# Patient Record
Sex: MALE | Race: WHITE | NOT HISPANIC OR LATINO | Employment: OTHER | URBAN - METROPOLITAN AREA
[De-identification: names, ages, dates, MRNs, and addresses within clinical notes are randomized per-mention and may not be internally consistent; named-entity substitution may affect disease eponyms.]

---

## 2017-01-01 ENCOUNTER — APPOINTMENT (OUTPATIENT)
Dept: LAB | Facility: CLINIC | Age: 72
End: 2017-01-01
Attending: INTERNAL MEDICINE
Payer: MEDICARE

## 2017-01-01 ENCOUNTER — ONCOLOGY VISIT (OUTPATIENT)
Dept: ONCOLOGY | Facility: CLINIC | Age: 72
End: 2017-01-01
Attending: INTERNAL MEDICINE
Payer: MEDICARE

## 2017-01-01 ENCOUNTER — TELEPHONE (OUTPATIENT)
Dept: ONCOLOGY | Facility: CLINIC | Age: 72
End: 2017-01-01

## 2017-01-01 ENCOUNTER — APPOINTMENT (OUTPATIENT)
Dept: LAB | Facility: CLINIC | Age: 72
End: 2017-01-01
Attending: PHYSICIAN ASSISTANT
Payer: MEDICARE

## 2017-01-01 ENCOUNTER — ONCOLOGY VISIT (OUTPATIENT)
Dept: ONCOLOGY | Facility: CLINIC | Age: 72
End: 2017-01-01
Attending: PHYSICIAN ASSISTANT
Payer: MEDICARE

## 2017-01-01 ENCOUNTER — ANESTHESIA EVENT (OUTPATIENT)
Dept: SURGERY | Facility: CLINIC | Age: 72
End: 2017-01-01
Payer: MEDICARE

## 2017-01-01 ENCOUNTER — TELEPHONE (OUTPATIENT)
Dept: TRANSPLANT | Facility: CLINIC | Age: 72
End: 2017-01-01

## 2017-01-01 ENCOUNTER — SURGERY (OUTPATIENT)
Age: 72
End: 2017-01-01

## 2017-01-01 ENCOUNTER — OFFICE VISIT (OUTPATIENT)
Dept: TRANSPLANT | Facility: CLINIC | Age: 72
End: 2017-01-01
Attending: INTERNAL MEDICINE
Payer: MEDICARE

## 2017-01-01 ENCOUNTER — HOSPITAL ENCOUNTER (OUTPATIENT)
Facility: CLINIC | Age: 72
Setting detail: SPECIMEN
Discharge: HOME | End: 2017-08-16
Attending: INTERNAL MEDICINE | Admitting: INTERNAL MEDICINE
Payer: MEDICARE

## 2017-01-01 ENCOUNTER — CARE COORDINATION (OUTPATIENT)
Dept: ONCOLOGY | Facility: CLINIC | Age: 72
End: 2017-01-01

## 2017-01-01 ENCOUNTER — HOSPITAL ENCOUNTER (OUTPATIENT)
Facility: CLINIC | Age: 72
Discharge: HOME | End: 2017-07-27
Attending: SPECIALIST | Admitting: SPECIALIST
Payer: MEDICARE

## 2017-01-01 ENCOUNTER — ANESTHESIA (OUTPATIENT)
Dept: SURGERY | Facility: CLINIC | Age: 72
End: 2017-01-01
Payer: MEDICARE

## 2017-01-01 ENCOUNTER — TRANSFERRED RECORDS (OUTPATIENT)
Dept: HEALTH INFORMATION MANAGEMENT | Facility: CLINIC | Age: 72
End: 2017-01-01

## 2017-01-01 ENCOUNTER — HOSPITAL ENCOUNTER (OUTPATIENT)
Facility: CLINIC | Age: 72
Discharge: HOME | End: 2017-09-21
Attending: SPECIALIST | Admitting: SPECIALIST
Payer: MEDICARE

## 2017-01-01 ENCOUNTER — DOCUMENTATION ONLY (OUTPATIENT)
Dept: ONCOLOGY | Facility: CLINIC | Age: 72
End: 2017-01-01

## 2017-01-01 ENCOUNTER — RADIANT APPOINTMENT (OUTPATIENT)
Dept: GENERAL RADIOLOGY | Facility: CLINIC | Age: 72
End: 2017-01-01
Attending: INTERNAL MEDICINE
Payer: COMMERCIAL

## 2017-01-01 ENCOUNTER — APPOINTMENT (OUTPATIENT)
Dept: GENERAL RADIOLOGY | Facility: CLINIC | Age: 72
End: 2017-01-01
Attending: EMERGENCY MEDICINE
Payer: MEDICARE

## 2017-01-01 ENCOUNTER — INFUSION THERAPY VISIT (OUTPATIENT)
Dept: INFUSION THERAPY | Facility: CLINIC | Age: 72
End: 2017-01-01
Attending: FAMILY MEDICINE
Payer: MEDICARE

## 2017-01-01 ENCOUNTER — HOSPITAL ENCOUNTER (EMERGENCY)
Facility: CLINIC | Age: 72
Discharge: HOME | End: 2017-10-06
Attending: EMERGENCY MEDICINE | Admitting: EMERGENCY MEDICINE
Payer: MEDICARE

## 2017-01-01 ENCOUNTER — ALLIED HEALTH/NURSE VISIT (OUTPATIENT)
Dept: PHARMACY | Facility: CLINIC | Age: 72
End: 2017-01-01
Payer: COMMERCIAL

## 2017-01-01 VITALS
DIASTOLIC BLOOD PRESSURE: 85 MMHG | BODY MASS INDEX: 20.43 KG/M2 | RESPIRATION RATE: 16 BRPM | OXYGEN SATURATION: 98 % | HEART RATE: 84 BPM | TEMPERATURE: 97.4 F | SYSTOLIC BLOOD PRESSURE: 140 MMHG | WEIGHT: 119.1 LBS

## 2017-01-01 VITALS
HEIGHT: 64 IN | TEMPERATURE: 98.2 F | WEIGHT: 119.9 LBS | BODY MASS INDEX: 20.58 KG/M2 | HEART RATE: 80 BPM | HEART RATE: 95 BPM | RESPIRATION RATE: 16 BRPM | SYSTOLIC BLOOD PRESSURE: 110 MMHG | WEIGHT: 116.8 LBS | OXYGEN SATURATION: 98 % | RESPIRATION RATE: 16 BRPM | TEMPERATURE: 98.4 F | DIASTOLIC BLOOD PRESSURE: 67 MMHG | DIASTOLIC BLOOD PRESSURE: 74 MMHG | SYSTOLIC BLOOD PRESSURE: 110 MMHG | BODY MASS INDEX: 19.94 KG/M2 | OXYGEN SATURATION: 96 %

## 2017-01-01 VITALS
SYSTOLIC BLOOD PRESSURE: 107 MMHG | HEART RATE: 89 BPM | RESPIRATION RATE: 16 BRPM | OXYGEN SATURATION: 95 % | TEMPERATURE: 97.9 F | BODY MASS INDEX: 22.11 KG/M2 | DIASTOLIC BLOOD PRESSURE: 70 MMHG | WEIGHT: 123.8 LBS

## 2017-01-01 VITALS
SYSTOLIC BLOOD PRESSURE: 160 MMHG | OXYGEN SATURATION: 98 % | HEART RATE: 78 BPM | RESPIRATION RATE: 16 BRPM | TEMPERATURE: 97.8 F | DIASTOLIC BLOOD PRESSURE: 90 MMHG

## 2017-01-01 VITALS
BODY MASS INDEX: 19.49 KG/M2 | HEART RATE: 95 BPM | SYSTOLIC BLOOD PRESSURE: 130 MMHG | WEIGHT: 113.6 LBS | OXYGEN SATURATION: 97 % | RESPIRATION RATE: 16 BRPM | DIASTOLIC BLOOD PRESSURE: 81 MMHG | TEMPERATURE: 98 F

## 2017-01-01 VITALS
DIASTOLIC BLOOD PRESSURE: 76 MMHG | WEIGHT: 120 LBS | RESPIRATION RATE: 16 BRPM | TEMPERATURE: 97.4 F | BODY MASS INDEX: 20.49 KG/M2 | HEART RATE: 76 BPM | HEIGHT: 64 IN | OXYGEN SATURATION: 96 % | SYSTOLIC BLOOD PRESSURE: 124 MMHG

## 2017-01-01 VITALS
DIASTOLIC BLOOD PRESSURE: 80 MMHG | TEMPERATURE: 98.2 F | RESPIRATION RATE: 18 BRPM | OXYGEN SATURATION: 98 % | HEART RATE: 65 BPM | SYSTOLIC BLOOD PRESSURE: 134 MMHG

## 2017-01-01 VITALS
HEART RATE: 97 BPM | DIASTOLIC BLOOD PRESSURE: 80 MMHG | SYSTOLIC BLOOD PRESSURE: 124 MMHG | HEIGHT: 64 IN | RESPIRATION RATE: 16 BRPM | BODY MASS INDEX: 20.14 KG/M2 | TEMPERATURE: 97.6 F | OXYGEN SATURATION: 98 % | WEIGHT: 118 LBS

## 2017-01-01 VITALS
WEIGHT: 124 LBS | HEART RATE: 110 BPM | SYSTOLIC BLOOD PRESSURE: 103 MMHG | OXYGEN SATURATION: 97 % | DIASTOLIC BLOOD PRESSURE: 67 MMHG | TEMPERATURE: 98.1 F | BODY MASS INDEX: 23.41 KG/M2 | HEIGHT: 61 IN | RESPIRATION RATE: 18 BRPM

## 2017-01-01 VITALS
OXYGEN SATURATION: 98 % | TEMPERATURE: 98.6 F | RESPIRATION RATE: 16 BRPM | SYSTOLIC BLOOD PRESSURE: 143 MMHG | HEART RATE: 73 BPM | DIASTOLIC BLOOD PRESSURE: 89 MMHG

## 2017-01-01 VITALS
HEART RATE: 91 BPM | WEIGHT: 118.3 LBS | OXYGEN SATURATION: 97 % | DIASTOLIC BLOOD PRESSURE: 80 MMHG | RESPIRATION RATE: 16 BRPM | SYSTOLIC BLOOD PRESSURE: 125 MMHG | TEMPERATURE: 98.6 F | BODY MASS INDEX: 20.31 KG/M2

## 2017-01-01 VITALS
RESPIRATION RATE: 16 BRPM | BODY MASS INDEX: 24.23 KG/M2 | HEIGHT: 60 IN | SYSTOLIC BLOOD PRESSURE: 153 MMHG | DIASTOLIC BLOOD PRESSURE: 92 MMHG | OXYGEN SATURATION: 97 % | TEMPERATURE: 97.5 F | WEIGHT: 123.4 LBS

## 2017-01-01 VITALS
SYSTOLIC BLOOD PRESSURE: 117 MMHG | RESPIRATION RATE: 18 BRPM | DIASTOLIC BLOOD PRESSURE: 82 MMHG | TEMPERATURE: 98.2 F | OXYGEN SATURATION: 98 % | HEART RATE: 75 BPM

## 2017-01-01 VITALS
OXYGEN SATURATION: 96 % | BODY MASS INDEX: 20.05 KG/M2 | RESPIRATION RATE: 14 BRPM | DIASTOLIC BLOOD PRESSURE: 75 MMHG | SYSTOLIC BLOOD PRESSURE: 112 MMHG | WEIGHT: 116.8 LBS | TEMPERATURE: 97.6 F | HEART RATE: 96 BPM

## 2017-01-01 VITALS
HEIGHT: 63 IN | OXYGEN SATURATION: 95 % | WEIGHT: 124 LBS | BODY MASS INDEX: 21.97 KG/M2 | RESPIRATION RATE: 18 BRPM | SYSTOLIC BLOOD PRESSURE: 123 MMHG | HEART RATE: 84 BPM | TEMPERATURE: 98.3 F | DIASTOLIC BLOOD PRESSURE: 76 MMHG

## 2017-01-01 VITALS
HEART RATE: 84 BPM | RESPIRATION RATE: 20 BRPM | HEIGHT: 61 IN | SYSTOLIC BLOOD PRESSURE: 147 MMHG | TEMPERATURE: 97.5 F | OXYGEN SATURATION: 98 % | DIASTOLIC BLOOD PRESSURE: 89 MMHG | BODY MASS INDEX: 24 KG/M2 | WEIGHT: 127.1 LBS

## 2017-01-01 VITALS
HEIGHT: 64 IN | RESPIRATION RATE: 16 BRPM | HEART RATE: 77 BPM | TEMPERATURE: 98.9 F | DIASTOLIC BLOOD PRESSURE: 77 MMHG | BODY MASS INDEX: 21.6 KG/M2 | OXYGEN SATURATION: 95 % | SYSTOLIC BLOOD PRESSURE: 133 MMHG | WEIGHT: 126.5 LBS

## 2017-01-01 VITALS
DIASTOLIC BLOOD PRESSURE: 79 MMHG | HEART RATE: 87 BPM | SYSTOLIC BLOOD PRESSURE: 118 MMHG | BODY MASS INDEX: 20.22 KG/M2 | OXYGEN SATURATION: 97 % | RESPIRATION RATE: 16 BRPM | TEMPERATURE: 97.8 F | WEIGHT: 117.8 LBS

## 2017-01-01 VITALS
SYSTOLIC BLOOD PRESSURE: 130 MMHG | RESPIRATION RATE: 16 BRPM | DIASTOLIC BLOOD PRESSURE: 82 MMHG | BODY MASS INDEX: 21.86 KG/M2 | TEMPERATURE: 97.6 F | WEIGHT: 123.4 LBS | HEIGHT: 63 IN | OXYGEN SATURATION: 94 %

## 2017-01-01 VITALS
SYSTOLIC BLOOD PRESSURE: 147 MMHG | TEMPERATURE: 98.4 F | HEART RATE: 80 BPM | BODY MASS INDEX: 21.91 KG/M2 | OXYGEN SATURATION: 96 % | WEIGHT: 127.7 LBS | DIASTOLIC BLOOD PRESSURE: 89 MMHG

## 2017-01-01 VITALS
HEIGHT: 63 IN | BODY MASS INDEX: 21.55 KG/M2 | TEMPERATURE: 97.9 F | OXYGEN SATURATION: 91 % | HEART RATE: 55 BPM | SYSTOLIC BLOOD PRESSURE: 124 MMHG | RESPIRATION RATE: 16 BRPM | WEIGHT: 121.6 LBS | DIASTOLIC BLOOD PRESSURE: 78 MMHG

## 2017-01-01 VITALS
OXYGEN SATURATION: 98 % | HEART RATE: 97 BPM | HEIGHT: 64 IN | WEIGHT: 114.3 LBS | DIASTOLIC BLOOD PRESSURE: 71 MMHG | RESPIRATION RATE: 18 BRPM | TEMPERATURE: 97.6 F | BODY MASS INDEX: 19.52 KG/M2 | SYSTOLIC BLOOD PRESSURE: 117 MMHG

## 2017-01-01 VITALS
SYSTOLIC BLOOD PRESSURE: 123 MMHG | HEART RATE: 75 BPM | WEIGHT: 112.89 LBS | RESPIRATION RATE: 16 BRPM | BODY MASS INDEX: 19.38 KG/M2 | DIASTOLIC BLOOD PRESSURE: 80 MMHG | TEMPERATURE: 98.9 F | OXYGEN SATURATION: 98 %

## 2017-01-01 VITALS
HEART RATE: 66 BPM | RESPIRATION RATE: 16 BRPM | SYSTOLIC BLOOD PRESSURE: 171 MMHG | DIASTOLIC BLOOD PRESSURE: 98 MMHG | TEMPERATURE: 98.7 F

## 2017-01-01 VITALS
WEIGHT: 120 LBS | OXYGEN SATURATION: 96 % | TEMPERATURE: 98.3 F | SYSTOLIC BLOOD PRESSURE: 141 MMHG | DIASTOLIC BLOOD PRESSURE: 93 MMHG | BODY MASS INDEX: 21.43 KG/M2 | HEART RATE: 101 BPM

## 2017-01-01 VITALS
RESPIRATION RATE: 16 BRPM | TEMPERATURE: 98.8 F | BODY MASS INDEX: 20.49 KG/M2 | DIASTOLIC BLOOD PRESSURE: 86 MMHG | SYSTOLIC BLOOD PRESSURE: 132 MMHG | WEIGHT: 119.4 LBS | OXYGEN SATURATION: 99 % | HEART RATE: 84 BPM

## 2017-01-01 VITALS
DIASTOLIC BLOOD PRESSURE: 69 MMHG | BODY MASS INDEX: 21.18 KG/M2 | OXYGEN SATURATION: 95 % | SYSTOLIC BLOOD PRESSURE: 117 MMHG | TEMPERATURE: 97.5 F | WEIGHT: 123.46 LBS | HEART RATE: 84 BPM | RESPIRATION RATE: 16 BRPM

## 2017-01-01 VITALS
DIASTOLIC BLOOD PRESSURE: 81 MMHG | BODY MASS INDEX: 20.04 KG/M2 | HEART RATE: 64 BPM | WEIGHT: 116.8 LBS | TEMPERATURE: 98 F | OXYGEN SATURATION: 96 % | RESPIRATION RATE: 16 BRPM | SYSTOLIC BLOOD PRESSURE: 120 MMHG

## 2017-01-01 VITALS
BODY MASS INDEX: 20.46 KG/M2 | TEMPERATURE: 98 F | RESPIRATION RATE: 16 BRPM | HEART RATE: 90 BPM | SYSTOLIC BLOOD PRESSURE: 108 MMHG | OXYGEN SATURATION: 91 % | WEIGHT: 119.2 LBS | DIASTOLIC BLOOD PRESSURE: 67 MMHG

## 2017-01-01 VITALS
DIASTOLIC BLOOD PRESSURE: 76 MMHG | OXYGEN SATURATION: 94 % | TEMPERATURE: 98.1 F | BODY MASS INDEX: 22.36 KG/M2 | WEIGHT: 125.2 LBS | HEART RATE: 76 BPM | SYSTOLIC BLOOD PRESSURE: 128 MMHG

## 2017-01-01 DIAGNOSIS — E87.6 HYPOKALEMIA: ICD-10-CM

## 2017-01-01 DIAGNOSIS — R11.10 VOMITING AND DIARRHEA: ICD-10-CM

## 2017-01-01 DIAGNOSIS — C90.02 MULTIPLE MYELOMA IN RELAPSE (H): Primary | ICD-10-CM

## 2017-01-01 DIAGNOSIS — I10 ESSENTIAL HYPERTENSION: ICD-10-CM

## 2017-01-01 DIAGNOSIS — B37.0 THRUSH: ICD-10-CM

## 2017-01-01 DIAGNOSIS — C90.00 MULTIPLE MYELOMA NOT HAVING ACHIEVED REMISSION (H): ICD-10-CM

## 2017-01-01 DIAGNOSIS — C90.02 MULTIPLE MYELOMA IN RELAPSE (H): ICD-10-CM

## 2017-01-01 DIAGNOSIS — R19.7 VOMITING AND DIARRHEA: ICD-10-CM

## 2017-01-01 DIAGNOSIS — M54.50 ACUTE BILATERAL LOW BACK PAIN WITHOUT SCIATICA: ICD-10-CM

## 2017-01-01 DIAGNOSIS — R53.83 OTHER FATIGUE: ICD-10-CM

## 2017-01-01 DIAGNOSIS — Z86.73 HISTORY OF STROKE: ICD-10-CM

## 2017-01-01 DIAGNOSIS — K21.9 GASTROESOPHAGEAL REFLUX DISEASE WITHOUT ESOPHAGITIS: Primary | ICD-10-CM

## 2017-01-01 DIAGNOSIS — C90.00 MULTIPLE MYELOMA NOT HAVING ACHIEVED REMISSION (H): Primary | ICD-10-CM

## 2017-01-01 DIAGNOSIS — N39.0 URINARY TRACT INFECTION, SITE UNSPECIFIED: ICD-10-CM

## 2017-01-01 DIAGNOSIS — R06.02 SOB (SHORTNESS OF BREATH): Primary | ICD-10-CM

## 2017-01-01 DIAGNOSIS — R06.02 SOB (SHORTNESS OF BREATH): ICD-10-CM

## 2017-01-01 DIAGNOSIS — I25.10 CAD (CORONARY ARTERY DISEASE): ICD-10-CM

## 2017-01-01 DIAGNOSIS — D84.9 IMMUNOCOMPROMISED STATE (H): ICD-10-CM

## 2017-01-01 DIAGNOSIS — C90.00 MYELOMA (H): Primary | ICD-10-CM

## 2017-01-01 DIAGNOSIS — Z79.899 ENCOUNTER FOR LONG-TERM CURRENT USE OF MEDICATION: ICD-10-CM

## 2017-01-01 DIAGNOSIS — R41.0 DELIRIUM: ICD-10-CM

## 2017-01-01 DIAGNOSIS — N39.0 URINARY TRACT INFECTION: Primary | ICD-10-CM

## 2017-01-01 DIAGNOSIS — Z51.81 MEDICATION MONITORING ENCOUNTER: Primary | ICD-10-CM

## 2017-01-01 DIAGNOSIS — G89.3 CANCER ASSOCIATED PAIN: ICD-10-CM

## 2017-01-01 DIAGNOSIS — F05 SUNDOWNING: ICD-10-CM

## 2017-01-01 DIAGNOSIS — I25.10 CORONARY ARTERY DISEASE INVOLVING NATIVE HEART WITHOUT ANGINA PECTORIS, UNSPECIFIED VESSEL OR LESION TYPE: ICD-10-CM

## 2017-01-01 DIAGNOSIS — S42.292A OTHER CLOSED DISPLACED FRACTURE OF PROXIMAL END OF LEFT HUMERUS, INITIAL ENCOUNTER: ICD-10-CM

## 2017-01-01 LAB
ABO + RH BLD: ABNORMAL
ALBUMIN SERPL ELPH-MCNC: 3.4 G/DL (ref 3.7–5.1)
ALBUMIN SERPL ELPH-MCNC: 3.4 G/DL (ref 3.7–5.1)
ALBUMIN SERPL ELPH-MCNC: 3.5 G/DL (ref 3.7–5.1)
ALBUMIN SERPL ELPH-MCNC: 3.8 G/DL (ref 3.7–5.1)
ALBUMIN SERPL-MCNC: 2.3 G/DL (ref 3.4–5)
ALBUMIN SERPL-MCNC: 2.3 G/DL (ref 3.4–5)
ALBUMIN SERPL-MCNC: 2.4 G/DL (ref 3.4–5)
ALBUMIN SERPL-MCNC: 2.4 G/DL (ref 3.4–5)
ALBUMIN SERPL-MCNC: 2.6 G/DL (ref 3.4–5)
ALBUMIN SERPL-MCNC: 2.7 G/DL (ref 3.4–5)
ALBUMIN SERPL-MCNC: 2.8 G/DL (ref 3.4–5)
ALBUMIN SERPL-MCNC: 3 G/DL (ref 3.4–5)
ALBUMIN SERPL-MCNC: 3.1 G/DL (ref 3.4–5)
ALBUMIN SERPL-MCNC: 3.1 G/DL (ref 3.4–5)
ALBUMIN SERPL-MCNC: 3.3 G/DL (ref 3.4–5)
ALBUMIN SERPL-MCNC: 3.3 G/DL (ref 3.4–5)
ALBUMIN SERPL-MCNC: 3.4 G/DL (ref 3.4–5)
ALBUMIN SERPL-MCNC: 3.4 G/DL (ref 3.4–5)
ALBUMIN SERPL-MCNC: 3.5 G/DL (ref 3.4–5)
ALBUMIN UR-MCNC: NEGATIVE MG/DL
ALP SERPL-CCNC: 42 U/L (ref 40–150)
ALP SERPL-CCNC: 43 U/L (ref 40–150)
ALP SERPL-CCNC: 43 U/L (ref 40–150)
ALP SERPL-CCNC: 44 U/L (ref 40–150)
ALP SERPL-CCNC: 44 U/L (ref 40–150)
ALP SERPL-CCNC: 45 U/L (ref 40–150)
ALP SERPL-CCNC: 46 U/L (ref 40–150)
ALP SERPL-CCNC: 50 U/L (ref 40–150)
ALP SERPL-CCNC: 51 U/L (ref 40–150)
ALP SERPL-CCNC: 51 U/L (ref 40–150)
ALP SERPL-CCNC: 53 U/L (ref 40–150)
ALP SERPL-CCNC: 55 U/L (ref 40–150)
ALP SERPL-CCNC: 55 U/L (ref 40–150)
ALP SERPL-CCNC: 56 U/L (ref 40–150)
ALP SERPL-CCNC: 57 U/L (ref 40–150)
ALP SERPL-CCNC: 63 U/L (ref 40–150)
ALP SERPL-CCNC: 67 U/L (ref 40–150)
ALP SERPL-CCNC: 69 U/L (ref 40–150)
ALPHA1 GLOB SERPL ELPH-MCNC: 0.3 G/DL (ref 0.2–0.4)
ALPHA1 GLOB SERPL ELPH-MCNC: 0.4 G/DL (ref 0.2–0.4)
ALPHA2 GLOB SERPL ELPH-MCNC: 0.9 G/DL (ref 0.5–0.9)
ALPHA2 GLOB SERPL ELPH-MCNC: 1 G/DL (ref 0.5–0.9)
ALPHA2 GLOB SERPL ELPH-MCNC: 1.2 G/DL (ref 0.5–0.9)
ALT SERPL W P-5'-P-CCNC: 11 U/L (ref 0–70)
ALT SERPL W P-5'-P-CCNC: 12 U/L (ref 0–70)
ALT SERPL W P-5'-P-CCNC: 13 U/L (ref 0–70)
ALT SERPL W P-5'-P-CCNC: 13 U/L (ref 0–70)
ALT SERPL W P-5'-P-CCNC: 14 U/L (ref 0–70)
ALT SERPL W P-5'-P-CCNC: 15 U/L (ref 0–70)
ALT SERPL W P-5'-P-CCNC: 15 U/L (ref 0–70)
ALT SERPL W P-5'-P-CCNC: 16 U/L (ref 0–70)
ALT SERPL W P-5'-P-CCNC: 17 U/L (ref 0–70)
ALT SERPL W P-5'-P-CCNC: 18 U/L (ref 0–70)
ALT SERPL W P-5'-P-CCNC: 20 U/L (ref 0–70)
ALT SERPL W P-5'-P-CCNC: 26 U/L (ref 0–70)
ALT SERPL W P-5'-P-CCNC: 28 U/L (ref 0–70)
ANION GAP SERPL CALCULATED.3IONS-SCNC: 10 MMOL/L (ref 3–14)
ANION GAP SERPL CALCULATED.3IONS-SCNC: 11 MMOL/L (ref 3–14)
ANION GAP SERPL CALCULATED.3IONS-SCNC: 11 MMOL/L (ref 3–14)
ANION GAP SERPL CALCULATED.3IONS-SCNC: 12 MMOL/L (ref 3–14)
ANION GAP SERPL CALCULATED.3IONS-SCNC: 7 MMOL/L (ref 3–14)
ANION GAP SERPL CALCULATED.3IONS-SCNC: 7 MMOL/L (ref 3–14)
ANION GAP SERPL CALCULATED.3IONS-SCNC: 8 MMOL/L (ref 3–14)
ANION GAP SERPL CALCULATED.3IONS-SCNC: 9 MMOL/L (ref 3–14)
ANISOCYTOSIS BLD QL SMEAR: ABNORMAL
ANISOCYTOSIS BLD QL SMEAR: SLIGHT
APPEARANCE UR: ABNORMAL
AST SERPL W P-5'-P-CCNC: 10 U/L (ref 0–45)
AST SERPL W P-5'-P-CCNC: 12 U/L (ref 0–45)
AST SERPL W P-5'-P-CCNC: 14 U/L (ref 0–45)
AST SERPL W P-5'-P-CCNC: 16 U/L (ref 0–45)
AST SERPL W P-5'-P-CCNC: 6 U/L (ref 0–45)
AST SERPL W P-5'-P-CCNC: 8 U/L (ref 0–45)
AST SERPL W P-5'-P-CCNC: 9 U/L (ref 0–45)
B-GLOBULIN SERPL ELPH-MCNC: 0.6 G/DL (ref 0.6–1)
B-GLOBULIN SERPL ELPH-MCNC: 0.7 G/DL (ref 0.6–1)
B-GLOBULIN SERPL ELPH-MCNC: 0.8 G/DL (ref 0.6–1)
BACTERIA SPEC CULT: ABNORMAL
BASOPHILS # BLD AUTO: 0 10E9/L (ref 0–0.2)
BASOPHILS # BLD AUTO: 0.1 10E9/L (ref 0–0.2)
BASOPHILS NFR BLD AUTO: 0 %
BASOPHILS NFR BLD AUTO: 0.2 %
BASOPHILS NFR BLD AUTO: 0.3 %
BASOPHILS NFR BLD AUTO: 0.4 %
BASOPHILS NFR BLD AUTO: 0.5 %
BASOPHILS NFR BLD AUTO: 0.9 %
BASOPHILS NFR BLD AUTO: 2.4 %
BILIRUB SERPL-MCNC: 0.2 MG/DL (ref 0.2–1.3)
BILIRUB SERPL-MCNC: 0.3 MG/DL (ref 0.2–1.3)
BILIRUB SERPL-MCNC: 0.4 MG/DL (ref 0.2–1.3)
BILIRUB SERPL-MCNC: 0.5 MG/DL (ref 0.2–1.3)
BILIRUB SERPL-MCNC: 0.5 MG/DL (ref 0.2–1.3)
BILIRUB SERPL-MCNC: 0.6 MG/DL (ref 0.2–1.3)
BILIRUB SERPL-MCNC: 0.8 MG/DL (ref 0.2–1.3)
BILIRUB UR QL STRIP: NEGATIVE
BLD GP AB SCN SERPL QL: ABNORMAL
BLD PROD TYP BPU: ABNORMAL
BLD PROD TYP BPU: NORMAL
BLD UNIT ID BPU: 0
BLOOD BANK CMNT PATIENT-IMP: ABNORMAL
BLOOD PRODUCT CODE: NORMAL
BPU ID: NORMAL
BUN SERPL-MCNC: 17 MG/DL (ref 7–30)
BUN SERPL-MCNC: 18 MG/DL (ref 7–30)
BUN SERPL-MCNC: 20 MG/DL (ref 7–30)
BUN SERPL-MCNC: 20 MG/DL (ref 7–30)
BUN SERPL-MCNC: 22 MG/DL (ref 7–30)
BUN SERPL-MCNC: 22 MG/DL (ref 7–30)
BUN SERPL-MCNC: 23 MG/DL (ref 7–30)
BUN SERPL-MCNC: 26 MG/DL (ref 7–30)
BUN SERPL-MCNC: 28 MG/DL (ref 7–30)
BUN SERPL-MCNC: 30 MG/DL (ref 7–30)
BUN SERPL-MCNC: 32 MG/DL (ref 7–30)
BUN SERPL-MCNC: 36 MG/DL (ref 7–30)
BUN SERPL-MCNC: 37 MG/DL (ref 7–30)
CALCIUM SERPL-MCNC: 7.2 MG/DL (ref 8.5–10.1)
CALCIUM SERPL-MCNC: 7.6 MG/DL (ref 8.5–10.1)
CALCIUM SERPL-MCNC: 7.7 MG/DL (ref 8.5–10.1)
CALCIUM SERPL-MCNC: 7.9 MG/DL (ref 8.5–10.1)
CALCIUM SERPL-MCNC: 8 MG/DL (ref 8.5–10.1)
CALCIUM SERPL-MCNC: 8.5 MG/DL (ref 8.5–10.1)
CALCIUM SERPL-MCNC: 8.5 MG/DL (ref 8.5–10.1)
CALCIUM SERPL-MCNC: 8.6 MG/DL (ref 8.5–10.1)
CALCIUM SERPL-MCNC: 8.7 MG/DL (ref 8.5–10.1)
CALCIUM SERPL-MCNC: 8.8 MG/DL (ref 8.5–10.1)
CALCIUM SERPL-MCNC: 8.9 MG/DL (ref 8.5–10.1)
CALCIUM SERPL-MCNC: 9 MG/DL (ref 8.5–10.1)
CALCIUM SERPL-MCNC: 9.1 MG/DL (ref 8.5–10.1)
CALCIUM SERPL-MCNC: 9.1 MG/DL (ref 8.5–10.1)
CALCIUM SERPL-MCNC: 9.2 MG/DL (ref 8.5–10.1)
CALCIUM SERPL-MCNC: 9.2 MG/DL (ref 8.5–10.1)
CALCIUM SERPL-MCNC: 9.4 MG/DL (ref 8.5–10.1)
CALCIUM SERPL-MCNC: 9.5 MG/DL (ref 8.5–10.1)
CALCIUM SERPL-MCNC: 9.5 MG/DL (ref 8.5–10.1)
CHLORIDE SERPL-SCNC: 100 MMOL/L (ref 94–109)
CHLORIDE SERPL-SCNC: 102 MMOL/L (ref 94–109)
CHLORIDE SERPL-SCNC: 103 MMOL/L (ref 94–109)
CHLORIDE SERPL-SCNC: 104 MMOL/L (ref 94–109)
CHLORIDE SERPL-SCNC: 105 MMOL/L (ref 94–109)
CHLORIDE SERPL-SCNC: 107 MMOL/L (ref 94–109)
CHLORIDE SERPL-SCNC: 108 MMOL/L (ref 94–109)
CHLORIDE SERPL-SCNC: 109 MMOL/L (ref 94–109)
CHLORIDE SERPL-SCNC: 109 MMOL/L (ref 94–109)
CHLORIDE SERPL-SCNC: 110 MMOL/L (ref 94–109)
CO2 SERPL-SCNC: 17 MMOL/L (ref 20–32)
CO2 SERPL-SCNC: 19 MMOL/L (ref 20–32)
CO2 SERPL-SCNC: 20 MMOL/L (ref 20–32)
CO2 SERPL-SCNC: 21 MMOL/L (ref 20–32)
CO2 SERPL-SCNC: 21 MMOL/L (ref 20–32)
CO2 SERPL-SCNC: 22 MMOL/L (ref 20–32)
CO2 SERPL-SCNC: 22 MMOL/L (ref 20–32)
CO2 SERPL-SCNC: 23 MMOL/L (ref 20–32)
CO2 SERPL-SCNC: 23 MMOL/L (ref 20–32)
CO2 SERPL-SCNC: 24 MMOL/L (ref 20–32)
CO2 SERPL-SCNC: 24 MMOL/L (ref 20–32)
CO2 SERPL-SCNC: 25 MMOL/L (ref 20–32)
CO2 SERPL-SCNC: 26 MMOL/L (ref 20–32)
CO2 SERPL-SCNC: 26 MMOL/L (ref 20–32)
CO2 SERPL-SCNC: 27 MMOL/L (ref 20–32)
CO2 SERPL-SCNC: 28 MMOL/L (ref 20–32)
CO2 SERPL-SCNC: 28 MMOL/L (ref 20–32)
COLOR UR AUTO: YELLOW
CREAT SERPL-MCNC: 0.79 MG/DL (ref 0.66–1.25)
CREAT SERPL-MCNC: 0.82 MG/DL (ref 0.66–1.25)
CREAT SERPL-MCNC: 0.88 MG/DL (ref 0.66–1.25)
CREAT SERPL-MCNC: 0.96 MG/DL (ref 0.66–1.25)
CREAT SERPL-MCNC: 0.99 MG/DL (ref 0.66–1.25)
CREAT SERPL-MCNC: 1.03 MG/DL (ref 0.66–1.25)
CREAT SERPL-MCNC: 1.05 MG/DL (ref 0.66–1.25)
CREAT SERPL-MCNC: 1.05 MG/DL (ref 0.66–1.25)
CREAT SERPL-MCNC: 1.06 MG/DL (ref 0.66–1.25)
CREAT SERPL-MCNC: 1.23 MG/DL (ref 0.66–1.25)
CREAT SERPL-MCNC: 1.35 MG/DL (ref 0.66–1.25)
CREAT SERPL-MCNC: 1.37 MG/DL (ref 0.66–1.25)
CREAT SERPL-MCNC: 1.39 MG/DL (ref 0.66–1.25)
CREAT SERPL-MCNC: 1.41 MG/DL (ref 0.66–1.25)
CREAT SERPL-MCNC: 1.49 MG/DL (ref 0.66–1.25)
CREAT SERPL-MCNC: 1.74 MG/DL (ref 0.66–1.25)
CREAT SERPL-MCNC: 1.77 MG/DL (ref 0.66–1.25)
CREAT SERPL-MCNC: 1.98 MG/DL (ref 0.66–1.25)
CREAT SERPL-MCNC: 2.01 MG/DL (ref 0.66–1.25)
CREAT SERPL-MCNC: 2.03 MG/DL (ref 0.66–1.25)
CREAT SERPL-MCNC: 2.1 MG/DL (ref 0.66–1.25)
DIFFERENTIAL METHOD BLD: ABNORMAL
EOSINOPHIL # BLD AUTO: 0 10E9/L (ref 0–0.7)
EOSINOPHIL # BLD AUTO: 0.1 10E9/L (ref 0–0.7)
EOSINOPHIL # BLD AUTO: 0.1 10E9/L (ref 0–0.7)
EOSINOPHIL NFR BLD AUTO: 0 %
EOSINOPHIL NFR BLD AUTO: 1.1 %
EOSINOPHIL NFR BLD AUTO: 1.3 %
EOSINOPHIL NFR BLD AUTO: 2.6 %
ERYTHROCYTE [DISTWIDTH] IN BLOOD BY AUTOMATED COUNT: 15.1 % (ref 10–15)
ERYTHROCYTE [DISTWIDTH] IN BLOOD BY AUTOMATED COUNT: 15.3 % (ref 10–15)
ERYTHROCYTE [DISTWIDTH] IN BLOOD BY AUTOMATED COUNT: 15.6 % (ref 10–15)
ERYTHROCYTE [DISTWIDTH] IN BLOOD BY AUTOMATED COUNT: 15.9 % (ref 10–15)
ERYTHROCYTE [DISTWIDTH] IN BLOOD BY AUTOMATED COUNT: 16 % (ref 10–15)
ERYTHROCYTE [DISTWIDTH] IN BLOOD BY AUTOMATED COUNT: 16.2 % (ref 10–15)
ERYTHROCYTE [DISTWIDTH] IN BLOOD BY AUTOMATED COUNT: 16.4 % (ref 10–15)
ERYTHROCYTE [DISTWIDTH] IN BLOOD BY AUTOMATED COUNT: 17.6 % (ref 10–15)
ERYTHROCYTE [DISTWIDTH] IN BLOOD BY AUTOMATED COUNT: 18.2 % (ref 10–15)
ERYTHROCYTE [DISTWIDTH] IN BLOOD BY AUTOMATED COUNT: 18.4 % (ref 10–15)
ERYTHROCYTE [DISTWIDTH] IN BLOOD BY AUTOMATED COUNT: 18.6 % (ref 10–15)
ERYTHROCYTE [DISTWIDTH] IN BLOOD BY AUTOMATED COUNT: 19.1 % (ref 10–15)
ERYTHROCYTE [DISTWIDTH] IN BLOOD BY AUTOMATED COUNT: 19.6 % (ref 10–15)
ERYTHROCYTE [DISTWIDTH] IN BLOOD BY AUTOMATED COUNT: 19.6 % (ref 10–15)
ERYTHROCYTE [DISTWIDTH] IN BLOOD BY AUTOMATED COUNT: 20.1 % (ref 10–15)
ERYTHROCYTE [DISTWIDTH] IN BLOOD BY AUTOMATED COUNT: 21.2 % (ref 10–15)
GAMMA GLOB SERPL ELPH-MCNC: 2 G/DL (ref 0.7–1.6)
GAMMA GLOB SERPL ELPH-MCNC: 2.3 G/DL (ref 0.7–1.6)
GAMMA GLOB SERPL ELPH-MCNC: 2.5 G/DL (ref 0.7–1.6)
GAMMA GLOB SERPL ELPH-MCNC: 2.7 G/DL (ref 0.7–1.6)
GAMMA GLOB SERPL ELPH-MCNC: 2.8 G/DL (ref 0.7–1.6)
GAMMA GLOB SERPL ELPH-MCNC: 2.9 G/DL (ref 0.7–1.6)
GFR SERPL CREATININE-BSD FRML MDRD: 31 ML/MIN/1.7M2
GFR SERPL CREATININE-BSD FRML MDRD: 32 ML/MIN/1.7M2
GFR SERPL CREATININE-BSD FRML MDRD: 33 ML/MIN/1.7M2
GFR SERPL CREATININE-BSD FRML MDRD: 33 ML/MIN/1.7M2
GFR SERPL CREATININE-BSD FRML MDRD: 38 ML/MIN/1.7M2
GFR SERPL CREATININE-BSD FRML MDRD: 39 ML/MIN/1.7M2
GFR SERPL CREATININE-BSD FRML MDRD: 46 ML/MIN/1.7M2
GFR SERPL CREATININE-BSD FRML MDRD: 49 ML/MIN/1.7M2
GFR SERPL CREATININE-BSD FRML MDRD: 50 ML/MIN/1.7M2
GFR SERPL CREATININE-BSD FRML MDRD: 51 ML/MIN/1.7M2
GFR SERPL CREATININE-BSD FRML MDRD: 52 ML/MIN/1.7M2
GFR SERPL CREATININE-BSD FRML MDRD: 58 ML/MIN/1.7M2
GFR SERPL CREATININE-BSD FRML MDRD: 68 ML/MIN/1.7M2
GFR SERPL CREATININE-BSD FRML MDRD: 69 ML/MIN/1.7M2
GFR SERPL CREATININE-BSD FRML MDRD: 69 ML/MIN/1.7M2
GFR SERPL CREATININE-BSD FRML MDRD: 71 ML/MIN/1.7M2
GFR SERPL CREATININE-BSD FRML MDRD: 74 ML/MIN/1.7M2
GFR SERPL CREATININE-BSD FRML MDRD: 77 ML/MIN/1.7M2
GFR SERPL CREATININE-BSD FRML MDRD: 85 ML/MIN/1.7M2
GFR SERPL CREATININE-BSD FRML MDRD: >90 ML/MIN/1.7M2
GFR SERPL CREATININE-BSD FRML MDRD: >90 ML/MIN/1.7M2
GLUCOSE SERPL-MCNC: 101 MG/DL (ref 70–99)
GLUCOSE SERPL-MCNC: 103 MG/DL (ref 70–99)
GLUCOSE SERPL-MCNC: 106 MG/DL (ref 70–99)
GLUCOSE SERPL-MCNC: 112 MG/DL (ref 70–99)
GLUCOSE SERPL-MCNC: 115 MG/DL (ref 70–99)
GLUCOSE SERPL-MCNC: 117 MG/DL (ref 70–99)
GLUCOSE SERPL-MCNC: 119 MG/DL (ref 70–99)
GLUCOSE SERPL-MCNC: 121 MG/DL (ref 70–99)
GLUCOSE SERPL-MCNC: 122 MG/DL (ref 70–99)
GLUCOSE SERPL-MCNC: 123 MG/DL (ref 70–99)
GLUCOSE SERPL-MCNC: 129 MG/DL (ref 70–99)
GLUCOSE SERPL-MCNC: 130 MG/DL (ref 70–99)
GLUCOSE SERPL-MCNC: 134 MG/DL (ref 70–99)
GLUCOSE SERPL-MCNC: 157 MG/DL (ref 70–99)
GLUCOSE SERPL-MCNC: 158 MG/DL (ref 70–99)
GLUCOSE SERPL-MCNC: 161 MG/DL (ref 70–99)
GLUCOSE SERPL-MCNC: 165 MG/DL (ref 70–99)
GLUCOSE SERPL-MCNC: 86 MG/DL (ref 70–99)
GLUCOSE SERPL-MCNC: 90 MG/DL (ref 70–99)
GLUCOSE SERPL-MCNC: 91 MG/DL (ref 70–99)
GLUCOSE SERPL-MCNC: 98 MG/DL (ref 70–99)
GLUCOSE UR STRIP-MCNC: NEGATIVE MG/DL
HCT VFR BLD AUTO: 24.9 % (ref 40–53)
HCT VFR BLD AUTO: 25.8 % (ref 40–53)
HCT VFR BLD AUTO: 26.4 % (ref 40–53)
HCT VFR BLD AUTO: 26.6 % (ref 40–53)
HCT VFR BLD AUTO: 27.1 % (ref 40–53)
HCT VFR BLD AUTO: 27.3 % (ref 40–53)
HCT VFR BLD AUTO: 28 % (ref 40–53)
HCT VFR BLD AUTO: 28.2 % (ref 40–53)
HCT VFR BLD AUTO: 28.6 % (ref 40–53)
HCT VFR BLD AUTO: 29.9 % (ref 40–53)
HCT VFR BLD AUTO: 30.1 % (ref 40–53)
HCT VFR BLD AUTO: 30.9 % (ref 40–53)
HCT VFR BLD AUTO: 31.1 % (ref 40–53)
HCT VFR BLD AUTO: 31.7 % (ref 40–53)
HCT VFR BLD AUTO: 32.3 % (ref 40–53)
HCT VFR BLD AUTO: 32.4 % (ref 40–53)
HCT VFR BLD AUTO: 32.8 % (ref 40–53)
HCT VFR BLD AUTO: 33.2 % (ref 40–53)
HCT VFR BLD AUTO: 34.8 % (ref 40–53)
HCT VFR BLD AUTO: 36.2 % (ref 40–53)
HGB BLD-MCNC: 10.2 G/DL (ref 13.3–17.7)
HGB BLD-MCNC: 10.2 G/DL (ref 13.3–17.7)
HGB BLD-MCNC: 10.3 G/DL (ref 13.3–17.7)
HGB BLD-MCNC: 10.3 G/DL (ref 13.3–17.7)
HGB BLD-MCNC: 10.7 G/DL (ref 13.3–17.7)
HGB BLD-MCNC: 10.8 G/DL (ref 13.3–17.7)
HGB BLD-MCNC: 11.5 G/DL (ref 13.3–17.7)
HGB BLD-MCNC: 7.6 G/DL (ref 13.3–17.7)
HGB BLD-MCNC: 8.1 G/DL (ref 13.3–17.7)
HGB BLD-MCNC: 8.2 G/DL (ref 13.3–17.7)
HGB BLD-MCNC: 8.5 G/DL (ref 13.3–17.7)
HGB BLD-MCNC: 8.5 G/DL (ref 13.3–17.7)
HGB BLD-MCNC: 8.6 G/DL (ref 13.3–17.7)
HGB BLD-MCNC: 8.8 G/DL (ref 13.3–17.7)
HGB BLD-MCNC: 8.9 G/DL (ref 13.3–17.7)
HGB BLD-MCNC: 8.9 G/DL (ref 13.3–17.7)
HGB BLD-MCNC: 9.4 G/DL (ref 13.3–17.7)
HGB BLD-MCNC: 9.5 G/DL (ref 13.3–17.7)
HGB BLD-MCNC: 9.7 G/DL (ref 13.3–17.7)
HGB BLD-MCNC: 9.7 G/DL (ref 13.3–17.7)
HGB UR QL STRIP: NEGATIVE
HYALINE CASTS #/AREA URNS LPF: 48 /LPF (ref 0–2)
IGM SERPL-MCNC: 2620 MG/DL (ref 60–265)
IGM SERPL-MCNC: 4260 MG/DL (ref 60–265)
IMM GRANULOCYTES # BLD: 0 10E9/L (ref 0–0.4)
IMM GRANULOCYTES NFR BLD: 0 %
IMM GRANULOCYTES NFR BLD: 0.2 %
IMM GRANULOCYTES NFR BLD: 0.3 %
IMM GRANULOCYTES NFR BLD: 0.4 %
IMM GRANULOCYTES NFR BLD: 0.5 %
IMM GRANULOCYTES NFR BLD: 0.7 %
IMM GRANULOCYTES NFR BLD: 0.8 %
IMM GRANULOCYTES NFR BLD: 0.8 %
IMM GRANULOCYTES NFR BLD: 1.4 %
KAPPA LC UR-MCNC: <0.3 MG/DL (ref 0.33–1.94)
KAPPA LC UR-MCNC: ABNORMAL MG/DL (ref 0.33–1.94)
KAPPA LC/LAMBDA SER: ABNORMAL {RATIO} (ref 0.26–1.65)
KETONES UR STRIP-MCNC: NEGATIVE MG/DL
LAMBDA LC SERPL-MCNC: 137.5 MG/DL (ref 0.57–2.63)
LAMBDA LC SERPL-MCNC: 144.5 MG/DL (ref 0.57–2.63)
LAMBDA LC SERPL-MCNC: 32 MG/DL (ref 0.57–2.63)
LAMBDA LC SERPL-MCNC: 51.5 MG/DL (ref 0.57–2.63)
LEUKOCYTE ESTERASE UR QL STRIP: ABNORMAL
LOCATION PERFORMED: NORMAL
LYMPHOCYTES # BLD AUTO: 0.1 10E9/L (ref 0.8–5.3)
LYMPHOCYTES # BLD AUTO: 0.2 10E9/L (ref 0.8–5.3)
LYMPHOCYTES # BLD AUTO: 0.3 10E9/L (ref 0.8–5.3)
LYMPHOCYTES # BLD AUTO: 0.4 10E9/L (ref 0.8–5.3)
LYMPHOCYTES # BLD AUTO: 0.8 10E9/L (ref 0.8–5.3)
LYMPHOCYTES NFR BLD AUTO: 1.4 %
LYMPHOCYTES NFR BLD AUTO: 1.7 %
LYMPHOCYTES NFR BLD AUTO: 15 %
LYMPHOCYTES NFR BLD AUTO: 3.5 %
LYMPHOCYTES NFR BLD AUTO: 3.5 %
LYMPHOCYTES NFR BLD AUTO: 4.4 %
LYMPHOCYTES NFR BLD AUTO: 4.9 %
LYMPHOCYTES NFR BLD AUTO: 5 %
LYMPHOCYTES NFR BLD AUTO: 5 %
LYMPHOCYTES NFR BLD AUTO: 5.1 %
LYMPHOCYTES NFR BLD AUTO: 5.1 %
LYMPHOCYTES NFR BLD AUTO: 5.3 %
LYMPHOCYTES NFR BLD AUTO: 5.8 %
LYMPHOCYTES NFR BLD AUTO: 6 %
LYMPHOCYTES NFR BLD AUTO: 6.1 %
LYMPHOCYTES NFR BLD AUTO: 6.7 %
LYMPHOCYTES NFR BLD AUTO: 7.8 %
LYMPHOCYTES NFR BLD AUTO: 8.6 %
LYMPHOCYTES NFR BLD AUTO: 9.6 %
LYMPHOCYTES NFR BLD AUTO: 9.7 %
Lab: ABNORMAL
M PROTEIN SERPL ELPH-MCNC: 1.8 G/DL
M PROTEIN SERPL ELPH-MCNC: 2.1 G/DL
M PROTEIN SERPL ELPH-MCNC: 2.3 G/DL
M PROTEIN SERPL ELPH-MCNC: 2.4 G/DL
M PROTEIN SERPL ELPH-MCNC: 2.5 G/DL
M PROTEIN SERPL ELPH-MCNC: 2.8 G/DL
MACROCYTES BLD QL SMEAR: PRESENT
MCH RBC QN AUTO: 31.3 PG (ref 26.5–33)
MCH RBC QN AUTO: 31.3 PG (ref 26.5–33)
MCH RBC QN AUTO: 31.5 PG (ref 26.5–33)
MCH RBC QN AUTO: 32 PG (ref 26.5–33)
MCH RBC QN AUTO: 32 PG (ref 26.5–33)
MCH RBC QN AUTO: 32.3 PG (ref 26.5–33)
MCH RBC QN AUTO: 32.8 PG (ref 26.5–33)
MCH RBC QN AUTO: 33 PG (ref 26.5–33)
MCH RBC QN AUTO: 33.1 PG (ref 26.5–33)
MCH RBC QN AUTO: 33.1 PG (ref 26.5–33)
MCH RBC QN AUTO: 33.2 PG (ref 26.5–33)
MCH RBC QN AUTO: 33.3 PG (ref 26.5–33)
MCH RBC QN AUTO: 33.4 PG (ref 26.5–33)
MCH RBC QN AUTO: 33.5 PG (ref 26.5–33)
MCH RBC QN AUTO: 33.6 PG (ref 26.5–33)
MCH RBC QN AUTO: 33.7 PG (ref 26.5–33)
MCHC RBC AUTO-ENTMCNC: 30.5 G/DL (ref 31.5–36.5)
MCHC RBC AUTO-ENTMCNC: 30.5 G/DL (ref 31.5–36.5)
MCHC RBC AUTO-ENTMCNC: 31 G/DL (ref 31.5–36.5)
MCHC RBC AUTO-ENTMCNC: 31.1 G/DL (ref 31.5–36.5)
MCHC RBC AUTO-ENTMCNC: 31.2 G/DL (ref 31.5–36.5)
MCHC RBC AUTO-ENTMCNC: 31.4 G/DL (ref 31.5–36.5)
MCHC RBC AUTO-ENTMCNC: 31.5 G/DL (ref 31.5–36.5)
MCHC RBC AUTO-ENTMCNC: 31.6 G/DL (ref 31.5–36.5)
MCHC RBC AUTO-ENTMCNC: 31.8 G/DL (ref 31.5–36.5)
MCHC RBC AUTO-ENTMCNC: 32.2 G/DL (ref 31.5–36.5)
MCV RBC AUTO: 100 FL (ref 78–100)
MCV RBC AUTO: 101 FL (ref 78–100)
MCV RBC AUTO: 101 FL (ref 78–100)
MCV RBC AUTO: 102 FL (ref 78–100)
MCV RBC AUTO: 103 FL (ref 78–100)
MCV RBC AUTO: 104 FL (ref 78–100)
MCV RBC AUTO: 105 FL (ref 78–100)
MCV RBC AUTO: 105 FL (ref 78–100)
MCV RBC AUTO: 106 FL (ref 78–100)
MCV RBC AUTO: 107 FL (ref 78–100)
MCV RBC AUTO: 99 FL (ref 78–100)
MICRO REPORT STATUS: ABNORMAL
MICROORGANISM SPEC CULT: ABNORMAL
MISCELLANEOUS TEST: NORMAL
MONOCYTES # BLD AUTO: 0.1 10E9/L (ref 0–1.3)
MONOCYTES # BLD AUTO: 0.2 10E9/L (ref 0–1.3)
MONOCYTES # BLD AUTO: 0.2 10E9/L (ref 0–1.3)
MONOCYTES # BLD AUTO: 0.3 10E9/L (ref 0–1.3)
MONOCYTES # BLD AUTO: 0.4 10E9/L (ref 0–1.3)
MONOCYTES # BLD AUTO: 0.5 10E9/L (ref 0–1.3)
MONOCYTES # BLD AUTO: 0.6 10E9/L (ref 0–1.3)
MONOCYTES # BLD AUTO: 0.7 10E9/L (ref 0–1.3)
MONOCYTES NFR BLD AUTO: 11.5 %
MONOCYTES NFR BLD AUTO: 13.8 %
MONOCYTES NFR BLD AUTO: 16.2 %
MONOCYTES NFR BLD AUTO: 16.5 %
MONOCYTES NFR BLD AUTO: 17.1 %
MONOCYTES NFR BLD AUTO: 17.2 %
MONOCYTES NFR BLD AUTO: 17.4 %
MONOCYTES NFR BLD AUTO: 17.4 %
MONOCYTES NFR BLD AUTO: 18 %
MONOCYTES NFR BLD AUTO: 18.4 %
MONOCYTES NFR BLD AUTO: 18.4 %
MONOCYTES NFR BLD AUTO: 18.6 %
MONOCYTES NFR BLD AUTO: 2.7 %
MONOCYTES NFR BLD AUTO: 20.6 %
MONOCYTES NFR BLD AUTO: 3.5 %
MONOCYTES NFR BLD AUTO: 4.4 %
MONOCYTES NFR BLD AUTO: 5.2 %
MONOCYTES NFR BLD AUTO: 6 %
MONOCYTES NFR BLD AUTO: 8 %
MONOCYTES NFR BLD AUTO: 9.2 %
MUCOUS THREADS #/AREA URNS LPF: PRESENT /LPF
MYELOCYTES # BLD: 0 10E9/L
MYELOCYTES NFR BLD MANUAL: 0.9 %
NEUTROPHILS # BLD AUTO: 1.5 10E9/L (ref 1.6–8.3)
NEUTROPHILS # BLD AUTO: 1.6 10E9/L (ref 1.6–8.3)
NEUTROPHILS # BLD AUTO: 1.6 10E9/L (ref 1.6–8.3)
NEUTROPHILS # BLD AUTO: 1.8 10E9/L (ref 1.6–8.3)
NEUTROPHILS # BLD AUTO: 1.9 10E9/L (ref 1.6–8.3)
NEUTROPHILS # BLD AUTO: 10.2 10E9/L (ref 1.6–8.3)
NEUTROPHILS # BLD AUTO: 2.3 10E9/L (ref 1.6–8.3)
NEUTROPHILS # BLD AUTO: 2.4 10E9/L (ref 1.6–8.3)
NEUTROPHILS # BLD AUTO: 2.5 10E9/L (ref 1.6–8.3)
NEUTROPHILS # BLD AUTO: 2.6 10E9/L (ref 1.6–8.3)
NEUTROPHILS # BLD AUTO: 2.8 10E9/L (ref 1.6–8.3)
NEUTROPHILS # BLD AUTO: 2.9 10E9/L (ref 1.6–8.3)
NEUTROPHILS # BLD AUTO: 3.1 10E9/L (ref 1.6–8.3)
NEUTROPHILS # BLD AUTO: 3.2 10E9/L (ref 1.6–8.3)
NEUTROPHILS # BLD AUTO: 3.9 10E9/L (ref 1.6–8.3)
NEUTROPHILS # BLD AUTO: 3.9 10E9/L (ref 1.6–8.3)
NEUTROPHILS # BLD AUTO: 4.1 10E9/L (ref 1.6–8.3)
NEUTROPHILS # BLD AUTO: 4.2 10E9/L (ref 1.6–8.3)
NEUTROPHILS # BLD AUTO: 4.2 10E9/L (ref 1.6–8.3)
NEUTROPHILS # BLD AUTO: 4.8 10E9/L (ref 1.6–8.3)
NEUTROPHILS NFR BLD AUTO: 70.4 %
NEUTROPHILS NFR BLD AUTO: 70.9 %
NEUTROPHILS NFR BLD AUTO: 72.8 %
NEUTROPHILS NFR BLD AUTO: 73.4 %
NEUTROPHILS NFR BLD AUTO: 75.9 %
NEUTROPHILS NFR BLD AUTO: 76 %
NEUTROPHILS NFR BLD AUTO: 76.5 %
NEUTROPHILS NFR BLD AUTO: 76.5 %
NEUTROPHILS NFR BLD AUTO: 77 %
NEUTROPHILS NFR BLD AUTO: 77 %
NEUTROPHILS NFR BLD AUTO: 77.1 %
NEUTROPHILS NFR BLD AUTO: 77.8 %
NEUTROPHILS NFR BLD AUTO: 78.9 %
NEUTROPHILS NFR BLD AUTO: 79.6 %
NEUTROPHILS NFR BLD AUTO: 84.2 %
NEUTROPHILS NFR BLD AUTO: 87.7 %
NEUTROPHILS NFR BLD AUTO: 89.9 %
NEUTROPHILS NFR BLD AUTO: 90.4 %
NEUTROPHILS NFR BLD AUTO: 94.8 %
NEUTROPHILS NFR BLD AUTO: 95 %
NITRATE UR QL: NEGATIVE
NORMAL RANGE FOR SEND OUTS MISC TEST: NORMAL
NRBC # BLD AUTO: 0 10*3/UL
NRBC # BLD AUTO: 0.1 10*3/UL
NRBC BLD AUTO-RTO: 0 /100
NRBC BLD AUTO-RTO: 1 /100
NUM BPU REQUESTED: 1
NUM BPU REQUESTED: 2
NUM BPU REQUESTED: 2
NUM BPU REQUESTED: 3
PH UR STRIP: 5 PH (ref 5–7)
PLATELET # BLD AUTO: 111 10E9/L (ref 150–450)
PLATELET # BLD AUTO: 120 10E9/L (ref 150–450)
PLATELET # BLD AUTO: 122 10E9/L (ref 150–450)
PLATELET # BLD AUTO: 126 10E9/L (ref 150–450)
PLATELET # BLD AUTO: 140 10E9/L (ref 150–450)
PLATELET # BLD AUTO: 150 10E9/L (ref 150–450)
PLATELET # BLD AUTO: 151 10E9/L (ref 150–450)
PLATELET # BLD AUTO: 158 10E9/L (ref 150–450)
PLATELET # BLD AUTO: 25 10E9/L (ref 150–450)
PLATELET # BLD AUTO: 28 10E9/L (ref 150–450)
PLATELET # BLD AUTO: 32 10E9/L (ref 150–450)
PLATELET # BLD AUTO: 35 10E9/L (ref 150–450)
PLATELET # BLD AUTO: 47 10E9/L (ref 150–450)
PLATELET # BLD AUTO: 50 10E9/L (ref 150–450)
PLATELET # BLD AUTO: 52 10E9/L (ref 150–450)
PLATELET # BLD AUTO: 58 10E9/L (ref 150–450)
PLATELET # BLD AUTO: 62 10E9/L (ref 150–450)
PLATELET # BLD AUTO: 76 10E9/L (ref 150–450)
PLATELET # BLD AUTO: 79 10E9/L (ref 150–450)
PLATELET # BLD AUTO: 93 10E9/L (ref 150–450)
PLATELET # BLD EST: ABNORMAL 10*3/UL
POIKILOCYTOSIS BLD QL SMEAR: SLIGHT
POTASSIUM SERPL-SCNC: 2.8 MMOL/L (ref 3.4–5.3)
POTASSIUM SERPL-SCNC: 3.6 MMOL/L (ref 3.4–5.3)
POTASSIUM SERPL-SCNC: 3.6 MMOL/L (ref 3.4–5.3)
POTASSIUM SERPL-SCNC: 3.9 MMOL/L (ref 3.4–5.3)
POTASSIUM SERPL-SCNC: 4 MMOL/L (ref 3.4–5.3)
POTASSIUM SERPL-SCNC: 4 MMOL/L (ref 3.4–5.3)
POTASSIUM SERPL-SCNC: 4.1 MMOL/L (ref 3.4–5.3)
POTASSIUM SERPL-SCNC: 4.1 MMOL/L (ref 3.4–5.3)
POTASSIUM SERPL-SCNC: 4.2 MMOL/L (ref 3.4–5.3)
POTASSIUM SERPL-SCNC: 4.3 MMOL/L (ref 3.4–5.3)
POTASSIUM SERPL-SCNC: 4.4 MMOL/L (ref 3.4–5.3)
POTASSIUM SERPL-SCNC: 4.4 MMOL/L (ref 3.4–5.3)
POTASSIUM SERPL-SCNC: 4.5 MMOL/L (ref 3.4–5.3)
POTASSIUM SERPL-SCNC: 4.6 MMOL/L (ref 3.4–5.3)
POTASSIUM SERPL-SCNC: 4.6 MMOL/L (ref 3.4–5.3)
POTASSIUM SERPL-SCNC: 4.7 MMOL/L (ref 3.4–5.3)
POTASSIUM SERPL-SCNC: 4.8 MMOL/L (ref 3.4–5.3)
POTASSIUM SERPL-SCNC: 4.9 MMOL/L (ref 3.4–5.3)
POTASSIUM SERPL-SCNC: 4.9 MMOL/L (ref 3.4–5.3)
PROT PATTERN SERPL ELPH-IMP: ABNORMAL
PROT SERPL-MCNC: 10.3 G/DL (ref 6.8–8.8)
PROT SERPL-MCNC: 10.6 G/DL (ref 6.8–8.8)
PROT SERPL-MCNC: 8.1 G/DL (ref 6.8–8.8)
PROT SERPL-MCNC: 8.2 G/DL (ref 6.8–8.8)
PROT SERPL-MCNC: 8.2 G/DL (ref 6.8–8.8)
PROT SERPL-MCNC: 8.4 G/DL (ref 6.8–8.8)
PROT SERPL-MCNC: 8.5 G/DL (ref 6.8–8.8)
PROT SERPL-MCNC: 8.6 G/DL (ref 6.8–8.8)
PROT SERPL-MCNC: 8.6 G/DL (ref 6.8–8.8)
PROT SERPL-MCNC: 8.7 G/DL (ref 6.8–8.8)
PROT SERPL-MCNC: 8.9 G/DL (ref 6.8–8.8)
PROT SERPL-MCNC: 9 G/DL (ref 6.8–8.8)
PROT SERPL-MCNC: 9.1 G/DL (ref 6.8–8.8)
PROT SERPL-MCNC: 9.2 G/DL (ref 6.8–8.8)
PROT SERPL-MCNC: 9.3 G/DL (ref 6.8–8.8)
PROT SERPL-MCNC: 9.4 G/DL (ref 6.8–8.8)
RBC # BLD AUTO: 2.32 10E12/L (ref 4.4–5.9)
RBC # BLD AUTO: 2.53 10E12/L (ref 4.4–5.9)
RBC # BLD AUTO: 2.55 10E12/L (ref 4.4–5.9)
RBC # BLD AUTO: 2.56 10E12/L (ref 4.4–5.9)
RBC # BLD AUTO: 2.57 10E12/L (ref 4.4–5.9)
RBC # BLD AUTO: 2.61 10E12/L (ref 4.4–5.9)
RBC # BLD AUTO: 2.62 10E12/L (ref 4.4–5.9)
RBC # BLD AUTO: 2.67 10E12/L (ref 4.4–5.9)
RBC # BLD AUTO: 2.67 10E12/L (ref 4.4–5.9)
RBC # BLD AUTO: 2.79 10E12/L (ref 4.4–5.9)
RBC # BLD AUTO: 2.84 10E12/L (ref 4.4–5.9)
RBC # BLD AUTO: 2.9 10E12/L (ref 4.4–5.9)
RBC # BLD AUTO: 2.91 10E12/L (ref 4.4–5.9)
RBC # BLD AUTO: 3.07 10E12/L (ref 4.4–5.9)
RBC # BLD AUTO: 3.11 10E12/L (ref 4.4–5.9)
RBC # BLD AUTO: 3.16 10E12/L (ref 4.4–5.9)
RBC # BLD AUTO: 3.21 10E12/L (ref 4.4–5.9)
RBC # BLD AUTO: 3.29 10E12/L (ref 4.4–5.9)
RBC # BLD AUTO: 3.45 10E12/L (ref 4.4–5.9)
RBC # BLD AUTO: 3.65 10E12/L (ref 4.4–5.9)
RBC #/AREA URNS AUTO: 6 /HPF (ref 0–2)
RESULT: NORMAL
SEND OUTS MISC TEST CODE: NORMAL
SEND OUTS MISC TEST SPECIMEN: NORMAL
SODIUM SERPL-SCNC: 135 MMOL/L (ref 133–144)
SODIUM SERPL-SCNC: 136 MMOL/L (ref 133–144)
SODIUM SERPL-SCNC: 137 MMOL/L (ref 133–144)
SODIUM SERPL-SCNC: 138 MMOL/L (ref 133–144)
SODIUM SERPL-SCNC: 139 MMOL/L (ref 133–144)
SODIUM SERPL-SCNC: 140 MMOL/L (ref 133–144)
SODIUM SERPL-SCNC: 140 MMOL/L (ref 133–144)
SODIUM SERPL-SCNC: 141 MMOL/L (ref 133–144)
SP GR UR STRIP: 1.01 (ref 1–1.03)
SPECIMEN EXP DATE BLD: ABNORMAL
SPECIMEN SOURCE: ABNORMAL
SQUAMOUS #/AREA URNS AUTO: 1 /HPF (ref 0–1)
TEST NAME: NORMAL
TRANSFUSION STATUS PATIENT QL: NORMAL
URATE SERPL-MCNC: 6.5 MG/DL (ref 3.5–7.2)
URATE SERPL-MCNC: 6.5 MG/DL (ref 3.5–7.2)
URN SPEC COLLECT METH UR: ABNORMAL
UROBILINOGEN UR STRIP-MCNC: 0 MG/DL (ref 0–2)
WBC # BLD AUTO: 10.8 10E9/L (ref 4–11)
WBC # BLD AUTO: 2.1 10E9/L (ref 4–11)
WBC # BLD AUTO: 2.1 10E9/L (ref 4–11)
WBC # BLD AUTO: 2.2 10E9/L (ref 4–11)
WBC # BLD AUTO: 2.5 10E9/L (ref 4–11)
WBC # BLD AUTO: 2.6 10E9/L (ref 4–11)
WBC # BLD AUTO: 2.9 10E9/L (ref 4–11)
WBC # BLD AUTO: 3.1 10E9/L (ref 4–11)
WBC # BLD AUTO: 3.3 10E9/L (ref 4–11)
WBC # BLD AUTO: 3.5 10E9/L (ref 4–11)
WBC # BLD AUTO: 3.7 10E9/L (ref 4–11)
WBC # BLD AUTO: 3.8 10E9/L (ref 4–11)
WBC # BLD AUTO: 4 10E9/L (ref 4–11)
WBC # BLD AUTO: 4.1 10E9/L (ref 4–11)
WBC # BLD AUTO: 4.2 10E9/L (ref 4–11)
WBC # BLD AUTO: 4.5 10E9/L (ref 4–11)
WBC # BLD AUTO: 4.6 10E9/L (ref 4–11)
WBC # BLD AUTO: 4.7 10E9/L (ref 4–11)
WBC # BLD AUTO: 5.4 10E9/L (ref 4–11)
WBC # BLD AUTO: 5.5 10E9/L (ref 4–11)
WBC #/AREA URNS AUTO: 40 /HPF (ref 0–2)

## 2017-01-01 PROCEDURE — 96413 CHEMO IV INFUSION 1 HR: CPT

## 2017-01-01 PROCEDURE — 25000128 H RX IP 250 OP 636: Mod: ZF | Performed by: PHYSICIAN ASSISTANT

## 2017-01-01 PROCEDURE — 25000128 H RX IP 250 OP 636: Mod: ZF | Performed by: INTERNAL MEDICINE

## 2017-01-01 PROCEDURE — 84165 PROTEIN E-PHORESIS SERUM: CPT | Performed by: PHYSICIAN ASSISTANT

## 2017-01-01 PROCEDURE — 87088 URINE BACTERIA CULTURE: CPT | Performed by: PHYSICIAN ASSISTANT

## 2017-01-01 PROCEDURE — 85025 COMPLETE CBC W/AUTO DIFF WBC: CPT | Performed by: PHYSICIAN ASSISTANT

## 2017-01-01 PROCEDURE — 00000402 ZZHCL STATISTIC TOTAL PROTEIN: Performed by: PHYSICIAN ASSISTANT

## 2017-01-01 PROCEDURE — 86901 BLOOD TYPING SEROLOGIC RH(D): CPT | Performed by: PHYSICIAN ASSISTANT

## 2017-01-01 PROCEDURE — 80053 COMPREHEN METABOLIC PANEL: CPT | Performed by: INTERNAL MEDICINE

## 2017-01-01 PROCEDURE — 86850 RBC ANTIBODY SCREEN: CPT | Performed by: PHYSICIAN ASSISTANT

## 2017-01-01 PROCEDURE — 36415 COLL VENOUS BLD VENIPUNCTURE: CPT | Performed by: PHYSICIAN ASSISTANT

## 2017-01-01 PROCEDURE — 99212 OFFICE O/P EST SF 10 MIN: CPT | Mod: ZF

## 2017-01-01 PROCEDURE — 99214 OFFICE O/P EST MOD 30 MIN: CPT | Mod: ZP | Performed by: PHYSICIAN ASSISTANT

## 2017-01-01 PROCEDURE — 86902 BLOOD TYPE ANTIGEN DONOR EA: CPT | Performed by: PHYSICIAN ASSISTANT

## 2017-01-01 PROCEDURE — 36591 DRAW BLOOD OFF VENOUS DEVICE: CPT

## 2017-01-01 PROCEDURE — G0008 ADMIN INFLUENZA VIRUS VAC: HCPCS

## 2017-01-01 PROCEDURE — 96360 HYDRATION IV INFUSION INIT: CPT

## 2017-01-01 PROCEDURE — 71000028 ZZH EYE RECOVERY PHASE 2 EACH 15 MINS: Performed by: SPECIALIST

## 2017-01-01 PROCEDURE — 85025 COMPLETE CBC W/AUTO DIFF WBC: CPT | Performed by: INTERNAL MEDICINE

## 2017-01-01 PROCEDURE — 99284 EMERGENCY DEPT VISIT MOD MDM: CPT | Mod: 25

## 2017-01-01 PROCEDURE — 36000101 ZZH EYE SURGERY LEVEL 3 1ST 30 MIN: Performed by: SPECIALIST

## 2017-01-01 PROCEDURE — 25000128 H RX IP 250 OP 636: Mod: JW,ZF | Performed by: INTERNAL MEDICINE

## 2017-01-01 PROCEDURE — 86900 BLOOD TYPING SEROLOGIC ABO: CPT | Performed by: INTERNAL MEDICINE

## 2017-01-01 PROCEDURE — 87186 SC STD MICRODIL/AGAR DIL: CPT | Performed by: PHYSICIAN ASSISTANT

## 2017-01-01 PROCEDURE — 40000170 ZZH STATISTIC PRE-PROCEDURE ASSESSMENT II: Performed by: SPECIALIST

## 2017-01-01 PROCEDURE — 99605 MTMS BY PHARM NP 15 MIN: CPT | Performed by: PHARMACIST

## 2017-01-01 PROCEDURE — 25000128 H RX IP 250 OP 636: Performed by: ANESTHESIOLOGY

## 2017-01-01 PROCEDURE — 99211 OFF/OP EST MAY X REQ PHY/QHP: CPT | Mod: ZF

## 2017-01-01 PROCEDURE — 25000128 H RX IP 250 OP 636: Performed by: NURSE ANESTHETIST, CERTIFIED REGISTERED

## 2017-01-01 PROCEDURE — 90662 IIV NO PRSV INCREASED AG IM: CPT | Mod: ZF | Performed by: INTERNAL MEDICINE

## 2017-01-01 PROCEDURE — 83883 ASSAY NEPHELOMETRY NOT SPEC: CPT | Performed by: PHYSICIAN ASSISTANT

## 2017-01-01 PROCEDURE — 80053 COMPREHEN METABOLIC PANEL: CPT | Performed by: PHYSICIAN ASSISTANT

## 2017-01-01 PROCEDURE — 96375 TX/PRO/DX INJ NEW DRUG ADDON: CPT

## 2017-01-01 PROCEDURE — 99213 OFFICE O/P EST LOW 20 MIN: CPT

## 2017-01-01 PROCEDURE — 81001 URINALYSIS AUTO W/SCOPE: CPT | Performed by: PHYSICIAN ASSISTANT

## 2017-01-01 PROCEDURE — 96361 HYDRATE IV INFUSION ADD-ON: CPT

## 2017-01-01 PROCEDURE — 86900 BLOOD TYPING SEROLOGIC ABO: CPT | Performed by: PHYSICIAN ASSISTANT

## 2017-01-01 PROCEDURE — 96367 TX/PROPH/DG ADDL SEQ IV INF: CPT

## 2017-01-01 PROCEDURE — 82784 ASSAY IGA/IGD/IGG/IGM EACH: CPT | Performed by: INTERNAL MEDICINE

## 2017-01-01 PROCEDURE — 80048 BASIC METABOLIC PNL TOTAL CA: CPT | Performed by: PHYSICIAN ASSISTANT

## 2017-01-01 PROCEDURE — V2632 POST CHMBR INTRAOCULAR LENS: HCPCS | Performed by: SPECIALIST

## 2017-01-01 PROCEDURE — 25000128 H RX IP 250 OP 636: Mod: ZF | Performed by: STUDENT IN AN ORGANIZED HEALTH CARE EDUCATION/TRAINING PROGRAM

## 2017-01-01 PROCEDURE — 00000402 ZZHCL STATISTIC TOTAL PROTEIN: Performed by: INTERNAL MEDICINE

## 2017-01-01 PROCEDURE — 99212 OFFICE O/P EST SF 10 MIN: CPT

## 2017-01-01 PROCEDURE — P9040 RBC LEUKOREDUCED IRRADIATED: HCPCS | Performed by: PHYSICIAN ASSISTANT

## 2017-01-01 PROCEDURE — 25000128 H RX IP 250 OP 636: Performed by: SPECIALIST

## 2017-01-01 PROCEDURE — 86850 RBC ANTIBODY SCREEN: CPT | Performed by: INTERNAL MEDICINE

## 2017-01-01 PROCEDURE — 25000125 ZZHC RX 250: Performed by: SPECIALIST

## 2017-01-01 PROCEDURE — 96401 CHEMO ANTI-NEOPL SQ/IM: CPT

## 2017-01-01 PROCEDURE — 84165 PROTEIN E-PHORESIS SERUM: CPT | Performed by: INTERNAL MEDICINE

## 2017-01-01 PROCEDURE — 83883 ASSAY NEPHELOMETRY NOT SPEC: CPT | Performed by: INTERNAL MEDICINE

## 2017-01-01 PROCEDURE — 99212 OFFICE O/P EST SF 10 MIN: CPT | Mod: 25

## 2017-01-01 PROCEDURE — 23600 CLTX PROX HUMRL FX W/O MNPJ: CPT | Mod: LT

## 2017-01-01 PROCEDURE — 25000128 H RX IP 250 OP 636: Performed by: INTERNAL MEDICINE

## 2017-01-01 PROCEDURE — 27210794 ZZH OR GENERAL SUPPLY STERILE: Performed by: SPECIALIST

## 2017-01-01 PROCEDURE — 36430 TRANSFUSION BLD/BLD COMPNT: CPT

## 2017-01-01 PROCEDURE — 99215 OFFICE O/P EST HI 40 MIN: CPT | Mod: ZP | Performed by: PHYSICIAN ASSISTANT

## 2017-01-01 PROCEDURE — 84550 ASSAY OF BLOOD/URIC ACID: CPT | Performed by: PHYSICIAN ASSISTANT

## 2017-01-01 PROCEDURE — 37000008 ZZH ANESTHESIA TECHNICAL FEE, 1ST 30 MIN: Performed by: SPECIALIST

## 2017-01-01 PROCEDURE — 73030 X-RAY EXAM OF SHOULDER: CPT | Mod: LT

## 2017-01-01 PROCEDURE — 96365 THER/PROPH/DIAG IV INF INIT: CPT

## 2017-01-01 PROCEDURE — 96374 THER/PROPH/DIAG INJ IV PUSH: CPT

## 2017-01-01 PROCEDURE — 86901 BLOOD TYPING SEROLOGIC RH(D): CPT | Performed by: INTERNAL MEDICINE

## 2017-01-01 PROCEDURE — 99211 OFF/OP EST MAY X REQ PHY/QHP: CPT | Mod: 25

## 2017-01-01 PROCEDURE — 84550 ASSAY OF BLOOD/URIC ACID: CPT | Performed by: INTERNAL MEDICINE

## 2017-01-01 PROCEDURE — 99607 MTMS BY PHARM ADDL 15 MIN: CPT | Performed by: PHARMACIST

## 2017-01-01 PROCEDURE — 99213 OFFICE O/P EST LOW 20 MIN: CPT | Mod: 25

## 2017-01-01 PROCEDURE — 87086 URINE CULTURE/COLONY COUNT: CPT | Performed by: PHYSICIAN ASSISTANT

## 2017-01-01 PROCEDURE — 25000125 ZZHC RX 250: Performed by: NURSE ANESTHETIST, CERTIFIED REGISTERED

## 2017-01-01 PROCEDURE — 25000125 ZZHC RX 250: Performed by: ANESTHESIOLOGY

## 2017-01-01 DEVICE — EYE IMP IOL AMO PCL TECNIS ZCB00 22.0: Type: IMPLANTABLE DEVICE | Site: EYE | Status: FUNCTIONAL

## 2017-01-01 DEVICE — EYE IMP IOL AMO PCL TECNIS ZCB00 21.5: Type: IMPLANTABLE DEVICE | Site: EYE | Status: FUNCTIONAL

## 2017-01-01 RX ORDER — MEPERIDINE HYDROCHLORIDE 25 MG/ML
25 INJECTION INTRAMUSCULAR; INTRAVENOUS; SUBCUTANEOUS EVERY 30 MIN PRN
Status: CANCELLED | OUTPATIENT
Start: 2017-01-01

## 2017-01-01 RX ORDER — ALBUTEROL SULFATE 90 UG/1
1-2 AEROSOL, METERED RESPIRATORY (INHALATION)
Status: CANCELLED
Start: 2017-01-01

## 2017-01-01 RX ORDER — ALBUTEROL SULFATE 0.83 MG/ML
2.5 SOLUTION RESPIRATORY (INHALATION)
Status: CANCELLED | OUTPATIENT
Start: 2017-01-01

## 2017-01-01 RX ORDER — SODIUM CHLORIDE 9 MG/ML
1000 INJECTION, SOLUTION INTRAVENOUS CONTINUOUS PRN
Status: CANCELLED
Start: 2017-01-01

## 2017-01-01 RX ORDER — POTASSIUM CHLORIDE 1500 MG/1
TABLET, EXTENDED RELEASE ORAL
Qty: 60 TABLET | Refills: 0 | Status: SHIPPED | OUTPATIENT
Start: 2017-01-01 | End: 2017-01-01

## 2017-01-01 RX ORDER — POTASSIUM CHLORIDE 1500 MG/1
20 TABLET, EXTENDED RELEASE ORAL DAILY
Qty: 30 TABLET | Refills: 3 | Status: SHIPPED | OUTPATIENT
Start: 2017-01-01 | End: 2017-01-01

## 2017-01-01 RX ORDER — HEPARIN SODIUM (PORCINE) LOCK FLUSH IV SOLN 100 UNIT/ML 100 UNIT/ML
5 SOLUTION INTRAVENOUS
Status: DISCONTINUED | OUTPATIENT
Start: 2017-01-01 | End: 2017-01-01 | Stop reason: HOSPADM

## 2017-01-01 RX ORDER — ZOLEDRONIC ACID 0.04 MG/ML
4 INJECTION, SOLUTION INTRAVENOUS ONCE
Status: CANCELLED | OUTPATIENT
Start: 2017-01-01 | End: 2017-01-01

## 2017-01-01 RX ORDER — HYDRALAZINE HYDROCHLORIDE 20 MG/ML
10 INJECTION INTRAMUSCULAR; INTRAVENOUS ONCE
Status: COMPLETED | OUTPATIENT
Start: 2017-01-01 | End: 2017-01-01

## 2017-01-01 RX ORDER — HEPARIN SODIUM (PORCINE) LOCK FLUSH IV SOLN 100 UNIT/ML 100 UNIT/ML
500 SOLUTION INTRAVENOUS ONCE
Status: COMPLETED | OUTPATIENT
Start: 2017-01-01 | End: 2017-01-01

## 2017-01-01 RX ORDER — EPINEPHRINE 0.3 MG/.3ML
0.3 INJECTION SUBCUTANEOUS EVERY 5 MIN PRN
Status: CANCELLED | OUTPATIENT
Start: 2017-01-01

## 2017-01-01 RX ORDER — HEPARIN SODIUM (PORCINE) LOCK FLUSH IV SOLN 100 UNIT/ML 100 UNIT/ML
500 SOLUTION INTRAVENOUS DAILY PRN
Status: DISCONTINUED | OUTPATIENT
Start: 2017-01-01 | End: 2017-01-01 | Stop reason: HOSPADM

## 2017-01-01 RX ORDER — ONDANSETRON 2 MG/ML
INJECTION INTRAMUSCULAR; INTRAVENOUS PRN
Status: DISCONTINUED | OUTPATIENT
Start: 2017-01-01 | End: 2017-01-01

## 2017-01-01 RX ORDER — METHYLPREDNISOLONE SODIUM SUCCINATE 125 MG/2ML
125 INJECTION, POWDER, LYOPHILIZED, FOR SOLUTION INTRAMUSCULAR; INTRAVENOUS
Status: CANCELLED
Start: 2017-01-01

## 2017-01-01 RX ORDER — HEPARIN SODIUM (PORCINE) LOCK FLUSH IV SOLN 100 UNIT/ML 100 UNIT/ML
500 SOLUTION INTRAVENOUS EVERY 8 HOURS
Status: CANCELLED
Start: 2017-01-01

## 2017-01-01 RX ORDER — ZOLEDRONIC ACID 0.04 MG/ML
4 INJECTION, SOLUTION INTRAVENOUS ONCE
Status: COMPLETED | OUTPATIENT
Start: 2017-01-01 | End: 2017-01-01

## 2017-01-01 RX ORDER — ZOLEDRONIC ACID 0.04 MG/ML
4 INJECTION, SOLUTION INTRAVENOUS ONCE
Status: CANCELLED | OUTPATIENT
Start: 2018-01-01 | End: 2018-01-01

## 2017-01-01 RX ORDER — LORAZEPAM 2 MG/ML
0.5 INJECTION INTRAMUSCULAR EVERY 4 HOURS PRN
Status: CANCELLED
Start: 2017-01-01

## 2017-01-01 RX ORDER — LIDOCAINE HYDROCHLORIDE 10 MG/ML
INJECTION, SOLUTION EPIDURAL; INFILTRATION; INTRACAUDAL; PERINEURAL PRN
Status: DISCONTINUED | OUTPATIENT
Start: 2017-01-01 | End: 2017-01-01 | Stop reason: HOSPADM

## 2017-01-01 RX ORDER — BALANCED SALT SOLUTION 6.4; .75; .48; .3; 3.9; 1.7 MG/ML; MG/ML; MG/ML; MG/ML; MG/ML; MG/ML
SOLUTION OPHTHALMIC PRN
Status: DISCONTINUED | OUTPATIENT
Start: 2017-01-01 | End: 2017-01-01 | Stop reason: HOSPADM

## 2017-01-01 RX ORDER — DIPHENHYDRAMINE HYDROCHLORIDE 50 MG/ML
50 INJECTION INTRAMUSCULAR; INTRAVENOUS
Status: CANCELLED
Start: 2017-01-01

## 2017-01-01 RX ORDER — HEPARIN SODIUM (PORCINE) LOCK FLUSH IV SOLN 100 UNIT/ML 100 UNIT/ML
5 SOLUTION INTRAVENOUS ONCE
Status: COMPLETED | OUTPATIENT
Start: 2017-01-01 | End: 2017-01-01

## 2017-01-01 RX ORDER — CEFDINIR 300 MG/1
300 CAPSULE ORAL 2 TIMES DAILY
Qty: 14 CAPSULE | Refills: 0 | Status: SHIPPED | OUTPATIENT
Start: 2017-01-01 | End: 2017-01-01

## 2017-01-01 RX ORDER — HEPARIN SODIUM (PORCINE) LOCK FLUSH IV SOLN 100 UNIT/ML 100 UNIT/ML
5 SOLUTION INTRAVENOUS EVERY 8 HOURS
Status: DISCONTINUED | OUTPATIENT
Start: 2017-01-01 | End: 2017-01-01 | Stop reason: HOSPADM

## 2017-01-01 RX ORDER — HEPARIN SODIUM (PORCINE) LOCK FLUSH IV SOLN 100 UNIT/ML 100 UNIT/ML
500 SOLUTION INTRAVENOUS EVERY 8 HOURS
Status: DISCONTINUED | OUTPATIENT
Start: 2017-01-01 | End: 2017-01-01 | Stop reason: HOSPADM

## 2017-01-01 RX ORDER — ACYCLOVIR 400 MG/1
400 TABLET ORAL 2 TIMES DAILY
Qty: 60 TABLET | Refills: 0 | Status: SHIPPED | OUTPATIENT
Start: 2017-01-01 | End: 2017-01-01

## 2017-01-01 RX ORDER — DEXAMETHASONE 4 MG/1
TABLET ORAL
Qty: 30 TABLET | Refills: 3 | Status: SHIPPED | OUTPATIENT
Start: 2017-01-01 | End: 2017-01-01

## 2017-01-01 RX ORDER — PHENYLEPHRINE HYDROCHLORIDE 25 MG/ML
1 SOLUTION/ DROPS OPHTHALMIC
Status: COMPLETED | OUTPATIENT
Start: 2017-01-01 | End: 2017-01-01

## 2017-01-01 RX ORDER — METHYLPHENIDATE HYDROCHLORIDE 5 MG/1
5 TABLET ORAL 2 TIMES DAILY
Qty: 15 TABLET | Refills: 0 | Status: SHIPPED | OUTPATIENT
Start: 2017-01-01 | End: 2017-01-01

## 2017-01-01 RX ORDER — DEXAMETHASONE SODIUM PHOSPHATE 4 MG/ML
20 INJECTION, SOLUTION INTRA-ARTICULAR; INTRALESIONAL; INTRAMUSCULAR; INTRAVENOUS; SOFT TISSUE ONCE
Status: CANCELLED
Start: 2017-01-01 | End: 2017-01-01

## 2017-01-01 RX ORDER — LIDOCAINE 40 MG/G
CREAM TOPICAL
Status: DISCONTINUED | OUTPATIENT
Start: 2017-01-01 | End: 2017-01-01 | Stop reason: HOSPADM

## 2017-01-01 RX ORDER — MOXIFLOXACIN 5 MG/ML
1 SOLUTION/ DROPS OPHTHALMIC
Status: CANCELLED | OUTPATIENT
Start: 2017-01-01

## 2017-01-01 RX ORDER — PROPARACAINE HYDROCHLORIDE 5 MG/ML
1 SOLUTION/ DROPS OPHTHALMIC ONCE
Status: COMPLETED | OUTPATIENT
Start: 2017-01-01 | End: 2017-01-01

## 2017-01-01 RX ORDER — HEPARIN SODIUM (PORCINE) LOCK FLUSH IV SOLN 100 UNIT/ML 100 UNIT/ML
5 SOLUTION INTRAVENOUS EVERY 8 HOURS
Status: CANCELLED
Start: 2017-01-01

## 2017-01-01 RX ORDER — METHYLPHENIDATE HYDROCHLORIDE 5 MG/1
5 TABLET ORAL 2 TIMES DAILY
Qty: 15 TABLET | Refills: 0 | COMMUNITY
Start: 2017-01-01 | End: 2017-01-01

## 2017-01-01 RX ORDER — AMLODIPINE BESYLATE 5 MG/1
TABLET ORAL
Qty: 90 TABLET | Refills: 1 | Status: SHIPPED | OUTPATIENT
Start: 2017-01-01 | End: 2018-01-01

## 2017-01-01 RX ORDER — CLOPIDOGREL BISULFATE 75 MG/1
TABLET ORAL
Qty: 90 TABLET | Refills: 0 | Status: ON HOLD | OUTPATIENT
Start: 2017-01-01 | End: 2018-01-01

## 2017-01-01 RX ORDER — OLANZAPINE 5 MG/1
TABLET ORAL AT BEDTIME
Qty: 60 TABLET | Refills: 0 | COMMUNITY
Start: 2017-01-01

## 2017-01-01 RX ORDER — HEPARIN SODIUM (PORCINE) LOCK FLUSH IV SOLN 100 UNIT/ML 100 UNIT/ML
5 SOLUTION INTRAVENOUS
Status: COMPLETED | OUTPATIENT
Start: 2017-01-01 | End: 2017-01-01

## 2017-01-01 RX ORDER — EPINEPHRINE 1 MG/ML
0.3 INJECTION, SOLUTION, CONCENTRATE INTRAVENOUS EVERY 5 MIN PRN
Status: CANCELLED | OUTPATIENT
Start: 2017-01-01

## 2017-01-01 RX ORDER — PROPARACAINE HYDROCHLORIDE 5 MG/ML
1 SOLUTION/ DROPS OPHTHALMIC ONCE
Status: CANCELLED | OUTPATIENT
Start: 2017-01-01 | End: 2017-01-01

## 2017-01-01 RX ORDER — TROPICAMIDE 10 MG/ML
1 SOLUTION/ DROPS OPHTHALMIC
Status: CANCELLED | OUTPATIENT
Start: 2017-01-01

## 2017-01-01 RX ORDER — ACYCLOVIR 400 MG/1
400 TABLET ORAL 2 TIMES DAILY
Qty: 60 TABLET | Refills: 3 | Status: SHIPPED | OUTPATIENT
Start: 2017-01-01 | End: 2017-01-01

## 2017-01-01 RX ORDER — MOXIFLOXACIN 5 MG/ML
1 SOLUTION/ DROPS OPHTHALMIC
Status: COMPLETED | OUTPATIENT
Start: 2017-01-01 | End: 2017-01-01

## 2017-01-01 RX ORDER — POTASSIUM CHLORIDE 1500 MG/1
TABLET, EXTENDED RELEASE ORAL
Qty: 60 TABLET | Refills: 0 | COMMUNITY
Start: 2017-01-01 | End: 2017-01-01

## 2017-01-01 RX ORDER — ACYCLOVIR 400 MG/1
400 TABLET ORAL 2 TIMES DAILY
Qty: 60 TABLET | Refills: 3 | Status: SHIPPED | OUTPATIENT
Start: 2017-01-01 | End: 2018-01-01

## 2017-01-01 RX ORDER — PROPARACAINE HYDROCHLORIDE 5 MG/ML
1 SOLUTION/ DROPS OPHTHALMIC ONCE
Status: DISCONTINUED | OUTPATIENT
Start: 2017-01-01 | End: 2017-01-01 | Stop reason: HOSPADM

## 2017-01-01 RX ORDER — HEPARIN SODIUM (PORCINE) LOCK FLUSH IV SOLN 100 UNIT/ML 100 UNIT/ML
5 SOLUTION INTRAVENOUS ONCE
Status: DISCONTINUED | OUTPATIENT
Start: 2017-01-01 | End: 2017-01-01 | Stop reason: HOSPADM

## 2017-01-01 RX ORDER — OXYCODONE HYDROCHLORIDE 5 MG/1
5 TABLET ORAL EVERY 6 HOURS PRN
Qty: 15 TABLET | Refills: 0 | Status: SHIPPED | OUTPATIENT
Start: 2017-01-01 | End: 2018-01-01

## 2017-01-01 RX ORDER — DEXAMETHASONE 4 MG/1
TABLET ORAL
Qty: 40 TABLET | Refills: 3 | Status: ON HOLD | OUTPATIENT
Start: 2017-01-01 | End: 2018-01-01

## 2017-01-01 RX ORDER — PREDNISONE 50 MG/1
TABLET ORAL
Qty: 30 TABLET | Refills: 1 | Status: ON HOLD | OUTPATIENT
Start: 2017-01-01 | End: 2018-01-01

## 2017-01-01 RX ORDER — SODIUM CHLORIDE, SODIUM LACTATE, POTASSIUM CHLORIDE, CALCIUM CHLORIDE 600; 310; 30; 20 MG/100ML; MG/100ML; MG/100ML; MG/100ML
500 INJECTION, SOLUTION INTRAVENOUS CONTINUOUS
Status: DISCONTINUED | OUTPATIENT
Start: 2017-01-01 | End: 2017-01-01 | Stop reason: HOSPADM

## 2017-01-01 RX ORDER — MUPIROCIN 20 MG/G
OINTMENT TOPICAL DAILY PRN
COMMUNITY
End: 2017-01-01

## 2017-01-01 RX ORDER — PHENYLEPHRINE HYDROCHLORIDE 25 MG/ML
1 SOLUTION/ DROPS OPHTHALMIC
Status: CANCELLED | OUTPATIENT
Start: 2017-01-01

## 2017-01-01 RX ORDER — HEPARIN SODIUM (PORCINE) LOCK FLUSH IV SOLN 100 UNIT/ML 100 UNIT/ML
5 SOLUTION INTRAVENOUS EVERY 8 HOURS PRN
Status: DISCONTINUED | OUTPATIENT
Start: 2017-01-01 | End: 2017-01-01 | Stop reason: HOSPADM

## 2017-01-01 RX ORDER — TRAMADOL HYDROCHLORIDE 50 MG/1
25-50 TABLET ORAL EVERY 6 HOURS PRN
Qty: 60 TABLET | Refills: 3 | Status: SHIPPED | OUTPATIENT
Start: 2017-01-01 | End: 2017-01-01

## 2017-01-01 RX ORDER — POTASSIUM CHLORIDE 1.5 G/1.58G
20 POWDER, FOR SOLUTION ORAL 2 TIMES DAILY
COMMUNITY
End: 2017-01-01

## 2017-01-01 RX ORDER — TRAMADOL HYDROCHLORIDE 50 MG/1
25-50 TABLET ORAL EVERY 6 HOURS PRN
Qty: 28 TABLET | Refills: 0
Start: 2017-01-01 | End: 2017-01-01

## 2017-01-01 RX ORDER — HEPARIN SODIUM (PORCINE) LOCK FLUSH IV SOLN 100 UNIT/ML 100 UNIT/ML
5 SOLUTION INTRAVENOUS ONCE
Status: CANCELLED
Start: 2017-01-01 | End: 2017-01-01

## 2017-01-01 RX ORDER — TROPICAMIDE 10 MG/ML
1 SOLUTION/ DROPS OPHTHALMIC
Status: COMPLETED | OUTPATIENT
Start: 2017-01-01 | End: 2017-01-01

## 2017-01-01 RX ORDER — NYSTATIN 100000/ML
500000 SUSPENSION, ORAL (FINAL DOSE FORM) ORAL 4 TIMES DAILY
Qty: 473 ML | Refills: 3 | Status: ON HOLD | OUTPATIENT
Start: 2017-01-01 | End: 2018-01-01

## 2017-01-01 RX ORDER — POTASSIUM CHLORIDE 1500 MG/1
20 TABLET, EXTENDED RELEASE ORAL DAILY
Qty: 30 TABLET | Refills: 3 | Status: ON HOLD | OUTPATIENT
Start: 2017-01-01 | End: 2018-01-01

## 2017-01-01 RX ORDER — TRAMADOL HYDROCHLORIDE 50 MG/1
50 TABLET ORAL EVERY 6 HOURS PRN
Qty: 60 TABLET | Refills: 3
Start: 2017-01-01 | End: 2018-01-01

## 2017-01-01 RX ORDER — ONDANSETRON 2 MG/ML
16 INJECTION INTRAMUSCULAR; INTRAVENOUS ONCE
Status: CANCELLED
Start: 2017-01-01 | End: 2017-01-01

## 2017-01-01 RX ORDER — TRAMADOL HYDROCHLORIDE 50 MG/1
25-50 TABLET ORAL EVERY 6 HOURS PRN
Qty: 28 TABLET | Refills: 0 | Status: SHIPPED | OUTPATIENT
Start: 2017-01-01 | End: 2017-01-01

## 2017-01-01 RX ADMIN — SODIUM CHLORIDE, PRESERVATIVE FREE 5 ML: 5 INJECTION INTRAVENOUS at 14:31

## 2017-01-01 RX ADMIN — SODIUM CHLORIDE, PRESERVATIVE FREE 5 ML: 5 INJECTION INTRAVENOUS at 15:51

## 2017-01-01 RX ADMIN — SODIUM CHLORIDE, PRESERVATIVE FREE 5 ML: 5 INJECTION INTRAVENOUS at 09:27

## 2017-01-01 RX ADMIN — SODIUM CHLORIDE, PRESERVATIVE FREE 5 ML: 5 INJECTION INTRAVENOUS at 16:10

## 2017-01-01 RX ADMIN — SODIUM CHLORIDE, PRESERVATIVE FREE 500 UNITS: 5 INJECTION INTRAVENOUS at 15:39

## 2017-01-01 RX ADMIN — SODIUM CHLORIDE, PRESERVATIVE FREE 5 ML: 5 INJECTION INTRAVENOUS at 15:12

## 2017-01-01 RX ADMIN — SODIUM CHONDROITIN SULFATE / SODIUM HYALURONATE 1 ML: 0.55-0.5 INJECTION INTRAOCULAR at 14:40

## 2017-01-01 RX ADMIN — MOXIFLOXACIN 1 DROP: 5 SOLUTION/ DROPS OPHTHALMIC at 13:28

## 2017-01-01 RX ADMIN — SODIUM CHLORIDE, PRESERVATIVE FREE 5 ML: 5 INJECTION INTRAVENOUS at 11:51

## 2017-01-01 RX ADMIN — CYCLOPHOSPHAMIDE 350 MG: 500 INJECTION, POWDER, FOR SOLUTION INTRAVENOUS; ORAL at 15:17

## 2017-01-01 RX ADMIN — SODIUM CHLORIDE, PRESERVATIVE FREE 5 ML: 5 INJECTION INTRAVENOUS at 13:57

## 2017-01-01 RX ADMIN — SODIUM CHLORIDE, PRESERVATIVE FREE 5 ML: 5 INJECTION INTRAVENOUS at 17:34

## 2017-01-01 RX ADMIN — CARFILZOMIB 44 MG: 60 INJECTION, POWDER, LYOPHILIZED, FOR SOLUTION INTRAVENOUS at 16:38

## 2017-01-01 RX ADMIN — DEXMEDETOMIDINE HYDROCHLORIDE 4 MCG: 100 INJECTION, SOLUTION INTRAVENOUS at 14:30

## 2017-01-01 RX ADMIN — SODIUM CHLORIDE, PRESERVATIVE FREE 5 ML: 5 INJECTION INTRAVENOUS at 14:34

## 2017-01-01 RX ADMIN — PHENYLEPHRINE HYDROCHLORIDE 1 DROP: 2.5 SOLUTION/ DROPS OPHTHALMIC at 11:14

## 2017-01-01 RX ADMIN — CYCLOPHOSPHAMIDE 350 MG: 500 INJECTION, POWDER, FOR SOLUTION INTRAVENOUS; ORAL at 11:36

## 2017-01-01 RX ADMIN — CARFILZOMIB 44 MG: 60 INJECTION, POWDER, LYOPHILIZED, FOR SOLUTION INTRAVENOUS at 13:00

## 2017-01-01 RX ADMIN — SODIUM CHLORIDE 250 ML: 9 INJECTION, SOLUTION INTRAVENOUS at 15:11

## 2017-01-01 RX ADMIN — ZOLEDRONIC ACID 4 MG: 0.04 INJECTION, SOLUTION INTRAVENOUS at 15:32

## 2017-01-01 RX ADMIN — SODIUM CHLORIDE, PRESERVATIVE FREE 5 ML: 5 INJECTION INTRAVENOUS at 14:25

## 2017-01-01 RX ADMIN — DEXAMETHASONE SODIUM PHOSPHATE 4 MG: 10 INJECTION, SOLUTION INTRAMUSCULAR; INTRAVENOUS at 12:44

## 2017-01-01 RX ADMIN — SODIUM CHLORIDE, PRESERVATIVE FREE 5 ML: 5 INJECTION INTRAVENOUS at 17:48

## 2017-01-01 RX ADMIN — MOXIFLOXACIN 1 DROP: 5 SOLUTION/ DROPS OPHTHALMIC at 13:36

## 2017-01-01 RX ADMIN — BORTEZOMIB 1.6 MG: 3.5 INJECTION, POWDER, LYOPHILIZED, FOR SOLUTION INTRAVENOUS; SUBCUTANEOUS at 14:02

## 2017-01-01 RX ADMIN — SODIUM CHLORIDE, PRESERVATIVE FREE 500 UNITS: 5 INJECTION INTRAVENOUS at 13:33

## 2017-01-01 RX ADMIN — SODIUM CHLORIDE, PRESERVATIVE FREE 5 ML: 5 INJECTION INTRAVENOUS at 10:19

## 2017-01-01 RX ADMIN — SODIUM CHLORIDE 250 ML: 9 INJECTION, SOLUTION INTRAVENOUS at 11:22

## 2017-01-01 RX ADMIN — SODIUM CHLORIDE 250 ML: 9 INJECTION, SOLUTION INTRAVENOUS at 15:32

## 2017-01-01 RX ADMIN — SODIUM CHLORIDE, POTASSIUM CHLORIDE, SODIUM LACTATE AND CALCIUM CHLORIDE 500 ML: 600; 310; 30; 20 INJECTION, SOLUTION INTRAVENOUS at 11:47

## 2017-01-01 RX ADMIN — SODIUM CHLORIDE 1000 ML: 9 INJECTION, SOLUTION INTRAVENOUS at 13:44

## 2017-01-01 RX ADMIN — SODIUM CHLORIDE, PRESERVATIVE FREE 5 ML: 5 INJECTION INTRAVENOUS at 15:52

## 2017-01-01 RX ADMIN — CARFILZOMIB 44 MG: 60 INJECTION, POWDER, LYOPHILIZED, FOR SOLUTION INTRAVENOUS at 16:55

## 2017-01-01 RX ADMIN — DEXAMETHASONE SODIUM PHOSPHATE 20 MG: 10 INJECTION, SOLUTION INTRAMUSCULAR; INTRAVENOUS at 13:03

## 2017-01-01 RX ADMIN — PHENYLEPHRINE HYDROCHLORIDE 1 DROP: 2.5 SOLUTION/ DROPS OPHTHALMIC at 13:36

## 2017-01-01 RX ADMIN — SODIUM CHLORIDE, PRESERVATIVE FREE 5 ML: 5 INJECTION INTRAVENOUS at 16:50

## 2017-01-01 RX ADMIN — SODIUM CHLORIDE, PRESERVATIVE FREE 500 UNITS: 5 INJECTION INTRAVENOUS at 16:34

## 2017-01-01 RX ADMIN — SODIUM CHLORIDE, PRESERVATIVE FREE 500 UNITS: 5 INJECTION INTRAVENOUS at 17:29

## 2017-01-01 RX ADMIN — DEXMEDETOMIDINE HYDROCHLORIDE 8 MCG: 100 INJECTION, SOLUTION INTRAVENOUS at 14:23

## 2017-01-01 RX ADMIN — PHENYLEPHRINE HYDROCHLORIDE 1 DROP: 2.5 SOLUTION/ DROPS OPHTHALMIC at 13:31

## 2017-01-01 RX ADMIN — SODIUM CHLORIDE, PRESERVATIVE FREE 5 ML: 5 INJECTION INTRAVENOUS at 16:35

## 2017-01-01 RX ADMIN — DEXAMETHASONE SODIUM PHOSPHATE 20 MG: 10 INJECTION, SOLUTION INTRAMUSCULAR; INTRAVENOUS at 11:23

## 2017-01-01 RX ADMIN — DEXAMETHASONE SODIUM PHOSPHATE 20 MG: 10 INJECTION, SOLUTION INTRAMUSCULAR; INTRAVENOUS at 15:56

## 2017-01-01 RX ADMIN — TROPICAMIDE 1 DROP: 10 SOLUTION/ DROPS OPHTHALMIC at 13:36

## 2017-01-01 RX ADMIN — HYDRALAZINE HYDROCHLORIDE 10 MG: 20 INJECTION INTRAMUSCULAR; INTRAVENOUS at 15:23

## 2017-01-01 RX ADMIN — LIDOCAINE HYDROCHLORIDE 1 ML: 10 INJECTION, SOLUTION EPIDURAL; INFILTRATION; INTRACAUDAL; PERINEURAL at 13:36

## 2017-01-01 RX ADMIN — CARFILZOMIB 44 MG: 60 INJECTION, POWDER, LYOPHILIZED, FOR SOLUTION INTRAVENOUS at 13:30

## 2017-01-01 RX ADMIN — PHENYLEPHRINE HYDROCHLORIDE 1 DROP: 2.5 SOLUTION/ DROPS OPHTHALMIC at 11:26

## 2017-01-01 RX ADMIN — SODIUM CHLORIDE, PRESERVATIVE FREE 500 UNITS: 5 INJECTION INTRAVENOUS at 11:33

## 2017-01-01 RX ADMIN — MOXIFLOXACIN HYDROCHLORIDE 1 DROP: 5 SOLUTION/ DROPS OPHTHALMIC at 11:21

## 2017-01-01 RX ADMIN — SODIUM CHLORIDE 1000 ML: 9 INJECTION, SOLUTION INTRAVENOUS at 14:47

## 2017-01-01 RX ADMIN — ONDANSETRON 4 MG: 2 INJECTION INTRAMUSCULAR; INTRAVENOUS at 13:47

## 2017-01-01 RX ADMIN — SODIUM CHLORIDE, PRESERVATIVE FREE 5 ML: 5 INJECTION INTRAVENOUS at 11:46

## 2017-01-01 RX ADMIN — TROPICAMIDE 1 DROP: 10 SOLUTION/ DROPS OPHTHALMIC at 13:27

## 2017-01-01 RX ADMIN — INFLUENZA A VIRUSA/MICHIGAN/45/2015 X-275 (H1N1) ANTIGEN (FORMALDEHYDE INACTIVATED), INFLUENZA A VIRUS A/HONG KONG/4801/2014 X-263B (H3N2) ANTIGEN (FORMALDEHYDE INACTIVATED), AND INFLUENZA B VIRUS B/BRISBANE/60/2008 ANTIGEN (FORMALDEHYDE INACTIVATED) 0.5 ML: 60; 60; 60 INJECTION, SUSPENSION INTRAMUSCULAR at 17:35

## 2017-01-01 RX ADMIN — SODIUM CHLORIDE, PRESERVATIVE FREE 5 ML: 5 INJECTION INTRAVENOUS at 11:58

## 2017-01-01 RX ADMIN — SODIUM CHLORIDE, PRESERVATIVE FREE 5 ML: 5 INJECTION INTRAVENOUS at 08:57

## 2017-01-01 RX ADMIN — SODIUM CHLORIDE, PRESERVATIVE FREE 5 ML: 5 INJECTION INTRAVENOUS at 10:04

## 2017-01-01 RX ADMIN — DEXAMETHASONE SODIUM PHOSPHATE 4 MG: 10 INJECTION, SOLUTION INTRAMUSCULAR; INTRAVENOUS at 16:16

## 2017-01-01 RX ADMIN — SODIUM CHLORIDE, PRESERVATIVE FREE 500 UNITS: 5 INJECTION INTRAVENOUS at 12:15

## 2017-01-01 RX ADMIN — SODIUM CHLORIDE 1000 ML: 9 INJECTION, SOLUTION INTRAVENOUS at 10:09

## 2017-01-01 RX ADMIN — PROPARACAINE HYDROCHLORIDE 1 DROP: 5 SOLUTION/ DROPS OPHTHALMIC at 13:27

## 2017-01-01 RX ADMIN — TROPICAMIDE 1 DROP: 10 SOLUTION/ DROPS OPHTHALMIC at 11:26

## 2017-01-01 RX ADMIN — LIDOCAINE HYDROCHLORIDE 1 ML: 10 INJECTION, SOLUTION EPIDURAL; INFILTRATION; INTRACAUDAL; PERINEURAL at 14:40

## 2017-01-01 RX ADMIN — EPINEPHRINE 500 ML: 1 INJECTION, SOLUTION, CONCENTRATE INTRAVENOUS at 14:40

## 2017-01-01 RX ADMIN — SODIUM CHLORIDE 250 ML: 9 INJECTION, SOLUTION INTRAVENOUS at 12:44

## 2017-01-01 RX ADMIN — MIDAZOLAM HYDROCHLORIDE 1 MG: 1 INJECTION, SOLUTION INTRAMUSCULAR; INTRAVENOUS at 14:26

## 2017-01-01 RX ADMIN — SODIUM CHLORIDE, PRESERVATIVE FREE 5 ML: 5 INJECTION INTRAVENOUS at 15:50

## 2017-01-01 RX ADMIN — TROPICAMIDE 1 DROP: 10 SOLUTION/ DROPS OPHTHALMIC at 13:31

## 2017-01-01 RX ADMIN — SODIUM CHLORIDE, PRESERVATIVE FREE 5 ML: 5 INJECTION INTRAVENOUS at 18:21

## 2017-01-01 RX ADMIN — SODIUM CHLORIDE, PRESERVATIVE FREE 5 ML: 5 INJECTION INTRAVENOUS at 15:53

## 2017-01-01 RX ADMIN — ONDANSETRON 4 MG: 2 INJECTION INTRAMUSCULAR; INTRAVENOUS at 14:26

## 2017-01-01 RX ADMIN — SODIUM CHLORIDE, PRESERVATIVE FREE 5 ML: 5 INJECTION INTRAVENOUS at 12:06

## 2017-01-01 RX ADMIN — SODIUM CHLORIDE, PRESERVATIVE FREE 5 ML: 5 INJECTION INTRAVENOUS at 08:43

## 2017-01-01 RX ADMIN — MOXIFLOXACIN 1 DROP: 5 SOLUTION/ DROPS OPHTHALMIC at 13:31

## 2017-01-01 RX ADMIN — SODIUM CHLORIDE 1000 ML: 9 INJECTION, SOLUTION INTRAVENOUS at 17:04

## 2017-01-01 RX ADMIN — SODIUM CHLORIDE, PRESERVATIVE FREE 5 ML: 5 INJECTION INTRAVENOUS at 11:29

## 2017-01-01 RX ADMIN — SODIUM CHLORIDE 1000 ML: 9 INJECTION, SOLUTION INTRAVENOUS at 13:27

## 2017-01-01 RX ADMIN — SODIUM CHLORIDE, PRESERVATIVE FREE 5 ML: 5 INJECTION INTRAVENOUS at 10:46

## 2017-01-01 RX ADMIN — SODIUM CHLORIDE, PRESERVATIVE FREE 5 ML: 5 INJECTION INTRAVENOUS at 18:35

## 2017-01-01 RX ADMIN — SODIUM CHLORIDE, PRESERVATIVE FREE 5 ML: 5 INJECTION INTRAVENOUS at 15:18

## 2017-01-01 RX ADMIN — DEXAMETHASONE SODIUM PHOSPHATE 4 MG: 10 INJECTION, SOLUTION INTRAMUSCULAR; INTRAVENOUS at 16:26

## 2017-01-01 RX ADMIN — MIDAZOLAM HYDROCHLORIDE 1 MG: 1 INJECTION, SOLUTION INTRAMUSCULAR; INTRAVENOUS at 13:50

## 2017-01-01 RX ADMIN — HEPARIN SODIUM (PORCINE) LOCK FLUSH IV SOLN 100 UNIT/ML 5 ML: 100 SOLUTION at 14:52

## 2017-01-01 RX ADMIN — CYCLOPHOSPHAMIDE 350 MG: 500 INJECTION, POWDER, FOR SOLUTION INTRAVENOUS; ORAL at 16:01

## 2017-01-01 RX ADMIN — BORTEZOMIB 1.6 MG: 3.5 INJECTION, POWDER, LYOPHILIZED, FOR SOLUTION INTRAVENOUS; SUBCUTANEOUS at 11:13

## 2017-01-01 RX ADMIN — SODIUM CHLORIDE 1000 ML: 9 INJECTION, SOLUTION INTRAVENOUS at 14:33

## 2017-01-01 RX ADMIN — LIDOCAINE HYDROCHLORIDE 0.5 ML: 35 GEL OPHTHALMIC at 14:40

## 2017-01-01 RX ADMIN — TROPICAMIDE 1 DROP: 10 SOLUTION/ DROPS OPHTHALMIC at 11:21

## 2017-01-01 RX ADMIN — MOXIFLOXACIN HYDROCHLORIDE 1 DROP: 5 SOLUTION/ DROPS OPHTHALMIC at 11:27

## 2017-01-01 RX ADMIN — CARFILZOMIB 44 MG: 60 INJECTION, POWDER, LYOPHILIZED, FOR SOLUTION INTRAVENOUS at 12:06

## 2017-01-01 RX ADMIN — BORTEZOMIB 1.6 MG: 3.5 INJECTION, POWDER, LYOPHILIZED, FOR SOLUTION INTRAVENOUS; SUBCUTANEOUS at 13:44

## 2017-01-01 RX ADMIN — SODIUM CHLORIDE, PRESERVATIVE FREE 5 ML: 5 INJECTION INTRAVENOUS at 12:10

## 2017-01-01 RX ADMIN — PROPARACAINE HYDROCHLORIDE 1 DROP: 5 SOLUTION/ DROPS OPHTHALMIC at 11:15

## 2017-01-01 RX ADMIN — SODIUM CHLORIDE, PRESERVATIVE FREE 5 ML: 5 INJECTION INTRAVENOUS at 08:39

## 2017-01-01 RX ADMIN — MOXIFLOXACIN HYDROCHLORIDE 1 DROP: 5 SOLUTION/ DROPS OPHTHALMIC at 11:15

## 2017-01-01 RX ADMIN — SODIUM CHLORIDE: 9 INJECTION, SOLUTION INTRAVENOUS at 16:26

## 2017-01-01 RX ADMIN — SODIUM CHLORIDE, PRESERVATIVE FREE 5 ML: 5 INJECTION INTRAVENOUS at 18:58

## 2017-01-01 RX ADMIN — CARFILZOMIB 44 MG: 60 INJECTION, POWDER, LYOPHILIZED, FOR SOLUTION INTRAVENOUS at 14:55

## 2017-01-01 RX ADMIN — DEXAMETHASONE SODIUM PHOSPHATE 4 MG: 10 INJECTION, SOLUTION INTRAMUSCULAR; INTRAVENOUS at 13:14

## 2017-01-01 RX ADMIN — DEXAMETHASONE SODIUM PHOSPHATE 4 MG: 10 INJECTION, SOLUTION INTRAMUSCULAR; INTRAVENOUS at 14:34

## 2017-01-01 RX ADMIN — BALANCED SALT SOLUTION 15 ML: 6.4; .75; .48; .3; 3.9; 1.7 SOLUTION OPHTHALMIC at 14:40

## 2017-01-01 RX ADMIN — SODIUM CHLORIDE 500 ML: 9 INJECTION, SOLUTION INTRAVENOUS at 18:28

## 2017-01-01 RX ADMIN — DEXAMETHASONE SODIUM PHOSPHATE 20 MG: 10 INJECTION, SOLUTION INTRAMUSCULAR; INTRAVENOUS at 15:11

## 2017-01-01 RX ADMIN — SODIUM CHLORIDE, PRESERVATIVE FREE 500 UNITS: 5 INJECTION INTRAVENOUS at 13:34

## 2017-01-01 RX ADMIN — TROPICAMIDE 1 DROP: 10 SOLUTION/ DROPS OPHTHALMIC at 11:15

## 2017-01-01 RX ADMIN — PHENYLEPHRINE HYDROCHLORIDE 1 DROP: 2.5 SOLUTION/ DROPS OPHTHALMIC at 11:21

## 2017-01-01 RX ADMIN — PHENYLEPHRINE HYDROCHLORIDE 1 DROP: 2.5 SOLUTION/ DROPS OPHTHALMIC at 13:27

## 2017-01-01 RX ADMIN — BORTEZOMIB 1.6 MG: 3.5 INJECTION, POWDER, LYOPHILIZED, FOR SOLUTION INTRAVENOUS; SUBCUTANEOUS at 13:25

## 2017-01-01 RX ADMIN — SODIUM CHLORIDE 1000 ML: 9 INJECTION, SOLUTION INTRAVENOUS at 11:54

## 2017-01-01 RX ADMIN — SODIUM CHLORIDE, PRESERVATIVE FREE 500 UNITS: 5 INJECTION INTRAVENOUS at 16:18

## 2017-01-01 RX ADMIN — SODIUM CHLORIDE, POTASSIUM CHLORIDE, SODIUM LACTATE AND CALCIUM CHLORIDE 500 ML: 600; 310; 30; 20 INJECTION, SOLUTION INTRAVENOUS at 13:36

## 2017-01-01 ASSESSMENT — ENCOUNTER SYMPTOMS
NECK PAIN: 0
ARTHRALGIAS: 1
BACK PAIN: 0
NUMBNESS: 0
CHEST TIGHTNESS: 0

## 2017-01-01 ASSESSMENT — PAIN SCALES - GENERAL
PAINLEVEL: NO PAIN (0)
PAINLEVEL: SEVERE PAIN (7)
PAINLEVEL: NO PAIN (0)
PAINLEVEL: SEVERE PAIN (7)
PAINLEVEL: NO PAIN (0)

## 2017-01-03 ASSESSMENT — ENCOUNTER SYMPTOMS
DECREASED APPETITE: 0
CHILLS: 0
CHILLS: 0
INSOMNIA: 0
POLYPHAGIA: 0
DEPRESSION: 1
BRUISES/BLEEDS EASILY: 1
DECREASED CONCENTRATION: 1
NIGHT SWEATS: 0
POLYDIPSIA: 0
NERVOUS/ANXIOUS: 0
WEIGHT GAIN: 0
WEIGHT LOSS: 0
INCREASED ENERGY: 0
WEIGHT LOSS: 0
POLYDIPSIA: 0
PANIC: 0
WEIGHT GAIN: 0
HALLUCINATIONS: 0
DEPRESSION: 1
FEVER: 0
INCREASED ENERGY: 0
INSOMNIA: 0
NERVOUS/ANXIOUS: 0
ALTERED TEMPERATURE REGULATION: 1
POLYPHAGIA: 0
PANIC: 0
ALTERED TEMPERATURE REGULATION: 1
FATIGUE: 1
HALLUCINATIONS: 0
FEVER: 0
SWOLLEN GLANDS: 0
SWOLLEN GLANDS: 0
FATIGUE: 1
DECREASED APPETITE: 0
BRUISES/BLEEDS EASILY: 1
DECREASED CONCENTRATION: 1
NIGHT SWEATS: 0

## 2017-01-05 ENCOUNTER — PRE VISIT (OUTPATIENT)
Dept: CARDIOLOGY | Facility: CLINIC | Age: 72
End: 2017-01-05

## 2017-01-05 ENCOUNTER — APPOINTMENT (OUTPATIENT)
Dept: LAB | Facility: CLINIC | Age: 72
End: 2017-01-05
Attending: INTERNAL MEDICINE
Payer: MEDICARE

## 2017-01-05 ENCOUNTER — OFFICE VISIT (OUTPATIENT)
Dept: TRANSPLANT | Facility: CLINIC | Age: 72
End: 2017-01-05
Attending: INTERNAL MEDICINE
Payer: MEDICARE

## 2017-01-05 VITALS
DIASTOLIC BLOOD PRESSURE: 75 MMHG | TEMPERATURE: 98.1 F | RESPIRATION RATE: 16 BRPM | OXYGEN SATURATION: 96 % | WEIGHT: 142.2 LBS | BODY MASS INDEX: 25.29 KG/M2 | SYSTOLIC BLOOD PRESSURE: 135 MMHG | HEART RATE: 62 BPM

## 2017-01-05 DIAGNOSIS — Z79.899 ENCOUNTER FOR LONG-TERM CURRENT USE OF MEDICATION: ICD-10-CM

## 2017-01-05 DIAGNOSIS — L98.9 SKIN LESION OF SCALP: ICD-10-CM

## 2017-01-05 DIAGNOSIS — K21.9 GASTROESOPHAGEAL REFLUX DISEASE WITHOUT ESOPHAGITIS: ICD-10-CM

## 2017-01-05 DIAGNOSIS — R53.83 FATIGUE, UNSPECIFIED TYPE: ICD-10-CM

## 2017-01-05 DIAGNOSIS — R53.81 MALAISE: ICD-10-CM

## 2017-01-05 DIAGNOSIS — C90.00 MULTIPLE MYELOMA, REMISSION STATUS UNSPECIFIED (H): Primary | ICD-10-CM

## 2017-01-05 DIAGNOSIS — R55 SYNCOPE, UNSPECIFIED SYNCOPE TYPE: Primary | ICD-10-CM

## 2017-01-05 LAB
ALBUMIN SERPL-MCNC: 3.2 G/DL (ref 3.4–5)
ALP SERPL-CCNC: 58 U/L (ref 40–150)
ALT SERPL W P-5'-P-CCNC: 19 U/L (ref 0–70)
ANION GAP SERPL CALCULATED.3IONS-SCNC: 8 MMOL/L (ref 3–14)
ANISOCYTOSIS BLD QL SMEAR: SLIGHT
AST SERPL W P-5'-P-CCNC: 13 U/L (ref 0–45)
BASOPHILS # BLD AUTO: 0.2 10E9/L (ref 0–0.2)
BASOPHILS NFR BLD AUTO: 7.6 %
BILIRUB SERPL-MCNC: 0.4 MG/DL (ref 0.2–1.3)
BUN SERPL-MCNC: 20 MG/DL (ref 7–30)
CALCIUM SERPL-MCNC: 8.6 MG/DL (ref 8.5–10.1)
CHLORIDE SERPL-SCNC: 107 MMOL/L (ref 94–109)
CO2 SERPL-SCNC: 25 MMOL/L (ref 20–32)
CREAT SERPL-MCNC: 0.88 MG/DL (ref 0.66–1.25)
DIFFERENTIAL METHOD BLD: ABNORMAL
EOSINOPHIL # BLD AUTO: 0.1 10E9/L (ref 0–0.7)
EOSINOPHIL NFR BLD AUTO: 5.7 %
ERYTHROCYTE [DISTWIDTH] IN BLOOD BY AUTOMATED COUNT: 17.7 % (ref 10–15)
GFR SERPL CREATININE-BSD FRML MDRD: 85 ML/MIN/1.7M2
GLUCOSE SERPL-MCNC: 96 MG/DL (ref 70–99)
HCT VFR BLD AUTO: 33.4 % (ref 40–53)
HGB BLD-MCNC: 10.4 G/DL (ref 13.3–17.7)
LYMPHOCYTES # BLD AUTO: 0.2 10E9/L (ref 0.8–5.3)
LYMPHOCYTES NFR BLD AUTO: 11.4 %
MACROCYTES BLD QL SMEAR: PRESENT
MCH RBC QN AUTO: 31.9 PG (ref 26.5–33)
MCHC RBC AUTO-ENTMCNC: 31.1 G/DL (ref 31.5–36.5)
MCV RBC AUTO: 103 FL (ref 78–100)
MONOCYTES # BLD AUTO: 0.2 10E9/L (ref 0–1.3)
MONOCYTES NFR BLD AUTO: 8.6 %
NEUTROPHILS # BLD AUTO: 1.3 10E9/L (ref 1.6–8.3)
NEUTROPHILS NFR BLD AUTO: 66.7 %
PLATELET # BLD AUTO: 163 10E9/L (ref 150–450)
POIKILOCYTOSIS BLD QL SMEAR: SLIGHT
POLYCHROMASIA BLD QL SMEAR: SLIGHT
POTASSIUM SERPL-SCNC: 3.8 MMOL/L (ref 3.4–5.3)
PROT SERPL-MCNC: 6.4 G/DL (ref 6.8–8.8)
RBC # BLD AUTO: 3.26 10E12/L (ref 4.4–5.9)
SODIUM SERPL-SCNC: 141 MMOL/L (ref 133–144)
WBC # BLD AUTO: 2 10E9/L (ref 4–11)

## 2017-01-05 PROCEDURE — 85025 COMPLETE CBC W/AUTO DIFF WBC: CPT | Performed by: INTERNAL MEDICINE

## 2017-01-05 PROCEDURE — 00000402 ZZHCL STATISTIC TOTAL PROTEIN: Performed by: INTERNAL MEDICINE

## 2017-01-05 PROCEDURE — 25000125 ZZHC RX 250: Mod: ZF | Performed by: INTERNAL MEDICINE

## 2017-01-05 PROCEDURE — 82784 ASSAY IGA/IGD/IGG/IGM EACH: CPT | Performed by: INTERNAL MEDICINE

## 2017-01-05 PROCEDURE — 80053 COMPREHEN METABOLIC PANEL: CPT | Performed by: INTERNAL MEDICINE

## 2017-01-05 PROCEDURE — 36591 DRAW BLOOD OFF VENOUS DEVICE: CPT

## 2017-01-05 PROCEDURE — 84165 PROTEIN E-PHORESIS SERUM: CPT | Performed by: INTERNAL MEDICINE

## 2017-01-05 PROCEDURE — 99212 OFFICE O/P EST SF 10 MIN: CPT | Mod: ZF

## 2017-01-05 RX ORDER — HEPARIN SODIUM (PORCINE) LOCK FLUSH IV SOLN 100 UNIT/ML 100 UNIT/ML
5 SOLUTION INTRAVENOUS ONCE
Status: COMPLETED | OUTPATIENT
Start: 2017-01-05 | End: 2017-01-05

## 2017-01-05 RX ADMIN — SODIUM CHLORIDE, PRESERVATIVE FREE 5 ML: 5 INJECTION INTRAVENOUS at 15:09

## 2017-01-05 ASSESSMENT — PAIN SCALES - GENERAL: PAINLEVEL: NO PAIN (0)

## 2017-01-05 NOTE — NURSING NOTE
BMT Heme Malignancy Rooming Note    Anthony Carbone - 1/5/2017 3:39 PM     Chief Complaint   Patient presents with     Port Draw     Labs drawn by RN from port. VS taken.      Oncology Clinic Visit     Return: MM        /75 mmHg  Pulse 62  Temp(Src) 98.1  F (36.7  C)  Resp 16  Wt 64.5 kg (142 lb 3.2 oz)  SpO2 96%     Medications reviewed: Yes    Labs drawn: Yes    Drawn by: Clinic Staff  Via: portacath  See Doc Flowsheet for details.      Dressing changed: No     Medications given: No    Staff time:6    Additional information if applicable: n/a    KAROLYN HDZ CMA

## 2017-01-05 NOTE — NURSING NOTE
Chief Complaint   Patient presents with     Port Draw     Labs drawn by RN from port. VS taken.      JASWINDER FLOYD RN

## 2017-01-05 NOTE — PROGRESS NOTES
HEMATOLOGY/ONCOLOGY PROGRESS NOTE  Jan 5, 2017    REASON FOR VISIT: patient with multiple myeloma, evaluate fatigue    Diagnosis: 71 year old gentleman with IgM lambda multiple myeloma originally diagnosed in 01/2012 as stage I, standard risk disease.   Treatment: Revlimid plus dexamethasone for 4-5 cycles but plateaued by late 03/2012.   - Velcade was added and he received another 2-3 cycles, achieving a good partial remission.     Transplant: Single auto after melphalan 200 mg/m2 preparative regimen on 01/09/2013  - Post-transplant course: unremarkable except for some mild steroid-induced hyperglycemia, gastritis, nausea and vomiting.     Maintenance: Lenalidomide at day-100 then developed a maculopapular rash. Lenalidomide was held and then we re-challenged him after about a month to 2 months at a lower dose (5 mg daily); rash returned.   - was started on maintenance Velcade, every other week through July 2014, when he was noted with abnormalities on myeloma studies drawn there. Noted with prostate cancer dx at about the time of relapse (see below).    Relapse: noted with return on M-spike in blood/urine with marrow involvement but negative PET.   - Started on retreatment with RVD on 8/27/14 with Revlimid at 15 mg 14 days of 21 days, dexamethasone 40 mg weekly and velcade weekly.Completed at total of 5 cycles without complication by 12/2014.   - Started Cycle 6 on inc'd Rev dosing of 20mg daily x 2 weeks on 12/11/14 which was complicated by pneumonia.  - Adm 12/21-1/10 for human metapneumovirus pneumonia complicated by anorexia, HTN, depression, anorexia with significant weight loss.   - Restarted Ernesto/Dex only on 2/4/15 with good tolerance but with thrombocytopenia.   - Bendamustine added to Velcade/dexamethasone on 5/21/15 due to disease progression  - Velcade discontinued on 7/9/2015 due to side effects (orthostasis)  - Schedule changed to bendamustine 80 mg/m2 days 1 and 2 on 28-day cycle  - Cycle 1 tolerated  poorly due to lightheadedness and weakness  - Cycle 2 dose reduced to 60 mg/m2 and pre-meds adjusted  - Cycle 5 received on 9/17/15.  - 10/1/2015 - increasing IgM, M-spike - started Pomalidomide 4mg/day (21 days out of 28 days) and weekly Decadron 20mg on Oct 1st, 2015.    - Carfilzomib added on 10/22/2015 making this CPD.  - C3-C6  received in [place]    - Returned to [facility] and resumed CPD here    - Adm: 4/12-4/14 with fever, confusion, neutropenia. Noted on MRI to have acute/subacute CVA and subacute/chronic CVA; started on Plavix, given brief course of Abx and GCSF prior to d/c.     - Continued on Carfilzomib and dexamethasone alone  - Pomalyst added back on 7/13/2016 for rising FLC  - Start daratumumab 11/10/16. Changed to every other week after 4 weekly treatments d/t profound fatigue and malaise.     INTERVAL HISTORY:  Feeling well today. Has felt better with the change to Q2wk daratumumab. Fatigue has improved though he stills needs the occasional nap. No F/C. No new bony pains. Eating well. No significant dizziness or lightheadedness. NO diarrhea/loose stools nor any URI sx.  He denies any cough orthopnea, or leg swelling. He would like to be able to go to [place] for the winter, but has not yet purchased tickets. Points out new scalp lesion that he's noted. NO pain, but has gotten a bit bigger.  Complete ROS was otherwise negative.     PHYSICAL EXAMINATION  /75 mmHg  Pulse 62  Temp(Src) 98.1  F (36.7  C)  Resp 16  Wt 64.5 kg (142 lb 3.2 oz)  SpO2 96%  Wt Readings from Last 10 Encounters:   01/05/17 64.5 kg (142 lb 3.2 oz)   12/29/16 63.231 kg (139 lb 6.4 oz)   12/23/16 63.186 kg (139 lb 4.8 oz)   12/15/16 63.322 kg (139 lb 9.6 oz)   12/09/16 63.095 kg (139 lb 1.6 oz)   11/30/16 63.866 kg (140 lb 12.8 oz)   11/23/16 62.778 kg (138 lb 6.4 oz)   11/17/16 62.37 kg (137 lb 8 oz)     General: no acute distress.  HEENT: PERRL, no palor or icterus. NECK: supple - no cervical or supraclavicular LAD.  CVS:  RRR. CHEST: clear b/l ABDOMEN: soft non tender no masses/HSM.  NEURO: AAOX3  Grossly non focal neuro exam.  SKIN: no obvious rashes.  LYMPH NODES: no palpable cervical or supraclavicular LAD.  EXTREMITIES: No edema.    LABS:  Recent Labs   Lab Test  01/05/17   1516   WBC  2.0*   RBC  3.26*   HGB  10.4*   HCT  33.4*   MCV  103*   MCH  31.9   MCHC  31.1*   RDW  17.7*   PLT  163     Recent Labs   Lab Test  01/05/17   1516  12/29/16   0648   NA  141  142   POTASSIUM  3.8  3.9   CHLORIDE  107  108   CO2  25  25   ANIONGAP  8  10   GLC  96  197*   BUN  20  20   CR  0.88  1.03   JAZMIN  8.6  8.8     Recent Labs   Lab Test  01/05/17   1516   PROTTOTAL  6.4*   ALBUMIN  3.2*   BILITOTAL  0.4   ALKPHOS  58   AST  13   ALT  19      10/5/2016 08:55 11/2/2016 16:16 12/9/2016 07:03 1/5/2017 15:16   IGM 1300 (H) 1410 (H) 838 (H) 1000 (H)   Bootjack Free Lt Chain <0.30 (L) <0.30 (L) <0.30 (L)    Kappa Lambda Ratio Unable to Calculate Unable to Calculate Unable to Calculate    Lambda Free Lt Chain 22.25 (H) 27.50 (H) 15.30 (H)    Monoclonal Peak 0.8 (H) 0.7 (H) 0.5 (H) 0.6 (H)     IMAGING:  Chest x-ray (12/23/16) shows no evidence of pneumonia.     IMPRESSION/PLAN:  71 year old male with relapsed MM after autologous transplant (1/9/2013), with recent progression on carfilzomib therapy, now on Ana/Pom/dex.    1. IgM multiple myeloma: Started on daratumumab on 11/10/16. Plan to add back on Kyprolis once approved but for now will leave it combined with Pom which he is tolerating fair.   - Awaiting biomarkers to come back before final decision on possible Pom reduction to 2 mg vs. Continuing as we are doing.  - If things continue to worsen, would try adding Carfilzomib to this regimen.     2. CV: History of prolonged QTc and orthostasis. Recently with issues with supine hypertension. Cardiology following.    - Continue amlodipine 5 mg at bedtime, daily BP checks, and midodrine for symptomatic orthostatic hypotension. Avoid QTc prolonging  meds.     3. Abd pain/cramps:  Has a h/o GERD, recent EGD on 8/20/15 was negative for ulcer or gastritis, small hiatal hernia was noted. - Ok'd prophylactic use of imodium prn.  - Continue Nexium qday. Has Carafate available prn.    4. ID: Presently without any fevers or infections.  - Flu shot completed for 2016-17 season    5. Subacute CVA: Presented with confusion, fever. ? Related to pomalidomide. No recurrent symptoms. Follow as he remains without symptoms still.     6. FEN: Decreased eating. Weight's come back up just a little since last visit. Will monitor and continued to encourage nutritional supplements.  - Creat relatively stable. Monitor.     7. Prostate cancer: PSA is stable at 8.54 (4/16/15). Last outside PSA was 8, per the notes from July 2014 when he saw Nicho Velazquez and Fang. Slightly increased on last check. Will just continue to monitor for now.      8. Scalp lesion: needs derm referral. Will place.     Derm referral- new scalp lesion- see this week or next when patient here    [place], DO

## 2017-01-06 ENCOUNTER — OFFICE VISIT (OUTPATIENT)
Dept: CARDIOLOGY | Facility: CLINIC | Age: 72
End: 2017-01-06
Attending: INTERNAL MEDICINE
Payer: MEDICARE

## 2017-01-06 VITALS
HEART RATE: 60 BPM | BODY MASS INDEX: 24.5 KG/M2 | HEIGHT: 64 IN | WEIGHT: 143.5 LBS | DIASTOLIC BLOOD PRESSURE: 82 MMHG | OXYGEN SATURATION: 94 % | SYSTOLIC BLOOD PRESSURE: 145 MMHG

## 2017-01-06 DIAGNOSIS — I95.1 ORTHOSTATIC HYPOTENSION: Primary | ICD-10-CM

## 2017-01-06 DIAGNOSIS — R55 SYNCOPE, UNSPECIFIED SYNCOPE TYPE: ICD-10-CM

## 2017-01-06 LAB
ALBUMIN SERPL ELPH-MCNC: 3.6 G/DL (ref 3.7–5.1)
ALPHA1 GLOB SERPL ELPH-MCNC: 0.3 G/DL (ref 0.2–0.4)
ALPHA2 GLOB SERPL ELPH-MCNC: 0.8 G/DL (ref 0.5–0.9)
B-GLOBULIN SERPL ELPH-MCNC: 0.6 G/DL (ref 0.6–1)
ELP INTERPRETATION: ABNORMAL
GAMMA GLOB SERPL ELPH-MCNC: 0.8 G/DL (ref 0.7–1.6)
IGM SERPL-MCNC: 1000 MG/DL (ref 60–265)
M PROTEIN SERPL ELPH-MCNC: 0.6 G/DL

## 2017-01-06 PROCEDURE — 99213 OFFICE O/P EST LOW 20 MIN: CPT | Performed by: INTERNAL MEDICINE

## 2017-01-06 PROCEDURE — 99212 OFFICE O/P EST SF 10 MIN: CPT | Mod: ZF

## 2017-01-06 ASSESSMENT — PAIN SCALES - GENERAL: PAINLEVEL: NO PAIN (0)

## 2017-01-06 NOTE — MR AVS SNAPSHOT
After Visit Summary   1/6/2017    Anthony Carbone    MRN: [id number]           Patient Information     Date Of Birth          1945        Visit Information        Provider Department      1/6/2017 1:00 PM Dmitri Banks MD [facility]        Today's Diagnoses     Orthostatic hypotension    -  1     Syncope, unspecified syncope type           Care Instructions    You will be scheduled for a follow up visit as instructed.    If you have any questions regarding to your visit please contact your care team:     Cardiology  Telephone Number    Yanni Wright -[phone]   Or send a message to your provider via my chart.   For scheduling appts or procedures:    Radha Laboy     [phone] or  [phone]   For the Device Clinic (Pacemakers and ICD's)   RN's :   Justyna Nieves  During business hours: [phone]    After business hours:   [phone]- select option 4 and ask for job code [phone].    You can also contact us using My Chart. If you need assistance in setting this up, please contact our office or ask at your follow up visit.     If you need a medication refill please contact your pharmacy. Please allow 3 business days for your refill to be completed.     As always, Thank you for trusting us with your health care needs!        Follow-ups after your visit        Follow-up notes from your care team     Return in about 6 months (around 7/6/2017) for Dr banks, [place].      Your next 10 appointments already scheduled     Jan 12, 2017  6:30 AM   [facility] Lab Draw with  [facility] LAB DRAW   [facility] ([facility])    [address] [place] [address] [phone]            Jan 12, 2017  7:00 AM   Infusion 360 with UC ONCOLOGY INFUSION, UC 11 ATC   [facility] ([facility])    [address] [address] [phone]             Jan 12, 2017  7:30 AM   (Arrive by 7:15 AM)   Return Visit with Leanne Reddy PA-C   [facility] ([facility])    [address] [place] [id number]   [phone]            Jan 26, 2017  8:30 AM   [facility] Lab Draw with UC [facility] LAB DRAW   [facility] Lab Draw ([facility])    [address] [place] [address]   [phone]            Jan 26, 2017  9:00 AM   Infusion 360 with UC ONCOLOGY INFUSION, UC 23 ATC   [facility] ([facility])    [address] [place] [address]   [phone]            Feb 09, 2017  8:30 AM   [facility] Lab Draw with UC [facility] LAB DRAW   [facility] Lab Draw ([facility])    [address] [place] [address]   [phone]            Feb 09, 2017  9:00 AM   Infusion 360 with UC ONCOLOGY INFUSION, UC 26 ATC   [facility] ([facility])    [address] [place] [address]   [phone]              Who to contact     If you have questions or need follow up information about today's clinic visit or your schedule please contact [facility] directly at [phone].  Normal or non-critical lab and imaging results will be communicated to you by MyChart, letter or phone within 4 business days after the clinic has received the results. If you do not hear from us within 7 days, please contact the clinic through [employer] or phone. If you have a critical or abnormal lab result, we will notify you by phone as soon as possible.  Submit refill requests through [employer] or call your pharmacy and they will forward the refill request to us. Please allow 3 business days for your refill to be completed.          Additional Information About Your Visit        [employer]  "Information     [employer] gives you secure access to your electronic health record. If you see a primary care provider, you can also send messages to your care team and make appointments. If you have questions, please call your primary care clinic.  If you do not have a primary care provider, please call [phone] and they will assist you.        Care EveryWhere ID     This is your Care EveryWhere ID. This could be used by other organizations to access your [facility] medical records  [id number]        Your Vitals Were     Pulse Height BMI (Body Mass Index) Pulse Oximetry          60 1.626 m (5' 4\") 24.62 kg/m2 94%         Blood Pressure from Last 3 Encounters:   01/06/17 150/84   01/05/17 135/75   12/29/16 106/59    Weight from Last 3 Encounters:   01/06/17 65.091 kg (143 lb 8 oz)   01/05/17 64.5 kg (142 lb 3.2 oz)   12/29/16 63.231 kg (139 lb 6.4 oz)              We Performed the Following     EKG 12-lead, tracing only (Future)        Primary Care Provider Office Phone # Fax #    Sanket Burciaga [phone]       [facility] [address] [facility] [address]        Thank you!     Thank you for choosing [facility]  for your care. Our goal is always to provide you with excellent care. Hearing back from our patients is one way we can continue to improve our services. Please take a few minutes to complete the written survey that you may receive in the mail after your visit with us. Thank you!             Your Updated Medication List - Protect others around you: Learn how to safely use, store and throw away your medicines at www.disposemymeds.org.          This list is accurate as of: 1/6/17  1:35 PM.  Always use your most recent med list.                   Brand Name Dispense Instructions for use    ACYCLOVIR PO      Take 400 mg by mouth 2 times daily       amLODIPine 5 MG tablet    NORVASC    30 tablet    Take one tablet at bedtime.       clopidogrel 75 MG tablet    " PLAVIX    90 tablet    Take 1 tablet (75 mg) by mouth daily       dexamethasone 4 MG tablet    DECADRON    30 tablet    Take 20mg twice weekly on weeks of Kyprolis (3 weeks on, 1 week off, repeat) - Takes the same 2 days as chemo each week       midodrine 2.5 MG tablet    PROAMATINE    90 tablet    Take 1 tablet (2.5 mg) by mouth 3 times daily       NEXIUM PO      Take 40 mg by mouth every morning (before breakfast)       ondansetron 8 MG ODT tab    ZOFRAN-ODT    30 tablet    Take 1 tablet (8 mg) by mouth every 8 hours as needed for nausea       potassium chloride SA 20 MEQ CR tablet    K-DUR/KLOR-CON M    60 tablet    Take 1 tablet (20 mEq) by mouth 2 times daily       SIMVASTATIN PO      Take 20 mg by mouth daily       sucralfate 1 GM tablet    CARAFATE    120 tablet    Take 1 tablet (1 g) by mouth 4 times daily as needed

## 2017-01-06 NOTE — Clinical Note
1/6/2017      RE: Anthony Carbone  [address] [facility] [address]       Dear Colleague,    Thank you for the opportunity to participate in the care of your patient, Anthony Carbone, at the [facility]. Please see a copy of my visit note below.    Chief Complaint: Long QT, orthostatic hypotension    HPI:   Mr. Carbone is a 70 y/o male with syncope - head-up tilt table study and autonomic study demonstrated orthostatic hypotension due to autonomic dysfunction, long QT that resolved after discontinuing meds and hypokalemia that prolong QT, and is currently being treated for multiple myeloma.     He was last seen in clinic in post-hospital follow-up on 9/8/2015. He was seen for long QT that resolved after discontinuing meds and hypokalemia that prolong QT. He was also having a great deal of difficulty with orthostatic hypotension and supine hypertension. He was discharged on midodrine 2.5 mg TID and the fludrocortisone was discontinued.     He has been doing well. He has occasional light-headedness but has had no syncope. He is taking his midodrine three times a day.    Interval History:  He has doing well. He denies even occasional lightheadedness. He would like to continue taking his Midodrine 2.5mg , TID    [id number]: He was in the hospital in April after losing consciousness. He reports he was standing shaving after his shower and he felt very weak. He called for his wife who helped him sit down. She reports his eyes were open but he was unresponsive for several minutes. Eventually, she was able to help him to his bed. Based on brain MRIs done later he was told he had a stroke.    His midodrine was stopped and he was re-started on fludrocortisone. He developed supine hypertension and the fludrocortisone was stopped. This is the reason it was stopped in the first place.     He has not had further episodes of loss of  consciousness.    10Jun2016: No further syncope. His BP readings from home show only rare readings over 140 mm systolic.     02Sep2016: Mr. Carbone notes that his chief complaint has been fatigue since his last visit. This is most marked after his chemotherapy sessions. Mr. Carbone gets chemo three out of four weeks of each month, therefore he is experiencing a significant amount of fatigue. He is, however, devoid of his orthostatic symptoms. Mr. Carbone also notes the absence of dizziness/lighteadedness at rest, palpitations, or syncopal episodes.    In the interim, Mr. Carbone was also seen by our Neurology colleagues, who recommended 30-day monitoring to rule out paroxsymal AFib.     06Jan2017 Interval history: He has been doing well. He has not had any syncope. He's chemo schedule and dosages have been adjusted and he feels better as far as his energy level is concerned. He is taking his midodrine as prescribed.     Current Outpatient Prescriptions   Medication Sig Dispense Refill     potassium chloride SA (K-DUR/KLOR-CON M) 20 MEQ CR tablet Take 1 tablet (20 mEq) by mouth 2 times daily 60 tablet 3     ACYCLOVIR PO Take 400 mg by mouth 2 times daily        ondansetron (ZOFRAN-ODT) 8 MG ODT tab Take 1 tablet (8 mg) by mouth every 8 hours as needed for nausea 30 tablet 3     amLODIPine (NORVASC) 5 MG tablet Take one tablet at bedtime. 30 tablet 6     dexamethasone (DECADRON) 4 MG tablet Take 20mg twice weekly on weeks of Kyprolis (3 weeks on, 1 week off, repeat) - Takes the same 2 days as chemo each week 30 tablet 3     clopidogrel (PLAVIX) 75 MG tablet Take 1 tablet (75 mg) by mouth daily 90 tablet 3     midodrine (PROAMATINE) 2.5 MG tablet Take 1 tablet (2.5 mg) by mouth 3 times daily 90 tablet 6     sucralfate (CARAFATE) 1 GM tablet Take 1 tablet (1 g) by mouth 4 times daily as needed 120 tablet 1     Esomeprazole Magnesium (NEXIUM PO) Take 40 mg by mouth every morning (before breakfast)        SIMVASTATIN PO Take 20 mg by mouth daily          Past Medical History   Diagnosis Date     Hypertension      Hx of migraines      haven;t bothered him since 2003     Nonulcer dyspepsia      Coronary artery disease, non-occlusive Jan 2012     Polio age 6     no sequelae     Malignant neoplasm (H) 1/2012     Prostate cancer (H)      Myeloma (H)      multiple       Past Surgical History   Procedure Laterality Date     Right inguinal hernia surgery       Right toe surgery       Colonoscopy       Esophagoscopy, gastroscopy, duodenoscopy (egd), combined N/A 1/6/2015     Procedure: COMBINED ESOPHAGOSCOPY, GASTROSCOPY, DUODENOSCOPY (EGD);  Surgeon: Yamil Donaldson Chi, MD;  Location:  GI     Esophagoscopy, gastroscopy, duodenoscopy (egd), combined Left 8/20/2015     Procedure: COMBINED ESOPHAGOSCOPY, GASTROSCOPY, DUODENOSCOPY (EGD), BIOPSY SINGLE OR MULTIPLE;  Surgeon: Mane Barragan MD;  Location:  GI       No family history on file.    Social History   Substance Use Topics     Smoking status: Never Smoker      Smokeless tobacco: Never Used     Alcohol Use: Yes      Comment: rarely       Allergies   Allergen Reactions     Aspirin      Stomach upset     Bactrim [Sulfamethoxazole W-Trimethoprim] Rash      Answers for HPI/ROS submitted by the patient on 1/3/2017   General Symptoms: Yes  Skin Symptoms: No  HENT Symptoms: No  EYE SYMPTOMS: No  HEART SYMPTOMS: No  LUNG SYMPTOMS: No  INTESTINAL SYMPTOMS: No  URINARY SYMPTOMS: No  REPRODUCTIVE SYMPTOMS: Yes  SKELETAL SYMPTOMS: No  BLOOD SYMPTOMS: Yes  NERVOUS SYSTEM SYMPTOMS: No  MENTAL HEALTH SYMPTOMS: Yes  Fever: No  Loss of appetite: No  Weight loss: No  Weight gain: No  Fatigue: Yes  Night sweats: No  Chills: No  Increased stress: Yes  Excessive hunger: No  Excessive thirst: No  Feeling hot or cold when others believe the temperature is normal: Yes  Loss of height: No  Post-operative complications: No  Surgical site pain: No  Hallucinations: No  Change in or Loss of  "Energy: No  Hyperactivity: No  Confusion: No  Anemia: Yes  Swollen glands: No  Easy bleeding or bruising: Yes  Edema or swelling: No  Scrotal pain or swelling: No  Erectile dysfunction: Yes  Penile discharge: No  Genital ulcers: No  Reduced libido: Yes  Nervous or Anxious: No  Depression: Yes  Trouble sleeping: No  Trouble thinking or concentrating: Yes  Mood changes: No  Panic attacks: No    No changes reported on 06Dec2017/woa    Physical Examination:  Vitals: /84 mmHg  Pulse 60  Ht 1.626 m (5' 4\")  Wt 65.091 kg (143 lb 8 oz)  BMI 24.62 kg/m2  SpO2 94%  BMI= Body mass index is 24.62 kg/(m^2).  GENERAL APPEARANCE: healthy, alert and no distress  HEENT: no icterus, mucosa moist, no cyanosis.  NECK:  JVP is not visible  CHEST: lungs clear to auscultation, respirations are unlabored, normal respiratory rate. 30-day monitor on chest. Also has port.   CARDIOVASCULAR: regular rhythm, normal S1S2, no murmur or gallop.  EXTREMITIES: no clubbing, cyanosis or edema     Laboratory:    I reviewed his CardioNet results. No atrial fibrillation was documented. 06Dec2017/woa    Previous studies:  MRI brain without and with contrast  MRA of the head without contrast  Neck MRA without and with contrast     History:  new finding on CT, syncope.     Comparison:  Head CT for 12/20/16 same day        Technique:    Brain MRI:  Axial diffusion, FLAIR, T2-weighted, susceptibility, and  coronal T1-weighted images were obtained without intravenous contrast.  Following intravenous gadolinium-based contrast administration, axial  and coronal T1-weighted images were obtained.     Head MRA: 3D time-of-flight MRA of the [place] was performed  without intravenous contrast.  Neck MRA:  Limited non contrast 2DTOF images were obtained of the  mid-cervical region. Following intravenous gadolinium-based contrast  administration, a contrast enhanced MRA of the neck/cervical vessels  was performed.  Three-dimensional reconstructions " of the neck and head MRA were  created, which were reviewed by the radiologist.     Dose: 7.5 ml Gadavist injected     Findings:    Brain MRI: Axial diffusion weighted images demonstrate a small,  punctate acute infarct in the left posterior limb internal capsule or  anterior aspect of the optic radiations near [place] loop, with  corresponding hyperintensity on FLAIR images. On the FLAIR images,  there is nonspecific moderate periventricular T2-hyperintensity, which  are most likely related to chronic small vessel ischemic disease. The  right seema-cerebellum has a wedge-shaped 2+ CM size focus of  encephalomalacia, corresponding to the area of dark signal on the CT  scan. This is new from the prior whole body PET/CT including the head  from 8/27/2014. No reduced diffusion (acute infarct) is noted within  this area; faint enhancement inferiorly suggests a late subacute or  chronic nature.       There is also slightly increased signal on FLAIR within the left  insula, and hippocampus and temporal lobe, which is of uncertain  etiology, does not have corresponding finding on diffusion images, and  does not enhance, which is nonspecific. There is no definite acute  intracranial hemorrhage on susceptibility images, although there is  remote hemosiderin deposition within the right seema-cerebellar chronic  infarct. Old remote infarcts present in the cerebellum bilaterally,  small and wedge-shaped. Ventricles are proportionate to the cerebral  sulci.. Contrast-enhanced images of the brain demonstrate mild left  putaminal blush of enhancement, which could be related to underlying  capillary telangiectasia but is of doubtful clinical significance.  Punctate remote hemorrhage on susceptibility images present in the  high left centrum semiovale ovale.     Head MRA demonstrates no definite aneurysm or stenosis of the major  intracranial arteries. The anterior communicating artery is patent.  Regarding the posterior communicating  arteries, both are patent.     Neck MRA demonstrates mild atherosclerotic findings of carotid  bifurcations with less than 30% stenosis of the left ICA only and  patent major cervical arteries. Antegrade flow in the major cervical  vasculature.         Impression:  1. Likely small acute or early subacute infarct within the left  posterior limb internal capsule or anterior edge of the optic  radiations.  2. Late subacute or chronic infarct in the right seema-cerebellum which  has occurred in the interim since 8/27/2014.  3. Of uncertain etiology is the mildly increased signal within the  left insula, anterior temporal lobe, and hippocampus. This can be seen  post seizure, or less likely are related to viral infection (such as  herpes encephalitis), or subacute vascular insult. Recommend continued  followup in one to 2 weeks with MRI.  4. Head MRA demonstrates patent major intracranial arteries.  5. Neck MRA demonstrates atherosclerosis but patent major cervical  arteries. Faint, brush-like enhancement of the left putamen, a  doubtful clinical significance but may represent a subtle, slow flow  vascular malformation such as a capillary telangiectasia. This also  can be further evaluated on followup MRI in 2-3 weeks.  6. Diffuse, chronic, severe vasculopathic findings of the  periventricular white matter on FLAIR images.        MAGI LAWLER MD      Assessment and recommendations:    1) Orthostatic hypotension  2) Supine hypertension  - Continue midodrine 2.5 mg TID   3) Stroke - no atrial fibrillation seen on monitor, we discussed placement of implantable loop recorder for prolonged monitoring. He will consider this.      RTC 6 months     I appreciate the chance to help with Mr. Carbone's care. Please let me know if you have any questions or concerns.    Dmitri Banks MD

## 2017-01-06 NOTE — PATIENT INSTRUCTIONS
You will be scheduled for a follow up visit as instructed.    If you have any questions regarding to your visit please contact your care team:     Cardiology  Telephone Number    Yanni Wright -[phone]   Or send a message to your provider via my chart.   For scheduling appts or procedures:    Radha Laboy     [phone] or  [phone]   For the Device Clinic (Pacemakers and ICD's)   RN's :   Justyna Nieves  During business hours: [phone]    After business hours:   [phone]- select option 4 and ask for job code [phone].    You can also contact us using My Chart. If you need assistance in setting this up, please contact our office or ask at your follow up visit.     If you need a medication refill please contact your pharmacy. Please allow 3 business days for your refill to be completed.     As always, Thank you for trusting us with your health care needs!

## 2017-01-06 NOTE — NURSING NOTE
Return Appointment: Patient given instructions regarding scheduling next clinic visit. Patient demonstrated understanding of this information and agreed to call with further questions or concerns.

## 2017-01-06 NOTE — PROGRESS NOTES
Chief Complaint: Long QT, orthostatic hypotension    HPI:   Mr. Carbone is a 68 y/o male with syncope - head-up tilt table study and autonomic study demonstrated orthostatic hypotension due to autonomic dysfunction, long QT that resolved after discontinuing meds and hypokalemia that prolong QT, and is currently being treated for multiple myeloma.     He was last seen in clinic in post-hospital follow-up on 9/8/2015. He was seen for long QT that resolved after discontinuing meds and hypokalemia that prolong QT. He was also having a great deal of difficulty with orthostatic hypotension and supine hypertension. He was discharged on midodrine 2.5 mg TID and the fludrocortisone was discontinued.     He has been doing well. He has occasional light-headedness but has had no syncope. He is taking his midodrine three times a day.    Interval History:  He has doing well. He denies even occasional lightheadedness. He would like to continue taking his Midodrine 2.5mg , TID    [id number]: He was in the hospital in April after losing consciousness. He reports he was standing shaving after his shower and he felt very weak. He called for his wife who helped him sit down. She reports his eyes were open but he was unresponsive for several minutes. Eventually, she was able to help him to his bed. Based on brain MRIs done later he was told he had a stroke.    His midodrine was stopped and he was re-started on fludrocortisone. He developed supine hypertension and the fludrocortisone was stopped. This is the reason it was stopped in the first place.     He has not had further episodes of loss of consciousness.    [id number]: No further syncope. His BP readings from home show only rare readings over 140 mm systolic.     [id number]: Mr. Carbone notes that his chief complaint has been fatigue since his last visit. This is most marked after his chemotherapy sessions. Mr. Carbone gets chemo three out of four weeks of each month, therefore  he is experiencing a significant amount of fatigue. He is, however, devoid of his orthostatic symptoms. Mr. Carbone also notes the absence of dizziness/lighteadedness at rest, palpitations, or syncopal episodes.    In the interim, Mr. Carbone was also seen by our Neurology colleagues, who recommended 30-day monitoring to rule out paroxsymal AFib.     06Jan2017 Interval history: He has been doing well. He has not had any syncope. He's chemo schedule and dosages have been adjusted and he feels better as far as his energy level is concerned. He is taking his midodrine as prescribed.     Current Outpatient Prescriptions   Medication Sig Dispense Refill     potassium chloride SA (K-DUR/KLOR-CON M) 20 MEQ CR tablet Take 1 tablet (20 mEq) by mouth 2 times daily 60 tablet 3     ACYCLOVIR PO Take 400 mg by mouth 2 times daily        ondansetron (ZOFRAN-ODT) 8 MG ODT tab Take 1 tablet (8 mg) by mouth every 8 hours as needed for nausea 30 tablet 3     amLODIPine (NORVASC) 5 MG tablet Take one tablet at bedtime. 30 tablet 6     dexamethasone (DECADRON) 4 MG tablet Take 20mg twice weekly on weeks of Kyprolis (3 weeks on, 1 week off, repeat) - Takes the same 2 days as chemo each week 30 tablet 3     clopidogrel (PLAVIX) 75 MG tablet Take 1 tablet (75 mg) by mouth daily 90 tablet 3     midodrine (PROAMATINE) 2.5 MG tablet Take 1 tablet (2.5 mg) by mouth 3 times daily 90 tablet 6     sucralfate (CARAFATE) 1 GM tablet Take 1 tablet (1 g) by mouth 4 times daily as needed 120 tablet 1     Esomeprazole Magnesium (NEXIUM PO) Take 40 mg by mouth every morning (before breakfast)       SIMVASTATIN PO Take 20 mg by mouth daily          Past Medical History   Diagnosis Date     Hypertension      Hx of migraines      haven;t bothered him since 2003     Nonulcer dyspepsia      Coronary artery disease, non-occlusive Jan 2012     Polio age 6     no sequelae     Malignant neoplasm (H) 1/2012     Prostate cancer (H)      Myeloma (H)       multiple       Past Surgical History   Procedure Laterality Date     Right inguinal hernia surgery       Right toe surgery       Colonoscopy       Esophagoscopy, gastroscopy, duodenoscopy (egd), combined N/A 1/6/2015     Procedure: COMBINED ESOPHAGOSCOPY, GASTROSCOPY, DUODENOSCOPY (EGD);  Surgeon: Yamil Donaldson Chi, MD;  Location:  GI     Esophagoscopy, gastroscopy, duodenoscopy (egd), combined Left 8/20/2015     Procedure: COMBINED ESOPHAGOSCOPY, GASTROSCOPY, DUODENOSCOPY (EGD), BIOPSY SINGLE OR MULTIPLE;  Surgeon: Mane Barragan MD;  Location:  GI       No family history on file.    Social History   Substance Use Topics     Smoking status: Never Smoker      Smokeless tobacco: Never Used     Alcohol Use: Yes      Comment: rarely       Allergies   Allergen Reactions     Aspirin      Stomach upset     Bactrim [Sulfamethoxazole W-Trimethoprim] Rash      Answers for HPI/ROS submitted by the patient on 1/3/2017   General Symptoms: Yes  Skin Symptoms: No  HENT Symptoms: No  EYE SYMPTOMS: No  HEART SYMPTOMS: No  LUNG SYMPTOMS: No  INTESTINAL SYMPTOMS: No  URINARY SYMPTOMS: No  REPRODUCTIVE SYMPTOMS: Yes  SKELETAL SYMPTOMS: No  BLOOD SYMPTOMS: Yes  NERVOUS SYSTEM SYMPTOMS: No  MENTAL HEALTH SYMPTOMS: Yes  Fever: No  Loss of appetite: No  Weight loss: No  Weight gain: No  Fatigue: Yes  Night sweats: No  Chills: No  Increased stress: Yes  Excessive hunger: No  Excessive thirst: No  Feeling hot or cold when others believe the temperature is normal: Yes  Loss of height: No  Post-operative complications: No  Surgical site pain: No  Hallucinations: No  Change in or Loss of Energy: No  Hyperactivity: No  Confusion: No  Anemia: Yes  Swollen glands: No  Easy bleeding or bruising: Yes  Edema or swelling: No  Scrotal pain or swelling: No  Erectile dysfunction: Yes  Penile discharge: No  Genital ulcers: No  Reduced libido: Yes  Nervous or Anxious: No  Depression: Yes  Trouble sleeping: No  Trouble thinking or concentrating:  "Yes  Mood changes: No  Panic attacks: No    No changes reported on 06Dec2017/woa    Physical Examination:  Vitals: /84 mmHg  Pulse 60  Ht 1.626 m (5' 4\")  Wt 65.091 kg (143 lb 8 oz)  BMI 24.62 kg/m2  SpO2 94%  BMI= Body mass index is 24.62 kg/(m^2).  GENERAL APPEARANCE: healthy, alert and no distress  HEENT: no icterus, mucosa moist, no cyanosis.  NECK:  JVP is not visible  CHEST: lungs clear to auscultation, respirations are unlabored, normal respiratory rate. 30-day monitor on chest. Also has port.   CARDIOVASCULAR: regular rhythm, normal S1S2, no murmur or gallop.  EXTREMITIES: no clubbing, cyanosis or edema     Laboratory:    I reviewed his CardioNet results. No atrial fibrillation was documented. 06Dec2017/woa    Previous studies:  MRI brain without and with contrast  MRA of the head without contrast  Neck MRA without and with contrast     History:  new finding on CT, syncope.     Comparison:  Head CT for 12/20/16 same day        Technique:    Brain MRI:  Axial diffusion, FLAIR, T2-weighted, susceptibility, and  coronal T1-weighted images were obtained without intravenous contrast.  Following intravenous gadolinium-based contrast administration, axial  and coronal T1-weighted images were obtained.     Head MRA: 3D time-of-flight MRA of the [place] was performed  without intravenous contrast.  Neck MRA:  Limited non contrast 2DTOF images were obtained of the  mid-cervical region. Following intravenous gadolinium-based contrast  administration, a contrast enhanced MRA of the neck/cervical vessels  was performed.  Three-dimensional reconstructions of the neck and head MRA were  created, which were reviewed by the radiologist.     Dose: 7.5 ml Gadavist injected     Findings:    Brain MRI: Axial diffusion weighted images demonstrate a small,  punctate acute infarct in the left posterior limb internal capsule or  anterior aspect of the optic radiations near [place] loop, with  corresponding " hyperintensity on FLAIR images. On the FLAIR images,  there is nonspecific moderate periventricular T2-hyperintensity, which  are most likely related to chronic small vessel ischemic disease. The  right seema-cerebellum has a wedge-shaped 2+ CM size focus of  encephalomalacia, corresponding to the area of dark signal on the CT  scan. This is new from the prior whole body PET/CT including the head  from 8/27/2014. No reduced diffusion (acute infarct) is noted within  this area; faint enhancement inferiorly suggests a late subacute or  chronic nature.       There is also slightly increased signal on FLAIR within the left  insula, and hippocampus and temporal lobe, which is of uncertain  etiology, does not have corresponding finding on diffusion images, and  does not enhance, which is nonspecific. There is no definite acute  intracranial hemorrhage on susceptibility images, although there is  remote hemosiderin deposition within the right seema-cerebellar chronic  infarct. Old remote infarcts present in the cerebellum bilaterally,  small and wedge-shaped. Ventricles are proportionate to the cerebral  sulci.. Contrast-enhanced images of the brain demonstrate mild left  putaminal blush of enhancement, which could be related to underlying  capillary telangiectasia but is of doubtful clinical significance.  Punctate remote hemorrhage on susceptibility images present in the  high left centrum semiovale ovale.     Head MRA demonstrates no definite aneurysm or stenosis of the major  intracranial arteries. The anterior communicating artery is patent.  Regarding the posterior communicating arteries, both are patent.     Neck MRA demonstrates mild atherosclerotic findings of carotid  bifurcations with less than 30% stenosis of the left ICA only and  patent major cervical arteries. Antegrade flow in the major cervical  vasculature.         Impression:  1. Likely small acute or early subacute infarct within the left  posterior limb  internal capsule or anterior edge of the optic  radiations.  2. Late subacute or chronic infarct in the right seema-cerebellum which  has occurred in the interim since 8/27/2014.  3. Of uncertain etiology is the mildly increased signal within the  left insula, anterior temporal lobe, and hippocampus. This can be seen  post seizure, or less likely are related to viral infection (such as  herpes encephalitis), or subacute vascular insult. Recommend continued  followup in one to 2 weeks with MRI.  4. Head MRA demonstrates patent major intracranial arteries.  5. Neck MRA demonstrates atherosclerosis but patent major cervical  arteries. Faint, brush-like enhancement of the left putamen, a  doubtful clinical significance but may represent a subtle, slow flow  vascular malformation such as a capillary telangiectasia. This also  can be further evaluated on followup MRI in 2-3 weeks.  6. Diffuse, chronic, severe vasculopathic findings of the  periventricular white matter on FLAIR images.        MAGI LAWLER MD      Assessment and recommendations:    1) Orthostatic hypotension  2) Supine hypertension  - Continue midodrine 2.5 mg TID   3) Stroke - no atrial fibrillation seen on monitor, we discussed placement of implantable loop recorder for prolonged monitoring. He will consider this.      RTC 6 months     I appreciate the chance to help with Mr. Carbone's care. Please let me know if you have any questions or concerns.    Dmitri Banks MD

## 2017-01-10 LAB — INTERPRETATION ECG - MUSE: NORMAL

## 2017-01-12 ENCOUNTER — ONCOLOGY VISIT (OUTPATIENT)
Dept: ONCOLOGY | Facility: CLINIC | Age: 72
End: 2017-01-12
Attending: PHYSICIAN ASSISTANT
Payer: MEDICARE

## 2017-01-12 ENCOUNTER — APPOINTMENT (OUTPATIENT)
Dept: LAB | Facility: CLINIC | Age: 72
End: 2017-01-12
Attending: INTERNAL MEDICINE
Payer: MEDICARE

## 2017-01-12 ENCOUNTER — INFUSION THERAPY VISIT (OUTPATIENT)
Dept: ONCOLOGY | Facility: CLINIC | Age: 72
End: 2017-01-12
Attending: INTERNAL MEDICINE
Payer: MEDICARE

## 2017-01-12 VITALS
DIASTOLIC BLOOD PRESSURE: 74 MMHG | WEIGHT: 142.64 LBS | BODY MASS INDEX: 24.47 KG/M2 | RESPIRATION RATE: 18 BRPM | TEMPERATURE: 98.2 F | OXYGEN SATURATION: 98 % | SYSTOLIC BLOOD PRESSURE: 132 MMHG | HEART RATE: 78 BPM

## 2017-01-12 DIAGNOSIS — C90.00 MULTIPLE MYELOMA NOT HAVING ACHIEVED REMISSION (H): Primary | ICD-10-CM

## 2017-01-12 DIAGNOSIS — C90.00 MULTIPLE MYELOMA, REMISSION STATUS UNSPECIFIED (H): ICD-10-CM

## 2017-01-12 DIAGNOSIS — R53.83 OTHER FATIGUE: ICD-10-CM

## 2017-01-12 LAB
ALBUMIN SERPL-MCNC: 3.4 G/DL (ref 3.4–5)
ALP SERPL-CCNC: 51 U/L (ref 40–150)
ALT SERPL W P-5'-P-CCNC: 22 U/L (ref 0–70)
ANION GAP SERPL CALCULATED.3IONS-SCNC: 10 MMOL/L (ref 3–14)
AST SERPL W P-5'-P-CCNC: 14 U/L (ref 0–45)
BASOPHILS # BLD AUTO: 0.1 10E9/L (ref 0–0.2)
BASOPHILS NFR BLD AUTO: 1.6 %
BILIRUB SERPL-MCNC: 0.6 MG/DL (ref 0.2–1.3)
BUN SERPL-MCNC: 22 MG/DL (ref 7–30)
CALCIUM SERPL-MCNC: 9.1 MG/DL (ref 8.5–10.1)
CHLORIDE SERPL-SCNC: 108 MMOL/L (ref 94–109)
CO2 SERPL-SCNC: 25 MMOL/L (ref 20–32)
CREAT SERPL-MCNC: 0.84 MG/DL (ref 0.66–1.25)
DIFFERENTIAL METHOD BLD: ABNORMAL
EOSINOPHIL # BLD AUTO: 0.1 10E9/L (ref 0–0.7)
EOSINOPHIL NFR BLD AUTO: 3.2 %
ERYTHROCYTE [DISTWIDTH] IN BLOOD BY AUTOMATED COUNT: 17.7 % (ref 10–15)
GFR SERPL CREATININE-BSD FRML MDRD: 90 ML/MIN/1.7M2
GLUCOSE SERPL-MCNC: 165 MG/DL (ref 70–99)
HCT VFR BLD AUTO: 35.1 % (ref 40–53)
HGB BLD-MCNC: 10.9 G/DL (ref 13.3–17.7)
IMM GRANULOCYTES # BLD: 0 10E9/L (ref 0–0.4)
IMM GRANULOCYTES NFR BLD: 0.5 %
LYMPHOCYTES # BLD AUTO: 0.4 10E9/L (ref 0.8–5.3)
LYMPHOCYTES NFR BLD AUTO: 8.1 %
MCH RBC QN AUTO: 31.6 PG (ref 26.5–33)
MCHC RBC AUTO-ENTMCNC: 31.1 G/DL (ref 31.5–36.5)
MCV RBC AUTO: 102 FL (ref 78–100)
MONOCYTES # BLD AUTO: 0.5 10E9/L (ref 0–1.3)
MONOCYTES NFR BLD AUTO: 11.7 %
NEUTROPHILS # BLD AUTO: 3.3 10E9/L (ref 1.6–8.3)
NEUTROPHILS NFR BLD AUTO: 74.9 %
NRBC # BLD AUTO: 0 10*3/UL
NRBC BLD AUTO-RTO: 0 /100
PLATELET # BLD AUTO: 169 10E9/L (ref 150–450)
POTASSIUM SERPL-SCNC: 3.7 MMOL/L (ref 3.4–5.3)
PROT SERPL-MCNC: 7 G/DL (ref 6.8–8.8)
RBC # BLD AUTO: 3.45 10E12/L (ref 4.4–5.9)
SODIUM SERPL-SCNC: 142 MMOL/L (ref 133–144)
WBC # BLD AUTO: 4.4 10E9/L (ref 4–11)

## 2017-01-12 PROCEDURE — 96413 CHEMO IV INFUSION 1 HR: CPT

## 2017-01-12 PROCEDURE — 25000125 ZZHC RX 250: Mod: ZF | Performed by: INTERNAL MEDICINE

## 2017-01-12 PROCEDURE — 25000128 H RX IP 250 OP 636: Mod: ZF | Performed by: INTERNAL MEDICINE

## 2017-01-12 PROCEDURE — 99214 OFFICE O/P EST MOD 30 MIN: CPT | Mod: ZP | Performed by: PHYSICIAN ASSISTANT

## 2017-01-12 PROCEDURE — 80053 COMPREHEN METABOLIC PANEL: CPT | Performed by: INTERNAL MEDICINE

## 2017-01-12 PROCEDURE — 85025 COMPLETE CBC W/AUTO DIFF WBC: CPT | Performed by: INTERNAL MEDICINE

## 2017-01-12 PROCEDURE — 25000130 H RX MED GY IP 250 OP 259 PS 637: Mod: ZF | Performed by: INTERNAL MEDICINE

## 2017-01-12 PROCEDURE — 25000132 ZZH RX MED GY IP 250 OP 250 PS 637: Mod: ZF | Performed by: INTERNAL MEDICINE

## 2017-01-12 PROCEDURE — 96415 CHEMO IV INFUSION ADDL HR: CPT

## 2017-01-12 PROCEDURE — A9270 NON-COVERED ITEM OR SERVICE: HCPCS | Mod: ZF | Performed by: INTERNAL MEDICINE

## 2017-01-12 PROCEDURE — 25000128 H RX IP 250 OP 636: Mod: ZF | Performed by: PHYSICIAN ASSISTANT

## 2017-01-12 PROCEDURE — 96375 TX/PRO/DX INJ NEW DRUG ADDON: CPT

## 2017-01-12 RX ORDER — HEPARIN SODIUM (PORCINE) LOCK FLUSH IV SOLN 100 UNIT/ML 100 UNIT/ML
5 SOLUTION INTRAVENOUS
Status: COMPLETED | OUTPATIENT
Start: 2017-01-12 | End: 2017-01-12

## 2017-01-12 RX ORDER — HEPARIN SODIUM (PORCINE) LOCK FLUSH IV SOLN 100 UNIT/ML 100 UNIT/ML
500 SOLUTION INTRAVENOUS ONCE
Status: COMPLETED | OUTPATIENT
Start: 2017-01-12 | End: 2017-01-12

## 2017-01-12 RX ORDER — ACETAMINOPHEN 325 MG/1
650 TABLET ORAL ONCE
Status: COMPLETED | OUTPATIENT
Start: 2017-01-12 | End: 2017-01-12

## 2017-01-12 RX ORDER — DIPHENHYDRAMINE HCL 25 MG
50 CAPSULE ORAL ONCE
Status: COMPLETED | OUTPATIENT
Start: 2017-01-12 | End: 2017-01-12

## 2017-01-12 RX ADMIN — DEXAMETHASONE SODIUM PHOSPHATE 12 MG: 10 INJECTION, SOLUTION INTRAMUSCULAR; INTRAVENOUS at 07:52

## 2017-01-12 RX ADMIN — SODIUM CHLORIDE 250 ML: 9 INJECTION, SOLUTION INTRAVENOUS at 07:36

## 2017-01-12 RX ADMIN — DARATUMUMAB 1000 MG: 100 INJECTION, SOLUTION, CONCENTRATE INTRAVENOUS at 08:49

## 2017-01-12 RX ADMIN — SODIUM CHLORIDE, PRESERVATIVE FREE 500 UNITS: 5 INJECTION INTRAVENOUS at 12:07

## 2017-01-12 RX ADMIN — ACETAMINOPHEN 650 MG: 325 TABLET ORAL at 07:41

## 2017-01-12 RX ADMIN — DIPHENHYDRAMINE HYDROCHLORIDE 50 MG: 25 CAPSULE ORAL at 07:41

## 2017-01-12 RX ADMIN — SODIUM CHLORIDE, PRESERVATIVE FREE 5 ML: 5 INJECTION INTRAVENOUS at 07:22

## 2017-01-12 NOTE — PATIENT INSTRUCTIONS
Contact Numbers    [facility] [place]: [phone]  [facility] [place]:  [phone]    Call triage with chills and/or temperature greater than or equal to 100.5, uncontrolled nausea/vomiting, diarrhea, constipation, dizziness, shortness of breath, chest pain, bleeding, unexplained bruising, or any new/concerning symptoms, questions/concerns.     If you are having any concerning symptoms or wish to speak to a provider before your next infusion visit, please call your care coordinator or triage to notify them so we can adequately serve you.       After Hours: [phone]    If after hours, weekends, or holidays, call main hospital  and ask for Oncology doctor on call.        January 2017 Sunday Monday Tuesday Wednesday Thursday Friday Saturday   1     2     3     4     5     [facility]    3:00 PM   (15 min.)   [facility]     [facility] ONC RETURN    3:30 PM   (30 min.)   Kamari Topete MD   [facility] Blood and Marrow Transplant 6     [facility] RETURN ARRHYTHMIA    1:00 PM   (30 min.)   Dmitri Banks MD   [facility] 7       8     9     10     11     12     [facility] D[facility]    6:30 AM   (15 min.)   [facility]   [facility] Lab Draw     [facility] ONC INFUSION 360    7:00 AM   (360 min.)   [facility] ONCOLOGY INFUSION   [facility]     [facility] RETURN    7:30 AM   (50 min.)   Leanne Reddy PA-C   [facility] 13     14       15     16     17     18     19     20     21       22     23     24     25     26     [facility] D[facility]    8:30 AM   (15 min.)   [facility]   [facility]     [facility] ONC INFUSION 360    9:00 AM   (360 min.)    ONCOLOGY INFUSION   [facility] 27     28       29     30     31 February 2017 Sunday Monday Tuesday Wednesday Thursday Friday Saturday                  1     2     3     4       5     6     7     8     9     [facility]  [facility] DRAW    8:30 AM   (15 min.)    [facility] LAB DRAW   [facility] Lab Draw     [facility] ONC INFUSION 360    9:00 AM   (360 min.)    ONCOLOGY INFUSION   [facility] 10     11       12     13     14     15     16     17     18       19     20     21     22     23     [facility] LAB DRAW    8:30 AM   (15 min.)   [facility] DRAW   [facility] Lab Draw     [facility] ONC INFUSION 360    9:00 AM   (360 min.)    ONCOLOGY INFUSION   [facility] 24     25       26     27     28                                      Lab Results:  Recent Results (from the past 12 hour(s))   Comprehensive metabolic panel    Collection Time: 01/12/17  7:28 AM   Result Value Ref Range    Sodium 142 133 - 144 mmol/L    Potassium 3.7 3.4 - 5.3 mmol/L    Chloride 108 94 - 109 mmol/L    Carbon Dioxide 25 20 - 32 mmol/L    Anion Gap 10 3 - 14 mmol/L    Glucose 165 (H) 70 - 99 mg/dL    Urea Nitrogen 22 7 - 30 mg/dL    Creatinine 0.84 0.66 - 1.25 mg/dL    GFR Estimate 90 >60 mL/min/1.7m2    GFR Estimate If Black >90   GFR Calc   >60 mL/min/1.7m2    Calcium 9.1 8.5 - 10.1 mg/dL    Bilirubin Total 0.6 0.2 - 1.3 mg/dL    Albumin 3.4 3.4 - 5.0 g/dL    Protein Total 7.0 6.8 - 8.8 g/dL    Alkaline Phosphatase 51 40 - 150 U/L    ALT 22 0 - 70 U/L    AST 14 0 - 45 U/L   CBC with platelets differential    Collection Time: 01/12/17  7:28 AM   Result Value Ref Range    WBC 4.4 4.0 - 11.0 10e9/L    RBC Count 3.45 (L) 4.4 - 5.9 10e12/L    Hemoglobin 10.9 (L) 13.3 - 17.7 g/dL    Hematocrit 35.1 (L) 40.0 - 53.0 %     (H) 78 - 100 fl    MCH 31.6 26.5 - 33.0 pg    MCHC 31.1 (L) 31.5 - 36.5 g/dL    RDW 17.7 (H) 10.0 - 15.0 %    Platelet Count 169 150 - 450 10e9/L    Diff Method Automated Method     % Neutrophils 74.9 %    % Lymphocytes 8.1 %    % Monocytes 11.7 %    % Eosinophils 3.2 %    % Basophils 1.6 %    % Immature Granulocytes 0.5 %    Nucleated RBCs 0 0 /100    Absolute Neutrophil 3.3 1.6  - 8.3 10e9/L    Absolute Lymphocytes 0.4 (L) 0.8 - 5.3 10e9/L    Absolute Monocytes 0.5 0.0 - 1.3 10e9/L    Absolute Eosinophils 0.1 0.0 - 0.7 10e9/L    Absolute Basophils 0.1 0.0 - 0.2 10e9/L    Abs Immature Granulocytes 0.0 0 - 0.4 10e9/L    Absolute Nucleated RBC 0.0

## 2017-01-12 NOTE — PROGRESS NOTES
Infusion Nursing Note:  Anthony Carbone presents today for Cycle 2 Day 22 Daratumumab.    Patient seen by provider today: Yes: PREM Herrera    Note: N/A.    Intravenous Access:  Implanted Port.    Treatment Conditions:  HGB     10.9   1/12/2017  WBC      4.4   1/12/2017   ANEU      3.3   1/12/2017  PLT      169   1/12/2017       NA      142   1/12/2017                POTASSIUM      3.7   1/12/2017             CR     0.84   1/12/2017               JAZMIN      9.1   1/12/2017             BILITOTAL      0.6   1/12/2017        ALBUMIN      3.4   1/12/2017                 ALT       22   1/12/2017        AST       14   1/12/2017  Results reviewed, labs MET treatment parameters, ok to proceed with treatment.      Post Infusion Assessment:  Patient tolerated infusion without incident.  Blood return noted pre and post infusion.  Site patent and intact, free from redness, edema or discomfort.  No evidence of extravasations.  Access discontinued per protocol.    Discharge Plan:   Patient declined prescription refills.  Discharge instructions reviewed with: Patient and Wife.  Patient and family verbalized understanding of discharge instructions and all questions answered.  Copy of AVS reviewed with patient and family.  Patient will return 1/26/17 for next appointment.  Patient discharged in stable condition accompanied by: self and wife.  Departure Mode: Ambulatory.    Natividad Caraballo RN

## 2017-01-12 NOTE — MR AVS SNAPSHOT
After Visit Summary   1/12/2017    Anthony Carbone    MRN: [id number]           Patient Information     Date Of Birth          1945        Visit Information        Provider Department      1/12/2017 7:00 AM UC 11 ATC;  ONCOLOGY INFUSION [facility]        Today's Diagnoses     Multiple myeloma not having achieved remission (H)    -  1     Multiple myeloma, remission status unspecified (H)           Care Instructions    Contact Numbers    [facility] Main Line: [phone]  [facility] Triage:  [phone]    Call triage with chills and/or temperature greater than or equal to 100.5, uncontrolled nausea/vomiting, diarrhea, constipation, dizziness, shortness of breath, chest pain, bleeding, unexplained bruising, or any new/concerning symptoms, questions/concerns.     If you are having any concerning symptoms or wish to speak to a provider before your next infusion visit, please call your care coordinator or triage to notify them so we can adequately serve you.       After Hours: [phone]    If after hours, weekends, or holidays, call main hospital  and ask for Oncology doctor on call.        January 2017 Sunday Monday Tuesday Wednesday Thursday Friday Saturday   1     2     3     4     5     [facility]    3:00 PM   (15 min.)    [facility] LAB DRAW   [facility] Lab Draw     [facility] ONC RETURN    3:30 PM   (30 min.)   Kamari Topete MD   [facility] Blood and Marrow Transplant 6     UMP RETURN ARRHYTHMIA    1:00 PM   (30 min.)   Dmitri Banks MD   [facility] 7       8     9     10     11     12     [facility] DRAW    6:30 AM   (15 min.)    [facility] LAB DRAW   [facility] Lab Draw     [facility] ONC INFUSION 360    7:00 AM   (360 min.)   UC ONCOLOGY INFUSION   [facility]     UMP RETURN    7:30 AM   (50 min.)   Leanne Reddy PA-C   [facility] 13     14       15     16     17     18      19     20     21       22     23     24     25     26     [facility] DRAW    8:30 AM   (15 min.)    MASONIC LAB DRAW   [facility] Lab Draw     UMP ONC INFUSION 360    9:00 AM   (360 min.)    ONCOLOGY INFUSION   [facility] 27     28       29 30 31 February 2017 Sunday Monday Tuesday Wednesday Thursday Friday Saturday                  1     2     3     4       5     6     7     8     9     [facility] DRAW    8:30 AM   (15 min.)    M[facility] LAB DRAW   [facility] Lab Draw     [facility] ONC INFUSION 360    9:00 AM   (360 min.)    ONCOLOGY INFUSION   [facility] 10     11       12     13     14     15     16     17     18       19     20     21     22     23     [facility] DRAW    8:30 AM   (15 min.)    M[facility] LAB DRAW   [facility] Lab Draw     [facility] ONC INFUSION 360    9:00 AM   (360 min.)    ONCOLOGY INFUSION   [facility] 24     25 26     27     28                                      Lab Results:  Recent Results (from the past 12 hour(s))   Comprehensive metabolic panel    Collection Time: 01/12/17  7:28 AM   Result Value Ref Range    Sodium 142 133 - 144 mmol/L    Potassium 3.7 3.4 - 5.3 mmol/L    Chloride 108 94 - 109 mmol/L    Carbon Dioxide 25 20 - 32 mmol/L    Anion Gap 10 3 - 14 mmol/L    Glucose 165 (H) 70 - 99 mg/dL    Urea Nitrogen 22 7 - 30 mg/dL    Creatinine 0.84 0.66 - 1.25 mg/dL    GFR Estimate 90 >60 mL/min/1.7m2    GFR Estimate If Black >90   GFR Calc   >60 mL/min/1.7m2    Calcium 9.1 8.5 - 10.1 mg/dL    Bilirubin Total 0.6 0.2 - 1.3 mg/dL    Albumin 3.4 3.4 - 5.0 g/dL    Protein Total 7.0 6.8 - 8.8 g/dL    Alkaline Phosphatase 51 40 - 150 U/L    ALT 22 0 - 70 U/L    AST 14 0 - 45 U/L   CBC with platelets differential    Collection Time: 01/12/17  7:28 AM   Result Value Ref Range    WBC 4.4 4.0 - 11.0 10e9/L    RBC Count 3.45 (L) 4.4 - 5.9  10e12/L    Hemoglobin 10.9 (L) 13.3 - 17.7 g/dL    Hematocrit 35.1 (L) 40.0 - 53.0 %     (H) 78 - 100 fl    MCH 31.6 26.5 - 33.0 pg    MCHC 31.1 (L) 31.5 - 36.5 g/dL    RDW 17.7 (H) 10.0 - 15.0 %    Platelet Count 169 150 - 450 10e9/L    Diff Method Automated Method     % Neutrophils 74.9 %    % Lymphocytes 8.1 %    % Monocytes 11.7 %    % Eosinophils 3.2 %    % Basophils 1.6 %    % Immature Granulocytes 0.5 %    Nucleated RBCs 0 0 /100    Absolute Neutrophil 3.3 1.6 - 8.3 10e9/L    Absolute Lymphocytes 0.4 (L) 0.8 - 5.3 10e9/L    Absolute Monocytes 0.5 0.0 - 1.3 10e9/L    Absolute Eosinophils 0.1 0.0 - 0.7 10e9/L    Absolute Basophils 0.1 0.0 - 0.2 10e9/L    Abs Immature Granulocytes 0.0 0 - 0.4 10e9/L    Absolute Nucleated RBC 0.0                Follow-ups after your visit        Your next 10 appointments already scheduled     Jan 26, 2017  8:30 AM   [facility] Lab Draw with  [facility] LAB DRAW   [facility] ([facility])    [address] [place] [id number] [phone]            Jan 26, 2017  9:00 AM   Infusion 360 with UC ONCOLOGY INFUSION,  23 ATC   [facility] ([facility])    [address] [address]   [phone]            Feb 09, 2017  8:30 AM   Masonic Lab Draw with  MASONIC LAB DRAW   [facility] Masonic Lab Draw ([facility])    [address] [address] [phone]            Feb 09, 2017  9:00 AM   Infusion 360 with UC ONCOLOGY INFUSION, UC 26 ATC   [facility] ([facility])    [address] [address] [phone]            Feb 23, 2017  8:30 AM   [facility]ic Lab Draw with  MASONIC LAB DRAW   [facility] Masonic Lab Draw ([facility])    [address] [facility]  [address] [facility] [address]    [phone]            Feb 23, 2017  9:00 AM   Infusion 360 with UC ONCOLOGY INFUSION, UC 23 ATC   [facility] ([facility])    [address]  2nd Floor  [facility] [address] [phone]            Jul 14, 2017  1:00 PM   (Arrive by 12:45 PM)   RETURN ARRHYTHMIA with Dmitri Banks MD   [facility] ([facility])    [address]  3rd Floor  [place] [address] [phone]              Who to contact     If you have questions or need follow up information about today's clinic visit or your schedule please contact [facility] directly at [phone].  Normal or non-critical lab and imaging results will be communicated to you by MyChart, letter or phone within 4 business days after the clinic has received the results. If you do not hear from us within 7 days, please contact the clinic through MyChart or phone. If you have a critical or abnormal lab result, we will notify you by phone as soon as possible.  Submit refill requests through [employer] or call your pharmacy and they will forward the refill request to us. Please allow 3 business days for your refill to be completed.          Additional Information About Your Visit        [employer]inkle Information     [employer] gives you secure access to your electronic health record. If you see a primary care provider, you can also send messages to your care team and make appointments. If you have questions, please call your primary care clinic.  If you do not have a primary care provider, please call [phone] and they will assist you.        Care EveryWhere ID     This is your Care EveryWhere ID. This could be used by other organizations to access your [facility] medical records  [id number]        Your Vitals Were     Pulse Temperature Respirations Pulse Oximetry          78 98.2  F (36.8  C) (Oral) 18 98%         Blood Pressure from Last 3  Encounters:   01/12/17 132/74   01/06/17 150/84   01/05/17 135/75    Weight from Last 3 Encounters:   01/12/17 64.7 kg (142 lb 10.2 oz)   01/06/17 65.091 kg (143 lb 8 oz)   01/05/17 64.5 kg (142 lb 3.2 oz)              We Performed the Following     CBC with platelets differential     Comprehensive metabolic panel        Primary Care Provider Office Phone # Fax #    Sanket Burciaga [phone]       [facility] [address] [facility] [address]        Thank you!     Thank you for choosing [facility]  for your care. Our goal is always to provide you with excellent care. Hearing back from our patients is one way we can continue to improve our services. Please take a few minutes to complete the written survey that you may receive in the mail after your visit with us. Thank you!             Your Updated Medication List - Protect others around you: Learn how to safely use, store and throw away your medicines at www.disposemymeds.org.          This list is accurate as of: 1/12/17  9:02 AM.  Always use your most recent med list.                   Brand Name Dispense Instructions for use    ACYCLOVIR PO      Take 400 mg by mouth 2 times daily       amLODIPine 5 MG tablet    NORVASC    30 tablet    Take one tablet at bedtime.       clopidogrel 75 MG tablet    PLAVIX    90 tablet    Take 1 tablet (75 mg) by mouth daily       dexamethasone 4 MG tablet    DECADRON    30 tablet    Take 20mg twice weekly on weeks of Kyprolis (3 weeks on, 1 week off, repeat) - Takes the same 2 days as chemo each week       midodrine 2.5 MG tablet    PROAMATINE    90 tablet    Take 1 tablet (2.5 mg) by mouth 3 times daily       NEXIUM PO      Take 40 mg by mouth every morning (before breakfast)       ondansetron 8 MG ODT tab    ZOFRAN-ODT    30 tablet    Take 1 tablet (8 mg) by mouth every 8 hours as needed for nausea       potassium chloride SA 20 MEQ CR tablet    K-DUR/KLOR-CON M    60  tablet    Take 1 tablet (20 mEq) by mouth 2 times daily       SIMVASTATIN PO      Take 20 mg by mouth daily       sucralfate 1 GM tablet    CARAFATE    120 tablet    Take 1 tablet (1 g) by mouth 4 times daily as needed

## 2017-01-12 NOTE — PROGRESS NOTES
HEMATOLOGY/ONCOLOGY PROGRESS NOTE  Jan 12, 2017    REASON FOR VISIT: patient with multiple myeloma    Diagnosis: 71 year old gentleman with IgM lambda multiple myeloma originally diagnosed in 01/2012 as stage I, standard risk disease.   Treatment: Revlimid plus dexamethasone for 4-5 cycles but plateaued by late 03/2012.   - Velcade was added and he received another 2-3 cycles, achieving a good partial remission.     Transplant: Single auto after melphalan 200 mg/m2 preparative regimen on 01/09/2013  - Post-transplant course: unremarkable except for some mild steroid-induced hyperglycemia, gastritis, nausea and vomiting.     Maintenance: Lenalidomide at day-100 then developed a maculopapular rash. Lenalidomide was held and then we re-challenged him after about a month to 2 months at a lower dose (5 mg daily); rash returned.   - was started on maintenance Velcade, every other week through July 2014, when he was noted with abnormalities on myeloma studies drawn there. Noted with prostate cancer dx at about the time of relapse (see below).    Relapse: noted with return on M-spike in blood/urine with marrow involvement but negative PET.   - Started on retreatment with RVD on 8/27/14 with Revlimid at 15 mg 14 days of 21 days, dexamethasone 40 mg weekly and velcade weekly.Completed at total of 5 cycles without complication by 12/2014.   - Started Cycle 6 on inc'd Rev dosing of 20mg daily x 2 weeks on 12/11/14 which was complicated by pneumonia.  - Adm 12/21-1/10 for human metapneumovirus pneumonia complicated by anorexia, HTN, depression, anorexia with significant weight loss.   - Restarted Ernesto/Dex only on 2/4/15 with good tolerance but with thrombocytopenia.   - Bendamustine added to Velcade/dexamethasone on 5/21/15 due to disease progression  - Velcade discontinued on 7/9/2015 due to side effects (orthostasis)  - Schedule changed to bendamustine 80 mg/m2 days 1 and 2 on 28-day cycle  - Cycle 1 tolerated poorly due to  lightheadedness and weakness  - Cycle 2 dose reduced to 60 mg/m2 and pre-meds adjusted  - Cycle 5 received on 9/17/15.  - 10/1/2015 - increasing IgM, M-spike - started Pomalidomide 4mg/day (21 days out of 28 days) and weekly Decadron 20mg on Oct 1st, 2015.    - Carfilzomib added on 10/22/2015 making this CPD.  - C3-C6  received in [place]    - Returned to [facility] and resumed CPD here    - Adm: 4/12-4/14 with fever, confusion, neutropenia. Noted on MRI to have acute/subacute CVA and subacute/chronic CVA; started on Plavix, given brief course of Abx and GCSF prior to d/c.     - Continued on Carfilzomib and dexamethasone alone  - Pomalyst added back on 7/13/2016 for rising FLC  - Start daratumumab 11/10/16. Changed to every other week after 4 weekly treatments d/t profound fatigue and malaise.    INTERVAL HISTORY:    Yamil presents with his wife today for follow-up. He has been feeling very well, feels the fatigue has improved tremendously. He now only needs an occasional nap but is functioning throughout the day. He was busy shoveling yesterday and went to the mall, too. No other concerns with this health. No fevers, chills, cough, SOB, CP, issues with his GERD, nausea, vomiting, abdominal pain, bowel changes, bleeding, or swelling. He is hoping to leave for [place] at the end of the month through April. Remainder of ROS otherwise negative.    PHYSICAL EXAMINATION    Vital Signs 1/12/2017   Systolic 132   Diastolic 74   Pulse 78   Temperature 98.2   Respirations 18   Weight (LB) 142 lb 10.2 oz   Height    BMI    Pain    O2 98     Wt Readings from Last 10 Encounters:   01/12/17 64.7 kg (142 lb 10.2 oz)   01/06/17 65.091 kg (143 lb 8 oz)   01/05/17 64.5 kg (142 lb 3.2 oz)   12/29/16 63.231 kg (139 lb 6.4 oz)   12/23/16 63.186 kg (139 lb 4.8 oz)   12/15/16 63.322 kg (139 lb 9.6 oz)   12/09/16 63.095 kg (139 lb 1.6 oz)   11/30/16 63.866 kg (140 lb 12.8 oz)   11/23/16 62.778 kg (138 lb 6.4 oz)   11/17/16 62.37 kg (137 lb 8 oz)    General: no acute distress.  HEENT: PERRL, no palor or icterus. NECK: supple - no cervical or supraclavicular LAD.  CVS: RRR. CHEST: Fine crackles in the LLL, otherwise clear to auscultation b/l . ABDOMEN: soft non tender no masses palpated in a seated position.  NEURO: AAOX3  Grossly non focal neuro exam.  SKIN: no obvious rashes.  LYMPH NODES: no palpable cervical or supraclavicular LAD.  EXTREMITIES: No edema.    LABS:  Results for WILLIAN ARCINIEGA (MRN [id number]) as of 1/12/2017 08:03   Ref. Range 1/12/2017 07:28   Sodium Latest Ref Range: 133-144 mmol/L 142   Potassium Latest Ref Range: 3.4-5.3 mmol/L 3.7   Chloride Latest Ref Range:  mmol/L 108   Carbon Dioxide Latest Ref Range: 20-32 mmol/L 25   Urea Nitrogen Latest Ref Range: 7-30 mg/dL 22   Creatinine Latest Ref Range: 0.66-1.25 mg/dL 0.84   GFR Estimate Latest Ref Range: >60 mL/min/1.7m2 90   GFR Estimate If Black Latest Ref Range: >60 mL/min/1.7m2 >90...   Calcium Latest Ref Range: 8.5-10.1 mg/dL 9.1   Anion Gap Latest Ref Range: 3-14 mmol/L 10   Albumin Latest Ref Range: 3.4-5.0 g/dL 3.4   Protein Total Latest Ref Range: 6.8-8.8 g/dL 7.0   Bilirubin Total Latest Ref Range: 0.2-1.3 mg/dL 0.6   Alkaline Phosphatase Latest Ref Range:  U/L 51   ALT Latest Ref Range: 0-70 U/L 22   AST Latest Ref Range: 0-45 U/L 14   Glucose Latest Ref Range: 70-99 mg/dL 165 (H)   WBC Latest Ref Range: 4.0-11.0 10e9/L 4.4   Hemoglobin Latest Ref Range: 13.3-17.7 g/dL 10.9 (L)   Hematocrit Latest Ref Range: 40.0-53.0 % 35.1 (L)   Platelet Count Latest Ref Range: 150-450 10e9/L 169     IMPRESSION/PLAN:  71 year old male with relapsed MM after autologous transplant (1/9/2013), with recent progression on carfilzomib therapy, now on Ana/Pom/Dex.    1. IgM multiple myeloma: Started on daratumumab on 11/10/16, changed to QOW due to fatigue with improved tolerance.  Planning to resume Pom at lower dose, as well. Continue checking biomarkers at start of each  cycle. Plan to add Kyprolis back in once approved. Of note, Yamil is hoping to go to [place] for the winter after the 1/26 infusion. Will discuss with Dr. Topete.    2. CV: History of prolonged QTc anad orthostasis. Follows with Dr. Banks.   - Continue amlodipine 5 mg at bedtime, daily BP checks, and midodrine for symptomatic orthostatic hypotension. Avoid QTc prolonging meds.     3. GI: History of GERD, no issues recently  - Continue Nexium QD and Carafate PRN    4. ID: Afebrile without infectious s/s.  - Received flu vaccine for 2016-17 season    5. Neuro: Continues on clopidogrel for suspected subacute CVA 4/2016    6. Fatigue: Significantly improved. Now only needing an occasional nap. Busy shoveling, etc now.    7. Derm: Scalp lesion. Derm referral placed last week but has not heard from scheduling. Encouraged Yamil to call the scheduling line.    Leanne Reddy PA-C    Addendum  January 13, 2017    Discussed plan with Dr. Topete. Plan to keep the daratumumab at QOW, but will try to conitnue the Pomalyst at 4 mg. OK to go to [place] after 1/26 infusion.  ECT

## 2017-01-12 NOTE — PROGRESS NOTES
"SPIRITUAL HEALTH SERVICES  SPIRITUAL ASSESSMENT Progress Note  [facility]    PRIMARY FOCUS:     Support for coping    ILLNESS CIRCUMSTANCES:   Reviewed documentation. Reflective conversation shared with pt Yamil and his wife Casey which integrated elements of illness and family narratives.     Context of Serious Illness/Symptom(s) - Yamil and Casey celebrated that Yamil has more energy now with a change in his meds.  They are happy that his chemo is \"working\" and that he is feeling more energetic.    Resources for Support - Friends, family, Judaism.    DISTRESS:     Emotional/Existential/Relational Distress - Yamil continues to worry about his sister Monique who has terminal cancer.  Yamil said that her doctors are trying to manage the cancer to \"give her more time but \"there's nothing else they can do.\"    Spiritual/Voodoo Distress - Not discussed    Social/Cultural/Economic Distress - Not discussed.    SPIRITUAL/Sikhism COPING:     Yarsani/Milagros - Casey is Pentecostalism.  Yamil is Druze    Spiritual Practice(s) - Prayer, which we shared together.  Anabaptism attendance and for Casey, singing with a choir.    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   o Yamil spoke of the cemetery plot his family has in [place], [place], saying that his sister Monique and her  would be buried there.  He said the plot is almost full but there is one more spot in this family plot in the cemetery that will be for him. We discussed how he finds some comfort and connection knowing that he will be buried in this cemetery where he knows most of the names on the gravestones.  o Since Yamil's sister Monique's health is declining, Casey and Yamil hope to spend the rest of the winter in [place] and visit Monique in [place] while they are there. Yamil said he has no particular hopes for " "the visit, just to \"spend time together.\"    GOALS OF CARE:    Goals of Care - Yamil and Casey are hoping to go to [place] soon and finish his treatments there.  They also hope to visit Yamil's sister Monique in [place] while they are in [place].    Meaning/Sense-Making - Yamil values the many relationships he has developed during his life and is grateful that he has more energy now to live his life and share these memories.    PLAN: Either I or another [facility] chaplaincy team member will follow up with Casey and Yamil as Yamil receives ongoing therapies at the [facility].    Nallely Billings M.Div.   Intern  Pager [phone]    "

## 2017-01-12 NOTE — NURSING NOTE
Chief Complaint   Patient presents with     Port Draw     port access and draw     Port in right chest wall accessed with 3/4 in gripper needle and covered with gauze dressing.  Labs drawn and port flushed with NS and heparin  Sarahy Ballesteros RN

## 2017-01-12 NOTE — Clinical Note
1/12/2017      RE: Anthony Thomasjacksonbaljit  [address] [facility] [place] [facility] [address]       HEMATOLOGY/ONCOLOGY PROGRESS NOTE  Jan 12, 2017    REASON FOR VISIT: patient with multiple myeloma    Diagnosis: 71 year old gentleman with IgM lambda multiple myeloma originally diagnosed in 01/2012 as stage I, standard risk disease.   Treatment: Revlimid plus dexamethasone for 4-5 cycles but plateaued by late 03/2012.   - Velcade was added and he received another 2-3 cycles, achieving a good partial remission.     Transplant: Single auto after melphalan 200 mg/m2 preparative regimen on 01/09/2013  - Post-transplant course: unremarkable except for some mild steroid-induced hyperglycemia, gastritis, nausea and vomiting.     Maintenance: Lenalidomide at day-100 then developed a maculopapular rash. Lenalidomide was held and then we re-challenged him after about a month to 2 months at a lower dose (5 mg daily); rash returned.   - was started on maintenance Velcade, every other week through July 2014, when he was noted with abnormalities on myeloma studies drawn there. Noted with prostate cancer dx at about the time of relapse (see below).    Relapse: noted with return on M-spike in blood/urine with marrow involvement but negative PET.   - Started on retreatment with RVD on 8/27/14 with Revlimid at 15 mg 14 days of 21 days, dexamethasone 40 mg weekly and velcade weekly.Completed at total of 5 cycles without complication by 12/2014.   - Started Cycle 6 on inc'd Rev dosing of 20mg daily x 2 weeks on 12/11/14 which was complicated by pneumonia.  - Adm 12/21-1/10 for human metapneumovirus pneumonia complicated by anorexia, HTN, depression, anorexia with significant weight loss.   - Restarted Ernesto/Dex only on 2/4/15 with good tolerance but with thrombocytopenia.   - Bendamustine added to Velcade/dexamethasone on 5/21/15 due to disease progression  - Velcade discontinued on 7/9/2015 due to side effects (orthostasis)  - Schedule changed  to bendamustine 80 mg/m2 days 1 and 2 on 28-day cycle  - Cycle 1 tolerated poorly due to lightheadedness and weakness  - Cycle 2 dose reduced to 60 mg/m2 and pre-meds adjusted  - Cycle 5 received on 9/17/15.  - 10/1/2015 - increasing IgM, M-spike - started Pomalidomide 4mg/day (21 days out of 28 days) and weekly Decadron 20mg on Oct 1st, 2015.    - Carfilzomib added on 10/22/2015 making this CPD.  - C3-C6  received in [place]    - Returned to [facility] and resumed CPD here    - Adm: 4/12-4/14 with fever, confusion, neutropenia. Noted on MRI to have acute/subacute CVA and subacute/chronic CVA; started on Plavix, given brief course of Abx and GCSF prior to d/c.     - Continued on Carfilzomib and dexamethasone alone  - Pomalyst added back on 7/13/2016 for rising FLC  - Start daratumumab 11/10/16. Changed to every other week after 4 weekly treatments d/t profound fatigue and malaise.    INTERVAL HISTORY:    Yamil presents with his wife today for follow-up. He has been feeling very well, feels the fatigue has improved tremendously. He now only needs an occasional nap but is functioning throughout the day. He was busy shoveling yesterday and went to the mall, too. No other concerns with this health. No fevers, chills, cough, SOB, CP, issues with his GERD, nausea, vomiting, abdominal pain, bowel changes, bleeding, or swelling. He is hoping to leave for [place] at the end of the month through April. Remainder of ROS otherwise negative.    PHYSICAL EXAMINATION    Vital Signs 1/12/2017   Systolic 132   Diastolic 74   Pulse 78   Temperature 98.2   Respirations 18   Weight (LB) 142 lb 10.2 oz   Height    BMI    Pain    O2 98     Wt Readings from Last 10 Encounters:   01/12/17 64.7 kg (142 lb 10.2 oz)   01/06/17 65.091 kg (143 lb 8 oz)   01/05/17 64.5 kg (142 lb 3.2 oz)   12/29/16 63.231 kg (139 lb 6.4 oz)   12/23/16 63.186 kg (139 lb 4.8 oz)   12/15/16 63.322 kg (139 lb 9.6 oz)   12/09/16 63.095 kg (139 lb 1.6 oz)   11/30/16 63.866 kg  (140 lb 12.8 oz)   11/23/16 62.778 kg (138 lb 6.4 oz)   11/17/16 62.37 kg (137 lb 8 oz)   General: no acute distress.  HEENT: PERRL, no palor or icterus. NECK: supple - no cervical or supraclavicular LAD.  CVS: RRR. CHEST: Fine crackles in the LLL, otherwise clear to auscultation b/l . ABDOMEN: soft non tender no masses palpated in a seated position.  NEURO: AAOX3  Grossly non focal neuro exam.  SKIN: no obvious rashes.  LYMPH NODES: no palpable cervical or supraclavicular LAD.  EXTREMITIES: No edema.    LABS:  Results for WILLIAN ARCINIEGA (MRN [id number]) as of 1/12/2017 08:03   Ref. Range 1/12/2017 07:28   Sodium Latest Ref Range: 133-144 mmol/L 142   Potassium Latest Ref Range: 3.4-5.3 mmol/L 3.7   Chloride Latest Ref Range:  mmol/L 108   Carbon Dioxide Latest Ref Range: 20-32 mmol/L 25   Urea Nitrogen Latest Ref Range: 7-30 mg/dL 22   Creatinine Latest Ref Range: 0.66-1.25 mg/dL 0.84   GFR Estimate Latest Ref Range: >60 mL/min/1.7m2 90   GFR Estimate If Black Latest Ref Range: >60 mL/min/1.7m2 >90...   Calcium Latest Ref Range: 8.5-10.1 mg/dL 9.1   Anion Gap Latest Ref Range: 3-14 mmol/L 10   Albumin Latest Ref Range: 3.4-5.0 g/dL 3.4   Protein Total Latest Ref Range: 6.8-8.8 g/dL 7.0   Bilirubin Total Latest Ref Range: 0.2-1.3 mg/dL 0.6   Alkaline Phosphatase Latest Ref Range:  U/L 51   ALT Latest Ref Range: 0-70 U/L 22   AST Latest Ref Range: 0-45 U/L 14   Glucose Latest Ref Range: 70-99 mg/dL 165 (H)   WBC Latest Ref Range: 4.0-11.0 10e9/L 4.4   Hemoglobin Latest Ref Range: 13.3-17.7 g/dL 10.9 (L)   Hematocrit Latest Ref Range: 40.0-53.0 % 35.1 (L)   Platelet Count Latest Ref Range: 150-450 10e9/L 169     IMPRESSION/PLAN:  71 year old male with relapsed MM after autologous transplant (1/9/2013), with recent progression on carfilzomib therapy, now on Ana/Pom/Dex.    1. IgM multiple myeloma: Started on daratumumab on 11/10/16, changed to QOW due to fatigue with improved tolerance.  Planning  to resume Pom at lower dose, as well. Continue checking biomarkers at start of each cycle. Plan to add Kyprolis back in once approved. Of note, Yamil is hoping to go to [place] for the winter after the 1/26 infusion. Will discuss with Dr. Topete.    2. CV: History of prolonged QTc anad orthostasis. Follows with Dr. Banks.   - Continue amlodipine 5 mg at bedtime, daily BP checks, and midodrine for symptomatic orthostatic hypotension. Avoid QTc prolonging meds.     3. GI: History of GERD, no issues recently  - Continue Nexium QD and Carafate PRN    4. ID: Afebrile without infectious s/s.  - Received flu vaccine for 2016-17 season    5. Neuro: Continues on clopidogrel for suspected subacute CVA 4/2016    6. Fatigue: Significantly improved. Now only needing an occasional nap. Busy shoveling, etc now.    7. Derm: Scalp lesion. Derm referral placed last week but has not heard from scheduling. Encouraged Yamil to call the scheduling line.    Leanne Reddy PA-C    Addendum  January 13, 2017    Discussed plan with Dr. Topete. Plan to keep the daratumumab at QOW, but will try to conitnue the Pomalyst at 4 mg. OK to go to [place] after 1/26 infusion.  ECT    Leanne Reddy PA-C

## 2017-01-13 ENCOUNTER — CARE COORDINATION (OUTPATIENT)
Dept: ONCOLOGY | Facility: CLINIC | Age: 72
End: 2017-01-13

## 2017-01-13 DIAGNOSIS — C90.00 MYELOMA (H): Primary | ICD-10-CM

## 2017-01-13 NOTE — PROGRESS NOTES
Called Yamil and Casey this morning to update them per Dr. Topete's request.  Dr. Topete would like to keep Yamil on Pomalyst 4 MG daily for 21 days of a 28 day cycle.  He also stated that after the 1/26 Daratumumab infusion it is okay for them to go to [place].  The Scharpings verbalized understanding and were grateful for the follow up call.    Called Yamil this morning to review with him the basic survey questions required of someone taking Pomalyst. Reviewed that Anthony can cause severe birth defects or death to unborn babies. Informed him, that his semen may contain Pomalyst even after he stops treatment (up to 4 weeks after stopping) and that he must use a latex or synthetic condom every time he has sex with a women of child bearing age. Anthony understands that Pomalyst is only for him and not to be shared with others and should be kept out the reach of small children. He understands that he will have to complete a mandatory confidential survey monthly while taking Pomalyst and that he is to return any unused  capsules to "" for proper disposal. He understands that he will not be able to donate blood or sperm while taking Pomalyst and for 4 weeks after stopping the medication. The appropriate paper work was completed and the prescription will be sent to the [facility]. The authorization number for the prescription is [id number] . Reviewed with Anthony that he is to take the Pomalyst 4 MG daily for 21 days out of a 28 day cycle. Anthony verbalized understanding of Pomalyst and how it is to be handle. Reviewed with Anthony his schedule, the important phone numbers (triage and after hours). Encouraged Anthony to call if he had any questions.

## 2017-01-16 ENCOUNTER — CARE COORDINATION (OUTPATIENT)
Dept: ONCOLOGY | Facility: CLINIC | Age: 72
End: 2017-01-16

## 2017-01-16 NOTE — PROGRESS NOTES
Called patient this afternoon to see if he had received his Pomalyst.  Patient will have it delivered tonight. He will start taking it tomorrow morning on 1/17/17.  Patient also wanted to inform us that he will be leaving for [place] as of 1/29/17. He is set up to see the MD there on 1/31/17.

## 2017-01-17 ENCOUNTER — OFFICE VISIT (OUTPATIENT)
Dept: DERMATOLOGY | Facility: CLINIC | Age: 72
End: 2017-01-17

## 2017-01-17 DIAGNOSIS — D48.5 NEOPLASM OF UNCERTAIN BEHAVIOR OF SKIN: Primary | ICD-10-CM

## 2017-01-17 DIAGNOSIS — L57.0 AK (ACTINIC KERATOSIS): ICD-10-CM

## 2017-01-17 PROCEDURE — 88305 TISSUE EXAM BY PATHOLOGIST: CPT | Performed by: DERMATOLOGY

## 2017-01-17 RX ORDER — LIDOCAINE HYDROCHLORIDE AND EPINEPHRINE 10; 10 MG/ML; UG/ML
3 INJECTION, SOLUTION INFILTRATION; PERINEURAL ONCE
Qty: 3 ML | Refills: 0 | OUTPATIENT
Start: 2017-01-17 | End: 2017-01-17

## 2017-01-17 ASSESSMENT — PAIN SCALES - GENERAL: PAINLEVEL: NO PAIN (0)

## 2017-01-17 NOTE — PATIENT INSTRUCTIONS
Wound Care After a Biopsy    What is a skin biopsy?  A skin biopsy allows the doctor to examine a very small piece of tissue under the microscope to determine the diagnosis and the best treatment for the skin condition. A local anesthetic (numbing medicine)  is injected with a very small needle into the skin area to be tested. A small piece of skin is taken from the area. Sometimes a suture (stitch) is used.     What are the risks of a skin biopsy?  I will experience scar, bleeding, swelling, pain, crusting and redness. I may experience incomplete removal or recurrence. Risks of this procedure are excessive bleeding, bruising, infection, nerve damage, numbness, thick (hypertrophic or keloidal) scar and non-diagnostic biopsy.    How should I care for my wound for the first 24 hours?    Keep the wound dry and covered for 24 hours    If it bleeds, hold direct pressure on the area for 15 minutes. If bleeding does not stop then go to the emergency room    Avoid strenuous exercise the first 1-2 days or as your doctor instructs you    How should I care for the wound after 24 hours?    After 24 hours, remove the bandage    You may bathe or shower as normal    If you had a scalp biopsy, you can shampoo as usual and can use shower water to clean the biopsy site daily    Clean the wound twice a day with gentle soap and water    Do not scrub, be gentle    Apply white petroleum/Vaseline after cleaning the wound with a cotton swab or a clean finger, and keep the site covered with a Bandaid /bandage. Bandages are not necessary with a scalp biopsy    If you are unable to cover the site with a Bandaid /bandage, re-apply ointment 2-3 times a day to keep the site moist. Moisture will help with healing    Avoid strenuous activity for first 1-2 days    Avoid lakes, rivers, pools, and oceans until the stitches are removed or the site is healed    How do I clean my wound?    Wash hands for 15 minutes with soap or use hand  before  all wound care    Clean the wound with gentle soap and water    Apply white petroleum/Vaseline  to wound after it is clean    Replace the Bandaid /bandage to keep the wound covered for the first few days or as instructed by your doctor    If you had a scalp biopsy, warm shower water to the area on a daily basis should suffice    What should I use to clean my wound?     Cotton-tipped applicators (Qtips )    White petroleum jelly (Vaseline ). Use a clean new container and use Q-tips to apply.    Bandaids   as needed    Gentle soap     How should I care for my wound long term?    Do not get your wound dirty    Keep up with wound care for one week or until the area is healed.    A small scab will form and fall off by itself when the area is completely healed. The area will be red and will become pink in color as it heals. Sun protection is very important for how your scar will turn out. Sunscreen with an SPF 30 or greater is recommended once the area is healed.    If you have stitches, stitches need to be removed in  days. You may return to our clinic for this or you may have it done locally at your doctor s office.    You should have some soreness but it should be mild and slowly go away over several days. Talk to your doctor about using tylenol for pain,    When should I call my doctor?  If you have increased:     Pain or swelling    Pus or drainage (clear or slightly yellow drainage is ok)    Temperature over 100F    Spreading redness or warmth around wound    When will I hear about my results?  The biopsy results can take 2-3 weeks to come back. The clinic will call you with the results, send you a Gera-ITt message, or have you schedule a follow-up clinic or phone time to discuss the results. Contact our clinics if you do not hear from us in 3 weeks.     Who should I call with questions?    [facility]: [phone]     [facility]: [phone]    For  urgent needs outside of business hours call the [facility] at [phone] and ask for the dermatology resident on call      Cryotherapy    What is it?    Use of a very cold liquid, such as liquid nitrogen, to freeze and destroy abnormal skin cells that need to be removed    What should I expect?    Tenderness and redness    A small blister that might grow and fill with dark purple blood. There may be crusting.    More than one treatment may be needed if the lesions do not go away.    How do I care for the treated area?    Gently wash the area with your hands when bathing.    Use a thin layer of Vaseline to help with healing. You may use a Band-Aid.     The area should heal within 7-10 days and may leave behind a pink or lighter color.     Do not use an antibiotic or Neosporin ointment.     You may take acetaminophen (Tylenol) for pain.     Call your Doctor if you have:    Severe pain    Signs of infection (warmth, redness, cloudy yellow drainage, and or a bad smell)    Questions or concerns    Who should I call with questions?       [facility]: [phone]       [facility]: [phone]       For urgent needs outside of business hours call the [facility] at [phone]        and ask for the dermatology resident on call

## 2017-01-17 NOTE — Clinical Note
1/17/2017       RE: Anthony Carbone  [address] [place] [address]     Dear Colleague,    Thank you for referring your patient, Anthony Carbone, to the [facility] DERMATOLOGY at [facility]. Please see a copy of my visit note below.    [facility] Dermatology Note      Dermatology Problem List:  1. Neoplasm of uncertain behavior on the L mid scalp. The differential diagnosis includes SCC vs inflamed keratosis, s/p bx 1/17/16  2. AKs: LN    Encounter Date: Jan 17, 2017    CC:   Lesion on scalp    History of Present Illness:  Mr. Anthony Carbone is a 71 year old male who presents in consultation from Dr. Kamari Topete for evaluation of a spot on his scalp. Pt states that area has been present for about a month. This spot has not grown in size or otherwise changed since he first noticed it. Lesion is not tender, painful, or otherwise symptomatic. Pt denies any other new, changing, or symptomatic lesions of concern. Pt otherwise feeling well without any other concerns. Pt does have a history of multiple myeloma for which he is regularly seeing Dr. Topete.     Past Medical History:   Patient Active Problem List   Diagnosis     Multiple myeloma (H)     Hypertension     Multiple myeloma (H)     Neutropenic fever (H)     Encounter for long-term current use of medication     GERD (gastroesophageal reflux disease)     Pneumonia     Syncope     Syncope, unspecified syncope type     Myeloma (H)     Altered mental status     Past Medical History   Diagnosis Date     Hypertension      Hx of migraines      haven;t bothered him since 2003     Nonulcer dyspepsia      Coronary artery disease, non-occlusive Jan 2012     Polio age 6     no sequelae     Malignant neoplasm (H) 1/2012     Prostate cancer (H)      Myeloma (H)      multiple     Past Surgical History   Procedure Laterality Date     Right inguinal hernia surgery       Right toe surgery        Colonoscopy       Esophagoscopy, gastroscopy, duodenoscopy (egd), combined N/A 1/6/2015     Procedure: COMBINED ESOPHAGOSCOPY, GASTROSCOPY, DUODENOSCOPY (EGD);  Surgeon: Yamil Donaldson Chi, MD;  Location:  GI     Esophagoscopy, gastroscopy, duodenoscopy (egd), combined Left 8/20/2015     Procedure: COMBINED ESOPHAGOSCOPY, GASTROSCOPY, DUODENOSCOPY (EGD), BIOPSY SINGLE OR MULTIPLE;  Surgeon: Mane Barragan MD;  Location:  GI       Social History:   The patient denies use of tanning beds.    Family History:  There is no family history of skin cancer.    Medications:  Current Outpatient Prescriptions   Medication Sig Dispense Refill     pomalidomide (POMALYST) 4 MG capsule Take 1 capsule (4 mg) by mouth daily for 21 days Swallow whole, do NOT break, chew or open capsule. Take 2 hours before or after food. 21 capsule 0     potassium chloride SA (K-DUR/KLOR-CON M) 20 MEQ CR tablet Take 1 tablet (20 mEq) by mouth 2 times daily 60 tablet 3     ACYCLOVIR PO Take 400 mg by mouth 2 times daily        ondansetron (ZOFRAN-ODT) 8 MG ODT tab Take 1 tablet (8 mg) by mouth every 8 hours as needed for nausea 30 tablet 3     amLODIPine (NORVASC) 5 MG tablet Take one tablet at bedtime. 30 tablet 6     dexamethasone (DECADRON) 4 MG tablet Take 20mg twice weekly on weeks of Kyprolis (3 weeks on, 1 week off, repeat) - Takes the same 2 days as chemo each week 30 tablet 3     clopidogrel (PLAVIX) 75 MG tablet Take 1 tablet (75 mg) by mouth daily 90 tablet 3     midodrine (PROAMATINE) 2.5 MG tablet Take 1 tablet (2.5 mg) by mouth 3 times daily 90 tablet 6     sucralfate (CARAFATE) 1 GM tablet Take 1 tablet (1 g) by mouth 4 times daily as needed 120 tablet 1     Esomeprazole Magnesium (NEXIUM PO) Take 40 mg by mouth every morning (before breakfast)       SIMVASTATIN PO Take 20 mg by mouth daily           Allergies   Allergen Reactions     Aspirin      Stomach upset     Bactrim [Sulfamethoxazole W-Trimethoprim] Rash         Review of  Systems:  -As per HPI  -Constitutional: The patient denies fatigue, fevers, chills, unintended weight loss, and night sweats.  -HEENT: Patient denies nonhealing oral sores.  -Skin: As above in HPI. No additional skin concerns.    Physical exam:  Vitals: There were no vitals taken for this visit.  GEN: This is a well developed, well-nourished male in no acute distress, in a pleasant mood.    SKIN: Focused examination of the scalp, face was performed.  - hyperkeratotic, non-tender firm papule with central ulceration and hemorrhagic crust on L midscalp  - few scattered pink, gritty papules on the posterior scalp, b/l helices, FH  -No other lesions of concern on areas examined.     Impression/Plan:  1. Neoplasm of uncertain behavior on the L mid scalp. The differential diagnosis includes SCC vs inflamed keratosis.     Shave biopsy:  After discussion of benefits and risks including but not limited to bleeding/bruising, pain/swelling, infection, scar, incomplete removal, nerve damage/numbness, recurrence, and non-diagnostic biopsy, written consent, verbal consent and photographs were obtained. Time-out was performed. The area was cleaned with isopropyl alcohol. 1mL of 1% lidocaine with epinephrine was injected to obtain adequate anesthesia of the lesion on the L mid scalp. A shave biopsy was performed. Hemostasis was achieved with aluminium chloride. Vaseline and a sterile dressing were applied. The patient tolerated the procedure and no complications were noted. The patient was provided with verbal and written post care instructions.       2. Actinic keratosis    Cryotherapy procedure note: After verbal consent and discussion of risks and benefits including but no limited to dyspigmentation/scar, blister, and pain, 4 were treated with 1-2mm freeze border for 2 cycles with liquid nitrogen. Post cryotherapy instructions were provided.         Follow-up prn for new or changing lesions or pending biopsy results.         Abad staffed the patient.    Staff Involved:  Resident(Shane Olmos)/Staff(as above)    I have seen and examined this patient and agree with the assessment and plan as documented in the resident's note.    Broderick Melgoza MD  Dermatology Attending          Pictures taken of patient today to be placed in chart for future reference.    Again, thank you for allowing me to participate in the care of your patient.      Sincerely,    Broderick Melgoza MD

## 2017-01-17 NOTE — NURSING NOTE
Dermatology Rooming Note    Anthony Carbone's goals for this visit include:   Chief Complaint   Patient presents with     Derm Problem     Anthony comes to clinic with concerns of a new lesion on his left scalp     Jessenia Venegas LPN

## 2017-01-17 NOTE — MR AVS SNAPSHOT
After Visit Summary   1/17/2017    Anthony Carbone    MRN: [id number]           Patient Information     Date Of Birth          1945        Visit Information        Provider Department      1/17/2017 10:00 AM Broderick Melgoza MD [facility] Dermatology        Today's Diagnoses     Neoplasm of uncertain behavior of skin    -  1       Care Instructions    Wound Care After a Biopsy    What is a skin biopsy?  A skin biopsy allows the doctor to examine a very small piece of tissue under the microscope to determine the diagnosis and the best treatment for the skin condition. A local anesthetic (numbing medicine)  is injected with a very small needle into the skin area to be tested. A small piece of skin is taken from the area. Sometimes a suture (stitch) is used.     What are the risks of a skin biopsy?  I will experience scar, bleeding, swelling, pain, crusting and redness. I may experience incomplete removal or recurrence. Risks of this procedure are excessive bleeding, bruising, infection, nerve damage, numbness, thick (hypertrophic or keloidal) scar and non-diagnostic biopsy.    How should I care for my wound for the first 24 hours?    Keep the wound dry and covered for 24 hours    If it bleeds, hold direct pressure on the area for 15 minutes. If bleeding does not stop then go to the emergency room    Avoid strenuous exercise the first 1-2 days or as your doctor instructs you    How should I care for the wound after 24 hours?    After 24 hours, remove the bandage    You may bathe or shower as normal    If you had a scalp biopsy, you can shampoo as usual and can use shower water to clean the biopsy site daily    Clean the wound twice a day with gentle soap and water    Do not scrub, be gentle    Apply white petroleum/Vaseline after cleaning the wound with a cotton swab or a clean finger, and keep the site covered with a Bandaid /bandage. Bandages are not necessary with a scalp biopsy    If you  are unable to cover the site with a Bandaid /bandage, re-apply ointment 2-3 times a day to keep the site moist. Moisture will help with healing    Avoid strenuous activity for first 1-2 days    Avoid lakes, rivers, pools, and oceans until the stitches are removed or the site is healed    How do I clean my wound?    Wash hands for 15 minutes with soap or use hand  before all wound care    Clean the wound with gentle soap and water    Apply white petroleum/Vaseline  to wound after it is clean    Replace the Bandaid /bandage to keep the wound covered for the first few days or as instructed by your doctor    If you had a scalp biopsy, warm shower water to the area on a daily basis should suffice    What should I use to clean my wound?     Cotton-tipped applicators (Qtips )    White petroleum jelly (Vaseline ). Use a clean new container and use Q-tips to apply.    Bandaids   as needed    Gentle soap     How should I care for my wound long term?    Do not get your wound dirty    Keep up with wound care for one week or until the area is healed.    A small scab will form and fall off by itself when the area is completely healed. The area will be red and will become pink in color as it heals. Sun protection is very important for how your scar will turn out. Sunscreen with an SPF 30 or greater is recommended once the area is healed.    If you have stitches, stitches need to be removed in  days. You may return to our clinic for this or you may have it done locally at your doctor s office.    You should have some soreness but it should be mild and slowly go away over several days. Talk to your doctor about using tylenol for pain,    When should I call my doctor?  If you have increased:     Pain or swelling    Pus or drainage (clear or slightly yellow drainage is ok)    Temperature over 100F    Spreading redness or warmth around wound    When will I hear about my results?  The biopsy results can take 2-3 weeks to come  back. The clinic will call you with the results, send you a [employer]ee [employer] message, or have you schedule a follow-up clinic or phone time to discuss the results. Contact our clinics if you do not hear from us in 3 weeks.     Who should I call with questions?    [facility]: [phone]     [facility]: [phone]    For urgent needs outside of business hours call the [facility] at [phone] and ask for the dermatology resident on call      Cryotherapy    What is it?    Use of a very cold liquid, such as liquid nitrogen, to freeze and destroy abnormal skin cells that need to be removed    What should I expect?    Tenderness and redness    A small blister that might grow and fill with dark purple blood. There may be crusting.    More than one treatment may be needed if the lesions do not go away.    How do I care for the treated area?    Gently wash the area with your hands when bathing.    Use a thin layer of Vaseline to help with healing. You may use a Band-Aid.     The area should heal within 7-10 days and may leave behind a pink or lighter color.     Do not use an antibiotic or Neosporin ointment.     You may take acetaminophen (Tylenol) for pain.     Call your Doctor if you have:    Severe pain    Signs of infection (warmth, redness, cloudy yellow drainage, and or a bad smell)    Questions or concerns    Who should I call with questions?       [facility]: [phone]       [facility]: [phone]       For urgent needs outside of business hours call the [facility] at [phone]        and ask for the dermatology resident on call              Follow-ups after your visit        Your next 10 appointments already scheduled     Jan 26, 2017  8:30 AM   M[facility] Lab Draw with  MAS[facility]IC LAB DRAW   [facility] Lab Draw ([facility])    [address]  [address]nd [address]  [place] [place] [address] [phone]            Jan 26, 2017  9:00 AM   Infusion 360 with UC ONCOLOGY INFUSION, UC 23 ATC   [facility] ([facility])    [address]  [address]  [place] [place] [address] [phone]            Jul 14, 2017  1:00 PM   (Arrive by 12:45 PM)   RETURN ARRHYTHMIA with Dmitri Banks MD   [facility]note 2[facility]    [address]  3rd Floor  [place] [place] [address] [phone]              Who to contact     Please call your clinic at [phone] to:    Ask questions about your health    Make or cancel appointments    Discuss your medicines    Learn about your test results    Speak to your doctor   If you have compliments or concerns about an experience at your clinic, or if you wish to file a complaint, please contact [facility] Patient Relations at [phone] or email us at [email or web address]         Additional Information About Your Visit        [employer] Information     "[employer] gives you secure access to your electronic health record. If you see a primary care provider, you can also send messages to your care team and make appointments. If you have questions, please call your primary care clinic.  If you do not have a primary care provider, please call [phone] and they will assist you.      "[employer] is an electronic gateway that provides easy, online access to your medical records. With "[employer] you can request a clinic appointment, read your test results, renew a prescription or communicate with your care team.     To access your existing account, please contact your [facility] Clinic or call [phone] for assistance.        Care EveryWhere ID     This is your Care EveryWhere ID. This could be used by other organizations to access your [facility] medical records  [id number]         Blood  Pressure from Last 3 Encounters:   01/12/17 132/74   01/06/17 150/84   01/05/17 135/75    Weight from Last 3 Encounters:   01/12/17 64.7 kg (142 lb 10.2 oz)   01/06/17 65.091 kg (143 lb 8 oz)   01/05/17 64.5 kg (142 lb 3.2 oz)              We Performed the Following     BIOPSY SKIN/SUBQ/MUC MEM, SINGLE LESION          Today's Medication Changes          These changes are accurate as of: 1/17/17 10:56 AM.  If you have any questions, ask your nurse or doctor.               Start taking these medicines.        Dose/Directions    lidocaine 1% with EPINEPHrine 1:100,000 1 %-1:041294 injection   Used for:  Neoplasm of uncertain behavior of skin   Started by:  Broderick Melgoza MD        Dose:  3 mL   Inject 3 mLs into the skin once for 1 dose   Quantity:  3 mL   Refills:  0            Where to get your medicines      Some of these will need a paper prescription and others can be bought over the counter.  Ask your nurse if you have questions.     You don't need a prescription for these medications    - lidocaine 1% with EPINEPHrine 1:100,000 1 %-1:156922 injection             Primary Care Provider Office Phone # Fax #    Sanket Burciaga [phone]       [place] [id number] [facility] [address]        Thank you!     Thank you for choosing [facility] DERMATOLOGY  for your care. Our goal is always to provide you with excellent care. Hearing back from our patients is one way we can continue to improve our services. Please take a few minutes to complete the written survey that you may receive in the mail after your visit with us. Thank you!             Your Updated Medication List - Protect others around you: Learn how to safely use, store and throw away your medicines at www.disposemymeds.org.          This list is accurate as of: 1/17/17 10:56 AM.  Always use your most recent med list.                   Brand Name Dispense Instructions for use    ACYCLOVIR PO      Take 400 mg by mouth  2 times daily       amLODIPine 5 MG tablet    NORVASC    30 tablet    Take one tablet at bedtime.       clopidogrel 75 MG tablet    PLAVIX    90 tablet    Take 1 tablet (75 mg) by mouth daily       dexamethasone 4 MG tablet    DECADRON    30 tablet    Take 20mg twice weekly on weeks of Kyprolis (3 weeks on, 1 week off, repeat) - Takes the same 2 days as chemo each week       lidocaine 1% with EPINEPHrine 1:100,000 1 %-1:937572 injection     3 mL    Inject 3 mLs into the skin once for 1 dose       midodrine 2.5 MG tablet    PROAMATINE    90 tablet    Take 1 tablet (2.5 mg) by mouth 3 times daily       NEXIUM PO      Take 40 mg by mouth every morning (before breakfast)       ondansetron 8 MG ODT tab    ZOFRAN-ODT    30 tablet    Take 1 tablet (8 mg) by mouth every 8 hours as needed for nausea       pomalidomide 4 MG capsule    POMALYST    21 capsule    Take 1 capsule (4 mg) by mouth daily for 21 days Swallow whole, do NOT break, chew or open capsule. Take 2 hours before or after food.       potassium chloride SA 20 MEQ CR tablet    K-DUR/KLOR-CON M    60 tablet    Take 1 tablet (20 mEq) by mouth 2 times daily       SIMVASTATIN PO      Take 20 mg by mouth daily       sucralfate 1 GM tablet    CARAFATE    120 tablet    Take 1 tablet (1 g) by mouth 4 times daily as needed

## 2017-01-17 NOTE — PROGRESS NOTES
[facility] Dermatology Note      Dermatology Problem List:  1. Neoplasm of uncertain behavior on the L mid scalp. The differential diagnosis includes SCC vs inflamed keratosis, s/p bx 1/17/16  2. AKs: LN    Encounter Date: Jan 17, 2017    CC:   Lesion on scalp    History of Present Illness:  Mr. Anthony Carbone is a 71 year old male who presents in consultation from Dr. Kamari Topete for evaluation of a spot on his scalp. Pt states that area has been present for about a month. This spot has not grown in size or otherwise changed since he first noticed it. Lesion is not tender, painful, or otherwise symptomatic. Pt denies any other new, changing, or symptomatic lesions of concern. Pt otherwise feeling well without any other concerns. Pt does have a history of multiple myeloma for which he is regularly seeing Dr. Topete.     Past Medical History:   Patient Active Problem List   Diagnosis     Multiple myeloma (H)     Hypertension     Multiple myeloma (H)     Neutropenic fever (H)     Encounter for long-term current use of medication     GERD (gastroesophageal reflux disease)     Pneumonia     Syncope     Syncope, unspecified syncope type     Myeloma (H)     Altered mental status     Past Medical History   Diagnosis Date     Hypertension      Hx of migraines      haven;t bothered him since 2003     Nonulcer dyspepsia      Coronary artery disease, non-occlusive Jan 2012     Polio age 6     no sequelae     Malignant neoplasm (H) 1/2012     Prostate cancer (H)      Myeloma (H)      multiple     Past Surgical History   Procedure Laterality Date     Right inguinal hernia surgery       Right toe surgery       Colonoscopy       Esophagoscopy, gastroscopy, duodenoscopy (egd), combined N/A 1/6/2015     Procedure: COMBINED ESOPHAGOSCOPY, GASTROSCOPY, DUODENOSCOPY (EGD);  Surgeon: Yamil Donaldson Chi, MD;  Location:  GI     Esophagoscopy, gastroscopy, duodenoscopy (egd), combined Left 8/20/2015      Procedure: COMBINED ESOPHAGOSCOPY, GASTROSCOPY, DUODENOSCOPY (EGD), BIOPSY SINGLE OR MULTIPLE;  Surgeon: Mane Barragan MD;  Location:  GI       Social History:   The patient denies use of tanning beds.    Family History:  There is no family history of skin cancer.    Medications:  Current Outpatient Prescriptions   Medication Sig Dispense Refill     pomalidomide (POMALYST) 4 MG capsule Take 1 capsule (4 mg) by mouth daily for 21 days Swallow whole, do NOT break, chew or open capsule. Take 2 hours before or after food. 21 capsule 0     potassium chloride SA (K-DUR/KLOR-CON M) 20 MEQ CR tablet Take 1 tablet (20 mEq) by mouth 2 times daily 60 tablet 3     ACYCLOVIR PO Take 400 mg by mouth 2 times daily        ondansetron (ZOFRAN-ODT) 8 MG ODT tab Take 1 tablet (8 mg) by mouth every 8 hours as needed for nausea 30 tablet 3     amLODIPine (NORVASC) 5 MG tablet Take one tablet at bedtime. 30 tablet 6     dexamethasone (DECADRON) 4 MG tablet Take 20mg twice weekly on weeks of Kyprolis (3 weeks on, 1 week off, repeat) - Takes the same 2 days as chemo each week 30 tablet 3     clopidogrel (PLAVIX) 75 MG tablet Take 1 tablet (75 mg) by mouth daily 90 tablet 3     midodrine (PROAMATINE) 2.5 MG tablet Take 1 tablet (2.5 mg) by mouth 3 times daily 90 tablet 6     sucralfate (CARAFATE) 1 GM tablet Take 1 tablet (1 g) by mouth 4 times daily as needed 120 tablet 1     Esomeprazole Magnesium (NEXIUM PO) Take 40 mg by mouth every morning (before breakfast)       SIMVASTATIN PO Take 20 mg by mouth daily           Allergies   Allergen Reactions     Aspirin      Stomach upset     Bactrim [Sulfamethoxazole W-Trimethoprim] Rash         Review of Systems:  -As per HPI  -Constitutional: The patient denies fatigue, fevers, chills, unintended weight loss, and night sweats.  -HEENT: Patient denies nonhealing oral sores.  -Skin: As above in HPI. No additional skin concerns.    Physical exam:  Vitals: There were no vitals taken for this  visit.  GEN: This is a well developed, well-nourished male in no acute distress, in a pleasant mood.    SKIN: Focused examination of the scalp, face was performed.  - hyperkeratotic, non-tender firm papule with central ulceration and hemorrhagic crust on L midscalp  - few scattered pink, gritty papules on the posterior scalp, b/l helices, FH  -No other lesions of concern on areas examined.     Impression/Plan:  1. Neoplasm of uncertain behavior on the L mid scalp. The differential diagnosis includes SCC vs inflamed keratosis.     Shave biopsy:  After discussion of benefits and risks including but not limited to bleeding/bruising, pain/swelling, infection, scar, incomplete removal, nerve damage/numbness, recurrence, and non-diagnostic biopsy, written consent, verbal consent and photographs were obtained. Time-out was performed. The area was cleaned with isopropyl alcohol. 1mL of 1% lidocaine with epinephrine was injected to obtain adequate anesthesia of the lesion on the L mid scalp. A shave biopsy was performed. Hemostasis was achieved with aluminium chloride. Vaseline and a sterile dressing were applied. The patient tolerated the procedure and no complications were noted. The patient was provided with verbal and written post care instructions.       2. Actinic keratosis    Cryotherapy procedure note: After verbal consent and discussion of risks and benefits including but no limited to dyspigmentation/scar, blister, and pain, 4 were treated with 1-2mm freeze border for 2 cycles with liquid nitrogen. Post cryotherapy instructions were provided.         Follow-up prn for new or changing lesions or pending biopsy results.       Dr. Melgoza staffed the patient.    Staff Involved:  Resident(Shane Olmos)/Staff(as above)    I have seen and examined this patient and agree with the assessment and plan as documented in the resident's note.    Broderick Melgoza MD  Dermatology Attending

## 2017-01-19 LAB — COPATH REPORT: NORMAL

## 2017-01-23 DIAGNOSIS — C44.42 SQUAMOUS CELL CARCINOMA OF SCALP: Primary | ICD-10-CM

## 2017-01-25 DIAGNOSIS — C90.00 MULTIPLE MYELOMA, REMISSION STATUS UNSPECIFIED (H): ICD-10-CM

## 2017-01-25 DIAGNOSIS — C90.00 MULTIPLE MYELOMA NOT HAVING ACHIEVED REMISSION (H): Primary | ICD-10-CM

## 2017-01-26 ENCOUNTER — INFUSION THERAPY VISIT (OUTPATIENT)
Dept: ONCOLOGY | Facility: CLINIC | Age: 72
End: 2017-01-26
Attending: INTERNAL MEDICINE
Payer: MEDICARE

## 2017-01-26 ENCOUNTER — APPOINTMENT (OUTPATIENT)
Dept: LAB | Facility: CLINIC | Age: 72
End: 2017-01-26
Attending: INTERNAL MEDICINE
Payer: MEDICARE

## 2017-01-26 VITALS
OXYGEN SATURATION: 96 % | DIASTOLIC BLOOD PRESSURE: 79 MMHG | HEART RATE: 65 BPM | RESPIRATION RATE: 18 BRPM | WEIGHT: 142.4 LBS | SYSTOLIC BLOOD PRESSURE: 132 MMHG | BODY MASS INDEX: 24.43 KG/M2 | TEMPERATURE: 97.7 F

## 2017-01-26 DIAGNOSIS — C90.00 MULTIPLE MYELOMA, REMISSION STATUS UNSPECIFIED (H): ICD-10-CM

## 2017-01-26 DIAGNOSIS — C90.00 MULTIPLE MYELOMA NOT HAVING ACHIEVED REMISSION (H): Primary | ICD-10-CM

## 2017-01-26 LAB
ALBUMIN SERPL-MCNC: 3.3 G/DL (ref 3.4–5)
ALP SERPL-CCNC: 62 U/L (ref 40–150)
ALT SERPL W P-5'-P-CCNC: 15 U/L (ref 0–70)
ANION GAP SERPL CALCULATED.3IONS-SCNC: 10 MMOL/L (ref 3–14)
AST SERPL W P-5'-P-CCNC: 7 U/L (ref 0–45)
BASOPHILS # BLD AUTO: 0 10E9/L (ref 0–0.2)
BASOPHILS NFR BLD AUTO: 0.7 %
BILIRUB SERPL-MCNC: 0.4 MG/DL (ref 0.2–1.3)
BUN SERPL-MCNC: 19 MG/DL (ref 7–30)
CALCIUM SERPL-MCNC: 9.1 MG/DL (ref 8.5–10.1)
CHLORIDE SERPL-SCNC: 106 MMOL/L (ref 94–109)
CO2 SERPL-SCNC: 24 MMOL/L (ref 20–32)
CREAT SERPL-MCNC: 0.9 MG/DL (ref 0.66–1.25)
DIFFERENTIAL METHOD BLD: ABNORMAL
EOSINOPHIL # BLD AUTO: 0.1 10E9/L (ref 0–0.7)
EOSINOPHIL NFR BLD AUTO: 2.6 %
ERYTHROCYTE [DISTWIDTH] IN BLOOD BY AUTOMATED COUNT: 16.6 % (ref 10–15)
GFR SERPL CREATININE-BSD FRML MDRD: 83 ML/MIN/1.7M2
GLUCOSE SERPL-MCNC: 128 MG/DL (ref 70–99)
HCT VFR BLD AUTO: 33.9 % (ref 40–53)
HGB BLD-MCNC: 10.8 G/DL (ref 13.3–17.7)
IGM SERPL-MCNC: 1540 MG/DL (ref 60–265)
IMM GRANULOCYTES # BLD: 0 10E9/L (ref 0–0.4)
IMM GRANULOCYTES NFR BLD: 0.9 %
KAPPA LC UR-MCNC: ABNORMAL MG/DL (ref 0.33–1.94)
KAPPA LC/LAMBDA SER: ABNORMAL {RATIO} (ref 0.26–1.65)
LAMBDA LC SERPL-MCNC: 26.5 MG/DL (ref 0.57–2.63)
LYMPHOCYTES # BLD AUTO: 0.1 10E9/L (ref 0.8–5.3)
LYMPHOCYTES NFR BLD AUTO: 2.4 %
MCH RBC QN AUTO: 31.9 PG (ref 26.5–33)
MCHC RBC AUTO-ENTMCNC: 31.9 G/DL (ref 31.5–36.5)
MCV RBC AUTO: 100 FL (ref 78–100)
MONOCYTES # BLD AUTO: 0.3 10E9/L (ref 0–1.3)
MONOCYTES NFR BLD AUTO: 5.7 %
NEUTROPHILS # BLD AUTO: 4 10E9/L (ref 1.6–8.3)
NEUTROPHILS NFR BLD AUTO: 87.7 %
NRBC # BLD AUTO: 0 10*3/UL
NRBC BLD AUTO-RTO: 0 /100
PLATELET # BLD AUTO: 140 10E9/L (ref 150–450)
POTASSIUM SERPL-SCNC: 4.1 MMOL/L (ref 3.4–5.3)
PROT SERPL-MCNC: 7.3 G/DL (ref 6.8–8.8)
RBC # BLD AUTO: 3.39 10E12/L (ref 4.4–5.9)
SODIUM SERPL-SCNC: 140 MMOL/L (ref 133–144)
WBC # BLD AUTO: 4.6 10E9/L (ref 4–11)

## 2017-01-26 PROCEDURE — 96415 CHEMO IV INFUSION ADDL HR: CPT

## 2017-01-26 PROCEDURE — 82784 ASSAY IGA/IGD/IGG/IGM EACH: CPT | Performed by: INTERNAL MEDICINE

## 2017-01-26 PROCEDURE — 96413 CHEMO IV INFUSION 1 HR: CPT

## 2017-01-26 PROCEDURE — 84165 PROTEIN E-PHORESIS SERUM: CPT | Performed by: INTERNAL MEDICINE

## 2017-01-26 PROCEDURE — 80053 COMPREHEN METABOLIC PANEL: CPT | Performed by: INTERNAL MEDICINE

## 2017-01-26 PROCEDURE — 25000132 ZZH RX MED GY IP 250 OP 250 PS 637: Mod: ZF | Performed by: INTERNAL MEDICINE

## 2017-01-26 PROCEDURE — 83883 ASSAY NEPHELOMETRY NOT SPEC: CPT | Performed by: INTERNAL MEDICINE

## 2017-01-26 PROCEDURE — 25000125 ZZHC RX 250: Mod: ZF | Performed by: INTERNAL MEDICINE

## 2017-01-26 PROCEDURE — 96375 TX/PRO/DX INJ NEW DRUG ADDON: CPT

## 2017-01-26 PROCEDURE — A9270 NON-COVERED ITEM OR SERVICE: HCPCS | Mod: ZF | Performed by: INTERNAL MEDICINE

## 2017-01-26 PROCEDURE — 25000130 H RX MED GY IP 250 OP 259 PS 637: Mod: ZF | Performed by: INTERNAL MEDICINE

## 2017-01-26 PROCEDURE — 85025 COMPLETE CBC W/AUTO DIFF WBC: CPT | Performed by: INTERNAL MEDICINE

## 2017-01-26 PROCEDURE — 25000128 H RX IP 250 OP 636: Mod: ZF | Performed by: INTERNAL MEDICINE

## 2017-01-26 PROCEDURE — 00000402 ZZHCL STATISTIC TOTAL PROTEIN: Performed by: INTERNAL MEDICINE

## 2017-01-26 RX ORDER — HEPARIN SODIUM (PORCINE) LOCK FLUSH IV SOLN 100 UNIT/ML 100 UNIT/ML
5 SOLUTION INTRAVENOUS DAILY PRN
Status: DISCONTINUED | OUTPATIENT
Start: 2017-01-26 | End: 2017-01-26 | Stop reason: HOSPADM

## 2017-01-26 RX ORDER — HEPARIN SODIUM (PORCINE) LOCK FLUSH IV SOLN 100 UNIT/ML 100 UNIT/ML
500 SOLUTION INTRAVENOUS ONCE
Status: DISCONTINUED | OUTPATIENT
Start: 2017-01-26 | End: 2017-01-26 | Stop reason: HOSPADM

## 2017-01-26 RX ORDER — ACETAMINOPHEN 325 MG/1
650 TABLET ORAL ONCE
Status: COMPLETED | OUTPATIENT
Start: 2017-01-26 | End: 2017-01-26

## 2017-01-26 RX ORDER — DIPHENHYDRAMINE HCL 25 MG
50 CAPSULE ORAL ONCE
Status: COMPLETED | OUTPATIENT
Start: 2017-01-26 | End: 2017-01-26

## 2017-01-26 RX ADMIN — ACETAMINOPHEN 650 MG: 325 TABLET ORAL at 09:14

## 2017-01-26 RX ADMIN — DEXAMETHASONE SODIUM PHOSPHATE 12 MG: 10 INJECTION, SOLUTION INTRAMUSCULAR; INTRAVENOUS at 09:16

## 2017-01-26 RX ADMIN — DIPHENHYDRAMINE HYDROCHLORIDE 50 MG: 25 CAPSULE ORAL at 09:14

## 2017-01-26 RX ADMIN — DARATUMUMAB 1000 MG: 100 INJECTION, SOLUTION, CONCENTRATE INTRAVENOUS at 09:54

## 2017-01-26 RX ADMIN — SODIUM CHLORIDE, PRESERVATIVE FREE 5 ML: 5 INJECTION INTRAVENOUS at 13:07

## 2017-01-26 RX ADMIN — SODIUM CHLORIDE 250 ML: 9 INJECTION, SOLUTION INTRAVENOUS at 09:14

## 2017-01-26 RX ADMIN — SODIUM CHLORIDE, PRESERVATIVE FREE 5 ML: 5 INJECTION INTRAVENOUS at 08:49

## 2017-01-26 ASSESSMENT — PAIN SCALES - GENERAL: PAINLEVEL: NO PAIN (0)

## 2017-01-26 NOTE — PROGRESS NOTES
SPIRITUAL HEALTH SERVICES  SPIRITUAL ASSESSMENT Progress Note  [facility]    Follow-up visit with pt Yamil and his wife Casey.  They celebrated that they will be leaving for [place] this Friday and look forward to spending time there with friends, while Yamil continues his treatment at a clinic there.  Yamil also reflected on his plans to visit his sister Monique in [place].  She is entering hospice for her ovarian cancer, and Yamil and Casey hope to spend some time with her, depending on how her energy for visitors is.      Shared prayer for their safe travels and for their time with Monique. Yamil said he will likely return to [place] sometime in the late spring or early summer.  I told them that I would likely not be working in the clinic at that time, but that other members of the [facility] chaplaincy team would be available for ongoing support.    PLAN:  Either I or another [facility] chaplaincy team member will follow up with Yamil and Casey as Yamil receives ongoing therapies at the [facility].    Nallely Billings M.Div.   Intern  Pager [phone]

## 2017-01-26 NOTE — PROGRESS NOTES
Infusion Nursing Note:  Anthony Carbone presents today for cycle 3, day 1 daratumumab.    Patient seen by provider today: No   present during visit today: Not Applicable.    Note: N/A.    Intravenous Access:  Implanted Port.    Treatment Conditions:  HGB     10.8   1/26/2017  WBC      4.6   1/26/2017   ANEU      4.0   1/26/2017  PLT      140   1/26/2017     NA      140   1/26/2017                POTASSIUM      4.1   1/26/2017               CR     0.90   1/26/2017              JAZMIN      9.1   1/26/2017             BILITOTAL      0.4   1/26/2017        ALBUMIN      3.3   1/26/2017                 ALT       15   1/26/2017        AST        7   1/26/2017  Results reviewed, labs MET treatment parameters, ok to proceed with treatment.      Post Infusion Assessment:  Patient tolerated infusion without incident.  Blood return noted pre and post infusion.  Site patent and intact, free from redness, edema or discomfort.  No evidence of extravasations.  Access discontinued per protocol.    Discharge Plan:   Patient declined prescription refills.  Discharge instructions reviewed with: Patient.  Patient and/or family verbalized understanding of discharge instructions and all questions answered.  Copy of AVS reviewed with patient and/or family.  Patient is going to [place] on Sunday 1/29 and will be receiving future treatments there. He will call to schedule future visits at [facility] when he returns in late spring  Patient discharged in stable condition accompanied by: self and wife.  Departure Mode: Ambulatory.  Face to Face time: 0.    Rhea Mcgraw RN

## 2017-01-26 NOTE — MR AVS SNAPSHOT
After Visit Summary   1/26/2017    Anthony Carbone    MRN: [id number]           Patient Information     Date Of Birth          1945        Visit Information        Provider Department      1/26/2017 9:00 AM UC 23 ATC;  ONCOLOGY INFUSION [facility]        Today's Diagnoses     Multiple myeloma not having achieved remission (H)    -  1     Multiple myeloma, remission status unspecified (H)           Care Instructions    Contact Numbers    [facility] Main Line: [phone]  [facility] Triage:  [phone]    Call triage with chills and/or temperature greater than or equal to 100.5, uncontrolled nausea/vomiting, diarrhea, constipation, dizziness, shortness of breath, chest pain, bleeding, unexplained bruising, or any new/concerning symptoms, questions/concerns.     If you are having any concerning symptoms or wish to speak to a provider before your next infusion visit, please call your care coordinator or triage to notify them so we can adequately serve you.       After Hours: [phone]    If after hours, weekends, or holidays, call main hospital  and ask for Oncology doctor on call.           January 2017 Sunday Monday Tuesday Wednesday Thursday Friday Saturday   1     2     3     4     5     [facility]    3:00 PM   (15 min.)    [facility] DRAW   [facility] Lab Draw     [facility] ONC RETURN    3:30 PM   (30 min.)   Kamari Topete MD   [facility] Blood and Marrow Transplant 6     [facility] RETURN ARRHYTHMIA    1:00 PM   (30 min.)   Dmitri Banks MD   [facility] 7       8     9     10     11     12     [facility]    6:30 AM   (15 min.)    [facility] DRAW   [facility] Lab Draw     [facility] ONC INFUSION 360    7:00 AM   (360 min.)    ONCOLOGY INFUSION   [facility]     [facility] RETURN    7:30 AM   (50 min.)   Leanne Reddy PA-C   [facility] 13     14       15     16     17     [facility]  NEW   10:00 AM   (15 min.)   Broderick Melgoza MD   [facility] Dermatology 18     19     20     21       22     23     24     25     26     [facility] DRAW    8:30 AM   (15 min.)    [facility] DRAW   [facility] Lab Draw     [facility] ONC INFUSION 360    9:00 AM   (360 min.)    ONCOLOGY INFUSION   [facility] 27     28       29     30 31 February 2017 Sunday Monday Tuesday Wednesday Thursday Friday Saturday                  1     2     3     4       5     6     7     8     9     10     11       12     13     14     15     16     17     18       19     20     21     22     23     24     25       26     27     28                                     Recent Results (from the past 24 hour(s))   Protein electrophoresis    Collection Time: 01/26/17  8:59 AM   Result Value Ref Range    Albumin Fraction Pending 3.7 - 5.1 g/dL    Alpha 1 Fraction Pending 0.2 - 0.4 g/dL    Alpha 2 Fraction Pending 0.5 - 0.9 g/dL    Beta Fraction Pending 0.6 - 1.0 g/dL    Gamma Fraction Pending 0.7 - 1.6 g/dL    Monoclonal Peak Pending 0.0 g/dL    ELP Interpretation: Pending    Comprehensive metabolic panel    Collection Time: 01/26/17  8:59 AM   Result Value Ref Range    Sodium 140 133 - 144 mmol/L    Potassium 4.1 3.4 - 5.3 mmol/L    Chloride 106 94 - 109 mmol/L    Carbon Dioxide 24 20 - 32 mmol/L    Anion Gap 10 3 - 14 mmol/L    Glucose 128 (H) 70 - 99 mg/dL    Urea Nitrogen 19 7 - 30 mg/dL    Creatinine 0.90 0.66 - 1.25 mg/dL    GFR Estimate 83 >60 mL/min/1.7m2    GFR Estimate If Black >90   GFR Calc   >60 mL/min/1.7m2    Calcium 9.1 8.5 - 10.1 mg/dL    Bilirubin Total 0.4 0.2 - 1.3 mg/dL    Albumin 3.3 (L) 3.4 - 5.0 g/dL    Protein Total 7.3 6.8 - 8.8 g/dL    Alkaline Phosphatase 62 40 - 150 U/L    ALT 15 0 - 70 U/L    AST 7 0 - 45 U/L   CBC with platelets differential    Collection Time: 01/26/17  8:59 AM   Result Value Ref Range    WBC 4.6 4.0 - 11.0  10e9/L    RBC Count 3.39 (L) 4.4 - 5.9 10e12/L    Hemoglobin 10.8 (L) 13.3 - 17.7 g/dL    Hematocrit 33.9 (L) 40.0 - 53.0 %     78 - 100 fl    MCH 31.9 26.5 - 33.0 pg    MCHC 31.9 31.5 - 36.5 g/dL    RDW 16.6 (H) 10.0 - 15.0 %    Platelet Count 140 (L) 150 - 450 10e9/L    Diff Method Automated Method     % Neutrophils 87.7 %    % Lymphocytes 2.4 %    % Monocytes 5.7 %    % Eosinophils 2.6 %    % Basophils 0.7 %    % Immature Granulocytes 0.9 %    Nucleated RBCs 0 0 /100    Absolute Neutrophil 4.0 1.6 - 8.3 10e9/L    Absolute Lymphocytes 0.1 (L) 0.8 - 5.3 10e9/L    Absolute Monocytes 0.3 0.0 - 1.3 10e9/L    Absolute Eosinophils 0.1 0.0 - 0.7 10e9/L    Absolute Basophils 0.0 0.0 - 0.2 10e9/L    Abs Immature Granulocytes 0.0 0 - 0.4 10e9/L    Absolute Nucleated RBC 0.0                  Follow-ups after your visit        Your next 10 appointments already scheduled     Jul 14, 2017  1:00 PM   (Arrive by 12:45 PM)   RETURN ARRHYTHMIA with Dmitri Banks MD   [facility] ([facility])    [address] [place] [id number] [phone]              Who to contact     If you have questions or need follow up information about today's clinic visit or your schedule please contact [facility] directly at [phone].  Normal or non-critical lab and imaging results will be communicated to you by MyChart, letter or phone within 4 business days after the clinic has received the results. If you do not hear from us within 7 days, please contact the clinic through MyChart or phone. If you have a critical or abnormal lab result, we will notify you by phone as soon as possible.  Submit refill requests through [employer] or call your pharmacy and they will forward the refill request to us. Please allow 3 business days for your refill to be completed.          Additional Information About Your Visit        [employer] Information     [employer] gives you  secure access to your electronic health record. If you see a primary care provider, you can also send messages to your care team and make appointments. If you have questions, please call your primary care clinic.  If you do not have a primary care provider, please call [phone] and they will assist you.        Care EveryWhere ID     This is your Care EveryWhere ID. This could be used by other organizations to access your [place] medical records  [id number]        Your Vitals Were     Pulse Temperature Respirations Pulse Oximetry          65 97.7  F (36.5  C) (Oral) 18 96%         Blood Pressure from Last 3 Encounters:   01/26/17 132/79   01/12/17 132/74   01/06/17 150/84    Weight from Last 3 Encounters:   01/26/17 64.592 kg (142 lb 6.4 oz)   01/12/17 64.7 kg (142 lb 10.2 oz)   01/06/17 65.091 kg (143 lb 8 oz)              We Performed the Following     CBC with platelets differential     Comprehensive metabolic panel     IgM     Kappa and lambda light chain     Protein electrophoresis     Treatment Conditions        Primary Care Provider Office Phone # Fax #    Sanket Burciaga [phone]       [facility] [address] [facility] [address]        Thank you!     Thank you for choosing [facility] CANCER [facility]  for your care. Our goal is always to provide you with excellent care. Hearing back from our patients is one way we can continue to improve our services. Please take a few minutes to complete the written survey that you may receive in the mail after your visit with us. Thank you!             Your Updated Medication List - Protect others around you: Learn how to safely use, store and throw away your medicines at www.disposemymeds.org.          This list is accurate as of: 1/26/17 11:25 AM.  Always use your most recent med list.                   Brand Name Dispense Instructions for use    ACYCLOVIR PO      Take 400 mg by mouth 2 times daily       amLODIPine 5 MG  tablet    NORVASC    30 tablet    Take one tablet at bedtime.       clopidogrel 75 MG tablet    PLAVIX    90 tablet    Take 1 tablet (75 mg) by mouth daily       dexamethasone 4 MG tablet    DECADRON    30 tablet    Take 20mg twice weekly on weeks of Kyprolis (3 weeks on, 1 week off, repeat) - Takes the same 2 days as chemo each week       midodrine 2.5 MG tablet    PROAMATINE    90 tablet    Take 1 tablet (2.5 mg) by mouth 3 times daily       NEXIUM PO      Take 40 mg by mouth every morning (before breakfast)       ondansetron 8 MG ODT tab    ZOFRAN-ODT    30 tablet    Take 1 tablet (8 mg) by mouth every 8 hours as needed for nausea       pomalidomide 4 MG capsule    POMALYST    21 capsule    Take 1 capsule (4 mg) by mouth daily for 21 days Swallow whole, do NOT break, chew or open capsule. Take 2 hours before or after food.       potassium chloride SA 20 MEQ CR tablet    K-DUR/KLOR-CON M    60 tablet    Take 1 tablet (20 mEq) by mouth 2 times daily       SIMVASTATIN PO      Take 20 mg by mouth daily       sucralfate 1 GM tablet    CARAFATE    120 tablet    Take 1 tablet (1 g) by mouth 4 times daily as needed

## 2017-01-26 NOTE — NURSING NOTE
Chief Complaint   Patient presents with     Port Draw     port accessed by RN, labs collected and sent, line flushed with NS and heparin, pt tolerated . Vitals taken and pt checked in for next appt.     Emma Izaguirre

## 2017-01-26 NOTE — PATIENT INSTRUCTIONS
Contact Numbers    [facility]: [phone]  [facility] Triage:  [phone]    Call triage with chills and/or temperature greater than or equal to 100.5, uncontrolled nausea/vomiting, diarrhea, constipation, dizziness, shortness of breath, chest pain, bleeding, unexplained bruising, or any new/concerning symptoms, questions/concerns.     If you are having any concerning symptoms or wish to speak to a provider before your next infusion visit, please call your care coordinator or triage to notify them so we can adequately serve you.       After Hours: [phone]    If after hours, weekends, or holidays, call main hospital  and ask for Oncology doctor on call.           January 2017 Sunday Monday Tuesday Wednesday Thursday Friday Saturday   1     2     3     4     5     [facility]    3:00 PM   (15 min.)   [facility]     [facility] ONC RETURN    3:30 PM   (30 min.)   Kamari Topete MD   [facility] Blood and Marrow Transplant 6     [facility] RETURN ARRHYTHMIA    1:00 PM   (30 min.)   Dmitri Banks MD   [facility] 7       8     9     10     11     12     [facility] DRAW    6:30 AM   (15 min.)   [facility]   [facility] Lab Draw     [facility] ONC INFUSION 360    7:00 AM   (360 min.)    ONCOLOGY INFUSION   [facility]     [facility] RETURN    7:30 AM   (50 min.)   Leanne Reddy PA-C   [facility] 13     14       15     16     17     [facility] NEW   10:00 AM   (15 min.)   Broderick Melgoza MD   [facility] Dermatology 18     19     20     21       22     23     24     25     26     [facility] D[facility]    8:30 AM   (15 min.)   [facility]   [facility] Lab Draw     [facility] ONC INFUSION 360    9:00 AM   (360 min.)    ONCOLOGY INFUSION   [facility] 27 28 29     30     31 February 2017 Sunday Monday Tuesday Wednesday Thursday Friday  Saturday                  1     2     3     4       5     6     7     8     9     10     11       12     13     14     15     16     17     18       19     20     21     22     23     24     25       26     27     28                                     Recent Results (from the past 24 hour(s))   Protein electrophoresis    Collection Time: 01/26/17  8:59 AM   Result Value Ref Range    Albumin Fraction Pending 3.7 - 5.1 g/dL    Alpha 1 Fraction Pending 0.2 - 0.4 g/dL    Alpha 2 Fraction Pending 0.5 - 0.9 g/dL    Beta Fraction Pending 0.6 - 1.0 g/dL    Gamma Fraction Pending 0.7 - 1.6 g/dL    Monoclonal Peak Pending 0.0 g/dL    ELP Interpretation: Pending    Comprehensive metabolic panel    Collection Time: 01/26/17  8:59 AM   Result Value Ref Range    Sodium 140 133 - 144 mmol/L    Potassium 4.1 3.4 - 5.3 mmol/L    Chloride 106 94 - 109 mmol/L    Carbon Dioxide 24 20 - 32 mmol/L    Anion Gap 10 3 - 14 mmol/L    Glucose 128 (H) 70 - 99 mg/dL    Urea Nitrogen 19 7 - 30 mg/dL    Creatinine 0.90 0.66 - 1.25 mg/dL    GFR Estimate 83 >60 mL/min/1.7m2    GFR Estimate If Black >90   GFR Calc   >60 mL/min/1.7m2    Calcium 9.1 8.5 - 10.1 mg/dL    Bilirubin Total 0.4 0.2 - 1.3 mg/dL    Albumin 3.3 (L) 3.4 - 5.0 g/dL    Protein Total 7.3 6.8 - 8.8 g/dL    Alkaline Phosphatase 62 40 - 150 U/L    ALT 15 0 - 70 U/L    AST 7 0 - 45 U/L   CBC with platelets differential    Collection Time: 01/26/17  8:59 AM   Result Value Ref Range    WBC 4.6 4.0 - 11.0 10e9/L    RBC Count 3.39 (L) 4.4 - 5.9 10e12/L    Hemoglobin 10.8 (L) 13.3 - 17.7 g/dL    Hematocrit 33.9 (L) 40.0 - 53.0 %     78 - 100 fl    MCH 31.9 26.5 - 33.0 pg    MCHC 31.9 31.5 - 36.5 g/dL    RDW 16.6 (H) 10.0 - 15.0 %    Platelet Count 140 (L) 150 - 450 10e9/L    Diff Method Automated Method     % Neutrophils 87.7 %    % Lymphocytes 2.4 %    % Monocytes 5.7 %    % Eosinophils 2.6 %    % Basophils 0.7 %    % Immature Granulocytes 0.9 %    Nucleated RBCs 0  0 /100    Absolute Neutrophil 4.0 1.6 - 8.3 10e9/L    Absolute Lymphocytes 0.1 (L) 0.8 - 5.3 10e9/L    Absolute Monocytes 0.3 0.0 - 1.3 10e9/L    Absolute Eosinophils 0.1 0.0 - 0.7 10e9/L    Absolute Basophils 0.0 0.0 - 0.2 10e9/L    Abs Immature Granulocytes 0.0 0 - 0.4 10e9/L    Absolute Nucleated RBC 0.0

## 2017-01-27 LAB
ALBUMIN SERPL ELPH-MCNC: 3.5 G/DL (ref 3.7–5.1)
ALPHA1 GLOB SERPL ELPH-MCNC: 0.4 G/DL (ref 0.2–0.4)
ALPHA2 GLOB SERPL ELPH-MCNC: 1 G/DL (ref 0.5–0.9)
B-GLOBULIN SERPL ELPH-MCNC: 0.8 G/DL (ref 0.6–1)
ELP INTERPRETATION: ABNORMAL
GAMMA GLOB SERPL ELPH-MCNC: 1.1 G/DL (ref 0.7–1.6)
M PROTEIN SERPL ELPH-MCNC: 0.9 G/DL

## 2017-01-31 ENCOUNTER — TRANSFERRED RECORDS (OUTPATIENT)
Dept: HEALTH INFORMATION MANAGEMENT | Facility: CLINIC | Age: 72
End: 2017-01-31

## 2017-01-31 LAB
% GRANULOCYTES: 73.5 %
ALBUMIN SERPL-MCNC: 3.7 G/DL
ALBUMIN SERPL-MCNC: 37.5 % (ref 48.1–59.5)
ALBUMIN/GLOB SERPL: 0.6 {RATIO}
ALBUMIN/GLOB SERPL: 1.4 {RATIO}
ALP SERPL-CCNC: 58 U/L
ALPHA-1-GLOBULIN: 6 % (ref 2.3–4.9)
ALPHA-2-GLOBULIN: 18.2 % (ref 6.9–13)
ALT SERPL-CCNC: 12 U/L
ANION GAP SERPL CALCULATED.3IONS-SCNC: ABNORMAL MMOL/L
AST SERPL-CCNC: 8 U/L
BASOPHILS # BLD AUTO: 0 10*3/UL
BASOPHILS NFR BLD AUTO: 0.2 %
BETA GLOBULIN: 15.4 % (ref 13.8–19.7)
BILIRUB SERPL-MCNC: 0.4 MG/DL
BUN SERPL-MCNC: 26 MG/DL
BUN/CREATININE RATIO: 28.9
CALCIUM SERPL-MCNC: 9.7 MG/DL
CHLORIDE SERPLBLD-SCNC: 105 MMOL/L
CO2 SERPL-SCNC: 21 MMOL/L
CREAT SERPL-MCNC: 0.9 MG/DL
EOSINOPHIL # BLD AUTO: 0.1 10*3/UL
EOSINOPHIL NFR BLD AUTO: 5.3 %
ERYTHROCYTE [DISTWIDTH] IN BLOOD BY AUTOMATED COUNT: 14.8 %
GAMMA GLOBULIN: 23 % (ref 10.1–21.9)
GFR SERPL CREATININE-BSD FRML MDRD: 88 ML/MIN/1.73M2
GLOBULIN: 2.7 G/DL
GLUCOSE SERPL-MCNC: 181 MG/DL (ref 70–99)
HCT VFR BLD AUTO: 34.3 %
HEMOGLOBIN: 11.5 G/DL (ref 13.3–17.7)
IGA: <25 MG/DL (ref 71–377)
IGG: 91 MG/DL (ref 716–1594)
IGM: 1307 MG/DL (ref 46–208)
KAPPA LAMBDA RATIO: 0.02 (ref 0.26–1.65)
LYMPHOCYTES # BLD AUTO: 0.2 10*3/UL
LYMPHOCYTES NFR BLD AUTO: 13.6 %
Lab: 141.64 MG/L (ref 5.71–26.3)
Lab: 3.14 MG/L (ref 3.3–19.4)
MCH RBC QN AUTO: ABNORMAL PG
MCHC RBC AUTO-ENTMCNC: ABNORMAL G/DL
MCV RBC AUTO: ABNORMAL FL
MONOCYTES # BLD AUTO: 0.1 10*3/UL
MONOCYTES NFR BLD AUTO: 7.4 %
NEUTROPHILS # BLD AUTO: 1.3 10*3/UL
PLATELET COUNT - QUEST: 128 10^9/L (ref 150–450)
PMV BLD: 8.1 FL
POTASSIUM SERPL-SCNC: 3.9 MMOL/L
PROT SERPL-MCNC: 6.4 G/DL
PROT SERPL-MCNC: 6.4 G/DL (ref 5.9–8.4)
RBC # BLD AUTO: 3.62 10^12/L
SODIUM SERPL-SCNC: 140 MMOL/L
WBC # BLD AUTO: 1.7 10^9/L

## 2017-02-02 ENCOUNTER — TELEPHONE (OUTPATIENT)
Dept: NEUROLOGY | Facility: CLINIC | Age: 72
End: 2017-02-02

## 2017-02-02 NOTE — TELEPHONE ENCOUNTER
Patients wife reports that patient is doing well and does not wish to follow-up with Dr. Graves at this time. He is doing well with new chemo. He is currently in [place] for a few weeks. They have my contact number and will call with questions and schedule as needed.

## 2017-02-02 NOTE — TELEPHONE ENCOUNTER
----- Message from Bella Weinstein RN sent at 11/3/2016  1:02 PM CDT -----  Regarding: f/u on pt status  Dr. Graves patient with multiple myeloma who was to follow-up November 2016. Deferred appointment. Contact wife, Casey, to see how patient is doing and schedule appointment, if needed.

## 2017-02-06 PROBLEM — L57.0 AK (ACTINIC KERATOSIS): Status: ACTIVE | Noted: 2017-02-06

## 2017-02-06 PROBLEM — D48.5 NEOPLASM OF UNCERTAIN BEHAVIOR OF SKIN: Status: ACTIVE | Noted: 2017-02-06

## 2017-02-09 ENCOUNTER — TELEPHONE (OUTPATIENT)
Dept: DERMATOLOGY | Facility: CLINIC | Age: 72
End: 2017-02-09

## 2017-02-09 NOTE — TELEPHONE ENCOUNTER
Spoke with patient's wife regarding Mohs surgery- Dr. Aparicio does not want Mr. Carbone to wait until he is back from [place] in May to have Mohs and recommended he have the surgery done there. I gave Mr. Carbone's wife the website Dr. Aparicio suggested using to find a Mohs surgeon in their area. She will call back with clinic information once they have chosen one so we can forward the medical records.

## 2017-02-13 ENCOUNTER — CARE COORDINATION (OUTPATIENT)
Dept: ONCOLOGY | Facility: CLINIC | Age: 72
End: 2017-02-13

## 2017-02-13 DIAGNOSIS — C90.00 MULTIPLE MYELOMA (H): Primary | ICD-10-CM

## 2017-02-13 NOTE — PROGRESS NOTES
Per Dr. Efrem arreola to refill Pomalyst at 4 MG daily for 21 days.    Called Anthony this morning to review with him the basic survey questions required of someone taking Pomalyst. Reviewed that Anthony can cause severe birth defects or death to unborn babies. Informed him, that his semen may contain Pomalyst even after he stops treatment (up to 4 weeks after stopping) and that he must use a latex or synthetic condom every time he has sex with a women of child bearing age. Anthony understands that Pomalyst is only for him and not to be shared with others and should be kept out the reach of small children. He understands that he will have to complete a mandatory confidential survey monthly while taking Pomalyst and that he is to return any unused  capsules to [facility] for proper disposal. He understands that he will not be able to donate blood or sperm while taking Pomalyst and for 4 weeks after stopping the medication. The appropriate paper work was completed and the prescription will be sent to the [facility]. The authorization number for the prescription is #[id number]. Reviewed with Anthony that he is to take the Pomalyst 4 MG daily for 21 days of a 28 day cycle. . Anthony verbalized understanding of Pomalyst and how it is to be handle. Reviewed with Anthony his schedule, the important phone numbers (triage and after hours), and his current schedule. Encouraged Anthony to call if he had any questions.

## 2017-02-20 ENCOUNTER — TELEPHONE (OUTPATIENT)
Dept: DERMATOLOGY | Facility: CLINIC | Age: 72
End: 2017-02-20

## 2017-02-20 NOTE — TELEPHONE ENCOUNTER
Patient's wife called to let me know they had found a clinic in [place] to have Mohs surgery done. She gave me the clinic information ([facility], [place], [place]) and I faxed  Williamjacksonbaljit's recs and path report.

## 2017-02-28 ENCOUNTER — APPOINTMENT (RX ONLY)
Dept: URBAN - METROPOLITAN AREA CLINIC 116 | Facility: CLINIC | Age: 72
Setting detail: DERMATOLOGY
End: 2017-02-28

## 2017-02-28 VITALS — SYSTOLIC BLOOD PRESSURE: 103 MMHG | HEART RATE: 76 BPM | DIASTOLIC BLOOD PRESSURE: 47 MMHG

## 2017-02-28 PROBLEM — C44.42 SQUAMOUS CELL CARCINOMA OF SKIN OF SCALP AND NECK: Status: ACTIVE | Noted: 2017-02-28

## 2017-02-28 PROCEDURE — 13121 CMPLX RPR S/A/L 2.6-7.5 CM: CPT

## 2017-02-28 PROCEDURE — 17312 MOHS ADDL STAGE: CPT

## 2017-02-28 PROCEDURE — ? MOHS SURGERY

## 2017-02-28 PROCEDURE — 17311 MOHS 1 STAGE H/N/HF/G: CPT

## 2017-02-28 PROCEDURE — ? PRESCRIPTION

## 2017-02-28 RX ORDER — CEPHALEXIN 250 MG/1
CAPSULE ORAL
Qty: 14 | Refills: 0 | Status: ERX | COMMUNITY
Start: 2017-02-28

## 2017-02-28 RX ADMIN — CEPHALEXIN: 250 CAPSULE ORAL at 15:49

## 2017-02-28 NOTE — PROCEDURE: MOHS SURGERY
Consent (Nose)/Introductory Paragraph: The rationale for Mohs was explained to the patient and consent was obtained. The risks, benefits and alternatives to therapy were discussed in detail. Specifically, the risks of nasal deformity, changes in the flow of air through the nose, infection, scarring, bleeding, prolonged wound healing, incomplete removal, allergy to anesthesia, nerve injury and recurrence were addressed. Prior to the procedure, the treatment site was clearly identified and confirmed by the patient. All components of Universal Protocol/PAUSE Rule completed.
Consent (Spinal Accessory)/Introductory Paragraph: The rationale for Mohs was explained to the patient and consent was obtained. The risks, benefits and alternatives to therapy were discussed in detail. Specifically, the risks of damage to the spinal accessory nerve, infection, scarring, bleeding, prolonged wound healing, incomplete removal, allergy to anesthesia, and recurrence were addressed. Prior to the procedure, the treatment site was clearly identified and confirmed by the patient. All components of Universal Protocol/PAUSE Rule completed.
Quadrant Reporting?: no
Incorporate Mauc Into Note After Indications: Yes
Consent 3/Introductory Paragraph: I gave the patient a chance to ask questions they had about the procedure. Following this I explained the Mohs procedure and consent was obtained. The risks, benefits and alternatives to therapy were discussed in detail. Specifically, the risks of infection, scarring, bleeding, prolonged wound healing, incomplete removal, allergy to anesthesia, nerve injury and recurrence were addressed. Prior to the procedure, the treatment site was clearly identified and confirmed by the patient. All components of Universal Protocol/PAUSE Rule completed.
Keystone Flap Text: The defect edges were debeveled with a #15 scalpel blade. Given the location of the defect, shape of the defect a keystone flap was deemed most appropriate. Using a sterile surgical marker, an appropriate keystone flap was drawn incorporating the defect, outlining the appropriate donor tissue and placing the expected incisions within the relaxed skin tension lines where possible. The area thus outlined was incised deep to adipose tissue with a #15 scalpel blade. The skin margins were undermined to an appropriate distance in all directions around the primary defect and laterally outward around the flap utilizing iris scissors.
O-Z Plasty Text: The defect edges were debeveled with a #15 scalpel blade. Given the location of the defect, shape of the defect and the proximity to free margins an O-Z plasty (double transposition flap) was deemed most appropriate. Using a sterile surgical marker, the appropriate transposition flaps were drawn incorporating the defect and placing the expected incisions within the relaxed skin tension lines where possible. The area thus outlined was incised deep to adipose tissue with a #15 scalpel blade. The skin margins were undermined to an appropriate distance in all directions utilizing iris scissors. Hemostasis was achieved with electrocautery. The flaps were then transposed into place, one clockwise and the other counterclockwise, and anchored with interrupted buried subcutaneous sutures.
Spiral Flap Text: The defect edges were debeveled with a #15 scalpel blade. Given the location of the defect, shape of the defect and the proximity to free margins a spiral flap was deemed most appropriate. Using a sterile surgical marker, an appropriate rotation flap was drawn incorporating the defect and placing the expected incisions within the relaxed skin tension lines where possible. The area thus outlined was incised deep to adipose tissue with a #15 scalpel blade. The skin margins were undermined to an appropriate distance in all directions utilizing iris scissors.
Quadrants Reporting?: 0
Cheek-To-Nose Interpolation Flap Text: A decision was made to reconstruct the defect utilizing an interpolation axial flap and a staged reconstruction. A telfa template was made of the defect. This telfa template was then used to outline the Cheek-To-Nose Interpolation flap. The donor area for the pedicle flap was then injected with anesthesia. The flap was excised through the skin and subcutaneous tissue down to the layer of the underlying musculature. The interpolation flap was carefully excised within this deep plane to maintain its blood supply. The edges of the donor site were undermined. The donor site was closed in a primary fashion. The pedicle was then rotated into position and sutured. Once the tube was sutured into place, adequate blood supply was confirmed with blanching and refill. The pedicle was then wrapped with xeroform gauze and dressed appropriately with a telfa and gauze bandage to ensure continued blood supply and protect the attached pedicle.
Depth Of Tumor Invasion (For Histology): dermis
Graft Donor Site Dermal Sutures (Optional): 5-0 Monocryl
Island Pedicle Flap Text: The defect edges were debeveled with a #15 scalpel blade. Given the location of the defect, shape of the defect and the proximity to free margins an island pedicle advancement flap was deemed most appropriate. Using a sterile surgical marker, an appropriate advancement flap was drawn incorporating the defect, outlining the appropriate donor tissue and placing the expected incisions within the relaxed skin tension lines where possible. The area thus outlined was incised deep to adipose tissue with a #15 scalpel blade. The skin margins were undermined to an appropriate distance in all directions around the primary defect and laterally outward around the island pedicle utilizing iris scissors. There was minimal undermining beneath the pedicle flap.
Bi-Rhombic Flap Text: The defect edges were debeveled with a #15 scalpel blade. Given the location of the defect and the proximity to free margins a bi-rhombic flap was deemed most appropriate. Using a sterile surgical marker, an appropriate rhombic flap was drawn incorporating the defect. The area thus outlined was incised deep to adipose tissue with a #15 scalpel blade. The skin margins were undermined to an appropriate distance in all directions utilizing iris scissors.
Split-Thickness Skin Graft Text: The defect edges were debeveled with a #15 scalpel blade. Given the location of the defect, shape of the defect and the proximity to free margins a split thickness skin graft was deemed most appropriate. Using a sterile surgical marker, the primary defect shape was transferred to the donor site. The split thickness graft was then harvested. The skin graft was then placed in the primary defect and oriented appropriately.
Lazy S Complex Repair Preamble Text (Leave Blank If You Do Not Want): Extensive wide undermining was performed.
Purse String (Simple) Text: Given the location of the defect and the characteristics of the surrounding skin a pursestring closure was deemed most appropriate. Undermining was performed circumfirentially around the surgical defect. A purstring suture was then placed and tightened.
Mixed Nodular And Infiltrative Bcc Histology Text: There are narrow strands of spiky, irregular basal cell carcinoma cells infiltrating between collagen bundles with mucin-rich stroma
Date Of Previous Biopsy (Optional): 01/17/17
Repair Type: Complex Repair
Closure 2 Information: This tab is for additional flaps and grafts, including complex repair and grafts and complex repair and flaps. You can also specify a different location for the additional defect, if the location is the same you do not need to select a new one. We will insert the automated text for the repair you select below just as we do for solitary flaps and grafts. Please note that at this time if you select a location with a different insurance zone you will need to override the ICD10 and CPT if appropriate.
Tissue Cultured Epidermal Autograft Text: The defect edges were debeveled with a #15 scalpel blade. Given the location of the defect, shape of the defect and the proximity to free margins a tissue cultured epidermal autograft was deemed most appropriate. The graft was then trimmed to fit the size of the defect. The graft was then placed in the primary defect and oriented appropriately.
Consent Type: Consent 1 (Standard)
Consent 1/Introductory Paragraph: Various treatment options for skin cancer treatment; including but not limited to no treatment, cryosurgery or cryotherapy, excision, radiation therapy, electrodessication and curettage, topical therapeutic agents and light therapy were discussed in depth with the patient. The rationale for Mohs was explained to the patient and consent was obtained. The risks, benefits and alternatives to therapy were discussed in detail. Specifically, the risks of infection, scarring, bleeding, prolonged wound healing, incomplete removal, allergy to anesthesia, nerve injury and recurrence were addressed. Prior to the procedure, the treatment site was clearly identified and confirmed by the patient and the patient agreed that Mohs surgery is the best treatment option for their lesion. No guarantees were made or implied; close follow-up was stressed out to the patient. All components of Universal Protocol/PAUSE Rule completed.
Scc In Situ Histology Text: Atypia of the keratinocytes across the full thickness of the epidermis with loss of the granular layer and overlying zones of parakeratosis
Stage 11: Additional Anesthesia Type: 1% lidocaine with epinephrine
Bcc Histology Text: Beneath an irregular epidermis, there are small nodular masses of basaloid neoplastic cells with nuclear pleomorphism attached to the basal layer and surrounded by a loose fibrous stroma and mild inflammation in the dermis. The findings are typical for a basal cell carcinoma.
Bcc Infundibulocystic Histology Text: Beneath an irregular epidermis, there are small nodular masses  of basaloid neoplastic cells aggregating in a cystic formation with nuclear pleomorphism attached to the basal layer and surrounded by a loose fibrous stroma and mild inflammation in the dermis. The findings are typical for a basal cell carcinoma.
Lazy S Intermediate Repair Preamble Text (Leave Blank If You Do Not Want): Undermining was performed with blunt dissection.
Perineural Invasion (For Histology - Be Specific If Possible): absent
Cheiloplasty (Complex) Text: A decision was made to reconstruct the defect with a  cheiloplasty. The defect was undermined extensively. Additional obicularis oris muscle was excised with a 15 blade scalpel. The defect was converted into a full thickness wedge to facilite a better cosmetic result. Small vessels were then tied off with 5-0 monocyrl. The obicularis oris, superficial fascia, adipose and dermis were then reapproximated. After the deeper layers were approximated the epidermis was reapproximated with particular care given to realign the vermillion border.
Area M Indication Text: Tumors in this location are included in Area M (cheek, forehead, scalp, neck, jawline and pretibial skin). Mohs surgery is indicated for tumors in these anatomic locations.
Unna Boot Text: An Unna boot was placed to help immobilize the limb and facilitate more rapid healing.
Burow's Advancement Flap Text: The defect edges were debeveled with a #15 scalpel blade. Given the location of the defect and the proximity to free margins a Burow's advancement flap was deemed most appropriate. Using a sterile surgical marker, the appropriate advancement flap was drawn incorporating the defect and placing the expected incisions within the relaxed skin tension lines where possible. The area thus outlined was incised deep to adipose tissue with a #15 scalpel blade. The skin margins were undermined to an appropriate distance in all directions utilizing iris scissors.
Surgical Defect Width In Cm (Optional): 1.1
Repair Hemostasis (Optional): Electrodesiccation
A-T Advancement Flap Text: The defect edges were debeveled with a #15 scalpel blade. Given the location of the defect, shape of the defect and the proximity to free margins an A-T advancement flap was deemed most appropriate. Using a sterile surgical marker, an appropriate advancement flap was drawn incorporating the defect and placing the expected incisions within the relaxed skin tension lines where possible. The area thus outlined was incised deep to adipose tissue with a #15 scalpel blade. The skin margins were undermined to an appropriate distance in all directions utilizing iris scissors.
Number Of Stages: 2
Paramedian Forehead Flap Text: A decision was made to reconstruct the defect utilizing an interpolation axial flap and a staged reconstruction. A telfa template was made of the defect. This telfa template was then used to outline the paramedian forehead pedicle flap. The donor area for the pedicle flap was then injected with anesthesia. The flap was excised through the skin and subcutaneous tissue down to the layer of the underlying musculature. The pedicle flap was carefully excised within this deep plane to maintain its blood supply. The edges of the donor site were undermined. The donor site was closed in a primary fashion. The pedicle was then rotated into position and sutured. Once the tube was sutured into place, adequate blood supply was confirmed with blanching and refill. The pedicle was then wrapped with xeroform gauze and dressed appropriately with a telfa and gauze bandage to ensure continued blood supply and protect the attached pedicle.
Ear Star Wedge Flap Text: The defect edges were debeveled with a #15 blade scalpel. Given the location of the defect and the proximity to free margins (helical rim) an ear star wedge flap was deemed most appropriate. Using a sterile surgical marker, the appropriate flap was drawn incorporating the defect and placing the expected incisions between the helical rim and antihelix where possible. The area thus outlined was incised through and through with a #15 scalpel blade.
Consent (Scalp)/Introductory Paragraph: The rationale for Mohs was explained to the patient and consent was obtained. The risks, benefits and alternatives to therapy were discussed in detail. Specifically, the risks of changes in hair growth pattern secondary to repair, infection, scarring, bleeding, prolonged wound healing, incomplete removal, allergy to anesthesia, nerve injury and recurrence were addressed. Prior to the procedure, the treatment site was clearly identified and confirmed by the patient. All components of Universal Protocol/PAUSE Rule completed.
Scc Poorly Differentiated Histology Text: There are nests of squamous epithelial cells arising from the epidermis and extending into the dermis. The malignant cells are poorly differentiated.
Full Thickness Lip Wedge Repair (Flap) Text: Given the location of the defect and the proximity to free margins a full thickness wedge repair was deemed most appropriate. Using a sterile surgical marker, the appropriate repair was drawn incorporating the defect and placing the expected incisions perpendicular to the vermillion border. The vermillion border was also meticulously outlined to ensure appropriate reapproximation during the repair. The area thus outlined was incised through and through with a #15 scalpel blade. The muscularis and dermis were reaproximated with deep sutures following hemostasis. Care was taken to realign the vermillion border before proceeding with the superficial closure. Once the vermillion was realigned the superfical and mucosal closure was finished.
Same Histology In Subsequent Stages Text: The pattern and morphology of the tumor is as described in the first stage.
H Plasty Text: Given the location of the defect, shape of the defect and the proximity to free margins a H-plasty was deemed most appropriate for repair. Using a sterile surgical marker, the appropriate advancement arms of the H-plasty were drawn incorporating the defect and placing the expected incisions within the relaxed skin tension lines where possible. The area thus outlined was incised deep to adipose tissue with a #15 scalpel blade. The skin margins were undermined to an appropriate distance in all directions utilizing iris scissors. The opposing advancement arms were then advanced into place in opposite direction and anchored with interrupted buried subcutaneous sutures.
Graft Basting Suture (Optional): 5-0 Fast Absorbing Gut
Mohs Histo Method Verbiage: Each section was then chromacoded and processed in the Mohs lab using the Mohs protocol and submitted for frozen section.
Consent (Temporal Branch)/Introductory Paragraph: The rationale for Mohs was explained to the patient and consent was obtained. The risks, benefits and alternatives to therapy were discussed in detail. Specifically, the risks of damage to the temporal branch of the facial nerve, infection, scarring, bleeding, prolonged wound healing, incomplete removal, allergy to anesthesia, and recurrence were addressed. Prior to the procedure, the treatment site was clearly identified and confirmed by the patient. All components of Universal Protocol/PAUSE Rule completed.
Melolabial Interpolation Flap Text: A decision was made to reconstruct the defect utilizing an interpolation axial flap and a staged reconstruction. A telfa template was made of the defect. This telfa template was then used to outline the melolabial interpolation flap. The donor area for the pedicle flap was then injected with anesthesia. The flap was excised through the skin and subcutaneous tissue down to the layer of the underlying musculature. The pedicle flap was carefully excised within this deep plane to maintain its blood supply. The edges of the donor site were undermined. The donor site was closed in a primary fashion. The pedicle was then rotated into position and sutured. Once the tube was sutured into place, adequate blood supply was confirmed with blanching and refill. The pedicle was then wrapped with xeroform gauze and dressed appropriately with a telfa and gauze bandage to ensure continued blood supply and protect the attached pedicle.
Surgical Defect Width In Cm (Optional): 1.5
Double Island Pedicle Flap Text: The defect edges were debeveled with a #15 scalpel blade. Given the location of the defect, shape of the defect and the proximity to free margins a double island pedicle advancement flap was deemed most appropriate. Using a sterile surgical marker, an appropriate advancement flap was drawn incorporating the defect, outlining the appropriate donor tissue and placing the expected incisions within the relaxed skin tension lines where possible. The area thus outlined was incised deep to adipose tissue with a #15 scalpel blade. The skin margins were undermined to an appropriate distance in all directions around the primary defect and laterally outward around the island pedicle utilizing iris scissors. There was minimal undermining beneath the pedicle flap.
Advancement Flap (Double) Text: The defect edges were debeveled with a #15 scalpel blade. Given the location of the defect and the proximity to free margins a double advancement flap was deemed most appropriate. Using a sterile surgical marker, the appropriate advancement flaps were drawn incorporating the defect and placing the expected incisions within the relaxed skin tension lines where possible. The area thus outlined was incised deep to adipose tissue with a #15 scalpel blade. The skin margins were undermined to an appropriate distance in all directions utilizing iris scissors.
Dermal Autograft Text: The defect edges were debeveled with a #15 scalpel blade. Given the location of the defect, shape of the defect and the proximity to free margins a dermal autograft was deemed most appropriate. Using a sterile surgical marker, the primary defect shape was transferred to the donor site. The area thus outlined was incised deep to adipose tissue with a #15 scalpel blade. The harvested graft was then trimmed of adipose and epidermal tissue until only dermis was left. The skin graft was then placed in the primary defect and oriented appropriately.
Medical Necessity Statement: Based on my medical judgement; after reviewing the pertinent pathology report, physical exam findings and the various treatment options for skin cancer treatment; standard excision and/or destruction technique methods are not clinically sufficient treatments options. To prevent the risk of compromising surgical cure and reconstruction; prompt microscopic examination of the surgical margins is necessary. Based on the given indication(s), maximum conservation of healthy tissue, and high cure rate, Mohs surgery is the most appropriate treatment for this cancer.
Consent (Ear)/Introductory Paragraph: The rationale for Mohs was explained to the patient and consent was obtained. The risks, benefits and alternatives to therapy were discussed in detail. Specifically, the risks of ear deformity, infection, scarring, bleeding, prolonged wound healing, incomplete removal, allergy to anesthesia, nerve injury and recurrence were addressed. Prior to the procedure, the treatment site was clearly identified and confirmed by the patient. All components of Universal Protocol/PAUSE Rule completed.
Rhombic Flap Text: The defect edges were debeveled with a #15 scalpel blade. Given the location of the defect and the proximity to free margins a rhombic flap was deemed most appropriate. Using a sterile surgical marker, an appropriate rhombic flap was drawn incorporating the defect. The area thus outlined was incised deep to adipose tissue with a #15 scalpel blade. The skin margins were undermined to an appropriate distance in all directions utilizing iris scissors.
Mucosal Advancement Flap Text: Given the location of the defect, shape of the defect and the proximity to free margins a mucosal advancement flap was deemed most appropriate. Incisions were made with a 15 blade scalpel in the appropriate fashion along the cutaneous vermillion border and the mucosal lip. The remaining actinically damaged mucosal tissue was excised. The mucosal advancement flap was then elevated to the gingival sulcus with care taken to preserve the neurovascular structures and advanced into the primary defect. Care was taken to ensure that precise realignment of the vermillion border was achieved.
Bcc Pigmented Histology Text: Functional melanocytes are scattered through the basal cell carcinoma tumor islands and there are numerous melanophages in the stroma
Stage 3: Additional Anesthesia Type: 1% lidocaine with epinephrine and a 1:10 solution of 8.4% sodium bicarbonate
Helical Rim Advancement Flap Text: The defect edges were debeveled with a #15 blade scalpel. Given the location of the defect and the proximity to free margins (helical rim) a double helical rim advancement flap was deemed most appropriate. Using a sterile surgical marker, the appropriate advancement flaps were drawn incorporating the defect and placing the expected incisions between the helical rim and antihelix where possible. The area thus outlined was incised through and through with a #15 scalpel blade. With a skin hook and iris scissors, the flaps were gently and sharply undermined and freed up.
Purse String (Intermediate) Text: Given the location of the defect and the characteristics of the surrounding skin a pursestring intermediate closure was deemed most appropriate. Undermining was performed circumfirentially around the surgical defect. A purstring suture was then placed and tightened.
Home Suture Removal Text: Patient was provided instructions on removing sutures and will remove their sutures at home. If they have any questions or difficulties they will call the office.
Eye Protection Verbiage: Before proceeding with the stage, a plastic scleral shield was inserted. The globe was anesthetized with a few drops of 1% lidocaine with 1:100,000 epinephrine. Then, an appropriate sized scleral shield was chosen and coated with lacrilube ointment. The shield was gently inserted and left in place for the duration of each stage. After the stage was completed, the shield was gently removed.
Bcc  Morpheaform/Sclerosing Histology Text: Beneath an irregular epidermis, there are bizarrely shaped masses of basaloid neoplastic cells with nuclear pleomorphism attached to the basal layer and surrounded by a rapidly forming scar and mild inflammation in the dermis. The findings are typical for a basal cell carcinoma.
Secondary Intention Text (Leave Blank If You Do Not Want): The defect will heal with secondary intention.
O-L Flap Text: The defect edges were debeveled with a #15 scalpel blade. Given the location of the defect, shape of the defect and the proximity to free margins an O-L flap was deemed most appropriate. Using a sterile surgical marker, an appropriate advancement flap was drawn incorporating the defect and placing the expected incisions within the relaxed skin tension lines where possible. The area thus outlined was incised deep to adipose tissue with a #15 scalpel blade. The skin margins were undermined to an appropriate distance in all directions utilizing iris scissors.
Trilobed Flap Text: The defect edges were debeveled with a #15 scalpel blade. Given the location of the defect and the proximity to free margins a trilobed flap was deemed most appropriate. Using a sterile surgical marker, an appropriate trilobed flap drawn around the defect. The area thus outlined was incised deep to adipose tissue with a #15 scalpel blade. The skin margins were undermined to an appropriate distance in all directions utilizing iris scissors.
Epidermal Closure Graft Donor Site (Optional): running
W Plasty Text: The lesion was extirpated to the level of the fat with a #15 scalpel blade. Given the location of the defect, shape of the defect and the proximity to free margins a W-plasty was deemed most appropriate for repair. Using a sterile surgical marker, the appropriate transposition arms of the W-plasty were drawn incorporating the defect and placing the expected incisions within the relaxed skin tension lines where possible. The area thus outlined was incised deep to adipose tissue with a #15 scalpel blade. The skin margins were undermined to an appropriate distance in all directions utilizing iris scissors. The opposing transposition arms were then transposed into place in opposite direction and anchored with interrupted buried subcutaneous sutures.
Scc Histology Text: There are nests of squamous epithelial cells arising from the epidermis and extending into the dermis. The malignant cells are large with abundant eosinophilic cytoplasm and a large vesicular nucleus.
Epidermal Sutures: 4-0 Prolene
S Plasty Text: Given the location and shape of the defect, and the orientation of relaxed skin tension lines, an S-plasty was deemed most appropriate for repair. Using a sterile surgical marker, the appropriate outline of the S-plasty was drawn, incorporating the defect and placing the expected incisions within the relaxed skin tension lines where possible. The area thus outlined was incised deep to adipose tissue with a #15 scalpel blade. The skin margins were undermined to an appropriate distance in all directions utilizing iris scissors. The skin flaps were advanced over the defect. The opposing margins were then approximated with interrupted buried subcutaneous sutures.
Muscle Hinge Flap Text: The defect edges were debeveled with a #15 scalpel blade. Given the size, depth and location of the defect and the proximity to free margins a muscle hinge flap was deemed most appropriate. Using a sterile surgical marker, an appropriate hinge flap was drawn incorporating the defect. The area thus outlined was incised with a #15 scalpel blade. The skin margins were undermined to an appropriate distance in all directions utilizing iris scissors.
Location Indication Override (Is Already Calculated Based On Selected Body Location): Area M
Referred To Asc For Closure Text (Leave Blank If You Do Not Want): After obtaining clear surgical margins the patient was sent to an [facility] for surgical repair. The patient understands they will receive post-surgical care and follow-up from the [facility] physician.
Inflammation Suggestive Of Cancer Camouflage Histology Text: There was a dense lymphocytic infiltrate which prevented adequate histologic evaluation of adjacent structures.
Body Location Override (Optional - Billing Will Still Be Based On Selected Body Map Location If Applicable): Left Mid Scalp
Mixed Nodular And Micronodular Bcc Histology Text: Beneath an irregular epidermis, there are varying sizes of nodular masses of basaloid neoplastic cells with nuclear pleomorphism attached to the basal layer and surrounded by a loose fibrous stroma and mild inflammation in the dermis. The findings are typical for a basal cell carcinoma.
Rotation Flap Text: After a lengthy discussion with the patient regarding the wound treatment reconstruction options available including but not limited to simple repair, intermediate repair, complex repair, adjacent tissue transfer, tissue graft as well as allowing the lesion to repair by secondary intention. It was determined that due to the defect location, complexity, and given indications; an adjacent tissue transfer (rotation flap) would be the most appropriate means for repair of the surgical defect as the defect extended through the skin into the subcutaneous tissues, and required rearrangement or transfer of adjacent tissue to repair the surgical wound. \\n\\n\\n\\n\\nThe defect edges were debeveled with a #15 scalpel blade. Given the location of the defect, shape of the defect and the proximity to free margins a rotation flap was deemed most appropriate. Using a sterile surgical marker, an appropriate rotation flap was drawn incorporating the defect and placing the expected incisions within the relaxed skin tension lines where possible. The area thus outlined was incised deep to adipose tissue with a #15 scalpel blade. The skin margins were undermined to an appropriate distance in all directions utilizing iris scissors.
Surgeon Performing Repair (Optional): Alf Nolasco M.D.
Surgeon/Pathologist Verbiage (Will Incorporate Name Of Surgeon From Intro If Not Blank): operated in two distinct and integrated capacities as the surgeon and pathologist.
Presence Of Scar Tissue (For Histology): present
Complex Repair And Flap Additional Text (Will Appearing After The Standard Complex Repair Text): The complex repair was not sufficient to completely close the primary defect. The remaining additional defect was repaired with the flap mentioned below.
Advancement-Rotation Flap Text: The defect edges were debeveled with a #15 scalpel blade. Given the location of the defect, shape of the defect and the proximity to free margins an advancement-rotation flap was deemed most appropriate. Using a sterile surgical marker, an appropriate flap was drawn incorporating the defect and placing the expected incisions within the relaxed skin tension lines where possible. The area thus outlined was incised deep to adipose tissue with a #15 scalpel blade. The skin margins were undermined to an appropriate distance in all directions utilizing iris scissors.
Hemostasis: Electrocautery
Transposition Flap Text: After a lengthy discussion with the patient regarding the wound treatment reconstruction options available including but not limited to simple repair, intermediate repair, complex repair, adjacent tissue transfer, tissue graft as well as allowing the lesion to repair by secondary intention. It was determined that due to the defect location, complexity, and given indications; an adjacent tissue transfer (transposition flap) would be the most appropriate means for repair of the surgical defect as the defect extended through the skin into the subcutaneous tissues, and required rearrangement or transfer of adjacent tissue to repair the surgical wound. \\n\\n\\n\\n\\nThe defect edges were debeveled with a #15 scalpel blade. Given the location of the defect and the proximity to free margins a transposition flap was deemed most appropriate. Using a sterile surgical marker, an appropriate transposition flap was drawn incorporating the defect. The area thus outlined was incised deep to adipose tissue with a #15 scalpel blade. The skin margins were undermined to an appropriate distance in all directions utilizing iris scissors.
Referring Physician (Optional): Hemant Conway
Bcc  Nodular Histology Text: Nodular aggregates of basaloid cells with large, hyperchromatic, oval nuclei and little cytoplasm aligning densely in a palisade pattern at the periphery of the nest
Xenograft Text: The defect edges were debeveled with a #15 scalpel blade. Given the location of the defect, shape of the defect and the proximity to free margins a xenograft was deemed most appropriate. The graft was then trimmed to fit the size of the defect. The graft was then placed in the primary defect and oriented appropriately.
Mohs Case Number: [id number]
Subsequent Stages Histo Method Verbiage: Using a similar technique to that described above, a thin layer of tissue was removed from all areas where tumor was visible on the previous stage. The tissue was again oriented, mapped, dyed, and processed as above.
Detail Level: Detailed
Posterior Auricular Interpolation Flap Text: A decision was made to reconstruct the defect utilizing an interpolation axial flap and a staged reconstruction. A telfa template was made of the defect. This telfa template was then used to outline the posterior auricular interpolation flap. The donor area for the pedicle flap was then injected with anesthesia. The flap was excised through the skin and subcutaneous tissue down to the layer of the underlying musculature. The pedicle flap was carefully excised within this deep plane to maintain its blood supply. The edges of the donor site were undermined. The donor site was closed in a primary fashion. The pedicle was then rotated into position and sutured. Once the tube was sutured into place, adequate blood supply was confirmed with blanching and refill. The pedicle was then wrapped with xeroform gauze and dressed appropriately with a telfa and gauze bandage to ensure continued blood supply and protect the attached pedicle.
Consent (Marginal Mandibular)/Introductory Paragraph: The rationale for Mohs was explained to the patient and consent was obtained. The risks, benefits and alternatives to therapy were discussed in detail. Specifically, the risks of damage to the marginal mandibular branch of the facial nerve, infection, scarring, bleeding, prolonged wound healing, incomplete removal, allergy to anesthesia, and recurrence were addressed. Prior to the procedure, the treatment site was clearly identified and confirmed by the patient. All components of Universal Protocol/PAUSE Rule completed.
Crescentic Advancement Flap Text: The defect edges were debeveled with a #15 scalpel blade. Given the location of the defect and the proximity to free margins a crescentic advancement flap was deemed most appropriate. Using a sterile surgical marker, the appropriate advancement flap was drawn incorporating the defect and placing the expected incisions within the relaxed skin tension lines where possible. The area thus outlined was incised deep to adipose tissue with a #15 scalpel blade. The skin margins were undermined to an appropriate distance in all directions utilizing iris scissors.
Dressing: pressure dressing with telfa
O-T Plasty Text: The defect edges were debeveled with a #15 scalpel blade. Given the location of the defect, shape of the defect and the proximity to free margins an O-T plasty was deemed most appropriate. Using a sterile surgical marker, an appropriate O-T plasty was drawn incorporating the defect and placing the expected incisions within the relaxed skin tension lines where possible. The area thus outlined was incised deep to adipose tissue with a #15 scalpel blade. The skin margins were undermined to an appropriate distance in all directions utilizing iris scissors.
Closure 3 Information: This tab is for additional flaps and grafts above and beyond our usual structured repairs. Please note if you enter information here it will not currently bill and you will need to add the billing information manually.
Referred To Otolaryngology For Closure Text (Leave Blank If You Do Not Want): After obtaining clear surgical margins the patient was sent to otolaryngology for surgical repair. The patient understands they will receive post-surgical care and follow-up from the referring physician's office.
Afx Histology Text: Bizarre multinucleated tumor cells in hypercellular, spindly stroma with frequent mitotic figures. There are also smaller fibroblastic cells with pleomorphism and angulated nuclei confined to the dermis.
No Residual Tumor Seen Histology Text: There were no malignant cells seen in the sections examined.
Area L Indication Text: Tumors in this location are included in Area L (trunk and extremities). Mohs surgery is indicated for larger tumors, or tumors with aggressive histologic features, in these anatomic locations.
Aggressive Histology Indication Override: AGGRESSIVE pathology
Estimated Blood Loss (Cc): minimal
Bilobed Flap Text: The defect edges were debeveled with a #15 scalpel blade. Given the location of the defect and the proximity to free margins a bilobe flap was deemed most appropriate. Using a sterile surgical marker, an appropriate bilobe flap drawn around the defect. The area thus outlined was incised deep to adipose tissue with a #15 scalpel blade. The skin margins were undermined to an appropriate distance in all directions utilizing iris scissors.
Consent (Lip)/Introductory Paragraph: The rationale for Mohs was explained to the patient and consent was obtained. The risks, benefits and alternatives to therapy were discussed in detail. Specifically, the risks of lip deformity, changes in the oral aperture, infection, scarring, bleeding, prolonged wound healing, incomplete removal, allergy to anesthesia, nerve injury and recurrence were addressed. Prior to the procedure, the treatment site was clearly identified and confirmed by the patient. All components of Universal Protocol/PAUSE Rule completed.
Manual Repair Warning Statement: We plan on removing the manually selected variable below in favor of our much easier automatic structured text blocks found in the previous tab. We decided to do this to help make the flow better and give you the full power of structured data. Manual selection is never going to be ideal in our platform and I would encourage you to avoid using manual selection from this point on, especially since I will be sunsetting this feature. It is important that you do one of two things with the customized text below. First, you can save all of the text in a word file so you can have it for future reference. Second, transfer the text to the appropriate area in the Library tab. Lastly, if there is a flap or graft type which we do not have you need to let us know right away so I can add it in before the variable is hidden. No need to panic, we plan to give you roughly 6 months to make the change.
Consent 2/Introductory Paragraph: Mohs surgery was explained to the patient and consent was obtained. The risks, benefits and alternatives to therapy were discussed in detail. Specifically, the risks of infection, scarring, bleeding, prolonged wound healing, incomplete removal, allergy to anesthesia, nerve injury and recurrence were addressed. Prior to the procedure, the treatment site was clearly identified and confirmed by the patient. All components of Universal Protocol/PAUSE Rule completed.
Mixed Superficial And Nodular Bcc Histology Text: On the basal layer of and beneath an irregular epidermis, there are small nodular masses of basaloid neoplastic cells with nuclear pleomorphism attached to the basal layer and surrounded by a loose fibrous stroma and mild inflammation in the dermis. The findings are typical for a basal cell carcinoma.
Modified Advancement Flap Text: The defect edges were debeveled with a #15 scalpel blade. Given the location of the defect, shape of the defect and the proximity to free margins a modified advancement flap was deemed most appropriate. Using a sterile surgical marker, an appropriate advancement flap was drawn incorporating the defect and placing the expected incisions within the relaxed skin tension lines where possible. The area thus outlined was incised deep to adipose tissue with a #15 scalpel blade. The skin margins were undermined to an appropriate distance in all directions utilizing iris scissors.
Deep Sutures: 3-0 Monocryl
Ear Wedge Repair Text: A wedge excision was completed by carrying down an excision through the full thickness of the ear and cartilage with an inward facing Burow's triangle. The wound was then closed in a layered fashion.
Anesthesia Volume In Cc: 3
Wound Care: Vaseline
Composite Graft Text: The defect edges were debeveled with a #15 scalpel blade. Given the location of the defect, shape of the defect, the proximity to free margins and the fact the defect was full thickness a composite graft was deemed most appropriate. The defect was outline and then transferred to the donor site. A full thickness graft was then excised from the donor site. The graft was then placed in the primary defect, oriented appropriately and then sutured into place. The secondary defect was then repaired using a primary closure.
Island Pedicle Flap With Canthal Suspension Text: The defect edges were debeveled with a #15 scalpel blade. Given the location of the defect, shape of the defect and the proximity to free margins an island pedicle advancement flap was deemed most appropriate. Using a sterile surgical marker, an appropriate advancement flap was drawn incorporating the defect, outlining the appropriate donor tissue and placing the expected incisions within the relaxed skin tension lines where possible. The area thus outlined was incised deep to adipose tissue with a #15 scalpel blade. The skin margins were undermined to an appropriate distance in all directions around the primary defect and laterally outward around the island pedicle utilizing iris scissors. There was minimal undermining beneath the pedicle flap. A suspension suture was placed in the canthal tendon to prevent tension and prevent ectropion.
Area H Indication Text: Tumors in this location are included in Area H (eyelids, eyebrows, nose, lips, chin, ear, pre-auricular, post-auricular, temple, genitalia, hands, feet, ankles and areola). Tissue conservation is critical in these anatomic locations.
Scc Moderately Differentiated Histology Text: There are nests of squamous epithelial cells arising from the epidermis and extending into the dermis. The malignant cells are large with abundant eosinophilic cytoplasm and a large nucleus. Keratin pearls are also present.
Mauc Instructions: By selecting yes to the question below the [facility] number will be added into the note. This will be calculated automatically based on the diagnosis chosen, the size entered, the body zone selected (H,M,L) and the specific indications you chose. You will also have the option to override the Mohs AUC if you disagree with the automatically calculated number and this option is found in the Case Summary tab.
Bcc Infiltrative Histology Text: There were numerous aggregates of basaloid cells demonstrating an infiltrative pattern.
Bilateral Helical Rim Advancement Flap Text: The defect edges were debeveled with a #15 blade scalpel. Given the location of the defect and the proximity to free margins (helical rim) a bilateral helical rim advancement flap was deemed most appropriate. Using a sterile surgical marker, the appropriate advancement flaps were drawn incorporating the defect and placing the expected incisions between the helical rim and antihelix where possible. The area thus outlined was incised through and through with a #15 scalpel blade. With a skin hook and iris scissors, the flaps were gently and sharply undermined and freed up.
Epidermal Closure: pulley stitch
Graft Donor Site Epidermal Sutures (Optional): 6-0 Prolene
Cheek Interpolation Flap Text: A decision was made to reconstruct the defect utilizing an interpolation axial flap and a staged reconstruction. A telfa template was made of the defect. This telfa template was then used to outline the Cheek Interpolation flap. The donor area for the pedicle flap was then injected with anesthesia. The flap was excised through the skin and subcutaneous tissue down to the layer of the underlying musculature. The interpolation flap was carefully excised within this deep plane to maintain its blood supply. The edges of the donor site were undermined. The donor site was closed in a primary fashion. The pedicle was then rotated into position and sutured. Once the tube was sutured into place, adequate blood supply was confirmed with blanching and refill. The pedicle was then wrapped with xeroform gauze and dressed appropriately with a telfa and gauze bandage to ensure continued blood supply and protect the attached pedicle.
Ftsg Text: The defect edges were debeveled with a #15 scalpel blade. Given the location of the defect, shape of the defect and the proximity to free margins a full thickness skin graft was deemed most appropriate. Using a sterile surgical marker, the primary defect shape was transferred to the donor site. The area thus outlined was incised deep to adipose tissue with a #15 scalpel blade. The harvested graft was then trimmed of adipose tissue until only dermis and epidermis was left. The skin margins of the secondary defect were undermined to an appropriate distance in all directions utilizing iris scissors. The secondary defect was closed with interrupted buried subcutaneous sutures. The skin edges were then re-apposed with running  sutures. The skin graft was then placed in the primary defect and oriented appropriately.
Surgeon: Yumiko Quijano M.D.
Undermining Location (Optional): in the deep fat
Bilobed Transposition Flap Text: The defect edges were debeveled with a #15 scalpel blade. Given the location of the defect and the proximity to free margins a bilobed transposition flap was deemed most appropriate. Using a sterile surgical marker, an appropriate bilobe flap drawn around the defect. The area thus outlined was incised deep to adipose tissue with a #15 scalpel blade. The skin margins were undermined to an appropriate distance in all directions utilizing iris scissors.
Referred To Oculoplastics For Closure Text (Leave Blank If You Do Not Want): After obtaining clear surgical margins the patient was sent to oculoplastics for surgical repair. The patient understands they will receive post-surgical care and follow-up from the referring physician's office.
Melanoma In Situ Histology Text: Histologically, the lesion is asymmetrical, poorly circumscribed with architectural disturbance and marked cytological atypia
Postop Diagnosis: same
Repair Anesthesia Method: local infiltration
Alternatives Discussed Intro (Do Not Add Period): I discussed alternative treatments to Mohs surgery and specifically discussed the risks and benefits of
Closure 4 Information: This tab is for additional flaps and grafts above and beyond our usual structured repairs.  Please note if you enter information here it will not currently bill and you will need to add the billing information manually.
Epidermal Autograft Text: The defect edges were debeveled with a #15 scalpel blade. Given the location of the defect, shape of the defect and the proximity to free margins an epidermal autograft was deemed most appropriate. Using a sterile surgical marker, the primary defect shape was transferred to the donor site. The epidermal graft was then harvested. The skin graft was then placed in the primary defect and oriented appropriately.
Hidradenocarcinoma Histology Text: On histologic exam, there are nodular, epithelioid, basaloid tumor cells with irregular infiltration of the surrounding dermis and foci of duct formation.
Scc Acantholytic Histology Text: There are nests of squamous epithelial cells arising from the epidermis and extending into the dermis.  The malignant cells are demonstratic acantholysis
Mohs Rapid Report Verbiage: The area of clinically evident tumor was marked with skin marking ink and appropriately hatched. The initial incision was made following the Mohs approach through the skin. The specimen was taken to the lab, divided into the necessary number of pieces, chromacoded and processed according to the Mohs protocol. This was repeated in successive stages until a tumor free defect was achieved.
V-Y Plasty Text: The defect edges were debeveled with a #15 scalpel blade. Given the location of the defect, shape of the defect and the proximity to free margins an V-Y advancement flap was deemed most appropriate. Using a sterile surgical marker, an appropriate advancement flap was drawn incorporating the defect and placing the expected incisions within the relaxed skin tension lines where possible. The area thus outlined was incised deep to adipose tissue with a #15 scalpel blade. The skin margins were undermined to an appropriate distance in all directions utilizing iris scissors.
Tumor Debulked?: scalpel
No Repair - Repaired With Adjacent Surgical Defect Text (Leave Blank If You Do Not Want): After obtaining clear surgical margins the defect was repaired concurrently with another surgical defect which was in close approximation.
Cheiloplasty (Less Than 50%) Text: A decision was made to reconstruct the defect with a  cheiloplasty. The defect was undermined extensively. Additional obicularis oris muscle was excised with a 15 blade scalpel. The defect was converted into a full thickness wedge, of less than 50% of the vertical height of the lip, to facilite a better cosmetic result. Small vessels were then tied off with 5-0 monocyrl. The obicularis oris, superficial fascia, adipose and dermis were then reapproximated. After the deeper layers were approximated the epidermis was reapproximated with particular care given to realign the vermillion border.
O-T Advancement Flap Text: The defect edges were debeveled with a #15 scalpel blade. Given the location of the defect, shape of the defect and the proximity to free margins an O-T advancement flap was deemed most appropriate. Using a sterile surgical marker, an appropriate advancement flap was drawn incorporating the defect and placing the expected incisions within the relaxed skin tension lines where possible. The area thus outlined was incised deep to adipose tissue with a #15 scalpel blade. The skin margins were undermined to an appropriate distance in all directions utilizing iris scissors.
Interpolation Flap Text: A decision was made to reconstruct the defect utilizing an interpolation axial flap and a staged reconstruction. A telfa template was made of the defect. This telfa template was then used to outline the interpolation flap. The donor area for the pedicle flap was then injected with anesthesia. The flap was excised through the skin and subcutaneous tissue down to the layer of the underlying musculature. The interpolation flap was carefully excised within this deep plane to maintain its blood supply. The edges of the donor site were undermined. The donor site was closed in a primary fashion. The pedicle was then rotated into position and sutured. Once the tube was sutured into place, adequate blood supply was confirmed with blanching and refill. The pedicle was then wrapped with xeroform gauze and dressed appropriately with a telfa and gauze bandage to ensure continued blood supply and protect the attached pedicle.
Referred To Plastics For Closure Text (Leave Blank If You Do Not Want): After obtaining clear surgical margins the patient was sent to plastics for surgical repair. The patient understands they will receive post-surgical care and follow-up from the referring physician's office.
Bcc  Nodulocystic Histology Text: Beneath an irregular epidermis, there are small nodular masses of basaloid neoplastic cells aggregating in a cystic formation with nuclear pleomorphism attached to the basal layer and surrounded by a loose fibrous stroma and mild inflammation in the dermis. The findings are typical for a basal cell carcinoma.
Alar Island Pedicle Flap Text: The defect edges were debeveled with a #15 scalpel blade. Given the location of the defect, shape of the defect and the proximity to the alar rim an island pedicle advancement flap was deemed most appropriate. Using a sterile surgical marker, an appropriate advancement flap was drawn incorporating the defect, outlining the appropriate donor tissue and placing the expected incisions within the nasal ala running parallel to the alar rim. The area thus outlined was incised with a #15 scalpel blade. The skin margins were undermined minimally to an appropriate distance in all directions around the primary defect and laterally outward around the island pedicle utilizing iris scissors. There was minimal undermining beneath the pedicle flap.
Mohs Method Verbiage: An incision at a 45 degree angle following the standard Mohs approach was done and the specimen was harvested as a microscopic controlled layer.
Localized Dermabrasion Text: The patient was draped in routine manner. Localized dermabrasion using 3 x 17 mm wire brush was performed in routine manner to papillary dermis. This spot dermabrasion is being performed to complete skin cancer reconstruction. It also will eliminate the other sun damaged precancerous cells that are known to be part of the regional effect of a lifetime's worth of sun exposure. This localized dermabrasion is therapeutic and should not be considered cosmetic in any regard.
Dorsal Nasal Flap Text: The defect edges were debeveled with a #15 scalpel blade. Given the location of the defect and the proximity to free margins a dorsal nasal flap was deemed most appropriate. Using a sterile surgical marker, an appropriate dorsal nasal flap was drawn around the defect. The area thus outlined was incised deep to adipose tissue with a #15 scalpel blade. The skin margins were undermined to an appropriate distance in all directions utilizing iris scissors.
Simple / Intermediate / Complex Repair - Final Wound Length In Cm: 4.5
Bcc Superficial Histology Text: On the basal layer of an irregular epidermis, there are small nodular masses of basaloid neoplastic cells with nuclear pleomorphism attached to the basal layer and surrounded by a loose fibrous stroma and mild inflammation in the dermis. The findings are typical for a basal cell carcinoma.
Hatchet Flap Text: The defect edges were debeveled with a #15 scalpel blade. Given the location of the defect, shape of the defect and the proximity to free margins a hatchet flap was deemed most appropriate. Using a sterile surgical marker, an appropriate hatchet flap was drawn incorporating the defect and placing the expected incisions within the relaxed skin tension lines where possible. The area thus outlined was incised deep to adipose tissue with a #15 scalpel blade. The skin margins were undermined to an appropriate distance in all directions utilizing iris scissors.
Cartilage Graft Text: The defect edges were debeveled with a #15 scalpel blade. Given the location of the defect, shape of the defect, the fact the defect involved a full thickness cartilage defect a cartilage graft was deemed most appropriate. An appropriate donor site was identified, cleansed, and anesthetized. The cartilage graft was then harvested and transferred to the recipient site, oriented appropriately and then sutured into place. The secondary defect was then repaired using a primary closure.
Complex Repair And Graft Additional Text (Will Appearing After The Standard Complex Repair Text): The complex repair was not sufficient to completely close the primary defect. The remaining additional defect was repaired with the graft mentioned below.
Melolabial Transposition Flap Text: The defect edges were debeveled with a #15 scalpel blade. Given the location of the defect and the proximity to free margins a melolabial flap was deemed most appropriate. Using a sterile surgical marker, an appropriate melolabial transposition flap was drawn incorporating the defect. The area thus outlined was incised deep to adipose tissue with a #15 scalpel blade. The skin margins were undermined to an appropriate distance in all directions utilizing iris scissors.
Initial Size Of Lesion: 1
Post-Care Instructions: I reviewed the post-care instructions with the patient in detail. \\n\\nKeep our initial bandage on and dry for 48-72 hours as directed. After 48-72 hours,\\n1. Cleanse the wound with peroxide gently. If there is a lot of crusting/scabbing, soak the site with peroxide to soften. \\n2. Apply a generous amount of Vaseline to the surgical site. DO NOT use Neosporin. \\n3. Cover the wound with a dressing. You can use a non-stick pad with paper tape or regular bandages. \\n4. Repeat twice daily, once in the morning, once in the evening. \\n\\n\\nIt is common to experience swelling, bruising, and drainage after surgery. To decrease pain, use extra strength Tylenol as directed on the bottle. Please do not use ibuprofen, Aleve, Motrin, or Excedrin as they may worsen bleeding. If Tylenol does not help with your pain, please call our office. Certain pain medications may require you to come to the office to  an actual paper prescription. Other intermediate (non-narcotic) strength pain medications may be called in for you if necessary. \\n\\nTo decrease pain, patients can also try using an ice pack, a bag of frozen green peas, or a bag of frozen marshmellows. Place the ice pack on top of the bandage for 20-30 minutes, then take a break for 20-30 minutes (place the ice pack back in the freezer to get it cold again). Repeat as many times as necessary. \\n\\nIf bleeding should occur, apply firm direct pressure to the site for 30 minutes without easing up. If bleeding continues after 30 minutes, please call the office at any time. In rare instances, it may be necessary to go to the nearest emergency room. \\n\\n\\nThings to avoid while healing:\\n - NO heavy lifting, exercise, or swimming for the next 14 days. \\n - NO exposure to tap water for the next 48-72 hours. \\n - NO exposure to hot tub, swimming pool, or ocean water for the next 14 days. \\n\\n\\nShould you have any questions or concerns, please call:\\n - [facility] Location = [facility] 07 [phone], please ask for [phone] or a member of Dr. Gisselle Campos surgical team\\n - [facility] : [facility] Location = [phone] - [phone], please ask for Mike Adkisn or a member of Dr. Gisselle Campos surgical team
Skin Substitute Text: The defect edges were debeveled with a #15 scalpel blade. Given the location of the defect, shape of the defect and the proximity to free margins a skin substitute graft was deemed most appropriate. The graft material was trimmed to fit the size of the defect. The graft was then placed in the primary defect and oriented appropriately.
Mastoid Interpolation Flap Text: A decision was made to reconstruct the defect utilizing an interpolation axial flap and a staged reconstruction. A telfa template was made of the defect. This telfa template was then used to outline the mastoid interpolation flap. The donor area for the pedicle flap was then injected with anesthesia. The flap was excised through the skin and subcutaneous tissue down to the layer of the underlying musculature. The pedicle flap was carefully excised within this deep plane to maintain its blood supply. The edges of the donor site were undermined. The donor site was closed in a primary fashion. The pedicle was then rotated into position and sutured. Once the tube was sutured into place, adequate blood supply was confirmed with blanching and refill. The pedicle was then wrapped with xeroform gauze and dressed appropriately with a telfa and gauze bandage to ensure continued blood supply and protect the attached pedicle.
Advancement Flap (Single) Text: After a lengthy discussion with the patient regarding the wound treatment reconstruction options available including but not limited to simple repair, intermediate repair, complex repair, adjacent tissue transfer, tissue graft as well as allowing the lesion to repair by secondary intention. It was determined that due to the defect location, complexity, and given indications; an adjacent tissue transfer (advancement flap) would be the most appropriate means for repair of the surgical defect as the defect extended through the skin into the subcutaneous tissues, and required rearrangement or transfer of adjacent tissue to repair the surgical wound. \\n\\n\\n\\n\\n\\nThe defect edges were debeveled with a #15 scalpel blade. Given the location of the defect and the proximity to free margins a single advancement flap was deemed most appropriate. Using a sterile surgical marker, an appropriate advancement flap was drawn incorporating the defect and placing the expected incisions within the relaxed skin tension lines where possible. The area thus outlined was incised deep to adipose tissue with a #15 scalpel blade. The skin margins were undermined to an appropriate distance in all directions utilizing iris scissors.
V-Y Flap Text: The defect edges were debeveled with a #15 scalpel blade. Given the location of the defect, shape of the defect and the proximity to free margins a V-Y flap was deemed most appropriate. Using a sterile surgical marker, an appropriate advancement flap was drawn incorporating the defect and placing the expected incisions within the relaxed skin tension lines where possible. The area thus outlined was incised deep to adipose tissue with a #15 scalpel blade. The skin margins were undermined to an appropriate distance in all directions utilizing iris scissors.
Z Plasty Text: The lesion was extirpated to the level of the fat with a #15 scalpel blade. Given the location of the defect, shape of the defect and the proximity to free margins a Z-plasty was deemed most appropriate for repair. Using a sterile surgical marker, the appropriate transposition arms of the Z-plasty were drawn incorporating the defect and placing the expected incisions within the relaxed skin tension lines where possible. The area thus outlined was incised deep to adipose tissue with a #15 scalpel blade. The skin margins were undermined to an appropriate distance in all directions utilizing iris scissors. The opposing transposition arms were then transposed into place in opposite direction and anchored with interrupted buried subcutaneous sutures.
Bcc Micronodular Histology Text: Beneath an irregular epidermis, there are extremely small but infiltrative nodular masses of basaloid neoplastic cells with nuclear pleomorphism attached to the basal layer and surrounded by a loose fibrous stroma and mild inflammation in the dermis. The findings are typical for a basal cell carcinoma.
Previous Accession (Optional): 
Scc Ka Subtype Histology Text: There is well-differentiated squamous epithelium with pleomorphism and masses of keratin.
Consent (Near Eyelid Margin)/Introductory Paragraph: The rationale for Mohs was explained to the patient and consent was obtained. The risks, benefits and alternatives to therapy were discussed in detail. Specifically, the risks of ectropion or eyelid deformity, infection, scarring, bleeding, prolonged wound healing, incomplete removal, allergy to anesthesia, nerve injury and recurrence were addressed. Prior to the procedure, the treatment site was clearly identified and confirmed by the patient. All components of Universal Protocol/PAUSE Rule completed.
Repair Performed By Another Provider Text (Leave Blank If You Do Not Want): After obtaining clear surgical margins the defect was repaired by another provider.
Island Pedicle Flap-Requiring Vessel Identification Text: The defect edges were debeveled with a #15 scalpel blade. Given the location of the defect, shape of the defect and the proximity to free margins an island pedicle advancement flap was deemed most appropriate. Using a sterile surgical marker, an appropriate advancement flap was drawn, based on the axial vessel mentioned above, incorporating the defect, outlining the appropriate donor tissue and placing the expected incisions within the relaxed skin tension lines where possible. The area thus outlined was incised deep to adipose tissue with a #15 scalpel blade. The skin margins were undermined to an appropriate distance in all directions around the primary defect and laterally outward around the island pedicle utilizing iris scissors. There was minimal undermining beneath the pedicle flap.

## 2017-02-28 NOTE — HPI: SKIN LESION (SQUAMOUS CELL CARCINOMA)
How Severe Is Your Skin Cancer?: mild
Is This A New Presentation, Or A Follow-Up?: Squamous Cell Carcinoma
Location From Outside Provider (Will Override Previously Chosen Location): Left Mid Scalp
When Was Squamous Cell Carcinoma Biopsied? (Optional): 1/17/17
Accession # (Optional):

## 2017-03-07 ENCOUNTER — MEDICAL CORRESPONDENCE (OUTPATIENT)
Dept: TRANSPLANT | Facility: CLINIC | Age: 72
End: 2017-03-07

## 2017-03-14 ENCOUNTER — APPOINTMENT (RX ONLY)
Dept: URBAN - METROPOLITAN AREA CLINIC 116 | Facility: CLINIC | Age: 72
Setting detail: DERMATOLOGY
End: 2017-03-14

## 2017-03-14 DIAGNOSIS — Z48.02 ENCOUNTER FOR REMOVAL OF SUTURES: ICD-10-CM

## 2017-03-14 PROCEDURE — ? SUTURE REMOVAL (GLOBAL PERIOD)

## 2017-03-14 ASSESSMENT — LOCATION ZONE DERM: LOCATION ZONE: SCALP

## 2017-03-14 ASSESSMENT — LOCATION DETAILED DESCRIPTION DERM: LOCATION DETAILED: LEFT CENTRAL FRONTAL SCALP

## 2017-03-14 ASSESSMENT — LOCATION SIMPLE DESCRIPTION DERM: LOCATION SIMPLE: LEFT SCALP

## 2017-03-14 NOTE — PROCEDURE: SUTURE REMOVAL (GLOBAL PERIOD)
Add 05864 Cpt? (Important Note: In 2017 The Use Of 40039 Is Being Tracked By Cms To Determine Future Global Period Reimbursement For Global Periods): no
Body Location Override (Optional - Billing Will Still Be Based On Selected Body Map Location If Applicable): Left mid scalp
Detail Level: Detailed

## 2017-03-15 ENCOUNTER — TRANSFERRED RECORDS (OUTPATIENT)
Dept: HEALTH INFORMATION MANAGEMENT | Facility: CLINIC | Age: 72
End: 2017-03-15

## 2017-03-15 LAB
BASOPHILS # BLD AUTO: 0 10*3/UL
BASOPHILS NFR BLD AUTO: 0.4 %
EOSINOPHIL # BLD AUTO: 0.1 10*3/UL
EOSINOPHIL NFR BLD AUTO: 2.9 %
ERYTHROCYTE [DISTWIDTH] IN BLOOD BY AUTOMATED COUNT: ABNORMAL %
HCT VFR BLD AUTO: 33.7 %
HEMOGLOBIN: 10.9 G/DL (ref 13.3–17.7)
LYMPHOCYTES # BLD AUTO: 0.4 10*3/UL
LYMPHOCYTES NFR BLD AUTO: 18.1 %
MCH RBC QN AUTO: 30.4 PG
MCHC RBC AUTO-ENTMCNC: 32.3 G/DL
MCV RBC AUTO: 94 FL
MONOCYTES # BLD AUTO: 0.5 10*3/UL
MONOCYTES NFR BLD AUTO: 23.2 %
NEUTROPHILS # BLD AUTO: 1.3 10*3/UL
NEUTROPHILS NFR BLD AUTO: 55.4 %
PLATELET COUNT - QUEST: 245 10^9/L (ref 150–450)
RBC # BLD AUTO: 3.58 10^12/L
WBC # BLD AUTO: 2.3 10^9/L

## 2017-03-17 ENCOUNTER — CARE COORDINATION (OUTPATIENT)
Dept: ONCOLOGY | Facility: CLINIC | Age: 72
End: 2017-03-17

## 2017-03-17 NOTE — PROGRESS NOTES
"Patient's wife called in on Tuesday 3/14/17 stating that Yamil has been dealing with some cold like symptoms for a couple of weeks now. Per the wife the symptoms of cough, nasal congestion, and fatigue have been waxing and waning now since the beginning on March. When Ymail went in for his Daratumumab infusion on 3/9/17 they started him on a Z-pack and gave him Tessalon Pearls for his cough. He completed the Z-pack on the 3/13/17 but he is still not feeling really great. He last saw his oncologist in [place] on 2/23/17 has his MM labs down and assessment done. (clinic notes are scaned in Media tab) He is returning to him again on 3/23/17 for his next set of MM labs and assessment. Wife however is going to be taking him in tomorrow Wednesday 3/15/17 to be assessed for this ongoing cold/cough/fatigue that does not seem to get better. Updated Dr. Topete who suggested that he should get a viral swab done at the appointment if able. Patient's wife verbalized understanding and will get back to us after his appointment on Wednesday.     Wife messaged team late on Wednesday 3/15/17 with an update of his appointment and copy of his labs which are entered into epic. See message below:    \"Attached are Yamil s labs from today, sorry it took awhile as we just got home. They did not have the capability at the clinic to do a swab for flu or virus.  While NP examined Yamil and talked with us, she was consulting with Dr Singh at another location.  He directed the following:    Fluids and Steroids:  They provided Yamil with hydration today to boost his energy (took about 2 hrs) plus also gave him Decahedron to also boost his energy level.    Prescribed:  - Allegra - given high pollen counts in the [place] area, may contribute to some allergies.  - Musinex to loosen lung secretions and told to drink lots to cough secretions up.  - Flonase spray    Notes from NP will be sent to Dr Topete after write up, probably tomorrow.    They also suggested " "plenty of rest, hand washing and again lots of fluids.\"      Per Dr. Topete, He would like Mr. Carbone to HOLD his Pomalyst for 1 week and then restart it at 2 MG daily for 21 days instead of 4 MG. He is also wondering what his MM labs are (Light chains, IgM, and SPEP) will be done next at his visit with Dr. Singh next week on 3/23/17.  Mrs. Carbone was grateful for the call and the follow up care. She will continue to fax clinic notes and information about Mr. Carbone as she gets it.   "

## 2017-03-21 DIAGNOSIS — I95.1 ORTHOSTATIC SYNCOPE: ICD-10-CM

## 2017-03-21 DIAGNOSIS — I95.1 ORTHOSTATIC HYPOTENSION: ICD-10-CM

## 2017-03-21 RX ORDER — MIDODRINE HYDROCHLORIDE 2.5 MG/1
2.5 TABLET ORAL 3 TIMES DAILY
Qty: 90 TABLET | Refills: 3 | Status: ON HOLD | OUTPATIENT
Start: 2017-03-21 | End: 2017-05-16

## 2017-03-23 ENCOUNTER — TRANSFERRED RECORDS (OUTPATIENT)
Dept: HEALTH INFORMATION MANAGEMENT | Facility: CLINIC | Age: 72
End: 2017-03-23

## 2017-03-23 ENCOUNTER — CARE COORDINATION (OUTPATIENT)
Dept: ONCOLOGY | Facility: CLINIC | Age: 72
End: 2017-03-23

## 2017-03-23 DIAGNOSIS — C90.00 MULTIPLE MYELOMA (H): Primary | ICD-10-CM

## 2017-03-23 LAB
% GRANULOCYTES: 71 %
% GRANULOCYTES: 71 %
ALBUMIN SERPL-MCNC: 3.6 G/DL
ALP SERPL-CCNC: 52 U/L
ALT SERPL-CCNC: 10 U/L
ANION GAP SERPL CALCULATED.3IONS-SCNC: 1.1 MMOL/L
AST SERPL-CCNC: 10 U/L
BASOPHILS # BLD AUTO: 0 10*3/UL
BASOPHILS # BLD AUTO: 0 10*3/UL
BASOPHILS NFR BLD AUTO: 0.6 %
BASOPHILS NFR BLD AUTO: 0.6 %
BILIRUB SERPL-MCNC: 0.2 MG/DL
BUN SERPL-MCNC: 22 MG/DL
CALCIUM SERPL-MCNC: 9.6 MG/DL
CHLORIDE SERPLBLD-SCNC: 107 MMOL/L
CO2 SERPL-SCNC: 23 MMOL/L
CREAT SERPL-MCNC: 0.9 MG/DL
EOSINOPHIL # BLD AUTO: 0.1 10*3/UL
EOSINOPHIL # BLD AUTO: 0.1 10*3/UL
EOSINOPHIL NFR BLD AUTO: 5 %
EOSINOPHIL NFR BLD AUTO: 5 %
ERYTHROCYTE [DISTWIDTH] IN BLOOD BY AUTOMATED COUNT: 15.7 %
ERYTHROCYTE [DISTWIDTH] IN BLOOD BY AUTOMATED COUNT: 15.7 %
GFR SERPL CREATININE-BSD FRML MDRD: 88 ML/MIN/1.73M2
GLUCOSE SERPL-MCNC: 168 MG/DL (ref 70–99)
HCT VFR BLD AUTO: 31 %
HCT VFR BLD AUTO: 31 %
HEMOGLOBIN: 10.1 G/DL (ref 13.3–17.7)
HEMOGLOBIN: 10.1 G/DL (ref 13.3–17.7)
IGA: <25 MG/DL
IGG: 101 MG/DL
IGM: 2092 MG/DL
KAPPA LAMBDA RATIO: 0.03
LYMPHOCYTES # BLD AUTO: 0.5 10*3/UL
LYMPHOCYTES # BLD AUTO: 0.5 10*3/UL
LYMPHOCYTES NFR BLD AUTO: 17.3 %
LYMPHOCYTES NFR BLD AUTO: 17.3 %
Lab: 208.77 MG/L
Lab: 6.09 MG/L
M-SPIKE: 1.2 G/DL
MCH RBC QN AUTO: 31 PG
MCH RBC QN AUTO: 31 PG
MCHC RBC AUTO-ENTMCNC: 32.5 G/DL
MCHC RBC AUTO-ENTMCNC: 32.5 G/DL
MCV RBC AUTO: 95 FL
MCV RBC AUTO: 95 FL
MONOCYTES # BLD AUTO: 0.2 10*3/UL
MONOCYTES # BLD AUTO: 0.2 10*3/UL
MONOCYTES NFR BLD AUTO: 6.1 %
MONOCYTES NFR BLD AUTO: 6.1 %
NEUTROPHILS # BLD AUTO: 1.9 10*3/UL
NEUTROPHILS NFR BLD AUTO: 1.9 %
PLATELET COUNT - QUEST: 183 10^9/L (ref 150–450)
PLATELET COUNT - QUEST: 183 10^9/L (ref 150–450)
PMV BLD: 8.2 FL
PMV BLD: 8.2 FL
POTASSIUM SERPL-SCNC: 3.8 MMOL/L
PROT SERPL-MCNC: 7 G/DL
RBC # BLD AUTO: 3.25 10^12/L
RBC # BLD AUTO: 3.25 10^12/L
SODIUM SERPL-SCNC: 142 MMOL/L
WBC # BLD AUTO: 2.7 10^9/L
WBC # BLD AUTO: 2.7 10^9/L

## 2017-03-23 NOTE — PROGRESS NOTES
Per Dr. Efrem arreola to refill Pomalyst but at a decreased dose of 2 MG daily (was 4 MG) for 21 days.    Called Anthony this afternoon to review with him the basic survey questions required of someone taking Pomalyst. Reviewed that Anthony can cause severe birth defects or death to unborn babies. Informed him, that his semen may contain Pomalyst even after he stops treatment (up to 4 weeks after stopping) and that he must use a latex or synthetic condom every time he has sex with a women of child bearing age. Anthony understands that Pomalyst is only for him and not to be shared with others and should be kept out the reach of small children. He understands that he will have to complete a mandatory confidential survey monthly while taking Pomalyst and that he is to return any unused  capsules to [facility] for proper disposal. He understands that he will not be able to donate blood or sperm while taking Polalyst and for 4 weeks after stopping the medication. The appropriate paper work was completed and the prescription will be sent to the [facility]. The authorization number for the prescription is #[id number] . Reviewed with Anthony that he is to take the Polalyst 2MG daily for 21 days of a 28 day cycle. Anthony verbalized understanding of Pomalyst and how it is to be handle. Reviewed with Anthony his schedule, the important phone numbers (triage and after hours).  Encouraged Anthony to call if he had any questions.

## 2017-03-31 ENCOUNTER — CARE COORDINATION (OUTPATIENT)
Dept: ONCOLOGY | Facility: CLINIC | Age: 72
End: 2017-03-31

## 2017-03-31 DIAGNOSIS — C90.00 MULTIPLE MYELOMA (H): ICD-10-CM

## 2017-03-31 NOTE — PROGRESS NOTES
Called patient to inform them that their prescription has been resent to the [facility] which was dictated by insurance.  Asked that the patient call me to notify me when drug has been delivered and started.  Patient and wife verbalized understanding and were grateful for the help.

## 2017-04-04 ENCOUNTER — DOCUMENTATION ONLY (OUTPATIENT)
Dept: TRANSPLANT | Facility: CLINIC | Age: 72
End: 2017-04-04

## 2017-04-04 NOTE — PROGRESS NOTES
Patient was approved for free drug through [employer]. Please see letter below.

## 2017-04-06 ENCOUNTER — TRANSFERRED RECORDS (OUTPATIENT)
Dept: HEALTH INFORMATION MANAGEMENT | Facility: CLINIC | Age: 72
End: 2017-04-06

## 2017-04-06 LAB
BASOPHILS # BLD AUTO: 0 10*3/UL
BASOPHILS NFR BLD AUTO: 0.7 %
EOSINOPHIL # BLD AUTO: 0.1 10*3/UL
EOSINOPHIL NFR BLD AUTO: 2.9 %
ERYTHROCYTE [DISTWIDTH] IN BLOOD BY AUTOMATED COUNT: 15.1 %
HCT VFR BLD AUTO: 30.8 %
HEMOGLOBIN: 10.2 G/DL (ref 13.3–17.7)
LYMPHOCYTES # BLD AUTO: 0.5 10*3/UL
LYMPHOCYTES NFR BLD AUTO: 13.8 %
MCH RBC QN AUTO: ABNORMAL PG
MCHC RBC AUTO-ENTMCNC: ABNORMAL G/DL
MCV RBC AUTO: ABNORMAL FL
MONOCYTES # BLD AUTO: 0.5 10*3/UL
MONOCYTES NFR BLD AUTO: 12.4 %
NEUTROPHILS # BLD AUTO: ABNORMAL 10*3/UL
NEUTROPHILS NFR BLD AUTO: ABNORMAL %
PLATELET COUNT - QUEST: 169 10^9/L (ref 150–450)
RBC # BLD AUTO: 3.23 10^12/L
WBC # BLD AUTO: 3.6 10^9/L

## 2017-04-20 ENCOUNTER — TRANSFERRED RECORDS (OUTPATIENT)
Dept: HEALTH INFORMATION MANAGEMENT | Facility: CLINIC | Age: 72
End: 2017-04-20

## 2017-04-20 LAB
ALBUMIN SERPL-MCNC: 3.9 G/DL
ALP SERPL-CCNC: 56 U/L
ALT SERPL-CCNC: 14 U/L
ANION GAP SERPL CALCULATED.3IONS-SCNC: ABNORMAL MMOL/L
AST SERPL-CCNC: 10 U/L
BASOPHILS # BLD AUTO: 0.1 10*3/UL
BASOPHILS NFR BLD AUTO: 0.4 %
BILIRUB SERPL-MCNC: 0.4 MG/DL
BUN SERPL-MCNC: 21 MG/DL
CALCIUM SERPL-MCNC: 10.3 MG/DL
CHLORIDE SERPLBLD-SCNC: 105 MMOL/L
CO2 SERPL-SCNC: 24 MMOL/L
CREAT SERPL-MCNC: 0.8 MG/DL
EOSINOPHIL # BLD AUTO: 0 10*3/UL
EOSINOPHIL NFR BLD AUTO: 1.6 %
ERYTHROCYTE [DISTWIDTH] IN BLOOD BY AUTOMATED COUNT: 15.5 %
GFR SERPL CREATININE-BSD FRML MDRD: ABNORMAL ML/MIN/1.73M2
GLUCOSE SERPL-MCNC: 129 MG/DL (ref 70–99)
HCT VFR BLD AUTO: 30.7 %
HEMOGLOBIN: 9.9 G/DL (ref 13.3–17.7)
IGA: <25 MG/DL
IGA: <25 MG/DL
IGG: 116 MG/DL
IGG: 116 MG/DL
IGM: 2180
IGM: 2180 MG/DL
KAPPA FREE LT CHAIN: 3.05
KAPPA LAMBDA RATIO: 0.01
LAMBDA FREE LT CHAIN: 248.32
LYMPHOCYTES # BLD AUTO: 0.3 10*3/UL
LYMPHOCYTES NFR BLD AUTO: 8.1 %
MCH RBC QN AUTO: ABNORMAL PG
MCHC RBC AUTO-ENTMCNC: ABNORMAL G/DL
MCV RBC AUTO: ABNORMAL FL
MONOCYTES # BLD AUTO: 0.5 10*3/UL
MONOCYTES NFR BLD AUTO: 13.3 %
NEUTROPHILS # BLD AUTO: 2.7 10*3/UL
NEUTROPHILS NFR BLD AUTO: 76.6 %
PLATELET COUNT - QUEST: 131 10^9/L (ref 150–450)
POTASSIUM SERPL-SCNC: 4.2 MMOL/L
PROT SERPL-MCNC: 7.3 G/DL
RBC # BLD AUTO: 3.17 10^12/L
SODIUM SERPL-SCNC: 141 MMOL/L
WBC # BLD AUTO: 3.5 10^9/L

## 2017-04-25 ENCOUNTER — APPOINTMENT (RX ONLY)
Dept: URBAN - METROPOLITAN AREA CLINIC 116 | Facility: CLINIC | Age: 72
Setting detail: DERMATOLOGY
End: 2017-04-25

## 2017-04-25 DIAGNOSIS — L72.8 OTHER FOLLICULAR CYSTS OF THE SKIN AND SUBCUTANEOUS TISSUE: ICD-10-CM

## 2017-04-25 DIAGNOSIS — S31000A OPEN WOUND(S) (MULTIPLE) OF UNSPECIFIED SITE(S), WITHOUT MENTION OF COMPLICATION: ICD-10-CM

## 2017-04-25 PROBLEM — T07 UNSPECIFIED MULTIPLE INJURIES: Status: ACTIVE | Noted: 2017-04-25

## 2017-04-25 PROCEDURE — 99212 OFFICE O/P EST SF 10 MIN: CPT | Mod: 25,57

## 2017-04-25 PROCEDURE — ? EXCISION

## 2017-04-25 PROCEDURE — ? SEPARATE AND IDENTIFIABLE DOCUMENTATION

## 2017-04-25 PROCEDURE — 14040 TIS TRNFR F/C/C/M/N/A/G/H/F: CPT

## 2017-04-25 ASSESSMENT — LOCATION DETAILED DESCRIPTION DERM: LOCATION DETAILED: LEFT INFERIOR FOREHEAD

## 2017-04-25 ASSESSMENT — LOCATION SIMPLE DESCRIPTION DERM: LOCATION SIMPLE: LEFT FOREHEAD

## 2017-04-25 ASSESSMENT — LOCATION ZONE DERM: LOCATION ZONE: FACE

## 2017-04-25 NOTE — PROCEDURE: EXCISION
Purse String (Simple) Text: Given the location of the defect and the characteristics of the surrounding skin a purse string simple closure was deemed most appropriate. Undermining was performed circumferentially around the surgical defect. A purse string suture was then placed and tightened.
Complex Repair And Split-Thickness Skin Graft Text: The defect edges were debeveled with a #15 scalpel blade. The primary defect was closed partially with a complex linear closure. Given the location of the defect, shape of the defect and the proximity to free margins a split thickness skin graft was deemed most appropriate to repair the remaining defect. The graft was trimmed to fit the size of the remaining defect. The graft was then placed in the primary defect, oriented appropriately, and sutured into place.
Detail Level: Detailed
Rhombic Flap Text: The defect edges were debeveled with a #15 scalpel blade. Given the location of the defect and the proximity to free margins a rhombic flap was deemed most appropriate. Using a sterile surgical marker, an appropriate rhombic flap was drawn incorporating the defect. The area thus outlined was incised deep to adipose tissue with a #15 scalpel blade. The skin margins were undermined to an appropriate distance in all directions utilizing iris scissors.
Purse String (Intermediate) Text: Given the location of the defect and the characteristics of the surrounding skin a pursestring intermediate closure was deemed most appropriate. Undermining was performed circumfirentially around the surgical defect. A purstring suture was then placed and tightened.
O-T Advancement Flap Text: The defect edges were debeveled with a #15 scalpel blade. Given the location of the defect, shape of the defect and the proximity to free margins an O-T advancement flap was deemed most appropriate. Using a sterile surgical marker, an appropriate advancement flap was drawn incorporating the defect and placing the expected incisions within the relaxed skin tension lines where possible. The area thus outlined was incised deep to adipose tissue with a #15 scalpel blade. The skin margins were undermined to an appropriate distance in all directions utilizing iris scissors.
Complex Repair And V-Y Plasty Text: The defect edges were debeveled with a #15 scalpel blade. The primary defect was closed partially with a complex linear closure. Given the location of the remaining defect, shape of the defect and the proximity to free margins a V-Y plasty was deemed most appropriate for complete closure of the defect. Using a sterile surgical marker, an appropriate advancement flap was drawn incorporating the defect and placing the expected incisions within the relaxed skin tension lines where possible. The area thus outlined was incised deep to adipose tissue with a #15 scalpel blade. The skin margins were undermined to an appropriate distance in all directions utilizing iris scissors.
Complex Repair And Dorsal Nasal Flap Text: The defect edges were debeveled with a #15 scalpel blade. The primary defect was closed partially with a complex linear closure. Given the location of the remaining defect, shape of the defect and the proximity to free margins a dorsal nasal flap was deemed most appropriate for complete closure of the defect. Using a sterile surgical marker, an appropriate flap was drawn incorporating the defect and placing the expected incisions within the relaxed skin tension lines where possible. The area thus outlined was incised deep to adipose tissue with a #15 scalpel blade. The skin margins were undermined to an appropriate distance in all directions utilizing iris scissors.
Biopsy Photograph Reviewed: No (no photograph available)
Bill For Surgical Tray: no
Fusiform Excision Additional Text (Leave Blank If You Do Not Want): The margin was drawn around the clinically apparent lesion. A fusiform shape was then drawn on the skin incorporating the lesion and margins. Incisions were then made along these lines to the appropriate tissue plane and the lesion was extirpated.
Surgeon (Optional): Dr Rosalina Monk
Secondary Defect Length (In Cm): 0
Flap Type: S plasty
Epidermal Closure: running horizontal mattress
Primary Defect Width (In Cm): 1.5
Lip Wedge Excision Repair Text: Given the location of the defect and the proximity to free margins a full thickness wedge repair was deemed most appropriate. Using a sterile surgical marker, the appropriate repair was drawn incorporating the defect and placing the expected incisions perpendicular to the vermillion border. The vermillion border was also meticulously outlined to ensure appropriate reapproximation during the repair. The area thus outlined was incised through and through with a #15 scalpel blade. The muscularis and dermis were reaproximated with deep sutures following hemostasis. Care was taken to realign the vermillion border before proceeding with the superficial closure. Once the vermillion was realigned the superfical and mucosal closure was finished.
Alar Island Pedicle Flap Text: The defect edges were debeveled with a #15 scalpel blade. Given the location of the defect, shape of the defect and the proximity to the alar rim an island pedicle advancement flap was deemed most appropriate. Using a sterile surgical marker, an appropriate advancement flap was drawn incorporating the defect, outlining the appropriate donor tissue and placing the expected incisions within the nasal ala running parallel to the alar rim. The area thus outlined was incised with a #15 scalpel blade. The skin margins were undermined minimally to an appropriate distance in all directions around the primary defect and laterally outward around the island pedicle utilizing iris scissors. There was minimal undermining beneath the pedicle flap.
Scalpel Size: 15 blade
Advancement Flap (Single) Text: After a lengthy discussion with the patient regarding the wound treatment reconstruction options available including but not limited to simple repair, intermediate repair, complex repair, adjacent tissue transfer, tissue graft as well as allowing the lesion to repair by secondary intention. It was determined that due to the defect location, complexity, and given indications; an adjacent tissue transfer (advancement flap) would be the most appropriate means for repair of the surgical defect as the defect extended through the skin into the subcutaneous tissues, and required rearrangement or transfer of adjacent tissue to repair the surgical wound. \\n\\n\\n\\n\\nThe defect edges were debeveled with a #15 scalpel blade. Given the location of the defect and the proximity to free margins a single advancement flap was deemed most appropriate. Using a sterile surgical marker, an appropriate advancement flap was drawn incorporating the defect and placing the expected incisions within the relaxed skin tension lines where possible. The area thus outlined was incised deep to adipose tissue with a #15 scalpel blade. The skin margins were undermined to an appropriate distance in all directions utilizing iris scissors.
Cheek Interpolation Flap Text: A decision was made to reconstruct the defect utilizing an interpolation axial flap and a staged reconstruction. A telfa template was made of the defect. This telfa template was then used to outline the Cheek Interpolation flap. The donor area for the pedicle flap was then injected with anesthesia. The flap was excised through the skin and subcutaneous tissue down to the layer of the underlying musculature. The interpolation flap was carefully excised within this deep plane to maintain its blood supply. The edges of the donor site were undermined. The donor site was closed in a primary fashion. The pedicle was then rotated into position and sutured. Once the tube was sutured into place, adequate blood supply was confirmed with blanching and refill. The pedicle was then wrapped with xeroform gauze and dressed appropriately with a telfa and gauze bandage to ensure continued blood supply and protect the attached pedicle.
Surgeon Performing Repair (Optional): Dr. Ivett Ayala
Intermediate Repair Preamble Text (Leave Blank If You Do Not Want): Undermining was performed with blunt dissection.
Complex Repair And Ftsg Text: The defect edges were debeveled with a #15 scalpel blade. The primary defect was closed partially with a complex linear closure. Given the location of the defect, shape of the defect and the proximity to free margins a full thickness skin graft was deemed most appropriate to repair the remaining defect. The graft was trimmed to fit the size of the remaining defect. The graft was then placed in the primary defect, oriented appropriately, and sutured into place.
Complex Repair And Melolabial Flap Text: The defect edges were debeveled with a #15 scalpel blade. The primary defect was closed partially with a complex linear closure. Given the location of the remaining defect, shape of the defect and the proximity to free margins a melolabial flap was deemed most appropriate for complete closure of the defect. Using a sterile surgical marker, an appropriate advancement flap was drawn incorporating the defect and placing the expected incisions within the relaxed skin tension lines where possible. The area thus outlined was incised deep to adipose tissue with a #15 scalpel blade. The skin margins were undermined to an appropriate distance in all directions utilizing iris scissors.
W Plasty Text: The lesion was extirpated to the level of the fat with a #15 scalpel blade. Given the location of the defect, shape of the defect and the proximity to free margins a W-plasty was deemed most appropriate for repair. Using a sterile surgical marker, the appropriate transposition arms of the W-plasty were drawn incorporating the defect and placing the expected incisions within the relaxed skin tension lines where possible. The area thus outlined was incised deep to adipose tissue with a #15 scalpel blade. The skin margins were undermined to an appropriate distance in all directions utilizing iris scissors. The opposing transposition arms were then transposed into place in opposite direction and anchored with interrupted buried subcutaneous sutures.
Bi-Rhombic Flap Text: The defect edges were debeveled with a #15 scalpel blade. Given the location of the defect and the proximity to free margins a bi-rhombic flap was deemed most appropriate. Using a sterile surgical marker, an appropriate rhombic flap was drawn incorporating the defect. The area thus outlined was incised deep to adipose tissue with a #15 scalpel blade. The skin margins were undermined to an appropriate distance in all directions utilizing iris scissors.
Keystone Flap Text: The defect edges were debeveled with a #15 scalpel blade. Given the location of the defect, shape of the defect a keystone flap was deemed most appropriate. Using a sterile surgical marker, an appropriate keystone flap was drawn incorporating the defect, outlining the appropriate donor tissue and placing the expected incisions within the relaxed skin tension lines where possible. The area thus outlined was incised deep to adipose tissue with a #15 scalpel blade. The skin margins were undermined to an appropriate distance in all directions around the primary defect and laterally outward around the flap utilizing iris scissors.
Medical Necessity Clause: This procedure was medically necessary because the lesion that was treated was: growing and irritating to the patient
Island Pedicle Flap With Canthal Suspension Text: The defect edges were debeveled with a #15 scalpel blade. Given the location of the defect, shape of the defect and the proximity to free margins an island pedicle advancement flap was deemed most appropriate. Using a sterile surgical marker, an appropriate advancement flap was drawn incorporating the defect, outlining the appropriate donor tissue and placing the expected incisions within the relaxed skin tension lines where possible. The area thus outlined was incised deep to adipose tissue with a #15 scalpel blade. The skin margins were undermined to an appropriate distance in all directions around the primary defect and laterally outward around the island pedicle utilizing iris scissors. There was minimal undermining beneath the pedicle flap. A suspension suture was placed in the canthal tendon to prevent tension and prevent ectropion.
Excision Method: Elliptical
Epidermal Sutures: 5-0 Fast Absorbing Gut
Island Pedicle Flap-Requiring Vessel Identification Text: The defect edges were debeveled with a #15 scalpel blade. Given the location of the defect, shape of the defect and the proximity to free margins an island pedicle advancement flap was deemed most appropriate. Using a sterile surgical marker, an appropriate advancement flap was drawn, based on the axial vessel mentioned above, incorporating the defect, outlining the appropriate donor tissue and placing the expected incisions within the relaxed skin tension lines where possible. The area thus outlined was incised deep to adipose tissue with a #15 scalpel blade. The skin margins were undermined to an appropriate distance in all directions around the primary defect and laterally outward around the island pedicle utilizing iris scissors. There was minimal undermining beneath the pedicle flap.
Dermal Autograft Text: The defect edges were debeveled with a #15 scalpel blade. Given the location of the defect, shape of the defect and the proximity to free margins a dermal autograft was deemed most appropriate. Using a sterile surgical marker, the primary defect shape was transferred to the donor site. The area thus outlined was incised deep to adipose tissue with a #15 scalpel blade. The harvested graft was then trimmed of adipose and epidermal tissue until only dermis was left. The skin graft was then placed in the primary defect and oriented appropriately.
Billing Type: [employer]
Render The Repair Note As A Separate Paragraph: Yes
S Plasty Text: Given the location and shape of the defect, and the orientation of relaxed skin tension lines, an S-plasty was deemed most appropriate for repair. Using a sterile surgical marker, the appropriate outline of the S-plasty was drawn, incorporating the defect and placing the expected incisions within the relaxed skin tension lines where possible. The area thus outlined was incised deep to adipose tissue with a #15 scalpel blade. The skin margins were undermined to an appropriate distance in all directions utilizing iris scissors. The skin flaps were advanced over the defect. The opposing margins were then approximated with interrupted buried subcutaneous sutures.
Ear Star Wedge Flap Text: The defect edges were debeveled with a #15 blade scalpel. Given the location of the defect and the proximity to free margins (helical rim) an ear star wedge flap was deemed most appropriate. Using a sterile surgical marker, the appropriate flap was drawn incorporating the defect and placing the expected incisions between the helical rim and antihelix where possible. The area thus outlined was incised through and through with a #15 scalpel blade.
Helical Rim Advancement Flap Text: The defect edges were debeveled with a #15 blade scalpel. Given the location of the defect and the proximity to free margins (helical rim) a double helical rim advancement flap was deemed most appropriate. Using a sterile surgical marker, the appropriate advancement flaps were drawn incorporating the defect and placing the expected incisions between the helical rim and antihelix where possible. The area thus outlined was incised through and through with a #15 scalpel blade. With a skin hook and iris scissors, the flaps were gently and sharply undermined and freed up.
Consent was obtained from the patient. The risks and benefits to therapy were discussed in detail. Specifically, the risks of infection, scarring, bleeding, prolonged wound healing, incomplete removal, allergy to anesthesia, nerve injury and recurrence were addressed. Prior to the procedure, the treatment site was clearly identified and confirmed by the patient. All components of Universal Protocol/PAUSE Rule completed.
Complex Repair And M Plasty Text: The defect edges were debeveled with a #15 scalpel blade. The primary defect was closed partially with a complex linear closure. Given the location of the remaining defect, shape of the defect and the proximity to free margins an M plasty was deemed most appropriate for complete closure of the defect. Using a sterile surgical marker, an appropriate advancement flap was drawn incorporating the defect and placing the expected incisions within the relaxed skin tension lines where possible. The area thus outlined was incised deep to adipose tissue with a #15 scalpel blade. The skin margins were undermined to an appropriate distance in all directions utilizing iris scissors.
Paramedian Forehead Flap Text: A decision was made to reconstruct the defect utilizing an interpolation axial flap and a staged reconstruction. A telfa template was made of the defect. This telfa template was then used to outline the paramedian forehead pedicle flap. The donor area for the pedicle flap was then injected with anesthesia. The flap was excised through the skin and subcutaneous tissue down to the layer of the underlying musculature. The pedicle flap was carefully excised within this deep plane to maintain its blood supply. The edges of the donor site were undermined. The donor site was closed in a primary fashion. The pedicle was then rotated into position and sutured. Once the tube was sutured into place, adequate blood supply was confirmed with blanching and refill. The pedicle was then wrapped with xeroform gauze and dressed appropriately with a telfa and gauze bandage to ensure continued blood supply and protect the attached pedicle.
Complex Repair And Tissue Cultured Epidermal Autograft Text: The defect edges were debeveled with a #15 scalpel blade. The primary defect was closed partially with a complex linear closure. Given the location of the defect, shape of the defect and the proximity to free margins an tissue cultured epidermal autograft was deemed most appropriate to repair the remaining defect. The graft was trimmed to fit the size of the remaining defect. The graft was then placed in the primary defect, oriented appropriately, and sutured into place.
Perilesional Excision Additional Text (Leave Blank If You Do Not Want): The margin was drawn around the clinically apparent lesion. Incisions were then made along these lines to the appropriate tissue plane and the lesion was extirpated.
Undermining Location (Optional): in the deep fat
Ftsg Text: The defect edges were debeveled with a #15 scalpel blade. Given the location of the defect, shape of the defect and the proximity to free margins a full thickness skin graft was deemed most appropriate. Using a sterile surgical marker, the primary defect shape was transferred to the donor site. The area thus outlined was incised deep to adipose tissue with a #15 scalpel blade. The harvested graft was then trimmed of adipose tissue until only dermis and epidermis was left. The skin margins of the secondary defect were undermined to an appropriate distance in all directions utilizing iris scissors. The secondary defect was closed with interrupted buried subcutaneous sutures. The skin edges were then re-apposed with running  sutures. The skin graft was then placed in the primary defect and oriented appropriately.
O-L Flap Text: The defect edges were debeveled with a #15 scalpel blade. Given the location of the defect, shape of the defect and the proximity to free margins an O-L flap was deemed most appropriate. Using a sterile surgical marker, an appropriate advancement flap was drawn incorporating the defect and placing the expected incisions within the relaxed skin tension lines where possible. The area thus outlined was incised deep to adipose tissue with a #15 scalpel blade. The skin margins were undermined to an appropriate distance in all directions utilizing iris scissors.
Melolabial Transposition Flap Text: The defect edges were debeveled with a #15 scalpel blade. Given the location of the defect and the proximity to free margins a melolabial flap was deemed most appropriate. Using a sterile surgical marker, an appropriate melolabial transposition flap was drawn incorporating the defect. The area thus outlined was incised deep to adipose tissue with a #15 scalpel blade. The skin margins were undermined to an appropriate distance in all directions utilizing iris scissors.
Medical Necessity Clause: The excision was medically necessary because the lesion which was excised was: growing, painful and irritating to the patient
Eliptical Excision Additional Text (Leave Blank If You Do Not Want): The margin was drawn around the clinically apparent lesion. An elliptical shape was then drawn on the skin incorporating the lesion and margins. Incisions were then made along these lines to the appropriate tissue plane and the lesion was extirpated.
Double Island Pedicle Flap Text: The defect edges were debeveled with a #15 scalpel blade. Given the location of the defect, shape of the defect and the proximity to free margins a double island pedicle advancement flap was deemed most appropriate. Using a sterile surgical marker, an appropriate advancement flap was drawn incorporating the defect, outlining the appropriate donor tissue and placing the expected incisions within the relaxed skin tension lines where possible. The area thus outlined was incised deep to adipose tissue with a #15 scalpel blade. The skin margins were undermined to an appropriate distance in all directions around the primary defect and laterally outward around the island pedicle utilizing iris scissors. There was minimal undermining beneath the pedicle flap.
Bilateral Helical Rim Advancement Flap Text: The defect edges were debeveled with a #15 blade scalpel. Given the location of the defect and the proximity to free margins (helical rim) a bilateral helical rim advancement flap was deemed most appropriate. Using a sterile surgical marker, the appropriate advancement flaps were drawn incorporating the defect and placing the expected incisions between the helical rim and antihelix where possible. The area thus outlined was incised through and through with a #15 scalpel blade. With a skin hook and iris scissors, the flaps were gently and sharply undermined and freed up.
Post-Care Instructions: I reviewed the post-care instructions with the patient in detail. \\n\\nKeep our initial bandage on and dry for 48-72 hours as directed. After 48-72 hours,\\n1. Cleanse the wound with peroxide gently. If there is a lot of crusting/scabbing, soak the site with peroxide to soften. \\n2. Apply a generous amount of Vaseline to the surgical site. DO NOT use Neosporin. \\n3. Cover the wound with a dressing. You can use a non-stick pad with paper tape or regular bandages. \\n4. Repeat twice daily, once in the morning, once in the evening. \\n\\n\\nIt is common to experience swelling, bruising, and drainage after surgery. To decrease pain, use extra strength Tylenol as directed on the bottle. Please do not use ibuprofen, Aleve, Motrin, or Excedrin as they may worsen bleeding. If Tylenol does not help with your pain, please call our office. Certain pain medications may require you to come to the office to  an actual paper prescription. Other intermediate (non-narcotic) strength pain medications may be called in for you if necessary. \\n\\nTo decrease pain, patients can also try using an ice pack, a bag of frozen green peas, or a bag of frozen marshmellows. Place the ice pack on top of the bandage for 20-30 minutes, then take a break for 20-30 minutes (place the ice pack back in the freezer to get it cold again). Repeat as many times as necessary. \\n\\nIf bleeding should occur, apply firm direct pressure to the site for 30 minutes without easing up. If bleeding continues after 30 minutes, please call the office at any time. In rare instances, it may be necessary to go to the nearest emergency room. \\n\\n\\nThings to avoid while healing:\\n - NO heavy lifting, exercise, or swimming for the next 14 days. \\n - NO exposure to tap water for the next 48-72 hours. \\n - NO exposure to hot tub, swimming pool, or ocean water for the next 14 days. \\n\\n\\nShould you have any questions or concerns, please call:\\n - [facility] Location = [facility] 07 [id number], please ask for [address] [employer] or a member of Dr. Lynette Rivas surgical team\\n - [id number] [facility] Location = [phone] - [phone], please ask for Katherine Kate or a member of Dr. Lynette Rivas surgical team
Z Plasty Text: The lesion was extirpated to the level of the fat with a #15 scalpel blade. Given the location of the defect, shape of the defect and the proximity to free margins a Z-plasty was deemed most appropriate for repair. Using a sterile surgical marker, the appropriate transposition arms of the Z-plasty were drawn incorporating the defect and placing the expected incisions within the relaxed skin tension lines where possible. The area thus outlined was incised deep to adipose tissue with a #15 scalpel blade. The skin margins were undermined to an appropriate distance in all directions utilizing iris scissors. The opposing transposition arms were then transposed into place in opposite direction and anchored with interrupted buried subcutaneous sutures.
Advancement Flap (Double) Text: The defect edges were debeveled with a #15 scalpel blade. Given the location of the defect and the proximity to free margins a double advancement flap was deemed most appropriate. Using a sterile surgical marker, the appropriate advancement flaps were drawn incorporating the defect and placing the expected incisions within the relaxed skin tension lines where possible. The area thus outlined was incised deep to adipose tissue with a #15 scalpel blade. The skin margins were undermined to an appropriate distance in all directions utilizing iris scissors.
Dressing: pressure dressing with telfa
Complex Repair And O-L Flap Text: The defect edges were debeveled with a #15 scalpel blade. The primary defect was closed partially with a complex linear closure. Given the location of the remaining defect, shape of the defect and the proximity to free margins an O-L flap was deemed most appropriate for complete closure of the defect. Using a sterile surgical marker, an appropriate flap was drawn incorporating the defect and placing the expected incisions within the relaxed skin tension lines where possible. The area thus outlined was incised deep to adipose tissue with a #15 scalpel blade. The skin margins were undermined to an appropriate distance in all directions utilizing iris scissors.
Complex Repair And Bilobe Flap Text: The defect edges were debeveled with a #15 scalpel blade. The primary defect was closed partially with a complex linear closure. Given the location of the remaining defect, shape of the defect and the proximity to free margins a bilobe flap was deemed most appropriate for complete closure of the defect. Using a sterile surgical marker, an appropriate advancement flap was drawn incorporating the defect and placing the expected incisions within the relaxed skin tension lines where possible. The area thus outlined was incised deep to adipose tissue with a #15 scalpel blade. The skin margins were undermined to an appropriate distance in all directions utilizing iris scissors.
Complex Repair And Rotation Flap Text: The defect edges were debeveled with a #15 scalpel blade. The primary defect was closed partially with a complex linear closure. Given the location of the remaining defect, shape of the defect and the proximity to free margins a rotation flap was deemed most appropriate for complete closure of the defect. Using a sterile surgical marker, an appropriate advancement flap was drawn incorporating the defect and placing the expected incisions within the relaxed skin tension lines where possible. The area thus outlined was incised deep to adipose tissue with a #15 scalpel blade. The skin margins were undermined to an appropriate distance in all directions utilizing iris scissors.
Dorsal Nasal Flap Text: The defect edges were debeveled with a #15 scalpel blade. Given the location of the defect and the proximity to free margins a dorsal nasal flap was deemed most appropriate. Using a sterile surgical marker, an appropriate dorsal nasal flap was drawn around the defect. The area thus outlined was incised deep to adipose tissue with a #15 scalpel blade. The skin margins were undermined to an appropriate distance in all directions utilizing iris scissors.
Complex Repair And W Plasty Text: The defect edges were debeveled with a #15 scalpel blade. The primary defect was closed partially with a complex linear closure. Given the location of the remaining defect, shape of the defect and the proximity to free margins a W plasty was deemed most appropriate for complete closure of the defect. Using a sterile surgical marker, an appropriate advancement flap was drawn incorporating the defect and placing the expected incisions within the relaxed skin tension lines where possible. The area thus outlined was incised deep to adipose tissue with a #15 scalpel blade. The skin margins were undermined to an appropriate distance in all directions utilizing iris scissors.
Modified Advancement Flap Text: The defect edges were debeveled with a #15 scalpel blade. Given the location of the defect, shape of the defect and the proximity to free margins a modified advancement flap was deemed most appropriate. Using a sterile surgical marker, an appropriate advancement flap was drawn incorporating the defect and placing the expected incisions within the relaxed skin tension lines where possible. The area thus outlined was incised deep to adipose tissue with a #15 scalpel blade. The skin margins were undermined to an appropriate distance in all directions utilizing iris scissors.
Rotation Flap Text: The defect edges were debeveled with a #15 scalpel blade. Given the location of the defect, shape of the defect and the proximity to free margins a rotation flap was deemed most appropriate. Using a sterile surgical marker, an appropriate rotation flap was drawn incorporating the defect and placing the expected incisions within the relaxed skin tension lines where possible. The area thus outlined was incised deep to adipose tissue with a #15 scalpel blade. The skin margins were undermined to an appropriate distance in all directions utilizing iris scissors.
Saucerization Excision Additional Text (Leave Blank If You Do Not Want): The margin was drawn around the clinically apparent lesion. Incisions were then made along these lines, in a tangential fashion, to the appropriate tissue plane and the lesion was extirpated.
Spiral Flap Text: The defect edges were debeveled with a #15 scalpel blade. Given the location of the defect, shape of the defect and the proximity to free margins a spiral flap was deemed most appropriate. Using a sterile surgical marker, an appropriate rotation flap was drawn incorporating the defect and placing the expected incisions within the relaxed skin tension lines where possible. The area thus outlined was incised deep to adipose tissue with a #15 scalpel blade. The skin margins were undermined to an appropriate distance in all directions utilizing iris scissors.
Crescentic Advancement Flap Text: The defect edges were debeveled with a #15 scalpel blade. Given the location of the defect and the proximity to free margins a crescentic advancement flap was deemed most appropriate. Using a sterile surgical marker, the appropriate advancement flap was drawn incorporating the defect and placing the expected incisions within the relaxed skin tension lines where possible. The area thus outlined was incised deep to adipose tissue with a #15 scalpel blade. The skin margins were undermined to an appropriate distance in all directions utilizing iris scissors.
O-Z Plasty Text: The defect edges were debeveled with a #15 scalpel blade. Given the location of the defect, shape of the defect and the proximity to free margins an O-Z plasty (double transposition flap) was deemed most appropriate. Using a sterile surgical marker, the appropriate transposition flaps were drawn incorporating the defect and placing the expected incisions within the relaxed skin tension lines where possible. The area thus outlined was incised deep to adipose tissue with a #15 scalpel blade. The skin margins were undermined to an appropriate distance in all directions utilizing iris scissors. Hemostasis was achieved with electrocautery. The flaps were then transposed into place, one clockwise and the other counterclockwise, and anchored with interrupted buried subcutaneous sutures.
Complex Repair And Modified Advancement Flap Text: The defect edges were debeveled with a #15 scalpel blade. The primary defect was closed partially with a complex linear closure. Given the location of the remaining defect, shape of the defect and the proximity to free margins a modified advancement flap was deemed most appropriate for complete closure of the defect. Using a sterile surgical marker, an appropriate advancement flap was drawn incorporating the defect and placing the expected incisions within the relaxed skin tension lines where possible. The area thus outlined was incised deep to adipose tissue with a #15 scalpel blade. The skin margins were undermined to an appropriate distance in all directions utilizing iris scissors.
Muscle Hinge Flap Text: The defect edges were debeveled with a #15 scalpel blade. Given the size, depth and location of the defect and the proximity to free margins a muscle hinge flap was deemed most appropriate. Using a sterile surgical marker, an appropriate hinge flap was drawn incorporating the defect. The area thus outlined was incised with a #15 scalpel blade. The skin margins were undermined to an appropriate distance in all directions utilizing iris scissors.
Posterior Auricular Interpolation Flap Text: A decision was made to reconstruct the defect utilizing an interpolation axial flap and a staged reconstruction. A telfa template was made of the defect. This telfa template was then used to outline the posterior auricular interpolation flap. The donor area for the pedicle flap was then injected with anesthesia. The flap was excised through the skin and subcutaneous tissue down to the layer of the underlying musculature. The pedicle flap was carefully excised within this deep plane to maintain its blood supply. The edges of the donor site were undermined. The donor site was closed in a primary fashion. The pedicle was then rotated into position and sutured. Once the tube was sutured into place, adequate blood supply was confirmed with blanching and refill. The pedicle was then wrapped with xeroform gauze and dressed appropriately with a telfa and gauze bandage to ensure continued blood supply and protect the attached pedicle.
Excisional Biopsy Additional Text (Leave Blank If You Do Not Want): The margin was drawn around the clinically apparent lesion. An elliptical shape was then drawn on the skin incorporating the lesion and margins.  Incisions were then made along these lines to the appropriate tissue plane and the lesion was extirpated.
Complex Repair And O-T Advancement Flap Text: The defect edges were debeveled with a #15 scalpel blade. The primary defect was closed partially with a complex linear closure. Given the location of the remaining defect, shape of the defect and the proximity to free margins an O-T advancement flap was deemed most appropriate for complete closure of the defect. Using a sterile surgical marker, an appropriate advancement flap was drawn incorporating the defect and placing the expected incisions within the relaxed skin tension lines where possible. The area thus outlined was incised deep to adipose tissue with a #15 scalpel blade. The skin margins were undermined to an appropriate distance in all directions utilizing iris scissors.
Bilobed Flap Text: The defect edges were debeveled with a #15 scalpel blade. Given the location of the defect and the proximity to free margins a bilobe flap was deemed most appropriate. Using a sterile surgical marker, an appropriate bilobe flap drawn around the defect. The area thus outlined was incised deep to adipose tissue with a #15 scalpel blade. The skin margins were undermined to an appropriate distance in all directions utilizing iris scissors.
V-Y Plasty Text: The defect edges were debeveled with a #15 scalpel blade. Given the location of the defect, shape of the defect and the proximity to free margins an V-Y advancement flap was deemed most appropriate. Using a sterile surgical marker, an appropriate advancement flap was drawn incorporating the defect and placing the expected incisions within the relaxed skin tension lines where possible. The area thus outlined was incised deep to adipose tissue with a #15 scalpel blade. The skin margins were undermined to an appropriate distance in all directions utilizing iris scissors.
Complex Repair And Double M Plasty Text: The defect edges were debeveled with a #15 scalpel blade. The primary defect was closed partially with a complex linear closure. Given the location of the remaining defect, shape of the defect and the proximity to free margins a double M plasty was deemed most appropriate for complete closure of the defect. Using a sterile surgical marker, an appropriate advancement flap was drawn incorporating the defect and placing the expected incisions within the relaxed skin tension lines where possible. The area thus outlined was incised deep to adipose tissue with a #15 scalpel blade. The skin margins were undermined to an appropriate distance in all directions utilizing iris scissors.
Skin Substitute Text: The defect edges were debeveled with a #15 scalpel blade. Given the location of the defect, shape of the defect and the proximity to free margins a skin substitute graft was deemed most appropriate. The graft material was trimmed to fit the size of the defect. The graft was then placed in the primary defect and oriented appropriately.
Split-Thickness Skin Graft Text: The defect edges were debeveled with a #15 scalpel blade. Given the location of the defect, shape of the defect and the proximity to free margins a split thickness skin graft was deemed most appropriate. Using a sterile surgical marker, the primary defect shape was transferred to the donor site. The split thickness graft was then harvested. The skin graft was then placed in the primary defect and oriented appropriately.
Lab: [facility]
V-Y Flap Text: The defect edges were debeveled with a #15 scalpel blade. Given the location of the defect, shape of the defect and the proximity to free margins a V-Y flap was deemed most appropriate. Using a sterile surgical marker, an appropriate advancement flap was drawn incorporating the defect and placing the expected incisions within the relaxed skin tension lines where possible. The area thus outlined was incised deep to adipose tissue with a #15 scalpel blade. The skin margins were undermined to an appropriate distance in all directions utilizing iris scissors.
A-T Advancement Flap Text: The defect edges were debeveled with a #15 scalpel blade. Given the location of the defect, shape of the defect and the proximity to free margins an A-T advancement flap was deemed most appropriate. Using a sterile surgical marker, an appropriate advancement flap was drawn incorporating the defect and placing the expected incisions within the relaxed skin tension lines where possible. The area thus outlined was incised deep to adipose tissue with a #15 scalpel blade. The skin margins were undermined to an appropriate distance in all directions utilizing iris scissors.
Complex Repair And Dermal Autograft Text: The defect edges were debeveled with a #15 scalpel blade. The primary defect was closed partially with a complex linear closure. Given the location of the defect, shape of the defect and the proximity to free margins an dermal autograft was deemed most appropriate to repair the remaining defect. The graft was trimmed to fit the size of the remaining defect. The graft was then placed in the primary defect, oriented appropriately, and sutured into place.
Cheek-To-Nose Interpolation Flap Text: A decision was made to reconstruct the defect utilizing an interpolation axial flap and a staged reconstruction. A telfa template was made of the defect. This telfa template was then used to outline the Cheek-To-Nose Interpolation flap. The donor area for the pedicle flap was then injected with anesthesia. The flap was excised through the skin and subcutaneous tissue down to the layer of the underlying musculature. The interpolation flap was carefully excised within this deep plane to maintain its blood supply. The edges of the donor site were undermined. The donor site was closed in a primary fashion. The pedicle was then rotated into position and sutured. Once the tube was sutured into place, adequate blood supply was confirmed with blanching and refill. The pedicle was then wrapped with xeroform gauze and dressed appropriately with a telfa and gauze bandage to ensure continued blood supply and protect the attached pedicle.
Anesthesia Volume In Cc: 9
Transposition Flap Text: The defect edges were debeveled with a #15 scalpel blade. Given the location of the defect and the proximity to free margins a transposition flap was deemed most appropriate. Using a sterile surgical marker, an appropriate transposition flap was drawn incorporating the defect. The area thus outlined was incised deep to adipose tissue with a #15 scalpel blade. The skin margins were undermined to an appropriate distance in all directions utilizing iris scissors.
Repair Type: Flap
Island Pedicle Flap Text: The defect edges were debeveled with a #15 scalpel blade. Given the location of the defect, shape of the defect and the proximity to free margins an island pedicle advancement flap was deemed most appropriate. Using a sterile surgical marker, an appropriate advancement flap was drawn incorporating the defect, outlining the appropriate donor tissue and placing the expected incisions within the relaxed skin tension lines where possible. The area thus outlined was incised deep to adipose tissue with a #15 scalpel blade. The skin margins were undermined to an appropriate distance in all directions around the primary defect and laterally outward around the island pedicle utilizing iris scissors. There was minimal undermining beneath the pedicle flap.
Anesthesia Type: 1% lidocaine with epinephrine and a 1:10 solution of 8.4% sodium bicarbonate
Mucosal Advancement Flap Text: Given the location of the defect, shape of the defect and the proximity to free margins a mucosal advancement flap was deemed most appropriate. Incisions were made with a 15 blade scalpel in the appropriate fashion along the cutaneous vermillion border and the mucosal lip. The remaining actinically damaged mucosal tissue was excised. The mucosal advancement flap was then elevated to the gingival sulcus with care taken to preserve the neurovascular structures and advanced into the primary defect. Care was taken to ensure that precise realignment of the vermillion border was achieved.
Burow's Advancement Flap Text: The defect edges were debeveled with a #15 scalpel blade. Given the location of the defect and the proximity to free margins a Burow's advancement flap was deemed most appropriate. Using a sterile surgical marker, the appropriate advancement flap was drawn incorporating the defect and placing the expected incisions within the relaxed skin tension lines where possible. The area thus outlined was incised deep to adipose tissue with a #15 scalpel blade. The skin margins were undermined to an appropriate distance in all directions utilizing iris scissors.
Cartilage Graft Text: The defect edges were debeveled with a #15 scalpel blade. Given the location of the defect, shape of the defect, the fact the defect involved a full thickness cartilage defect a cartilage graft was deemed most appropriate. An appropriate donor site was identified, cleansed, and anesthetized. The cartilage graft was then harvested and transferred to the recipient site, oriented appropriately and then sutured into place. The secondary defect was then repaired using a primary closure.
Bilobed Transposition Flap Text: The defect edges were debeveled with a #15 scalpel blade. Given the location of the defect and the proximity to free margins a bilobed transposition flap was deemed most appropriate. Using a sterile surgical marker, an appropriate bilobe flap drawn around the defect. The area thus outlined was incised deep to adipose tissue with a #15 scalpel blade. The skin margins were undermined to an appropriate distance in all directions utilizing iris scissors.
Complex Repair And Rhombic Flap Text: The defect edges were debeveled with a #15 scalpel blade. The primary defect was closed partially with a complex linear closure. Given the location of the remaining defect, shape of the defect and the proximity to free margins a rhombic flap was deemed most appropriate for complete closure of the defect. Using a sterile surgical marker, an appropriate advancement flap was drawn incorporating the defect and placing the expected incisions within the relaxed skin tension lines where possible. The area thus outlined was incised deep to adipose tissue with a #15 scalpel blade. The skin margins were undermined to an appropriate distance in all directions utilizing iris scissors.
Complex Repair Preamble Text (Leave Blank If You Do Not Want): Extensive wide undermining was performed.
Trilobed Flap Text: The defect edges were debeveled with a #15 scalpel blade. Given the location of the defect and the proximity to free margins a trilobed flap was deemed most appropriate. Using a sterile surgical marker, an appropriate trilobed flap drawn around the defect. The area thus outlined was incised deep to adipose tissue with a #15 scalpel blade. The skin margins were undermined to an appropriate distance in all directions utilizing iris scissors.
Excision Depth: adipose tissue
Complex Repair And Xenograft Text: The defect edges were debeveled with a #15 scalpel blade.  The primary defect was closed partially with a complex linear closure.  Given the location of the defect, shape of the defect and the proximity to free margins an tissue cultured epidermal autograft was deemed most appropriate to repair the remaining defect.  The graft was trimmed to fit the size of the remaining defect.  The graft was then placed in the primary defect, oriented appropriately, and sutured into place.
Mastoid Interpolation Flap Text: A decision was made to reconstruct the defect utilizing an interpolation axial flap and a staged reconstruction. A telfa template was made of the defect. This telfa template was then used to outline the mastoid interpolation flap. The donor area for the pedicle flap was then injected with anesthesia. The flap was excised through the skin and subcutaneous tissue down to the layer of the underlying musculature. The pedicle flap was carefully excised within this deep plane to maintain its blood supply. The edges of the donor site were undermined. The donor site was closed in a primary fashion. The pedicle was then rotated into position and sutured. Once the tube was sutured into place, adequate blood supply was confirmed with blanching and refill. The pedicle was then wrapped with xeroform gauze and dressed appropriately with a telfa and gauze bandage to ensure continued blood supply and protect the attached pedicle.
Complex Repair And Single Advancement Flap Text: The defect edges were debeveled with a #15 scalpel blade. The primary defect was closed partially with a complex linear closure. Given the location of the remaining defect, shape of the defect and the proximity to free margins a single advancement flap was deemed most appropriate for complete closure of the defect. Using a sterile surgical marker, an appropriate advancement flap was drawn incorporating the defect and placing the expected incisions within the relaxed skin tension lines where possible. The area thus outlined was incised deep to adipose tissue with a #15 scalpel blade. The skin margins were undermined to an appropriate distance in all directions utilizing iris scissors.
Lab Facility: [id number]
Complex Repair And A-T Advancement Flap Text: The defect edges were debeveled with a #15 scalpel blade. The primary defect was closed partially with a complex linear closure. Given the location of the remaining defect, shape of the defect and the proximity to free margins an A-T advancement flap was deemed most appropriate for complete closure of the defect. Using a sterile surgical marker, an appropriate advancement flap was drawn incorporating the defect and placing the expected incisions within the relaxed skin tension lines where possible. The area thus outlined was incised deep to adipose tissue with a #15 scalpel blade. The skin margins were undermined to an appropriate distance in all directions utilizing iris scissors.
Slit Excision Additional Text (Leave Blank If You Do Not Want): A linear line was drawn on the skin overlying the lesion. An incision was made slowly until the lesion was visualized. Once visualized, the lesion was removed with blunt dissection.
Complex Repair And Double Advancement Flap Text: The defect edges were debeveled with a #15 scalpel blade. The primary defect was closed partially with a complex linear closure. Given the location of the remaining defect, shape of the defect and the proximity to free margins a double advancement flap was deemed most appropriate for complete closure of the defect. Using a sterile surgical marker, an appropriate advancement flap was drawn incorporating the defect and placing the expected incisions within the relaxed skin tension lines where possible. The area thus outlined was incised deep to adipose tissue with a #15 scalpel blade. The skin margins were undermined to an appropriate distance in all directions utilizing iris scissors.
O-T Plasty Text: The defect edges were debeveled with a #15 scalpel blade. Given the location of the defect, shape of the defect and the proximity to free margins an O-T plasty was deemed most appropriate. Using a sterile surgical marker, an appropriate O-T plasty was drawn incorporating the defect and placing the expected incisions within the relaxed skin tension lines where possible. The area thus outlined was incised deep to adipose tissue with a #15 scalpel blade. The skin margins were undermined to an appropriate distance in all directions utilizing iris scissors.
Interpolation Flap Text: A decision was made to reconstruct the defect utilizing an interpolation axial flap and a staged reconstruction. A telfa template was made of the defect. This telfa template was then used to outline the interpolation flap. The donor area for the pedicle flap was then injected with anesthesia. The flap was excised through the skin and subcutaneous tissue down to the layer of the underlying musculature. The interpolation flap was carefully excised within this deep plane to maintain its blood supply. The edges of the donor site were undermined. The donor site was closed in a primary fashion. The pedicle was then rotated into position and sutured. Once the tube was sutured into place, adequate blood supply was confirmed with blanching and refill. The pedicle was then wrapped with xeroform gauze and dressed appropriately with a telfa and gauze bandage to ensure continued blood supply and protect the attached pedicle.
Composite Graft Text: The defect edges were debeveled with a #15 scalpel blade. Given the location of the defect, shape of the defect, the proximity to free margins and the fact the defect was full thickness a composite graft was deemed most appropriate. The defect was outline and then transferred to the donor site. A full thickness graft was then excised from the donor site. The graft was then placed in the primary defect, oriented appropriately and then sutured into place. The secondary defect was then repaired using a primary closure.
Deep Sutures: 5-0 Monocryl
Hatchet Flap Text: The defect edges were debeveled with a #15 scalpel blade. Given the location of the defect, shape of the defect and the proximity to free margins a hatchet flap was deemed most appropriate. Using a sterile surgical marker, an appropriate hatchet flap was drawn incorporating the defect and placing the expected incisions within the relaxed skin tension lines where possible. The area thus outlined was incised deep to adipose tissue with a #15 scalpel blade. The skin margins were undermined to an appropriate distance in all directions utilizing iris scissors.
Hemostasis: Electrocautery
Estimated Blood Loss (Cc): minimal
Wound Care: Vaseline
H Plasty Text: Given the location of the defect, shape of the defect and the proximity to free margins a H-plasty was deemed most appropriate for repair. Using a sterile surgical marker, the appropriate advancement arms of the H-plasty were drawn incorporating the defect and placing the expected incisions within the relaxed skin tension lines where possible. The area thus outlined was incised deep to adipose tissue with a #15 scalpel blade. The skin margins were undermined to an appropriate distance in all directions utilizing iris scissors. The opposing advancement arms were then advanced into place in opposite direction and anchored with interrupted buried subcutaneous sutures.
Complex Repair And Z Plasty Text: The defect edges were debeveled with a #15 scalpel blade. The primary defect was closed partially with a complex linear closure. Given the location of the remaining defect, shape of the defect and the proximity to free margins a Z plasty was deemed most appropriate for complete closure of the defect. Using a sterile surgical marker, an appropriate advancement flap was drawn incorporating the defect and placing the expected incisions within the relaxed skin tension lines where possible. The area thus outlined was incised deep to adipose tissue with a #15 scalpel blade. The skin margins were undermined to an appropriate distance in all directions utilizing iris scissors.
Medical Necessity Information: It is in your best interest to select a reason for this procedure from the list below. All of these items fulfill various CMS LCD requirements except lesion extends to a margin.
No Repair - Repaired With Adjacent Surgical Defect Text (Leave Blank If You Do Not Want): After the excision the defect was repaired concurrently with another surgical defect which was in close approximation.
Body Location Override (Optional - Billing Will Still Be Based On Selected Body Map Location If Applicable): Left [place]
Complex Repair And Transposition Flap Text: The defect edges were debeveled with a #15 scalpel blade. The primary defect was closed partially with a complex linear closure. Given the location of the remaining defect, shape of the defect and the proximity to free margins a transposition flap was deemed most appropriate for complete closure of the defect. Using a sterile surgical marker, an appropriate advancement flap was drawn incorporating the defect and placing the expected incisions within the relaxed skin tension lines where possible. The area thus outlined was incised deep to adipose tissue with a #15 scalpel blade. The skin margins were undermined to an appropriate distance in all directions utilizing iris scissors.
Epidermal Autograft Text: The defect edges were debeveled with a #15 scalpel blade. Given the location of the defect, shape of the defect and the proximity to free margins an epidermal autograft was deemed most appropriate. Using a sterile surgical marker, the primary defect shape was transferred to the donor site. The epidermal graft was then harvested. The skin graft was then placed in the primary defect and oriented appropriately.
Complex Repair And Skin Substitute Graft Text: The defect edges were debeveled with a #15 scalpel blade. The primary defect was closed partially with a complex linear closure. Given the location of the remaining defect, shape of the defect and the proximity to free margins a skin substitute graft was deemed most appropriate to repair the remaining defect. The graft was trimmed to fit the size of the remaining defect. The graft was then placed in the primary defect, oriented appropriately, and sutured into place.
Xenograft Text: The defect edges were debeveled with a #15 scalpel blade. Given the location of the defect, shape of the defect and the proximity to free margins a xenograft was deemed most appropriate. The graft was then trimmed to fit the size of the defect. The graft was then placed in the primary defect and oriented appropriately.
Complex Repair And Epidermal Autograft Text: The defect edges were debeveled with a #15 scalpel blade. The primary defect was closed partially with a complex linear closure. Given the location of the defect, shape of the defect and the proximity to free margins an epidermal autograft was deemed most appropriate to repair the remaining defect. The graft was trimmed to fit the size of the remaining defect. The graft was then placed in the primary defect, oriented appropriately, and sutured into place.
Tissue Cultured Epidermal Autograft Text: The defect edges were debeveled with a #15 scalpel blade. Given the location of the defect, shape of the defect and the proximity to free margins a tissue cultured epidermal autograft was deemed most appropriate. The graft was then trimmed to fit the size of the defect. The graft was then placed in the primary defect and oriented appropriately.
Melolabial Interpolation Flap Text: A decision was made to reconstruct the defect utilizing an interpolation axial flap and a staged reconstruction. A telfa template was made of the defect. This telfa template was then used to outline the melolabial interpolation flap. The donor area for the pedicle flap was then injected with anesthesia. The flap was excised through the skin and subcutaneous tissue down to the layer of the underlying musculature. The pedicle flap was carefully excised within this deep plane to maintain its blood supply. The edges of the donor site were undermined. The donor site was closed in a primary fashion. The pedicle was then rotated into position and sutured. Once the tube was sutured into place, adequate blood supply was confirmed with blanching and refill. The pedicle was then wrapped with xeroform gauze and dressed appropriately with a telfa and gauze bandage to ensure continued blood supply and protect the attached pedicle.

## 2017-04-28 ENCOUNTER — CARE COORDINATION (OUTPATIENT)
Dept: ONCOLOGY | Facility: CLINIC | Age: 72
End: 2017-04-28

## 2017-04-28 NOTE — PROGRESS NOTES
Mrs. Carbnoe called in today to give Dr. Topete and team an update on Yamil's health status. He has recently come down with another cold and is not feeling so well. Casey took Yamil in yesterday to be seen by a PA as his cough has increased and was keeping him up. He is extremely weak and fatigued. He has mild congestion and a significant cough that has been going on for about a week. He has no fever. He had a chest x-ray done and was started on Doxycyline for a week. He was given a cough syrup with codeine which has helped. They did not do a resp culture and the chest x-ray results are not yet back. He also has had significant increase in back and had a MRI done of her lumbar spine. Mrs. Carbone will be getting all the records/reports of these appointment and tests sent to Dr. Topete. For know they are hoping the antibiotics will help and the cough syrup will help him sleep. She will keep Dr. Topete updated.

## 2017-05-01 DIAGNOSIS — E87.6 HYPOKALEMIA: ICD-10-CM

## 2017-05-01 RX ORDER — POTASSIUM CHLORIDE 1500 MG/1
TABLET, EXTENDED RELEASE ORAL
Qty: 60 TABLET | Refills: 0 | Status: ON HOLD | OUTPATIENT
Start: 2017-05-01 | End: 2017-05-16

## 2017-05-01 NOTE — TELEPHONE ENCOUNTER
Potassium Chloride 20 meq      Last Written Prescription Date: 12/29/16  Last Fill Quantity: 60,  # refills: 3   Last Office Visit with [facility], [facility] or [facility] prescribing provider: 1/12/17 with Leanne AMARO

## 2017-05-04 ENCOUNTER — TRANSFERRED RECORDS (OUTPATIENT)
Dept: HEALTH INFORMATION MANAGEMENT | Facility: CLINIC | Age: 72
End: 2017-05-04

## 2017-05-07 ENCOUNTER — HOSPITAL ENCOUNTER (INPATIENT)
Facility: CLINIC | Age: 72
LOS: 9 days | Discharge: NURSING, REHAB OR LONG-TERM CARE | DRG: 840 | End: 2017-05-16
Attending: EMERGENCY MEDICINE | Admitting: INTERNAL MEDICINE
Payer: MEDICARE

## 2017-05-07 DIAGNOSIS — C90.02 MULTIPLE MYELOMA IN RELAPSE (H): ICD-10-CM

## 2017-05-07 DIAGNOSIS — I95.1 ORTHOSTATIC SYNCOPE: ICD-10-CM

## 2017-05-07 DIAGNOSIS — Z86.73 HISTORY OF STROKE: ICD-10-CM

## 2017-05-07 DIAGNOSIS — E78.2 MIXED HYPERLIPIDEMIA: Primary | ICD-10-CM

## 2017-05-07 DIAGNOSIS — R11.0 NAUSEA: ICD-10-CM

## 2017-05-07 DIAGNOSIS — I10 ESSENTIAL HYPERTENSION: ICD-10-CM

## 2017-05-07 DIAGNOSIS — R41.0 DELIRIUM: ICD-10-CM

## 2017-05-07 DIAGNOSIS — M54.50 ACUTE BILATERAL LOW BACK PAIN WITHOUT SCIATICA: ICD-10-CM

## 2017-05-07 DIAGNOSIS — I95.1 ORTHOSTATIC HYPOTENSION: ICD-10-CM

## 2017-05-07 DIAGNOSIS — E87.6 HYPOKALEMIA: ICD-10-CM

## 2017-05-07 DIAGNOSIS — K21.9 GASTROESOPHAGEAL REFLUX DISEASE WITHOUT ESOPHAGITIS: ICD-10-CM

## 2017-05-07 DIAGNOSIS — E86.0 DEHYDRATION: ICD-10-CM

## 2017-05-07 PROBLEM — M54.9 BACK PAIN: Status: ACTIVE | Noted: 2017-05-07

## 2017-05-07 LAB
ALBUMIN SERPL-MCNC: 3.4 G/DL (ref 3.4–5)
ALBUMIN UR-MCNC: 10 MG/DL
ALP SERPL-CCNC: 66 U/L (ref 40–150)
ALT SERPL W P-5'-P-CCNC: 14 U/L (ref 0–70)
ANION GAP SERPL CALCULATED.3IONS-SCNC: 13 MMOL/L (ref 3–14)
APPEARANCE UR: CLEAR
AST SERPL W P-5'-P-CCNC: 11 U/L (ref 0–45)
BASOPHILS # BLD AUTO: 0.1 10E9/L (ref 0–0.2)
BASOPHILS NFR BLD AUTO: 2.8 %
BILIRUB SERPL-MCNC: 0.5 MG/DL (ref 0.2–1.3)
BILIRUB UR QL STRIP: NEGATIVE
BUN SERPL-MCNC: 54 MG/DL (ref 7–30)
CALCIUM SERPL-MCNC: 10.3 MG/DL (ref 8.5–10.1)
CHLORIDE SERPL-SCNC: 102 MMOL/L (ref 94–109)
CO2 SERPL-SCNC: 21 MMOL/L (ref 20–32)
COLOR UR AUTO: YELLOW
CREAT SERPL-MCNC: 1.8 MG/DL (ref 0.66–1.25)
DIFFERENTIAL METHOD BLD: ABNORMAL
EOSINOPHIL # BLD AUTO: 0 10E9/L (ref 0–0.7)
EOSINOPHIL NFR BLD AUTO: 0.3 %
ERYTHROCYTE [DISTWIDTH] IN BLOOD BY AUTOMATED COUNT: 17.8 % (ref 10–15)
GFR SERPL CREATININE-BSD FRML MDRD: 37 ML/MIN/1.7M2
GLUCOSE SERPL-MCNC: 151 MG/DL (ref 70–99)
GLUCOSE UR STRIP-MCNC: NEGATIVE MG/DL
HCT VFR BLD AUTO: 33.8 % (ref 40–53)
HGB BLD-MCNC: 10.9 G/DL (ref 13.3–17.7)
HGB UR QL STRIP: NEGATIVE
HYALINE CASTS #/AREA URNS LPF: 9 /LPF (ref 0–2)
IMM GRANULOCYTES # BLD: 0 10E9/L (ref 0–0.4)
IMM GRANULOCYTES NFR BLD: 1 %
KETONES UR STRIP-MCNC: 5 MG/DL
LEUKOCYTE ESTERASE UR QL STRIP: ABNORMAL
LIPASE SERPL-CCNC: 95 U/L (ref 73–393)
LYMPHOCYTES # BLD AUTO: 0.4 10E9/L (ref 0.8–5.3)
LYMPHOCYTES NFR BLD AUTO: 10.8 %
MCH RBC QN AUTO: 31.8 PG (ref 26.5–33)
MCHC RBC AUTO-ENTMCNC: 32.2 G/DL (ref 31.5–36.5)
MCV RBC AUTO: 99 FL (ref 78–100)
MONOCYTES # BLD AUTO: 0.3 10E9/L (ref 0–1.3)
MONOCYTES NFR BLD AUTO: 8 %
MUCOUS THREADS #/AREA URNS LPF: PRESENT /LPF
NEUTROPHILS # BLD AUTO: 3.1 10E9/L (ref 1.6–8.3)
NEUTROPHILS NFR BLD AUTO: 77.1 %
NITRATE UR QL: NEGATIVE
NRBC # BLD AUTO: 0 10*3/UL
NRBC BLD AUTO-RTO: 0 /100
PH UR STRIP: 5 PH (ref 5–7)
PLATELET # BLD AUTO: 201 10E9/L (ref 150–450)
POTASSIUM SERPL-SCNC: 4.4 MMOL/L (ref 3.4–5.3)
PROT SERPL-MCNC: 9.5 G/DL (ref 6.8–8.8)
RBC # BLD AUTO: 3.43 10E12/L (ref 4.4–5.9)
RBC #/AREA URNS AUTO: 1 /HPF (ref 0–2)
SODIUM SERPL-SCNC: 136 MMOL/L (ref 133–144)
SP GR UR STRIP: 1.01 (ref 1–1.03)
SQUAMOUS #/AREA URNS AUTO: <1 /HPF (ref 0–1)
TRANS CELLS #/AREA URNS HPF: <1 /HPF (ref 0–1)
URN SPEC COLLECT METH UR: ABNORMAL
UROBILINOGEN UR STRIP-MCNC: NORMAL MG/DL (ref 0–2)
WBC # BLD AUTO: 4 10E9/L (ref 4–11)
WBC #/AREA URNS AUTO: 7 /HPF (ref 0–2)

## 2017-05-07 PROCEDURE — 99223 1ST HOSP IP/OBS HIGH 75: CPT | Mod: AI | Performed by: INTERNAL MEDICINE

## 2017-05-07 PROCEDURE — 99285 EMERGENCY DEPT VISIT HI MDM: CPT | Mod: 25 | Performed by: EMERGENCY MEDICINE

## 2017-05-07 PROCEDURE — 83690 ASSAY OF LIPASE: CPT | Performed by: EMERGENCY MEDICINE

## 2017-05-07 PROCEDURE — 25800025 ZZH RX 258: Performed by: EMERGENCY MEDICINE

## 2017-05-07 PROCEDURE — 99285 EMERGENCY DEPT VISIT HI MDM: CPT | Mod: Z6 | Performed by: EMERGENCY MEDICINE

## 2017-05-07 PROCEDURE — 81001 URINALYSIS AUTO W/SCOPE: CPT | Performed by: EMERGENCY MEDICINE

## 2017-05-07 PROCEDURE — 85025 COMPLETE CBC W/AUTO DIFF WBC: CPT | Performed by: EMERGENCY MEDICINE

## 2017-05-07 PROCEDURE — 25000128 H RX IP 250 OP 636: Performed by: STUDENT IN AN ORGANIZED HEALTH CARE EDUCATION/TRAINING PROGRAM

## 2017-05-07 PROCEDURE — 20000002 ZZH R&B BMT INTERMEDIATE

## 2017-05-07 PROCEDURE — 80053 COMPREHEN METABOLIC PANEL: CPT | Performed by: EMERGENCY MEDICINE

## 2017-05-07 PROCEDURE — 25000128 H RX IP 250 OP 636: Performed by: INTERNAL MEDICINE

## 2017-05-07 RX ORDER — CLOPIDOGREL BISULFATE 75 MG/1
75 TABLET ORAL DAILY
Status: DISCONTINUED | OUTPATIENT
Start: 2017-05-08 | End: 2017-05-16 | Stop reason: HOSPADM

## 2017-05-07 RX ORDER — ACYCLOVIR 200 MG/1
400 CAPSULE ORAL 2 TIMES DAILY
Status: DISCONTINUED | OUTPATIENT
Start: 2017-05-07 | End: 2017-05-16 | Stop reason: HOSPADM

## 2017-05-07 RX ORDER — AMLODIPINE BESYLATE 5 MG/1
5 TABLET ORAL DAILY
Status: DISCONTINUED | OUTPATIENT
Start: 2017-05-08 | End: 2017-05-09

## 2017-05-07 RX ORDER — NALOXONE HYDROCHLORIDE 0.4 MG/ML
.1-.4 INJECTION, SOLUTION INTRAMUSCULAR; INTRAVENOUS; SUBCUTANEOUS
Status: DISCONTINUED | OUTPATIENT
Start: 2017-05-07 | End: 2017-05-16 | Stop reason: HOSPADM

## 2017-05-07 RX ORDER — ONDANSETRON 8 MG/1
8 TABLET, ORALLY DISINTEGRATING ORAL EVERY 8 HOURS PRN
Status: DISCONTINUED | OUTPATIENT
Start: 2017-05-07 | End: 2017-05-07

## 2017-05-07 RX ORDER — PROCHLORPERAZINE MALEATE 5 MG
5 TABLET ORAL EVERY 6 HOURS PRN
Status: DISCONTINUED | OUTPATIENT
Start: 2017-05-07 | End: 2017-05-16 | Stop reason: HOSPADM

## 2017-05-07 RX ORDER — SODIUM CHLORIDE 9 MG/ML
INJECTION, SOLUTION INTRAVENOUS CONTINUOUS
Status: DISCONTINUED | OUTPATIENT
Start: 2017-05-07 | End: 2017-05-08

## 2017-05-07 RX ORDER — SUCRALFATE 1 G/1
1 TABLET ORAL 4 TIMES DAILY PRN
Status: DISCONTINUED | OUTPATIENT
Start: 2017-05-07 | End: 2017-05-16 | Stop reason: HOSPADM

## 2017-05-07 RX ORDER — SODIUM CHLORIDE, SODIUM LACTATE, POTASSIUM CHLORIDE, CALCIUM CHLORIDE 600; 310; 30; 20 MG/100ML; MG/100ML; MG/100ML; MG/100ML
INJECTION, SOLUTION INTRAVENOUS CONTINUOUS
Status: DISCONTINUED | OUTPATIENT
Start: 2017-05-07 | End: 2017-05-07

## 2017-05-07 RX ORDER — SIMVASTATIN 20 MG
20 TABLET ORAL AT BEDTIME
Status: DISCONTINUED | OUTPATIENT
Start: 2017-05-07 | End: 2017-05-16 | Stop reason: HOSPADM

## 2017-05-07 RX ORDER — ONDANSETRON 2 MG/ML
8 INJECTION INTRAMUSCULAR; INTRAVENOUS EVERY 8 HOURS PRN
Status: DISCONTINUED | OUTPATIENT
Start: 2017-05-07 | End: 2017-05-16 | Stop reason: HOSPADM

## 2017-05-07 RX ORDER — ONDANSETRON 8 MG/1
8 TABLET, ORALLY DISINTEGRATING ORAL EVERY 8 HOURS PRN
Status: DISCONTINUED | OUTPATIENT
Start: 2017-05-07 | End: 2017-05-16 | Stop reason: HOSPADM

## 2017-05-07 RX ORDER — OXYCODONE HYDROCHLORIDE 5 MG/1
5 TABLET ORAL EVERY 4 HOURS PRN
Status: DISCONTINUED | OUTPATIENT
Start: 2017-05-07 | End: 2017-05-08

## 2017-05-07 RX ORDER — HALOPERIDOL 5 MG/ML
2 INJECTION INTRAMUSCULAR EVERY 6 HOURS PRN
Status: DISCONTINUED | OUTPATIENT
Start: 2017-05-07 | End: 2017-05-08

## 2017-05-07 RX ORDER — MIDODRINE HYDROCHLORIDE 2.5 MG/1
2.5 TABLET ORAL 3 TIMES DAILY
Status: DISCONTINUED | OUTPATIENT
Start: 2017-05-07 | End: 2017-05-11

## 2017-05-07 RX ADMIN — HALOPERIDOL LACTATE 2 MG: 5 INJECTION, SOLUTION INTRAMUSCULAR at 23:38

## 2017-05-07 RX ADMIN — SODIUM CHLORIDE, POTASSIUM CHLORIDE, SODIUM LACTATE AND CALCIUM CHLORIDE: 600; 310; 30; 20 INJECTION, SOLUTION INTRAVENOUS at 19:07

## 2017-05-07 RX ADMIN — SODIUM CHLORIDE: 9 INJECTION, SOLUTION INTRAVENOUS at 22:21

## 2017-05-07 ASSESSMENT — ENCOUNTER SYMPTOMS
TREMORS: 1
FATIGUE: 1
BACK PAIN: 1
WEAKNESS: 1
CONFUSION: 1
ABDOMINAL PAIN: 1
COUGH: 1
FEVER: 0
CARDIOVASCULAR NEGATIVE: 1

## 2017-05-07 NOTE — IP AVS SNAPSHOT
` `     UNIT [address] [facility] BANK: [phone]            Medication Administration Report for Anthony Carbone as of 05/16/17 1321   Legend:    Given Hold Not Given Due Canceled Entry Other Actions    Time Time (Time) Time  Time-Action       Inactive    Active    Linked        Medications 05/10/17 05/11/17 05/12/17 05/13/17 05/14/17 05/15/17 05/16/17    0.9% sodium chloride infusion  Rate: 200 mL/hr Freq: CONTINUOUS PRN Route: IV  PRN Comment: refractory hypotension associated with hypersensitivity  Start: 05/11/17 1700              acetaminophen (TYLENOL) tablet 1,000 mg  Dose: 1,000 mg Freq: EVERY 6 HOURS PRN Route: PO  PRN Reason: mild pain  Start: 05/08/17 1147   Admin Instructions: Maximum acetaminophen dose from all sources = 75 mg/kg/day not to exceed 4 gram     0931 (1,000 mg)-Given       1922 (650 mg)-Given        0741 (1,000 mg)-Given                acyclovir (ZOVIRAX) capsule 400 mg  Dose: 400 mg Freq: 2 TIMES DAILY Route: PO  Indications Comment: Unsure of dose  Start: 05/07/17 2145    0844 (400 mg)-Given       1922 (400 mg)-Given        0810 (400 mg)-Given       2053 (400 mg)-Given        0815 (400 mg)-Given       1943 (400 mg)-Given        0838 (400 mg)-Given       1959 (400 mg)-Given        0951 (400 mg)-Given       2100 (400 mg)-Given        0941 (400 mg)-Given       1943 (400 mg)-Given        0846 (400 mg)-Given       [ ] 2000           albuterol (PROAIR HFA/PROVENTIL HFA/VENTOLIN HFA) Inhaler 1-2 puff  Dose: 1-2 puff Freq: ONCE PRN Route: IN  PRN Comment: refractory bronchospasm associated with hypersensitivity  Start: 05/11/17 1700              albuterol neb solution 2.5 mg  Dose: 2.5 mg Freq: ONCE PRN Route: NEBULIZATION  PRN Comment: refractory bronchospasm associated with hypersensitivity  Start: 05/11/17 1700              amLODIPine (NORVASC) tablet 5 mg  Dose: 5 mg Freq: AT BEDTIME Route: PO  Start: 05/10/17 2200    1923 (5 mg)-Given        2133 (5 mg)-Given        1944 (5  mg)-Given        1959 (5 mg)-Given        2101 (5 mg)-Given        1943 (5 mg)-Given        [ ] 2000           clopidogrel (PLAVIX) tablet 75 mg  Dose: 75 mg Freq: DAILY Route: PO  Start: 05/08/17 0800    0844 (75 mg)-Given        0810 (75 mg)-Given        0818 (75 mg)-Given        0838 (75 mg)-Given        0952 (75 mg)-Given        0941 (75 mg)-Given        0846 (75 mg)-Given           diclofenac (VOLTAREN) 1 % topical gel 2 g  Dose: 2 g Freq: 4 TIMES DAILY PRN Route: TD  PRN Reason: moderate pain  Start: 05/10/17 1207   Admin Instructions: Apply to ribs  Send dosing card with product.     1929 (2 g)-Given        0526 (2 g)-Given                diphenhydrAMINE (BENADRYL) injection 50 mg  Dose: 50 mg Freq: ONCE PRN Route: IV  PRN Comment: rash, hives, itching, facial flushing associated with hypersensitivity  Start: 05/11/17 1700              EPINEPHrine (ADRENALIN) injection 0.3 mg  Dose: 0.3 mg Freq: EVERY 5 MIN PRN Route: IM  PRN Comment: swollen lips / tongue, airway obstruction, or hypotension associated with hypersensitivity  Start: 05/11/17 1700   Admin Instructions: May repeat x1 dose. Administer in the mid outer thigh.  Not for direct undiluted intravenous injection (1mg/ml = 1:1000). Protect from light.               esomeprazole (nexIUM) CR capsule 40 mg  Dose: 40 mg Freq: EVERY MORNING BEFORE BREAKFAST Route: PO  Start: 05/09/17 1100    0843 (40 mg)-Given        0810 (40 mg)-Given        0814 (40 mg)-Given [C]        0847 (20 mg)-Given [C]       0931 (20 mg)-Given        0951 (40 mg)-Given        0940 (40 mg)-Given        0845 (40 mg)-Given           famotidine (PEPCID) injection 20 mg  Dose: 20 mg Freq: ONCE PRN Route: IV  PRN Comment: reflux  Start: 05/11/17 1700              haloperidol lactate (HALDOL) injection 5 mg  Dose: 5 mg Freq: EVERY 4 HOURS PRN Route: IV/IM  PRN Reason: agitation  Start: 05/08/17 7203 7315 (5 mg)-Given               heparin 100 UNIT/ML injection 5 mL  Dose: 5 mL Freq:  EVERY 28 DAYS Route: IK  Start: 05/11/17 1030   Admin Instructions: ONLY to de-access each port in dual implanted port.  Flush with 10 mL NS followed by 5 mL heparin (100 units/mL) at discharge and at least every 28 days.  MAX: 5 mL per port                      heparin lock flush 10 UNIT/ML injection 5-10 mL  Dose: 5-10 mL Freq: EVERY 1 HOUR PRN Route: IK  PRN Reason: other  PRN Comment: to lock each port in dual implanted port.  Start: 05/11/17 1017   Admin Instructions: MAX: 5 mL per port.       0540 (5 mL)-Given        0701 (5 mL)-Given       2008 (5 mL)-Given        0349 (5 mL)-Given       0604 (5 mL)-Given       0901 (5 mL)-Given        1652 (5 mL)-Given            heparin lock flush 10 UNIT/ML injection 5-10 mL  Dose: 5-10 mL Freq: EVERY 24 HOURS Route: IK  Start: 05/11/17 1030   Admin Instructions: To lock each dormant port in dual implanted port.  Check PRN heparin flush order to see when last dose of PRN heparin was given before administering.   MAX: 5 mL per port.      1138 (5 mL)-Given       2247 (5 mL)-Given        (1028)-Not Given [C]        0015 (5 mL)-Given       (1330)-Not Given        1256 (5 mL)-Given        0944 (5 mL)-Given        1042 (5 mL)-Given           hydrALAZINE (APRESOLINE) injection 10 mg  Dose: 10 mg Freq: EVERY 6 HOURS PRN Route: IV  PRN Reason: high blood pressure  PRN Comment: SBP >160 or DBP >110  Start: 05/12/17 0938              HYDROmorphone (DILAUDID) half-tab 1-2 mg  Dose: 1-2 mg Freq: EVERY 4 HOURS PRN Route: PO  PRN Reason: moderate to severe pain  Start: 05/08/17 1508    0447 (1 mg)-Given       0840 (2 mg)-Given        0524 (1 mg)-Given       1605 (2 mg)-Given        0339 (2 mg)-Given       0905 (2 mg)-Given         0258 (2 mg)-Given       1255 (2 mg)-Given       2045 (2 mg)-Given        0250 (2 mg)-Given       1022 (2 mg)-Given       1613 (2 mg)-Given       2018 (2 mg)-Given        0036 (2 mg)-Given       0845 (2 mg)-Given       1239 (2 mg)-Given           lidocaine  (LIDODERM) 5 % Patch 3 patch  Dose: 3 patch Freq: EVERY 24 HOURS 0800 Route: TD  Start: 05/10/17 1215   Admin Instructions: Apply patch(s) to ribs. To prevent lidocaine toxicity, patient should be patch free for 12 hrs daily. Patches may be cut to smaller size prior to removing release liner.  NEVER APPLY HEAT OVER PATCH which will increase absorption and may lead to risk of local anesthetic toxicity. Do not apply over area where liposomal bupivacaine was injected for 96 hours post injection.     1408 (1 patch)-Given [C]        0746 (1 patch)-Given [C]        0816 (2 patch)-Given [C]        0838 (2 patch)-Given [C]        0952 (2 patch)-Given [C]        0936 (2 patch)-Given [C]        0846 (2 patch)-Given [C]           lidocaine (LIDODERM) Patch in Place  Freq: EVERY 8 HOURS Route: TD  Start: 05/10/17 1215   Admin Instructions: Chart every shift, confirming that patch is still in place on patient (no barcode scan needed). See patch order for dose information.  NEVER APPLY HEAT OVER PATCH which will increase absorption and may lead to risk of local anesthetic toxicity. Do not apply over area where liposomal bupivacaine injected for 96 hours.     1410 ( )-Patch in Place       1923 ( )-Patch Removed               1138 ( )-Patch in Place       (2104)-Not Given [C]        (0354)-Not Given [C]       1158 ( )-Patch in Place       1953 ( )-Given        (0436)-Not Given [C]       1240 ( )-Patch in Place       (2005)-Not Given        (0609)-Not Given [C]       1215 ( )-Patch in Place       (2105)-Not Given        (0450)-Not Given [C]       1147 ( )-Patch in Place                      [ ] 1215       [ ] 2015           lidocaine (LIDODERM) patch REMOVAL  Freq: EVERY 24 HOURS 2000 Route: TD  Start: 05/10/17 2000   Admin Instructions: Patient should have a 12 hour patch free interval     1923 ( )-Patch Removed        2104 ( )-Patch Removed        1953 ( )-Given        2005 ( )-Patch Removed        2105 ( )-Patch Removed         "        [ ] 2000           lidocaine (LMX4) kit  Freq: EVERY 1 HOUR PRN Route: Top  PRN Reason: moderate pain  PRN Comment: with VAD insertion or accessing implanted port  Start: 05/11/17 1017   Admin Instructions: Do NOT give if patient has a history of allergy to any local anesthetic or any \"cleo\" product.   Apply 30 minutes prior to VAD insertion or port access.  MAX Dose:  2.5 g (  of 5 g tube)               lidocaine 1 % 1 mL  Dose: 1 mL Freq: EVERY 1 HOUR PRN Route: OTHER  PRN Comment: mild pain with VAD insertion or accessing implanted port  Start: 05/11/17 1017   Admin Instructions: Do NOT give if patient has a history of allergy to any local anesthetic or any \"cleo\" product. MAX dose 1 mL subcutaneous OR intradermal in divided doses.               LORazepam (ATIVAN) tablet 0.5 mg  Dose: 0.5 mg Freq: EVERY 4 HOURS PRN Route: PO  PRN Reasons: anxiety,nausea,vomiting  Start: 05/15/17 1150         1205 (0.5 mg)-Given            MEDICATION INSTRUCTION  Freq: CONTINUOUS PRN Route: XX  PRN Comment: hypersensitivity reaction  Start: 05/11/17 1700   Admin Instructions: Hypersensitivity Reaction: Slow rate or stop medication infusion if hypersensitivity reaction occurs. Obtain Hypersensitivity Kit located on patient care unit. Administer only the hypersensitivity medications required for appropriate symptom relief.               Medication Instruction  Freq: CONTINUOUS PRN Route: XX  Start: 05/07/17 2144   Admin Instructions: No rectal suppositories if WBC less than 1000/ L or platelets less than 50,000/ L               meperidine (DEMEROL) injection 25 mg  Dose: 25 mg Freq: EVERY 30 MIN PRN Route: IV  PRN Comment: rigors associated with hypersensitivity.  Start: 05/11/17 1700              methylPREDNISolone sodium succinate (solu-MEDROL) injection 125 mg  Dose: 125 mg Freq: ONCE PRN Route: IV  PRN Comment: swollen lips / tongue, refractory hypotension, or bronchospasm associated with hypersensitivity  Start: " 05/11/17 1700   Admin Instructions: Doses greater than 125 mg need to be in at least 50 mL IVPB               naloxone (NARCAN) injection 0.1-0.4 mg  Dose: 0.1-0.4 mg Freq: EVERY 2 MIN PRN Route: IV  PRN Reason: opioid reversal  Start: 05/07/17 2210   Admin Instructions: For respiratory rate LESS than or EQUAL to 8.  Partial reversal dose:  0.1 mg titrated q 2 minutes for Analgesia Side Effects Monitoring Sedation Level of 3 (frequently drowsy, arousable, drifts to sleep during conversation).Full reversal dose:  0.4 mg bolus for Analgesia Side Effects Monitoring Sedation Level of 4 (somnolent, minimal or no response to stimulation).               OLANZapine (zyPREXA) tablet 5 mg  Dose: 5 mg Freq: 2 TIMES DAILY Route: PO  Start: 05/12/17 0915   Admin Instructions: Combined IM and PO doses may significantly increase the risk of orthostatic hypotension at 30 mg per day or higher.       1025 (5 mg)-Given       1944 (5 mg)-Given        0838 (5 mg)-Given       1959 (5 mg)-Given        0951 (5 mg)-Given       2101 (5 mg)-Given        0941 (5 mg)-Given       1943 (5 mg)-Given        0846 (5 mg)-Given       [ ] 2000           ondansetron (ZOFRAN) injection 8 mg  Dose: 8 mg Freq: EVERY 8 HOURS PRN Route: IV  PRN Reasons: nausea,vomiting  Start: 05/07/17 2144   Admin Instructions: Offer second  Irritant.       2321 (8 mg)-Given              Or  ondansetron (ZOFRAN-ODT) ODT tab 8 mg  Dose: 8 mg Freq: EVERY 8 HOURS PRN Route: PO  PRN Reasons: nausea,vomiting  Start: 05/07/17 2144   Admin Instructions: Offer second.                      potassium chloride (KLOR-CON) Packet 20-40 mEq  Dose: 20-40 mEq Freq: EVERY 2 HOURS PRN Route: ORAL OR FEED  PRN Reason: potassium supplementation  Start: 05/11/17 1315   Admin Instructions: Use if unable to tolerate tablets.  If Serum K+ 3.0-3.3, dose = 60 mEq po total dose (40 mEq x1 followed in 2 hours by 20 mEq x1). Recheck K+ level 4 hours after dose and the next AM.  If Serum K+ 2.5-2.9,  dose = 80 mEq po total dose (40 mEq Q2H x2). Recheck K+ level 4 hours after dose and the next AM.  If Serum K+ less than 2.5, See IV order.  Dissolve packet contents in 4-8 ounces of cold water or juice.               potassium chloride 10 mEq in 100 mL intermittent infusion  Dose: 10 mEq Freq: EVERY 1 HOUR PRN Route: IV  PRN Reason: potassium supplementation  Start: 05/11/17 1315   Admin Instructions: Infuse via PERIPHERAL LINE or CENTRAL LINE. Use for central line replacement if patient weight less than 65 kg, if patient is on TPN with high potassium content or if unit does not stock 20 mEq bags.   If Serum K+ 3.0-3.3, dose = 10 mEq/hr x4 doses (40 mEq IV total dose). Recheck K+ level 2 hours after dose and the next AM.   If Serum K+ less than 3.0, dose = 10 mEq/hr x6 doses (60 mEq IV total dose). Recheck K+ level 2 hours after dose and the next AM.               potassium chloride 10 mEq in 100 mL intermittent infusion with 10 mg lidocaine  Dose: 10 mEq Freq: EVERY 1 HOUR PRN Route: IV  PRN Reason: potassium supplementation  Start: 05/11/17 1315   Admin Instructions: Infuse via PERIPHERAL LINE. Use potassium with lidocaine for pain with peripheral administration.  If Serum K+ 3.0-3.3, dose = 10 mEq/hr x4 doses (40 mEq IV total dose). Recheck K+ level 2 hours after dose and the next AM.  If Serum K+ less than 3.0, dose = 10 mEq/hr x6 doses (60 mEq IV total dose). Recheck K+ level 2 hours after dose and the next AM.               potassium chloride 20 mEq in 50 mL intermittent infusion  Dose: 20 mEq Freq: EVERY 1 HOUR PRN Route: IV  PRN Reason: potassium supplementation  Start: 05/11/17 1315   Admin Instructions: Infuse via CENTRAL LINE Only. May need EKG if less than 65 kg or on TPN - Max rate is 0.3 mEq/kg/hr for patients not on EKG monitoring.   If Serum K+ 3.0-3.3, dose = 20 mEq/hr x2 doses (40 mEq IV total dose). Recheck K+ level 2 hours after dose and the next AM.  If Serum K+ less than 3.0, dose = 20 mEq/hr  x3 doses (60 mEq IV total dose). Recheck K+ level 2 hours after dose and the next AM.               potassium chloride SA (K-DUR/KLOR-CON M) CR tablet 20 mEq  Dose: 20 mEq Freq: 2 TIMES DAILY Route: PO  Start: 05/09/17 1100   Admin Instructions: DO NOT CRUSH.     0844 (20 mEq)-Given       1923 (20 mEq)-Given        0810 (20 mEq)-Given       2053 (20 mEq)-Given        0814 (20 mEq)-Given       1944 (20 mEq)-Given        0838 (20 mEq)-Given       1959 (20 mEq)-Given        0952 (20 mEq)-Given       2100 (20 mEq)-Given        0941 (20 mEq)-Given       1943 (20 mEq)-Given        0846 (20 mEq)-Given       [ ] 2000           potassium chloride SA (K-DUR/KLOR-CON M) CR tablet 20-40 mEq  Dose: 20-40 mEq Freq: EVERY 2 HOURS PRN Route: PO  PRN Reason: potassium supplementation  Start: 05/11/17 1315   Admin Instructions: Use if able to take PO.   If Serum K+ 3.0-3.3, dose = 60 mEq po total dose (40 mEq x1 followed in 2 hours by 20 mEq x1). Recheck K+ level 4 hours after dose and the next AM.  If Serum K+ 2.5-2.9, dose = 80 mEq po total dose (40 mEq Q2H x2). Recheck K+ level 4 hours after dose and the next AM.  If Serum K+ less than 2.5, See IV order.  DO NOT CRUSH.      1527 (40 mEq)-Given       1740 (20 mEq)-Given [C]                prochlorperazine (COMPAZINE) tablet 5 mg  Dose: 5 mg Freq: EVERY 6 HOURS PRN Route: PO  PRN Reasons: nausea,vomiting  Start: 05/07/17 2144   Admin Instructions: Offer first              Or  prochlorperazine (COMPAZINE) injection 5 mg  Dose: 5 mg Freq: EVERY 6 HOURS PRN Route: IV  PRN Reasons: nausea,vomiting  Start: 05/07/17 2144   Admin Instructions: Offer first               simethicone (MYLICON) chewable tablet 160 mg  Dose: 160 mg Freq: 4 TIMES DAILY PRN Route: PO  PRN Reasons: cramping,other  PRN Comment: gas pain  Start: 05/12/17 1137       0835 (160 mg)-Given       1330 (160 mg)-Given        1255 (160 mg)-Given       2012 (160 mg)-Given        1611 (160 mg)-Given            simvastatin  (ZOCOR) tablet 20 mg  Dose: 20 mg Freq: AT BEDTIME Route: PO  Start: 05/07/17 2215    1923 (20 mg)-Given        2133 (20 mg)-Given        1945 (20 mg)-Given        1959 (20 mg)-Given        2101 (20 mg)-Given        1943 (20 mg)-Given        [ ] 2000           sodium chloride (PF) 0.9% PF flush 10-20 mL  Dose: 10-20 mL Freq: EVERY 1 HOUR PRN Route: IK  PRN Reasons: line flush,post meds or blood draw  Start: 05/11/17 1017   Admin Instructions: And Daily PRN, to de-access each port in dual implanted port.  Flush with 10 mL NS followed by 5 mL heparin (100 units/mL) at discharge and at least every 28 days.        0657 (20 mL)-Given        0348 (10 mL)-Given       0901 (20 mL)-Given             sodium chloride (PF) 0.9% PF flush 10-20 mL  Dose: 10-20 mL Freq: EVERY 1 HOUR PRN Route: IK  PRN Reasons: line flush,post meds or blood draw  PRN Comment: To flush each CVC Implanted port.  Start: 05/11/17 1017   Admin Instructions: 10 mL post IV meds;   20 mL post blood draw.       0540 (20 mL)-Given       1305 (30 mL)-Given [C]        0010 (10 mL)-Given        0604 (20 mL)-Given        0634 (20 mL)-Given       1652 (20 mL)-Given        0648 (30 mL)-Given [C]           sucralfate (CARAFATE) tablet 1 g  Dose: 1 g Freq: 4 TIMES DAILY PRN Route: PO  PRN Reason: nausea  Start: 05/07/17 2144   Admin Instructions: Recommended to take before meals.              Completed Medications  Medications 05/10/17 05/11/17 05/12/17 05/13/17 05/14/17 05/15/17 05/16/17         Dose: 500 mL Freq: ONCE Route: IV  Start: 05/13/17 1615   End: 05/13/17 1728       1728 (500 mL)-New Bag                Dose: 750 mg Freq: DAILY Route: PO  Indications of Use: COMMUNITY ACQUIRED PNEUMONIA  Start: 05/15/17 0937   End: 05/15/17 1205   Admin Instructions: Administer at least 2 hrs before or 4 hrs after aluminum, calcium, iron, zinc or magnesium containing products.          1205 (750 mg)-Given              Dose: 750 mg Freq: DAILY Route: PO  Indications of  Use: COMMUNITY ACQUIRED PNEUMONIA  Start: 05/10/17 1215   End: 05/14/17 0951   Admin Instructions: Administer at least 2 hrs before or 4 hrs after aluminum, calcium, iron, zinc or magnesium containing products.     1309 (750 mg)-Given        0810 (750 mg)-Given        0814 (750 mg)-Given        0838 (750 mg)-Given        0951 (750 mg)-Given            Discontinued Medications  Medications 05/10/17 05/11/17 05/12/17 05/13/17 05/14/17 05/15/17 05/16/17         Dose: 0.3-0.5 mg Freq: EVERY 4 HOURS PRN Route: IV  PRN Reason: severe pain  Start: 05/10/17 1029   End: 05/15/17 0727      0914 (0.5 mg)-Given        0008 (0.3 mg)-Given         0727-Med Discontinued          Dose: 0.5 mg Freq: ONCE PRN Route: IV  PRN Reason: other  PRN Comment: severe pain not relieved by initial 0.5mg dose  Start: 05/12/17 0914   End: 05/15/17 0727         0727-Med Discontinued          Dose: 750 mg Freq: DAILY Route: PO  Indications of Use: COMMUNITY ACQUIRED PNEUMONIA  Start: 05/15/17 0800   End: 05/15/17 0937   Admin Instructions: Administer at least 2 hrs before or 4 hrs after aluminum, calcium, iron, zinc or magnesium containing products.          0937-Med Discontinued  (0942)-Not Given              Dose: 0.5 mg Freq: EVERY 4 HOURS PRN Route: IV  PRN Comment: Anxiety or Nausea/Vomiting  Start: 05/11/17 1700   End: 05/15/17 1150   Admin Instructions: IV Push over 2-5 minutes. May repeat x 1.<br><br>Offer 3rd for nausea/vomiting  For IV PUSH: Dilute with equal volume of NS.         0343 (0.5 mg)-Given        1150-Med Discontinued

## 2017-05-07 NOTE — ED NOTES
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

## 2017-05-07 NOTE — ED PROVIDER NOTES
History     Chief Complaint   Patient presents with     Back Pain     HPI  Anthony Carbone is a 71 year old male with a history of multiple myeloma s/p BMT (1/9/2013) who presents to the emergency department today with complaints of worsening back pain and ongoing cough. Patient flew back from [place], [place] today, where he had been staying for the past 3.5 months. Patient's wife reports that patient developed back pain several weeks ago while in [place] and underwent an x-ray as well as an MRI approximately 2 weeks ago. Patient's wife reported that the MRI showed possible compression and spinal stenosis. Patient was started on Tramadol on Thursday (3 days ago) for the pain. Patient's wife also reports that patient has had a cough for several weeks. She states he has been taking doxycycline as well as promethazine with codeine for the past week and a half. She denies any more recent changes in his medications other than adding Tramadol. Patient's wife reports that patient took both the cough medication as well as the Tramadol around 3 PM this afternoon. Patient's wife reports that while on the flight today, patient became disoriented, confused and very agitated, she states he wanted to get off the plane mid-flight. She also reports that his back pain appeared to acutely worsen and states he was unable to get up out of his seat due to pain. Patient has not had any recent fevers. He is followed by Dr. Topete with oncology. He recently completed a 3 week cycle of Pomalyst. He is scheduled for a follow up appointment with oncology early this week.  Here in the ED, patient is alert and oriented. He is complaining of some abdominal pain.     I have reviewed the Medications, Allergies, Past Medical and Surgical History, and Social History in the [employer] system.    Past Medical History:   Diagnosis Date     Coronary artery disease, non-occlusive Jan 2012     Hx of migraines     haven;t bothered him since 2003      Hypertension      Malignant neoplasm (H) 1/2012     Myeloma (H)     multiple     Nonulcer dyspepsia      Polio age 6    no sequelae     Prostate cancer (H)        Past Surgical History:   Procedure Laterality Date     COLONOSCOPY       ESOPHAGOSCOPY, GASTROSCOPY, DUODENOSCOPY (EGD), COMBINED N/A 1/6/2015    Procedure: COMBINED ESOPHAGOSCOPY, GASTROSCOPY, DUODENOSCOPY (EGD);  Surgeon: Yamil Donaldson Chi, MD;  Location: [facility]     ESOPHAGOSCOPY, GASTROSCOPY, DUODENOSCOPY (EGD), COMBINED Left 8/20/2015    Procedure: COMBINED ESOPHAGOSCOPY, GASTROSCOPY, DUODENOSCOPY (EGD), BIOPSY SINGLE OR MULTIPLE;  Surgeon: Mane Barragan MD;  Location: [facility]     right inguinal hernia surgery       right toe surgery         Family History   Problem Relation Age of Onset     Melanoma No family hx of      Skin Cancer No family hx of        Social History   Substance Use Topics     Smoking status: Never Smoker     Smokeless tobacco: Never Used     Alcohol use Yes      Comment: rarely     Current Facility-Administered Medications   Medication     lactated ringers infusion     Current Outpatient Prescriptions   Medication     potassium chloride SA (K-DUR/KLOR-CON M) 20 MEQ CR tablet     pomalidomide (POMALYST) 2 MG capsule     midodrine (PROAMATINE) 2.5 MG tablet     ACYCLOVIR PO     ondansetron (ZOFRAN-ODT) 8 MG ODT tab     amLODIPine (NORVASC) 5 MG tablet     dexamethasone (DECADRON) 4 MG tablet     clopidogrel (PLAVIX) 75 MG tablet     sucralfate (CARAFATE) 1 GM tablet     Esomeprazole Magnesium (NEXIUM PO)     SIMVASTATIN PO     Facility-Administered Medications Ordered in Other Encounters   Medication     filgrastim (NEUPOGEN) injection vial 780 mcg        Allergies   Allergen Reactions     Aspirin      Stomach upset     Bactrim [Sulfamethoxazole W-Trimethoprim] Rash       Review of Systems   Constitutional: Positive for fatigue. Negative for fever.   Respiratory: Positive for cough.    Cardiovascular: Negative.    Gastrointestinal:  Positive for abdominal pain.   Musculoskeletal: Positive for back pain.   Neurological: Positive for tremors and weakness.   Psychiatric/Behavioral: Positive for confusion.   All other systems reviewed and are negative.      Physical Exam   BP: 162/90  Pulse: 93  Heart Rate: 93  Temp: 98.1  F (36.7  C)  Resp: 16  Weight: 58.2 kg (128 lb 6.4 oz)  SpO2: 95 %  Physical Exam   Constitutional: He is oriented to person, place, and time. He appears ill. No distress.   HENT:   Mouth/Throat: Mucous membranes are dry.   Eyes: Pupils are equal, round, and reactive to light.   Neck: Neck supple.   Cardiovascular: Normal rate, regular rhythm and normal heart sounds.    Pulmonary/Chest: Effort normal and breath sounds normal.   Abdominal: Soft. There is no tenderness.   Neurological: He is alert and oriented to person, place, and time. He displays normal reflexes. No cranial nerve deficit. He exhibits normal muscle tone.   Skin: Skin is warm. He is not diaphoretic.   Psychiatric: He has a normal mood and affect. His behavior is normal.   Nursing note and vitals reviewed.      ED Course     ED Course     Procedures   6:29 PM  The patient was seen and examined by Dr. aLw in Room ED22.         Medications   lactated ringers infusion ( Intravenous New Bag 5/7/17 1907)     8:23 PM Discussed with Oncology Fellow         Labs Ordered and Resulted from Time of ED Arrival Up to the Time of Departure from the ED   CBC WITH PLATELETS DIFFERENTIAL - Abnormal; Notable for the following:        Result Value    RBC Count 3.43 (*)     Hemoglobin 10.9 (*)     Hematocrit 33.8 (*)     RDW 17.8 (*)     Absolute Lymphocytes 0.4 (*)     All other components within normal limits   COMPREHENSIVE METABOLIC PANEL - Abnormal; Notable for the following:     Glucose 151 (*)     Urea Nitrogen 54 (*)     Creatinine 1.80 (*)     GFR Estimate 37 (*)     GFR Estimate If Black 45 (*)     Calcium 10.3 (*)     Protein Total 9.5 (*)     All other components  within normal limits   LIPASE   ROUTINE UA WITH MICROSCOPIC            Assessments & Plan (with Medical Decision Making)   71-year-old male with a history of multiple myeloma status post BMT in 2013 now with relapse. They have been spending the winter in [place]. He had a recent viral syndrome and was started on doxycycline. This seemed to have improved. However, he s been dwindling over the last few days, and in fact was weak and confused on the airplane flying home, and so was brought directly to the Emergency Department. Here, he clinically appeared dehydrated, and this is reflected in his labs in the elevated BUN and creatinine. He is afebrile with a good hemoglobin and platelets. They did hold his chemotherapy because of his viral illness. He had a recent MRI done because of increasing back pain. I don t know the formal reading of that, but neurologically here he is intact. He will be admitted to the BMT service for hydration, pain management, and likely reinitiation of his chemotherapy.    I have reviewed the nursing notes.    I have reviewed the findings, diagnosis, plan and need for follow up with the patient.    New Prescriptions    No medications on file       Final diagnoses:   Dehydration   Acute bilateral low back pain without sciatica   Multiple myeloma in relapse (H)   Delirium   I, Shannon Walker, am serving as a trained medical scribe to document services personally performed by Jan Law MD, based on the provider's statements to me.      IJan MD, was physically present and have reviewed and verified the accuracy of this note documented by Shannon Walker.       5/7/2017   [facility], [place], EMERGENCY DEPARTMENT     Jan Law MD  05/07/17 2025       Jan Law MD  05/07/17 2055

## 2017-05-07 NOTE — IP AVS SNAPSHOT
` `     [address] [facility] BANK: [phone]                 INTERAGENCY TRANSFER FORM - NOTES (H&P, Discharge Summary, Consults, Procedures, Therapies)   2017                    Hospital Admission Date: 2017  ANTHONY SANTOCHARLYLUDA   : 1945  Sex: Male        Patient PCP Information     Provider PCP Type    [facility]         History & Physicals      H&P by Joanie Anderson MD at 2017  9:56 PM     Author:  Joanie Anderson MD Service:  Blood and Marrow Transplantation Author Type:  Physician    Filed:  2017  9:56 PM Date of Service:  2017  9:56 PM Note Created:  2017  9:24 PM    Status:  Signed :  Joanie Anderson MD (Physician)         [facility] History and Physical    Anthony Carbone MRN# [id number]   Age: 71 year old YOB: 1945     Date of Admission:  2017    Home clinic: [facility]  Primary care provider: Sanket Burciaga          Assessment and Plan:   71 year old male with relapsed MM S/P  autologous transplant (2013), with recent progression on carfilzomib therapy, now on Ana/Pom/Dex presented to the ED for evaluation of sever back pain , JOSE MARIA and confusion   1- Back pain : unclear the cause started 3 weeks ago and as per the ED note the wife states that MRI at OSH showed possible compression and spinal stenosis, started on some pain medication but his pain continue worsening, no neurological symptoms , no trama or fall- will. Unclear if the pain related to his MM or other cause   - consider MRI of the thoracolumber if the pat. Tolerated   - consider NS as indicated   - pain medication PRN  2- JOSE MARIA : worsening than baseline, likely d/o dehydration   - IVF for now   - monitor lab   3-  Confusion: seems confusion to time and places, could be related to the pain/ medication?? may consider further image if worsening   4- ID: no fever will do infectious workup and will  "f/U  - c/w prophy medication   5- hx of relapse MM: on  Ana/Pom/Dex  6- GI: History of GERD, no issues recently  - Continue Nexium QD and Carafate PRN  7- Neuro: Continues on clopidogrel for suspected subacute CVA 4/2016  8- FTT and deconditioning: Neutritional consult  - PT OT   9- CV: History of prolonged QTc anad orthostasis.  - Continue amlodipine 5 mg at bedtime, daily BP checks, and midodrine for symptomatic orthostatic hypotension. Avoid QTc prolonging meds  FEN  - IVF  - monitor lytes and replace as needed  - ADAT  DVT PPX Mechanical  Code status Full Code   -        Chief Complaint:   Back pain     History is obtained from the patient and medical record          History of Present Illness:   71 year old male with relapsed MM S/P  autologous transplant (1/9/2013), with recent progression on carfilzomib therapy, now on Ana/Pom/Dex presented to the ED for evaluation of sever back pain . The pat. Was in  Vacation in [place] for the past 3.5 months. About 3 weeks ago  he started to  have back pain evaluated per the ED note Patient's wife reported that the MRI showed possible compression and spinal stenosis. Patient was started on Tramadol on Thursday (3 days ago) for the pain\" but the pain was worsening , Patient flew back from  [place] today and presented to the ED with worsening back pain and confusion, found to have JOSE MARIA and abnormal lab, he also was treated with doxycycline for cough and possible PNA,. The pat. Seems confusion at the time of exam and unable to provide detail hx about his pain. He denies any chest pain, no SOB, no abdominal pain . deniesany fever, no N/V or urinary symptoms. no neurological symptoms , no trama or fall.       Cancer Treatment History:   Diagnosis: 71 year old gentleman with IgM lambda multiple myeloma originally diagnosed in 01/2012 as stage I, standard risk disease.   Treatment: Revlimid plus dexamethasone for 4-5 cycles but plateaued by late 03/2012.   - Velcade was added and he " received another 2-3 cycles, achieving a good partial remission.      Transplant: Single auto after melphalan 200 mg/m2 preparative regimen on 01/09/2013  - Post-transplant course: unremarkable except for some mild steroid-induced hyperglycemia, gastritis, nausea and vomiting.      Maintenance: Lenalidomide at day-100 then developed a maculopapular rash. Lenalidomide was held and then we re-challenged him after about a month to 2 months at a lower dose (5 mg daily); rash returned.   - was started on maintenance Velcade, every other week through July 2014, when he was noted with abnormalities on myeloma studies drawn there. Noted with prostate cancer dx at about the time of relapse (see below).     Relapse: noted with return on M-spike in blood/urine with marrow involvement but negative PET.   - Started on retreatment with RVD on 8/27/14 with Revlimid at 15 mg 14 days of 21 days, dexamethasone 40 mg weekly and velcade weekly.Completed at total of 5 cycles without complication by 12/2014.   - Started Cycle 6 on inc'd Rev dosing of 20mg daily x 2 weeks on 12/11/14 which was complicated by pneumonia.  - Adm 12/21-1/10 for human metapneumovirus pneumonia complicated by anorexia, HTN, depression, anorexia with significant weight loss.   - Restarted Ernesto/Dex only on 2/4/15 with good tolerance but with thrombocytopenia.   - Bendamustine added to Velcade/dexamethasone on 5/21/15 due to disease progression  - Velcade discontinued on 7/9/2015 due to side effects (orthostasis)  - Schedule changed to bendamustine 80 mg/m2 days 1 and 2 on 28-day cycle  - Cycle 1 tolerated poorly due to lightheadedness and weakness  - Cycle 2 dose reduced to 60 mg/m2 and pre-meds adjusted  - Cycle 5 received on 9/17/15.  - 10/1/2015 - increasing IgM, M-spike - started Pomalidomide 4mg/day (21 days out of 28 days) and weekly Decadron 20mg on Oct 1st, 2015.    - Carfilzomib added on 10/22/2015 making this CPD.  - C3-C6  received in [place]    -  Returned to [facility] and resumed CPD here    - Adm: 4/12-4/14 with fever, confusion, neutropenia. Noted on MRI to have acute/subacute CVA and subacute/chronic CVA; started on Plavix, given brief course of Abx and GCSF prior to d/c.     - Continued on Carfilzomib and dexamethasone alone  - Pomalyst added back on 7/13/2016 for rising FLC  - Start daratumumab 11/10/16. Changed to every other week after 4 weekly treatments d/t profound fatigue and malaise.         Past Medical History:     Past Medical History:   Diagnosis Date     Coronary artery disease, non-occlusive Jan 2012     Hx of migraines     haven;t bothered him since 2003     Hypertension      Malignant neoplasm (H) 1/2012     Myeloma (H)     multiple     Nonulcer dyspepsia      Polio age 6    no sequelae     Prostate cancer (H)             Past Surgical History:      Past Surgical History:   Procedure Laterality Date     COLONOSCOPY       ESOPHAGOSCOPY, GASTROSCOPY, DUODENOSCOPY (EGD), COMBINED N/A 1/6/2015    Procedure: COMBINED ESOPHAGOSCOPY, GASTROSCOPY, DUODENOSCOPY (EGD);  Surgeon: Yamil Donaldson Chi, MD;  Location:  GI     ESOPHAGOSCOPY, GASTROSCOPY, DUODENOSCOPY (EGD), COMBINED Left 8/20/2015    Procedure: COMBINED ESOPHAGOSCOPY, GASTROSCOPY, DUODENOSCOPY (EGD), BIOPSY SINGLE OR MULTIPLE;  Surgeon: Mane Barragan MD;  Location:  GI     right inguinal hernia surgery       right toe surgery              Social History:     Social History     Social History     Marital status:      Spouse name: N/A     Number of children: N/A     Years of education: N/A     Occupational History     Not on file.     Social History Main Topics     Smoking status: Never Smoker     Smokeless tobacco: Never Used     Alcohol use Yes      Comment: rarely     Drug use: No     Sexual activity: Not on file     Other Topics Concern     Not on file     Social History Narrative            Family History:   The family history is negative for Melanoma and Skin Cancer.    Family  history   reviewed and updated in [facility]         Immunizations:     Immunization History   Administered Date(s) Administered     HIB 01/07/2014     Hepatitis B 01/07/2014     Influenza (High Dose) 3 valent vaccine 10/08/2014, 11/19/2015, 10/05/2016     Influenza (IIV3) 10/01/2012     Pneumococcal (PCV 13) 01/07/2014     Poliovirus, inactivated (IPV) 01/07/2014     TDAP Vaccine (Boostrix) 01/07/2014            Allergies:     Allergies   Allergen Reactions     Aspirin      Stomach upset     Bactrim [Sulfamethoxazole W-Trimethoprim] Rash            Medications:     Current Facility-Administered Medications   Medication     lactated ringers infusion     Current Outpatient Prescriptions   Medication Sig     potassium chloride SA (K-DUR/KLOR-CON M) 20 MEQ CR tablet TAKE 1 TABLET BY MOUTH TWICE DAILY.     pomalidomide (POMALYST) 2 MG capsule Take 1 capsule (2 mg) by mouth daily Swallow whole, do NOT break, chew or open capsule. Take 2 hours before or after food.     midodrine (PROAMATINE) 2.5 MG tablet Take 1 tablet (2.5 mg) by mouth 3 times daily     ACYCLOVIR PO Take 400 mg by mouth 2 times daily      ondansetron (ZOFRAN-ODT) 8 MG ODT tab Take 1 tablet (8 mg) by mouth every 8 hours as needed for nausea     amLODIPine (NORVASC) 5 MG tablet Take one tablet at bedtime.     dexamethasone (DECADRON) 4 MG tablet Take 20mg twice weekly on weeks of Kyprolis (3 weeks on, 1 week off, repeat) - Takes the same 2 days as chemo each week     clopidogrel (PLAVIX) 75 MG tablet Take 1 tablet (75 mg) by mouth daily     sucralfate (CARAFATE) 1 GM tablet Take 1 tablet (1 g) by mouth 4 times daily as needed     Esomeprazole Magnesium (NEXIUM PO) Take 40 mg by mouth every morning (before breakfast)     SIMVASTATIN PO Take 20 mg by mouth daily      Facility-Administered Medications Ordered in Other Encounters   Medication     filgrastim (NEUPOGEN) injection vial 780 mcg            Review of Systems:   A comprehensive review of systems was  performed and found to be negative except as described in this note         Physical Exam:     Vitals were reviewed  Patient Vitals for the past 8 hrs:   BP Temp Temp src Pulse Heart Rate Resp SpO2 Weight   05/07/17 2111 (!) 152/106 - - - 98 (!) 45 97 % -   05/07/17 2030 145/88 - - - 74 13 - -   05/07/17 2015 - - - - 78 17 - -   05/07/17 2000 (!) 142/102 - - - 76 16 - -   05/07/17 1945 - - - - 77 15 - -   05/07/17 1930 148/82 - - - 81 12 92 % -   05/07/17 1915 - - - - 84 14 95 % -   05/07/17 1900 150/82 - - - 86 17 - -   05/07/17 1830 (!) 144/98 - - - 92 19 92 % -   05/07/17 1823 162/90 98.1  F (36.7  C) Oral 93 93 16 95 % 58.2 kg (128 lb 6.4 oz)     Constitutional: Awake, alert, cooperative, no apparent distress.  Eyes: Lids and lashes normal, pupils equal, round and reactive to light, extra ocular muscles intact, sclera clear, conjunctiva normal.  ENT: Normocephalic, without obvious abnormality, atramatic, sinuses nontender on palpation, external ears without lesions, oral pharynx with moist mucus membranes, tonsils without erythema or exudates, gums normal and good dentition.  Neck: Supple, symmetrical, trachea midline, no adenopathy, thyroid symmetric, not enlarged and no tenderness, skin normal.  Hematologic / Lymphatic: No cervical lymphadenopathy and no supraclavicular lymphadenopathy.  Back: Symmetric, no curvature, spinous processes are non-tender on palpation, paraspinous muscles are non-tender on palpation, no costal vertebral tenderness.  Lungs: No increased work of breathing, good air exchange, clear to auscultation bilaterally, no crackles or wheezing.  Cardiovascular: Regular rate and rhythm, normal S1 and S2, no S3 or S4, and no murmur noted.  Abdomen:  normal bowel sounds, soft, non-distended, non-tender, no masses palpated, no hepatosplenomegally.  Musculoskeletal: No redness, warmth, or swelling of the joints.  Full range of motion noted.  Motor strength is 5 out of 5 all extremities bilaterally.   Tone is normal.  Neurologic: Awake, alert, confused to  place and time.  Cranial nerves II-XII are grossly intact.  Motor is 5 out of 5 bilaterally.  Skin: No rashes, erythema, pallor, petechia or purpura.         Data:     Results for orders placed or performed during the hospital encounter of 05/07/17   CBC with platelets differential   Result Value Ref Range    WBC 4.0 4.0 - 11.0 10e9/L    RBC Count 3.43 (L) 4.4 - 5.9 10e12/L    Hemoglobin 10.9 (L) 13.3 - 17.7 g/dL    Hematocrit 33.8 (L) 40.0 - 53.0 %    MCV 99 78 - 100 fl    MCH 31.8 26.5 - 33.0 pg    MCHC 32.2 31.5 - 36.5 g/dL    RDW 17.8 (H) 10.0 - 15.0 %    Platelet Count 201 150 - 450 10e9/L    Diff Method Automated Method     % Neutrophils 77.1 %    % Lymphocytes 10.8 %    % Monocytes 8.0 %    % Eosinophils 0.3 %    % Basophils 2.8 %    % Immature Granulocytes 1.0 %    Nucleated RBCs 0 0 /100    Absolute Neutrophil 3.1 1.6 - 8.3 10e9/L    Absolute Lymphocytes 0.4 (L) 0.8 - 5.3 10e9/L    Absolute Monocytes 0.3 0.0 - 1.3 10e9/L    Absolute Eosinophils 0.0 0.0 - 0.7 10e9/L    Absolute Basophils 0.1 0.0 - 0.2 10e9/L    Abs Immature Granulocytes 0.0 0 - 0.4 10e9/L    Absolute Nucleated RBC 0.0    Comprehensive metabolic panel   Result Value Ref Range    Sodium 136 133 - 144 mmol/L    Potassium 4.4 3.4 - 5.3 mmol/L    Chloride 102 94 - 109 mmol/L    Carbon Dioxide 21 20 - 32 mmol/L    Anion Gap 13 3 - 14 mmol/L    Glucose 151 (H) 70 - 99 mg/dL    Urea Nitrogen 54 (H) 7 - 30 mg/dL    Creatinine 1.80 (H) 0.66 - 1.25 mg/dL    GFR Estimate 37 (L) >60 mL/min/1.7m2    GFR Estimate If Black 45 (L) >60 mL/min/1.7m2    Calcium 10.3 (H) 8.5 - 10.1 mg/dL    Bilirubin Total 0.5 0.2 - 1.3 mg/dL    Albumin 3.4 3.4 - 5.0 g/dL    Protein Total 9.5 (H) 6.8 - 8.8 g/dL    Alkaline Phosphatase 66 40 - 150 U/L    ALT 14 0 - 70 U/L    AST 11 0 - 45 U/L   Lipase   Result Value Ref Range    Lipase 95 73 - 393 U/L     Attestation:  Total time: 70 minutes    Shelly Menendez,  MD[EB1.1]        Revision History        User Key Date/Time User Provider Type Action    > EB1.1 5/7/2017  9:56 PM Joanie Anderson MD Physician Sign                  Discharge Summaries     No notes of this type exist for this encounter.         Consult Notes      Consults by Deborah Richardson RPA at 5/8/2017  1:37 PM     Author:  Deborah Richardson RPA Service:  Interventional Radiology Author Type:  Radiology Practioner Assistant    Filed:  5/8/2017  1:37 PM Date of Service:  5/8/2017  1:37 PM Note Created:  5/8/2017  1:36 PM    Status:  Signed :  Deborah Richardson RPA (Radiology Practioner Assistant)     Consult Orders:    1. Interventional Radiology IP Consult: tomorrow in AM may need a non tunneled line for plex; Consultant may enter orders: Yes; Patient to be seen: Routine - within 24 hours; Call back #: [phone] [[id number]] ordered by Lorena Mays MD at 05/08/17 1217                Patient is on IR schedule 5/9/2017 for a Nontunneled central line placement .   Labs WNL for procedure.   No NPO required.   Consent will be done prior to procedure.  Please [place] the [place] RN [place] [phone] for estimated time of procedure.       Deborah Debra IR RPA  [phone] Call pager  [phone] pager[SY1.1]             Revision History        User Key Date/Time User Provider Type Action    > SY1.1 5/8/2017  1:37 PM Deborah Richardson RPA Radiology Practioner Assistant Sign                     Progress Notes - Physician (Notes from 05/13/17 through 05/16/17)      Progress Notes by Louisa Castillo RD at 5/15/2017  2:58 PM     Author:  Louisa Castillo RD Service:  Nutrition Author Type:  Registered Dietitian    Filed:  5/15/2017  3:59 PM Date of Service:  5/15/2017  2:58 PM Note Created:  5/15/2017  2:58 PM    Status:  Signed :  Louisa Castillo RD (Registered Dietitian)         CLINICAL NUTRITION SERVICES - REASSESSMENT NOTE     Nutrition Prescription    RECOMMENDATIONS FOR  MDs/PROVIDERS TO ORDER:[NS1.1]  - Recommend obtaining calorie counts if pt remains hospitalized > 72 hrs, in order to determine if nutrition support warranted.[NS1.2]    Malnutrition Status:[NS1.1]    Unable to determine at this time[NS1.2]    Recommendations already ordered by Registered Dietitian (RD):[NS1.1]  - Changed Magic Cup order to betw meals at 10 am and 2 pm.    Future/Additional Recommendations:  - If EN is indicated, recommend via NJT with Isosource 1.5 @ goal 45 ml/hr (1080 ml/day) to provide 1620 kcals, 73 g PRO, 821 ml free H2O, 190 g CHO and 16 g Fiber daily. Pt would need additional 30 ml fluid flushes for FT patency, additional flushes per MD pending pt's oral intake.[NS1.2]      EVALUATION OF THE PROGRESS TOWARD GOALS   Diet: Regular with Magic Cup supplement ordered with meals (lunch and dinner)    Intake:[NS1.1] Per RN/flowsheets (pt out of room at time of visit),  pt with fair appetite and consuming on average approximately 67% of his meals over the past week.  - Noted to continue to have intermittent abd pain and AMS which reported to be resolving.   - Per review of health touch, no evidence that pt is requesting any nutritional supplements at time of meal ordering.[NS1.2]    NEW FINDINGS   Weight: 59.4 kg (today), 58.2 kg (5/7 - admit); Wt remains above admit wt.[NS1.1] However, h/o 9.9% wt loss x past 4 mos.[NS1.2]    MALNUTRITION  % Intake:[NS1.1] < 75% for > 7 days (non-severe)[NS1.2]  % Weight Loss:[NS1.1] > 7.5% in 3 months (severe)[NS1.2]  Subcutaneous Fat Loss:[NS1.1] Unable to assess[NS1.2]  Muscle Loss:[NS1.1] Unable to assess[NS1.2]  Fluid Accumulation/Edema:[NS1.1] None noted per chart review[NS1.2]  Malnutrition Diagnosis:[NS1.1] Unable to determine due to unable to complete nutritional physical reassessment.[NS1.2]    Previous Goals   Patient to consume % of nutritionally adequate meal trays TID, or the equivalent with supplements/snacks.    Evaluation:[NS1.1] Not  met[NS1.2]    Previous Nutrition Diagnosis  Inadequate oral intake related to severe back pain, increased confusion as evidenced by pt with possible poor PO and FTT PTA, 9.9% wt loss in the past 4 months, pt with poor intake at present with pt not ordering any meals so far today       Evaluation:[NS1.1] No change[NS1.2]    CURRENT NUTRITION DIAGNOSIS[NS1.1]  Inadequate oral intake[NS1.2] related to[NS1.1] improvement in AMS, but appetite reduced and has intermittent tim pain[NS1.2] as evidenced by[NS1.1] pt consuming < 75% of meal intakes over the past week.[NS1.2]    INTERVENTIONS  Implementation[NS1.1]  Medical food supplement therapy as outlined above[NS1.2]    Goals[NS1.1]  Patient to consume % of nutritionally adequate meal trays TID, or the equivalent with supplements/snacks.[NS1.2]    Monitoring/Evaluation  Progress toward goals will be monitored and evaluated per protocol.[NS1.1]      Louisa Castillo RD,LD  Pager [phone][NS1.2]         Revision History        User Key Date/Time User Provider Type Action    > NS1.2 5/15/2017  3:59 PM Louisa Castillo RD Registered Dietitian Sign     NS1.1 5/15/2017  2:58 PM Louisa Castillo RD Registered Dietitian             Progress Notes by Samantha Jones at 5/15/2017  3:45 PM     Author:  Samantha Jones Service:  Social Work Author Type:      Filed:  5/15/2017  3:53 PM Date of Service:  5/15/2017  3:45 PM Note Created:  5/15/2017  3:45 PM    Status:  Signed :  Samantha Jones ()         Social Work Services Progress Note[JM1.1]    Hospital Day: 9[JM1.2]  Collaborated with:  Hematology team, 7D RN staff, pt, pt's wife-Casey (via phone) and TCU admissions  Data:  Received update from team re: pt medically ready for DC to TCU.    Intervention:  Met with pt, and spoke with pt's wife re: DC planning recommendation for TCU.  Reviewed TCU options, average length of stay and anticipated insurance coverage.  Pt's wife states preferences  for:  1-[facility] - left voicemail, waiting call back  2-[facility] - faxed referral, currently assessing   3-[facility]  Assessment:  See bedside RN, PT/OT and provider notes  Significant relationship at present time:  Pt's wife  Family of origin is available for support:  yes  Other support available:  yes  Gaps in support system:  n/a  Patient is caregiver to:  None  Plan:    Anticipated Disposition:  Facility:  TCU (tbd)    Barriers to d/c plan:  availability / acceptance     Follow Up:  SW will continue to follow and assist with DC planning    CLIFFORD Sheridan, LIANE  7D  (Hem/Onc)  Pager: [phone]  5/15/2017[JM1.1]                 Revision History        User Key Date/Time User Provider Type Action    > JM1.2 5/15/2017  3:53 PM Samantha Jones  Sign     JM1.1 5/15/2017  3:45 PM Samantha Jones              Progress Notes by Lorena Mays MD at 5/14/2017 12:22 PM     Author:  Lorena Mays MD Service:  Hem/Onc Author Type:  Resident    Filed:  5/14/2017 12:25 PM Date of Service:  5/14/2017 12:22 PM Note Created:  5/14/2017 12:22 PM    Status:  Attested :  Lorena Mays MD (Resident)    Cosigner:  Savanna Bob MD at 5/14/2017  3:24 PM        Attestation signed by Savanna Bob MD at 5/14/2017  3:24 PM        Attending note:   I have reviewed today's vital signs, medications, labs and imaging results. I have seen, evaluated and examined the patient independently and discussed the plan with the patient and team. The associated note has been read and corrected by me. My independent findings and assessment are below.  In Summary: Anthony Carbone is a 71 year old male with a history of relapsed multiple myeloma admitted on 5/7/2017 with back pain, confusion, and acute kidney injury, now all resolved after correction of hyper Ca and treatment w/ rasburicase and carfilzomib.     Overnight: No issues.  Did well w/ no sitter.  Afebrile.     Exam: NAD, Ox3, OP clear, RRR No MRG, lungs clear, abd soft, no edema.   Key labs: WBC 3.8 Hgb 8.4 Plt 105 Cr 1.01  Plan: MM stable, Continue outpt treatment after d/c. Renal: Cr improved and stable off IVF. Ca normalized. ID: ? PNA, now afebrile, will complete 7 days of levaquin on 5/15. Continue acyclovir prophy. Pain control adequate. Neuro: Delirium resolved, not needing sitter. Continue zyprexa and monitor. Plan to d/c to TCU.   Savanna Bob MD  Pager: [phone]                               [facility] [place]  Hematology / Oncology Progress Note     Assessment & Plan   Anthony Carbone is a 71 year old male with a history of relapsed multiple myeloma admitted on 5/7/2017 with back pain, confusion, and acute kidney injury.     1. NEURO  #Encephalopathy, likely combination of metabolic, infectious, & uremic  - Multifactorial. Per wife, had been getting weaker over several days PTA but confusion suddenly came on in PM of 5/7 while on airplane back from trip to [place]. AMS resolving (even jokes how he used to say president was [place]). Noncontrast CT head completed 5/12/17 and was negative. Evaluated by PT/OT and they recommend TCU placement, discussed with SW.   -Infectious workup with BC, UC, CXR, RVP - BC, UC negative thus far. RVP negative. CXR with possible pneumonia, treating with Levaquin (see below).  -Discussed on admission with Dr. Bowie of Palliative Care, appreciate assistance. Zyprexa 5mg BID with Haldol 5mg every 4 hours PRN agitation (Qtc 5/8 431). Discussed this plan with patient's wife Casey and she was in agreement with this strategy due to patient not being cooperative with cares prior to medication administration. Continue Zyprexa 5mg BID (was reduced to 5mg QHS but delirium increased).  -Delirium precautions  -No plasmapheresis needed at this time    #History of subacute CVA. Continue home Plavix.    2. RENAL  #Acute  kidney injury  -Unclear etiology. Creatinine on 4/20/17 was 0.80. Wife reports patient was taking 1200-2400mg of ibuprofen daily for at least the past 2 weeks. Also with suspected progression of myeloma.  -SCr continues to improve s/p IV hydration. Avoid NSAIDs in future. D/t hypernatremia and overall clinical improvement IV fluids were d/c'd on 5/11. Will hold off on further IV fluids, continue to follow creatinine and PO intake.     #Hypercalcemia - RESOLVED  -Reduce IV fluid rate  -Pamidronate 60mg x 1 given 5/8    #Hyperuricemia associated with malignancy - RESOLVED  -Reduce IV fluid rate  -Rasburicase 3mg x 1 given 5/8 to treat hyperuricemia (not tumor lysis syndrome)    3. ID  #Possible infection - suspected pneumonia  -Per wife and review of notes, patient was experiencing cough and cold symptoms for at least the past 2 weeks PTA. Was being treated with doxycycline and promethazine + codeine cough syrup.  -ID workup as above  -Transitioned to oral Levaquin 750mg daily on 5/10, plan to complete 7 days of therapy (done on 5/15).  -Continue acyclovir 400mg BID for prophylaxis    4. HEME/ONC  #Multiple myeloma  -Followed by Dr. Topete. Most recently being treated in [place]. Was on daratumumab/pomalidomide/dexamethasone. Had been on hold for past couple of weeks d/t illness.  -Labs pending  -Solumedrol 100mg IV 5/8, 5/9 and 5/10. Aware that this can worsen mental status, but feel benefit outweighs risk at this time and treating delirium as above. May also help with pain. Transitioned 5/11 to oral dexamethasone at 40mg daily x 3 days. Also giving carfilzomib 5/11 and today per Dr. Topete. Will plan for follow up as outpatient early next week pending TCU stay.  -Viscosity index = 2.3. No plasmapheresis needed.    #Cancer-related pain  -Per wife, has been going on for about 2 weeks. Consider imaging. MRI lumbar spine done 4/24 at OSH in [place] reviewed, copy made to be scanned into chart: mild-to-moderate compression  deformities T9-T11, degenerative changes in lumbar spine, mild central and foraminal stenoses in lumbar spine. Additional evaluation indicates pain is located over rib cage and well as mid-back. Reported significant pain while coughing recently, rib x-ray negative for fracture. Will continue lidocaine, Voltaren. X-rays of thoracic and lumbar spine ordered 5/11 which showed redemonstration of T11 compression fracture from Dec 2016, but otherwise nothing new. EKG and cardiac enzymes negative.  -Try Tylenol first d/t delirium. Dilaudid ordered PRN severe pain if unable to control with Tylenol.  -Also receiving steroids as above.    5. CV  #Hypertension. Continue home Norvasc.  #Hyperlipidemia. Continue home Zocor.    6. GI  #GERD. Continue home Nexium.    #Abdominal pain. Developed sudden onset of abdominal pain this AM 5/12/17. Resolved with Dilaudid. Abd x-ray showed nonobstructive bowel gas pattern. No further pain. Simethicone available as needed.    FEN  -No MIVF, bolus as needed.  -Electrolyte replacement PRN per protocol  -Regular diet as tolerated. Continue to monitor PO intake.    Code status: FULL  DC: require TCU placement on MOnday      Case and plan discussed with Dr. Lisbeth Mays MD  PGY4  Hematology Oncology Fellow      Interval History     Did well overnight, denies infectious symptoms.  Feels better.  Sleeping well.    Physical Exam   Temp: 97.6  F (36.4  C) Temp src: Oral BP: 98/48   Heart Rate: 77 Resp: 20 SpO2: 96 % O2 Device: None (Room air)    Vitals:    05/12/17 1200 05/13/17 0900 05/14/17 1208   Weight: 59.5 kg (131 lb 1.6 oz) 59.9 kg (132 lb 1.6 oz) 59.3 kg (130 lb 11.2 oz)     Vital Signs with Ranges  Temp:  [96.8  F (36  C)-98.5  F (36.9  C)] 97.6  F (36.4  C)  Heart Rate:  [] 77  Resp:  [18-22] 20  BP: ()/(48-88) 98/48  SpO2:  [93 %-97 %] 96 %  I/O last 3 completed shifts:  In: 1357 [P.O.:837; I.V.:520]  Out: -     Constitutional: Adult male who appears stated age  seen lying in bed, no acute distress.   Respiratory: No increased work of breathing, good air exchange, lungs clear to auscultation.  Cardiovascular: Regular rate and rhythm, no obvious murmurs.  Abdominal: Soft, non-tender.   Skin: warm, dry  Musculoskeletal: No LE edema.   Neurologic: mild tremor today. Cooperative    Medications     - MEDICATION INSTRUCTIONS -       NaCl       - MEDICATION INSTRUCTIONS -         OLANZapine  5 mg Oral BID     heparin lock flush  5-10 mL Intracatheter Q24H     heparin  5 mL Intracatheter Q28 Days     lidocaine  3 patch Transdermal Q24H     lidocaine   Transdermal Q24h     lidocaine   Transdermal Q8H     esomeprazole  40 mg Oral QAM AC     amLODIPine  5 mg Oral At Bedtime     potassium chloride  20 mEq Oral BID     acyclovir (ZOVIRAX) capsule 400 mg  400 mg Oral BID     clopidogrel  75 mg Oral Daily     simvastatin (ZOCOR) tablet 20 mg  20 mg Oral At Bedtime       Data   Results for orders placed or performed during the hospital encounter of 05/07/17 (from the past 24 hour(s))   Uric acid   Result Value Ref Range    Uric Acid 3.2 (L) 3.5 - 7.2 mg/dL   INR   Result Value Ref Range    INR 1.27 (H) 0.86 - 1.14   Comprehensive metabolic panel   Result Value Ref Range    Sodium 142 133 - 144 mmol/L    Potassium 3.7 3.4 - 5.3 mmol/L    Chloride 111 (H) 94 - 109 mmol/L    Carbon Dioxide 23 20 - 32 mmol/L    Anion Gap 8 3 - 14 mmol/L    Glucose 160 (H) 70 - 99 mg/dL    Urea Nitrogen 33 (H) 7 - 30 mg/dL    Creatinine 1.01 0.66 - 1.25 mg/dL    GFR Estimate 72 >60 mL/min/1.7m2    GFR Estimate If Black 88 >60 mL/min/1.7m2    Calcium 7.3 (L) 8.5 - 10.1 mg/dL    Bilirubin Total 0.3 0.2 - 1.3 mg/dL    Albumin 2.6 (L) 3.4 - 5.0 g/dL    Protein Total 7.2 6.8 - 8.8 g/dL    Alkaline Phosphatase 42 40 - 150 U/L    ALT 19 0 - 70 U/L    AST 10 0 - 45 U/L   CBC with platelets differential   Result Value Ref Range    WBC 3.8 (L) 4.0 - 11.0 10e9/L    RBC Count 2.78 (L) 4.4 - 5.9 10e12/L    Hemoglobin 8.4  (L) 13.3 - 17.7 g/dL    Hematocrit 27.7 (L) 40.0 - 53.0 %     78 - 100 fl    MCH 30.2 26.5 - 33.0 pg    MCHC 30.3 (L) 31.5 - 36.5 g/dL    RDW 18.1 (H) 10.0 - 15.0 %    Platelet Count 105 (L) 150 - 450 10e9/L    Diff Method Automated Method     % Neutrophils 83.7 %    % Lymphocytes 3.1 %    % Monocytes 12.6 %    % Eosinophils 0.0 %    % Basophils 0.3 %    % Immature Granulocytes 0.3 %    Nucleated RBCs 2 (H) 0 /100    Absolute Neutrophil 3.2 1.6 - 8.3 10e9/L    Absolute Lymphocytes 0.1 (L) 0.8 - 5.3 10e9/L    Absolute Monocytes 0.5 0.0 - 1.3 10e9/L    Absolute Eosinophils 0.0 0.0 - 0.7 10e9/L    Absolute Basophils 0.0 0.0 - 0.2 10e9/L    Abs Immature Granulocytes 0.0 0 - 0.4 10e9/L    Absolute Nucleated RBC 0.1    Lactic acid level STAT   Result Value Ref Range    Lactic Acid 1.7 0.7 - 2.1 mmol/L[EL1.1]          Revision History        User Key Date/Time User Provider Type Action    > EL1.1 5/14/2017 12:25 PM Lorena Mays MD Resident Sign            Progress Notes by Lorena Mays MD at 5/13/2017  4:05 PM     Author:  Lorena Mays MD Service:  Hem/Onc Author Type:  Resident    Filed:  5/13/2017  4:11 PM Date of Service:  5/13/2017  4:05 PM Note Created:  5/13/2017  4:05 PM    Status:  Attested :  Lorena Mays MD (Resident)    Cosigner:  Savanna Bob MD at 5/14/2017  8:29 AM        Attestation signed by Savanna Bob MD at 5/14/2017  8:29 AM        Attending note:    I have reviewed today's vital signs, medications, labs and imaging results. I have seen, evaluated and examined the patient independently and discussed the plan with the patient and team. The associated note has been read and corrected by me.  My independent findings and assessment are below.  In Summary:  Anthony Thomasjacksonbaljit is a 71 year old male with a history of relapsed multiple myeloma admitted on 5/7/2017 with back pain, confusion, and acute kidney injury, now all resolved after correction of hyper Ca  and treatment w/ rasburicase and carfilzomib.   Overnight: Abdominal pain, normal KUB, now resolved. Some delirium, resolved this am. Back pain stable, adequately controlled. Afebrile, on RA. Working w/ PT/OT who recommend TCU  Exam: NAD, Ox3, OP clear, RRR  No MRG, lungs clear, abd soft, no edema.   Key labs: WBC 5.4  Anc 4.9  Plt 138  Cr 0.98  Plan: MM stable,  Continue outpt treatment after d/c.  Renal: Cr improved and stable off IVF. Ca normalized. ID: ? PNA, now afebrile, will completed 7 days of levaquin on 5/15. Continue acyclovir prophy. Pain control adequate. Neuro: Delirium last pm may have been precipitated by our change in his meds. Restart zyprexa and monitor.    Plan to d/c to TCU.   Savanna Bob MD  Pager: [phone]                                 [facility] [place]  Hematology / Oncology Progress Note     Assessment & Plan   Anthony Carbone is a 71 year old male with a history of relapsed multiple myeloma admitted on 5/7/2017 with back pain, confusion, and acute kidney injury.     1. NEURO  #Encephalopathy, likely combination of metabolic, infectious, & uremic  - Multifactorial. Per wife, had been getting weaker over several days PTA but confusion suddenly came on in PM of 5/7 while on airplane back from trip to [place]. AMS resolving (even jokes how he used to say president was [place]). Noncontrast CT head completed 5/12/17 and was negative. Evaluated by PT/OT and they recommend TCU placement, discussed with SW.   -Infectious workup with BC, UC, CXR, RVP - BC, UC negative thus far. RVP negative. CXR with possible pneumonia, treating with Levaquin (see below).  -Discussed on admission with Dr. Bowie of Palliative Care, appreciate assistance. Zyprexa 5mg BID with Haldol 5mg every 4 hours PRN agitation (Qtc 5/8 431). Discussed this plan with patient's wife Casey and she was in agreement with this strategy due to patient not being cooperative with cares prior to  medication administration. Continue Zyprexa 5mg BID (was reduced to 5mg QHS but delirium increased).  -Delirium precautions  -No plasmapheresis needed at this time    #History of subacute CVA. Continue home Plavix.    2. RENAL  #Acute kidney injury  -Unclear etiology. Creatinine on 4/20/17 was 0.80. Wife reports patient was taking 1200-2400mg of ibuprofen daily for at least the past 2 weeks. Also with suspected progression of myeloma.  -SCr continues to improve s/p IV hydration. Avoid NSAIDs in future. D/t hypernatremia and overall clinical improvement IV fluids were d/c'd on 5/11. Will hold off on further IV fluids, continue to follow creatinine and PO intake.     #Hypercalcemia - RESOLVED  -Reduce IV fluid rate  -Pamidronate 60mg x 1 given 5/8    #Hyperuricemia associated with malignancy - RESOLVED  -Reduce IV fluid rate  -Rasburicase 3mg x 1 given 5/8 to treat hyperuricemia (not tumor lysis syndrome)    3. ID  #Possible infection - suspected pneumonia  -Per wife and review of notes, patient was experiencing cough and cold symptoms for at least the past 2 weeks PTA. Was being treated with doxycycline and promethazine + codeine cough syrup.  -ID workup as above  -Transitioned to oral Levaquin 750mg daily on 5/10, plan to complete 7 days of therapy (done on 5/15).  -Continue acyclovir 400mg BID for prophylaxis    4. HEME/ONC  #Multiple myeloma  -Followed by Dr. Topete. Most recently being treated in [place]. Was on daratumumab/pomalidomide/dexamethasone. Had been on hold for past couple of weeks d/t illness.  -Labs pending  -Solumedrol 100mg IV 5/8, 5/9 and 5/10. Aware that this can worsen mental status, but feel benefit outweighs risk at this time and treating delirium as above. May also help with pain. Transitioned 5/11 to oral dexamethasone at 40mg daily x 3 days. Also giving carfilzomib 5/11 and today per Dr. Topete. Will plan for follow up as outpatient early next week pending TCU stay.  -Viscosity index = 2.3. No  plasmapheresis needed.    #Cancer-related pain  -Per wife, has been going on for about 2 weeks. Consider imaging. MRI lumbar spine done 4/24 at OSH in [place] reviewed, copy made to be scanned into chart: mild-to-moderate compression deformities T9-T11, degenerative changes in lumbar spine, mild central and foraminal stenoses in lumbar spine. Additional evaluation indicates pain is located over rib cage and well as mid-back. Reported significant pain while coughing recently, rib x-ray negative for fracture. Will continue lidocaine, Voltaren. X-rays of thoracic and lumbar spine ordered 5/11 which showed redemonstration of T11 compression fracture from Dec 2016, but otherwise nothing new. EKG and cardiac enzymes negative.  -Try Tylenol first d/t delirium. Dilaudid ordered PRN severe pain if unable to control with Tylenol.  -Also receiving steroids as above.    5. CV  #Hypertension. Continue home Norvasc.  #Hyperlipidemia. Continue home Zocor.    6. GI  #GERD. Continue home Nexium.    #Abdominal pain. Developed sudden onset of abdominal pain this AM 5/12/17. Resolved with Dilaudid. Abd x-ray showed nonobstructive bowel gas pattern. No further pain. Simethicone available as needed.    FEN  -No MIVF, bolus as needed.  -Electrolyte replacement PRN per protocol  -Regular diet as tolerated. Continue to monitor PO intake.    Code status: FULL      Case and plan discussed with Dr. Lisbeth Mays MD  PGY4  Hematology Oncology Fellow      Interval History   Overnight increased delirium.  Is able to joke about it this am appropriately. No fevers, sweats, chills. No chest pain, SOB. Urinating ok and bowels regular.    Physical Exam   Temp: 97  F (36.1  C) Temp src: Oral BP: 124/80   Heart Rate: 64 Resp: 18 SpO2: 97 % O2 Device: None (Room air)    Vitals:    05/11/17 0801 05/12/17 1200 05/13/17 0900   Weight: 60.3 kg (133 lb) 59.5 kg (131 lb 1.6 oz) 59.9 kg (132 lb 1.6 oz)     Vital Signs with Ranges  Temp:  [97  F (36.1   C)-99.2  F (37.3  C)] 97  F (36.1  C)  Heart Rate:  [62-64] 64  Resp:  [18] 18  BP: (121-146)/(52-89) 124/80  SpO2:  [94 %-97 %] 97 %  I/O last 3 completed shifts:  In: 897 [P.O.:877; I.V.:20]  Out: 222 [Urine:222]    Constitutional: Adult male who appears stated age seen lying in bed, no acute distress.   Respiratory: No increased work of breathing, good air exchange, lungs clear to auscultation.  Cardiovascular: Regular rate and rhythm, no obvious murmurs.  Abdominal: Soft, non-tender.   Skin: warm, dry  Musculoskeletal: No LE edema.   Neurologic: mild tremor today. Cooperative    Medications     - MEDICATION INSTRUCTIONS -       NaCl       - MEDICATION INSTRUCTIONS -         OLANZapine  5 mg Oral BID     heparin lock flush  5-10 mL Intracatheter Q24H     heparin  5 mL Intracatheter Q28 Days     lidocaine  3 patch Transdermal Q24H     lidocaine   Transdermal Q24h     lidocaine   Transdermal Q8H     levofloxacin  750 mg Oral Daily     esomeprazole  40 mg Oral QAM AC     amLODIPine  5 mg Oral At Bedtime     potassium chloride  20 mEq Oral BID     acyclovir (ZOVIRAX) capsule 400 mg  400 mg Oral BID     clopidogrel  75 mg Oral Daily     simvastatin (ZOCOR) tablet 20 mg  20 mg Oral At Bedtime       Data   Results for orders placed or performed during the hospital encounter of 05/07/17 (from the past 24 hour(s))   Uric acid   Result Value Ref Range    Uric Acid 2.8 (L) 3.5 - 7.2 mg/dL   INR   Result Value Ref Range    INR 1.35 (H) 0.86 - 1.14   Comprehensive metabolic panel   Result Value Ref Range    Sodium 145 (H) 133 - 144 mmol/L    Potassium 3.7 3.4 - 5.3 mmol/L    Chloride 114 (H) 94 - 109 mmol/L    Carbon Dioxide 21 20 - 32 mmol/L    Anion Gap 10 3 - 14 mmol/L    Glucose 112 (H) 70 - 99 mg/dL    Urea Nitrogen 30 7 - 30 mg/dL    Creatinine 0.98 0.66 - 1.25 mg/dL    GFR Estimate 74 >60 mL/min/1.7m2    GFR Estimate If Black >90   GFR Calc   >60 mL/min/1.7m2    Calcium 7.5 (L) 8.5 - 10.1 mg/dL     Bilirubin Total 0.3 0.2 - 1.3 mg/dL    Albumin 2.5 (L) 3.4 - 5.0 g/dL    Protein Total 6.7 (L) 6.8 - 8.8 g/dL    Alkaline Phosphatase 36 (L) 40 - 150 U/L    ALT 15 0 - 70 U/L    AST 8 0 - 45 U/L   CBC with platelets differential   Result Value Ref Range    WBC 5.4 4.0 - 11.0 10e9/L    RBC Count 2.73 (L) 4.4 - 5.9 10e12/L    Hemoglobin 8.3 (L) 13.3 - 17.7 g/dL    Hematocrit 27.6 (L) 40.0 - 53.0 %     (H) 78 - 100 fl    MCH 30.4 26.5 - 33.0 pg    MCHC 30.1 (L) 31.5 - 36.5 g/dL    RDW 18.5 (H) 10.0 - 15.0 %    Platelet Count 138 (L) 150 - 450 10e9/L    Diff Method Automated Method     % Neutrophils 89.6 %    % Lymphocytes 2.0 %    % Monocytes 7.6 %    % Eosinophils 0.0 %    % Basophils 0.4 %    % Immature Granulocytes 0.4 %    Nucleated RBCs 1 (H) 0 /100    Absolute Neutrophil 4.9 1.6 - 8.3 10e9/L    Absolute Lymphocytes 0.1 (L) 0.8 - 5.3 10e9/L    Absolute Monocytes 0.4 0.0 - 1.3 10e9/L    Absolute Eosinophils 0.0 0.0 - 0.7 10e9/L    Absolute Basophils 0.0 0.0 - 0.2 10e9/L    Abs Immature Granulocytes 0.0 0 - 0.4 10e9/L    Absolute Nucleated RBC 0.1    Magnesium   Result Value Ref Range    Magnesium 1.7 1.6 - 2.3 mg/dL   Phosphorus   Result Value Ref Range    Phosphorus 2.3 (L) 2.5 - 4.5 mg/dL[EL1.1]          Revision History        User Key Date/Time User Provider Type Action    > EL1.1 5/13/2017  4:11 PM Lorena Mays MD Resident Sign            Progress Notes by Parmjit Gregory MD at 5/12/2017 11:48 AM     Author:  Parmjit Gregory MD Service:  Hem/Onc Author Type:  Physician    Filed:  5/13/2017 11:43 AM Date of Service:  5/12/2017 11:48 AM Note Created:  5/12/2017 11:48 AM    Status:  Signed :  Parmjit Gregory MD (Physician)         [facility]  Hematology / Oncology Progress Note[AB1.1]     Assessment & Plan[AB1.2]   Anthony aCrbone is a 71 year old male with a history of relapsed multiple myeloma admitted on 5/7/2017 with back pain, confusion, and  acute kidney injury.     1. NEURO  #[AB1.1]Encephalopathy, likely combination of metabolic, infectious, & uremic[AB1.3]  -[AB1.1] Multifactorial.[AB1.3] Per wife, ha[AB1.1]d[AB1.3] been getting weaker over several days[AB1.1] PTA[AB1.3] but confusion suddenly came on in PM of 5/7 while on airplane back from trip to [place].[AB1.1] AMS nearly resolved yesterday but unfortunately some regression today. Noncontrast CT head completed today and was negative. Evaluated by PT/OT and they recommend TCU placement, discussed with SW.[AB1.3]   -Infectious workup with BC, UC, CXR, RVP - BC, UC negative thus far. RVP negative. CXR with possible pneumonia, treating with Levaquin (see below).  -Discussed on admission with Dr. Bowie of Palliative Care, appreciate assistance. Zyprexa 5mg BID with Haldol 5mg every 4 hours PRN agitation (Qtc 5/8 431). Discussed this plan with patient's wife Casey and she was in agreement with this strategy due to patient not being cooperative with cares prior to medication administration.[AB1.1] Given regression today, resumed Zyprexa 5mg BID (was reduced to 5mg QHS yesterday).[AB1.3]  -Delirium precautions  -No plasmapheresis needed at this time    #History of subacute CVA. Continue home Plavix.    2. RENAL  #Acute kidney injury  -Unclear etiology. Creatinine on 4/20/17 was 0.80. Wife reports patient was taking 1200-2400mg of ibuprofen daily for at least the past 2 weeks. Also with suspected progression of myeloma.  -SCr continues to improve s/p IV hydration. Avoid NSAIDs in future. D/t hypernatremia and overall clinical improvement[AB1.1] IV fluids were d/c'd on 5/11. Will hold off on further IV fluids today but continue to follow creatinine and PO intake.[AB1.3]     #Hypercalcemia - RESOLVED  -Reduce IV fluid rate  -Pamidronate 60mg x 1 given 5/8    #Hyperuricemia associated with malignancy - RESOLVED  -Reduce IV fluid rate  -Rasburicase 3mg x 1 given 5/8 to treat hyperuricemia (not tumor lysis  syndrome)    3. ID  #Possible infection - suspected pneumonia  -Per wife and review of notes, patient was experiencing cough and cold symptoms for at least the past 2 weeks PTA. Was being treated with doxycycline and promethazine + codeine cough syrup.  -ID workup as above  -Transitioned to oral Levaquin 750mg daily on 5/10, plan to complete 7 days of therapy[AB1.1] (done on 5/15)[AB1.3].  -Continue acyclovir 400mg BID for prophylaxis    4. HEME/ONC  #Multiple myeloma  -Followed by Dr. Topete. Most recently being treated in [place]. Was on daratumumab/pomalidomide/dexamethasone. Had been on hold for past couple of weeks d/t illness.  -Labs pending  -Solumedrol 100mg IV 5/8, 5/9 and 5/10. Aware that this can worsen mental status, but feel benefit outweighs risk at this time and treating delirium as above. May also help with pain. Transitioned[AB1.1] 5/11[AB1.3] to oral dexamethasone at 40mg daily x 3 days. Also giving carfilzomib[AB1.1] 5/11[AB1.3] and to[AB1.1]day[AB1.3] per Dr. Topete. Will plan for follow up as outpatient early next week pending TCU stay.  -Viscosity index = 2.3. No plasmapheresis needed.    #Cancer-related pain  -Per wife, has been going on for about 2 weeks. Consider imaging. MRI lumbar spine done 4/24 at OSH in [place] reviewed, copy made to be scanned into chart: mild-to-moderate compression deformities T9-T11, degenerative changes in lumbar spine, mild central and foraminal stenoses in lumbar spine. Additional evaluation indicates pain is located over rib cage and well as mid-back. Reported significant pain while coughing recently, rib x-ray negative for fracture. Will continue lidocaine, Voltaren. X-rays of thoracic and lumbar spine ordered[AB1.1] 5/11 which showed redemonstration of T11 compression fracture from Dec 2016, but otherwise nothing new[AB1.3].[AB1.1] EKG and cardiac enzymes negative.[AB1.3]  -Try Tylenol first d/t delirium. Dilaudid ordered PRN severe pain if unable to control with  Tylenol.  -Also receiving steroids as above.    5. CV  #Hypertension. Continue home Norvasc.  #Hyperlipidemia. Continue home Zocor.    6. GI  #GERD. Continue home Nexium.[AB1.1]    #Abdominal pain. Developed sudden onset of abdominal pain this AM. Resolved with Dilaudid. Abd x-ray showed nonobstructive bowel gas pattern. No further pain since this morning. Simethicone available as needed.[AB1.3]    FEN  -No MIVF, bolus as needed.  -Electrolyte replacement PRN per protocol  -Regular diet as tolerated.[AB1.1] Continue to monitor PO intake.[AB1.3]    Code status: FULL    Patient discussed with staff attending Dr. Gregory.    Brittney Armstrong PA-C  Hematology/Oncology  Pager: [phone][AB1.1]    Interval History[AB1.2]   Mr. Carbone[AB1.1] is having abdominal pain this morning. Also reported double vision for a few minutes and increased shaking/tremors after having x-ray done. This resolved quickly. More disoriented today. No fevers, sweats, chills. No chest pain, SOB. Urinating ok and bowels regular.[AB1.3]    Physical Exam   Temp: 96.9  F (36.1  C) Temp src: Oral BP: 118/64 Pulse: 95 Heart Rate: 65 Resp: 20 SpO2: 96 % O2 Device: None (Room air)    Vitals:    05/10/17 0754 05/11/17 0801 05/12/17 1200   Weight: 61.9 kg (136 lb 6.4 oz) 60.3 kg (133 lb) 59.5 kg (131 lb 1.6 oz)[AB1.2]     Vital Signs with Ranges[AB1.1]  Temp:  [95.4  F (35.2  C)-98.9  F (37.2  C)] 96.9  F (36.1  C)  Pulse:  [62-95] 95  Heart Rate:  [64-96] 65  Resp:  [16-20] 20  BP: (118-166)/() 118/64  SpO2:  [94 %-98 %] 96 %  I/O last 3 completed shifts:  In: 1107 [P.O.:480; I.V.:50; IV Piggyback:577]  Out: 950 [Urine:950][AB1.2]    Constitutional: Adult male who appears stated age seen lying in bed, no acute distress.   Respiratory: No increased work of breathing, good air exchange, lungs clear to auscultation.  Cardiovascular: Regular rate and rhythm, no obvious murmurs.  Abdominal: + bowel sounds. Soft, non-tender[AB1.1]. Somewhat  distended.[AB1.3]  Skin: Contusions noted to extremities.   Musculoskeletal: No LE edema. R ribs tender to palpation. No tenderness to palpation of spinous processes.  Neurologic: Alert and oriented x[AB1.1] 2[AB1.3]. No tremor today.[AB1.1] Cooperative, mood and affect somewhat labile. Cranial nerves 2-12 intact as tested. No pronator drift.[AB1.3]    Medications     - MEDICATION INSTRUCTIONS -       NaCl       - MEDICATION INSTRUCTIONS -         OLANZapine  5 mg Oral BID     heparin lock flush  5-10 mL Intracatheter Q24H     heparin  5 mL Intracatheter Q28 Days     dexamethasone  4 mg Intravenous Q24H     carfilzomib (KYPROLIS) chemo infusion  27 mg/m2 (Treatment Plan Recorded) Intravenous Q24H     lidocaine  3 patch Transdermal Q24H     lidocaine   Transdermal Q24h     lidocaine   Transdermal Q8H     levofloxacin  750 mg Oral Daily     dexamethasone  40 mg Oral Daily     esomeprazole  40 mg Oral QAM AC     amLODIPine  5 mg Oral At Bedtime     potassium chloride  20 mEq Oral BID     acyclovir (ZOVIRAX) capsule 400 mg  400 mg Oral BID     clopidogrel  75 mg Oral Daily     simvastatin (ZOCOR) tablet 20 mg  20 mg Oral At Bedtime       Data   Results for orders placed or performed during the hospital encounter of 05/07/17 (from the past 24 hour(s))   Troponin I   Result Value Ref Range    Troponin I ES 0.018 0.000 - 0.045 ug/L   CK total   Result Value Ref Range    CK Total 137 30 - 300 U/L   CBC with platelets differential   Result Value Ref Range    WBC 3.5 (L) 4.0 - 11.0 10e9/L    RBC Count 3.06 (L) 4.4 - 5.9 10e12/L    Hemoglobin 9.3 (L) 13.3 - 17.7 g/dL    Hematocrit 30.3 (L) 40.0 - 53.0 %    MCV 99 78 - 100 fl    MCH 30.4 26.5 - 33.0 pg    MCHC 30.7 (L) 31.5 - 36.5 g/dL    RDW 17.9 (H) 10.0 - 15.0 %    Platelet Count 174 150 - 450 10e9/L    Diff Method Automated Method     % Neutrophils 89.3 %    % Lymphocytes 3.2 %    % Monocytes 5.4 %    % Eosinophils 0.3 %    % Basophils 0.9 %    % Immature Granulocytes 0.9 %     Nucleated RBCs 1 (H) 0 /100    Absolute Neutrophil 3.1 1.6 - 8.3 10e9/L    Absolute Lymphocytes 0.1 (L) 0.8 - 5.3 10e9/L    Absolute Monocytes 0.2 0.0 - 1.3 10e9/L    Absolute Eosinophils 0.0 0.0 - 0.7 10e9/L    Absolute Basophils 0.0 0.0 - 0.2 10e9/L    Abs Immature Granulocytes 0.0 0 - 0.4 10e9/L    Absolute Nucleated RBC 0.0    Comprehensive metabolic panel   Result Value Ref Range    Sodium 143 133 - 144 mmol/L    Potassium 4.0 3.4 - 5.3 mmol/L    Chloride 110 (H) 94 - 109 mmol/L    Carbon Dioxide 23 20 - 32 mmol/L    Anion Gap 9 3 - 14 mmol/L    Glucose 141 (H) 70 - 99 mg/dL    Urea Nitrogen 17 7 - 30 mg/dL    Creatinine 0.94 0.66 - 1.25 mg/dL    GFR Estimate 78 >60 mL/min/1.7m2    GFR Estimate If Black >90   GFR Calc   >60 mL/min/1.7m2    Calcium 7.8 (L) 8.5 - 10.1 mg/dL    Bilirubin Total 0.3 0.2 - 1.3 mg/dL    Albumin 2.8 (L) 3.4 - 5.0 g/dL    Protein Total 7.7 6.8 - 8.8 g/dL    Alkaline Phosphatase 44 40 - 150 U/L    ALT 17 0 - 70 U/L    AST 15 0 - 45 U/L   Troponin I   Result Value Ref Range    Troponin I ES 0.022 0.000 - 0.045 ug/L   Uric acid   Result Value Ref Range    Uric Acid 1.4 (L) 3.5 - 7.2 mg/dL   INR   Result Value Ref Range    INR 1.25 (H) 0.86 - 1.14   Comprehensive metabolic panel   Result Value Ref Range    Sodium 146 (H) 133 - 144 mmol/L    Potassium 3.5 3.4 - 5.3 mmol/L    Chloride 112 (H) 94 - 109 mmol/L    Carbon Dioxide 21 20 - 32 mmol/L    Anion Gap 12 3 - 14 mmol/L    Glucose 122 (H) 70 - 99 mg/dL    Urea Nitrogen 15 7 - 30 mg/dL    Creatinine 0.82 0.66 - 1.25 mg/dL    GFR Estimate >90  Non  GFR Calc   >60 mL/min/1.7m2    GFR Estimate If Black >90   GFR Calc   >60 mL/min/1.7m2    Calcium 7.8 (L) 8.5 - 10.1 mg/dL    Bilirubin Total 0.7 0.2 - 1.3 mg/dL    Albumin 2.6 (L) 3.4 - 5.0 g/dL    Protein Total 7.2 6.8 - 8.8 g/dL    Alkaline Phosphatase 41 40 - 150 U/L    ALT 16 0 - 70 U/L    AST 13 0 - 45 U/L   CK total   Result Value Ref Range     CK Total 119 30 - 300 U/L   CBC with platelets differential   Result Value Ref Range    WBC 5.4 4.0 - 11.0 10e9/L    RBC Count 2.95 (L) 4.4 - 5.9 10e12/L    Hemoglobin 9.0 (L) 13.3 - 17.7 g/dL    Hematocrit 29.3 (L) 40.0 - 53.0 %    MCV 99 78 - 100 fl    MCH 30.5 26.5 - 33.0 pg    MCHC 30.7 (L) 31.5 - 36.5 g/dL    RDW 18.2 (H) 10.0 - 15.0 %    Platelet Count 174 150 - 450 10e9/L    Diff Method Automated Method     % Neutrophils 88.0 %    % Lymphocytes 4.3 %    % Monocytes 6.5 %    % Eosinophils 0.0 %    % Basophils 0.6 %    % Immature Granulocytes 0.6 %    Nucleated RBCs 1 (H) 0 /100    Absolute Neutrophil 4.7 1.6 - 8.3 10e9/L    Absolute Lymphocytes 0.2 (L) 0.8 - 5.3 10e9/L    Absolute Monocytes 0.4 0.0 - 1.3 10e9/L    Absolute Eosinophils 0.0 0.0 - 0.7 10e9/L    Absolute Basophils 0.0 0.0 - 0.2 10e9/L    Abs Immature Granulocytes 0.0 0 - 0.4 10e9/L    Absolute Nucleated RBC 0.1    XR Abdomen 2 Views    Narrative    XR ABDOMEN 2 VW  5/12/2017 10:14 AM      HISTORY: Upper abd pain    COMPARISON: 8/27/2014    FINDINGS: Upright and supine frontal views of the abdomen.  Nonobstructive bowel gas pattern. No free air or pneumatosis. Multiple  pelvic phleboliths. The lung bases are clear. Moderate left greater  than right degenerative changes in the hips.      Impression    IMPRESSION: Nonobstructive bowel gas pattern.    I have personally reviewed the examination and initial interpretation  and I agree with the findings.    BAKARI RUSSELL   CT Head w/o Contrast    Narrative    Head CT without contrast    History: Tremor, altered mental status.  Comparison: MRI 4/12/2016    Technique: Axial images through the brain obtained without intravenous  contrast, reviewed in bone, brain, and subdural windows.    Findings: Motion artifact mildly degrades image quality. Areas of  encephalomalacia again noted in the left parietal lobe and in the  right cerebellar hemisphere. Patchy low-attenuation foci in cerebral  white matter is  "similar to the prior MRI. No focal loss of gray-white  differentiation or acute intracranial hemorrhage. No hydrocephalus.  Mild cerebral volume loss again noted.    No skull fracture. Layering fluid in the maxillary sinuses  bilaterally.      Impression    Impression:  1. No acute intracranial pathology. Relatively little change in  cerebral volume loss and presumed chronic small vessel ischemic  disease.  2. Layering fluid in the maxillary sinuses bilaterally, possibly  sinusitis.    MOLINA HANSON MD   Lactic acid whole blood   Result Value Ref Range    Lactic Acid 2.1 0.7 - 2.1 mmol/L[AB1.2]     ATTENDING ADDENDUM:    I have independently seen and evaluated the patient on May 1[AB1.1]2[AR1.1], 2017 and reviewed clinical, laboratory, and radiographic findings. I have discussed the plan with the team and agree with the attached note with the following edits:    Anthony Carbone is a 71 year old year old male, with R/R IgM MM here for AMS, possible PNA, and rapid progression of myeloma, hyperCa, high IgM. S/p bisphosphanate and hydration, and improved[AB1.1]. Abd pain sudden onset this AM and some \"twitches\" which resolved with pain control. Residual urine was minimal.[AR1.1]       Ph/E: Vitals reviewed. No distress. Oropharynx clear. Neck supple. Heart RRR. Lungs clear. Abdomen[AB1.1] tender (generalized) but no rebound or guarding[AR1.1]. No peripheral edema. No rash. Neuro nonfocal.     Plan: Acute issues resolved,[AB1.1] abd pain today which was unremarkable on CT. Also some \"twitch\" which was unremarkable on brain imaging. Watch closely and continue carfilzomib.[AR1.1] Dex[AB1.1] one more dose.      OLANZapine  5 mg Oral BID     heparin lock flush  5-10 mL Intracatheter Q24H     heparin  5 mL Intracatheter Q28 Days     lidocaine  3 patch Transdermal Q24H     lidocaine   Transdermal Q24h     lidocaine   Transdermal Q8H     levofloxacin  750 mg Oral Daily     esomeprazole  40 mg Oral QAM AC     amLODIPine "  5 mg Oral At Bedtime     potassium chloride  20 mEq Oral BID     acyclovir (ZOVIRAX) capsule 400 mg  400 mg Oral BID     clopidogrel  75 mg Oral Daily     simvastatin (ZOCOR) tablet 20 mg  20 mg Oral At Bedtime[AR1.1]       Parmjit Gregory[AB1.1]       Revision History        User Key Date/Time User Provider Type Action    > AR1.1 5/13/2017 11:43 AM Parmjit Gregory MD Physician Sign     AB1.2 5/12/2017  3:29 PM Brittney Armstrong PA-C Physician Assistant - PAULETTE Sign     AB1.3 5/12/2017  3:22 PM Brittney Armstrong PA-C Physician Assistant - C      AB1.1 5/12/2017 11:48 AM Brittney Armstrong PA-C Physician Assistant - PAULETTE             Progress Notes by Bette Sevilla RN at 5/12/2017 10:00 PM     Author:  Di, Bette Deyvi, RN Service:  Oncology Author Type:  Registered Nurse    Filed:  5/13/2017  3:40 AM Date of Service:  5/12/2017 10:00 PM Note Created:  5/13/2017  3:38 AM    Status:  Signed :  Bette Sevilla RN (Registered Nurse)         Nursing Focus: Chemotherapy  D: Positive blood return via Port x3. Insertion site is clean/dry/intact, dressing intact with no complaints of pain.  Urine output is recorded in intake in Doc Flowsheet.    I: Premedications given per order (see electronic medical administration record). Dose #2 of Carfilzomib started to infuse over 10 minutes. Reviewed pt teaching on chemotherapy side effects.  Pt denies need for further teaching. Chemotherapy double checked per protocol by two chemotherapy competent RN's.   A: Tolerating procedure well. Denies nausea and or pain during chemotherapy infusion.   P: Continue to monitor urine output and symptoms of nausea. Screen for symptoms of toxicity.[ML1.1]       Revision History        User Key Date/Time User Provider Type Action    > ML1.1 5/13/2017  3:40 AM Bette Sevilla RN Registered Nurse Sign            Progress Notes by Bette Sevilla RN at 5/12/2017 11:25 PM     Author:  Di, Bette Deyvi, RN Service:  Oncology Author Type:  Registered Nurse    Filed:   "5/13/2017  3:37 AM Date of Service:  5/12/2017 11:25 PM Note Created:  5/13/2017  3:24 AM    Status:  Signed :  Bette Sevilla RN (Registered Nurse)         Pt become more agitated than when pt was receiving chemotherapy. Post chemotherapy, pt wanted to leave room again, two staff was at pt's bedside, pt pushed against staff and gripped strongly against a staff's hands. Pt was asked to stop, refused. Additional staff arrived in room, asked pt to comply with sitting in bed, pt refused, pt assisted to sit in chair. Pt redirected that it is night time and pt should considering going to bed, after a short conversation, pt agreed. Sitter at bedside for patient's safety. Fall risk. Assist x2.[ML1.1]      Revision History        User Key Date/Time User Provider Type Action    > ML1.1 5/13/2017  3:37 AM Bette Sevilla RN Registered Nurse Sign            Progress Notes by Bette Sevilla RN at 5/12/2017  9:10 PM     Author:  Di, Bette Deyvi, RN Service:  Oncology Author Type:  Registered Nurse    Filed:  5/13/2017  3:21 AM Date of Service:  5/12/2017  9:10 PM Note Created:  5/13/2017  3:14 AM    Status:  Signed :  Bette Sevilla RN (Registered Nurse)         Pt become increasing agitation. Found in bed undressing him, attempted to get out of bed, disregarding verbal commands to stay seated. Assist x3 when pt started pacing for the door, pt pushed his weight again staff, reports he has to \"go home\" or \"go to the office\", unable to redirect patient to bed. Haldol IV x1 given. Code 21 called for additional assistance from hospital staff, security, float RN and nursing supervisor attended to site. Staff asked patient to cooperate and go back to bed, pt refused, pt demanded he leave the hospital, considering his state of increasing confusion and agitation, he's not in a stable state to leave. Pt educated about chemotherapy; demanded to talk to debra LANGSTON MD, MD arrived to seen 5 mins after phone call. Pt didn't have any " questions for MD, MD left. Pt complied to staying in bed until chemotherapy was completed. Pt requested to talk to his wife, whom we've dialed her numbers, both no answer, pt happily left voicemail. Currently, pt agree to cares and remains calm.[ML1.1]     Revision History        User Key Date/Time User Provider Type Action    > ML1.1 5/13/2017  3:21 AM Bette Sevilla RN Registered Nurse Sign                  Procedure Notes     No notes of this type exist for this encounter.      Progress Notes - Therapies (Notes from 05/13/17 through 05/16/17)     No notes of this type exist for this encounter.

## 2017-05-07 NOTE — IP AVS SNAPSHOT
MRN:[id number]                      After Visit Summary   5/7/2017    Anthony Carbone    MRN: [id number]           Thank you!     Thank you for choosing [facility] for your care. Our goal is always to provide you with excellent care. Hearing back from our patients is one way we can continue to improve our services. Please take a few minutes to complete the written survey that you may receive in the mail after you visit with us. Thank you!        Patient Information     Date Of Birth          1945        Designated Caregiver       Most Recent Value    Caregiver    Will someone help with your care after discharge? yes    Name of designated caregiver Louise    Phone number of caregiver unknown    Caregiver address [place]      About your hospital stay     You were admitted on:  May 7, 2017 You last received care in the:  [address] [facility]    You were discharged on:  May 16, 2017        Reason for your hospital stay       You were hospitalized for pneumonia and dehydration as well as worsening multiple myeloma.                  Who to Call     For medical emergencies, please call 911.  For non-urgent questions about your medical care, please call your primary care provider or clinic, [phone]          Attending Provider     Provider Specialty    Jan Law MD Emergency Medicine    [facility], MD [facility]    Parmjit Gregory MD Hematology & Oncology       Primary Care Provider Office Phone # Fax #    Sanket Gualberto [phone]       [facility] [address] [facility] [address]        After Care Instructions     Activity - Up with nursing assistance           Advance Diet as Tolerated       Follow this diet upon discharge: Orders Placed This Encounter      Snacks/Supplements Adult: With Meals      Snacks/Supplements Adult: Magic Cup; Between Meals      Regular Diet Adult            Daily weights        Call Provider for weight gain of more than 2 pounds per day or 5 pounds per week.            Fall precautions           General info for SNF       Length of Stay Estimate: Short Term Care: Estimated # of Days <30  Condition at Discharge: Improving  Level of care:skilled   Rehabilitation Potential: Good  Admission H&P remains valid and up-to-date: Yes  Recent Chemotherapy: Date: 5/11 and 5/12                       Use Nursing Home Standing Orders: Yes            Intake and output       Every shift            Mantoux instructions       Give two-step Mantoux (PPD) Per Facility Policy Yes                  Follow-up Appointments     Follow Up and recommended labs and tests       Follow up as scheduled on Friday 5/19 with Leanne Reddy PA-C as scheduled.                  Your next 10 appointments already scheduled     May 19, 2017  9:45 AM CDT   [facility] Draw with  24/7 Card LAB DRAW   [facility] Lab Draw ([facility])    [address] [place] [address]  [address] [facility] [address]   [phone]            May 19, 2017 10:20 AM CDT   (Arrive by 10:05 AM)   Return Visit with Leanne Reddy PA-C   [facility] ([facility])    [address] [place] [address]  [address] [facility] [address]   [phone]            Jul 14, 2017  1:00 PM CDT   (Arrive by 12:45 PM)   RETURN ARRHYTHMIA with Dmitri Banks MD   [facility])    [address] [place] [address]  [address] [facility] [address]   [phone]              Pending Results     No orders found from 5/5/2017 to 5/8/2017.            Statement of Approval     Ordered          05/16/17 1319  I have reviewed and agree with all the recommendations and orders detailed in this document.  EFFECTIVE NOW     Approved and electronically signed by:  Brittney Armstrong PA-C             Admission Information     Date & Time Provider Department Dept. Phone     5/7/2017 Parmjit Gregory MD [address] [facility] [place] [phone]      Your Vitals Were     Blood Pressure Pulse Temperature Respirations Weight Pulse Oximetry    101/55 (BP Location: Right arm) 95 98.4  F (36.9  C) (Oral) 16 59.1 kg (130 lb 3.2 oz) 97%    BMI (Body Mass Index)                   22.35 kg/m2           [facility] Information     [facility] gives you secure access to your electronic health record. If you see a primary care provider, you can also send messages to your care team and make appointments. If you have questions, please call your primary care clinic.  If you do not have a primary care provider, please call [phone] and they will assist you.        Care EveryWhere ID     This is your Care EveryWhere ID. This could be used by other organizations to access your [facility] medical records  [id number]           Review of your medicines      START taking        Dose / Directions    acetaminophen 500 MG tablet   Commonly known as:  TYLENOL   Used for:  Multiple myeloma in relapse (H)        Dose:  1000 mg   Take 2 tablets (1,000 mg) by mouth every 6 hours as needed for mild pain   Refills:  0       HYDROmorphone 2 MG tablet   Commonly known as:  DILAUDID   Used for:  Multiple myeloma in relapse (H)        Dose:  2 mg   Take 1 tablet (2 mg) by mouth every 4 hours as needed for moderate to severe pain   Refills:  0       lidocaine 5 % Patch   Commonly known as:  LIDODERM   Used for:  Multiple myeloma in relapse (H)        Dose:  3 patch   Place 3 patches onto the skin every 24 hours   Quantity:  30 patch   Refills:  0       LORazepam 0.5 MG tablet   Commonly known as:  ATIVAN   Used for:  Multiple myeloma in relapse (H), Delirium        Dose:  0.5 mg   Take 1 tablet (0.5 mg) by mouth every 4 hours as needed   Quantity:  30 tablet   Refills:  0       OLANZapine 5 MG tablet   Commonly known as:  zyPREXA   Used for:  Delirium, Multiple myeloma in relapse (H)        Dose:  5 mg   Take 1 tablet (5 mg) by mouth  2 times daily   Quantity:  60 tablet   Refills:  0       simethicone 80 MG chewable tablet   Commonly known as:  MYLICON   Used for:  Nausea        Dose:  160 mg   Take 2 tablets (160 mg) by mouth 4 times daily as needed for cramping or other (gas pain)   Quantity:  180 tablet   Refills:  0         CONTINUE these medicines which may have CHANGED, or have new prescriptions. If we are uncertain of the size of tablets/capsules you have at home, strength may be listed as something that might have changed.        Dose / Directions    acyclovir 400 MG tablet   Commonly known as:  ZOVIRAX   Indication:  Unsure of dose   This may have changed:  medication strength   Used for:  Multiple myeloma in relapse (H)        Dose:  400 mg   Take 1 tablet (400 mg) by mouth 2 times daily   Quantity:  60 tablet   Refills:  0       esomeprazole 40 MG CR capsule   Commonly known as:  nexIUM   This may have changed:  medication strength   Used for:  Gastroesophageal reflux disease without esophagitis        Dose:  40 mg   Take 1 capsule (40 mg) by mouth every morning (before breakfast)   Quantity:  30 capsule   Refills:  0       potassium chloride SA 20 MEQ CR tablet   Commonly known as:  K-DUR/KLOR-CON M   This may have changed:  See the new instructions.   Used for:  Hypokalemia        TAKE 1 TABLET BY MOUTH TWICE DAILY.   Quantity:  60 tablet   Refills:  0         CONTINUE these medicines which have NOT CHANGED        Dose / Directions    amLODIPine 5 MG tablet   Commonly known as:  NORVASC   Used for:  Essential hypertension        Take one tablet at bedtime.   Quantity:  30 tablet   Refills:  0       clopidogrel 75 MG tablet   Commonly known as:  PLAVIX   Used for:  History of stroke        Dose:  75 mg   Take 1 tablet (75 mg) by mouth daily   Quantity:  30 tablet   Refills:  0       midodrine 2.5 MG tablet   Commonly known as:  PROAMATINE   Used for:  Orthostatic hypotension, Orthostatic syncope        Dose:  2.5 mg   Take 1 tablet  (2.5 mg) by mouth 3 times daily   Quantity:  90 tablet   Refills:  3       ondansetron 8 MG ODT tab   Commonly known as:  ZOFRAN-ODT   Used for:  Nausea        Dose:  8 mg   Take 1 tablet (8 mg) by mouth every 8 hours as needed for nausea   Quantity:  30 tablet   Refills:  3       simvastatin 5 MG tablet   Commonly known as:  ZOCOR   Used for:  Mixed hyperlipidemia        Dose:  20 mg   Take 4 tablets (20 mg) by mouth daily   Quantity:  30 tablet   Refills:  0       sucralfate 1 GM tablet   Commonly known as:  CARAFATE   Used for:  Gastroesophageal reflux disease without esophagitis        Dose:  1 g   Take 1 tablet (1 g) by mouth 4 times daily as needed   Quantity:  120 tablet   Refills:  1         STOP taking     dexamethasone 4 MG tablet   Commonly known as:  DECADRON           pomalidomide 2 MG capsule   Commonly known as:  POMALYST                Where to get your medicines      Some of these will need a paper prescription and others can be bought over the counter. Ask your nurse if you have questions.     You don't need a prescription for these medications     acetaminophen 500 MG tablet    acyclovir 400 MG tablet    amLODIPine 5 MG tablet    clopidogrel 75 MG tablet    esomeprazole 40 MG CR capsule    lidocaine 5 % Patch    midodrine 2.5 MG tablet    OLANZapine 5 MG tablet    ondansetron 8 MG ODT tab    potassium chloride SA 20 MEQ CR tablet    simethicone 80 MG chewable tablet    simvastatin 5 MG tablet    sucralfate 1 GM tablet         Information about where to get these medications is not yet available     ! Ask your nurse or doctor about these medications     HYDROmorphone 2 MG tablet    LORazepam 0.5 MG tablet                Protect others around you: Learn how to safely use, store and throw away your medicines at www.disposemymeds.org.             Medication List: This is a list of all your medications and when to take them. Check marks below indicate your daily home schedule. Keep this list as a  reference.      Medications           Morning Afternoon Evening Bedtime As Needed    acetaminophen 500 MG tablet   Commonly known as:  TYLENOL   Take 2 tablets (1,000 mg) by mouth every 6 hours as needed for mild pain   Last time this was given:  1,000 mg on 5/11/2017  7:41 AM                                acyclovir 400 MG tablet   Commonly known as:  ZOVIRAX   Take 1 tablet (400 mg) by mouth 2 times daily   Last time this was given:  400 mg on 5/16/2017  8:46 AM                                amLODIPine 5 MG tablet   Commonly known as:  NORVASC   Take one tablet at bedtime.   Last time this was given:  5 mg on 5/15/2017  7:43 PM                                clopidogrel 75 MG tablet   Commonly known as:  PLAVIX   Take 1 tablet (75 mg) by mouth daily   Last time this was given:  75 mg on 5/16/2017  8:46 AM                                esomeprazole 40 MG CR capsule   Commonly known as:  nexIUM   Take 1 capsule (40 mg) by mouth every morning (before breakfast)   Last time this was given:  40 mg on 5/16/2017  8:45 AM                                HYDROmorphone 2 MG tablet   Commonly known as:  DILAUDID   Take 1 tablet (2 mg) by mouth every 4 hours as needed for moderate to severe pain   Last time this was given:  2 mg on 5/16/2017 12:39 PM                                lidocaine 5 % Patch   Commonly known as:  LIDODERM   Place 3 patches onto the skin every 24 hours   Last time this was given:  2 patches on 5/16/2017  8:46 AM                                LORazepam 0.5 MG tablet   Commonly known as:  ATIVAN   Take 1 tablet (0.5 mg) by mouth every 4 hours as needed   Last time this was given:  0.5 mg on 5/15/2017 12:05 PM                                midodrine 2.5 MG tablet   Commonly known as:  PROAMATINE   Take 1 tablet (2.5 mg) by mouth 3 times daily   Last time this was given:  2.5 mg on 5/11/2017  3:28 PM                                OLANZapine 5 MG tablet   Commonly known as:  zyPREXA   Take 1 tablet (5  mg) by mouth 2 times daily   Last time this was given:  5 mg on 5/16/2017  8:46 AM                                ondansetron 8 MG ODT tab   Commonly known as:  ZOFRAN-ODT   Take 1 tablet (8 mg) by mouth every 8 hours as needed for nausea                                potassium chloride SA 20 MEQ CR tablet   Commonly known as:  K-DUR/KLOR-CON M   TAKE 1 TABLET BY MOUTH TWICE DAILY.   Last time this was given:  20 mEq on 5/16/2017  8:46 AM                                simethicone 80 MG chewable tablet   Commonly known as:  MYLICON   Take 2 tablets (160 mg) by mouth 4 times daily as needed for cramping or other (gas pain)   Last time this was given:  160 mg on 5/15/2017  4:11 PM                                simvastatin 5 MG tablet   Commonly known as:  ZOCOR   Take 4 tablets (20 mg) by mouth daily   Last time this was given:  20 mg on 5/15/2017  7:43 PM                                sucralfate 1 GM tablet   Commonly known as:  CARAFATE   Take 1 tablet (1 g) by mouth 4 times daily as needed

## 2017-05-07 NOTE — IP AVS SNAPSHOT
"    UNIT [facility]D [facility]: [phone]                                              INTERAGENCY TRANSFER FORM - LAB / IMAGING / EKG / EMG RESULTS   2017                    Hospital Admission Date: 2017  WILLIAN ARCINIEGA   : 1945  Sex: Male        Attending Provider: Parmjit Gregory MD     Allergies:  Aspirin, Bactrim [Sulfamethoxazole W-trimethoprim]    Infection:  None   Service:  Hem/Onc    Ht:  1.597 m (5' 2.87\")   Wt:  59.1 kg (130 lb 3.2 oz)   Admission Wt:  58.2 kg (128 lb 6.4 oz)    BMI:  23.16 kg/m 2   BSA:  1.62 m 2            Patient PCP Information     Provider PCP Type    [facility]         Lab Results - 3 Days      CBC with platelets differential [[id number]] (Abnormal)  Resulted: 17 0801, Result status: Final result    Ordering provider: Parmjit Gregory MD  05/15/17 0820 Resulting lab: [facility]    Specimen Information    Type Source Collected On   Blood  17 0643          Components       Value Reference Range Flag Lab   WBC 2.1 4.0 - 11.0 10e9/L L 51   RBC Count 3.05 4.4 - 5.9 10e12/L L 51   Hemoglobin 9.2 13.3 - 17.7 g/dL L 51   Hematocrit 30.5 40.0 - 53.0 % L 51    78 - 100 fl  51   MCH 30.2 26.5 - 33.0 pg  51   MCHC 30.2 31.5 - 36.5 g/dL L 51   RDW 18.3 10.0 - 15.0 % H 51   Platelet Count 60 150 - 450 10e9/L L 51   Diff Method Automated Method   51   % Neutrophils 84.4 %  51   % Lymphocytes 6.1 %  51   % Monocytes 5.7 %  51   % Eosinophils 0.9 %  51   % Basophils 2.4 %  51   % Immature Granulocytes 0.5 %  51   Nucleated RBCs 3 0 /100 H 51   Absolute Neutrophil 1.8 1.6 - 8.3 10e9/L  51   Absolute Lymphocytes 0.1 0.8 - 5.3 10e9/L L 51   Absolute Monocytes 0.1 0.0 - 1.3 10e9/L  51   Absolute Eosinophils 0.0 0.0 - 0.7 10e9/L  51   Absolute Basophils 0.1 0.0 - 0.2 10e9/L  51   Abs Immature Granulocytes 0.0 0 - 0.4 10e9/L  51   Absolute Nucleated RBC 0.1   51            Uric acid [[id number]]  Resulted: 17 " 0739, Result status: Final result    Ordering provider: Parmjit Gregory MD  05/15/17 2330 Resulting lab: [facility]    Specimen Information    Type Source Collected On   Blood  05/16/17 0643          Components       Value Reference Range Flag Lab   Uric Acid 3.8 3.5 - 7.2 mg/dL  51            Comprehensive metabolic panel [[id number]] (Abnormal)  Resulted: 05/16/17 0739, Result status: Final result    Ordering provider: Parmjit Gregory MD  05/15/17 2330 Resulting lab: [facility]    Specimen Information    Type Source Collected On   Blood  05/16/17 0643          Components       Value Reference Range Flag Lab   Sodium 141 133 - 144 mmol/L  51   Potassium 3.6 3.4 - 5.3 mmol/L  51   Chloride 110 94 - 109 mmol/L H 51   Carbon Dioxide 22 20 - 32 mmol/L  51   Anion Gap 10 3 - 14 mmol/L  51   Glucose 90 70 - 99 mg/dL  51   Urea Nitrogen 21 7 - 30 mg/dL  51   Creatinine 0.96 0.66 - 1.25 mg/dL  51   GFR Estimate 77 >60 mL/min/1.7m2  51   Comment:  Non  GFR Calc   GFR Estimate If Black -- >60 mL/min/1.7m2  51   Result:         >90   GFR Calc     Calcium 7.2 8.5 - 10.1 mg/dL L 51   Result:     Bilirubin Total 1.2 0.2 - 1.3 mg/dL  51   Albumin 2.5 3.4 - 5.0 g/dL L 51   Protein Total 7.1 6.8 - 8.8 g/dL  51   Alkaline Phosphatase 40 40 - 150 U/L  51   ALT 16 0 - 70 U/L  51   AST 10 0 - 45 U/L  51            INR [[id number]] (Abnormal)  Resulted: 05/16/17 0729, Result status: Final result    Ordering provider: Parmjit Gregory MD  05/15/17 2330 Resulting lab: [facility]    Specimen Information    Type Source Collected On   Blood  05/16/17 0643          Components       Value Reference Range Flag Lab   INR 1.16 0.86 - 1.14 H 51            Lactic acid level STAT [[id number]]  Resulted: 05/15/17 1707, Result status: Final result    Ordering provider: Parmjit Gregory MD  05/15/17 1611 Resulting lab:  [facility]    Specimen Information    Type Source Collected On   Blood  05/15/17 1656          Components       Value Reference Range Flag Lab   Lactic Acid 1.8 0.7 - 2.1 mmol/L  51            CBC with platelets differential [[id number]] (Abnormal)  Resulted: 05/15/17 1503, Result status: Edited Result - FINAL    Ordering provider: Parmjit Gregory MD  05/14/17 2330 Resulting lab: [facility]    Specimen Information    Type Source Collected On   Blood  05/15/17 0647          Components       Value Reference Range Flag Lab   WBC 3.4 4.0 - 11.0 10e9/L L 51   RBC Count 3.21 4.4 - 5.9 10e12/L L 51   Hemoglobin 9.8 13.3 - 17.7 g/dL L 51   Hematocrit 32.1 40.0 - 53.0 % L 51    78 - 100 fl  51   MCH 30.5 26.5 - 33.0 pg  51   MCHC 30.5 31.5 - 36.5 g/dL L 51   RDW 18.0 10.0 - 15.0 % H 51   Platelet Count 85 150 - 450 10e9/L L 51   Diff Method Manual Differential   51   % Neutrophils 75.3 %  51   % Lymphocytes 8.8 %  51   % Monocytes 8.8 %  51   % Eosinophils 0.0 %  51   % Basophils 0.0 %  51   % Plasma Cells 7.1 %  51   Comment:         Plasmacytoid lymphocytes. Favor neoplastic plasma cells. Perform flow   cytometry   on peripheral blood if clinically indicated. Discussed with Dr. Mays on   5.15.17 at 1153. JUJU Allen, DO     Nucleated RBCs 4 0 /100 H 51   Absolute Neutrophil 2.6 1.6 - 8.3 10e9/L  51   Absolute Lymphocytes 0.3 0.8 - 5.3 10e9/L L 51   Absolute Monocytes 0.3 0.0 - 1.3 10e9/L  51   Absolute Eosinophils 0.0 0.0 - 0.7 10e9/L  51   Absolute Basophils 0.0 0.0 - 0.2 10e9/L  51   Absolute Plasma Cells 0.2 0 10e9/L H 51   Absolute Nucleated RBC 0.1   51   Anisocytosis Slight   51   Poikilocytosis Slight   51   Ovalocytes Slight   51   Macrocytes Present   51   Platelet Estimate Confirming automated cell count   51            Uric acid [[id number]]  Resulted: 05/15/17 0734, Result status: Final result    Ordering provider: Parmjit Gregory MD   05/14/17 2330 Resulting lab: [facility]    Specimen Information    Type Source Collected On   Blood  05/15/17 0647          Components       Value Reference Range Flag Lab   Uric Acid 3.8 3.5 - 7.2 mg/dL  51            Comprehensive metabolic panel [[id number]] (Abnormal)  Resulted: 05/15/17 0734, Result status: Final result    Ordering provider: Parmjit Gregory MD  05/14/17 2330 Resulting lab: [facility]    Specimen Information    Type Source Collected On   Blood  05/15/17 0647          Components       Value Reference Range Flag Lab   Sodium 145 133 - 144 mmol/L H 51   Potassium 3.9 3.4 - 5.3 mmol/L  51   Chloride 114 94 - 109 mmol/L H 51   Carbon Dioxide 21 20 - 32 mmol/L  51   Anion Gap 10 3 - 14 mmol/L  51   Glucose 89 70 - 99 mg/dL  51   Urea Nitrogen 29 7 - 30 mg/dL  51   Creatinine 1.04 0.66 - 1.25 mg/dL  51   GFR Estimate 70 >60 mL/min/1.7m2  51   Comment:  Non  GFR Calc   GFR Estimate If Black 85 >60 mL/min/1.7m2  51   Comment:  African American GFR Calc   Calcium 7.6 8.5 - 10.1 mg/dL L 51   Bilirubin Total 0.8 0.2 - 1.3 mg/dL  51   Albumin 2.6 3.4 - 5.0 g/dL L 51   Protein Total 7.4 6.8 - 8.8 g/dL  51   Alkaline Phosphatase 41 40 - 150 U/L  51   ALT 20 0 - 70 U/L  51   AST 10 0 - 45 U/L  51            INR [[id number]] (Abnormal)  Resulted: 05/15/17 0722, Result status: Final result    Ordering provider: Parmjit Gregory MD  05/14/17 2330 Resulting lab: [facility]    Specimen Information    Type Source Collected On   Blood  05/15/17 0647          Components       Value Reference Range Flag Lab   INR 1.15 0.86 - 1.14 H 51            Lactic acid level STAT [[id number]]  Resulted: 05/14/17 0923, Result status: Final result    Ordering provider: Parmjit Gregory MD  05/14/17 0746 Resulting lab: [facility]    Specimen Information    Type Source Collected On   Blood   05/14/17 0904          Components       Value Reference Range Flag Lab   Lactic Acid 1.7 0.7 - 2.1 mmol/L  51            Blood culture [[id number]]  Resulted: 05/14/17 0706, Result status: Final result    Ordering provider: Nora Zurita MD  05/08/17 1052 Resulting lab: [facility]    Specimen Information    Type Source Collected On   Blood  05/08/17 1149          Components       Value Reference Range Flag Lab   Specimen Description Blood Left Arm   51   Culture Micro No growth   225   Micro Report Status FINAL 05/14/2017   225            Blood culture [[id number]]  Resulted: 05/14/17 0706, Result status: Final result    Ordering provider: Brittney Armstrong PA-C  05/08/17 0743 Resulting lab: [facility]    Specimen Information    Type Source Collected On   Blood venous blood 05/08/17 1032          Components       Value Reference Range Flag Lab   Specimen Description Blood VAD Collection   75   Culture Micro No growth   225   Micro Report Status FINAL 05/14/2017   225            Uric acid [[id number]] (Abnormal)  Resulted: 05/14/17 0656, Result status: Final result    Ordering provider: Parmjit Gregory MD  05/13/17 2330 Resulting lab: [facility]    Specimen Information    Type Source Collected On   Blood  05/14/17 0608          Components       Value Reference Range Flag Lab   Uric Acid 3.2 3.5 - 7.2 mg/dL L 51            Comprehensive metabolic panel [[id number]] (Abnormal)  Resulted: 05/14/17 0656, Result status: Final result    Ordering provider: Parmjit Gregory MD  05/13/17 2330 Resulting lab: [facility]    Specimen Information    Type Source Collected On   Blood  05/14/17 0608          Components       Value Reference Range Flag Lab   Sodium 142 133 - 144 mmol/L  51   Potassium 3.7 3.4 - 5.3 mmol/L  51   Chloride 111 94 - 109 mmol/L H 51   Carbon Dioxide 23 20 - 32 mmol/L  51   Anion Gap  8 3 - 14 mmol/L  51   Glucose 160 70 - 99 mg/dL H 51   Urea Nitrogen 33 7 - 30 mg/dL H 51   Creatinine 1.01 0.66 - 1.25 mg/dL  51   GFR Estimate 72 >60 mL/min/1.7m2  51   Comment:  Non  GFR Calc   GFR Estimate If Black 88 >60 mL/min/1.7m2  51   Comment:  African American GFR Calc   Calcium 7.3 8.5 - 10.1 mg/dL L 51   Bilirubin Total 0.3 0.2 - 1.3 mg/dL  51   Albumin 2.6 3.4 - 5.0 g/dL L 51   Protein Total 7.2 6.8 - 8.8 g/dL  51   Alkaline Phosphatase 42 40 - 150 U/L  51   ALT 19 0 - 70 U/L  51   AST 10 0 - 45 U/L  51            INR [[id number]] (Abnormal)  Resulted: 05/14/17 0652, Result status: Final result    Ordering provider: Parmjit Gregory MD  05/13/17 2330 Resulting lab: [facility]    Specimen Information    Type Source Collected On   Blood  05/14/17 0608          Components       Value Reference Range Flag Lab   INR 1.27 0.86 - 1.14 H 51            CBC with platelets differential [[id number]] (Abnormal)  Resulted: 05/14/17 0632, Result status: Final result    Ordering provider: Parmjit Gregory MD  05/13/17 2330 Resulting lab: [facility]    Specimen Information    Type Source Collected On   Blood  05/14/17 0608          Components       Value Reference Range Flag Lab   WBC 3.8 4.0 - 11.0 10e9/L L 51   RBC Count 2.78 4.4 - 5.9 10e12/L L 51   Hemoglobin 8.4 13.3 - 17.7 g/dL L 51   Hematocrit 27.7 40.0 - 53.0 % L 51    78 - 100 fl  51   MCH 30.2 26.5 - 33.0 pg  51   MCHC 30.3 31.5 - 36.5 g/dL L 51   RDW 18.1 10.0 - 15.0 % H 51   Platelet Count 105 150 - 450 10e9/L L 51   Diff Method Automated Method   51   % Neutrophils 83.7 %  51   % Lymphocytes 3.1 %  51   % Monocytes 12.6 %  51   % Eosinophils 0.0 %  51   % Basophils 0.3 %  51   % Immature Granulocytes 0.3 %  51   Nucleated RBCs 2 0 /100 H 51   Absolute Neutrophil 3.2 1.6 - 8.3 10e9/L  51   Absolute Lymphocytes 0.1 0.8 - 5.3 10e9/L L 51   Absolute Monocytes 0.5 0.0 - 1.3  10e9/L  51   Absolute Eosinophils 0.0 0.0 - 0.7 10e9/L  51   Absolute Basophils 0.0 0.0 - 0.2 10e9/L  51   Abs Immature Granulocytes 0.0 0 - 0.4 10e9/L  51   Absolute Nucleated RBC 0.1   51            Creatine Kinase Isoenzymes [[id number]]  Resulted: 05/13/17 1911, Result status: Final result    Ordering provider: Brittney Armstrong PA-C  05/11/17 1103 Resulting lab: [facility]    Specimen Information    Type Source Collected On   Blood  05/11/17 1115          Components       Value Reference Range Flag Lab   CK Total 151   51   Comment:         Reference range: 20 to 200  Unit: U/L  (Note)  REFERENCE INTERVAL: CK Total  Access complete set of age- and/or gender-specific  reference intervals for this test in the [facility]  Test Directory ([employer]     CK-   51   Comment:  Reference range: 96 to 100  Unit: %     CK-MB 0   51   Comment:  Reference range: 0 to 4  Unit: %     CK-BB 0   51   Comment:  Reference range: 0 to 0  Unit: %     CK Macro Type I 0   51   Comment:  Reference range: 0 to 0  Unit: %     CK Macro Type II 0   51   Comment:         Reference range: 0 to 0  Unit: %  (Note)  Performed by [facility],  [address] [place] [id number] [phone]  [email or web address] Fritz Bell MD, Lab. Director              Uric acid [[id number]] (Abnormal)  Resulted: 05/13/17 0816, Result status: Final result    Ordering provider: Parmjit Gregory MD  05/12/17 2330 Resulting lab: [facility]    Specimen Information    Type Source Collected On   Blood  05/13/17 0701          Components       Value Reference Range Flag Lab   Uric Acid 2.8 3.5 - 7.2 mg/dL L 51            Comprehensive metabolic panel [[id number]] (Abnormal)  Resulted: 05/13/17 0816, Result status: Final result    Ordering provider: Parmjit Gregory MD  05/12/17 2330 Resulting lab: [facility]    Specimen Information    Type Source Collected On    Blood  05/13/17 0701          Components       Value Reference Range Flag Lab   Sodium 145 133 - 144 mmol/L H 51   Potassium 3.7 3.4 - 5.3 mmol/L  51   Chloride 114 94 - 109 mmol/L H 51   Carbon Dioxide 21 20 - 32 mmol/L  51   Anion Gap 10 3 - 14 mmol/L  51   Glucose 112 70 - 99 mg/dL H 51   Urea Nitrogen 30 7 - 30 mg/dL  51   Creatinine 0.98 0.66 - 1.25 mg/dL  51   GFR Estimate 74 >60 mL/min/1.7m2  51   Comment:  Non  GFR Calc   GFR Estimate If Black -- >60 mL/min/1.7m2  51   Result:         >90   GFR Calc     Calcium 7.5 8.5 - 10.1 mg/dL L 51   Result:     Bilirubin Total 0.3 0.2 - 1.3 mg/dL  51   Albumin 2.5 3.4 - 5.0 g/dL L 51   Protein Total 6.7 6.8 - 8.8 g/dL L 51   Alkaline Phosphatase 36 40 - 150 U/L L 51   ALT 15 0 - 70 U/L  51   AST 8 0 - 45 U/L  51            Magnesium [[id number]]  Resulted: 05/13/17 0816, Result status: Final result    Ordering provider: Parmjit Gregory MD  05/12/17 2330 Resulting lab: [facility]    Specimen Information    Type Source Collected On   Blood  05/13/17 0701          Components       Value Reference Range Flag Lab   Magnesium 1.7 1.6 - 2.3 mg/dL  51            Phosphorus [[id number]] (Abnormal)  Resulted: 05/13/17 0816, Result status: Final result    Ordering provider: Parmjit Gregory MD  05/12/17 2330 Resulting lab: [facility]    Specimen Information    Type Source Collected On   Blood  05/13/17 0701          Components       Value Reference Range Flag Lab   Phosphorus 2.3 2.5 - 4.5 mg/dL L 51            INR [[id number]] (Abnormal)  Resulted: 05/13/17 0804, Result status: Final result    Ordering provider: Parmjit Gregory MD  05/12/17 2330 Resulting lab: [facility]    Specimen Information    Type Source Collected On   Blood  05/13/17 0701          Components       Value Reference Range Flag Lab   INR 1.35 0.86 - 1.14 H 51            CBC with  platelets differential [[id number]] (Abnormal)  Resulted: 05/13/17 0754, Result status: Final result    Ordering provider: Parmjit Gregory MD  05/12/17 2310 Resulting lab: [facility]    Specimen Information    Type Source Collected On   Blood  05/13/17 0701          Components       Value Reference Range Flag Lab   WBC 5.4 4.0 - 11.0 10e9/L  51   RBC Count 2.73 4.4 - 5.9 10e12/L L 51   Hemoglobin 8.3 13.3 - 17.7 g/dL L 51   Hematocrit 27.6 40.0 - 53.0 % L 51    78 - 100 fl H 51   MCH 30.4 26.5 - 33.0 pg  51   MCHC 30.1 31.5 - 36.5 g/dL L 51   RDW 18.5 10.0 - 15.0 % H 51   Platelet Count 138 150 - 450 10e9/L L 51   Diff Method Automated Method   51   % Neutrophils 89.6 %  51   % Lymphocytes 2.0 %  51   % Monocytes 7.6 %  51   % Eosinophils 0.0 %  51   % Basophils 0.4 %  51   % Immature Granulocytes 0.4 %  51   Nucleated RBCs 1 0 /100 H 51   Absolute Neutrophil 4.9 1.6 - 8.3 10e9/L  51   Absolute Lymphocytes 0.1 0.8 - 5.3 10e9/L L 51   Absolute Monocytes 0.4 0.0 - 1.3 10e9/L  51   Absolute Eosinophils 0.0 0.0 - 0.7 10e9/L  51   Absolute Basophils 0.0 0.0 - 0.2 10e9/L  51   Abs Immature Granulocytes 0.0 0 - 0.4 10e9/L  51   Absolute Nucleated RBC 0.1   51            Testing Performed By     Lab - Abbreviation Name Director Address Valid Date Range    51 - Unknown [facility] Unknown [address] [facility] [id number] 12/31/14 1010 - Present    75 - Unknown [facility] Unknown [address] [facility] [id number] 01/15/15 1019 - Present    225 - Unknown [facility] Unknown [address] [facility] [id number] 12/19/14 0954 - Present            Unresulted Labs (24h ago through future)    Start       Ordered    05/12/17 0530  Uric acid  AM DRAW,   Routine      05/11/17 1711 05/12/17 0530  INR  DAILY,   Routine      05/11/17 1711 05/12/17 0530  Comprehensive metabolic panel  DAILY,    Routine      05/11/17 1711    05/12/17 0530  CBC with platelets differential  DAILY,   Routine     Comments:  Last Lab Result: Hemoglobin (g/dL)       Date                     Value                 05/11/2017               8.7 (L)          ----------    05/11/17 1711    Unscheduled  ABO/Rh type and screen  CONDITIONAL (SPECIFY),   Routine     Comments:  Draw Routine IF not performed in last three days, then repeat every three days.    05/07/17 2144    Unscheduled  Blood culture  (Blood Culture - 2 Sites)  CONDITIONAL (SPECIFY),   Routine     Comments:  Site #1 - collected from venipuncture IF temp is greater than 100.4* F. May repeat max of once per 24 hrs.    05/07/17 2144    Unscheduled  Blood culture  (Blood Culture - 2 Sites)  CONDITIONAL (SPECIFY),   Routine     Comments:  Site # 2 - collected from VAD IF temp is greater than 100.4*F.  IF no VAD then 2 peripheral venipuncture  sticks from 2 different sites. May repeat max of once per 24 hrs.    05/07/17 2144    Unscheduled  Blood culture  (Blood Culture - 2 Sites)  CONDITIONAL (SPECIFY),   Routine     Comments:  Site #1 - IF 1 blood culture is collected for temp greater than 100.4*F, draw one blood culture 15 minutes after first blood culture collected.    05/07/17 2144    Unscheduled  Blood culture  (Blood Culture - 2 Sites)  CONDITIONAL (SPECIFY),   Routine     Comments:  Site #2 - IF 1 blood culture is collected for temp greater than 100.4*F, draw one blood culture 15 minutes after first blood culture collected.    05/07/17 2144    Unscheduled  Platelets prepare order unit conditional  (Conditional Platelet  Units)  CONDITIONAL (SPECIFY) BLOOD,   Routine     Comments:  For patients with significant heart failure: Recommend transfusing slowly using the 4 hour allowed time period, if clinically appropriate.  Do not change or add to this text.  Use additional instructions field.   Question Answer Comment   Number of Doses 1    When to transfuse a) Platelet  "count 10,000/uL or less    Special Requirements a) None    Transfusion duration per dose Infuse within 4 hours of release        05/07/17 2144    Unscheduled  Potassium  (Potassium Replacement - \"Standard\" - For K levels less than 3.4 mmol/L - UU,UR,UA,RH,SH,PH,WY )  CONDITIONAL (SPECIFY),   Routine     Comments:  Obtain Potassium Level for these conditions:  *IF no potassium result within 24 hours before initiation of order set, draw potassium level with next lab collect.    *2 HOURS AFTER last IV potassium replacement dose and 4 hours after an oral replacement dose.  *Next morning after potassium dose.     Repeat Potassium Replacement if necessary.    05/11/17 1711    Unscheduled  Red blood cell prepare order unit conditional  (Conditional Red Blood Cells Unit)  CONDITIONAL (SPECIFY) BLOOD,   Routine     Comments:  For patients with significant heart failure:  Recommend transfusing slowly using the 4 hour allowed time period, if clinically appropriate.  Do NOT change or add to this text.  Use additional instructions field.   Question Answer Comment   Irradiation Indication Past, current or scheduled stem cell transplant (BM, cord, bld)    Red Blood Cells: IRRADIATED    Number of Units 1    When to transfuse Hemoglobin is 7 g/dL or less (anemia)    Special Requirements None    Infusion Duration Infuse within 4 hours of release        05/14/17 1220    Signed and Held  INR  (INR and PTT Panel)  STAT,   Routine     Comments:  On admission    Signed and Held    Signed and Held  CBC with platelets  STAT,   Routine     Comments:  Last Lab Result: Hemoglobin (g/dL)       Date                     Value                 05/08/2017               10.0 (L)         ----------    Signed and Held      Encounter-Level Documents:     There are no encounter-level documents.      Order-Level Documents:     There are no order-level documents.      "

## 2017-05-07 NOTE — ED NOTES
Bed: ED22  Expected date: 5/7/17  Expected time: 6:20 PM  Means of arrival: Ambulance  Comments:  Aga 89  71 y M  [place] hx, on flight c/o upper back pain, refusing pain meds from EMS  [place]

## 2017-05-07 NOTE — LETTER
[facility] [employer] [facility]               Recipient:  Admissions           Sender:  Samantha Jones LG   Pager: [phone]          Date: May 16, 2017  Patient Name:  Anthony Carbone  Routing Message:  DC orders w/PT/OT. Thanks!  7D RN: [phone]          The documents accompanying this notice contain confidential information belonging to the sender.  This information is intended only for the use of the individual or entity named above.  The authorized recipient of this information is prohibited from disclosing this information to any other party and is required to destroy the information after its stated need has been fulfilled, unless otherwise required by state law.      If you are not the intended recipient, you are hereby notified that any disclosure, copying, distribution or action taken in reliance on the contents of these documents is strictly prohibited. If you have received this document in error, please notify [place] immediately at [phone].  You may return the document via fax ([phone]) or return mail  (Health Information Management, , [address] [place] [address]).

## 2017-05-07 NOTE — IP AVS SNAPSHOT
"    UNIT 7D [facility]: [phone]                                              INTERAGENCY TRANSFER FORM - PHYSICIAN ORDERS   2017                    Hospital Admission Date: 2017  WILLIAN ARCINIEGA   : 1945  Sex: Male        Attending Provider: Parmjit Gregory MD     Allergies:  Aspirin, Bactrim [Sulfamethoxazole W-trimethoprim]    Infection:  None   Service:  Hem/Onc    Ht:  1.597 m (5' 2.87\")   Wt:  59.1 kg (130 lb 3.2 oz)   Admission Wt:  58.2 kg (128 lb 6.4 oz)    BMI:  23.16 kg/m 2   BSA:  1.62 m 2            Patient PCP Information     Provider PCP Type    Sanket Burciaga [facility]      ED Clinical Impression     Diagnosis Description Comment Added By Time Added    Dehydration [E86.0] Dehydration [E86.0]  Jan Law MD 2017  7:59 PM    Acute bilateral low back pain without sciatica [M54.5] Acute bilateral low back pain without sciatica [M54.5]  Jan Law MD 2017  8:00 PM    Multiple myeloma in relapse (H) [C90.02] Multiple myeloma in relapse (H) [C90.02]  Jan Law MD 2017  8:00 PM    Delirium [R41.0] Delirium [R41.0]  Jan Law MD 2017  8:00 PM      Hospital Problems as of 2017              Priority Class Noted POA    Back pain Medium  2017 Yes      Non-Hospital Problems as of 2017              Priority Class Noted    Multiple myeloma (H)   2012    Hypertension   Unknown    Multiple myeloma (H)   2012    Neutropenic fever (H)   2013    Encounter for long-term current use of medication   2013    GERD (gastroesophageal reflux disease) Medium  2014    Pneumonia   2014    Syncope   2015    Syncope, unspecified syncope type   2015    Myeloma (H)   10/22/2015    Altered mental status Medium  2016    Neoplasm of uncertain behavior of skin Medium  2017    AK (actinic keratosis) Medium  2017      Code Status History     Date Active Date Inactive " Code Status Order ID Comments User Context    5/16/2017  1:12 PM  Full Code [id number]  Brittnye Armstrong PA-C Outpatient    5/7/2017  9:44 PM 5/16/2017  1:12 PM Full Code [id number]  Joanie Anderson MD Inpatient    4/14/2016  9:15 AM 5/7/2017  9:44 PM Full Code [id number]  Roxie Frey PA-C Outpatient    4/12/2016  9:03 PM 4/14/2016  9:15 AM Full Code [id number]  Joanie Anderson MD Inpatient    12/23/2014  9:32 AM 1/10/2015  6:37 PM DNR/DNI [id number]  Kamilla Dumont PA-C Inpatient    12/21/2014 10:03 PM 12/23/2014  9:32 AM Full Code [id number]  Colin Bundy MD Inpatient    1/18/2013  8:28 AM 1/21/2013  3:14 PM Full Code [id number]  Mary Limon, RENETTA Inpatient    1/17/2013 11:22 PM 1/18/2013  8:28 AM DNR/DNI [id number]  Letty Puente RN Inpatient    1/7/2013 12:25 PM 1/10/2013 11:32 AM Full Code [id number]  Lorena Pizarro, NP Inpatient    12/20/2012 12:41 PM 12/22/2012  4:36 PM Full Code [id number]  Leda Gibson PA Inpatient         Medication Review      START taking        Dose / Directions Comments    acetaminophen 500 MG tablet   Commonly known as:  TYLENOL   Used for:  Multiple myeloma in relapse (H)        Dose:  1000 mg   Take 2 tablets (1,000 mg) by mouth every 6 hours as needed for mild pain   Refills:  0        HYDROmorphone 2 MG tablet   Commonly known as:  DILAUDID   Used for:  Multiple myeloma in relapse (H)        Dose:  2 mg   Take 1 tablet (2 mg) by mouth every 4 hours as needed for moderate to severe pain   Refills:  0        lidocaine 5 % Patch   Commonly known as:  LIDODERM   Used for:  Multiple myeloma in relapse (H)        Dose:  3 patch   Place 3 patches onto the skin every 24 hours   Quantity:  30 patch   Refills:  0        LORazepam 0.5 MG tablet   Commonly known as:  ATIVAN   Used for:  Multiple myeloma in relapse (H), Delirium        Dose:  0.5 mg   Take 1 tablet (0.5 mg) by mouth every 4 hours as needed    Quantity:  30 tablet   Refills:  0        OLANZapine 5 MG tablet   Commonly known as:  zyPREXA   Used for:  Delirium, Multiple myeloma in relapse (H)        Dose:  5 mg   Take 1 tablet (5 mg) by mouth 2 times daily   Quantity:  60 tablet   Refills:  0        simethicone 80 MG chewable tablet   Commonly known as:  MYLICON   Used for:  Nausea        Dose:  160 mg   Take 2 tablets (160 mg) by mouth 4 times daily as needed for cramping or other (gas pain)   Quantity:  180 tablet   Refills:  0          CONTINUE these medications which may have CHANGED, or have new prescriptions. If we are uncertain of the size of tablets/capsules you have at home, strength may be listed as something that might have changed.        Dose / Directions Comments    acyclovir 400 MG tablet   Commonly known as:  ZOVIRAX   Indication:  Unsure of dose   This may have changed:  medication strength   Used for:  Multiple myeloma in relapse (H)        Dose:  400 mg   Take 1 tablet (400 mg) by mouth 2 times daily   Quantity:  60 tablet   Refills:  0        esomeprazole 40 MG CR capsule   Commonly known as:  nexIUM   This may have changed:  medication strength   Used for:  Gastroesophageal reflux disease without esophagitis        Dose:  40 mg   Take 1 capsule (40 mg) by mouth every morning (before breakfast)   Quantity:  30 capsule   Refills:  0        potassium chloride SA 20 MEQ CR tablet   Commonly known as:  K-DUR/KLOR-CON M   This may have changed:  See the new instructions.   Used for:  Hypokalemia        TAKE 1 TABLET BY MOUTH TWICE DAILY.   Quantity:  60 tablet   Refills:  0          CONTINUE these medications which have NOT CHANGED        Dose / Directions Comments    amLODIPine 5 MG tablet   Commonly known as:  NORVASC   Used for:  Essential hypertension        Take one tablet at bedtime.   Quantity:  30 tablet   Refills:  0        clopidogrel 75 MG tablet   Commonly known as:  PLAVIX   Used for:  History of stroke        Dose:  75 mg    Take 1 tablet (75 mg) by mouth daily   Quantity:  30 tablet   Refills:  0        midodrine 2.5 MG tablet   Commonly known as:  PROAMATINE   Used for:  Orthostatic hypotension, Orthostatic syncope        Dose:  2.5 mg   Take 1 tablet (2.5 mg) by mouth 3 times daily   Quantity:  90 tablet   Refills:  3        ondansetron 8 MG ODT tab   Commonly known as:  ZOFRAN-ODT   Used for:  Nausea        Dose:  8 mg   Take 1 tablet (8 mg) by mouth every 8 hours as needed for nausea   Quantity:  30 tablet   Refills:  3        simvastatin 5 MG tablet   Commonly known as:  ZOCOR   Used for:  Mixed hyperlipidemia        Dose:  20 mg   Take 4 tablets (20 mg) by mouth daily   Quantity:  30 tablet   Refills:  0        sucralfate 1 GM tablet   Commonly known as:  CARAFATE   Used for:  Gastroesophageal reflux disease without esophagitis        Dose:  1 g   Take 1 tablet (1 g) by mouth 4 times daily as needed   Quantity:  120 tablet   Refills:  1          STOP taking     dexamethasone 4 MG tablet   Commonly known as:  DECADRON           pomalidomide 2 MG capsule   Commonly known as:  POMALYST                   Summary of Visit     Reason for your hospital stay       You were hospitalized for pneumonia and dehydration as well as worsening multiple myeloma.             After Care     Activity - Up with nursing assistance           Advance Diet as Tolerated       Follow this diet upon discharge: Orders Placed This Encounter      Snacks/Supplements Adult: With Meals      Snacks/Supplements Adult: Magic Cup; Between Meals      Regular Diet Adult       Daily weights       Call Provider for weight gain of more than 2 pounds per day or 5 pounds per week.       Fall precautions           General info for SNF       Length of Stay Estimate: Short Term Care: Estimated # of Days <30  Condition at Discharge: Improving  Level of care:skilled   Rehabilitation Potential: Good  Admission H&P remains valid and up-to-date: Yes  Recent Chemotherapy: Date:  5/11 and 5/12                       Use Nursing Home Standing Orders: Yes       Intake and output       Every shift       Mantoux instructions       Give two-step Mantoux (PPD) Per Facility Policy Yes             Your next 10 appointments already scheduled     May 19, 2017  9:45 AM CDT   [facility] Draw with  [facility]   [facility] ([facility])    [address] [place] [address]  [address] [facility] [address]   [phone]            May 19, 2017 10:20 AM CDT   (Arrive by 10:05 AM)   Return Visit with Leanne Reddy PA-C   [facility] ([facility])    [address] [place] [address]  [address] [facility] [address]   [phone]            Jul 14, 2017  1:00 PM CDT   (Arrive by 12:45 PM)   RETURN ARRHYTHMIA with Dmitri Banks MD   [facility] ([facility])    [address] [place] [address]  3rd Floor  [facility] [address]   [phone]              Follow-Up Appointment Instructions     Future Labs/Procedures    Follow Up and recommended labs and tests     Comments:    Follow up as scheduled on Friday 5/19 with Leanne Reddy PA-C as scheduled.      Follow-Up Appointment Instructions     Follow Up and recommended labs and tests       Follow up as scheduled on Friday 5/19 with Leanne Reddy PA-C as scheduled.             Statement of Approval     Ordered          05/16/17 1319  I have reviewed and agree with all the recommendations and orders detailed in this document.  EFFECTIVE NOW     Approved and electronically signed by:  Brittney Armstrong PA-C

## 2017-05-07 NOTE — LETTER
Transition Communication Hand-off for Care Transitions to Next Level of Care Provider    Name: Anthony Carbone  MRN #: [id number]  Primary Care Provider: Sanket Burciaga     Primary Clinic: [facility] [address] [facility] [address]     Reason for Hospitalization:  Dehydration [E86.0]  Delirium [R41.0]  Multiple myeloma in relapse (H) [C90.02]  Acute bilateral low back pain without sciatica [M54.5]  Admit Date/Time: 5/7/2017  6:21 PM  Payor Source: Payor: MEDICA / Plan: MEDICA PRIME SOLUTION / Product Type: Indemnity /      Anticipated Discharge Date: 5/16/2017     Discharge Disposition:   TCU: [facility]  [address] [place] [address]  Fax: [phone]  Admissions: [phone]  TCU Fax: [phone]  TCU Adms.: [phone]  Main: [phone]     Additional Services/Equipment Arranged: Pt's wife will provide family transport today around 2pm.      Patient / Family response to discharge plan:  In agreement with DC today to TCU     Persons notified of above discharge plan: Hematology team, 7D RN staff, pt's wife (via phone) and [facility] TCU admissions     Discharge Needs Assessment:  Needs       Most Recent Value    Equipment Currently Used at Home other (see comments) [Pt and spouse living in 3 level home. Pt does not need to ac]    Transportation Available car, family or friend will provide        Follow-up plan:  Future Appointments  Date Time Provider Department Center   5/17/2017 6:00 AM Kelly Dawn, PT [facility]   5/19/2017 9:45 AM  [facility]   5/19/2017 10:20 AM Leanne Reddy PA-C [facility]   7/14/2017 1:00 PM Dmitri Banks MD [facility]       CLIFFORD Sheridan, MSW  7D  (Hem/Onc)  Phone: [phone]  5/16/2017      AVS/Discharge Summary is the source of truth; this is a helpful guide for improved communication of patient story

## 2017-05-07 NOTE — IP AVS SNAPSHOT
"` `     UNIT 7D [facility]: [phone]                                              INTERAGENCY TRANSFER FORM - NURSING   2017                    Hospital Admission Date: 2017  WILLIAN ARCINIEGA   : 1945  Sex: Male        Attending Provider: Parmjit Gregory MD     Allergies:  Aspirin, Bactrim [Sulfamethoxazole W-trimethoprim]    Infection:  None   Service:  Hem/Onc    Ht:  1.597 m (5' 2.87\")   Wt:  59.1 kg (130 lb 3.2 oz)   Admission Wt:  58.2 kg (128 lb 6.4 oz)    BMI:  23.16 kg/m 2   BSA:  1.62 m 2            Patient PCP Information     Provider PCP Type    Sanket Burciaga [facility]      Current Code Status     Date Active Code Status Order ID Comments User Context       Prior      Code Status History     Date Active Date Inactive Code Status Order ID Comments User Context    2017  1:12 PM  Full Code [id number]  Brittney Armstrong PA-C Outpatient    2017  9:44 PM 2017  1:12 PM Full Code [id number]  Joanie Anderson MD Inpatient    2016  9:15 AM 2017  9:44 PM Full Code [id number]  Roxie Frey PA-C Outpatient    2016  9:03 PM 2016  9:15 AM Full Code [id number]  Joanie Anderson MD Inpatient    2014  9:32 AM 1/10/2015  6:37 PM DNR/DNI [id number]  Kamilla Dumont PA-C Inpatient    2014 10:03 PM 2014  9:32 AM Full Code [id number]  Colin Bundy MD Inpatient    2013  8:28 AM 2013  3:14 PM Full Code [id number]  Mary Limon NP Inpatient    2013 11:22 PM 2013  8:28 AM DNR/DNI [id number]  Letty Puente RN Inpatient    2013 12:25 PM 1/10/2013 11:32 AM Full Code [id number]  Lorena Pizarro NP Inpatient    2012 12:41 PM 2012  4:36 PM Full Code [id number]  Leda Gibson PA Inpatient      Advance Directives        Does patient have a scanned Advance Directive/ACP document in EPIC?           No        Hospital Problems as of 2017        "       Priority Class Noted POA    Back pain Medium  5/7/2017 Yes      Non-Hospital Problems as of 5/16/2017              Priority Class Noted    Multiple myeloma (H)   8/28/2012    Hypertension   Unknown    Multiple myeloma (H)   12/20/2012    Neutropenic fever (H)   1/17/2013    Encounter for long-term current use of medication   1/22/2013    GERD (gastroesophageal reflux disease) Medium  12/8/2014    Pneumonia   12/21/2014    Syncope   7/6/2015    Syncope, unspecified syncope type   7/8/2015    Myeloma (H)   10/22/2015    Altered mental status Medium  4/12/2016    Neoplasm of uncertain behavior of skin Medium  2/6/2017    AK (actinic keratosis) Medium  2/6/2017      Immunizations     Name Date      HIB 01/07/14     Hepatitis B 01/07/14     Influenza (High Dose) 3 valent vaccine 10/05/16     Influenza (High Dose) 3 valent vaccine 11/19/15     Influenza (High Dose) 3 valent vaccine 10/08/14     Influenza (IIV3) 10/01/12     Pneumococcal (PCV 13) 01/07/14     Poliovirus, inactivated (IPV) 01/07/14     TDAP Vaccine (Boostrix) 01/07/14          END      ASSESSMENT     Discharge Profile Flowsheet     DISCHARGE NEEDS ASSESSMENT     Patient's communication style  spoken language (English or Bilingual) 05/07/17 2204    Equipment Currently Used at Home  other (see comments) (Pt and spouse living in 3 level home. Pt does not need to ac) 05/11/17 1558   SKIN      Transportation Available  car;family or friend will provide 05/11/17 1558   Inspection  Full 05/16/17 0908    Equipment Used at Home  none 12/27/14 1529   Skin WDL  ex 05/15/17 1614    GASTROINTESTINAL (ADULT,PEDIATRIC,OB)     Skin Color/Characteristics  bruised (ecchymotic) 05/16/17 0908    GI WDL  ex 05/16/17 0908   Skin Temperature  warm 05/16/17 0908    Abdominal Appearance  distended 05/16/17 0908   Skin Moisture  dry 05/16/17 0908    All Quadrants Bowel Sounds  audible and active in all quadrants 05/16/17 0908   Skin Integrity  bruise(s);abrasion(s) 05/16/17  "0908    All Quadrants Palpation  tender 05/15/17 1309   Additional Documentation  Wound (LDA) 05/13/17 1747    Last Bowel Movement  05/14/17 05/15/17 1309   SAFETY      GI Signs/Symptoms  abdominal discomfort;abdominal pain 05/16/17 0908   Safety WDL  WDL 05/16/17 0908    COMMUNICATION ASSESSMENT     Safety Factors  ID band on;upper side rails raised x 2;call light in reach;wheels locked;bed in low position 05/16/17 0908                 Assessment WDL (Within Defined Limits) Definitions           Safety WDL     Effective: 09/28/15    Row Information: <b>WDL Definition:</b> Bed in low position, wheels locked; call light in reach; upper side rails up x 2; ID band on<br> <font color=\"gray\"><i>Item=AS safety wdl>>List=AS safety wdl>>Version=F14</i></font>      Skin WDL     Effective: 09/28/15    Row Information: <b>WDL Definition:</b> Warm; dry; intact; elastic; without discoloration; pressure points without redness<br> <font color=\"gray\"><i>Item=AS skin wdl>>List=AS skin wdl>>Version=F14</i></font>      Vitals     Vital Signs Flowsheet     VITAL SIGNS     Functioning  can do most things, but pain gets in the way of some 05/16/17 1241    Temp  98.4  F (36.9  C) 05/16/17 1112   Sleep  awake with occasional pain 05/16/17 1241    Temp src  Oral 05/16/17 1112   ANALGESIA SIDE EFFECTS MONITORING      Resp  16 05/16/17 1112   Side Effects Monitoring: Respiratory Quality  R 05/16/17 1241    Pulse  95 05/12/17 0934   Side Effects Monitoring: Respiratory Depth  N 05/16/17 1241    Heart Rate  77 05/16/17 1112   Side Effects Monitoring: Sedation Level  1 05/16/17 1241    Pulse/Heart Rate Source  Monitor 05/16/17 1112   HEIGHT AND WEIGHT      BP  101/55 05/16/17 1146   Height Method  Actual 05/07/17 1828    BP Location  Right arm 05/16/17 1146   Weight  59.1 kg (130 lb 3.2 oz) 05/16/17 0902    OXYGEN THERAPY     POSITIONING      SpO2  97 % 05/16/17 1112   Body Position  independently positioning 05/16/17 0908    O2 Device  None " (Room air) 05/16/17 1112   Head of Bed (HOB)  HOB at 30-45 degrees 05/16/17 0908    PAIN/COMFORT     Chair  Upright in chair 05/15/17 1614    Patient Currently in Pain  yes 05/16/17 1241   Positioning/Transfer Devices  pillows 05/13/17 1747    Preferred Pain Scale  CAPA (Clinically Aligned Pain Assessment) ([facility] Adults Only) 05/16/17 1241   DAILY CARE      Pain Location  Back 05/16/17 1241   Activity Type  activity adjusted per tolerance;activity encouraged 05/16/17 0908    Pain Orientation  Right;Left;Lower 05/16/17 0859   Activity Level of Assistance  assistance, stand-by 05/16/17 0908    Pain Descriptors  Aching 05/16/17 1241   Activity Assistive Device  walker 05/15/17 1309    Pain Management Interventions  analgesia administered 05/16/17 1241   KARLEE COMA SCALE      Pain Intervention(s)  Medication (See eMAR) 05/16/17 1241   Best Eye Response  4-->(E4) spontaneous 05/12/17 2207    Response to Interventions  Decrease in pain 05/16/17 0152   Best Motor Response  6-->(M6) obeys commands 05/12/17 2207    CLINICALLY ALIGNED PAIN ASSESSMENT (CAPA) ([facility] ADULTS ONLY)     Best Verbal Response  4-->(V4) confused 05/12/17 2207    Comfort  tolerable with discomfort 05/16/17 1241   Karlee Coma Scale Score  14 05/12/17 2207    Change in Pain  about the same 05/16/17 1241   CHEMO VALIDATION      Pain Control  partially effective 05/16/17 1241   Chemo order double check RN 1  chemo 05/11/17 1702            Patient Lines/Drains/Airways Status    Active LINES/DRAINS/AIRWAYS     Name: Placement date: Placement time: Site: Days: Last dressing change:    Wound 05/07/17 Elbow Abrasion(s);Skin tear 05/07/17   2213   Elbow   8             Patient Lines/Drains/Airways Status    Active PICC/CVC     Name: Placement date: Placement time: Site: Days: Additional Info Last dressing change:    Port A Cath Single 05/08/17 Right Chest wall 05/08/17   1100   Chest wall   8 Orientation: Right             Power Port: Yes            Line Flushed (See MAR): Yes               Intake/Output Detail Report     Date Intake     Output Net    Shift P.O. I.V. IV Piggyback Total Urine Total       Day 05/15/17 0000 - 05/15/17 0659 120 20 -- 140 -- -- 140    María 05/15/17 0700 - 05/15/17 1459 360 -- -- 360 -- -- 360    Noc 05/15/17 1500 - 05/15/17 2359 -- 20 -- 20 350 350 -330    Day 05/16/17 0000 - 05/16/17 0659 -- 30 -- 30 300 300 -270    María 05/16/17 0700 - 05/16/17 1459 120 -- -- 120 -- -- 120      Last Void/BM       Most Recent Value    Urine Occurrence 1 at 05/16/2017 0902    Stool Occurrence 1 at 05/16/2017 0907      Case Management/Discharge Planning     Case Management/Discharge Planning Flowsheet     REFERRAL INFORMATION     Equipment Currently Used at Home  other (see comments) (Pt and spouse living in 3 level home. Pt does not need to ac) 05/11/17 1558    Arrived From  emergency department 01/17/13 2312   / CAREGIVER      LIVING ENVIRONMENT     Filed Complexity Screen Score  5 05/08/17 0858    Lives With  spouse 05/11/17 1558   ABUSE RISK SCREEN      Living Arrangements  house 05/11/17 1558   QUESTION TO PATIENT:  Has a member of your family or a partner(now or in the past) intimidated, hurt, manipulated, or controlled you in any way?  no 05/07/17 1823    Provides Primary Care For  no one 01/08/13 1420   QUESTION TO PATIENT: Do you feel safe going back to the place where you are living?  yes 05/07/17 1823    COPING/STRESS     OBSERVATION: Is there reason to believe there has been maltreatment of a vulnerable adult (ie. Physical/Sexual/Emotional abuse, self neglect, lack of adequate food, shelter, medical care, or financial exploitation)?  no 05/07/17 1823    Major Change/Loss/Stressor  hospitalization 05/07/17 2204   (R) MENTAL HEALTH SUICIDE RISK      DISCHARGE PLANNING     Are you depressed or being treated for depression?  No 05/07/17 2204    Transportation Available  car;family or friend will provide 05/11/17  1558   HOMICIDE RISK      Equipment Used at Home  none 12/27/14 1529   Homicidal Ideation  no 05/07/17 1823    FINAL RESOURCES

## 2017-05-07 NOTE — IP AVS SNAPSHOT
` ` Patient Information     Patient Name Sex     Anthony Arciniega ([id number]) Male 1945       Room Bed    7517 7517-02      Patient Demographics     Address Phone E-mail Address    [address] [place] [id number] [phone] (Home)  [phone] (Mobile) *Preferred* [email or web address]      Patient Ethnicity & Race     Ethnic Group Patient Race    Not  or  White      Emergency Contact(s)     Name Relation Home Work Mobile    RORO ARCINIEGA Spouse [phone]      Documents on File        Status Date Received Description       Documents for the Patient    Privacy Notice - [place] Received 12     Insurance Card Received 13     External Medication Information Consent       Patient ID Received 13     Consent for Services - Hospital/Clinic  ()      Consent for Services - Hospital/Clinic Received () 12     Consent for Services - Hospital/Clinic  () 12 CONSENT FOR SERVICE    Privacy Notice - [place]  12 ACKNOWLEDGMENT OF RECEIPT OF NOTICE OF PRIVACY PRACTICE    Consent to Communicate Received () 12     Consent to Communicate  () 12 AUTHORIZATION TO DISCUSS PROTECTED HEALTH INFORMATION    Consent for Services - [facility] Received 13 [facility]ent [facility]    Insurance Card       Insurance Card       Insurance Card       Advance Directives and Living Will Received 01/10/13 VALIDATION OF AD 13    Advance Directives and Living Will Received 01/10/13 HEALTHCARE DIRECTIVE 13    Research  13 ADULT CONSENT (CETI)(HCT)    Research  13 HIPAA AUTHORIZATION    Research  13 HIPAA AUTHORIZATION    Research  13 CONSENT TO PARTICIPATE IN RESEARCH BLOOD AND MARROW TRANSPLANT PROGRAM    Research  13 HIPAA AUTHORIZATION    Research  13 ADULT CONSENT (NMDP) & (CIBMTR)    HIM HANH Authorization - File Only  13 AUTHORIZATION FOR RELEASE OF PROTECTED  HEALTH INFORMATION    Consent for Services - [facility]  13     Consent for EHR Access  13 Copied from existing Consent for services - C/HOD collected on 2012    [facility] Specified Other       Consent for Services - Hospital/Clinic Received () 13     Consent for Services - Hospital/Clinic Received () 14     HIM HANH Authorization  10/31/13 [facility]    Consent for Services - Hospital/Clinic  () 14 CONSENT FOR SERVICE    Consent to Communicate  09/10/14 AUTHORIZATION TO DISCUSS PROTECTED HEALTH INFORMATION    HIM HANH Authorization  10/07/14     Consent for Services - Hospital/Clinic  () 01/13/15 CONSENT FOR SERVICE    Business/Insurance/Care Coordination/Health Form - Patient  03/23/15 NOTICE OF APPROVAL OF MEDICARE PRESCRIPTION DRUG COVERAGE    Business/Insurance/Care Coordination/Health Form - Patient  04/10/15 REQUEST FOR MEDICARE PRESCRIPTION DRUG COVERAGE DETERMINATION    Business/Insurance/Care Coordination/Health Form - Patient  04/10/15 NOTICE OF APPROVAL FOR MEDICARE PRESCRIPTION DRUG COVERAGE    Business/Insurance/Care Coordination/Health Form - Patient  04/14/15 NOTICE OF APPROVAL OF PRESCRIPTION DRUG COVERAGE    Business/Insurance/Care Coordination/Health Form - Patient  05/27/15 PRIOR AUTHORIZATION    Consent for Services - Hospital/Clinic Received () 07/16/15     Consent for Services - Hospital/Clinic  () 07/16/15 CONSENT FOR SERVICE    Business/Insurance/Care Coordination/Health Form - Patient  10/02/15 APPROVAL FOR POMALYST 8/27/15 - 16    HIM HANH Authorization  12/15/15 [facility]    Business/Insurance/Care Coordination/Health Form - Patient  16 CELGENE, REVLIMID PATIENT-PHYSICIAN AGREEMENT FORM, 2014    Consent for Services/Privacy Notice - Hospital/Clinic Received 17     Consent for Services/Privacy Notice - Hospital/Clinic-Esign Received () 16      Business/Insurance/Care Coordination/Health Form - Patient  08/02/16 PATIENT FINANCIAL ASSISTANCE    Business/Insurance/Care Coordination/Health Form - Patient   Kyprolis- Drug Replacement Program Denial       Documents for the Encounter    CMS IM for Patient Signature Received 05/07/17     EMS/Ambulance Record  05/10/17 [facility]      Admission Information     Attending Provider Admitting Provider Admission Type Admission Date/Time    Parmjit Gregory MD Barjes Alrawi, Ezzideen A, MD Emergency 05/07/17  1821    Discharge Date Hospital Service Auth/Cert Status Service Area     Hem/Onc Incomplete [facility]    Unit Room/Bed Admission Status       UU U7D 7517/7517-02 Admission (Confirmed)       Admission     Complaint    Back pain      Hospital Account     Name Acct ID Class Status Primary Coverage    Anthony Carbone [id number] Inpatient Open MEDICARE - MEDICARE FOR HB SUPPLEMENT            Guarantor Account (for Hospital Account #[id number])     Name Relation to Pt Service Area Active? Acct Type    Anthony Carbone Self FCS Yes Personal/Family    Address Phone          [address] [place] [address] [phone](H)              Coverage Information (for Hospital Account #[id number])     1. MEDICARE/MEDICARE FOR HB SUPPLEMENT     F/O Payor/Plan Precert #    MEDICARE/MEDICARE FOR HB SUPPLEMENT     Subscriber Subscriber #    Anthony Carbone [id number]    Address Phone    [employer] CLAIM[employer]  [address]  [place] [address] [phone]          2. MEDICA/MEDICA PRIME SOLUTION     F/O Payor/Plan Precert #    MEDICA/MEDICA PRIME SOLUTION     Subscriber Subscriber #    Anthony Carbone [id number]    Address Phone    [address] [address]  [place] [address] [phone]

## 2017-05-07 NOTE — IP AVS SNAPSHOT
[address] [address]    Phone:  [phone]                                       After Visit Summary   5/7/2017    Anthony Carbone    MRN: [id number]           After Visit Summary Signature Page     I have received my discharge instructions, and my questions have been answered. I have discussed any challenges I see with this plan with the nurse or doctor.    ..........................................................................................................................................  Patient/Patient Representative Signature      ..........................................................................................................................................  Patient Representative Print Name and Relationship to Patient    ..................................................               ................................................  Date                                            Time    ..........................................................................................................................................  Reviewed by Signature/Title    ...................................................              ..............................................  Date                                                            Time

## 2017-05-08 ENCOUNTER — APPOINTMENT (OUTPATIENT)
Dept: GENERAL RADIOLOGY | Facility: CLINIC | Age: 72
DRG: 840 | End: 2017-05-08
Attending: PHYSICIAN ASSISTANT
Payer: MEDICARE

## 2017-05-08 LAB
ALBUMIN UR-MCNC: 10 MG/DL
AMMONIA PLAS-SCNC: 15 UMOL/L (ref 10–50)
ANION GAP SERPL CALCULATED.3IONS-SCNC: 10 MMOL/L (ref 3–14)
APPEARANCE UR: CLEAR
APTT PPP: 31 SEC (ref 22–37)
BASOPHILS # BLD AUTO: 0.1 10E9/L (ref 0–0.2)
BASOPHILS NFR BLD AUTO: 2.6 %
BILIRUB UR QL STRIP: NEGATIVE
BUN SERPL-MCNC: 47 MG/DL (ref 7–30)
CALCIUM SERPL-MCNC: 10.2 MG/DL (ref 8.5–10.1)
CHLORIDE SERPL-SCNC: 106 MMOL/L (ref 94–109)
CO2 SERPL-SCNC: 23 MMOL/L (ref 20–32)
COLOR UR AUTO: YELLOW
CREAT SERPL-MCNC: 1.44 MG/DL (ref 0.66–1.25)
DIFFERENTIAL METHOD BLD: ABNORMAL
EOSINOPHIL # BLD AUTO: 0 10E9/L (ref 0–0.7)
EOSINOPHIL NFR BLD AUTO: 0 %
ERYTHROCYTE [DISTWIDTH] IN BLOOD BY AUTOMATED COUNT: 17.7 % (ref 10–15)
FIBRINOGEN PPP-MCNC: 392 MG/DL (ref 200–420)
GFR SERPL CREATININE-BSD FRML MDRD: 48 ML/MIN/1.7M2
GLUCOSE SERPL-MCNC: 128 MG/DL (ref 70–99)
GLUCOSE UR STRIP-MCNC: NEGATIVE MG/DL
HCT VFR BLD AUTO: 31.6 % (ref 40–53)
HGB BLD-MCNC: 10 G/DL (ref 13.3–17.7)
HGB UR QL STRIP: NEGATIVE
HYALINE CASTS #/AREA URNS LPF: 1 /LPF (ref 0–2)
IMM GRANULOCYTES # BLD: 0 10E9/L (ref 0–0.4)
IMM GRANULOCYTES NFR BLD: 0.3 %
INR PPP: 1.14 (ref 0.86–1.14)
KAPPA LC UR-MCNC: ABNORMAL MG/DL (ref 0.33–1.94)
KAPPA LC/LAMBDA SER: ABNORMAL {RATIO} (ref 0.26–1.65)
KETONES UR STRIP-MCNC: 10 MG/DL
LAMBDA LC SERPL-MCNC: 31 MG/DL (ref 0.57–2.63)
LEUKOCYTE ESTERASE UR QL STRIP: NEGATIVE
LYMPHOCYTES # BLD AUTO: 0.3 10E9/L (ref 0.8–5.3)
LYMPHOCYTES NFR BLD AUTO: 8.9 %
MAGNESIUM SERPL-MCNC: 2 MG/DL (ref 1.6–2.3)
MCH RBC QN AUTO: 30.5 PG (ref 26.5–33)
MCHC RBC AUTO-ENTMCNC: 31.6 G/DL (ref 31.5–36.5)
MCV RBC AUTO: 96 FL (ref 78–100)
MONOCYTES # BLD AUTO: 0.5 10E9/L (ref 0–1.3)
MONOCYTES NFR BLD AUTO: 16.1 %
MUCOUS THREADS #/AREA URNS LPF: PRESENT /LPF
NEUTROPHILS # BLD AUTO: 2.2 10E9/L (ref 1.6–8.3)
NEUTROPHILS NFR BLD AUTO: 72.1 %
NITRATE UR QL: NEGATIVE
NRBC # BLD AUTO: 0 10*3/UL
NRBC BLD AUTO-RTO: 0 /100
PH UR STRIP: 5 PH (ref 5–7)
PHOSPHATE SERPL-MCNC: 3.8 MG/DL (ref 2.5–4.5)
PLATELET # BLD AUTO: 195 10E9/L (ref 150–450)
POTASSIUM SERPL-SCNC: 3.9 MMOL/L (ref 3.4–5.3)
RBC # BLD AUTO: 3.28 10E12/L (ref 4.4–5.9)
RBC #/AREA URNS AUTO: 1 /HPF (ref 0–2)
SODIUM SERPL-SCNC: 139 MMOL/L (ref 133–144)
SP GR UR STRIP: 1.01 (ref 1–1.03)
TSH SERPL DL<=0.05 MIU/L-ACNC: 1.05 MU/L (ref 0.4–4)
URATE SERPL-MCNC: 8.3 MG/DL (ref 3.5–7.2)
URN SPEC COLLECT METH UR: ABNORMAL
UROBILINOGEN UR STRIP-MCNC: NORMAL MG/DL (ref 0–2)
WBC # BLD AUTO: 3 10E9/L (ref 4–11)
WBC #/AREA URNS AUTO: 1 /HPF (ref 0–2)

## 2017-05-08 PROCEDURE — 87086 URINE CULTURE/COLONY COUNT: CPT | Performed by: PHYSICIAN ASSISTANT

## 2017-05-08 PROCEDURE — 87633 RESP VIRUS 12-25 TARGETS: CPT | Performed by: PHYSICIAN ASSISTANT

## 2017-05-08 PROCEDURE — 84443 ASSAY THYROID STIM HORMONE: CPT | Performed by: INTERNAL MEDICINE

## 2017-05-08 PROCEDURE — 83735 ASSAY OF MAGNESIUM: CPT | Performed by: INTERNAL MEDICINE

## 2017-05-08 PROCEDURE — 25000128 H RX IP 250 OP 636: Performed by: STUDENT IN AN ORGANIZED HEALTH CARE EDUCATION/TRAINING PROGRAM

## 2017-05-08 PROCEDURE — 25000128 H RX IP 250 OP 636: Performed by: PHYSICIAN ASSISTANT

## 2017-05-08 PROCEDURE — 82784 ASSAY IGA/IGD/IGG/IGM EACH: CPT | Performed by: INTERNAL MEDICINE

## 2017-05-08 PROCEDURE — 85610 PROTHROMBIN TIME: CPT | Performed by: INTERNAL MEDICINE

## 2017-05-08 PROCEDURE — 84100 ASSAY OF PHOSPHORUS: CPT | Performed by: INTERNAL MEDICINE

## 2017-05-08 PROCEDURE — 20000002 ZZH R&B BMT INTERMEDIATE

## 2017-05-08 PROCEDURE — A9270 NON-COVERED ITEM OR SERVICE: HCPCS | Mod: GY | Performed by: INTERNAL MEDICINE

## 2017-05-08 PROCEDURE — 85384 FIBRINOGEN ACTIVITY: CPT | Performed by: INTERNAL MEDICINE

## 2017-05-08 PROCEDURE — 87040 BLOOD CULTURE FOR BACTERIA: CPT | Performed by: INTERNAL MEDICINE

## 2017-05-08 PROCEDURE — A9270 NON-COVERED ITEM OR SERVICE: HCPCS | Mod: GY | Performed by: PHYSICIAN ASSISTANT

## 2017-05-08 PROCEDURE — 82140 ASSAY OF AMMONIA: CPT | Performed by: INTERNAL MEDICINE

## 2017-05-08 PROCEDURE — 93010 ELECTROCARDIOGRAM REPORT: CPT | Performed by: INTERNAL MEDICINE

## 2017-05-08 PROCEDURE — 25000132 ZZH RX MED GY IP 250 OP 250 PS 637: Mod: GY | Performed by: PHYSICIAN ASSISTANT

## 2017-05-08 PROCEDURE — 36415 COLL VENOUS BLD VENIPUNCTURE: CPT | Performed by: INTERNAL MEDICINE

## 2017-05-08 PROCEDURE — 83883 ASSAY NEPHELOMETRY NOT SPEC: CPT | Performed by: INTERNAL MEDICINE

## 2017-05-08 PROCEDURE — 85730 THROMBOPLASTIN TIME PARTIAL: CPT | Performed by: INTERNAL MEDICINE

## 2017-05-08 PROCEDURE — 00000402 ZZHCL STATISTIC TOTAL PROTEIN: Performed by: INTERNAL MEDICINE

## 2017-05-08 PROCEDURE — 85025 COMPLETE CBC W/AUTO DIFF WBC: CPT | Performed by: INTERNAL MEDICINE

## 2017-05-08 PROCEDURE — 86334 IMMUNOFIX E-PHORESIS SERUM: CPT | Performed by: INTERNAL MEDICINE

## 2017-05-08 PROCEDURE — 84550 ASSAY OF BLOOD/URIC ACID: CPT | Performed by: INTERNAL MEDICINE

## 2017-05-08 PROCEDURE — 25000132 ZZH RX MED GY IP 250 OP 250 PS 637: Mod: GY | Performed by: INTERNAL MEDICINE

## 2017-05-08 PROCEDURE — 71010 XR CHEST PORT 1 VW: CPT

## 2017-05-08 PROCEDURE — 84165 PROTEIN E-PHORESIS SERUM: CPT | Performed by: INTERNAL MEDICINE

## 2017-05-08 PROCEDURE — 85810 BLOOD VISCOSITY EXAMINATION: CPT | Performed by: INTERNAL MEDICINE

## 2017-05-08 PROCEDURE — 81001 URINALYSIS AUTO W/SCOPE: CPT | Performed by: INTERNAL MEDICINE

## 2017-05-08 PROCEDURE — 93005 ELECTROCARDIOGRAM TRACING: CPT

## 2017-05-08 PROCEDURE — 87040 BLOOD CULTURE FOR BACTERIA: CPT | Performed by: PHYSICIAN ASSISTANT

## 2017-05-08 PROCEDURE — 80048 BASIC METABOLIC PNL TOTAL CA: CPT | Performed by: INTERNAL MEDICINE

## 2017-05-08 RX ORDER — HYDROMORPHONE HYDROCHLORIDE 1 MG/ML
.3-.5 INJECTION, SOLUTION INTRAMUSCULAR; INTRAVENOUS; SUBCUTANEOUS EVERY 4 HOURS PRN
Status: DISCONTINUED | OUTPATIENT
Start: 2017-05-08 | End: 2017-05-10

## 2017-05-08 RX ORDER — METHYLPREDNISOLONE SODIUM SUCCINATE 125 MG/2ML
100 INJECTION, POWDER, LYOPHILIZED, FOR SOLUTION INTRAMUSCULAR; INTRAVENOUS ONCE
Status: COMPLETED | OUTPATIENT
Start: 2017-05-09 | End: 2017-05-09

## 2017-05-08 RX ORDER — HALOPERIDOL 5 MG/ML
2 INJECTION INTRAMUSCULAR ONCE
Status: COMPLETED | OUTPATIENT
Start: 2017-05-08 | End: 2017-05-08

## 2017-05-08 RX ORDER — HALOPERIDOL 5 MG/ML
2 INJECTION INTRAMUSCULAR EVERY 6 HOURS PRN
Status: DISCONTINUED | OUTPATIENT
Start: 2017-05-08 | End: 2017-05-08

## 2017-05-08 RX ORDER — ACETAMINOPHEN 500 MG
1000 TABLET ORAL EVERY 6 HOURS PRN
Status: DISCONTINUED | OUTPATIENT
Start: 2017-05-08 | End: 2017-05-16 | Stop reason: HOSPADM

## 2017-05-08 RX ORDER — OLANZAPINE 5 MG/1
5 TABLET, ORALLY DISINTEGRATING ORAL 2 TIMES DAILY
Status: DISCONTINUED | OUTPATIENT
Start: 2017-05-08 | End: 2017-05-10

## 2017-05-08 RX ORDER — HALOPERIDOL 5 MG/ML
5 INJECTION INTRAMUSCULAR EVERY 4 HOURS
Status: DISCONTINUED | OUTPATIENT
Start: 2017-05-08 | End: 2017-05-08

## 2017-05-08 RX ORDER — METHYLPREDNISOLONE SODIUM SUCCINATE 125 MG/2ML
100 INJECTION, POWDER, LYOPHILIZED, FOR SOLUTION INTRAMUSCULAR; INTRAVENOUS ONCE
Status: DISCONTINUED | OUTPATIENT
Start: 2017-05-09 | End: 2017-05-08

## 2017-05-08 RX ORDER — HALOPERIDOL 5 MG/ML
5 INJECTION INTRAMUSCULAR EVERY 4 HOURS PRN
Status: DISCONTINUED | OUTPATIENT
Start: 2017-05-08 | End: 2017-05-16 | Stop reason: HOSPADM

## 2017-05-08 RX ORDER — PIPERACILLIN SODIUM, TAZOBACTAM SODIUM 2; .25 G/10ML; G/10ML
2.25 INJECTION, POWDER, LYOPHILIZED, FOR SOLUTION INTRAVENOUS EVERY 6 HOURS
Status: DISCONTINUED | OUTPATIENT
Start: 2017-05-08 | End: 2017-05-09

## 2017-05-08 RX ORDER — SODIUM CHLORIDE 9 MG/ML
INJECTION, SOLUTION INTRAVENOUS CONTINUOUS
Status: DISCONTINUED | OUTPATIENT
Start: 2017-05-08 | End: 2017-05-11

## 2017-05-08 RX ORDER — HALOPERIDOL 5 MG/ML
5 INJECTION INTRAMUSCULAR ONCE
Status: COMPLETED | OUTPATIENT
Start: 2017-05-08 | End: 2017-05-08

## 2017-05-08 RX ORDER — METHYLPREDNISOLONE SODIUM SUCCINATE 125 MG/2ML
100 INJECTION, POWDER, LYOPHILIZED, FOR SOLUTION INTRAMUSCULAR; INTRAVENOUS ONCE
Status: COMPLETED | OUTPATIENT
Start: 2017-05-08 | End: 2017-05-08

## 2017-05-08 RX ADMIN — SODIUM CHLORIDE 1000 ML: 9 INJECTION, SOLUTION INTRAVENOUS at 10:32

## 2017-05-08 RX ADMIN — CARBIDOPA AND LEVODOPA 2.5 MG: 50; 200 TABLET, EXTENDED RELEASE ORAL at 10:38

## 2017-05-08 RX ADMIN — ACYCLOVIR 400 MG: 200 CAPSULE ORAL at 19:48

## 2017-05-08 RX ADMIN — CARBIDOPA AND LEVODOPA 2.5 MG: 50; 200 TABLET, EXTENDED RELEASE ORAL at 14:09

## 2017-05-08 RX ADMIN — ACETAMINOPHEN 1000 MG: 325 TABLET, FILM COATED ORAL at 12:01

## 2017-05-08 RX ADMIN — PIPERACILLIN AND TAZOBACTAM 2.25 G: 2; .25 INJECTION, POWDER, LYOPHILIZED, FOR SOLUTION INTRAVENOUS; PARENTERAL at 16:17

## 2017-05-08 RX ADMIN — SODIUM CHLORIDE 1000 ML: 9 INJECTION, SOLUTION INTRAVENOUS at 16:42

## 2017-05-08 RX ADMIN — ACYCLOVIR 400 MG: 200 CAPSULE ORAL at 10:39

## 2017-05-08 RX ADMIN — PAMIDRONATE DISODIUM 60 MG: 9 INJECTION INTRAVENOUS at 17:56

## 2017-05-08 RX ADMIN — HALOPERIDOL LACTATE 5 MG: 5 INJECTION, SOLUTION INTRAMUSCULAR at 10:03

## 2017-05-08 RX ADMIN — METHYLPREDNISOLONE SODIUM SUCCINATE 100 MG: 125 INJECTION, POWDER, LYOPHILIZED, FOR SOLUTION INTRAMUSCULAR; INTRAVENOUS at 10:43

## 2017-05-08 RX ADMIN — PIPERACILLIN AND TAZOBACTAM 2.25 G: 2; .25 INJECTION, POWDER, LYOPHILIZED, FOR SOLUTION INTRAVENOUS; PARENTERAL at 10:47

## 2017-05-08 RX ADMIN — CLOPIDOGREL 75 MG: 75 TABLET, FILM COATED ORAL at 10:39

## 2017-05-08 RX ADMIN — HALOPERIDOL LACTATE 2 MG: 5 INJECTION, SOLUTION INTRAMUSCULAR at 00:59

## 2017-05-08 RX ADMIN — CARBIDOPA AND LEVODOPA 2.5 MG: 50; 200 TABLET, EXTENDED RELEASE ORAL at 19:48

## 2017-05-08 RX ADMIN — OLANZAPINE 5 MG: 5 TABLET, ORALLY DISINTEGRATING ORAL at 14:09

## 2017-05-08 RX ADMIN — PIPERACILLIN AND TAZOBACTAM 2.25 G: 2; .25 INJECTION, POWDER, LYOPHILIZED, FOR SOLUTION INTRAVENOUS; PARENTERAL at 22:05

## 2017-05-08 RX ADMIN — SODIUM CHLORIDE: 9 INJECTION, SOLUTION INTRAVENOUS at 10:33

## 2017-05-08 RX ADMIN — OLANZAPINE 5 MG: 5 TABLET, ORALLY DISINTEGRATING ORAL at 19:48

## 2017-05-08 RX ADMIN — AMLODIPINE BESYLATE 5 MG: 5 TABLET ORAL at 10:39

## 2017-05-08 ASSESSMENT — PAIN DESCRIPTION - DESCRIPTORS: DESCRIPTORS: ACHING

## 2017-05-08 NOTE — PROGRESS NOTES
CLINICAL NUTRITION SERVICES - ASSESSMENT NOTE     Nutrition Prescription    RECOMMENDATIONS FOR MDs/PROVIDERS TO ORDER:  - No orders needed at this time  - Monitor K+, Mg++ and Phosphorus and replace as appropriate    Malnutrition Status:    - Unable to determine due to not being able to meet pt    Recommendations already ordered by Registered Dietitian (RD):  - Will try Magic cup BID     Future/Additional Recommendations:  - If pt unable to take adequate PO due to increased confusion, will need to consider NS if pt able to keep access for NS without pulling it.  - If EN is indicated, recommend via NJT with Isosource 1.5 @ goal 45 ml/hr (1080 ml/day) to provide 1620 kcals, 73 g PRO, 821 ml free H2O, 190 g CHO and 16 g Fiber daily. Pt would need additional 30 ml fluid flushes for FT patency, additional flushes per MD pending pt's oral intake.   - If EN is contraindicated and PN becomes POC, recommend central PN per DW of 58 kg @ 1440 ml/day (or per MD pending pt's fluid status)  with 150 gm Dextrose (GIR= 1.8 mg/kg/min), 80 gm AA and 20% 250 ml lipids x 5 days (Mon -Fri). If pt tolerating this initial volumes without exhibiting any signs or symptoms of refeeding (K+, Mg++ wnl and Phos > 1.9), recommend increasing Dextrose by 30 -50 gm daily until final goal of 225 gm (GIR = 2.7 mg/kg/min). This goal regimen would provide on average 1442 kcal/day (25 kcal/kg), 1.4 gm/kg,  31% of total kcal from fat on days when pt receives lipids.   - Monitor mental status, PO intake, ONS acceptance and order jory cts as appropriate       REASON FOR ASSESSMENT  Anthony Carbone is a/an 71 year old male assessed by the dietitian for Provider Order - Nutritional Assessment for FTT. Pt with hx of relapsed MM s/p autologous transplant (1/9/2013) with recent progression was admitted for severe back pain, JOSE MARIA, confusion.     NUTRITION HISTORY  Pt confused/agitated and combative and not able to give nutrition hx. Pt has 1:1 sitter.  "Unable to meet pt 2/2 code 21 called this am. Per chart review pt does not like Boost supplements    CURRENT NUTRITION ORDERS  Diet: Regular with snacks and supplements with meals  Intake/Tolerance: pt has not ordered any meal so far per  records.     LABS  Labs reviewed  Date: 5/8/17   K+, Mg++ and Phos wnl,  BUN,Cr increased due to JOSE MARIA but slowly trending down.     MEDICATIONS  Medications reviewed    ANTHROPOMETRICS  Height: 5'  3\"  Most Recent Weight: 58.2 kg (128 lb 6.4 oz)    IBW: 56  kg  BMI: Normal BMI  Weight History:   Wt Readings from Last 10 Encounters:   05/07/17 58.2 kg (128 lb 6.4 oz)   01/26/17 64.6 kg (142 lb 6.4 oz)   01/12/17 64.7 kg (142 lb 10.2 oz)   01/06/17 65.1 kg (143 lb 8 oz)   01/05/17 64.5 kg (142 lb 3.2 oz)   12/29/16 63.2 kg (139 lb 6.4 oz)   12/23/16 63.2 kg (139 lb 4.8 oz)   12/15/16 63.3 kg (139 lb 9.6 oz)   12/09/16 63.1 kg (139 lb 1.6 oz)   11/30/16 63.9 kg (140 lb 12.8 oz)   12 lb wt loss since Jan 5 2017 - 9.9% in past 4 months  Dosing Weight: 58 kg (CBW)    ASSESSED NUTRITION NEEDS  Estimated Energy Needs: 1450 - 1740 kcals/day (25 - 30 kcals/kg)  Justification: Maintenance  Estimated Protein Needs: 70 - 87 grams protein/day (1.2 - 1.5 grams of pro/kg)  Justification: Maintenance  Estimated Fluid Needs:  (1 mL/kcal)   Justification: Maintenance and Per provider pending fluid status    PHYSICAL FINDINGS  See malnutrition section below.    MALNUTRITION  % Intake: Unable to assess  % Weight Loss: > 7.5% in 3 months (severe)  Subcutaneous Fat Loss: Unable to assess  Muscle Loss: Unable to assess  Fluid Accumulation/Edema: Unable to assess  Malnutrition Diagnosis: Unable to determine due to not being able to meet pt    NUTRITION DIAGNOSIS  Inadequate oral intake related to severe back pain, increased confusion as evidenced by pt with possible poor PO and FTT PTA, 9.9% wt loss in the past 4 months, pt with poor intake at present with pt not ordering any meals so far today  "      INTERVENTIONS  Implementation  Nutrition Education: Not appropriate at this time due to patient condition   Medical food supplement therapy     Goals  Patient to consume % of nutritionally adequate meal trays TID, or the equivalent with supplements/snacks.     Monitoring/Evaluation  Progress toward goals will be monitored and evaluated per protocol.    Hollis Wan RD LD  Weekday pager - [phone]  Weekend pager - [phone]

## 2017-05-08 NOTE — PROGRESS NOTES
General acute hospital, [place]  Hematology / Oncology Progress Note     Assessment & Plan   Anthony Carbone is a 71 year old male with a history of relapsed multiple myeloma admitted on 5/7/2017 with back pain, confusion, and acute kidney injury.     1. NEURO  #Altered mental status.  #Weakness.  #Tremor.  - Unclear etiology. Suspect infection vs dehydration vs metabolic derangement vs worsening myeloma vs other. Per wife, has been getting weaker over past several days but confusion suddenly came on in PM of 5/7 while on airplane back from trip to [place].  -Infectious workup with BC, UC, CXR, RVP  -Empirically started on Zosyn (renally dosed)  -Discussed with Dr. Bowie of Palliative Care, appreciate assistance. Zyprexa 5mg BID with Haldol 5mg every 4 hours PRN agitation (Qtc 5/8 431). Discussed this plan with patient's wife Casey and she was in agreement with this strategy due to patient not being cooperative with cares prior to medication administration.  -Delirium precautions  -Consider plasmapheresis if no significant improvement overnight and infectious workup appears to be negative  -Hold off on imaging, may need CT or MRI tomorrow.    #History of subacute CVA. Continue home Plavix.    2. RENAL  #Acute kidney injury  -Unclear etiology. Creatinine on 4/20/17 was 0.80. Wife reports patient was taking 1200-2400mg of ibuprofen daily for at least the past 2 weeks. Also with suspected progression of myeloma.  -Aggressively hydrating with IV fluids, will follow BMP    #Hypercalcemia  -IV fluids as above  -Pamidronate 60mg x 1 today    #Hyperuricemia  -IV fluids as above  -Rasburicase 3mg x 1 today  -Follow level    3. ID  #Possible infection   -Per wife and review of notes, patient was experiencing cough and cold symptoms for at least the past 2 weeks. Was being treated with doxycycline and promethazine + codeine cough syrup.  -ID workup as above  -On renally dosed Zosyn  -Continue acyclovir  400mg BID for prophylaxis    4. HEME/ONC  #Multiple myeloma  -Followed by Dr. Topete. Most recently being treated in [place]. Was on daratumumab/pomalidomide/dexamethasone. Had been on hold for past couple of weeks d/t illness.  -Labs pending  -Solumedrol 100mg IV today and tomorrow. Aware that this can worsen mental status, but feel benefit outweighs risk at this time and treating delirium as above. May also help with pain.   -If no improvement in AMS tomorrow, consider plasmapheresis. Transfusion medicine aware and scheduled in IR for line placement if needed.    #Cancer-related pain  -Per wife, has been going on for about 2 weeks. Consider imaging.  -Hold NSAIDs d/t JOSE MARIA  -Try Tylenol first d/t delirium. Dilaudid ordered PRN severe pain if unable to control with Tylenol.  -Also receiving IV steroids as above.    5. CV  #Hypertension. Continue home Norvasc.  #Hyperlipidemia. Continue home Zocor.    FEN  -MIVF with NS at 175ml/hr  -Electrolyte replacement PRN per protocol  -Regular diet as tolerated. Consider TPN - per wife, poor PO intake for past several days.    Code status: FULL    Patient discussed with staff attending Dr. Zurita.    Brittney Armstrong PA-C  Hematology/Oncology  Pager: [phone]    Interval History   Majority of history obtained from review of records and patient's wife, Casey. Patient himself denies pain. He thinks [facility] is president and it is currently the 1800s. He is able to tell me he is in a hospital. He is able to say that he has an appointment with his oncology doctor tomorrow morning. Per nursing he removed his own port access needle twice overnight.    Physical Exam   Temp: 98  F (36.7  C) Temp src: Oral BP: 160/86 Pulse: 94 Heart Rate: 93 Resp: 20 SpO2: 95 % O2 Device: None (Room air)    Vitals:    05/07/17 1823   Weight: 58.2 kg (128 lb 6.4 oz)     Vital Signs with Ranges  Temp:  [97.3  F (36.3  C)-99.3  F (37.4  C)] 98  F (36.7  C)  Pulse:  [93-94] 94  Heart Rate:  [74-98]  93  Resp:  [12-45] 20  BP: (114-162)/() 160/86  SpO2:  [92 %-98 %] 95 %  I/O last 3 completed shifts:  In: -   Out: 350 [Urine:350]    Constitutional: Adult male who appears stated age seen lying in bed, no acute distress. Unable to fully examine due to refusal.  Respiratory: No increased work of breathing, good air exchange, decreased breath sounds.  Cardiovascular: Regular rate and rhythm, no obvious murmurs.  Skin: Contusions noted to extremities.  Neurologic: Alert. Oriented to self. Occasionally oriented to place. Extremity tremor vs muscle fasciculations noted. Not cooperative with exam or interview questions. Memory for recent events somewhat intact (knows he was in [place] recently). Thought process not normal at this time.    Medications     NaCl 175 mL/hr at 05/08/17 1224     - MEDICATION INSTRUCTIONS -         piperacillin-tazobactam  2.25 g Intravenous Q6H     haloperidol lactate  5 mg IV/IM Q4H     acyclovir (ZOVIRAX) capsule 400 mg  400 mg Oral BID     amLODIPine  5 mg Oral Daily     clopidogrel  75 mg Oral Daily     midodrine  2.5 mg Oral TID     pomalidomide  2 mg Oral Daily     simvastatin (ZOCOR) tablet 20 mg  20 mg Oral At Bedtime       Data   Results for orders placed or performed during the hospital encounter of 05/07/17 (from the past 24 hour(s))   CBC with platelets differential   Result Value Ref Range    WBC 4.0 4.0 - 11.0 10e9/L    RBC Count 3.43 (L) 4.4 - 5.9 10e12/L    Hemoglobin 10.9 (L) 13.3 - 17.7 g/dL    Hematocrit 33.8 (L) 40.0 - 53.0 %    MCV 99 78 - 100 fl    MCH 31.8 26.5 - 33.0 pg    MCHC 32.2 31.5 - 36.5 g/dL    RDW 17.8 (H) 10.0 - 15.0 %    Platelet Count 201 150 - 450 10e9/L    Diff Method Automated Method     % Neutrophils 77.1 %    % Lymphocytes 10.8 %    % Monocytes 8.0 %    % Eosinophils 0.3 %    % Basophils 2.8 %    % Immature Granulocytes 1.0 %    Nucleated RBCs 0 0 /100    Absolute Neutrophil 3.1 1.6 - 8.3 10e9/L    Absolute Lymphocytes 0.4 (L) 0.8 - 5.3 10e9/L     Absolute Monocytes 0.3 0.0 - 1.3 10e9/L    Absolute Eosinophils 0.0 0.0 - 0.7 10e9/L    Absolute Basophils 0.1 0.0 - 0.2 10e9/L    Abs Immature Granulocytes 0.0 0 - 0.4 10e9/L    Absolute Nucleated RBC 0.0    Comprehensive metabolic panel   Result Value Ref Range    Sodium 136 133 - 144 mmol/L    Potassium 4.4 3.4 - 5.3 mmol/L    Chloride 102 94 - 109 mmol/L    Carbon Dioxide 21 20 - 32 mmol/L    Anion Gap 13 3 - 14 mmol/L    Glucose 151 (H) 70 - 99 mg/dL    Urea Nitrogen 54 (H) 7 - 30 mg/dL    Creatinine 1.80 (H) 0.66 - 1.25 mg/dL    GFR Estimate 37 (L) >60 mL/min/1.7m2    GFR Estimate If Black 45 (L) >60 mL/min/1.7m2    Calcium 10.3 (H) 8.5 - 10.1 mg/dL    Bilirubin Total 0.5 0.2 - 1.3 mg/dL    Albumin 3.4 3.4 - 5.0 g/dL    Protein Total 9.5 (H) 6.8 - 8.8 g/dL    Alkaline Phosphatase 66 40 - 150 U/L    ALT 14 0 - 70 U/L    AST 11 0 - 45 U/L   Lipase   Result Value Ref Range    Lipase 95 73 - 393 U/L   UA with Microscopic   Result Value Ref Range    Color Urine Yellow     Appearance Urine Clear     Glucose Urine Negative NEG mg/dL    Bilirubin Urine Negative NEG    Ketones Urine 5 (A) NEG mg/dL    Specific Gravity Urine 1.015 1.003 - 1.035    Blood Urine Negative NEG    pH Urine 5.0 5.0 - 7.0 pH    Protein Albumin Urine 10 (A) NEG mg/dL    Urobilinogen mg/dL Normal 0.0 - 2.0 mg/dL    Nitrite Urine Negative NEG    Leukocyte Esterase Urine Trace (A) NEG    Source Midstream Urine     WBC Urine 7 (H) 0 - 2 /HPF    RBC Urine 1 0 - 2 /HPF    Squamous Epithelial /HPF Urine <1 0 - 1 /HPF    Transitional Epi <1 0 - 1 /HPF    Mucous Urine Present (A) NEG /LPF    Hyaline Casts 9 (H) 0 - 2 /LPF   Basic metabolic panel   Result Value Ref Range    Sodium 139 133 - 144 mmol/L    Potassium 3.9 3.4 - 5.3 mmol/L    Chloride 106 94 - 109 mmol/L    Carbon Dioxide 23 20 - 32 mmol/L    Anion Gap 10 3 - 14 mmol/L    Glucose 128 (H) 70 - 99 mg/dL    Urea Nitrogen 47 (H) 7 - 30 mg/dL    Creatinine 1.44 (H) 0.66 - 1.25 mg/dL    GFR Estimate  48 (L) >60 mL/min/1.7m2    GFR Estimate If Black 58 (L) >60 mL/min/1.7m2    Calcium 10.2 (H) 8.5 - 10.1 mg/dL   CBC with platelets differential   Result Value Ref Range    WBC 3.0 (L) 4.0 - 11.0 10e9/L    RBC Count 3.28 (L) 4.4 - 5.9 10e12/L    Hemoglobin 10.0 (L) 13.3 - 17.7 g/dL    Hematocrit 31.6 (L) 40.0 - 53.0 %    MCV 96 78 - 100 fl    MCH 30.5 26.5 - 33.0 pg    MCHC 31.6 31.5 - 36.5 g/dL    RDW 17.7 (H) 10.0 - 15.0 %    Platelet Count 195 150 - 450 10e9/L    Diff Method Automated Method     % Neutrophils 72.1 %    % Lymphocytes 8.9 %    % Monocytes 16.1 %    % Eosinophils 0.0 %    % Basophils 2.6 %    % Immature Granulocytes 0.3 %    Nucleated RBCs 0 0 /100    Absolute Neutrophil 2.2 1.6 - 8.3 10e9/L    Absolute Lymphocytes 0.3 (L) 0.8 - 5.3 10e9/L    Absolute Monocytes 0.5 0.0 - 1.3 10e9/L    Absolute Eosinophils 0.0 0.0 - 0.7 10e9/L    Absolute Basophils 0.1 0.0 - 0.2 10e9/L    Abs Immature Granulocytes 0.0 0 - 0.4 10e9/L    Absolute Nucleated RBC 0.0    INR   Result Value Ref Range    INR 1.14 0.86 - 1.14   Protein electrophoresis   Result Value Ref Range    Albumin Fraction Pending 3.7 - 5.1 g/dL    Alpha 1 Fraction Pending 0.2 - 0.4 g/dL    Alpha 2 Fraction Pending 0.5 - 0.9 g/dL    Beta Fraction Pending 0.6 - 1.0 g/dL    Gamma Fraction Pending 0.7 - 1.6 g/dL    Monoclonal Peak Pending 0.0 g/dL    ELP Interpretation: Pending    Blood culture   Result Value Ref Range    Specimen Description Blood VAD Collection     Culture Micro Pending     Micro Report Status Pending    Phosphorus   Result Value Ref Range    Phosphorus 3.8 2.5 - 4.5 mg/dL   Magnesium   Result Value Ref Range    Magnesium 2.0 1.6 - 2.3 mg/dL   TSH   Result Value Ref Range    TSH 1.05 0.40 - 4.00 mU/L   Partial thromboplastin time   Result Value Ref Range    PTT 31 22 - 37 sec   Fibrinogen activity   Result Value Ref Range    Fibrinogen 392 200 - 420 mg/dL   Uric acid   Result Value Ref Range    Uric Acid 8.3 (H) 3.5 - 7.2 mg/dL   Ammonia    Result Value Ref Range    Ammonia 15 10 - 50 umol/L   EKG 12-lead, complete   Result Value Ref Range    Interpretation ECG Click View Image link to view waveform and result    UA with Microscopic   Result Value Ref Range    Color Urine Yellow     Appearance Urine Clear     Glucose Urine Negative NEG mg/dL    Bilirubin Urine Negative NEG    Ketones Urine 10 (A) NEG mg/dL    Specific Gravity Urine 1.013 1.003 - 1.035    Blood Urine Negative NEG    pH Urine 5.0 5.0 - 7.0 pH    Protein Albumin Urine 10 (A) NEG mg/dL    Urobilinogen mg/dL Normal 0.0 - 2.0 mg/dL    Nitrite Urine Negative NEG    Leukocyte Esterase Urine Negative NEG    Source Midstream Urine     WBC Urine 1 0 - 2 /HPF    RBC Urine 1 0 - 2 /HPF    Mucous Urine Present (A) NEG /LPF    Hyaline Casts 1 0 - 2 /LPF   Urine Culture Aerobic Bacterial   Result Value Ref Range    Specimen Description Midstream Urine     Special Requests Specimen received in preservative     Culture Micro Pending     Micro Report Status Pending    Interventional Radiology IP Consult: tomorrow in AM may need a non tunneled line for plex; Consultant may enter orders: Yes; Patient to be seen: Routine - within 24 hours; Call back #: [phone]    Deborah Berger, GISSELL     5/8/2017  1:37 PM  Patient is on IR schedule 5/9/2017 for a Nontunneled central line   placement .   Labs WNL for procedure.   No NPO required.   Consent will be done prior to procedure.  Please contact the IR charge RN at [phone] for estimated time of   procedure.       Deborah Richardson IR RPA  [phone] Call pager  [phone] pager             Patient has been seen, examined and evaluated by me independently. I have reviewed today's vital signs, medications, labs and imaging results. I have discussed the plan with the patient and his wife.  I visted him 2 times , a total of > 40 minutes  PE  Alert, oriented but not ll the time, afternnoon he was more clear  In no acute distress  Vitals  are stable  /75 (BP Location: Right arm)  Pulse 94  Temp 98.3  F (36.8  C) (Oral)  Resp 18  Wt 58.2 kg (128 lb 6.4 oz)  SpO2 95%  BMI 22.04 kg/m2    CVS RR, and Pulmonary systems hard to listen from the chest  Labs  CBC and chemistry are not significant  Lab Results   Component Value Date    WBC 3.0 05/08/2017     Lab Results   Component Value Date    RBC 3.28 05/08/2017     Lab Results   Component Value Date    HGB 10.0 05/08/2017     Lab Results   Component Value Date    HCT 31.6 05/08/2017     No components found for: MCT  Lab Results   Component Value Date    MCV 96 05/08/2017     Lab Results   Component Value Date    MCH 30.5 05/08/2017     Lab Results   Component Value Date    MCHC 31.6 05/08/2017     Lab Results   Component Value Date    RDW 17.7 05/08/2017     Lab Results   Component Value Date     05/08/2017       Last Basic Metabolic Panel:  Lab Results   Component Value Date     05/08/2017      Lab Results   Component Value Date    POTASSIUM 3.9 05/08/2017     Lab Results   Component Value Date    CHLORIDE 106 05/08/2017     Lab Results   Component Value Date    JAZMIN 10.2 05/08/2017     Lab Results   Component Value Date    CO2 23 05/08/2017     Lab Results   Component Value Date    BUN 47 05/08/2017     Lab Results   Component Value Date    CR 1.44 05/08/2017     Lab Results   Component Value Date     05/08/2017     He has multiple issues, apparently last few weeks in [place] he was not feeling well, more back pain (MRI was done and shared with Dr. Leon), fatique (taking naps), less appetite and oral intake. His became confused, agitated during flight and brought to ER directly.    1-Some agitation, confusion, seems delirium. PRN haldols seems working. Zyprexa was added.  Etiology:   A) ARF: Mostly caused by MM and dehydration and toxic substance retention-Aggressive IVF, creatine was getting better already  High UA, give rasburicase  B) He has had cough and sputum,  perhaps infection in respiratory and/or urinary tract; Start Zosyn, adjusted dose by renal failure  UA is clear, Chest XRya  Possible L sided infiltration (official result is pending)  C) Hyperviscosity due to high IgM; from PB TPro /alb ration, clearly hs G globulin fraction increased. Hyperviscosity ordred; give IVF, high dose steroids, if not clears his MS tomorrow, then consider plasmapheresis    D)Hypercalcemia, again idincation of MM progression. Treat with IVF and steroids.      He got better over 8 h, but still unknoen his progress toningt    2-Bone pain, back pain, Dilauded IV if needed      3-TSH is WNL  Even he improves with this aggressive supportive care, e needs effective therapy for MM (if available) because it is main the cause of all problems    Delay imagings needing him to go out of floor given he is not stable unless necessary and will change therapy  Overall high risk patient  Nora Zurita MD

## 2017-05-08 NOTE — PROGRESS NOTES
Called to 5C for electrocardiogram.  Exam explained and patient persistent on refusing.  Attempts to reassure patient seem to lack effectiveness.  Will try again in AM.  RN/MD aware.

## 2017-05-08 NOTE — ED NOTES
Patient found by ER tech in a corner of the room by a chair calling for help. When writer arrived ER tech was assisting patient with urinal. Portacath needle was on the bed still attached to IV tubing. Port re-accessed without incident once patient was back in bed. Report called to 5C, receiving RN updated on incident.

## 2017-05-08 NOTE — CONSULTS
Patient is on IR schedule 5/9/2017 for a Nontunneled central line placement .   Labs WNL for procedure.   No NPO required.   Consent will be done prior to procedure.  Please contact the IR charge RN at [phone] for estimated time of procedure.       Deborah Richardson IR RPA  [phone] Call pager  [phone] pager

## 2017-05-08 NOTE — PLAN OF CARE
Problem: Goal Outcome Summary  Goal: Goal Outcome Summary  Outcome: Declining  Pt arrived to 5C from ED for severe back pain, confusion, and JOSE MARIA. Upon admission patient was able to tell me he was in the hospital, could tell me his name. He became very agitated when asked questions which made it difficult to get a clear indication of his orientation status because he was unwilling to answer. He appeared to be disoriented to time/situation when arrived to unit. Pt reported having back pain but refusing to take any medications or any interventions offered. Pt was able to answer yes/no questions appropriately but unwilling to respond when asked anything further. Pt having good motor strength but weak, denies numbness tingling, pupils reactive. After about an hour patient was settled into bed, he pulled the call light out and was aggressive/combative and more agitated from earlier. Pt was also picking at his lines and had ripped off his fluid line and took off his oxygen. MD was notified and 2 mg of haldol was ordered. Pt became more combative/aggressive with more interaction and needed to be restrained to administer haldol IV. A bedside sitter was also issued. Pt port still intact. Pt vitally stable on room air. MD requesting CT scan/12 lead EKG for further assessment of his confusion but patient continued to be combative/agressive/agitated despite reassuring/calming measures. Pt orientation had also declined as patient stated that he was at home and it was 1977. He did know Steven Ludwig was the president. No signs of hallucinations. Moonlighter was notified and came down for further assessment. 2mg of haldol was also administered with pt needing to be restrained. Fluids were attempted to be restarted but patient instantly become combative and tried to pulled his line. Moonlighter decided to hold off on interventions/test and to let patient rest. Pt is calm and more relaxed when left alone to sleep. Pt was able to tell  staff when he needed to use the bathroom and has not been incontinent. He is able to stand and walk with assist of 2 if willing.     Chemo medication - Pomalyst. Not in stock in pharmacy, will need home supply brought in.    Problem: Confusion, Acute (Adult)  Goal: Identify Related Risk Factors and Signs and Symptoms  Related risk factors and signs and symptoms are identified upon initiation of Human Response Clinical Practice Guideline (CPG)    05/08/17 0143   Confusion, Acute   Related Risk Factors (Acute Confusion) dehydration;pain;advanced age;medication effects;infection   Signs and Symptoms (Acute Confusion) acute onset of symptoms;disorientation;emotional/behavioral disturbances;reasoning/planning disturbed;thought process diminished/disorganized

## 2017-05-09 LAB
ALBUMIN SERPL ELPH-MCNC: 3.6 G/DL (ref 3.7–5.1)
ALBUMIN SERPL-MCNC: 2.8 G/DL (ref 3.4–5)
ALP SERPL-CCNC: 48 U/L (ref 40–150)
ALPHA1 GLOB SERPL ELPH-MCNC: 0.5 G/DL (ref 0.2–0.4)
ALPHA2 GLOB SERPL ELPH-MCNC: 1.1 G/DL (ref 0.5–0.9)
ALT SERPL W P-5'-P-CCNC: 12 U/L (ref 0–70)
ANION GAP SERPL CALCULATED.3IONS-SCNC: 11 MMOL/L (ref 3–14)
AST SERPL W P-5'-P-CCNC: 15 U/L (ref 0–45)
B-GLOBULIN SERPL ELPH-MCNC: 0.7 G/DL (ref 0.6–1)
BACTERIA SPEC CULT: NORMAL
BASOPHILS # BLD AUTO: 0 10E9/L (ref 0–0.2)
BASOPHILS NFR BLD AUTO: 0.4 %
BILIRUB DIRECT SERPL-MCNC: 0.1 MG/DL (ref 0–0.2)
BILIRUB SERPL-MCNC: 0.4 MG/DL (ref 0.2–1.3)
BUN SERPL-MCNC: 34 MG/DL (ref 7–30)
CALCIUM SERPL-MCNC: 8.7 MG/DL (ref 8.5–10.1)
CHLORIDE SERPL-SCNC: 110 MMOL/L (ref 94–109)
CO2 SERPL-SCNC: 21 MMOL/L (ref 20–32)
CREAT SERPL-MCNC: 1.15 MG/DL (ref 0.66–1.25)
DIFFERENTIAL METHOD BLD: ABNORMAL
ELP INTERPRETATION: ABNORMAL
EOSINOPHIL # BLD AUTO: 0 10E9/L (ref 0–0.7)
EOSINOPHIL NFR BLD AUTO: 0 %
ERYTHROCYTE [DISTWIDTH] IN BLOOD BY AUTOMATED COUNT: 17.8 % (ref 10–15)
GAMMA GLOB SERPL ELPH-MCNC: 2.4 G/DL (ref 0.7–1.6)
GFR SERPL CREATININE-BSD FRML MDRD: 62 ML/MIN/1.7M2
GLUCOSE SERPL-MCNC: 123 MG/DL (ref 70–99)
HCT VFR BLD AUTO: 28.4 % (ref 40–53)
HGB BLD-MCNC: 8.9 G/DL (ref 13.3–17.7)
IGA SERPL-MCNC: <7 MG/DL (ref 70–380)
IGA SERPL-MCNC: ABNORMAL MG/DL (ref 70–380)
IGG SERPL-MCNC: 120 MG/DL (ref 695–1620)
IGG SERPL-MCNC: 138 MG/DL (ref 695–1620)
IGM SERPL-MCNC: 2960 MG/DL (ref 60–265)
IGM SERPL-MCNC: 3410 MG/DL (ref 60–265)
IMM GRANULOCYTES # BLD: 0 10E9/L (ref 0–0.4)
IMM GRANULOCYTES NFR BLD: 0.4 %
IMMUNOFIXATION ELP: ABNORMAL
INR PPP: 1.21 (ref 0.86–1.14)
LYMPHOCYTES # BLD AUTO: 0.2 10E9/L (ref 0.8–5.3)
LYMPHOCYTES NFR BLD AUTO: 8.9 %
Lab: NORMAL
M PROTEIN SERPL ELPH-MCNC: 2.1 G/DL
MCH RBC QN AUTO: 30.6 PG (ref 26.5–33)
MCHC RBC AUTO-ENTMCNC: 31.3 G/DL (ref 31.5–36.5)
MCV RBC AUTO: 98 FL (ref 78–100)
MICRO REPORT STATUS: NORMAL
MONOCYTES # BLD AUTO: 0.3 10E9/L (ref 0–1.3)
MONOCYTES NFR BLD AUTO: 10.5 %
NEUTROPHILS # BLD AUTO: 2.1 10E9/L (ref 1.6–8.3)
NEUTROPHILS NFR BLD AUTO: 79.8 %
NRBC # BLD AUTO: 0 10*3/UL
NRBC BLD AUTO-RTO: 0 /100
PLATELET # BLD AUTO: 146 10E9/L (ref 150–450)
POTASSIUM SERPL-SCNC: 3.6 MMOL/L (ref 3.4–5.3)
PROT SERPL-MCNC: 7.3 G/DL (ref 6.8–8.8)
RBC # BLD AUTO: 2.91 10E12/L (ref 4.4–5.9)
SODIUM SERPL-SCNC: 142 MMOL/L (ref 133–144)
SPECIMEN SOURCE: NORMAL
URATE SERPL-MCNC: 2.8 MG/DL (ref 3.5–7.2)
VISC SER: 2.3 CP (ref 1.4–1.8)
WBC # BLD AUTO: 2.6 10E9/L (ref 4–11)

## 2017-05-09 PROCEDURE — 25000132 ZZH RX MED GY IP 250 OP 250 PS 637: Mod: GY | Performed by: PHYSICIAN ASSISTANT

## 2017-05-09 PROCEDURE — 80076 HEPATIC FUNCTION PANEL: CPT | Performed by: INTERNAL MEDICINE

## 2017-05-09 PROCEDURE — 85610 PROTHROMBIN TIME: CPT | Performed by: INTERNAL MEDICINE

## 2017-05-09 PROCEDURE — 85025 COMPLETE CBC W/AUTO DIFF WBC: CPT | Performed by: INTERNAL MEDICINE

## 2017-05-09 PROCEDURE — 80076 HEPATIC FUNCTION PANEL: CPT | Performed by: PHYSICIAN ASSISTANT

## 2017-05-09 PROCEDURE — 82784 ASSAY IGA/IGD/IGG/IGM EACH: CPT | Performed by: PHYSICIAN ASSISTANT

## 2017-05-09 PROCEDURE — 25000128 H RX IP 250 OP 636: Performed by: STUDENT IN AN ORGANIZED HEALTH CARE EDUCATION/TRAINING PROGRAM

## 2017-05-09 PROCEDURE — 20000002 ZZH R&B BMT INTERMEDIATE

## 2017-05-09 PROCEDURE — 80048 BASIC METABOLIC PNL TOTAL CA: CPT | Performed by: INTERNAL MEDICINE

## 2017-05-09 PROCEDURE — 84550 ASSAY OF BLOOD/URIC ACID: CPT | Performed by: INTERNAL MEDICINE

## 2017-05-09 PROCEDURE — A9270 NON-COVERED ITEM OR SERVICE: HCPCS | Mod: GY | Performed by: INTERNAL MEDICINE

## 2017-05-09 PROCEDURE — A9270 NON-COVERED ITEM OR SERVICE: HCPCS | Mod: GY | Performed by: PHYSICIAN ASSISTANT

## 2017-05-09 PROCEDURE — 25000128 H RX IP 250 OP 636: Performed by: INTERNAL MEDICINE

## 2017-05-09 PROCEDURE — 25000132 ZZH RX MED GY IP 250 OP 250 PS 637: Mod: GY | Performed by: INTERNAL MEDICINE

## 2017-05-09 PROCEDURE — 25000128 H RX IP 250 OP 636: Performed by: PHYSICIAN ASSISTANT

## 2017-05-09 RX ORDER — POTASSIUM CHLORIDE 750 MG/1
20 TABLET, EXTENDED RELEASE ORAL 2 TIMES DAILY
Status: DISCONTINUED | OUTPATIENT
Start: 2017-05-09 | End: 2017-05-16 | Stop reason: HOSPADM

## 2017-05-09 RX ORDER — PIPERACILLIN SODIUM, TAZOBACTAM SODIUM 3; .375 G/15ML; G/15ML
3.38 INJECTION, POWDER, LYOPHILIZED, FOR SOLUTION INTRAVENOUS EVERY 6 HOURS
Status: DISCONTINUED | OUTPATIENT
Start: 2017-05-09 | End: 2017-05-10

## 2017-05-09 RX ORDER — AMLODIPINE BESYLATE 5 MG/1
5 TABLET ORAL AT BEDTIME
Status: DISCONTINUED | OUTPATIENT
Start: 2017-05-10 | End: 2017-05-16 | Stop reason: HOSPADM

## 2017-05-09 RX ORDER — METHYLPREDNISOLONE SODIUM SUCCINATE 125 MG/2ML
100 INJECTION, POWDER, LYOPHILIZED, FOR SOLUTION INTRAMUSCULAR; INTRAVENOUS ONCE
Status: COMPLETED | OUTPATIENT
Start: 2017-05-10 | End: 2017-05-10

## 2017-05-09 RX ADMIN — PIPERACILLIN SODIUM,TAZOBACTAM SODIUM 3.38 G: 3; .375 INJECTION, POWDER, FOR SOLUTION INTRAVENOUS at 16:19

## 2017-05-09 RX ADMIN — HYDROMORPHONE HYDROCHLORIDE 1 MG: 2 TABLET ORAL at 21:00

## 2017-05-09 RX ADMIN — POTASSIUM CHLORIDE 20 MEQ: 750 TABLET, EXTENDED RELEASE ORAL at 11:45

## 2017-05-09 RX ADMIN — ACYCLOVIR 400 MG: 200 CAPSULE ORAL at 21:00

## 2017-05-09 RX ADMIN — PIPERACILLIN SODIUM,TAZOBACTAM SODIUM 3.38 G: 3; .375 INJECTION, POWDER, FOR SOLUTION INTRAVENOUS at 22:52

## 2017-05-09 RX ADMIN — METHYLPREDNISOLONE SODIUM SUCCINATE 100 MG: 125 INJECTION, POWDER, LYOPHILIZED, FOR SOLUTION INTRAMUSCULAR; INTRAVENOUS at 05:52

## 2017-05-09 RX ADMIN — POTASSIUM CHLORIDE 20 MEQ: 750 TABLET, EXTENDED RELEASE ORAL at 21:00

## 2017-05-09 RX ADMIN — PIPERACILLIN AND TAZOBACTAM 2.25 G: 2; .25 INJECTION, POWDER, LYOPHILIZED, FOR SOLUTION INTRAVENOUS; PARENTERAL at 04:31

## 2017-05-09 RX ADMIN — ACYCLOVIR 400 MG: 200 CAPSULE ORAL at 08:06

## 2017-05-09 RX ADMIN — ACETAMINOPHEN 1000 MG: 325 TABLET, FILM COATED ORAL at 19:55

## 2017-05-09 RX ADMIN — PIPERACILLIN SODIUM,TAZOBACTAM SODIUM 3.38 G: 3; .375 INJECTION, POWDER, FOR SOLUTION INTRAVENOUS at 10:27

## 2017-05-09 RX ADMIN — OLANZAPINE 5 MG: 5 TABLET, ORALLY DISINTEGRATING ORAL at 21:00

## 2017-05-09 RX ADMIN — SIMVASTATIN 20 MG: 20 TABLET, FILM COATED ORAL at 21:00

## 2017-05-09 RX ADMIN — SODIUM CHLORIDE: 9 INJECTION, SOLUTION INTRAVENOUS at 08:06

## 2017-05-09 RX ADMIN — OLANZAPINE 5 MG: 5 TABLET, ORALLY DISINTEGRATING ORAL at 08:06

## 2017-05-09 RX ADMIN — CARBIDOPA AND LEVODOPA 2.5 MG: 50; 200 TABLET, EXTENDED RELEASE ORAL at 08:06

## 2017-05-09 RX ADMIN — ACETAMINOPHEN 1000 MG: 325 TABLET, FILM COATED ORAL at 08:49

## 2017-05-09 RX ADMIN — SODIUM CHLORIDE: 9 INJECTION, SOLUTION INTRAVENOUS at 16:20

## 2017-05-09 RX ADMIN — CARBIDOPA AND LEVODOPA 2.5 MG: 50; 200 TABLET, EXTENDED RELEASE ORAL at 14:12

## 2017-05-09 RX ADMIN — SODIUM CHLORIDE: 9 INJECTION, SOLUTION INTRAVENOUS at 16:19

## 2017-05-09 RX ADMIN — AMLODIPINE BESYLATE 5 MG: 5 TABLET ORAL at 08:06

## 2017-05-09 RX ADMIN — CARBIDOPA AND LEVODOPA 2.5 MG: 50; 200 TABLET, EXTENDED RELEASE ORAL at 21:00

## 2017-05-09 RX ADMIN — SODIUM CHLORIDE 3 MG: 9 INJECTION, SOLUTION INTRAVENOUS at 00:40

## 2017-05-09 RX ADMIN — ESOMEPRAZOLE MAGNESIUM 40 MG: 20 CAPSULE, DELAYED RELEASE PELLETS ORAL at 11:45

## 2017-05-09 RX ADMIN — CLOPIDOGREL 75 MG: 75 TABLET, FILM COATED ORAL at 08:06

## 2017-05-09 ASSESSMENT — PAIN DESCRIPTION - DESCRIPTORS
DESCRIPTORS: SHARP
DESCRIPTORS: SHARP
DESCRIPTORS: ACHING

## 2017-05-09 NOTE — PROGRESS NOTES
[facility] [place]  Hematology / Oncology Progress Note     Assessment & Plan   Anthony Carbone is a 71 year old male with a history of relapsed multiple myeloma admitted on 5/7/2017 with back pain, confusion, and acute kidney injury.     1. NEURO  #Altered mental status.  #Weakness.  #Tremor.  - Unclear etiology. Suspect infection vs dehydration vs metabolic derangement vs worsening myeloma vs other (overall, likely multifactorial). Per wife, has been getting weaker over past several days but confusion suddenly came on in PM of 5/7 while on airplane back from trip to [place]. Some improvement noted today.  -Infectious workup with BC, UC, CXR, RVP - BC, UC negative thus far. RVP pending. CXR with possible pneumonia   -Empirically started on Zosyn, will continue  -Discussed with Dr. Bowie of Palliative Care, appreciate assistance. Zyprexa 5mg BID with Haldol 5mg every 4 hours PRN agitation (Qtc 5/8 431). Discussed this plan with patient's wife Casey and she was in agreement with this strategy due to patient not being cooperative with cares prior to medication administration.  -Delirium precautions  -Consider plasmapheresis if no significant improvement overnight and infectious workup appears to be negative  -Hold off on imaging, may need CT or MRI.     #History of subacute CVA. Continue home Plavix.    2. RENAL  #Acute kidney injury  -Unclear etiology. Creatinine on 4/20/17 was 0.80. Wife reports patient was taking 1200-2400mg of ibuprofen daily for at least the past 2 weeks. Also with suspected progression of myeloma.  -Aggressively hydrating with IV fluids, will follow BMP - SCr improved today to 1.15.    #Hypercalcemia - significantly improved  -Continue IV fluids as above  -Pamidronate 60mg x 1 given 5/8    #Hyperuricemia - significantly improved  -Continue IV fluids as above  -Rasburicase 3mg x 1 5/8    3. ID  #Possible infection - suspect pneumonia  -Per wife and review of  notes, patient was experiencing cough and cold symptoms for at least the past 2 weeks. Was being treated with doxycycline and promethazine + codeine cough syrup.  -ID workup as above  -On renally dosed Zosyn  -Continue acyclovir 400mg BID for prophylaxis    4. HEME/ONC  #Multiple myeloma  -Followed by Dr. Topete. Most recently being treated in [place]. Was on daratumumab/pomalidomide/dexamethasone. Had been on hold for past couple of weeks d/t illness.  -Labs pending  -Solumedrol 100mg IV 5/8, 5/9 and 5/10. Aware that this can worsen mental status, but feel benefit outweighs risk at this time and treating delirium as above. May also help with pain.   -Viscosity index = 2.3. No plasmapheresis needed.    #Cancer-related pain  -Per wife, has been going on for about 2 weeks. Consider imaging. MRI lumbar spine done 4/24 at OSH in [place] reviewed, copy made to be scanned into chart: mild-to-moderate compression deformities T9-T11, degenerative changes in lumbar spine, mild central and foraminal stenoses in lumbar spine.  -Hold NSAIDs d/t JOSE MARIA  -Try Tylenol first d/t delirium. Dilaudid ordered PRN severe pain if unable to control with Tylenol.  -Also receiving IV steroids as above.    5. CV  #Hypertension. Continue home Norvasc.  #Hyperlipidemia. Continue home Zocor.    6. GI  #GERD. Continue home Nexium.    FEN  -MIVF with NS at 175ml/hr  -Electrolyte replacement PRN per protocol  -Regular diet as tolerated. Consider TPN if PO intake does not improve as mental status improves.    Code status: FULL    Patient discussed with staff attending Dr. Zurita.    Brittney Armstrong PA-C  Hematology/Oncology  Pager: [phone]    Interval History   Majority of history obtained from review of records, RN reports and patient's wife, Casey. Mr. Carbone is a bit more oriented and alert today, somewhat more cooperative. Reports pain to R side. Feels his cough is better. No chest pain or SOB. States he hasn't been eating much, unable to tell me why  not.   Physical Exam   Temp: 97.5  F (36.4  C) Temp src: Oral BP: 128/70 Pulse: 60 Heart Rate: 70 Resp: 16 SpO2: 96 % O2 Device: None (Room air)    Vitals:    05/07/17 1823   Weight: 58.2 kg (128 lb 6.4 oz)     Vital Signs with Ranges  Temp:  [96.6  F (35.9  C)-98.6  F (37  C)] 97.5  F (36.4  C)  Pulse:  [60] 60  Heart Rate:  [58-70] 70  Resp:  [16-18] 16  BP: (128-143)/(70-75) 128/70  SpO2:  [94 %-98 %] 96 %  I/O last 3 completed shifts:  In: 3893.33 [P.O.:210; I.V.:3683.33]  Out: 1975 [Urine:1975]    Constitutional: Adult male who appears stated age seen lying in bed, no acute distress.   Respiratory: No increased work of breathing, good air exchange, decreased breath sounds with ? Crackles R lung base.  Cardiovascular: Regular rate and rhythm, no obvious murmurs.  Skin: Contusions noted to extremities.   Musculoskeletal: No LE edema.  Neurologic: Alert. Oriented to self and place. Extremity tremor vs muscle fasciculations noted. Memory for recent events somewhat intact (knows he was in [place] recently).     Medications     NaCl 175 mL/hr at 05/09/17 0806     - MEDICATION INSTRUCTIONS -         piperacillin-tazobactam  3.375 g Intravenous Q6H     esomeprazole  40 mg Oral QAM AC     [START ON 5/10/2017] amLODIPine  5 mg Oral At Bedtime     potassium chloride  20 mEq Oral BID     [START ON 5/10/2017] methylPREDNISolone  100 mg Intravenous Once     OLANZapine zydis  5 mg Oral BID     acyclovir (ZOVIRAX) capsule 400 mg  400 mg Oral BID     clopidogrel  75 mg Oral Daily     midodrine  2.5 mg Oral TID     simvastatin (ZOCOR) tablet 20 mg  20 mg Oral At Bedtime       Data   Results for orders placed or performed during the hospital encounter of 05/07/17 (from the past 24 hour(s))   XR Chest Port 1 View    Narrative    Exam: XR CHEST PORT 1 VW, 5/8/2017 3:50 PM    Indication: Recent cough/URI x 2 weeks    Comparison: Radiograph of the chest 12/23/2016 and 4/11/2016.    Findings:   There is a right IJ catheter, which is  accessed, with the tip over the  atriocaval junction. The cardiac silhouette is enlarged. There is a  left basilar opacity and possible retrocardiac opacity. The right lung  is clear. The upper abdomen is unremarkable. No evidence of  pneumothorax.      Impression    Impression: There is a left lung base pulmonary opacity, which could  represent infection, atelectasis, or aspiration.    I have personally reviewed the examination and initial interpretation  and I agree with the findings.    LUX BUENO MD   Basic metabolic panel   Result Value Ref Range    Sodium 142 133 - 144 mmol/L    Potassium 3.6 3.4 - 5.3 mmol/L    Chloride 110 (H) 94 - 109 mmol/L    Carbon Dioxide 21 20 - 32 mmol/L    Anion Gap 11 3 - 14 mmol/L    Glucose 123 (H) 70 - 99 mg/dL    Urea Nitrogen 34 (H) 7 - 30 mg/dL    Creatinine 1.15 0.66 - 1.25 mg/dL    GFR Estimate 62 >60 mL/min/1.7m2    GFR Estimate If Black 75 >60 mL/min/1.7m2    Calcium 8.7 8.5 - 10.1 mg/dL   CBC with platelets differential   Result Value Ref Range    WBC 2.6 (L) 4.0 - 11.0 10e9/L    RBC Count 2.91 (L) 4.4 - 5.9 10e12/L    Hemoglobin 8.9 (L) 13.3 - 17.7 g/dL    Hematocrit 28.4 (L) 40.0 - 53.0 %    MCV 98 78 - 100 fl    MCH 30.6 26.5 - 33.0 pg    MCHC 31.3 (L) 31.5 - 36.5 g/dL    RDW 17.8 (H) 10.0 - 15.0 %    Platelet Count 146 (L) 150 - 450 10e9/L    Diff Method Automated Method     % Neutrophils 79.8 %    % Lymphocytes 8.9 %    % Monocytes 10.5 %    % Eosinophils 0.0 %    % Basophils 0.4 %    % Immature Granulocytes 0.4 %    Nucleated RBCs 0 0 /100    Absolute Neutrophil 2.1 1.6 - 8.3 10e9/L    Absolute Lymphocytes 0.2 (L) 0.8 - 5.3 10e9/L    Absolute Monocytes 0.3 0.0 - 1.3 10e9/L    Absolute Eosinophils 0.0 0.0 - 0.7 10e9/L    Absolute Basophils 0.0 0.0 - 0.2 10e9/L    Abs Immature Granulocytes 0.0 0 - 0.4 10e9/L    Absolute Nucleated RBC 0.0    INR   Result Value Ref Range    INR 1.21 (H) 0.86 - 1.14   Uric acid   Result Value Ref Range    Uric Acid 2.8 (L) 3.5 - 7.2  mg/dL   Hepatic panel   Result Value Ref Range    Bilirubin Direct 0.1 0.0 - 0.2 mg/dL    Bilirubin Total 0.4 0.2 - 1.3 mg/dL    Albumin 2.8 (L) 3.4 - 5.0 g/dL    Protein Total 7.3 6.8 - 8.8 g/dL    Alkaline Phosphatase 48 40 - 150 U/L    ALT 12 0 - 70 U/L    AST 15 0 - 45 U/L   Immunoglobulins A G and M   Result Value Ref Range     (L) 695 - 1620 mg/dL    IGA <7  Add on Order   (L) 70 - 380 mg/dL    IGM 2960 (H) 60 - 265 mg/dL     Patient has been seen, examined and evaluated by me independently. I have reviewed today's vital signs, medications, labs and imaging results. I have discussed the plan with the patient . He is much clear today    PE  Alert, oriented  In no acute distress  Vitals are stable  /70 (BP Location: Left arm)  Pulse 60  Temp 97.5  F (36.4  C) (Oral)  Resp 16  Wt 58.2 kg (128 lb 6.4 oz)  SpO2 96%  BMI 22.04 kg/m2    Lung sounds crackle in base  CVS R,R  abd soft    Lab Results   Component Value Date    WBC 2.6 05/09/2017     Lab Results   Component Value Date    RBC 2.91 05/09/2017     Lab Results   Component Value Date    HGB 8.9 05/09/2017     Lab Results   Component Value Date    HCT 28.4 05/09/2017     No components found for: MCT  Lab Results   Component Value Date    MCV 98 05/09/2017     Lab Results   Component Value Date    MCH 30.6 05/09/2017     Lab Results   Component Value Date    MCHC 31.3 05/09/2017     Lab Results   Component Value Date    RDW 17.8 05/09/2017     Lab Results   Component Value Date     05/09/2017       Last Basic Metabolic Panel:  Lab Results   Component Value Date     05/09/2017      Lab Results   Component Value Date    POTASSIUM 3.6 05/09/2017     Lab Results   Component Value Date    CHLORIDE 110 05/09/2017     Lab Results   Component Value Date    JAZMIN 8.7 05/09/2017     Lab Results   Component Value Date    CO2 21 05/09/2017     Lab Results   Component Value Date    BUN 34 05/09/2017     Lab Results   Component Value Date    CR  1.15 05/09/2017     Lab Results   Component Value Date     05/09/2017       He is more alert today    His calcium, uric acid, renal dysfunction are all improved (pamidronate, rasburicase, aggressive IVF); Cont     Viscosity is <4, and his MS is better so no plasmapheresis    Pneumonia, Cont zosyn    Cont Prednisone IV, but consider adding more myeloma therapy (not very myelosuppressive given infection now) with discussion .    Overall much improved.    Nora Zurita MD

## 2017-05-09 NOTE — PLAN OF CARE
Problem: Goal Outcome Summary  Goal: Goal Outcome Summary  Outcome: No Change  AVSS. Oriented to self only, but more agreeable this shift. No hostile behaviors noted. Sitter at bedside. Increasing restlessness noted this evening. Pt complains of back pain but declines intervention. Heating pad available at bedside. Up with 1A and walker. Pt taking small amounts of fluids. Sleeping on and off throughout shift. Continue to monitor, following plan of care.

## 2017-05-09 NOTE — PLAN OF CARE
Problem: Goal Outcome Summary  Goal: Goal Outcome Summary  Outcome: No Change  Vitals stable.  Mental status has cleared and we are no longer using the bedside attendant.  Tylenol once for bilateral mid-back pain.  Reports no pain at rest but does grimace with activity.  Treating pneumonia with zosyn.  Wife at bedside most of the day.      Problem: Confusion, Acute (Adult)  Goal: Identify Related Risk Factors and Signs and Symptoms  Related risk factors and signs and symptoms are identified upon initiation of Human Response Clinical Practice Guideline (CPG)   Outcome: Improving    05/09/17 2363   Confusion, Acute   Related Risk Factors (Acute Confusion) advanced age;dehydration;infection   Signs and Symptoms (Acute Confusion) other (see comments)

## 2017-05-09 NOTE — PLAN OF CARE
"Problem: Goal Outcome Summary  Goal: Goal Outcome Summary  Outcome: No Change  Pt sleeping during night. Awakens with cares then sleeps again. Denies complaints, pain or nausea. When asked if he needed anything he stated \"you've already done everything.\" Sitter at bedside due to confusion. Pt alert and orientated, confused about time but able to answer questions appropriately. Up with assist of one. NS 175ml/hour to port. No replacements needed this am. Hourly rounding done. Will continue to monitor.     Problem: Confusion, Acute (Adult)  Goal: Identify Related Risk Factors and Signs and Symptoms  Related risk factors and signs and symptoms are identified upon initiation of Human Response Clinical Practice Guideline (CPG)     05/09/17 0623   Confusion, Acute   Related Risk Factors (Acute Confusion) dehydration;cognitive impairment;neurological impairment;advanced age;pain   Signs and Symptoms (Acute Confusion) disorientation;memory disturbed           "

## 2017-05-10 ENCOUNTER — APPOINTMENT (OUTPATIENT)
Dept: GENERAL RADIOLOGY | Facility: CLINIC | Age: 72
DRG: 840 | End: 2017-05-10
Attending: PHYSICIAN ASSISTANT
Payer: MEDICARE

## 2017-05-10 LAB
ALBUMIN SERPL-MCNC: 2.6 G/DL (ref 3.4–5)
ALP SERPL-CCNC: 44 U/L (ref 40–150)
ALT SERPL W P-5'-P-CCNC: 14 U/L (ref 0–70)
ANION GAP SERPL CALCULATED.3IONS-SCNC: 8 MMOL/L (ref 3–14)
AST SERPL W P-5'-P-CCNC: 14 U/L (ref 0–45)
BASOPHILS # BLD AUTO: 0 10E9/L (ref 0–0.2)
BASOPHILS NFR BLD AUTO: 0.7 %
BILIRUB SERPL-MCNC: 0.3 MG/DL (ref 0.2–1.3)
BUN SERPL-MCNC: 27 MG/DL (ref 7–30)
CALCIUM SERPL-MCNC: 8 MG/DL (ref 8.5–10.1)
CHLORIDE SERPL-SCNC: 115 MMOL/L (ref 94–109)
CO2 SERPL-SCNC: 22 MMOL/L (ref 20–32)
CREAT SERPL-MCNC: 1.09 MG/DL (ref 0.66–1.25)
DIFFERENTIAL METHOD BLD: ABNORMAL
EOSINOPHIL # BLD AUTO: 0 10E9/L (ref 0–0.7)
EOSINOPHIL NFR BLD AUTO: 0 %
ERYTHROCYTE [DISTWIDTH] IN BLOOD BY AUTOMATED COUNT: 18.1 % (ref 10–15)
FLUAV H1 2009 PAND RNA SPEC QL NAA+PROBE: NEGATIVE
FLUAV H1 RNA SPEC QL NAA+PROBE: NEGATIVE
FLUAV H3 RNA SPEC QL NAA+PROBE: NEGATIVE
FLUAV RNA SPEC QL NAA+PROBE: NEGATIVE
FLUBV RNA SPEC QL NAA+PROBE: NEGATIVE
GFR SERPL CREATININE-BSD FRML MDRD: 66 ML/MIN/1.7M2
GLUCOSE SERPL-MCNC: 84 MG/DL (ref 70–99)
HADV DNA SPEC QL NAA+PROBE: NEGATIVE
HADV DNA SPEC QL NAA+PROBE: NEGATIVE
HCT VFR BLD AUTO: 27.6 % (ref 40–53)
HGB BLD-MCNC: 8.5 G/DL (ref 13.3–17.7)
HMPV RNA SPEC QL NAA+PROBE: NEGATIVE
HPIV1 RNA SPEC QL NAA+PROBE: NEGATIVE
HPIV2 RNA SPEC QL NAA+PROBE: NEGATIVE
HPIV3 RNA SPEC QL NAA+PROBE: NEGATIVE
IMM GRANULOCYTES # BLD: 0 10E9/L (ref 0–0.4)
IMM GRANULOCYTES NFR BLD: 0.3 %
INR PPP: 1.2 (ref 0.86–1.14)
INTERPRETATION ECG - MUSE: NORMAL
LYMPHOCYTES # BLD AUTO: 0.3 10E9/L (ref 0.8–5.3)
LYMPHOCYTES NFR BLD AUTO: 9.6 %
MCH RBC QN AUTO: 30.7 PG (ref 26.5–33)
MCHC RBC AUTO-ENTMCNC: 30.8 G/DL (ref 31.5–36.5)
MCV RBC AUTO: 100 FL (ref 78–100)
MICROBIOLOGIST REVIEW: NORMAL
MONOCYTES # BLD AUTO: 0.3 10E9/L (ref 0–1.3)
MONOCYTES NFR BLD AUTO: 11 %
NEUTROPHILS # BLD AUTO: 2.3 10E9/L (ref 1.6–8.3)
NEUTROPHILS NFR BLD AUTO: 78.4 %
NRBC # BLD AUTO: 0 10*3/UL
NRBC BLD AUTO-RTO: 0 /100
PLATELET # BLD AUTO: 153 10E9/L (ref 150–450)
POTASSIUM SERPL-SCNC: 3.5 MMOL/L (ref 3.4–5.3)
PROT SERPL-MCNC: 7 G/DL (ref 6.8–8.8)
RBC # BLD AUTO: 2.77 10E12/L (ref 4.4–5.9)
RHINOVIRUS RNA SPEC QL NAA+PROBE: NEGATIVE
RSV RNA SPEC QL NAA+PROBE: NEGATIVE
RSV RNA SPEC QL NAA+PROBE: NEGATIVE
SODIUM SERPL-SCNC: 145 MMOL/L (ref 133–144)
SPECIMEN SOURCE: NORMAL
URATE SERPL-MCNC: <0.2 MG/DL (ref 3.5–7.2)
WBC # BLD AUTO: 2.9 10E9/L (ref 4–11)

## 2017-05-10 PROCEDURE — 80053 COMPREHEN METABOLIC PANEL: CPT | Performed by: INTERNAL MEDICINE

## 2017-05-10 PROCEDURE — 85610 PROTHROMBIN TIME: CPT | Performed by: INTERNAL MEDICINE

## 2017-05-10 PROCEDURE — 25000132 ZZH RX MED GY IP 250 OP 250 PS 637: Mod: GY | Performed by: INTERNAL MEDICINE

## 2017-05-10 PROCEDURE — 25000132 ZZH RX MED GY IP 250 OP 250 PS 637: Mod: GY | Performed by: PHYSICIAN ASSISTANT

## 2017-05-10 PROCEDURE — 20000002 ZZH R&B BMT INTERMEDIATE

## 2017-05-10 PROCEDURE — A9270 NON-COVERED ITEM OR SERVICE: HCPCS | Mod: GY | Performed by: PHYSICIAN ASSISTANT

## 2017-05-10 PROCEDURE — 71101 X-RAY EXAM UNILAT RIBS/CHEST: CPT | Mod: RT

## 2017-05-10 PROCEDURE — 25000128 H RX IP 250 OP 636: Performed by: INTERNAL MEDICINE

## 2017-05-10 PROCEDURE — A9270 NON-COVERED ITEM OR SERVICE: HCPCS | Mod: GY | Performed by: INTERNAL MEDICINE

## 2017-05-10 PROCEDURE — 25000128 H RX IP 250 OP 636: Performed by: STUDENT IN AN ORGANIZED HEALTH CARE EDUCATION/TRAINING PROGRAM

## 2017-05-10 PROCEDURE — 85025 COMPLETE CBC W/AUTO DIFF WBC: CPT | Performed by: INTERNAL MEDICINE

## 2017-05-10 PROCEDURE — 84550 ASSAY OF BLOOD/URIC ACID: CPT | Performed by: INTERNAL MEDICINE

## 2017-05-10 PROCEDURE — 25000128 H RX IP 250 OP 636: Performed by: PHYSICIAN ASSISTANT

## 2017-05-10 RX ORDER — OLANZAPINE 5 MG/1
5 TABLET, ORALLY DISINTEGRATING ORAL 2 TIMES DAILY
Status: COMPLETED | OUTPATIENT
Start: 2017-05-10 | End: 2017-05-10

## 2017-05-10 RX ORDER — HYDROMORPHONE HYDROCHLORIDE 1 MG/ML
.3-.5 INJECTION, SOLUTION INTRAMUSCULAR; INTRAVENOUS; SUBCUTANEOUS EVERY 4 HOURS PRN
Status: DISCONTINUED | OUTPATIENT
Start: 2017-05-10 | End: 2017-05-15

## 2017-05-10 RX ORDER — LIDOCAINE 50 MG/G
3 PATCH TOPICAL
Status: DISCONTINUED | OUTPATIENT
Start: 2017-05-10 | End: 2017-05-16 | Stop reason: HOSPADM

## 2017-05-10 RX ORDER — LEVOFLOXACIN 750 MG/1
750 TABLET, FILM COATED ORAL DAILY
Status: COMPLETED | OUTPATIENT
Start: 2017-05-10 | End: 2017-05-14

## 2017-05-10 RX ORDER — DEXAMETHASONE 4 MG/1
40 TABLET ORAL DAILY
Status: COMPLETED | OUTPATIENT
Start: 2017-05-11 | End: 2017-05-13

## 2017-05-10 RX ORDER — HYDROMORPHONE HYDROCHLORIDE 1 MG/ML
.5-1 INJECTION, SOLUTION INTRAMUSCULAR; INTRAVENOUS; SUBCUTANEOUS ONCE
Status: DISCONTINUED | OUTPATIENT
Start: 2017-05-10 | End: 2017-05-11

## 2017-05-10 RX ADMIN — OLANZAPINE 5 MG: 5 TABLET, ORALLY DISINTEGRATING ORAL at 08:45

## 2017-05-10 RX ADMIN — ACYCLOVIR 400 MG: 200 CAPSULE ORAL at 19:22

## 2017-05-10 RX ADMIN — LIDOCAINE 1 PATCH: 50 PATCH TOPICAL at 14:08

## 2017-05-10 RX ADMIN — METHYLPREDNISOLONE SODIUM SUCCINATE 100 MG: 125 INJECTION, POWDER, LYOPHILIZED, FOR SOLUTION INTRAMUSCULAR; INTRAVENOUS at 06:07

## 2017-05-10 RX ADMIN — POTASSIUM CHLORIDE 20 MEQ: 750 TABLET, EXTENDED RELEASE ORAL at 08:44

## 2017-05-10 RX ADMIN — ACETAMINOPHEN 650 MG: 325 TABLET, FILM COATED ORAL at 19:22

## 2017-05-10 RX ADMIN — PIPERACILLIN SODIUM,TAZOBACTAM SODIUM 3.38 G: 3; .375 INJECTION, POWDER, FOR SOLUTION INTRAVENOUS at 10:08

## 2017-05-10 RX ADMIN — DICLOFENAC SODIUM 2 G: 10 GEL TOPICAL at 19:29

## 2017-05-10 RX ADMIN — SIMVASTATIN 20 MG: 20 TABLET, FILM COATED ORAL at 19:23

## 2017-05-10 RX ADMIN — PIPERACILLIN SODIUM,TAZOBACTAM SODIUM 3.38 G: 3; .375 INJECTION, POWDER, FOR SOLUTION INTRAVENOUS at 04:18

## 2017-05-10 RX ADMIN — ESOMEPRAZOLE MAGNESIUM 40 MG: 20 CAPSULE, DELAYED RELEASE PELLETS ORAL at 08:43

## 2017-05-10 RX ADMIN — OLANZAPINE 5 MG: 5 TABLET, ORALLY DISINTEGRATING ORAL at 19:22

## 2017-05-10 RX ADMIN — LEVOFLOXACIN 750 MG: 750 TABLET, FILM COATED ORAL at 13:09

## 2017-05-10 RX ADMIN — HYDROMORPHONE HYDROCHLORIDE 1 MG: 2 TABLET ORAL at 04:47

## 2017-05-10 RX ADMIN — CARBIDOPA AND LEVODOPA 2.5 MG: 50; 200 TABLET, EXTENDED RELEASE ORAL at 13:09

## 2017-05-10 RX ADMIN — CLOPIDOGREL 75 MG: 75 TABLET, FILM COATED ORAL at 08:44

## 2017-05-10 RX ADMIN — SODIUM CHLORIDE: 9 INJECTION, SOLUTION INTRAVENOUS at 08:50

## 2017-05-10 RX ADMIN — HYDROMORPHONE HYDROCHLORIDE 2 MG: 2 TABLET ORAL at 08:40

## 2017-05-10 RX ADMIN — AMLODIPINE BESYLATE 5 MG: 5 TABLET ORAL at 19:23

## 2017-05-10 RX ADMIN — ACETAMINOPHEN 1000 MG: 325 TABLET, FILM COATED ORAL at 09:31

## 2017-05-10 RX ADMIN — ACYCLOVIR 400 MG: 200 CAPSULE ORAL at 08:44

## 2017-05-10 RX ADMIN — CARBIDOPA AND LEVODOPA 2.5 MG: 50; 200 TABLET, EXTENDED RELEASE ORAL at 08:44

## 2017-05-10 RX ADMIN — POTASSIUM CHLORIDE 20 MEQ: 750 TABLET, EXTENDED RELEASE ORAL at 19:23

## 2017-05-10 ASSESSMENT — PAIN DESCRIPTION - DESCRIPTORS
DESCRIPTORS: ACHING;RADIATING;SHARP
DESCRIPTORS: ACHING;SHARP

## 2017-05-10 NOTE — PLAN OF CARE
"Problem: Goal Outcome Summary  Goal: Goal Outcome Summary  Outcome: No Change  Pt alert and able to answer orientation questions this shift. More aware of situation overnight but remains off at times. Pt using call light but not always, bed alarm on. Pt did pull out port during start of shift, he told staff that it \"got stuck and came out\". IV line was broken and port needle had been taken out by patient. Pt did not call staff with incident. Pt let staff place new needle and has not removed. No agitation this shift. VSS. Pt very pleasant and thanking staff throughout night. Up until about 1 am as unable to sleep. Pt reporting back/abdominal pain this shift. Tylenol given once with no relief. Dilaudid given twice, pt reports this helps pain. Pt up with assist of one and walker to bathroom. IVF continue at 175 ml/hour. Continues zosyn for pneumonia. Hourly rounding done. Will continue to monitor.     Problem: Confusion, Acute (Adult)  Goal: Identify Related Risk Factors and Signs and Symptoms  Related risk factors and signs and symptoms are identified upon initiation of Human Response Clinical Practice Guideline (CPG)     05/10/17 0631   Confusion, Acute   Related Risk Factors (Acute Confusion) advanced age;dehydration;infection   Signs and Symptoms (Acute Confusion) disorientation  (off at times but orientated enough to answer questions)           "

## 2017-05-10 NOTE — PLAN OF CARE
Problem: Goal Outcome Summary  Goal: Goal Outcome Summary  Afebrile,vital stable,alert and oriented x3 confusion to situation.Complained of severe  Right lower back pain,not relieved with prn 2 mg po dilaudid and  1000 mg tylenol per pt,PA was contacted and came to assess pt,dilaudid iv x1 ordered in addition to prn dilaudid IV every 4hrs,pt declined prn IV dilaudid when offered,said pain was already relieved at that time,rib x ray ordered,this is yet to be done,Voltaren jell and Lidoderm patch  for back ordered,PT/OT consult made.Pt reported feeling comfortable at this time.Appetite fair.IV antibiotic switched to po.Up with standby assist and walker.Continue to monitor per plan of care.

## 2017-05-10 NOTE — PROGRESS NOTES
[facility], [place]  Hematology / Oncology Progress Note     Assessment & Plan   Anthony Carbone is a 71 year old male with a history of relapsed multiple myeloma admitted on 5/7/2017 with back pain, confusion, and acute kidney injury.     1. NEURO  #Altered mental status.  #Weakness.  #Tremor.  - Unclear etiology. Suspect infection vs dehydration vs metabolic derangement vs worsening myeloma vs other (overall, likely multifactorial). Per wife, has been getting weaker over past several days but confusion suddenly came on in PM of 5/7 while on airplane back from trip to [place]. Continued improvement noted today, AMS nearly resolved.  -Infectious workup with BC, UC, CXR, RVP - BC, UC negative thus far. RVP pending. CXR with possible pneumonia   -Empirically started on Zosyn, will continue  -Discussed with Dr. Bowie of Palliative Care, appreciate assistance. Zyprexa 5mg BID with Haldol 5mg every 4 hours PRN agitation (Qtc 5/8 431). Discussed this plan with patient's wife Casey and she was in agreement with this strategy due to patient not being cooperative with cares prior to medication administration. Will hold tomorrow AM dose of Zyprexa given significant improvement after treating underlying medical issues as outlined below and in anticipation of discharge in next 1-2 days.  -Delirium precautions  -No plasmapheresis needed at this time    #History of subacute CVA. Continue home Plavix.    2. RENAL  #Acute kidney injury  -Unclear etiology. Creatinine on 4/20/17 was 0.80. Wife reports patient was taking 1200-2400mg of ibuprofen daily for at least the past 2 weeks. Also with suspected progression of myeloma.  -SCr continues to improve s/p IV hydration. Avoid NSAIDs in future. Reduce IV fluid rate and push PO fluids as tolerated.    #Hypercalcemia - RESOLVED  -Reduce IV fluid rate  -Pamidronate 60mg x 1 given 5/8    #Hyperuricemia associated with malignancy - RESOLVED  -Reduce IV fluid  rate  -Rasburicase 3mg x 1 given 5/8 to treat hyperuricemia (not tumor lysis syndrome)    3. ID  #Possible infection - suspected pneumonia  -Per wife and review of notes, patient was experiencing cough and cold symptoms for at least the past 2 weeks PTA. Was being treated with doxycycline and promethazine + codeine cough syrup.  -ID workup as above  -Transition to oral Levaquin 750mg daily to complete 7 days of therapy.  -Continue acyclovir 400mg BID for prophylaxis    4. HEME/ONC  #Multiple myeloma  -Followed by Dr. Topete. Most recently being treated in [place]. Was on daratumumab/pomalidomide/dexamethasone. Had been on hold for past couple of weeks d/t illness.  -Labs pending  -Solumedrol 100mg IV 5/8, 5/9 and 5/10. Aware that this can worsen mental status, but feel benefit outweighs risk at this time and treating delirium as above. May also help with pain. Will transition tomorrow to oral dexamethasone at 40mg daily x 3 days per recs from Dr. Topete with planned close outpatient follow up.  -Viscosity index = 2.3. No plasmapheresis needed.    #Cancer-related pain  -Per wife, has been going on for about 2 weeks. Consider imaging. MRI lumbar spine done 4/24 at OSH in [place] reviewed, copy made to be scanned into chart: mild-to-moderate compression deformities T9-T11, degenerative changes in lumbar spine, mild central and foraminal stenoses in lumbar spine. Additional evaluation indicates pain is actually located over rib cage. Reported significant pain while coughing recently, ? Rib fracture. Add lidocaine patch, Voltaren gel (follow SCr if using this as some systemic absorption possible).  -Try Tylenol first d/t delirium. Dilaudid ordered PRN severe pain if unable to control with Tylenol.  -Also receiving steroids as above.    5. CV  #Hypertension. Continue home Norvasc.  #Hyperlipidemia. Continue home Zocor.    6. GI  #GERD. Continue home Nexium.    FEN  -MIVF with NS at 50ml/hr  -Electrolyte replacement PRN per  protocol  -Regular diet as tolerated. PO intake much improved today.    Code status: FULL    Patient discussed with staff attending Dr. Gregory.    Brittney Armstrong PA-C  Hematology/Oncology  Pager: [phone]    Interval History   Mr. Carbone is much more alert today and reports overall feeling better. He is having more pain to his R side (starts in front and goes into his back) now that he is up and moving more. This is not new from previous pain which he has been experiencing for the past few weeks since he started getting sick. It hurts to take a deep breath. He feels his cough is now a lot better. His appetite has improved. Wife at bedside and notes his overall mental status and personality seems to be back to normal. Yamil denies any chest pain or shortness of breath. Bowels are regular. Urinating normally.    Physical Exam   Temp: 97.6  F (36.4  C) Temp src: Axillary BP: 156/74 Pulse: (!) 45 Heart Rate: 45 Resp: 16 SpO2: 95 % O2 Device: None (Room air)    Vitals:    05/07/17 1823 05/10/17 0754   Weight: 58.2 kg (128 lb 6.4 oz) 61.9 kg (136 lb 6.4 oz)     Vital Signs with Ranges  Temp:  [96.2  F (35.7  C)-98.3  F (36.8  C)] 97.6  F (36.4  C)  Pulse:  [45-46] 45  Heart Rate:  [45-53] 45  Resp:  [16] 16  BP: (131-156)/(64-74) 156/74  SpO2:  [94 %-97 %] 95 %  I/O last 3 completed shifts:  In: 4680 [P.O.:880; I.V.:3800]  Out: 3400 [Urine:3400]    Constitutional: Adult male who appears stated age seen lying in bed, no acute distress.   Respiratory: No increased work of breathing, good air exchange, lungs clear to auscultation.  Cardiovascular: Regular rate and rhythm, no obvious murmurs.  Abdominal: + bowel sounds. Soft, non-tender, non-distended.  Skin: Contusions noted to extremities.   Musculoskeletal: No LE edema. R ribs tender to palpation.  Neurologic: Alert and oriented x 3. No tremor today. Memory much improved. Much more cooperative.     Medications     NaCl 50 mL/hr at 05/10/17 1159     - MEDICATION  INSTRUCTIONS -         HYDROmorphone  0.5-1 mg Intravenous Once     lidocaine  3 patch Transdermal Q24H     lidocaine   Transdermal Q24h     lidocaine   Transdermal Q8H     levofloxacin  750 mg Oral Daily     esomeprazole  40 mg Oral QAM AC     amLODIPine  5 mg Oral At Bedtime     potassium chloride  20 mEq Oral BID     OLANZapine zydis  5 mg Oral BID     acyclovir (ZOVIRAX) capsule 400 mg  400 mg Oral BID     clopidogrel  75 mg Oral Daily     midodrine  2.5 mg Oral TID     simvastatin (ZOCOR) tablet 20 mg  20 mg Oral At Bedtime       Data   Results for orders placed or performed during the hospital encounter of 05/07/17 (from the past 24 hour(s))   CBC with platelets differential   Result Value Ref Range    WBC 2.9 (L) 4.0 - 11.0 10e9/L    RBC Count 2.77 (L) 4.4 - 5.9 10e12/L    Hemoglobin 8.5 (L) 13.3 - 17.7 g/dL    Hematocrit 27.6 (L) 40.0 - 53.0 %     78 - 100 fl    MCH 30.7 26.5 - 33.0 pg    MCHC 30.8 (L) 31.5 - 36.5 g/dL    RDW 18.1 (H) 10.0 - 15.0 %    Platelet Count 153 150 - 450 10e9/L    Diff Method Automated Method     % Neutrophils 78.4 %    % Lymphocytes 9.6 %    % Monocytes 11.0 %    % Eosinophils 0.0 %    % Basophils 0.7 %    % Immature Granulocytes 0.3 %    Nucleated RBCs 0 0 /100    Absolute Neutrophil 2.3 1.6 - 8.3 10e9/L    Absolute Lymphocytes 0.3 (L) 0.8 - 5.3 10e9/L    Absolute Monocytes 0.3 0.0 - 1.3 10e9/L    Absolute Eosinophils 0.0 0.0 - 0.7 10e9/L    Absolute Basophils 0.0 0.0 - 0.2 10e9/L    Abs Immature Granulocytes 0.0 0 - 0.4 10e9/L    Absolute Nucleated RBC 0.0    INR   Result Value Ref Range    INR 1.20 (H) 0.86 - 1.14   Uric acid   Result Value Ref Range    Uric Acid <0.2 (L) 3.5 - 7.2 mg/dL   Comprehensive metabolic panel   Result Value Ref Range    Sodium 145 (H) 133 - 144 mmol/L    Potassium 3.5 3.4 - 5.3 mmol/L    Chloride 115 (H) 94 - 109 mmol/L    Carbon Dioxide 22 20 - 32 mmol/L    Anion Gap 8 3 - 14 mmol/L    Glucose 84 70 - 99 mg/dL    Urea Nitrogen 27 7 - 30 mg/dL     Creatinine 1.09 0.66 - 1.25 mg/dL    GFR Estimate 66 >60 mL/min/1.7m2    GFR Estimate If Black 80 >60 mL/min/1.7m2    Calcium 8.0 (L) 8.5 - 10.1 mg/dL    Bilirubin Total 0.3 0.2 - 1.3 mg/dL    Albumin 2.6 (L) 3.4 - 5.0 g/dL    Protein Total 7.0 6.8 - 8.8 g/dL    Alkaline Phosphatase 44 40 - 150 U/L    ALT 14 0 - 70 U/L    AST 14 0 - 45 U/L     ATTENDING ADDENDUM:    I have independently seen and evaluated the patient on May 10, 2017 and reviewed clinical, laboratory, and radiographic findings. I have discussed the plan with the team and agree with the attached note with the following edits:    Anthony Carbone is a 71 year old year old male, with R/R IgM MM here for AMS, possible PNA, and rapid progression of myeloma, hyperCa, high IgM. S/p bisphosphanate and hydration, and improved. Some musculoskeletal pain around the ribs.     Ph/E: Vitals reviewed. No distress. Oropharynx clear. Neck supple. Heart RRR. Lungs clear. Abdomen soft. No peripheral edema. No rash. Neuro nonfocal.     Plan: Acute issues resolved, switch to Leva and complete the ABx course for possible PNA, Dex daily x3 and home sooner or after the doses. Dr. Topete in agreement. Next plan to be discussed in clinic. If pain continues, check cardiac enzymes especially given the bradycardia which could mean right-sided CAD.       HYDROmorphone  0.5-1 mg Intravenous Once     lidocaine  3 patch Transdermal Q24H     lidocaine   Transdermal Q24h     lidocaine   Transdermal Q8H     levofloxacin  750 mg Oral Daily     [START ON 5/11/2017] dexamethasone  40 mg Oral Daily     esomeprazole  40 mg Oral QAM AC     amLODIPine  5 mg Oral At Bedtime     potassium chloride  20 mEq Oral BID     acyclovir (ZOVIRAX) capsule 400 mg  400 mg Oral BID     clopidogrel  75 mg Oral Daily     midodrine  2.5 mg Oral TID     simvastatin (ZOCOR) tablet 20 mg  20 mg Oral At Bedtime       Parmjit Gregory

## 2017-05-11 ENCOUNTER — APPOINTMENT (OUTPATIENT)
Dept: OCCUPATIONAL THERAPY | Facility: CLINIC | Age: 72
DRG: 840 | End: 2017-05-11
Attending: PHYSICIAN ASSISTANT
Payer: MEDICARE

## 2017-05-11 ENCOUNTER — APPOINTMENT (OUTPATIENT)
Dept: PHYSICAL THERAPY | Facility: CLINIC | Age: 72
DRG: 840 | End: 2017-05-11
Attending: PHYSICIAN ASSISTANT
Payer: MEDICARE

## 2017-05-11 ENCOUNTER — APPOINTMENT (OUTPATIENT)
Dept: GENERAL RADIOLOGY | Facility: CLINIC | Age: 72
DRG: 840 | End: 2017-05-11
Attending: PHYSICIAN ASSISTANT
Payer: MEDICARE

## 2017-05-11 LAB
ALBUMIN SERPL-MCNC: 2.6 G/DL (ref 3.4–5)
ALBUMIN SERPL-MCNC: 2.8 G/DL (ref 3.4–5)
ALP SERPL-CCNC: 42 U/L (ref 40–150)
ALP SERPL-CCNC: 44 U/L (ref 40–150)
ALT SERPL W P-5'-P-CCNC: 16 U/L (ref 0–70)
ALT SERPL W P-5'-P-CCNC: 17 U/L (ref 0–70)
ANION GAP SERPL CALCULATED.3IONS-SCNC: 8 MMOL/L (ref 3–14)
ANION GAP SERPL CALCULATED.3IONS-SCNC: 9 MMOL/L (ref 3–14)
AST SERPL W P-5'-P-CCNC: 13 U/L (ref 0–45)
AST SERPL W P-5'-P-CCNC: 15 U/L (ref 0–45)
BASOPHILS # BLD AUTO: 0 10E9/L (ref 0–0.2)
BASOPHILS # BLD AUTO: 0 10E9/L (ref 0–0.2)
BASOPHILS NFR BLD AUTO: 0.9 %
BASOPHILS NFR BLD AUTO: 1.2 %
BILIRUB SERPL-MCNC: 0.3 MG/DL (ref 0.2–1.3)
BILIRUB SERPL-MCNC: 0.4 MG/DL (ref 0.2–1.3)
BUN SERPL-MCNC: 16 MG/DL (ref 7–30)
BUN SERPL-MCNC: 17 MG/DL (ref 7–30)
CALCIUM SERPL-MCNC: 7.8 MG/DL (ref 8.5–10.1)
CALCIUM SERPL-MCNC: 7.8 MG/DL (ref 8.5–10.1)
CHLORIDE SERPL-SCNC: 110 MMOL/L (ref 94–109)
CHLORIDE SERPL-SCNC: 116 MMOL/L (ref 94–109)
CK SERPL-CCNC: 137 U/L (ref 30–300)
CK SERPL-CCNC: 154 U/L (ref 30–300)
CO2 SERPL-SCNC: 23 MMOL/L (ref 20–32)
CO2 SERPL-SCNC: 24 MMOL/L (ref 20–32)
CREAT SERPL-MCNC: 0.9 MG/DL (ref 0.66–1.25)
CREAT SERPL-MCNC: 0.94 MG/DL (ref 0.66–1.25)
DIFFERENTIAL METHOD BLD: ABNORMAL
DIFFERENTIAL METHOD BLD: ABNORMAL
EOSINOPHIL # BLD AUTO: 0 10E9/L (ref 0–0.7)
EOSINOPHIL # BLD AUTO: 0 10E9/L (ref 0–0.7)
EOSINOPHIL NFR BLD AUTO: 0 %
EOSINOPHIL NFR BLD AUTO: 0.3 %
ERYTHROCYTE [DISTWIDTH] IN BLOOD BY AUTOMATED COUNT: 17.9 % (ref 10–15)
ERYTHROCYTE [DISTWIDTH] IN BLOOD BY AUTOMATED COUNT: 18 % (ref 10–15)
GFR SERPL CREATININE-BSD FRML MDRD: 78 ML/MIN/1.7M2
GFR SERPL CREATININE-BSD FRML MDRD: 83 ML/MIN/1.7M2
GLUCOSE SERPL-MCNC: 141 MG/DL (ref 70–99)
GLUCOSE SERPL-MCNC: 79 MG/DL (ref 70–99)
HCT VFR BLD AUTO: 28.2 % (ref 40–53)
HCT VFR BLD AUTO: 30.3 % (ref 40–53)
HGB BLD-MCNC: 8.7 G/DL (ref 13.3–17.7)
HGB BLD-MCNC: 9.3 G/DL (ref 13.3–17.7)
IMM GRANULOCYTES # BLD: 0 10E9/L (ref 0–0.4)
IMM GRANULOCYTES # BLD: 0 10E9/L (ref 0–0.4)
IMM GRANULOCYTES NFR BLD: 0.6 %
IMM GRANULOCYTES NFR BLD: 0.9 %
INR PPP: 1.23 (ref 0.86–1.14)
LYMPHOCYTES # BLD AUTO: 0.1 10E9/L (ref 0.8–5.3)
LYMPHOCYTES # BLD AUTO: 0.2 10E9/L (ref 0.8–5.3)
LYMPHOCYTES NFR BLD AUTO: 3.2 %
LYMPHOCYTES NFR BLD AUTO: 6.3 %
MCH RBC QN AUTO: 30.4 PG (ref 26.5–33)
MCH RBC QN AUTO: 30.5 PG (ref 26.5–33)
MCHC RBC AUTO-ENTMCNC: 30.7 G/DL (ref 31.5–36.5)
MCHC RBC AUTO-ENTMCNC: 30.9 G/DL (ref 31.5–36.5)
MCV RBC AUTO: 99 FL (ref 78–100)
MCV RBC AUTO: 99 FL (ref 78–100)
MONOCYTES # BLD AUTO: 0.2 10E9/L (ref 0–1.3)
MONOCYTES # BLD AUTO: 0.4 10E9/L (ref 0–1.3)
MONOCYTES NFR BLD AUTO: 12 %
MONOCYTES NFR BLD AUTO: 5.4 %
NEUTROPHILS # BLD AUTO: 2.7 10E9/L (ref 1.6–8.3)
NEUTROPHILS # BLD AUTO: 3.1 10E9/L (ref 1.6–8.3)
NEUTROPHILS NFR BLD AUTO: 79.9 %
NEUTROPHILS NFR BLD AUTO: 89.3 %
NRBC # BLD AUTO: 0 10*3/UL
NRBC # BLD AUTO: 0 10*3/UL
NRBC BLD AUTO-RTO: 1 /100
NRBC BLD AUTO-RTO: 1 /100
PLATELET # BLD AUTO: 150 10E9/L (ref 150–450)
PLATELET # BLD AUTO: 174 10E9/L (ref 150–450)
POTASSIUM SERPL-SCNC: 3.1 MMOL/L (ref 3.4–5.3)
POTASSIUM SERPL-SCNC: 4 MMOL/L (ref 3.4–5.3)
PROT SERPL-MCNC: 6.9 G/DL (ref 6.8–8.8)
PROT SERPL-MCNC: 7.7 G/DL (ref 6.8–8.8)
RBC # BLD AUTO: 2.85 10E12/L (ref 4.4–5.9)
RBC # BLD AUTO: 3.06 10E12/L (ref 4.4–5.9)
SODIUM SERPL-SCNC: 143 MMOL/L (ref 133–144)
SODIUM SERPL-SCNC: 147 MMOL/L (ref 133–144)
TROPONIN I SERPL-MCNC: 0.02 UG/L (ref 0–0.04)
TROPONIN I SERPL-MCNC: 0.02 UG/L (ref 0–0.04)
URATE SERPL-MCNC: 0.5 MG/DL (ref 3.5–7.2)
WBC # BLD AUTO: 3.3 10E9/L (ref 4–11)
WBC # BLD AUTO: 3.5 10E9/L (ref 4–11)

## 2017-05-11 PROCEDURE — 82550 ASSAY OF CK (CPK): CPT | Performed by: INTERNAL MEDICINE

## 2017-05-11 PROCEDURE — 12000008 ZZH R&B INTERMEDIATE UMMC

## 2017-05-11 PROCEDURE — A9270 NON-COVERED ITEM OR SERVICE: HCPCS | Mod: GY | Performed by: PHYSICIAN ASSISTANT

## 2017-05-11 PROCEDURE — 82552 ASSAY OF CPK IN BLOOD: CPT | Performed by: PHYSICIAN ASSISTANT

## 2017-05-11 PROCEDURE — 97116 GAIT TRAINING THERAPY: CPT | Mod: GP | Performed by: PHYSICAL THERAPIST

## 2017-05-11 PROCEDURE — 80053 COMPREHEN METABOLIC PANEL: CPT | Performed by: INTERNAL MEDICINE

## 2017-05-11 PROCEDURE — 72070 X-RAY EXAM THORAC SPINE 2VWS: CPT

## 2017-05-11 PROCEDURE — 25000132 ZZH RX MED GY IP 250 OP 250 PS 637: Mod: GY | Performed by: INTERNAL MEDICINE

## 2017-05-11 PROCEDURE — 93005 ELECTROCARDIOGRAM TRACING: CPT

## 2017-05-11 PROCEDURE — 3E04305 INTRODUCTION OF OTHER ANTINEOPLASTIC INTO CENTRAL VEIN, PERCUTANEOUS APPROACH: ICD-10-PCS | Performed by: INTERNAL MEDICINE

## 2017-05-11 PROCEDURE — 82550 ASSAY OF CK (CPK): CPT | Performed by: PHYSICIAN ASSISTANT

## 2017-05-11 PROCEDURE — 25000128 H RX IP 250 OP 636: Performed by: INTERNAL MEDICINE

## 2017-05-11 PROCEDURE — 97530 THERAPEUTIC ACTIVITIES: CPT | Mod: GP | Performed by: PHYSICAL THERAPIST

## 2017-05-11 PROCEDURE — 85025 COMPLETE CBC W/AUTO DIFF WBC: CPT | Performed by: INTERNAL MEDICINE

## 2017-05-11 PROCEDURE — 25000125 ZZHC RX 250: Performed by: INTERNAL MEDICINE

## 2017-05-11 PROCEDURE — 84484 ASSAY OF TROPONIN QUANT: CPT | Performed by: PHYSICIAN ASSISTANT

## 2017-05-11 PROCEDURE — 25000125 ZZHC RX 250: Performed by: PHYSICIAN ASSISTANT

## 2017-05-11 PROCEDURE — 84550 ASSAY OF BLOOD/URIC ACID: CPT | Performed by: INTERNAL MEDICINE

## 2017-05-11 PROCEDURE — 25000132 ZZH RX MED GY IP 250 OP 250 PS 637: Mod: GY | Performed by: PHYSICIAN ASSISTANT

## 2017-05-11 PROCEDURE — 97162 PT EVAL MOD COMPLEX 30 MIN: CPT | Mod: GP | Performed by: PHYSICAL THERAPIST

## 2017-05-11 PROCEDURE — 97535 SELF CARE MNGMENT TRAINING: CPT | Mod: GO

## 2017-05-11 PROCEDURE — 40000193 ZZH STATISTIC PT WARD VISIT: Performed by: PHYSICAL THERAPIST

## 2017-05-11 PROCEDURE — 72100 X-RAY EXAM L-S SPINE 2/3 VWS: CPT

## 2017-05-11 PROCEDURE — 97165 OT EVAL LOW COMPLEX 30 MIN: CPT | Mod: GO

## 2017-05-11 PROCEDURE — 84484 ASSAY OF TROPONIN QUANT: CPT | Performed by: INTERNAL MEDICINE

## 2017-05-11 PROCEDURE — 40000133 ZZH STATISTIC OT WARD VISIT

## 2017-05-11 PROCEDURE — 93010 ELECTROCARDIOGRAM REPORT: CPT | Performed by: INTERNAL MEDICINE

## 2017-05-11 PROCEDURE — 25000128 H RX IP 250 OP 636: Performed by: PHYSICIAN ASSISTANT

## 2017-05-11 PROCEDURE — 85610 PROTHROMBIN TIME: CPT | Performed by: INTERNAL MEDICINE

## 2017-05-11 PROCEDURE — A9270 NON-COVERED ITEM OR SERVICE: HCPCS | Mod: GY | Performed by: INTERNAL MEDICINE

## 2017-05-11 PROCEDURE — 25000131 ZZH RX MED GY IP 250 OP 636 PS 637: Mod: GY | Performed by: PHYSICIAN ASSISTANT

## 2017-05-11 RX ORDER — POTASSIUM CHLORIDE 29.8 MG/ML
20 INJECTION INTRAVENOUS
Status: DISCONTINUED | OUTPATIENT
Start: 2017-05-11 | End: 2017-05-16 | Stop reason: HOSPADM

## 2017-05-11 RX ORDER — METHYLPREDNISOLONE SODIUM SUCCINATE 125 MG/2ML
125 INJECTION, POWDER, LYOPHILIZED, FOR SOLUTION INTRAMUSCULAR; INTRAVENOUS
Status: DISCONTINUED | OUTPATIENT
Start: 2017-05-11 | End: 2017-05-16 | Stop reason: HOSPADM

## 2017-05-11 RX ORDER — POTASSIUM CHLORIDE 7.45 MG/ML
10 INJECTION INTRAVENOUS
Status: DISCONTINUED | OUTPATIENT
Start: 2017-05-11 | End: 2017-05-16 | Stop reason: HOSPADM

## 2017-05-11 RX ORDER — ALBUTEROL SULFATE 90 UG/1
1-2 AEROSOL, METERED RESPIRATORY (INHALATION)
Status: DISCONTINUED | OUTPATIENT
Start: 2017-05-11 | End: 2017-05-16 | Stop reason: HOSPADM

## 2017-05-11 RX ORDER — POTASSIUM CL/LIDO/0.9 % NACL 10MEQ/0.1L
10 INTRAVENOUS SOLUTION, PIGGYBACK (ML) INTRAVENOUS
Status: DISCONTINUED | OUTPATIENT
Start: 2017-05-11 | End: 2017-05-16 | Stop reason: HOSPADM

## 2017-05-11 RX ORDER — METHYLPREDNISOLONE SODIUM SUCCINATE 125 MG/2ML
125 INJECTION, POWDER, LYOPHILIZED, FOR SOLUTION INTRAMUSCULAR; INTRAVENOUS
Status: CANCELLED
Start: 2017-05-12

## 2017-05-11 RX ORDER — EPINEPHRINE 0.3 MG/.3ML
0.3 INJECTION SUBCUTANEOUS EVERY 5 MIN PRN
Status: DISCONTINUED | OUTPATIENT
Start: 2017-05-11 | End: 2017-05-11

## 2017-05-11 RX ORDER — POTASSIUM CHLORIDE 750 MG/1
20-40 TABLET, EXTENDED RELEASE ORAL
Status: DISCONTINUED | OUTPATIENT
Start: 2017-05-11 | End: 2017-05-16 | Stop reason: HOSPADM

## 2017-05-11 RX ORDER — DIPHENHYDRAMINE HYDROCHLORIDE 50 MG/ML
50 INJECTION INTRAMUSCULAR; INTRAVENOUS
Status: DISCONTINUED | OUTPATIENT
Start: 2017-05-11 | End: 2017-05-16 | Stop reason: HOSPADM

## 2017-05-11 RX ORDER — HEPARIN SODIUM (PORCINE) LOCK FLUSH IV SOLN 100 UNIT/ML 100 UNIT/ML
5 SOLUTION INTRAVENOUS
Status: DISCONTINUED | OUTPATIENT
Start: 2017-05-11 | End: 2017-05-16 | Stop reason: HOSPADM

## 2017-05-11 RX ORDER — MEPERIDINE HYDROCHLORIDE 25 MG/ML
25 INJECTION INTRAMUSCULAR; INTRAVENOUS; SUBCUTANEOUS EVERY 30 MIN PRN
Status: DISCONTINUED | OUTPATIENT
Start: 2017-05-11 | End: 2017-05-16 | Stop reason: HOSPADM

## 2017-05-11 RX ORDER — ALBUTEROL SULFATE 0.83 MG/ML
2.5 SOLUTION RESPIRATORY (INHALATION)
Status: DISCONTINUED | OUTPATIENT
Start: 2017-05-11 | End: 2017-05-16 | Stop reason: HOSPADM

## 2017-05-11 RX ORDER — ALBUTEROL SULFATE 0.83 MG/ML
2.5 SOLUTION RESPIRATORY (INHALATION)
Status: CANCELLED | OUTPATIENT
Start: 2017-05-12

## 2017-05-11 RX ORDER — POTASSIUM CHLORIDE 1.5 G/1.58G
20-40 POWDER, FOR SOLUTION ORAL
Status: DISCONTINUED | OUTPATIENT
Start: 2017-05-11 | End: 2017-05-16 | Stop reason: HOSPADM

## 2017-05-11 RX ORDER — DEXAMETHASONE SODIUM PHOSPHATE 4 MG/ML
4 INJECTION, SOLUTION INTRA-ARTICULAR; INTRALESIONAL; INTRAMUSCULAR; INTRAVENOUS; SOFT TISSUE EVERY 24 HOURS
Status: COMPLETED | OUTPATIENT
Start: 2017-05-11 | End: 2017-05-12

## 2017-05-11 RX ORDER — EPINEPHRINE 1 MG/ML
0.3 INJECTION INTRAMUSCULAR; INTRAVENOUS; SUBCUTANEOUS EVERY 5 MIN PRN
Status: DISCONTINUED | OUTPATIENT
Start: 2017-05-11 | End: 2017-05-16 | Stop reason: HOSPADM

## 2017-05-11 RX ORDER — EPINEPHRINE 1 MG/ML
0.3 INJECTION INTRAMUSCULAR; INTRAVENOUS; SUBCUTANEOUS EVERY 5 MIN PRN
Status: CANCELLED | OUTPATIENT
Start: 2017-05-12

## 2017-05-11 RX ORDER — LORAZEPAM 2 MG/ML
0.5 INJECTION INTRAMUSCULAR EVERY 4 HOURS PRN
Status: DISCONTINUED | OUTPATIENT
Start: 2017-05-11 | End: 2017-05-15

## 2017-05-11 RX ORDER — DIPHENHYDRAMINE HYDROCHLORIDE 50 MG/ML
50 INJECTION INTRAMUSCULAR; INTRAVENOUS
Status: CANCELLED
Start: 2017-05-12

## 2017-05-11 RX ORDER — LORAZEPAM 2 MG/ML
0.5 INJECTION INTRAMUSCULAR EVERY 4 HOURS PRN
Status: CANCELLED
Start: 2017-05-12

## 2017-05-11 RX ORDER — HEPARIN SODIUM,PORCINE 10 UNIT/ML
5-10 VIAL (ML) INTRAVENOUS EVERY 24 HOURS
Status: DISCONTINUED | OUTPATIENT
Start: 2017-05-11 | End: 2017-05-16 | Stop reason: HOSPADM

## 2017-05-11 RX ORDER — SODIUM CHLORIDE 9 MG/ML
1000 INJECTION, SOLUTION INTRAVENOUS CONTINUOUS PRN
Status: CANCELLED
Start: 2017-05-12

## 2017-05-11 RX ORDER — ALBUTEROL SULFATE 90 UG/1
1-2 AEROSOL, METERED RESPIRATORY (INHALATION)
Status: CANCELLED
Start: 2017-05-12

## 2017-05-11 RX ORDER — EPINEPHRINE 0.3 MG/.3ML
0.3 INJECTION SUBCUTANEOUS EVERY 5 MIN PRN
Status: CANCELLED | OUTPATIENT
Start: 2017-05-12

## 2017-05-11 RX ORDER — LIDOCAINE 40 MG/G
CREAM TOPICAL
Status: DISCONTINUED | OUTPATIENT
Start: 2017-05-11 | End: 2017-05-16 | Stop reason: HOSPADM

## 2017-05-11 RX ORDER — MEPERIDINE HYDROCHLORIDE 25 MG/ML
25 INJECTION INTRAMUSCULAR; INTRAVENOUS; SUBCUTANEOUS EVERY 30 MIN PRN
Status: CANCELLED | OUTPATIENT
Start: 2017-05-12

## 2017-05-11 RX ORDER — SODIUM CHLORIDE 9 MG/ML
1000 INJECTION, SOLUTION INTRAVENOUS CONTINUOUS PRN
Status: DISCONTINUED | OUTPATIENT
Start: 2017-05-11 | End: 2017-05-16 | Stop reason: HOSPADM

## 2017-05-11 RX ORDER — HEPARIN SODIUM,PORCINE 10 UNIT/ML
5-10 VIAL (ML) INTRAVENOUS
Status: DISCONTINUED | OUTPATIENT
Start: 2017-05-11 | End: 2017-05-16 | Stop reason: HOSPADM

## 2017-05-11 RX ADMIN — SODIUM CHLORIDE, PRESERVATIVE FREE 5 ML: 5 INJECTION INTRAVENOUS at 22:47

## 2017-05-11 RX ADMIN — ACYCLOVIR 400 MG: 200 CAPSULE ORAL at 08:10

## 2017-05-11 RX ADMIN — CLOPIDOGREL 75 MG: 75 TABLET, FILM COATED ORAL at 08:10

## 2017-05-11 RX ADMIN — CARBIDOPA AND LEVODOPA 2.5 MG: 50; 200 TABLET, EXTENDED RELEASE ORAL at 15:28

## 2017-05-11 RX ADMIN — ACETAMINOPHEN 1000 MG: 325 TABLET, FILM COATED ORAL at 07:41

## 2017-05-11 RX ADMIN — LEVOFLOXACIN 750 MG: 750 TABLET, FILM COATED ORAL at 08:10

## 2017-05-11 RX ADMIN — POTASSIUM CHLORIDE 40 MEQ: 750 TABLET, EXTENDED RELEASE ORAL at 15:27

## 2017-05-11 RX ADMIN — ESOMEPRAZOLE MAGNESIUM 40 MG: 20 CAPSULE, DELAYED RELEASE PELLETS ORAL at 08:10

## 2017-05-11 RX ADMIN — ACYCLOVIR 400 MG: 200 CAPSULE ORAL at 20:53

## 2017-05-11 RX ADMIN — DEXAMETHASONE SODIUM PHOSPHATE 4 MG: 4 INJECTION, SOLUTION INTRAMUSCULAR; INTRAVENOUS at 20:53

## 2017-05-11 RX ADMIN — DEXAMETHASONE 40 MG: 4 TABLET ORAL at 08:10

## 2017-05-11 RX ADMIN — POTASSIUM CHLORIDE 20 MEQ: 750 TABLET, EXTENDED RELEASE ORAL at 17:40

## 2017-05-11 RX ADMIN — DICLOFENAC SODIUM 2 G: 10 GEL TOPICAL at 05:26

## 2017-05-11 RX ADMIN — SODIUM CHLORIDE 500 ML: 9 INJECTION, SOLUTION INTRAVENOUS at 21:00

## 2017-05-11 RX ADMIN — CARFILZOMIB 44 MG: 60 INJECTION, POWDER, LYOPHILIZED, FOR SOLUTION INTRAVENOUS at 21:43

## 2017-05-11 RX ADMIN — HYDROMORPHONE HYDROCHLORIDE 2 MG: 2 TABLET ORAL at 16:05

## 2017-05-11 RX ADMIN — AMLODIPINE BESYLATE 5 MG: 5 TABLET ORAL at 21:33

## 2017-05-11 RX ADMIN — SODIUM CHLORIDE: 9 INJECTION, SOLUTION INTRAVENOUS at 00:22

## 2017-05-11 RX ADMIN — LIDOCAINE 1 PATCH: 50 PATCH TOPICAL at 07:46

## 2017-05-11 RX ADMIN — SIMVASTATIN 20 MG: 20 TABLET, FILM COATED ORAL at 21:33

## 2017-05-11 RX ADMIN — POTASSIUM CHLORIDE 20 MEQ: 750 TABLET, EXTENDED RELEASE ORAL at 08:10

## 2017-05-11 RX ADMIN — SODIUM CHLORIDE, PRESERVATIVE FREE 5 ML: 5 INJECTION INTRAVENOUS at 11:38

## 2017-05-11 RX ADMIN — CARBIDOPA AND LEVODOPA 2.5 MG: 50; 200 TABLET, EXTENDED RELEASE ORAL at 08:09

## 2017-05-11 RX ADMIN — POTASSIUM CHLORIDE 20 MEQ: 750 TABLET, EXTENDED RELEASE ORAL at 20:53

## 2017-05-11 RX ADMIN — HYDROMORPHONE HYDROCHLORIDE 1 MG: 2 TABLET ORAL at 05:24

## 2017-05-11 ASSESSMENT — ACTIVITIES OF DAILY LIVING (ADL): IADL_COMMENTS: SPOUSE ABLE TO ASSIST

## 2017-05-11 ASSESSMENT — PAIN DESCRIPTION - DESCRIPTORS
DESCRIPTORS: ACHING
DESCRIPTORS: ACHING

## 2017-05-11 NOTE — PLAN OF CARE
Problem: Goal Outcome Summary  Goal: Goal Outcome Summary  Outcome: No Change  Vitals are stable,oriented x 3,intermittent confusion to situation,patient had  prn tylenol x1 and scheduled Lidoderm patch applied for mid back pain,pain is controlled with this intervention per pt.Thoracic and lumbar spine x-ray done this afternoon,result pending.Intermittent bradycardia,pt is asymptomatic,blood pressure stable.EKG and cardiac lab drawn to follow up on bradycardia,Troponin and CK level result were normal,both labs ordered  to be done every 8 hrs,next due at 2000.Potassium level 3.1 today,replacement protocol ordered,pt got  oral 40 mEq kcL at 1530,would need another 20 mEq kcl  po at 1730 and potassium level to be rechecked 4 hrs after last dose of replacement,.Urine output adequate,need encouragement to eat.no bowel movement this shift.Ambulates with standby assist and walker.Transfer order to unit 7D received,report given to  receiving 7D nurse.Patient was transferred to 7D at 1555 via wheel chair accompanied by his wife and belongings.Continue per plan of care.

## 2017-05-11 NOTE — PROGRESS NOTES
DATE/TIME  (DOT-TD, DOT-NOW) CHEMO CHECK ACTIVITY (REGIMEN & DOSE CHECK, DAY, DOSE #, NAME OF CHEMO #1)  CHEMO DRUG #2  CHEMO DRUG #3 NAME OF RN #1 (USE DOT-ME HERE) NAME OF RN#2 (2ND RN TO LOG IN SEPARATELY)   5/11/2017  5:01 PM   Carfilzomib day 1 & 2 double check   Kyra Walker        5/11/2017  9:28 PM   Carfilzomib dose #1   Kyra Cespedes     5/12/2017  9:06 PM Carfilzomib dose #2   Bette Da Silva

## 2017-05-11 NOTE — PLAN OF CARE
Problem: Goal Outcome Summary  Goal: Goal Outcome Summary  Outcome: No Change  Has been alert and oriented x4 although he has been forgetful and impulsive. Denied pain earlier in the night. C/o now of pain after getting up to the bathroom. Given oxycodone po and Voltaren gel to back. BP elevated into the 160's. Afebrile. Portacath with 09. NS at 50/hr.Ambulating to the bathroom with walker and assist of one. Continue with current plan of care. Notify team with concerns.

## 2017-05-11 NOTE — PROGRESS NOTES
05/11/17 1543   Quick Adds   Type of Visit Initial Occupational Therapy Evaluation   Living Environment   Lives With spouse   Living Arrangements house   Home Accessibility bed and bath are not on the first floor;stairs within home;stairs to enter home   Number of Stairs to Enter Home 3   Number of Stairs Within Home 12   Stair Railings at Home other (see comments)  (unilateral railing)   Transportation Available car;family or friend will provide   Living Environment Comment Pt and spouse living in 3 level home. Pt does not need to access upper level. Bedroom, a bathroom with tub/shower, kitchen, and laundry room. Pt prefers showering downstairs with walk-in shower and built-in bench but has to navigate 12 stairs. Receive in-home cleaning services.   Self-Care   Dominant Hand right   Usual Activity Tolerance good   Current Activity Tolerance moderate   Regular Exercise no   Equipment Currently Used at Home other (see comments)  (Pt and spouse living in 3 level home. Pt does not need to ac)   Activity/Exercise/Self-Care Comment Pt reports enjoying yard work and gardening but has been limited 2/2 fatigue and back pain. No formal exercise.   Functional Level Prior   Ambulation 0-->independent   Transferring 0-->independent   Toileting 0-->independent   Bathing 0-->independent   Dressing 0-->independent   Eating 0-->independent   Communication 0-->understands/communicates without difficulty   Swallowing 0-->swallows foods/liquids without difficulty   Cognition 2 - difficulty with organizing thoughts  (reports difficulty with numbers since CVA last year.)   Fall history within last six months yes   Number of times patient has fallen within last six months (Per wife, pt fell down stairs.)   Which of the above functional risks had a recent onset or change? ambulation;transferring;dressing;bathing;cognition;fall history;toileting   Prior Functional Level Comment Pt previously independent with ADLs requiring assist with med  "management, financials, cooking, cleaning, and laundry. Pt recently requiring increased assist for ADLs 2/2 back pain   General Information   Onset of Illness/Injury or Date of Surgery - Date 05/07/17   Referring Physician Kamari Topete MD   Patient/Family Goals Statement Return home   Additional Occupational Profile Info/Pertinent History of Current Problem \"71 year old male with a history of relapsed multiple myeloma admitted on 5/7/2017 with back pain, confusion, and acute kidney injury.\" Returned home from [facility] home on Sunday and noting increased agitation, confusion, and weakness since.    Precautions/Limitations spinal precautions;fall precautions   Weight-Bearing Status - LUE partial weight-bearing (% in comments)  (<10#)   Weight-Bearing Status - RUE partial weight-bearing (% in comments)  (<10#)   Weight-Bearing Status - LLE full weight-bearing   Weight-Bearing Status - RLE full weight-bearing   General Observations Pt up in chair with spouse present.    General Info Comments Activity: up ad cassi   Cognitive Status Examination   Orientation time;person  (Did not ask place; also oriented to time, month, and year)   Level of Consciousness alert;confused;agitated  (Becoming agitated near end of session)   Able to Follow Commands moderate impairment   Personal Safety (Cognitive) decreased insight to deficits;mild impairment   Attention Distractible during evaluation   Organization/Problem Solving Problem solving impaired   Cognitive Comment Pt demonstrating confusion and slightly tangential during conversation. Unable to provide accurate home setup requiring verification from spouse.   Visual Perception   Visual Perception Wears glasses   Visual Perception Comments Bilateral blurriness-aware of need for cataract surgery.   Integumentary/Edema   Integumentary/Edema no deficits were identifed   Posture   Posture forward head position   Strength   Strength Comments Not tested 2/2 spinal precautions " "  Lower Body Dressing   Level of [place]: Dress Lower Body maximum assist (25% patients effort)   Physical Assist/Nonphysical Assist: Dress Lower Body set-up required;verbal cues   Instrumental Activities of Daily Living (IADL)   Previous Responsibilities laundry;yardwork   IADL Comments Spouse able to assist    Activities of Daily Living Analysis   Impairments Contributing to Impaired Activities of Daily Living cognition impaired;pain;ROM decreased  (spinal precautions)   General Therapy Interventions   Planned Therapy Interventions ADL retraining;IADL retraining;cognition;transfer training;home program guidelines;progressive activity/exercise;risk factor education   Clinical Impression   Criteria for Skilled Therapeutic Interventions Met yes, treatment indicated   OT Diagnosis Decreased safety and independence in I/ADLs   Influenced by the following impairments spinal precautions, cognition, pain   Assessment of Occupational Performance 3-5 Performance Deficits   Identified Performance Deficits LB dressing, home management, shower transfer, toileting   Clinical Decision Making (Complexity) Low complexity   Therapy Frequency daily   Predicted Duration of Therapy Intervention (days/wks) 1 week   Anticipated Equipment Needs at Discharge other (see comments)  (grab bars or toilet safety frame for toilet transfers)   Anticipated Discharge Disposition Home with Assist;Home with Home Therapy;Transitional Care Facility   Risks and Benefits of Treatment have been explained. Yes   Patient, Family & other staff in agreement with plan of care Yes   [facility] [employer] TM \"6 Clicks\"   2016, Trustees of [facility], under license to [employer].  All rights reserved.   6 Clicks Short Forms Daily Activity Inpatient Short Form   [facility] [employer]  \"6 Clicks\" Daily Activity Inpatient Short Form   1. Putting on and taking off regular lower body clothing? 2 - A Lot   2. Bathing (including washing, rinsing, " drying)? 2 - A Lot   3. Toileting, which includes using toilet, bedpan or urinal? 3 - A Little   4. Putting on and taking off regular upper body clothing? 3 - A Little   5. Taking care of personal grooming such as brushing teeth? 3 - A Little   6. Eating meals? 4 - None   Daily Activity Raw Score (Score out of 24.Lower scores equate to lower levels of function) 17   Total Evaluation Time   Total Evaluation Time (Minutes) 10

## 2017-05-11 NOTE — PLAN OF CARE
Problem: Goal Outcome Summary  Goal: Goal Outcome Summary  Outcome: Improving  AVSS on room air. Pt oriented to person, place, and time. Disoriented to situation and forgetful. Pt had trouble using call light and phone in room. Became agitated briefly but redirected easily when reminded that his wife was coming tomorrow morning. No cough noted this evening. Given voltaren gel to left lower back and tylenol x1. Xray of ribs done this evening, negative for fracture. Up with SBA to bathroom. Port infusing 50cc/hr NS. Ate 1/2 of dinner. Midodrine held as BP was mildly elevated.

## 2017-05-11 NOTE — PROGRESS NOTES
" 05/11/17 1500   Quick Adds   Type of Visit Initial PT Evaluation   Living Environment   Lives With spouse   Living Arrangements house   Home Accessibility bed and bath on same level;bed and bath are not on the first floor;stairs to enter home;stairs within home   Number of Stairs to Enter Home 3  (railing)   Number of Stairs Within Home (3 level home, 16 steps with railing to basement)   Living Environment Comment Pt and SO live in a house in [place] during the summer, live in [place] in the winter. [place] house has 3 stories. Main floor has master bedroom, second bedroom where patient sleeps in a twin size bed. Main floor also has kitchen, living area and a bathroom. Pt prefers to use the bathroom in basement (16 steps with railing) to access downstairs basement. Basement bathroom has a walk in shower with a bench. Pt's SO sleeps in master bedroom, patient sleeps in second bedroom with twin bed. Reports lower bed height is easier to get into/out of.    Self-Care   Usual Activity Tolerance good   Current Activity Tolerance moderate   Regular Exercise no   Equipment Currently Used at Home bath bench  (shower \"stool\"- shower chair without backrest)   Activity/Exercise/Self-Care Comment Pt's wife assists with medication set-up, finances, drives/runs errands. Reports occassionally leaving patient home alone for short periods (~2 hours) for errands. Has had home PT/OT previously after 20 day hospital admission for PNA.    Functional Level Prior   Ambulation 0-->independent   Transferring 0-->independent   Fall history within last six months yes   Which of the above functional risks had a recent onset or change? ambulation;transferring;toileting;bathing;dressing   Prior Functional Level Comment Pt and wife live in [place] in the winter months. Returned to [place] on Sunday, 5/7/17. Pt and SO report progressive weakness prior to return home. Pt walking more slowly, and \"crooked\". Requiring some assist to get off " the toilet. Patient reports back pain began in April resulting in progressively decreased activity level. Has used a FWW previously after discharge from hospital (autologous BMT for MM in 2013). Does not own walking aide at home. History of falls. Per SO, last year patient had multiple falls (likely 2/2 medication management/side effects causing fainting). Pt has also had a fall down the stairs at home. Pt had increased agitation/AML on plane returning home to [place] on 5/11/17 resulting in hospital admission.    General Information   Onset of Illness/Injury or Date of Surgery - Date 05/10/17   Referring Physician Brittney Armstrong PA-C   Patient/Family Goals Statement Go home   Pertinent History of Current Problem (include personal factors and/or comorbidities that impact the POC) Pt is a  is a 71 year old male with a history of relapsed multiple myeloma admitted on 5/7/2017 with back pain, confusion, and acute kidney injury. PMH: CVA   Precautions/Limitations fall precautions;spinal precautions  (T9-11 vertebral deformities, MM)   Heart Disease Risk Factors High blood pressure;Lack of physical activity   General Observations Pt supine in bed upon entry, agreeable to participate in PT session, RN ok'd session. Pt wearing glasses, endorses blurry vision (cataracts in B eyes).    General Info Comments Activity: Up ad cassi   Cognitive Status Examination   Orientation person;place   Level of Consciousness alert   Follows Commands and Answers Questions 100% of the time;able to follow single-step instructions   Personal Safety and Judgment at risk behaviors demonstrated   Memory impaired   Cognitive Comment Pt initially reports date as May 24th, 1986. Unable to correctly identify date despite cues.    Pain Assessment   Patient Currently in Pain Yes, see Vital Sign flowsheet   Posture    Posture Kyphosis;Protracted shoulders;Forward head position   Range of Motion (ROM)   ROM Comment BLE ROM WFL   Strength   Strength  "Comments BLE strength >3/5 per observation of functional mobility. No formal MMT 2/2 spinal precautions/vertebral deformities.    Bed Mobility   Bed Mobility Comments Supine>sitting EOB with min A via logroll technique.    Transfer Skills   Transfer Comments Sit<>stand with FWW, CGA and use of gait belt.    Gait   Gait Comments Pt ambulated 15 ft with HHA-min A x 1, CGA. Noted decreased tavon, decreased terminal knee extension in terminal swing phase, kyphotic posture. One posterior LOB   Balance   Balance Comments Poor-pt stands with normal PAMELA x ~10 seconds. Noted posterior lean/weight through heels. Poor postural awareness, pt reports he feels like he is falling forward.    General Therapy Interventions   Planned Therapy Interventions gait training;balance training;bed mobility training;strengthening;transfer training;home program guidelines;progressive activity/exercise   Clinical Impression   Criteria for Skilled Therapeutic Intervention yes, treatment indicated   PT Diagnosis impaired functional mobility   Influenced by the following impairments impaired balance, decreased activity tolerance, impaired cognition, spinal precautions/back pain   Functional limitations due to impairments difficulty with bed mobility, transfers, ambulation, stairs, community mobility   Clinical Presentation Evolving/Changing   Clinical Presentation Rationale medical plan, PMH, co-morbidities, change in cognition, clinical judgement.    Clinical Decision Making (Complexity) Moderate complexity   Therapy Frequency` daily   Predicted Duration of Therapy Intervention (days/wks) 5/16/17   Anticipated Equipment Needs at Discharge front wheeled walker   Anticipated Discharge Disposition Home with Home Therapy;Transitional Care Facility   Risk & Benefits of therapy have been explained Yes   Patient, Family & other staff in agreement with plan of care Yes   [facility] AM-PAC TM \"6 Clicks\"   2016, Trustees of [facility], " "under license to [employer].  All rights reserved.   6 Clicks Short Forms Basic Mobility Inpatient Short Form   [facility] AM-PAC  \"6 Clicks\" V.2 Basic Mobility Inpatient Short Form   1. Turning from your back to your side while in a flat bed without using bedrails? 4 - None   2. Moving from lying on your back to sitting on the side of a flat bed without using bedrails? 3 - A Little   3. Moving to and from a bed to a chair (including a wheelchair)? 3 - A Little   4. Standing up from a chair using your arms (e.g., wheelchair, or bedside chair)? 3 - A Little   5. To walk in hospital room? 3 - A Little   6. Climbing 3-5 steps with a railing? 3 - A Little   Basic Mobility Raw Score (Score out of 24.Lower scores equate to lower levels of function) 19   Total Evaluation Time   Total Evaluation Time (Minutes) 12     "

## 2017-05-11 NOTE — PROGRESS NOTES
[facility], [place]  Hematology / Oncology Progress Note     Assessment & Plan   Anthony Carbone is a 71 year old male with a history of relapsed multiple myeloma admitted on 5/7/2017 with back pain, confusion, and acute kidney injury.     1. NEURO  #Altered mental status.  #Weakness.  #Tremor.  - Unclear etiology. Suspect infection vs dehydration vs metabolic derangement vs worsening myeloma vs other (overall, likely multifactorial). Per wife, has been getting weaker over past several days but confusion suddenly came on in PM of 5/7 while on airplane back from trip to [place]. Continued improvement noted today, AMS nearly resolved. Evaluated by PT/OT today, they are recommending TCU. Will follow up with SW on this tomorrow AM.  -Infectious workup with BC, UC, CXR, RVP - BC, UC negative thus far. RVP negative. CXR with possible pneumonia, treating with Levaquin (see below).  -Discussed on admission with Dr. Bowie of Palliative Care, appreciate assistance. Zyprexa 5mg BID with Haldol 5mg every 4 hours PRN agitation (Qtc 5/8 431). Discussed this plan with patient's wife Casey and she was in agreement with this strategy due to patient not being cooperative with cares prior to medication administration. Reduced dose of Zyprexa to 5mg QHS given significant improvement after treating underlying medical issues as outlined below and in anticipation of discharge to TCU.   -Delirium precautions  -No plasmapheresis needed at this time    #History of subacute CVA. Continue home Plavix.    2. RENAL  #Acute kidney injury  -Unclear etiology. Creatinine on 4/20/17 was 0.80. Wife reports patient was taking 1200-2400mg of ibuprofen daily for at least the past 2 weeks. Also with suspected progression of myeloma.  -SCr continues to improve s/p IV hydration. Avoid NSAIDs in future. D/t hypernatremia and overall clinical improvement will d/c IV fluids today and monitor PO intake.    #Hypercalcemia -  RESOLVED  -Reduce IV fluid rate  -Pamidronate 60mg x 1 given 5/8    #Hyperuricemia associated with malignancy - RESOLVED  -Reduce IV fluid rate  -Rasburicase 3mg x 1 given 5/8 to treat hyperuricemia (not tumor lysis syndrome)    3. ID  #Possible infection - suspected pneumonia  -Per wife and review of notes, patient was experiencing cough and cold symptoms for at least the past 2 weeks PTA. Was being treated with doxycycline and promethazine + codeine cough syrup.  -ID workup as above  -Transitioned to oral Levaquin 750mg daily on 5/10, plan to complete 7 days of therapy.  -Continue acyclovir 400mg BID for prophylaxis    4. HEME/ONC  #Multiple myeloma  -Followed by Dr. Topete. Most recently being treated in [place]. Was on daratumumab/pomalidomide/dexamethasone. Had been on hold for past couple of weeks d/t illness.  -Labs pending  -Solumedrol 100mg IV 5/8, 5/9 and 5/10. Aware that this can worsen mental status, but feel benefit outweighs risk at this time and treating delirium as above. May also help with pain. Transitioned today to oral dexamethasone at 40mg daily x 3 days. Also giving carfilzomib today and tomorrow per Dr. Topete. Will plan for follow up as outpatient early next week pending TCU stay.  -Viscosity index = 2.3. No plasmapheresis needed.    #Cancer-related pain  -Per wife, has been going on for about 2 weeks. Consider imaging. MRI lumbar spine done 4/24 at OSH in [place] reviewed, copy made to be scanned into chart: mild-to-moderate compression deformities T9-T11, degenerative changes in lumbar spine, mild central and foraminal stenoses in lumbar spine. Additional evaluation indicates pain is located over rib cage and well as mid-back. Reported significant pain while coughing recently, rib x-ray negative for fracture. Will continue lidocaine, Voltaren. X-rays of thoracic and lumbar spine ordered today. Also checking EKG and cardiac enzymes x 3 to r/o R CAD.  -Try Tylenol first d/t delirium. Dilaudid ordered  PRN severe pain if unable to control with Tylenol.  -Also receiving steroids as above.    5. CV  #Hypertension. Continue home Norvasc.  #Hyperlipidemia. Continue home Zocor.    6. GI  #GERD. Continue home Nexium.    FEN  -No MIVF, bolus as needed.  -Electrolyte replacement PRN per protocol  -Regular diet as tolerated. PO intake continuing to improve, will monitor.    Code status: FULL    Patient discussed with staff attending Dr. Gregory.    [facility]  Hematology/Oncology  Pager: [phone]    Interval History   Mr. Carbone reports continued pain, now located more-so in mid-back. This is not new pain and c/w previous pain he was having prior to admission. Mental status is still better compared to admission. His appetite is a bit better, but still not quite back to normal. He denies any chest pain or shortness of breath. Maybe some nausea if he eats too much. No bowel complaints. Urinating normally. He is agreeable to working with PT/OT.     Physical Exam   Temp: 95.4  F (35.2  C) Temp src: Oral BP: 153/90 Pulse: 56 Heart Rate: 96 Resp: 18 SpO2: 98 % O2 Device: None (Room air)    Vitals:    05/07/17 1823 05/10/17 0754 05/11/17 0801   Weight: 58.2 kg (128 lb 6.4 oz) 61.9 kg (136 lb 6.4 oz) 60.3 kg (133 lb)     Vital Signs with Ranges  Temp:  [95.4  F (35.2  C)-98.2  F (36.8  C)] 95.4  F (35.2  C)  Pulse:  [56] 56  Heart Rate:  [50-96] 96  Resp:  [16-18] 18  BP: (134-167)/(66-90) 153/90  SpO2:  [94 %-98 %] 98 %  I/O last 3 completed shifts:  In: 1410 [P.O.:560; I.V.:850]  Out: 3300 [Urine:3300]    Constitutional: Adult male who appears stated age seen lying in bed, no acute distress.   Respiratory: No increased work of breathing, good air exchange, lungs clear to auscultation.  Cardiovascular: Regular rate and rhythm, no obvious murmurs.  Abdominal: + bowel sounds. Soft, non-tender, non-distended.  Skin: Contusions noted to extremities.   Musculoskeletal: No LE edema. R ribs tender to palpation. No  tenderness to palpation of spinous processes.  Neurologic: Alert and oriented x 3. No tremor today. Memory much improved. Much more cooperative. Pleasant mood and affect.    Medications     - MEDICATION INSTRUCTIONS -       NaCl       - MEDICATION INSTRUCTIONS -         heparin lock flush  5-10 mL Intracatheter Q24H     heparin  5 mL Intracatheter Q28 Days     sodium chloride 0.9%  500 mL Intravenous Once     dexamethasone  4 mg Intravenous Q24H     carfilzomib (KYPROLIS) chemo infusion  27 mg/m2 (Treatment Plan Recorded) Intravenous Q24H     lidocaine  3 patch Transdermal Q24H     lidocaine   Transdermal Q24h     lidocaine   Transdermal Q8H     levofloxacin  750 mg Oral Daily     dexamethasone  40 mg Oral Daily     esomeprazole  40 mg Oral QAM AC     amLODIPine  5 mg Oral At Bedtime     potassium chloride  20 mEq Oral BID     acyclovir (ZOVIRAX) capsule 400 mg  400 mg Oral BID     clopidogrel  75 mg Oral Daily     midodrine  2.5 mg Oral TID     simvastatin (ZOCOR) tablet 20 mg  20 mg Oral At Bedtime       Data   Results for orders placed or performed during the hospital encounter of 05/07/17 (from the past 24 hour(s))   CBC with platelets differential   Result Value Ref Range    WBC 3.3 (L) 4.0 - 11.0 10e9/L    RBC Count 2.85 (L) 4.4 - 5.9 10e12/L    Hemoglobin 8.7 (L) 13.3 - 17.7 g/dL    Hematocrit 28.2 (L) 40.0 - 53.0 %    MCV 99 78 - 100 fl    MCH 30.5 26.5 - 33.0 pg    MCHC 30.9 (L) 31.5 - 36.5 g/dL    RDW 18.0 (H) 10.0 - 15.0 %    Platelet Count 150 150 - 450 10e9/L    Diff Method Automated Method     % Neutrophils 79.9 %    % Lymphocytes 6.3 %    % Monocytes 12.0 %    % Eosinophils 0.0 %    % Basophils 1.2 %    % Immature Granulocytes 0.6 %    Nucleated RBCs 1 (H) 0 /100    Absolute Neutrophil 2.7 1.6 - 8.3 10e9/L    Absolute Lymphocytes 0.2 (L) 0.8 - 5.3 10e9/L    Absolute Monocytes 0.4 0.0 - 1.3 10e9/L    Absolute Eosinophils 0.0 0.0 - 0.7 10e9/L    Absolute Basophils 0.0 0.0 - 0.2 10e9/L    Abs Immature  Granulocytes 0.0 0 - 0.4 10e9/L    Absolute Nucleated RBC 0.0    INR   Result Value Ref Range    INR 1.23 (H) 0.86 - 1.14   Uric acid   Result Value Ref Range    Uric Acid 0.5 (L) 3.5 - 7.2 mg/dL   Comprehensive metabolic panel   Result Value Ref Range    Sodium 147 (H) 133 - 144 mmol/L    Potassium 3.1 (L) 3.4 - 5.3 mmol/L    Chloride 116 (H) 94 - 109 mmol/L    Carbon Dioxide 24 20 - 32 mmol/L    Anion Gap 8 3 - 14 mmol/L    Glucose 79 70 - 99 mg/dL    Urea Nitrogen 16 7 - 30 mg/dL    Creatinine 0.90 0.66 - 1.25 mg/dL    GFR Estimate 83 >60 mL/min/1.7m2    GFR Estimate If Black >90   GFR Calc   >60 mL/min/1.7m2    Calcium 7.8 (L) 8.5 - 10.1 mg/dL    Bilirubin Total 0.4 0.2 - 1.3 mg/dL    Albumin 2.6 (L) 3.4 - 5.0 g/dL    Protein Total 6.9 6.8 - 8.8 g/dL    Alkaline Phosphatase 42 40 - 150 U/L    ALT 16 0 - 70 U/L    AST 13 0 - 45 U/L   Troponin I   Result Value Ref Range    Troponin I ES 0.024 0.000 - 0.045 ug/L   CK total   Result Value Ref Range    CK Total 154 30 - 300 U/L   EKG 12-lead, complete   Result Value Ref Range    Interpretation ECG Click View Image link to view waveform and result    XR Thoracic Spine 2 Views    Narrative    EXAMINATION: XR THORACIC SPINE 2 VW, XR LUMBAR SPINE 2-3 VIEWS   5/11/2017 2:23 PM     CLINICAL HISTORY:  Back pain, multiple myeloma     COMPARISON: 12/23/2016, bone survey dated 1/2/2014    FINDINGS: AP and lateral views of the thoracic and lumbar spine were  obtained and compared to the prior examination dated 1/2/2014. There  is a central venous catheter in place. There is an anterior  compression fracture at the level of T11, this is not new and has been  seen previously on the chest radiograph dated 12/23/2016 when compared  to the prior exam there is no significant change. There has been  progression of multilevel degenerative changes of the lumbar spine  with marked disc space narrowing.      Impression    IMPRESSION:   1. Redemonstration of T11 compression  fracture, not significantly  changed since 12/23/2016.  2. Significant multilevel degenerative changes of the lumbar spine  have progressed since prior exams.  3. If clinically indicated, a cross-sectional study would be more  sensitive to changes involving the spinal cord.    JUTTA ELLERMANN, MD   XR Lumbar Spine 2/3 Views    Narrative    EXAMINATION: XR THORACIC SPINE 2 VW, XR LUMBAR SPINE 2-3 VIEWS   5/11/2017 2:23 PM     CLINICAL HISTORY:  Back pain, multiple myeloma     COMPARISON: 12/23/2016, bone survey dated 1/2/2014    FINDINGS: AP and lateral views of the thoracic and lumbar spine were  obtained and compared to the prior examination dated 1/2/2014. There  is a central venous catheter in place. There is an anterior  compression fracture at the level of T11, this is not new and has been  seen previously on the chest radiograph dated 12/23/2016 when compared  to the prior exam there is no significant change. There has been  progression of multilevel degenerative changes of the lumbar spine  with marked disc space narrowing.      Impression    IMPRESSION:   1. Redemonstration of T11 compression fracture, not significantly  changed since 12/23/2016.  2. Significant multilevel degenerative changes of the lumbar spine  have progressed since prior exams.  3. If clinically indicated, a cross-sectional study would be more  sensitive to changes involving the spinal cord.    JUTTA ELLERMANN, MD     ATTENDING ADDENDUM:    I have independently seen and evaluated the patient on May 11, 2017 and reviewed clinical, laboratory, and radiographic findings. I have discussed the plan with the team and agree with the attached note with the following edits:    Anthony Carbone is a 71 year old year old male, with R/R IgM MM here for AMS, possible PNA, and rapid progression of myeloma, hyperCa, high IgM. S/p bisphosphanate and hydration, and improved. Some musculoskeletal pain around the ribs which is better.      Ph/E:  Vitals reviewed. No distress. Oropharynx clear. Neck supple. Heart RRR. Lungs clear. Abdomen soft. No peripheral edema. No rash. Neuro nonfocal.     Plan: Acute issues resolved, Dex daily x2, Carfilzomib daily x2, then home. Dr. Topete in agreement. Next plan to be discussed in clinic. Cardiac work up for pain neg and is likely due to bone lesions of myeloma. He has seen carfilzomib before and tolerated it well with efficacy.       heparin lock flush  5-10 mL Intracatheter Q24H     heparin  5 mL Intracatheter Q28 Days     sodium chloride 0.9%  500 mL Intravenous Once     dexamethasone  4 mg Intravenous Q24H     carfilzomib (KYPROLIS) chemo infusion  27 mg/m2 (Treatment Plan Recorded) Intravenous Q24H     lidocaine  3 patch Transdermal Q24H     lidocaine   Transdermal Q24h     lidocaine   Transdermal Q8H     levofloxacin  750 mg Oral Daily     dexamethasone  40 mg Oral Daily     esomeprazole  40 mg Oral QAM AC     amLODIPine  5 mg Oral At Bedtime     potassium chloride  20 mEq Oral BID     acyclovir (ZOVIRAX) capsule 400 mg  400 mg Oral BID     clopidogrel  75 mg Oral Daily     midodrine  2.5 mg Oral TID     simvastatin (ZOCOR) tablet 20 mg  20 mg Oral At Bedtime       Parmjit Gregory

## 2017-05-11 NOTE — PLAN OF CARE
Problem: Goal Outcome Summary  Goal: Goal Outcome Summary  PT 5C: Initial PT evaluation completed, treatment initiated. Pt oriented to self, states date incorrectly as May 24th, 1986. Tangential in conversation, requires assist from SO to answer questions about home set-up. Pt and wife educated on logroll technique to maintain neutral spinal alignment 2/2 vertebral compression deformities. Sit<>stand with CGA. Pt has posterior LOB with static standing without UE support. Posterior LOB with ambulation without AD. Pt ambulated 100 ft x 1, 300 ft x 1 with FWW, CGA, gait belt donned for safety. New FWW ordered to room (pt needs youth walker 2/2 height). Compared to baseline- pt presents with increased cognition deficits, decreased strength and impaired balance. Pt requires FWW/gait belt and CGA with all mobility 2/2 fall risk. Also recommend use of bed/chair alarm without family in room.      Recommend: Currently recommend discharge to TCU. With daily IP PT/OT, pt may progress to safe discharge to home with assist from wife and home therapies. Pt would need to be able to transfer and ambulate with supervision, negotiate 3 stairs to enter home, demonstrate adherence to spinal precautions with transfers and mobility. OT following to monitor cognition/provide appropriate level of assist needed at home.

## 2017-05-11 NOTE — PLAN OF CARE
Problem: Goal Outcome Summary  Goal: Goal Outcome Summary  OT 5C: OT eval and treatment initiated. Pt with decreased cognition from baseline per spouse report. Inconsistency in home setup and PLOF requiring verification from wife. Administered Short Blessed Test to assess cognition. Pt scored 10/28 indicating mild cognitive impairment (0-8 indicates normal performance). Pt scored well in orientation, oriented to time of day, year, and month, fair in delayed recall, but poor in concentration with counting backwards from twenty and stating the months in reverse order, requiring repetition of directions and VCs for attending to task. Educated on spinal precautions and ways to adapt, grade and modify daily activities. Assessed carryover of figure four compensatory technique for LB dressing; however patient demonstrating difficulty with carryover and problem solving task despite 1-step cuing. Unable to assess other ADLs and functional mobility 2/2 pain. Pt limited by cognitive deficits, spinal precautions, and pain. Recommend use of bed/chair alarm without family in room.      Recommend: Currently recommend discharge to TCU. With daily IP PT/OT, pt may progress to safe discharge to home with assist from wife and home therapies. Barriers for discharge home at this time include need for 24/7 supervision/assist 2/2 cognitive deficits and concern for safety as well as home setup as patient reports preference for using downstairs shower but would have to navigate 10+ stairs. Also no presence of grab bars by toilets or in shower.

## 2017-05-12 ENCOUNTER — APPOINTMENT (OUTPATIENT)
Dept: GENERAL RADIOLOGY | Facility: CLINIC | Age: 72
DRG: 840 | End: 2017-05-12
Attending: PHYSICIAN ASSISTANT
Payer: MEDICARE

## 2017-05-12 ENCOUNTER — APPOINTMENT (OUTPATIENT)
Dept: CT IMAGING | Facility: CLINIC | Age: 72
DRG: 840 | End: 2017-05-12
Attending: PHYSICIAN ASSISTANT
Payer: MEDICARE

## 2017-05-12 ENCOUNTER — APPOINTMENT (OUTPATIENT)
Dept: PHYSICAL THERAPY | Facility: CLINIC | Age: 72
DRG: 840 | End: 2017-05-12
Payer: MEDICARE

## 2017-05-12 ENCOUNTER — APPOINTMENT (OUTPATIENT)
Dept: OCCUPATIONAL THERAPY | Facility: CLINIC | Age: 72
DRG: 840 | End: 2017-05-12
Payer: MEDICARE

## 2017-05-12 LAB
ALBUMIN SERPL-MCNC: 2.6 G/DL (ref 3.4–5)
ALP SERPL-CCNC: 41 U/L (ref 40–150)
ALT SERPL W P-5'-P-CCNC: 16 U/L (ref 0–70)
ANION GAP SERPL CALCULATED.3IONS-SCNC: 12 MMOL/L (ref 3–14)
AST SERPL W P-5'-P-CCNC: 13 U/L (ref 0–45)
BASOPHILS # BLD AUTO: 0 10E9/L (ref 0–0.2)
BASOPHILS NFR BLD AUTO: 0.6 %
BILIRUB SERPL-MCNC: 0.7 MG/DL (ref 0.2–1.3)
BUN SERPL-MCNC: 15 MG/DL (ref 7–30)
CALCIUM SERPL-MCNC: 7.8 MG/DL (ref 8.5–10.1)
CHLORIDE SERPL-SCNC: 112 MMOL/L (ref 94–109)
CK SERPL-CCNC: 119 U/L (ref 30–300)
CO2 SERPL-SCNC: 21 MMOL/L (ref 20–32)
CREAT SERPL-MCNC: 0.82 MG/DL (ref 0.66–1.25)
DIFFERENTIAL METHOD BLD: ABNORMAL
EOSINOPHIL # BLD AUTO: 0 10E9/L (ref 0–0.7)
EOSINOPHIL NFR BLD AUTO: 0 %
ERYTHROCYTE [DISTWIDTH] IN BLOOD BY AUTOMATED COUNT: 18.2 % (ref 10–15)
GFR SERPL CREATININE-BSD FRML MDRD: ABNORMAL ML/MIN/1.7M2
GLUCOSE SERPL-MCNC: 122 MG/DL (ref 70–99)
HCT VFR BLD AUTO: 29.3 % (ref 40–53)
HGB BLD-MCNC: 9 G/DL (ref 13.3–17.7)
IMM GRANULOCYTES # BLD: 0 10E9/L (ref 0–0.4)
IMM GRANULOCYTES NFR BLD: 0.6 %
INR PPP: 1.25 (ref 0.86–1.14)
LACTATE BLD-SCNC: 2.1 MMOL/L (ref 0.7–2.1)
LYMPHOCYTES # BLD AUTO: 0.2 10E9/L (ref 0.8–5.3)
LYMPHOCYTES NFR BLD AUTO: 4.3 %
MCH RBC QN AUTO: 30.5 PG (ref 26.5–33)
MCHC RBC AUTO-ENTMCNC: 30.7 G/DL (ref 31.5–36.5)
MCV RBC AUTO: 99 FL (ref 78–100)
MONOCYTES # BLD AUTO: 0.4 10E9/L (ref 0–1.3)
MONOCYTES NFR BLD AUTO: 6.5 %
NEUTROPHILS # BLD AUTO: 4.7 10E9/L (ref 1.6–8.3)
NEUTROPHILS NFR BLD AUTO: 88 %
NRBC # BLD AUTO: 0.1 10*3/UL
NRBC BLD AUTO-RTO: 1 /100
PLATELET # BLD AUTO: 174 10E9/L (ref 150–450)
POTASSIUM SERPL-SCNC: 3.5 MMOL/L (ref 3.4–5.3)
PROT SERPL-MCNC: 7.2 G/DL (ref 6.8–8.8)
RBC # BLD AUTO: 2.95 10E12/L (ref 4.4–5.9)
SODIUM SERPL-SCNC: 146 MMOL/L (ref 133–144)
TROPONIN I SERPL-MCNC: 0.02 UG/L (ref 0–0.04)
URATE SERPL-MCNC: 1.4 MG/DL (ref 3.5–7.2)
WBC # BLD AUTO: 5.4 10E9/L (ref 4–11)

## 2017-05-12 PROCEDURE — 80053 COMPREHEN METABOLIC PANEL: CPT | Performed by: INTERNAL MEDICINE

## 2017-05-12 PROCEDURE — 97110 THERAPEUTIC EXERCISES: CPT | Mod: GP | Performed by: PHYSICAL THERAPIST

## 2017-05-12 PROCEDURE — 36592 COLLECT BLOOD FROM PICC: CPT | Performed by: INTERNAL MEDICINE

## 2017-05-12 PROCEDURE — 40000133 ZZH STATISTIC OT WARD VISIT

## 2017-05-12 PROCEDURE — 83605 ASSAY OF LACTIC ACID: CPT | Performed by: PHYSICIAN ASSISTANT

## 2017-05-12 PROCEDURE — 97535 SELF CARE MNGMENT TRAINING: CPT | Mod: GO

## 2017-05-12 PROCEDURE — 85025 COMPLETE CBC W/AUTO DIFF WBC: CPT | Performed by: INTERNAL MEDICINE

## 2017-05-12 PROCEDURE — 12000008 ZZH R&B INTERMEDIATE UMMC

## 2017-05-12 PROCEDURE — 82550 ASSAY OF CK (CPK): CPT | Performed by: INTERNAL MEDICINE

## 2017-05-12 PROCEDURE — A9270 NON-COVERED ITEM OR SERVICE: HCPCS | Mod: GY | Performed by: PHYSICIAN ASSISTANT

## 2017-05-12 PROCEDURE — 25000125 ZZHC RX 250: Performed by: INTERNAL MEDICINE

## 2017-05-12 PROCEDURE — 74020 XR ABDOMEN 2 VW: CPT

## 2017-05-12 PROCEDURE — 36592 COLLECT BLOOD FROM PICC: CPT | Performed by: PHYSICIAN ASSISTANT

## 2017-05-12 PROCEDURE — 25000132 ZZH RX MED GY IP 250 OP 250 PS 637: Mod: GY | Performed by: INTERNAL MEDICINE

## 2017-05-12 PROCEDURE — 25000128 H RX IP 250 OP 636: Performed by: INTERNAL MEDICINE

## 2017-05-12 PROCEDURE — 25000128 H RX IP 250 OP 636: Performed by: PHYSICIAN ASSISTANT

## 2017-05-12 PROCEDURE — 25000132 ZZH RX MED GY IP 250 OP 250 PS 637: Mod: GY | Performed by: PHYSICIAN ASSISTANT

## 2017-05-12 PROCEDURE — 25000125 ZZHC RX 250: Performed by: PHYSICIAN ASSISTANT

## 2017-05-12 PROCEDURE — 25000131 ZZH RX MED GY IP 250 OP 636 PS 637: Mod: GY | Performed by: PHYSICIAN ASSISTANT

## 2017-05-12 PROCEDURE — 97530 THERAPEUTIC ACTIVITIES: CPT | Mod: GP | Performed by: PHYSICAL THERAPIST

## 2017-05-12 PROCEDURE — A9270 NON-COVERED ITEM OR SERVICE: HCPCS | Mod: GY | Performed by: INTERNAL MEDICINE

## 2017-05-12 PROCEDURE — 97116 GAIT TRAINING THERAPY: CPT | Mod: GP | Performed by: PHYSICAL THERAPIST

## 2017-05-12 PROCEDURE — 84484 ASSAY OF TROPONIN QUANT: CPT | Performed by: INTERNAL MEDICINE

## 2017-05-12 PROCEDURE — 40000193 ZZH STATISTIC PT WARD VISIT: Performed by: PHYSICAL THERAPIST

## 2017-05-12 PROCEDURE — 70450 CT HEAD/BRAIN W/O DYE: CPT

## 2017-05-12 PROCEDURE — 84550 ASSAY OF BLOOD/URIC ACID: CPT | Performed by: INTERNAL MEDICINE

## 2017-05-12 PROCEDURE — 85610 PROTHROMBIN TIME: CPT | Performed by: INTERNAL MEDICINE

## 2017-05-12 RX ORDER — SIMETHICONE 80 MG
160 TABLET,CHEWABLE ORAL 4 TIMES DAILY PRN
Status: DISCONTINUED | OUTPATIENT
Start: 2017-05-12 | End: 2017-05-16 | Stop reason: HOSPADM

## 2017-05-12 RX ORDER — HYDROMORPHONE HYDROCHLORIDE 1 MG/ML
0.5 INJECTION, SOLUTION INTRAMUSCULAR; INTRAVENOUS; SUBCUTANEOUS
Status: DISCONTINUED | OUTPATIENT
Start: 2017-05-12 | End: 2017-05-15

## 2017-05-12 RX ORDER — OLANZAPINE 5 MG/1
5 TABLET ORAL 2 TIMES DAILY
Status: DISCONTINUED | OUTPATIENT
Start: 2017-05-12 | End: 2017-05-16 | Stop reason: HOSPADM

## 2017-05-12 RX ORDER — HYDRALAZINE HYDROCHLORIDE 20 MG/ML
10 INJECTION INTRAMUSCULAR; INTRAVENOUS EVERY 6 HOURS PRN
Status: DISCONTINUED | OUTPATIENT
Start: 2017-05-12 | End: 2017-05-16 | Stop reason: HOSPADM

## 2017-05-12 RX ADMIN — DEXAMETHASONE SODIUM PHOSPHATE 4 MG: 4 INJECTION, SOLUTION INTRAMUSCULAR; INTRAVENOUS at 21:44

## 2017-05-12 RX ADMIN — HYDROMORPHONE HYDROCHLORIDE 0.5 MG: 1 INJECTION, SOLUTION INTRAMUSCULAR; INTRAVENOUS; SUBCUTANEOUS at 09:14

## 2017-05-12 RX ADMIN — CARFILZOMIB 44 MG: 60 INJECTION, POWDER, LYOPHILIZED, FOR SOLUTION INTRAVENOUS at 22:07

## 2017-05-12 RX ADMIN — LEVOFLOXACIN 750 MG: 750 TABLET, FILM COATED ORAL at 08:14

## 2017-05-12 RX ADMIN — ONDANSETRON 8 MG: 2 INJECTION INTRAMUSCULAR; INTRAVENOUS at 23:21

## 2017-05-12 RX ADMIN — SODIUM CHLORIDE, PRESERVATIVE FREE 5 ML: 5 INJECTION INTRAVENOUS at 05:40

## 2017-05-12 RX ADMIN — LIDOCAINE 2 PATCH: 50 PATCH TOPICAL at 08:16

## 2017-05-12 RX ADMIN — OLANZAPINE 5 MG: 5 TABLET, FILM COATED ORAL at 10:25

## 2017-05-12 RX ADMIN — HYDROMORPHONE HYDROCHLORIDE 2 MG: 2 TABLET ORAL at 03:39

## 2017-05-12 RX ADMIN — ACYCLOVIR 400 MG: 200 CAPSULE ORAL at 19:43

## 2017-05-12 RX ADMIN — CLOPIDOGREL 75 MG: 75 TABLET, FILM COATED ORAL at 08:18

## 2017-05-12 RX ADMIN — DEXAMETHASONE 40 MG: 4 TABLET ORAL at 08:15

## 2017-05-12 RX ADMIN — OLANZAPINE 5 MG: 5 TABLET, FILM COATED ORAL at 19:44

## 2017-05-12 RX ADMIN — POTASSIUM CHLORIDE 20 MEQ: 750 TABLET, EXTENDED RELEASE ORAL at 19:44

## 2017-05-12 RX ADMIN — ESOMEPRAZOLE MAGNESIUM 40 MG: 20 CAPSULE, DELAYED RELEASE PELLETS ORAL at 08:14

## 2017-05-12 RX ADMIN — AMLODIPINE BESYLATE 5 MG: 5 TABLET ORAL at 19:44

## 2017-05-12 RX ADMIN — ACYCLOVIR 400 MG: 200 CAPSULE ORAL at 08:15

## 2017-05-12 RX ADMIN — POTASSIUM CHLORIDE 20 MEQ: 750 TABLET, EXTENDED RELEASE ORAL at 08:14

## 2017-05-12 RX ADMIN — SIMVASTATIN 20 MG: 20 TABLET, FILM COATED ORAL at 19:45

## 2017-05-12 RX ADMIN — HYDROMORPHONE HYDROCHLORIDE 2 MG: 2 TABLET ORAL at 09:05

## 2017-05-12 RX ADMIN — HALOPERIDOL LACTATE 5 MG: 5 INJECTION, SOLUTION INTRAMUSCULAR at 21:15

## 2017-05-12 ASSESSMENT — PAIN DESCRIPTION - DESCRIPTORS: DESCRIPTORS: ACHING

## 2017-05-12 NOTE — PLAN OF CARE
Problem: Goal Outcome Summary  Goal: Goal Outcome Summary  PT 7D: Pt alert, sitting up in chair. Oriented to self only. New involuntary facial twitching on L noted. Increased difficulty with communication/completing continuous and sensical statements compared to prior PT session on 5/11/17. Able to point to abdominal area when asked for areas of pain, pt endorses reproducible pain to palpation on L ribcage only. Unsure of accuracy of yes/no statements. Pt transfers sit<>stand with min-mod A, mod A to stand pivot 180 degrees to chair. Pt ambualted 90 ft x 1, 300 ft x 1 with FWW, CGA-min A x 1, assist of another for wheelchair follow. Ambulates/turns with simple verbal cues for direction. Increased difficulty with motor planning/coordination of stand pivot to wheelchair. Stable cardiovascular response to activity.     Recommend: TCU upon discharge

## 2017-05-12 NOTE — PLAN OF CARE
Problem: Goal Outcome Summary  Goal: Goal Outcome Summary  Outcome: No Change     2975-1477: Hypertensive; midodrine held per MD; scheduled norvasc given. Severe back pain when patient first arrived to the unit; improved after PO dilaudid. On levaquin for pneumonia. Disoriented to time, situation, and place. Patient pleasantly confused and redirectable. Bed alarm on. Up with Ax1 and walker. PT recommending d/c to TCU. Potassium replaced; recheck 4.0. Continuing to check troponin levels Q8 hrs; last level was 0.018. Patient received first dose of carfilzomib and tolerated well. NS bolus was given 30 minutes prior and post. Continue with plan of care.      5325-7131: Blood pressures improved. Continues to be pleasantly confused with off statements. Up frequently to the bathroom overnight; using urinal at bedside with assistance x1, gait belt, and walker. 2 small BMs. Dilaudid given x1 for back pain. Up in chair with alarm this morning drinking coffee. Continue with plan of care.

## 2017-05-12 NOTE — PLAN OF CARE
Problem: Goal Outcome Summary  Goal: Goal Outcome Summary  Outcome: No Change  VSS, Hypertensive before xray. Severe abd pain this AM; improved after PO & IV dilaudid. Had abd xray. Had head CT. On levaquin for pneumonia. Disoriented to time, situation, and place. Patient pleasantly confused at times. Bed alarm on. Up with Ax1 and walker. PT recommending d/c to TCU. Continuing to check troponin levels Q8 hrs; last level was 0.022. Pt had int twitching and shivering, was observed to have double vision one time. Wife at BS, helpful. Continue with plan of care.

## 2017-05-12 NOTE — PROGRESS NOTES
Nursing Focus: Transfer     D: Arrived at 1600 from 5C via wheelchair. Patient accompanied by staff and wife. Admitted for back pain, confusion, pneumonia, and JOSE MARIA.     I: Transfer process began.  Patient oriented to room, enviroment, call light.  MD notified of patient's arrival on unit. Patient's wife given unit phone number.    A: Hypertensive, afebrile.  Patient stable at this time.  Complaining of back pain. Dilaudid given. Patient disoriented and pleasantly confused; bed alarm on.     P: Implement plan of care when available. Continue to monitor patient. Nursing interventions as appropriate. Notify MD with changes in pt status.

## 2017-05-12 NOTE — PROGRESS NOTES
Nursing Focus: Chemotherapy    D: Positive blood return via Port. Insertion site is clean/dry/intact, dressing intact with no complaints of pain.  Urine output is recorded in intake Doc Flowsheet.      I: Dexamethasone premedications given per order (see electronic medical administration record). NS bolus given 30 minutes prior and 30 minutes post chemo infusion. Carfilzomib dose #1 of 2 started to infuse over 10 minutes. Reviewed pt teaching on chemotherapy side effects.  Pt denies need for further teaching. Chemotherapy double checked per protocol by two chemotherapy competent RN's.     A: Tolerating chemo well. Denies nausea and or pain.     P: Continue to monitor urine output and symptoms of nausea. Screen for symptoms of toxicity.

## 2017-05-13 ENCOUNTER — APPOINTMENT (OUTPATIENT)
Dept: OCCUPATIONAL THERAPY | Facility: CLINIC | Age: 72
DRG: 840 | End: 2017-05-13
Payer: MEDICARE

## 2017-05-13 ENCOUNTER — APPOINTMENT (OUTPATIENT)
Dept: PHYSICAL THERAPY | Facility: CLINIC | Age: 72
DRG: 840 | End: 2017-05-13
Payer: MEDICARE

## 2017-05-13 LAB
ALBUMIN SERPL-MCNC: 2.5 G/DL (ref 3.4–5)
ALP SERPL-CCNC: 36 U/L (ref 40–150)
ALT SERPL W P-5'-P-CCNC: 15 U/L (ref 0–70)
ANION GAP SERPL CALCULATED.3IONS-SCNC: 10 MMOL/L (ref 3–14)
AST SERPL W P-5'-P-CCNC: 8 U/L (ref 0–45)
BASOPHILS # BLD AUTO: 0 10E9/L (ref 0–0.2)
BASOPHILS NFR BLD AUTO: 0.4 %
BILIRUB SERPL-MCNC: 0.3 MG/DL (ref 0.2–1.3)
BUN SERPL-MCNC: 30 MG/DL (ref 7–30)
CALCIUM SERPL-MCNC: 7.5 MG/DL (ref 8.5–10.1)
CHLORIDE SERPL-SCNC: 114 MMOL/L (ref 94–109)
CK BB CFR SERPL ELPH: 0 %
CK MACRO1 CFR SERPL: 0 %
CK MACRO2 CFR SERPL: 0 %
CK MB CFR SERPL ELPH: 0 %
CK MM CFR SERPL ELPH: 100 %
CK SERPL-CCNC: 151 U/L
CO2 SERPL-SCNC: 21 MMOL/L (ref 20–32)
CREAT SERPL-MCNC: 0.98 MG/DL (ref 0.66–1.25)
DIFFERENTIAL METHOD BLD: ABNORMAL
EOSINOPHIL # BLD AUTO: 0 10E9/L (ref 0–0.7)
EOSINOPHIL NFR BLD AUTO: 0 %
ERYTHROCYTE [DISTWIDTH] IN BLOOD BY AUTOMATED COUNT: 18.5 % (ref 10–15)
GFR SERPL CREATININE-BSD FRML MDRD: 74 ML/MIN/1.7M2
GLUCOSE SERPL-MCNC: 112 MG/DL (ref 70–99)
HCT VFR BLD AUTO: 27.6 % (ref 40–53)
HGB BLD-MCNC: 8.3 G/DL (ref 13.3–17.7)
IMM GRANULOCYTES # BLD: 0 10E9/L (ref 0–0.4)
IMM GRANULOCYTES NFR BLD: 0.4 %
INR PPP: 1.35 (ref 0.86–1.14)
INTERPRETATION ECG - MUSE: NORMAL
LYMPHOCYTES # BLD AUTO: 0.1 10E9/L (ref 0.8–5.3)
LYMPHOCYTES NFR BLD AUTO: 2 %
MAGNESIUM SERPL-MCNC: 1.7 MG/DL (ref 1.6–2.3)
MCH RBC QN AUTO: 30.4 PG (ref 26.5–33)
MCHC RBC AUTO-ENTMCNC: 30.1 G/DL (ref 31.5–36.5)
MCV RBC AUTO: 101 FL (ref 78–100)
MONOCYTES # BLD AUTO: 0.4 10E9/L (ref 0–1.3)
MONOCYTES NFR BLD AUTO: 7.6 %
NEUTROPHILS # BLD AUTO: 4.9 10E9/L (ref 1.6–8.3)
NEUTROPHILS NFR BLD AUTO: 89.6 %
NRBC # BLD AUTO: 0.1 10*3/UL
NRBC BLD AUTO-RTO: 1 /100
PHOSPHATE SERPL-MCNC: 2.3 MG/DL (ref 2.5–4.5)
PLATELET # BLD AUTO: 138 10E9/L (ref 150–450)
POTASSIUM SERPL-SCNC: 3.7 MMOL/L (ref 3.4–5.3)
PROT SERPL-MCNC: 6.7 G/DL (ref 6.8–8.8)
RBC # BLD AUTO: 2.73 10E12/L (ref 4.4–5.9)
SODIUM SERPL-SCNC: 145 MMOL/L (ref 133–144)
URATE SERPL-MCNC: 2.8 MG/DL (ref 3.5–7.2)
WBC # BLD AUTO: 5.4 10E9/L (ref 4–11)

## 2017-05-13 PROCEDURE — 40000193 ZZH STATISTIC PT WARD VISIT: Performed by: PHYSICAL THERAPIST

## 2017-05-13 PROCEDURE — 97535 SELF CARE MNGMENT TRAINING: CPT | Mod: GO

## 2017-05-13 PROCEDURE — 97530 THERAPEUTIC ACTIVITIES: CPT | Mod: GP | Performed by: PHYSICAL THERAPIST

## 2017-05-13 PROCEDURE — 36592 COLLECT BLOOD FROM PICC: CPT | Performed by: INTERNAL MEDICINE

## 2017-05-13 PROCEDURE — 84100 ASSAY OF PHOSPHORUS: CPT | Performed by: INTERNAL MEDICINE

## 2017-05-13 PROCEDURE — 25000125 ZZHC RX 250: Performed by: PHYSICIAN ASSISTANT

## 2017-05-13 PROCEDURE — 85610 PROTHROMBIN TIME: CPT | Performed by: INTERNAL MEDICINE

## 2017-05-13 PROCEDURE — 80053 COMPREHEN METABOLIC PANEL: CPT | Performed by: INTERNAL MEDICINE

## 2017-05-13 PROCEDURE — 25000132 ZZH RX MED GY IP 250 OP 250 PS 637: Mod: GY | Performed by: INTERNAL MEDICINE

## 2017-05-13 PROCEDURE — 25000131 ZZH RX MED GY IP 250 OP 636 PS 637: Mod: GY | Performed by: PHYSICIAN ASSISTANT

## 2017-05-13 PROCEDURE — 97116 GAIT TRAINING THERAPY: CPT | Mod: GP | Performed by: PHYSICAL THERAPIST

## 2017-05-13 PROCEDURE — 25000132 ZZH RX MED GY IP 250 OP 250 PS 637: Mod: GY | Performed by: PHYSICIAN ASSISTANT

## 2017-05-13 PROCEDURE — 84550 ASSAY OF BLOOD/URIC ACID: CPT | Performed by: INTERNAL MEDICINE

## 2017-05-13 PROCEDURE — 25000128 H RX IP 250 OP 636: Performed by: PHYSICIAN ASSISTANT

## 2017-05-13 PROCEDURE — 85025 COMPLETE CBC W/AUTO DIFF WBC: CPT | Performed by: INTERNAL MEDICINE

## 2017-05-13 PROCEDURE — 83735 ASSAY OF MAGNESIUM: CPT | Performed by: INTERNAL MEDICINE

## 2017-05-13 PROCEDURE — 97112 NEUROMUSCULAR REEDUCATION: CPT | Mod: GP | Performed by: PHYSICAL THERAPIST

## 2017-05-13 PROCEDURE — A9270 NON-COVERED ITEM OR SERVICE: HCPCS | Mod: GY | Performed by: PHYSICIAN ASSISTANT

## 2017-05-13 PROCEDURE — 40000133 ZZH STATISTIC OT WARD VISIT

## 2017-05-13 PROCEDURE — 12000001 ZZH R&B MED SURG/OB UMMC

## 2017-05-13 PROCEDURE — 97530 THERAPEUTIC ACTIVITIES: CPT | Mod: GO

## 2017-05-13 PROCEDURE — A9270 NON-COVERED ITEM OR SERVICE: HCPCS | Mod: GY | Performed by: INTERNAL MEDICINE

## 2017-05-13 PROCEDURE — 25000128 H RX IP 250 OP 636: Performed by: STUDENT IN AN ORGANIZED HEALTH CARE EDUCATION/TRAINING PROGRAM

## 2017-05-13 RX ADMIN — AMLODIPINE BESYLATE 5 MG: 5 TABLET ORAL at 19:59

## 2017-05-13 RX ADMIN — HYDROMORPHONE HYDROCHLORIDE 0.3 MG: 1 INJECTION, SOLUTION INTRAMUSCULAR; INTRAVENOUS; SUBCUTANEOUS at 00:08

## 2017-05-13 RX ADMIN — SIMETHICONE CHEW TAB 80 MG 160 MG: 80 TABLET ORAL at 08:35

## 2017-05-13 RX ADMIN — POTASSIUM CHLORIDE 20 MEQ: 750 TABLET, EXTENDED RELEASE ORAL at 08:38

## 2017-05-13 RX ADMIN — DEXAMETHASONE 40 MG: 4 TABLET ORAL at 08:37

## 2017-05-13 RX ADMIN — ESOMEPRAZOLE MAGNESIUM 20 MG: 20 CAPSULE, DELAYED RELEASE PELLETS ORAL at 09:31

## 2017-05-13 RX ADMIN — ACYCLOVIR 400 MG: 200 CAPSULE ORAL at 08:38

## 2017-05-13 RX ADMIN — ACYCLOVIR 400 MG: 200 CAPSULE ORAL at 19:59

## 2017-05-13 RX ADMIN — SIMETHICONE CHEW TAB 80 MG 160 MG: 80 TABLET ORAL at 13:30

## 2017-05-13 RX ADMIN — OLANZAPINE 5 MG: 5 TABLET, FILM COATED ORAL at 08:38

## 2017-05-13 RX ADMIN — LIDOCAINE 2 PATCH: 50 PATCH TOPICAL at 08:38

## 2017-05-13 RX ADMIN — CLOPIDOGREL 75 MG: 75 TABLET, FILM COATED ORAL at 08:38

## 2017-05-13 RX ADMIN — LEVOFLOXACIN 750 MG: 750 TABLET, FILM COATED ORAL at 08:38

## 2017-05-13 RX ADMIN — OLANZAPINE 5 MG: 5 TABLET, FILM COATED ORAL at 19:59

## 2017-05-13 RX ADMIN — SODIUM CHLORIDE, PRESERVATIVE FREE 5 ML: 5 INJECTION INTRAVENOUS at 20:08

## 2017-05-13 RX ADMIN — SODIUM CHLORIDE 500 ML: 9 INJECTION, SOLUTION INTRAVENOUS at 17:28

## 2017-05-13 RX ADMIN — SODIUM CHLORIDE, PRESERVATIVE FREE 5 ML: 5 INJECTION INTRAVENOUS at 00:15

## 2017-05-13 RX ADMIN — ESOMEPRAZOLE MAGNESIUM 20 MG: 20 CAPSULE, DELAYED RELEASE PELLETS ORAL at 08:47

## 2017-05-13 RX ADMIN — SIMVASTATIN 20 MG: 20 TABLET, FILM COATED ORAL at 19:59

## 2017-05-13 RX ADMIN — SODIUM CHLORIDE, PRESERVATIVE FREE 5 ML: 5 INJECTION INTRAVENOUS at 07:01

## 2017-05-13 RX ADMIN — POTASSIUM CHLORIDE 20 MEQ: 750 TABLET, EXTENDED RELEASE ORAL at 19:59

## 2017-05-13 ASSESSMENT — PAIN DESCRIPTION - DESCRIPTORS
DESCRIPTORS: ACHING
DESCRIPTORS: CRAMPING
DESCRIPTORS: CRAMPING;ACHING
DESCRIPTORS: CRAMPING

## 2017-05-13 NOTE — PLAN OF CARE
Problem: Goal Outcome Summary  Goal: Goal Outcome Summary  Outcome: Improving  Yamil was mentally clear this morning, benefitting from getting up to a chair and starting morning routines.  He has been alert and oriented x4 today without any agitation or restlessness. He required no haldol today.  He worked with PT and OT, walking in the halls and playing Connect 4.  He ate good amounts of breakfast and lunch.  He continues on levaquin for probable pneumonia.  His chief complaint today has been abdominal cramping pain.  He had an abdominal xray yesterday which showed nonobstructive gas pattern; simethicone was ordered and has helped ease his pain, also has sucralfate available.  Warm pack has helped as well.  His wife was at bedside for several hours.  Bed alarm is on, bedside attendant was released at noonish.  Possible transfer to TCU tomorrow or Monday.

## 2017-05-13 NOTE — PROGRESS NOTES
[facility], [place]  Hematology / Oncology Progress Note     Assessment & Plan   Anthony Carbone is a 71 year old male with a history of relapsed multiple myeloma admitted on 5/7/2017 with back pain, confusion, and acute kidney injury.     1. NEURO  #Encephalopathy, likely combination of metabolic, infectious, & uremic  - Multifactorial. Per wife, had been getting weaker over several days PTA but confusion suddenly came on in PM of 5/7 while on airplane back from trip to [place]. AMS resolving (even jokes how he used to say president was [place]). Noncontrast CT head completed 5/12/17 and was negative. Evaluated by PT/OT and they recommend TCU placement, discussed with SW.   -Infectious workup with BC, UC, CXR, RVP - BC, UC negative thus far. RVP negative. CXR with possible pneumonia, treating with Levaquin (see below).  -Discussed on admission with Dr. Bowie of Palliative Care, appreciate assistance. Zyprexa 5mg BID with Haldol 5mg every 4 hours PRN agitation (Qtc 5/8 431). Discussed this plan with patient's wife Casey and she was in agreement with this strategy due to patient not being cooperative with cares prior to medication administration. Continue Zyprexa 5mg BID (was reduced to 5mg QHS but delirium increased).  -Delirium precautions  -No plasmapheresis needed at this time    #History of subacute CVA. Continue home Plavix.    2. RENAL  #Acute kidney injury  -Unclear etiology. Creatinine on 4/20/17 was 0.80. Wife reports patient was taking 1200-2400mg of ibuprofen daily for at least the past 2 weeks. Also with suspected progression of myeloma.  -SCr continues to improve s/p IV hydration. Avoid NSAIDs in future. D/t hypernatremia and overall clinical improvement IV fluids were d/c'd on 5/11. Will hold off on further IV fluids, continue to follow creatinine and PO intake.     #Hypercalcemia - RESOLVED  -Reduce IV fluid rate  -Pamidronate 60mg x 1 given 5/8    #Hyperuricemia  associated with malignancy - RESOLVED  -Reduce IV fluid rate  -Rasburicase 3mg x 1 given 5/8 to treat hyperuricemia (not tumor lysis syndrome)    3. ID  #Possible infection - suspected pneumonia  -Per wife and review of notes, patient was experiencing cough and cold symptoms for at least the past 2 weeks PTA. Was being treated with doxycycline and promethazine + codeine cough syrup.  -ID workup as above  -Transitioned to oral Levaquin 750mg daily on 5/10, plan to complete 7 days of therapy (done on 5/15).  -Continue acyclovir 400mg BID for prophylaxis    4. HEME/ONC  #Multiple myeloma  -Followed by Dr. Topete. Most recently being treated in [place]. Was on daratumumab/pomalidomide/dexamethasone. Had been on hold for past couple of weeks d/t illness.  -Labs pending  -Solumedrol 100mg IV 5/8, 5/9 and 5/10. Aware that this can worsen mental status, but feel benefit outweighs risk at this time and treating delirium as above. May also help with pain. Transitioned 5/11 to oral dexamethasone at 40mg daily x 3 days. Also giving carfilzomib 5/11 and today per Dr. Topete. Will plan for follow up as outpatient early next week pending TCU stay.  -Viscosity index = 2.3. No plasmapheresis needed.    #Cancer-related pain  -Per wife, has been going on for about 2 weeks. Consider imaging. MRI lumbar spine done 4/24 at OSH in [place] reviewed, copy made to be scanned into chart: mild-to-moderate compression deformities T9-T11, degenerative changes in lumbar spine, mild central and foraminal stenoses in lumbar spine. Additional evaluation indicates pain is located over rib cage and well as mid-back. Reported significant pain while coughing recently, rib x-ray negative for fracture. Will continue lidocaine, Voltaren. X-rays of thoracic and lumbar spine ordered 5/11 which showed redemonstration of T11 compression fracture from Dec 2016, but otherwise nothing new. EKG and cardiac enzymes negative.  -Try Tylenol first d/t delirium. Dilaudid  ordered PRN severe pain if unable to control with Tylenol.  -Also receiving steroids as above.    5. CV  #Hypertension. Continue home Norvasc.  #Hyperlipidemia. Continue home Zocor.    6. GI  #GERD. Continue home Nexium.    #Abdominal pain. Developed sudden onset of abdominal pain this AM 5/12/17. Resolved with Dilaudid. Abd x-ray showed nonobstructive bowel gas pattern. No further pain. Simethicone available as needed.    FEN  -No MIVF, bolus as needed.  -Electrolyte replacement PRN per protocol  -Regular diet as tolerated. Continue to monitor PO intake.    Code status: FULL      Case and plan discussed with Dr. Lisbeth Mays MD  [id number]  Hematology Oncology Fellow      Interval History   Overnight increased delirium.  Is able to joke about it this am appropriately. No fevers, sweats, chills. No chest pain, SOB. Urinating ok and bowels regular.    Physical Exam   Temp: 97  F (36.1  C) Temp src: Oral BP: 124/80   Heart Rate: 64 Resp: 18 SpO2: 97 % O2 Device: None (Room air)    Vitals:    05/11/17 0801 05/12/17 1200 05/13/17 0900   Weight: 60.3 kg (133 lb) 59.5 kg (131 lb 1.6 oz) 59.9 kg (132 lb 1.6 oz)     Vital Signs with Ranges  Temp:  [97  F (36.1  C)-99.2  F (37.3  C)] 97  F (36.1  C)  Heart Rate:  [62-64] 64  Resp:  [18] 18  BP: (121-146)/(52-89) 124/80  SpO2:  [94 %-97 %] 97 %  I/O last 3 completed shifts:  In: 897 [P.O.:877; I.V.:20]  Out: 222 [Urine:222]    Constitutional: Adult male who appears stated age seen lying in bed, no acute distress.   Respiratory: No increased work of breathing, good air exchange, lungs clear to auscultation.  Cardiovascular: Regular rate and rhythm, no obvious murmurs.  Abdominal: Soft, non-tender.   Skin: warm, dry  Musculoskeletal: No LE edema.   Neurologic: mild tremor today. Cooperative    Medications     - MEDICATION INSTRUCTIONS -       NaCl       - MEDICATION INSTRUCTIONS -         OLANZapine  5 mg Oral BID     heparin lock flush  5-10 mL Intracatheter Q24H      heparin  5 mL Intracatheter Q28 Days     lidocaine  3 patch Transdermal Q24H     lidocaine   Transdermal Q24h     lidocaine   Transdermal Q8H     levofloxacin  750 mg Oral Daily     esomeprazole  40 mg Oral QAM AC     amLODIPine  5 mg Oral At Bedtime     potassium chloride  20 mEq Oral BID     acyclovir (ZOVIRAX) capsule 400 mg  400 mg Oral BID     clopidogrel  75 mg Oral Daily     simvastatin (ZOCOR) tablet 20 mg  20 mg Oral At Bedtime       Data   Results for orders placed or performed during the hospital encounter of 05/07/17 (from the past 24 hour(s))   Uric acid   Result Value Ref Range    Uric Acid 2.8 (L) 3.5 - 7.2 mg/dL   INR   Result Value Ref Range    INR 1.35 (H) 0.86 - 1.14   Comprehensive metabolic panel   Result Value Ref Range    Sodium 145 (H) 133 - 144 mmol/L    Potassium 3.7 3.4 - 5.3 mmol/L    Chloride 114 (H) 94 - 109 mmol/L    Carbon Dioxide 21 20 - 32 mmol/L    Anion Gap 10 3 - 14 mmol/L    Glucose 112 (H) 70 - 99 mg/dL    Urea Nitrogen 30 7 - 30 mg/dL    Creatinine 0.98 0.66 - 1.25 mg/dL    GFR Estimate 74 >60 mL/min/1.7m2    GFR Estimate If Black >90   GFR Calc   >60 mL/min/1.7m2    Calcium 7.5 (L) 8.5 - 10.1 mg/dL    Bilirubin Total 0.3 0.2 - 1.3 mg/dL    Albumin 2.5 (L) 3.4 - 5.0 g/dL    Protein Total 6.7 (L) 6.8 - 8.8 g/dL    Alkaline Phosphatase 36 (L) 40 - 150 U/L    ALT 15 0 - 70 U/L    AST 8 0 - 45 U/L   CBC with platelets differential   Result Value Ref Range    WBC 5.4 4.0 - 11.0 10e9/L    RBC Count 2.73 (L) 4.4 - 5.9 10e12/L    Hemoglobin 8.3 (L) 13.3 - 17.7 g/dL    Hematocrit 27.6 (L) 40.0 - 53.0 %     (H) 78 - 100 fl    MCH 30.4 26.5 - 33.0 pg    MCHC 30.1 (L) 31.5 - 36.5 g/dL    RDW 18.5 (H) 10.0 - 15.0 %    Platelet Count 138 (L) 150 - 450 10e9/L    Diff Method Automated Method     % Neutrophils 89.6 %    % Lymphocytes 2.0 %    % Monocytes 7.6 %    % Eosinophils 0.0 %    % Basophils 0.4 %    % Immature Granulocytes 0.4 %    Nucleated RBCs 1 (H) 0 /100     Absolute Neutrophil 4.9 1.6 - 8.3 10e9/L    Absolute Lymphocytes 0.1 (L) 0.8 - 5.3 10e9/L    Absolute Monocytes 0.4 0.0 - 1.3 10e9/L    Absolute Eosinophils 0.0 0.0 - 0.7 10e9/L    Absolute Basophils 0.0 0.0 - 0.2 10e9/L    Abs Immature Granulocytes 0.0 0 - 0.4 10e9/L    Absolute Nucleated RBC 0.1    Magnesium   Result Value Ref Range    Magnesium 1.7 1.6 - 2.3 mg/dL   Phosphorus   Result Value Ref Range    Phosphorus 2.3 (L) 2.5 - 4.5 mg/dL

## 2017-05-13 NOTE — PLAN OF CARE
Problem: Goal Outcome Summary  Goal: Goal Outcome Summary  Outcome: No Change     Pt aggressive at beginning of shift (see note). Eventually able to redirect to bed. Sitter at bedside, bed alarm on. Pt oriented to self only. Pt refusing vital sign checks, fighting against blood pressure cuff, refusing temp check and clenching hands so no pulse ox can be done. Dilaudid 0.3 mg given for back pain with relief. Voiding well. Cont POC.

## 2017-05-13 NOTE — PROGRESS NOTES
Nursing Focus: Chemotherapy  D: Positive blood return via Port x3. Insertion site is clean/dry/intact, dressing intact with no complaints of pain.  Urine output is recorded in intake in Doc Flowsheet.    I: Premedications given per order (see electronic medical administration record). Dose #2 of Carfilzomib started to infuse over 10 minutes. Reviewed pt teaching on chemotherapy side effects.  Pt denies need for further teaching. Chemotherapy double checked per protocol by two chemotherapy competent RN's.   A: Tolerating procedure well. Denies nausea and or pain during chemotherapy infusion.   P: Continue to monitor urine output and symptoms of nausea. Screen for symptoms of toxicity.

## 2017-05-13 NOTE — PLAN OF CARE
"Problem: Goal Outcome Summary  Goal: Goal Outcome Summary  PT 7D: Reviewed importance of logroll for supine>sitting for neutral spinal alignment 2/2 compression deformities. Pt rolls onto R side with simple cue to bend L knee/place foot on bed, verbal cue to \"roll on your side\". Sidelying>sitting with SBA, simple cue to \"sit up\". Sit<>stand with CGA-min A. Pt ambulated 300 ft x 3 with CGA on gait belt, easily distracted by environment causing mild instability but no formal LOB. Completed stairs negotiation up/down steps x 5, progressing to no railing. Pt requires CGA-min A 2/2 posterior LOB upon descent. Pt is limited by impaired balance/coordination, confusion limiting ability to follow multi-step commands.     Recommend: TCU upon discharge      "

## 2017-05-13 NOTE — PLAN OF CARE
Problem: Goal Outcome Summary  Goal: Goal Outcome Summary  Outcome: No Change  D/I: Confused throughout the shift. Oriented to self only. Making nonsensical statements. Calm and cooperative at the beginning of the shift, but gradually became more and more agitated. The pt got oob and began walking to the doorway around 2100. Stated  that he was going to go home. Very agitated and aggressive. Unable to redirect the pt. Code 21 called. The pt agreed to return to bed after speaking with security team. Continued to express frustration and anger about not being able to go home. Given IV Haldol at 2115. The pt was seen by Dr Menendez around 2200  The pt  agreed to stay and to have chemo tonight after speaking with the MD. Less agitated around 2230, but continues to be impulsive and confused. Attendant at the bedside after the Code 21.  Continues to have uncontrollable movements in face and trunk. Hands with tremors. Voiding spontaneously. Able to ambulate to the BR with SBA of 1 and a walker.   P: Continue POC. Fall precautions. Bed alarm. Attendant prn. Alert MD of changes in status.

## 2017-05-13 NOTE — PROGRESS NOTES
"Pt become increasing agitation. Found in bed undressing him, attempted to get out of bed, disregarding verbal commands to stay seated. Assist x3 when pt started pacing for the door, pt pushed his weight again staff, reports he has to \"go home\" or \"go to the office\", unable to redirect patient to bed. Haldol IV x1 given. Code 21 called for additional assistance from hospital staff, security, float RN and nursing supervisor attended to site. Staff asked patient to cooperate and go back to bed, pt refused, pt demanded he leave the hospital, considering his state of increasing confusion and agitation, he's not in a stable state to leave. Pt educated about chemotherapy; demanded to talk to debra LANGSTON MD, MD arrived to seen 5 mins after phone call. Pt didn't have any questions for MD IVIS left. Pt complied to staying in bed until chemotherapy was completed. Pt requested to talk to his wife, whom we've dialed her numbers, both no answer, pt happily left voicemail. Currently, pt agree to cares and remains calm.  "

## 2017-05-13 NOTE — PROGRESS NOTES
Pt become more agitated than when pt was receiving chemotherapy. Post chemotherapy, pt wanted to leave room again, two staff was at pt's bedside, pt pushed against staff and gripped strongly against a staff's hands. Pt was asked to stop, refused. Additional staff arrived in room, asked pt to comply with sitting in bed, pt refused, pt assisted to sit in chair. Pt redirected that it is night time and pt should considering going to bed, after a short conversation, pt agreed. Sitter at bedside for patient's safety. Fall risk. Assist x2.

## 2017-05-13 NOTE — PLAN OF CARE
Problem: Goal Outcome Summary  Goal: Goal Outcome Summary  OT 7D: Pt and Th played 5 rounds of connect four, pt was able to follow simple one-step directions throughout game, but has significant difficulty with concept of strategy even with max VCs from Th. Pt was unable to identify ways to win even when Th would directly point out opportunities, pt would not catch on and would place game chip in a random spot. Pts cognition has significantly improved since last session, pt is cooperative/compliant and able to follow 75% of direct one-step commands. Pt also ambulated ~1000 feet with fww and CGA this session, no signs of fatigue noted. Pt unable to recall any spinal precautions, will need reinforcement.      REC TCU to increase safety and independence with ADLs/IADLs/cognition and functional mobility.

## 2017-05-14 ENCOUNTER — APPOINTMENT (OUTPATIENT)
Dept: PHYSICAL THERAPY | Facility: CLINIC | Age: 72
DRG: 840 | End: 2017-05-14
Payer: MEDICARE

## 2017-05-14 LAB
ALBUMIN SERPL-MCNC: 2.6 G/DL (ref 3.4–5)
ALP SERPL-CCNC: 42 U/L (ref 40–150)
ALT SERPL W P-5'-P-CCNC: 19 U/L (ref 0–70)
ANION GAP SERPL CALCULATED.3IONS-SCNC: 8 MMOL/L (ref 3–14)
AST SERPL W P-5'-P-CCNC: 10 U/L (ref 0–45)
BACTERIA SPEC CULT: NO GROWTH
BACTERIA SPEC CULT: NO GROWTH
BASOPHILS # BLD AUTO: 0 10E9/L (ref 0–0.2)
BASOPHILS NFR BLD AUTO: 0.3 %
BILIRUB SERPL-MCNC: 0.3 MG/DL (ref 0.2–1.3)
BUN SERPL-MCNC: 33 MG/DL (ref 7–30)
CALCIUM SERPL-MCNC: 7.3 MG/DL (ref 8.5–10.1)
CHLORIDE SERPL-SCNC: 111 MMOL/L (ref 94–109)
CO2 SERPL-SCNC: 23 MMOL/L (ref 20–32)
CREAT SERPL-MCNC: 1.01 MG/DL (ref 0.66–1.25)
DIFFERENTIAL METHOD BLD: ABNORMAL
EOSINOPHIL # BLD AUTO: 0 10E9/L (ref 0–0.7)
EOSINOPHIL NFR BLD AUTO: 0 %
ERYTHROCYTE [DISTWIDTH] IN BLOOD BY AUTOMATED COUNT: 18.1 % (ref 10–15)
GFR SERPL CREATININE-BSD FRML MDRD: 72 ML/MIN/1.7M2
GLUCOSE SERPL-MCNC: 160 MG/DL (ref 70–99)
HCT VFR BLD AUTO: 27.7 % (ref 40–53)
HGB BLD-MCNC: 8.4 G/DL (ref 13.3–17.7)
IMM GRANULOCYTES # BLD: 0 10E9/L (ref 0–0.4)
IMM GRANULOCYTES NFR BLD: 0.3 %
INR PPP: 1.27 (ref 0.86–1.14)
LACTATE BLD-SCNC: 1.7 MMOL/L (ref 0.7–2.1)
LYMPHOCYTES # BLD AUTO: 0.1 10E9/L (ref 0.8–5.3)
LYMPHOCYTES NFR BLD AUTO: 3.1 %
MCH RBC QN AUTO: 30.2 PG (ref 26.5–33)
MCHC RBC AUTO-ENTMCNC: 30.3 G/DL (ref 31.5–36.5)
MCV RBC AUTO: 100 FL (ref 78–100)
MICRO REPORT STATUS: NORMAL
MICRO REPORT STATUS: NORMAL
MONOCYTES # BLD AUTO: 0.5 10E9/L (ref 0–1.3)
MONOCYTES NFR BLD AUTO: 12.6 %
NEUTROPHILS # BLD AUTO: 3.2 10E9/L (ref 1.6–8.3)
NEUTROPHILS NFR BLD AUTO: 83.7 %
NRBC # BLD AUTO: 0.1 10*3/UL
NRBC BLD AUTO-RTO: 2 /100
PLATELET # BLD AUTO: 105 10E9/L (ref 150–450)
POTASSIUM SERPL-SCNC: 3.7 MMOL/L (ref 3.4–5.3)
PROT SERPL-MCNC: 7.2 G/DL (ref 6.8–8.8)
RBC # BLD AUTO: 2.78 10E12/L (ref 4.4–5.9)
SODIUM SERPL-SCNC: 142 MMOL/L (ref 133–144)
SPECIMEN SOURCE: NORMAL
SPECIMEN SOURCE: NORMAL
URATE SERPL-MCNC: 3.2 MG/DL (ref 3.5–7.2)
WBC # BLD AUTO: 3.8 10E9/L (ref 4–11)

## 2017-05-14 PROCEDURE — 25000125 ZZHC RX 250: Performed by: PHYSICIAN ASSISTANT

## 2017-05-14 PROCEDURE — 83605 ASSAY OF LACTIC ACID: CPT | Performed by: INTERNAL MEDICINE

## 2017-05-14 PROCEDURE — 36592 COLLECT BLOOD FROM PICC: CPT | Performed by: INTERNAL MEDICINE

## 2017-05-14 PROCEDURE — 40000193 ZZH STATISTIC PT WARD VISIT: Performed by: PHYSICAL THERAPIST

## 2017-05-14 PROCEDURE — 84550 ASSAY OF BLOOD/URIC ACID: CPT | Performed by: INTERNAL MEDICINE

## 2017-05-14 PROCEDURE — 85025 COMPLETE CBC W/AUTO DIFF WBC: CPT | Performed by: INTERNAL MEDICINE

## 2017-05-14 PROCEDURE — 97116 GAIT TRAINING THERAPY: CPT | Mod: GP | Performed by: PHYSICAL THERAPIST

## 2017-05-14 PROCEDURE — 25000128 H RX IP 250 OP 636: Performed by: INTERNAL MEDICINE

## 2017-05-14 PROCEDURE — 97530 THERAPEUTIC ACTIVITIES: CPT | Mod: GP | Performed by: PHYSICAL THERAPIST

## 2017-05-14 PROCEDURE — 85610 PROTHROMBIN TIME: CPT | Performed by: INTERNAL MEDICINE

## 2017-05-14 PROCEDURE — 80053 COMPREHEN METABOLIC PANEL: CPT | Performed by: INTERNAL MEDICINE

## 2017-05-14 PROCEDURE — 25000132 ZZH RX MED GY IP 250 OP 250 PS 637: Mod: GY | Performed by: PHYSICIAN ASSISTANT

## 2017-05-14 PROCEDURE — 25000132 ZZH RX MED GY IP 250 OP 250 PS 637: Mod: GY | Performed by: INTERNAL MEDICINE

## 2017-05-14 PROCEDURE — A9270 NON-COVERED ITEM OR SERVICE: HCPCS | Mod: GY | Performed by: INTERNAL MEDICINE

## 2017-05-14 PROCEDURE — 12000008 ZZH R&B INTERMEDIATE UMMC

## 2017-05-14 PROCEDURE — A9270 NON-COVERED ITEM OR SERVICE: HCPCS | Mod: GY | Performed by: PHYSICIAN ASSISTANT

## 2017-05-14 RX ORDER — LEVOFLOXACIN 750 MG/1
750 TABLET, FILM COATED ORAL DAILY
Status: DISCONTINUED | OUTPATIENT
Start: 2017-05-15 | End: 2017-05-15

## 2017-05-14 RX ADMIN — POTASSIUM CHLORIDE 20 MEQ: 750 TABLET, EXTENDED RELEASE ORAL at 09:52

## 2017-05-14 RX ADMIN — HYDROMORPHONE HYDROCHLORIDE 2 MG: 2 TABLET ORAL at 20:45

## 2017-05-14 RX ADMIN — ACYCLOVIR 400 MG: 200 CAPSULE ORAL at 21:00

## 2017-05-14 RX ADMIN — AMLODIPINE BESYLATE 5 MG: 5 TABLET ORAL at 21:01

## 2017-05-14 RX ADMIN — SODIUM CHLORIDE, PRESERVATIVE FREE 5 ML: 5 INJECTION INTRAVENOUS at 03:49

## 2017-05-14 RX ADMIN — LEVOFLOXACIN 750 MG: 750 TABLET, FILM COATED ORAL at 09:51

## 2017-05-14 RX ADMIN — CLOPIDOGREL 75 MG: 75 TABLET, FILM COATED ORAL at 09:52

## 2017-05-14 RX ADMIN — LORAZEPAM 0.5 MG: 2 INJECTION INTRAMUSCULAR; INTRAVENOUS at 03:43

## 2017-05-14 RX ADMIN — ESOMEPRAZOLE MAGNESIUM 40 MG: 20 CAPSULE, DELAYED RELEASE PELLETS ORAL at 09:51

## 2017-05-14 RX ADMIN — SODIUM CHLORIDE, PRESERVATIVE FREE 5 ML: 5 INJECTION INTRAVENOUS at 06:04

## 2017-05-14 RX ADMIN — OLANZAPINE 5 MG: 5 TABLET, FILM COATED ORAL at 09:51

## 2017-05-14 RX ADMIN — ACYCLOVIR 400 MG: 200 CAPSULE ORAL at 09:51

## 2017-05-14 RX ADMIN — SIMETHICONE CHEW TAB 80 MG 160 MG: 80 TABLET ORAL at 12:55

## 2017-05-14 RX ADMIN — HYDROMORPHONE HYDROCHLORIDE 2 MG: 2 TABLET ORAL at 02:58

## 2017-05-14 RX ADMIN — LIDOCAINE 2 PATCH: 50 PATCH TOPICAL at 09:52

## 2017-05-14 RX ADMIN — SIMETHICONE CHEW TAB 80 MG 160 MG: 80 TABLET ORAL at 20:12

## 2017-05-14 RX ADMIN — SODIUM CHLORIDE, PRESERVATIVE FREE 5 ML: 5 INJECTION INTRAVENOUS at 09:01

## 2017-05-14 RX ADMIN — OLANZAPINE 5 MG: 5 TABLET, FILM COATED ORAL at 21:01

## 2017-05-14 RX ADMIN — HYDROMORPHONE HYDROCHLORIDE 2 MG: 2 TABLET ORAL at 12:55

## 2017-05-14 RX ADMIN — SODIUM CHLORIDE, PRESERVATIVE FREE 5 ML: 5 INJECTION INTRAVENOUS at 12:56

## 2017-05-14 RX ADMIN — POTASSIUM CHLORIDE 20 MEQ: 750 TABLET, EXTENDED RELEASE ORAL at 21:00

## 2017-05-14 RX ADMIN — SIMVASTATIN 20 MG: 20 TABLET, FILM COATED ORAL at 21:01

## 2017-05-14 ASSESSMENT — PAIN DESCRIPTION - DESCRIPTORS
DESCRIPTORS: ACHING
DESCRIPTORS: ACHING;CRAMPING

## 2017-05-14 NOTE — PLAN OF CARE
Problem: Goal Outcome Summary  Goal: Goal Outcome Summary  PT 7D: Reviewed spinal precautions and technique for donning socks/shoes, completes with min A. Pt ambulated >1000 ft to [facility] with CGA-SBA. Ambulation on uneven surfaces/up and down incline to challenge dynamic balance. Gait deviations but no LOB. Pt continues to requires simple single step cues, demonstrates difficulty with dual task activities.    Recommend: TCU upon discharge to continue to address mobility and cognition    PT: pt D/C to TCU before PT session today. Pt last seen 5/14/17 ( see above.)   At time of D/c pt met 2/5 goals  Unmet goals of bed mob and transfer pt needing increased assist  Goal #1 not tested  Pt to cont with skilled PT at TCU

## 2017-05-14 NOTE — PLAN OF CARE
Problem: Individualization  Goal: Patient Preferences  Afebrile. VSS. Pt denied pain. Has been quite for the most part. Alert and oriented with orientation question, but tries to get up from bed on his own in the wrong angle. Bed alarm on. Patient received 500cc fluid bolus. Continue to monitor.

## 2017-05-14 NOTE — PLAN OF CARE
Problem: Goal Outcome Summary  Goal: Goal Outcome Summary  OT 7D: cancel, pt sleeping soundly upon arrival, pts SO reporting PT to return soon, unable to check back in later PM with schedule, will reschedule.

## 2017-05-14 NOTE — PLAN OF CARE
Problem: Goal Outcome Summary  Goal: Goal Outcome Summary  Outcome: Improving     AVSS, afebrile. A & O x 4 but forgetful. C/o abdominal pain, dilaudid 2mg PO given with relief. Pt anxious overnight, ativan given with relief. Voiding well, no BM overnight. Up with assist and walker, not calling when needs to get up, bed alarm on for safety. Cont POC.

## 2017-05-14 NOTE — PLAN OF CARE
Problem: Goal Outcome Summary  Goal: Goal Outcome Summary  Outcome: Improving  Oriented x4 today.  A bit sleepy this morning but had difficulty falling asleep but ultimately slept after 3-4am after receiving dilaudid and ativan.  Pain was well controlled until after lunch when he developed increased abdominal discomfort. Given simethicone and dilaudid with good relief albeit with some sleepiness.  Has sat in chair for 1 hr three times today.  Has walked to the bathroom.  Plans to work with PT this afternoon.  His wife is at the bedside.  His nutritional intake is fair, about 50%.  Anticipate transfer to TCU soon.

## 2017-05-14 NOTE — PROGRESS NOTES
[facility], [place]  Hematology / Oncology Progress Note     Assessment & Plan   Anthony Carbone is a 71 year old male with a history of relapsed multiple myeloma admitted on 5/7/2017 with back pain, confusion, and acute kidney injury.     1. NEURO  #Encephalopathy, likely combination of metabolic, infectious, & uremic  - Multifactorial. Per wife, had been getting weaker over several days PTA but confusion suddenly came on in PM of 5/7 while on airplane back from trip to [place]. AMS resolving (even jokes how he used to say president was [place]). Noncontrast CT head completed 5/12/17 and was negative. Evaluated by PT/OT and they recommend TCU placement, discussed with SW.   -Infectious workup with BC, UC, CXR, RVP - BC, UC negative thus far. RVP negative. CXR with possible pneumonia, treating with Levaquin (see below).  -Discussed on admission with Dr. Bowie of Palliative Care, appreciate assistance. Zyprexa 5mg BID with Haldol 5mg every 4 hours PRN agitation (Qtc 5/8 431). Discussed this plan with patient's wife Casey and she was in agreement with this strategy due to patient not being cooperative with cares prior to medication administration. Continue Zyprexa 5mg BID (was reduced to 5mg QHS but delirium increased).  -Delirium precautions  -No plasmapheresis needed at this time    #History of subacute CVA. Continue home Plavix.    2. RENAL  #Acute kidney injury  -Unclear etiology. Creatinine on 4/20/17 was 0.80. Wife reports patient was taking 1200-2400mg of ibuprofen daily for at least the past 2 weeks. Also with suspected progression of myeloma.  -SCr continues to improve s/p IV hydration. Avoid NSAIDs in future. D/t hypernatremia and overall clinical improvement IV fluids were d/c'd on 5/11. Will hold off on further IV fluids, continue to follow creatinine and PO intake.     #Hypercalcemia - RESOLVED  -Reduce IV fluid rate  -Pamidronate 60mg x 1 given 5/8    #Hyperuricemia  associated with malignancy - RESOLVED  -Reduce IV fluid rate  -Rasburicase 3mg x 1 given 5/8 to treat hyperuricemia (not tumor lysis syndrome)    3. ID  #Possible infection - suspected pneumonia  -Per wife and review of notes, patient was experiencing cough and cold symptoms for at least the past 2 weeks PTA. Was being treated with doxycycline and promethazine + codeine cough syrup.  -ID workup as above  -Transitioned to oral Levaquin 750mg daily on 5/10, plan to complete 7 days of therapy (done on 5/15).  -Continue acyclovir 400mg BID for prophylaxis    4. HEME/ONC  #Multiple myeloma  -Followed by Dr. Topete. Most recently being treated in [place]. Was on daratumumab/pomalidomide/dexamethasone. Had been on hold for past couple of weeks d/t illness.  -Labs pending  -Solumedrol 100mg IV 5/8, 5/9 and 5/10. Aware that this can worsen mental status, but feel benefit outweighs risk at this time and treating delirium as above. May also help with pain. Transitioned 5/11 to oral dexamethasone at 40mg daily x 3 days. Also giving carfilzomib 5/11 and today per Dr. Topete. Will plan for follow up as outpatient early next week pending TCU stay.  -Viscosity index = 2.3. No plasmapheresis needed.    #Cancer-related pain  -Per wife, has been going on for about 2 weeks. Consider imaging. MRI lumbar spine done 4/24 at OSH in [place] reviewed, copy made to be scanned into chart: mild-to-moderate compression deformities T9-T11, degenerative changes in lumbar spine, mild central and foraminal stenoses in lumbar spine. Additional evaluation indicates pain is located over rib cage and well as mid-back. Reported significant pain while coughing recently, rib x-ray negative for fracture. Will continue lidocaine, Voltaren. X-rays of thoracic and lumbar spine ordered 5/11 which showed redemonstration of T11 compression fracture from Dec 2016, but otherwise nothing new. EKG and cardiac enzymes negative.  -Try Tylenol first d/t delirium. Dilaudid  ordered PRN severe pain if unable to control with Tylenol.  -Also receiving steroids as above.    5. CV  #Hypertension. Continue home Norvasc.  #Hyperlipidemia. Continue home Zocor.    6. GI  #GERD. Continue home Nexium.    #Abdominal pain. Developed sudden onset of abdominal pain this AM 5/12/17. Resolved with Dilaudid. Abd x-ray showed nonobstructive bowel gas pattern. No further pain. Simethicone available as needed.    FEN  -No MIVF, bolus as needed.  -Electrolyte replacement PRN per protocol  -Regular diet as tolerated. Continue to monitor PO intake.    Code status: FULL  DC: require TCU placement on MOnday      Case and plan discussed with Dr. Lisbeth Mays MD  [id number]  Hematology Oncology Fellow      Interval History     Did well overnight, denies infectious symptoms.  Feels better.  Sleeping well.    Physical Exam   Temp: 97.6  F (36.4  C) Temp src: Oral BP: 98/48   Heart Rate: 77 Resp: 20 SpO2: 96 % O2 Device: None (Room air)    Vitals:    05/12/17 1200 05/13/17 0900 05/14/17 1208   Weight: 59.5 kg (131 lb 1.6 oz) 59.9 kg (132 lb 1.6 oz) 59.3 kg (130 lb 11.2 oz)     Vital Signs with Ranges  Temp:  [96.8  F (36  C)-98.5  F (36.9  C)] 97.6  F (36.4  C)  Heart Rate:  [] 77  Resp:  [18-22] 20  BP: ()/(48-88) 98/48  SpO2:  [93 %-97 %] 96 %  I/O last 3 completed shifts:  In: 1357 [P.O.:837; I.V.:520]  Out: -     Constitutional: Adult male who appears stated age seen lying in bed, no acute distress.   Respiratory: No increased work of breathing, good air exchange, lungs clear to auscultation.  Cardiovascular: Regular rate and rhythm, no obvious murmurs.  Abdominal: Soft, non-tender.   Skin: warm, dry  Musculoskeletal: No LE edema.   Neurologic: mild tremor today. Cooperative    Medications     - MEDICATION INSTRUCTIONS -       NaCl       - MEDICATION INSTRUCTIONS -         OLANZapine  5 mg Oral BID     heparin lock flush  5-10 mL Intracatheter Q24H     heparin  5 mL Intracatheter Q28 Days      lidocaine  3 patch Transdermal Q24H     lidocaine   Transdermal Q24h     lidocaine   Transdermal Q8H     esomeprazole  40 mg Oral QAM AC     amLODIPine  5 mg Oral At Bedtime     potassium chloride  20 mEq Oral BID     acyclovir (ZOVIRAX) capsule 400 mg  400 mg Oral BID     clopidogrel  75 mg Oral Daily     simvastatin (ZOCOR) tablet 20 mg  20 mg Oral At Bedtime       Data   Results for orders placed or performed during the hospital encounter of 05/07/17 (from the past 24 hour(s))   Uric acid   Result Value Ref Range    Uric Acid 3.2 (L) 3.5 - 7.2 mg/dL   INR   Result Value Ref Range    INR 1.27 (H) 0.86 - 1.14   Comprehensive metabolic panel   Result Value Ref Range    Sodium 142 133 - 144 mmol/L    Potassium 3.7 3.4 - 5.3 mmol/L    Chloride 111 (H) 94 - 109 mmol/L    Carbon Dioxide 23 20 - 32 mmol/L    Anion Gap 8 3 - 14 mmol/L    Glucose 160 (H) 70 - 99 mg/dL    Urea Nitrogen 33 (H) 7 - 30 mg/dL    Creatinine 1.01 0.66 - 1.25 mg/dL    GFR Estimate 72 >60 mL/min/1.7m2    GFR Estimate If Black 88 >60 mL/min/1.7m2    Calcium 7.3 (L) 8.5 - 10.1 mg/dL    Bilirubin Total 0.3 0.2 - 1.3 mg/dL    Albumin 2.6 (L) 3.4 - 5.0 g/dL    Protein Total 7.2 6.8 - 8.8 g/dL    Alkaline Phosphatase 42 40 - 150 U/L    ALT 19 0 - 70 U/L    AST 10 0 - 45 U/L   CBC with platelets differential   Result Value Ref Range    WBC 3.8 (L) 4.0 - 11.0 10e9/L    RBC Count 2.78 (L) 4.4 - 5.9 10e12/L    Hemoglobin 8.4 (L) 13.3 - 17.7 g/dL    Hematocrit 27.7 (L) 40.0 - 53.0 %     78 - 100 fl    MCH 30.2 26.5 - 33.0 pg    MCHC 30.3 (L) 31.5 - 36.5 g/dL    RDW 18.1 (H) 10.0 - 15.0 %    Platelet Count 105 (L) 150 - 450 10e9/L    Diff Method Automated Method     % Neutrophils 83.7 %    % Lymphocytes 3.1 %    % Monocytes 12.6 %    % Eosinophils 0.0 %    % Basophils 0.3 %    % Immature Granulocytes 0.3 %    Nucleated RBCs 2 (H) 0 /100    Absolute Neutrophil 3.2 1.6 - 8.3 10e9/L    Absolute Lymphocytes 0.1 (L) 0.8 - 5.3 10e9/L    Absolute Monocytes 0.5  0.0 - 1.3 10e9/L    Absolute Eosinophils 0.0 0.0 - 0.7 10e9/L    Absolute Basophils 0.0 0.0 - 0.2 10e9/L    Abs Immature Granulocytes 0.0 0 - 0.4 10e9/L    Absolute Nucleated RBC 0.1    Lactic acid level STAT   Result Value Ref Range    Lactic Acid 1.7 0.7 - 2.1 mmol/L

## 2017-05-15 ENCOUNTER — APPOINTMENT (OUTPATIENT)
Dept: OCCUPATIONAL THERAPY | Facility: CLINIC | Age: 72
DRG: 840 | End: 2017-05-15
Payer: MEDICARE

## 2017-05-15 LAB
ALBUMIN SERPL-MCNC: 2.6 G/DL (ref 3.4–5)
ALP SERPL-CCNC: 41 U/L (ref 40–150)
ALT SERPL W P-5'-P-CCNC: 20 U/L (ref 0–70)
ANION GAP SERPL CALCULATED.3IONS-SCNC: 10 MMOL/L (ref 3–14)
ANISOCYTOSIS BLD QL SMEAR: SLIGHT
AST SERPL W P-5'-P-CCNC: 10 U/L (ref 0–45)
BASOPHILS # BLD AUTO: 0 10E9/L (ref 0–0.2)
BASOPHILS NFR BLD AUTO: 0 %
BILIRUB SERPL-MCNC: 0.8 MG/DL (ref 0.2–1.3)
BUN SERPL-MCNC: 29 MG/DL (ref 7–30)
CALCIUM SERPL-MCNC: 7.6 MG/DL (ref 8.5–10.1)
CHLORIDE SERPL-SCNC: 114 MMOL/L (ref 94–109)
CO2 SERPL-SCNC: 21 MMOL/L (ref 20–32)
CREAT SERPL-MCNC: 1.04 MG/DL (ref 0.66–1.25)
DIFFERENTIAL METHOD BLD: ABNORMAL
EOSINOPHIL # BLD AUTO: 0 10E9/L (ref 0–0.7)
EOSINOPHIL NFR BLD AUTO: 0 %
ERYTHROCYTE [DISTWIDTH] IN BLOOD BY AUTOMATED COUNT: 18 % (ref 10–15)
GFR SERPL CREATININE-BSD FRML MDRD: 70 ML/MIN/1.7M2
GLUCOSE SERPL-MCNC: 89 MG/DL (ref 70–99)
HCT VFR BLD AUTO: 32.1 % (ref 40–53)
HGB BLD-MCNC: 9.8 G/DL (ref 13.3–17.7)
IMM PLASMA CELLS NFR BLD: 7.1 %
INR PPP: 1.15 (ref 0.86–1.14)
LACTATE BLD-SCNC: 1.8 MMOL/L (ref 0.7–2.1)
LYMPHOCYTES # BLD AUTO: 0.3 10E9/L (ref 0.8–5.3)
LYMPHOCYTES NFR BLD AUTO: 8.8 %
MACROCYTES BLD QL SMEAR: PRESENT
MCH RBC QN AUTO: 30.5 PG (ref 26.5–33)
MCHC RBC AUTO-ENTMCNC: 30.5 G/DL (ref 31.5–36.5)
MCV RBC AUTO: 100 FL (ref 78–100)
MONOCYTES # BLD AUTO: 0.3 10E9/L (ref 0–1.3)
MONOCYTES NFR BLD AUTO: 8.8 %
NEUTROPHILS # BLD AUTO: 2.6 10E9/L (ref 1.6–8.3)
NEUTROPHILS NFR BLD AUTO: 75.3 %
NRBC # BLD AUTO: 0.1 10*3/UL
NRBC BLD AUTO-RTO: 4 /100
OVALOCYTES BLD QL SMEAR: SLIGHT
PLASMA CELLS # BLD MANUAL: 0.2 10E9/L
PLATELET # BLD AUTO: 85 10E9/L (ref 150–450)
PLATELET # BLD EST: ABNORMAL 10*3/UL
POIKILOCYTOSIS BLD QL SMEAR: SLIGHT
POTASSIUM SERPL-SCNC: 3.9 MMOL/L (ref 3.4–5.3)
PROT SERPL-MCNC: 7.4 G/DL (ref 6.8–8.8)
RBC # BLD AUTO: 3.21 10E12/L (ref 4.4–5.9)
SODIUM SERPL-SCNC: 145 MMOL/L (ref 133–144)
URATE SERPL-MCNC: 3.8 MG/DL (ref 3.5–7.2)
WBC # BLD AUTO: 3.4 10E9/L (ref 4–11)

## 2017-05-15 PROCEDURE — 36592 COLLECT BLOOD FROM PICC: CPT | Performed by: INTERNAL MEDICINE

## 2017-05-15 PROCEDURE — 80053 COMPREHEN METABOLIC PANEL: CPT | Performed by: INTERNAL MEDICINE

## 2017-05-15 PROCEDURE — 85610 PROTHROMBIN TIME: CPT | Performed by: INTERNAL MEDICINE

## 2017-05-15 PROCEDURE — 25000132 ZZH RX MED GY IP 250 OP 250 PS 637: Mod: GY | Performed by: PHYSICIAN ASSISTANT

## 2017-05-15 PROCEDURE — 12000008 ZZH R&B INTERMEDIATE UMMC

## 2017-05-15 PROCEDURE — 97530 THERAPEUTIC ACTIVITIES: CPT | Mod: GO

## 2017-05-15 PROCEDURE — 40000133 ZZH STATISTIC OT WARD VISIT

## 2017-05-15 PROCEDURE — 84550 ASSAY OF BLOOD/URIC ACID: CPT | Performed by: INTERNAL MEDICINE

## 2017-05-15 PROCEDURE — 83605 ASSAY OF LACTIC ACID: CPT | Performed by: INTERNAL MEDICINE

## 2017-05-15 PROCEDURE — 25000125 ZZHC RX 250: Performed by: PHYSICIAN ASSISTANT

## 2017-05-15 PROCEDURE — 85025 COMPLETE CBC W/AUTO DIFF WBC: CPT | Performed by: INTERNAL MEDICINE

## 2017-05-15 PROCEDURE — A9270 NON-COVERED ITEM OR SERVICE: HCPCS | Mod: GY | Performed by: INTERNAL MEDICINE

## 2017-05-15 PROCEDURE — A9270 NON-COVERED ITEM OR SERVICE: HCPCS | Mod: GY | Performed by: PHYSICIAN ASSISTANT

## 2017-05-15 PROCEDURE — 25000132 ZZH RX MED GY IP 250 OP 250 PS 637: Mod: GY | Performed by: INTERNAL MEDICINE

## 2017-05-15 RX ORDER — LORAZEPAM 0.5 MG/1
0.5 TABLET ORAL EVERY 4 HOURS PRN
Status: DISCONTINUED | OUTPATIENT
Start: 2017-05-15 | End: 2017-05-16 | Stop reason: HOSPADM

## 2017-05-15 RX ORDER — LEVOFLOXACIN 750 MG/1
750 TABLET, FILM COATED ORAL DAILY
Status: COMPLETED | OUTPATIENT
Start: 2017-05-15 | End: 2017-05-15

## 2017-05-15 RX ADMIN — ACYCLOVIR 400 MG: 200 CAPSULE ORAL at 19:43

## 2017-05-15 RX ADMIN — HYDROMORPHONE HYDROCHLORIDE 2 MG: 2 TABLET ORAL at 10:22

## 2017-05-15 RX ADMIN — HYDROMORPHONE HYDROCHLORIDE 2 MG: 2 TABLET ORAL at 16:13

## 2017-05-15 RX ADMIN — ESOMEPRAZOLE MAGNESIUM 40 MG: 20 CAPSULE, DELAYED RELEASE PELLETS ORAL at 09:40

## 2017-05-15 RX ADMIN — POTASSIUM CHLORIDE 20 MEQ: 750 TABLET, EXTENDED RELEASE ORAL at 09:41

## 2017-05-15 RX ADMIN — OLANZAPINE 5 MG: 5 TABLET, FILM COATED ORAL at 09:41

## 2017-05-15 RX ADMIN — AMLODIPINE BESYLATE 5 MG: 5 TABLET ORAL at 19:43

## 2017-05-15 RX ADMIN — SIMETHICONE CHEW TAB 80 MG 160 MG: 80 TABLET ORAL at 16:11

## 2017-05-15 RX ADMIN — LIDOCAINE 2 PATCH: 50 PATCH TOPICAL at 09:36

## 2017-05-15 RX ADMIN — LORAZEPAM 0.5 MG: 0.5 TABLET ORAL at 12:05

## 2017-05-15 RX ADMIN — SODIUM CHLORIDE, PRESERVATIVE FREE 5 ML: 5 INJECTION INTRAVENOUS at 09:44

## 2017-05-15 RX ADMIN — HYDROMORPHONE HYDROCHLORIDE 2 MG: 2 TABLET ORAL at 20:18

## 2017-05-15 RX ADMIN — ACYCLOVIR 400 MG: 200 CAPSULE ORAL at 09:41

## 2017-05-15 RX ADMIN — LEVOFLOXACIN 750 MG: 750 TABLET, FILM COATED ORAL at 12:05

## 2017-05-15 RX ADMIN — SIMVASTATIN 20 MG: 20 TABLET, FILM COATED ORAL at 19:43

## 2017-05-15 RX ADMIN — SODIUM CHLORIDE, PRESERVATIVE FREE 5 ML: 5 INJECTION INTRAVENOUS at 16:52

## 2017-05-15 RX ADMIN — POTASSIUM CHLORIDE 20 MEQ: 750 TABLET, EXTENDED RELEASE ORAL at 19:43

## 2017-05-15 RX ADMIN — OLANZAPINE 5 MG: 5 TABLET, FILM COATED ORAL at 19:43

## 2017-05-15 RX ADMIN — CLOPIDOGREL 75 MG: 75 TABLET, FILM COATED ORAL at 09:41

## 2017-05-15 RX ADMIN — HYDROMORPHONE HYDROCHLORIDE 2 MG: 2 TABLET ORAL at 02:50

## 2017-05-15 ASSESSMENT — PAIN DESCRIPTION - DESCRIPTORS: DESCRIPTORS: ACHING

## 2017-05-15 NOTE — PLAN OF CARE
Problem: Goal Outcome Summary  Goal: Goal Outcome Summary  OT 7D: Pt participating in navigation task today to facilitate problem solving and orientation to external environment, completing about 500 feet functional mobility CGA with FWW. Pt requiring one-step commands and frequent repetition of verbalizing purpose/goal of activity and strategies to increase success with activity. Pt unable to navigate way to unit nor to specific room when on that unit despite these verbal cues. Pt unable to navigate way back to room with exception of finding his room when provided room number 3 rooms away from his own. Pt limited by short term memory deficits, problem solving, and following multi-step commands. Rec: TCU to progress cognition and independence in ADLs.

## 2017-05-15 NOTE — PLAN OF CARE
Problem: Goal Outcome Summary  Goal: Goal Outcome Summary  Outcome: No Change  VSS. C/O abd/back pain. Heating pad offered and dilaudid given as well. Triggered sepsis, Lactic acid 1.8. Poor PO this evening. No other complaints.

## 2017-05-15 NOTE — PLAN OF CARE
Problem: Goal Outcome Summary  Goal: Goal Outcome Summary  PT 7C - Cancel - Pt recently finishing session with OT this afternoon so declining d/t fatigue, pain, etc.

## 2017-05-15 NOTE — PROGRESS NOTES
Social Work Services Progress Note    Hospital Day: 9  Collaborated with:  Hematology team, 7D RN staff, pt, pt's wife-Casey (via phone) and TCU admissions  Data:  Received update from team re: pt medically ready for DC to TCU.    Intervention:  Met with pt, and spoke with pt's wife re: DC planning recommendation for TCU.  Reviewed TCU options, average length of stay and anticipated insurance coverage.  Pt's wife states preferences for:  [facility] - left voicemail, waiting call back  2-[facility] - faxed referral, currently assessing   3-[facility]  Assessment:  See bedside RN, PT/OT and provider notes  Significant relationship at present time:  Pt's wife  Family of origin is available for support:  yes  Other support available:  yes  Gaps in support system:  n/a  Patient is caregiver to:  None  Plan:    Anticipated Disposition:  Facility:  TCU (tbd)    Barriers to d/c plan:  availability / acceptance     Follow Up:  SW will continue to follow and assist with DC planning    CLIFFORD Sheridan, MSW  7D  (Hem/Onc)  Pager: [phone]  5/15/2017

## 2017-05-15 NOTE — PLAN OF CARE
Problem: Goal Outcome Summary  Goal: Goal Outcome Summary  Outcome: No Change     AVSS, afebrile. A&O x 4, but forgetful. Bed alarm on for safety. Up with assist to bathroom. C/o abdominal pain, dilaudid 2mg given x 1 with relief. Awaiting TCU placement. Cont POC.

## 2017-05-15 NOTE — PLAN OF CARE
Problem: Individualization  Goal: Patient Preferences  Afebrile. VSS. Patient c/o of abdominal and back pain this shift. Simethicone 80 mg  and dilaudid 2 mg given x 1 with good relief. Patient worked with PT this afternoon and has slept between care afterwards. A&Ox4, but forgetful. Bed alarm on for precaution. Continue to monitor.

## 2017-05-15 NOTE — PROGRESS NOTES
CLINICAL NUTRITION SERVICES - REASSESSMENT NOTE     Nutrition Prescription    RECOMMENDATIONS FOR MDs/PROVIDERS TO ORDER:  - Recommend obtaining calorie counts if pt remains hospitalized > 72 hrs, in order to determine if nutrition support warranted.    Malnutrition Status:    Unable to determine at this time    Recommendations already ordered by Registered Dietitian (RD):  - Changed Magic Cup order to betw meals at 10 am and 2 pm.    Future/Additional Recommendations:  - If EN is indicated, recommend via NJT with Isosource 1.5 @ goal 45 ml/hr (1080 ml/day) to provide 1620 kcals, 73 g PRO, 821 ml free H2O, 190 g CHO and 16 g Fiber daily. Pt would need additional 30 ml fluid flushes for FT patency, additional flushes per MD pending pt's oral intake.      EVALUATION OF THE PROGRESS TOWARD GOALS   Diet: Regular with Magic Cup supplement ordered with meals (lunch and dinner)    Intake: Per RN/flowsheets (pt out of room at time of visit),  pt with fair appetite and consuming on average approximately 67% of his meals over the past week.  - Noted to continue to have intermittent abd pain and AMS which reported to be resolving.   - Per review of health touch, no evidence that pt is requesting any nutritional supplements at time of meal ordering.    NEW FINDINGS   Weight: 59.4 kg (today), 58.2 kg (5/7 - admit); Wt remains above admit wt. However, h/o 9.9% wt loss x past 4 mos.    MALNUTRITION  % Intake: < 75% for > 7 days (non-severe)  % Weight Loss: > 7.5% in 3 months (severe)  Subcutaneous Fat Loss: Unable to assess  Muscle Loss: Unable to assess  Fluid Accumulation/Edema: None noted per chart review  Malnutrition Diagnosis: Unable to determine due to unable to complete nutritional physical reassessment.    Previous Goals   Patient to consume % of nutritionally adequate meal trays TID, or the equivalent with supplements/snacks.    Evaluation: Not met    Previous Nutrition Diagnosis  Inadequate oral intake related to  severe back pain, increased confusion as evidenced by pt with possible poor PO and FTT PTA, 9.9% wt loss in the past 4 months, pt with poor intake at present with pt not ordering any meals so far today       Evaluation: No change    CURRENT NUTRITION DIAGNOSIS  Inadequate oral intake related to improvement in AMS, but appetite reduced and has intermittent tim pain as evidenced by pt consuming < 75% of meal intakes over the past week.    INTERVENTIONS  Implementation  Medical food supplement therapy as outlined above    Goals  Patient to consume % of nutritionally adequate meal trays TID, or the equivalent with supplements/snacks.    Monitoring/Evaluation  Progress toward goals will be monitored and evaluated per protocol.      Louisa Castillo RD,LD  Pager [phone]

## 2017-05-15 NOTE — PROGRESS NOTES
[facility], [place]  Hematology / Oncology Progress Note     Assessment & Plan   Anthony Carbone is a 71 year old male with a history of relapsed multiple myeloma admitted on 5/7/2017 with back pain, confusion, and acute kidney injury.     1. NEURO  #Encephalopathy, likely combination of metabolic, infectious, & uremic  - Multifactorial. Per wife, had been getting weaker over several days PTA but confusion suddenly came on in PM of 5/7 while on airplane back from trip to [place]. AMS seems to be resolving, has some intermittent agitation particularly at nighttime (may be sundowning). Noncontrast CT head completed 5/12/17 and was negative. Evaluated by PT/OT and they recommend TCU placement, discussed with SW.   -Infectious workup with BC, UC, CXR, RVP - BC, UC negative. RVP negative. CXR with possible pneumonia, treated with Zosyn/Levaquin (see below).  -Discussed on admission with Dr. Bowie of Palliative Care, appreciate assistance. Zyprexa 5mg BID with Haldol 5mg every 4 hours PRN agitation (Qtc 5/8 431). Discussed this plan with patient's wife Casey and she was in agreement with this strategy due to patient not being cooperative with cares prior to medication administration. Continue Zyprexa 5mg BID (was reduced to 5mg QHS but delirium increased so will keep at same dose - consider increasing PM dose if nighttime agitation worsens).  -Delirium precautions  -No plasmapheresis needed at this time    #History of subacute CVA. Continue home Plavix.    2. RENAL  #Acute kidney injury  -Unclear etiology. Creatinine on 4/20/17 was 0.80. Wife reports patient was taking 1200-2400mg of ibuprofen daily for at least the past 2 weeks. Also with suspected progression of myeloma.  -SCr continues to improve s/p IV hydration. Avoid NSAIDs in future. D/t hypernatremia and overall clinical improvement IV fluids were d/c'd on 5/11. Will hold off on further IV fluids, continue to follow creatinine and  PO intake.     #Hypercalcemia - RESOLVED  -Reduce IV fluid rate  -Pamidronate 60mg x 1 given 5/8    #Hyperuricemia associated with malignancy - RESOLVED  -Reduce IV fluid rate  -Rasburicase 3mg x 1 given 5/8 to treat hyperuricemia (not tumor lysis syndrome)    3. ID  #Possible infection - suspected pneumonia  -Per wife and review of notes, patient was experiencing cough and cold symptoms for at least the past 2 weeks PTA. Was being treated with doxycycline and promethazine + codeine cough syrup.  -ID workup as above  -Transitioned to oral Levaquin 750mg daily on 5/10, completed 7 days of antibiotic therapy today.   -Continue acyclovir 400mg BID for prophylaxis    4. HEME/ONC  #Multiple myeloma  -Followed by Dr. Topete. Most recently being treated in [place]. Was on daratumumab/pomalidomide/dexamethasone. Had been on hold for past couple of weeks d/t illness.  -Labs pending  -Solumedrol 100mg IV 5/8, 5/9 and 5/10. Aware that this can worsen mental status, but feel benefit outweighs risk at this time and treating delirium as above. May also help with pain. Transitioned 5/11 to oral dexamethasone at 40mg daily x 3 days. Also received carfilzomib 5/11 and 5/12 per Dr. Topete. Outpatient follow-up scheduled for this week.  -Viscosity index = 2.3. No plasmapheresis needed.    #Cancer-related pain  -Per wife, started about 2 weeks PTA. MRI lumbar spine done 4/24 at OSH in [place] reviewed, copy made to be scanned into chart: mild-to-moderate compression deformities T9-T11, degenerative changes in lumbar spine, mild central and foraminal stenoses in lumbar spine. Additional evaluation indicates pain is located over rib cage and well as mid-back. Reported significant pain while coughing recently, rib x-ray negative for fracture. Will continue lidocaine, Voltaren. X-rays of thoracic and lumbar spine ordered 5/11 which showed redemonstration of T11 compression fracture from Dec 2016, but otherwise nothing new. EKG and cardiac enzymes  negative.  -Try Tylenol first d/t delirium. Dilaudid ordered PRN severe pain if unable to control with Tylenol.  -Also received steroids (Methylprednisolone 100mg daily x 3 days and then 40mg dexamethasone x 3 days).    5. CV  #Hypertension. Continue home Norvasc.  #Hyperlipidemia. Continue home Zocor.    6. GI  #GERD. Continue home Nexium.    #Abdominal pain. Developed sudden onset of abdominal pain AM of 5/12/17. Resolved with Dilaudid. Abd x-ray showed nonobstructive bowel gas pattern. No further pain. Simethicone available as needed. Consider CT scan if worsening.    FEN  -No MIVF, bolus as needed.  -Electrolyte replacement PRN per protocol  -Regular diet as tolerated.    Code status: FULL  Dispo: Awaiting TCU placement.    Case and plan discussed with Dr. Bob.    Brittney Armstrong PA-C  Hematology/Oncology  Pager: [phone]    Interval History     Yamil reports feeling ok overall today this morning. He continues with some intermittent abdominal pain and back/rib pain. No fevers, sweats or chills. Mental status ok today. Understands he needs to go to TCU to get stronger before being able to go home. No new complaints or concerns today. Slept well.    Physical Exam   Temp: 98.8  F (37.1  C) Temp src: Oral BP: 119/59   Heart Rate: 60 Resp: 18 SpO2: 98 % O2 Device: None (Room air)    Vitals:    05/13/17 0900 05/14/17 1208 05/15/17 0900   Weight: 59.9 kg (132 lb 1.6 oz) 59.3 kg (130 lb 11.2 oz) 59.4 kg (131 lb)     Vital Signs with Ranges  Temp:  [97.6  F (36.4  C)-98.8  F (37.1  C)] 98.8  F (37.1  C)  Heart Rate:  [56-77] 60  Resp:  [18-20] 18  BP: ()/(48-75) 119/59  SpO2:  [96 %-98 %] 98 %  I/O last 3 completed shifts:  In: 750 [P.O.:710; I.V.:40]  Out: -     Constitutional: Adult male who appears stated age seen lying in bed, no acute distress.   Respiratory: No increased work of breathing, good air exchange, lungs clear to auscultation.  Cardiovascular: Regular rate and rhythm, no obvious  murmurs.  Abdominal: Soft, non-tender.   Skin: warm, dry  Musculoskeletal: No LE edema.   Neurologic: A&O x 3. Pleasant affect, appropriate conversation.     Medications     - MEDICATION INSTRUCTIONS -       NaCl       - MEDICATION INSTRUCTIONS -         levofloxacin  750 mg Oral Daily     OLANZapine  5 mg Oral BID     heparin lock flush  5-10 mL Intracatheter Q24H     heparin  5 mL Intracatheter Q28 Days     lidocaine  3 patch Transdermal Q24H     lidocaine   Transdermal Q24h     lidocaine   Transdermal Q8H     esomeprazole  40 mg Oral QAM AC     amLODIPine  5 mg Oral At Bedtime     potassium chloride  20 mEq Oral BID     acyclovir (ZOVIRAX) capsule 400 mg  400 mg Oral BID     clopidogrel  75 mg Oral Daily     simvastatin (ZOCOR) tablet 20 mg  20 mg Oral At Bedtime       Data   Results for orders placed or performed during the hospital encounter of 05/07/17 (from the past 24 hour(s))   Uric acid   Result Value Ref Range    Uric Acid 3.8 3.5 - 7.2 mg/dL   INR   Result Value Ref Range    INR 1.15 (H) 0.86 - 1.14   Comprehensive metabolic panel   Result Value Ref Range    Sodium 145 (H) 133 - 144 mmol/L    Potassium 3.9 3.4 - 5.3 mmol/L    Chloride 114 (H) 94 - 109 mmol/L    Carbon Dioxide 21 20 - 32 mmol/L    Anion Gap 10 3 - 14 mmol/L    Glucose 89 70 - 99 mg/dL    Urea Nitrogen 29 7 - 30 mg/dL    Creatinine 1.04 0.66 - 1.25 mg/dL    GFR Estimate 70 >60 mL/min/1.7m2    GFR Estimate If Black 85 >60 mL/min/1.7m2    Calcium 7.6 (L) 8.5 - 10.1 mg/dL    Bilirubin Total 0.8 0.2 - 1.3 mg/dL    Albumin 2.6 (L) 3.4 - 5.0 g/dL    Protein Total 7.4 6.8 - 8.8 g/dL    Alkaline Phosphatase 41 40 - 150 U/L    ALT 20 0 - 70 U/L    AST 10 0 - 45 U/L   CBC with platelets differential   Result Value Ref Range    WBC 3.4 (L) 4.0 - 11.0 10e9/L    RBC Count 3.21 (L) 4.4 - 5.9 10e12/L    Hemoglobin 9.8 (L) 13.3 - 17.7 g/dL    Hematocrit 32.1 (L) 40.0 - 53.0 %     78 - 100 fl    MCH 30.5 26.5 - 33.0 pg    MCHC 30.5 (L) 31.5 - 36.5  g/dL    RDW 18.0 (H) 10.0 - 15.0 %    Platelet Count 85 (L) 150 - 450 10e9/L    Diff Method Manual Differential     % Neutrophils 75.3 %    % Lymphocytes 8.8 %    % Monocytes 8.8 %    % Eosinophils 0.0 %    % Basophils 0.0 %    % Plasma Cells 7.1 %    Nucleated RBCs 4 (H) 0 /100    Absolute Neutrophil 2.6 1.6 - 8.3 10e9/L    Absolute Lymphocytes 0.3 (L) 0.8 - 5.3 10e9/L    Absolute Monocytes 0.3 0.0 - 1.3 10e9/L    Absolute Eosinophils 0.0 0.0 - 0.7 10e9/L    Absolute Basophils 0.0 0.0 - 0.2 10e9/L    Absolute Plasma Cells 0.2 (H) 0 10e9/L    Absolute Nucleated RBC 0.1     Anisocytosis Slight     Poikilocytosis Slight     Ovalocytes Slight     Macrocytes Present     Platelet Estimate Confirming automated cell count

## 2017-05-15 NOTE — PLAN OF CARE
Problem: Goal Outcome Summary  Goal: Goal Outcome Summary  AVSS. Pt continues to have abdominal/back pain. Lidocaine patches placed on lower back. Dilaudid and ativan given x 1. Hot packs applied with some relief. Fair appetite. Voiding spont. Ambulating with walker and sba. Pt alert and oriented, yet forgetful. Bed alarm on. Continue with poc. Awaiting TCU placement.

## 2017-05-15 NOTE — PLAN OF CARE
Problem: Goal Outcome Summary  Goal: Goal Outcome Summary  OT/7D - Pt declines therapy upon AM attempt due to increased pain. RN notified of pain and has plans to administer medication. Will check back later this morning as able.

## 2017-05-16 ENCOUNTER — CARE COORDINATION (OUTPATIENT)
Dept: CARE COORDINATION | Facility: CLINIC | Age: 72
End: 2017-05-16

## 2017-05-16 VITALS
RESPIRATION RATE: 16 BRPM | HEART RATE: 95 BPM | DIASTOLIC BLOOD PRESSURE: 55 MMHG | WEIGHT: 130.2 LBS | OXYGEN SATURATION: 97 % | TEMPERATURE: 98.4 F | BODY MASS INDEX: 22.35 KG/M2 | SYSTOLIC BLOOD PRESSURE: 101 MMHG

## 2017-05-16 LAB
ALBUMIN SERPL-MCNC: 2.5 G/DL (ref 3.4–5)
ALP SERPL-CCNC: 40 U/L (ref 40–150)
ALT SERPL W P-5'-P-CCNC: 16 U/L (ref 0–70)
ANION GAP SERPL CALCULATED.3IONS-SCNC: 10 MMOL/L (ref 3–14)
AST SERPL W P-5'-P-CCNC: 10 U/L (ref 0–45)
BASOPHILS # BLD AUTO: 0.1 10E9/L (ref 0–0.2)
BASOPHILS NFR BLD AUTO: 2.4 %
BILIRUB SERPL-MCNC: 1.2 MG/DL (ref 0.2–1.3)
BUN SERPL-MCNC: 21 MG/DL (ref 7–30)
CALCIUM SERPL-MCNC: 7.2 MG/DL (ref 8.5–10.1)
CHLORIDE SERPL-SCNC: 110 MMOL/L (ref 94–109)
CO2 SERPL-SCNC: 22 MMOL/L (ref 20–32)
CREAT SERPL-MCNC: 0.96 MG/DL (ref 0.66–1.25)
DIFFERENTIAL METHOD BLD: ABNORMAL
EOSINOPHIL # BLD AUTO: 0 10E9/L (ref 0–0.7)
EOSINOPHIL NFR BLD AUTO: 0.9 %
ERYTHROCYTE [DISTWIDTH] IN BLOOD BY AUTOMATED COUNT: 18.3 % (ref 10–15)
GFR SERPL CREATININE-BSD FRML MDRD: 77 ML/MIN/1.7M2
GLUCOSE SERPL-MCNC: 90 MG/DL (ref 70–99)
HCT VFR BLD AUTO: 30.5 % (ref 40–53)
HGB BLD-MCNC: 9.2 G/DL (ref 13.3–17.7)
IMM GRANULOCYTES # BLD: 0 10E9/L (ref 0–0.4)
IMM GRANULOCYTES NFR BLD: 0.5 %
INR PPP: 1.16 (ref 0.86–1.14)
LYMPHOCYTES # BLD AUTO: 0.1 10E9/L (ref 0.8–5.3)
LYMPHOCYTES NFR BLD AUTO: 6.1 %
MCH RBC QN AUTO: 30.2 PG (ref 26.5–33)
MCHC RBC AUTO-ENTMCNC: 30.2 G/DL (ref 31.5–36.5)
MCV RBC AUTO: 100 FL (ref 78–100)
MONOCYTES # BLD AUTO: 0.1 10E9/L (ref 0–1.3)
MONOCYTES NFR BLD AUTO: 5.7 %
NEUTROPHILS # BLD AUTO: 1.8 10E9/L (ref 1.6–8.3)
NEUTROPHILS NFR BLD AUTO: 84.4 %
NRBC # BLD AUTO: 0.1 10*3/UL
NRBC BLD AUTO-RTO: 3 /100
PLATELET # BLD AUTO: 60 10E9/L (ref 150–450)
POTASSIUM SERPL-SCNC: 3.6 MMOL/L (ref 3.4–5.3)
PROT SERPL-MCNC: 7.1 G/DL (ref 6.8–8.8)
RBC # BLD AUTO: 3.05 10E12/L (ref 4.4–5.9)
SODIUM SERPL-SCNC: 141 MMOL/L (ref 133–144)
URATE SERPL-MCNC: 3.8 MG/DL (ref 3.5–7.2)
WBC # BLD AUTO: 2.1 10E9/L (ref 4–11)

## 2017-05-16 PROCEDURE — 25000125 ZZHC RX 250: Performed by: PHYSICIAN ASSISTANT

## 2017-05-16 PROCEDURE — 80053 COMPREHEN METABOLIC PANEL: CPT | Performed by: INTERNAL MEDICINE

## 2017-05-16 PROCEDURE — 85610 PROTHROMBIN TIME: CPT | Performed by: INTERNAL MEDICINE

## 2017-05-16 PROCEDURE — 85025 COMPLETE CBC W/AUTO DIFF WBC: CPT | Performed by: INTERNAL MEDICINE

## 2017-05-16 PROCEDURE — 25000132 ZZH RX MED GY IP 250 OP 250 PS 637: Mod: GY | Performed by: PHYSICIAN ASSISTANT

## 2017-05-16 PROCEDURE — A9270 NON-COVERED ITEM OR SERVICE: HCPCS | Mod: GY | Performed by: PHYSICIAN ASSISTANT

## 2017-05-16 PROCEDURE — 25000128 H RX IP 250 OP 636: Performed by: PHYSICIAN ASSISTANT

## 2017-05-16 PROCEDURE — A9270 NON-COVERED ITEM OR SERVICE: HCPCS | Mod: GY | Performed by: INTERNAL MEDICINE

## 2017-05-16 PROCEDURE — 25000132 ZZH RX MED GY IP 250 OP 250 PS 637: Mod: GY | Performed by: INTERNAL MEDICINE

## 2017-05-16 PROCEDURE — 84550 ASSAY OF BLOOD/URIC ACID: CPT | Performed by: INTERNAL MEDICINE

## 2017-05-16 PROCEDURE — 36592 COLLECT BLOOD FROM PICC: CPT | Performed by: INTERNAL MEDICINE

## 2017-05-16 RX ORDER — OLANZAPINE 5 MG/1
5 TABLET ORAL 2 TIMES DAILY
Qty: 60 TABLET | Refills: 0 | DISCHARGE
Start: 2017-05-16 | End: 2017-01-01

## 2017-05-16 RX ORDER — SUCRALFATE 1 G/1
1 TABLET ORAL 4 TIMES DAILY PRN
Qty: 120 TABLET | Refills: 1 | DISCHARGE
Start: 2017-05-16 | End: 2017-01-01

## 2017-05-16 RX ORDER — ONDANSETRON 8 MG/1
8 TABLET, ORALLY DISINTEGRATING ORAL EVERY 8 HOURS PRN
Qty: 30 TABLET | Refills: 3 | DISCHARGE
Start: 2017-05-16 | End: 2018-01-01

## 2017-05-16 RX ORDER — HYDROMORPHONE HYDROCHLORIDE 2 MG/1
2 TABLET ORAL EVERY 4 HOURS PRN
Refills: 0 | Status: ON HOLD | DISCHARGE
Start: 2017-05-16 | End: 2017-05-29

## 2017-05-16 RX ORDER — LIDOCAINE 50 MG/G
3 PATCH TOPICAL EVERY 24 HOURS
Qty: 30 PATCH | Refills: 0 | Status: SHIPPED | DISCHARGE
Start: 2017-05-16 | End: 2017-01-01

## 2017-05-16 RX ORDER — CLOPIDOGREL BISULFATE 75 MG/1
75 TABLET ORAL DAILY
Qty: 30 TABLET | Refills: 0 | DISCHARGE
Start: 2017-05-16 | End: 2017-01-01

## 2017-05-16 RX ORDER — MIDODRINE HYDROCHLORIDE 2.5 MG/1
2.5 TABLET ORAL 3 TIMES DAILY
Qty: 90 TABLET | Refills: 3 | Status: ON HOLD | DISCHARGE
Start: 2017-05-16 | End: 2017-05-29

## 2017-05-16 RX ORDER — AMLODIPINE BESYLATE 5 MG/1
TABLET ORAL
Qty: 30 TABLET | Refills: 0 | DISCHARGE
Start: 2017-05-16 | End: 2017-01-01

## 2017-05-16 RX ORDER — SIMVASTATIN 5 MG
20 TABLET ORAL DAILY
Qty: 30 TABLET | DISCHARGE
Start: 2017-05-16 | End: 2017-01-01

## 2017-05-16 RX ORDER — ESOMEPRAZOLE MAGNESIUM 40 MG/1
40 CAPSULE, DELAYED RELEASE ORAL
Qty: 30 CAPSULE | Refills: 0 | DISCHARGE
Start: 2017-05-16

## 2017-05-16 RX ORDER — ACETAMINOPHEN 500 MG
1000 TABLET ORAL EVERY 6 HOURS PRN
DISCHARGE
Start: 2017-05-16 | End: 2017-01-01

## 2017-05-16 RX ORDER — ACYCLOVIR 400 MG/1
400 TABLET ORAL 2 TIMES DAILY
Qty: 60 TABLET | Refills: 0 | DISCHARGE
Start: 2017-05-16 | End: 2017-01-01

## 2017-05-16 RX ORDER — POTASSIUM CHLORIDE 1500 MG/1
TABLET, EXTENDED RELEASE ORAL
Qty: 60 TABLET | Refills: 0 | DISCHARGE
Start: 2017-05-16 | End: 2017-01-01

## 2017-05-16 RX ORDER — SIMETHICONE 80 MG
160 TABLET,CHEWABLE ORAL 4 TIMES DAILY PRN
Qty: 180 TABLET | DISCHARGE
Start: 2017-05-16 | End: 2017-01-01

## 2017-05-16 RX ORDER — LORAZEPAM 0.5 MG/1
0.5 TABLET ORAL EVERY 4 HOURS PRN
Qty: 30 TABLET | Refills: 0 | Status: ON HOLD | DISCHARGE
Start: 2017-05-16 | End: 2017-05-29

## 2017-05-16 RX ADMIN — SIMETHICONE CHEW TAB 80 MG 160 MG: 80 TABLET ORAL at 14:06

## 2017-05-16 RX ADMIN — CLOPIDOGREL 75 MG: 75 TABLET, FILM COATED ORAL at 08:46

## 2017-05-16 RX ADMIN — POTASSIUM CHLORIDE 20 MEQ: 750 TABLET, EXTENDED RELEASE ORAL at 08:46

## 2017-05-16 RX ADMIN — HYDROMORPHONE HYDROCHLORIDE 2 MG: 2 TABLET ORAL at 08:45

## 2017-05-16 RX ADMIN — SODIUM CHLORIDE, PRESERVATIVE FREE 5 ML: 5 INJECTION INTRAVENOUS at 10:42

## 2017-05-16 RX ADMIN — LIDOCAINE 2 PATCH: 50 PATCH TOPICAL at 08:46

## 2017-05-16 RX ADMIN — SODIUM CHLORIDE, PRESERVATIVE FREE 5 ML: 5 INJECTION INTRAVENOUS at 13:48

## 2017-05-16 RX ADMIN — HYDROMORPHONE HYDROCHLORIDE 2 MG: 2 TABLET ORAL at 00:36

## 2017-05-16 RX ADMIN — ACETAMINOPHEN 1000 MG: 325 TABLET, FILM COATED ORAL at 14:06

## 2017-05-16 RX ADMIN — HYDROMORPHONE HYDROCHLORIDE 2 MG: 2 TABLET ORAL at 12:39

## 2017-05-16 RX ADMIN — OLANZAPINE 5 MG: 5 TABLET, FILM COATED ORAL at 08:46

## 2017-05-16 RX ADMIN — ESOMEPRAZOLE MAGNESIUM 40 MG: 20 CAPSULE, DELAYED RELEASE PELLETS ORAL at 08:45

## 2017-05-16 RX ADMIN — ACYCLOVIR 400 MG: 200 CAPSULE ORAL at 08:46

## 2017-05-16 ASSESSMENT — ENCOUNTER SYMPTOMS
DEPRESSION: 1
POLYPHAGIA: 0
SPEECH CHANGE: 1
WHEEZING: 1
EYE REDNESS: 0
DYSPNEA ON EXERTION: 0
DISTURBANCES IN COORDINATION: 0
STIFFNESS: 0
HEADACHES: 0
EYE PAIN: 0
EYE WATERING: 0
CHILLS: 0
INSOMNIA: 1
NIGHT SWEATS: 0
FATIGUE: 1
JOINT SWELLING: 0
MYALGIAS: 0
WEIGHT GAIN: 0
NUMBNESS: 0
COUGH: 1
HEMOPTYSIS: 0
SNORES LOUDLY: 1
TREMORS: 1
MEMORY LOSS: 1
POSTURAL DYSPNEA: 0
SHORTNESS OF BREATH: 0
WEAKNESS: 1
COUGH DISTURBING SLEEP: 1
ALTERED TEMPERATURE REGULATION: 1
FEVER: 0
LOSS OF CONSCIOUSNESS: 0
NERVOUS/ANXIOUS: 1
POLYDIPSIA: 0
SEIZURES: 0
PARALYSIS: 0
DIZZINESS: 0
DECREASED CONCENTRATION: 1
NECK PAIN: 0
MUSCLE CRAMPS: 0
ARTHRALGIAS: 0
TINGLING: 0
DOUBLE VISION: 1
INCREASED ENERGY: 1
WEIGHT LOSS: 1
PANIC: 1
EYE IRRITATION: 0
MUSCLE WEAKNESS: 0
DECREASED APPETITE: 1
SPUTUM PRODUCTION: 1
RESPIRATORY PAIN: 0
HALLUCINATIONS: 1
BACK PAIN: 1

## 2017-05-16 ASSESSMENT — PAIN DESCRIPTION - DESCRIPTORS
DESCRIPTORS: ACHING
DESCRIPTORS: ACHING

## 2017-05-16 NOTE — DISCHARGE SUMMARY
[facility], [place]  Discharge Summary  Hematology / Oncology    Date of Admission:  5/7/2017  Date of Discharge:  5/16/2017  Discharging Provider: Brittney Armstrong PA-C    Discharge Diagnoses   Patient Active Problem List   Diagnosis     Multiple myeloma (H)     Hypertension     Multiple myeloma (H)     Neutropenic fever (H)     Encounter for long-term current use of medication     GERD (gastroesophageal reflux disease)     Pneumonia     Syncope     Syncope, unspecified syncope type     Myeloma (H)     Altered mental status     Neoplasm of uncertain behavior of skin     AK (actinic keratosis)     Back pain       History of Present Illness   Anthony Carbone is a 71 year old male with a history of relapsed multiple myeloma admitted on 5/7/2017 with back pain, confusion, and acute kidney injury. He was noted to have hypercalcemia, hyperuricemia, pneumonia, and laboratory evidence of worsening multiple myeloma which were felt to be causing the above issues. He was treated with aggressive IV hydration, antibiotic therapy (Zosyn + Levaquin), rasburicase, and pamidronate. His encephalopathy was treated with Zyprexa and had resolved on discharge with the exception of some episodes of sundowning. On day of discharge Yamil was feeling well. He felt his pain was well-controlled (located in back and ribs). His appetite and PO intake had improved. He was willing to work with PT/OT at [facility]. He was scheduled to follow up in clinic on Friday with labwork prior.    Hospital Course   Anthony Carbone was admitted on 5/7/2017.  The following problems were addressed during his hospitalization:    1. NEURO  #Encephalopathy, likely combination of metabolic, infectious, & uremic  - Multifactorial. Per wife, had been getting weaker over several days PTA but confusion suddenly came on in PM of 5/7 while on airplane back from trip to [place]. AMS essentially resolved, has some intermittent agitation  particularly at nighttime (may be sundowning). Noncontrast CT head completed 5/12/17 and was negative. Evaluated by PT/OT and they recommend TCU placement, discussed with SW.   -Infectious workup with BC, UC, CXR, RVP - BC, UC negative. RVP negative. CXR with possible pneumonia, treated with Zosyn/Levaquin (see below).  -Discussed on admission with Dr. Bowie of Palliative Care, appreciate assistance. Controlled with Zyprexa 5mg BID.  -Delirium precautions  -No plasmapheresis needed at this time     #History of subacute CVA. Continue home Plavix.     2. RENAL  #Acute kidney injury - RESOLVED.  -Unclear etiology. Creatinine on 4/20/17 was 0.80. Wife reports patient was taking 1200-2400mg of ibuprofen daily for at least the past 2 weeks. Also with suspected progression of myeloma.  -Avoid NSAIDs in future. D/t hypernatremia and overall clinical improvement IV fluids were d/c'd on 5/11 and creatinine remained stable.    #Hypercalcemia - RESOLVED  -Pamidronate 60mg x 1 given 5/8     #Hyperuricemia associated with malignancy - RESOLVED  -Rasburicase 3mg x 1 given 5/8 to treat hyperuricemia (not tumor lysis syndrome)     3. ID  #Possible infection - suspected pneumonia  -Per wife and review of notes, patient was experiencing cough and cold symptoms for at least the past 2 weeks PTA. Was being treated with doxycycline and promethazine + codeine cough syrup.  -ID workup as above  -Transitioned to oral Levaquin 750mg daily on 5/10, completed 7 days of antibiotic therapy on 5/15.   -Continue acyclovir 400mg BID for prophylaxis     4. HEME/ONC  #Multiple myeloma  -Followed by Dr. Topete. Most recently being treated in [place]. Was on daratumumab/pomalidomide/dexamethasone. Had been on hold for past couple of weeks d/t illness.  -Labs pending  -Solumedrol 100mg IV 5/8, 5/9 and 5/10. Aware that this can worsen mental status, but feel benefit outweighs risk at this time and treating delirium as above. May also help with pain.  Transitioned 5/11 to oral dexamethasone at 40mg daily x 3 days. Also received carfilzomib 5/11 and 5/12 per Dr. Topete. Outpatient follow-up scheduled for Friday.  -Viscosity index = 2.3. No plasmapheresis needed.     #Cancer-related pain  -Per wife, started about 2 weeks PTA. MRI lumbar spine done 4/24 at OSH in [place] reviewed, copy made to be scanned into chart: mild-to-moderate compression deformities T9-T11, degenerative changes in lumbar spine, mild central and foraminal stenoses in lumbar spine. Additional evaluation indicates pain is located over rib cage and well as mid-back. Reported significant pain while coughing recently, rib x-ray negative for fracture. Will continue lidocaine, Voltaren. X-rays of thoracic and lumbar spine ordered 5/11 which showed redemonstration of T11 compression fracture from Dec 2016, but otherwise nothing new. EKG and cardiac enzymes negative.  -Try Tylenol first d/t delirium. Dilaudid ordered PRN severe pain if unable to control with Tylenol.  -Also received steroids (Methylprednisolone 100mg daily x 3 days and then 40mg dexamethasone x 3 days).     5. CV  #Hypertension. Continue home Norvasc.  #Hyperlipidemia. Continue home Zocor.     6. GI  #GERD. Continue home Nexium.     #Abdominal pain. Developed sudden onset of abdominal pain AM of 5/12/17. Resolved with Dilaudid. Abd x-ray showed nonobstructive bowel gas pattern. No further pain. Simethicone available as needed. Consider CT scan as outpatient if persistent.     FEN  -No MIVF, bolus as needed.  -Electrolyte replacement PRN per protocol  -Regular diet as tolerated.     Code status: FULL     Case and plan discussed with Dr. Bob.     Brittney Armstrong PA-C  Hematology/Oncology  Pager: [phone]    Code Status   Full Code    Primary Care Physician   Sanket Burciaga    Physical Exam   Temp: 98.4  F (36.9  C) Temp src: Oral BP: 101/55   Heart Rate: 77 Resp: 16 SpO2: 97 % O2 Device: None (Room air)    Vitals:    05/14/17 1208  05/15/17 0900 05/16/17 0902   Weight: 59.3 kg (130 lb 11.2 oz) 59.4 kg (131 lb) 59.1 kg (130 lb 3.2 oz)     Vital Signs with Ranges  Temp:  [96.5  F (35.8  C)-99.9  F (37.7  C)] 98.4  F (36.9  C)  Heart Rate:  [] 77  Resp:  [16] 16  BP: ()/(45-70) 101/55  SpO2:  [95 %-97 %] 97 %  I/O last 3 completed shifts:  In: 410 [P.O.:360; I.V.:50]  Out: 650 [Urine:650]    Constitutional: Adult male who appears stated age seen lying in bed, no acute distress.   Respiratory: No increased work of breathing, good air exchange, lungs clear to auscultation.  Cardiovascular: Regular rate and rhythm, no obvious murmurs.  Abdominal: + bowel sounds. Soft, non-tender.   Skin: warm, dry  Musculoskeletal: No LE edema.   Neurologic: A&O x 3. Pleasant affect, appropriate conversation.       Discharge Disposition   Discharged to short-term care facility  Condition at discharge: Stable    Consultations This Hospital Stay   NUTRITION SERVICES ADULT IP CONSULT  NUTRITION SERVICES ADULT IP CONSULT  INTERVENTIONAL RADIOLOGY IP CONSULT  TRANSFUSION MEDICINE IP CONSULT  INTERVENTIONAL RADIOLOGY IP CONSULT  PHYSICAL THERAPY ADULT IP CONSULT  OCCUPATIONAL THERAPY ADULT IP CONSULT    Discharge Orders     General info for SNF   Length of Stay Estimate: Short Term Care: Estimated # of Days <30  Condition at Discharge: Improving  Level of care:skilled   Rehabilitation Potential: Good  Admission H&P remains valid and up-to-date: Yes  Recent Chemotherapy: Date: 5/11 and 5/12                       Use Nursing Home Standing Orders: Yes     Mantoux instructions   Give two-step Mantoux (PPD) Per Facility Policy Yes     Reason for your hospital stay   You were hospitalized for pneumonia and dehydration as well as worsening multiple myeloma.     Intake and output   Every shift     Daily weights   Call Provider for weight gain of more than 2 pounds per day or 5 pounds per week.     Follow Up and recommended labs and tests   Follow up as scheduled on  Friday 5/19 with Leanne Reddy PA-C as scheduled.     Activity - Up with nursing assistance     Full Code     Fall precautions     Advance Diet as Tolerated   Follow this diet upon discharge: Orders Placed This Encounter     Snacks/Supplements Adult: With Meals     Snacks/Supplements Adult: Magic Cup; Between Meals     Regular Diet Adult       Discharge Medications   Current Discharge Medication List      START taking these medications    Details   HYDROmorphone (DILAUDID) 2 MG tablet Take 1 tablet (2 mg) by mouth every 4 hours as needed for moderate to severe pain  Refills: 0    Associated Diagnoses: Multiple myeloma in relapse (H); Acute bilateral low back pain without sciatica      acetaminophen (TYLENOL) 500 MG tablet Take 2 tablets (1,000 mg) by mouth every 6 hours as needed for mild pain    Associated Diagnoses: Acute bilateral low back pain without sciatica; Multiple myeloma in relapse (H)      LORazepam (ATIVAN) 0.5 MG tablet Take 1 tablet (0.5 mg) by mouth every 4 hours as needed  Qty: 30 tablet, Refills: 0    Associated Diagnoses: Multiple myeloma in relapse (H); Delirium      OLANZapine (ZYPREXA) 5 MG tablet Take 1 tablet (5 mg) by mouth 2 times daily  Qty: 60 tablet, Refills: 0    Associated Diagnoses: Delirium; Multiple myeloma in relapse (H)      simethicone (MYLICON) 80 MG chewable tablet Take 2 tablets (160 mg) by mouth 4 times daily as needed for cramping or other (gas pain)  Qty: 180 tablet    Associated Diagnoses: Nausea      lidocaine (LIDODERM) 5 % Patch Place 3 patches onto the skin every 24 hours  Qty: 30 patch, Refills: 0    Associated Diagnoses: Multiple myeloma in relapse (H); Acute bilateral low back pain without sciatica         CONTINUE these medications which have CHANGED    Details   ondansetron (ZOFRAN-ODT) 8 MG ODT tab Take 1 tablet (8 mg) by mouth every 8 hours as needed for nausea  Qty: 30 tablet, Refills: 3    Associated Diagnoses: Nausea      simvastatin (ZOCOR) 5 MG tablet Take  4 tablets (20 mg) by mouth daily  Qty: 30 tablet    Associated Diagnoses: Mixed hyperlipidemia      acyclovir (ZOVIRAX) 400 MG tablet Take 1 tablet (400 mg) by mouth 2 times daily  Qty: 60 tablet, Refills: 0    Associated Diagnoses: Multiple myeloma in relapse (H)      amLODIPine (NORVASC) 5 MG tablet Take one tablet at bedtime.  Qty: 30 tablet, Refills: 0    Associated Diagnoses: Essential hypertension      potassium chloride SA (K-DUR/KLOR-CON M) 20 MEQ CR tablet TAKE 1 TABLET BY MOUTH TWICE DAILY.  Qty: 60 tablet, Refills: 0    Associated Diagnoses: Hypokalemia      clopidogrel (PLAVIX) 75 MG tablet Take 1 tablet (75 mg) by mouth daily  Qty: 30 tablet, Refills: 0    Associated Diagnoses: History of stroke      midodrine (PROAMATINE) 2.5 MG tablet Take 1 tablet (2.5 mg) by mouth 3 times daily  Qty: 90 tablet, Refills: 3    Associated Diagnoses: Orthostatic hypotension; Orthostatic syncope      esomeprazole (NEXIUM) 40 MG CR capsule Take 1 capsule (40 mg) by mouth every morning (before breakfast)  Qty: 30 capsule, Refills: 0    Associated Diagnoses: Gastroesophageal reflux disease without esophagitis      sucralfate (CARAFATE) 1 GM tablet Take 1 tablet (1 g) by mouth 4 times daily as needed  Qty: 120 tablet, Refills: 1    Associated Diagnoses: Gastroesophageal reflux disease without esophagitis         STOP taking these medications       pomalidomide (POMALYST) 2 MG capsule Comments:   Reason for Stopping:         dexamethasone (DECADRON) 4 MG tablet Comments:   Reason for Stopping:             Allergies   Allergies   Allergen Reactions     Aspirin      Stomach upset     Bactrim [Sulfamethoxazole W-Trimethoprim] Rash     Data   Results for orders placed or performed during the hospital encounter of 05/07/17   XR Chest Port 1 View    Narrative    Exam: XR CHEST PORT 1 VW, 5/8/2017 3:50 PM    Indication: Recent cough/URI x 2 weeks    Comparison: Radiograph of the chest 12/23/2016 and 4/11/2016.    Findings:   There  is a right IJ catheter, which is accessed, with the tip over the  atriocaval junction. The cardiac silhouette is enlarged. There is a  left basilar opacity and possible retrocardiac opacity. The right lung  is clear. The upper abdomen is unremarkable. No evidence of  pneumothorax.      Impression    Impression: There is a left lung base pulmonary opacity, which could  represent infection, atelectasis, or aspiration.    I have personally reviewed the examination and initial interpretation  and I agree with the findings.    LUX BUENO MD   XR Rib & Chest Port Right G/E 3 Views    Narrative    Exam: XR RIBS & CHEST PORT RIGHT 3VW  5/10/2017 3:50 PM     CLINICAL HISTORY:  Rib pain ? fracture     COMPARISON: 5/8/2017    FINDINGS: Views of the right ribs were obtained. There is a comparison  available dated 5/8/2017. There is no definite evidence of rib  fracture. Again seen is a central venous catheter. A metallic density  is projecting suture the catheter and has been present previously.  There is a left lung atelectasis.      Impression    IMPRESSION: No definite evidence of rib fracture on the right. Should  clinical suspicion persist a repeat radiograph is recommended. Soft  tissue density involving the left lung, likely representing  atelectasis.    JUTTA ELLERMANN, MD   XR Lumbar Spine 2/3 Views    Narrative    EXAMINATION: XR THORACIC SPINE 2 VW, XR LUMBAR SPINE 2-3 VIEWS   5/11/2017 2:23 PM     CLINICAL HISTORY:  Back pain, multiple myeloma     COMPARISON: 12/23/2016, bone survey dated 1/2/2014    FINDINGS: AP and lateral views of the thoracic and lumbar spine were  obtained and compared to the prior examination dated 1/2/2014. There  is a central venous catheter in place. There is an anterior  compression fracture at the level of T11, this is not new and has been  seen previously on the chest radiograph dated 12/23/2016 when compared  to the prior exam there is no significant change. There has  been  progression of multilevel degenerative changes of the lumbar spine  with marked disc space narrowing.      Impression    IMPRESSION:   1. Redemonstration of T11 compression fracture, not significantly  changed since 12/23/2016.  2. Significant multilevel degenerative changes of the lumbar spine  have progressed since prior exams.  3. If clinically indicated, a cross-sectional study would be more  sensitive to changes involving the spinal cord.    JUTTA ELLERMANN, MD   XR Thoracic Spine 2 Views    Narrative    EXAMINATION: XR THORACIC SPINE 2 VW, XR LUMBAR SPINE 2-3 VIEWS   5/11/2017 2:23 PM     CLINICAL HISTORY:  Back pain, multiple myeloma     COMPARISON: 12/23/2016, bone survey dated 1/2/2014    FINDINGS: AP and lateral views of the thoracic and lumbar spine were  obtained and compared to the prior examination dated 1/2/2014. There  is a central venous catheter in place. There is an anterior  compression fracture at the level of T11, this is not new and has been  seen previously on the chest radiograph dated 12/23/2016 when compared  to the prior exam there is no significant change. There has been  progression of multilevel degenerative changes of the lumbar spine  with marked disc space narrowing.      Impression    IMPRESSION:   1. Redemonstration of T11 compression fracture, not significantly  changed since 12/23/2016.  2. Significant multilevel degenerative changes of the lumbar spine  have progressed since prior exams.  3. If clinically indicated, a cross-sectional study would be more  sensitive to changes involving the spinal cord.    JUTTA ELLERMANN, MD   XR Abdomen 2 Views    Narrative    XR ABDOMEN 2 VW  5/12/2017 10:14 AM      HISTORY: Upper abd pain    COMPARISON: 8/27/2014    FINDINGS: Upright and supine frontal views of the abdomen.  Nonobstructive bowel gas pattern. No free air or pneumatosis. Multiple  pelvic phleboliths. The lung bases are clear. Moderate left greater  than right degenerative  changes in the hips.      Impression    IMPRESSION: Nonobstructive bowel gas pattern.    I have personally reviewed the examination and initial interpretation  and I agree with the findings.    BAKARI RUSSELL   CT Head w/o Contrast    Narrative    Head CT without contrast    History: Tremor, altered mental status.  Comparison: MRI 4/12/2016    Technique: Axial images through the brain obtained without intravenous  contrast, reviewed in bone, brain, and subdural windows.    Findings: Motion artifact mildly degrades image quality. Areas of  encephalomalacia again noted in the left parietal lobe and in the  right cerebellar hemisphere. Patchy low-attenuation foci in cerebral  white matter is similar to the prior MRI. No focal loss of gray-white  differentiation or acute intracranial hemorrhage. No hydrocephalus.  Mild cerebral volume loss again noted.    No skull fracture. Layering fluid in the maxillary sinuses  bilaterally.      Impression    Impression:  1. No acute intracranial pathology. Relatively little change in  cerebral volume loss and presumed chronic small vessel ischemic  disease.  2. Layering fluid in the maxillary sinuses bilaterally, possibly  sinusitis.    MOLINA HANSON MD     Most Recent 3 CBC's:  Recent Labs   Lab Test  05/16/17   0643  05/15/17   0647  05/14/17   0608   WBC  2.1*  3.4*  3.8*   HGB  9.2*  9.8*  8.4*   MCV  100  100  100   PLT  60*  85*  105*     Most Recent 3 BMP's:  Recent Labs   Lab Test  05/16/17   0643  05/15/17   0647  05/14/17   0608   NA  141  145*  142   POTASSIUM  3.6  3.9  3.7   CHLORIDE  110*  114*  111*   CO2  22  21  23   BUN  21  29  33*   CR  0.96  1.04  1.01   ANIONGAP  10  10  8   JAZMIN  7.2*  7.6*  7.3*   GLC  90  89  160*     Most Recent 2 LFT's:  Recent Labs   Lab Test  05/16/17   0643  05/15/17   0647   AST  10  10   ALT  16  20   ALKPHOS  40  41   BILITOTAL  1.2  0.8     Most Recent 3 INR's:  Recent Labs   Lab Test  05/16/17   0643  05/15/17   0647  05/14/17    0608   INR  1.16*  1.15*  1.27*

## 2017-05-16 NOTE — PROGRESS NOTES
Patient is a BMT patient so they will handle the post DC follow up for this patient            Blood & Marrow Transplant Information    Donor/Recipient: Recipient   Infusions    Transplant     Transplant Type:     Referral 12/9/14    Evaluation     Mobilization Started      Admission     Conditioning Started     Collection Date     Discharge       Primary BMT transplant protocol with signed consent   Ancillary protocols with signed consent   Protocols presented and patient declined      Transplant Type Source Material Infusion Date Comments Is Tandem?   Autologous Peripheral blood stem cells 1/9/13 refused tandem No         Transplant Characteristics

## 2017-05-16 NOTE — PLAN OF CARE
Problem: Goal Outcome Summary  Goal: Goal Outcome Summary  Occupational Therapy Discharge Summary     Reason for therapy discharge:    Discharged to transitional care facility.     Progress towards therapy goal(s). See goals on Care Plan in [facility] record for goal details.  Goals partially met.  Barriers to achieving goals:   discharge from facility.     Therapy recommendation(s):    Continued therapy is recommended.  Rationale/Recommendations:  increase independence and safety with ADL/IADL and to progress cognition as able.

## 2017-05-16 NOTE — PLAN OF CARE
Nursing Focus: Discharge    D: Patient discharged to TCU at 1400. Patient stable upon discharge.  All belongings sent with patient and wife.    I: Discharge prescriptions sent to TCU. All discharge medications and instructions reviewed with patient and wife. Discharge packet sent with patient to TCU. Patient instructed to call clinic triage nurse if he experiences a fever >100.4, uncontrolled nausea, vomiting, diarrhea, or pain; or experiences any signs or symptoms of bleeding. Port heparin locked and deaccessed. Education completed.  Care Plan goals met and adequate for discharge.    A: Patient and wife verbalized understanding of discharge medications and instructions. Pain in back and abdomen, dilaudid 2mg PO given x2. Tylenol and simethicone given for pain. Denies nausea. A&Ox4. Up with assist of 1. AVSS, afebrile.

## 2017-05-16 NOTE — PROGRESS NOTES
Social Work Services Discharge Note      Patient Name:  Anthony Carbone     Anticipated Discharge Date:  5/16/2017    Discharge Disposition:   TCU:  [facility]  [address] [place] [address]  Fax: [phone]  Admissions: [phone]  TCU Fax: [phone]  TCU Adms.: [phone]   Main: [phone]     Pre-Admission Screening (PAS) online form has been completed.  The Level of Care (LOC) is:  Determined  Confirmation Code is:  [id number]  Patient/caregiver informed of referral to [facility] Line for Pre-Admission Screening for skilled nursing facility (SNF) placement and to expect a phone call post discharge from SNF.     Additional Services/Equipment Arranged:  Pt's wife will provide family transport today around 2pm.      Patient / Family response to discharge plan:  In agreement with DC today to TCU     Persons notified of above discharge plan:  Hematology team, 7D RN staff, pt's wife (via phone) and [facility] TCU admissions     Staff Discharge Instructions:  SW faxed discharge orders and signed hard scripts for any controlled substances.  Please print a packet (including scripts) and send with patient.     CTS Handoff completed:  Will complete upon DC    Medicare Notice of Rights provided to the patient/family:      CLIFFORD Sheridan, LIANE  7D  (Hem/Onc)  Pager: [phone]  5/16/2017

## 2017-05-16 NOTE — PLAN OF CARE
Problem: Goal Outcome Summary  Goal: Goal Outcome Summary  Outcome: No Change  2805-1124: A&O, VSS on RA. Tmax 99.3. C/o abdomen pain that was well managed with heating packs and PRN PO Dilaudid x2. Denied nausea. Rested well throughout night between cares. Up with SBA. Voiding adequate amounts. SL'ed. On regular diet with poor appetite. Awaiting placement. Continue to monitor.

## 2017-05-17 ENCOUNTER — CARE COORDINATION (OUTPATIENT)
Dept: ONCOLOGY | Facility: CLINIC | Age: 72
End: 2017-05-17

## 2017-05-19 ENCOUNTER — ONCOLOGY VISIT (OUTPATIENT)
Dept: ONCOLOGY | Facility: CLINIC | Age: 72
End: 2017-05-19
Attending: PHYSICIAN ASSISTANT
Payer: MEDICARE

## 2017-05-19 ENCOUNTER — INFUSION THERAPY VISIT (OUTPATIENT)
Dept: ONCOLOGY | Facility: CLINIC | Age: 72
End: 2017-05-19
Attending: INTERNAL MEDICINE
Payer: MEDICARE

## 2017-05-19 ENCOUNTER — APPOINTMENT (OUTPATIENT)
Dept: LAB | Facility: CLINIC | Age: 72
End: 2017-05-19
Attending: INTERNAL MEDICINE
Payer: MEDICARE

## 2017-05-19 VITALS
WEIGHT: 130.4 LBS | TEMPERATURE: 98.2 F | RESPIRATION RATE: 16 BRPM | SYSTOLIC BLOOD PRESSURE: 120 MMHG | DIASTOLIC BLOOD PRESSURE: 76 MMHG | HEART RATE: 98 BPM | BODY MASS INDEX: 22.26 KG/M2 | HEIGHT: 64 IN | OXYGEN SATURATION: 96 %

## 2017-05-19 DIAGNOSIS — C90.00 MULTIPLE MYELOMA (H): Primary | ICD-10-CM

## 2017-05-19 DIAGNOSIS — C90.02 MULTIPLE MYELOMA IN RELAPSE (H): ICD-10-CM

## 2017-05-19 LAB
ALBUMIN SERPL-MCNC: 3.1 G/DL (ref 3.4–5)
ALP SERPL-CCNC: 49 U/L (ref 40–150)
ALT SERPL W P-5'-P-CCNC: 22 U/L (ref 0–70)
ANION GAP SERPL CALCULATED.3IONS-SCNC: 8 MMOL/L (ref 3–14)
AST SERPL W P-5'-P-CCNC: 17 U/L (ref 0–45)
BASOPHILS # BLD AUTO: 0 10E9/L (ref 0–0.2)
BASOPHILS NFR BLD AUTO: 0.3 %
BILIRUB SERPL-MCNC: 0.5 MG/DL (ref 0.2–1.3)
BUN SERPL-MCNC: 15 MG/DL (ref 7–30)
CALCIUM SERPL-MCNC: 8.4 MG/DL (ref 8.5–10.1)
CHLORIDE SERPL-SCNC: 107 MMOL/L (ref 94–109)
CO2 SERPL-SCNC: 24 MMOL/L (ref 20–32)
CREAT SERPL-MCNC: 0.83 MG/DL (ref 0.66–1.25)
DIFFERENTIAL METHOD BLD: ABNORMAL
EOSINOPHIL # BLD AUTO: 0.1 10E9/L (ref 0–0.7)
EOSINOPHIL NFR BLD AUTO: 3 %
ERYTHROCYTE [DISTWIDTH] IN BLOOD BY AUTOMATED COUNT: 18.6 % (ref 10–15)
GFR SERPL CREATININE-BSD FRML MDRD: ABNORMAL ML/MIN/1.7M2
GLUCOSE SERPL-MCNC: 103 MG/DL (ref 70–99)
HCT VFR BLD AUTO: 31.3 % (ref 40–53)
HGB BLD-MCNC: 9.7 G/DL (ref 13.3–17.7)
IMM GRANULOCYTES # BLD: 0 10E9/L (ref 0–0.4)
IMM GRANULOCYTES NFR BLD: 0.3 %
LYMPHOCYTES # BLD AUTO: 0.2 10E9/L (ref 0.8–5.3)
LYMPHOCYTES NFR BLD AUTO: 5.3 %
MCH RBC QN AUTO: 31 PG (ref 26.5–33)
MCHC RBC AUTO-ENTMCNC: 31 G/DL (ref 31.5–36.5)
MCV RBC AUTO: 100 FL (ref 78–100)
MONOCYTES # BLD AUTO: 0.4 10E9/L (ref 0–1.3)
MONOCYTES NFR BLD AUTO: 10.4 %
NEUTROPHILS # BLD AUTO: 2.7 10E9/L (ref 1.6–8.3)
NEUTROPHILS NFR BLD AUTO: 80.7 %
NRBC # BLD AUTO: 0 10*3/UL
NRBC BLD AUTO-RTO: 0 /100
PLATELET # BLD AUTO: 71 10E9/L (ref 150–450)
POTASSIUM SERPL-SCNC: 4.4 MMOL/L (ref 3.4–5.3)
PROT SERPL-MCNC: 8 G/DL (ref 6.8–8.8)
RBC # BLD AUTO: 3.13 10E12/L (ref 4.4–5.9)
SODIUM SERPL-SCNC: 139 MMOL/L (ref 133–144)
WBC # BLD AUTO: 3.4 10E9/L (ref 4–11)

## 2017-05-19 PROCEDURE — 84165 PROTEIN E-PHORESIS SERUM: CPT | Performed by: PHYSICIAN ASSISTANT

## 2017-05-19 PROCEDURE — 86334 IMMUNOFIX E-PHORESIS SERUM: CPT | Performed by: PHYSICIAN ASSISTANT

## 2017-05-19 PROCEDURE — 83883 ASSAY NEPHELOMETRY NOT SPEC: CPT | Performed by: PHYSICIAN ASSISTANT

## 2017-05-19 PROCEDURE — 96376 TX/PRO/DX INJ SAME DRUG ADON: CPT

## 2017-05-19 PROCEDURE — 82784 ASSAY IGA/IGD/IGG/IGM EACH: CPT | Performed by: PHYSICIAN ASSISTANT

## 2017-05-19 PROCEDURE — 85025 COMPLETE CBC W/AUTO DIFF WBC: CPT | Performed by: PHYSICIAN ASSISTANT

## 2017-05-19 PROCEDURE — 00000402 ZZHCL STATISTIC TOTAL PROTEIN: Performed by: PHYSICIAN ASSISTANT

## 2017-05-19 PROCEDURE — 25000128 H RX IP 250 OP 636: Mod: ZF | Performed by: PHYSICIAN ASSISTANT

## 2017-05-19 PROCEDURE — 25000128 H RX IP 250 OP 636: Mod: ZF | Performed by: INTERNAL MEDICINE

## 2017-05-19 PROCEDURE — 99215 OFFICE O/P EST HI 40 MIN: CPT | Mod: ZP | Performed by: PHYSICIAN ASSISTANT

## 2017-05-19 PROCEDURE — 80053 COMPREHEN METABOLIC PANEL: CPT | Performed by: PHYSICIAN ASSISTANT

## 2017-05-19 PROCEDURE — 99212 OFFICE O/P EST SF 10 MIN: CPT | Mod: 25

## 2017-05-19 PROCEDURE — 96374 THER/PROPH/DIAG INJ IV PUSH: CPT

## 2017-05-19 RX ORDER — HEPARIN SODIUM (PORCINE) LOCK FLUSH IV SOLN 100 UNIT/ML 100 UNIT/ML
5 SOLUTION INTRAVENOUS EVERY 8 HOURS
Status: DISCONTINUED | OUTPATIENT
Start: 2017-05-19 | End: 2017-05-19 | Stop reason: HOSPADM

## 2017-05-19 RX ORDER — HYDROMORPHONE HYDROCHLORIDE 1 MG/ML
.3-.5 INJECTION, SOLUTION INTRAMUSCULAR; INTRAVENOUS; SUBCUTANEOUS
Status: DISCONTINUED | OUTPATIENT
Start: 2017-05-19 | End: 2017-05-19

## 2017-05-19 RX ORDER — MORPHINE SULFATE 15 MG/1
15 TABLET, FILM COATED, EXTENDED RELEASE ORAL EVERY 12 HOURS
Qty: 60 TABLET | Refills: 0 | Status: ON HOLD | OUTPATIENT
Start: 2017-05-19 | End: 2017-05-29

## 2017-05-19 RX ORDER — HEPARIN SODIUM (PORCINE) LOCK FLUSH IV SOLN 100 UNIT/ML 100 UNIT/ML
5 SOLUTION INTRAVENOUS ONCE
Status: COMPLETED | OUTPATIENT
Start: 2017-05-19 | End: 2017-05-19

## 2017-05-19 RX ADMIN — SODIUM CHLORIDE, PRESERVATIVE FREE 5 ML: 5 INJECTION INTRAVENOUS at 14:21

## 2017-05-19 RX ADMIN — SODIUM CHLORIDE, PRESERVATIVE FREE 5 ML: 5 INJECTION INTRAVENOUS at 10:06

## 2017-05-19 RX ADMIN — HYDROMORPHONE HYDROCHLORIDE 0.3 MG: 1 INJECTION, SOLUTION INTRAMUSCULAR; INTRAVENOUS; SUBCUTANEOUS at 13:59

## 2017-05-19 RX ADMIN — HYDROMORPHONE HYDROCHLORIDE 0.5 MG: 1 INJECTION, SOLUTION INTRAMUSCULAR; INTRAVENOUS; SUBCUTANEOUS at 11:40

## 2017-05-19 ASSESSMENT — PAIN SCALES - GENERAL: PAINLEVEL: EXTREME PAIN (9)

## 2017-05-19 NOTE — NURSING NOTE
"Oncology Rooming Note    May 19, 2017 10:24 AM   Anthony Carbone is a 71 year old male who presents for:    Chief Complaint   Patient presents with     Oncology Clinic Visit     Myeloma      Port Draw     Labs collected via port by RN.      Initial Vitals: /76  Pulse 98  Temp 98.2  F (36.8  C) (Oral)  Resp 16  Ht 1.626 m (5' 4.02\")  Wt 59.1 kg (130 lb 6.4 oz)  SpO2 96%  BMI 22.37 kg/m2 Estimated body mass index is 22.37 kg/(m^2) as calculated from the following:    Height as of this encounter: 1.626 m (5' 4.02\").    Weight as of this encounter: 59.1 kg (130 lb 6.4 oz). Body surface area is 1.63 meters squared.  Extreme Pain (9) Comment: Back   No LMP for male patient.  Allergies reviewed: Yes  Medications reviewed: Yes    Medications: Medication refills not needed today.  Pharmacy name entered into [facility]:    [id number] - [address] AT [facility] [address] [facility] [address]  [place], [place] - [address] [facility]  [id number] - [place], [place] - [address] AT [facility] OF [address]    Clinical concerns: No new concerns Provider was notified.    7 minutes for nursing intake (face to face time)     Marleni Hernandez MA              "

## 2017-05-19 NOTE — LETTER
5/19/2017      RE: Anthony Carbone  [address] [place] [address]       HEMATOLOGY/ONCOLOGY PROGRESS NOTE  May 19, 2017    REASON FOR VISIT: patient with multiple myeloma    Diagnosis: 71 year old gentleman with IgM lambda multiple myeloma originally diagnosed in 01/2012 as stage I, standard risk disease.   Treatment: Revlimid plus dexamethasone for 4-5 cycles but plateaued by late 03/2012.   - Velcade was added and he received another 2-3 cycles, achieving a good partial remission.     Transplant: Single auto after melphalan 200 mg/m2 preparative regimen on 01/09/2013  - Post-transplant course: unremarkable except for some mild steroid-induced hyperglycemia, gastritis, nausea and vomiting.     Maintenance: Lenalidomide at day-100 then developed a maculopapular rash. Lenalidomide was held and then we re-challenged him after about a month to 2 months at a lower dose (5 mg daily); rash returned.   - was started on maintenance Velcade, every other week through July 2014, when he was noted with abnormalities on myeloma studies drawn there. Noted with prostate cancer dx at about the time of relapse (see below).    Relapse: noted with return on M-spike in blood/urine with marrow involvement but negative PET.   - Started on retreatment with RVD on 8/27/14 with Revlimid at 15 mg 14 days of 21 days, dexamethasone 40 mg weekly and velcade weekly.Completed at total of 5 cycles without complication by 12/2014.   - Started Cycle 6 on inc'd Rev dosing of 20mg daily x 2 weeks on 12/11/14 which was complicated by pneumonia.  - Adm 12/21-1/10 for human metapneumovirus pneumonia complicated by anorexia, HTN, depression, anorexia with significant weight loss.   - Restarted Ernesto/Dex only on 2/4/15 with good tolerance but with thrombocytopenia.   - Bendamustine added to Velcade/dexamethasone on 5/21/15 due to disease progression  - Velcade discontinued on 7/9/2015 due to side effects (orthostasis)  - Schedule changed to  "bendamustine 80 mg/m2 days 1 and 2 on 28-day cycle  - Cycle 1 tolerated poorly due to lightheadedness and weakness  - Cycle 2 dose reduced to 60 mg/m2 and pre-meds adjusted  - Cycle 5 received on 9/17/15.  - 10/1/2015 - increasing IgM, M-spike - started Pomalidomide 4mg/day (21 days out of 28 days) and weekly Decadron 20mg on Oct 1st, 2015.    - Carfilzomib added on 10/22/2015 making this CPD.  - C3-C6  received in [place]    - Returned to [facility] and resumed CPD here    - Adm: 4/12-4/14 with fever, confusion, neutropenia. Noted on MRI to have acute/subacute CVA and subacute/chronic CVA; started on Plavix, given brief course of Abx and GCSF prior to d/c.     - Continued on Carfilzomib and dexamethasone alone  - Pomalyst added back on 7/13/2016 for rising FLC  - Start daratumumab 11/10/16. Changed to every other week after 4 weekly treatments d/t profound fatigue and malaise.    INTERVAL HISTORY:    Yamil presents with his wife today for follow-up. He is residing at the TCU. He is not feeling great today, pain being the main issue. When asked how the pain is, he says he is \"suffering\". Pain is 9-10/10 in intensity. Unchanged in character or location, remains painful in lower ribs bilaterally extending to his lumbar area. No new sites of pain or new types of pain. No pleuritic pain. No bowel/bladder dysfunction or paresthesias. He does take Dilaudid at the TCU and this is marginally effective. Lidocaine patches the same. Heat warmer felt good in the car. Confusion much better, he does not feel confused. He hasn't had any fevers, chills, cough, SOB, chest pain, abdominal pain, GERD, diarrhea, constipation (not taking any promotility agents), bleeding, or swelling. Remainder of ROS otherwise negative.    PHYSICAL EXAMINATION  /76  Pulse 98  Temp 98.2  F (36.8  C) (Oral)  Resp 16  Ht 1.626 m (5' 4.02\")  Wt 59.1 kg (130 lb 6.4 oz)  SpO2 96%  BMI 22.37 kg/m2    Wt Readings from Last 10 Encounters:   05/19/17 59.1 kg " (130 lb 6.4 oz)   05/16/17 59.1 kg (130 lb 3.2 oz)   01/26/17 64.6 kg (142 lb 6.4 oz)   01/12/17 64.7 kg (142 lb 10.2 oz)   01/06/17 65.1 kg (143 lb 8 oz)   01/05/17 64.5 kg (142 lb 3.2 oz)   12/29/16 63.2 kg (139 lb 6.4 oz)   12/23/16 63.2 kg (139 lb 4.8 oz)   12/15/16 63.3 kg (139 lb 9.6 oz)   12/09/16 63.1 kg (139 lb 1.6 oz)   General: appears in pain, hunched over, using wheelchair. Unable to transfer.  No acute distress. HEENT: PERRL, no palor or icterus. NECK: supple - no cervical or supraclavicular LAD.  CVS: RRR. CHEST: Fine crackles in the LLL, otherwise clear to auscultation b/l . ABDOMEN: soft non tender no masses palpated in a seated position.  NEURO: AAOX3  Grossly non focal neuro exam.  SKIN: no obvious rashes.  LYMPH NODES: no palpable cervical or supraclavicular LAD.  EXTREMITIES: No edema. MSK: generalized non-focal tenderness at lower rib margins bilaterally extending to lumbar paraspinal muslces    LABS:  Results for WILLIAN ARCINIEGA (MRN [id number]) as of 5/18/2017 18:37   Ref. Range 1/26/2017 08:59 1/31/2017 00:00 3/23/2017 09:02 4/20/2017 00:00 4/20/2017 00:00 5/8/2017 10:32   Albumin Fraction Latest Ref Range: 3.7 - 5.1 g/dL 3.5 (L)     3.6 (L)   Alpha 1 Fraction Latest Ref Range: 0.2 - 0.4 g/dL 0.4     0.5 (H)   Alpha 2 Fraction Latest Ref Range: 0.5 - 0.9 g/dL 1.0 (H)     1.1 (H)   Beta Fraction Latest Ref Range: 0.6 - 1.0 g/dL 0.8     0.7   ELP Interpretation: Unknown (Note)...     (Note)...   Gamma Fraction Latest Ref Range: 0.7 - 1.6 g/dL 1.1     2.4 (H)   IGA Latest Ref Range: 70 - 380 mg/dL  <25 <25 <25 <25 <7 (L)   IGG Latest Ref Range: 695 - 1620 mg/dL  91 (A) 101 116 116 138 (L)   IGM Latest Ref Range: 60 - 265 mg/dL 1540 (H) 1307 (A) 2092 2180 2180 3410 (H)   Immunofixation ELP Unknown      (Note)...   Kappa Free Lt Chain Latest Ref Range: 0.33 - 1.94 mg/dL <0.30... (L)   3.05  <0.30... (L)   Kappa Free Lt Chains mg/L Latest Units: mg/L  3.14 (A) 6.09      Kappa Lambda Ratio  Latest Ref Range: 0.26 - 1.65  Unable to Calculate 0.02 (A) 0.03 0.01  Unable to Calculate   Lambda Free Lt Chain Latest Ref Range: 0.57 - 2.63 mg/dL 26.50 (H)   248.32  31.00 (H)   Lambda Free Lt Chaines mg/L Latest Units: mg/L  141.64 (A) 208.77      Monoclonal Peak Latest Ref Range: 0.0 g/dL 0.9 (H)     2.1 (H)   Results for WILLIAN ARCINIEGA (MRN [id number]) as of 5/19/2017 10:28   Ref. Range 5/16/2017 06:43 5/19/2017 10:15   WBC Latest Ref Range: 4.0 - 11.0 10e9/L 2.1 (L) 3.4 (L)   Hemoglobin Latest Ref Range: 13.3 - 17.7 g/dL 9.2 (L) 9.7 (L)   Hematocrit Latest Ref Range: 40.0 - 53.0 % 30.5 (L) 31.3 (L)   Platelet Count Latest Ref Range: 150 - 450 10e9/L 60 (L) 71 (L)   Results for WILLIAN ARCINIEGA (MRN [id number]) as of 5/19/2017 10:54   Ref. Range 5/19/2017 10:15   Sodium Latest Ref Range: 133 - 144 mmol/L 139   Potassium Latest Ref Range: 3.4 - 5.3 mmol/L 4.4   Chloride Latest Ref Range: 94 - 109 mmol/L 107   Carbon Dioxide Latest Ref Range: 20 - 32 mmol/L 24   Urea Nitrogen Latest Ref Range: 7 - 30 mg/dL 15   Creatinine Latest Ref Range: 0.66 - 1.25 mg/dL 0.83   GFR Estimate Latest Ref Range: >60 mL/min/1.7m2 >90...   GFR Estimate If Black Latest Ref Range: >60 mL/min/1.7m2 >90...   Calcium Latest Ref Range: 8.5 - 10.1 mg/dL 8.4 (L)   Anion Gap Latest Ref Range: 3 - 14 mmol/L 8   Albumin Latest Ref Range: 3.4 - 5.0 g/dL 3.1 (L)   Protein Total Latest Ref Range: 6.8 - 8.8 g/dL 8.0   Bilirubin Total Latest Ref Range: 0.2 - 1.3 mg/dL 0.5   Alkaline Phosphatase Latest Ref Range: 40 - 150 U/L 49   ALT Latest Ref Range: 0 - 70 U/L 22   AST Latest Ref Range: 0 - 45 U/L 17   Glucose Latest Ref Range: 70 - 99 mg/dL 103 (H)     IMPRESSION/PLAN:  71 year old male with relapsed MM after autologous transplant (1/9/2013), with recent progression on daratumumab/pom/dex, with recent hospitalization 5/7-5/16 for back pain, JOSE MARIA,and  Hypercalcemia.    1. MM: Previously on edgardo/pom/dex while wintering in [place],  progressive disease on SPEP inpatient (M spike 0.9 --> 2.1, IgM 3410)  - Received solumedrol 100 mg IV daily 5/8-5/10. Started PO dex 40 mg daily x 3 days on 5/11 with Kyprolis 5/11 and 5/12  - Unclear what plan moving forward is. Message sent to Dr. Topete, he is out today. I will see Yamil back early next week.    2. Encephalopathy: Onset of AMS 5/7 en route from [place]. Likely multifactorial in setting of metabolic derangements/possible infection/uremia. Mostly resolved inpatient with exception of sundowning, CT head negative. Viscosity index 2.3.  - Continues to improve, continue Zyprexa 5 mg BID for now    3. Heme: Cytopenias 2/2 treatment and likely MM in setting of progression. Stable.  - Continues on Plavix for history of CVA -- although will need to monitor platelets and hold for platelets <50    4. Renal/FEN: JOSE MARIA on admission, likely multifactorial in setting of NSAIDs, uremia, hyperCa2+  - Creat stable today  - Hypercalcemia: s/p pamidronate x 1 on 5/8, resolved  - Hyperuricemia: s/p rasburicase x 1 on 5/8    5. ID: Possible PNA, completed course of PO Levaquin. Afebrile without localzing infectious s/s today.  - Continues ppx  mg BID    6. Back/rib pain: Started early May. Lumbar MRI at OSH showing mild-mod central and foraminal stenoses in lumbar spine, per patient and wife, there was no MM lesion there. Rib films inpatient negative, back films stable from prior imaging. Uncontrolled pain today of same quality/location, uncontrolled 10/10 in intensity. No bowel/bladder dysfunction or paresthesias.  - Really not optimizing the PRN dilaudid. Given 0.5 mg IV dilaudid in infusion with dramatic improvement in pain   - Discussed admission for pain control versus trial of MS Contin 15 mg q12h. GIven the significant improvement, decision as mutually made between myself, patient, and patient's wife to try the MS Contin. Call or go to ED with worsening symptoms, new pain, or any neurologic symptoms.  Discussed possibility for constipation and use of promotility agents, as well as possibility for recurrence of the AMS, which case they will call.  - If the severity persists, may consider thoracic MRI and repeating the lumbar MRI  - Continue Tylenol, Voltaren, lidocaine    7. CV: Continue Norvasc and Zocor.   - Avoid QTc prolonging agents (history of QTc prolongation with syncopal episodes)      I spent >50 minutes with the patient, with over 50% of the time spent counseling or coordinating their care as described above.      Leanne Reddy PA-C  Hematology, Oncology, and Transplant  [facility]  [address] [place] [id number]  [phone]

## 2017-05-19 NOTE — PROGRESS NOTES
Infusion Nursing Note:  Anthony Carbone presents today for pain medications.    Patient seen by provider today: Yes: Leanne Reddy PA-C   present during visit today: Not Applicable.    Note: Patient arrived with 10/10 pain.  Port re-accessed and dilaudid 0.5 mg IV given. Pt sleeping so pain was not assessed 15 minutes post.  Pt rated pain a 5/10 upon waking up.   Leanne Reddy PA-C came to assess patient in infusion. TORB: Leanne Reddy PA-C/Nadine Fong RN: Observe patient for 20 to 30 more minutes. Ok to give another dose of pain medications.  Okay to discharge if feeling better.  Pt rated pain at a 3/10 and requested a second dose of dilaudid before leaving.  Dilaudid 0.3 mg IV given. 15 minutes post pt rated pain 0/10    Intravenous Access:  Implanted Port.    Treatment Conditions:  Lab Results   Component Value Date    HGB 9.7 05/19/2017     Lab Results   Component Value Date    WBC 3.4 05/19/2017      Lab Results   Component Value Date    ANEU 2.7 05/19/2017     Lab Results   Component Value Date    PLT 71 05/19/2017      Lab Results   Component Value Date     05/19/2017                   Lab Results   Component Value Date    POTASSIUM 4.4 05/19/2017           Lab Results   Component Value Date    MAG 1.7 05/13/2017            Lab Results   Component Value Date    CR 0.83 05/19/2017                   Lab Results   Component Value Date    JAZMIN 8.4 05/19/2017                Lab Results   Component Value Date    BILITOTAL 0.5 05/19/2017           Lab Results   Component Value Date    ALBUMIN 3.1 05/19/2017                    Lab Results   Component Value Date    ALT 22 05/19/2017           Lab Results   Component Value Date    AST 17 05/19/2017       Post Infusion Assessment:  Patient tolerated infusion without incident.  Blood return noted pre and post infusion.  Site patent and intact, free from redness, edema or discomfort.  No evidence of extravasations.  Access discontinued  per protocol.    Discharge Plan:   Prescription refills given for morphine.  Discharge instructions reviewed with: Patient.  Patient and/or family verbalized understanding of discharge instructions and all questions answered.  Copy of AVS reviewed with patient and/or family.  Patient will return 5/23/17 for next appointment.  Patient discharged in stable condition accompanied by: self.  Departure Mode: Ambulatory.    Nadine Fong RN

## 2017-05-19 NOTE — NURSING NOTE
Chief Complaint   Patient presents with     Oncology Clinic Visit     Myeloma      Port Draw     Labs collected via port by RN.      Port accessed, labs collected, flushed with normal saline and heparin.     Chrsitie Dixon RN

## 2017-05-19 NOTE — PATIENT INSTRUCTIONS
Contact Numbers    [facility] Main Line: [phone]  [facility] Triage:  [phone]    Call triage with chills and/or temperature greater than or equal to 100.5, uncontrolled nausea/vomiting, diarrhea, constipation, dizziness, shortness of breath, chest pain, bleeding, unexplained bruising, or any new/concerning symptoms, questions/concerns.     If you are having any concerning symptoms or wish to speak to a provider before your next infusion visit, please call your care coordinator or triage to notify them so we can adequately serve you.       After Hours: [phone]    If after hours, weekends, or holidays, call main hospital  and ask for Oncology doctor on call.         May 2017   [facility] Monday Tuesday Wednesday Thursday Friday Saturday        1     2     3     4     5     6       7     Admission    6:21 PM   Joanie Anderson MD   [address] [facility]   (Discharge: 5/16/2017) 8     XR CHEST PORT 1 VIEW    3:05 PM   (20 min.)   [id number]   [facility],  Radiology 9     P [facility] LAB DRAW   11:30 AM   (15 min.)    [facility] LAB DRAW   [facility] Lab Draw     [facility] ONC RETURN   12:00 PM   (30 min.)    BMT DOM   [facility] Blood and Marrow Transplant 10     XR RIBS RIGHT 2 VIEWS    3:20 PM   (20 min.)   [email or web address]   [facility],  Radiology 11     IP EVALUATION    6:00 AM   (60 min.)   Lashay Kelley, PT   [facility], [place], Physical Therapy     IP EVALUATION    6:00 AM   (60 min.)   Lashay Gonzalez, OT   [facility], [place], Occupational Therapy     XR LUMBAR SPINE 2/3 VIEWS    1:10 PM   (20 min.)   [email or web address]   [facility],  Radiology     XR THORACIC SPINE 2 VIEWS    1:15 PM   (20 min.)   [email or web address]   [facility],  Radiology 12     IP TREATMENT    6:00 AM   (30 min.)   Dulce Maria Bejarano, OT   [facility], [place], Occupational Therapy     XR ABDOMEN 2 VIEWS    9:15 AM   (15 min.)   [email or web address]   [facility],  Radiology     CT HEAD WO   11:40 AM   (15 min.)   [id number]   [facility], CT     IP TREATMENT    1:00 PM   (30  min.)   Lashay Kelley, PT   [facility] [place], Physical Therapy 13     IP TREATMENT    6:00 AM   (30 min.)   Dulce Maria Bejarano, OT   [facility] [place], Occupational Therapy     IP TREATMENT   11:00 AM   (30 min.)   Lashay Kelley, PT   [facility] [place], Physical Therapy   14     IP TREATMENT    1:00 PM   (30 min.)   Lashay Kelley, PT   [facility] [place], Physical Therapy 15     IP TREATMENT    6:00 AM   (30 min.)   Lashay Gonzalez, OT   [facility] [place], Occupational Therapy 16     17     18     19     [facility] LAB DRAW    9:45 AM   (15 min.)    [facility] LAB DRAW   [facility] Lab Draw     [facility] RETURN   10:05 AM   (50 min.)   Leanne Reddy PA-C   [facility] ONC INFUSION 120   11:30 AM   (120 min.)    ONCOLOGY INFUSION   [facility] 20       21     22     23     24     25     26     27       28     29     30     31 June 2017 Sunday Monday Tuesday Wednesday Thursday Friday Saturday                       1     2     3       4     5     6     7     8     9     10       11     12     13     14     15     16     17       18     19     20     21     22     23     24       25     26     27     28     29     30                       Lab Results:  Recent Results (from the past 12 hour(s))   Comprehensive metabolic panel    Collection Time: 05/19/17 10:15 AM   Result Value Ref Range    Sodium 139 133 - 144 mmol/L    Potassium 4.4 3.4 - 5.3 mmol/L    Chloride 107 94 - 109 mmol/L    Carbon Dioxide 24 20 - 32 mmol/L    Anion Gap 8 3 - 14 mmol/L    Glucose 103 (H) 70 - 99 mg/dL    Urea Nitrogen 15 7 - 30 mg/dL    Creatinine 0.83 0.66 - 1.25 mg/dL    GFR Estimate >90  Non  GFR Calc   >60 mL/min/1.7m2    GFR Estimate If Black >90   GFR Calc   >60 mL/min/1.7m2    Calcium 8.4 (L) 8.5 - 10.1 mg/dL    Bilirubin Total 0.5 0.2 - 1.3 mg/dL    Albumin 3.1 (L) 3.4 - 5.0 g/dL    Protein Total 8.0 6.8 - 8.8 g/dL    Alkaline  Phosphatase 49 40 - 150 U/L    ALT 22 0 - 70 U/L    AST 17 0 - 45 U/L   CBC with platelets differential    Collection Time: 05/19/17 10:15 AM   Result Value Ref Range    WBC 3.4 (L) 4.0 - 11.0 10e9/L    RBC Count 3.13 (L) 4.4 - 5.9 10e12/L    Hemoglobin 9.7 (L) 13.3 - 17.7 g/dL    Hematocrit 31.3 (L) 40.0 - 53.0 %     78 - 100 fl    MCH 31.0 26.5 - 33.0 pg    MCHC 31.0 (L) 31.5 - 36.5 g/dL    RDW 18.6 (H) 10.0 - 15.0 %    Platelet Count 71 (L) 150 - 450 10e9/L    Diff Method Automated Method     % Neutrophils 80.7 %    % Lymphocytes 5.3 %    % Monocytes 10.4 %    % Eosinophils 3.0 %    % Basophils 0.3 %    % Immature Granulocytes 0.3 %    Nucleated RBCs 0 0 /100    Absolute Neutrophil 2.7 1.6 - 8.3 10e9/L    Absolute Lymphocytes 0.2 (L) 0.8 - 5.3 10e9/L    Absolute Monocytes 0.4 0.0 - 1.3 10e9/L    Absolute Eosinophils 0.1 0.0 - 0.7 10e9/L    Absolute Basophils 0.0 0.0 - 0.2 10e9/L    Abs Immature Granulocytes 0.0 0 - 0.4 10e9/L    Absolute Nucleated RBC 0.0    Protein electrophoresis    Collection Time: 05/19/17 10:15 AM   Result Value Ref Range    Albumin Fraction Pending 3.7 - 5.1 g/dL    Alpha 1 Fraction Pending 0.2 - 0.4 g/dL    Alpha 2 Fraction Pending 0.5 - 0.9 g/dL    Beta Fraction Pending 0.6 - 1.0 g/dL    Gamma Fraction Pending 0.7 - 1.6 g/dL    Monoclonal Peak Pending 0.0 g/dL    ELP Interpretation: Pending

## 2017-05-19 NOTE — MR AVS SNAPSHOT
After Visit Summary   5/19/2017    Anthony Carbone    MRN: [id number]           Patient Information     Date Of Birth          1945        Visit Information        Provider Department      5/19/2017 11:30 AM  32 ATC;  ONCOLOGY INFUSION [facility]        Care Instructions    Contact Numbers    [facility] Main Line: [phone]  [facility] Triage:  [phone]    Call triage with chills and/or temperature greater than or equal to 100.5, uncontrolled nausea/vomiting, diarrhea, constipation, dizziness, shortness of breath, chest pain, bleeding, unexplained bruising, or any new/concerning symptoms, questions/concerns.     If you are having any concerning symptoms or wish to speak to a provider before your next infusion visit, please call your care coordinator or triage to notify them so we can adequately serve you.       After Hours: [phone]    If after hours, weekends, or holidays, call main hospital  and ask for Oncology doctor on call.         May 2017   [facility] Monday Tuesday Wednesday Thursday Friday Saturday        1     2     3     4     5     6       7     Admission    6:21 PM   Joanie Anderson MD   [address] [facility]   (Discharge: 5/16/2017) 8     XR CHEST PORT 1 VIEW    3:05 PM   (20 min.)   [id number]   [facility],  Radiology 9     [facility] DRAW   11:30 AM   (15 min.)    [facility] DRAW   [facility] Lab Draw     [facility] ONC RETURN   12:00 PM   (30 min.)    BMT DOM   [facility] Blood and Marrow Transplant 10     XR RIBS RIGHT 2 VIEWS    3:20 PM   (20 min.)   UUXR3   [facility],  Radiology 11     IP EVALUATION    6:00 AM   (60 min.)   Lashay Kelley, PT   [facility], Physical Therapy     IP EVALUATION    6:00 AM   (60 min.)   Lashay Gonzalez, OT   [facility], Occupational Therapy     XR LUMBAR SPINE 2/3 VIEWS    1:10 PM   (20 min.)   UUXR3   [facility],  Radiology     XR THORACIC SPINE 2 VIEWS    1:15 PM   (20  min.)   UUXR3   [facility] [place],  Radiology 12     IP TREATMENT    6:00 AM   (30 min.)   Dulce Maria Bejarano, OT   [facility] [place], Occupational Therapy     XR ABDOMEN 2 VIEWS    9:15 AM   (15 min.)   UUXR3   [facility],  Radiology     CT HEAD WO   11:40 AM   (15 min.)   UUCT1   [facility] [place], CT     IP TREATMENT    1:00 PM   (30 min.)   Lashay Kelley, PT   [facility] [place], Physical Therapy 13     IP TREATMENT    6:00 AM   (30 min.)   Dulce Maria Bejarano, OT   [facility] [place], Occupational Therapy     IP TREATMENT   11:00 AM   (30 min.)   Lashay Kelley, PT   [facility] [place], Physical Therapy   14     IP TREATMENT    1:00 PM   (30 min.)   Lashay Kelley, PT   [facility] [place], Physical Therapy 15     IP TREATMENT    6:00 AM   (30 min.)   Lashay Gonzalez, OT   [facility] [place], Occupational Therapy 16     17     18     19     [facility] DRAW    9:45 AM   (15 min.)    [facility] DRAW   [facility] Lab Draw     [facility] RETURN   10:05 AM   (50 min.)   Leanne Reddy PA-C   [facility] [facility] Cancer [facility] ONC INFUSION 120   11:30 AM   (120 min.)    ONCOLOGY INFUSION   [facility] 20       21     22     23     24     25     26     27       28     29     30     31 June 2017 Sunday Monday Tuesday Wednesday Thursday Friday Saturday                       1     2     3       4     5     6     7     8     9     10       11     12     13     14     15     16     17       18     19     20     21     22     23     24       25     26     27     28     29     30                       Lab Results:  Recent Results (from the past 12 hour(s))   Comprehensive metabolic panel    Collection Time: 05/19/17 10:15 AM   Result Value Ref Range    Sodium 139 133 - 144 mmol/L    Potassium 4.4 3.4 - 5.3 mmol/L    Chloride 107 94 - 109 mmol/L    Carbon Dioxide 24 20 - 32 mmol/L    Anion Gap 8 3 - 14 mmol/L    Glucose 103 (H) 70 - 99 mg/dL    Urea Nitrogen 15 7 - 30 mg/dL     Creatinine 0.83 0.66 - 1.25 mg/dL    GFR Estimate >90  Non  GFR Calc   >60 mL/min/1.7m2    GFR Estimate If Black >90   GFR Calc   >60 mL/min/1.7m2    Calcium 8.4 (L) 8.5 - 10.1 mg/dL    Bilirubin Total 0.5 0.2 - 1.3 mg/dL    Albumin 3.1 (L) 3.4 - 5.0 g/dL    Protein Total 8.0 6.8 - 8.8 g/dL    Alkaline Phosphatase 49 40 - 150 U/L    ALT 22 0 - 70 U/L    AST 17 0 - 45 U/L   CBC with platelets differential    Collection Time: 05/19/17 10:15 AM   Result Value Ref Range    WBC 3.4 (L) 4.0 - 11.0 10e9/L    RBC Count 3.13 (L) 4.4 - 5.9 10e12/L    Hemoglobin 9.7 (L) 13.3 - 17.7 g/dL    Hematocrit 31.3 (L) 40.0 - 53.0 %     78 - 100 fl    MCH 31.0 26.5 - 33.0 pg    MCHC 31.0 (L) 31.5 - 36.5 g/dL    RDW 18.6 (H) 10.0 - 15.0 %    Platelet Count 71 (L) 150 - 450 10e9/L    Diff Method Automated Method     % Neutrophils 80.7 %    % Lymphocytes 5.3 %    % Monocytes 10.4 %    % Eosinophils 3.0 %    % Basophils 0.3 %    % Immature Granulocytes 0.3 %    Nucleated RBCs 0 0 /100    Absolute Neutrophil 2.7 1.6 - 8.3 10e9/L    Absolute Lymphocytes 0.2 (L) 0.8 - 5.3 10e9/L    Absolute Monocytes 0.4 0.0 - 1.3 10e9/L    Absolute Eosinophils 0.1 0.0 - 0.7 10e9/L    Absolute Basophils 0.0 0.0 - 0.2 10e9/L    Abs Immature Granulocytes 0.0 0 - 0.4 10e9/L    Absolute Nucleated RBC 0.0    Protein electrophoresis    Collection Time: 05/19/17 10:15 AM   Result Value Ref Range    Albumin Fraction Pending 3.7 - 5.1 g/dL    Alpha 1 Fraction Pending 0.2 - 0.4 g/dL    Alpha 2 Fraction Pending 0.5 - 0.9 g/dL    Beta Fraction Pending 0.6 - 1.0 g/dL    Gamma Fraction Pending 0.7 - 1.6 g/dL    Monoclonal Peak Pending 0.0 g/dL    ELP Interpretation: Pending              Follow-ups after your visit        Your next 10 appointments already scheduled     Jul 14, 2017  1:00 PM CDT   (Arrive by 12:45 PM)   RETURN ARRHYTHMIA with Dmitri Banks MD   [facility] ([facility])    [address] [id number] [phone]              Future tests that were ordered for you today     Open Future Orders        Priority Expected Expires Ordered    CBC with platelets differential Routine 5/22/2017 7/19/2017 5/19/2017    Comprehensive metabolic panel Routine 5/22/2017 7/19/2017 5/19/2017            Who to contact     If you have questions or need follow up information about today's clinic visit or your schedule please contact [facility] directly at [phone].  Normal or non-critical lab and imaging results will be communicated to you by FlyCliphart, letter or phone within 4 business days after the clinic has received the results. If you do not hear from us within 7 days, please contact the clinic through Zazzy or phone. If you have a critical or abnormal lab result, we will notify you by phone as soon as possible.  Submit refill requests through Zazzy or call your pharmacy and they will forward the refill request to us. Please allow 3 business days for your refill to be completed.          Additional Information About Your Visit        Zazzy Information     Zazzy gives you secure access to your electronic health record. If you see a primary care provider, you can also send messages to your care team and make appointments. If you have questions, please call your primary care clinic.  If you do not have a primary care provider, please call [phone] and they will assist you.        Care EveryWhere ID     This is your Care EveryWhere ID. This could be used by other organizations to access your [facility] medical records  [id number]         Blood Pressure from Last 3 Encounters:   05/19/17 120/76   05/16/17 101/55   01/26/17 132/79    Weight from Last 3 Encounters:   05/19/17 59.1 kg (130 lb 6.4 oz)   05/16/17 59.1 kg (130 lb 3.2 oz)   01/26/17 64.6 kg (142 lb 6.4 oz)              Today, you had the following     No orders found for display          Today's Medication Changes          These changes are accurate as of: 5/19/17  2:17 PM.  If you have any questions, ask your nurse or doctor.               Start taking these medicines.        Dose/Directions    morphine 15 MG 12 hr tablet   Commonly known as:  MS CONTIN   Used for:  Multiple myeloma in relapse (H)   Started by:  Leanne Reddy PA-C        Dose:  15 mg   Take 1 tablet (15 mg) by mouth every 12 hours   Quantity:  60 tablet   Refills:  0            Where to get your medicines      Some of these will need a paper prescription and others can be bought over the counter.  Ask your nurse if you have questions.     Bring a paper prescription for each of these medications     morphine 15 MG 12 hr tablet                Primary Care Provider Office Phone # Fax #    Sanket Gualberto [phone]       [facility] [address] [facility] [address]        Thank you!     Thank you for choosing [facility]  for your care. Our goal is always to provide you with excellent care. Hearing back from our patients is one way we can continue to improve our services. Please take a few minutes to complete the written survey that you may receive in the mail after your visit with us. Thank you!             Your Updated Medication List - Protect others around you: Learn how to safely use, store and throw away your medicines at www.disposemymeds.org.          This list is accurate as of: 5/19/17  2:17 PM.  Always use your most recent med list.                   Brand Name Dispense Instructions for use    acetaminophen 500 MG tablet    TYLENOL     Take 2 tablets (1,000 mg) by mouth every 6 hours as needed for mild pain       acyclovir 400 MG tablet    ZOVIRAX    60 tablet    Take 1 tablet (400 mg) by mouth 2 times daily       amLODIPine 5 MG tablet    NORVASC    30 tablet    Take one tablet at bedtime.       clopidogrel 75 MG tablet    PLAVIX    30 tablet     Take 1 tablet (75 mg) by mouth daily       esomeprazole 40 MG CR capsule    nexIUM    30 capsule    Take 1 capsule (40 mg) by mouth every morning (before breakfast)       HYDROmorphone 2 MG tablet    DILAUDID     Take 1 tablet (2 mg) by mouth every 4 hours as needed for moderate to severe pain       lidocaine 5 % Patch    LIDODERM    30 patch    Place 3 patches onto the skin every 24 hours       LORazepam 0.5 MG tablet    ATIVAN    30 tablet    Take 1 tablet (0.5 mg) by mouth every 4 hours as needed       midodrine 2.5 MG tablet    PROAMATINE    90 tablet    Take 1 tablet (2.5 mg) by mouth 3 times daily       morphine 15 MG 12 hr tablet    MS CONTIN    60 tablet    Take 1 tablet (15 mg) by mouth every 12 hours       OLANZapine 5 MG tablet    zyPREXA    60 tablet    Take 1 tablet (5 mg) by mouth 2 times daily       ondansetron 8 MG ODT tab    ZOFRAN-ODT    30 tablet    Take 1 tablet (8 mg) by mouth every 8 hours as needed for nausea       potassium chloride SA 20 MEQ CR tablet    K-DUR/KLOR-CON M    60 tablet    TAKE 1 TABLET BY MOUTH TWICE DAILY.       simethicone 80 MG chewable tablet    MYLICON    180 tablet    Take 2 tablets (160 mg) by mouth 4 times daily as needed for cramping or other (gas pain)       simvastatin 5 MG tablet    ZOCOR    30 tablet    Take 4 tablets (20 mg) by mouth daily       sucralfate 1 GM tablet    CARAFATE    120 tablet    Take 1 tablet (1 g) by mouth 4 times daily as needed

## 2017-05-19 NOTE — MR AVS SNAPSHOT
After Visit Summary   5/19/2017    Anthony Carbone    MRN: [id number]           Patient Information     Date Of Birth          1945        Visit Information        Provider Department      5/19/2017 10:20 AM Leanne Reddy PA-C [facility]        Today's Diagnoses     Multiple myeloma in relapse (H)           Follow-ups after your visit        Your next 10 appointments already scheduled     May 23, 2017 10:30 AM CDT   [facility] Draw with  [employer] LAB DRAW   [facility] Draw ([facility])    [address] [place] [address]  [address] [facility] [address] [phone]            May 23, 2017 11:00 AM CDT   (Arrive by 10:45 AM)   Return Visit with Leanne Reddy PA-C   [facility] ([facility])    [address] [place] [address]  [address] [facility] [id number] [phone]            Jul 14, 2017  1:00 PM CDT   (Arrive by 12:45 PM)   RETURN ARRHYTHMIA with Dmitri Bakns MD   [facility] ([facility])    [address] [place] [address]  [address] [facility] [id number] [phone]              Future tests that were ordered for you today     Open Future Orders        Priority Expected Expires Ordered    CBC with platelets differential Routine 5/22/2017 7/19/2017 5/19/2017    Comprehensive metabolic panel Routine 5/22/2017 7/19/2017 5/19/2017            Who to contact     If you have questions or need follow up information about today's clinic visit or your schedule please contact [facility] directly at [phone].  Normal or non-critical lab and imaging results will be communicated to you by MyChart, letter or phone within 4 business days after the clinic has received the results. If you do not hear from us within 7 days, please contact the clinic through MyChart or phone. If you have a critical or abnormal lab result, we  "will notify you by phone as soon as possible.  Submit refill requests through [employer] or call your pharmacy and they will forward the refill request to us. Please allow 3 business days for your refill to be completed.          Additional Information About Your Visit        [employer] Information     [employer] gives you secure access to your electronic health record. If you see a primary care provider, you can also send messages to your care team and make appointments. If you have questions, please call your primary care clinic.  If you do not have a primary care provider, please call [phone] and they will assist you.        Care EveryWhere ID     This is your Care EveryWhere ID. This could be used by other organizations to access your [place] medical records  [id number]        Your Vitals Were     Pulse Temperature Respirations Height Pulse Oximetry BMI (Body Mass Index)    98 98.2  F (36.8  C) (Oral) 16 1.626 m (5' 4.02\") 96% 22.37 kg/m2       Blood Pressure from Last 3 Encounters:   05/19/17 120/76   05/16/17 101/55   01/26/17 132/79    Weight from Last 3 Encounters:   05/19/17 59.1 kg (130 lb 6.4 oz)   05/16/17 59.1 kg (130 lb 3.2 oz)   01/26/17 64.6 kg (142 lb 6.4 oz)              We Performed the Following     CBC with platelets differential     Comprehensive metabolic panel     Kappa and lambda light chain     Protein electrophoresis     Protein Immunofixation Serum          Today's Medication Changes          These changes are accurate as of: 5/19/17  3:06 PM.  If you have any questions, ask your nurse or doctor.               Start taking these medicines.        Dose/Directions    morphine 15 MG 12 hr tablet   Commonly known as:  MS CONTIN   Used for:  Multiple myeloma in relapse (H)   Started by:  Leanne Reddy PA-C        Dose:  15 mg   Take 1 tablet (15 mg) by mouth every 12 hours   Quantity:  60 tablet   Refills:  0            Where to get your medicines      Some of these will need a " paper prescription and others can be bought over the counter.  Ask your nurse if you have questions.     Bring a paper prescription for each of these medications     morphine 15 MG 12 hr tablet                Primary Care Provider Office Phone # Fax #    Sanket Burciaga [phone]       [facility] [address]  [facility] [address]        Thank you!     Thank you for choosing [facility] CANCER [facility]  for your care. Our goal is always to provide you with excellent care. Hearing back from our patients is one way we can continue to improve our services. Please take a few minutes to complete the written survey that you may receive in the mail after your visit with us. Thank you!             Your Updated Medication List - Protect others around you: Learn how to safely use, store and throw away your medicines at www.disposemymeds.org.          This list is accurate as of: 5/19/17  3:06 PM.  Always use your most recent med list.                   Brand Name Dispense Instructions for use    acetaminophen 500 MG tablet    TYLENOL     Take 2 tablets (1,000 mg) by mouth every 6 hours as needed for mild pain       acyclovir 400 MG tablet    ZOVIRAX    60 tablet    Take 1 tablet (400 mg) by mouth 2 times daily       amLODIPine 5 MG tablet    NORVASC    30 tablet    Take one tablet at bedtime.       clopidogrel 75 MG tablet    PLAVIX    30 tablet    Take 1 tablet (75 mg) by mouth daily       esomeprazole 40 MG CR capsule    nexIUM    30 capsule    Take 1 capsule (40 mg) by mouth every morning (before breakfast)       HYDROmorphone 2 MG tablet    DILAUDID     Take 1 tablet (2 mg) by mouth every 4 hours as needed for moderate to severe pain       lidocaine 5 % Patch    LIDODERM    30 patch    Place 3 patches onto the skin every 24 hours       LORazepam 0.5 MG tablet    ATIVAN    30 tablet    Take 1 tablet (0.5 mg) by mouth every 4 hours as needed       midodrine 2.5 MG tablet     PROAMATINE    90 tablet    Take 1 tablet (2.5 mg) by mouth 3 times daily       morphine 15 MG 12 hr tablet    MS CONTIN    60 tablet    Take 1 tablet (15 mg) by mouth every 12 hours       OLANZapine 5 MG tablet    zyPREXA    60 tablet    Take 1 tablet (5 mg) by mouth 2 times daily       ondansetron 8 MG ODT tab    ZOFRAN-ODT    30 tablet    Take 1 tablet (8 mg) by mouth every 8 hours as needed for nausea       potassium chloride SA 20 MEQ CR tablet    K-DUR/KLOR-CON M    60 tablet    TAKE 1 TABLET BY MOUTH TWICE DAILY.       simethicone 80 MG chewable tablet    MYLICON    180 tablet    Take 2 tablets (160 mg) by mouth 4 times daily as needed for cramping or other (gas pain)       simvastatin 5 MG tablet    ZOCOR    30 tablet    Take 4 tablets (20 mg) by mouth daily       sucralfate 1 GM tablet    CARAFATE    120 tablet    Take 1 tablet (1 g) by mouth 4 times daily as needed

## 2017-05-22 LAB
ALBUMIN SERPL ELPH-MCNC: 3.4 G/DL (ref 3.7–5.1)
ALPHA1 GLOB SERPL ELPH-MCNC: 0.4 G/DL (ref 0.2–0.4)
ALPHA2 GLOB SERPL ELPH-MCNC: 1 G/DL (ref 0.5–0.9)
B-GLOBULIN SERPL ELPH-MCNC: 0.7 G/DL (ref 0.6–1)
GAMMA GLOB SERPL ELPH-MCNC: 2.4 G/DL (ref 0.7–1.6)
IGA SERPL-MCNC: <7 MG/DL (ref 70–380)
IGG SERPL-MCNC: 132 MG/DL (ref 695–1620)
IGM SERPL-MCNC: 3310 MG/DL (ref 60–265)
IMMUNOFIXATION ELP: ABNORMAL
KAPPA LC UR-MCNC: ABNORMAL MG/DL (ref 0.33–1.94)
KAPPA LC/LAMBDA SER: ABNORMAL {RATIO} (ref 0.26–1.65)
LAMBDA LC SERPL-MCNC: 29.25 MG/DL (ref 0.57–2.63)
M PROTEIN SERPL ELPH-MCNC: 2.2 G/DL
PROT PATTERN SERPL ELPH-IMP: ABNORMAL

## 2017-05-23 ENCOUNTER — INFUSION THERAPY VISIT (OUTPATIENT)
Dept: ONCOLOGY | Facility: CLINIC | Age: 72
DRG: 177 | End: 2017-05-23
Attending: INTERNAL MEDICINE
Payer: MEDICARE

## 2017-05-23 ENCOUNTER — APPOINTMENT (OUTPATIENT)
Dept: LAB | Facility: CLINIC | Age: 72
DRG: 177 | End: 2017-05-23
Attending: INTERNAL MEDICINE
Payer: MEDICARE

## 2017-05-23 VITALS
RESPIRATION RATE: 20 BRPM | HEART RATE: 82 BPM | SYSTOLIC BLOOD PRESSURE: 141 MMHG | TEMPERATURE: 98.5 F | DIASTOLIC BLOOD PRESSURE: 82 MMHG | OXYGEN SATURATION: 95 % | BODY MASS INDEX: 22.39 KG/M2 | WEIGHT: 130.5 LBS

## 2017-05-23 DIAGNOSIS — C90.02 MULTIPLE MYELOMA IN RELAPSE (H): Primary | ICD-10-CM

## 2017-05-23 DIAGNOSIS — C90.02 MULTIPLE MYELOMA IN RELAPSE (H): ICD-10-CM

## 2017-05-23 LAB
ALBUMIN SERPL-MCNC: 2.9 G/DL (ref 3.4–5)
ALP SERPL-CCNC: 52 U/L (ref 40–150)
ALT SERPL W P-5'-P-CCNC: 17 U/L (ref 0–70)
ANION GAP SERPL CALCULATED.3IONS-SCNC: 8 MMOL/L (ref 3–14)
AST SERPL W P-5'-P-CCNC: 18 U/L (ref 0–45)
BASOPHILS # BLD AUTO: 0 10E9/L (ref 0–0.2)
BASOPHILS NFR BLD AUTO: 0.2 %
BILIRUB SERPL-MCNC: 0.4 MG/DL (ref 0.2–1.3)
BUN SERPL-MCNC: 18 MG/DL (ref 7–30)
CALCIUM SERPL-MCNC: 9.3 MG/DL (ref 8.5–10.1)
CHLORIDE SERPL-SCNC: 102 MMOL/L (ref 94–109)
CO2 SERPL-SCNC: 26 MMOL/L (ref 20–32)
CREAT SERPL-MCNC: 0.89 MG/DL (ref 0.66–1.25)
DIFFERENTIAL METHOD BLD: ABNORMAL
EOSINOPHIL # BLD AUTO: 0 10E9/L (ref 0–0.7)
EOSINOPHIL NFR BLD AUTO: 1 %
ERYTHROCYTE [DISTWIDTH] IN BLOOD BY AUTOMATED COUNT: 18.6 % (ref 10–15)
GFR SERPL CREATININE-BSD FRML MDRD: 84 ML/MIN/1.7M2
GLUCOSE SERPL-MCNC: 97 MG/DL (ref 70–99)
HCT VFR BLD AUTO: 29.7 % (ref 40–53)
HGB BLD-MCNC: 9.1 G/DL (ref 13.3–17.7)
IMM GRANULOCYTES # BLD: 0 10E9/L (ref 0–0.4)
IMM GRANULOCYTES NFR BLD: 0.5 %
LYMPHOCYTES # BLD AUTO: 0.2 10E9/L (ref 0.8–5.3)
LYMPHOCYTES NFR BLD AUTO: 4.2 %
MCH RBC QN AUTO: 31.3 PG (ref 26.5–33)
MCHC RBC AUTO-ENTMCNC: 30.6 G/DL (ref 31.5–36.5)
MCV RBC AUTO: 102 FL (ref 78–100)
MONOCYTES # BLD AUTO: 0.4 10E9/L (ref 0–1.3)
MONOCYTES NFR BLD AUTO: 9.8 %
NEUTROPHILS # BLD AUTO: 3.5 10E9/L (ref 1.6–8.3)
NEUTROPHILS NFR BLD AUTO: 84.3 %
NRBC # BLD AUTO: 0 10*3/UL
NRBC BLD AUTO-RTO: 0 /100
PLATELET # BLD AUTO: 116 10E9/L (ref 150–450)
POTASSIUM SERPL-SCNC: 4.7 MMOL/L (ref 3.4–5.3)
PROT SERPL-MCNC: 8 G/DL (ref 6.8–8.8)
RBC # BLD AUTO: 2.91 10E12/L (ref 4.4–5.9)
SODIUM SERPL-SCNC: 136 MMOL/L (ref 133–144)
WBC # BLD AUTO: 4.1 10E9/L (ref 4–11)

## 2017-05-23 PROCEDURE — 99213 OFFICE O/P EST LOW 20 MIN: CPT | Mod: ZP | Performed by: PHYSICIAN ASSISTANT

## 2017-05-23 RX ORDER — HEPARIN SODIUM (PORCINE) LOCK FLUSH IV SOLN 100 UNIT/ML 100 UNIT/ML
5 SOLUTION INTRAVENOUS EVERY 8 HOURS
Status: DISCONTINUED | OUTPATIENT
Start: 2017-05-23 | End: 2017-05-23 | Stop reason: HOSPADM

## 2017-05-23 RX ORDER — EPINEPHRINE 0.3 MG/.3ML
0.3 INJECTION SUBCUTANEOUS EVERY 5 MIN PRN
Status: CANCELLED | OUTPATIENT
Start: 2017-05-24

## 2017-05-23 RX ORDER — ALBUTEROL SULFATE 90 UG/1
1-2 AEROSOL, METERED RESPIRATORY (INHALATION)
Status: CANCELLED
Start: 2017-05-23

## 2017-05-23 RX ORDER — SODIUM CHLORIDE 9 MG/ML
1000 INJECTION, SOLUTION INTRAVENOUS CONTINUOUS PRN
Status: CANCELLED
Start: 2017-05-23

## 2017-05-23 RX ORDER — MEPERIDINE HYDROCHLORIDE 25 MG/ML
25 INJECTION INTRAMUSCULAR; INTRAVENOUS; SUBCUTANEOUS EVERY 30 MIN PRN
Status: CANCELLED | OUTPATIENT
Start: 2017-05-24

## 2017-05-23 RX ORDER — ALBUTEROL SULFATE 0.83 MG/ML
2.5 SOLUTION RESPIRATORY (INHALATION)
Status: CANCELLED | OUTPATIENT
Start: 2017-05-24

## 2017-05-23 RX ORDER — ALBUTEROL SULFATE 0.83 MG/ML
2.5 SOLUTION RESPIRATORY (INHALATION)
Status: CANCELLED | OUTPATIENT
Start: 2017-05-23

## 2017-05-23 RX ORDER — HEPARIN SODIUM (PORCINE) LOCK FLUSH IV SOLN 100 UNIT/ML 100 UNIT/ML
5 SOLUTION INTRAVENOUS
Status: COMPLETED | OUTPATIENT
Start: 2017-05-23 | End: 2017-05-23

## 2017-05-23 RX ORDER — EPINEPHRINE 0.3 MG/.3ML
0.3 INJECTION SUBCUTANEOUS EVERY 5 MIN PRN
Status: CANCELLED | OUTPATIENT
Start: 2017-05-23

## 2017-05-23 RX ORDER — LORAZEPAM 2 MG/ML
0.5 INJECTION INTRAMUSCULAR EVERY 4 HOURS PRN
Status: CANCELLED
Start: 2017-05-23

## 2017-05-23 RX ORDER — METHYLPREDNISOLONE SODIUM SUCCINATE 125 MG/2ML
125 INJECTION, POWDER, LYOPHILIZED, FOR SOLUTION INTRAMUSCULAR; INTRAVENOUS
Status: CANCELLED
Start: 2017-05-23

## 2017-05-23 RX ORDER — SODIUM CHLORIDE 9 MG/ML
1000 INJECTION, SOLUTION INTRAVENOUS CONTINUOUS PRN
Status: CANCELLED
Start: 2017-05-24

## 2017-05-23 RX ORDER — DIPHENHYDRAMINE HYDROCHLORIDE 50 MG/ML
50 INJECTION INTRAMUSCULAR; INTRAVENOUS
Status: CANCELLED
Start: 2017-05-24

## 2017-05-23 RX ORDER — ALBUTEROL SULFATE 90 UG/1
1-2 AEROSOL, METERED RESPIRATORY (INHALATION)
Status: CANCELLED
Start: 2017-05-24

## 2017-05-23 RX ORDER — LORAZEPAM 2 MG/ML
0.5 INJECTION INTRAMUSCULAR EVERY 4 HOURS PRN
Status: CANCELLED
Start: 2017-05-24

## 2017-05-23 RX ORDER — DIPHENHYDRAMINE HYDROCHLORIDE 50 MG/ML
50 INJECTION INTRAMUSCULAR; INTRAVENOUS
Status: CANCELLED
Start: 2017-05-23

## 2017-05-23 RX ORDER — METHYLPREDNISOLONE SODIUM SUCCINATE 125 MG/2ML
125 INJECTION, POWDER, LYOPHILIZED, FOR SOLUTION INTRAMUSCULAR; INTRAVENOUS
Status: CANCELLED
Start: 2017-05-24

## 2017-05-23 RX ORDER — MEPERIDINE HYDROCHLORIDE 25 MG/ML
25 INJECTION INTRAMUSCULAR; INTRAVENOUS; SUBCUTANEOUS EVERY 30 MIN PRN
Status: CANCELLED | OUTPATIENT
Start: 2017-05-23

## 2017-05-23 RX ADMIN — DEXAMETHASONE SODIUM PHOSPHATE 4 MG: 10 INJECTION, SOLUTION INTRAMUSCULAR; INTRAVENOUS at 13:43

## 2017-05-23 RX ADMIN — CARFILZOMIB 44 MG: 60 INJECTION, POWDER, LYOPHILIZED, FOR SOLUTION INTRAVENOUS at 14:34

## 2017-05-23 RX ADMIN — SODIUM CHLORIDE 500 ML: 9 INJECTION, SOLUTION INTRAVENOUS at 13:42

## 2017-05-23 RX ADMIN — SODIUM CHLORIDE, PRESERVATIVE FREE 5 ML: 5 INJECTION INTRAVENOUS at 15:48

## 2017-05-23 ASSESSMENT — PAIN SCALES - GENERAL: PAINLEVEL: NO PAIN (0)

## 2017-05-23 NOTE — NURSING NOTE
Port in right chest wall accessed with 3/4 in gripper needle and covered with gauze dressing.  Labs drawn and port flushed with NS and heparin  Sarahy Ballesteros RN

## 2017-05-23 NOTE — MR AVS SNAPSHOT
After Visit Summary   5/23/2017    Anthony Carbone    MRN: [id number]           Patient Information     Date Of Birth          1945        Visit Information        Provider Department      5/23/2017 11:00 AM Leanne Reddy PA-C [facility]        Today's Diagnoses     Multiple myeloma in relapse (H)           Follow-ups after your visit        Your next 10 appointments already scheduled     May 24, 2017  1:00 PM CDT   Infusion 60 with UC ONCOLOGY INFUSION, UC 11 ATC   [facility] ([facility])    [address] [place] [address]   [phone]            Jun 06, 2017  1:15 PM CDT   Masonic Lab Draw with UC MASONIC LAB DRAW   [facility]    [address] [place] [address]   [phone]            Jun 06, 2017  1:40 PM CDT   (Arrive by 1:25 PM)   Return Visit with Leanne Reddy PA-C   [facility] ([facility])    [address] [place] [address]   [phone]            Jun 06, 2017  3:00 PM CDT   Infusion 60 with UC ONCOLOGY INFUSION, UC 26 ATC   [facility] ([facility])    [address] [place] [address]   [phone]            Jun 07, 2017  2:30 PM CDT   Infusion 60 with UC ONCOLOGY INFUSION, UC 29 ATC   [facility] ([facility])    [address] [place] [address]   [phone]            Jun 13, 2017  1:30 PM CDT   Masonic Lab Draw with UC MASONIC LAB DRAW   [facility]    [address] [place] [address]   [phone]            Jun 13, 2017  2:00 PM CDT   Infusion 60 with UC ONCOLOGY INFUSION   [facility]  Cancer Clinic ([facility])    [address]  2nd Floor  [facility] [address] [phone]              Who to contact     If you have questions or need follow up information about today's clinic visit or your schedule please contact [facility] directly at [phone].  Normal or non-critical lab and imaging results will be communicated to you by MyChart, letter or phone within 4 business days after the clinic has received the results. If you do not hear from us within 7 days, please contact the clinic through [employer] or phone. If you have a critical or abnormal lab result, we will notify you by phone as soon as possible.  Submit refill requests through [employer] or call your pharmacy and they will forward the refill request to us. Please allow 3 business days for your refill to be completed.          Additional Information About Your Visit        [employer] Information     [employer] gives you secure access to your electronic health record. If you see a primary care provider, you can also send messages to your care team and make appointments. If you have questions, please call your primary care clinic.  If you do not have a primary care provider, please call [phone] and they will assist you.        Care EveryWhere ID     This is your Care EveryWhere ID. This could be used by other organizations to access your [place] medical records  [id number]        Your Vitals Were     Pulse Temperature Respirations Pulse Oximetry BMI (Body Mass Index)       82 98.5  F (36.9  C) (Oral) 20 95% 22.39 kg/m2        Blood Pressure from Last 3 Encounters:   05/23/17 141/82   05/19/17 120/76   05/16/17 101/55    Weight from Last 3 Encounters:   05/23/17 59.2 kg (130 lb 8 oz)   05/19/17 59.1 kg (130 lb 6.4 oz)   05/16/17 59.1 kg (130 lb 3.2 oz)              Today, you had the following     No orders found for display       Primary Care Provider Office Phone # Fax #    Sanket  Gualberto [phone]       [facility] [address]  [facility] [address]        Thank you!     Thank you for choosing [facility]  for your care. Our goal is always to provide you with excellent care. Hearing back from our patients is one way we can continue to improve our services. Please take a few minutes to complete the written survey that you may receive in the mail after your visit with us. Thank you!             Your Updated Medication List - Protect others around you: Learn how to safely use, store and throw away your medicines at www.disposemymeds.org.          This list is accurate as of: 5/23/17  5:09 PM.  Always use your most recent med list.                   Brand Name Dispense Instructions for use    acetaminophen 500 MG tablet    TYLENOL     Take 2 tablets (1,000 mg) by mouth every 6 hours as needed for mild pain       acyclovir 400 MG tablet    ZOVIRAX    60 tablet    Take 1 tablet (400 mg) by mouth 2 times daily       amLODIPine 5 MG tablet    NORVASC    30 tablet    Take one tablet at bedtime.       clopidogrel 75 MG tablet    PLAVIX    30 tablet    Take 1 tablet (75 mg) by mouth daily       esomeprazole 40 MG CR capsule    nexIUM    30 capsule    Take 1 capsule (40 mg) by mouth every morning (before breakfast)       HYDROmorphone 2 MG tablet    DILAUDID     Take 1 tablet (2 mg) by mouth every 4 hours as needed for moderate to severe pain       lidocaine 5 % Patch    LIDODERM    30 patch    Place 3 patches onto the skin every 24 hours       LORazepam 0.5 MG tablet    ATIVAN    30 tablet    Take 1 tablet (0.5 mg) by mouth every 4 hours as needed       midodrine 2.5 MG tablet    PROAMATINE    90 tablet    Take 1 tablet (2.5 mg) by mouth 3 times daily       morphine 15 MG 12 hr tablet    MS CONTIN    60 tablet    Take 1 tablet (15 mg) by mouth every 12 hours       OLANZapine 5 MG tablet    zyPREXA    60 tablet    Take 1 tablet (5 mg) by mouth 2  times daily       ondansetron 8 MG ODT tab    ZOFRAN-ODT    30 tablet    Take 1 tablet (8 mg) by mouth every 8 hours as needed for nausea       potassium chloride SA 20 MEQ CR tablet    K-DUR/KLOR-CON M    60 tablet    TAKE 1 TABLET BY MOUTH TWICE DAILY.       simethicone 80 MG chewable tablet    MYLICON    180 tablet    Take 2 tablets (160 mg) by mouth 4 times daily as needed for cramping or other (gas pain)       simvastatin 5 MG tablet    ZOCOR    30 tablet    Take 4 tablets (20 mg) by mouth daily       sucralfate 1 GM tablet    CARAFATE    120 tablet    Take 1 tablet (1 g) by mouth 4 times daily as needed

## 2017-05-23 NOTE — MR AVS SNAPSHOT
After Visit Summary   5/23/2017    Anthony Carbone    MRN: [id number]           Patient Information     Date Of Birth          1945        Visit Information        Provider Department      5/23/2017 1:00 PM  14 ATC;  ONCOLOGY INFUSION [facility]        Today's Diagnoses     Multiple myeloma in relapse (H)    -  1      Care Instructions    Contact Numbers    [facility] Main Line: [phone]  [facility] Triage:  [phone]    Call triage with chills and/or temperature greater than or equal to 100.5, uncontrolled nausea/vomiting, diarrhea, constipation, dizziness, shortness of breath, chest pain, bleeding, unexplained bruising, or any new/concerning symptoms, questions/concerns.     If you are having any concerning symptoms or wish to speak to a provider before your next infusion visit, please call your care coordinator or triage to notify them so we can adequately serve you.       After Hours: [phone]    If after hours, weekends, or holidays, call main hospital  and ask for Oncology doctor on call.         May 2017   [facility] Monday Tuesday Wednesday Thursday Friday Saturday        1     2     3     4     5     6       7     Admission    6:21 PM   Joanie Anderson MD   [address] [facility]   (Discharge: 5/16/2017) 8     XR CHEST PORT 1 VIEW    3:05 PM   (20 min.)   [id number]   [facility],  Radiology 9     [facility] MASONIC LAB DRAW   11:30 AM   (15 min.)    MASONIC LAB DRAW   [facility] Masonic Lab Draw     [facility] ONC RETURN   12:00 PM   (30 min.)    BMT DOM   [facility] Blood and Marrow Transplant 10     XR RIBS RIGHT 2 VIEWS    3:20 PM   (20 min.)   UUXR3   [facility],  Radiology 11     IP EVALUATION    6:00 AM   (60 min.)   Lashay Kelley, PT   [facility] [place], Physical Therapy     IP EVALUATION    6:00 AM   (60 min.)   Lashay Gonzalez, OT   [facility], Occupational Therapy     XR LUMBAR SPINE 2/3 VIEWS    1:10 PM   (20 min.)   UUXR3   [facility]  [place],  Radiology     XR THORACIC SPINE 2 VIEWS    1:15 PM   (20 min.)   UUXR3   [facility] [place],  Radiology 12     IP TREATMENT    6:00 AM   (30 min.)   Dulce Maria Bejarano, OT   [facility] [place], Occupational Therapy     XR ABDOMEN 2 VIEWS    9:15 AM   (15 min.)   UUXR3   [facility],  Radiology     CT HEAD WO   11:40 AM   (15 min.)   UUCT1   [facility] [place], [place]     IP TREATMENT    1:00 PM   (30 min.)   Lashay Kelley, PT   [facility] [place], Physical Therapy 13     IP TREATMENT    6:00 AM   (30 min.)   Dulce Maria Bejarano, OT   [facility] [place], Occupational Therapy     IP TREATMENT   11:00 AM   (30 min.)   Lashay Kelley, PT   [facility] [place], Physical Therapy   14     IP TREATMENT    1:00 PM   (30 min.)   Lashay Kelley, PT   [facility] [place], Physical Therapy 15     IP TREATMENT    6:00 AM   (30 min.)   Lashay Gonzalez, OT   [facility] [place], Occupational Therapy 16     17     18     19     [facility] MASONIC LAB DRAW    9:45 AM   (15 min.)    [facility] LAB [facility]RAW   [facility]     [facility] RETURN   10:05 AM   (50 min.)   Leanne Reddy PA-C [facility] ONC INFUSION 120   11:30 AM   (120 min.)   UC ONCOLOGY INFUSION   [facility] 20       21     22     23     [facility] MASONIC LAB DRAW   10:30 AM   (15 min.)   [facility] MASONIC LAB DRAW   [facility] Masonic Lab Draw     [facility] RETURN   10:45 AM   (50 min.)   [facility]    1:00 PM   (60 min.)   UC ONCOLOGY INFUSION   [facility] 24     [facility] ONC INFUSION 60    1:00 PM   (60 min.)   UC ONCOLOGY INFUSION   [facility] 25     26     27       28     29     30     31 June 2017 Sunday Monday Tuesday Wednesday Thursday Friday Saturday                       1     2     3       4     5     6     [facility] MASONIC LAB DRAW    1:15 PM   (15 min.)   [facility] MASONIC LAB DRAW   [facility] Masonic Lab Draw     P  RETURN    1:25 PM   (50 min.)   Leanne Reddy PA-C   [facility]     [facility] ONC INFUSION 60    3:00 PM   (60 min.)    ONCOLOGY INFUSION   [facility] 7     [facility] ONC INFUSION 60    2:30 PM   (60 min.)    ONCOLOGY INFUSION   [facility] 8     9     10       11     12     13     [facility] MASONIC LAB DRAW    1:30 PM   (15 min.)    MASONIC LAB DRAW   [facility] Lab Draw     [facility] ONC INFUSION 60    2:00 PM   (60 min.)    ONCOLOGY INFUSION   [facility] 14     [facility] ONC INFUSION 60    2:00 PM   (60 min.)    ONCOLOGY INFUSION   [facility] 15     16     17       18     19     20     [facility] MASONIC LAB DRAW    1:30 PM   (15 min.)   [facility] MASONIC LAB DRAW   [facility] Lab Draw     [facility] ONC INFUSION 60    2:00 PM   (60 min.)    ONCOLOGY INFUSION   [facility] 21     [facility] ONC INFUSION 60    2:00 PM   (60 min.)    ONCOLOGY INFUSION   [facility] 22     23     24       25     26     27     28     29     30                       Lab Results:  Recent Results (from the past 12 hour(s))   CBC with platelets differential    Collection Time: 05/23/17 11:05 AM   Result Value Ref Range    WBC 4.1 4.0 - 11.0 10e9/L    RBC Count 2.91 (L) 4.4 - 5.9 10e12/L    Hemoglobin 9.1 (L) 13.3 - 17.7 g/dL    Hematocrit 29.7 (L) 40.0 - 53.0 %     (H) 78 - 100 fl    MCH 31.3 26.5 - 33.0 pg    MCHC 30.6 (L) 31.5 - 36.5 g/dL    RDW 18.6 (H) 10.0 - 15.0 %    Platelet Count 116 (L) 150 - 450 10e9/L    Diff Method Automated Method     % Neutrophils 84.3 %    % Lymphocytes 4.2 %    % Monocytes 9.8 %    % Eosinophils 1.0 %    % Basophils 0.2 %    % Immature Granulocytes 0.5 %    Nucleated RBCs 0 0 /100    Absolute Neutrophil 3.5 1.6 - 8.3 10e9/L    Absolute Lymphocytes 0.2 (L) 0.8 - 5.3 10e9/L    Absolute Monocytes 0.4 0.0 - 1.3 10e9/L    Absolute Eosinophils 0.0 0.0 - 0.7 10e9/L    Absolute Basophils 0.0  0.0 - 0.2 10e9/L    Abs Immature Granulocytes 0.0 0 - 0.4 10e9/L    Absolute Nucleated RBC 0.0    Comprehensive metabolic panel    Collection Time: 05/23/17 11:05 AM   Result Value Ref Range    Sodium 136 133 - 144 mmol/L    Potassium 4.7 3.4 - 5.3 mmol/L    Chloride 102 94 - 109 mmol/L    Carbon Dioxide 26 20 - 32 mmol/L    Anion Gap 8 3 - 14 mmol/L    Glucose 97 70 - 99 mg/dL    Urea Nitrogen 18 7 - 30 mg/dL    Creatinine 0.89 0.66 - 1.25 mg/dL    GFR Estimate 84 >60 mL/min/1.7m2    GFR Estimate If Black >90   GFR Calc   >60 mL/min/1.7m2    Calcium 9.3 8.5 - 10.1 mg/dL    Bilirubin Total 0.4 0.2 - 1.3 mg/dL    Albumin 2.9 (L) 3.4 - 5.0 g/dL    Protein Total 8.0 6.8 - 8.8 g/dL    Alkaline Phosphatase 52 40 - 150 U/L    ALT 17 0 - 70 U/L    AST 18 0 - 45 U/L             Follow-ups after your visit        Your next 10 appointments already scheduled     May 24, 2017  1:00 PM CDT   Infusion 60 with UC ONCOLOGY INFUSION, UC 11 ATC   [facility] ([facility])    [address] [place] [id number] [phone]            Jun 06, 2017  1:15 PM CDT   [facility]RAW   [facility] Lab Draw ([facility])    [address] [place] [id number] [phone]            Jun 06, 2017  1:40 PM CDT   (Arrive by 1:25 PM)   Return Visit with Leanne Reddy PA-C   [facility] ([facility])    [address] [phone]            Jun 06, 2017  3:00 PM CDT   Infusion 60 with UC ONCOLOGY INFUSION, UC 26 ATC   [facility] ([facility])    [address] [place] [phone]            Jun 07, 2017  2:30 PM CDT   Infusion 60 with UC ONCOLOGY INFUSION, UC 29 ATC   [facility] ([facility]  [facility])    [address] [place] [address]  2nd [facility] [address] [phone]            Jun 13, 2017  1:30 PM CDT   Masonic Lab Draw with  MASONIC LAB DRAW   [facility] Lab Draw ([facility])    [address] [place] [phone]            Jun 13, 2017  2:00 PM CDT   Infusion 60 with  ONCOLOGY INFUSION   [facility] ([facility])    [address] [place] [id number] [phone]              Who to contact     If you have questions or need follow up information about today's clinic visit or your schedule please contact [facility] directly at [phone].  Normal or non-critical lab and imaging results will be communicated to you by MyChart, letter or phone within 4 business days after the clinic has received the results. If you do not hear from us within 7 days, please contact the clinic through [employer] or phone. If you have a critical or abnormal lab result, we will notify you by phone as soon as possible.  Submit refill requests through Emp[employer] or call your pharmacy and they will forward the refill request to us. Please allow 3 business days for your refill to be completed.          Additional Information About Your Visit        [employer] Information     [employer] gives you secure access to your electronic health record. If you see a primary care provider, you can also send messages to your care team and make appointments. If you have questions, please call your primary care clinic.  If you do not have a primary care provider, please call [phone] and they will assist you.        Care EveryWhere ID     This is your Care EveryWhere ID. This could be used by other organizations to access your [place] medical records  [id number]         Blood Pressure from Last 3 Encounters:   05/23/17 141/82   05/19/17 120/76    05/16/17 101/55    Weight from Last 3 Encounters:   05/23/17 59.2 kg (130 lb 8 oz)   05/19/17 59.1 kg (130 lb 6.4 oz)   05/16/17 59.1 kg (130 lb 3.2 oz)              We Performed the Following     CBC with platelets differential     Comprehensive metabolic panel     Treatment Conditions        Primary Care Provider Office Phone # Fax #    Sanket Burciaga [phone]       [facility] [address] [address]        Thank you!     Thank you for choosing [facility]CER [facility]  for your care. Our goal is always to provide you with excellent care. Hearing back from our patients is one way we can continue to improve our services. Please take a few minutes to complete the written survey that you may receive in the mail after your visit with us. Thank you!             Your Updated Medication List - Protect others around you: Learn how to safely use, store and throw away your medicines at www.disposemymeds.org.          This list is accurate as of: 5/23/17  2:54 PM.  Always use your most recent med list.                   Brand Name Dispense Instructions for use    acetaminophen 500 MG tablet    TYLENOL     Take 2 tablets (1,000 mg) by mouth every 6 hours as needed for mild pain       acyclovir 400 MG tablet    ZOVIRAX    60 tablet    Take 1 tablet (400 mg) by mouth 2 times daily       amLODIPine 5 MG tablet    NORVASC    30 tablet    Take one tablet at bedtime.       clopidogrel 75 MG tablet    PLAVIX    30 tablet    Take 1 tablet (75 mg) by mouth daily       esomeprazole 40 MG CR capsule    nexIUM    30 capsule    Take 1 capsule (40 mg) by mouth every morning (before breakfast)       HYDROmorphone 2 MG tablet    DILAUDID     Take 1 tablet (2 mg) by mouth every 4 hours as needed for moderate to severe pain       lidocaine 5 % Patch    LIDODERM    30 patch    Place 3 patches onto the skin every 24 hours       LORazepam 0.5 MG tablet    ATIVAN    30 tablet    Take 1  tablet (0.5 mg) by mouth every 4 hours as needed       midodrine 2.5 MG tablet    PROAMATINE    90 tablet    Take 1 tablet (2.5 mg) by mouth 3 times daily       morphine 15 MG 12 hr tablet    MS CONTIN    60 tablet    Take 1 tablet (15 mg) by mouth every 12 hours       OLANZapine 5 MG tablet    zyPREXA    60 tablet    Take 1 tablet (5 mg) by mouth 2 times daily       ondansetron 8 MG ODT tab    ZOFRAN-ODT    30 tablet    Take 1 tablet (8 mg) by mouth every 8 hours as needed for nausea       potassium chloride SA 20 MEQ CR tablet    K-DUR/KLOR-CON M    60 tablet    TAKE 1 TABLET BY MOUTH TWICE DAILY.       simethicone 80 MG chewable tablet    MYLICON    180 tablet    Take 2 tablets (160 mg) by mouth 4 times daily as needed for cramping or other (gas pain)       simvastatin 5 MG tablet    ZOCOR    30 tablet    Take 4 tablets (20 mg) by mouth daily       sucralfate 1 GM tablet    CARAFATE    120 tablet    Take 1 tablet (1 g) by mouth 4 times daily as needed

## 2017-05-23 NOTE — LETTER
5/23/2017      RE: Anthony Carbone  [address] [place] [address]       HEMATOLOGY/ONCOLOGY PROGRESS NOTE  May 23, 2017    REASON FOR VISIT: patient with multiple myeloma    Diagnosis: 71 year old gentleman with IgM lambda multiple myeloma originally diagnosed in 01/2012 as stage I, standard risk disease.   Treatment: Revlimid plus dexamethasone for 4-5 cycles but plateaued by late 03/2012.   - Velcade was added and he received another 2-3 cycles, achieving a good partial remission.     Transplant: Single auto after melphalan 200 mg/m2 preparative regimen on 01/09/2013  - Post-transplant course: unremarkable except for some mild steroid-induced hyperglycemia, gastritis, nausea and vomiting.     Maintenance: Lenalidomide at day-100 then developed a maculopapular rash. Lenalidomide was held and then we re-challenged him after about a month to 2 months at a lower dose (5 mg daily); rash returned.   - was started on maintenance Velcade, every other week through July 2014, when he was noted with abnormalities on myeloma studies drawn there. Noted with prostate cancer dx at about the time of relapse (see below).    Relapse: noted with return on M-spike in blood/urine with marrow involvement but negative PET.   - Started on retreatment with RVD on 8/27/14 with Revlimid at 15 mg 14 days of 21 days, dexamethasone 40 mg weekly and velcade weekly.Completed at total of 5 cycles without complication by 12/2014.   - Started Cycle 6 on inc'd Rev dosing of 20mg daily x 2 weeks on 12/11/14 which was complicated by pneumonia.  - Adm 12/21-1/10 for human metapneumovirus pneumonia complicated by anorexia, HTN, depression, anorexia with significant weight loss.   - Restarted Ernesto/Dex only on 2/4/15 with good tolerance but with thrombocytopenia.   - Bendamustine added to Velcade/dexamethasone on 5/21/15 due to disease progression  - Velcade discontinued on 7/9/2015 due to side effects (orthostasis)  - Schedule changed to  bendamustine 80 mg/m2 days 1 and 2 on 28-day cycle  - Cycle 1 tolerated poorly due to lightheadedness and weakness  - Cycle 2 dose reduced to 60 mg/m2 and pre-meds adjusted  - Cycle 5 received on 9/17/15.  - 10/1/2015 - increasing IgM, M-spike - started Pomalidomide 4mg/day (21 days out of 28 days) and weekly Decadron 20mg on Oct 1st, 2015.    - Carfilzomib added on 10/22/2015 making this CPD.  - C3-C6  received in [place]    - Returned to [facility] and resumed CPD here    - Adm: 4/12-4/14 with fever, confusion, neutropenia. Noted on MRI to have acute/subacute CVA and subacute/chronic CVA; started on Plavix, given brief course of Abx and GCSF prior to d/c.     - Continued on Carfilzomib and dexamethasone alone  - Pomalyst added back on 7/13/2016 for rising FLC  - Start daratumumab 11/10/16. Changed to every other week after 4 weekly treatments d/t profound fatigue and malaise.    INTERVAL HISTORY:    Yamil presents with his wife today for follow-up. Since our visit on Friday, he has been quite a bit better. The addition of the MS Contin has been very helpful for his pain and he actually feels it is now well controlled. He hasn't need to use much dilaudid for breakthrough and there has been a day where he didn't need any at all. He remains without any leg weakness, paresthesias, or bowel/bladder symptoms. He does get some mild fatigue from the MS contin, but no confusion. He doesn't feel that he has been confused lately and that this is better. He is eager to get back on treatment.  He hasn't had any fevers, chills, cough, SOB, chest pain, abdominal pain, GERD, diarrhea, constipation (not taking any promotility agents), bleeding, or swelling. Remainder of ROS otherwise negative.    PHYSICAL EXAMINATION  /82 (BP Location: Right arm, Patient Position: Chair, Cuff Size: Adult Regular)  Pulse 82  Temp 98.5  F (36.9  C) (Oral)  Resp 20  Wt 59.2 kg (130 lb 8 oz)  SpO2 95%  BMI 22.39 kg/m2    Wt Readings from Last 10  Encounters:   05/23/17 59.2 kg (130 lb 8 oz)   05/19/17 59.1 kg (130 lb 6.4 oz)   05/16/17 59.1 kg (130 lb 3.2 oz)   01/26/17 64.6 kg (142 lb 6.4 oz)   01/12/17 64.7 kg (142 lb 10.2 oz)   01/06/17 65.1 kg (143 lb 8 oz)   01/05/17 64.5 kg (142 lb 3.2 oz)   12/29/16 63.2 kg (139 lb 6.4 oz)   12/23/16 63.2 kg (139 lb 4.8 oz)   12/15/16 63.3 kg (139 lb 9.6 oz)   General: Looks better today.  No acute distress. HEENT: PERRL, no palor or icterus. NECK: supple - no cervical or supraclavicular LAD.  CVS: RRR. CHEST: Fine crackles in the LLL, otherwise clear to auscultation b/l . ABDOMEN: soft non tender no masses palpated in a seated position.  NEURO: AAOX3  Grossly non focal neuro exam.  SKIN: no obvious rashes.  LYMPH NODES: no palpable cervical or supraclavicular LAD.  EXTREMITIES: No edema. MSK: generalized non-focal tenderness at lower rib margins bilaterally extending to lumbar paraspinal muslces    LABS:  Results for WILLIAN ARCINIEGA (MRN [id number]) as of 5/18/2017 18:37   Ref. Range 1/26/2017 08:59 1/31/2017 00:00 3/23/2017 09:02 4/20/2017 00:00 4/20/2017 00:00 5/8/2017 10:32   Albumin Fraction Latest Ref Range: 3.7 - 5.1 g/dL 3.5 (L)     3.6 (L)   Alpha 1 Fraction Latest Ref Range: 0.2 - 0.4 g/dL 0.4     0.5 (H)   Alpha 2 Fraction Latest Ref Range: 0.5 - 0.9 g/dL 1.0 (H)     1.1 (H)   Beta Fraction Latest Ref Range: 0.6 - 1.0 g/dL 0.8     0.7   ELP Interpretation: Unknown (Note)...     (Note)...   Gamma Fraction Latest Ref Range: 0.7 - 1.6 g/dL 1.1     2.4 (H)   IGA Latest Ref Range: 70 - 380 mg/dL  <25 <25 <25 <25 <7 (L)   IGG Latest Ref Range: 695 - 1620 mg/dL  91 (A) 101 116 116 138 (L)   IGM Latest Ref Range: 60 - 265 mg/dL 1540 (H) 1307 (A) 2092 2180 2180 3410 (H)   Immunofixation ELP Unknown      (Note)...   Kappa Free Lt Chain Latest Ref Range: 0.33 - 1.94 mg/dL <0.30... (L)   3.05  <0.30... (L)   Kappa Free Lt Chains mg/L Latest Units: mg/L  3.14 (A) 6.09      Kappa Lambda Ratio Latest Ref Range:  0.26 - 1.65  Unable to Calculate 0.02 (A) 0.03 0.01  Unable to Calculate   Lambda Free Lt Chain Latest Ref Range: 0.57 - 2.63 mg/dL 26.50 (H)   248.32  31.00 (H)   Lambda Free Lt Chaines mg/L Latest Units: mg/L  141.64 (A) 208.77      Monoclonal Peak Latest Ref Range: 0.0 g/dL 0.9 (H)     2.1 (H)     Results for WILLAIN ARCINIEGA (MRN [id number]) as of 5/23/2017 12:02   Ref. Range 5/23/2017 11:05   Sodium Latest Ref Range: 133 - 144 mmol/L 136   Potassium Latest Ref Range: 3.4 - 5.3 mmol/L 4.7   Chloride Latest Ref Range: 94 - 109 mmol/L 102   Carbon Dioxide Latest Ref Range: 20 - 32 mmol/L 26   Urea Nitrogen Latest Ref Range: 7 - 30 mg/dL 18   Creatinine Latest Ref Range: 0.66 - 1.25 mg/dL 0.89   GFR Estimate Latest Ref Range: >60 mL/min/1.7m2 84   GFR Estimate If Black Latest Ref Range: >60 mL/min/1.7m2 >90...   Calcium Latest Ref Range: 8.5 - 10.1 mg/dL 9.3   Anion Gap Latest Ref Range: 3 - 14 mmol/L 8   Albumin Latest Ref Range: 3.4 - 5.0 g/dL 2.9 (L)   Protein Total Latest Ref Range: 6.8 - 8.8 g/dL 8.0   Bilirubin Total Latest Ref Range: 0.2 - 1.3 mg/dL 0.4   Alkaline Phosphatase Latest Ref Range: 40 - 150 U/L 52   ALT Latest Ref Range: 0 - 70 U/L 17   AST Latest Ref Range: 0 - 45 U/L 18   Glucose Latest Ref Range: 70 - 99 mg/dL 97   WBC Latest Ref Range: 4.0 - 11.0 10e9/L 4.1   Hemoglobin Latest Ref Range: 13.3 - 17.7 g/dL 9.1 (L)   Hematocrit Latest Ref Range: 40.0 - 53.0 % 29.7 (L)   Platelet Count Latest Ref Range: 150 - 450 10e9/L 116 (L)     IMPRESSION/PLAN:  71 year old male with relapsed MM after autologous transplant (1/9/2013), with recent progression on daratumumab/pom/dex, with recent hospitalization 5/7-5/16 for back pain, JOSE MARIA,and  Hypercalcemia.    1. MM: Previously on edgardo/pom/dex while wintering in [place], progressive disease on SPEP inpatient (M spike 0.9 --> 2.1, IgM 3410)  - Received solumedrol 100 mg IV daily 5/8-5/10. Started PO dex 40 mg daily x 3 days on 5/11 with Kyprolis 5/11 and  5/12  - Resume Kyprolis today and tomorrow with Dex 20 mg days of infusion. Will have week off next week, and return to start cycle 2. Dr. Topete thinking of what to add to this and will work him into his schedule.    2. Encephalopathy: Onset of AMS 5/7 en route from [place]. Likely multifactorial in setting of metabolic derangements/possible infection/uremia. Mostly resolved inpatient with exception of sundowning, CT head negative. Viscosity index 2.3.  - Continues to improve, continue Zyprexa 5 mg BID for now    3. Heme: Cytopenias 2/2 treatment and likely MM in setting of progression. Stable.  - Continues on Plavix for history of CVA -- although will need to monitor platelets and hold for platelets <50    4. Renal/FEN: JOSE MARIA on admission, likely multifactorial in setting of NSAIDs, uremia, hyperCa2+  - Creat stable today  - Hypercalcemia: s/p pamidronate x 1 on 5/8, resolved but will need to monitor closely, as it is creeping up  - Hyperuricemia: s/p rasburicase x 1 on 5/8    5. ID: Possible PNA, completed course of PO Levaquin. Afebrile without localzing infectious s/s today.  - Continues ppx  mg BID    6. Back/rib pain: Started early May. Lumbar MRI at OSH showing mild-mod central and foraminal stenoses in lumbar spine, per patient and wife, there was no MM lesion there. Rib films inpatient negative, back films stable from prior imaging. Uncontrolled pain today of same quality/location, uncontrolled 10/10 in intensity. No bowel/bladder dysfunction or paresthesias.  - Significantly improved with addition of MS Contin 15 mg q12h, tolerating with mild fatigue but no confusion. Rarely needing PRN Dilaudid with this.  - If pain significantly worsens, may consider thoracic MRI and repeating the lumbar MRI  - Continue Tylenol, Voltaren, lidocaine    7. CV: Continue Norvasc and Zocor.   - Avoid QTc prolonging agents (history of QTc prolongation with syncopal episodes)          Leanne Reddy PA-C  Hematology,  Oncology, and Transplant  [facility]  [address], [place] [id number] [phone]

## 2017-05-23 NOTE — NURSING NOTE
"Oncology Rooming Note    May 23, 2017 11:23 AM   Anthony Carbone is a 71 year old male who presents for:    Chief Complaint   Patient presents with     Port Draw     port access and lab draw     Oncology Clinic Visit     F/u back pain     Initial Vitals: /82 (BP Location: Right arm, Patient Position: Chair, Cuff Size: Adult Regular)  Pulse 82  Temp 98.5  F (36.9  C) (Oral)  Resp 20  Wt 59.2 kg (130 lb 8 oz)  SpO2 95%  BMI 22.39 kg/m2 Estimated body mass index is 22.39 kg/(m^2) as calculated from the following:    Height as of 5/19/17: 1.626 m (5' 4.02\").    Weight as of this encounter: 59.2 kg (130 lb 8 oz). Body surface area is 1.64 meters squared.  No Pain (0) Comment: Data Unavailable   No LMP for male patient.  Allergies reviewed: Yes  Medications reviewed: Yes    Medications: Medication refills not needed today.  Pharmacy name entered into [facility]:    [id number] - [facility] [address] AT [facility] [address]  [place], [place] - [address] [facility]  [facility] [address] [place], [place] - [address] AT [facility] [address]    Clinical concerns: Back pain is better Leanne Reddy was notified.    6 minutes for nursing intake (face to face time)     Yanni Chilel CMA              "

## 2017-05-23 NOTE — PATIENT INSTRUCTIONS
Contact Numbers    [facility] Main Line: [phone]  [facility] Triage:  [phone]    Call triage with chills and/or temperature greater than or equal to 100.5, uncontrolled nausea/vomiting, diarrhea, constipation, dizziness, shortness of breath, chest pain, bleeding, unexplained bruising, or any new/concerning symptoms, questions/concerns.     If you are having any concerning symptoms or wish to speak to a provider before your next infusion visit, please call your care coordinator or triage to notify them so we can adequately serve you.       After Hours: [phone]    If after hours, weekends, or holidays, call main hospital  and ask for Oncology doctor on call.         May 2017   [facility] Monday Tuesday Wednesday Thursday Friday Saturday        1     2     3     4     5     6       7     Admission    6:21 PM   Joanie Anderson MD   [address] [facility]   (Discharge: 5/16/2017) 8     XR CHEST PORT 1 VIEW    3:05 PM   (20 min.)   [id number]   [facility],  Radiology 9     P [facility] LAB DRAW   11:30 AM   (15 min.)    [facility] LAB DRAW   [facility] Lab Draw     [facility] ONC RETURN   12:00 PM   (30 min.)    BMT DOM   [facility] Blood and Marrow Transplant 10     XR RIBS RIGHT 2 VIEWS    3:20 PM   (20 min.)   [email or web address]   [facility],  Radiology 11     IP EVALUATION    6:00 AM   (60 min.)   Lashay Kelley, PT   [facility] [place], Physical Therapy     IP EVALUATION    6:00 AM   (60 min.)   Lashay Gonzalez, OT   [facility] [place], Occupational Therapy     XR LUMBAR SPINE 2/3 VIEWS    1:10 PM   (20 min.)   [id number]   [facility],  Radiology     XR THORACIC SPINE 2 VIEWS    1:15 PM   (20 min.)   [id number]   [facility],  Radiology 12     IP TREATMENT    6:00 AM   (30 min.)   Dulce Maria Bejarano, OT   [facility] [place], Occupational Therapy     XR ABDOMEN 2 VIEWS    9:15 AM   (15 min.)   [id number]   [facility],  Radiology     CT HEAD WO   11:40 AM   (15 min.)   [id number]   [facility], CT     IP TREATMENT    1:00 PM   (30  min.)   Lashay Kelley, PT   [facility] [place], Physical Therapy 13     IP TREATMENT    6:00 AM   (30 min.)   Dulce Maria Bejarano, OT   [facility] [place], Occupational Therapy     IP TREATMENT   11:00 AM   (30 min.)   Lashay Kelley, PT   [facility] [place], Physical Therapy   14     IP TREATMENT    1:00 PM   (30 min.)   Lashay Kelley, PT   [facility] [place], Physical Therapy 15     IP TREATMENT    6:00 AM   (30 min.)   Lashay Gonzalez, OT   [facility] [place], Occupational Therapy 16     17     18     19     [facility] DRAW    9:45 AM   (15 min.)    [facility] LAB DRAW   [facility] Lab Draw     [facility] RETURN   10:05 AM   (50 min.)   Leanne Reddy PA-C   [facility]   11:30 AM   (120 min.)    ONCOLOGY INFUSION   [facility] 20       21     22     23     [facility] LAB DRAW   10:30 AM   (15 min.)   [facility] DRAW   [facility] Lab Draw     [facility] RETURN   10:45 AM   (50 min.)   Leanne Reddy PA-C   [facility]     [facility] ONC INFUSION 60    1:00 PM   (60 min.)   [facility] ONCOLOGY INFUSION   [facility] 24     [facility] ONC INFUSION 60    1:00 PM   (60 min.)    ONCOLOGY INFUSION   [facility] 25     26     27       28     29     30     31 June 2017 Sunday Monday Tuesday Wednesday Thursday Friday Saturday                       1     2     3       4     5     6     [facility] DRAW    1:15 PM   (15 min.)   [facility] LAB DRAW   [facility] Lab Draw     [facility] RETURN    1:25 PM   (50 min.)   Leanne Reddy PA-C   [facility]     [facility] ONC INFUSION 60    3:00 PM   (60 min.)   [facility] ONCOLOGY INFUSION   [facility] 7     [facility] ONC INFUSION 60    2:30 PM   (60 min.)   [facility] ONCOLOGY INFUSION   [facility] 8     9     10       11     12     13     [facility] DRAW    1:30 PM   (15 min.)   [facility]  LAB DRAW   [facility] Lab Draw     [facility] ONC INFUSION 60    2:00 PM   (60 min.)    ONCOLOGY INFUSION   [facility] 14     [facility] ONC INFUSION 60    2:00 PM   (60 min.)    ONCOLOGY INFUSION   [facility] 15     16     17       18     19     20     [facility] LAB DRAW    1:30 PM   (15 min.)   [facility] LAB DRAW   [facility] Lab Draw     [facility] ONC INFUSION 60    2:00 PM   (60 min.)    ONCOLOGY INFUSION   [facility] 21     [facility] ONC INFUSION 60    2:00 PM   (60 min.)    ONCOLOGY INFUSION   [facility] 22     23     24       25     26     27     28     29     30                       Lab Results:  Recent Results (from the past 12 hour(s))   CBC with platelets differential    Collection Time: 05/23/17 11:05 AM   Result Value Ref Range    WBC 4.1 4.0 - 11.0 10e9/L    RBC Count 2.91 (L) 4.4 - 5.9 10e12/L    Hemoglobin 9.1 (L) 13.3 - 17.7 g/dL    Hematocrit 29.7 (L) 40.0 - 53.0 %     (H) 78 - 100 fl    MCH 31.3 26.5 - 33.0 pg    MCHC 30.6 (L) 31.5 - 36.5 g/dL    RDW 18.6 (H) 10.0 - 15.0 %    Platelet Count 116 (L) 150 - 450 10e9/L    Diff Method Automated Method     % Neutrophils 84.3 %    % Lymphocytes 4.2 %    % Monocytes 9.8 %    % Eosinophils 1.0 %    % Basophils 0.2 %    % Immature Granulocytes 0.5 %    Nucleated RBCs 0 0 /100    Absolute Neutrophil 3.5 1.6 - 8.3 10e9/L    Absolute Lymphocytes 0.2 (L) 0.8 - 5.3 10e9/L    Absolute Monocytes 0.4 0.0 - 1.3 10e9/L    Absolute Eosinophils 0.0 0.0 - 0.7 10e9/L    Absolute Basophils 0.0 0.0 - 0.2 10e9/L    Abs Immature Granulocytes 0.0 0 - 0.4 10e9/L    Absolute Nucleated RBC 0.0    Comprehensive metabolic panel    Collection Time: 05/23/17 11:05 AM   Result Value Ref Range    Sodium 136 133 - 144 mmol/L    Potassium 4.7 3.4 - 5.3 mmol/L    Chloride 102 94 - 109 mmol/L    Carbon Dioxide 26 20 - 32 mmol/L    Anion Gap 8 3 - 14 mmol/L    Glucose 97 70 - 99 mg/dL    Urea Nitrogen 18 7  - 30 mg/dL    Creatinine 0.89 0.66 - 1.25 mg/dL    GFR Estimate 84 >60 mL/min/1.7m2    GFR Estimate If Black >90   GFR Calc   >60 mL/min/1.7m2    Calcium 9.3 8.5 - 10.1 mg/dL    Bilirubin Total 0.4 0.2 - 1.3 mg/dL    Albumin 2.9 (L) 3.4 - 5.0 g/dL    Protein Total 8.0 6.8 - 8.8 g/dL    Alkaline Phosphatase 52 40 - 150 U/L    ALT 17 0 - 70 U/L    AST 18 0 - 45 U/L

## 2017-05-23 NOTE — PROGRESS NOTES
HEMATOLOGY/ONCOLOGY PROGRESS NOTE  May 23, 2017    REASON FOR VISIT: patient with multiple myeloma    Diagnosis: 71 year old gentleman with IgM lambda multiple myeloma originally diagnosed in 01/2012 as stage I, standard risk disease.   Treatment: Revlimid plus dexamethasone for 4-5 cycles but plateaued by late 03/2012.   - Velcade was added and he received another 2-3 cycles, achieving a good partial remission.     Transplant: Single auto after melphalan 200 mg/m2 preparative regimen on 01/09/2013  - Post-transplant course: unremarkable except for some mild steroid-induced hyperglycemia, gastritis, nausea and vomiting.     Maintenance: Lenalidomide at day-100 then developed a maculopapular rash. Lenalidomide was held and then we re-challenged him after about a month to 2 months at a lower dose (5 mg daily); rash returned.   - was started on maintenance Velcade, every other week through July 2014, when he was noted with abnormalities on myeloma studies drawn there. Noted with prostate cancer dx at about the time of relapse (see below).    Relapse: noted with return on M-spike in blood/urine with marrow involvement but negative PET.   - Started on retreatment with RVD on 8/27/14 with Revlimid at 15 mg 14 days of 21 days, dexamethasone 40 mg weekly and velcade weekly.Completed at total of 5 cycles without complication by 12/2014.   - Started Cycle 6 on inc'd Rev dosing of 20mg daily x 2 weeks on 12/11/14 which was complicated by pneumonia.  - Adm 12/21-1/10 for human metapneumovirus pneumonia complicated by anorexia, HTN, depression, anorexia with significant weight loss.   - Restarted Ernesto/Dex only on 2/4/15 with good tolerance but with thrombocytopenia.   - Bendamustine added to Velcade/dexamethasone on 5/21/15 due to disease progression  - Velcade discontinued on 7/9/2015 due to side effects (orthostasis)  - Schedule changed to bendamustine 80 mg/m2 days 1 and 2 on 28-day cycle  - Cycle 1 tolerated poorly due to  lightheadedness and weakness  - Cycle 2 dose reduced to 60 mg/m2 and pre-meds adjusted  - Cycle 5 received on 9/17/15.  - 10/1/2015 - increasing IgM, M-spike - started Pomalidomide 4mg/day (21 days out of 28 days) and weekly Decadron 20mg on Oct 1st, 2015.    - Carfilzomib added on 10/22/2015 making this CPD.  - C3-C6  received in [place]    - Returned to [facility] and resumed CPD here    - Adm: 4/12-4/14 with fever, confusion, neutropenia. Noted on MRI to have acute/subacute CVA and subacute/chronic CVA; started on Plavix, given brief course of Abx and GCSF prior to d/c.     - Continued on Carfilzomib and dexamethasone alone  - Pomalyst added back on 7/13/2016 for rising FLC  - Start daratumumab 11/10/16. Changed to every other week after 4 weekly treatments d/t profound fatigue and malaise.    INTERVAL HISTORY:    Yamil presents with his wife today for follow-up. Since our visit on Friday, he has been quite a bit better. The addition of the MS Contin has been very helpful for his pain and he actually feels it is now well controlled. He hasn't need to use much dilaudid for breakthrough and there has been a day where he didn't need any at all. He remains without any leg weakness, paresthesias, or bowel/bladder symptoms. He does get some mild fatigue from the MS contin, but no confusion. He doesn't feel that he has been confused lately and that this is better. He is eager to get back on treatment.  He hasn't had any fevers, chills, cough, SOB, chest pain, abdominal pain, GERD, diarrhea, constipation (not taking any promotility agents), bleeding, or swelling. Remainder of ROS otherwise negative.    PHYSICAL EXAMINATION  /82 (BP Location: Right arm, Patient Position: Chair, Cuff Size: Adult Regular)  Pulse 82  Temp 98.5  F (36.9  C) (Oral)  Resp 20  Wt 59.2 kg (130 lb 8 oz)  SpO2 95%  BMI 22.39 kg/m2    Wt Readings from Last 10 Encounters:   05/23/17 59.2 kg (130 lb 8 oz)   05/19/17 59.1 kg (130 lb 6.4 oz)    05/16/17 59.1 kg (130 lb 3.2 oz)   01/26/17 64.6 kg (142 lb 6.4 oz)   01/12/17 64.7 kg (142 lb 10.2 oz)   01/06/17 65.1 kg (143 lb 8 oz)   01/05/17 64.5 kg (142 lb 3.2 oz)   12/29/16 63.2 kg (139 lb 6.4 oz)   12/23/16 63.2 kg (139 lb 4.8 oz)   12/15/16 63.3 kg (139 lb 9.6 oz)   General: Looks better today.  No acute distress. HEENT: PERRL, no palor or icterus. NECK: supple - no cervical or supraclavicular LAD.  CVS: RRR. CHEST: Fine crackles in the LLL, otherwise clear to auscultation b/l . ABDOMEN: soft non tender no masses palpated in a seated position.  NEURO: AAOX3  Grossly non focal neuro exam.  SKIN: no obvious rashes.  LYMPH NODES: no palpable cervical or supraclavicular LAD.  EXTREMITIES: No edema. MSK: generalized non-focal tenderness at lower rib margins bilaterally extending to lumbar paraspinal muslces    LABS:  Results for WILLIAN ARCINIEGA (MRN [id number]) as of 5/18/2017 18:37   Ref. Range 1/26/2017 08:59 1/31/2017 00:00 3/23/2017 09:02 4/20/2017 00:00 4/20/2017 00:00 5/8/2017 10:32   Albumin Fraction Latest Ref Range: 3.7 - 5.1 g/dL 3.5 (L)     3.6 (L)   Alpha 1 Fraction Latest Ref Range: 0.2 - 0.4 g/dL 0.4     0.5 (H)   Alpha 2 Fraction Latest Ref Range: 0.5 - 0.9 g/dL 1.0 (H)     1.1 (H)   Beta Fraction Latest Ref Range: 0.6 - 1.0 g/dL 0.8     0.7   ELP Interpretation: Unknown (Note)...     (Note)...   Gamma Fraction Latest Ref Range: 0.7 - 1.6 g/dL 1.1     2.4 (H)   IGA Latest Ref Range: 70 - 380 mg/dL  <25 <25 <25 <25 <7 (L)   IGG Latest Ref Range: 695 - 1620 mg/dL  91 (A) 101 116 116 138 (L)   IGM Latest Ref Range: 60 - 265 mg/dL 1540 (H) 1307 (A) 2092 2180 2180 3410 (H)   Immunofixation ELP Unknown      (Note)...   Kappa Free Lt Chain Latest Ref Range: 0.33 - 1.94 mg/dL <0.30... (L)   3.05  <0.30... (L)   Kappa Free Lt Chains mg/L Latest Units: mg/L  3.14 (A) 6.09      Kappa Lambda Ratio Latest Ref Range: 0.26 - 1.65  Unable to Calculate 0.02 (A) 0.03 0.01  Unable to Calculate   Lambda  Free Lt Chain Latest Ref Range: 0.57 - 2.63 mg/dL 26.50 (H)   248.32  31.00 (H)   Lambda Free Lt Chaines mg/L Latest Units: mg/L  141.64 (A) 208.77      Monoclonal Peak Latest Ref Range: 0.0 g/dL 0.9 (H)     2.1 (H)     Results for WILLIAN ARCINIEGA (MRN [id number]) as of 5/23/2017 12:02   Ref. Range 5/23/2017 11:05   Sodium Latest Ref Range: 133 - 144 mmol/L 136   Potassium Latest Ref Range: 3.4 - 5.3 mmol/L 4.7   Chloride Latest Ref Range: 94 - 109 mmol/L 102   Carbon Dioxide Latest Ref Range: 20 - 32 mmol/L 26   Urea Nitrogen Latest Ref Range: 7 - 30 mg/dL 18   Creatinine Latest Ref Range: 0.66 - 1.25 mg/dL 0.89   GFR Estimate Latest Ref Range: >60 mL/min/1.7m2 84   GFR Estimate If Black Latest Ref Range: >60 mL/min/1.7m2 >90...   Calcium Latest Ref Range: 8.5 - 10.1 mg/dL 9.3   Anion Gap Latest Ref Range: 3 - 14 mmol/L 8   Albumin Latest Ref Range: 3.4 - 5.0 g/dL 2.9 (L)   Protein Total Latest Ref Range: 6.8 - 8.8 g/dL 8.0   Bilirubin Total Latest Ref Range: 0.2 - 1.3 mg/dL 0.4   Alkaline Phosphatase Latest Ref Range: 40 - 150 U/L 52   ALT Latest Ref Range: 0 - 70 U/L 17   AST Latest Ref Range: 0 - 45 U/L 18   Glucose Latest Ref Range: 70 - 99 mg/dL 97   WBC Latest Ref Range: 4.0 - 11.0 10e9/L 4.1   Hemoglobin Latest Ref Range: 13.3 - 17.7 g/dL 9.1 (L)   Hematocrit Latest Ref Range: 40.0 - 53.0 % 29.7 (L)   Platelet Count Latest Ref Range: 150 - 450 10e9/L 116 (L)     IMPRESSION/PLAN:  71 year old male with relapsed MM after autologous transplant (1/9/2013), with recent progression on daratumumab/pom/dex, with recent hospitalization 5/7-5/16 for back pain, JOSE MARIA,and  Hypercalcemia.    1. MM: Previously on edgardo/pom/dex while wintering in [place], progressive disease on SPEP inpatient (M spike 0.9 --> 2.1, IgM 3410)  - Received solumedrol 100 mg IV daily 5/8-5/10. Started PO dex 40 mg daily x 3 days on 5/11 with Kyprolis 5/11 and 5/12  - Resume Kyprolis today and tomorrow with Dex 20 mg days of infusion. Will have  week off next week, and return to start cycle 2. Dr. Topete thinking of what to add to this and will work him into his schedule.    2. Encephalopathy: Onset of AMS 5/7 en route from [place]. Likely multifactorial in setting of metabolic derangements/possible infection/uremia. Mostly resolved inpatient with exception of sundowning, CT head negative. Viscosity index 2.3.  - Continues to improve, continue Zyprexa 5 mg BID for now    3. Heme: Cytopenias 2/2 treatment and likely MM in setting of progression. Stable.  - Continues on Plavix for history of CVA -- although will need to monitor platelets and hold for platelets <50    4. Renal/FEN: JOSE MARIA on admission, likely multifactorial in setting of NSAIDs, uremia, hyperCa2+  - Creat stable today  - Hypercalcemia: s/p pamidronate x 1 on 5/8, resolved but will need to monitor closely, as it is creeping up  - Hyperuricemia: s/p rasburicase x 1 on 5/8    5. ID: Possible PNA, completed course of PO Levaquin. Afebrile without localzing infectious s/s today.  - Continues ppx  mg BID    6. Back/rib pain: Started early May. Lumbar MRI at OSH showing mild-mod central and foraminal stenoses in lumbar spine, per patient and wife, there was no MM lesion there. Rib films inpatient negative, back films stable from prior imaging. Uncontrolled pain today of same quality/location, uncontrolled 10/10 in intensity. No bowel/bladder dysfunction or paresthesias.  - Significantly improved with addition of MS Contin 15 mg q12h, tolerating with mild fatigue but no confusion. Rarely needing PRN Dilaudid with this.  - If pain significantly worsens, may consider thoracic MRI and repeating the lumbar MRI  - Continue Tylenol, Voltaren, lidocaine    7. CV: Continue Norvasc and Zocor.   - Avoid QTc prolonging agents (history of QTc prolongation with syncopal episodes)          Leanne Reddy PA-C  Hematology, Oncology, and Transplant  [facility]  [address] [place] [address]  [address]  [phone]

## 2017-05-23 NOTE — PROGRESS NOTES
Infusion Nursing Note:  Anthony Carbone presents today for C1D15 - kyprolis    Patient seen by provider today: Yes: PREM Herrera   present during visit today: Not Applicable.    Note: Patient states he is feeling much better today and at the moment has no pain.     Intravenous Access:  Implanted Port.    Treatment Conditions:  Lab Results   Component Value Date    HGB 9.1 05/23/2017     Lab Results   Component Value Date    WBC 4.1 05/23/2017      Lab Results   Component Value Date    ANEU 3.5 05/23/2017     Lab Results   Component Value Date     05/23/2017      Lab Results   Component Value Date     05/23/2017                   Lab Results   Component Value Date    POTASSIUM 4.7 05/23/2017           Lab Results   Component Value Date    MAG 1.7 05/13/2017            Lab Results   Component Value Date    CR 0.89 05/23/2017                   Lab Results   Component Value Date    JAZMIN 9.3 05/23/2017                Lab Results   Component Value Date    BILITOTAL 0.4 05/23/2017           Lab Results   Component Value Date    ALBUMIN 2.9 05/23/2017                    Lab Results   Component Value Date    ALT 17 05/23/2017           Lab Results   Component Value Date    AST 18 05/23/2017     Results reviewed, labs MET treatment parameters, ok to proceed with treatment.      Post Infusion Assessment:  Patient tolerated infusion without incident.  Blood return noted pre and post infusion.  Site patent and intact, free from redness, edema or discomfort.  No evidence of extravasations. Kyprolis stopped at 1522.     Discharge Plan:   Patient declined prescription refills.  Discharge instructions reviewed with: Patient.  Patient and/or family verbalized understanding of discharge instructions and all questions answered.  Copy of AVS reviewed with patient and/or family.  Patient will return 5/24/17 for day 2 kyprolis,  Patient discharged in stable condition accompanied by: self.  Departure  Mode: Ambulatory.      Tracy Ibrahim RN

## 2017-05-24 ENCOUNTER — ONCOLOGY VISIT (OUTPATIENT)
Dept: ONCOLOGY | Facility: CLINIC | Age: 72
DRG: 177 | End: 2017-05-24
Attending: INTERNAL MEDICINE
Payer: MEDICARE

## 2017-05-24 ENCOUNTER — CARE COORDINATION (OUTPATIENT)
Dept: ONCOLOGY | Facility: CLINIC | Age: 72
End: 2017-05-24

## 2017-05-24 VITALS
SYSTOLIC BLOOD PRESSURE: 123 MMHG | HEART RATE: 84 BPM | DIASTOLIC BLOOD PRESSURE: 73 MMHG | TEMPERATURE: 96.6 F | RESPIRATION RATE: 16 BRPM

## 2017-05-24 DIAGNOSIS — C90.00 MULTIPLE MYELOMA (H): Primary | ICD-10-CM

## 2017-05-24 DIAGNOSIS — C90.02 MULTIPLE MYELOMA IN RELAPSE (H): ICD-10-CM

## 2017-05-24 DIAGNOSIS — R41.0 CONFUSION: ICD-10-CM

## 2017-05-24 DIAGNOSIS — C90.00 MULTIPLE MYELOMA NOT HAVING ACHIEVED REMISSION (H): Primary | ICD-10-CM

## 2017-05-24 LAB
ALBUMIN SERPL-MCNC: 2.9 G/DL (ref 3.4–5)
ALBUMIN UR-MCNC: 30 MG/DL
ALP SERPL-CCNC: 47 U/L (ref 40–150)
ALT SERPL W P-5'-P-CCNC: 19 U/L (ref 0–70)
ANION GAP SERPL CALCULATED.3IONS-SCNC: 10 MMOL/L (ref 3–14)
APPEARANCE UR: ABNORMAL
AST SERPL W P-5'-P-CCNC: 14 U/L (ref 0–45)
BASOPHILS # BLD AUTO: 0 10E9/L (ref 0–0.2)
BASOPHILS NFR BLD AUTO: 0 %
BILIRUB SERPL-MCNC: 0.3 MG/DL (ref 0.2–1.3)
BILIRUB UR QL STRIP: NEGATIVE
BUN SERPL-MCNC: 28 MG/DL (ref 7–30)
CA-I SERPL ISE-MCNC: 4.7 MG/DL (ref 4.4–5.2)
CALCIUM SERPL-MCNC: 9.1 MG/DL (ref 8.5–10.1)
CHLORIDE SERPL-SCNC: 104 MMOL/L (ref 94–109)
CO2 SERPL-SCNC: 24 MMOL/L (ref 20–32)
COLOR UR AUTO: YELLOW
CREAT SERPL-MCNC: 1.17 MG/DL (ref 0.66–1.25)
DIFFERENTIAL METHOD BLD: ABNORMAL
EOSINOPHIL # BLD AUTO: 0 10E9/L (ref 0–0.7)
EOSINOPHIL NFR BLD AUTO: 0 %
ERYTHROCYTE [DISTWIDTH] IN BLOOD BY AUTOMATED COUNT: 18.5 % (ref 10–15)
GFR SERPL CREATININE-BSD FRML MDRD: 61 ML/MIN/1.7M2
GLUCOSE SERPL-MCNC: 208 MG/DL (ref 70–99)
GLUCOSE UR STRIP-MCNC: 50 MG/DL
HCT VFR BLD AUTO: 26.6 % (ref 40–53)
HGB BLD-MCNC: 8.3 G/DL (ref 13.3–17.7)
HGB UR QL STRIP: NEGATIVE
HYALINE CASTS #/AREA URNS LPF: 176 /LPF (ref 0–2)
IMM GRANULOCYTES # BLD: 0 10E9/L (ref 0–0.4)
IMM GRANULOCYTES NFR BLD: 0.8 %
KETONES UR STRIP-MCNC: 5 MG/DL
LEUKOCYTE ESTERASE UR QL STRIP: NEGATIVE
LYMPHOCYTES # BLD AUTO: 0.2 10E9/L (ref 0.8–5.3)
LYMPHOCYTES NFR BLD AUTO: 2.9 %
MAGNESIUM SERPL-MCNC: 2.1 MG/DL (ref 1.6–2.3)
MCH RBC QN AUTO: 30.7 PG (ref 26.5–33)
MCHC RBC AUTO-ENTMCNC: 31.2 G/DL (ref 31.5–36.5)
MCV RBC AUTO: 99 FL (ref 78–100)
MONOCYTES # BLD AUTO: 0.3 10E9/L (ref 0–1.3)
MONOCYTES NFR BLD AUTO: 6.6 %
MUCOUS THREADS #/AREA URNS LPF: PRESENT /LPF
NEUTROPHILS # BLD AUTO: 4.6 10E9/L (ref 1.6–8.3)
NEUTROPHILS NFR BLD AUTO: 89.7 %
NITRATE UR QL: NEGATIVE
NRBC # BLD AUTO: 0 10*3/UL
NRBC BLD AUTO-RTO: 0 /100
PH UR STRIP: 5 PH (ref 5–7)
PLATELET # BLD AUTO: 120 10E9/L (ref 150–450)
POTASSIUM SERPL-SCNC: 4.6 MMOL/L (ref 3.4–5.3)
PROT SERPL-MCNC: 7.8 G/DL (ref 6.8–8.8)
RBC # BLD AUTO: 2.7 10E12/L (ref 4.4–5.9)
RBC #/AREA URNS AUTO: 2 /HPF (ref 0–2)
SODIUM SERPL-SCNC: 138 MMOL/L (ref 133–144)
SP GR UR STRIP: 1.02 (ref 1–1.03)
URATE SERPL-MCNC: 5.9 MG/DL (ref 3.5–7.2)
URN SPEC COLLECT METH UR: ABNORMAL
UROBILINOGEN UR STRIP-MCNC: 0 MG/DL (ref 0–2)
WBC # BLD AUTO: 5.1 10E9/L (ref 4–11)
WBC #/AREA URNS AUTO: 2 /HPF (ref 0–2)

## 2017-05-24 PROCEDURE — 99214 OFFICE O/P EST MOD 30 MIN: CPT | Performed by: PHYSICIAN ASSISTANT

## 2017-05-24 RX ORDER — HEPARIN SODIUM (PORCINE) LOCK FLUSH IV SOLN 100 UNIT/ML 100 UNIT/ML
5 SOLUTION INTRAVENOUS EVERY 8 HOURS
Status: DISCONTINUED | OUTPATIENT
Start: 2017-05-24 | End: 2017-05-24

## 2017-05-24 RX ORDER — HEPARIN SODIUM (PORCINE) LOCK FLUSH IV SOLN 100 UNIT/ML 100 UNIT/ML
500 SOLUTION INTRAVENOUS ONCE
Status: COMPLETED | OUTPATIENT
Start: 2017-05-24 | End: 2017-05-24

## 2017-05-24 RX ADMIN — SODIUM CHLORIDE 1000 ML: 9 INJECTION, SOLUTION INTRAVENOUS at 15:00

## 2017-05-24 RX ADMIN — SODIUM CHLORIDE, PRESERVATIVE FREE 500 UNITS: 5 INJECTION INTRAVENOUS at 17:02

## 2017-05-24 RX ADMIN — DEXAMETHASONE SODIUM PHOSPHATE 4 MG: 10 INJECTION, SOLUTION INTRAMUSCULAR; INTRAVENOUS at 13:44

## 2017-05-24 RX ADMIN — SODIUM CHLORIDE 500 ML: 9 INJECTION, SOLUTION INTRAVENOUS at 13:44

## 2017-05-24 ASSESSMENT — PAIN SCALES - GENERAL: PAINLEVEL: NO PAIN (0)

## 2017-05-24 NOTE — PROGRESS NOTES
"HEMATOLOGY/ONCOLOGY PROGRESS NOTE  May 24, 2017    REASON FOR VISIT: add-on fatigue, confusion     Diagnosis: 71 year old gentleman with relapsed IgM lambda multiple myeloma originally diagnosed in 2012 as stage I, standard risk disease.   Current Treatment: Kyprolis IV + dexamethasone 20 mg days of Kyprolis. Day 15 given 17      INTERVAL HISTORY:    Yamil was seen by Leanne Reddy yesterday in routine follow-up prior to day 15 Kyprolis. He was feeling well yesterday. Tolerated infusion well and was a little tired when he returned to TCU. He woke up this morning and nursing staff noted this AM he was a little confused. His wife noticed he seemed very tired when they went for lunch. He does not feel confused and feels well. Able to tell me name, , season of the year. When I asked him where he is, he mutters about \"feeling okay\". He does not recall 3 objects. He is able to answer direct yes/no questions.  He has no new symptoms.  Afebrile here, no chills, no cough, sore throat, SOB, CP, abdominal pain, n/v/d, difficulties with urination. He has been eating okay. Fluid intake is unclear. No new meds. Leanne increased his MS Contin last Friday to 15 mg BID. He has not used any dilaudid since . No other recent dose adjustments. No falls, no HA, no vision changes, hemiparesis, or paraesthesias. ROS otherwise negative.     PHYSICAL EXAMINATION    Vital Signs 2017   Systolic 123   Diastolic 73   Pulse 84   Temperature 96.6   Respirations 16   Weight (LB)    Height    BMI (Calculated)    Pain    O2      Wt Readings from Last 10 Encounters:   17 59.2 kg (130 lb 8 oz)   17 59.1 kg (130 lb 6.4 oz)   17 59.1 kg (130 lb 3.2 oz)   17 64.6 kg (142 lb 6.4 oz)   17 64.7 kg (142 lb 10.2 oz)   17 65.1 kg (143 lb 8 oz)   17 64.5 kg (142 lb 3.2 oz)   16 63.2 kg (139 lb 6.4 oz)   16 63.2 kg (139 lb 4.8 oz)   12/15/16 63.3 kg (139 lb 9.6 oz)   General: Alert, oriented x " 2.  No acute distress. HEENT: PERRL, no palor or icterus. CVS: RRR. CHEST: CTAB, normal work of breathing ABDOMEN: soft non tender no masses palpated in a seated position.  NEURO: AAOX3  Grossly non focal neuro exam. SKIN: no obvious rashes. EXTREMITIES: No edema.     LABS:    5/23/2017 11:05 5/24/2017 14:15 5/24/2017 16:00   Sodium 136 138    Potassium 4.7 4.6    Chloride 102 104    Carbon Dioxide 26 24    Urea Nitrogen 18 28    Creatinine 0.89 1.17    GFR Estimate 84 61    GFR Estimate If Black >90... 74    Calcium 9.3 9.1    Anion Gap 8 10    Magnesium  2.1    Albumin 2.9 (L) 2.9 (L)    Protein Total 8.0 7.8    Bilirubin Total 0.4 0.3    Alkaline Phosphatase 52 47    ALT 17 19    AST 18 14    Calcium Ionized   4.7   Uric Acid  5.9    Glucose 97 208 (H)    WBC 4.1 5.1    Hemoglobin 9.1 (L) 8.3 (L)    Hematocrit 29.7 (L) 26.6 (L)    Platelet Count 116 (L) 120 (L)    RBC Count 2.91 (L) 2.70 (L)     (H) 99    MCH 31.3 30.7    MCHC 30.6 (L) 31.2 (L)    RDW 18.6 (H) 18.5 (H)    Diff Method Automated Method Automated Method    % Neutrophils 84.3 89.7    % Lymphocytes 4.2 2.9    % Monocytes 9.8 6.6    % Eosinophils 1.0 0.0    % Basophils 0.2 0.0    % Immature Granulocytes 0.5 0.8    Nucleated RBCs 0 0    Absolute Neutrophil 3.5 4.6    Absolute Lymphocytes 0.2 (L) 0.2 (L)    Absolute Monocytes 0.4 0.3    Absolute Eosinophils 0.0 0.0    Absolute Basophils 0.0 0.0    Abs Immature Granulocytes 0.0 0.0    Absolute Nucleated RBC 0.0 0.0          IMPRESSION/PLAN:  71 year old male with relapsed MM after autologous transplant (1/9/2013), with recent progression on daratumumab/pom/dex, with recent hospitalization 5/7-5/16 for back pain, JOSE MARIA,and  Hypercalcemia. Started on kyprolis IV 5/11 as well as po dex.     1. MM: Previously on edgardo/pom/dex while wintering in [place], progressive disease on SPEP inpatient (M spike 0.9 --> 2.1, IgM 3410)  - Received solumedrol 100 mg IV daily 5/8-5/10. Started PO dex 40 mg daily x 3 days on  5/11 with Kyprolis 5/11 and 5/12  - HOLDing kyprolis today due to AMS of unclear etiology. Plan for close follow-up tomorrow and kyprolis tomorrow if he has cleared.     2. Encephalopathy: Onset of AMS 5/7 en route from [place]. Likely multifactorial in setting of metabolic derangements/possible infection/uremia. Mostly resolved inpatient with exception of sundowning, CT head negative. He has been on zyprexa 5 mg BID.   -Acute confusion this AM: Calcium stable from yesterday, corrected in mid-10s, normal Mg, normal uric acid. Mild Cr bump today. Checked UA for infection which was negative. No contributory meds. Return tomorrow, recheck lytes, check ammonia, reassess.     3. Heme: Cytopenias 2/2 treatment and likely MM in setting of progression. Hgb dropped today to 8.3. No bleeding. Normal bili so not likely to be hemolysis, trend tomorrow.   - Continues on Plavix for history of CVA -- although will need to monitor platelets and hold for platelets <50    4. Renal/FEN: Mild Cr bump today. He was given 1 L IVF. UA today--negative for infection but did have large amount of casts which is likely secondary to dehydration. Recheck Cr tomorrow.   -Hx Hypercalcemia: s/p pamidronate x 1 on 5/8, trended up yesterday, stable today, given IVFs   -Hx Hyperuricemia: s/p rasburicase x 1 on 5/8    5. ID: No infectious symptoms but with AMS checked UA which was negative.   - Continues ppx  mg BID    6. Back/rib pain: Started early May. Lumbar MRI at OSH showing mild-mod central and foraminal stenoses in lumbar spine, per patient and wife, there was no MM lesion there. Rib films inpatient negative, back films stable from prior imaging.  - Significantly improved with addition of MS Contin 15 mg q12h, tolerating fine yesterday. No dilaudid needs since 5/21.     7. CV: Continue Norvasc and Zocor.   - Avoid QTc prolonging agents (history of QTc prolongation with syncopal episodes)     Leda Gold PA-C    [facility]  [address] [address]  [phone]

## 2017-05-24 NOTE — MR AVS SNAPSHOT
After Visit Summary   5/24/2017    Anthony Carbone    MRN: [id number]           Patient Information     Date Of Birth          1945        Visit Information        Provider Department      5/24/2017 2:40 PM Leda Gold PA [facility]        Today's Diagnoses     Multiple myeloma not having achieved remission (H)    -  1    Confusion           Follow-ups after your visit        Your next 10 appointments already scheduled     May 25, 2017 11:45 AM CDT   Masonic Lab Draw with UC MASONIC LAB DRAW   [facility] Masonic Lab Draw ([facility])    [address] [place] [id number]   [phone]            May 25, 2017 12:20 PM CDT   (Arrive by 12:05 PM)   Return Visit with Leanne Reddy PA-C   [facility] ([facility])    [address] [place] [id number]   [phone]            May 25, 2017  1:00 PM CDT   Infusion 60 with UC ONCOLOGY INFUSION, UC 29 ATC   [facility] ([facility])    [address] [place] [id number]   [phone]            Jun 06, 2017  1:15 PM CDT   Masonic Lab Draw with UC MASONIC LAB DRAW   [facility] Masonic Lab Draw ([facility])    [address] [place] [address]   [phone]            Jun 06, 2017  1:40 PM CDT   (Arrive by 1:25 PM)   Return Visit with Leanne Reddy PA-C   [facility] ([facility])    [address] [place] [address]   [phone]            Jun 06, 2017  3:00 PM CDT   Infusion 60 with UC ONCOLOGY INFUSION, UC 26 ATC   [facility] ([facility])    [address] [place] [address]   [phone]            Jun 07, 2017  2:30 PM CDT    Infusion 60 with UC ONCOLOGY INFUSION, UC 29 ATC   [facility] ([facility])    [address]  [address]  [facility] [address] [phone]              Future tests that were ordered for you today     Open Future Orders        Priority Expected Expires Ordered    Ammonia Routine 5/25/2017 5/24/2018 5/24/2017            Who to contact     If you have questions or need follow up information about today's clinic visit or your schedule please contact [facility] directly at [phone].  Normal or non-critical lab and imaging results will be communicated to you by Mitomicshart, letter or phone within 4 business days after the clinic has received the results. If you do not hear from us within 7 days, please contact the clinic through Summifyt or phone. If you have a critical or abnormal lab result, we will notify you by phone as soon as possible.  Submit refill requests through InHomeVest or call your pharmacy and they will forward the refill request to us. Please allow 3 business days for your refill to be completed.          Additional Information About Your Visit        Mitomicshart Information     InHomeVest gives you secure access to your electronic health record. If you see a primary care provider, you can also send messages to your care team and make appointments. If you have questions, please call your primary care clinic.  If you do not have a primary care provider, please call [phone] and they will assist you.        Care EveryWhere ID     This is your Care EveryWhere ID. This could be used by other organizations to access your [facility] medical records  [id number]         Blood Pressure from Last 3 Encounters:   05/24/17 123/73   05/23/17 141/82   05/19/17 120/76    Weight from Last 3 Encounters:   05/23/17 59.2 kg (130 lb 8 oz)   05/19/17 59.1 kg (130 lb 6.4 oz)   05/16/17 59.1 kg (130 lb 3.2 oz)              We Performed the Following      Calcium ionized        Primary Care Provider Office Phone # Fax #    Sanket Burciaga [phone]       [facility] [address]  [facility] [phone]        Thank you!     Thank you for choosing [facility]  for your care. Our goal is always to provide you with excellent care. Hearing back from our patients is one way we can continue to improve our services. Please take a few minutes to complete the written survey that you may receive in the mail after your visit with us. Thank you!             Your Updated Medication List - Protect others around you: Learn how to safely use, store and throw away your medicines at www.disposemymeds.org.          This list is accurate as of: 5/24/17  5:23 PM.  Always use your most recent med list.                   Brand Name Dispense Instructions for use    acetaminophen 500 MG tablet    TYLENOL     Take 2 tablets (1,000 mg) by mouth every 6 hours as needed for mild pain       acyclovir 400 MG tablet    ZOVIRAX    60 tablet    Take 1 tablet (400 mg) by mouth 2 times daily       amLODIPine 5 MG tablet    NORVASC    30 tablet    Take one tablet at bedtime.       clopidogrel 75 MG tablet    PLAVIX    30 tablet    Take 1 tablet (75 mg) by mouth daily       esomeprazole 40 MG CR capsule    nexIUM    30 capsule    Take 1 capsule (40 mg) by mouth every morning (before breakfast)       HYDROmorphone 2 MG tablet    DILAUDID     Take 1 tablet (2 mg) by mouth every 4 hours as needed for moderate to severe pain       lidocaine 5 % Patch    LIDODERM    30 patch    Place 3 patches onto the skin every 24 hours       LORazepam 0.5 MG tablet    ATIVAN    30 tablet    Take 1 tablet (0.5 mg) by mouth every 4 hours as needed       midodrine 2.5 MG tablet    PROAMATINE    90 tablet    Take 1 tablet (2.5 mg) by mouth 3 times daily       morphine 15 MG 12 hr tablet    MS CONTIN    60 tablet    Take 1 tablet (15 mg) by mouth every 12 hours        OLANZapine 5 MG tablet    zyPREXA    60 tablet    Take 1 tablet (5 mg) by mouth 2 times daily       ondansetron 8 MG ODT tab    ZOFRAN-ODT    30 tablet    Take 1 tablet (8 mg) by mouth every 8 hours as needed for nausea       potassium chloride SA 20 MEQ CR tablet    K-DUR/KLOR-CON M    60 tablet    TAKE 1 TABLET BY MOUTH TWICE DAILY.       simethicone 80 MG chewable tablet    MYLICON    180 tablet    Take 2 tablets (160 mg) by mouth 4 times daily as needed for cramping or other (gas pain)       simvastatin 5 MG tablet    ZOCOR    30 tablet    Take 4 tablets (20 mg) by mouth daily       sucralfate 1 GM tablet    CARAFATE    120 tablet    Take 1 tablet (1 g) by mouth 4 times daily as needed

## 2017-05-24 NOTE — PROGRESS NOTES
Infusion Nursing Note:  Anthony Carbone presents today for Cycle 16 Day 1 Kyprolis  (being deferred until tomorrow).    Patient seen by provider today: Yes: Leda AMARO   present during visit today: Not Applicable.    Note: Appears very sleepy.  Is somewhat confused.  Wife does say he's more sleepy today as compared to yesterday.  He answers some questions appropriately, others he does not.    Discussed with Leda AMARO - will check cbcdp, cmp.  She will see him in infusion.    CHRISTOPHER Chou RN / Leda AMARO  No Kyprolis today.  1 liter IV NS today  UA  To return tomorrow to see Leanne AMARO followed by possible Kyprolis.  Labs tomorrow:  Cbcdp, cmp, uric acid, mg+    Intravenous Access:  Implanted Port.    Treatment Conditions:  Lab Results   Component Value Date    HGB 8.3 05/24/2017     Lab Results   Component Value Date    WBC 5.1 05/24/2017      Lab Results   Component Value Date    ANEU 4.6 05/24/2017     Lab Results   Component Value Date     05/24/2017      Lab Results   Component Value Date     05/24/2017                   Lab Results   Component Value Date    POTASSIUM 4.6 05/24/2017           Lab Results   Component Value Date    MAG 2.1 05/24/2017            Lab Results   Component Value Date    CR 1.17 05/24/2017                   Lab Results   Component Value Date    JAZMIN 9.1 05/24/2017                Lab Results   Component Value Date    BILITOTAL 0.3 05/24/2017           Lab Results   Component Value Date    ALBUMIN 2.9 05/24/2017                    Lab Results   Component Value Date    ALT 19 05/24/2017           Lab Results   Component Value Date    AST 14 05/24/2017         Post Infusion Assessment:  Patient tolerated infusion without incident.  Blood return noted pre and post infusion.  Site patent and intact, free from redness, edema or discomfort.  No evidence of extravasations.  Access discontinued per protocol.    Discharge  Plan:   Patient declined prescription refills.  Copy of AVS reviewed with patient and/or family.  Patient will return tomorow for next appointment.  Patient discharged in stable condition accompanied by: wife.  Departure Mode: Ambulatory with walker.  Face to Face time: 10 minutes.    Estrella Chou RN

## 2017-05-24 NOTE — PATIENT INSTRUCTIONS
[facility] Main Line: [phone]    Call triage nurse with chills and/or temperature greater than or equal to 100.4, uncontrolled nausea/vomiting, diarrhea, constipation, dizziness, shortness of breath, chest pain, bleeding, unexplained bruising, or any new/concerning symptoms, questions/concerns.   If you are having any concerning symptoms or wish to speak to a provider before your next infusion visit, please call your care coordinator or triage to notify them so we can adequately serve you.   Triage Nurse Line: [phone]    If after hours, weekends, or holidays, call main hospital  and ask for Oncology doctor on call @ [phone]               May 2017   [facility] Monday Tuesday Wednesday Thursday Friday Saturday        1     2     3     4     5     6       7     Admission    6:21 PM   Joanie Anderson MD   [address] [facility]   (Discharge: 5/16/2017) 8     XR CHEST PORT 1 VIEW    3:05 PM   (20 min.)   [id number]   [facility],  Radiology 9     [facility] MASONIC LAB DRAW   11:30 AM   (15 min.)    MASONIC LAB DRAW   [facility] Masonic Lab Draw     [facility] ONC RETURN   12:00 PM   (30 min.)    BMT DOM   [facility] Blood and Marrow Transplant 10     XR RIBS RIGHT 2 VIEWS    3:20 PM   (20 min.)   UUXR3   [facility],  Radiology 11     IP EVALUATION    6:00 AM   (60 min.)   Lashay Kelley, PT   [facility] [place], Physical Therapy     IP EVALUATION    6:00 AM   (60 min.)   Lashay Gonzalez, OT   [facility] [place], Occupational Therapy     XR LUMBAR SPINE 2/3 VIEWS    1:10 PM   (20 min.)   UUXR3   [facility],  Radiology     XR THORACIC SPINE 2 VIEWS    1:15 PM   (20 min.)   UUXR3   [facility] [place],  Radiology 12     IP TREATMENT    6:00 AM   (30 min.)   Dulce Maria Bejarano, OT   [facility] [place], Occupational Therapy     XR ABDOMEN 2 VIEWS    9:15 AM   (15 min.)   UUXR3   [facility],  Radiology     CT HEAD WO   11:40 AM   (15 min.)   UUCT1   [facility], [place]     IP TREATMENT    1:00 PM   (30  min.)   Lashay Kelley, PT   [facility] [place], Physical Therapy 13     IP TREATMENT    6:00 AM   (30 min.)   Dulce Maria Bejarano, OT   [facility] [place], Occupational Therapy     IP TREATMENT   11:00 AM   (30 min.)   Lashay Kelley, PT   [facility] [place], Physical Therapy   14     IP TREATMENT    1:00 PM   (30 min.)   Lashay Kelley, PT   [facility] [place], Physical Therapy 15     IP TREATMENT    6:00 AM   (30 min.)   Lashay Gonzalez, OT   [facility] [place], Occupational Therapy 16     17     18     19     [facility] MASONIC LAB DRAW    9:45 AM   (15 min.)   [facility]     [facility] RETURN   10:05 AM   (50 min.)   Leanne Reddy PA-C   [facility] ONC INFUSION 120   11:30 AM   (120 min.)   UC ONCOLOGY INFUSION   [facility] 20       21     22     23     [facility] MAS[facility]IC LAB DRAW   10:30 AM   (15 min.)   [facility]     [facility] RETURN   10:45 AM   (50 min.)   [facility]   [facility]    1:00 PM   (60 min.)   [facility] ONCOLOGY INFUSION   [facility] 24     [facility] ONC INFUSION 60    1:00 PM   (60 min.)   [facility] ONCOLOGY INFUSION   [facility]     [facility] RETURN    2:25 PM   (50 min.)   Leda Gold PA M [facility] 25     P MASONIC LAB DRAW   11:45 AM   (15 min.)   [facility] MASONIC LAB DRAW   [facility] Masonic Lab Draw     [facility] RETURN   12:05 PM   (50 min.)   [facility]C   [facility] 60    1:00 PM   (60 min.)   UC ONCOLOGY INFUSION   [facility] 26     27       28     29     30     31 June 2017 Sunday Monday Tuesday Wednesday Thursday Friday Saturday                       1     2     3       4     5     6     [facility] MASONIC LAB DRAW    1:15 PM   (15 min.)   [facility] LAB DRAW   [facility] Masonic Lab Draw     [facility]  RETURN    1:25 PM   (50 min.)   Leanne Reddy PA-C   [facility]     [facility] ONC INFUSION 60    3:00 PM   (60 min.)    ONCOLOGY INFUSION   [facility] 7     [facility] ONC INFUSION 60    2:30 PM   (60 min.)   UC ONCOLOGY INFUSION   [facility] 8     9     10       11     12     13     [facility] MASONIC LAB DRAW    1:30 PM   (15 min.)    MASONIC LAB DRAW   [facility] Lab Draw     [facility] ONC INFUSION 60    2:00 PM   (60 min.)   UC ONCOLOGY INFUSION   [facility] 14     [facility] ONC INFUSION 60    2:00 PM   (60 min.)    ONCOLOGY INFUSION   [facility] 15     16     17       18     19     20     [facility] MASONIC LAB DRAW    1:30 PM   (15 min.)    MASONIC LAB DRAW   [facility] Lab Draw     [facility] ONC INFUSION 60    2:00 PM   (60 min.)    ONCOLOGY INFUSION   [facility] 21     [facility] ONC INFUSION 60    2:00 PM   (60 min.)    ONCOLOGY INFUSION   [facility] 22     23     24       25     26     27     28     29     30                       Lab Results:  Recent Results (from the past 12 hour(s))   Comprehensive metabolic panel    Collection Time: 05/24/17  2:15 PM   Result Value Ref Range    Sodium 138 133 - 144 mmol/L    Potassium 4.6 3.4 - 5.3 mmol/L    Chloride 104 94 - 109 mmol/L    Carbon Dioxide 24 20 - 32 mmol/L    Anion Gap 10 3 - 14 mmol/L    Glucose 208 (H) 70 - 99 mg/dL    Urea Nitrogen 28 7 - 30 mg/dL    Creatinine 1.17 0.66 - 1.25 mg/dL    GFR Estimate 61 >60 mL/min/1.7m2    GFR Estimate If Black 74 >60 mL/min/1.7m2    Calcium 9.1 8.5 - 10.1 mg/dL    Bilirubin Total 0.3 0.2 - 1.3 mg/dL    Albumin 2.9 (L) 3.4 - 5.0 g/dL    Protein Total 7.8 6.8 - 8.8 g/dL    Alkaline Phosphatase 47 40 - 150 U/L    ALT 19 0 - 70 U/L    AST 14 0 - 45 U/L   *CBC with platelets differential    Collection Time: 05/24/17  2:15 PM   Result Value Ref Range    WBC 5.1 4.0 - 11.0 10e9/L    RBC Count 2.70 (L)  4.4 - 5.9 10e12/L    Hemoglobin 8.3 (L) 13.3 - 17.7 g/dL    Hematocrit 26.6 (L) 40.0 - 53.0 %    MCV 99 78 - 100 fl    MCH 30.7 26.5 - 33.0 pg    MCHC 31.2 (L) 31.5 - 36.5 g/dL    RDW 18.5 (H) 10.0 - 15.0 %    Platelet Count 120 (L) 150 - 450 10e9/L    Diff Method Automated Method     % Neutrophils 89.7 %    % Lymphocytes 2.9 %    % Monocytes 6.6 %    % Eosinophils 0.0 %    % Basophils 0.0 %    % Immature Granulocytes 0.8 %    Nucleated RBCs 0 0 /100    Absolute Neutrophil 4.6 1.6 - 8.3 10e9/L    Absolute Lymphocytes 0.2 (L) 0.8 - 5.3 10e9/L    Absolute Monocytes 0.3 0.0 - 1.3 10e9/L    Absolute Eosinophils 0.0 0.0 - 0.7 10e9/L    Absolute Basophils 0.0 0.0 - 0.2 10e9/L    Abs Immature Granulocytes 0.0 0 - 0.4 10e9/L    Absolute Nucleated RBC 0.0    Uric acid    Collection Time: 05/24/17  2:15 PM   Result Value Ref Range    Uric Acid 5.9 3.5 - 7.2 mg/dL   Magnesium    Collection Time: 05/24/17  2:15 PM   Result Value Ref Range    Magnesium 2.1 1.6 - 2.3 mg/dL   Calcium ionized    Collection Time: 05/24/17  4:00 PM   Result Value Ref Range    Calcium Ionized 4.7 4.4 - 5.2 mg/dL

## 2017-05-24 NOTE — PROGRESS NOTES
See Pt Education documentation re: teach back. Pt's WIFE was able to correctly verbalize when she should call the care team, 3 strategies for infection prevention and what to do when/if he was to develop a fever. Patient is dealing with some AMS at this time and was unable to teach patient.

## 2017-05-25 ENCOUNTER — APPOINTMENT (OUTPATIENT)
Dept: GENERAL RADIOLOGY | Facility: CLINIC | Age: 72
DRG: 177 | End: 2017-05-25
Attending: PHYSICIAN ASSISTANT
Payer: MEDICARE

## 2017-05-25 ENCOUNTER — HOSPITAL ENCOUNTER (INPATIENT)
Facility: CLINIC | Age: 72
LOS: 4 days | Discharge: NURSING, REHAB OR LONG-TERM CARE | DRG: 177 | End: 2017-05-29
Admitting: INTERNAL MEDICINE
Payer: MEDICARE

## 2017-05-25 ENCOUNTER — INFUSION THERAPY VISIT (OUTPATIENT)
Dept: ONCOLOGY | Facility: CLINIC | Age: 72
DRG: 177 | End: 2017-05-25
Attending: INTERNAL MEDICINE
Payer: MEDICARE

## 2017-05-25 ENCOUNTER — APPOINTMENT (OUTPATIENT)
Dept: LAB | Facility: CLINIC | Age: 72
DRG: 177 | End: 2017-05-25
Attending: INTERNAL MEDICINE
Payer: MEDICARE

## 2017-05-25 VITALS
SYSTOLIC BLOOD PRESSURE: 117 MMHG | TEMPERATURE: 98.3 F | HEART RATE: 91 BPM | DIASTOLIC BLOOD PRESSURE: 79 MMHG | OXYGEN SATURATION: 96 % | RESPIRATION RATE: 12 BRPM

## 2017-05-25 DIAGNOSIS — R41.0 DELIRIUM: ICD-10-CM

## 2017-05-25 DIAGNOSIS — C90.00 MULTIPLE MYELOMA NOT HAVING ACHIEVED REMISSION (H): Primary | ICD-10-CM

## 2017-05-25 DIAGNOSIS — M54.50 ACUTE BILATERAL LOW BACK PAIN WITHOUT SCIATICA: ICD-10-CM

## 2017-05-25 DIAGNOSIS — R68.2 DRY MOUTH: Primary | ICD-10-CM

## 2017-05-25 DIAGNOSIS — J18.9 PNEUMONIA OF LEFT LOWER LOBE DUE TO INFECTIOUS ORGANISM: ICD-10-CM

## 2017-05-25 DIAGNOSIS — R19.7 DIARRHEA, UNSPECIFIED TYPE: ICD-10-CM

## 2017-05-25 DIAGNOSIS — C90.00 MULTIPLE MYELOMA (H): Primary | ICD-10-CM

## 2017-05-25 DIAGNOSIS — I95.1 ORTHOSTATIC SYNCOPE: ICD-10-CM

## 2017-05-25 DIAGNOSIS — R41.82 ALTERED MENTAL STATUS: ICD-10-CM

## 2017-05-25 DIAGNOSIS — C90.00 MULTIPLE MYELOMA NOT HAVING ACHIEVED REMISSION (H): ICD-10-CM

## 2017-05-25 DIAGNOSIS — R41.0 DISORIENTATION: ICD-10-CM

## 2017-05-25 DIAGNOSIS — C90.02 MULTIPLE MYELOMA IN RELAPSE (H): ICD-10-CM

## 2017-05-25 DIAGNOSIS — I95.1 ORTHOSTATIC HYPOTENSION: ICD-10-CM

## 2017-05-25 DIAGNOSIS — R41.0 CONFUSION: ICD-10-CM

## 2017-05-25 LAB
ALBUMIN SERPL-MCNC: 3 G/DL (ref 3.4–5)
ALBUMIN UR-MCNC: NEGATIVE MG/DL
ALP SERPL-CCNC: 60 U/L (ref 40–150)
ALT SERPL W P-5'-P-CCNC: 21 U/L (ref 0–70)
AMMONIA PLAS-SCNC: 19 UMOL/L (ref 10–50)
ANION GAP SERPL CALCULATED.3IONS-SCNC: 8 MMOL/L (ref 3–14)
APPEARANCE UR: CLEAR
AST SERPL W P-5'-P-CCNC: 12 U/L (ref 0–45)
BASOPHILS # BLD AUTO: 0 10E9/L (ref 0–0.2)
BASOPHILS NFR BLD AUTO: 0.1 %
BILIRUB SERPL-MCNC: 0.3 MG/DL (ref 0.2–1.3)
BILIRUB UR QL STRIP: NEGATIVE
BUN SERPL-MCNC: 37 MG/DL (ref 7–30)
CALCIUM SERPL-MCNC: 9.2 MG/DL (ref 8.5–10.1)
CHLORIDE SERPL-SCNC: 107 MMOL/L (ref 94–109)
CO2 SERPL-SCNC: 23 MMOL/L (ref 20–32)
COLOR UR AUTO: ABNORMAL
CREAT SERPL-MCNC: 1.2 MG/DL (ref 0.66–1.25)
CREAT SERPL-MCNC: 1.31 MG/DL (ref 0.66–1.25)
DIFFERENTIAL METHOD BLD: ABNORMAL
EOSINOPHIL # BLD AUTO: 0 10E9/L (ref 0–0.7)
EOSINOPHIL NFR BLD AUTO: 0 %
ERYTHROCYTE [DISTWIDTH] IN BLOOD BY AUTOMATED COUNT: 18.6 % (ref 10–15)
GFR SERPL CREATININE-BSD FRML MDRD: 54 ML/MIN/1.7M2
GFR SERPL CREATININE-BSD FRML MDRD: 59 ML/MIN/1.7M2
GLUCOSE SERPL-MCNC: 135 MG/DL (ref 70–99)
GLUCOSE UR STRIP-MCNC: NEGATIVE MG/DL
HCT VFR BLD AUTO: 28.3 % (ref 40–53)
HGB BLD-MCNC: 8.7 G/DL (ref 13.3–17.7)
HGB UR QL STRIP: NEGATIVE
HYALINE CASTS #/AREA URNS LPF: 1 /LPF (ref 0–2)
IMM GRANULOCYTES # BLD: 0.1 10E9/L (ref 0–0.4)
IMM GRANULOCYTES NFR BLD: 0.7 %
KETONES UR STRIP-MCNC: NEGATIVE MG/DL
LEUKOCYTE ESTERASE UR QL STRIP: NEGATIVE
LYMPHOCYTES # BLD AUTO: 0.2 10E9/L (ref 0.8–5.3)
LYMPHOCYTES NFR BLD AUTO: 1.6 %
MAGNESIUM SERPL-MCNC: 2.2 MG/DL (ref 1.6–2.3)
MCH RBC QN AUTO: 31.4 PG (ref 26.5–33)
MCHC RBC AUTO-ENTMCNC: 30.7 G/DL (ref 31.5–36.5)
MCV RBC AUTO: 102 FL (ref 78–100)
MONOCYTES # BLD AUTO: 1.1 10E9/L (ref 0–1.3)
MONOCYTES NFR BLD AUTO: 11.9 %
MUCOUS THREADS #/AREA URNS LPF: PRESENT /LPF
NEUTROPHILS # BLD AUTO: 8.1 10E9/L (ref 1.6–8.3)
NEUTROPHILS NFR BLD AUTO: 85.7 %
NITRATE UR QL: NEGATIVE
NRBC # BLD AUTO: 0.1 10*3/UL
NRBC BLD AUTO-RTO: 1 /100
PH UR STRIP: 5 PH (ref 5–7)
PLATELET # BLD AUTO: 100 10E9/L (ref 150–450)
PLATELET # BLD AUTO: 118 10E9/L (ref 150–450)
POTASSIUM SERPL-SCNC: 4.9 MMOL/L (ref 3.4–5.3)
PROT SERPL-MCNC: 8.2 G/DL (ref 6.8–8.8)
RBC # BLD AUTO: 2.77 10E12/L (ref 4.4–5.9)
RBC #/AREA URNS AUTO: <1 /HPF (ref 0–2)
SODIUM SERPL-SCNC: 138 MMOL/L (ref 133–144)
SP GR UR STRIP: 1.01 (ref 1–1.03)
TRANS CELLS #/AREA URNS HPF: <1 /HPF (ref 0–1)
TSH SERPL DL<=0.005 MIU/L-ACNC: 1.7 MU/L (ref 0.4–4)
URATE SERPL-MCNC: 6.4 MG/DL (ref 3.5–7.2)
URN SPEC COLLECT METH UR: ABNORMAL
UROBILINOGEN UR STRIP-MCNC: NORMAL MG/DL (ref 0–2)
WBC # BLD AUTO: 9.4 10E9/L (ref 4–11)
WBC #/AREA URNS AUTO: 0 /HPF (ref 0–2)

## 2017-05-25 PROCEDURE — 36592 COLLECT BLOOD FROM PICC: CPT | Performed by: PHYSICIAN ASSISTANT

## 2017-05-25 PROCEDURE — A9270 NON-COVERED ITEM OR SERVICE: HCPCS | Mod: GY | Performed by: INTERNAL MEDICINE

## 2017-05-25 PROCEDURE — 25000128 H RX IP 250 OP 636: Mod: ZF | Performed by: INTERNAL MEDICINE

## 2017-05-25 PROCEDURE — 87040 BLOOD CULTURE FOR BACTERIA: CPT | Performed by: PHYSICIAN ASSISTANT

## 2017-05-25 PROCEDURE — 85049 AUTOMATED PLATELET COUNT: CPT | Performed by: PHYSICIAN ASSISTANT

## 2017-05-25 PROCEDURE — 99213 OFFICE O/P EST LOW 20 MIN: CPT | Mod: 25

## 2017-05-25 PROCEDURE — 85810 BLOOD VISCOSITY EXAMINATION: CPT | Performed by: PHYSICIAN ASSISTANT

## 2017-05-25 PROCEDURE — 82784 ASSAY IGA/IGD/IGG/IGM EACH: CPT | Performed by: PHYSICIAN ASSISTANT

## 2017-05-25 PROCEDURE — 25000128 H RX IP 250 OP 636: Mod: ZF | Performed by: PHYSICIAN ASSISTANT

## 2017-05-25 PROCEDURE — 84443 ASSAY THYROID STIM HORMONE: CPT | Performed by: INTERNAL MEDICINE

## 2017-05-25 PROCEDURE — 87088 URINE BACTERIA CULTURE: CPT | Performed by: PHYSICIAN ASSISTANT

## 2017-05-25 PROCEDURE — 00000402 ZZHCL STATISTIC TOTAL PROTEIN: Performed by: PHYSICIAN ASSISTANT

## 2017-05-25 PROCEDURE — 87186 SC STD MICRODIL/AGAR DIL: CPT | Performed by: PHYSICIAN ASSISTANT

## 2017-05-25 PROCEDURE — 82140 ASSAY OF AMMONIA: CPT | Performed by: PHYSICIAN ASSISTANT

## 2017-05-25 PROCEDURE — 25000128 H RX IP 250 OP 636: Performed by: INTERNAL MEDICINE

## 2017-05-25 PROCEDURE — 83735 ASSAY OF MAGNESIUM: CPT | Performed by: INTERNAL MEDICINE

## 2017-05-25 PROCEDURE — 84165 PROTEIN E-PHORESIS SERUM: CPT | Performed by: PHYSICIAN ASSISTANT

## 2017-05-25 PROCEDURE — 71020 XR CHEST 2 VW: CPT

## 2017-05-25 PROCEDURE — 96361 HYDRATE IV INFUSION ADD-ON: CPT

## 2017-05-25 PROCEDURE — 40000268 ZZH STATISTIC NO CHARGES: Mod: ZF

## 2017-05-25 PROCEDURE — 83883 ASSAY NEPHELOMETRY NOT SPEC: CPT | Performed by: PHYSICIAN ASSISTANT

## 2017-05-25 PROCEDURE — 80053 COMPREHEN METABOLIC PANEL: CPT | Performed by: INTERNAL MEDICINE

## 2017-05-25 PROCEDURE — 81001 URINALYSIS AUTO W/SCOPE: CPT | Performed by: PHYSICIAN ASSISTANT

## 2017-05-25 PROCEDURE — 25000132 ZZH RX MED GY IP 250 OP 250 PS 637: Mod: GY | Performed by: INTERNAL MEDICINE

## 2017-05-25 PROCEDURE — 82565 ASSAY OF CREATININE: CPT | Performed by: PHYSICIAN ASSISTANT

## 2017-05-25 PROCEDURE — 96360 HYDRATION IV INFUSION INIT: CPT

## 2017-05-25 PROCEDURE — 12000001 ZZH R&B MED SURG/OB UMMC

## 2017-05-25 PROCEDURE — 87086 URINE CULTURE/COLONY COUNT: CPT | Performed by: PHYSICIAN ASSISTANT

## 2017-05-25 PROCEDURE — 99223 1ST HOSP IP/OBS HIGH 75: CPT | Mod: AI | Performed by: INTERNAL MEDICINE

## 2017-05-25 PROCEDURE — 85025 COMPLETE CBC W/AUTO DIFF WBC: CPT | Performed by: INTERNAL MEDICINE

## 2017-05-25 PROCEDURE — 99215 OFFICE O/P EST HI 40 MIN: CPT | Mod: ZP | Performed by: PHYSICIAN ASSISTANT

## 2017-05-25 PROCEDURE — 84550 ASSAY OF BLOOD/URIC ACID: CPT | Performed by: INTERNAL MEDICINE

## 2017-05-25 RX ORDER — ACETAMINOPHEN 325 MG/1
650 TABLET ORAL EVERY 4 HOURS PRN
Status: DISCONTINUED | OUTPATIENT
Start: 2017-05-25 | End: 2017-05-26

## 2017-05-25 RX ORDER — LIDOCAINE 50 MG/G
3 PATCH TOPICAL EVERY 24 HOURS
Status: DISCONTINUED | OUTPATIENT
Start: 2017-05-26 | End: 2017-05-29 | Stop reason: HOSPADM

## 2017-05-25 RX ORDER — AMLODIPINE BESYLATE 5 MG/1
5 TABLET ORAL DAILY
Status: DISCONTINUED | OUTPATIENT
Start: 2017-05-25 | End: 2017-05-29 | Stop reason: HOSPADM

## 2017-05-25 RX ORDER — NALOXONE HYDROCHLORIDE 0.4 MG/ML
.1-.4 INJECTION, SOLUTION INTRAMUSCULAR; INTRAVENOUS; SUBCUTANEOUS
Status: DISCONTINUED | OUTPATIENT
Start: 2017-05-25 | End: 2017-05-29 | Stop reason: HOSPADM

## 2017-05-25 RX ORDER — ACYCLOVIR 400 MG/1
400 TABLET ORAL 2 TIMES DAILY
Status: DISCONTINUED | OUTPATIENT
Start: 2017-05-25 | End: 2017-05-29 | Stop reason: HOSPADM

## 2017-05-25 RX ORDER — HEPARIN SODIUM (PORCINE) LOCK FLUSH IV SOLN 100 UNIT/ML 100 UNIT/ML
500 SOLUTION INTRAVENOUS ONCE
Status: COMPLETED | OUTPATIENT
Start: 2017-05-25 | End: 2017-05-25

## 2017-05-25 RX ORDER — SODIUM CHLORIDE 9 MG/ML
INJECTION, SOLUTION INTRAVENOUS CONTINUOUS
Status: DISCONTINUED | OUTPATIENT
Start: 2017-05-25 | End: 2017-05-29 | Stop reason: HOSPADM

## 2017-05-25 RX ORDER — SIMETHICONE 80 MG
160 TABLET,CHEWABLE ORAL 4 TIMES DAILY PRN
Status: DISCONTINUED | OUTPATIENT
Start: 2017-05-25 | End: 2017-05-29 | Stop reason: HOSPADM

## 2017-05-25 RX ORDER — ONDANSETRON 8 MG/1
8 TABLET, ORALLY DISINTEGRATING ORAL EVERY 8 HOURS PRN
Status: DISCONTINUED | OUTPATIENT
Start: 2017-05-25 | End: 2017-05-29 | Stop reason: HOSPADM

## 2017-05-25 RX ORDER — OLANZAPINE 5 MG/1
5 TABLET ORAL 2 TIMES DAILY
Status: DISCONTINUED | OUTPATIENT
Start: 2017-05-25 | End: 2017-05-29 | Stop reason: HOSPADM

## 2017-05-25 RX ORDER — SUCRALFATE 1 G/1
1 TABLET ORAL 4 TIMES DAILY PRN
Status: DISCONTINUED | OUTPATIENT
Start: 2017-05-25 | End: 2017-05-29 | Stop reason: HOSPADM

## 2017-05-25 RX ORDER — PANTOPRAZOLE SODIUM 20 MG/1
40 TABLET, DELAYED RELEASE ORAL
Status: DISCONTINUED | OUTPATIENT
Start: 2017-05-26 | End: 2017-05-29 | Stop reason: HOSPADM

## 2017-05-25 RX ORDER — CLOPIDOGREL BISULFATE 75 MG/1
75 TABLET ORAL DAILY
Status: DISCONTINUED | OUTPATIENT
Start: 2017-05-26 | End: 2017-05-29 | Stop reason: HOSPADM

## 2017-05-25 RX ORDER — HEPARIN SODIUM (PORCINE) LOCK FLUSH IV SOLN 100 UNIT/ML 100 UNIT/ML
5 SOLUTION INTRAVENOUS ONCE
Status: COMPLETED | OUTPATIENT
Start: 2017-05-25 | End: 2017-05-25

## 2017-05-25 RX ORDER — SIMVASTATIN 20 MG
20 TABLET ORAL AT BEDTIME
Status: DISCONTINUED | OUTPATIENT
Start: 2017-05-25 | End: 2017-05-29 | Stop reason: HOSPADM

## 2017-05-25 RX ORDER — AMOXICILLIN 250 MG
1-2 CAPSULE ORAL
Status: DISCONTINUED | OUTPATIENT
Start: 2017-05-25 | End: 2017-05-27

## 2017-05-25 RX ORDER — POLYETHYLENE GLYCOL 3350 17 G/17G
17 POWDER, FOR SOLUTION ORAL DAILY PRN
Status: DISCONTINUED | OUTPATIENT
Start: 2017-05-25 | End: 2017-05-29 | Stop reason: HOSPADM

## 2017-05-25 RX ORDER — ACETAMINOPHEN 500 MG
1000 TABLET ORAL EVERY 6 HOURS PRN
Status: DISCONTINUED | OUTPATIENT
Start: 2017-05-25 | End: 2017-05-25

## 2017-05-25 RX ORDER — HYDROMORPHONE HYDROCHLORIDE 2 MG/1
2 TABLET ORAL
Status: DISCONTINUED | OUTPATIENT
Start: 2017-05-25 | End: 2017-05-26

## 2017-05-25 RX ADMIN — OLANZAPINE 5 MG: 5 TABLET, FILM COATED ORAL at 20:23

## 2017-05-25 RX ADMIN — SODIUM CHLORIDE, PRESERVATIVE FREE: 5 INJECTION INTRAVENOUS at 22:38

## 2017-05-25 RX ADMIN — SODIUM CHLORIDE 1000 ML: 9 INJECTION, SOLUTION INTRAVENOUS at 20:23

## 2017-05-25 RX ADMIN — HEPARIN SODIUM (PORCINE) LOCK FLUSH IV SOLN 100 UNIT/ML 500 UNITS: 100 SOLUTION at 17:10

## 2017-05-25 RX ADMIN — AMLODIPINE BESYLATE 5 MG: 5 TABLET ORAL at 20:23

## 2017-05-25 RX ADMIN — SODIUM CHLORIDE 500 ML: 9 INJECTION, SOLUTION INTRAVENOUS at 16:57

## 2017-05-25 RX ADMIN — HYDROMORPHONE HYDROCHLORIDE 2 MG: 2 TABLET ORAL at 20:23

## 2017-05-25 RX ADMIN — SIMVASTATIN 20 MG: 20 TABLET, FILM COATED ORAL at 21:50

## 2017-05-25 RX ADMIN — ACYCLOVIR 400 MG: 400 TABLET ORAL at 20:23

## 2017-05-25 RX ADMIN — SODIUM CHLORIDE 1000 ML: 9 INJECTION, SOLUTION INTRAVENOUS at 13:56

## 2017-05-25 RX ADMIN — SODIUM CHLORIDE, PRESERVATIVE FREE 5 ML: 5 INJECTION INTRAVENOUS at 12:52

## 2017-05-25 ASSESSMENT — PAIN DESCRIPTION - DESCRIPTORS: DESCRIPTORS: SORE

## 2017-05-25 ASSESSMENT — PAIN SCALES - GENERAL: PAINLEVEL: NO PAIN (0)

## 2017-05-25 NOTE — PROGRESS NOTES
Infusion Nursing Note:  Anthony Carbone presents today for Delayed Cycle 16 Day 1 Kyprolis, which is being held again today..    Patient seen by provider today: Yes: Leanne AMARO   present during visit today: Not Applicable.    Note: Per Leanne Reddy, no chemo today.  Give 1 liter IV NS.  Yamil will be admitted to hospital.    Yamil more confused today than yesterday.  See Leanne's note for assessment.  When Ravin (wife) left to get lunch, I sat with Yamil as I was not comfortable leaving him alone.  He was pulling at his IV line and trying to get out of the infusion chair.  I did let 7D know that he would probably need a sitter (spoke with Daniel).    Leanne has ordered more lab work, which was drawn and sent.  Obtained blood cx via port.  Unable to obtain via venipuncture.  (7D notified - Daniel the nurse)    Intravenous Access:  Implanted Port.    Treatment Conditions:  Lab Results   Component Value Date    HGB 8.7 05/25/2017     Lab Results   Component Value Date    WBC 9.4 05/25/2017      Lab Results   Component Value Date    ANEU 8.1 05/25/2017     Lab Results   Component Value Date     05/25/2017      Lab Results   Component Value Date     05/25/2017                   Lab Results   Component Value Date    POTASSIUM 4.9 05/25/2017           Lab Results   Component Value Date    MAG 2.2 05/25/2017            Lab Results   Component Value Date    CR 1.31 05/25/2017                   Lab Results   Component Value Date    JAZMIN 9.2 05/25/2017                Lab Results   Component Value Date    BILITOTAL 0.3 05/25/2017           Lab Results   Component Value Date    ALBUMIN 3.0 05/25/2017                    Lab Results   Component Value Date    ALT 21 05/25/2017           Lab Results   Component Value Date    AST 12 05/25/2017         Post Infusion Assessment:  Patient tolerated infusion without incident.  Blood return noted pre and post infusion.  Site patent and intact, free from  redness, edema or discomfort.  No evidence of extravasations.  Port flushed per protocol and left intact for hospital admission.    Discharge Plan:   Patient declined prescription refills.  AVS to patient via MYCHART.  Patient will return (to be determined based on hospital d/c) for next appointment.   Patient discharged in stable condition accompanied by: [facility].  Departure Mode: Wheelchair.  Face to Face time: 10 minutes.    Estrella Chou RN

## 2017-05-25 NOTE — IP AVS SNAPSHOT
` `     [address] [facility] BANK: [phone]            Medication Administration Report for Anthony Carbone as of 05/29/17 0936   Legend:    Given Hold Not Given Due Canceled Entry Other Actions    Time Time (Time) Time  Time-Action       Inactive    Active    Linked        Medications 05/23/17 05/24/17 05/25/17 05/26/17 05/27/17 05/28/17 05/29/17    0.9% sodium chloride infusion  Rate: 100 mL/hr Freq: CONTINUOUS Route: IV  Start: 05/25/17 1830      2238 ( )-New Bag        0850 ( )-Restarted       0936 (20 mL)-New Bag       1026-Stopped       1126 ( )-Restarted       1516 ( )-New Bag       1838-Stopped       2051 ( )-Restarted        0408 ( )-New Bag       2357 ( )-New Bag        1241 ( )-New Bag       2248 ( )-New Bag            acetaminophen (TYLENOL) tablet 1,000 mg  Dose: 1,000 mg Freq: 3 TIMES DAILY Route: PO  Start: 05/26/17 2000   Admin Instructions: Maximum acetaminophen dose from all sources = 75 mg/kg/day not to exceed 4 gram        2007 (1,000 mg)-Given        0902 (1,000 mg)-Given       1407 (1,000 mg)-Given       1951 (1,000 mg)-Given        0835 (1,000 mg)-Given       1417 (1,000 mg)-Given       1953 (1,000 mg)-Given        0759 (1,000 mg)-Given       [ ] 1400       [ ] 2000           acyclovir (ZOVIRAX) tablet 400 mg  Dose: 400 mg Freq: 2 TIMES DAILY Route: PO  Indications Comment: Unsure of dose  Start: 05/25/17 2000      2023 (400 mg)-Given        0855 (400 mg)-Given       2007 (400 mg)-Given        0902 (400 mg)-Given       1951 (400 mg)-Given        0835 (400 mg)-Given       1953 (400 mg)-Given        0759 (400 mg)-Given       [ ] 2000           amLODIPine (NORVASC) tablet 5 mg  Dose: 5 mg Freq: DAILY Route: PO  Start: 05/25/17 1830 2023 (5 mg)-Given        0855 (5 mg)-Given        0902 (5 mg)-Given        0835 (5 mg)-Given        0759 (5 mg)-Given           amoxicillin-clavulanate (AUGMENTIN) 875-125 MG per tablet 1 tablet  Dose: 1 tablet Freq: EVERY 12 HOURS SCHEDULED Route:  PO  Indications of Use: HEALTHCARE-ASSOCIATED PNEUMONIA  Start: 05/28/17 1115   End: 06/05/17 0759         1245 (1 tablet)-Given       1953 (1 tablet)-Given        0759 (1 tablet)-Given       [ ] 2000           artificial saliva (BIOTENE DRY MOUTHWASH) liquid 3-5 mL  Dose: 3-5 mL Freq: 4 TIMES DAILY Route: SWISH & SPIT  Start: 05/28/17 1200         1246 (5 mL)-Given       1701 (5 mL)-Given       1953 (5 mL)-Given        (0808)-Not Given       [ ] 1200       [ ] 1600       [ ] 2000           clopidogrel (PLAVIX) tablet 75 mg  Dose: 75 mg Freq: DAILY Route: PO  Start: 05/26/17 0800       0855 (75 mg)-Given        0903 (75 mg)-Given        0835 (75 mg)-Given        0759 (75 mg)-Given           enoxaparin (LOVENOX) injection 40 mg  Dose: 40 mg Freq: DAILY Route: SC  Start: 05/26/17 1200       1514 (40 mg)-Given               1754 (40 mg)-Given        1701 (40 mg)-Given        [ ] 1600           haloperidol (HALDOL) tablet 5 mg  Dose: 5 mg Freq: EVERY 6 HOURS PRN Route: PO  PRN Reason: agitation  Start: 05/26/17 1814              HYDROmorphone (DILAUDID) half-tab 1 mg  Dose: 1 mg Freq: EVERY 3 HOURS PRN Route: PO  PRN Reason: moderate to severe pain  Start: 05/26/17 1803   Admin Instructions: Offer 2nd         1431 (1 mg)-Given             lidocaine (LIDODERM) 5 % Patch 3 patch  Dose: 3 patch Freq: EVERY 24 HOURS Route: TD  Start: 05/26/17 0800   Admin Instructions: Apply patch(s) to as directed . To prevent lidocaine toxicity, patient should be patch free for 12 hrs daily. Patches may be cut to smaller size prior to removing release liner.  NEVER APPLY HEAT OVER PATCH which will increase absorption and may lead to risk of local anesthetic toxicity.  Do not apply over area where liposomal bupivacaine was injected for 96 hours post injection.        (0853)-Not Given       1030 (2 patch)-Given [C]        0907 (3 patch)-Given [C]        0935 (3 patch)-Given [C]        0809 (3 patch)-Given [C]           lidocaine (LIDODERM)  Patch in Place  Freq: EVERY 8 HOURS Route: TD  Start: 05/25/17 1845   Admin Instructions: Chart every shift, confirming that patch is still in place on patient (no barcode scan needed). See patch order for dose information.  NEVER APPLY HEAT OVER PATCH which will increase absorption and may lead to risk of local anesthetic toxicity. Do not apply over area where liposomal bupivacaine injected for 96 hours.       1900 (3 each)-Patch in Place [C]        (0428)-Not Given [C]       (1013)-Not Given       1111 ( )-Patch in Place       1840 ( )-Patch in Place        (0219)-Not Given [C]       1000 ( )-Patch in Place       1817 ( )-Patch in Place        (0300)-Not Given [C]       1021 ( )-Patch in Place       1902 ( )-Patch in Place        (0208)-Not Given [C]       [ ] 1045       [ ] 1845           lidocaine (LIDODERM) patch REMOVAL  Freq: EVERY 24 HOURS 2000 Route: TD  Start: 05/25/17 2000   Admin Instructions: Remove lidocaine Patch.       2032 (3 each)-Patch Removed        2007 ( )-Patch Removed [C]        1952 ( )-Patch Removed [C]        1954 ( )-Patch Removed        [ ] 2000           magnesium sulfate 4 g in 100 mL sterile water (premade)  Dose: 4 g Freq: EVERY 4 HOURS PRN Route: IV  PRN Reason: magnesium supplementation  Start: 05/27/17 1101   Admin Instructions: For serum Mg++ less than 1.6 mg/dL  Give 4 g and recheck magnesium level 2 hours after dose, and next AM.               Medication Instruction  Freq: CONTINUOUS PRN Route: XX  Start: 05/25/17 1731   Admin Instructions: No rectal suppositories if WBC less than 1000/ L or platelets less than 50,000/ L               naloxone (NARCAN) injection 0.1-0.4 mg  Dose: 0.1-0.4 mg Freq: EVERY 2 MIN PRN Route: IV  PRN Reason: opioid reversal  Start: 05/25/17 2007   Admin Instructions: For respiratory rate LESS than or EQUAL to 8.  Partial reversal dose:  0.1 mg titrated q 2 minutes for Analgesia Side Effects Monitoring Sedation Level of 3 (frequently drowsy, arousable,  drifts to sleep during conversation).Full reversal dose:  0.4 mg bolus for Analgesia Side Effects Monitoring Sedation Level of 4 (somnolent, minimal or no response to stimulation).               OLANZapine (zyPREXA) tablet 5 mg  Dose: 5 mg Freq: 2 TIMES DAILY Route: PO  Start: 05/25/17 2000   Admin Instructions: Combined IM and PO doses may significantly increase the risk of orthostatic hypotension at 30 mg per day or higher.       2023 (5 mg)-Given        0855 (5 mg)-Given       2007 (5 mg)-Given        0903 (5 mg)-Given       1951 (5 mg)-Given        0835 (5 mg)-Given       1953 (5 mg)-Given        0759 (5 mg)-Given       [ ] 2000           ondansetron (ZOFRAN-ODT) ODT tab 8 mg  Dose: 8 mg Freq: EVERY 8 HOURS PRN Route: PO  PRN Reason: nausea  Start: 05/25/17 1826   Admin Instructions: With dry hands, peel back foil backing and gently remove tablet; do not push oral disintegrating tablet through foil backing; administer immediately on tongue and oral disintegrating tablet dissolves in seconds; then swallow with saliva; liquid not required.               pantoprazole (PROTONIX) EC tablet 40 mg  Dose: 40 mg Freq: EVERY MORNING BEFORE BREAKFAST Route: PO  Start: 05/26/17 0730   Admin Instructions: Auto-Sub for Nexium (home med) to formulary agent.        0855 (40 mg)-Given        0903 (40 mg)-Given        0835 (40 mg)-Given        0759 (40 mg)-Given           polyethylene glycol (MIRALAX/GLYCOLAX) Packet 17 g  Dose: 17 g Freq: DAILY PRN Route: PO  PRN Reason: constipation  Start: 05/25/17 1731   Admin Instructions: Hold for loose stools.  1 Packet = 17 grams. Mixed prescribed dose in 8 ounces of water. Follow with 8 oz. of water.               potassium chloride (KLOR-CON) Packet 20-40 mEq  Dose: 20-40 mEq Freq: EVERY 2 HOURS PRN Route: ORAL OR FEED  PRN Reason: potassium supplementation  Start: 05/27/17 1101   Admin Instructions: Use if unable to tolerate tablets.  If Serum K+ 3.0-3.3, dose = 60 mEq po total dose  (40 mEq x1 followed in 2 hours by 20 mEq x1). Recheck K+ level 4 hours after dose and the next AM.  If Serum K+ 2.5-2.9, dose = 80 mEq po total dose (40 mEq Q2H x2). Recheck K+ level 4 hours after dose and the next AM.  If Serum K+ less than 2.5, See IV order.  Dissolve packet contents in 4-8 ounces of cold water or juice.               potassium chloride 10 mEq in 100 mL intermittent infusion  Dose: 10 mEq Freq: EVERY 1 HOUR PRN Route: IV  PRN Reason: potassium supplementation  Start: 05/27/17 1101   Admin Instructions: Infuse via PERIPHERAL LINE or CENTRAL LINE. Use for central line replacement if patient weight less than 65 kg, if patient is on TPN with high potassium content or if unit does not stock 20 mEq bags.   If Serum K+ 3.0-3.3, dose = 10 mEq/hr x4 doses (40 mEq IV total dose). Recheck K+ level 2 hours after dose and the next AM.   If Serum K+ less than 3.0, dose = 10 mEq/hr x6 doses (60 mEq IV total dose). Recheck K+ level 2 hours after dose and the next AM.               potassium chloride 10 mEq in 100 mL intermittent infusion with 10 mg lidocaine  Dose: 10 mEq Freq: EVERY 1 HOUR PRN Route: IV  PRN Reason: potassium supplementation  Start: 05/27/17 1101   Admin Instructions: Infuse via PERIPHERAL LINE. Use potassium with lidocaine for pain with peripheral administration.  If Serum K+ 3.0-3.3, dose = 10 mEq/hr x4 doses (40 mEq IV total dose). Recheck K+ level 2 hours after dose and the next AM.  If Serum K+ less than 3.0, dose = 10 mEq/hr x6 doses (60 mEq IV total dose). Recheck K+ level 2 hours after dose and the next AM.               potassium chloride 20 mEq in 50 mL intermittent infusion  Dose: 20 mEq Freq: EVERY 1 HOUR PRN Route: IV  PRN Reason: potassium supplementation  Last Dose: 20 mEq (05/29/17 0702)  Start: 05/27/17 1101   Admin Instructions: Infuse via CENTRAL LINE Only. May need EKG if less than 65 kg or on TPN - Max rate is 0.3 mEq/kg/hr for patients not on EKG monitoring.   If Serum K+  3.0-3.3, dose = 20 mEq/hr x2 doses (40 mEq IV total dose). Recheck K+ level 2 hours after dose and the next AM.  If Serum K+ less than 3.0, dose = 20 mEq/hr x3 doses (60 mEq IV total dose). Recheck K+ level 2 hours after dose and the next AM.         1109 (20 mEq)-New Bag       1304 (20 mEq)-New Bag        1029 (20 mEq)-New Bag       1241 (20 mEq)-New Bag       1415 (20 mEq)-New Bag        0702 (20 mEq)-New Bag [C]           potassium chloride SA (K-DUR/KLOR-CON M) CR tablet 20-40 mEq  Dose: 20-40 mEq Freq: EVERY 2 HOURS PRN Route: PO  PRN Reason: potassium supplementation  Start: 05/27/17 1101   Admin Instructions: Use if able to take PO.   If Serum K+ 3.0-3.3, dose = 60 mEq po total dose (40 mEq x1 followed in 2 hours by 20 mEq x1). Recheck K+ level 4 hours after dose and the next AM.  If Serum K+ 2.5-2.9, dose = 80 mEq po total dose (40 mEq Q2H x2). Recheck K+ level 4 hours after dose and the next AM.  If Serum K+ less than 2.5, See IV order.  DO NOT CRUSH.               saccharomyces boulardii (FLORASTOR) capsule 250 mg  Dose: 250 mg Freq: 2 TIMES DAILY Route: PO  Start: 05/29/17 0815   Admin Instructions: Capsules may be opened and sprinkled on semisolid food or added to beverage.           [ ] 0815       [ ] 2000           senna-docusate (SENOKOT-S;PERICOLACE) 8.6-50 MG per tablet 1 tablet  Dose: 1 tablet Freq: AT BEDTIME PRN Route: PO  PRN Reason: constipation  Start: 05/28/17 0930   Admin Instructions: Hold for loose stools.               simethicone (MYLICON) chewable tablet 160 mg  Dose: 160 mg Freq: 4 TIMES DAILY PRN Route: PO  PRN Reasons: cramping,other  PRN Comment: gas pain  Start: 05/25/17 1826              simvastatin (ZOCOR) tablet 20 mg  Dose: 20 mg Freq: AT BEDTIME Route: PO  Start: 05/25/17 2200      2150 (20 mg)-Given        2215 (20 mg)-Given        2222 (20 mg)-Given        2159 (20 mg)-Given        [ ] 2200           sodium chloride (PF) 0.9% PF flush 3 mL  Dose: 3 mL Freq: EVERY 8 HOURS  Route: IK  Start: 05/28/17 1830         1828 (20 mL)-Given [C]        (0208)-Not Given       0510 (20 mL)-Given [C]       [ ] 1030       [ ] 1830           sodium phosphate 15 mmol in D5W intermittent infusion  Dose: 15 mmol Freq: DAILY PRN Route: IV  PRN Reason: phosphorous supplementation  Start: 05/27/17 1101   Admin Instructions: For serum phosphorus level 2-2.4  Do not infuse Phosphorus in the same line as TPN.   Give 15 mmol and recheck phosphorus level next AM.               sodium phosphate 20 mmol in D5W intermittent infusion  Dose: 20 mmol Freq: EVERY 6 HOURS PRN Route: IV  PRN Reason: phosphorous supplementation  Start: 05/27/17 1101   Admin Instructions: For serum phosphorus level 1.1-1.9  Do not infuse Phosphorus in the same line as TPN.   Give 20 mmol and recheck phosphorus level 2 hours after last dose and next AM.               sodium phosphate 25 mmol in D5W intermittent infusion  Dose: 25 mmol Freq: EVERY 8 HOURS PRN Route: IV  PRN Reason: phosphorous supplementation  Start: 05/27/17 1101   Admin Instructions: For serum phosphorus level less than 1.1  Do not infuse Phosphorus in the same line as TPN.   Give 25 mmol and recheck phosphorus level 2 hours after last dose and next AM.               sucralfate (CARAFATE) tablet 1 g  Dose: 1 g Freq: 4 TIMES DAILY PRN Route: PO  PRN Reason: nausea  Start: 05/25/17 1826   Admin Instructions: Recommended to take before meals.               traMADol (ULTRAM) half-tab 25-50 mg  Dose: 25-50 mg Freq: EVERY 6 HOURS PRN Route: PO  PRN Reason: moderate pain  Start: 05/26/17 1800   Admin Instructions: Offer 1st.        2007 (25 mg)-Given       2332 (25 mg)-Given        0903 (25 mg)-Given       0950 (25 mg)-Given       1952 (25 mg)-Given        0836 (25 mg)-Given       2200 (25 mg)-Given           Completed Medications  Medications 05/23/17 05/24/17 05/25/17 05/26/17 05/27/17 05/28/17 05/29/17         Dose: 20 mL Freq: ONCE Route: IK  Start: 05/28/17 0545   End:  05/28/17 0542         0542 (20 mL)-Given              Dose: 20 mL Freq: ONCE Route: IV  Start: 05/27/17 1615   End: 05/27/17 1612        1612 (20 mL)-Given               Dose: 20 mL Freq: ONCE Route: IK  Start: 05/27/17 0630   End: 05/27/17 0626        0626 (20 mL)-Given            Discontinued Medications  Medications 05/23/17 05/24/17 05/25/17 05/26/17 05/27/17 05/28/17 05/29/17         Dose: 650 mg Freq: EVERY 4 HOURS PRN Route: PO  PRN Reason: mild pain  Start: 05/25/17 1731   End: 05/26/17 1803   Admin Instructions: Maximum acetaminophen dose from all sources = 75 mg/kg/day not to exceed 4 grams/day.        0858 (650 mg)-Given       1524 (650 mg)-Given       1803-Med Discontinued            Dose: 2 mg Freq: EVERY 3 HOURS PRN Route: PO  PRN Reason: moderate to severe pain  Start: 05/25/17 2003   End: 05/26/17 1804      2023 (2 mg)-Given        0410 (2 mg)-Given       0858 (2 mg)-Given       1524 (2 mg)-Given       1804-Med Discontinued            Dose: 4.5 g Freq: EVERY 6 HOURS Route: IV  Indications of Use: HEALTHCARE-ASSOCIATED PNEUMONIA  Start: 05/26/17 0830   End: 05/28/17 1105       0909 (4.5 g)-New Bag       1514 (4.5 g)-New Bag       2049 (4.5 g)-New Bag        0341 (4.5 g)-New Bag       0830 (4.5 g)-New Bag       1511 (4.5 g)-New Bag       1952 (4.5 g)-New Bag        0240 (4.5 g)-New Bag       0831 (4.5 g)-New Bag       1105-Med Discontinued          Dose: 1 tablet Freq: AT BEDTIME Route: PO  Start: 05/27/17 2200   End: 05/28/17 0923   Admin Instructions: Hold for loose stools.         2222 (1 tablet)-Given        0923-Med Discontinued          Dose: 1-2 tablet Freq: AT BEDTIME PRN Route: PO  PRN Reason: constipation  Start: 05/25/17 1731   End: 05/27/17 1025   Admin Instructions: Start with 1 tablet PO BID, If no bowel movement in 24 hours, increase to 2 tablets PO BID.  Hold for loose stools.         1025-Med Discontinued           Dose: 1,500 mg Freq: EVERY 24 HOURS Route: IV  Indications of Use:  HEALTHCARE-ASSOCIATED PNEUMONIA  Start: 05/26/17 0930   End: 05/27/17 0843   Admin Instructions: For an adult with peripheral catheter and dose of 2-2.5 g, infuse over 2 hours.  Vesicant. IF central catheter, doses below 2 g may be given over 1 hour.        1026 (1,500 mg)-New Bag        0843-Med Discontinued           Dose: 1,000 mg Freq: EVERY 12 HOURS Route: IV  Indications of Use: HEALTHCARE-ASSOCIATED PNEUMONIA  Start: 05/27/17 0930   End: 05/27/17 1025   Admin Instructions: For an adult with peripheral catheter and dose of 2-2.5 g, infuse over 2 hours.  Vesicant. IF central catheter, doses below 2 g may be given over 1 hour.         0941 (1,000 mg)-New Bag       1025-Med Discontinued      Medications 05/23/17 05/24/17 05/25/17 05/26/17 05/27/17 05/28/17 05/29/17

## 2017-05-25 NOTE — IP AVS SNAPSHOT
` `     [address] [facility]: [phone]                 INTERAGENCY TRANSFER FORM - NOTES (H&P, Discharge Summary, Consults, Procedures, Therapies)   2017                    Hospital Admission Date: 2017  ANTHONY ARCINIEGA   : 1945  Sex: Male        Patient PCP Information     Provider PCP Type    [facility]         History & Physicals      H&P by Joanie Anderson MD at 2017  4:18 PM     Author:  Joanie Anderson MD Service:  Hem/Onc Author Type:  Physician    Filed:  2017 10:16 PM Date of Service:  2017  4:18 PM Creation Time:  2017  4:18 PM    Status:  Signed :  Joanie Anderson MD (Physician)             History and Physical  Hematology / Oncology     Date of Admission:  (Not on file)  Date of Service (when I saw the patient):[CR1.1] 17[CR1.2]    Assessment & Plan   Anthony Arciniega is a 71 year old male with history of relapsed multiple myeloma, recently resumed Kyprolis (), and recent admission earlier this month for back pain, JOSE MARIA, and hypercalcemia, who is being admitted to our service with altered mental status.[CR1.1]       1-[EB1.1] AMS. RN at TCU noted patient more confused yesterday. Kyprolis was held yesterday. Per wife, patient's confusion waxes and wanes. Intermittently agitated. Feeling fatigued over past 1-2 days, unreliable historian in clinic appointment today. Recently admitted for AMS - with likely multifactorial cause including metabolic derangements, uremia, and possible infection. Recently started MS Contin  with improvement of pain but no apparent AMS.   -Continue Zyprexa 5 mg bid on admission.  -Labs from clinic to f/u on: CMP, CBC, viscosity, SPEP, light chains, BCx, TSH, ammonia, UA/UC, and CXR.  -Give 1L NS bolus on admission, and then run MIVF  -PRN haldol[CR1.1]  2- chronic pain: pain is better, will hold MS Contin  For now and c/w dilaudid, consider palliative  consult.    3- JOSE MARIA: Crt slightly elevated, will start IVF and will monitor    4- ID: no fever will do infectious workup and will f/U  - c/w prophy medication   5- hx of relapse MM: on  Ana/Pom/Dex  6- GI: History of GERD, no issues recently  - Continue Nexium QD and Carafate PRN  7- Neuro: Continues on clopidogrel for suspected subacute CVA 4/2016  8- FTT and deconditioning: Neutritional consult  - PT OT   9- CV: History of prolonged QTc anad orthostasis.  - Continue amlodipine 5 mg at bedtime, daily BP checks, and midodrine for symptomatic orthostatic hypotension. Avoid QTc prolonging meds  FEN  - IVF  - monitor lytes and replace as needed  - ADAT  DVT PPX Mechanical  Code status Full Code[EB1.1]     Heme  #IgM lambda multiple myeloma. Origionally dx in 1/2012.        Code Status[CR1.1]   Full Code[EB1.1]  Chief Complaint[CR1.1]   Confusion     History is obtained from the patient and medical record[EB1.1]     History of Present Illness[CR1.1]   71 year old male with relapsed MM S/P  autologous transplant (1/9/2013), with recent progression on carfilzomib therapy, now on Ana/Pom/Dex , recently admitted for confusion , back pain and JOSE MARIA , discharged to the TCU, today he was noticed to be more confused than his baseline and disoriented, transferred to the hospital for further workup, he denies any fever, no chest pian, SOB, pain is under control, no N/V or diarrhea. VS was stable on exam.[EB1.1]   Past Medical History    I have reviewed this patient's medical history and updated it with pertinent information if needed.[CR1.1]   Past Medical History:   Diagnosis Date     Coronary artery disease, non-occlusive Jan 2012     Hx of migraines     haven;t bothered him since 2003     Hypertension      Malignant neoplasm (H) 1/2012     Myeloma (H)     multiple     Nonulcer dyspepsia      Polio age 6    no sequelae     Prostate cancer (H)[CR1.3]        Past Surgical History   I have reviewed this patient's surgical history  and updated it with pertinent information if needed.[CR1.1]  Past Surgical History:   Procedure Laterality Date     COLONOSCOPY       ESOPHAGOSCOPY, GASTROSCOPY, DUODENOSCOPY (EGD), COMBINED N/A 1/6/2015    Procedure: COMBINED ESOPHAGOSCOPY, GASTROSCOPY, DUODENOSCOPY (EGD);  Surgeon: Yamil Donaldson Chi, MD;  Location:  GI     ESOPHAGOSCOPY, GASTROSCOPY, DUODENOSCOPY (EGD), COMBINED Left 8/20/2015    Procedure: COMBINED ESOPHAGOSCOPY, GASTROSCOPY, DUODENOSCOPY (EGD), BIOPSY SINGLE OR MULTIPLE;  Surgeon: Mane Barragan MD;  Location:  GI     right inguinal hernia surgery       right toe surgery[CR1.4]         Prior to Admission Medications   Cannot display prior to admission medications because the patient has not been admitted in this contact.     Allergies   Allergies   Allergen Reactions     Aspirin      Stomach upset     Bactrim [Sulfamethoxazole W-Trimethoprim] Rash       Social History   I have reviewed this patient's social history and updated it with pertinent information if needed. Anthony Thomasjacksonbaljit[CR1.1]  reports that he has never smoked. He has never used smokeless tobacco. He reports that he drinks alcohol. He reports that he does not use illicit drugs.[CR1.4]    Family History   I have reviewed this patient's family history and updated it with pertinent information if needed.[CR1.1]   Family History   Problem Relation Age of Onset     Melanoma No family hx of      Skin Cancer No family hx of[CR1.4]        Review of Systems   The 10 point Review of Systems is negative other than noted in the HPI or here.     Physical Exam                      Vital Signs with Ranges  BP: ()/()   Arterial Line BP: ()/()   0 lbs 0 oz[CR1.1]    Constitutional: Awake, alert, cooperative, no apparent distress, and appears stated age.  Eyes: Lids and lashes normal, pupils equal, round and reactive to light, extra ocular muscles intact, sclera clear, conjunctiva normal.  ENT: Normocephalic, without obvious abnormality,  atraumatic, sinuses nontender on palpation, external ears without lesions, oral pharynx with moist mucus membranes, tonsils without erythema or exudates, gums normal and good dentition.  Respiratory: No increased work of breathing, good air exchange, clear to auscultation bilaterally, no crackles or wheezing.  Cardiovascular: Normal apical impulse, regular rate and rhythm, normal S1 and S2, no S3 or S4, and no murmur noted.  GI: No scars, normal bowel sounds, soft, non-distended, non-tender, no masses palpated, no hepatosplenomegaly.  Lymph/Hematologic: No cervical lymphadenopathy and no supraclavicular lymphadenopathy.  Skin: No bruising or bleeding, normal skin color, texture, turgor, no redness, warmth, or swelling, no rashes, no lesions, no abnormal moles, nails normal without discoloration or clubbing and no jaundice.  Musculoskeletal: There is no redness, warmth, or swelling of the joints.  Full range of motion noted.  Motor strength is 5 out of 5 all extremities bilaterally.  Tone is normal.  Neurologic: Awake, alert, oriented to name, place and time.  Cranial nerves II-XII are grossly intact.  Motor is 5 out of 5 bilaterally.    Neuropsychiatric: Calm, normal eye contact, alert, normal affect, oriented to self, place, time and situation, memory for past and recent events intact and thought process normal.[EB1.1]    Data[CR1.1]   Results for orders placed or performed during the hospital encounter of 05/25/17 (from the past 24 hour(s))   XR Chest 2 Views    Narrative    EXAM: XR CHEST 2 VW  5/25/2017 6:22 PM      HISTORY: Altered mental status, evaluate for infection    COMPARISON: Chest radiograph 5/10/2017, 5/8/2017    FINDINGS:     AP and lateral views of the chest. Right IJ central venous catheter  tip projects in the mid low right atrium.    The cardiomediastinal silhouette is within normal limits. Increased  left perihilar and left lower lobe pulmonary opacities.     Stable trace left pleural effusion.  No pneumothorax.       Impression    IMPRESSION:   1.  Increased left perihilar and left lower lobe pulmonary opacities,  infection, aspiration and/or atelectasis.  2.  Stable trace left pleural effusion.    I have personally reviewed the examination and initial interpretation  and I agree with the findings.    MICH VINCENT MD   Platelet count   Result Value Ref Range    Platelet Count 100 (L) 150 - 450 10e9/L   Creatinine   Result Value Ref Range    Creatinine 1.20 0.66 - 1.25 mg/dL    GFR Estimate 59 (L) >60 mL/min/1.7m2    GFR Estimate If Black 72 >60 mL/min/1.7m2[EB1.2]            Revision History        User Key Date/Time User Provider Type Action    > EB1.2 5/25/2017 10:16 PM Joanie Anderson MD Physician Sign     EB1.1 5/25/2017 10:07 PM Joanie Anderson MD Physician      CR1.4 5/25/2017  4:48 PM Criss Vaz PA-C Physician Assistant - [employer] [employer]     CR1.3 5/25/2017  4:45 PM Criss Vaz PA-C Physician Assistant - C      CR1.2 5/25/2017  4:27 PM Criss Vaz PA-C Physician Assistant - C      CR1.1 5/25/2017  4:18 PM Criss Vaz PA-C Physician Assistant - C                   Discharge Summaries     No notes of this type exist for this encounter.         Consult Notes      Consults by Lorena Rhoades MD at 5/26/2017  1:46 PM     Author:  Lorena Rhoades MD Service:  Palliative Author Type:  Physician    Filed:  5/26/2017  5:20 PM Date of Service:  5/26/2017  1:46 PM Creation Time:  5/26/2017  1:46 PM    Status:  Signed :  Lorena Rhoades MD (Physician)     Consult Orders:    1. Palliative Care ADULT: Patient to be seen: Routine within 24 hrs; Call back #: [phone]; pain management and support of illness; Consultant may enter orders: Yes [[id number]] ordered by Criss Vaz PA-C at 05/26/17 1108                [facility]  Palliative Care Consultation         Anthony Carbone MRN#  [id number]   Age: 71 year old YOB: 1945   Date of Admission: 5/25/2017    Reason for consult:[EE1.1] Pain management[EE1.2]       Requesting physician/service:[EE1.1] Heme/onc[EE1.2]       Recommendations[EE1.1]      -would recommend restarting tramadol 25-50 mg q6h prn  -would have oral dilaudid 1 mg q3h prn as second option for pain control if tramadol is not sufficient  -continue to hold long acting opioid agents given acute illness, in the future if long acting agent was deemed necessary would recommend fentanyl patch at lowest dose of 12 mcg/hr as this does not have metabolites that would build up in renal impairment[EE1.2] (in case he develops another JOSE MARIA)[EJ1.1]  -continue lidocaine patch  -recommend scheduling tylenol 1000 mg TID  -continued ambulation with PT/OT[EE1.2]  - could consider voltaren gel to back as well (tried last admission, pt and wife were not sure that it helped)[EJ1.1]    POLST[EE1.1] N/A pt full code    Discussion:  Pain has clearly been an issue for Yamil recently as has hospitalizations with confusion and infection recently. Although there are multiple etiologies for his confusion and AMS (infection, metabolic, dehydration), it certainly is possible morphine was playing a role in this episode. It appears he tolerated the medication well, however as his renal function worsened he likely built up metabolites which likely played a role in his confusion. Agree with holding long acting agents at the moment while acutely ill. Since admission, has been receiving oral dilaudid which has improved his pain, however he has been spending most of the day sleeping. Seems reasonable to trial tramadol; he has been on this medication in the past with good response and it will be less likely to be sedating. For breakthrough pain would have oral dilaudid available, but would decrease dose to 1 mg in hopes of improving sedation. Would schedule tylenol and ensure he remains as active as  possible with PT/OT.[EE1.2]     Pain seems to be related to the known T11 spinal compression fracture. Per chart review, does nto appear to be related to the multiple myeloma but i am not completely clear on this.  No neuropathic symptoms--no radiation of pain and now LE weakness.[EJ1.1]     Our hope is we can control his pain without the need for long acting agent, however is long acting agent is needed for adequate pain control[EE1.2] to allow for more activity[EJ1.1], would recommend fentanyl patch at lowest dose (12.5 mcg/hr).  This is due to fentanyl not having metabolites that would build up if he were to develop renal dysfunction in the future.[EE1.2] This dose of fentanyl would only be ok if he was already using and tolerating at least 25-30mg oral morphine equivalent per day (ie 6-8mg dilaudid per day).[EJ1.1]    Would be appropriate for outpatient palliative follow up if Yamil or Casey are interested.    Patient seen and discussed with attending Dr. Lorena Rhoades.    Jos Ferreira  Internal Medicine PGY1[EE1.2]    Patient seen and evaluated with Dr. Ferreira.   Agree with assessment and recommendations.    Total time spent was 80 minutes,  >50% of time was spent counseling and/or coordination of care regarding symptoms, support, pain, AMS.    Shelly Rhoades  Palliative Care   Pager [phone][EJ1.1]       Disease Process/es & Symptoms[EE1.1]   Anthony Carbone is a 71 year old male with history of multiple myeloma s/p autologous transplant 1/2013 with subsequent relapse currently on chemotherapy with recent admission for confusion, infection, and JOSE MARIA who was admitted with confusion and found to have probable pneumonia and dehydration. Prior to admission was started on MS contin and there was question whether this was playing a role in this confusion episode.    Hx of possible stroke[EE1.2]     Support/Coping   (We typically ask each of our patients the following questions:)    How do you make sense  "of this?[EE1.1] Not asked[EE1.2]  Are you Sikh? Spiritual? Not so much?[EE1.1] Yamil's Nondenominational maurizio is important to him, Casey is a Islam[EE1.2]  Are you at peace?[EE1.1] Not asked[EE1.2]  What are your concerns, medical and non-medical, that are not being addressed?[EE1.1] None[EE1.2]   Who are your \"go-to\" people when you need support?[EE1.1] Family and friends[EE1.2]    Plan for psychosocial/spiritual support/follow-up from palliative team:[EE1.1] was seen by  this morning[EE1.2]     Decision-Making & Goals of Care     Discussion/counseling today about prognosis/goals of care/decisions:[EE1.1]   Not discussed[EE1.2]  Patient has a completed health care directive available in the chart (Y/N):[EE1.1] Y[EE1.2]  Health care agent (only if patient has an available, complete HCD):[EE1.1] wife Casey[EE1.2]    Code Status:[EE1.1] Full code[EE1.2]   Patient has decision-making capacity (Y/N):[EE1.1] Y, although not while acutely confused[EE1.2]    Coordination of Care     Findings & plan of care discussed with:[EE1.1] primary team[EE1.2]  Follow-up plan from palliative team:[EE1.1] will be available via on call pager this weekend[EE1.2]  Thank you for involving us in the patient's care.           Chief Complaint     \"My pain has been fair\"         History of Present Illness     History obtained from: Patient, patient's wife Casey, and EMR[EE1.1]    Anthony Carbone is a 71 year old male with history of multiple myeloma s/p autologous transplant 1/2013 with subsequent relapse currently on chemotherapy with recent admission for confusion, infection, and JOSE MARIA who was admitted with confusion and found to have probable pneumonia and dehydration. Lower back pain has been an issue for Yamil worsening recently and impacting his activity. Previously for his pain he was on tramadol with improvement. This was discontinued during a hospital stay and most recently on 5/19 was started on MS contin for his pain. This produced " great improvement in his pain, his wife Casey does note he was fairly sleeping after starting the medication though. He was doing well until early this week when he became more agitated, confused, and weak. This prompted his admission to the hospital. He has since been found to have a likely pneumonia and dehydration and is receiving IV antibiotics and fluids.    Palliative care was consulted to help determine pain management regimen given concern the morphine could have been playing a role in his confusion. Since admission, MS contin has been held. He has been receiving oral dilaudid 2 mg prn. He does not endorse much pain currently, however he has been sleeping basically all day. Denies constipation, nausea, vomiting, difficulty breathing, and anxiety.[EE1.2]          Past Medical History:[EE1.1]   Past Medical History:   Diagnosis Date     Coronary artery disease, non-occlusive Jan 2012     Hx of migraines     haven;t bothered him since 2003     Hypertension      Malignant neoplasm (H) 1/2012     Myeloma (H)     multiple     Nonulcer dyspepsia      Polio age 6    no sequelae     Prostate cancer (H)[EE1.3]               Past Surgical History:[EE1.1]   Past Surgical History:   Procedure Laterality Date     COLONOSCOPY       ESOPHAGOSCOPY, GASTROSCOPY, DUODENOSCOPY (EGD), COMBINED N/A 1/6/2015    Procedure: COMBINED ESOPHAGOSCOPY, GASTROSCOPY, DUODENOSCOPY (EGD);  Surgeon: Yamil Donaldson Chi, MD;  Location:  GI     ESOPHAGOSCOPY, GASTROSCOPY, DUODENOSCOPY (EGD), COMBINED Left 8/20/2015    Procedure: COMBINED ESOPHAGOSCOPY, GASTROSCOPY, DUODENOSCOPY (EGD), BIOPSY SINGLE OR MULTIPLE;  Surgeon: Mane Barragan MD;  Location:  GI     right inguinal hernia surgery       right toe surgery[EE1.3]                 Social/Spiritual History:     Living situation: Most recently living at [facility] after last hospitalization in early May, prior to this was living at home with wife  Family system: , no children  Functional  status (needs help with ADLs or IADLs): recently required assistance[EE1.1], was ambulating with walker and assistance at TCU[EJ1.1]  Employment/education: retired [employer]  Use of community resources: not asked  Activities/interests: spending time with family, friends, travel  History of substance use/abuse: not asked            Family History:[EE1.1]   Family History   Problem Relation Age of Onset     Melanoma No family hx of      Skin Cancer No family hx of[EE1.4]               Allergies:[EE1.1]   Allergies   Allergen Reactions     Aspirin      Stomach upset     Bactrim [Sulfamethoxazole W-Trimethoprim] Rash[EE1.5]              Medications:   I have reviewed this patient's medication profile and medications during this hospitalization[EE1.1]  Current Facility-Administered Medications   Medication     piperacillin-tazobactam (ZOSYN) 4.5 g vial to attach to  mL bag     vancomycin (VANCOCIN) 1,500 mg in NaCl 0.9 % 250 mL intermittent infusion     enoxaparin (LOVENOX) injection 40 mg     Medication Instruction     senna-docusate (SENOKOT-S;PERICOLACE) 8.6-50 MG per tablet 1-2 tablet     polyethylene glycol (MIRALAX/GLYCOLAX) Packet 17 g     acetaminophen (TYLENOL) tablet 650 mg     acyclovir (ZOVIRAX) tablet 400 mg     amLODIPine (NORVASC) tablet 5 mg     clopidogrel (PLAVIX) tablet 75 mg     pantoprazole (PROTONIX) EC tablet 40 mg     lidocaine (LIDODERM) 5 % Patch 3 patch     OLANZapine (zyPREXA) tablet 5 mg     ondansetron (ZOFRAN-ODT) ODT tab 8 mg     simethicone (MYLICON) chewable tablet 160 mg     simvastatin (ZOCOR) tablet 20 mg     sucralfate (CARAFATE) tablet 1 g     0.9% sodium chloride infusion     lidocaine (LIDODERM) patch REMOVAL     lidocaine (LIDODERM) Patch in Place     HYDROmorphone (DILAUDID) tablet 2 mg     naloxone (NARCAN) injection 0.1-0.4 mg     Facility-Administered Medications Ordered in Other Encounters   Medication     filgrastim (NEUPOGEN) injection vial 780 mcg[EE1.5]               Review of Systems:   The comprehensive review of systems is negative other than noted here and in the HPI.    Palliative Symptom Review (0=no symptom/no concern, 1=mild, 2=moderate, 3=severe):      Pain: 2      Fatigue: 1      Nausea: 0      Constipation: 0      Diarrhea: 0      Depressive Symptoms: 0      Anxiety: 1      Drowsiness: 1      Poor Appetite: 0      Shortness of Breath: 0      Insomnia: 0      Other: 0            Physical Exam:[EE1.1]   Temp: 97.1  F (36.2  C) Temp src: Oral BP: 160/73 Pulse: 90   Resp: 16 SpO2: 95 % O2 Device: None (Room air)[EE1.6]    Constitutional:[EE1.1]   Initially sleeping comfortably, awakes appropriately, appears comfortable in NAD[EE1.2]     Eyes:[EE1.1]   Sclera anicteric[EE1.2]     ENT:[EE1.1]   Mucus membranes dry[EE1.2]     Neck:[EE1.1]   No JVD[EE1.2]     Lungs:[EE1.1]   No increased work of breathing[EE1.2]     Cardiovascular:[EE1.1]   Normal rate, regular rhythm, no murmur/rub/gallop[EE1.2]     Abdomen:[EE1.1]   Soft, non tender, non distended[EE1.2]     Musculoskeletal:[EE1.1]   No LE edema, palpation of spinous processes without tenderness, no tenderness to palpation of hips or with internal/external rotation of thighs[EE1.2].[EJ1.1]      Neurologic:[EE1.1]   Drowsy but awake[EJ1.1] and oriented[EE1.2] to person and place, missing some details about his medical situation and aware that he has been confused[EJ1.1] , lower extremity strength 5/5 bilaterally[EE1.2]      Skin:[EE1.1]   Diffuse bruising on upper extremities[EE1.2]          Delirium Screen/CAM:  Delirium = (#1 and #2 = YES) + (#3 and/or #4)        1) Acute onset and fluctuating course:   N   (acute change in mental status from baseline over last 24 hours)    2) Inattention:   N   (difficulty focusing, distractible, can't follow conversation)    Delirium/CAM score: 0/4  Interpretation:  1)  Delirium:  present/not present  Not present  2)  Type:  Hypoactive, hyperactive, mixed  N/A  3)  Severity:  Mild,  moderate, severe  N/A         Data Reviewed:   Laboratory and imaging results reviewed in EPIC.[EE1.1]    Per chart review notes document MRI from OSH done 4/24 in [facility], noting compression deformities T9-11 with degenerative changes to lumbar spine and mild stenosis.[EJ1.1]          Revision History        User Key Date/Time User Provider Type Action    > EJ1.1 5/26/2017  5:20 PM Lorena Rhoades MD Physician Sign     EE1.2 5/26/2017  3:44 PM Jos Ferreira MD Resident Sign     EE1.3 5/26/2017  1:51 PM Jos Ferreira MD Resident      EE1.4 5/26/2017  1:50 PM Jos Ferreira MD Resident      EE1.5 5/26/2017  1:49 PM Jos Ferreira MD Resident      EE1.6 5/26/2017  1:48 PM Jos Ferreira MD Resident      EE1.1 5/26/2017  1:46 PM Jos Ferreira MD Resident                      Progress Notes - Physician (Notes from 05/26/17 through 05/29/17)      Progress Notes by Natividad Mora MSW at 5/29/2017  9:06 AM     Author:  Natividad Mora MSW Service:  (none) Author Type:      Filed:  5/29/2017  9:12 AM Date of Service:  5/29/2017  9:06 AM Creation Time:  5/29/2017  9:06 AM    Status:  Addendum :  Natividad Mora MSW ()         Social Work Services Discharge Note      Patient Name:  Anthony Carbone     Anticipated Discharge Date: 5/29/17     Discharge Disposition:    Nursing Home Placement  -SNF:   [facility]- currently holding [facility] bed     PAS not needed as the pt is returning to the TCU.     Additional Services/Equipment Arranged: none     Patient / Family response to discharge plan:  agree     Persons notified of above discharge plan: Torito Kohli, [facility], Pt, family.    -Staff Discharge Instructions:  Please fax discharge orders and signed hard scripts for any controlled substances.  Please print a packet and send with patient.     FAX ORDERS TO:  [facility]  [phone]- fax  [phone]- phone[JE1.1]          Nicole LIANE Mora, LGSW  On-Call   [phone][JE1.2]     Revision History        User Key Date/Time User Provider Type Action    > JE1.2 5/29/2017  9:12 AM Natividad Mora MSW  Addend     JE1.1 5/29/2017  9:09 AM Natividad Mora MSW  Sign            Progress Notes by Criss Vaz PA-C at 5/28/2017 11:34 AM     Author:  Criss Vaz PA-C Service:  Hem/Onc Author Type:  Physician Assistant - C    Filed:  5/28/2017 11:50 AM Date of Service:  5/28/2017 11:34 AM Creation Time:  5/28/2017 11:34 AM    Status:  Attested :  Criss Vaz PA-C (Physician Assistant - C)    Cosigner:  Serina Vang MD at 5/28/2017  6:10 PM        Attestation signed by Serina Vang MD at 5/28/2017  6:10 PM        Attending note.  With the team of midlevel practitioners, nurses and a fellow, we reviewed today's events, vital signs, medications, labs and imaging results and then I have independently seen and examined the patient. I have discussed todays' plan with the team, read and corrected the progress note and agree with the findings.        71 year old male with history of relapsed multiple myeloma, recently treated with Kyprolis (5/23), admitted with subacute mental status changes, delirium and somnolence - diagnosed with new pneumonia.     Again better today, walked the halls. No fevers. Clear and conversant.  O/E oriented to place, up in a chair, no SOB. chest clear today, RRR, abd soft.  A/P End-stage myeloma -- with new onset pneumonia - possible aspiration - responding to Zosyn. Will change to Augmentin and plan to d/c tomorrow back to TCU. Viscosity and IgM levels suggest response to carfilzomib. Plan d/w the patient and his wife.    Serina Vang MD                               [facility], [place]    Hematology / Oncology Progress  Note    Date of Admission: 5/25/2017  Hospital Day #: 3   Date of Service (when I saw the patient): 05/28/2017     Assessment & Plan   Anthony Carbone is a 71 year old male with history of relapsed multiple myeloma, recently resumed Kyprolis (5/23), and recent admission earlier this month for back pain, JOSE MARIA, and hypercalcemia, who is being admitted to our service with altered mental status.      Neuro  #AMS. RN at TCU noted patient more confused 5/24. Kyprolis was held. Per wife, patient's confusion waxes and wanes and has been more fatigued since 5/24. Intermittently agitated. Feeling fatigued over past 1-2 days prior to admission. Patient was an unreliable historian in clinic appointment 5/25 due to confusion, would answer questions incorrectly or would not provide answers. Had difficulty participating in exam in clinic (per outpatient PA). Recently admitted for AMS 5/7-516 with likely multifactorial cause including metabolic derangements, uremia, and possible infection. Recently started MS Contin 5/19 with improvement of pain but no apparent AMS on reevaluation 5/23.   -Continue Zyprexa 5 mg bid.  -Cr on admission elevated to 1.31, has since improved to ~0.8. Electrolytes WNL.   -BCx NGTD, TSH WNL, ammonia normal, UA/UC negative  -MIVF  -PRN haldol 5 mg q6hrs prn    #Hx CVA. suspected subacute CVA 4/2016.  -Continue PTA clopidogrel     ID  #Pneumonia. CXR with LLL opacity and increased perihilar opacities, with stable L pleural effusion. Denies shortness of breath, chest pain, or cough. No sputum production.  -Started on IV Vancomycin and IV Zosyn. D/c'd IV vancomycin 5/27.  -Transitioned from IV Zosyn--->Augmentin 875 mg bid. Patient to complete a 10 day course (through 6/4).    #Anti-infectives.  -Acyclovir 400 mg bid    Heme  #IgM lamda multiple myeloma. Origonially diagnosed 1/2012. S/p therapy with Revlimid + dex for 4-5 cycles but plateaued 3/2012. Velcade added, completed 2-3 cycles then achieving  GPR. 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 In 7/2014 found to have return of M-spike with marrow involvement (PET negative) and began retreatment with RVD in 8/14. Completed 5 cycles, 6th complicated by infection. Bendamustine added to VD 5/15 due to progression but tolerated poorly, dose reduced with cycle 2 and completed 5 cycles in total. Found to have increasing M-spike 10/2015 and started Pomolyst and weekly decadron 10/2015. Carflizomib added. Daratumumab added 11/2016. Admitted to hospital 5/2017 for AMS, while inpatient started Kyprolis and continues on this.   -Kyprolis held (since 5/24) due to AMS, will continue to hold while inpatient, to resume at outpatient follow up.  -Viscosity index of 2.1 (H), SPEP (Albumin 3.1 (L), gamma 2.1 (H), and monoclonal peak 1.9 (H), and Light chains-kappa WNL, lambda elevated @ 29. Slightly improved from last evaluation, demonstrating response to carfilzomib.    #Anemia, Thrombocytopenia. 2/2 to treatment and MM in setting of progression.  -Transfuse for Hgb <8 and plts <10K. No transfusions needed today.    Renal  #JOSE MARIA. Cr elevated to 1.31 on admission. 1L fluid bolus given in clinic, another on admission. MIVF continued. Cr improved to ~0.8-0.9. Will monitor.    Misc  #Back/ rib pain. Started 5/2017. Lumbar MRI at OSH showing mild-mod central and foraminal stenoses in lumbar spine, per patient's wife, no MM lesion there. Rib films from last admission negative. Started MS Contin 15 mg bid 5/19 with improvement of pain and no e/o AMS.  -Held MS contin on admission  -Palliative consult placed, appreciate recs.  -Tramadol 25-50 mg PO q6 hrs prn (use first)  -IV dilaudid 1 mg q 3hrs prn  -Lidocaine patches  -Tylenol 1000 mg tid started today.  -Palliative suggested considering a f/u apt outpatient with their team for further management. May also consider something like a fentanyl patch for pain management  outpatient to avoid possible decreased clearance of narcotic if JOSE MARIA.  -Pain well managed on tramadol prn and scheduled tylenol.     CV  #Hypertension.  -Continue PTA amlodipine  -Avoid QTC prolonging agents (has hx of QTc prolongation and syncopal episodes).  -Consider midodrine for symptomatic orthostatic hypotension.    GI  #History of GERD  -Protonix 40 mg daily  -Carafate PRN    #Abdominal pain. Patient endorses some 'side pain' which when evaluated/palpated was located in lateral LLQ and lateral RLQ. Endorsed mild tenderness to palpation. No rigidity or guarding. Patient did have lg stool burden on Xray (seen on CXR from admission). Pain has resolved.  -Will monitor, no urgent evaluation necessary.  -Senna transitioned to prn as patient has softer stools now and multiple yesterday.    FEN:  -NS @ 100 ml/hr  -PRN lyte replacement  -RDAT    Prophy/Misc:  -VTE: Lovenox 40 mg daily.  -GI/PUD: Protonix 40 mg daily  -Bowels: senna prn and miralax prn  -PT/OT consult  -Biotene mouth rinse daily    Code status: Full  Disposition: Improvement of AMS, remains afebrile, pain management, and resolution of JOSE MARIA, plan to DC Monday.    Criss Vaz PA-C  Hematology/Oncology  [phone]      Interval History   Yamil states he feels better over the last day or two. Explains he has had 2 BMs this morning already that are quite soft. Feels as though pain has been well controlled. Denies shortness of breath, cough, or URI symptoms. Abdomen without pain. Encouraged patient to drink fluids and try eating a bit more today.    Physical Exam   Temp: 97  F (36.1  C) Temp src: Oral BP: 137/78   Heart Rate: 63 Resp: 16 SpO2: 95 % O2 Device: None (Room air)    Vitals:    05/26/17 1200 05/27/17 1007 05/28/17 0749   Weight: 59.8 kg (131 lb 12.8 oz) 58.7 kg (129 lb 8 oz) 56.4 kg (124 lb 4.8 oz)     Vital Signs with Ranges  Temp:  [95.7  F (35.4  C)-98.8  F (37.1  C)] 97  F (36.1  C)  Heart Rate:  [57-78] 63  Resp:  [16-18] 16  BP:  (113-153)/(58-85) 137/78  SpO2:  [94 %-96 %] 95 %  I/O last 3 completed shifts:  In: 3180 [P.O.:540; I.V.:2640]  Out: 1925 [Urine:1925]    Constitutional: Pleasant male seen lying in bed, in NAD. Interactive in conversation and appropriately engaged.   HEENT: NCAT, PERRL, anicteric sclerae. Oral mucosa dry with some white film on tongue, does not scrape away when tried to remove.   Respiratory: Non-labored breathing, with good air exchange. Lung sounds are clear, with good aeration to bases. No crackles, wheezing or rhonchi auscultated.   Cardiovascular: Regular rate and rhythm. No murmur or rub.   GI: Normoactive bowel sounds. Abdomen softer today, non-distended, and non-tender. No palpable masses or organomegaly.  Skin: Warm and dry. No concerning lesions or rash on exposed surfaces.   Musculoskeletal: Extremities grossly normal, non-tender, no edema today.  Neurologic: A&O x3, speech normal. Responsive to questions and answered them appropriately. Needed help sitting up/rolling over during exam.  Neuropsychiatric: Mentation and affect appear normal/appropriate.    Medications   Current Facility-Administered Medications   Medication     senna-docusate (SENOKOT-S;PERICOLACE) 8.6-50 MG per tablet 1 tablet     amoxicillin-clavulanate (AUGMENTIN) 875-125 MG per tablet 1 tablet     artificial saliva (BIOTENE DRY MOUTHWASH) liquid 3-5 mL     potassium chloride SA (K-DUR/KLOR-CON M) CR tablet 20-40 mEq     potassium chloride (KLOR-CON) Packet 20-40 mEq     potassium chloride 10 mEq in 100 mL intermittent infusion     potassium chloride 10 mEq in 100 mL intermittent infusion with 10 mg lidocaine     potassium chloride 20 mEq in 50 mL intermittent infusion     magnesium sulfate 4 g in 100 mL sterile water (premade)     sodium phosphate 15 mmol in D5W intermittent infusion     sodium phosphate 20 mmol in D5W intermittent infusion     sodium phosphate 25 mmol in D5W intermittent infusion     enoxaparin (LOVENOX) injection 40  mg     traMADol (ULTRAM) half-tab 25-50 mg     acetaminophen (TYLENOL) tablet 1,000 mg     HYDROmorphone (DILAUDID) half-tab 1 mg     haloperidol (HALDOL) tablet 5 mg     Medication Instruction     polyethylene glycol (MIRALAX/GLYCOLAX) Packet 17 g     acyclovir (ZOVIRAX) tablet 400 mg     amLODIPine (NORVASC) tablet 5 mg     clopidogrel (PLAVIX) tablet 75 mg     pantoprazole (PROTONIX) EC tablet 40 mg     lidocaine (LIDODERM) 5 % Patch 3 patch     OLANZapine (zyPREXA) tablet 5 mg     ondansetron (ZOFRAN-ODT) ODT tab 8 mg     simethicone (MYLICON) chewable tablet 160 mg     simvastatin (ZOCOR) tablet 20 mg     sucralfate (CARAFATE) tablet 1 g     0.9% sodium chloride infusion     lidocaine (LIDODERM) patch REMOVAL     lidocaine (LIDODERM) Patch in Place     naloxone (NARCAN) injection 0.1-0.4 mg     Facility-Administered Medications Ordered in Other Encounters   Medication     filgrastim (NEUPOGEN) injection vial 780 mcg       Data   CBC    Recent Labs  Lab 05/28/17  0540 05/27/17  0630 05/26/17  0943 05/25/17  1947 05/25/17  1304   WBC 2.5* 3.6* 5.2  --  9.4   RBC 3.13* 3.20* 2.49*  --  2.77*   HGB 9.5* 9.6* 7.5*  --  8.7*   HCT 30.5* 30.8* 24.8*  --  28.3*   MCV 97 96 100  --  102*   MCH 30.4 30.0 30.1  --  31.4   MCHC 31.1* 31.2* 30.2*  --  30.7*   RDW 19.5* 19.2* 18.9*  --  18.6*   PLT 87* 95* 98* 100* 118*     CMP    Recent Labs  Lab 05/28/17  0540 05/27/17  1618 05/27/17  0630 05/26/17  0943 05/25/17  1947 05/25/17  1304 05/24/17  1415 05/23/17  1105     --  142 143  --  138 138 136   POTASSIUM 2.9* 3.4 3.2* 3.5  --  4.9 4.6 4.7   CHLORIDE 108  --  107 110*  --  107 104 102   CO2 24  --  25 25  --  23 24 26   ANIONGAP 9  --  10 8  --  8 10 8   GLC 88  --  89 96  --  135* 208* 97   BUN 8  --  9 18  --  37* 28 18   CR 0.90  --  0.84 0.80 1.20 1.31* 1.17 0.89   GFRESTIMATED 83  --  90 >90Non  GFR Calc 59* 54* 61 84   GFRESTBLACK >90African American GFR Calc  --  >90African American GFR Calc  >90African American GFR Calc 72 65 74 >90African American GFR Calc   JAZMIN 8.0*  --  8.5 8.3*  --  9.2 9.1 9.3   MAG  --   --   --   --   --  2.2 2.1  --    PROTTOTAL  --   --   --   --   --  8.2 7.8 8.0   ALBUMIN  --   --   --   --   --  3.0* 2.9* 2.9*   BILITOTAL  --   --   --   --   --  0.3 0.3 0.4   ALKPHOS  --   --   --   --   --  60 47 52   AST  --   --   --   --   --  12 14 18   ALT  --   --   --   --   --  21 19 17     INRNo lab results found in last 7 days.    Results for orders placed or performed during the hospital encounter of 05/25/17   XR Chest 2 Views    Narrative    EXAM: XR CHEST 2 VW  5/25/2017 6:22 PM      HISTORY: Altered mental status, evaluate for infection    COMPARISON: Chest radiograph 5/10/2017, 5/8/2017    FINDINGS:     AP and lateral views of the chest. Right IJ central venous catheter  tip projects in the mid low right atrium.    The cardiomediastinal silhouette is within normal limits. Increased  left perihilar and left lower lobe pulmonary opacities.     Stable trace left pleural effusion. No pneumothorax.       Impression    IMPRESSION:   1.  Increased left perihilar and left lower lobe pulmonary opacities,  infection, aspiration and/or atelectasis.  2.  Stable trace left pleural effusion.    I have personally reviewed the examination and initial interpretation  and I agree with the findings.    MICH VINCENT MD[CR1.1]          Revision History        User Key Date/Time User Provider Type Action    > CR1.1 5/28/2017 11:50 AM Criss Vaz PA-C Physician Assistant - PAULETTE Sign            Progress Notes by Criss Vaz PA-C at 5/27/2017 12:39 PM     Author:  Criss Vaz PA-C Service:  Hem/Onc Author Type:  Physician Assistant Tamiko CALDWELL    Filed:  5/27/2017 12:54 PM Date of Service:  5/27/2017 12:39 PM Creation Time:  5/27/2017 12:39 PM    Status:  Attested :  Criss Vaz PA-C (Physician Assistant - C)    Cosigner:  Serina Vang MD at 5/27/2017  6:11  PM        Attestation signed by Serina Vang MD at 5/27/2017  6:11 PM        Attending note.  With the team of midlevel practitioners, nurses and a fellow, we reviewed today's events, vital signs, medications, labs and imaging results and then I have independently seen and examined the patient. I have discussed todays' plan with the team, read and corrected the progress note and agree with the findings.       71 year old male with history of relapsed multiple myeloma, recently resumed Kyprolis (5/23), admitted with subacute mental status changes, delirium and somnolence - diagnosed with new pneumonia.    Better today, walked the halls. No fevers.   O/E oriented to place, up in a chair, conversant, no SOB. chest clear today, RRR, abd soft.  A/P End-stage myeloma -- with new onset pneumonia - possible aspiration - will cont  Zosyn. Ok  To stop Vanco and monitor. Blood cx and aspiration precautions. Will monitor, viscosity and IgM suggest response to carfilzomib. Hope he clears quickly.   Serina Vang MD  Associate Professor of Medicine                               [facility], [place]    Hematology / Oncology Progress Note    Date of Admission: 5/25/2017  Hospital Day #: 2   Date of Service (when I saw the patient): 05/27/2017     Assessment & Plan   Anthony Carbone is a 71 year old male with history of relapsed multiple myeloma, recently resumed Kyprolis (5/23), and recent admission earlier this month for back pain, JOSE MARIA, and hypercalcemia, who is being admitted to our service with altered mental status.      Neuro  #AMS. RN at TCU noted patient more confused 5/24. Kyprolis was held. Per wife, patient's confusion waxes and wanes and has been more fatigued since 5/24. Intermittently agitated. Feeling fatigued over past 1-2 days prior to admission. Patient was an unreliable historian in clinic appointment 5/25 due to confusion, would answer questions incorrectly or  would not provide answers. Had difficulty participating in exam in clinic (per outpatient PA). Recently admitted for AMS 5/7-516 with likely multifactorial cause including metabolic derangements, uremia, and possible infection. Recently started MS Contin 5/19 with improvement of pain but no apparent AMS on reevaluation 5/23.   -Continue Zyprexa 5 mg bid.  -Cr on admission elevated to 1.31, has since improved to ~0.8. Electrolytes WNL.   -BCx NGTD, TSH WNL, ammonia normal, UA/UC negative  -MIVF  -PRN haldol 5 mg q6hrs prn  -Patient alert and oriented to person, place, and situation today. Unable to tell what year it was, but knew who the president is.    #Hx CVA. suspected subacute CVA 4/2016.  -Continue PTA clopidogrel     ID  #Pneumonia. CXR with LLL opacity and increased perihilar opacities, with stable L pleural effusion. Denies shortness of breath, chest pain, or cough. No sputum production.  -Started on IV Vancomycin and IV Zosyn. D/c'd IV vancomycin today.  -Will consider transitioning to oral abx tomorrow.  -Consider SLP consult to evaluate for aspiration once patient improves.    #Anti-infectives.  -Acyclovir 400 mg bid    Heme  #IgM lamda multiple myeloma. Elizabety diagnosed 1/2012. S/p therapy with Revlimid + dex for 4-5 cycles but plateaued 3/2012. Velcade added, completed 2-3 cycles then achieving GPR. 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 In 7/2014 found to have return of M-spike with marrow involvement (PET negative) and began retreatment with RVD in 8/14. Completed 5 cycles, 6th complicated by infection. Bendamustine added to VD 5/15 due to progression but tolerated poorly, dose reduced with cycle 2 and completed 5 cycles in total. Found to have increasing M-spike 10/2015 and started Pomolyst and weekly decadron 10/2015. Carflizomib added. Daratumumab added 11/2016. Admitted to hospital 5/2017 for AMS, while inpatient started  Kyprolis and continues on this.   -Kyprolis held (since 5/24) due to AMS, will continue to hold while inpatient, to resume at outpatient follow up.  -Viscosity index of 2.1 (H), SPEP (Albumin 3.1 (L), gamma 2.1 (H), and monoclonal peak 1.9 (H), and Light chains-kappa WNL, lambda elevated @ 29. Slightly improved from last evaluation, decreasing suspicion for AMS due to myeloma progression.    #Anemia, Thrombocytopenia. 2/2 to treatment and MM in setting of progression.  -Transfuse for Hgb <8 and plts <10K. No transfusions needed today.    Renal  #JOSE MARIA. Cr elevated to 1.31 on admission. 1L fluid bolus given in clinic, another on admission. MIVF continued. Cr improved to 0.8. Will monitor.    Misc  #Back/ rib pain. Started 5/2017. Lumbar MRI at OSH showing mild-mod central and foraminal stenoses in lumbar spine, per patient's wife, no MM lesion there. Rib films from last admission negative. Started MS Contin 15 mg bid 5/19 with improvement of pain and no e/o AMS.  -Held MS contin on admission  -Palliative consult placed, appreciate recs.  -Tramadol 25-50 mg PO q6 hrs prn (use first)  -IV dilaudid 1 mg q 3hrs prn  -Lidocaine patches  -Tylenol 1000 mg tid started today.  -Palliative suggested considering a f/u apt outpatient with their team for further management. May also consider something like a fentanyl patch for pain management outpatient to avoid possible decreased clearance of narcotic if JOSE MARIA.    CV  #Hypertension.  -Continue PTA amlodipine  -Avoid QTC prolonging agents (has hx of QTc prolongation and syncopal episodes).  -Consider midodrine for symptomatic orthostatic hypotension.    GI  #History of GERD  - Protonix 40 mg daily  -Carafate PRN    #Abdominal pain. Patient endorses some 'side pain' which when evaluated/palpated was located in lateral LLQ and lateral RLQ. Endorsed mild tenderness to palpation. No rigidity or guarding. Patient did have lg stool burden on Xray (seen on CXR from admission).   -Will  monitor, no urgent evaluation necessary.  -Senna scheduled 1 tab PO qhs.    FEN:  -NS @ 100 ml/hr  -PRN lyte replacement  -RDAT    Prophy/Misc:  -VTE: Lovenox 40 mg daily.  -GI/PUD: Protonix 40 mg daily  -Bowels: senna 1 tab PO qhs and miralax prn  -PT/OT consult    Code status: Full  Disposition: Improvement of AMS, remains afebrile, pain management, and resolution of JOSE MARIA, possible DC Monday.    Criss Vaz PA-C  Hematology/Oncology  [phone]      Interval History   Patient was sleeping on entry but woke easily. Alert and interactive in discussion today. Able to answer questions appropriately. Denies headache, vision changes, URI symptoms, cough, shortness of breath, chest pain, abdominal pain, dysuria or diarrhea. Does endorse some 'side pain' which he locates to the lateral LLQ. Otherwise feels comfortable. Per nursing, did have some moderately severe back pain mid morning, given medication for pain management.     Physical Exam   Temp: 96.2  F (35.7  C) Temp src: Oral BP: 152/85 Pulse: 68 Heart Rate: 64 Resp: 16 SpO2: 96 % O2 Device: None (Room air)    Vitals:    05/25/17 1729 05/26/17 1200 05/27/17 1007   Weight: 62.9 kg (138 lb 11.2 oz) 59.8 kg (131 lb 12.8 oz) 58.7 kg (129 lb 8 oz)     Vital Signs with Ranges  Temp:  [96.2  F (35.7  C)-98.5  F (36.9  C)] 96.2  F (35.7  C)  Pulse:  [68-82] 68  Heart Rate:  [64-77] 64  Resp:  [16-18] 16  BP: (135-165)/(75-88) 152/85  SpO2:  [93 %-96 %] 96 %  I/O last 3 completed shifts:  In: 1885 [P.O.:540; I.V.:1045]  Out: 3195 [Urine:3195]    Constitutional: Pleasant male seen lying in bed, in NAD. Interactive with questions during conversation and appropriately engaged.   HEENT: NCAT, PERRL, anicteric sclerae. Oral mucosa pink and moist with no lesions or thrush.  Respiratory: Non-labored breathing, with good air exchange. Lung sounds coarse but improved, with good aeration to bases. Some crackles noted at bases b/l. No wheezing or rhonchi. Had to return for better  lung exam as patient would not sit or roll during first exam.   Cardiovascular: Regular rate and rhythm. No murmur or rub.   GI: Normoactive bowel sounds. Abdomen soft, non-distended, and non-tender. No palpable masses or organomegaly.  Skin: Warm and dry. No concerning lesions or rash on exposed surfaces. Bruising on hand noted.  Musculoskeletal: Extremities grossly normal, non-tender, no edema today.  Neurologic: A&O to person, place and situation. disoriented to time, speech normal. Interacts moderately during exam. Responsive to questions and answered them appropriately.  Neuropsychiatric: Mentation and affect appear normal/appropriate.    Medications   Current Facility-Administered Medications   Medication     senna-docusate (SENOKOT-S;PERICOLACE) 8.6-50 MG per tablet 1 tablet     potassium chloride SA (K-DUR/KLOR-CON M) CR tablet 20-40 mEq     potassium chloride (KLOR-CON) Packet 20-40 mEq     potassium chloride 10 mEq in 100 mL intermittent infusion     potassium chloride 10 mEq in 100 mL intermittent infusion with 10 mg lidocaine     potassium chloride 20 mEq in 50 mL intermittent infusion     magnesium sulfate 4 g in 100 mL sterile water (premade)     sodium phosphate 15 mmol in D5W intermittent infusion     sodium phosphate 20 mmol in D5W intermittent infusion     sodium phosphate 25 mmol in D5W intermittent infusion     piperacillin-tazobactam (ZOSYN) 4.5 g vial to attach to  mL bag     enoxaparin (LOVENOX) injection 40 mg     traMADol (ULTRAM) half-tab 25-50 mg     acetaminophen (TYLENOL) tablet 1,000 mg     HYDROmorphone (DILAUDID) half-tab 1 mg     haloperidol (HALDOL) tablet 5 mg     Medication Instruction     polyethylene glycol (MIRALAX/GLYCOLAX) Packet 17 g     acyclovir (ZOVIRAX) tablet 400 mg     amLODIPine (NORVASC) tablet 5 mg     clopidogrel (PLAVIX) tablet 75 mg     pantoprazole (PROTONIX) EC tablet 40 mg     lidocaine (LIDODERM) 5 % Patch 3 patch     OLANZapine (zyPREXA) tablet 5 mg      ondansetron (ZOFRAN-ODT) ODT tab 8 mg     simethicone (MYLICON) chewable tablet 160 mg     simvastatin (ZOCOR) tablet 20 mg     sucralfate (CARAFATE) tablet 1 g     0.9% sodium chloride infusion     lidocaine (LIDODERM) patch REMOVAL     lidocaine (LIDODERM) Patch in Place     naloxone (NARCAN) injection 0.1-0.4 mg     Facility-Administered Medications Ordered in Other Encounters   Medication     filgrastim (NEUPOGEN) injection vial 780 mcg       Data   CBC    Recent Labs  Lab 05/27/17  0630 05/26/17  0943 05/25/17 1947 05/25/17  1304 05/24/17  1415   WBC 3.6* 5.2  --  9.4 5.1   RBC 3.20* 2.49*  --  2.77* 2.70*   HGB 9.6* 7.5*  --  8.7* 8.3*   HCT 30.8* 24.8*  --  28.3* 26.6*   MCV 96 100  --  102* 99   MCH 30.0 30.1  --  31.4 30.7   MCHC 31.2* 30.2*  --  30.7* 31.2*   RDW 19.2* 18.9*  --  18.6* 18.5*   PLT 95* 98* 100* 118* 120*     CMP    Recent Labs  Lab 05/27/17  0630 05/26/17  0943 05/25/17  1947 05/25/17  1304 05/24/17  1415 05/23/17  1105    143  --  138 138 136   POTASSIUM 3.2* 3.5  --  4.9 4.6 4.7   CHLORIDE 107 110*  --  107 104 102   CO2 25 25  --  23 24 26   ANIONGAP 10 8  --  8 10 8   GLC 89 96  --  135* 208* 97   BUN 9 18  --  37* 28 18   CR 0.84 0.80 1.20 1.31* 1.17 0.89   GFRESTIMATED 90 >90Non  GFR Calc 59* 54* 61 84   GFRESTBLACK >90African American GFR Calc >90African American GFR Calc 72 65 74 >90African American GFR Calc   JAZMIN 8.5 8.3*  --  9.2 9.1 9.3   MAG  --   --   --  2.2 2.1  --    PROTTOTAL  --   --   --  8.2 7.8 8.0   ALBUMIN  --   --   --  3.0* 2.9* 2.9*   BILITOTAL  --   --   --  0.3 0.3 0.4   ALKPHOS  --   --   --  60 47 52   AST  --   --   --  12 14 18   ALT  --   --   --  21 19 17     INRNo lab results found in last 7 days.    Results for orders placed or performed during the hospital encounter of 05/25/17   XR Chest 2 Views    Narrative    EXAM: XR CHEST 2 VW  5/25/2017 6:22 PM      HISTORY: Altered mental status, evaluate for infection    COMPARISON:  Chest radiograph 5/10/2017, 5/8/2017    FINDINGS:     AP and lateral views of the chest. Right IJ central venous catheter  tip projects in the mid low right atrium.    The cardiomediastinal silhouette is within normal limits. Increased  left perihilar and left lower lobe pulmonary opacities.     Stable trace left pleural effusion. No pneumothorax.       Impression    IMPRESSION:   1.  Increased left perihilar and left lower lobe pulmonary opacities,  infection, aspiration and/or atelectasis.  2.  Stable trace left pleural effusion.    I have personally reviewed the examination and initial interpretation  and I agree with the findings.    MICH VINCENT MD[CR1.1]          Revision History        User Key Date/Time User Provider Type Action    > CR1.1 5/27/2017 12:54 PM Criss Vaz PA-C Physician Assistant - C Sign            Progress Notes by Alexia Hoyos PT at 5/27/2017  2:17 PM     Author:  Alexia Hoyos PT Service:  (none) Author Type:  Physical Therapist    Filed:  5/27/2017  2:18 PM Date of Service:  5/27/2017  2:17 PM Creation Time:  5/27/2017  2:17 PM    Status:  Signed :  Alexia Hoyos PT (Physical Therapist)          05/27/17 1315   Quick Adds   Type of Visit Initial PT Evaluation   Living Environment   Lives With spouse   Living Arrangements house   Home Accessibility stairs within home;stairs to enter home   Number of Stairs to Enter Home 3   Number of Stairs Within Home 12   Stair Railings at Home (No railing on outside stairs)   Transportation Available family or friend will provide   Living Environment Comment Pt lives in 3 story home with wife, does not need to go up to 2nd level, bathroom with walk-in shower in basement, 3 stairs to enter with no rail.  Pt admitted from TCU, has not been home for a while as he was hospitalzed then sent right to TCU.   Self-Care   Dominant Hand left   Usual Activity Tolerance good   Current Activity Tolerance moderate   Regular Exercise  no   Activity/Exercise/Self-Care Comment Pt at TCU prior to admission, was receiving theapy there.  Per pt and wife prior to hospitalization/TCU admission pt was mostly IND at home, receiving some assist from wife, both are retired.   Functional Level Prior   Ambulation 0-->independent   Transferring 0-->independent   Toileting 2-->assistive person   Bathing 0-->independent   Dressing 0-->independent   Eating 0-->independent   Communication 0-->understands/communicates without difficulty   Swallowing 0-->swallows foods/liquids without difficulty   Cognition 1 - attention or memory deficits   Fall history within last six months no   Which of the above functional risks had a recent onset or change? transferring;ambulation   Prior Functional Level Comment Pt came from TCU however was IND prior to first hospital admission.    General Information   Onset of Illness/Injury or Date of Surgery - Date 05/25/17   Referring Physician Criss Vaz PA-C   Patient/Family Goals Statement Pt wants to return home safely.   Pertinent History of Current Problem (include personal factors and/or comorbidities that impact the POC) 71 year old male with history of relapsed multiple myeloma, recently resumed Kyprolis (5/23), and recent admission earlier this month for back pain, JOSE MARIA, and hypercalcemia, who is being admitted to our service with altered mental status   Precautions/Limitations fall precautions   Weight-Bearing Status - LUE full weight-bearing   Weight-Bearing Status - RUE full weight-bearing   Weight-Bearing Status - LLE full weight-bearing   Weight-Bearing Status - RLE full weight-bearing   General Observations On RA, per RN improved mental status compared to this morning.   General Info Comments PT: up ad cassi   Cognitive Status Examination   Orientation orientation to person, place and time   Level of Consciousness alert   Follows Commands and Answers Questions 75% of the time;able to follow single-step instructions    Personal Safety and Judgment intact   Memory intact   Pain Assessment   Patient Currently in Pain Yes, see Vital Sign flowsheet  (Back pain)   Posture    Posture Kyphosis   Range of Motion (ROM)   ROM Comment ROM WFL   Strength   Strength Comments B LE strength grossly 4/5   Bed Mobility   Bed Mobility Comments ModA for bed mobility   Transfer Skills   Transfer Comments SBA for transfers   Gait   Gait Comments Ambulated 100' with use of FWW and CGA, improved step length and posture with cues   Balance   Balance Comments Impaired standing balance, when standing in front of chair with no UE support demonstrating shaking/unsteadiness   Sensory Examination   Sensory Perception no deficits were identified   Coordination   Coordination no deficits were identified   Muscle Tone   Muscle Tone no deficits were identified   General Therapy Interventions   Planned Therapy Interventions balance training;bed mobility training;gait training;strengthening;transfer training;risk factor education;home program guidelines;progressive activity/exercise   Clinical Impression   Criteria for Skilled Therapeutic Intervention yes, treatment indicated   PT Diagnosis Impaired functional mobility   Influenced by the following impairments Decreased strength, balance and activity tolerance   Functional limitations due to impairments Inability to perform functional mobility at baseline level of functioning   Clinical Presentation Evolving/Changing   Clinical Presentation Rationale Mental status changing as day goes on   Clinical Decision Making (Complexity) Low complexity   Therapy Frequency` 5 times/week   Predicted Duration of Therapy Intervention (days/wks) 1 week   Anticipated Equipment Needs at Discharge front wheeled walker   Anticipated Discharge Disposition Transitional Care Facility;Home with Home Therapy   Risk & Benefits of therapy have been explained Yes   Patient, Family & other staff in agreement with plan of care Yes   Clinical  "Impression Comments Pt would beenfit from in-patient PT in order to increase strength and IND with functional mobility.   [facility] AM-PAC TM \"6 Clicks\"   2016, Trustees of [facility], under license to [employer].  All rights reserved.   6 Clicks Short Forms Basic Mobility Inpatient Short Form   [facility] AM-PAC  \"6 Clicks\" V.2 Basic Mobility Inpatient Short Form   1. Turning from your back to your side while in a flat bed without using bedrails? 3 - A Little   2. Moving from lying on your back to sitting on the side of a flat bed without using bedrails? 3 - A Little   3. Moving to and from a bed to a chair (including a wheelchair)? 3 - A Little   4. Standing up from a chair using your arms (e.g., wheelchair, or bedside chair)? 3 - A Little   5. To walk in hospital room? 3 - A Little   6. Climbing 3-5 steps with a railing? 2 - A Lot   Basic Mobility Raw Score (Score out of 24.Lower scores equate to lower levels of function) 17   Total Evaluation Time   Total Evaluation Time (Minutes) 10[BD1.1]        Revision History        User Key Date/Time User Provider Type Action    > BD1.1 5/27/2017  2:18 PM Alexia Hoyos PT Physical Therapist Sign            Progress Notes by Airam Carolina OT at 5/27/2017 12:45 PM     Author:  Airam Carolina OT Service:  (none) Author Type:  Occupational Therapist    Filed:  5/27/2017 12:45 PM Date of Service:  5/27/2017 12:45 PM Creation Time:  5/27/2017 12:45 PM    Status:  Signed :  Airam Carolina OT (Occupational Therapist)          05/27/17 0800   Quick Adds   Type of Visit Initial Occupational Therapy Evaluation   Living Environment   Lives With spouse   Living Arrangements house   Home Accessibility bed not on first floor;stairs to enter home;stairs within home   Number of Stairs to Enter Home 3   Number of Stairs Within Home 12   Transportation Available car;family or friend will provide   Living Environment Comment Patient came to hospital from " "TCU, unsure how long he has been at [facility] but states \"not long\". Pt. lives with wife in 3 level home, does not need to access upper level. Patient states he has a walk-in shower with a bench however upon chart review, requires to use 12 steps. Receives in-home cleaning services   Self-Care   Dominant Hand right   Usual Activity Tolerance good   Current Activity Tolerance moderate   Regular Exercise no   Activity/Exercise/Self-Care Comment [facility] at [facility] prior to [facility]   Functional Level Prior   Ambulation 0-->independent   Transferring 2-->assistive person   Toileting 2-->assistive person   Bathing 0-->independent   Dressing 0-->independent   Eating 0-->independent   Communication 0-->understands/communicates without difficulty   Swallowing 0-->swallows foods/liquids without difficulty   Cognition 2 - difficulty with organizing thoughts   Fall history within last six months no   Which of the above functional risks had a recent onset or change? ambulation;transferring;dressing;bathing;toileting;cognition   Prior Functional Level Comment Patient came from [facility]. States he was previous independent with ADLs and functional mobility. Required assistance with cooking, cleaning, laundry and medication management.    General Information   Onset of Illness/Injury or Date of Surgery - Date 05/25/17   Referring Physician Criss Vaz, ALPHONSO   Patient/Family Goals Statement Decrease back pain   Additional Occupational Profile Info/Pertinent History of Current Problem Per chart review: \" 71 year old male with history of relapsed multiple myeloma, recently resumed Kyprolis (5/23), and recent admission earlier this month for back pain, JOSE MARIA, and hypercalcemia, who is being admitted to our service with altered mental status\"   Precautions/Limitations fall precautions   Heart Disease Risk Factors Medical history   General Observations Patient supine in bed upon arrival   General Info Comments On room air, IV intact   Cognitive " "Status Examination   Orientation person;place   Level of Consciousness alert   Able to Follow Commands moderate impairment   Personal Safety (Cognitive) decreased awareness, need for assist;decreased awareness, need for safety;decreased insight to deficits   Memory impaired   Cognitive Comment Patient oriented to person, place. Disoriented to time stating it was \"2018\". Had difficulty following directions and required one-step commands   Visual Perception   Visual Perception Wears glasses   Sensory Examination   Sensory Quick Adds No deficits were identified   Pain Assessment   Patient Currently in Pain Yes, see Vital Sign flowsheet   Posture   Posture forward head position;protracted shoulders   Range of Motion (ROM)   ROM Quick Adds No deficits were identified   Strength   Strength Comments Not formally assessed due to back pain, generalized weakness noted during ADLs and functional transfer   Muscle Tone Assessment   Muscle Tone Quick Adds No deficits were identified   Mobility   Bed Mobility Comments Supine>sit with moderate Ax1 and FWW   Transfer Skills   Transfer Comments Min A for sit<>stand with FWW, verbal cues for hand placement   Toilet Transfer   Toilet Transfer Comments Transfer to commode with moderate Ax1 using FWW   Lower Body Dressing   Level of [place]: Dress Lower Body maximum assist (25% patients effort)   Physical Assist/Nonphysical Assist: Dress Lower Body 1 person assist   Toileting   Level of [place]: Toilet maximum assist (25% patients effort)   Physical Assist/Nonphysical Assist: Toilet 1 person assist   Activities of Daily Living Analysis   Impairments Contributing to Impaired Activities of Daily Living balance impaired;cognition impaired;pain;postural control impaired;strength decreased   General Therapy Interventions   Planned Therapy Interventions ADL retraining;balance training;bed mobility training;cognition;strengthening;transfer training;home program guidelines;progressive " "activity/exercise;risk factor education   Clinical Impression   Criteria for Skilled Therapeutic Interventions Met yes, treatment indicated   OT Diagnosis Decrease independence with ADLs and functional mobility   Influenced by the following impairments pain, weakness, decrease cognition   Assessment of Occupational Performance 5 or more Performance Deficits   Identified Performance Deficits dressing, bathing, toileting, transfers, grooming/hygiene   Clinical Decision Making (Complexity) Moderate complexity   Therapy Frequency 5 times/wk   Predicted Duration of Therapy Intervention (days/wks) x10 days   Anticipated Discharge Disposition Transitional Care Facility   Risks and Benefits of Treatment have been explained. Yes   Patient, Family & other staff in agreement with plan of care Yes   [facility] TM \"6 Clicks\"   2016, Trustees of [facility], under license to [employer].  All rights reserved.   6 Clicks Short Forms Daily Activity Inpatient Short Form   [facility]  \"6 Clicks\" Daily Activity Inpatient Short Form   1. Putting on and taking off regular lower body clothing? 2 - A Lot   2. Bathing (including washing, rinsing, drying)? 2 - A Lot   3. Toileting, which includes using toilet, bedpan or urinal? 2 - A Lot   4. Putting on and taking off regular upper body clothing? 2 - A Lot   5. Taking care of personal grooming such as brushing teeth? 3 - A Little   6. Eating meals? 4 - None   Daily Activity Raw Score (Score out of 24.Lower scores equate to lower levels of function) 15   Total Evaluation Time   Total Evaluation Time (Minutes) 10[JC1.1]        Revision History        User Key Date/Time User Provider Type Action    > JC1.1 5/27/2017 12:45 PM Airam Carolina OT Occupational Therapist Sign            Progress Notes by Criss Vaz PA-C at 5/26/2017  6:15 PM     Author:  Criss Vaz PA-C Service:  Hem/Onc Author Type:  Physician Assistant - C    Filed:  5/26/2017  " 7:06 PM Date of Service:  5/26/2017  6:15 PM Creation Time:  5/26/2017  6:15 PM    Status:  Attested :  Criss Vaz PA-C (Physician Assistant - PAULETTE)    Cosigner:  Serina Vang MD at 5/26/2017 10:22 PM        Attestation signed by Serina Vang MD at 5/26/2017 10:22 PM        Attending note.  With the team of midlevel practitioners, nurses and a fellow, we reviewed today's events, vital signs, medications, labs and imaging results and then I have independently seen and examined the patient. I have discussed todays' plan with the team, read and corrected the progress note and agree with the findings.      71 year old male with history of relapsed multiple myeloma, recently resumed Kyprolis (5/23), admitted with subacute mental status changes, delirium and somnolence.  W/u showed new pneumonia. No fevers today, still intermittent confused, very weak. No new complains.  O/E oriented to place, weak, in bed, unable to sit up, pale, ecchymosis, Chest with crackles, RRR, abd soft.  A/P End-stage myeloma -- with new onset pneumonia - possible aspiration - will start Zosyn/Vanco and monitor. Blood cx and aspiration precautions. Will monitor, recheck viscosity and IgM. Hope he clears quickly.   Serina Vang MD  Associate Professor of Medicine  [phone].                                [facility], [place]    Hematology / Oncology Progress Note    Date of Admission: 5/25/2017  Hospital Day #: 1   Date of Service (when I saw the patient): 05/26/2017     Assessment & Plan   Anthony Carbone is a 71 year old male with history of relapsed multiple myeloma, recently resumed Kyprolis (5/23), and recent admission earlier this month for back pain, JOSE MARIA, and hypercalcemia, who is being admitted to our service with altered mental status.      Neuro  #AMS. RN at TCU noted patient more confused 5/24. Kyprolis was held. Per wife, patient's confusion waxes and wanes and has  been more fatigued since 5/24. Intermittently agitated. Feeling fatigued over past 1-2 days prior to admission. Patient was an unreliable historian in clinic appointment 5/25 due to confusion, would answer questions incorrectly or would not provide answers. Had difficulty participating in exam in clinic (per outpatient PA). Recently admitted for AMS 5/7-516 with likely multifactorial cause including metabolic derangements, uremia, and possible infection. Recently started MS Contin 5/19 with improvement of pain but no apparent AMS on reevaluation 5/23.   -Continue Zyprexa 5 mg bid.  -Cr on admission elevated to 1.31, has since improved. Electrolytes WNL.   -BCx NGTD, TSH WNL, ammonia normal, UA/UC negative  -MIVF  -PRN haldol 5 mg q6hrs prn    #Hx CVA. suspected subacute CVA 4/2016.  -Continue PTA clopidogrel     ID  #Pneumonia. CXR with LLL opacity and increased perihilar opacities, with stable L pleural effusion. Denies shortness of breath, chest pain, or cough. No sputum production.  -Started on IV Vancomycin and IV Zosyn.  -Consider SLP consult to evaluate for aspiration once patient improves.    #Anti-infectives.  -Acyclovir 400 mg bid    Heme  #IgM lamda multiple myeloma. Origonially diagnosed 1/2012. S/p therapy with Revlimid + dex for 4-5 cycles but plateaued 3/2012. Velcade added, completed 2-3 cycles then achieving GPR. 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 In 7/2014 found to have return of M-spike with marrow involvement (PET negative) and began retreatment with RVD in 8/14. Completed 5 cycles, 6th complicated by infection. Bendamustine added to VD 5/15 due to progression but tolerated poorly, dose reduced with cycle 2 and completed 5 cycles in total. Found to have increasing M-spike 10/2015 and started Pomolyst and weekly decadron 10/2015. Carflizomib added. Daratumumab added 11/2016. Admitted to hospital 5/2017 for AMS, while inpatient  started Kyprolis and continues on this.   -Kyprolis held (since 5/24) due to AMS, will continue to hold while inpatient.  -Viscosity index of 2.1 (H)  -SPEP (Albumin 3.1 (L), gamma 2.1 (H), and monoclonal peak 1.9 (H)  -Light chains-kappa WNL, lambda elevated @ 29    #Anemia, Thrombocytopenia. 2/2 to treatment and MM in setting of progression.  -Transfuse for Hgb <8 and plts <10K. Received PRBCs today.    Renal  #JOSE MARIA. Cr elevated to 1.31 on admission. 1L fluid bolus given in clinic, another on admission. MIVF continued. Cr improved to 0.8. Will monitor.    Misc  #Back/ rib pain. Started 5/2017. Lumbar MRI at OSH showing mild-mod central and foraminal stenoses in lumbar spine, per patient's wife, no MM lesion there. Rib films from last admission negative. Started MS Contin 15 mg bid 5/19 with improvement of pain and no e/o AMS.  -Held MS contin on admission  -Palliative consult placed, appreciate recs.  -Tramadol 25-50 mg PO q6 hrs prn (use first)  -IV dilaudid 1 mg q 3hrs prn  -Lidocaine patches  -Tylenol 1000 mg tid started today.  -Palliative suggested considering a f/u apt outpatient with their team for further management. May also consider something like a fentanyl patch for pain management outpatient to avoid possible decreased clearance of narcotic if JOSE MARIA.    CV  #Hypertension.  -Continue PTA amlodipine  -Avoid QTC prolonging agents (has hx of QTc prolongation and syncopal episodes).  -Consider midodrine for symptomatic orthostatic hypotension.    GI  #History of GERD  - Protonix 40 mg daily  -Carafate PRN    FEN:  -NS @ 100 ml/hr  -PRN lyte replacement  -RDAT    Prophy/Misc:  -VTE: will reevaluate in am  -GI/PUD: Protonix 40 mg daily  -Bowels: senna and miralax prn  -PT/OT consult    Code status: Full  Disposition: Improvement of AMS, remains afebrile, pain management, and resolution of JOSE MARIA, unsure at this time.    Criss Vaz PA-C  Hematology/Oncology  [phone]      Interval History   Patient was  mildly somnolent when visiting today. Answered questions when asked. Denied pain, SOB, chest pain, headaches, vision changes, nausea, vomiting, abdominal pain, diarrhea, dysuria. Was alert and oriented x4, disoriented to situation. Did not open eyes during interview or exam. Minimally participated during exam.    Physical Exam   Temp: 98.4  F (36.9  C) Temp src: Oral BP: 135/75 Pulse: 80   Resp: 18 SpO2: 93 % O2 Device: None (Room air)    Vitals:    05/25/17 1729 05/26/17 1200   Weight: 62.9 kg (138 lb 11.2 oz) 59.8 kg (131 lb 12.8 oz)     Vital Signs with Ranges  Temp:  [97.1  F (36.2  C)-98.7  F (37.1  C)] 98.4  F (36.9  C)  Pulse:  [71-90] 80  Resp:  [16-18] 18  BP: (135-160)/(60-80) 135/75  SpO2:  [93 %-96 %] 93 %  I/O last 3 completed shifts:  In: 1965 [P.O.:240; I.V.:725; IV Piggyback:1000]  Out: 1825 [Urine:1825]    Constitutional: 72 y/o male seen resting in bed in NAD. Interactive with questions however did not open eyes or move during assessment.  HEENT: NCAT. Oral mucosa pink and moist with no lesions or thrush.  Respiratory: Non-labored breathing, good air exchange, coarse lung sounds, especially on left side, good aeration to bases. Some crackles noted at bases. No wheezing or rhonchi. Had to return for better lung exam as patient would not sit or roll during first exam.   Cardiovascular: Regular rate and rhythm. No murmur or rub.   GI: Normoactive bowel sounds. Abdomen soft, non-distended, and non-tender. No palpable masses or organomegaly.  Skin: Warm and dry. No concerning lesions or rash on exposed surfaces. Bruising on hand noted.  Musculoskeletal: Extremities grossly normal, non-tender, 1+ pitting edema.   Neurologic: A&O to person, place and time, disoriented to situation, speech normal. Minimal participation in exam. Was more alert in afternoon when revisited but still tired. Responsive to questions and answered them appropriately.  Neuropsychiatric: Mentation and affect appear  normal/appropriate.    Medications   Current Facility-Administered Medications   Medication     piperacillin-tazobactam (ZOSYN) 4.5 g vial to attach to  mL bag     vancomycin (VANCOCIN) 1,500 mg in NaCl 0.9 % 250 mL intermittent infusion     enoxaparin (LOVENOX) injection 40 mg     traMADol (ULTRAM) half-tab 25-50 mg     acetaminophen (TYLENOL) tablet 1,000 mg     HYDROmorphone (DILAUDID) half-tab 1 mg     haloperidol (HALDOL) tablet 5 mg     Medication Instruction     senna-docusate (SENOKOT-S;PERICOLACE) 8.6-50 MG per tablet 1-2 tablet     polyethylene glycol (MIRALAX/GLYCOLAX) Packet 17 g     acyclovir (ZOVIRAX) tablet 400 mg     amLODIPine (NORVASC) tablet 5 mg     clopidogrel (PLAVIX) tablet 75 mg     pantoprazole (PROTONIX) EC tablet 40 mg     lidocaine (LIDODERM) 5 % Patch 3 patch     OLANZapine (zyPREXA) tablet 5 mg     ondansetron (ZOFRAN-ODT) ODT tab 8 mg     simethicone (MYLICON) chewable tablet 160 mg     simvastatin (ZOCOR) tablet 20 mg     sucralfate (CARAFATE) tablet 1 g     0.9% sodium chloride infusion     lidocaine (LIDODERM) patch REMOVAL     lidocaine (LIDODERM) Patch in Place     naloxone (NARCAN) injection 0.1-0.4 mg     Facility-Administered Medications Ordered in Other Encounters   Medication     filgrastim (NEUPOGEN) injection vial 780 mcg       Data   CBC  Recent Labs  Lab 05/26/17  0943 05/25/17  1947 05/25/17  1304 05/24/17  1415 05/23/17  1105   WBC 5.2  --  9.4 5.1 4.1   RBC 2.49*  --  2.77* 2.70* 2.91*   HGB 7.5*  --  8.7* 8.3* 9.1*   HCT 24.8*  --  28.3* 26.6* 29.7*     --  102* 99 102*   MCH 30.1  --  31.4 30.7 31.3   MCHC 30.2*  --  30.7* 31.2* 30.6*   RDW 18.9*  --  18.6* 18.5* 18.6*   PLT 98* 100* 118* 120* 116*     CMP  Recent Labs  Lab 05/26/17  0943 05/25/17  1947 05/25/17  1304 05/24/17  1415 05/23/17  1105     --  138 138 136   POTASSIUM 3.5  --  4.9 4.6 4.7   CHLORIDE 110*  --  107 104 102   CO2 25  --  23 24 26   ANIONGAP 8  --  8 10 8   GLC 96  --  135*  208* 97   BUN 18  --  37* 28 18   CR 0.80 1.20 1.31* 1.17 0.89   GFRESTIMATED >90Non  GFR Calc 59* 54* 61 84   GFRESTBLACK >90African American GFR Calc 72 65 74 >90African American GFR Calc   JAZMIN 8.3*  --  9.2 9.1 9.3   MAG  --   --  2.2 2.1  --    PROTTOTAL  --   --  8.2 7.8 8.0   ALBUMIN  --   --  3.0* 2.9* 2.9*   BILITOTAL  --   --  0.3 0.3 0.4   ALKPHOS  --   --  60 47 52   AST  --   --  12 14 18   ALT  --   --  21 19 17     INRNo lab results found in last 7 days.    Results for orders placed or performed during the hospital encounter of 05/25/17   XR Chest 2 Views    Narrative    EXAM: XR CHEST 2 VW  5/25/2017 6:22 PM      HISTORY: Altered mental status, evaluate for infection    COMPARISON: Chest radiograph 5/10/2017, 5/8/2017    FINDINGS:     AP and lateral views of the chest. Right IJ central venous catheter  tip projects in the mid low right atrium.    The cardiomediastinal silhouette is within normal limits. Increased  left perihilar and left lower lobe pulmonary opacities.     Stable trace left pleural effusion. No pneumothorax.       Impression    IMPRESSION:   1.  Increased left perihilar and left lower lobe pulmonary opacities,  infection, aspiration and/or atelectasis.  2.  Stable trace left pleural effusion.    I have personally reviewed the examination and initial interpretation  and I agree with the findings.    MICH VINCENT MD[CR1.1]          Revision History        User Key Date/Time User Provider Type Action    > CR1.1 5/26/2017  7:06 PM Criss Vaz PA-C Physician Assistant - C Sign            Progress Notes by Savanna Granados at 5/26/2017 12:22 PM     Author:  Savanna Granados Service:  Spiritual Health Author Type:      Filed:  5/26/2017 12:31 PM Date of Service:  5/26/2017 12:22 PM Creation Time:  5/26/2017 12:22 PM    Status:  Signed :  Savanna Granados ()         SPIRITUAL HEALTH SERVICES  SPIRITUAL ASSESSMENT Progress Note  [facility] ([place]) 7D  "    REFERRAL SOURCE: Patient requested hospital  on admission to the unit.    Shared a reflective conversation with Casey, Yamil's wife, at Yamil's bedside. Ymail was drifting in and out of restfulness as we talked. Casey shared that pain has been a significant issue for Yamil in the past few weeks and as they have worked to control that issue, he has been \"somewhat disoriented.\" Yamil asked to hold Casey's hand as help for him to feel grounded. Shared a prayer together for answers and direction as to what is happening in Yamil's body at this time.     PLAN: I will follow up with Yamil 1-2x/week as he remains on the unit.     Savanna Granados  Chaplain Resident  Pager [phone][SK1.1]       Revision History        User Key Date/Time User Provider Type Action    > SK1.1 5/26/2017 12:31 PM Savanna Granados Sign            Progress Notes by Cassie Pan RN at 5/26/2017  5:00 AM     Author:  Cassie Pan RN Service:  Oncology Author Type:  Registered Nurse    Filed:  5/26/2017  6:09 AM Date of Service:  5/26/2017  5:00 AM Creation Time:  5/26/2017  6:09 AM    Status:  Signed :  Cassie Pan RN (Registered Nurse)         Pt pulled L) peripheral IV out. Pressure dressing applied to site.[BR1.1]      Revision History        User Key Date/Time User Provider Type Action    > BR1.1 5/26/2017  6:09 AM Cassie Pan RN Registered Nurse Sign            Progress Notes by Cassie Pan RN at 5/26/2017  3:22 AM     Author:  Cassie Pan RN Service:  Oncology Author Type:  Registered Nurse    Filed:  5/26/2017  4:53 AM Date of Service:  5/26/2017  3:22 AM Creation Time:  5/26/2017  3:22 AM    Status:  Addendum :  Cassie Pan RN (Registered Nurse)         Pt pulled port out, took off arm band, set off bed alarm.[BR1.1] Pressure dressing applied to port site. L) peripheral IV place infusing NS @ 100mL/hr.[BR1.2] Video patient monitoring initiated.[BR1.1] Bed " alarm. Pt sets off bed alarm when needing to use commode. C/o lower back pain, 2mg PO dilaudid given. K-Pad ordered. Assist x 1 to commode. LBM this shift. Oriented to person & time. Adequate UOP. Will continue w/POC.[BR1.2]      Revision History        User Key Date/Time User Provider Type Action    > BR1.2 5/26/2017  4:53 AM Cassie Pan, RN Registered Nurse Addend     BR1.1 5/26/2017  3:25 AM Cassie Pan RN Registered Nurse Sign                  Procedure Notes     No notes of this type exist for this encounter.         Progress Notes - Therapies (Notes from 05/26/17 through 05/29/17)      Progress Notes by lAexia Hoyos PT at 5/27/2017  2:17 PM     Author:  Alexia Hoyos PT Service:  (none) Author Type:  Physical Therapist    Filed:  5/27/2017  2:18 PM Date of Service:  5/27/2017  2:17 PM Creation Time:  5/27/2017  2:17 PM    Status:  Signed :  Alexia Hoyos PT (Physical Therapist)          05/27/17 1315   Quick Adds   Type of Visit Initial PT Evaluation   Living Environment   Lives With spouse   Living Arrangements house   Home Accessibility stairs within home;stairs to enter home   Number of Stairs to Enter Home 3   Number of Stairs Within Home 12   Stair Railings at Home (No railing on outside stairs)   Transportation Available family or friend will provide   Living Environment Comment Pt lives in 3 story home with wife, does not need to go up to 2nd level, bathroom with walk-in shower in basement, 3 stairs to enter with no rail.  Pt admitted from TCU, has not been home for a while as he was hospitalzed then sent right to TCU.   Self-Care   Dominant Hand left   Usual Activity Tolerance good   Current Activity Tolerance moderate   Regular Exercise no   Activity/Exercise/Self-Care Comment Pt at TCU prior to admission, was receiving theapy there.  Per pt and wife prior to hospitalization/TCU admission pt was mostly IND at home, receiving some assist from wife, both are  retired.   Functional Level Prior   Ambulation 0-->independent   Transferring 0-->independent   Toileting 2-->assistive person   Bathing 0-->independent   Dressing 0-->independent   Eating 0-->independent   Communication 0-->understands/communicates without difficulty   Swallowing 0-->swallows foods/liquids without difficulty   Cognition 1 - attention or memory deficits   Fall history within last six months no   Which of the above functional risks had a recent onset or change? transferring;ambulation   Prior Functional Level Comment Pt came from TCU however was IND prior to first hospital admission.    General Information   Onset of Illness/Injury or Date of Surgery - Date 05/25/17   Referring Physician Criss Vaz, ALPHONSO   Patient/Family Goals Statement Pt wants to return home safely.   Pertinent History of Current Problem (include personal factors and/or comorbidities that impact the POC) 71 year old male with history of relapsed multiple myeloma, recently resumed Kyprolis (5/23), and recent admission earlier this month for back pain, JOSE MARIA, and hypercalcemia, who is being admitted to our service with altered mental status   Precautions/Limitations fall precautions   Weight-Bearing Status - LUE full weight-bearing   Weight-Bearing Status - RUE full weight-bearing   Weight-Bearing Status - LLE full weight-bearing   Weight-Bearing Status - RLE full weight-bearing   General Observations On RA, per RN improved mental status compared to this morning.   General Info Comments PT: up ad cassi   Cognitive Status Examination   Orientation orientation to person, place and time   Level of Consciousness alert   Follows Commands and Answers Questions 75% of the time;able to follow single-step instructions   Personal Safety and Judgment intact   Memory intact   Pain Assessment   Patient Currently in Pain Yes, see Vital Sign flowsheet  (Back pain)   Posture    Posture Kyphosis   Range of Motion (ROM)   ROM Comment ROM WFL  "  Strength   Strength Comments B LE strength grossly 4/5   Bed Mobility   Bed Mobility Comments ModA for bed mobility   Transfer Skills   Transfer Comments SBA for transfers   Gait   Gait Comments Ambulated 100' with use of FWW and CGA, improved step length and posture with cues   Balance   Balance Comments Impaired standing balance, when standing in front of chair with no UE support demonstrating shaking/unsteadiness   Sensory Examination   Sensory Perception no deficits were identified   Coordination   Coordination no deficits were identified   Muscle Tone   Muscle Tone no deficits were identified   General Therapy Interventions   Planned Therapy Interventions balance training;bed mobility training;gait training;strengthening;transfer training;risk factor education;home program guidelines;progressive activity/exercise   Clinical Impression   Criteria for Skilled Therapeutic Intervention yes, treatment indicated   PT Diagnosis Impaired functional mobility   Influenced by the following impairments Decreased strength, balance and activity tolerance   Functional limitations due to impairments Inability to perform functional mobility at baseline level of functioning   Clinical Presentation Evolving/Changing   Clinical Presentation Rationale Mental status changing as day goes on   Clinical Decision Making (Complexity) Low complexity   Therapy Frequency` 5 times/week   Predicted Duration of Therapy Intervention (days/wks) 1 week   Anticipated Equipment Needs at Discharge front wheeled walker   Anticipated Discharge Disposition Transitional Care Facility;Home with Home Therapy   Risk & Benefits of therapy have been explained Yes   Patient, Family & other staff in agreement with plan of care Yes   Clinical Impression Comments Pt would beenfit from in-patient PT in order to increase strength and IND with functional mobility.   [facility] AM-PAC TM \"6 Clicks\"   2016, Trustees of [facility], under license to " "[employer].  All rights reserved.   6 Clicks Short Forms Basic Mobility Inpatient Short Form   [facility] AM-PAC  \"6 Clicks\" V.2 Basic Mobility Inpatient Short Form   1. Turning from your back to your side while in a flat bed without using bedrails? 3 - A Little   2. Moving from lying on your back to sitting on the side of a flat bed without using bedrails? 3 - A Little   3. Moving to and from a bed to a chair (including a wheelchair)? 3 - A Little   4. Standing up from a chair using your arms (e.g., wheelchair, or bedside chair)? 3 - A Little   5. To walk in hospital room? 3 - A Little   6. Climbing 3-5 steps with a railing? 2 - A Lot   Basic Mobility Raw Score (Score out of 24.Lower scores equate to lower levels of function) 17   Total Evaluation Time   Total Evaluation Time (Minutes) 10[BD1.1]        Revision History        User Key Date/Time User Provider Type Action    > BD1.1 5/27/2017  2:18 PM Alexia Hoyos, PT Physical Therapist Sign            Progress Notes by Airam Carolina OT at 5/27/2017 12:45 PM     Author:  Airam Carolina OT Service:  (none) Author Type:  Occupational Therapist    Filed:  5/27/2017 12:45 PM Date of Service:  5/27/2017 12:45 PM Creation Time:  5/27/2017 12:45 PM    Status:  Signed :  Airam Carolina OT (Occupational Therapist)          05/27/17 0800   Quick Adds   Type of Visit Initial Occupational Therapy Evaluation   Living Environment   Lives With spouse   Living Arrangements house   Home Accessibility bed not on first floor;stairs to enter home;stairs within home   Number of Stairs to Enter Home 3   Number of Stairs Within Home 12   Transportation Available car;family or friend will provide   Living Environment Comment Patient came to hospital from TCU, unsure how long he has been at TCU but states \"not long\". Pt. lives with wife in 3 level home, does not need to access upper level. Patient states he has a walk-in shower with a bench however upon chart review, " "requires to use 12 steps. Receives in-home cleaning services   Self-Care   Dominant Hand right   Usual Activity Tolerance good   Current Activity Tolerance moderate   Regular Exercise no   Activity/Exercise/Self-Care Comment Patient at TCU prior to admission   Functional Level Prior   Ambulation 0-->independent   Transferring 2-->assistive person   Toileting 2-->assistive person   Bathing 0-->independent   Dressing 0-->independent   Eating 0-->independent   Communication 0-->understands/communicates without difficulty   Swallowing 0-->swallows foods/liquids without difficulty   Cognition 2 - difficulty with organizing thoughts   Fall history within last six months no   Which of the above functional risks had a recent onset or change? ambulation;transferring;dressing;bathing;toileting;cognition   Prior Functional Level Comment Patient came from TCU. States he was previous independent with ADLs and functional mobility. Required assistance with cooking, cleaning, laundry and medication management.    General Information   Onset of Illness/Injury or Date of Surgery - Date 05/25/17   Referring Physician Criss Vaz, ALPHONSO   Patient/Family Goals Statement Decrease back pain   Additional Occupational Profile Info/Pertinent History of Current Problem Per chart review: \" 71 year old male with history of relapsed multiple myeloma, recently resumed Kyprolis (5/23), and recent admission earlier this month for back pain, JOSE MARIA, and hypercalcemia, who is being admitted to our service with altered mental status\"   Precautions/Limitations fall precautions   Heart Disease Risk Factors Medical history   General Observations Patient supine in bed upon arrival   General Info Comments On room air, IV intact   Cognitive Status Examination   Orientation person;place   Level of Consciousness alert   Able to Follow Commands moderate impairment   Personal Safety (Cognitive) decreased awareness, need for assist;decreased awareness, need " "for safety;decreased insight to deficits   Memory impaired   Cognitive Comment Patient oriented to person, place. Disoriented to time stating it was \"2018\". Had difficulty following directions and required one-step commands   Visual Perception   Visual Perception Wears glasses   Sensory Examination   Sensory Quick Adds No deficits were identified   Pain Assessment   Patient Currently in Pain Yes, see Vital Sign flowsheet   Posture   Posture forward head position;protracted shoulders   Range of Motion (ROM)   ROM Quick Adds No deficits were identified   Strength   Strength Comments Not formally assessed due to back pain, generalized weakness noted during ADLs and functional transfer   Muscle Tone Assessment   Muscle Tone Quick Adds No deficits were identified   Mobility   Bed Mobility Comments Supine>sit with moderate Ax1 and FWW   Transfer Skills   Transfer Comments Min A for sit<>stand with FWW, verbal cues for hand placement   Toilet Transfer   Toilet Transfer Comments Transfer to commode with moderate Ax1 using FWW   Lower Body Dressing   Level of [place]: Dress Lower Body maximum assist (25% patients effort)   Physical Assist/Nonphysical Assist: Dress Lower Body 1 person assist   Toileting   Level of [place]: Toilet maximum assist (25% patients effort)   Physical Assist/Nonphysical Assist: Toilet 1 person assist   Activities of Daily Living Analysis   Impairments Contributing to Impaired Activities of Daily Living balance impaired;cognition impaired;pain;postural control impaired;strength decreased   General Therapy Interventions   Planned Therapy Interventions ADL retraining;balance training;bed mobility training;cognition;strengthening;transfer training;home program guidelines;progressive activity/exercise;risk factor education   Clinical Impression   Criteria for Skilled Therapeutic Interventions Met yes, treatment indicated   OT Diagnosis Decrease independence with ADLs and functional mobility " "  Influenced by the following impairments pain, weakness, decrease cognition   Assessment of Occupational Performance 5 or more Performance Deficits   Identified Performance Deficits dressing, bathing, toileting, transfers, grooming/hygiene   Clinical Decision Making (Complexity) Moderate complexity   Therapy Frequency 5 times/wk   Predicted Duration of Therapy Intervention (days/wks) x10 days   Anticipated Discharge Disposition Transitional Care Facility   Risks and Benefits of Treatment have been explained. Yes   Patient, Family & other staff in agreement with plan of care Yes   [facility] TM \"6 Clicks\"   2016, Trustees of [facility], under license to [employer].  All rights reserved.   6 Clicks Short Forms Daily Activity Inpatient Short Form   [facility] AM-PAC  \"6 Clicks\" Daily Activity Inpatient Short Form   1. Putting on and taking off regular lower body clothing? 2 - A Lot   2. Bathing (including washing, rinsing, drying)? 2 - A Lot   3. Toileting, which includes using toilet, bedpan or urinal? 2 - A Lot   4. Putting on and taking off regular upper body clothing? 2 - A Lot   5. Taking care of personal grooming such as brushing teeth? 3 - A Little   6. Eating meals? 4 - None   Daily Activity Raw Score (Score out of 24.Lower scores equate to lower levels of function) 15   Total Evaluation Time   Total Evaluation Time (Minutes) 10[JC1.1]        Revision History        User Key Date/Time User Provider Type Action    > JC1.1 5/27/2017 12:45 PM Airam Carolina OT Occupational Therapist Sign            "

## 2017-05-25 NOTE — PLAN OF CARE
Problem: Goal Outcome Summary  Goal: Goal Outcome Summary     Nursing Focus: Admission  D: Arrived around 1530 from Outpatient clinic via transport. Patient accompanied by spouse-Casey. Admitted for AMS. Complains of back pain w/ movement.       I: Admission process began.  Patient oriented to room, enviroment, call light.  MD notified of patient's arrival on unit.      A: Vital signs stable, afebrile.  Patient stable at this time.       P: Implement plan of care when available. Continue to monitor patient. Nursing interventions as appropriate. Notify MD with changes in pt status.

## 2017-05-25 NOTE — PROGRESS NOTES
HEMATOLOGY/ONCOLOGY PROGRESS NOTE  May 25, 2017    REASON FOR VISIT: add-on fatigue, confusion     Diagnosis: 71 year old gentleman with IgM lambda multiple myeloma originally diagnosed in 01/2012 as stage I, standard risk disease.   Treatment: Revlimid plus dexamethasone for 4-5 cycles but plateaued by late 03/2012.   - Velcade was added and he received another 2-3 cycles, achieving a good partial remission.      Transplant: Single auto after melphalan 200 mg/m2 preparative regimen on 01/09/2013  - Post-transplant course: unremarkable except for some mild steroid-induced hyperglycemia, gastritis, nausea and vomiting.      Maintenance: Lenalidomide at day-100 then developed a maculopapular rash. Lenalidomide was held and then we re-challenged him after about a month to 2 months at a lower dose (5 mg daily); rash returned.   - was started on maintenance Velcade, every other week through July 2014, when he was noted with abnormalities on myeloma studies drawn there. Noted with prostate cancer dx at about the time of relapse (see below).     Relapse: noted with return on M-spike in blood/urine with marrow involvement but negative PET.   - Started on retreatment with RVD on 8/27/14 with Revlimid at 15 mg 14 days of 21 days, dexamethasone 40 mg weekly and velcade weekly.Completed at total of 5 cycles without complication by 12/2014.   - Started Cycle 6 on inc'd Rev dosing of 20mg daily x 2 weeks on 12/11/14 which was complicated by pneumonia.  - Adm 12/21-1/10 for human metapneumovirus pneumonia complicated by anorexia, HTN, depression, anorexia with significant weight loss.   - Restarted Ernesto/Dex only on 2/4/15 with good tolerance but with thrombocytopenia.   - Bendamustine added to Velcade/dexamethasone on 5/21/15 due to disease progression  - Velcade discontinued on 7/9/2015 due to side effects (orthostasis)  - Schedule changed to bendamustine 80 mg/m2 days 1 and 2 on 28-day cycle  - Cycle 1 tolerated poorly due to  lightheadedness and weakness  - Cycle 2 dose reduced to 60 mg/m2 and pre-meds adjusted  - Cycle 5 received on 9/17/15.  - 10/1/2015 - increasing IgM, M-spike - started Pomalidomide 4mg/day (21 days out of 28 days) and weekly Decadron 20mg on Oct 1st, 2015.    - Carfilzomib added on 10/22/2015 making this CPD.  - C3-C6  received in [place]    - Returned to [facility] and resumed CPD here    - Adm: 4/12-4/14 with fever, confusion, neutropenia. Noted on MRI to have acute/subacute CVA and subacute/chronic CVA; started on Plavix, given brief course of Abx and GCSF prior to d/c.     - Continued on Carfilzomib and dexamethasone alone  - Pomalyst added back on 7/13/2016 for rising FLC  - Start daratumumab 11/10/16. Changed to every other week after 4 weekly treatments d/t profound fatigue and malaise.   - Adm 5/7-5/16 due to AMS, hypercalcemia, hyperuricemia, possible PNA, back pain, and JOSE MARIA. Started Kyprolis while inpatient.    Current Treatment: Kyprolis IV + dexamethasone 20 mg days of Kyprolis. Day 15 given 5/24/17      INTERVAL HISTORY:    Yamil presents for interval follow-up today due to confusion yesterday. Yesterday, the RN at the TCU noted he was a little confused, and his wife noted he was quite tired. Kyprolis was held yesterday.     Today, the confusion continues to wax and wane per his wife, Casey. Casey tells me they had some troubles getting into the car today because Yamil was getting a little agitated. He continues to be very fatigued since yesterday. Casey isn't sure if the confusion is worse today as she isn't with him all of the time, but definitely thinks he is not doing well today. She thinks he is starting to see things, like he though he had a bag on him but there wasn't one. She tells me he is weak and needs the wheelchair again. He was in the walker, albeit slowly, yesterday.    Yamil is unable to provide much history due to the confusion. He did not know his name at the start of our visit, the year, where we  "were, his wife's name; but did know the president and his birthday. He answers \"sure\" to many questions. He does deny fevers, chills, headaches, falls, vision changes, altered taste, nausea, vomiting, GERD, abdominal pain, bowel changes (says last BM yesterday, but Casey reports that was his answer yesterday, too), bleeding, or swelling.    PHYSICAL EXAMINATION  /79  Pulse 91  Temp 98.3  F (36.8  C) (Oral)  Resp 12  SpO2 96%    Wt Readings from Last 10 Encounters:   17 59.2 kg (130 lb 8 oz)   17 59.1 kg (130 lb 6.4 oz)   17 59.1 kg (130 lb 3.2 oz)   17 64.6 kg (142 lb 6.4 oz)   17 64.7 kg (142 lb 10.2 oz)   17 65.1 kg (143 lb 8 oz)   17 64.5 kg (142 lb 3.2 oz)   16 63.2 kg (139 lb 6.4 oz)   16 63.2 kg (139 lb 4.8 oz)   12/15/16 63.3 kg (139 lb 9.6 oz)   General: Intermittently alert, otherwise looking down at the floor. Oriented to  only initially, but then able to tell me his name. Fidgeting, trying to pull out PORT. No acute distress. HEENT: PERRL, no palor or icterus. Exam limited due to poor cooperation, but i believe some thrush was visualized CVS: RRR. CHEST: CTAB, normal work of breathing ABDOMEN: soft non tender no masses palpated in a seated position.  NEURO: AAOX3  Grossly non focal neuro exam. SKIN: no obvious rashes. EXTREMITIES: No edema.     LABS:   Results for WILLIAN ARCINIEGA (MRN [id number]) as of 2017 14:35   Ref. Range 2017 13:04   Sodium Latest Ref Range: 133 - 144 mmol/L 138   Potassium Latest Ref Range: 3.4 - 5.3 mmol/L 4.9   Chloride Latest Ref Range: 94 - 109 mmol/L 107   Carbon Dioxide Latest Ref Range: 20 - 32 mmol/L 23   Urea Nitrogen Latest Ref Range: 7 - 30 mg/dL 37 (H)   Creatinine Latest Ref Range: 0.66 - 1.25 mg/dL 1.31 (H)   GFR Estimate Latest Ref Range: >60 mL/min/1.7m2 54 (L)   GFR Estimate If Black Latest Ref Range: >60 mL/min/1.7m2 65   Calcium Latest Ref Range: 8.5 - 10.1 mg/dL 9.2   Anion Gap " "Latest Ref Range: 3 - 14 mmol/L 8   Magnesium Latest Ref Range: 1.6 - 2.3 mg/dL 2.2   Albumin Latest Ref Range: 3.4 - 5.0 g/dL 3.0 (L)   Protein Total Latest Ref Range: 6.8 - 8.8 g/dL 8.2   Bilirubin Total Latest Ref Range: 0.2 - 1.3 mg/dL 0.3   Alkaline Phosphatase Latest Ref Range: 40 - 150 U/L 60   ALT Latest Ref Range: 0 - 70 U/L 21   AST Latest Ref Range: 0 - 45 U/L 12   Ammonia Latest Ref Range: 10 - 50 umol/L 19   Uric Acid Latest Ref Range: 3.5 - 7.2 mg/dL 6.4     IMPRESSION/PLAN:  71 year old male with relapsed MM after autologous transplant (1/9/2013), with recent progression on daratumumab/pom/dex, with recent hospitalization 5/7-5/16 for back pain, JOSE MARIA,and  Hypercalcemia. Started on kyprolis IV 5/11 as well as po dex.       1. AMS: Onset of AMS 5/7 en route from [place]. At that time, suspected to be multifactorial in setting of metabolic derangements/possible infection/uremia. Mostly resolved inpatient with exception of sundowning, CT head negative. He has been on zyprexa 5 mg BID.   - Yesterday, had waxing and waning AMS and worsening fatigue.  Today, AMS continues to wax and wane and in discussion with my colleague who saw Yamil yesterday, appears to have worsened. At the start of our visit, Yamil was unable to tell me his name, thought the year was 2630, thought he was in the hospital, but knew who the President of the  was. He did provide me his name at the end of our visit. He answered most questions inappropriately or did not respond at all. He was unable to cooperate with most the exam, answering \"sure\" or \"its going better\" to most of the commands or questions. I was unable to fully assess his neuro exam due to lack of cooperation. He was trying to pull out his port during our exam.  - At this time, unclear if this is medication related (started MS Contin 5/19 with improvement in pain and no AMS at 5/23 visit), versus infectious issues (UA clear yesterday, no infectious symptoms on exam and " "although he answers \"no\" to entire infectious ROS, I'm not sure how reliable he is, versus MM. Ammonia normal.  - viscosity, SPEP, light chains pending  - Blood cultures pending, added TSH to labs  - Discussed with Dr. Topete, plan to admit    2. MM: Previously on edgardo/pom/dex while wintering in [place], progressive disease on SPEP inpatient (M spike 0.9 --> 2.1, IgM 3410)  - Received solumedrol 100 mg IV daily 5/8-5/10. Started PO dex 40 mg daily x 3 days on 5/11 with Kyprolis 5/11 and 5/12  - Kyprolis held yesterday for AMS and dehydration, holding again today.    3. Heme: Cytopenias 2/2 treatment and likely MM in setting of progression. Stable today.  - Continues on Plavix for history of CVA -- although will need to monitor platelets  4. Renal/FEN: Creatinine up today above his baseline. He received IVF yesterday, and will receive IVF while awaiting bed. Possibly 2/2 MM versus poor intake at TCU  -Hx Hypercalcemia: s/p pamidronate x 1 on 5/8, calcium corrects to 10 today  -Hx Hyperuricemia: s/p rasburicase x 1 on 5/8    4. ID: No infectious symptoms at this time although his history is unreliable.  - UA negative yesterday.  - BCx pending  - I did think I could visualize some thrush, exam was limited by poor cooperation with opening his mouth  - Continues ppx  mg BID    5. Back/rib pain: Started early May. Lumbar MRI at OSH showing mild-mod central and foraminal stenoses in lumbar spine, per patient and wife, there was no MM lesion there. Rib films inpatient negative, back films stable from prior imaging.  - Significantly improved with addition of MS Contin 15 mg q12h, tolerating this fine without AMS at our visit on 5/23. No dilaudid needs since 5/21.     6. CV: Continue Norvasc and Zocor.   - Avoid QTc prolonging agents (history of QTc prolongation with syncopal episodes)     Leanne Reddy PA-C      I spent >40 minutes with the patient, with over 50% of the time spent counseling or coordinating their care " as described above.

## 2017-05-25 NOTE — H&P
History and Physical  Hematology / Oncology     Date of Admission:  (Not on file)  Date of Service (when I saw the patient): 05/25/17    Assessment & Plan   Anthony Carbone is a 71 year old male with history of relapsed multiple myeloma, recently resumed Kyprolis (5/23), and recent admission earlier this month for back pain, JOSE MARIA, and hypercalcemia, who is being admitted to our service with altered mental status.       1- AMS. RN at TCU noted patient more confused yesterday. Kyprolis was held yesterday. Per wife, patient's confusion waxes and wanes. Intermittently agitated. Feeling fatigued over past 1-2 days, unreliable historian in clinic appointment today. Recently admitted for AMS 5/7-516 with likely multifactorial cause including metabolic derangements, uremia, and possible infection. Recently started MS Contin 5/19 with improvement of pain but no apparent AMS.   -Continue Zyprexa 5 mg bid on admission.  -Labs from clinic to f/u on: CMP, CBC, viscosity, SPEP, light chains, BCx, TSH, ammonia, UA/UC, and CXR.  -Give 1L NS bolus on admission, and then run MIVF  -PRN haldol  2- chronic pain: pain is better, will hold MS Contin  For now and c/w dilaudid, consider palliative consult.    3- JOSE MARIA: Crt slightly elevated, will start IVF and will monitor    4- ID: no fever will do infectious workup and will f/U  - c/w prophy medication   5- hx of relapse MM: on  Ana/Pom/Dex  6- GI: History of GERD, no issues recently  - Continue Nexium QD and Carafate PRN  7- Neuro: Continues on clopidogrel for suspected subacute CVA 4/2016  8- FTT and deconditioning: Neutritional consult  - PT OT   9- CV: History of prolonged QTc anad orthostasis.  - Continue amlodipine 5 mg at bedtime, daily BP checks, and midodrine for symptomatic orthostatic hypotension. Avoid QTc prolonging meds  FEN  - IVF  - monitor lytes and replace as needed  - ADAT  DVT PPX Mechanical  Code status Full Code     Heme  #IgM lambda multiple myeloma.  Origionally dx in 1/2012.        Code Status   Full Code  Chief Complaint   Confusion     History is obtained from the patient and medical record     History of Present Illness   71 year old male with relapsed MM S/P  autologous transplant (1/9/2013), with recent progression on carfilzomib therapy, now on Ana/Pom/Dex , recently admitted for confusion , back pain and JOSE MARIA , discharged to the TCU, today he was noticed to be more confused than his baseline and disoriented, transferred to the hospital for further workup, he denies any fever, no chest pian, SOB, pain is under control, no N/V or diarrhea. VS was stable on exam.   Past Medical History    I have reviewed this patient's medical history and updated it with pertinent information if needed.   Past Medical History:   Diagnosis Date     Coronary artery disease, non-occlusive Jan 2012     Hx of migraines     haven;t bothered him since 2003     Hypertension      Malignant neoplasm (H) 1/2012     Myeloma (H)     multiple     Nonulcer dyspepsia      Polio age 6    no sequelae     Prostate cancer (H)        Past Surgical History   I have reviewed this patient's surgical history and updated it with pertinent information if needed.  Past Surgical History:   Procedure Laterality Date     COLONOSCOPY       ESOPHAGOSCOPY, GASTROSCOPY, DUODENOSCOPY (EGD), COMBINED N/A 1/6/2015    Procedure: COMBINED ESOPHAGOSCOPY, GASTROSCOPY, DUODENOSCOPY (EGD);  Surgeon: Yamil Donaldson Chi, MD;  Location:  GI     ESOPHAGOSCOPY, GASTROSCOPY, DUODENOSCOPY (EGD), COMBINED Left 8/20/2015    Procedure: COMBINED ESOPHAGOSCOPY, GASTROSCOPY, DUODENOSCOPY (EGD), BIOPSY SINGLE OR MULTIPLE;  Surgeon: Mane Barragan MD;  Location: [facility]     right inguinal hernia surgery       right toe surgery         Prior to Admission Medications   Cannot display prior to admission medications because the patient has not been admitted in this contact.     Allergies   Allergies   Allergen Reactions     Aspirin       Stomach upset     Bactrim [Sulfamethoxazole W-Trimethoprim] Rash       Social History   I have reviewed this patient's social history and updated it with pertinent information if needed. Anthony Carbone  reports that he has never smoked. He has never used smokeless tobacco. He reports that he drinks alcohol. He reports that he does not use illicit drugs.    Family History   I have reviewed this patient's family history and updated it with pertinent information if needed.   Family History   Problem Relation Age of Onset     Melanoma No family hx of      Skin Cancer No family hx of        Review of Systems   The 10 point Review of Systems is negative other than noted in the HPI or here.     Physical Exam                      Vital Signs with Ranges  BP: ()/()   Arterial Line BP: ()/()   0 lbs 0 oz    Constitutional: Awake, alert, cooperative, no apparent distress, and appears stated age.  Eyes: Lids and lashes normal, pupils equal, round and reactive to light, extra ocular muscles intact, sclera clear, conjunctiva normal.  ENT: Normocephalic, without obvious abnormality, atraumatic, sinuses nontender on palpation, external ears without lesions, oral pharynx with moist mucus membranes, tonsils without erythema or exudates, gums normal and good dentition.  Respiratory: No increased work of breathing, good air exchange, clear to auscultation bilaterally, no crackles or wheezing.  Cardiovascular: Normal apical impulse, regular rate and rhythm, normal S1 and S2, no S3 or S4, and no murmur noted.  GI: No scars, normal bowel sounds, soft, non-distended, non-tender, no masses palpated, no hepatosplenomegaly.  Lymph/Hematologic: No cervical lymphadenopathy and no supraclavicular lymphadenopathy.  Skin: No bruising or bleeding, normal skin color, texture, turgor, no redness, warmth, or swelling, no rashes, no lesions, no abnormal moles, nails normal without discoloration or clubbing and no jaundice.  Musculoskeletal:  There is no redness, warmth, or swelling of the joints.  Full range of motion noted.  Motor strength is 5 out of 5 all extremities bilaterally.  Tone is normal.  Neurologic: Awake, alert, oriented to name, place and time.  Cranial nerves II-XII are grossly intact.  Motor is 5 out of 5 bilaterally.    Neuropsychiatric: Calm, normal eye contact, alert, normal affect, oriented to self, place, time and situation, memory for past and recent events intact and thought process normal.    Data   Results for orders placed or performed during the hospital encounter of 05/25/17 (from the past 24 hour(s))   XR Chest 2 Views    Narrative    EXAM: XR CHEST 2 VW  5/25/2017 6:22 PM      HISTORY: Altered mental status, evaluate for infection    COMPARISON: Chest radiograph 5/10/2017, 5/8/2017    FINDINGS:     AP and lateral views of the chest. Right IJ central venous catheter  tip projects in the mid low right atrium.    The cardiomediastinal silhouette is within normal limits. Increased  left perihilar and left lower lobe pulmonary opacities.     Stable trace left pleural effusion. No pneumothorax.       Impression    IMPRESSION:   1.  Increased left perihilar and left lower lobe pulmonary opacities,  infection, aspiration and/or atelectasis.  2.  Stable trace left pleural effusion.    I have personally reviewed the examination and initial interpretation  and I agree with the findings.    MICH VINCENT MD   Platelet count   Result Value Ref Range    Platelet Count 100 (L) 150 - 450 10e9/L   Creatinine   Result Value Ref Range    Creatinine 1.20 0.66 - 1.25 mg/dL    GFR Estimate 59 (L) >60 mL/min/1.7m2    GFR Estimate If Black 72 >60 mL/min/1.7m2

## 2017-05-25 NOTE — MR AVS SNAPSHOT
After Visit Summary   5/25/2017    Anthony Carbone    MRN: [id number]           Patient Information     Date Of Birth          1945        Visit Information        Provider Department      5/25/2017 1:00 PM  29 ATC;  ONCOLOGY INFUSION [facility]        Today's Diagnoses     Multiple myeloma (H)    -  1    Altered mental status        Confusion        Multiple myeloma not having achieved remission (H)          Care [facility] Main Line: [phone]    Call triage nurse with chills and/or temperature greater than or equal to 100.4, uncontrolled nausea/vomiting, diarrhea, constipation, dizziness, shortness of breath, chest pain, bleeding, unexplained bruising, or any new/concerning symptoms, questions/concerns.   If you are having any concerning symptoms or wish to speak to a provider before your next infusion visit, please call your care coordinator or triage to notify them so we can adequately serve you.   Triage Nurse Line: [phone]    If after hours, weekends, or holidays, call main hospital  and ask for Oncology doctor on call @ [phone]               May 2017   [facility] Monday Tuesday Wednesday Thursday Friday Saturday        1     2     3     4     5     6       7     Admission    6:21 PM   Joanie Anderson MD   [address] [employer]   (Discharge: 5/16/2017) 8     XR CHEST PORT 1 VIEW    3:05 PM   (20 min.)   [id number]   [facility],  Radiology 9     [facility] MASONIC LAB DRAW   11:30 AM   (15 min.)   [facility] LAB DRAW   [facility]     [facility] ONC RETURN   12:00 PM   (30 min.)    BMT DOM   [facility] Blood and Marrow Transplant 10     XR RIBS RIGHT 2 VIEWS    3:20 PM   (20 min.)   UUXR3   [facility],  Radiology 11     IP EVALUATION    6:00 AM   (60 min.)   Lashay Kelley, PT   [facility], Physical Therapy     IP EVALUATION    6:00 AM   (60 min.)   Lashay Gonzalez, OT   [facility],  Occupational Therapy     XR LUMBAR SPINE 2/3 VIEWS    1:10 PM   (20 min.)   UUXR3   [facility] [place],  Radiology     XR THORACIC SPINE 2 VIEWS    1:15 PM   (20 min.)   UUXR3   [facility] [place],  Radiology 12     IP TREATMENT    6:00 AM   (30 min.)   Dulce Maria Bejarano, OT   [facility] [place], Occupational Therapy     XR ABDOMEN 2 VIEWS    9:15 AM   (15 min.)   UUXR3   [facility],  Radiology     CT HEAD WO   11:40 AM   (15 min.)   UUCT1   [facility] [place], CT     IP TREATMENT    1:00 PM   (30 min.)   Lashay Kelley, PT   [facility] [place], Physical Therapy 13     IP TREATMENT    6:00 AM   (30 min.)   Dulce Maria Bejarano, OT   [facility] [place], Occupational Therapy     IP TREATMENT   11:00 AM   (30 min.)   Lashay Kelley, PT   [facility] [place], Physical Therapy   14     IP TREATMENT    1:00 PM   (30 min.)   Lashay Kelley, PT   [facility] [place], Physical Therapy 15     IP TREATMENT    6:00 AM   (30 min.)   Lashay Gonzalez, OT   [facility] [place], Occupational Therapy 16     17     18     19     P [facility] LAB DRAW    9:45 AM   (15 min.)   [facility]     [facility] RETURN   10:05 AM   (50 min.)   Leanne eRddy PA-C   [facility] ONC INFUSION 120   11:30 AM   (120 min.)    ONCOLOGY INFUSION   [facility] 20       21     22     23     P [facility] LAB DRAW   10:30 AM   (15 min.)   [facility]     [facility] RETURN   10:45 AM   (50 min.)   Le[facility] Reddy PA-C   [facility]    1:00 PM   (60 min.)   UC ONCOLOGY INFUSION   [facility] 24     P ONC INFUSION 60    1:00 PM   (60 min.)   UC ONCOLOGY INFUSION   [facility]     UMP RETURN    2:25 PM   (50 min.)   Leda Gold PA   [facility] 25     P [facility] LAB DRAW   11:45 AM   (15 min.)   [facility] LAB DRAW   [facility] Lab Draw     UMP RETURN   12:05  PM   (50 min.)   Leanne Reddy PA-C   [facility]     UMP ONC INFUSION 60    1:00 PM   (60 min.)   UC ONCOLOGY INFUSION   [facility]     26     27       28     29     30     31 June 2017 Sunday Monday Tuesday Wednesday Thursday Friday Saturday                       1     2     3       4     5     6     [facility] LAB DRAW    1:15 PM   (15 min.)   [facility] LAB DRAW   [facility] Masonic Lab Draw     UMP RETURN    1:25 PM   (50 min.)   Leanne Reddy PA-C   [facility]     UMP ONC INFUSION 60    3:00 PM   (60 min.)   UC ONCOLOGY INFUSION   [facility] 7     UMP ONC INFUSION 60    2:30 PM   (60 min.)   UC ONCOLOGY INFUSION   [facility] 8     9     10       11     12     13     [facility] LAB DRAW    1:30 PM   (15 min.)    MASONIC LAB DRAW   [facility] Lab Draw     UMP ONC INFUSION 60    2:00 PM   (60 min.)   UC ONCOLOGY INFUSION   [facility] 14     UMP ONC INFUSION 60    2:00 PM   (60 min.)   UC ONCOLOGY INFUSION   [facility] 15     16     17       18     19     20     [facility] LAB DRAW    1:30 PM   (15 min.)   [facility] LAB DRAW   [facility] Masonic Lab Draw     UMP ONC INFUSION 60    2:00 PM   (60 min.)   UC ONCOLOGY INFUSION   [facility] 21     UMP ONC INFUSION 60    2:00 PM   (60 min.)   UC ONCOLOGY INFUSION   [facility] 22     23     24       25     26     27     28     29     30                       Lab Results:  Recent Results (from the past 12 hour(s))   Comprehensive metabolic panel    Collection Time: 05/25/17  1:04 PM   Result Value Ref Range    Sodium 138 133 - 144 mmol/L    Potassium 4.9 3.4 - 5.3 mmol/L    Chloride 107 94 - 109 mmol/L    Carbon Dioxide 23 20 - 32 mmol/L    Anion Gap 8 3 - 14 mmol/L    Glucose 135 (H) 70 - 99 mg/dL    Urea Nitrogen 37 (H) 7 - 30  mg/dL    Creatinine 1.31 (H) 0.66 - 1.25 mg/dL    GFR Estimate 54 (L) >60 mL/min/1.7m2    GFR Estimate If Black 65 >60 mL/min/1.7m2    Calcium 9.2 8.5 - 10.1 mg/dL    Bilirubin Total 0.3 0.2 - 1.3 mg/dL    Albumin 3.0 (L) 3.4 - 5.0 g/dL    Protein Total 8.2 6.8 - 8.8 g/dL    Alkaline Phosphatase 60 40 - 150 U/L    ALT 21 0 - 70 U/L    AST 12 0 - 45 U/L   Magnesium    Collection Time: 05/25/17  1:04 PM   Result Value Ref Range    Magnesium 2.2 1.6 - 2.3 mg/dL   Uric acid    Collection Time: 05/25/17  1:04 PM   Result Value Ref Range    Uric Acid 6.4 3.5 - 7.2 mg/dL   *CBC with platelets differential    Collection Time: 05/25/17  1:04 PM   Result Value Ref Range    WBC 9.4 4.0 - 11.0 10e9/L    RBC Count 2.77 (L) 4.4 - 5.9 10e12/L    Hemoglobin 8.7 (L) 13.3 - 17.7 g/dL    Hematocrit 28.3 (L) 40.0 - 53.0 %     (H) 78 - 100 fl    MCH 31.4 26.5 - 33.0 pg    MCHC 30.7 (L) 31.5 - 36.5 g/dL    RDW 18.6 (H) 10.0 - 15.0 %    Platelet Count 118 (L) 150 - 450 10e9/L    Diff Method Automated Method     % Neutrophils 85.7 %    % Lymphocytes 1.6 %    % Monocytes 11.9 %    % Eosinophils 0.0 %    % Basophils 0.1 %    % Immature Granulocytes 0.7 %    Nucleated RBCs 1 (H) 0 /100    Absolute Neutrophil 8.1 1.6 - 8.3 10e9/L    Absolute Lymphocytes 0.2 (L) 0.8 - 5.3 10e9/L    Absolute Monocytes 1.1 0.0 - 1.3 10e9/L    Absolute Eosinophils 0.0 0.0 - 0.7 10e9/L    Absolute Basophils 0.0 0.0 - 0.2 10e9/L    Abs Immature Granulocytes 0.1 0 - 0.4 10e9/L    Absolute Nucleated RBC 0.1    Ammonia    Collection Time: 05/25/17  1:04 PM   Result Value Ref Range    Ammonia 19 10 - 50 umol/L             Follow-ups after your visit        Your next 10 appointments already scheduled     Jun 06, 2017  1:15 PM CDT   [facility] Lab Draw with  MASONIC LAB DRAW   [facility] Masonic Lab Draw ([facility])    [address]  [address] [facility] [address]   [phone]            Jun 06, 2017  1:40 PM CDT   (Arrive by  1:25 PM)   Return Visit with Leanne Reddy PA-C   [facility] ([facility])    [address]  [address] [facility] [address]   [phone]            Jun 06, 2017  3:00 PM CDT   Infusion 60 with UC ONCOLOGY INFUSION, UC 26 ATC   [facility] ([facility])    [address]  [address] [facility] [address] [phone]            Jun 07, 2017  2:30 PM CDT   Infusion 60 with UC ONCOLOGY INFUSION, UC 29 ATC   [facility] ([facility])    [address] [place] [address] [phone]            Jun 13, 2017  1:30 PM CDT   [facility] Lab Draw with UC MASONIC LAB DRAW   [facility] Lab Draw ([facility])    [address] [place] [address] [phone]            Jun 13, 2017  2:00 PM CDT   Infusion 60 with UC ONCOLOGY INFUSION, UC 14 ATC   [facility] ([facility])    [address] [address] [phone]            Jun 14, 2017  2:00 PM CDT   Infusion 60 with UC ONCOLOGY INFUSION   [facility] ([facility])    [address] [place] [address] [phone]              Who to contact     If you have questions or need follow up information about today's clinic visit or your schedule please contact [facility] directly at [phone].  Normal or non-critical lab and imaging results will be communicated to you by MyChart, letter or phone within 4 business days after the clinic has received the results. If you do not hear from us within 7 days, please contact the clinic through MyChart or phone. If you have a critical or abnormal lab result, we will notify you by phone as soon as possible.  Submit refill requests  through [employer] or call your pharmacy and they will forward the refill request to us. Please allow 3 business days for your refill to be completed.          Additional Information About Your Visit        [employer] News Information     [employer] gives you secure access to your electronic health record. If you see a primary care provider, you can also send messages to your care team and make appointments. If you have questions, please call your primary care clinic.  If you do not have a primary care provider, please call [phone] and they will assist you.        Care EveryWhere ID     This is your Care EveryWhere ID. This could be used by other organizations to access your [place] medical records  [id number]         Blood Pressure from Last 3 Encounters:   05/25/17 117/79   05/24/17 123/73   05/23/17 141/82    Weight from Last 3 Encounters:   05/23/17 59.2 kg (130 lb 8 oz)   05/19/17 59.1 kg (130 lb 6.4 oz)   05/16/17 59.1 kg (130 lb 3.2 oz)              We Performed the Following     *CBC with platelets differential     Ammonia     Blood culture     Blood culture     Comprehensive metabolic panel     Immunoglobulins A G and M     Kappa and lambda light chain     Macroglobulins     Magnesium     Protein electrophoresis     TSH with free T4 reflex     Uric acid        Primary Care Provider Office Phone # Fax #    Sanket Gualberto [phone]       [place] [address] [facility] [address]        Thank you!     Thank you for choosing [facility]  for your care. Our goal is always to provide you with excellent care. Hearing back from our patients is one way we can continue to improve our services. Please take a few minutes to complete the written survey that you may receive in the mail after your visit with us. Thank you!             Your Updated Medication List - Protect others around you: Learn how to safely use, store and throw away your medicines at  www.disposemymeds.org.          This list is accurate as of: 5/25/17  5:19 PM.  Always use your most recent med list.                   Brand Name Dispense Instructions for use    acetaminophen 500 MG tablet    TYLENOL     Take 2 tablets (1,000 mg) by mouth every 6 hours as needed for mild pain       acyclovir 400 MG tablet    ZOVIRAX    60 tablet    Take 1 tablet (400 mg) by mouth 2 times daily       amLODIPine 5 MG tablet    NORVASC    30 tablet    Take one tablet at bedtime.       clopidogrel 75 MG tablet    PLAVIX    30 tablet    Take 1 tablet (75 mg) by mouth daily       esomeprazole 40 MG CR capsule    nexIUM    30 capsule    Take 1 capsule (40 mg) by mouth every morning (before breakfast)       HYDROmorphone 2 MG tablet    DILAUDID     Take 1 tablet (2 mg) by mouth every 4 hours as needed for moderate to severe pain       lidocaine 5 % Patch    LIDODERM    30 patch    Place 3 patches onto the skin every 24 hours       LORazepam 0.5 MG tablet    ATIVAN    30 tablet    Take 1 tablet (0.5 mg) by mouth every 4 hours as needed       midodrine 2.5 MG tablet    PROAMATINE    90 tablet    Take 1 tablet (2.5 mg) by mouth 3 times daily       morphine 15 MG 12 hr tablet    MS CONTIN    60 tablet    Take 1 tablet (15 mg) by mouth every 12 hours       OLANZapine 5 MG tablet    zyPREXA    60 tablet    Take 1 tablet (5 mg) by mouth 2 times daily       ondansetron 8 MG ODT tab    ZOFRAN-ODT    30 tablet    Take 1 tablet (8 mg) by mouth every 8 hours as needed for nausea       potassium chloride SA 20 MEQ CR tablet    K-DUR/KLOR-CON M    60 tablet    TAKE 1 TABLET BY MOUTH TWICE DAILY.       simethicone 80 MG chewable tablet    MYLICON    180 tablet    Take 2 tablets (160 mg) by mouth 4 times daily as needed for cramping or other (gas pain)       simvastatin 5 MG tablet    ZOCOR    30 tablet    Take 4 tablets (20 mg) by mouth daily       sucralfate 1 GM tablet    CARAFATE    120 tablet    Take 1 tablet (1 g) by mouth 4 times  daily as needed

## 2017-05-25 NOTE — IP AVS SNAPSHOT
MRN:[id number]                      After Visit Summary   5/25/2017    Anthony Carbone    MRN: [id number]           Thank you!     Thank you for choosing [facility] for your care. Our goal is always to provide you with excellent care. Hearing back from our patients is one way we can continue to improve our services. Please take a few minutes to complete the written survey that you may receive in the mail after you visit with us. Thank you!        Patient Information     Date Of Birth          1945        Designated Caregiver       Most Recent Value    Caregiver    Will someone help with your care after discharge? no      About your hospital stay     You were admitted on:  May 25, 2017 You last received care in the:  [address] [facility]    You were discharged on:  May 29, 2017       Who to Call     For medical emergencies, please call 911.  For non-urgent questions about your medical care, please call your primary care provider or clinic, [phone]          Attending Provider     Provider Specialty    Serina Vang MD Internal Medicine       Primary Care Provider Office Phone # Fax #    Sanket Gualberto [phone]       [facility] [address] [facility] [address]        After Care Instructions     Activity - Up ad cassi           Advance Diet as Tolerated       Follow this diet upon discharge: Regular diet as tolerated            General info for SNF       Length of Stay Estimate: Short Term Care: Estimated # of Days <30  Condition at Discharge: Improving  Level of care:skilled   Rehabilitation Potential: Fair  Admission H&P remains valid and up-to-date: Yes  Recent Chemotherapy: Kyprolis, held as of 5/24                      Use Nursing Home Standing Orders: Yes            Mantoux instructions       Give two-step Mantoux (PPD) Per Facility Policy Yes                  Follow-up Appointments     Adult [facility] Follow-up and recommended  labs and tests       Follow up with Palliative Care Clinic within 1-2 weeks of discharge.      Appointments on [facility] and/or [facility] (with [facility] or [facility] provider or service). Call [phone] if you haven't heard regarding these appointments within 7 days of discharge.            Follow Up and recommended labs and tests       Follow-up as previously scheduled.                  Your next 10 appointments already scheduled     Jun 06, 2017  1:15 PM CDT   [facility] Draw with [facility] D[facility]   [facility] Draw ([facility])    [address] [place] [address]   [phone]            Jun 06, 2017  1:40 PM CDT   (Arrive by 1:25 PM)   Return Visit with Leanne Reddy PA-C   [facility] ([facility])    [address] [place] [address]   [phone]            Jun 06, 2017  3:00 PM CDT   Infusion 60 with UC ONCOLOGY INFUSION, UC 26 ATC   [facility] ([facility])    [address] [place] [address]   [phone]            Jun 07, 2017  2:30 PM CDT   Infusion 60 with UC ONCOLOGY INFUSION, UC 29 ATC   [facility] ([facility])    [address] [place] [address]   [phone]            Jun 13, 2017  1:30 PM CDT   [facility] Draw with [facility] DRAW   [facility] Draw ([facility])    [address] [place] [address]   [phone]            Jun 13, 2017  2:00 PM CDT   Infusion 60 with UC ONCOLOGY INFUSION, UC 14 ATC   [facility] ([facility])    [address] [place] [address]   [phone]            Jun 14, 2017  2:00 PM CDT   Infusion 60 with UC ONCOLOGY INFUSION   [facility]  "Clinic ([facility])    [address]  2nd Floor  [facility] [address] [phone]              Additional Services     Occupational Therapy Adult Consult       Evaluate and treat as clinically indicated.    Reason: Ongoing rehabilitation            Physical Therapy Adult Consult       Evaluate and treat as clinically indicated.    Reason:  Ongoing rehabilitation                  Pending Results     Date and Time Order Name Status Description    5/25/2017 1809 Blood culture Preliminary     5/25/2017 1551 BLOOD CULTURE Preliminary             Statement of Approval     Ordered          05/29/17 0927  I have reviewed and agree with all the recommendations and orders detailed in this document.  EFFECTIVE NOW     Approved and electronically signed by:  Kamilla Dumont PA-C             Admission Information     Date & Time Provider Department Dept. Phone    5/25/2017 Serina Vang MD Unit [facility] [phone]      Your Vitals Were     Blood Pressure Pulse Temperature Respirations Height Weight    143/84 (BP Location: Left arm) 68 97.4  F (36.3  C) (Oral) 18 1.626 m (5' 4\") 56.7 kg (124 lb 14.4 oz)    Pulse Oximetry BMI (Body Mass Index)                95% 21.44 kg/m2          [facility] Information     [facility] gives you secure access to your electronic health record. If you see a primary care provider, you can also send messages to your care team and make appointments. If you have questions, please call your primary care clinic.  If you do not have a primary care provider, please call [phone] and they will assist you.        Care EveryWhere ID     This is your Care EveryWhere ID. This could be used by other organizations to access your [facility] medical records  [id number]           Review of your medicines      START taking        Dose / Directions    amoxicillin-clavulanate 875-125 MG per tablet   Commonly known as:  AUGMENTIN   Indication:  " [facility] Pneumonia   Used for:  Pneumonia of left lower lobe due to infectious organism        Dose:  1 tablet   Take 1 tablet by mouth every 12 hours for 13 doses   Refills:  0       artificial saliva Liqd liquid   Used for:  Dry mouth        Dose:  3-5 mL   Swish and spit 3-5 mLs in mouth 4 times daily as needed for dry mouth   Refills:  0       saccharomyces boulardii 250 MG capsule   Commonly known as:  FLORASTOR   Used for:  Diarrhea, unspecified type        Dose:  250 mg   Take 1 capsule (250 mg) by mouth 2 times daily for 7 days   Quantity:  14 capsule   Refills:  0       traMADol 50 MG tablet   Commonly known as:  ULTRAM   Used for:  Acute bilateral low back pain without sciatica, Multiple myeloma in relapse (H)        Dose:  25-50 mg   Take 0.5-1 tablets (25-50 mg) by mouth every 6 hours as needed for moderate pain . Recommend trying after Tylenol and before Dilaudid.   Quantity:  28 tablet   Refills:  0         CONTINUE these medicines which may have CHANGED, or have new prescriptions. If we are uncertain of the size of tablets/capsules you have at home, strength may be listed as something that might have changed.        Dose / Directions    HYDROmorphone 2 MG tablet   Commonly known as:  DILAUDID   This may have changed:    - how much to take  - when to take this   Used for:  Multiple myeloma in relapse (H), Acute bilateral low back pain without sciatica        Dose:  1 mg   Take 0.5 tablets (1 mg) by mouth every 3 hours as needed for moderate to severe pain   Quantity:  30 tablet   Refills:  0       LORazepam 0.5 MG tablet   Commonly known as:  ATIVAN   This may have changed:    - when to take this  - additional instructions   Used for:  Multiple myeloma in relapse (H), Delirium        Dose:  0.5 mg   Take 1 tablet (0.5 mg) by mouth every 6 hours as needed for nausea or anxiety.   Quantity:  30 tablet   Refills:  0       midodrine 2.5 MG tablet   Commonly known as:  PROAMATINE   This may  have changed:  additional instructions   Used for:  Orthostatic hypotension, Orthostatic syncope        Dose:  2.5 mg   Take 1 tablet (2.5 mg) by mouth 3 times daily . HOLD due to elevated blood pressures during admission. Continue to hold until further advised by outpatient clinic team or if develops recurrent orthostatic hypotension.   Quantity:  90 tablet   Refills:  3         CONTINUE these medicines which have NOT CHANGED        Dose / Directions    acetaminophen 500 MG tablet   Commonly known as:  TYLENOL   Used for:  Acute bilateral low back pain without sciatica, Multiple myeloma in relapse (H)        Dose:  1000 mg   Take 2 tablets (1,000 mg) by mouth every 6 hours as needed for mild pain   Refills:  0       acyclovir 400 MG tablet   Commonly known as:  ZOVIRAX   Indication:  Unsure of dose   Used for:  Multiple myeloma in relapse (H)        Dose:  400 mg   Take 1 tablet (400 mg) by mouth 2 times daily   Quantity:  60 tablet   Refills:  0       amLODIPine 5 MG tablet   Commonly known as:  NORVASC   Used for:  Essential hypertension        Take one tablet at bedtime.   Quantity:  30 tablet   Refills:  0       clopidogrel 75 MG tablet   Commonly known as:  PLAVIX   Used for:  History of stroke        Dose:  75 mg   Take 1 tablet (75 mg) by mouth daily   Quantity:  30 tablet   Refills:  0       esomeprazole 40 MG CR capsule   Commonly known as:  nexIUM   Used for:  Gastroesophageal reflux disease without esophagitis        Dose:  40 mg   Take 1 capsule (40 mg) by mouth every morning (before breakfast)   Quantity:  30 capsule   Refills:  0       lidocaine 5 % Patch   Commonly known as:  LIDODERM   Used for:  Multiple myeloma in relapse (H), Acute bilateral low back pain without sciatica        Dose:  3 patch   Place 3 patches onto the skin every 24 hours   Quantity:  30 patch   Refills:  0       OLANZapine 5 MG tablet   Commonly known as:  zyPREXA   Used for:  Delirium, Multiple myeloma in relapse (H)         Dose:  5 mg   Take 1 tablet (5 mg) by mouth 2 times daily   Quantity:  60 tablet   Refills:  0       ondansetron 8 MG ODT tab   Commonly known as:  ZOFRAN-ODT   Used for:  Nausea        Dose:  8 mg   Take 1 tablet (8 mg) by mouth every 8 hours as needed for nausea   Quantity:  30 tablet   Refills:  3       potassium chloride SA 20 MEQ CR tablet   Commonly known as:  K-DUR/KLOR-CON M   Used for:  Hypokalemia        TAKE 1 TABLET BY MOUTH TWICE DAILY.   Quantity:  60 tablet   Refills:  0       simethicone 80 MG chewable tablet   Commonly known as:  MYLICON   Used for:  Nausea        Dose:  160 mg   Take 2 tablets (160 mg) by mouth 4 times daily as needed for cramping or other (gas pain)   Quantity:  180 tablet   Refills:  0       simvastatin 5 MG tablet   Commonly known as:  ZOCOR   Used for:  Mixed hyperlipidemia        Dose:  20 mg   Take 4 tablets (20 mg) by mouth daily   Quantity:  30 tablet   Refills:  0       sucralfate 1 GM tablet   Commonly known as:  CARAFATE   Used for:  Gastroesophageal reflux disease without esophagitis        Dose:  1 g   Take 1 tablet (1 g) by mouth 4 times daily as needed   Quantity:  120 tablet   Refills:  1         STOP taking     morphine 15 MG 12 hr tablet   Commonly known as:  MS CONTIN                Where to get your medicines      Some of these will need a paper prescription and others can be bought over the counter. Ask your nurse if you have questions.     Bring a paper prescription for each of these medications     HYDROmorphone 2 MG tablet    LORazepam 0.5 MG tablet    traMADol 50 MG tablet       You don't need a prescription for these medications     amoxicillin-clavulanate 875-125 MG per tablet    artificial saliva Liqd liquid    midodrine 2.5 MG tablet    saccharomyces boulardii 250 MG capsule                Protect others around you: Learn how to safely use, store and throw away your medicines at www.disposemymeds.org.             Medication List: This is a list of all  your medications and when to take them. Check marks below indicate your daily home schedule. Keep this list as a reference.      Medications           Morning Afternoon Evening Bedtime As Needed    acetaminophen 500 MG tablet   Commonly known as:  TYLENOL   Take 2 tablets (1,000 mg) by mouth every 6 hours as needed for mild pain   Last time this was given:  1,000 mg on 5/29/2017  7:59 AM                                acyclovir 400 MG tablet   Commonly known as:  ZOVIRAX   Take 1 tablet (400 mg) by mouth 2 times daily   Last time this was given:  400 mg on 5/29/2017  7:59 AM                                amLODIPine 5 MG tablet   Commonly known as:  NORVASC   Take one tablet at bedtime.   Last time this was given:  5 mg on 5/29/2017  7:59 AM                                amoxicillin-clavulanate 875-125 MG per tablet   Commonly known as:  AUGMENTIN   Take 1 tablet by mouth every 12 hours for 13 doses   Last time this was given:  1 tablet on 5/29/2017  7:59 AM                                artificial saliva Liqd liquid   Swish and spit 3-5 mLs in mouth 4 times daily as needed for dry mouth   Last time this was given:  5 mLs on 5/28/2017  7:53 PM                                clopidogrel 75 MG tablet   Commonly known as:  PLAVIX   Take 1 tablet (75 mg) by mouth daily   Last time this was given:  75 mg on 5/29/2017  7:59 AM                                esomeprazole 40 MG CR capsule   Commonly known as:  nexIUM   Take 1 capsule (40 mg) by mouth every morning (before breakfast)                                HYDROmorphone 2 MG tablet   Commonly known as:  DILAUDID   Take 0.5 tablets (1 mg) by mouth every 3 hours as needed for moderate to severe pain   Last time this was given:  1 mg on 5/27/2017  2:31 PM                                lidocaine 5 % Patch   Commonly known as:  LIDODERM   Place 3 patches onto the skin every 24 hours   Last time this was given:  3 patches on 5/29/2017  8:09 AM                                 LORazepam 0.5 MG tablet   Commonly known as:  ATIVAN   Take 1 tablet (0.5 mg) by mouth every 6 hours as needed for nausea or anxiety.                                midodrine 2.5 MG tablet   Commonly known as:  PROAMATINE   Take 1 tablet (2.5 mg) by mouth 3 times daily . HOLD due to elevated blood pressures during admission. Continue to hold until further advised by outpatient clinic team or if develops recurrent orthostatic hypotension.                                OLANZapine 5 MG tablet   Commonly known as:  zyPREXA   Take 1 tablet (5 mg) by mouth 2 times daily   Last time this was given:  5 mg on 5/29/2017  7:59 AM                                ondansetron 8 MG ODT tab   Commonly known as:  ZOFRAN-ODT   Take 1 tablet (8 mg) by mouth every 8 hours as needed for nausea                                potassium chloride SA 20 MEQ CR tablet   Commonly known as:  K-DUR/KLOR-CON M   TAKE 1 TABLET BY MOUTH TWICE DAILY.                                saccharomyces boulardii 250 MG capsule   Commonly known as:  FLORASTOR   Take 1 capsule (250 mg) by mouth 2 times daily for 7 days                                simethicone 80 MG chewable tablet   Commonly known as:  MYLICON   Take 2 tablets (160 mg) by mouth 4 times daily as needed for cramping or other (gas pain)                                simvastatin 5 MG tablet   Commonly known as:  ZOCOR   Take 4 tablets (20 mg) by mouth daily   Last time this was given:  20 mg on 5/28/2017  9:59 PM                                sucralfate 1 GM tablet   Commonly known as:  CARAFATE   Take 1 tablet (1 g) by mouth 4 times daily as needed                                traMADol 50 MG tablet   Commonly known as:  ULTRAM   Take 0.5-1 tablets (25-50 mg) by mouth every 6 hours as needed for moderate pain . Recommend trying after Tylenol and before Dilaudid.   Last time this was given:  25 mg on 5/28/2017 10:00 PM

## 2017-05-25 NOTE — IP AVS SNAPSHOT
"` `     [facility] [facility]: [phone]                                              INTERAGENCY TRANSFER FORM - NURSING   2017                    Hospital Admission Date: 2017  WILLIAN ARCINIEGA   : 1945  Sex: Male        Attending Provider: Serina Vang MD     Allergies:  Aspirin, Bactrim [Sulfamethoxazole W-trimethoprim]    Infection:  None   Service:  Hem/Onc    Ht:  1.626 m (5' 4\")   Wt:  56.7 kg (124 lb 14.4 oz)   Admission Wt:  62.9 kg (138 lb 11.2 oz)    BMI:  21.44 kg/m 2   BSA:  1.6 m 2            Patient PCP Information     Provider PCP Type    [facility]      Current Code Status     Date Active Code Status Order ID Comments User Context       2017  5:31 PM Full Code [id number]  Criss Vaz PA-C Inpatient       Code Status History     Date Active Date Inactive Code Status Order ID Comments User Context    2017  1:12 PM 2017  5:31 PM Full Code [id number]  Brittney Armstrong PA-C Outpatient    2017  9:44 PM 2017  1:12 PM Full Code [id number]  Joanie Anderson MD Inpatient    2016  9:15 AM 2017  9:44 PM Full Code [id number]  Roxie Frey PA-C Outpatient    2016  9:03 PM 2016  9:15 AM Full Code [id number]  Joanie Anderson MD Inpatient    2014  9:32 AM 1/10/2015  6:37 PM DNR/DNI [id number]  Kamilla Dumont PA-C Inpatient    2014 10:03 PM 2014  9:32 AM Full Code [id number]  Colin Bundy MD Inpatient    2013  8:28 AM 2013  3:14 PM Full Code [id number]  Mary Limon NP Inpatient    2013 11:22 PM 2013  8:28 AM DNR/DNI [id number]  Letty Puente RN Inpatient    2013 12:25 PM 1/10/2013 11:32 AM Full Code [id number]  Lorena Pizarro NP Inpatient    2012 12:41 PM 2012  4:36 PM Full Code [id number]  Leda Gibson PA Inpatient      Advance Directives        Does patient have a scanned Advance " Directive/ACP document in EPIC?           Yes        Hospital Problems as of 5/29/2017              Priority Class Noted POA    Altered mental status Medium  4/12/2016 Yes      Non-Hospital Problems as of 5/29/2017              Priority Class Noted    Multiple myeloma (H)   8/28/2012    Hypertension   Unknown    Multiple myeloma (H)   12/20/2012    Neutropenic fever (H)   1/17/2013    Encounter for long-term current use of medication   1/22/2013    GERD (gastroesophageal reflux disease) Medium  12/8/2014    Pneumonia   12/21/2014    Syncope   7/6/2015    Syncope, unspecified syncope type   7/8/2015    Myeloma (H)   10/22/2015    Neoplasm of uncertain behavior of skin Medium  2/6/2017    AK (actinic keratosis) Medium  2/6/2017    Back pain Medium  5/7/2017      Immunizations     Name Date      HIB 01/07/14     Hepatitis B 01/07/14     Influenza (High Dose) 3 valent vaccine 10/05/16     Influenza (High Dose) 3 valent vaccine 11/19/15     Influenza (High Dose) 3 valent vaccine 10/08/14     Influenza (IIV3) 10/01/12     Pneumococcal (PCV 13) 01/07/14     Poliovirus, inactivated (IPV) 01/07/14     TDAP Vaccine (Boostrix) 01/07/14          END      ASSESSMENT     Discharge Profile Flowsheet     DISCHARGE NEEDS ASSESSMENT     Inspection  Full 05/29/17 0119    Equipment Currently Used at Home  other (see comments) (Pt and spouse living in 3 level home. Pt does not need to ac) 05/11/17 1558   Skin areas NOT inspected  Spine;Hip, left;Hip, right;Buttock, left;Buttock, right;Sacrum;Coccyx;Knee, left;Knee, right 05/28/17 0150    Transportation Available  family or friend will provide 05/27/17 1406   Skin WDL  ex 05/29/17 0119    Equipment Used at Home  none 12/27/14 1529   Skin Color/Characteristics  bruised (ecchymotic) 05/29/17 0119    GASTROINTESTINAL (ADULT,PEDIATRIC,OB)     Skin Temperature  warm 05/29/17 0119    GI WDL  WDL 05/29/17 0119   Skin Moisture  dry 05/29/17 0119    Last Bowel Movement  05/29/17 05/29/17 0119    "Skin Integrity  bruise(s) 05/29/17 0119    COMMUNICATION ASSESSMENT     SAFETY      Patient's communication style  spoken language (English or Bilingual) 05/07/17 2204   Safety WDL  WDL 05/29/17 0119    SKIN                        Assessment WDL (Within Defined Limits) Definitions           Safety WDL     Effective: 09/28/15    Row Information: <b>WDL Definition:</b> Bed in low position, wheels locked; call light in reach; upper side rails up x 2; ID band on<br> <font color=\"gray\"><i>Item=AS safety wdl>>List=AS safety wdl>>Version=F14</i></font>      Skin WDL     Effective: 09/28/15    Row Information: <b>WDL Definition:</b> Warm; dry; intact; elastic; without discoloration; pressure points without redness<br> <font color=\"gray\"><i>Item=AS skin wdl>>List=AS skin wdl>>Version=F14</i></font>      Vitals     Vital Signs Flowsheet     COMMENTS     Comfort  comfortably manageable 05/28/17 2254    Comments  post blood 05/26/17 2056   Change in Pain  getting better 05/28/17 2254    VITAL SIGNS     Pain Control  partially effective 05/28/17 2254    Temp  97.4  F (36.3  C) 05/29/17 0547   Functioning  can do most things, but pain gets in the way of some 05/28/17 2254    Temp src  Oral 05/29/17 0547   Sleep  awake with occasional pain 05/28/17 2254    Resp  18 05/29/17 0547   ANALGESIA SIDE EFFECTS MONITORING      Pulse  68 05/26/17 2230   Side Effects Monitoring: Respiratory Quality  R 05/28/17 2254    Heart Rate  67 05/29/17 0547   Side Effects Monitoring: Respiratory Depth  N 05/28/17 2254    Pulse/Heart Rate Source  Monitor 05/29/17 0547   Side Effects Monitoring: Sedation Level  1 05/28/17 2254    BP  143/84 05/29/17 0547   HEIGHT AND WEIGHT      BP Location  Left arm 05/29/17 0547   Height  1.626 m (5' 4\") 05/25/17 1734    OXYGEN THERAPY     Weight  56.7 kg (124 lb 14.4 oz) 05/29/17 0915    SpO2  95 % 05/29/17 0547   BSA (Calculated - sq m)  1.69 05/25/17 1734    O2 Device  None (Room air) 05/29/17 0547   BMI " (Calculated)  23.86 05/25/17 1734    PAIN/COMFORT     POSITIONING      Patient Currently in Pain  denies 05/29/17 0109   Body Position  supine 05/29/17 0454    Preferred Pain Scale  CAPA (Clinically Aligned Pain Assessment) ([facility] Adults Only) 05/29/17 0109   Head of Bed (HOB)  HOB at 30-45 degrees 05/29/17 0454    Pain Location  Back 05/28/17 2208   Chair  -- 05/28/17 0150    Pain Orientation  Lower 05/28/17 2208   DAILY CARE      Pain Descriptors  Aching 05/28/17 2208   Activity Type  activity encouraged;ambulated in room 05/29/17 0119    Pain Intervention(s)  Medication (See eMAR) 05/28/17 2208   Activity Level of Assistance  assistance, stand-by 05/29/17 0119    Response to Interventions  Decrease in pain 05/28/17 2254   Activity Assistive Device  walker 05/29/17 0119    CLINICALLY ALIGNED PAIN ASSESSMENT (CAPA) ([facility] ADULTS ONLY)                   Patient Lines/Drains/Airways Status    Active LINES/DRAINS/AIRWAYS     Name: Placement date: Placement time: Site: Days: Last dressing change:    Wound 05/07/17 Elbow Abrasion(s);Skin tear 05/07/17 2213   Elbow   21             Patient Lines/Drains/Airways Status    Active PICC/CVC     Name: Placement date: Placement time: Site: Days: Additional Info Last dressing change:    Port A Cath Single 05/08/17 Right Chest wall 05/08/17   1100   Chest wall   20 Orientation: Right            Power Port: Yes            Line Flushed (See MAR): Yes               Intake/Output Detail Report     Date Intake       Output Net    Shift P.O. I.V. IV Piggyback Blood Components Total Urine Total       Day 05/28/17 0000 - 05/28/17 0659 -- 920 -- -- 920 600 600 320    María 05/28/17 0700 - 05/28/17 1459 200 950 -- -- 1150 574 574 576    Noc 05/28/17 1500 - 05/28/17 2359 120 820 -- -- 940 850 850 90    Day 05/29/17 0000 - 05/29/17 0659 -- 820 -- -- 820 1475 1475 -655    María 05/29/17 0700 - 05/29/17 1459 -- 50 -- -- 50 275 996 -013      Last Void/BM        Most Recent Value    Urine Occurrence 1 [voided into toilet] at 05/28/2017 1316    Stool Occurrence 1 at 05/29/2017 0112      Case Management/Discharge Planning     Case Management/Discharge Planning Flowsheet     REFERRAL INFORMATION     Equipment Used at Home  none 12/27/14 1529    Arrived From  emergency department 01/17/13 2312   FINAL RESOURCES      LIVING ENVIRONMENT     Equipment Currently Used at Home  other (see comments) (Pt and spouse living in 3 level home. Pt does not need to ac) 05/11/17 1558    Lives With  spouse 05/27/17 1406   / CAREGIVER      Living Arrangements  house 05/27/17 1406   Filed Complexity Screen Score  6 05/26/17 0846    Provides Primary Care For  no one 01/08/13 1420   ABUSE RISK SCREEN      COPING/STRESS     QUESTION TO PATIENT:  Has a member of your family or a partner(now or in the past) intimidated, hurt, manipulated, or controlled you in any way?  no 05/26/17 0935    Major Change/Loss/Stressor  death of a loved one;hospitalization 05/25/17 1750   (R) MENTAL HEALTH SUICIDE RISK      DISCHARGE PLANNING     Are you depressed or being treated for depression?  Yes 05/25/17 1750    Transportation Available  family or friend will provide 05/27/17 1407

## 2017-05-25 NOTE — NURSING NOTE
"Oncology Rooming Note    May 25, 2017 12:49 PM   Anthony Carbone is a 71 year old male who presents for:    Chief Complaint   Patient presents with     Oncology Clinic Visit     Multiple myeloma      Initial Vitals: /79  Pulse 91  Temp 98.3  F (36.8  C) (Oral)  Resp 12  SpO2 96% Estimated body mass index is 22.39 kg/(m^2) as calculated from the following:    Height as of 5/19/17: 1.626 m (5' 4.02\").    Weight as of 5/23/17: 59.2 kg (130 lb 8 oz). There is no height or weight on file to calculate BSA.  No Pain (0) Comment: Data Unavailable   No LMP for male patient.  Allergies reviewed: Yes  Medications reviewed: Yes    Medications: Medication refills not needed today.  Pharmacy name entered into [employer]:    [id number] [address] [facility] [address] [address] [facility] [address]  [employer] [place], [place] - [address] [facility]  [facility] [facility] [address] AT [facility] [address]    Clinical concerns: Wife states pt is confused today,  Leanne Maureen was notified.    6 minutes for nursing intake (face to face time)     Yanni Chilel CMA            Chief Complaint   Patient presents with     Oncology Clinic Visit     Multiple myeloma      Port Draw     Labs drawn by RN from port.      JASWINDER FLOYD RN    "

## 2017-05-25 NOTE — IP AVS SNAPSHOT
"    UNIT 7D [facility]: [phone]                                              INTERAGENCY TRANSFER FORM - LAB / IMAGING / EKG / EMG RESULTS   2017                    Hospital Admission Date: 2017  WILLIAN ARCINIEGA   : 1945  Sex: Male        Attending Provider: Serina Vang MD     Allergies:  Aspirin, Bactrim [Sulfamethoxazole W-trimethoprim]    Infection:  None   Service:  Hem/Onc    Ht:  1.626 m (5' 4\")   Wt:  56.7 kg (124 lb 14.4 oz)   Admission Wt:  62.9 kg (138 lb 11.2 oz)    BMI:  21.44 kg/m 2   BSA:  1.6 m 2            Patient PCP Information     Provider PCP Type    [facility]         Lab Results - 3 Days      CBC with platelets differential [[id number]] (Abnormal)  Resulted: 17 0629, Result status: Final result    Ordering provider: Criss Vaz PA-C  17 2220 Resulting lab: [facility]    Specimen Information    Type Source Collected On   Blood  17 0513          Components       Value Reference Range Flag Lab   WBC 2.2 4.0 - 11.0 10e9/L L 51   RBC Count 3.27 4.4 - 5.9 10e12/L L 51   Hemoglobin 9.9 13.3 - 17.7 g/dL L 51   Hematocrit 31.7 40.0 - 53.0 % L 51   MCV 97 78 - 100 fl  51   MCH 30.3 26.5 - 33.0 pg  51   MCHC 31.2 31.5 - 36.5 g/dL L 51   RDW 19.0 10.0 - 15.0 % H 51   Platelet Count 95 150 - 450 10e9/L L 51   Diff Method Manual Differential   51   % Neutrophils 88.3 %  51   % Lymphocytes 4.5 %  51   % Monocytes 4.5 %  51   % Eosinophils 0.9 %  51   % Basophils 0.9 %  51   % Metamyelocytes 0.9 %  51   Absolute Neutrophil 1.9 1.6 - 8.3 10e9/L  51   Absolute Lymphocytes 0.1 0.8 - 5.3 10e9/L L 51   Absolute Monocytes 0.1 0.0 - 1.3 10e9/L  51   Absolute Eosinophils 0.0 0.0 - 0.7 10e9/L  51   Absolute Basophils 0.0 0.0 - 0.2 10e9/L  51   Absolute Metamyelocytes 0.0 0 10e9/L  51   Anisocytosis Slight   51   Platelet Estimate Confirming automated cell count   51            Basic metabolic panel " [[id number]] (Abnormal)  Resulted: 05/29/17 0554, Result status: Final result    Ordering provider: Criss Vaz PA-C  05/28/17 2330 Resulting lab: [facility]    Specimen Information    Type Source Collected On   Blood  05/29/17 0513          Components       Value Reference Range Flag Lab   Sodium 141 133 - 144 mmol/L  51   Potassium 3.0 3.4 - 5.3 mmol/L L 51   Chloride 107 94 - 109 mmol/L  51   Carbon Dioxide 23 20 - 32 mmol/L  51   Anion Gap 11 3 - 14 mmol/L  51   Glucose 73 70 - 99 mg/dL  51   Urea Nitrogen 5 7 - 30 mg/dL L 51   Creatinine 0.66 0.66 - 1.25 mg/dL  51   GFR Estimate -- >60 mL/min/1.7m2  51   Result:         >90  Non  GFR Calc     GFR Estimate If Black -- >60 mL/min/1.7m2  51   Result:         >90   GFR Calc     Calcium 8.3 8.5 - 10.1 mg/dL L 51   Result:              Blood culture [[id number]]  Resulted: 05/29/17 0012, Result status: Preliminary result    Ordering provider: Criss Vaz PA-C  05/25/17 1809 Resulting lab: [facility]    Specimen Information    Type Source Collected On   Blood  05/25/17 1947          Components       Value Reference Range Flag Lab   Specimen Description Blood Port   75   Culture Micro No growth after 4 days   226   Micro Report Status Pending   226            Blood culture [[id number]]  Resulted: 05/29/17 0012, Result status: Preliminary result    Resulting lab: [facility]     Specimen Information    Type Source Collected On   Blood  05/25/17 1605          Components       Value Reference Range Flag Lab   Specimen Description Blood Port   75   Culture Micro No growth after 4 days   226   Micro Report Status Pending   226            Potassium [[id number]]  Resulted: 05/28/17 1924, Result status: Final result    Ordering provider: Serina Vang MD  05/28/17 1618 Resulting lab: [facility]    Specimen Information    Type Source  Collected On   Blood  05/28/17 1832          Components       Value Reference Range Flag Lab   Potassium 3.6 3.4 - 5.3 mmol/L  51            Urine Culture Aerobic Bacterial [[id number]] (Abnormal)  Resulted: 05/28/17 0951, Result status: Edited Result - FINAL    Ordering provider: Criss Vaz PA-C  05/25/17 1731 Resulting lab: [facility]    Specimen Information    Type Source Collected On   Urine Urine clean catch 05/25/17 2210          Components       Value Reference Range Flag Lab   Specimen Description Midstream Urine   51   Special Requests Specimen received in preservative   75   Culture Micro --  A 225   Result:         10,000 to 50,000 colonies/mL Enterococcus faecalis  10,000 to 50,000 colonies/mL Coagulase negative Staphylococcus     Micro Report Status FINAL 05/28/2017   225   Result:     Organism: 10,000 to 50,000 colonies/mL Enterococcus faecalis   225   Organism: 10,000 to 50,000 colonies/mL Coagulase negative Staphylococcus   225            Basic metabolic panel [[id number]] (Abnormal)  Resulted: 05/28/17 0623, Result status: Final result    Ordering provider: Criss Vaz PA-C  05/27/17 2330 Resulting lab: [facility]    Specimen Information    Type Source Collected On   Blood  05/28/17 0540          Components       Value Reference Range Flag Lab   Sodium 142 133 - 144 mmol/L  51   Potassium 2.9 3.4 - 5.3 mmol/L L 51   Chloride 108 94 - 109 mmol/L  51   Carbon Dioxide 24 20 - 32 mmol/L  51   Anion Gap 9 3 - 14 mmol/L  51   Glucose 88 70 - 99 mg/dL  51   Urea Nitrogen 8 7 - 30 mg/dL  51   Creatinine 0.90 0.66 - 1.25 mg/dL  51   GFR Estimate 83 >60 mL/min/1.7m2  51   Comment:  Non  GFR Calc   GFR Estimate If Black -- >60 mL/min/1.7m2  51   Result:         >90   GFR Calc     Calcium 8.0 8.5 - 10.1 mg/dL L 51   Result:              CBC with platelets differential [[id number]] (Abnormal)  Resulted:  05/28/17 0602, Result status: Final result    Ordering provider: Criss Vaz PA-C  05/27/17 2330 Resulting lab: [facility]    Specimen Information    Type Source Collected On   Blood  05/28/17 0540          Components       Value Reference Range Flag Lab   WBC 2.5 4.0 - 11.0 10e9/L L 51   RBC Count 3.13 4.4 - 5.9 10e12/L L 51   Hemoglobin 9.5 13.3 - 17.7 g/dL L 51   Hematocrit 30.5 40.0 - 53.0 % L 51   MCV 97 78 - 100 fl  51   MCH 30.4 26.5 - 33.0 pg  51   MCHC 31.1 31.5 - 36.5 g/dL L 51   RDW 19.5 10.0 - 15.0 % H 51   Platelet Count 87 150 - 450 10e9/L L 51   Diff Method Automated Method   51   % Neutrophils 85.7 %  51   % Lymphocytes 4.4 %  51   % Monocytes 6.3 %  51   % Eosinophils 2.0 %  51   % Basophils 1.2 %  51   % Immature Granulocytes 0.4 %  51   Nucleated RBCs 0 0 /100  51   Absolute Neutrophil 2.2 1.6 - 8.3 10e9/L  51   Absolute Lymphocytes 0.1 0.8 - 5.3 10e9/L L 51   Absolute Monocytes 0.2 0.0 - 1.3 10e9/L  51   Absolute Eosinophils 0.1 0.0 - 0.7 10e9/L  51   Absolute Basophils 0.0 0.0 - 0.2 10e9/L  51   Abs Immature Granulocytes 0.0 0 - 0.4 10e9/L  51   Absolute Nucleated RBC 0.0   51            Potassium [[id number]]  Resulted: 05/27/17 1646, Result status: Final result    Ordering provider: Serina Vang MD  05/27/17 1320 Resulting lab: [facility]    Specimen Information    Type Source Collected On   Blood  05/27/17 1618          Components       Value Reference Range Flag Lab   Potassium 3.4 3.4 - 5.3 mmol/L  51            Blood culture [[id number]]  Resulted: 05/27/17 1429, Result status: Final result    Resulting lab: [facility]     Specimen Information    Type Source Collected On   Blood  05/25/17 1551          Components       Value Reference Range Flag Lab   Specimen Description Blood   1740   Culture Micro --   75   Result:         Canceled, Test credited  Specimen not received     Micro  Report Status FINAL 05/27/2017   75   Result:              Basic metabolic panel [[id number]] (Abnormal)  Resulted: 05/27/17 0851, Result status: Final result    Ordering provider: Criss Vaz PA-C  05/26/17 2330 Resulting lab: [facility]    Specimen Information    Type Source Collected On   Blood  05/27/17 0630          Components       Value Reference Range Flag Lab   Sodium 142 133 - 144 mmol/L  13   Potassium 3.2 3.4 - 5.3 mmol/L L 13   Chloride 107 94 - 109 mmol/L  13   Carbon Dioxide 25 20 - 32 mmol/L  13   Anion Gap 10 3 - 14 mmol/L  13   Glucose 89 70 - 99 mg/dL  13   Urea Nitrogen 9 7 - 30 mg/dL  13   Creatinine 0.84 0.66 - 1.25 mg/dL  13   GFR Estimate 90 >60 mL/min/1.7m2  13   Comment:  Non  GFR Calc   GFR Estimate If Black -- >60 mL/min/1.7m2  13   Result:         >90   GFR Calc     Calcium 8.5 8.5 - 10.1 mg/dL  13   Result:              CBC with platelets differential [[id number]] (Abnormal)  Resulted: 05/27/17 0651, Result status: Final result    Ordering provider: Criss Vaz PA-C  05/26/17 2330 Resulting lab: [facility]    Specimen Information    Type Source Collected On   Blood  05/27/17 0630          Components       Value Reference Range Flag Lab   WBC 3.6 4.0 - 11.0 10e9/L L 51   RBC Count 3.20 4.4 - 5.9 10e12/L L 51   Hemoglobin 9.6 13.3 - 17.7 g/dL L 51   Hematocrit 30.8 40.0 - 53.0 % L 51   MCV 96 78 - 100 fl  51   MCH 30.0 26.5 - 33.0 pg  51   MCHC 31.2 31.5 - 36.5 g/dL L 51   RDW 19.2 10.0 - 15.0 % H 51   Platelet Count 95 150 - 450 10e9/L L 51   Diff Method Automated Method   51   % Neutrophils 88.1 %  51   % Lymphocytes 1.7 %  51   % Monocytes 6.3 %  51   % Eosinophils 2.2 %  51   % Basophils 1.1 %  51   % Immature Granulocytes 0.6 %  51   Nucleated RBCs 3 0 /100 H 51   Absolute Neutrophil 3.2 1.6 - 8.3 10e9/L  51   Absolute Lymphocytes 0.1 0.8 - 5.3 10e9/L L 51   Absolute Monocytes  0.2 0.0 - 1.3 10e9/L  51   Absolute Eosinophils 0.1 0.0 - 0.7 10e9/L  51   Absolute Basophils 0.0 0.0 - 0.2 10e9/L  51   Abs Immature Granulocytes 0.0 0 - 0.4 10e9/L  51   Absolute Nucleated RBC 0.1   51            Kappa and lambda light chain [[id number]] (Abnormal)  Resulted: 05/26/17 1602, Result status: Final result    Resulting lab: [facility]     Specimen Information    Type Source Collected On     05/25/17 1440          Components       Value Reference Range Flag Lab   Kappa Free Lt Chain 0.34 0.33 - 1.94 mg/dL  51   Lambda Free Lt Chain 29.00 0.57 - 2.63 mg/dL H 51   Comment:         NOTE: This result is outside the initial measuring range of this assay.   Samples   which are outside of the initial measuring range of the assay must be   re-assayed at a dilution in order to obtain a result. In these cases, the   result reported may not be proportional to results that are within the   measuring range and were performed using the initial dilution.   This is due to the fact that all serum free light chain assays are prone to   sample non-linearity. That is, some samples will have different results if   performed on different dilutions. In some instances where the result is just   outside the limit of the initial measuring range, the results obtained from   the   diluted sample may be lower or higher than expected.   In these cases the result will be reported as greater than or less than the   limit of the measuring range.   Test Added     Kappa Lambda Ratio 0.01 0.26 - 1.65 L 51            Protein electrophoresis [[id number]] (Abnormal)  Resulted: 05/26/17 1523, Result status: Final result    Resulting lab: [facility]     Specimen Information    Type Source Collected On     05/25/17 1440          Components       Value Reference Range Flag Lab   Albumin Fraction 3.1 3.7 - 5.1 g/dL L 51   Alpha 1 Fraction 0.4 0.2 - 0.4 g/dL  51   Alpha 2 Fraction 0.9  0.5 - 0.9 g/dL  51   Beta Fraction 0.6 0.6 - 1.0 g/dL  51   Gamma Fraction 2.1 0.7 - 1.6 g/dL H 51   Monoclonal Peak 1.9 0.0 g/dL H 51   ELP Interpretation: --   51   Result:         Two monoclonal proteins (about 1.9 and 0.1 g/dL) seen in the gamma fraction.   Previously characterized in our laboratory on 5/19/2017 as a monoclonal IgM   immunoglobulin of lambda light chain type and a small monclonal IgG of kappa   light chain type. Pathologic significance requires clinical correlation.  ALBINO Young M.D., Ph.D., Pathologist ().              Immunoglobulins A G and M [[id number]] (Abnormal)  Resulted: 05/26/17 1445, Result status: Final result    Resulting lab: [facility]     Specimen Information    Type Source Collected On     05/25/17 1440          Components       Value Reference Range Flag Lab    695 - 1620 mg/dL L 51   IGA 7 70 - 380 mg/dL L 51   IGM 2910 60 - 265 mg/dL H 51            Macroglobulins [[id number]] (Abnormal)  Resulted: 05/26/17 1158, Result status: Final result    Resulting lab: [facility]     Specimen Information    Type Source Collected On   Blood  05/25/17 1440          Components       Value Reference Range Flag Lab   Viscosity Index 2.1 1.4 - 1.8 H 51   Comment:         This test was developed and its performance characteristics determined by the   [facility],  Special Chemistry Laboratory. It has   not been cleared or approved by the FDA. The laboratory is regulated under   CLIA   as qualified to perform high-complexity testing. This test is used for   clinical   purposes. It should not be regarded as investigational or for research.              CBC with platelets differential [[id number]] (Abnormal)  Resulted: 05/26/17 1026, Result status: Final result    Ordering provider: Criss Vaz PA-C  05/26/17 0758 Resulting lab: [facility]     Specimen Information    Type Source Collected On   Blood  05/26/17 0943          Components       Value Reference Range Flag Lab   WBC 5.2 4.0 - 11.0 10e9/L  51   RBC Count 2.49 4.4 - 5.9 10e12/L L 51   Hemoglobin 7.5 13.3 - 17.7 g/dL L 51   Hematocrit 24.8 40.0 - 53.0 % L 51    78 - 100 fl  51   MCH 30.1 26.5 - 33.0 pg  51   MCHC 30.2 31.5 - 36.5 g/dL L 51   RDW 18.9 10.0 - 15.0 % H 51   Platelet Count 98 150 - 450 10e9/L L 51   Diff Method Automated Method   51   % Neutrophils 86.1 %  51   % Lymphocytes 6.3 %  51   % Monocytes 6.0 %  51   % Eosinophils 0.4 %  51   % Basophils 0.8 %  51   % Immature Granulocytes 0.4 %  51   Nucleated RBCs 1 0 /100 H 51   Absolute Neutrophil 4.5 1.6 - 8.3 10e9/L  51   Absolute Lymphocytes 0.3 0.8 - 5.3 10e9/L L 51   Absolute Monocytes 0.3 0.0 - 1.3 10e9/L  51   Absolute Eosinophils 0.0 0.0 - 0.7 10e9/L  51   Absolute Basophils 0.0 0.0 - 0.2 10e9/L  51   Abs Immature Granulocytes 0.0 0 - 0.4 10e9/L  51   Absolute Nucleated RBC 0.1   51            Basic metabolic panel [[id number]] (Abnormal)  Resulted: 05/26/17 1010, Result status: Final result    Ordering provider: Criss Vaz PA-C  05/26/17 0758 Resulting lab: [facility]    Specimen Information    Type Source Collected On   Blood  05/26/17 0943          Components       Value Reference Range Flag Lab   Sodium 143 133 - 144 mmol/L  51   Potassium 3.5 3.4 - 5.3 mmol/L  51   Chloride 110 94 - 109 mmol/L H 51   Carbon Dioxide 25 20 - 32 mmol/L  51   Anion Gap 8 3 - 14 mmol/L  51   Glucose 96 70 - 99 mg/dL  51   Urea Nitrogen 18 7 - 30 mg/dL  51   Creatinine 0.80 0.66 - 1.25 mg/dL  51   GFR Estimate -- >60 mL/min/1.7m2  51   Result:         >90  Non  GFR Calc     GFR Estimate If Black -- >60 mL/min/1.7m2  51   Result:         >90   GFR Calc     Calcium 8.3 8.5 - 10.1 mg/dL L 51   Result:              UA with Microscopic [[id number]] (Abnormal)  Resulted:  05/25/17 2247, Result status: Final result    Ordering provider: Criss Vaz PA-C  05/25/17 1731 Resulting lab: [facility]    Specimen Information    Type Source Collected On   Urine Urine clean catch 05/25/17 2210          Components       Value Reference Range Flag Lab   Color Urine Light Yellow   51   Appearance Urine Clear   51   Glucose Urine Negative NEG mg/dL  51   Bilirubin Urine Negative NEG  51   Ketones Urine Negative NEG mg/dL  51   Specific Gravity Urine 1.013 1.003 - 1.035  51   Blood Urine Negative NEG  51   pH Urine 5.0 5.0 - 7.0 pH  51   Protein Albumin Urine Negative NEG mg/dL  51   Urobilinogen mg/dL Normal 0.0 - 2.0 mg/dL  51   Nitrite Urine Negative NEG  51   Leukocyte Esterase Urine Negative NEG  51   Source Midstream Urine   51   WBC Urine 0 0 - 2 /HPF  51   RBC Urine <1 0 - 2 /HPF  51   Transitional Epi <1 0 - 1 /HPF  51   Mucous Urine Present NEG /LPF A 51   Hyaline Casts 1 0 - 2 /LPF  51            Testing Performed By     Lab - Abbreviation Name Director Address Valid Date Range    13 - Unknown [facility] Unknown [address] [facility] [address] 01/15/15 0916 - Present    51 - Unknown [facility] Unknown [address] [facility] [address] 12/31/14 1010 - Present    75 - Unknown [facility] Unknown [address] [facility] [address] 01/15/15 1019 - Present    225 - Unknown [facility] Unknown [address] [facility] [id number] 12/19/14 0954 - Present    226 - Unknown [facility] Unknown [address] [facility] [address] 12/19/14 0955 - Present    1740 - Unknown [facility] Unknown [address] [address] 02/15/16 1113 - Present            Unresulted Labs (24h ago through future)    Start       Ordered    05/29/17 0915   Clostridium difficile toxin B PCR  (LAB Clostridium difficile toxin B PCR PANEL)  ROUTINE,   Routine      05/29/17 0901    05/28/17 0600  Platelet count  (enoxaparin (dosing for wt  Kg with CrCl greater than 30 is prechecked) Do not order if PLT less than 50 K)  EVERY THREE DAYS,   Routine     Comments:  Repeat every 3 days while on VTE prophylaxis. If no result is listed, this lab has not been done the past 365 days. LATEST LAB RESULT: Platelet Count (10e9/L)       Date                     Value                 05/25/2017               118 (L)          ----------      05/25/17 1731    05/26/17 0800  CBC with platelets differential  AM DRAW,   Routine     Comments:  Last Lab Result: Hemoglobin (g/dL)       Date                     Value                 05/25/2017               8.7 (L)          ----------    05/26/17 0758    05/26/17 0800  Basic metabolic panel  AM DRAW,   Routine      05/26/17 0758    Unscheduled  ABO/Rh type and screen  CONDITIONAL (SPECIFY),   Routine     Comments:  Draw Routine IF not performed in last three days, then repeat every three days.    05/25/17 1731    Unscheduled  Blood culture  (Blood Culture - 2 Sites)  CONDITIONAL (SPECIFY),   Routine     Comments:  Site #1 - collected from venipuncture IF temp is greater than 100.4* F. May repeat max of once per 24 hrs.    05/25/17 1731    Unscheduled  Blood culture  (Blood Culture - 2 Sites)  CONDITIONAL (SPECIFY),   Routine     Comments:  Site # 2 - collected from VAD IF temp is greater than 100.4*F.  IF no VAD then 2 peripheral venipuncture  sticks from 2 different sites. May repeat max of once per 24 hrs.    05/25/17 1731    Unscheduled  Blood culture  (Blood Culture - 2 Sites)  CONDITIONAL (SPECIFY),   Routine     Comments:  Site #1 - IF 1 blood culture is collected for temp greater than 100.4*F, draw one blood culture 15 minutes after first blood culture collected.    05/25/17 1731    Unscheduled  Blood culture  (Blood Culture - 2  Sites)  CONDITIONAL (SPECIFY),   Routine     Comments:  Site #2 - IF 1 blood culture is collected for temp greater than 100.4*F, draw one blood culture 15 minutes after first blood culture collected.    05/25/17 1731    Unscheduled  Platelets prepare order unit conditional  (Conditional Platelet  Units)  CONDITIONAL (SPECIFY) BLOOD,   Routine     Comments:  For patients with significant heart failure: Recommend transfusing slowly using the 4 hour allowed time period, if clinically appropriate.  Do not change or add to this text.  Use additional instructions field.   Question Answer Comment   Number of Doses 1    When to transfuse a) Platelet count 10,000/uL or less    Special Requirements a) None    Transfusion duration per dose Infuse within 4 hours of release        05/25/17 1731    Unscheduled  Plasma prepare order unit conditional  (Conditional Plasma Units)  CONDITIONAL (SPECIFY) BLOOD,   Routine     Comments:  For patients with significant heart failure:  Recommend transfusing slowly using the 4 hour allowed time period, if clinically appropriate.  Do NOT change or add to this text.  Use additional instructions field.   Question Answer Comment   Dose Type Adult    Number of Units 2    When to transfuse d) Other; specify in comment field to right IF INR greater than 1.5   Transfusion duration per unit (hrs): Infuse within 4 hours of release        05/25/17 1731    Unscheduled  Cryoprecipitate prepare order unit conditional  (Conditional Cryoprecipitate Unit )  CONDITIONAL (SPECIFY) BLOOD,   Routine     Comments:  Do NOT add text to this field. Use additional instructions field.   Question Answer Comment   Number of Units 5    When to transfuse a) Fibrinogen 100 mg/dL or less (coagulopathy)    Transfusion duration per unit (hrs): Infuse within 4 hours of release        05/25/17 1731    Unscheduled  Red blood cell prepare order unit conditional  (Conditional Red Blood Cells Unit)  CONDITIONAL (SPECIFY) BLOOD,    "Routine     Comments:  For patients with significant heart failure:  Recommend transfusing slowly using the 4 hour allowed time period, if clinically appropriate.  Do NOT change or add to this text.  Use additional instructions field.   Question Answer Comment   Irradiation Indication Past, current or scheduled stem cell transplant (BM, cord, bld)    Red Blood Cells: IRRADIATED    Number of Units 1    When to transfuse Hemoglobin is 8 g/dL or less (anemia)    Special Requirements None    Infusion Duration Infuse within 4 hours of release        05/26/17 1410    Unscheduled  Potassium  (Potassium Replacement - \"Standard\" - For K levels less than 3.4 mmol/L - UU,UR,UA,RH,SH,PH,WY )  CONDITIONAL (SPECIFY),   Routine     Comments:  Obtain Potassium Level for these conditions:  *IF no potassium result within 24 hours before initiation of order set, draw potassium level with next lab collect.    *2 HOURS AFTER last IV potassium replacement dose and 4 hours after an oral replacement dose.  *Next morning after potassium dose.     Repeat Potassium Replacement if necessary.    05/27/17 1101    Unscheduled  Magnesium  (Magnesium Replacement -  Adult - \"Standard\" - Replacement for all levels less than 1.6 mg/dL )  CONDITIONAL (SPECIFY),   Routine     Comments:  Obtain Magnesium Level for these conditions:  *IF no magnesium result within 24 hrs before initiation of order set, draw magnesium level with next lab collect.    *2 HOURS AFTER last magnesium replacement dose when magnesium replacement given for level less than 1.6   *Next morning after magnesium dose.     Repeat Magnesium Replacement if necessary.    05/27/17 1101    Unscheduled  Phosphorus  (or    SODIUM Phosphate - \"Standard\" - Replacement for levels less than or equal to 2.4 mg/dL )  CONDITIONAL (SPECIFY),   Routine     Comments:  *IF no phosphorus result within 24 hours before initiation of order set, draw phosphorus level with next lab collect.    *2 HOURS AFTER " last phosphorus replacement dose for levels less than 2.0.  *Next morning after phosphorus dose.     Repeat Phosphorus Replacement if necessary.    05/27/17 1101      Encounter-Level Documents:     There are no encounter-level documents.      Order-Level Documents:     There are no order-level documents.

## 2017-05-25 NOTE — LETTER
5/25/2017       RE: Anthony Carbone  [address]  [place] [address]     Dear Colleague,    Thank you for referring your patient, Anthony Carbone, to the [facility]. Please see a copy of my visit note below.    HEMATOLOGY/ONCOLOGY PROGRESS NOTE  May 25, 2017    REASON FOR VISIT: add-on fatigue, confusion     Diagnosis: 71 year old gentleman with IgM lambda multiple myeloma originally diagnosed in 01/2012 as stage I, standard risk disease.   Treatment: Revlimid plus dexamethasone for 4-5 cycles but plateaued by late 03/2012.   - Velcade was added and he received another 2-3 cycles, achieving a good partial remission.      Transplant: Single auto after melphalan 200 mg/m2 preparative regimen on 01/09/2013  - Post-transplant course: unremarkable except for some mild steroid-induced hyperglycemia, gastritis, nausea and vomiting.      Maintenance: Lenalidomide at day-100 then developed a maculopapular rash. Lenalidomide was held and then we re-challenged him after about a month to 2 months at a lower dose (5 mg daily); rash returned.   - was started on maintenance Velcade, every other week through July 2014, when he was noted with abnormalities on myeloma studies drawn there. Noted with prostate cancer dx at about the time of relapse (see below).     Relapse: noted with return on M-spike in blood/urine with marrow involvement but negative PET.   - Started on retreatment with RVD on 8/27/14 with Revlimid at 15 mg 14 days of 21 days, dexamethasone 40 mg weekly and velcade weekly.Completed at total of 5 cycles without complication by 12/2014.   - Started Cycle 6 on inc'd Rev dosing of 20mg daily x 2 weeks on 12/11/14 which was complicated by pneumonia.  - Adm 12/21-1/10 for human metapneumovirus pneumonia complicated by anorexia, HTN, depression, anorexia with significant weight loss.   - Restarted Ernesto/Dex only on 2/4/15 with good tolerance but with thrombocytopenia.   - Bendamustine  added to Velcade/dexamethasone on 5/21/15 due to disease progression  - Velcade discontinued on 7/9/2015 due to side effects (orthostasis)  - Schedule changed to bendamustine 80 mg/m2 days 1 and 2 on 28-day cycle  - Cycle 1 tolerated poorly due to lightheadedness and weakness  - Cycle 2 dose reduced to 60 mg/m2 and pre-meds adjusted  - Cycle 5 received on 9/17/15.  - 10/1/2015 - increasing IgM, M-spike - started Pomalidomide 4mg/day (21 days out of 28 days) and weekly Decadron 20mg on Oct 1st, 2015.    - Carfilzomib added on 10/22/2015 making this CPD.  - C3-C6  received in [place]    - Returned to [facility] and resumed CPD here    - Adm: 4/12-4/14 with fever, confusion, neutropenia. Noted on MRI to have acute/subacute CVA and subacute/chronic CVA; started on Plavix, given brief course of Abx and GCSF prior to d/c.     - Continued on Carfilzomib and dexamethasone alone  - Pomalyst added back on 7/13/2016 for rising FLC  - Start daratumumab 11/10/16. Changed to every other week after 4 weekly treatments d/t profound fatigue and malaise.   - Adm 5/7-5/16 due to AMS, hypercalcemia, hyperuricemia, possible PNA, back pain, and JOSE MARIA. Started Kyprolis while inpatient.    Current Treatment: Kyprolis IV + dexamethasone 20 mg days of Kyprolis. Day 15 given 5/24/17      INTERVAL HISTORY:    Yamil presents for interval follow-up today due to confusion yesterday. Yesterday, the RN at the TCU noted he was a little confused, and his wife noted he was quite tired. Kyprolis was held yesterday.     Today, the confusion continues to wax and wane per his wife, Casey. Casey tells me they had some troubles getting into the car today because Yamil was getting a little agitated. He continues to be very fatigued since yesterday. Casey isn't sure if the confusion is worse today as she isn't with him all of the time, but definitely thinks he is not doing well today. She thinks he is starting to see things, like he though he had a bag on him but there  "wasn't one. She tells me he is weak and needs the wheelchair again. He was in the walker, albeit slowly, yesterday.    Yamil is unable to provide much history due to the confusion. He did not know his name at the start of our visit, the year, where we were, his wife's name; but did know the president and his birthday. He answers \"sure\" to many questions. He does deny fevers, chills, headaches, falls, vision changes, altered taste, nausea, vomiting, GERD, abdominal pain, bowel changes (says last BM yesterday, but Casey reports that was his answer yesterday, too), bleeding, or swelling.    PHYSICAL EXAMINATION  /79  Pulse 91  Temp 98.3  F (36.8  C) (Oral)  Resp 12  SpO2 96%    Wt Readings from Last 10 Encounters:   17 59.2 kg (130 lb 8 oz)   17 59.1 kg (130 lb 6.4 oz)   17 59.1 kg (130 lb 3.2 oz)   17 64.6 kg (142 lb 6.4 oz)   17 64.7 kg (142 lb 10.2 oz)   17 65.1 kg (143 lb 8 oz)   17 64.5 kg (142 lb 3.2 oz)   16 63.2 kg (139 lb 6.4 oz)   16 63.2 kg (139 lb 4.8 oz)   12/15/16 63.3 kg (139 lb 9.6 oz)   General: Intermittently alert, otherwise looking down at the floor. Oriented to  only initially, but then able to tell me his name. Fidgeting, trying to pull out PORT. No acute distress. HEENT: PERRL, no palor or icterus. Exam limited due to poor cooperation, but i believe some thrush was visualized CVS: RRR. CHEST: CTAB, normal work of breathing ABDOMEN: soft non tender no masses palpated in a seated position.  NEURO: AAOX3  Grossly non focal neuro exam. SKIN: no obvious rashes. EXTREMITIES: No edema.     LABS:   Results for RENAEWILLIAN LARES (MRN [id number]) as of 2017 14:35   Ref. Range 2017 13:04   Sodium Latest Ref Range: 133 - 144 mmol/L 138   Potassium Latest Ref Range: 3.4 - 5.3 mmol/L 4.9   Chloride Latest Ref Range: 94 - 109 mmol/L 107   Carbon Dioxide Latest Ref Range: 20 - 32 mmol/L 23   Urea Nitrogen Latest Ref Range: 7 - 30 " "mg/dL 37 (H)   Creatinine Latest Ref Range: 0.66 - 1.25 mg/dL 1.31 (H)   GFR Estimate Latest Ref Range: >60 mL/min/1.7m2 54 (L)   GFR Estimate If Black Latest Ref Range: >60 mL/min/1.7m2 65   Calcium Latest Ref Range: 8.5 - 10.1 mg/dL 9.2   Anion Gap Latest Ref Range: 3 - 14 mmol/L 8   Magnesium Latest Ref Range: 1.6 - 2.3 mg/dL 2.2   Albumin Latest Ref Range: 3.4 - 5.0 g/dL 3.0 (L)   Protein Total Latest Ref Range: 6.8 - 8.8 g/dL 8.2   Bilirubin Total Latest Ref Range: 0.2 - 1.3 mg/dL 0.3   Alkaline Phosphatase Latest Ref Range: 40 - 150 U/L 60   ALT Latest Ref Range: 0 - 70 U/L 21   AST Latest Ref Range: 0 - 45 U/L 12   Ammonia Latest Ref Range: 10 - 50 umol/L 19   Uric Acid Latest Ref Range: 3.5 - 7.2 mg/dL 6.4     IMPRESSION/PLAN:  71 year old male with relapsed MM after autologous transplant (1/9/2013), with recent progression on daratumumab/pom/dex, with recent hospitalization 5/7-5/16 for back pain, JOSE MARIA,and  Hypercalcemia. Started on kyprolis IV 5/11 as well as po dex.       1. AMS: Onset of AMS 5/7 en route from [place]. At that time, suspected to be multifactorial in setting of metabolic derangements/possible infection/uremia. Mostly resolved inpatient with exception of sundowning, CT head negative. He has been on zyprexa 5 mg BID.   - Yesterday, had waxing and waning AMS and worsening fatigue.  Today, AMS continues to wax and wane and in discussion with my colleague who saw Yamil yesterday, appears to have worsened. At the start of our visit, Yamil was unable to tell me his name, thought the year was 2630, thought he was in the hospital, but knew who the President of the  was. He did provide me his name at the end of our visit. He answered most questions inappropriately or did not respond at all. He was unable to cooperate with most the exam, answering \"sure\" or \"its going better\" to most of the commands or questions. I was unable to fully assess his neuro exam due to lack of cooperation. He was trying to " "pull out his port during our exam.  - At this time, unclear if this is medication related (started MS Contin 5/19 with improvement in pain and no AMS at 5/23 visit), versus infectious issues (UA clear yesterday, no infectious symptoms on exam and although he answers \"no\" to entire infectious ROS, I'm not sure how reliable he is, versus MM. Ammonia normal.  - viscosity, SPEP, light chains pending  - Blood cultures pending, added TSH to labs  - Discussed with Dr. Topete, plan to admit    2. MM: Previously on edgardo/pom/dex while wintering in [place], progressive disease on SPEP inpatient (M spike 0.9 --> 2.1, IgM 3410)  - Received solumedrol 100 mg IV daily 5/8-5/10. Started PO dex 40 mg daily x 3 days on 5/11 with Kyprolis 5/11 and 5/12  - Kyprolis held yesterday for AMS and dehydration, holding again today.    3. Heme: Cytopenias 2/2 treatment and likely MM in setting of progression. Stable today.  - Continues on Plavix for history of CVA -- although will need to monitor platelets  4. Renal/FEN: Creatinine up today above his baseline. He received IVF yesterday, and will receive IVF while awaiting bed. Possibly 2/2 MM versus poor intake at TCU  -Hx Hypercalcemia: s/p pamidronate x 1 on 5/8, calcium corrects to 10 today  -Hx Hyperuricemia: s/p rasburicase x 1 on 5/8    4. ID: No infectious symptoms at this time although his history is unreliable.  - UA negative yesterday.  - BCx pending  - I did think I could visualize some thrush, exam was limited by poor cooperation with opening his mouth  - Continues ppx  mg BID    5. Back/rib pain: Started early May. Lumbar MRI at OSH showing mild-mod central and foraminal stenoses in lumbar spine, per patient and wife, there was no MM lesion there. Rib films inpatient negative, back films stable from prior imaging.  - Significantly improved with addition of MS Contin 15 mg q12h, tolerating this fine without AMS at our visit on 5/23. No dilaudid needs since 5/21.     6. CV: " Continue Norvasc and Zocor.   - Avoid QTc prolonging agents (history of QTc prolongation with syncopal episodes)     Leanne Reddy PA-C      I spent >40 minutes with the patient, with over 50% of the time spent counseling or coordinating their care as described above.

## 2017-05-25 NOTE — IP AVS SNAPSHOT
"    UNIT [address]D [facility]: [phone]                                              INTERAGENCY TRANSFER FORM - PHYSICIAN ORDERS   2017                    Hospital Admission Date: 2017  WILLIAN ARCINIEGA   : 1945  Sex: Male        Attending Provider: Serina Vang MD     Allergies:  Aspirin, Bactrim [Sulfamethoxazole W-trimethoprim]    Infection:  None   Service:  Hem/Onc    Ht:  1.626 m (5' 4\")   Wt:  56.7 kg (124 lb 14.4 oz)   Admission Wt:  62.9 kg (138 lb 11.2 oz)    BMI:  21.44 kg/m 2   BSA:  1.6 m 2            Patient PCP Information     Provider PCP Type    Sanket Gualberto [facility]      ED Clinical Impression     Diagnosis Description Comment Added By Time Added    Multiple myeloma in relapse (H) [C90.02] Multiple myeloma in relapse (H) [C90.02]  Kamilla Dumont PA-C 2017  9:08 AM    Acute bilateral low back pain without sciatica [M54.5] Acute bilateral low back pain without sciatica [M54.5]  Kamilla Dumont PA-C 2017  9:08 AM    Delirium [R41.0] Delirium [R41.0]  Kamilla Dumont PA-C 2017  9:10 AM    Dry mouth [R68.2] Dry mouth [R68.2]  Kamilla Dumont PA-C 2017  9:14 AM    Diarrhea, unspecified type [R19.7] Diarrhea, unspecified type [R19.7]  Kamilla Dumont PA-C 2017  9:17 AM    Orthostatic hypotension [I95.1] Orthostatic hypotension [I95.1]  Kamilla Dumont PA-C 2017  9:18 AM    Orthostatic syncope [I95.1] Orthostatic syncope [I95.1]  Kamilla Dumont PA-C 2017  9:18 AM    Pneumonia of left lower lobe due to infectious organism [J18.9] Pneumonia of left lower lobe due to infectious organism [J18.9]  Kamilla Dumont PA-C 2017  9:21 AM      Hospital Problems as of 2017              Priority Class Noted POA    Altered mental status Medium  2016 Yes      Non-Hospital Problems as of 2017              Priority Class Noted    Multiple myeloma (H)   2012    " Hypertension   Unknown    Multiple myeloma (H)   12/20/2012    Neutropenic fever (H)   1/17/2013    Encounter for long-term current use of medication   1/22/2013    GERD (gastroesophageal reflux disease) Medium  12/8/2014    Pneumonia   12/21/2014    Syncope   7/6/2015    Syncope, unspecified syncope type   7/8/2015    Myeloma (H)   10/22/2015    Neoplasm of uncertain behavior of skin Medium  2/6/2017    AK (actinic keratosis) Medium  2/6/2017    Back pain Medium  5/7/2017      Code Status History     Date Active Date Inactive Code Status Order ID Comments User Context    5/16/2017  1:12 PM 5/25/2017  5:31 PM Full Code [id number]  Brittney Armstrong PA-C Outpatient    5/7/2017  9:44 PM 5/16/2017  1:12 PM Full Code [id number]  Joaine Anderson MD Inpatient    4/14/2016  9:15 AM 5/7/2017  9:44 PM Full Code [id number]  Roxie Frey PA-C Outpatient    4/12/2016  9:03 PM 4/14/2016  9:15 AM Full Code [id number]  Joanie Anderson MD Inpatient    12/23/2014  9:32 AM 1/10/2015  6:37 PM DNR/DNI [id number]  Kamilla Dumont PA-C Inpatient    12/21/2014 10:03 PM 12/23/2014  9:32 AM Full Code [id number]  Colin Bundy MD Inpatient    1/18/2013  8:28 AM 1/21/2013  3:14 PM Full Code [id number]  Mary Limon NP Inpatient    1/17/2013 11:22 PM 1/18/2013  8:28 AM DNR/DNI [id number]  Letty Puente RN Inpatient    1/7/2013 12:25 PM 1/10/2013 11:32 AM Full Code [id number]  Lorena Pizarro NP Inpatient    12/20/2012 12:41 PM 12/22/2012  4:36 PM Full Code [id number]  Leda Gibson PA Inpatient         Medication Review      START taking        Dose / Directions Comments    amoxicillin-clavulanate 875-125 MG per tablet   Commonly known as:  AUGMENTIN   Indication:  Healthcare-Associated Pneumonia   Used for:  Pneumonia of left lower lobe due to infectious organism        Dose:  1 tablet   Take 1 tablet by mouth every 12 hours for 13 doses   Refills:  0         artificial saliva Liqd liquid   Used for:  Dry mouth        Dose:  3-5 mL   Swish and spit 3-5 mLs in mouth 4 times daily as needed for dry mouth   Refills:  0        saccharomyces boulardii 250 MG capsule   Commonly known as:  FLORASTOR   Used for:  Diarrhea, unspecified type        Dose:  250 mg   Take 1 capsule (250 mg) by mouth 2 times daily for 7 days   Quantity:  14 capsule   Refills:  0        traMADol 50 MG tablet   Commonly known as:  ULTRAM   Used for:  Acute bilateral low back pain without sciatica, Multiple myeloma in relapse (H)        Dose:  25-50 mg   Take 0.5-1 tablets (25-50 mg) by mouth every 6 hours as needed for moderate pain . Recommend trying after Tylenol and before Dilaudid.   Quantity:  28 tablet   Refills:  0          CONTINUE these medications which may have CHANGED, or have new prescriptions. If we are uncertain of the size of tablets/capsules you have at home, strength may be listed as something that might have changed.        Dose / Directions Comments    HYDROmorphone 2 MG tablet   Commonly known as:  DILAUDID   This may have changed:    - how much to take  - when to take this   Used for:  Multiple myeloma in relapse (H), Acute bilateral low back pain without sciatica        Dose:  1 mg   Take 0.5 tablets (1 mg) by mouth every 3 hours as needed for moderate to severe pain   Quantity:  30 tablet   Refills:  0        LORazepam 0.5 MG tablet   Commonly known as:  ATIVAN   This may have changed:    - when to take this  - additional instructions   Used for:  Multiple myeloma in relapse (H), Delirium        Dose:  0.5 mg   Take 1 tablet (0.5 mg) by mouth every 6 hours as needed for nausea or anxiety.   Quantity:  30 tablet   Refills:  0        midodrine 2.5 MG tablet   Commonly known as:  PROAMATINE   This may have changed:  additional instructions   Used for:  Orthostatic hypotension, Orthostatic syncope        Dose:  2.5 mg   Take 1 tablet (2.5 mg) by mouth 3 times daily . HOLD due to  elevated blood pressures during admission. Continue to hold until further advised by outpatient clinic team or if develops recurrent orthostatic hypotension.   Quantity:  90 tablet   Refills:  3          CONTINUE these medications which have NOT CHANGED        Dose / Directions Comments    acetaminophen 500 MG tablet   Commonly known as:  TYLENOL   Used for:  Acute bilateral low back pain without sciatica, Multiple myeloma in relapse (H)        Dose:  1000 mg   Take 2 tablets (1,000 mg) by mouth every 6 hours as needed for mild pain   Refills:  0        acyclovir 400 MG tablet   Commonly known as:  ZOVIRAX   Indication:  Unsure of dose   Used for:  Multiple myeloma in relapse (H)        Dose:  400 mg   Take 1 tablet (400 mg) by mouth 2 times daily   Quantity:  60 tablet   Refills:  0        amLODIPine 5 MG tablet   Commonly known as:  NORVASC   Used for:  Essential hypertension        Take one tablet at bedtime.   Quantity:  30 tablet   Refills:  0        clopidogrel 75 MG tablet   Commonly known as:  PLAVIX   Used for:  History of stroke        Dose:  75 mg   Take 1 tablet (75 mg) by mouth daily   Quantity:  30 tablet   Refills:  0        esomeprazole 40 MG CR capsule   Commonly known as:  nexIUM   Used for:  Gastroesophageal reflux disease without esophagitis        Dose:  40 mg   Take 1 capsule (40 mg) by mouth every morning (before breakfast)   Quantity:  30 capsule   Refills:  0        lidocaine 5 % Patch   Commonly known as:  LIDODERM   Used for:  Multiple myeloma in relapse (H), Acute bilateral low back pain without sciatica        Dose:  3 patch   Place 3 patches onto the skin every 24 hours   Quantity:  30 patch   Refills:  0        OLANZapine 5 MG tablet   Commonly known as:  zyPREXA   Used for:  Delirium, Multiple myeloma in relapse (H)        Dose:  5 mg   Take 1 tablet (5 mg) by mouth 2 times daily   Quantity:  60 tablet   Refills:  0        ondansetron 8 MG ODT tab   Commonly known as:  ZOFRAN-ODT    Used for:  Nausea        Dose:  8 mg   Take 1 tablet (8 mg) by mouth every 8 hours as needed for nausea   Quantity:  30 tablet   Refills:  3        potassium chloride SA 20 MEQ CR tablet   Commonly known as:  K-DUR/KLOR-CON M   Used for:  Hypokalemia        TAKE 1 TABLET BY MOUTH TWICE DAILY.   Quantity:  60 tablet   Refills:  0        simethicone 80 MG chewable tablet   Commonly known as:  MYLICON   Used for:  Nausea        Dose:  160 mg   Take 2 tablets (160 mg) by mouth 4 times daily as needed for cramping or other (gas pain)   Quantity:  180 tablet   Refills:  0        simvastatin 5 MG tablet   Commonly known as:  ZOCOR   Used for:  Mixed hyperlipidemia        Dose:  20 mg   Take 4 tablets (20 mg) by mouth daily   Quantity:  30 tablet   Refills:  0        sucralfate 1 GM tablet   Commonly known as:  CARAFATE   Used for:  Gastroesophageal reflux disease without esophagitis        Dose:  1 g   Take 1 tablet (1 g) by mouth 4 times daily as needed   Quantity:  120 tablet   Refills:  1          STOP taking     morphine 15 MG 12 hr tablet   Commonly known as:  MS CONTIN                   After Care     Activity - Up ad cassi           Advance Diet as Tolerated       Follow this diet upon discharge: Regular diet as tolerated       General info for SNF       Length of Stay Estimate: Short Term Care: Estimated # of Days <30  Condition at Discharge: Improving  Level of care:skilled   Rehabilitation Potential: Fair  Admission H&P remains valid and up-to-date: Yes  Recent Chemotherapy: Kyprolis, held as of 5/24                      Use Nursing Home Standing Orders: Yes       Mantoux instructions       Give two-step Mantoux (PPD) Per Facility Policy Yes             Referrals     Occupational Therapy Adult Consult       Evaluate and treat as clinically indicated.    Reason: Ongoing rehabilitation       Physical Therapy Adult Consult       Evaluate and treat as clinically indicated.    Reason:  Ongoing rehabilitation              Your next 10 appointments already scheduled     Jun 06, 2017  1:15 PM CDT   [facility] Draw with [facility]   [facility] Draw ([facility])    [address] [place] [id number]   [phone]            Jun 06, 2017  1:40 PM CDT   (Arrive by 1:25 PM)   Return Visit with Leanne Reddy PA-C   [facility] ([facility])    [address] [place] [id number]   [phone]            Jun 06, 2017  3:00 PM CDT   Infusion 60 with [facility] ONCOLOGY INFUSION, UC 26 ATC   [facility] ([facility])    [address] [place] [id number]   [phone]            Jun 07, 2017  2:30 PM CDT   Infusion 60 with UC ONCOLOGY INFUSION, UC 29 ATC   [facility] ([facility])    [address] [place] [phone]   [phone]            Jun 13, 2017  1:30 PM CDT   [facility] Draw with [facility] DRAW   [facility] Draw ([facility])    [address] [place] [id number]   [phone]            Jun 13, 2017  2:00 PM CDT   Infusion 60 with UC ONCOLOGY INFUSION, UC 14 ATC   [facility] ([facility])    [address] [place] [id number]   [phone]            Jun 14, 2017  2:00 PM CDT   Infusion 60 with UC ONCOLOGY INFUSION   [facility] ([facility])    [address] [place] [id number]   [phone]              Follow-Up Appointment Instructions     Future Labs/Procedures    Adult [facility] Follow-up and recommended labs and tests     Comments:    Follow up with Palliative Care Clinic within 1-2 weeks of discharge.      Appointments on [place] and/or [place] (with [facility] or [facility] provider or service). Call [phone] if you haven't heard regarding these appointments within 7 days of discharge.    Follow Up and recommended labs and tests     Comments:    Follow-up as previously scheduled.      Follow-Up Appointment Instructions     Adult [facility] Follow-up and recommended labs and tests       Follow up with Palliative Care Clinic within 1-2 weeks of discharge.      Appointments on [place] and/or [facility] (with [facility] or [facility] provider or service). Call [phone] if you haven't heard regarding these appointments within 7 days of discharge.       Follow Up and recommended labs and tests       Follow-up as previously scheduled.             Statement of Approval     Ordered          05/29/17 0927  I have reviewed and agree with all the recommendations and orders detailed in this document.  EFFECTIVE NOW     Approved and electronically signed by:  Kamilla Dumont PA-C

## 2017-05-25 NOTE — MR AVS SNAPSHOT
After Visit Summary   5/25/2017    Anthony Carbone    MRN: [id number]           Patient Information     Date Of Birth          1945        Visit Information        Provider Department      5/25/2017 12:20 PM Leanne Reddy PA-C [facility]        Today's Diagnoses     Multiple myeloma not having achieved remission (H)    -  1    Disorientation            Follow-ups after your visit        Your next 10 appointments already scheduled     Jun 06, 2017  1:15 PM CDT   [facility] Draw with [facility] LAB DRAW   [facility] Lab Draw ([facility])    [address] [place] [address]   [phone]            Jun 06, 2017  1:40 PM CDT   (Arrive by 1:25 PM)   Return Visit with Leanne Reddy PA-C   [facility] ([facility])    [address] [place] [address]   [phone]            Jun 06, 2017  3:00 PM CDT   Infusion 60 with UC ONCOLOGY INFUSION, UC 26 ATC   [facility] ([facility])    [address] [place] [address]   [phone]            Jun 07, 2017  2:30 PM CDT   Infusion 60 with UC ONCOLOGY INFUSION, UC 29 ATC   [facility] ([facility])    [address] [address]   [phone]            Jun 13, 2017  1:30 PM CDT   [facility] Lab Draw with [facility] LAB DRAW   [facility] Lab Draw ([facility])    [address] [place] [address]   [phone]            Jun 13, 2017  2:00 PM CDT   Infusion 60 with UC ONCOLOGY INFUSION, UC 14 ATC   [facility] ([facility])    [address] [place] [address]   [phone]            Jun 14, 2017  2:00 PM CDT   Infusion  60 with UC ONCOLOGY INFUSION   [facility] ([facility])    [address] [facility]  2nd Floor  [facility] [address] [phone]              Who to contact     If you have questions or need follow up information about today's clinic visit or your schedule please contact [facility] directly at [phone].  Normal or non-critical lab and imaging results will be communicated to you by MyChart, letter or phone within 4 business days after the clinic has received the results. If you do not hear from us within 7 days, please contact the clinic through Possible Webhart or phone. If you have a critical or abnormal lab result, we will notify you by phone as soon as possible.  Submit refill requests through Burpple or call your pharmacy and they will forward the refill request to us. Please allow 3 business days for your refill to be completed.          Additional Information About Your Visit        Possible Webhart Information     Burpple gives you secure access to your electronic health record. If you see a primary care provider, you can also send messages to your care team and make appointments. If you have questions, please call your primary care clinic.  If you do not have a primary care provider, please call [phone] and they will assist you.        Care EveryWhere ID     This is your Care EveryWhere ID. This could be used by other organizations to access your [facility] medical records  [id number]        Your Vitals Were     Pulse Temperature Respirations Pulse Oximetry          91 98.3  F (36.8  C) (Oral) 12 96%         Blood Pressure from Last 3 Encounters:   05/25/17 117/79   05/24/17 123/73   05/23/17 141/82    Weight from Last 3 Encounters:   05/23/17 59.2 kg (130 lb 8 oz)   05/19/17 59.1 kg (130 lb 6.4 oz)   05/16/17 59.1 kg (130 lb 3.2 oz)              We Performed the Following     TSH with free T4 reflex        Primary Care Provider Office Phone # Fax #     Sanket Burciaga [phone]       [facility] [address]  [facility] [phone]        Thank you!     Thank you for choosing [facility]  for your care. Our goal is always to provide you with excellent care. Hearing back from our patients is one way we can continue to improve our services. Please take a few minutes to complete the written survey that you may receive in the mail after your visit with us. Thank you!             Your Updated Medication List - Protect others around you: Learn how to safely use, store and throw away your medicines at www.disposemymeds.org.          This list is accurate as of: 5/25/17  3:55 PM.  Always use your most recent med list.                   Brand Name Dispense Instructions for use    acetaminophen 500 MG tablet    TYLENOL     Take 2 tablets (1,000 mg) by mouth every 6 hours as needed for mild pain       acyclovir 400 MG tablet    ZOVIRAX    60 tablet    Take 1 tablet (400 mg) by mouth 2 times daily       amLODIPine 5 MG tablet    NORVASC    30 tablet    Take one tablet at bedtime.       clopidogrel 75 MG tablet    PLAVIX    30 tablet    Take 1 tablet (75 mg) by mouth daily       esomeprazole 40 MG CR capsule    nexIUM    30 capsule    Take 1 capsule (40 mg) by mouth every morning (before breakfast)       HYDROmorphone 2 MG tablet    DILAUDID     Take 1 tablet (2 mg) by mouth every 4 hours as needed for moderate to severe pain       lidocaine 5 % Patch    LIDODERM    30 patch    Place 3 patches onto the skin every 24 hours       LORazepam 0.5 MG tablet    ATIVAN    30 tablet    Take 1 tablet (0.5 mg) by mouth every 4 hours as needed       midodrine 2.5 MG tablet    PROAMATINE    90 tablet    Take 1 tablet (2.5 mg) by mouth 3 times daily       morphine 15 MG 12 hr tablet    MS CONTIN    60 tablet    Take 1 tablet (15 mg) by mouth every 12 hours       OLANZapine 5 MG tablet    zyPREXA    60 tablet    Take 1 tablet (5 mg)  by mouth 2 times daily       ondansetron 8 MG ODT tab    ZOFRAN-ODT    30 tablet    Take 1 tablet (8 mg) by mouth every 8 hours as needed for nausea       potassium chloride SA 20 MEQ CR tablet    K-DUR/KLOR-CON M    60 tablet    TAKE 1 TABLET BY MOUTH TWICE DAILY.       simethicone 80 MG chewable tablet    MYLICON    180 tablet    Take 2 tablets (160 mg) by mouth 4 times daily as needed for cramping or other (gas pain)       simvastatin 5 MG tablet    ZOCOR    30 tablet    Take 4 tablets (20 mg) by mouth daily       sucralfate 1 GM tablet    CARAFATE    120 tablet    Take 1 tablet (1 g) by mouth 4 times daily as needed

## 2017-05-25 NOTE — IP AVS SNAPSHOT
` ` Patient Information     Patient Name Sex     Anthony Arciniega ([id number]) Male 1945       Room Bed    7515 7515-01      Patient Demographics     Address Phone E-mail Address    [address] [place] [id number] [phone] (Home)  [phone] (Mobile) *Preferred* [email or web address]      Patient Ethnicity & Race     Ethnic Group Patient Race    Not  or  White      Emergency Contact(s)     Name Relation Home Work Mobile    RORO ARCINIEGA Spouse [phone]      Documents on File        Status Date Received Description       Documents for the Patient    Privacy Notice - [place] Received 12     Insurance Card Received 13     External Medication Information Consent       Patient ID Received 13     Consent for Services - Hospital/Clinic  ()      Consent for Services - Hospital/Clinic Received () 12     Consent for Services - Hospital/Clinic  () 12 CONSENT FOR SERVICE    Privacy Notice - [place]  12 ACKNOWLEDGMENT OF RECEIPT OF NOTICE OF PRIVACY PRACTICE    Consent to Communicate Received () 12     Consent to Communicate  () 12 AUTHORIZATION TO DISCUSS PROTECTED HEALTH INFORMATION    Consent for Services - [facility] Received 13 Consent IMAGING CENTER    Insurance Card       Insurance Card       Insurance Card       Advance Directives and Living Will Received 01/10/13 VALIDATION OF AD 13    Advance Directives and Living Will Received 01/10/13 HEALTHCARE DIRECTIVE 13    Research  13 ADULT CONSENT (CETI)(HCT)    Research  13 HIPAA AUTHORIZATION    Research  13 HIPAA AUTHORIZATION    Research  13 CONSENT TO PARTICIPATE IN RESEARCH BLOOD AND MARROW TRANSPLANT PROGRAM    Research  13 HIPAA AUTHORIZATION    Research  13 ADULT CONSENT (NMDP) & (CIBMTR)    HIM HANH Authorization - File Only  13 AUTHORIZATION FOR RELEASE OF PROTECTED  HEALTH INFORMATION    Consent for Services - [facility]  13     Consent for EHR Access  13 Copied from existing Consent for services - C/HOD collected on 2012    [facility] Specified Other       Consent for Services - Hospital/Clinic Received () 13     Consent for Services - Hospital/Clinic Received () 14     HIM HANH Authorization  10/31/13 [facility]    Consent for Services - Hospital/Clinic  () 14 CONSENT FOR SERVICE    Consent to Communicate  09/10/14 AUTHORIZATION TO DISCUSS PROTECTED HEALTH INFORMATION    HIM HANH Authorization  10/07/14     Consent for Services - Hospital/Clinic  () 01/13/15 CONSENT FOR SERVICE    Business/Insurance/Care Coordination/Health Form - Patient  03/23/15 NOTICE OF APPROVAL OF MEDICARE PRESCRIPTION DRUG COVERAGE    Business/Insurance/Care Coordination/Health Form - Patient  04/10/15 REQUEST FOR MEDICARE PRESCRIPTION DRUG COVERAGE DETERMINATION    Business/Insurance/Care Coordination/Health Form - Patient  04/10/15 NOTICE OF APPROVAL FOR MEDICARE PRESCRIPTION DRUG COVERAGE    Business/Insurance/Care Coordination/Health Form - Patient  04/14/15 NOTICE OF APPROVAL OF PRESCRIPTION DRUG COVERAGE    Business/Insurance/Care Coordination/Health Form - Patient  05/27/15 PRIOR AUTHORIZATION    Consent for Services - Hospital/Clinic Received () 07/16/15     Consent for Services - Hospital/Clinic  () 07/16/15 CONSENT FOR SERVICE    Business/Insurance/Care Coordination/Health Form - Patient  10/02/15 APPROVAL FOR POMALYST 8/27/15 - 16    HIM HANH Authorization  12/15/15 [facility]    Business/Insurance/Care Coordination/Health Form - Patient  16 CELGENE, REVLIMID PATIENT-PHYSICIAN AGREEMENT FORM, 2014    Consent for Services/Privacy Notice - Hospital/Clinic Received 17     Consent for Services/Privacy Notice - Hospital/Clinic-Esign Received () 16      Business/Insurance/Care Coordination/Health Form - Patient  08/02/16 PATIENT FINANCIAL ASSISTANCE    Business/Insurance/Care Coordination/Health Form - Patient   Kyprolis- Drug Replacement Program Denial       Documents for the Encounter    CMS IM for Patient Signature Received 05/26/17       Admission Information     Attending Provider Admitting Provider Admission Type Admission Date/Time    Serina Vang MD Bachanova, Veronika, MD Urgent 05/25/17  1729    Discharge Date Hospital Service Auth/Cert Status Service Area     Hem/Onc Incomplete [facility]    Unit Room/Bed Admission Status       UU U7D 7515/7515-01 Admission (Confirmed)       Admission     Complaint    Altered mental status, Multiple myeloma, Altered mental status      Hospital Account     Name Acct ID Class Status Primary Coverage    Anthony Carbone [id number] Inpatient Open MEDICARE - MEDICARE FOR HB SUPPLEMENT            Guarantor Account (for Hospital Account #[id number])     Name Relation to Pt Service Area Active? Acct Type    Anthony Carbone Self FCS Yes Personal/Family    Address Phone          [address] [place] [address] [phone](H)              Coverage Information (for Hospital Account #[id number])     1. MEDICARE/MEDICARE FOR HB SUPPLEMENT     F/O Payor/Plan Precert #    MEDICARE/MEDICARE FOR HB SUPPLEMENT     Subscriber Subscriber #    Anthony Carbone [id number]    Address Phone    ATT[employer] CLAIMS  [address]  [place] [address] [phone]          2. MEDICA/MEDICA PRIME SOLUTION     F/O Payor/Plan Precert #    MEDICA/MEDICA PRIME SOLUTION     Subscriber Subscriber #    Anthony Carbone [id number]    Address Phone    [address] [address]  [place] [address] [phone]

## 2017-05-25 NOTE — PATIENT INSTRUCTIONS
[facility] Main Line: [phone]    Call triage nurse with chills and/or temperature greater than or equal to 100.4, uncontrolled nausea/vomiting, diarrhea, constipation, dizziness, shortness of breath, chest pain, bleeding, unexplained bruising, or any new/concerning symptoms, questions/concerns.   If you are having any concerning symptoms or wish to speak to a provider before your next infusion visit, please call your care coordinator or triage to notify them so we can adequately serve you.   Triage Nurse Line: [phone]    If after hours, weekends, or holidays, call main hospital  and ask for Oncology doctor on call @ [phone]               May 2017   [facility] Monday Tuesday Wednesday Thursday Friday Saturday        1     2     3     4     5     6       7     Admission    6:21 PM   Joanie Anderson MD   Unit 7D [facility]   (Discharge: 5/16/2017) 8     XR CHEST PORT 1 VIEW    3:05 PM   (20 min.)   [id number]   [facility],  Radiology 9     [facility] LAB DRAW   11:30 AM   (15 min.)    [facility] LAB DRAW   [facility] Lab Draw     [facility] ONC RETURN   12:00 PM   (30 min.)    BMT DOM   [facility] Blood and Marrow Transplant 10     XR RIBS RIGHT 2 VIEWS    3:20 PM   (20 min.)   UUXR3   [facility],  Radiology 11     IP EVALUATION    6:00 AM   (60 min.)   Lashay Kelley, PT   [facility] [place], Physical Therapy     IP EVALUATION    6:00 AM   (60 min.)   Lashay Gonzalez, OT   [facility], [place], Occupational Therapy     XR LUMBAR SPINE 2/3 VIEWS    1:10 PM   (20 min.)   UUXR3   [facility],  Radiology     XR THORACIC SPINE 2 VIEWS    1:15 PM   (20 min.)   UUXR3   [facility], [place],  Radiology 12     IP TREATMENT    6:00 AM   (30 min.)   Dulce Maria Bejarano, OT   [facility], [place], Occupational Therapy     XR ABDOMEN 2 VIEWS    9:15 AM   (15 min.)   UUXR3   [facility],  Radiology     CT HEAD WO   11:40 AM   (15 min.)   UUCT1   [facility], CT     IP TREATMENT    1:00 PM   (30  min.)   Lashay Kelley, PT   [facility], [place], Physical Therapy 13     IP TREATMENT    6:00 AM   (30 min.)   Dulce Maria Bejarano, OT   [facility], [place], Occupational Therapy     IP TREATMENT   11:00 AM   (30 min.)   Lashay Kelley, PT   [facility], [place], Physical Therapy   14     IP TREATMENT    1:00 PM   (30 min.)   Lashay Kelley, PT   [facility], [place], Physical Therapy 15     IP TREATMENT    6:00 AM   (30 min.)   Lashay Gonzalez, OT   [facility], [place], Occupational Therapy 16     17     18     19     [facility] LAB DRAW    9:45 AM   (15 min.)   [facility]     [facility] RETURN   10:05 AM   (50 min.)   Leanne Reddy PA-C   [facility] ONC INFUSION 120   11:30 AM   (120 min.)   UC ONCOLOGY INFUSION   [facility] 20       21     22     23     [facility] LAB DRAW   10:30 AM   (15 min.)   [facility]     [facility] RETURN   10:45 AM   (50 min.)   [facility]-C   [facility]    1:00 PM   (60 min.)   UC ONCOLOGY INFUSION   [facility] 24     [facility] ONC INFUSION 60    1:00 PM   (60 min.)   [facility] ONCOLOGY INFUSION   [facility]     [facility] RETURN    2:25 PM   (50 min.)   Leda Gold PA M [facility] 25     P [facility]ONIC LAB DRAW   11:45 AM   (15 min.)   [facility] LAB DRAW   [facility] Masonic Lab Draw     [facility] RETURN   12:05 PM   (50 min.)   Leanne Reddy [facility]-C   [facility] 60    1:00 PM   (60 min.)   UC ONCOLOGY INFUSION   [facility]     26     27       28     29     30     31 June 2017 Sunday Monday Tuesday Wednesday Thursday Friday Saturday                       1     2     3       4     5     6     [facility] LAB DRAW    1:15 PM   (15 min.)   [facility] LAB DRAW   [facility] Masonic Lab Draw     [facility]  RETURN    1:25 PM   (50 min.)   Leanne Reddy PA-C   [facility]     [facility] ONC INFUSION 60    3:00 PM   (60 min.)    ONCOLOGY INFUSION   [facility] 7     [facility] ONC INFUSION 60    2:30 PM   (60 min.)   UC ONCOLOGY INFUSION   [facility] 8     9     10       11     12     13     [facility] LAB DRAW    1:30 PM   (15 min.)    MASONIC LAB DRAW   [facility] Lab Draw     [facility] ONC INFUSION 60    2:00 PM   (60 min.)   UC ONCOLOGY INFUSION   [facility] 14     [facility] ONC INFUSION 60    2:00 PM   (60 min.)    ONCOLOGY INFUSION   [facility] 15     16     17       18     19     20     [facility] LAB DRAW    1:30 PM   (15 min.)    MASONIC LAB DRAW   [facility] Lab Draw     [facility] ONC INFUSION 60    2:00 PM   (60 min.)    ONCOLOGY INFUSION   [facility] 21     [facility] ONC INFUSION 60    2:00 PM   (60 min.)    ONCOLOGY INFUSION   [facility] 22     23     24       25     26     27     28     29     30                       Lab Results:  Recent Results (from the past 12 hour(s))   Comprehensive metabolic panel    Collection Time: 05/25/17  1:04 PM   Result Value Ref Range    Sodium 138 133 - 144 mmol/L    Potassium 4.9 3.4 - 5.3 mmol/L    Chloride 107 94 - 109 mmol/L    Carbon Dioxide 23 20 - 32 mmol/L    Anion Gap 8 3 - 14 mmol/L    Glucose 135 (H) 70 - 99 mg/dL    Urea Nitrogen 37 (H) 7 - 30 mg/dL    Creatinine 1.31 (H) 0.66 - 1.25 mg/dL    GFR Estimate 54 (L) >60 mL/min/1.7m2    GFR Estimate If Black 65 >60 mL/min/1.7m2    Calcium 9.2 8.5 - 10.1 mg/dL    Bilirubin Total 0.3 0.2 - 1.3 mg/dL    Albumin 3.0 (L) 3.4 - 5.0 g/dL    Protein Total 8.2 6.8 - 8.8 g/dL    Alkaline Phosphatase 60 40 - 150 U/L    ALT 21 0 - 70 U/L    AST 12 0 - 45 U/L   Magnesium    Collection Time: 05/25/17  1:04 PM   Result Value Ref Range    Magnesium 2.2 1.6 - 2.3 mg/dL   Uric acid    Collection  Time: 05/25/17  1:04 PM   Result Value Ref Range    Uric Acid 6.4 3.5 - 7.2 mg/dL   *CBC with platelets differential    Collection Time: 05/25/17  1:04 PM   Result Value Ref Range    WBC 9.4 4.0 - 11.0 10e9/L    RBC Count 2.77 (L) 4.4 - 5.9 10e12/L    Hemoglobin 8.7 (L) 13.3 - 17.7 g/dL    Hematocrit 28.3 (L) 40.0 - 53.0 %     (H) 78 - 100 fl    MCH 31.4 26.5 - 33.0 pg    MCHC 30.7 (L) 31.5 - 36.5 g/dL    RDW 18.6 (H) 10.0 - 15.0 %    Platelet Count 118 (L) 150 - 450 10e9/L    Diff Method Automated Method     % Neutrophils 85.7 %    % Lymphocytes 1.6 %    % Monocytes 11.9 %    % Eosinophils 0.0 %    % Basophils 0.1 %    % Immature Granulocytes 0.7 %    Nucleated RBCs 1 (H) 0 /100    Absolute Neutrophil 8.1 1.6 - 8.3 10e9/L    Absolute Lymphocytes 0.2 (L) 0.8 - 5.3 10e9/L    Absolute Monocytes 1.1 0.0 - 1.3 10e9/L    Absolute Eosinophils 0.0 0.0 - 0.7 10e9/L    Absolute Basophils 0.0 0.0 - 0.2 10e9/L    Abs Immature Granulocytes 0.1 0 - 0.4 10e9/L    Absolute Nucleated RBC 0.1    Ammonia    Collection Time: 05/25/17  1:04 PM   Result Value Ref Range    Ammonia 19 10 - 50 umol/L

## 2017-05-26 LAB
ABO + RH BLD: ABNORMAL
ABO + RH BLD: ABNORMAL
ALBUMIN SERPL ELPH-MCNC: 3.1 G/DL (ref 3.7–5.1)
ALPHA1 GLOB SERPL ELPH-MCNC: 0.4 G/DL (ref 0.2–0.4)
ALPHA2 GLOB SERPL ELPH-MCNC: 0.9 G/DL (ref 0.5–0.9)
ANION GAP SERPL CALCULATED.3IONS-SCNC: 8 MMOL/L (ref 3–14)
B-GLOBULIN SERPL ELPH-MCNC: 0.6 G/DL (ref 0.6–1)
BASOPHILS # BLD AUTO: 0 10E9/L (ref 0–0.2)
BASOPHILS NFR BLD AUTO: 0.8 %
BLD GP AB SCN SERPL QL: ABNORMAL
BLD PROD TYP BPU: ABNORMAL
BLD PROD TYP BPU: NORMAL
BLD UNIT ID BPU: 0
BLOOD BANK CMNT PATIENT-IMP: ABNORMAL
BLOOD BANK CMNT PATIENT-IMP: ABNORMAL
BLOOD PRODUCT CODE: NORMAL
BPU ID: NORMAL
BUN SERPL-MCNC: 18 MG/DL (ref 7–30)
CALCIUM SERPL-MCNC: 8.3 MG/DL (ref 8.5–10.1)
CHLORIDE SERPL-SCNC: 110 MMOL/L (ref 94–109)
CO2 SERPL-SCNC: 25 MMOL/L (ref 20–32)
CREAT SERPL-MCNC: 0.8 MG/DL (ref 0.66–1.25)
DIFFERENTIAL METHOD BLD: ABNORMAL
EOSINOPHIL # BLD AUTO: 0 10E9/L (ref 0–0.7)
EOSINOPHIL NFR BLD AUTO: 0.4 %
ERYTHROCYTE [DISTWIDTH] IN BLOOD BY AUTOMATED COUNT: 18.9 % (ref 10–15)
GAMMA GLOB SERPL ELPH-MCNC: 2.1 G/DL (ref 0.7–1.6)
GFR SERPL CREATININE-BSD FRML MDRD: ABNORMAL ML/MIN/1.7M2
GLUCOSE SERPL-MCNC: 96 MG/DL (ref 70–99)
HCT VFR BLD AUTO: 24.8 % (ref 40–53)
HGB BLD-MCNC: 7.5 G/DL (ref 13.3–17.7)
IGA SERPL-MCNC: 7 MG/DL (ref 70–380)
IGG SERPL-MCNC: 109 MG/DL (ref 695–1620)
IGM SERPL-MCNC: 2910 MG/DL (ref 60–265)
IMM GRANULOCYTES # BLD: 0 10E9/L (ref 0–0.4)
IMM GRANULOCYTES NFR BLD: 0.4 %
KAPPA LC UR-MCNC: 0.34 MG/DL (ref 0.33–1.94)
KAPPA LC/LAMBDA SER: 0.01 {RATIO} (ref 0.26–1.65)
LAMBDA LC SERPL-MCNC: 29 MG/DL (ref 0.57–2.63)
LYMPHOCYTES # BLD AUTO: 0.3 10E9/L (ref 0.8–5.3)
LYMPHOCYTES NFR BLD AUTO: 6.3 %
M PROTEIN SERPL ELPH-MCNC: 1.9 G/DL
MCH RBC QN AUTO: 30.1 PG (ref 26.5–33)
MCHC RBC AUTO-ENTMCNC: 30.2 G/DL (ref 31.5–36.5)
MCV RBC AUTO: 100 FL (ref 78–100)
MONOCYTES # BLD AUTO: 0.3 10E9/L (ref 0–1.3)
MONOCYTES NFR BLD AUTO: 6 %
NEUTROPHILS # BLD AUTO: 4.5 10E9/L (ref 1.6–8.3)
NEUTROPHILS NFR BLD AUTO: 86.1 %
NRBC # BLD AUTO: 0.1 10*3/UL
NRBC BLD AUTO-RTO: 1 /100
NUM BPU REQUESTED: 1
PLATELET # BLD AUTO: 98 10E9/L (ref 150–450)
POTASSIUM SERPL-SCNC: 3.5 MMOL/L (ref 3.4–5.3)
PROT PATTERN SERPL ELPH-IMP: ABNORMAL
RBC # BLD AUTO: 2.49 10E12/L (ref 4.4–5.9)
SODIUM SERPL-SCNC: 143 MMOL/L (ref 133–144)
SPECIMEN EXP DATE BLD: ABNORMAL
TRANSFUSION STATUS PATIENT QL: NORMAL
TRANSFUSION STATUS PATIENT QL: NORMAL
VISC SER: 2.1 CP (ref 1.4–1.8)
WBC # BLD AUTO: 5.2 10E9/L (ref 4–11)

## 2017-05-26 PROCEDURE — 25000128 H RX IP 250 OP 636: Performed by: INTERNAL MEDICINE

## 2017-05-26 PROCEDURE — A9270 NON-COVERED ITEM OR SERVICE: HCPCS | Mod: GY | Performed by: PHYSICIAN ASSISTANT

## 2017-05-26 PROCEDURE — 25000132 ZZH RX MED GY IP 250 OP 250 PS 637: Mod: GY | Performed by: PHYSICIAN ASSISTANT

## 2017-05-26 PROCEDURE — P9040 RBC LEUKOREDUCED IRRADIATED: HCPCS | Performed by: PHYSICIAN ASSISTANT

## 2017-05-26 PROCEDURE — 40000164 ZZH STATISTIC PHYSICIAN TLC VISIT: Performed by: INTERNAL MEDICINE

## 2017-05-26 PROCEDURE — 99207 ZZC CDG-CORRECTLY CODED, REVIEWED AND AGREE: CPT | Performed by: INTERNAL MEDICINE

## 2017-05-26 PROCEDURE — 86900 BLOOD TYPING SEROLOGIC ABO: CPT | Performed by: PHYSICIAN ASSISTANT

## 2017-05-26 PROCEDURE — 86902 BLOOD TYPE ANTIGEN DONOR EA: CPT | Performed by: PHYSICIAN ASSISTANT

## 2017-05-26 PROCEDURE — 25000128 H RX IP 250 OP 636

## 2017-05-26 PROCEDURE — A9270 NON-COVERED ITEM OR SERVICE: HCPCS | Mod: GY | Performed by: INTERNAL MEDICINE

## 2017-05-26 PROCEDURE — 86901 BLOOD TYPING SEROLOGIC RH(D): CPT | Performed by: PHYSICIAN ASSISTANT

## 2017-05-26 PROCEDURE — 25000128 H RX IP 250 OP 636: Performed by: PHYSICIAN ASSISTANT

## 2017-05-26 PROCEDURE — 40000141 ZZH STATISTIC PERIPHERAL IV START W/O US GUIDANCE

## 2017-05-26 PROCEDURE — 99223 1ST HOSP IP/OBS HIGH 75: CPT | Mod: GC | Performed by: INTERNAL MEDICINE

## 2017-05-26 PROCEDURE — 80048 BASIC METABOLIC PNL TOTAL CA: CPT | Performed by: PHYSICIAN ASSISTANT

## 2017-05-26 PROCEDURE — 86850 RBC ANTIBODY SCREEN: CPT | Performed by: PHYSICIAN ASSISTANT

## 2017-05-26 PROCEDURE — 25000132 ZZH RX MED GY IP 250 OP 250 PS 637: Mod: GY | Performed by: INTERNAL MEDICINE

## 2017-05-26 PROCEDURE — 36592 COLLECT BLOOD FROM PICC: CPT | Performed by: PHYSICIAN ASSISTANT

## 2017-05-26 PROCEDURE — 85025 COMPLETE CBC W/AUTO DIFF WBC: CPT | Performed by: PHYSICIAN ASSISTANT

## 2017-05-26 PROCEDURE — 12000008 ZZH R&B INTERMEDIATE UMMC

## 2017-05-26 RX ORDER — ACETAMINOPHEN 500 MG
1000 TABLET ORAL 3 TIMES DAILY
Status: DISCONTINUED | OUTPATIENT
Start: 2017-05-26 | End: 2017-05-29 | Stop reason: HOSPADM

## 2017-05-26 RX ORDER — PIPERACILLIN SODIUM, TAZOBACTAM SODIUM 4; .5 G/20ML; G/20ML
4.5 INJECTION, POWDER, LYOPHILIZED, FOR SOLUTION INTRAVENOUS EVERY 6 HOURS
Status: DISCONTINUED | OUTPATIENT
Start: 2017-05-26 | End: 2017-05-28

## 2017-05-26 RX ORDER — HALOPERIDOL 5 MG/1
5 TABLET ORAL EVERY 6 HOURS PRN
Status: DISCONTINUED | OUTPATIENT
Start: 2017-05-26 | End: 2017-05-29 | Stop reason: HOSPADM

## 2017-05-26 RX ADMIN — ACETAMINOPHEN 650 MG: 325 TABLET, FILM COATED ORAL at 15:24

## 2017-05-26 RX ADMIN — AMLODIPINE BESYLATE 5 MG: 5 TABLET ORAL at 08:55

## 2017-05-26 RX ADMIN — SIMVASTATIN 20 MG: 20 TABLET, FILM COATED ORAL at 22:15

## 2017-05-26 RX ADMIN — OLANZAPINE 5 MG: 5 TABLET, FILM COATED ORAL at 08:55

## 2017-05-26 RX ADMIN — ENOXAPARIN SODIUM 40 MG: 40 INJECTION SUBCUTANEOUS at 15:14

## 2017-05-26 RX ADMIN — SODIUM CHLORIDE, PRESERVATIVE FREE 20 ML: 5 INJECTION INTRAVENOUS at 09:36

## 2017-05-26 RX ADMIN — PANTOPRAZOLE SODIUM 40 MG: 20 TABLET, DELAYED RELEASE ORAL at 08:55

## 2017-05-26 RX ADMIN — ACETAMINOPHEN 650 MG: 325 TABLET, FILM COATED ORAL at 08:58

## 2017-05-26 RX ADMIN — ACETAMINOPHEN 1000 MG: 500 TABLET, FILM COATED ORAL at 20:07

## 2017-05-26 RX ADMIN — TRAMADOL HYDROCHLORIDE 25 MG: 50 TABLET, FILM COATED ORAL at 23:32

## 2017-05-26 RX ADMIN — HYDROMORPHONE HYDROCHLORIDE 2 MG: 2 TABLET ORAL at 08:58

## 2017-05-26 RX ADMIN — PIPERACILLIN SODIUM,TAZOBACTAM SODIUM 4.5 G: 4; .5 INJECTION, POWDER, FOR SOLUTION INTRAVENOUS at 20:49

## 2017-05-26 RX ADMIN — HYDROMORPHONE HYDROCHLORIDE 2 MG: 2 TABLET ORAL at 04:10

## 2017-05-26 RX ADMIN — HYDROMORPHONE HYDROCHLORIDE 2 MG: 2 TABLET ORAL at 15:24

## 2017-05-26 RX ADMIN — VANCOMYCIN HYDROCHLORIDE 1500 MG: 10 INJECTION, POWDER, LYOPHILIZED, FOR SOLUTION INTRAVENOUS at 10:26

## 2017-05-26 RX ADMIN — SODIUM CHLORIDE, PRESERVATIVE FREE: 5 INJECTION INTRAVENOUS at 15:16

## 2017-05-26 RX ADMIN — LIDOCAINE 2 PATCH: 50 PATCH TOPICAL at 10:30

## 2017-05-26 RX ADMIN — OLANZAPINE 5 MG: 5 TABLET, FILM COATED ORAL at 20:07

## 2017-05-26 RX ADMIN — TRAMADOL HYDROCHLORIDE 25 MG: 50 TABLET, FILM COATED ORAL at 20:07

## 2017-05-26 RX ADMIN — ACYCLOVIR 400 MG: 400 TABLET ORAL at 08:55

## 2017-05-26 RX ADMIN — CLOPIDOGREL 75 MG: 75 TABLET, FILM COATED ORAL at 08:55

## 2017-05-26 RX ADMIN — PIPERACILLIN SODIUM,TAZOBACTAM SODIUM 4.5 G: 4; .5 INJECTION, POWDER, FOR SOLUTION INTRAVENOUS at 09:09

## 2017-05-26 RX ADMIN — ACYCLOVIR 400 MG: 400 TABLET ORAL at 20:07

## 2017-05-26 RX ADMIN — PIPERACILLIN SODIUM,TAZOBACTAM SODIUM 4.5 G: 4; .5 INJECTION, POWDER, FOR SOLUTION INTRAVENOUS at 15:14

## 2017-05-26 ASSESSMENT — PAIN DESCRIPTION - DESCRIPTORS
DESCRIPTORS: SORE

## 2017-05-26 NOTE — PLAN OF CARE
Problem: Goal Outcome Summary  Goal: Goal Outcome Summary     Port re-accessed this am and patient hasn't been touching it today. Zosyn and Vancomycin started, VSS except systolic up to 160. Pt's wife has been her most of the day and pt has been sleeping all day except to get up to the commode. Declined PO intake. Dilaudid and Tylenol given x 1. Lidocaine patches in place. Palliative care and PT/OT consults placed. Video monitor on for patient's safety. Pt is cooperative with cares- confused as to place and reason why he is hospitalized.

## 2017-05-26 NOTE — PLAN OF CARE
Problem: Goal Outcome Summary  Goal: Goal Outcome Summary     VSS, afebrile. Lab couldn't collect both blood cultures in outpatient clinic, so second blood culture was drawn when pt arrived. Pt up w/ assist of 1 or 2 w/ walker. Commode ordered. Bed alarm on. UA/UC collected. Chest xray done. 1 Liter bolus given. On MIVF, 100NS. Adequate UOP. 3 lidocaine patches removed from lower back. Orientation waxes and wanes, declined as the night went on. Continue to monitor & w/ POC.

## 2017-05-26 NOTE — CONSULTS
[facility]  Palliative Care Consultation         Anthony Carbone MRN# [id number]   Age: 71 year old YOB: 1945   Date of Admission: 5/25/2017    Reason for consult: Pain management       Requesting physician/service: Heme/onc       Recommendations      -would recommend restarting tramadol 25-50 mg q6h prn  -would have oral dilaudid 1 mg q3h prn as second option for pain control if tramadol is not sufficient  -continue to hold long acting opioid agents given acute illness, in the future if long acting agent was deemed necessary would recommend fentanyl patch at lowest dose of 12 mcg/hr as this does not have metabolites that would build up in renal impairment (in case he develops another JOSE MARIA)  -continue lidocaine patch  -recommend scheduling tylenol 1000 mg TID  -continued ambulation with PT/OT  - could consider voltaren gel to back as well (tried last admission, pt and wife were not sure that it helped)    POLST N/A pt full code    Discussion:  Pain has clearly been an issue for Yamil recently as has hospitalizations with confusion and infection recently. Although there are multiple etiologies for his confusion and AMS (infection, metabolic, dehydration), it certainly is possible morphine was playing a role in this episode. It appears he tolerated the medication well, however as his renal function worsened he likely built up metabolites which likely played a role in his confusion. Agree with holding long acting agents at the moment while acutely ill. Since admission, has been receiving oral dilaudid which has improved his pain, however he has been spending most of the day sleeping. Seems reasonable to trial tramadol; he has been on this medication in the past with good response and it will be less likely to be sedating. For breakthrough pain would have oral dilaudid available, but would decrease dose to 1 mg in hopes of improving sedation. Would schedule tylenol and  ensure he remains as active as possible with PT/OT.     Pain seems to be related to the known T11 spinal compression fracture. Per chart review, does nto appear to be related to the multiple myeloma but i am not completely clear on this.  No neuropathic symptoms--no radiation of pain and now LE weakness.     Our hope is we can control his pain without the need for long acting agent, however is long acting agent is needed for adequate pain control to allow for more activity, would recommend fentanyl patch at lowest dose (12.5 mcg/hr).  This is due to fentanyl not having metabolites that would build up if he were to develop renal dysfunction in the future. This dose of fentanyl would only be ok if he was already using and tolerating at least 25-30mg oral morphine equivalent per day (ie 6-8mg dilaudid per day).    Would be appropriate for outpatient palliative follow up if Yamil or Casey are interested.    Patient seen and discussed with attending Dr. Lorena Rhoades.    Jos Ferreira  Internal Medicine PGY1    Patient seen and evaluated with Dr. Ferreira.   Agree with assessment and recommendations.    Total time spent was 80 minutes,  >50% of time was spent counseling and/or coordination of care regarding symptoms, support, pain, AMS.    Shelly Rhoades  Palliative Care   Pager [phone]       Disease Process/es & Symptoms   Anthony Carbone is a 71 year old male with history of multiple myeloma s/p autologous transplant 1/2013 with subsequent relapse currently on chemotherapy with recent admission for confusion, infection, and JOSE MARIA who was admitted with confusion and found to have probable pneumonia and dehydration. Prior to admission was started on MS contin and there was question whether this was playing a role in this confusion episode.    Hx of possible stroke     Support/Coping   (We typically ask each of our patients the following questions:)    How do you make sense of this? Not asked  Are you Samaritan?  "Spiritual? Not so much? Yamil's Zoroastrianism maurizio is important to him, Casey is a Confucianism  Are you at peace? Not asked  What are your concerns, medical and non-medical, that are not being addressed? None   Who are your \"go-to\" people when you need support? Family and friends    Plan for psychosocial/spiritual support/follow-up from palliative team: was seen by  this morning     Decision-Making & Goals of Care     Discussion/counseling today about prognosis/goals of care/decisions:   Not discussed  Patient has a completed health care directive available in the chart (Y/N): Y  Health care agent (only if patient has an available, complete HCD): wife Casey    Code Status: Full code   Patient has decision-making capacity (Y/N): Y, although not while acutely confused    Coordination of Care     Findings & plan of care discussed with: primary team  Follow-up plan from palliative team: will be available via on call pager this weekend  Thank you for involving us in the patient's care.           Chief Complaint     \"My pain has been fair\"         History of Present Illness     History obtained from: Patient, patient's wife Casey, and EMR    Anthony Carbone is a 71 year old male with history of multiple myeloma s/p autologous transplant 1/2013 with subsequent relapse currently on chemotherapy with recent admission for confusion, infection, and JOSE MARIA who was admitted with confusion and found to have probable pneumonia and dehydration. Lower back pain has been an issue for Yamil worsening recently and impacting his activity. Previously for his pain he was on tramadol with improvement. This was discontinued during a hospital stay and most recently on 5/19 was started on MS contin for his pain. This produced great improvement in his pain, his wife Casey does note he was fairly sleeping after starting the medication though. He was doing well until early this week when he became more agitated, confused, and weak. This prompted his " admission to the hospital. He has since been found to have a likely pneumonia and dehydration and is receiving IV antibiotics and fluids.    Palliative care was consulted to help determine pain management regimen given concern the morphine could have been playing a role in his confusion. Since admission, MS contin has been held. He has been receiving oral dilaudid 2 mg prn. He does not endorse much pain currently, however he has been sleeping basically all day. Denies constipation, nausea, vomiting, difficulty breathing, and anxiety.          Past Medical History:   Past Medical History:   Diagnosis Date     Coronary artery disease, non-occlusive Jan 2012     Hx of migraines     haven;t bothered him since 2003     Hypertension      Malignant neoplasm (H) 1/2012     Myeloma (H)     multiple     Nonulcer dyspepsia      Polio age 6    no sequelae     Prostate cancer (H)               Past Surgical History:   Past Surgical History:   Procedure Laterality Date     COLONOSCOPY       ESOPHAGOSCOPY, GASTROSCOPY, DUODENOSCOPY (EGD), COMBINED N/A 1/6/2015    Procedure: COMBINED ESOPHAGOSCOPY, GASTROSCOPY, DUODENOSCOPY (EGD);  Surgeon: Yamil Donaldson Chi, MD;  Location:  GI     ESOPHAGOSCOPY, GASTROSCOPY, DUODENOSCOPY (EGD), COMBINED Left 8/20/2015    Procedure: COMBINED ESOPHAGOSCOPY, GASTROSCOPY, DUODENOSCOPY (EGD), BIOPSY SINGLE OR MULTIPLE;  Surgeon: Mane Barragan MD;  Location:  GI     right inguinal hernia surgery       right toe surgery                 Social/Spiritual History:     Living situation: Most recently living at [facility] after last hospitalization in early May, prior to this was living at home with wife  Family system: , no children  Functional status (needs help with ADLs or IADLs): recently required assistance, was ambulating with walker and assistance at [facility]  Employment/education: retired [employer]  Use of community resources: not asked  Activities/interests: spending time with family, friends,  travel  History of substance use/abuse: not asked            Family History:   Family History   Problem Relation Age of Onset     Melanoma No family hx of      Skin Cancer No family hx of               Allergies:   Allergies   Allergen Reactions     Aspirin      Stomach upset     Bactrim [Sulfamethoxazole W-Trimethoprim] Rash              Medications:   I have reviewed this patient's medication profile and medications during this hospitalization  Current Facility-Administered Medications   Medication     piperacillin-tazobactam (ZOSYN) 4.5 g vial to attach to  mL bag     vancomycin (VANCOCIN) 1,500 mg in NaCl 0.9 % 250 mL intermittent infusion     enoxaparin (LOVENOX) injection 40 mg     Medication Instruction     senna-docusate (SENOKOT-S;PERICOLACE) 8.6-50 MG per tablet 1-2 tablet     polyethylene glycol (MIRALAX/GLYCOLAX) Packet 17 g     acetaminophen (TYLENOL) tablet 650 mg     acyclovir (ZOVIRAX) tablet 400 mg     amLODIPine (NORVASC) tablet 5 mg     clopidogrel (PLAVIX) tablet 75 mg     pantoprazole (PROTONIX) EC tablet 40 mg     lidocaine (LIDODERM) 5 % Patch 3 patch     OLANZapine (zyPREXA) tablet 5 mg     ondansetron (ZOFRAN-ODT) ODT tab 8 mg     simethicone (MYLICON) chewable tablet 160 mg     simvastatin (ZOCOR) tablet 20 mg     sucralfate (CARAFATE) tablet 1 g     0.9% sodium chloride infusion     lidocaine (LIDODERM) patch REMOVAL     lidocaine (LIDODERM) Patch in Place     HYDROmorphone (DILAUDID) tablet 2 mg     naloxone (NARCAN) injection 0.1-0.4 mg     Facility-Administered Medications Ordered in Other Encounters   Medication     filgrastim (NEUPOGEN) injection vial 780 mcg              Review of Systems:   The comprehensive review of systems is negative other than noted here and in the HPI.    Palliative Symptom Review (0=no symptom/no concern, 1=mild, 2=moderate, 3=severe):      Pain: 2      Fatigue: 1      Nausea: 0      Constipation: 0      Diarrhea: 0      Depressive Symptoms: 0       Anxiety: 1      Drowsiness: 1      Poor Appetite: 0      Shortness of Breath: 0      Insomnia: 0      Other: 0            Physical Exam:   Temp: 97.1  F (36.2  C) Temp src: Oral BP: 160/73 Pulse: 90   Resp: 16 SpO2: 95 % O2 Device: None (Room air)    Constitutional:   Initially sleeping comfortably, awakes appropriately, appears comfortable in NAD     Eyes:   Sclera anicteric     ENT:   Mucus membranes dry     Neck:   No JVD     Lungs:   No increased work of breathing     Cardiovascular:   Normal rate, regular rhythm, no murmur/rub/gallop     Abdomen:   Soft, non tender, non distended     Musculoskeletal:   No LE edema, palpation of spinous processes without tenderness, no tenderness to palpation of hips or with internal/external rotation of thighs.      Neurologic:   Drowsy but awake and oriented to person and place, missing some details about his medical situation and aware that he has been confused , lower extremity strength 5/5 bilaterally      Skin:   Diffuse bruising on upper extremities          Delirium Screen/CAM:  Delirium = (#1 and #2 = YES) + (#3 and/or #4)        1) Acute onset and fluctuating course:   N   (acute change in mental status from baseline over last 24 hours)    2) Inattention:   N   (difficulty focusing, distractible, can't follow conversation)    Delirium/CAM score: 0/4  Interpretation:  1)  Delirium:  present/not present  Not present  2)  Type:  Hypoactive, hyperactive, mixed  N/A  3)  Severity:  Mild, moderate, severe  N/A         Data Reviewed:   Laboratory and imaging results reviewed in EPIC.    Per chart review notes document MRI from OSH done 4/24 in [facility], noting compression deformities T9-11 with degenerative changes to lumbar spine and mild stenosis.

## 2017-05-26 NOTE — PLAN OF CARE
Problem: Goal Outcome Summary  Goal: Goal Outcome Summary     Denies pain this afternoon. Declined any food and only sips of water to drink. 1 unit of PRBC's infusing now for HGB of 7.5. Seemed like he was getting a little more restless this evening, but is calm now. Video monitor in place.

## 2017-05-26 NOTE — PROGRESS NOTES
Pt pulled port out, took off arm band, set off bed alarm. Pressure dressing applied to port site. L) peripheral IV place infusing NS @ 100mL/hr. Video patient monitoring initiated. Bed alarm. Pt sets off bed alarm when needing to use commode. C/o lower back pain, 2mg PO dilaudid given. K-Pad ordered. Assist x 1 to commode. LBM this shift. Oriented to person & time. Adequate UOP. Will continue w/POC.

## 2017-05-26 NOTE — PROGRESS NOTES
[facility], [place]    Hematology / Oncology Progress Note    Date of Admission: 5/25/2017  Hospital Day #: 1   Date of Service (when I saw the patient): 05/26/2017     Assessment & Plan   Anthony Carbone is a 71 year old male with history of relapsed multiple myeloma, recently resumed Kyprolis (5/23), and recent admission earlier this month for back pain, JOSE MARIA, and hypercalcemia, who is being admitted to our service with altered mental status.      Neuro  #AMS. RN at TCU noted patient more confused 5/24. Kyprolis was held. Per wife, patient's confusion waxes and wanes and has been more fatigued since 5/24. Intermittently agitated. Feeling fatigued over past 1-2 days prior to admission. Patient was an unreliable historian in clinic appointment 5/25 due to confusion, would answer questions incorrectly or would not provide answers. Had difficulty participating in exam in clinic (per outpatient PA). Recently admitted for AMS 5/7-516 with likely multifactorial cause including metabolic derangements, uremia, and possible infection. Recently started MS Contin 5/19 with improvement of pain but no apparent AMS on reevaluation 5/23.   -Continue Zyprexa 5 mg bid.  -Cr on admission elevated to 1.31, has since improved. Electrolytes WNL.   -BCx NGTD, TSH WNL, ammonia normal, UA/UC negative  -MIVF  -PRN haldol 5 mg q6hrs prn    #Hx CVA. suspected subacute CVA 4/2016.  -Continue PTA clopidogrel     ID  #Pneumonia. CXR with LLL opacity and increased perihilar opacities, with stable L pleural effusion. Denies shortness of breath, chest pain, or cough. No sputum production.  -Started on IV Vancomycin and IV Zosyn.  -Consider SLP consult to evaluate for aspiration once patient improves.    #Anti-infectives.  -Acyclovir 400 mg bid    Heme  #IgM lamda multiple myeloma. Origonially diagnosed 1/2012. S/p therapy with Revlimid + dex for 4-5 cycles but plateaued 3/2012. Velcade added, completed 2-3  cycles then achieving GPR. 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 In 7/2014 found to have return of M-spike with marrow involvement (PET negative) and began retreatment with RVD in 8/14. Completed 5 cycles, 6th complicated by infection. Bendamustine added to VD 5/15 due to progression but tolerated poorly, dose reduced with cycle 2 and completed 5 cycles in total. Found to have increasing M-spike 10/2015 and started Pomolyst and weekly decadron 10/2015. Carflizomib added. Daratumumab added 11/2016. Admitted to hospital 5/2017 for AMS, while inpatient started Kyprolis and continues on this.   -Kyprolis held (since 5/24) due to AMS, will continue to hold while inpatient.  -Viscosity index of 2.1 (H)  -SPEP (Albumin 3.1 (L), gamma 2.1 (H), and monoclonal peak 1.9 (H)  -Light chains-kappa WNL, lambda elevated @ 29    #Anemia, Thrombocytopenia. 2/2 to treatment and MM in setting of progression.  -Transfuse for Hgb <8 and plts <10K. Received PRBCs today.    Renal  #JOSE MARIA. Cr elevated to 1.31 on admission. 1L fluid bolus given in clinic, another on admission. MIVF continued. Cr improved to 0.8. Will monitor.    Misc  #Back/ rib pain. Started 5/2017. Lumbar MRI at OSH showing mild-mod central and foraminal stenoses in lumbar spine, per patient's wife, no MM lesion there. Rib films from last admission negative. Started MS Contin 15 mg bid 5/19 with improvement of pain and no e/o AMS.  -Held MS contin on admission  -Palliative consult placed, appreciate recs.  -Tramadol 25-50 mg PO q6 hrs prn (use first)  -IV dilaudid 1 mg q 3hrs prn  -Lidocaine patches  -Tylenol 1000 mg tid started today.  -Palliative suggested considering a f/u apt outpatient with their team for further management. May also consider something like a fentanyl patch for pain management outpatient to avoid possible decreased clearance of narcotic if JOSE MARIA.    CV  #Hypertension.  -Continue PTA  amlodipine  -Avoid QTC prolonging agents (has hx of QTc prolongation and syncopal episodes).  -Consider midodrine for symptomatic orthostatic hypotension.    GI  #History of GERD  - Protonix 40 mg daily  -Carafate PRN    FEN:  -NS @ 100 ml/hr  -PRN lyte replacement  -RDAT    Prophy/Misc:  -VTE: will reevaluate in am  -GI/PUD: Protonix 40 mg daily  -Bowels: senna and miralax prn  -PT/OT consult    Code status: Full  Disposition: Improvement of AMS, remains afebrile, pain management, and resolution of JOSE MARIA, unsure at this time.    Criss Vaz PA-C  Hematology/Oncology  [phone]      Interval History   Patient was mildly somnolent when visiting today. Answered questions when asked. Denied pain, SOB, chest pain, headaches, vision changes, nausea, vomiting, abdominal pain, diarrhea, dysuria. Was alert and oriented x4, disoriented to situation. Did not open eyes during interview or exam. Minimally participated during exam.    Physical Exam   Temp: 98.4  F (36.9  C) Temp src: Oral BP: 135/75 Pulse: 80   Resp: 18 SpO2: 93 % O2 Device: None (Room air)    Vitals:    05/25/17 1729 05/26/17 1200   Weight: 62.9 kg (138 lb 11.2 oz) 59.8 kg (131 lb 12.8 oz)     Vital Signs with Ranges  Temp:  [97.1  F (36.2  C)-98.7  F (37.1  C)] 98.4  F (36.9  C)  Pulse:  [71-90] 80  Resp:  [16-18] 18  BP: (135-160)/(60-80) 135/75  SpO2:  [93 %-96 %] 93 %  I/O last 3 completed shifts:  In: 1965 [P.O.:240; I.V.:725; IV Piggyback:1000]  Out: 1825 [Urine:1825]    Constitutional: 72 y/o male seen resting in bed in NAD. Interactive with questions however did not open eyes or move during assessment.  HEENT: NCAT. Oral mucosa pink and moist with no lesions or thrush.  Respiratory: Non-labored breathing, good air exchange, coarse lung sounds, especially on left side, good aeration to bases. Some crackles noted at bases. No wheezing or rhonchi. Had to return for better lung exam as patient would not sit or roll during first exam.   Cardiovascular:  Regular rate and rhythm. No murmur or rub.   GI: Normoactive bowel sounds. Abdomen soft, non-distended, and non-tender. No palpable masses or organomegaly.  Skin: Warm and dry. No concerning lesions or rash on exposed surfaces. Bruising on hand noted.  Musculoskeletal: Extremities grossly normal, non-tender, 1+ pitting edema.   Neurologic: A&O to person, place and time, disoriented to situation, speech normal. Minimal participation in exam. Was more alert in afternoon when revisited but still tired. Responsive to questions and answered them appropriately.  Neuropsychiatric: Mentation and affect appear normal/appropriate.    Medications   Current Facility-Administered Medications   Medication     piperacillin-tazobactam (ZOSYN) 4.5 g vial to attach to  mL bag     vancomycin (VANCOCIN) 1,500 mg in NaCl 0.9 % 250 mL intermittent infusion     enoxaparin (LOVENOX) injection 40 mg     traMADol (ULTRAM) half-tab 25-50 mg     acetaminophen (TYLENOL) tablet 1,000 mg     HYDROmorphone (DILAUDID) half-tab 1 mg     haloperidol (HALDOL) tablet 5 mg     Medication Instruction     senna-docusate (SENOKOT-S;PERICOLACE) 8.6-50 MG per tablet 1-2 tablet     polyethylene glycol (MIRALAX/GLYCOLAX) Packet 17 g     acyclovir (ZOVIRAX) tablet 400 mg     amLODIPine (NORVASC) tablet 5 mg     clopidogrel (PLAVIX) tablet 75 mg     pantoprazole (PROTONIX) EC tablet 40 mg     lidocaine (LIDODERM) 5 % Patch 3 patch     OLANZapine (zyPREXA) tablet 5 mg     ondansetron (ZOFRAN-ODT) ODT tab 8 mg     simethicone (MYLICON) chewable tablet 160 mg     simvastatin (ZOCOR) tablet 20 mg     sucralfate (CARAFATE) tablet 1 g     0.9% sodium chloride infusion     lidocaine (LIDODERM) patch REMOVAL     lidocaine (LIDODERM) Patch in Place     naloxone (NARCAN) injection 0.1-0.4 mg     Facility-Administered Medications Ordered in Other Encounters   Medication     filgrastim (NEUPOGEN) injection vial 780 mcg       Data   CBC  Recent Labs  Lab 05/26/17  0943  05/25/17 1947 05/25/17  1304 05/24/17  1415 05/23/17  1105   WBC 5.2  --  9.4 5.1 4.1   RBC 2.49*  --  2.77* 2.70* 2.91*   HGB 7.5*  --  8.7* 8.3* 9.1*   HCT 24.8*  --  28.3* 26.6* 29.7*     --  102* 99 102*   MCH 30.1  --  31.4 30.7 31.3   MCHC 30.2*  --  30.7* 31.2* 30.6*   RDW 18.9*  --  18.6* 18.5* 18.6*   PLT 98* 100* 118* 120* 116*     CMP  Recent Labs  Lab 05/26/17  0943 05/25/17 1947 05/25/17  1304 05/24/17  1415 05/23/17  1105     --  138 138 136   POTASSIUM 3.5  --  4.9 4.6 4.7   CHLORIDE 110*  --  107 104 102   CO2 25  --  23 24 26   ANIONGAP 8  --  8 10 8   GLC 96  --  135* 208* 97   BUN 18  --  37* 28 18   CR 0.80 1.20 1.31* 1.17 0.89   GFRESTIMATED >90Non  GFR Calc 59* 54* 61 84   GFRESTBLACK >90African American GFR Calc 72 65 74 >90African American GFR Calc   JAZMIN 8.3*  --  9.2 9.1 9.3   MAG  --   --  2.2 2.1  --    PROTTOTAL  --   --  8.2 7.8 8.0   ALBUMIN  --   --  3.0* 2.9* 2.9*   BILITOTAL  --   --  0.3 0.3 0.4   ALKPHOS  --   --  60 47 52   AST  --   --  12 14 18   ALT  --   --  21 19 17     INRNo lab results found in last 7 days.    Results for orders placed or performed during the hospital encounter of 05/25/17   XR Chest 2 Views    Narrative    EXAM: XR CHEST 2 VW  5/25/2017 6:22 PM      HISTORY: Altered mental status, evaluate for infection    COMPARISON: Chest radiograph 5/10/2017, 5/8/2017    FINDINGS:     AP and lateral views of the chest. Right IJ central venous catheter  tip projects in the mid low right atrium.    The cardiomediastinal silhouette is within normal limits. Increased  left perihilar and left lower lobe pulmonary opacities.     Stable trace left pleural effusion. No pneumothorax.       Impression    IMPRESSION:   1.  Increased left perihilar and left lower lobe pulmonary opacities,  infection, aspiration and/or atelectasis.  2.  Stable trace left pleural effusion.    I have personally reviewed the examination and initial interpretation  and I  agree with the findings.    MICH VINCENT MD

## 2017-05-26 NOTE — PHARMACY-VANCOMYCIN DOSING SERVICE
Pharmacy Vancomycin Initial Note  Date of Service May 26, 2017  Patient's  1945  71 year old, male    Indication: [facility] Pneumonia    Current estimated CrCl = Estimated Creatinine Clearance: 69.6 mL/min (based on Cr of 0.8).    Creatinine for last 3 days  2017:  2:15 PM Creatinine 1.17 mg/dL  2017:  1:04 PM Creatinine 1.31 mg/dL;  7:47 PM Creatinine 1.20 mg/dL  2017:  9:43 AM Creatinine 0.80 mg/dL    Recent Vancomycin Level(s) for last 3 days  No results found for requested labs within last 72 hours.      Vancomycin IV Administrations (past 72 hours)                   vancomycin (VANCOCIN) 1,500 mg in NaCl 0.9 % 250 mL intermittent infusion (mg) 1,500 mg New Bag 17 1026                Nephrotoxins and other renal medications (Future)    Start     Dose/Rate Route Frequency Ordered Stop    17 0930  vancomycin (VANCOCIN) 1,500 mg in NaCl 0.9 % 250 mL intermittent infusion      1,500 mg Intravenous EVERY 24 HOURS 17 0838      17 0830  piperacillin-tazobactam (ZOSYN) 4.5 g vial to attach to  mL bag      4.5 g  over 1 Hours Intravenous EVERY 6 HOURS 17 0830      17 2000  acyclovir (ZOVIRAX) tablet 400 mg      400 mg Oral 2 TIMES DAILY 17 1826            Contrast Orders - past 72 hours     None                Plan:  1.  Start vancomycin  1500 mg IV q24h.   2.  Goal Trough Level: 15-20 mg/L   3.  Pharmacy will check trough levels as appropriate in 1-3 Days.    4. Serum creatinine levels will be ordered daily for the first week of therapy and at least twice weekly for subsequent weeks.    5. [place] method utilized to dose vancomycin therapy: Method 2    Andy J. Kurtzweil

## 2017-05-26 NOTE — PLAN OF CARE
Problem: Goal Outcome Summary  Goal: Goal Outcome Summary  Outcome: No Change  Oriented to person & time. Bed alarm. Video Patient monitoring. PO dilaudid x 1 for lower back pain. K Pad. No No ordered. Assist x 1 to commode. Adequate UOP. Will continue w/POC.

## 2017-05-26 NOTE — PHARMACY-VANCOMYCIN DOSING SERVICE
Pharmacy Vancomycin Initial Note  Date of Service May 26, 2017  Patient's  1945  71 year old, male    Indication: Healthcare-Associated Pneumonia    Current estimated CrCl = Estimated Creatinine Clearance: 48.8 mL/min (based on Cr of 1.2).    Creatinine for last 3 days  2017: 11:05 AM Creatinine 0.89 mg/dL  2017:  2:15 PM Creatinine 1.17 mg/dL  2017:  1:04 PM Creatinine 1.31 mg/dL;  7:47 PM Creatinine 1.20 mg/dL    Recent Vancomycin Level(s) for last 3 days  No results found for requested labs within last 72 hours.      Vancomycin IV Administrations (past 72 hours)      No vancomycin orders with administrations in past 72 hours.                Nephrotoxins and other renal medications (Future)    Start     Dose/Rate Route Frequency Ordered Stop    17 0830  piperacillin-tazobactam (ZOSYN) 4.5 g vial to attach to  mL bag      4.5 g  over 1 Hours Intravenous EVERY 6 HOURS 17 0830      17  acyclovir (ZOVIRAX) tablet 400 mg      400 mg Oral 2 TIMES DAILY 17 1826            Contrast Orders - past 72 hours     None                Plan:  1.  Start vancomycin  1250 mg IV q 24h.   2.  Goal Trough Level: 15-20 mg/L   3.  Pharmacy will check trough levels as appropriate in 3-5 Days.    4. Serum creatinine levels will be ordered daily for the first week of therapy and at least twice weekly for subsequent weeks.    5. [place] method utilized to dose vancomycin therapy: Method 2    Marysol Brown, PharmD Student

## 2017-05-26 NOTE — PROGRESS NOTES
"SPIRITUAL HEALTH SERVICES  SPIRITUAL ASSESSMENT Progress Note  [facility] ([place]) 7D     REFERRAL SOURCE: Patient requested hospital  on admission to the unit.    Shared a reflective conversation with Casey, Yamil's wife, at Yamil's bedside. Yamil was drifting in and out of restfulness as we talked. Casey shared that pain has been a significant issue for Yamil in the past few weeks and as they have worked to control that issue, he has been \"somewhat disoriented.\" Yamil asked to hold Casey's hand as help for him to feel grounded. Shared a prayer together for answers and direction as to what is happening in Yamil's body at this time.     PLAN: I will follow up with Yamil 1-2x/week as he remains on the unit.     Savanna Granados  Chaplain Resident  Pager [phone]    "

## 2017-05-27 ENCOUNTER — APPOINTMENT (OUTPATIENT)
Dept: PHYSICAL THERAPY | Facility: CLINIC | Age: 72
DRG: 177 | End: 2017-05-27
Attending: PHYSICIAN ASSISTANT
Payer: MEDICARE

## 2017-05-27 ENCOUNTER — APPOINTMENT (OUTPATIENT)
Dept: OCCUPATIONAL THERAPY | Facility: CLINIC | Age: 72
DRG: 177 | End: 2017-05-27
Attending: PHYSICIAN ASSISTANT
Payer: MEDICARE

## 2017-05-27 LAB
ANION GAP SERPL CALCULATED.3IONS-SCNC: 10 MMOL/L (ref 3–14)
BACTERIA SPEC CULT: NORMAL
BASOPHILS # BLD AUTO: 0 10E9/L (ref 0–0.2)
BASOPHILS NFR BLD AUTO: 1.1 %
BUN SERPL-MCNC: 9 MG/DL (ref 7–30)
CALCIUM SERPL-MCNC: 8.5 MG/DL (ref 8.5–10.1)
CHLORIDE SERPL-SCNC: 107 MMOL/L (ref 94–109)
CO2 SERPL-SCNC: 25 MMOL/L (ref 20–32)
CREAT SERPL-MCNC: 0.84 MG/DL (ref 0.66–1.25)
DIFFERENTIAL METHOD BLD: ABNORMAL
EOSINOPHIL # BLD AUTO: 0.1 10E9/L (ref 0–0.7)
EOSINOPHIL NFR BLD AUTO: 2.2 %
ERYTHROCYTE [DISTWIDTH] IN BLOOD BY AUTOMATED COUNT: 19.2 % (ref 10–15)
GFR SERPL CREATININE-BSD FRML MDRD: 90 ML/MIN/1.7M2
GLUCOSE SERPL-MCNC: 89 MG/DL (ref 70–99)
HCT VFR BLD AUTO: 30.8 % (ref 40–53)
HGB BLD-MCNC: 9.6 G/DL (ref 13.3–17.7)
IMM GRANULOCYTES # BLD: 0 10E9/L (ref 0–0.4)
IMM GRANULOCYTES NFR BLD: 0.6 %
LYMPHOCYTES # BLD AUTO: 0.1 10E9/L (ref 0.8–5.3)
LYMPHOCYTES NFR BLD AUTO: 1.7 %
MCH RBC QN AUTO: 30 PG (ref 26.5–33)
MCHC RBC AUTO-ENTMCNC: 31.2 G/DL (ref 31.5–36.5)
MCV RBC AUTO: 96 FL (ref 78–100)
MICRO REPORT STATUS: NORMAL
MONOCYTES # BLD AUTO: 0.2 10E9/L (ref 0–1.3)
MONOCYTES NFR BLD AUTO: 6.3 %
NEUTROPHILS # BLD AUTO: 3.2 10E9/L (ref 1.6–8.3)
NEUTROPHILS NFR BLD AUTO: 88.1 %
NRBC # BLD AUTO: 0.1 10*3/UL
NRBC BLD AUTO-RTO: 3 /100
PLATELET # BLD AUTO: 95 10E9/L (ref 150–450)
POTASSIUM SERPL-SCNC: 3.2 MMOL/L (ref 3.4–5.3)
POTASSIUM SERPL-SCNC: 3.4 MMOL/L (ref 3.4–5.3)
RBC # BLD AUTO: 3.2 10E12/L (ref 4.4–5.9)
SODIUM SERPL-SCNC: 142 MMOL/L (ref 133–144)
SPECIMEN SOURCE: NORMAL
WBC # BLD AUTO: 3.6 10E9/L (ref 4–11)

## 2017-05-27 PROCEDURE — 25000132 ZZH RX MED GY IP 250 OP 250 PS 637: Mod: GY | Performed by: PHYSICIAN ASSISTANT

## 2017-05-27 PROCEDURE — 97535 SELF CARE MNGMENT TRAINING: CPT | Mod: GO | Performed by: OCCUPATIONAL THERAPIST

## 2017-05-27 PROCEDURE — 36592 COLLECT BLOOD FROM PICC: CPT | Performed by: PHYSICIAN ASSISTANT

## 2017-05-27 PROCEDURE — 97530 THERAPEUTIC ACTIVITIES: CPT | Mod: GP

## 2017-05-27 PROCEDURE — 97165 OT EVAL LOW COMPLEX 30 MIN: CPT | Mod: GO | Performed by: OCCUPATIONAL THERAPIST

## 2017-05-27 PROCEDURE — 25000128 H RX IP 250 OP 636: Performed by: INTERNAL MEDICINE

## 2017-05-27 PROCEDURE — A9270 NON-COVERED ITEM OR SERVICE: HCPCS | Mod: GY | Performed by: INTERNAL MEDICINE

## 2017-05-27 PROCEDURE — 12000008 ZZH R&B INTERMEDIATE UMMC

## 2017-05-27 PROCEDURE — 80048 BASIC METABOLIC PNL TOTAL CA: CPT | Performed by: PHYSICIAN ASSISTANT

## 2017-05-27 PROCEDURE — 25000132 ZZH RX MED GY IP 250 OP 250 PS 637: Mod: GY | Performed by: INTERNAL MEDICINE

## 2017-05-27 PROCEDURE — 36592 COLLECT BLOOD FROM PICC: CPT

## 2017-05-27 PROCEDURE — A9270 NON-COVERED ITEM OR SERVICE: HCPCS | Mod: GY | Performed by: PHYSICIAN ASSISTANT

## 2017-05-27 PROCEDURE — 97116 GAIT TRAINING THERAPY: CPT | Mod: GP

## 2017-05-27 PROCEDURE — 40000193 ZZH STATISTIC PT WARD VISIT

## 2017-05-27 PROCEDURE — 25000128 H RX IP 250 OP 636

## 2017-05-27 PROCEDURE — 25000128 H RX IP 250 OP 636: Performed by: PHYSICIAN ASSISTANT

## 2017-05-27 PROCEDURE — 85025 COMPLETE CBC W/AUTO DIFF WBC: CPT | Performed by: PHYSICIAN ASSISTANT

## 2017-05-27 PROCEDURE — 25000125 ZZHC RX 250: Performed by: PHYSICIAN ASSISTANT

## 2017-05-27 PROCEDURE — 40000133 ZZH STATISTIC OT WARD VISIT: Performed by: OCCUPATIONAL THERAPIST

## 2017-05-27 PROCEDURE — 84132 ASSAY OF SERUM POTASSIUM: CPT

## 2017-05-27 PROCEDURE — 97161 PT EVAL LOW COMPLEX 20 MIN: CPT | Mod: GP

## 2017-05-27 RX ORDER — MAGNESIUM SULFATE HEPTAHYDRATE 40 MG/ML
4 INJECTION, SOLUTION INTRAVENOUS EVERY 4 HOURS PRN
Status: DISCONTINUED | OUTPATIENT
Start: 2017-05-27 | End: 2017-05-29 | Stop reason: HOSPADM

## 2017-05-27 RX ORDER — AMOXICILLIN 250 MG
1 CAPSULE ORAL AT BEDTIME
Status: DISCONTINUED | OUTPATIENT
Start: 2017-05-27 | End: 2017-05-28

## 2017-05-27 RX ORDER — POTASSIUM CHLORIDE 1.5 G/1.58G
20-40 POWDER, FOR SOLUTION ORAL
Status: DISCONTINUED | OUTPATIENT
Start: 2017-05-27 | End: 2017-05-29 | Stop reason: HOSPADM

## 2017-05-27 RX ORDER — VANCOMYCIN HYDROCHLORIDE 1 G/200ML
1000 INJECTION, SOLUTION INTRAVENOUS EVERY 12 HOURS
Status: DISCONTINUED | OUTPATIENT
Start: 2017-05-27 | End: 2017-05-27

## 2017-05-27 RX ORDER — POTASSIUM CHLORIDE 29.8 MG/ML
20 INJECTION INTRAVENOUS
Status: DISCONTINUED | OUTPATIENT
Start: 2017-05-27 | End: 2017-05-29 | Stop reason: HOSPADM

## 2017-05-27 RX ORDER — POTASSIUM CHLORIDE 750 MG/1
20-40 TABLET, EXTENDED RELEASE ORAL
Status: DISCONTINUED | OUTPATIENT
Start: 2017-05-27 | End: 2017-05-29 | Stop reason: HOSPADM

## 2017-05-27 RX ORDER — POTASSIUM CHLORIDE 7.45 MG/ML
10 INJECTION INTRAVENOUS
Status: DISCONTINUED | OUTPATIENT
Start: 2017-05-27 | End: 2017-05-29 | Stop reason: HOSPADM

## 2017-05-27 RX ORDER — POTASSIUM CL/LIDO/0.9 % NACL 10MEQ/0.1L
10 INTRAVENOUS SOLUTION, PIGGYBACK (ML) INTRAVENOUS
Status: DISCONTINUED | OUTPATIENT
Start: 2017-05-27 | End: 2017-05-29 | Stop reason: HOSPADM

## 2017-05-27 RX ADMIN — PANTOPRAZOLE SODIUM 40 MG: 20 TABLET, DELAYED RELEASE ORAL at 09:03

## 2017-05-27 RX ADMIN — PIPERACILLIN SODIUM,TAZOBACTAM SODIUM 4.5 G: 4; .5 INJECTION, POWDER, FOR SOLUTION INTRAVENOUS at 19:52

## 2017-05-27 RX ADMIN — PIPERACILLIN SODIUM,TAZOBACTAM SODIUM 4.5 G: 4; .5 INJECTION, POWDER, FOR SOLUTION INTRAVENOUS at 15:11

## 2017-05-27 RX ADMIN — TRAMADOL HYDROCHLORIDE 25 MG: 50 TABLET, FILM COATED ORAL at 09:50

## 2017-05-27 RX ADMIN — POTASSIUM CHLORIDE 20 MEQ: 29.8 INJECTION, SOLUTION INTRAVENOUS at 11:09

## 2017-05-27 RX ADMIN — ACETAMINOPHEN 1000 MG: 500 TABLET, FILM COATED ORAL at 19:51

## 2017-05-27 RX ADMIN — SODIUM CHLORIDE, PRESERVATIVE FREE: 5 INJECTION INTRAVENOUS at 23:57

## 2017-05-27 RX ADMIN — ACETAMINOPHEN 1000 MG: 500 TABLET, FILM COATED ORAL at 09:02

## 2017-05-27 RX ADMIN — OLANZAPINE 5 MG: 5 TABLET, FILM COATED ORAL at 09:03

## 2017-05-27 RX ADMIN — OLANZAPINE 5 MG: 5 TABLET, FILM COATED ORAL at 19:51

## 2017-05-27 RX ADMIN — SIMVASTATIN 20 MG: 20 TABLET, FILM COATED ORAL at 22:22

## 2017-05-27 RX ADMIN — ACYCLOVIR 400 MG: 400 TABLET ORAL at 19:51

## 2017-05-27 RX ADMIN — ACYCLOVIR 400 MG: 400 TABLET ORAL at 09:02

## 2017-05-27 RX ADMIN — SODIUM CHLORIDE, PRESERVATIVE FREE: 5 INJECTION INTRAVENOUS at 04:08

## 2017-05-27 RX ADMIN — PIPERACILLIN SODIUM,TAZOBACTAM SODIUM 4.5 G: 4; .5 INJECTION, POWDER, FOR SOLUTION INTRAVENOUS at 03:41

## 2017-05-27 RX ADMIN — HYDROMORPHONE HYDROCHLORIDE 1 MG: 2 TABLET ORAL at 14:31

## 2017-05-27 RX ADMIN — TRAMADOL HYDROCHLORIDE 25 MG: 50 TABLET, FILM COATED ORAL at 19:52

## 2017-05-27 RX ADMIN — LIDOCAINE 3 PATCH: 50 PATCH TOPICAL at 09:07

## 2017-05-27 RX ADMIN — CLOPIDOGREL 75 MG: 75 TABLET, FILM COATED ORAL at 09:03

## 2017-05-27 RX ADMIN — VANCOMYCIN HYDROCHLORIDE 1000 MG: 1 INJECTION, SOLUTION INTRAVENOUS at 09:41

## 2017-05-27 RX ADMIN — AMLODIPINE BESYLATE 5 MG: 5 TABLET ORAL at 09:02

## 2017-05-27 RX ADMIN — SENNOSIDES AND DOCUSATE SODIUM 1 TABLET: 8.6; 5 TABLET ORAL at 22:22

## 2017-05-27 RX ADMIN — POTASSIUM CHLORIDE 20 MEQ: 29.8 INJECTION, SOLUTION INTRAVENOUS at 13:04

## 2017-05-27 RX ADMIN — TRAMADOL HYDROCHLORIDE 25 MG: 50 TABLET, FILM COATED ORAL at 09:03

## 2017-05-27 RX ADMIN — ENOXAPARIN SODIUM 40 MG: 40 INJECTION SUBCUTANEOUS at 17:54

## 2017-05-27 RX ADMIN — ACETAMINOPHEN 1000 MG: 500 TABLET, FILM COATED ORAL at 14:07

## 2017-05-27 RX ADMIN — PIPERACILLIN SODIUM,TAZOBACTAM SODIUM 4.5 G: 4; .5 INJECTION, POWDER, FOR SOLUTION INTRAVENOUS at 08:30

## 2017-05-27 ASSESSMENT — PAIN DESCRIPTION - DESCRIPTORS
DESCRIPTORS: SHARP
DESCRIPTORS: SHARP;ACHING

## 2017-05-27 NOTE — PLAN OF CARE
Problem: Goal Outcome Summary  Goal: Goal Outcome Summary  PT: PT evaluation completed and treatment initiated.  Pt arrived to hospital from TCU, was receiving therapy there as well as ambulating with use of a walker.  Pt requires modA for bed mobility and SBA for transfers.  Pt ambulated 100' with use of FWW and CGA, demonstrates short/shuffling steps as well as kyphotic posturing however improved with cues.  Continue to encourage OOB activity with sitting in chair and ambulating in halls several times a day in order to increase activity tolerance.  Per pt he wants to go home but is willing to return to TCU if needed depending on LOS in hospital, wife in agreement.  Recommend TCU upon d/c in order to increase IND with functional mobility.  If pt's LOS is long and pt able to progress IND with bed mobility and stairs pt may be able to d/c home with assist from wife and home PT.

## 2017-05-27 NOTE — PROGRESS NOTES
05/27/17 1315   Quick Adds   Type of Visit Initial PT Evaluation   Living Environment   Lives With spouse   Living Arrangements house   Home Accessibility stairs within home;stairs to enter home   Number of Stairs to Enter Home 3   Number of Stairs Within Home 12   Stair Railings at Home (No railing on outside stairs)   Transportation Available family or friend will provide   Living Environment Comment Pt lives in 3 story home with wife, does not need to go up to 2nd level, bathroom with walk-in shower in basement, 3 stairs to enter with no rail.  Pt admitted from TCU, has not been home for a while as he was hospitalzed then sent right to TCU.   Self-Care   Dominant Hand left   Usual Activity Tolerance good   Current Activity Tolerance moderate   Regular Exercise no   Activity/Exercise/Self-Care Comment Pt at TCU prior to admission, was receiving theapy there.  Per pt and wife prior to hospitalization/TCU admission pt was mostly IND at home, receiving some assist from wife, both are retired.   Functional Level Prior   Ambulation 0-->independent   Transferring 0-->independent   Toileting 2-->assistive person   Bathing 0-->independent   Dressing 0-->independent   Eating 0-->independent   Communication 0-->understands/communicates without difficulty   Swallowing 0-->swallows foods/liquids without difficulty   Cognition 1 - attention or memory deficits   Fall history within last six months no   Which of the above functional risks had a recent onset or change? transferring;ambulation   Prior Functional Level Comment Pt came from TCU however was IND prior to first hospital admission.    General Information   Onset of Illness/Injury or Date of Surgery - Date 05/25/17   Referring Physician Criss Vaz, ALPHONSO   Patient/Family Goals Statement Pt wants to return home safely.   Pertinent History of Current Problem (include personal factors and/or comorbidities that impact the POC) 71 year old male with history of relapsed  multiple myeloma, recently resumed Kyprolis (5/23), and recent admission earlier this month for back pain, JOSE MARIA, and hypercalcemia, who is being admitted to our service with altered mental status   Precautions/Limitations fall precautions   Weight-Bearing Status - LUE full weight-bearing   Weight-Bearing Status - RUE full weight-bearing   Weight-Bearing Status - LLE full weight-bearing   Weight-Bearing Status - RLE full weight-bearing   General Observations On RA, per RN improved mental status compared to this morning.   General Info Comments PT: up ad cassi   Cognitive Status Examination   Orientation orientation to person, place and time   Level of Consciousness alert   Follows Commands and Answers Questions 75% of the time;able to follow single-step instructions   Personal Safety and Judgment intact   Memory intact   Pain Assessment   Patient Currently in Pain Yes, see Vital Sign flowsheet  (Back pain)   Posture    Posture Kyphosis   Range of Motion (ROM)   ROM Comment ROM WFL   Strength   Strength Comments B LE strength grossly 4/5   Bed Mobility   Bed Mobility Comments ModA for bed mobility   Transfer Skills   Transfer Comments SBA for transfers   Gait   Gait Comments Ambulated 100' with use of FWW and CGA, improved step length and posture with cues   Balance   Balance Comments Impaired standing balance, when standing in front of chair with no UE support demonstrating shaking/unsteadiness   Sensory Examination   Sensory Perception no deficits were identified   Coordination   Coordination no deficits were identified   Muscle Tone   Muscle Tone no deficits were identified   General Therapy Interventions   Planned Therapy Interventions balance training;bed mobility training;gait training;strengthening;transfer training;risk factor education;home program guidelines;progressive activity/exercise   Clinical Impression   Criteria for Skilled Therapeutic Intervention yes, treatment indicated   PT Diagnosis Impaired  "functional mobility   Influenced by the following impairments Decreased strength, balance and activity tolerance   Functional limitations due to impairments Inability to perform functional mobility at baseline level of functioning   Clinical Presentation Evolving/Changing   Clinical Presentation Rationale Mental status changing as day goes on   Clinical Decision Making (Complexity) Low complexity   Therapy Frequency` 5 times/week   Predicted Duration of Therapy Intervention (days/wks) 1 week   Anticipated Equipment Needs at Discharge front wheeled walker   Anticipated Discharge Disposition Transitional Care Facility;Home with Home Therapy   Risk & Benefits of therapy have been explained Yes   Patient, Family & other staff in agreement with plan of care Yes   Clinical Impression Comments Pt would beenfit from in-patient PT in order to increase strength and IND with functional mobility.   [facility] [employer] TM \"6 Clicks\"   2016, Trustees of [facility], under license to [employer].  All rights reserved.   6 Clicks Short Forms Basic Mobility Inpatient Short Form   [facility]  \"6 Clicks\" V.2 Basic Mobility Inpatient Short Form   1. Turning from your back to your side while in a flat bed without using bedrails? 3 - A Little   2. Moving from lying on your back to sitting on the side of a flat bed without using bedrails? 3 - A Little   3. Moving to and from a bed to a chair (including a wheelchair)? 3 - A Little   4. Standing up from a chair using your arms (e.g., wheelchair, or bedside chair)? 3 - A Little   5. To walk in hospital room? 3 - A Little   6. Climbing 3-5 steps with a railing? 2 - A Lot   Basic Mobility Raw Score (Score out of 24.Lower scores equate to lower levels of function) 17   Total Evaluation Time   Total Evaluation Time (Minutes) 10     "

## 2017-05-27 NOTE — PROGRESS NOTES
" 05/27/17 0800   Quick Adds   Type of Visit Initial Occupational Therapy Evaluation   Living Environment   Lives With spouse   Living Arrangements house   Home Accessibility bed not on first floor;stairs to enter home;stairs within home   Number of Stairs to Enter Home 3   Number of Stairs Within Home 12   Transportation Available car;family or friend will provide   Living Environment Comment Patient came to hospital from [facility], unsure how long he has been at [facility] but states \"not long\". Pt. lives with wife in 3 level home, does not need to access upper level. Patient states he has a walk-in shower with a bench however upon chart review, requires to use 12 steps. Receives in-home cleaning services   Self-Care   Dominant Hand right   Usual Activity Tolerance good   Current Activity Tolerance moderate   Regular Exercise no   Activity/Exercise/Self-Care Comment [facility] at [facility] prior to admission   Functional Level Prior   Ambulation 0-->independent   Transferring 2-->assistive person   Toileting 2-->assistive person   Bathing 0-->independent   Dressing 0-->independent   Eating 0-->independent   Communication 0-->understands/communicates without difficulty   Swallowing 0-->swallows foods/liquids without difficulty   Cognition 2 - difficulty with organizing thoughts   Fall history within last six months no   Which of the above functional risks had a recent onset or change? ambulation;transferring;dressing;bathing;toileting;cognition   Prior Functional Level Comment Patient came from [facility]. States he was previous independent with ADLs and functional mobility. Required assistance with cooking, cleaning, laundry and medication management.    General Information   Onset of Illness/Injury or Date of Surgery - Date 05/25/17   Referring Physician Criss Vaz, ALPHONSO   Patient/Family Goals Statement Decrease back pain   Additional Occupational Profile Info/Pertinent History of Current Problem Per chart review: \" 71 year old male " "with history of relapsed multiple myeloma, recently resumed Kyprolis (5/23), and recent admission earlier this month for back pain, JOSE MARIA, and hypercalcemia, who is being admitted to our service with altered mental status\"   Precautions/Limitations fall precautions   Heart Disease Risk Factors Medical history   General Observations Patient supine in bed upon arrival   General Info Comments On room air, IV intact   Cognitive Status Examination   Orientation person;place   Level of Consciousness alert   Able to Follow Commands moderate impairment   Personal Safety (Cognitive) decreased awareness, need for assist;decreased awareness, need for safety;decreased insight to deficits   Memory impaired   Cognitive Comment Patient oriented to person, place. Disoriented to time stating it was \"2018\". Had difficulty following directions and required one-step commands   Visual Perception   Visual Perception Wears glasses   Sensory Examination   Sensory Quick Adds No deficits were identified   Pain Assessment   Patient Currently in Pain Yes, see Vital Sign flowsheet   Posture   Posture forward head position;protracted shoulders   Range of Motion (ROM)   ROM Quick Adds No deficits were identified   Strength   Strength Comments Not formally assessed due to back pain, generalized weakness noted during ADLs and functional transfer   Muscle Tone Assessment   Muscle Tone Quick Adds No deficits were identified   Mobility   Bed Mobility Comments Supine>sit with moderate Ax1 and FWW   Transfer Skills   Transfer Comments Min A for sit<>stand with FWW, verbal cues for hand placement   Toilet Transfer   Toilet Transfer Comments Transfer to commode with moderate Ax1 using FWW   Lower Body Dressing   Level of [place]: Dress Lower Body maximum assist (25% patients effort)   Physical Assist/Nonphysical Assist: Dress Lower Body 1 person assist   Toileting   Level of [place]: Toilet maximum assist (25% patients effort)   Physical " "Assist/Nonphysical Assist: Toilet 1 person assist   Activities of Daily Living Analysis   Impairments Contributing to Impaired Activities of Daily Living balance impaired;cognition impaired;pain;postural control impaired;strength decreased   General Therapy Interventions   Planned Therapy Interventions ADL retraining;balance training;bed mobility training;cognition;strengthening;transfer training;home program guidelines;progressive activity/exercise;risk factor education   Clinical Impression   Criteria for Skilled Therapeutic Interventions Met yes, treatment indicated   OT Diagnosis Decrease independence with ADLs and functional mobility   Influenced by the following impairments pain, weakness, decrease cognition   Assessment of Occupational Performance 5 or more Performance Deficits   Identified Performance Deficits dressing, bathing, toileting, transfers, grooming/hygiene   Clinical Decision Making (Complexity) Moderate complexity   Therapy Frequency 5 times/wk   Predicted Duration of Therapy Intervention (days/wks) x10 days   Anticipated Discharge Disposition Transitional Care Facility   Risks and Benefits of Treatment have been explained. Yes   Patient, Family & other staff in agreement with plan of care Yes   [facility] TM \"6 Clicks\"   2016, Trustees of [facility], under license to [employer].  All rights reserved.   6 Clicks Short Forms Daily Activity Inpatient Short Form   [facility]  \"6 Clicks\" Daily Activity Inpatient Short Form   1. Putting on and taking off regular lower body clothing? 2 - A Lot   2. Bathing (including washing, rinsing, drying)? 2 - A Lot   3. Toileting, which includes using toilet, bedpan or urinal? 2 - A Lot   4. Putting on and taking off regular upper body clothing? 2 - A Lot   5. Taking care of personal grooming such as brushing teeth? 3 - A Little   6. Eating meals? 4 - None   Daily Activity Raw Score (Score out of 24.Lower scores equate to lower " levels of function) 15   Total Evaluation Time   Total Evaluation Time (Minutes) 10

## 2017-05-27 NOTE — PHARMACY-VANCOMYCIN DOSING SERVICE
Pharmacy Empiric Dose Change Per Policy  Medication: vancomycin  Original Dose Ordered: 1500 mg q24h  Dose Changed To: 1000 mg q12h  This dose change was based on the pharmacist's assessment of this patient's age, weight, concurrent drug therapy, treatment goals, whether patient's creatinine clearance adequately indicates renal function (factoring in age, muscle mass, fluid and clinical status), and, if applicable, prior pharmacokinetic data.    Patient's renal function has improved since receiving the 1500 mg (25 mg/kg) loading dose. Will change the dose to 1000 mg (~17 mg/kg) and frequency to q12h given improvement in renal function. Plan is to check a level in the next 1-3 days if vancomycin is continued.     Estimated Creatinine Clearance: 69.6 mL/min (based on Cr of 0.8).  Will continue to follow and modify dosage according to levels, organ function and clinical condition    Kojo Rincon, TiffanyD  PGY-1 Pharmacy Resident

## 2017-05-27 NOTE — PLAN OF CARE
"Problem: Goal Outcome Summary  Goal: Goal Outcome Summary     Yamil was up in the chair this am for 2 hours, ate a blueberry muffin and coffee. Was c/o of back pain- scheduled Tylenol and PRN ultram given. 1 hour later pt states \"I feel like I'm going to pass out from the pain.\" ultram repeated and pt slept for a few hours afterwards. This afternoon states his back pain is much improved and declined further pain meds. Large BM this afternoon. Walked in the halls with PT and now sitting in the chair again. Family members visiting.       "

## 2017-05-27 NOTE — PLAN OF CARE
Problem: Goal Outcome Summary  Goal: Goal Outcome Summary     Video monitoring stopped this afternoon as patient is improving and hasn't been pulling at lines. Bed alarm/chair alarm on for safety. Pt ambulated with walker and gait belt for approximately 10 min. Up in the chair now.  Ate 1/2 of a [employer] sandwich for lunch. Hasn't been drinking much despite encouragement. Denies pain currently.

## 2017-05-27 NOTE — PLAN OF CARE
Problem: Goal Outcome Summary  Goal: Goal Outcome Summary  OT 7D: Occupational therapy evaluation completed and treatment initiated. Patient required mod A with FWW for transfers with single step commands as demonstrated difficulty following directions. Recommend discharge to TCU to maximize independence and safety with ADLs and functional mobility.

## 2017-05-27 NOTE — PLAN OF CARE
Problem: Goal Outcome Summary  Goal: Goal Outcome Summary  Outcome: No Change     4730-9710: VSS, except elevated BP. Continues on zosyn and vanco. Tolerated blood transfusion for hemoglobin of 7.5. Tramadol given for back pain; tpump in place; lidocaine patches removed. Continues to be disoriented to time, place, and situation. Video monitoring in place. Patient did set off bed alarm when needing to use urinal. Very redirectable and pleasant. Continue with plan of care.      9935-1965: BP up to 164/78 overnight; MD notified. Back pain improving with tramadol. Up frequently to use commode and urinal; high amount of urine output. Bed alarm on for safety. Video monitoring continues.

## 2017-05-27 NOTE — PROGRESS NOTES
[facility], [place]    Hematology / Oncology Progress Note    Date of Admission: 5/25/2017  Hospital Day #: 2   Date of Service (when I saw the patient): 05/27/2017     Assessment & Plan   Anthony Carbone is a 71 year old male with history of relapsed multiple myeloma, recently resumed Kyprolis (5/23), and recent admission earlier this month for back pain, JOSE MARIA, and hypercalcemia, who is being admitted to our service with altered mental status.      Neuro  #AMS. RN at TCU noted patient more confused 5/24. Kyprolis was held. Per wife, patient's confusion waxes and wanes and has been more fatigued since 5/24. Intermittently agitated. Feeling fatigued over past 1-2 days prior to admission. Patient was an unreliable historian in clinic appointment 5/25 due to confusion, would answer questions incorrectly or would not provide answers. Had difficulty participating in exam in clinic (per outpatient PA). Recently admitted for AMS 5/7-516 with likely multifactorial cause including metabolic derangements, uremia, and possible infection. Recently started MS Contin 5/19 with improvement of pain but no apparent AMS on reevaluation 5/23.   -Continue Zyprexa 5 mg bid.  -Cr on admission elevated to 1.31, has since improved to ~0.8. Electrolytes WNL.   -BCx NGTD, TSH WNL, ammonia normal, UA/UC negative  -MIVF  -PRN haldol 5 mg q6hrs prn  -Patient alert and oriented to person, place, and situation today. Unable to tell what year it was, but knew who the president is.    #Hx CVA. suspected subacute CVA 4/2016.  -Continue PTA clopidogrel     ID  #Pneumonia. CXR with LLL opacity and increased perihilar opacities, with stable L pleural effusion. Denies shortness of breath, chest pain, or cough. No sputum production.  -Started on IV Vancomycin and IV Zosyn. D/c'd IV vancomycin today.  -Will consider transitioning to oral abx tomorrow.  -Consider SLP consult to evaluate for aspiration once patient  improves.    #Anti-infectives.  -Acyclovir 400 mg bid    Heme  #IgM lamda multiple myeloma. Origonially diagnosed 1/2012. S/p therapy with Revlimid + dex for 4-5 cycles but plateaued 3/2012. Velcade added, completed 2-3 cycles then achieving GPR. 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 In 7/2014 found to have return of M-spike with marrow involvement (PET negative) and began retreatment with RVD in 8/14. Completed 5 cycles, 6th complicated by infection. Bendamustine added to VD 5/15 due to progression but tolerated poorly, dose reduced with cycle 2 and completed 5 cycles in total. Found to have increasing M-spike 10/2015 and started Pomolyst and weekly decadron 10/2015. Carflizomib added. Daratumumab added 11/2016. Admitted to hospital 5/2017 for AMS, while inpatient started Kyprolis and continues on this.   -Kyprolis held (since 5/24) due to AMS, will continue to hold while inpatient, to resume at outpatient follow up.  -Viscosity index of 2.1 (H), SPEP (Albumin 3.1 (L), gamma 2.1 (H), and monoclonal peak 1.9 (H), and Light chains-kappa WNL, lambda elevated @ 29. Slightly improved from last evaluation, decreasing suspicion for AMS due to myeloma progression.    #Anemia, Thrombocytopenia. 2/2 to treatment and MM in setting of progression.  -Transfuse for Hgb <8 and plts <10K. No transfusions needed today.    Renal  #JOSE MARIA. Cr elevated to 1.31 on admission. 1L fluid bolus given in clinic, another on admission. MIVF continued. Cr improved to 0.8. Will monitor.    Misc  #Back/ rib pain. Started 5/2017. Lumbar MRI at OSH showing mild-mod central and foraminal stenoses in lumbar spine, per patient's wife, no MM lesion there. Rib films from last admission negative. Started MS Contin 15 mg bid 5/19 with improvement of pain and no e/o AMS.  -Held MS contin on admission  -Palliative consult placed, appreciate recs.  -Tramadol 25-50 mg PO q6 hrs prn (use  first)  -IV dilaudid 1 mg q 3hrs prn  -Lidocaine patches  -Tylenol 1000 mg tid started today.  -Palliative suggested considering a f/u apt outpatient with their team for further management. May also consider something like a fentanyl patch for pain management outpatient to avoid possible decreased clearance of narcotic if JOSE MARIA.    CV  #Hypertension.  -Continue PTA amlodipine  -Avoid QTC prolonging agents (has hx of QTc prolongation and syncopal episodes).  -Consider midodrine for symptomatic orthostatic hypotension.    GI  #History of GERD  - Protonix 40 mg daily  -Carafate PRN    #Abdominal pain. Patient endorses some 'side pain' which when evaluated/palpated was located in lateral LLQ and lateral RLQ. Endorsed mild tenderness to palpation. No rigidity or guarding. Patient did have lg stool burden on Xray (seen on CXR from admission).   -Will monitor, no urgent evaluation necessary.  -Senna scheduled 1 tab PO qhs.    FEN:  -NS @ 100 ml/hr  -PRN lyte replacement  -RDAT    Prophy/Misc:  -VTE: Lovenox 40 mg daily.  -GI/PUD: Protonix 40 mg daily  -Bowels: senna 1 tab PO qhs and miralax prn  -PT/OT consult    Code status: Full  Disposition: Improvement of AMS, remains afebrile, pain management, and resolution of JOSE MARIA, possible DC Monday.    Criss Vaz PA-C  Hematology/Oncology  [phone]      Interval History   Patient was sleeping on entry but woke easily. Alert and interactive in discussion today. Able to answer questions appropriately. Denies headache, vision changes, URI symptoms, cough, shortness of breath, chest pain, abdominal pain, dysuria or diarrhea. Does endorse some 'side pain' which he locates to the lateral LLQ. Otherwise feels comfortable. Per nursing, did have some moderately severe back pain mid morning, given medication for pain management.     Physical Exam   Temp: 96.2  F (35.7  C) Temp src: Oral BP: 152/85 Pulse: 68 Heart Rate: 64 Resp: 16 SpO2: 96 % O2 Device: None (Room air)    Vitals:     05/25/17 1729 05/26/17 1200 05/27/17 1007   Weight: 62.9 kg (138 lb 11.2 oz) 59.8 kg (131 lb 12.8 oz) 58.7 kg (129 lb 8 oz)     Vital Signs with Ranges  Temp:  [96.2  F (35.7  C)-98.5  F (36.9  C)] 96.2  F (35.7  C)  Pulse:  [68-82] 68  Heart Rate:  [64-77] 64  Resp:  [16-18] 16  BP: (135-165)/(75-88) 152/85  SpO2:  [93 %-96 %] 96 %  I/O last 3 completed shifts:  In: 1885 [P.O.:540; I.V.:1045]  Out: 3195 [Urine:3195]    Constitutional: Pleasant male seen lying in bed, in NAD. Interactive with questions during conversation and appropriately engaged.   HEENT: NCAT, PERRL, anicteric sclerae. Oral mucosa pink and moist with no lesions or thrush.  Respiratory: Non-labored breathing, with good air exchange. Lung sounds coarse but improved, with good aeration to bases. Some crackles noted at bases b/l. No wheezing or rhonchi. Had to return for better lung exam as patient would not sit or roll during first exam.   Cardiovascular: Regular rate and rhythm. No murmur or rub.   GI: Normoactive bowel sounds. Abdomen soft, non-distended, and non-tender. No palpable masses or organomegaly.  Skin: Warm and dry. No concerning lesions or rash on exposed surfaces. Bruising on hand noted.  Musculoskeletal: Extremities grossly normal, non-tender, no edema today.  Neurologic: A&O to person, place and situation. disoriented to time, speech normal. Interacts moderately during exam. Responsive to questions and answered them appropriately.  Neuropsychiatric: Mentation and affect appear normal/appropriate.    Medications   Current Facility-Administered Medications   Medication     senna-docusate (SENOKOT-S;PERICOLACE) 8.6-50 MG per tablet 1 tablet     potassium chloride SA (K-DUR/KLOR-CON M) CR tablet 20-40 mEq     potassium chloride (KLOR-CON) Packet 20-40 mEq     potassium chloride 10 mEq in 100 mL intermittent infusion     potassium chloride 10 mEq in 100 mL intermittent infusion with 10 mg lidocaine     potassium chloride 20 mEq in 50 mL  intermittent infusion     magnesium sulfate 4 g in 100 mL sterile water (premade)     sodium phosphate 15 mmol in D5W intermittent infusion     sodium phosphate 20 mmol in D5W intermittent infusion     sodium phosphate 25 mmol in D5W intermittent infusion     piperacillin-tazobactam (ZOSYN) 4.5 g vial to attach to  mL bag     enoxaparin (LOVENOX) injection 40 mg     traMADol (ULTRAM) half-tab 25-50 mg     acetaminophen (TYLENOL) tablet 1,000 mg     HYDROmorphone (DILAUDID) half-tab 1 mg     haloperidol (HALDOL) tablet 5 mg     Medication Instruction     polyethylene glycol (MIRALAX/GLYCOLAX) Packet 17 g     acyclovir (ZOVIRAX) tablet 400 mg     amLODIPine (NORVASC) tablet 5 mg     clopidogrel (PLAVIX) tablet 75 mg     pantoprazole (PROTONIX) EC tablet 40 mg     lidocaine (LIDODERM) 5 % Patch 3 patch     OLANZapine (zyPREXA) tablet 5 mg     ondansetron (ZOFRAN-ODT) ODT tab 8 mg     simethicone (MYLICON) chewable tablet 160 mg     simvastatin (ZOCOR) tablet 20 mg     sucralfate (CARAFATE) tablet 1 g     0.9% sodium chloride infusion     lidocaine (LIDODERM) patch REMOVAL     lidocaine (LIDODERM) Patch in Place     naloxone (NARCAN) injection 0.1-0.4 mg     Facility-Administered Medications Ordered in Other Encounters   Medication     filgrastim (NEUPOGEN) injection vial 780 mcg       Data   CBC    Recent Labs  Lab 05/27/17  0630 05/26/17  0943 05/25/17  1947 05/25/17  1304 05/24/17  1415   WBC 3.6* 5.2  --  9.4 5.1   RBC 3.20* 2.49*  --  2.77* 2.70*   HGB 9.6* 7.5*  --  8.7* 8.3*   HCT 30.8* 24.8*  --  28.3* 26.6*   MCV 96 100  --  102* 99   MCH 30.0 30.1  --  31.4 30.7   MCHC 31.2* 30.2*  --  30.7* 31.2*   RDW 19.2* 18.9*  --  18.6* 18.5*   PLT 95* 98* 100* 118* 120*     CMP    Recent Labs  Lab 05/27/17  0630 05/26/17  0943 05/25/17  1947 05/25/17  1304 05/24/17  1415 05/23/17  1105    143  --  138 138 136   POTASSIUM 3.2* 3.5  --  4.9 4.6 4.7   CHLORIDE 107 110*  --  107 104 102   CO2 25 25  --  23 24 26    ANIONGAP 10 8  --  8 10 8   GLC 89 96  --  135* 208* 97   BUN 9 18  --  37* 28 18   CR 0.84 0.80 1.20 1.31* 1.17 0.89   GFRESTIMATED 90 >90Non  GFR Calc 59* 54* 61 84   GFRESTBLACK >90African American GFR Calc >90African American GFR Calc 72 65 74 >90African American GFR Calc   JAZMIN 8.5 8.3*  --  9.2 9.1 9.3   MAG  --   --   --  2.2 2.1  --    PROTTOTAL  --   --   --  8.2 7.8 8.0   ALBUMIN  --   --   --  3.0* 2.9* 2.9*   BILITOTAL  --   --   --  0.3 0.3 0.4   ALKPHOS  --   --   --  60 47 52   AST  --   --   --  12 14 18   ALT  --   --   --  21 19 17     INRNo lab results found in last 7 days.    Results for orders placed or performed during the hospital encounter of 05/25/17   XR Chest 2 Views    Narrative    EXAM: XR CHEST 2 VW  5/25/2017 6:22 PM      HISTORY: Altered mental status, evaluate for infection    COMPARISON: Chest radiograph 5/10/2017, 5/8/2017    FINDINGS:     AP and lateral views of the chest. Right IJ central venous catheter  tip projects in the mid low right atrium.    The cardiomediastinal silhouette is within normal limits. Increased  left perihilar and left lower lobe pulmonary opacities.     Stable trace left pleural effusion. No pneumothorax.       Impression    IMPRESSION:   1.  Increased left perihilar and left lower lobe pulmonary opacities,  infection, aspiration and/or atelectasis.  2.  Stable trace left pleural effusion.    I have personally reviewed the examination and initial interpretation  and I agree with the findings.    MICH VINCENT MD

## 2017-05-28 LAB
ANION GAP SERPL CALCULATED.3IONS-SCNC: 9 MMOL/L (ref 3–14)
BACTERIA SPEC CULT: ABNORMAL
BASOPHILS # BLD AUTO: 0 10E9/L (ref 0–0.2)
BASOPHILS NFR BLD AUTO: 1.2 %
BUN SERPL-MCNC: 8 MG/DL (ref 7–30)
CALCIUM SERPL-MCNC: 8 MG/DL (ref 8.5–10.1)
CHLORIDE SERPL-SCNC: 108 MMOL/L (ref 94–109)
CO2 SERPL-SCNC: 24 MMOL/L (ref 20–32)
CREAT SERPL-MCNC: 0.9 MG/DL (ref 0.66–1.25)
DIFFERENTIAL METHOD BLD: ABNORMAL
EOSINOPHIL # BLD AUTO: 0.1 10E9/L (ref 0–0.7)
EOSINOPHIL NFR BLD AUTO: 2 %
ERYTHROCYTE [DISTWIDTH] IN BLOOD BY AUTOMATED COUNT: 19.5 % (ref 10–15)
GFR SERPL CREATININE-BSD FRML MDRD: 83 ML/MIN/1.7M2
GLUCOSE SERPL-MCNC: 88 MG/DL (ref 70–99)
HCT VFR BLD AUTO: 30.5 % (ref 40–53)
HGB BLD-MCNC: 9.5 G/DL (ref 13.3–17.7)
IMM GRANULOCYTES # BLD: 0 10E9/L (ref 0–0.4)
IMM GRANULOCYTES NFR BLD: 0.4 %
LYMPHOCYTES # BLD AUTO: 0.1 10E9/L (ref 0.8–5.3)
LYMPHOCYTES NFR BLD AUTO: 4.4 %
Lab: ABNORMAL
MCH RBC QN AUTO: 30.4 PG (ref 26.5–33)
MCHC RBC AUTO-ENTMCNC: 31.1 G/DL (ref 31.5–36.5)
MCV RBC AUTO: 97 FL (ref 78–100)
MICRO REPORT STATUS: ABNORMAL
MICROORGANISM SPEC CULT: ABNORMAL
MICROORGANISM SPEC CULT: ABNORMAL
MONOCYTES # BLD AUTO: 0.2 10E9/L (ref 0–1.3)
MONOCYTES NFR BLD AUTO: 6.3 %
NEUTROPHILS # BLD AUTO: 2.2 10E9/L (ref 1.6–8.3)
NEUTROPHILS NFR BLD AUTO: 85.7 %
NRBC # BLD AUTO: 0 10*3/UL
NRBC BLD AUTO-RTO: 0 /100
PLATELET # BLD AUTO: 87 10E9/L (ref 150–450)
POTASSIUM SERPL-SCNC: 2.9 MMOL/L (ref 3.4–5.3)
POTASSIUM SERPL-SCNC: 3.6 MMOL/L (ref 3.4–5.3)
RBC # BLD AUTO: 3.13 10E12/L (ref 4.4–5.9)
SODIUM SERPL-SCNC: 142 MMOL/L (ref 133–144)
SPECIMEN SOURCE: ABNORMAL
WBC # BLD AUTO: 2.5 10E9/L (ref 4–11)

## 2017-05-28 PROCEDURE — A9270 NON-COVERED ITEM OR SERVICE: HCPCS | Mod: GY | Performed by: PHYSICIAN ASSISTANT

## 2017-05-28 PROCEDURE — 25000128 H RX IP 250 OP 636: Performed by: INTERNAL MEDICINE

## 2017-05-28 PROCEDURE — 25000132 ZZH RX MED GY IP 250 OP 250 PS 637: Mod: GY | Performed by: INTERNAL MEDICINE

## 2017-05-28 PROCEDURE — 12000008 ZZH R&B INTERMEDIATE UMMC

## 2017-05-28 PROCEDURE — 80048 BASIC METABOLIC PNL TOTAL CA: CPT | Performed by: PHYSICIAN ASSISTANT

## 2017-05-28 PROCEDURE — A9270 NON-COVERED ITEM OR SERVICE: HCPCS | Mod: GY | Performed by: INTERNAL MEDICINE

## 2017-05-28 PROCEDURE — 36592 COLLECT BLOOD FROM PICC: CPT | Performed by: PHYSICIAN ASSISTANT

## 2017-05-28 PROCEDURE — 25000132 ZZH RX MED GY IP 250 OP 250 PS 637: Mod: GY | Performed by: PHYSICIAN ASSISTANT

## 2017-05-28 PROCEDURE — 25000128 H RX IP 250 OP 636: Performed by: PHYSICIAN ASSISTANT

## 2017-05-28 PROCEDURE — 36592 COLLECT BLOOD FROM PICC: CPT

## 2017-05-28 PROCEDURE — 25000125 ZZHC RX 250: Performed by: PHYSICIAN ASSISTANT

## 2017-05-28 PROCEDURE — 85025 COMPLETE CBC W/AUTO DIFF WBC: CPT | Performed by: PHYSICIAN ASSISTANT

## 2017-05-28 PROCEDURE — 84132 ASSAY OF SERUM POTASSIUM: CPT

## 2017-05-28 RX ORDER — FLUORIDE TOOTHPASTE
3-5 TOOTHPASTE DENTAL 4 TIMES DAILY
Status: DISCONTINUED | OUTPATIENT
Start: 2017-05-28 | End: 2017-05-29 | Stop reason: HOSPADM

## 2017-05-28 RX ORDER — AMOXICILLIN 250 MG
1 CAPSULE ORAL
Status: DISCONTINUED | OUTPATIENT
Start: 2017-05-28 | End: 2017-05-29 | Stop reason: HOSPADM

## 2017-05-28 RX ADMIN — TRAMADOL HYDROCHLORIDE 25 MG: 50 TABLET, FILM COATED ORAL at 08:36

## 2017-05-28 RX ADMIN — ACETAMINOPHEN 1000 MG: 500 TABLET, FILM COATED ORAL at 14:17

## 2017-05-28 RX ADMIN — PANTOPRAZOLE SODIUM 40 MG: 20 TABLET, DELAYED RELEASE ORAL at 08:35

## 2017-05-28 RX ADMIN — ACYCLOVIR 400 MG: 400 TABLET ORAL at 08:35

## 2017-05-28 RX ADMIN — Medication 5 ML: at 17:01

## 2017-05-28 RX ADMIN — PIPERACILLIN SODIUM,TAZOBACTAM SODIUM 4.5 G: 4; .5 INJECTION, POWDER, FOR SOLUTION INTRAVENOUS at 08:31

## 2017-05-28 RX ADMIN — PIPERACILLIN SODIUM,TAZOBACTAM SODIUM 4.5 G: 4; .5 INJECTION, POWDER, FOR SOLUTION INTRAVENOUS at 02:40

## 2017-05-28 RX ADMIN — AMLODIPINE BESYLATE 5 MG: 5 TABLET ORAL at 08:35

## 2017-05-28 RX ADMIN — Medication 5 ML: at 19:53

## 2017-05-28 RX ADMIN — AMOXICILLIN AND CLAVULANATE POTASSIUM 1 TABLET: 875; 125 TABLET, FILM COATED ORAL at 12:45

## 2017-05-28 RX ADMIN — ENOXAPARIN SODIUM 40 MG: 40 INJECTION SUBCUTANEOUS at 17:01

## 2017-05-28 RX ADMIN — POTASSIUM CHLORIDE 20 MEQ: 29.8 INJECTION, SOLUTION INTRAVENOUS at 12:41

## 2017-05-28 RX ADMIN — LIDOCAINE 3 PATCH: 50 PATCH TOPICAL at 09:35

## 2017-05-28 RX ADMIN — OLANZAPINE 5 MG: 5 TABLET, FILM COATED ORAL at 08:35

## 2017-05-28 RX ADMIN — ACYCLOVIR 400 MG: 400 TABLET ORAL at 19:53

## 2017-05-28 RX ADMIN — Medication 5 ML: at 12:46

## 2017-05-28 RX ADMIN — CLOPIDOGREL 75 MG: 75 TABLET, FILM COATED ORAL at 08:35

## 2017-05-28 RX ADMIN — ACETAMINOPHEN 1000 MG: 500 TABLET, FILM COATED ORAL at 08:35

## 2017-05-28 RX ADMIN — SODIUM CHLORIDE, PRESERVATIVE FREE: 5 INJECTION INTRAVENOUS at 12:41

## 2017-05-28 RX ADMIN — SIMVASTATIN 20 MG: 20 TABLET, FILM COATED ORAL at 21:59

## 2017-05-28 RX ADMIN — SODIUM CHLORIDE, PRESERVATIVE FREE: 5 INJECTION INTRAVENOUS at 22:48

## 2017-05-28 RX ADMIN — POTASSIUM CHLORIDE 20 MEQ: 29.8 INJECTION, SOLUTION INTRAVENOUS at 14:15

## 2017-05-28 RX ADMIN — POTASSIUM CHLORIDE 20 MEQ: 29.8 INJECTION, SOLUTION INTRAVENOUS at 10:29

## 2017-05-28 RX ADMIN — TRAMADOL HYDROCHLORIDE 25 MG: 50 TABLET, FILM COATED ORAL at 22:00

## 2017-05-28 RX ADMIN — ACETAMINOPHEN 1000 MG: 500 TABLET, FILM COATED ORAL at 19:53

## 2017-05-28 RX ADMIN — OLANZAPINE 5 MG: 5 TABLET, FILM COATED ORAL at 19:53

## 2017-05-28 RX ADMIN — AMOXICILLIN AND CLAVULANATE POTASSIUM 1 TABLET: 875; 125 TABLET, FILM COATED ORAL at 19:53

## 2017-05-28 ASSESSMENT — PAIN DESCRIPTION - DESCRIPTORS
DESCRIPTORS: SHARP
DESCRIPTORS: ACHING

## 2017-05-28 NOTE — PLAN OF CARE
Problem: Goal Outcome Summary  Goal: Goal Outcome Summary     Oriented x 4 today. Ambulated x 2 and up in the chair x 1. Ate 50 % of lunch- omelet and hash browns. Antibiotics changed to PO. Incontinent of soft loose stool this am as he wasn't able to get to the bathroom in time. He showered afterwards with assist of 1. Required Ultram x 1 this am and reports pain has been good the rest of the day.

## 2017-05-28 NOTE — PLAN OF CARE
"Problem: Goal Outcome Summary  Goal: Goal Outcome Summary  OT/7D - Cancel. Pt declines therapy upon attempt due to diarrhea and \"just getting cleaned up\". Pt requests check back later afternoon or tomorrow.       "

## 2017-05-28 NOTE — PLAN OF CARE
Problem: Goal Outcome Summary  Goal: Goal Outcome Summary  Outcome: No Change  00:00 -07:00 am  AF VSS  Mentation improved  Alert and oriented x 4  Bed alarm remains activated for safety  Denied nausea or pain   Continue using T-pump to back area   Appears to be resting/sleeping comfortably  Up to void with 1 assist using walker  Has 2  large soft BM   Assisted with repositioning q 2-3 hrs  Encourage PO intake  Pt otherwise reports feeling well and no acute issues overnight  Continue w/POC

## 2017-05-28 NOTE — PROGRESS NOTES
[facility], [place]    Hematology / Oncology Progress Note    Date of Admission: 5/25/2017  Hospital Day #: 3   Date of Service (when I saw the patient): 05/28/2017     Assessment & Plan   Anthony Carbone is a 71 year old male with history of relapsed multiple myeloma, recently resumed Kyprolis (5/23), and recent admission earlier this month for back pain, JOSE MARIA, and hypercalcemia, who is being admitted to our service with altered mental status.      Neuro  #AMS. RN at TCU noted patient more confused 5/24. Kyprolis was held. Per wife, patient's confusion waxes and wanes and has been more fatigued since 5/24. Intermittently agitated. Feeling fatigued over past 1-2 days prior to admission. Patient was an unreliable historian in clinic appointment 5/25 due to confusion, would answer questions incorrectly or would not provide answers. Had difficulty participating in exam in clinic (per outpatient PA). Recently admitted for AMS 5/7-516 with likely multifactorial cause including metabolic derangements, uremia, and possible infection. Recently started MS Contin 5/19 with improvement of pain but no apparent AMS on reevaluation 5/23.   -Continue Zyprexa 5 mg bid.  -Cr on admission elevated to 1.31, has since improved to ~0.8. Electrolytes WNL.   -BCx NGTD, TSH WNL, ammonia normal, UA/UC negative  -MIVF  -PRN haldol 5 mg q6hrs prn    #Hx CVA. suspected subacute CVA 4/2016.  -Continue PTA clopidogrel     ID  #Pneumonia. CXR with LLL opacity and increased perihilar opacities, with stable L pleural effusion. Denies shortness of breath, chest pain, or cough. No sputum production.  -Started on IV Vancomycin and IV Zosyn. D/c'd IV vancomycin 5/27.  -Transitioned from IV Zosyn--->Augmentin 875 mg bid. Patient to complete a 10 day course (through 6/4).    #Anti-infectives.  -Acyclovir 400 mg bid    Heme  #IgM lamda multiple myeloma. Origonially diagnosed 1/2012. S/p therapy with Revlimid + dex for  4-5 cycles but plateaued 3/2012. Velcade added, completed 2-3 cycles then achieving GPR. 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 In 7/2014 found to have return of M-spike with marrow involvement (PET negative) and began retreatment with RVD in 8/14. Completed 5 cycles, 6th complicated by infection. Bendamustine added to VD 5/15 due to progression but tolerated poorly, dose reduced with cycle 2 and completed 5 cycles in total. Found to have increasing M-spike 10/2015 and started Pomolyst and weekly decadron 10/2015. Carflizomib added. Daratumumab added 11/2016. Admitted to hospital 5/2017 for AMS, while inpatient started Kyprolis and continues on this.   -Kyprolis held (since 5/24) due to AMS, will continue to hold while inpatient, to resume at outpatient follow up.  -Viscosity index of 2.1 (H), SPEP (Albumin 3.1 (L), gamma 2.1 (H), and monoclonal peak 1.9 (H), and Light chains-kappa WNL, lambda elevated @ 29. Slightly improved from last evaluation, demonstrating response to carfilzomib.    #Anemia, Thrombocytopenia. 2/2 to treatment and MM in setting of progression.  -Transfuse for Hgb <8 and plts <10K. No transfusions needed today.    Renal  #JOSE MARIA. Cr elevated to 1.31 on admission. 1L fluid bolus given in clinic, another on admission. MIVF continued. Cr improved to ~0.8-0.9. Will monitor.    Misc  #Back/ rib pain. Started 5/2017. Lumbar MRI at OSH showing mild-mod central and foraminal stenoses in lumbar spine, per patient's wife, no MM lesion there. Rib films from last admission negative. Started MS Contin 15 mg bid 5/19 with improvement of pain and no e/o AMS.  -Held MS contin on admission  -Palliative consult placed, appreciate recs.  -Tramadol 25-50 mg PO q6 hrs prn (use first)  -IV dilaudid 1 mg q 3hrs prn  -Lidocaine patches  -Tylenol 1000 mg tid started today.  -Palliative suggested considering a f/u apt outpatient with their team for further  management. May also consider something like a fentanyl patch for pain management outpatient to avoid possible decreased clearance of narcotic if JOSE MARIA.  -Pain well managed on tramadol prn and scheduled tylenol.     CV  #Hypertension.  -Continue PTA amlodipine  -Avoid QTC prolonging agents (has hx of QTc prolongation and syncopal episodes).  -Consider midodrine for symptomatic orthostatic hypotension.    GI  #History of GERD  -Protonix 40 mg daily  -Carafate PRN    #Abdominal pain. Patient endorses some 'side pain' which when evaluated/palpated was located in lateral LLQ and lateral RLQ. Endorsed mild tenderness to palpation. No rigidity or guarding. Patient did have lg stool burden on Xray (seen on CXR from admission). Pain has resolved.  -Will monitor, no urgent evaluation necessary.  -Senna transitioned to prn as patient has softer stools now and multiple yesterday.    FEN:  -NS @ 100 ml/hr  -PRN lyte replacement  -RDAT    Prophy/Misc:  -VTE: Lovenox 40 mg daily.  -GI/PUD: Protonix 40 mg daily  -Bowels: senna prn and miralax prn  -PT/OT consult  -Biotene mouth rinse daily    Code status: Full  Disposition: Improvement of AMS, remains afebrile, pain management, and resolution of JOSE MARIA, plan to DC Monday.    Criss Vaz PA-C  Hematology/Oncology  [phone]      Interval History   Yamil states he feels better over the last day or two. Explains he has had 2 BMs this morning already that are quite soft. Feels as though pain has been well controlled. Denies shortness of breath, cough, or URI symptoms. Abdomen without pain. Encouraged patient to drink fluids and try eating a bit more today.    Physical Exam   Temp: 97  F (36.1  C) Temp src: Oral BP: 137/78   Heart Rate: 63 Resp: 16 SpO2: 95 % O2 Device: None (Room air)    Vitals:    05/26/17 1200 05/27/17 1007 05/28/17 0749   Weight: 59.8 kg (131 lb 12.8 oz) 58.7 kg (129 lb 8 oz) 56.4 kg (124 lb 4.8 oz)     Vital Signs with Ranges  Temp:  [95.7  F (35.4  C)-98.8  F  (37.1  C)] 97  F (36.1  C)  Heart Rate:  [57-78] 63  Resp:  [16-18] 16  BP: (113-153)/(58-85) 137/78  SpO2:  [94 %-96 %] 95 %  I/O last 3 completed shifts:  In: 3180 [P.O.:540; I.V.:2640]  Out: 1925 [Urine:1925]    Constitutional: Pleasant male seen lying in bed, in NAD. Interactive in conversation and appropriately engaged.   HEENT: NCAT, PERRL, anicteric sclerae. Oral mucosa dry with some white film on tongue, does not scrape away when tried to remove.   Respiratory: Non-labored breathing, with good air exchange. Lung sounds are clear, with good aeration to bases. No crackles, wheezing or rhonchi auscultated.   Cardiovascular: Regular rate and rhythm. No murmur or rub.   GI: Normoactive bowel sounds. Abdomen softer today, non-distended, and non-tender. No palpable masses or organomegaly.  Skin: Warm and dry. No concerning lesions or rash on exposed surfaces.   Musculoskeletal: Extremities grossly normal, non-tender, no edema today.  Neurologic: A&O x3, speech normal. Responsive to questions and answered them appropriately. Needed help sitting up/rolling over during exam.  Neuropsychiatric: Mentation and affect appear normal/appropriate.    Medications   Current Facility-Administered Medications   Medication     senna-docusate (SENOKOT-S;PERICOLACE) 8.6-50 MG per tablet 1 tablet     amoxicillin-clavulanate (AUGMENTIN) 875-125 MG per tablet 1 tablet     artificial saliva (BIOTENE DRY MOUTHWASH) liquid 3-5 mL     potassium chloride SA (K-DUR/KLOR-CON M) CR tablet 20-40 mEq     potassium chloride (KLOR-CON) Packet 20-40 mEq     potassium chloride 10 mEq in 100 mL intermittent infusion     potassium chloride 10 mEq in 100 mL intermittent infusion with 10 mg lidocaine     potassium chloride 20 mEq in 50 mL intermittent infusion     magnesium sulfate 4 g in 100 mL sterile water (premade)     sodium phosphate 15 mmol in D5W intermittent infusion     sodium phosphate 20 mmol in D5W intermittent infusion     sodium  phosphate 25 mmol in D5W intermittent infusion     enoxaparin (LOVENOX) injection 40 mg     traMADol (ULTRAM) half-tab 25-50 mg     acetaminophen (TYLENOL) tablet 1,000 mg     HYDROmorphone (DILAUDID) half-tab 1 mg     haloperidol (HALDOL) tablet 5 mg     Medication Instruction     polyethylene glycol (MIRALAX/GLYCOLAX) Packet 17 g     acyclovir (ZOVIRAX) tablet 400 mg     amLODIPine (NORVASC) tablet 5 mg     clopidogrel (PLAVIX) tablet 75 mg     pantoprazole (PROTONIX) EC tablet 40 mg     lidocaine (LIDODERM) 5 % Patch 3 patch     OLANZapine (zyPREXA) tablet 5 mg     ondansetron (ZOFRAN-ODT) ODT tab 8 mg     simethicone (MYLICON) chewable tablet 160 mg     simvastatin (ZOCOR) tablet 20 mg     sucralfate (CARAFATE) tablet 1 g     0.9% sodium chloride infusion     lidocaine (LIDODERM) patch REMOVAL     lidocaine (LIDODERM) Patch in Place     naloxone (NARCAN) injection 0.1-0.4 mg     Facility-Administered Medications Ordered in Other Encounters   Medication     filgrastim (NEUPOGEN) injection vial 780 mcg       Data   CBC    Recent Labs  Lab 05/28/17  0540 05/27/17  0630 05/26/17  0943 05/25/17 1947 05/25/17  1304   WBC 2.5* 3.6* 5.2  --  9.4   RBC 3.13* 3.20* 2.49*  --  2.77*   HGB 9.5* 9.6* 7.5*  --  8.7*   HCT 30.5* 30.8* 24.8*  --  28.3*   MCV 97 96 100  --  102*   MCH 30.4 30.0 30.1  --  31.4   MCHC 31.1* 31.2* 30.2*  --  30.7*   RDW 19.5* 19.2* 18.9*  --  18.6*   PLT 87* 95* 98* 100* 118*     CMP    Recent Labs  Lab 05/28/17  0540 05/27/17  1618 05/27/17  0630 05/26/17  0943 05/25/17  1947 05/25/17  1304 05/24/17  1415 05/23/17  1105     --  142 143  --  138 138 136   POTASSIUM 2.9* 3.4 3.2* 3.5  --  4.9 4.6 4.7   CHLORIDE 108  --  107 110*  --  107 104 102   CO2 24  --  25 25  --  23 24 26   ANIONGAP 9  --  10 8  --  8 10 8   GLC 88  --  89 96  --  135* 208* 97   BUN 8  --  9 18  --  37* 28 18   CR 0.90  --  0.84 0.80 1.20 1.31* 1.17 0.89   GFRESTIMATED 83  --  90 >90Non  GFR Calc 59*  54* 61 84   GFRESTBLACK >90African American GFR Calc  --  >90African American GFR Calc >90African American GFR Calc 72 65 74 >90African American GFR Calc   JAZMIN 8.0*  --  8.5 8.3*  --  9.2 9.1 9.3   MAG  --   --   --   --   --  2.2 2.1  --    PROTTOTAL  --   --   --   --   --  8.2 7.8 8.0   ALBUMIN  --   --   --   --   --  3.0* 2.9* 2.9*   BILITOTAL  --   --   --   --   --  0.3 0.3 0.4   ALKPHOS  --   --   --   --   --  60 47 52   AST  --   --   --   --   --  12 14 18   ALT  --   --   --   --   --  21 19 17     INRNo lab results found in last 7 days.    Results for orders placed or performed during the hospital encounter of 05/25/17   XR Chest 2 Views    Narrative    EXAM: XR CHEST 2 VW  5/25/2017 6:22 PM      HISTORY: Altered mental status, evaluate for infection    COMPARISON: Chest radiograph 5/10/2017, 5/8/2017    FINDINGS:     AP and lateral views of the chest. Right IJ central venous catheter  tip projects in the mid low right atrium.    The cardiomediastinal silhouette is within normal limits. Increased  left perihilar and left lower lobe pulmonary opacities.     Stable trace left pleural effusion. No pneumothorax.       Impression    IMPRESSION:   1.  Increased left perihilar and left lower lobe pulmonary opacities,  infection, aspiration and/or atelectasis.  2.  Stable trace left pleural effusion.    I have personally reviewed the examination and initial interpretation  and I agree with the findings.    MICH VINCENT MD

## 2017-05-28 NOTE — PLAN OF CARE
Problem: Goal Outcome Summary  Goal: Goal Outcome Summary  Outcome: No Change     [id number]: VSS. Tramadol given x1 for back pain. Lidocaine patches off for the night. Tpump in place on back. Patient pleasant and agreeable to cares. Bed alarm on. Has been doing well without the video monitoring. Up in the chair for a few hours with chair alarm. Still having minimal oral intake. Continue with plan of care.

## 2017-05-29 ENCOUNTER — APPOINTMENT (OUTPATIENT)
Dept: OCCUPATIONAL THERAPY | Facility: CLINIC | Age: 72
DRG: 177 | End: 2017-05-29
Payer: MEDICARE

## 2017-05-29 VITALS
DIASTOLIC BLOOD PRESSURE: 84 MMHG | OXYGEN SATURATION: 95 % | HEIGHT: 64 IN | WEIGHT: 124.9 LBS | BODY MASS INDEX: 21.32 KG/M2 | HEART RATE: 68 BPM | SYSTOLIC BLOOD PRESSURE: 143 MMHG | TEMPERATURE: 97.4 F | RESPIRATION RATE: 18 BRPM

## 2017-05-29 LAB
ANION GAP SERPL CALCULATED.3IONS-SCNC: 11 MMOL/L (ref 3–14)
ANISOCYTOSIS BLD QL SMEAR: SLIGHT
BASOPHILS # BLD AUTO: 0 10E9/L (ref 0–0.2)
BASOPHILS NFR BLD AUTO: 0.9 %
BUN SERPL-MCNC: 5 MG/DL (ref 7–30)
CALCIUM SERPL-MCNC: 8.3 MG/DL (ref 8.5–10.1)
CHLORIDE SERPL-SCNC: 107 MMOL/L (ref 94–109)
CO2 SERPL-SCNC: 23 MMOL/L (ref 20–32)
CREAT SERPL-MCNC: 0.66 MG/DL (ref 0.66–1.25)
DIFFERENTIAL METHOD BLD: ABNORMAL
EOSINOPHIL # BLD AUTO: 0 10E9/L (ref 0–0.7)
EOSINOPHIL NFR BLD AUTO: 0.9 %
ERYTHROCYTE [DISTWIDTH] IN BLOOD BY AUTOMATED COUNT: 19 % (ref 10–15)
GFR SERPL CREATININE-BSD FRML MDRD: ABNORMAL ML/MIN/1.7M2
GLUCOSE SERPL-MCNC: 73 MG/DL (ref 70–99)
HCT VFR BLD AUTO: 31.7 % (ref 40–53)
HGB BLD-MCNC: 9.9 G/DL (ref 13.3–17.7)
LYMPHOCYTES # BLD AUTO: 0.1 10E9/L (ref 0.8–5.3)
LYMPHOCYTES NFR BLD AUTO: 4.5 %
MCH RBC QN AUTO: 30.3 PG (ref 26.5–33)
MCHC RBC AUTO-ENTMCNC: 31.2 G/DL (ref 31.5–36.5)
MCV RBC AUTO: 97 FL (ref 78–100)
METAMYELOCYTES # BLD: 0 10E9/L
METAMYELOCYTES NFR BLD MANUAL: 0.9 %
MONOCYTES # BLD AUTO: 0.1 10E9/L (ref 0–1.3)
MONOCYTES NFR BLD AUTO: 4.5 %
NEUTROPHILS # BLD AUTO: 1.9 10E9/L (ref 1.6–8.3)
NEUTROPHILS NFR BLD AUTO: 88.3 %
PLATELET # BLD AUTO: 95 10E9/L (ref 150–450)
PLATELET # BLD EST: ABNORMAL 10*3/UL
POTASSIUM SERPL-SCNC: 3 MMOL/L (ref 3.4–5.3)
RBC # BLD AUTO: 3.27 10E12/L (ref 4.4–5.9)
SODIUM SERPL-SCNC: 141 MMOL/L (ref 133–144)
WBC # BLD AUTO: 2.2 10E9/L (ref 4–11)

## 2017-05-29 PROCEDURE — 25000132 ZZH RX MED GY IP 250 OP 250 PS 637: Mod: GY | Performed by: PHYSICIAN ASSISTANT

## 2017-05-29 PROCEDURE — A9270 NON-COVERED ITEM OR SERVICE: HCPCS | Mod: GY | Performed by: INTERNAL MEDICINE

## 2017-05-29 PROCEDURE — A9270 NON-COVERED ITEM OR SERVICE: HCPCS | Mod: GY | Performed by: PHYSICIAN ASSISTANT

## 2017-05-29 PROCEDURE — 97530 THERAPEUTIC ACTIVITIES: CPT | Mod: GO

## 2017-05-29 PROCEDURE — 25000128 H RX IP 250 OP 636: Performed by: PHYSICIAN ASSISTANT

## 2017-05-29 PROCEDURE — 80048 BASIC METABOLIC PNL TOTAL CA: CPT | Performed by: PHYSICIAN ASSISTANT

## 2017-05-29 PROCEDURE — 25000125 ZZHC RX 250: Performed by: PHYSICIAN ASSISTANT

## 2017-05-29 PROCEDURE — 36592 COLLECT BLOOD FROM PICC: CPT | Performed by: PHYSICIAN ASSISTANT

## 2017-05-29 PROCEDURE — 85025 COMPLETE CBC W/AUTO DIFF WBC: CPT | Performed by: PHYSICIAN ASSISTANT

## 2017-05-29 PROCEDURE — 40000133 ZZH STATISTIC OT WARD VISIT

## 2017-05-29 PROCEDURE — 25000132 ZZH RX MED GY IP 250 OP 250 PS 637: Mod: GY | Performed by: INTERNAL MEDICINE

## 2017-05-29 PROCEDURE — 99212 OFFICE O/P EST SF 10 MIN: CPT | Mod: 25

## 2017-05-29 PROCEDURE — 97535 SELF CARE MNGMENT TRAINING: CPT | Mod: GO

## 2017-05-29 RX ORDER — HEPARIN SODIUM (PORCINE) LOCK FLUSH IV SOLN 100 UNIT/ML 100 UNIT/ML
5 SOLUTION INTRAVENOUS
Status: DISCONTINUED | OUTPATIENT
Start: 2017-05-29 | End: 2017-05-29 | Stop reason: HOSPADM

## 2017-05-29 RX ORDER — MIDODRINE HYDROCHLORIDE 2.5 MG/1
2.5 TABLET ORAL 3 TIMES DAILY
Qty: 90 TABLET | Refills: 3 | DISCHARGE
Start: 2017-05-29 | End: 2017-01-01

## 2017-05-29 RX ORDER — HEPARIN SODIUM,PORCINE 10 UNIT/ML
5-10 VIAL (ML) INTRAVENOUS
Status: DISCONTINUED | OUTPATIENT
Start: 2017-05-29 | End: 2017-05-29 | Stop reason: HOSPADM

## 2017-05-29 RX ORDER — SACCHAROMYCES BOULARDII 250 MG
250 CAPSULE ORAL 2 TIMES DAILY
Qty: 14 CAPSULE | DISCHARGE
Start: 2017-05-29 | End: 2017-06-05

## 2017-05-29 RX ORDER — FLUORIDE TOOTHPASTE
3-5 TOOTHPASTE DENTAL 4 TIMES DAILY PRN
DISCHARGE
Start: 2017-05-29 | End: 2017-01-01

## 2017-05-29 RX ORDER — SACCHAROMYCES BOULARDII 250 MG
250 CAPSULE ORAL 2 TIMES DAILY
Status: DISCONTINUED | OUTPATIENT
Start: 2017-05-29 | End: 2017-05-29 | Stop reason: HOSPADM

## 2017-05-29 RX ORDER — LORAZEPAM 0.5 MG/1
0.5 TABLET ORAL EVERY 6 HOURS PRN
Qty: 30 TABLET | Refills: 0 | Status: SHIPPED | OUTPATIENT
Start: 2017-05-29 | End: 2018-01-01

## 2017-05-29 RX ORDER — HYDROMORPHONE HYDROCHLORIDE 2 MG/1
1 TABLET ORAL
Qty: 30 TABLET | Refills: 0 | Status: SHIPPED | OUTPATIENT
Start: 2017-05-29 | End: 2017-01-01

## 2017-05-29 RX ORDER — TRAMADOL HYDROCHLORIDE 50 MG/1
25-50 TABLET ORAL EVERY 6 HOURS PRN
Qty: 28 TABLET | Refills: 0 | Status: SHIPPED | OUTPATIENT
Start: 2017-05-29 | End: 2017-01-01

## 2017-05-29 RX ORDER — LIDOCAINE 40 MG/G
CREAM TOPICAL
Status: DISCONTINUED | OUTPATIENT
Start: 2017-05-29 | End: 2017-05-29 | Stop reason: HOSPADM

## 2017-05-29 RX ORDER — HEPARIN SODIUM,PORCINE 10 UNIT/ML
5-10 VIAL (ML) INTRAVENOUS EVERY 24 HOURS
Status: DISCONTINUED | OUTPATIENT
Start: 2017-05-29 | End: 2017-05-29 | Stop reason: HOSPADM

## 2017-05-29 RX ADMIN — OLANZAPINE 5 MG: 5 TABLET, FILM COATED ORAL at 07:59

## 2017-05-29 RX ADMIN — CLOPIDOGREL 75 MG: 75 TABLET, FILM COATED ORAL at 07:59

## 2017-05-29 RX ADMIN — POTASSIUM CHLORIDE 20 MEQ: 29.8 INJECTION, SOLUTION INTRAVENOUS at 07:02

## 2017-05-29 RX ADMIN — AMLODIPINE BESYLATE 5 MG: 5 TABLET ORAL at 07:59

## 2017-05-29 RX ADMIN — POTASSIUM CHLORIDE 20 MEQ: 29.8 INJECTION, SOLUTION INTRAVENOUS at 09:36

## 2017-05-29 RX ADMIN — Medication 250 MG: at 10:30

## 2017-05-29 RX ADMIN — ACYCLOVIR 400 MG: 400 TABLET ORAL at 07:59

## 2017-05-29 RX ADMIN — SODIUM CHLORIDE, PRESERVATIVE FREE 5 ML: 5 INJECTION INTRAVENOUS at 11:54

## 2017-05-29 RX ADMIN — ACETAMINOPHEN 1000 MG: 500 TABLET, FILM COATED ORAL at 07:59

## 2017-05-29 RX ADMIN — AMOXICILLIN AND CLAVULANATE POTASSIUM 1 TABLET: 875; 125 TABLET, FILM COATED ORAL at 07:59

## 2017-05-29 RX ADMIN — LIDOCAINE 3 PATCH: 50 PATCH TOPICAL at 08:09

## 2017-05-29 RX ADMIN — PANTOPRAZOLE SODIUM 40 MG: 20 TABLET, DELAYED RELEASE ORAL at 07:59

## 2017-05-29 NOTE — PLAN OF CARE
"Problem: Goal Outcome Summary  Goal: Goal Outcome Summary  Outcome: Therapy, progress toward functional goals as expected  OT/7D - Pt scores 11/30 on MoCA version 7.1 indicating cognitive impairment; a score of 26 or greater is considered normal. Pt reports feeling \"slightly below\" baseline cognitively, but that he has never had any issues with memory. Due to cognitive deficits, recommend assist with higher level IADLs such as cooking and medication management. Pt reports wife is able to provide this. Facilitated functional mobility ~400 feet with FWW and CGA-SBA. HR elevated to 126 bpm immediately following functional mobility, decreased to 119 bpm with cues for breathing and seated rest. Pt limited by cognition, strength, pain, and activity tolerance.     REC: TCU for continued therapy      "

## 2017-05-29 NOTE — PROGRESS NOTES
Social Work Services Discharge Note      Patient Name:  Anthony Carbone     Anticipated Discharge Date: 5/29/17     Discharge Disposition:    Nursing Home Placement  -SNF:   [facility] currently holding [facility] bed     PAS not needed as the pt is returning to the TCU.     Additional Services/Equipment Arranged: none     Patient / Family response to discharge plan:  agree     Persons notified of above discharge plan: Torito Kohli, [facility], Pt, family.    -Staff Discharge Instructions:  Please fax discharge orders and signed hard scripts for any controlled substances.  Please print a packet and send with patient.     FAX ORDERS TO:  [facility]  [phone]- fax  [phone]- phone          LIANE Silva, LGSW  On-Call   [phone]

## 2017-05-29 NOTE — PLAN OF CARE
Problem: Goal Outcome Summary  Goal: Goal Outcome Summary  Physical Therapy Discharge Summary     Reason for therapy discharge:    Discharged to transitional care facility.     Progress towards therapy goal(s). See goals on Care Plan in [facility] electronic health record for goal details.  Goals not met.  Barriers to achieving goals:   limited tolerance for therapy and discharge from facility.     Therapy recommendation(s):    Continued therapy is recommended.  Rationale/Recommendations:  continue progressing functional deficits towards mobility.

## 2017-05-29 NOTE — DISCHARGE SUMMARY
[facility], [place]    Discharge Summary  Hematology/Oncology    Date of Admission:  5/25/2017  Date of Discharge:  5/29/2017  Discharging Provider: Kamilla Dumont PA-C / Dr. Vang  Date of Service (when I saw the patient): 05/29/17    Discharge Diagnoses   Active Problems:    Altered mental status    Aspiration pneumonia - Left lower lobe pneumonia, organism unknown    Multiple Myeloma      History of Present Illness   Anthony Carbone is a 71 year old male with history of relapsed multiple myeloma on Kyprolis (last given on 5/23) who was admitted with altered mental status.     Hospital Course   Anthony Carbone was admitted on 5/25/2017.  The following problems were addressed during his hospitalization:    Neuro  #AMS. Likely multifactorial. RN at TCU noted patient more confused 5/24. Kyprolis was held. Per wife, patient's confusion waxes and wanes and has been more fatigued since 5/24. Intermittently agitated. Recently admitted for AMS 5/7-516 with likely multifactorial cause including metabolic derangements, uremia, and possible infection. Had recently started MS Contin 5/19 with improvement of pain but no apparent AMS on reevaluation 5/23. Possible relation to increased effect with elevated creatinine on admission. Found to have left lung opacities concerning for pneumonia. UCx also revealed enterococcus and CoNS. BCx remain NGTD. Kidney function improved.   - ammonia level WNL  - treatment for infection as below  - HELD long acting pain medication, adjusted pain medications as below.   - continue Zyprexa 5 mg bid.     #Hx CVA. suspected subacute CVA 4/2016.  -Continue PTA clopidogrel      ID  #Pneumonia. Likely bacterial vs. Aspiration (organism unknown). CXR with LLL opacity and increased perihilar opacities, with stable L pleural effusion. Denies shortness of breath, chest pain, or cough. No sputum production.   -Started on IV Vancomycin and IV Zosyn (x  5/26). Transitioned to PO Augmentin BID, continue through 6/4.    #Enterococcus and CoNS bacteriuria. Asymptomatic. Abx as above.      #Anti-infectives.  -Acyclovir 400 mg bid     Heme  #IgM lamda multiple myeloma. Originally diagnosed 1/2012. Please see prior notes for treatment history. Most recently on Kyprolis (C1D15 on 5/23/17).     #Pancytopenia. 2/2 to chemotherapy and MM.  -Transfuse for Hgb <8 and plts <10K.      Renal  #JOSE MARIA. RESOLVED. S/p IVFs.      Misc  #Back/rib pain. Started 5/2017. Lumbar MRI at OSH showing mild-mod central and foraminal stenoses in lumbar spine. Rib films from last admission negative. Started MS Contin 15 mg bid 5/19 with improvement of pain and no e/o AMS until ~5/24. Pain well controlled on Tylenol and Tramadol during admission.   -Held MS contin on admission. Continue to HOLD on discharge.   -Palliative consult placed, appreciate recs. Have placed referral for f/u Palliative Care clinic appt on discharge orders. May also consider something like a fentanyl patch for pain management outpatient to avoid possible decreased clearance of narcotic if JOSE MARIA.  -s/p scheduled Tylenol 1g TID inpatient, adjust back to 1g q6hr PRN on discharge. Continue Tramadol PRN, dilaudid (decreased to 1mg q3hr PRN).   -continue lidoderm patches.     CV  #Hypertension.  -Continue PTA amlodipine  -Avoid QTC prolonging agents (has hx of QTc prolongation and syncopal episodes).  -Consider resuming PTA midodrine for symptomatic orthostatic hypotension; however this remains on hold at this time given elevated BPs during admission.     GI  #History of GERD  -Protonix 40 mg daily  -Carafate PRN     #Abdominal pain. RESOLVED. Possibly 2/2 large stool burden, now improved.     #Diarrhea. Increasing loose stools 5/28-5/29. S/p Senna 1 tab on 5/27. Likely 2/2 antibiotics with transition to PO Augmentin.   - if has stool appropriate to send sample, will check Cdiff per Dr. Vang. Inpt PA will follow-up on this if  collected.   - add Florastor x 7 days while completing antibiotic course    FEN:  -regular diet as tolerated    #Hypokalemia. Likely 2/2 diarrhea and with supplement on hold. Sliding scale lyte replacement.   - will add back PTA scheduled potassium supplement given resolution of JOSE MARIA      Prophy/Misc:  -VTE: Lovenox 40 mg daily while inpatient   -GI/PUD: Protonix 40 mg daily  -PT/OT consulted, appreciate recs  -Biotene mouth rinse, will continue PRN on discharge     Code status: Full    Disposition: Discharge to community TCU today. D/w ANALIA.      Kamilla Dumont PA-C  Heme/Onc  [phone] (pager)       Code Status   Full Code    Primary Care Physician   Sanket Burciaga    Physical Exam   Temp: 97.4  F (36.3  C) Temp src: Oral BP: 143/84   Heart Rate: 67 Resp: 18 SpO2: 95 % O2 Device: None (Room air)    Vitals:    05/27/17 1007 05/28/17 0749 05/29/17 0915   Weight: 58.7 kg (129 lb 8 oz) 56.4 kg (124 lb 4.8 oz) 56.7 kg (124 lb 14.4 oz)     Vital Signs with Ranges  Temp:  [95.6  F (35.3  C)-98.2  F (36.8  C)] 97.4  F (36.3  C)  Heart Rate:  [67-74] 67  Resp:  [16-18] 18  BP: (143-177)/(82-86) 143/84  SpO2:  [95 %-98 %] 95 %  I/O last 3 completed shifts:  In: 2910 [P.O.:320; I.V.:2590]  Out: 2899 [Urine:2899]  Constitutional: Pleasant male seen lying in bed, in NAD. Alert, interactive in conversation.  HEENT: NC/AT.  Respiratory: Non-labored breathing, with good air exchange. No cough or wheezing.   Skin: Warm and dry.  Musculoskeletal: Extremities grossly normal in appearance. No edema.   Neurologic: A&O x3, speech normal. Responsive to questions and answered them appropriately.   Neuropsychiatric: Mentation and affect appear normal/appropriate.    Discharge Disposition   Discharged to home  Condition at discharge: Stable    Consultations This Hospital Stay   VASCULAR ACCESS CARE ADULT IP CONSULT  PHARMACY TO DOSE VANCO  PHYSICAL THERAPY ADULT IP CONSULT  OCCUPATIONAL THERAPY ADULT IP CONSULT  PALLIATIVE CARE  ADULT  PHYSICAL THERAPY ADULT IP CONSULT  OCCUPATIONAL THERAPY ADULT IP CONSULT    Discharge Orders     General info for SNF   Length of Stay Estimate: Short Term Care: Estimated # of Days <30  Condition at Discharge: Improving  Level of care:skilled   Rehabilitation Potential: Fair  Admission H&P remains valid and up-to-date: Yes  Recent Chemotherapy: Kyprolis, held as of 5/24                      Use Nursing Home Standing Orders: Yes     Mantoux instructions   Give two-step Mantoux (PPD) Per Facility Policy Yes     Activity - Up ad cassi     Follow Up and recommended labs and tests   Follow-up as previously scheduled.     Adult [facility] Follow-up and recommended labs and tests   Follow up with Palliative Care Clinic within 1-2 weeks of discharge.      Appointments on [place] and/or [facility] (with [facility] or [facility] provider or service). Call [phone] if you haven't heard regarding these appointments within 7 days of discharge.     Physical Therapy Adult Consult   Evaluate and treat as clinically indicated.    Reason:  Ongoing rehabilitation     Occupational Therapy Adult Consult   Evaluate and treat as clinically indicated.    Reason: Ongoing rehabilitation     Advance Diet as Tolerated   Follow this diet upon discharge: Regular diet as tolerated       Discharge Medications   Current Discharge Medication List      START taking these medications    Details   traMADol (ULTRAM) 50 MG tablet Take 0.5-1 tablets (25-50 mg) by mouth every 6 hours as needed for moderate pain . Recommend trying after Tylenol and before Dilaudid.  Qty: 28 tablet, Refills: 0    Associated Diagnoses: Acute bilateral low back pain without sciatica; Multiple myeloma in relapse (H)      artificial saliva (BIOTENE DRY MOUTHWASH) LIQD liquid Swish and spit 3-5 mLs in mouth 4 times daily as needed for dry mouth    Associated Diagnoses: Dry mouth      saccharomyces boulardii (FLORASTOR) 250 MG capsule Take 1 capsule (250 mg) by mouth 2 times  daily for 7 days  Qty: 14 capsule    Associated Diagnoses: Diarrhea, unspecified type      amoxicillin-clavulanate (AUGMENTIN) 875-125 MG per tablet Take 1 tablet by mouth every 12 hours for 13 doses  Refills: 0    Associated Diagnoses: Pneumonia of left lower lobe due to infectious organism         CONTINUE these medications which have CHANGED    Details   HYDROmorphone (DILAUDID) 2 MG tablet Take 0.5 tablets (1 mg) by mouth every 3 hours as needed for moderate to severe pain  Qty: 30 tablet, Refills: 0    Associated Diagnoses: Multiple myeloma in relapse (H); Acute bilateral low back pain without sciatica      LORazepam (ATIVAN) 0.5 MG tablet Take 1 tablet (0.5 mg) by mouth every 6 hours as needed for nausea or anxiety.  Qty: 30 tablet, Refills: 0    Associated Diagnoses: Multiple myeloma in relapse (H); Delirium      midodrine (PROAMATINE) 2.5 MG tablet Take 1 tablet (2.5 mg) by mouth 3 times daily . HOLD due to elevated blood pressures during admission. Continue to hold until further advised by outpatient clinic team or if develops recurrent orthostatic hypotension.  Qty: 90 tablet, Refills: 3    Associated Diagnoses: Orthostatic hypotension; Orthostatic syncope         CONTINUE these medications which have NOT CHANGED    Details   acetaminophen (TYLENOL) 500 MG tablet Take 2 tablets (1,000 mg) by mouth every 6 hours as needed for mild pain    Associated Diagnoses: Acute bilateral low back pain without sciatica; Multiple myeloma in relapse (H)      simvastatin (ZOCOR) 5 MG tablet Take 4 tablets (20 mg) by mouth daily  Qty: 30 tablet    Associated Diagnoses: Mixed hyperlipidemia      OLANZapine (ZYPREXA) 5 MG tablet Take 1 tablet (5 mg) by mouth 2 times daily  Qty: 60 tablet, Refills: 0    Associated Diagnoses: Delirium; Multiple myeloma in relapse (H)      acyclovir (ZOVIRAX) 400 MG tablet Take 1 tablet (400 mg) by mouth 2 times daily  Qty: 60 tablet, Refills: 0    Associated Diagnoses: Multiple myeloma in  relapse (H)      amLODIPine (NORVASC) 5 MG tablet Take one tablet at bedtime.  Qty: 30 tablet, Refills: 0    Associated Diagnoses: Essential hypertension      potassium chloride SA (K-DUR/KLOR-CON M) 20 MEQ CR tablet TAKE 1 TABLET BY MOUTH TWICE DAILY.  Qty: 60 tablet, Refills: 0    Associated Diagnoses: Hypokalemia      simethicone (MYLICON) 80 MG chewable tablet Take 2 tablets (160 mg) by mouth 4 times daily as needed for cramping or other (gas pain)  Qty: 180 tablet    Associated Diagnoses: Nausea      clopidogrel (PLAVIX) 75 MG tablet Take 1 tablet (75 mg) by mouth daily  Qty: 30 tablet, Refills: 0    Associated Diagnoses: History of stroke      esomeprazole (NEXIUM) 40 MG CR capsule Take 1 capsule (40 mg) by mouth every morning (before breakfast)  Qty: 30 capsule, Refills: 0    Associated Diagnoses: Gastroesophageal reflux disease without esophagitis      sucralfate (CARAFATE) 1 GM tablet Take 1 tablet (1 g) by mouth 4 times daily as needed  Qty: 120 tablet, Refills: 1    Associated Diagnoses: Gastroesophageal reflux disease without esophagitis      lidocaine (LIDODERM) 5 % Patch Place 3 patches onto the skin every 24 hours  Qty: 30 patch, Refills: 0    Associated Diagnoses: Multiple myeloma in relapse (H); Acute bilateral low back pain without sciatica      ondansetron (ZOFRAN-ODT) 8 MG ODT tab Take 1 tablet (8 mg) by mouth every 8 hours as needed for nausea  Qty: 30 tablet, Refills: 3    Associated Diagnoses: Nausea         STOP taking these medications       morphine (MS CONTIN) 15 MG 12 hr tablet Comments:   Reason for Stopping:             Allergies   Allergies   Allergen Reactions     Aspirin      Stomach upset     Bactrim [Sulfamethoxazole W-Trimethoprim] Rash     Data      BMP  Recent Labs  Lab 05/29/17  0513 05/28/17  1832 05/28/17  0540 05/27/17  1618 05/27/17  0630 05/26/17  0943     --  142  --  142 143   POTASSIUM 3.0* 3.6 2.9* 3.4 3.2* 3.5   CHLORIDE 107  --  108  --  107 110*   JAZMIN 8.3*  --   8.0*  --  8.5 8.3*   CO2 23  --  24  --  25 25   BUN 5*  --  8  --  9 18   CR 0.66  --  0.90  --  0.84 0.80   GLC 73  --  88  --  89 96     CBC  Recent Labs  Lab 05/29/17  0513 05/28/17  0540 05/27/17  0630 05/26/17  0943   WBC 2.2* 2.5* 3.6* 5.2   RBC 3.27* 3.13* 3.20* 2.49*   HGB 9.9* 9.5* 9.6* 7.5*   HCT 31.7* 30.5* 30.8* 24.8*   MCV 97 97 96 100   MCH 30.3 30.4 30.0 30.1   MCHC 31.2* 31.1* 31.2* 30.2*   RDW 19.0* 19.5* 19.2* 18.9*   PLT 95* 87* 95* 98*     Results for orders placed or performed during the hospital encounter of 05/25/17   XR Chest 2 Views    Narrative    EXAM: XR CHEST 2 VW  5/25/2017 6:22 PM      HISTORY: Altered mental status, evaluate for infection    COMPARISON: Chest radiograph 5/10/2017, 5/8/2017    FINDINGS:     AP and lateral views of the chest. Right IJ central venous catheter  tip projects in the mid low right atrium.    The cardiomediastinal silhouette is within normal limits. Increased  left perihilar and left lower lobe pulmonary opacities.     Stable trace left pleural effusion. No pneumothorax.       Impression    IMPRESSION:   1.  Increased left perihilar and left lower lobe pulmonary opacities,  infection, aspiration and/or atelectasis.  2.  Stable trace left pleural effusion.    I have personally reviewed the examination and initial interpretation  and I agree with the findings.    MICH VINCENT MD

## 2017-05-29 NOTE — PLAN OF CARE
Problem: Goal Outcome Summary  Goal: Goal Outcome Summary  Outcome: No Change     6376-2059: VSS. Tramadol given x1 for pain. On oral abx. Incontinent of stool x1. Bed alarm on but calling to use urinal appropriately.      [id number]: VSS. Slept well overnight. Incontinent of stool x1 again. Continues to call appropriately for urinal. Bed alarm on for safety overnight. Continue with plan of care.      Addendum: Potassium 3.0 this morning; bag 1/2 infusing.

## 2017-05-29 NOTE — PROGRESS NOTES
D/I:  Patient's mental status is now back to baseline & patient is able to discharge back to [facility] TCU.  Patient has had some episodes of incontinent loose stool overnight; C-diff ordered, but patient didn't have a bowel movement before discharge.  Discharge orders & med scripts faxed to [facility] prior to discharge.  Patient's port needle instilled with heparin prior to removal.  Potassium replaced.  RN to RN report called to Kamila at [facility] prior to discharge.  Patient's wife Casey transported patient to TCU.  All of patient's personal belongings taken with him upon discharge.    A:  Patient appears ready to discharge back to [facility].    P:  Patient's wife transferred patient to [facility] TCU for further rehab.

## 2017-05-30 ENCOUNTER — CARE COORDINATION (OUTPATIENT)
Dept: CARE COORDINATION | Facility: CLINIC | Age: 72
End: 2017-05-30

## 2017-05-30 NOTE — PLAN OF CARE
Problem: Goal Outcome Summary  Goal: Goal Outcome Summary  Occupational Therapy Discharge Summary     Reason for therapy discharge:    Discharged to transitional care facility.     Progress towards therapy goal(s). See goals on Care Plan in [facility] electronic health record for goal details.  Goals partially met.  Barriers to achieving goals:   discharge from facility.     Therapy recommendation(s):    Continued therapy is recommended.  Rationale/Recommendations:  increase safety and IND with ADL/IADL.

## 2017-05-30 NOTE — PROGRESS NOTES
"[facility]  \"Hello, my name is Ericka Mccauley , and I am calling from the [facility].  I want to check in and see how you are doing, after leaving the hospital.  You may also receive a call from your Care Coordinator (care team), but I want to make sure you don t have any urgent needs.  I have a couple questions to review with you:     Post-Discharge Outreach                                                    Anthony Carbone is a 71 year old male     Follow-up Appointments           Adult [facility] Follow-up and recommended labs and tests         Follow up with Palliative Care Clinic within 1-2 weeks of discharge.       Appointments on [place] and/or [facility] (with [facility] or [facility] provider or service). Call [phone] if you haven't heard regarding these appointments within 7 days of discharge.               Follow Up and recommended labs and tests         Follow-up as previously scheduled.                        Your next 10 appointments already scheduled            Jun 06, 2017  1:15 PM CDT   [facility] Lab Draw with  [facility] DRAW   [facility] Lab Draw ([place])     [address] [place] [id number]   [phone]                  Jun 06, 2017  1:40 PM CDT   (Arrive by 1:25 PM)   Return Visit with Leanne Reddy PA-C   [facility] ([place])     [address] [place] [address]   [phone]                  Jun 06, 2017  3:00 PM CDT   Infusion 60 with UC ONCOLOGY INFUSION, UC 26 ATC   [facility] ([place])     [address] [place] [address]   [phone]                  Jun 07, 2017  2:30 PM CDT   Infusion 60 with UC ONCOLOGY INFUSION, UC 29 ATC   [facility] ([place])     [address] [place] [address]"   [address] [facility] [address]   [phone]                  Jun 13, 2017  1:30 PM CDT   [facility] Lab Draw with UC [facility] LAB DRAW   [facility] Lab Draw ([facility])     [address] [address]   [phone]                  Jun 13, 2017  2:00 PM CDT   Infusion 60 with UC ONCOLOGY INFUSION, UC 14 ATC   [facility] ([facility])     [address] [address] [phone]                  Jun 14, 2017  2:00 PM CDT   Infusion 60 with UC ONCOLOGY INFUSION   [facility] ([facility])     [address] [address] [phone]              Care Team:    Patient Care Team       Relationship Specialty Notifications Start End    Sanket Burciaga PCP - General Internal Medicine  7/20/12     Phone: [phone] Fax: [phone]         [place] [address] [facility] [address]    Kamari Topete MD BMT Physician Hematology  11/5/12     Phone: [phone] Fax: [phone]          [facility] [address] [address]    [facility]   [facility]missions 11/9/12     Comment:  BMT  pager [phone]    Phone: [phone] Pager: [phone] Fax: [phone]       Kumar Garner MD MD Hematology & Oncology  5/1/14     Phone: [phone] Fax: [phone]         XXX RESIGNED XXX [facility] [address]    Mane Singh MD     4/21/15     Phone: [phone] Fax: [phone]         [facility] [address] [facility] [address]    Anila Kelsey, RN Nurse Coordinator Hematology & Oncology Admissions 7/30/15     Phone: [phone]         Dmitri Banks MD MD Clinical Cardiac Electrophysiology Admissions 5/13/16     Phone: [phone] Fax: [phone]         [place] [address]  [facility] [address] [facility] [address]    Yanni Wright, RN Nurse Coordinator Cardiology Admissions 5/13/16     Phone: [phone] Pager: [phone]                Transition of Care Review                                                      Did you have a surgery or procedure during your hospital visit? No   If yes, do you have any of the following:     Signs of infection:  No:     Pain:  NO      Wound/incision concerns : N/A    Do you have all of your medications/refills?  Yes    Are you having any side effects or questions about your medication(s)? No    Do you have any new or worsening symptoms?  No    Do you have any future appointments scheduled?   Yes 6/6             Plan                                                      Thanks for your time.  Your Care Coordinator may follow-up within the next couple days.  In the meantime if you have questions, concerns or problems call your care team.        Ericka Mccauley

## 2017-05-31 ENCOUNTER — CARE COORDINATION (OUTPATIENT)
Dept: ONCOLOGY | Facility: CLINIC | Age: 72
End: 2017-05-31

## 2017-05-31 LAB
BACTERIA SPEC CULT: NO GROWTH
BACTERIA SPEC CULT: NO GROWTH
MICRO REPORT STATUS: NORMAL
MICRO REPORT STATUS: NORMAL
SPECIMEN SOURCE: NORMAL
SPECIMEN SOURCE: NORMAL

## 2017-05-31 NOTE — PROGRESS NOTES
Called patient's wife this morning to follow up with how Yamil is doing after being discharged from the hospital for AMS and increased weakness.  Per patient's wife, he is doing better. PT/OT stated yesterday that Yamil seems stronger then he did last week. His mentation is improving. The diarrhea is under control. Yamil is eating and drinking okay.  They are going to have a care conference today and the wife will call with any updates. Reminded wife of next weeks appointments and encouraged her to call if she had any questions, comments or concerns.     Called the TCU and spoke to his nurse Renee who states that Yamil is doing okay. Per the nurse he is about the same. Nurse states that he is A/O x2 this morning so far but states that he is just waking up at this time. He has ordered breakfast and will be eating and taking his pills soon. Nurse stated that his diarrhea is under control now. She will call with any further updates.

## 2017-06-06 ENCOUNTER — ONCOLOGY VISIT (OUTPATIENT)
Dept: ONCOLOGY | Facility: CLINIC | Age: 72
End: 2017-06-06
Attending: INTERNAL MEDICINE
Payer: MEDICARE

## 2017-06-06 ENCOUNTER — APPOINTMENT (OUTPATIENT)
Dept: LAB | Facility: CLINIC | Age: 72
End: 2017-06-06
Attending: INTERNAL MEDICINE
Payer: MEDICARE

## 2017-06-06 ENCOUNTER — TRANSFERRED RECORDS (OUTPATIENT)
Dept: HEALTH INFORMATION MANAGEMENT | Facility: CLINIC | Age: 72
End: 2017-06-06

## 2017-06-06 VITALS
HEART RATE: 109 BPM | WEIGHT: 126 LBS | TEMPERATURE: 98.5 F | SYSTOLIC BLOOD PRESSURE: 119 MMHG | BODY MASS INDEX: 21.63 KG/M2 | RESPIRATION RATE: 18 BRPM | OXYGEN SATURATION: 96 % | DIASTOLIC BLOOD PRESSURE: 90 MMHG

## 2017-06-06 DIAGNOSIS — C90.02 MULTIPLE MYELOMA IN RELAPSE (H): Primary | ICD-10-CM

## 2017-06-06 PROCEDURE — 99215 OFFICE O/P EST HI 40 MIN: CPT | Mod: ZP | Performed by: PHYSICIAN ASSISTANT

## 2017-06-06 PROCEDURE — 25000128 H RX IP 250 OP 636: Mod: ZF | Performed by: PHYSICIAN ASSISTANT

## 2017-06-06 PROCEDURE — 36591 DRAW BLOOD OFF VENOUS DEVICE: CPT

## 2017-06-06 PROCEDURE — 99212 OFFICE O/P EST SF 10 MIN: CPT | Mod: ZF

## 2017-06-06 RX ORDER — HEPARIN SODIUM (PORCINE) LOCK FLUSH IV SOLN 100 UNIT/ML 100 UNIT/ML
5 SOLUTION INTRAVENOUS ONCE
Status: COMPLETED | OUTPATIENT
Start: 2017-06-06 | End: 2017-06-06

## 2017-06-06 RX ADMIN — SODIUM CHLORIDE, PRESERVATIVE FREE 5 ML: 5 INJECTION INTRAVENOUS at 13:32

## 2017-06-06 ASSESSMENT — PAIN SCALES - GENERAL: PAINLEVEL: MODERATE PAIN (5)

## 2017-06-06 NOTE — NURSING NOTE
Chief Complaint   Patient presents with     Oncology Clinic Visit     mult myeloma      Port Draw     Labs drawn by RN from port. VS taken.      Port accessed by RN with 20g 3/4in Gripper needle. Labs drawn, port flushed with NS and Heparin.   Vianca Lares RN

## 2017-06-06 NOTE — MR AVS SNAPSHOT
After Visit Summary   6/6/2017    Anthony Carbone    MRN: [id number]           Patient Information     Date Of Birth          1945        Visit Information        Provider Department      6/6/2017 1:40 PM Leanne Reddy PA-C [facility]        Today's Diagnoses     Multiple myeloma in relapse (H)    -  1       Follow-ups after your visit        Your next 10 appointments already scheduled     Jun 06, 2017  3:00 PM CDT   Infusion 60 with UC ONCOLOGY INFUSION, UC 26 ATC   [facility] ([facility])    [address] [place] [id number]   [phone]            Jun 07, 2017  2:30 PM CDT   Infusion 60 with UC ONCOLOGY INFUSION, UC 29 ATC   [facility] ([facility])    [address] [place] [address]   [phone]            Jun 13, 2017  1:30 PM CDT   Masonic Lab Draw with UC MASONIC LAB DRAW   [facility] ([facility]    [address] [place] [address]   [phone]            Jun 13, 2017  2:00 PM CDT   Infusion 60 with UC ONCOLOGY INFUSION, UC 14 ATC   [facility] ([facility])    [address] [place] [address]   [phone]            Jun 14, 2017  2:00 PM CDT   Infusion 60 with UC ONCOLOGY INFUSION, UC 15 ATC   [facility] ([facility])    [address] [place] [address]   [phone]            Jun 20, 2017  1:30 PM CDT   Masonic Lab Draw with UC MASONIC LAB DRAW   [facility] Lab Draw ([facility])    [address] [place] [id number] [phone]              Who to contact     If you have questions or need follow up information about today's clinic visit or  your schedule please contact [facility] directly at [phone].  Normal or non-critical lab and imaging results will be communicated to you by MyChart, letter or phone within 4 business days after the clinic has received the results. If you do not hear from us within 7 days, please contact the clinic through [employer] or phone. If you have a critical or abnormal lab result, we will notify you by phone as soon as possible.  Submit refill requests through [employer] or call your pharmacy and they will forward the refill request to us. Please allow 3 business days for your refill to be completed.          Additional Information About Your Visit        [employer] Information     [employer] gives you secure access to your electronic health record. If you see a primary care provider, you can also send messages to your care team and make appointments. If you have questions, please call your primary care clinic.  If you do not have a primary care provider, please call [phone] and they will assist you.        Care EveryWhere ID     This is your Care EveryWhere ID. This could be used by other organizations to access your [facility] medical records  [id number]        Your Vitals Were     Pulse Temperature Respirations Pulse Oximetry BMI (Body Mass Index)       109 98.5  F (36.9  C) (Oral) 18 96% 21.63 kg/m2        Blood Pressure from Last 3 Encounters:   06/06/17 119/90   05/29/17 143/84   05/25/17 117/79    Weight from Last 3 Encounters:   06/06/17 57.2 kg (126 lb)   05/29/17 56.7 kg (124 lb 14.4 oz)   05/23/17 59.2 kg (130 lb 8 oz)              Today, you had the following     No orders found for display       Primary Care Provider Office Phone # Fax #    Sanket Burciaga [phone]       [place] [id number] [facility] [address]        Thank you!     Thank you for choosing [facility]  for your care. Our goal is always to provide you with excellent  care. Hearing back from our patients is one way we can continue to improve our services. Please take a few minutes to complete the written survey that you may receive in the mail after your visit with us. Thank you!             Your Updated Medication List - Protect others around you: Learn how to safely use, store and throw away your medicines at www.disposemymeds.org.          This list is accurate as of: 6/6/17  2:56 PM.  Always use your most recent med list.                   Brand Name Dispense Instructions for use    acetaminophen 500 MG tablet    TYLENOL     Take 2 tablets (1,000 mg) by mouth every 6 hours as needed for mild pain       acyclovir 400 MG tablet    ZOVIRAX    60 tablet    Take 1 tablet (400 mg) by mouth 2 times daily       amLODIPine 5 MG tablet    NORVASC    30 tablet    Take one tablet at bedtime.       artificial saliva Liqd liquid      Swish and spit 3-5 mLs in mouth 4 times daily as needed for dry mouth       clopidogrel 75 MG tablet    PLAVIX    30 tablet    Take 1 tablet (75 mg) by mouth daily       esomeprazole 40 MG CR capsule    nexIUM    30 capsule    Take 1 capsule (40 mg) by mouth every morning (before breakfast)       HYDROmorphone 2 MG tablet    DILAUDID    30 tablet    Take 0.5 tablets (1 mg) by mouth every 3 hours as needed for moderate to severe pain       lidocaine 5 % Patch    LIDODERM    30 patch    Place 3 patches onto the skin every 24 hours       LORazepam 0.5 MG tablet    ATIVAN    30 tablet    Take 1 tablet (0.5 mg) by mouth every 6 hours as needed for nausea or anxiety.       midodrine 2.5 MG tablet    PROAMATINE    90 tablet    Take 1 tablet (2.5 mg) by mouth 3 times daily . HOLD due to elevated blood pressures during admission. Continue to hold until further advised by outpatient clinic team or if develops recurrent orthostatic hypotension.       OLANZapine 5 MG tablet    zyPREXA    60 tablet    Take 1 tablet (5 mg) by mouth 2 times daily       ondansetron 8 MG ODT  tab    ZOFRAN-ODT    30 tablet    Take 1 tablet (8 mg) by mouth every 8 hours as needed for nausea       potassium chloride SA 20 MEQ CR tablet    K-DUR/KLOR-CON M    60 tablet    TAKE 1 TABLET BY MOUTH TWICE DAILY.       simethicone 80 MG chewable tablet    MYLICON    180 tablet    Take 2 tablets (160 mg) by mouth 4 times daily as needed for cramping or other (gas pain)       simvastatin 5 MG tablet    ZOCOR    30 tablet    Take 4 tablets (20 mg) by mouth daily       sucralfate 1 GM tablet    CARAFATE    120 tablet    Take 1 tablet (1 g) by mouth 4 times daily as needed       traMADol 50 MG tablet    ULTRAM    28 tablet    Take 0.5-1 tablets (25-50 mg) by mouth every 6 hours as needed for moderate pain . Recommend trying after Tylenol and before Dilaudid.

## 2017-06-06 NOTE — NURSING NOTE
"Oncology Rooming Note    June 6, 2017 1:50 PM   Anthony Carbone is a 71 year old male who presents for:    Chief Complaint   Patient presents with     Oncology Clinic Visit     mult myeloma      Port Draw     Labs drawn by RN from port. VS taken.      Initial Vitals: BP (!) 129/92 (BP Location: Right arm, Patient Position: Chair, Cuff Size: Adult Regular)  Pulse 109  Temp 98.5  F (36.9  C) (Oral)  Resp 18  Wt 57.2 kg (126 lb)  SpO2 96%  BMI 21.63 kg/m2 Estimated body mass index is 21.63 kg/(m^2) as calculated from the following:    Height as of 5/25/17: 1.626 m (5' 4\").    Weight as of this encounter: 57.2 kg (126 lb). Body surface area is 1.61 meters squared.  Moderate Pain (5) Comment: Data Unavailable   No LMP for male patient.  Allergies reviewed: Yes  Medications reviewed: Yes    Medications: Medication refills not needed today.  Pharmacy name entered into [employer]:    [facility] - [facility] [address] AT [facility] [address]  [employer] [place], [place] - [address] [facility]  [facility] [address] [place], [place] - [address] AT [facility] [address]    Clinical concerns: no doc was NOT notified.    5 minutes for nursing intake (face to face time)     Stiven Snowden CMA              "

## 2017-06-06 NOTE — LETTER
6/6/2017      RE: Anthony Carbone  [address] [place] [address]       HEMATOLOGY/ONCOLOGY PROGRESS NOTE  Jun 6, 2017    REASON FOR VISIT: add-on fatigue, confusion     Diagnosis: 71 year old gentleman with IgM lambda multiple myeloma originally diagnosed in 01/2012 as stage I, standard risk disease.   Treatment: Revlimid plus dexamethasone for 4-5 cycles but plateaued by late 03/2012.   - Velcade was added and he received another 2-3 cycles, achieving a good partial remission.      Transplant: Single auto after melphalan 200 mg/m2 preparative regimen on 01/09/2013  - Post-transplant course: unremarkable except for some mild steroid-induced hyperglycemia, gastritis, nausea and vomiting.      Maintenance: Lenalidomide at day-100 then developed a maculopapular rash. Lenalidomide was held and then we re-challenged him after about a month to 2 months at a lower dose (5 mg daily); rash returned.   - was started on maintenance Velcade, every other week through July 2014, when he was noted with abnormalities on myeloma studies drawn there. Noted with prostate cancer dx at about the time of relapse (see below).     Relapse: noted with return on M-spike in blood/urine with marrow involvement but negative PET.   - Started on retreatment with RVD on 8/27/14 with Revlimid at 15 mg 14 days of 21 days, dexamethasone 40 mg weekly and velcade weekly.Completed at total of 5 cycles without complication by 12/2014.   - Started Cycle 6 on inc'd Rev dosing of 20mg daily x 2 weeks on 12/11/14 which was complicated by pneumonia.  - Adm 12/21-1/10 for human metapneumovirus pneumonia complicated by anorexia, HTN, depression, anorexia with significant weight loss.   - Restarted Ernesto/Dex only on 2/4/15 with good tolerance but with thrombocytopenia.   - Bendamustine added to Velcade/dexamethasone on 5/21/15 due to disease progression  - Velcade discontinued on 7/9/2015 due to side effects (orthostasis)  - Schedule changed to  bendamustine 80 mg/m2 days 1 and 2 on 28-day cycle  - Cycle 1 tolerated poorly due to lightheadedness and weakness  - Cycle 2 dose reduced to 60 mg/m2 and pre-meds adjusted  - Cycle 5 received on 9/17/15.  - 10/1/2015 - increasing IgM, M-spike - started Pomalidomide 4mg/day (21 days out of 28 days) and weekly Decadron 20mg on Oct 1st, 2015.    - Carfilzomib added on 10/22/2015 making this CPD.  - C3-C6  received in [place]    - Returned to [facility] and resumed CPD here    - Adm: 4/12-4/14 with fever, confusion, neutropenia. Noted on MRI to have acute/subacute CVA and subacute/chronic CVA; started on Plavix, given brief course of Abx and GCSF prior to d/c.     - Continued on Carfilzomib and dexamethasone alone  - Pomalyst added back on 7/13/2016 for rising FLC  - Start daratumumab 11/10/16. Changed to every other week after 4 weekly treatments d/t profound fatigue and malaise.   - Adm 5/7-5/16 due to AMS, hypercalcemia, hyperuricemia, possible PNA, back pain, and JOSE MARIA. Started Kyprolis while inpatient.  - Re-admitted 5/25-/529 with recurrent AMS, found to have concomitant PNA    Current Treatment: Kyprolis IV + dexamethasone 20 mg days of Kyprolis. Day 15 given 5/24/17      INTERVAL HISTORY:    Yamil presents for hospital follow-up today. He is doing better. His biggest concern is that he is tired physically, and tired of being at the rehab facility. They worked him hard this morning and he is tired as a result. After PT, his back usually hurts a little bit more. He did take a Tramadol and this does help. He has no radicular pains. He is alert and oriented to name, date, location, birthday. He hasn't had any confusion per his report, spouse also not noting any confusion. He is having issues with his appetite, getting full fast. He is working on this, started doing Boost Clear. He hasn't had any dizziness, lightheadedness, fevers, chills, headaches, falls, vision changes, nausea, vomiting, GERD, abdominal pain, bowel changes,  bleeding, or swelling. Remainder of ROS otherwise negative.    PHYSICAL EXAMINATION  /90  Pulse 109  Temp 98.5  F (36.9  C) (Oral)  Resp 18  Wt 57.2 kg (126 lb)  SpO2 96%  BMI 21.63 kg/m2    Wt Readings from Last 10 Encounters:   06/06/17 57.2 kg (126 lb)   05/29/17 56.7 kg (124 lb 14.4 oz)   05/23/17 59.2 kg (130 lb 8 oz)   05/19/17 59.1 kg (130 lb 6.4 oz)   05/16/17 59.1 kg (130 lb 3.2 oz)   01/26/17 64.6 kg (142 lb 6.4 oz)   01/12/17 64.7 kg (142 lb 10.2 oz)   01/06/17 65.1 kg (143 lb 8 oz)   01/05/17 64.5 kg (142 lb 3.2 oz)   12/29/16 63.2 kg (139 lb 6.4 oz)   General: AxOx4. Able to ambulate independently, albeit slow and cautiously. Using walker for longer distances. No acute distress. HEENT: PERRL, no palor or icterus. CVS: RRR. CHEST: CTAB, normal work of breathing ABDOMEN: soft non tender no masses palpated in a seated position.  NEURO: AAOX3  Grossly non focal neuro exam. SKIN: no obvious rashes. EXTREMITIES: No edema.     LABS:   Results for WILLIAN ARCINIEGA (MRN [id number]) as of 6/6/2017 14:15   Ref. Range 6/6/2017 13:42   Sodium Latest Ref Range: 133 - 144 mmol/L 138   Potassium Latest Ref Range: 3.4 - 5.3 mmol/L 4.5   Chloride Latest Ref Range: 94 - 109 mmol/L 106   Carbon Dioxide Latest Ref Range: 20 - 32 mmol/L 22   Urea Nitrogen Latest Ref Range: 7 - 30 mg/dL 16   Creatinine Latest Ref Range: 0.66 - 1.25 mg/dL 1.08   GFR Estimate Latest Ref Range: >60 mL/min/1.7m2 67   GFR Estimate If Black Latest Ref Range: >60 mL/min/1.7m2 81   Calcium Latest Ref Range: 8.5 - 10.1 mg/dL 8.8   Anion Gap Latest Ref Range: 3 - 14 mmol/L 9   Albumin Latest Ref Range: 3.4 - 5.0 g/dL 3.0 (L)   Protein Total Latest Ref Range: 6.8 - 8.8 g/dL 8.7   Bilirubin Total Latest Ref Range: 0.2 - 1.3 mg/dL 0.4   Alkaline Phosphatase Latest Ref Range: 40 - 150 U/L 79   ALT Latest Ref Range: 0 - 70 U/L 17   AST Latest Ref Range: 0 - 45 U/L 11   Glucose Latest Ref Range: 70 - 99 mg/dL 139 (H)   WBC Latest Ref  Range: 4.0 - 11.0 10e9/L 5.9   Hemoglobin Latest Ref Range: 13.3 - 17.7 g/dL 9.6 (L)   Hematocrit Latest Ref Range: 40.0 - 53.0 % 31.2 (L)   Platelet Count Latest Ref Range: 150 - 450 10e9/L 128 (L)   Results for WILLIAN ARCINIEGA (MRN [id number]) as of 6/6/2017 14:15   Ref. Range 5/8/2017 10:32 5/9/2017 08:18 5/19/2017 10:15 5/25/2017 14:40   Albumin Fraction Latest Ref Range: 3.7 - 5.1 g/dL 3.6 (L)  3.4 (L) 3.1 (L)   Alpha 1 Fraction Latest Ref Range: 0.2 - 0.4 g/dL 0.5 (H)  0.4 0.4   Alpha 2 Fraction Latest Ref Range: 0.5 - 0.9 g/dL 1.1 (H)  1.0 (H) 0.9   Beta Fraction Latest Ref Range: 0.6 - 1.0 g/dL 0.7  0.7 0.6   ELP Interpretation: Unknown (Note)...  Two monoclonal pr... Two monoclonal pr...   Gamma Fraction Latest Ref Range: 0.7 - 1.6 g/dL 2.4 (H)  2.4 (H) 2.1 (H)   IGA Latest Ref Range: 70 - 380 mg/dL <7 (L) <7... (L) <7 (L) 7 (L)   IGG Latest Ref Range: 695 - 1620 mg/dL 138 (L) 120 (L) 132 (L) 109 (L)   IGM Latest Ref Range: 60 - 265 mg/dL 3410 (H) 2960 (H) 3310 (H) 2910 (H)   Immunofixation ELP Unknown (Note)...  (Note)...    Kappa Free Lt Chain Latest Ref Range: 0.33 - 1.94 mg/dL <0.30... (L)  <0.30... (L) 0.34   Kappa Lambda Ratio Latest Ref Range: 0.26 - 1.65  Unable to Calculate  Unable to Calculate 0.01 (L)   Lambda Free Lt Chain Latest Ref Range: 0.57 - 2.63 mg/dL 31.00 (H)  29.25 (H) 29.00 (H)   Monoclonal Peak Latest Ref Range: 0.0 g/dL 2.1 (H)  2.2 (H) 1.9 (H)   Viscosity Index Latest Ref Range: 1.4 - 1.8  2.3 (H)   2.1 (H)     IMPRESSION/PLAN:  71 year old male with relapsed MM after autologous transplant (1/9/2013), with recent progression on daratumumab/pom/dex, with recent hospitalization 5/7-5/16 for back pain, JOSE MARIA,and  Hypercalcemia. Started on kyprolis IV 5/11 as well as po dex. He unfortunately developed worsening AMS and was re-admitted 5/25-5/29, found to have PNA.      MM: Previously on edgardo/pom/dex while wintering in [place], progressive disease on SPEP inpatient (M spike 0.9 -->  2.1, IgM 3410)  - Received solumedrol 100 mg IV daily 5/8-5/10. Started PO dex 40 mg daily x 3 days on 5/11 with Kyprolis 5/11 and 5/12, and 5/23, subsequent doses held for AMS  - Plan to hold today due to weakness and fragility, will reassess next week    AMS: AMS started early may in setting of metabolic derangements, uremia, and possible infection. Initially resolved, then returned ~5/24. Work-up showing PNA and UCx with enterococcus and CONS.  - Appears improved today, coherent speech and answering questions appropriately, alert and oriented  - Continue Zyprexa 5 mg  - Holding off long-acting pain meds    Heme: Cytopenias 2/2 treatment and likely MM in setting of progression.Stable today.  - Continues on Plavix for history of CVA -- although will need to monitor platelet    Renal/FEN: Creat baseline ~0.8-1.0, stable at 1.08 today  -Hx Hypercalcemia: s/p pamidronate x 1 on 5/8, calcium corrects to  9.6 today  -Hx Hyperuricemia: s/p rasburicase x 1 on 5/8  -Encouraged protein/calorie supplements    ID: PNA and positive urine cx inpatient  - Completed course of Augmentin PO BID 6/4  - Continues ppx  mg BID    Back/rib pain: Started early May. Lumbar MRI at OSH showing mild-mod central and foraminal stenoses in lumbar spine, per patient and wife, there was no MM lesion there. Rib films inpatient negative, back films stable from prior imaging.  - Continues tramadol PRN and dilaudid PRN - encouraged more regular use of tramadol and tramadol before and after PT    CV: Continue Norvasc and Zocor.   - Avoid QTc prolonging agents (history of QTc prolongation with syncopal episodes)   Deconditioning: Continue PT      Leanne Reddy PA-C

## 2017-06-06 NOTE — PROGRESS NOTES
HEMATOLOGY/ONCOLOGY PROGRESS NOTE  Jun 6, 2017    REASON FOR VISIT: add-on fatigue, confusion     Diagnosis: 71 year old gentleman with IgM lambda multiple myeloma originally diagnosed in 01/2012 as stage I, standard risk disease.   Treatment: Revlimid plus dexamethasone for 4-5 cycles but plateaued by late 03/2012.   - Velcade was added and he received another 2-3 cycles, achieving a good partial remission.      Transplant: Single auto after melphalan 200 mg/m2 preparative regimen on 01/09/2013  - Post-transplant course: unremarkable except for some mild steroid-induced hyperglycemia, gastritis, nausea and vomiting.      Maintenance: Lenalidomide at day-100 then developed a maculopapular rash. Lenalidomide was held and then we re-challenged him after about a month to 2 months at a lower dose (5 mg daily); rash returned.   - was started on maintenance Velcade, every other week through July 2014, when he was noted with abnormalities on myeloma studies drawn there. Noted with prostate cancer dx at about the time of relapse (see below).     Relapse: noted with return on M-spike in blood/urine with marrow involvement but negative PET.   - Started on retreatment with RVD on 8/27/14 with Revlimid at 15 mg 14 days of 21 days, dexamethasone 40 mg weekly and velcade weekly.Completed at total of 5 cycles without complication by 12/2014.   - Started Cycle 6 on inc'd Rev dosing of 20mg daily x 2 weeks on 12/11/14 which was complicated by pneumonia.  - Adm 12/21-1/10 for human metapneumovirus pneumonia complicated by anorexia, HTN, depression, anorexia with significant weight loss.   - Restarted Ernesto/Dex only on 2/4/15 with good tolerance but with thrombocytopenia.   - Bendamustine added to Velcade/dexamethasone on 5/21/15 due to disease progression  - Velcade discontinued on 7/9/2015 due to side effects (orthostasis)  - Schedule changed to bendamustine 80 mg/m2 days 1 and 2 on 28-day cycle  - Cycle 1 tolerated poorly due to  lightheadedness and weakness  - Cycle 2 dose reduced to 60 mg/m2 and pre-meds adjusted  - Cycle 5 received on 9/17/15.  - 10/1/2015 - increasing IgM, M-spike - started Pomalidomide 4mg/day (21 days out of 28 days) and weekly Decadron 20mg on Oct 1st, 2015.    - Carfilzomib added on 10/22/2015 making this CPD.  - C3-C6  received in [place]    - Returned to [facility] and resumed CPD here    - Adm: 4/12-4/14 with fever, confusion, neutropenia. Noted on MRI to have acute/subacute CVA and subacute/chronic CVA; started on Plavix, given brief course of Abx and GCSF prior to d/c.     - Continued on Carfilzomib and dexamethasone alone  - Pomalyst added back on 7/13/2016 for rising FLC  - Start daratumumab 11/10/16. Changed to every other week after 4 weekly treatments d/t profound fatigue and malaise.   - Adm 5/7-5/16 due to AMS, hypercalcemia, hyperuricemia, possible PNA, back pain, and JOSE MARIA. Started Kyprolis while inpatient.  - Re-admitted 5/25-/529 with recurrent AMS, found to have concomitant PNA    Current Treatment: Kyprolis IV + dexamethasone 20 mg days of Kyprolis. Day 15 given 5/24/17      INTERVAL HISTORY:    Yamil presents for hospital follow-up today. He is doing better. His biggest concern is that he is tired physically, and tired of being at the rehab facility. They worked him hard this morning and he is tired as a result. After PT, his back usually hurts a little bit more. He did take a Tramadol and this does help. He has no radicular pains. He is alert and oriented to name, date, location, birthday. He hasn't had any confusion per his report, spouse also not noting any confusion. He is having issues with his appetite, getting full fast. He is working on this, started doing Boost Clear. He hasn't had any dizziness, lightheadedness, fevers, chills, headaches, falls, vision changes, nausea, vomiting, GERD, abdominal pain, bowel changes, bleeding, or swelling. Remainder of ROS otherwise negative.    PHYSICAL  EXAMINATION  /90  Pulse 109  Temp 98.5  F (36.9  C) (Oral)  Resp 18  Wt 57.2 kg (126 lb)  SpO2 96%  BMI 21.63 kg/m2    Wt Readings from Last 10 Encounters:   06/06/17 57.2 kg (126 lb)   05/29/17 56.7 kg (124 lb 14.4 oz)   05/23/17 59.2 kg (130 lb 8 oz)   05/19/17 59.1 kg (130 lb 6.4 oz)   05/16/17 59.1 kg (130 lb 3.2 oz)   01/26/17 64.6 kg (142 lb 6.4 oz)   01/12/17 64.7 kg (142 lb 10.2 oz)   01/06/17 65.1 kg (143 lb 8 oz)   01/05/17 64.5 kg (142 lb 3.2 oz)   12/29/16 63.2 kg (139 lb 6.4 oz)   General: AxOx4. Able to ambulate independently, albeit slow and cautiously. Using walker for longer distances. No acute distress. HEENT: PERRL, no palor or icterus. CVS: RRR. CHEST: CTAB, normal work of breathing ABDOMEN: soft non tender no masses palpated in a seated position.  NEURO: AAOX3  Grossly non focal neuro exam. SKIN: no obvious rashes. EXTREMITIES: No edema.     LABS:   Results for WILLIAN ARCINIEGA (MRN [id number]) as of 6/6/2017 14:15   Ref. Range 6/6/2017 13:42   Sodium Latest Ref Range: 133 - 144 mmol/L 138   Potassium Latest Ref Range: 3.4 - 5.3 mmol/L 4.5   Chloride Latest Ref Range: 94 - 109 mmol/L 106   Carbon Dioxide Latest Ref Range: 20 - 32 mmol/L 22   Urea Nitrogen Latest Ref Range: 7 - 30 mg/dL 16   Creatinine Latest Ref Range: 0.66 - 1.25 mg/dL 1.08   GFR Estimate Latest Ref Range: >60 mL/min/1.7m2 67   GFR Estimate If Black Latest Ref Range: >60 mL/min/1.7m2 81   Calcium Latest Ref Range: 8.5 - 10.1 mg/dL 8.8   Anion Gap Latest Ref Range: 3 - 14 mmol/L 9   Albumin Latest Ref Range: 3.4 - 5.0 g/dL 3.0 (L)   Protein Total Latest Ref Range: 6.8 - 8.8 g/dL 8.7   Bilirubin Total Latest Ref Range: 0.2 - 1.3 mg/dL 0.4   Alkaline Phosphatase Latest Ref Range: 40 - 150 U/L 79   ALT Latest Ref Range: 0 - 70 U/L 17   AST Latest Ref Range: 0 - 45 U/L 11   Glucose Latest Ref Range: 70 - 99 mg/dL 139 (H)   WBC Latest Ref Range: 4.0 - 11.0 10e9/L 5.9   Hemoglobin Latest Ref Range: 13.3 - 17.7 g/dL  9.6 (L)   Hematocrit Latest Ref Range: 40.0 - 53.0 % 31.2 (L)   Platelet Count Latest Ref Range: 150 - 450 10e9/L 128 (L)   Results for WILLIAN ARCINIEGA (MRN [id number]) as of 6/6/2017 14:15   Ref. Range 5/8/2017 10:32 5/9/2017 08:18 5/19/2017 10:15 5/25/2017 14:40   Albumin Fraction Latest Ref Range: 3.7 - 5.1 g/dL 3.6 (L)  3.4 (L) 3.1 (L)   Alpha 1 Fraction Latest Ref Range: 0.2 - 0.4 g/dL 0.5 (H)  0.4 0.4   Alpha 2 Fraction Latest Ref Range: 0.5 - 0.9 g/dL 1.1 (H)  1.0 (H) 0.9   Beta Fraction Latest Ref Range: 0.6 - 1.0 g/dL 0.7  0.7 0.6   ELP Interpretation: Unknown (Note)...  Two monoclonal pr... Two monoclonal pr...   Gamma Fraction Latest Ref Range: 0.7 - 1.6 g/dL 2.4 (H)  2.4 (H) 2.1 (H)   IGA Latest Ref Range: 70 - 380 mg/dL <7 (L) <7... (L) <7 (L) 7 (L)   IGG Latest Ref Range: 695 - 1620 mg/dL 138 (L) 120 (L) 132 (L) 109 (L)   IGM Latest Ref Range: 60 - 265 mg/dL 3410 (H) 2960 (H) 3310 (H) 2910 (H)   Immunofixation ELP Unknown (Note)...  (Note)...    Kappa Free Lt Chain Latest Ref Range: 0.33 - 1.94 mg/dL <0.30... (L)  <0.30... (L) 0.34   Kappa Lambda Ratio Latest Ref Range: 0.26 - 1.65  Unable to Calculate  Unable to Calculate 0.01 (L)   Lambda Free Lt Chain Latest Ref Range: 0.57 - 2.63 mg/dL 31.00 (H)  29.25 (H) 29.00 (H)   Monoclonal Peak Latest Ref Range: 0.0 g/dL 2.1 (H)  2.2 (H) 1.9 (H)   Viscosity Index Latest Ref Range: 1.4 - 1.8  2.3 (H)   2.1 (H)     IMPRESSION/PLAN:  71 year old male with relapsed MM after autologous transplant (1/9/2013), with recent progression on daratumumab/pom/dex, with recent hospitalization 5/7-5/16 for back pain, JOSE MARIA,and  Hypercalcemia. Started on kyprolis IV 5/11 as well as po dex. He unfortunately developed worsening AMS and was re-admitted 5/25-5/29, found to have PNA.      MM: Previously on edgardo/pom/dex while wintering in [place], progressive disease on SPEP inpatient (M spike 0.9 --> 2.1, IgM 3410)  - Received solumedrol 100 mg IV daily 5/8-5/10. Started PO dex  40 mg daily x 3 days on 5/11 with Kyprolis 5/11 and 5/12, and 5/23, subsequent doses held for AMS  - Plan to hold today due to weakness and fragility, will reassess next week    AMS: AMS started early may in setting of metabolic derangements, uremia, and possible infection. Initially resolved, then returned ~5/24. Work-up showing PNA and UCx with enterococcus and CONS.  - Appears improved today, coherent speech and answering questions appropriately, alert and oriented  - Continue Zyprexa 5 mg  - Holding off long-acting pain meds    Heme: Cytopenias 2/2 treatment and likely MM in setting of progression.Stable today.  - Continues on Plavix for history of CVA -- although will need to monitor platelet    Renal/FEN: Creat baseline ~0.8-1.0, stable at 1.08 today  -Hx Hypercalcemia: s/p pamidronate x 1 on 5/8, calcium corrects to  9.6 today  -Hx Hyperuricemia: s/p rasburicase x 1 on 5/8  -Encouraged protein/calorie supplements    ID: PNA and positive urine cx inpatient  - Completed course of Augmentin PO BID 6/4  - Continues ppx  mg BID    Back/rib pain: Started early May. Lumbar MRI at OSH showing mild-mod central and foraminal stenoses in lumbar spine, per patient and wife, there was no MM lesion there. Rib films inpatient negative, back films stable from prior imaging.  - Continues tramadol PRN and dilaudid PRN - encouraged more regular use of tramadol and tramadol before and after PT    CV: Continue Norvasc and Zocor.   - Avoid QTc prolonging agents (history of QTc prolongation with syncopal episodes)   Deconditioning: Continue PT      Leanne Reddy PA-C

## 2017-06-13 ENCOUNTER — INFUSION THERAPY VISIT (OUTPATIENT)
Dept: ONCOLOGY | Facility: CLINIC | Age: 72
End: 2017-06-13
Attending: INTERNAL MEDICINE
Payer: MEDICARE

## 2017-06-13 ENCOUNTER — APPOINTMENT (OUTPATIENT)
Dept: LAB | Facility: CLINIC | Age: 72
End: 2017-06-13
Attending: INTERNAL MEDICINE
Payer: MEDICARE

## 2017-06-13 VITALS
TEMPERATURE: 98.5 F | WEIGHT: 127 LBS | RESPIRATION RATE: 14 BRPM | DIASTOLIC BLOOD PRESSURE: 51 MMHG | OXYGEN SATURATION: 98 % | HEIGHT: 64 IN | SYSTOLIC BLOOD PRESSURE: 125 MMHG | BODY MASS INDEX: 21.68 KG/M2 | HEART RATE: 104 BPM

## 2017-06-13 DIAGNOSIS — C90.02 MULTIPLE MYELOMA IN RELAPSE (H): Primary | ICD-10-CM

## 2017-06-13 DIAGNOSIS — M54.50 ACUTE BILATERAL LOW BACK PAIN WITHOUT SCIATICA: ICD-10-CM

## 2017-06-13 LAB
ALBUMIN SERPL-MCNC: 3 G/DL (ref 3.4–5)
ALP SERPL-CCNC: 77 U/L (ref 40–150)
ALT SERPL W P-5'-P-CCNC: 20 U/L (ref 0–70)
ANION GAP SERPL CALCULATED.3IONS-SCNC: 10 MMOL/L (ref 3–14)
ANISOCYTOSIS BLD QL SMEAR: SLIGHT
AST SERPL W P-5'-P-CCNC: 18 U/L (ref 0–45)
BASOPHILS # BLD AUTO: 0 10E9/L (ref 0–0.2)
BASOPHILS NFR BLD AUTO: 0.2 %
BILIRUB SERPL-MCNC: 0.4 MG/DL (ref 0.2–1.3)
BUN SERPL-MCNC: 26 MG/DL (ref 7–30)
CALCIUM SERPL-MCNC: 9.2 MG/DL (ref 8.5–10.1)
CHLORIDE SERPL-SCNC: 104 MMOL/L (ref 94–109)
CO2 SERPL-SCNC: 24 MMOL/L (ref 20–32)
CREAT SERPL-MCNC: 0.88 MG/DL (ref 0.66–1.25)
DIFFERENTIAL METHOD BLD: ABNORMAL
EOSINOPHIL # BLD AUTO: 0 10E9/L (ref 0–0.7)
EOSINOPHIL NFR BLD AUTO: 0.5 %
ERYTHROCYTE [DISTWIDTH] IN BLOOD BY AUTOMATED COUNT: 18.1 % (ref 10–15)
GFR SERPL CREATININE-BSD FRML MDRD: 85 ML/MIN/1.7M2
GLUCOSE SERPL-MCNC: 158 MG/DL (ref 70–99)
HCT VFR BLD AUTO: 30.9 % (ref 40–53)
HGB BLD-MCNC: 9.5 G/DL (ref 13.3–17.7)
IMM GRANULOCYTES # BLD: 0 10E9/L (ref 0–0.4)
IMM GRANULOCYTES NFR BLD: 0.5 %
LYMPHOCYTES # BLD AUTO: 0.3 10E9/L (ref 0.8–5.3)
LYMPHOCYTES NFR BLD AUTO: 6.9 %
MCH RBC QN AUTO: 31.3 PG (ref 26.5–33)
MCHC RBC AUTO-ENTMCNC: 30.7 G/DL (ref 31.5–36.5)
MCV RBC AUTO: 102 FL (ref 78–100)
MONOCYTES # BLD AUTO: 0.3 10E9/L (ref 0–1.3)
MONOCYTES NFR BLD AUTO: 8.2 %
NEUTROPHILS # BLD AUTO: 3.4 10E9/L (ref 1.6–8.3)
NEUTROPHILS NFR BLD AUTO: 83.7 %
NRBC # BLD AUTO: 0 10*3/UL
NRBC BLD AUTO-RTO: 0 /100
PLATELET # BLD AUTO: 134 10E9/L (ref 150–450)
POTASSIUM SERPL-SCNC: 4 MMOL/L (ref 3.4–5.3)
PROT SERPL-MCNC: 8.6 G/DL (ref 6.8–8.8)
RBC # BLD AUTO: 3.04 10E12/L (ref 4.4–5.9)
SODIUM SERPL-SCNC: 139 MMOL/L (ref 133–144)
WBC # BLD AUTO: 4 10E9/L (ref 4–11)

## 2017-06-13 PROCEDURE — 96367 TX/PROPH/DG ADDL SEQ IV INF: CPT

## 2017-06-13 PROCEDURE — 96413 CHEMO IV INFUSION 1 HR: CPT

## 2017-06-13 PROCEDURE — 99214 OFFICE O/P EST MOD 30 MIN: CPT | Mod: ZP | Performed by: PHYSICIAN ASSISTANT

## 2017-06-13 PROCEDURE — 80053 COMPREHEN METABOLIC PANEL: CPT | Performed by: PHYSICIAN ASSISTANT

## 2017-06-13 PROCEDURE — 99212 OFFICE O/P EST SF 10 MIN: CPT | Mod: ZF

## 2017-06-13 PROCEDURE — 25000128 H RX IP 250 OP 636: Mod: ZF | Performed by: PHYSICIAN ASSISTANT

## 2017-06-13 PROCEDURE — 36415 COLL VENOUS BLD VENIPUNCTURE: CPT | Performed by: PHYSICIAN ASSISTANT

## 2017-06-13 PROCEDURE — 85025 COMPLETE CBC W/AUTO DIFF WBC: CPT | Performed by: PHYSICIAN ASSISTANT

## 2017-06-13 RX ORDER — HEPARIN SODIUM (PORCINE) LOCK FLUSH IV SOLN 100 UNIT/ML 100 UNIT/ML
5 SOLUTION INTRAVENOUS EVERY 8 HOURS
Status: CANCELLED
Start: 2017-06-28

## 2017-06-13 RX ORDER — EPINEPHRINE 0.3 MG/.3ML
0.3 INJECTION SUBCUTANEOUS EVERY 5 MIN PRN
Status: CANCELLED | OUTPATIENT
Start: 2017-06-20

## 2017-06-13 RX ORDER — SODIUM CHLORIDE 9 MG/ML
1000 INJECTION, SOLUTION INTRAVENOUS CONTINUOUS PRN
Status: CANCELLED
Start: 2017-06-27

## 2017-06-13 RX ORDER — HEPARIN SODIUM (PORCINE) LOCK FLUSH IV SOLN 100 UNIT/ML 100 UNIT/ML
500 SOLUTION INTRAVENOUS ONCE
Status: COMPLETED | OUTPATIENT
Start: 2017-06-13 | End: 2017-06-13

## 2017-06-13 RX ORDER — HEPARIN SODIUM (PORCINE) LOCK FLUSH IV SOLN 100 UNIT/ML 100 UNIT/ML
5 SOLUTION INTRAVENOUS EVERY 8 HOURS
Status: CANCELLED
Start: 2017-06-20

## 2017-06-13 RX ORDER — SODIUM CHLORIDE 9 MG/ML
1000 INJECTION, SOLUTION INTRAVENOUS CONTINUOUS PRN
Status: CANCELLED
Start: 2017-06-20

## 2017-06-13 RX ORDER — METHYLPREDNISOLONE SODIUM SUCCINATE 125 MG/2ML
125 INJECTION, POWDER, LYOPHILIZED, FOR SOLUTION INTRAMUSCULAR; INTRAVENOUS
Status: CANCELLED
Start: 2017-06-14

## 2017-06-13 RX ORDER — DIPHENHYDRAMINE HYDROCHLORIDE 50 MG/ML
50 INJECTION INTRAMUSCULAR; INTRAVENOUS
Status: CANCELLED
Start: 2017-06-13

## 2017-06-13 RX ORDER — SODIUM CHLORIDE 9 MG/ML
1000 INJECTION, SOLUTION INTRAVENOUS CONTINUOUS PRN
Status: CANCELLED
Start: 2017-06-21

## 2017-06-13 RX ORDER — DIPHENHYDRAMINE HYDROCHLORIDE 50 MG/ML
50 INJECTION INTRAMUSCULAR; INTRAVENOUS
Status: CANCELLED
Start: 2017-06-27

## 2017-06-13 RX ORDER — ALBUTEROL SULFATE 0.83 MG/ML
2.5 SOLUTION RESPIRATORY (INHALATION)
Status: CANCELLED | OUTPATIENT
Start: 2017-06-14

## 2017-06-13 RX ORDER — ALBUTEROL SULFATE 90 UG/1
1-2 AEROSOL, METERED RESPIRATORY (INHALATION)
Status: CANCELLED
Start: 2017-06-27

## 2017-06-13 RX ORDER — EPINEPHRINE 0.3 MG/.3ML
0.3 INJECTION SUBCUTANEOUS EVERY 5 MIN PRN
Status: CANCELLED | OUTPATIENT
Start: 2017-06-27

## 2017-06-13 RX ORDER — LORAZEPAM 2 MG/ML
0.5 INJECTION INTRAMUSCULAR EVERY 4 HOURS PRN
Status: CANCELLED
Start: 2017-06-13

## 2017-06-13 RX ORDER — DIPHENHYDRAMINE HYDROCHLORIDE 50 MG/ML
50 INJECTION INTRAMUSCULAR; INTRAVENOUS
Status: CANCELLED
Start: 2017-06-21

## 2017-06-13 RX ORDER — DIPHENHYDRAMINE HYDROCHLORIDE 50 MG/ML
50 INJECTION INTRAMUSCULAR; INTRAVENOUS
Status: CANCELLED
Start: 2017-06-20

## 2017-06-13 RX ORDER — METHYLPREDNISOLONE SODIUM SUCCINATE 125 MG/2ML
125 INJECTION, POWDER, LYOPHILIZED, FOR SOLUTION INTRAMUSCULAR; INTRAVENOUS
Status: CANCELLED
Start: 2017-06-21

## 2017-06-13 RX ORDER — METHYLPREDNISOLONE SODIUM SUCCINATE 125 MG/2ML
125 INJECTION, POWDER, LYOPHILIZED, FOR SOLUTION INTRAMUSCULAR; INTRAVENOUS
Status: CANCELLED
Start: 2017-06-20

## 2017-06-13 RX ORDER — EPINEPHRINE 0.3 MG/.3ML
0.3 INJECTION SUBCUTANEOUS EVERY 5 MIN PRN
Status: CANCELLED | OUTPATIENT
Start: 2017-06-14

## 2017-06-13 RX ORDER — ALBUTEROL SULFATE 0.83 MG/ML
2.5 SOLUTION RESPIRATORY (INHALATION)
Status: CANCELLED | OUTPATIENT
Start: 2017-06-20

## 2017-06-13 RX ORDER — ALBUTEROL SULFATE 90 UG/1
1-2 AEROSOL, METERED RESPIRATORY (INHALATION)
Status: CANCELLED
Start: 2017-06-21

## 2017-06-13 RX ORDER — MEPERIDINE HYDROCHLORIDE 25 MG/ML
25 INJECTION INTRAMUSCULAR; INTRAVENOUS; SUBCUTANEOUS EVERY 30 MIN PRN
Status: CANCELLED | OUTPATIENT
Start: 2017-06-21

## 2017-06-13 RX ORDER — DIPHENHYDRAMINE HYDROCHLORIDE 50 MG/ML
50 INJECTION INTRAMUSCULAR; INTRAVENOUS
Status: CANCELLED
Start: 2017-06-28

## 2017-06-13 RX ORDER — EPINEPHRINE 0.3 MG/.3ML
0.3 INJECTION SUBCUTANEOUS EVERY 5 MIN PRN
Status: CANCELLED | OUTPATIENT
Start: 2017-06-28

## 2017-06-13 RX ORDER — MEPERIDINE HYDROCHLORIDE 25 MG/ML
25 INJECTION INTRAMUSCULAR; INTRAVENOUS; SUBCUTANEOUS EVERY 30 MIN PRN
Status: CANCELLED | OUTPATIENT
Start: 2017-06-20

## 2017-06-13 RX ORDER — ALBUTEROL SULFATE 90 UG/1
1-2 AEROSOL, METERED RESPIRATORY (INHALATION)
Status: CANCELLED
Start: 2017-06-14

## 2017-06-13 RX ORDER — MEPERIDINE HYDROCHLORIDE 25 MG/ML
25 INJECTION INTRAMUSCULAR; INTRAVENOUS; SUBCUTANEOUS EVERY 30 MIN PRN
Status: CANCELLED | OUTPATIENT
Start: 2017-06-28

## 2017-06-13 RX ORDER — SODIUM CHLORIDE 9 MG/ML
1000 INJECTION, SOLUTION INTRAVENOUS CONTINUOUS PRN
Status: CANCELLED
Start: 2017-06-28

## 2017-06-13 RX ORDER — LORAZEPAM 2 MG/ML
0.5 INJECTION INTRAMUSCULAR EVERY 4 HOURS PRN
Status: CANCELLED
Start: 2017-06-20

## 2017-06-13 RX ORDER — MEPERIDINE HYDROCHLORIDE 25 MG/ML
25 INJECTION INTRAMUSCULAR; INTRAVENOUS; SUBCUTANEOUS EVERY 30 MIN PRN
Status: CANCELLED | OUTPATIENT
Start: 2017-06-27

## 2017-06-13 RX ORDER — EPINEPHRINE 0.3 MG/.3ML
0.3 INJECTION SUBCUTANEOUS EVERY 5 MIN PRN
Status: CANCELLED | OUTPATIENT
Start: 2017-06-21

## 2017-06-13 RX ORDER — MEPERIDINE HYDROCHLORIDE 25 MG/ML
25 INJECTION INTRAMUSCULAR; INTRAVENOUS; SUBCUTANEOUS EVERY 30 MIN PRN
Status: CANCELLED | OUTPATIENT
Start: 2017-06-14

## 2017-06-13 RX ORDER — HEPARIN SODIUM (PORCINE) LOCK FLUSH IV SOLN 100 UNIT/ML 100 UNIT/ML
5 SOLUTION INTRAVENOUS EVERY 8 HOURS
Status: CANCELLED
Start: 2017-06-14

## 2017-06-13 RX ORDER — ALBUTEROL SULFATE 0.83 MG/ML
2.5 SOLUTION RESPIRATORY (INHALATION)
Status: CANCELLED | OUTPATIENT
Start: 2017-06-21

## 2017-06-13 RX ORDER — ALBUTEROL SULFATE 0.83 MG/ML
2.5 SOLUTION RESPIRATORY (INHALATION)
Status: CANCELLED | OUTPATIENT
Start: 2017-06-27

## 2017-06-13 RX ORDER — HEPARIN SODIUM (PORCINE) LOCK FLUSH IV SOLN 100 UNIT/ML 100 UNIT/ML
5 SOLUTION INTRAVENOUS EVERY 8 HOURS
Status: DISCONTINUED | OUTPATIENT
Start: 2017-06-13 | End: 2017-06-13 | Stop reason: HOSPADM

## 2017-06-13 RX ORDER — LORAZEPAM 2 MG/ML
0.5 INJECTION INTRAMUSCULAR EVERY 4 HOURS PRN
Status: CANCELLED
Start: 2017-06-28

## 2017-06-13 RX ORDER — LORAZEPAM 2 MG/ML
0.5 INJECTION INTRAMUSCULAR EVERY 4 HOURS PRN
Status: CANCELLED
Start: 2017-06-27

## 2017-06-13 RX ORDER — HEPARIN SODIUM (PORCINE) LOCK FLUSH IV SOLN 100 UNIT/ML 100 UNIT/ML
5 SOLUTION INTRAVENOUS EVERY 8 HOURS
Status: CANCELLED
Start: 2017-06-13

## 2017-06-13 RX ORDER — LORAZEPAM 2 MG/ML
0.5 INJECTION INTRAMUSCULAR EVERY 4 HOURS PRN
Status: CANCELLED
Start: 2017-06-14

## 2017-06-13 RX ORDER — DIPHENHYDRAMINE HYDROCHLORIDE 50 MG/ML
50 INJECTION INTRAMUSCULAR; INTRAVENOUS
Status: CANCELLED
Start: 2017-06-14

## 2017-06-13 RX ORDER — HEPARIN SODIUM (PORCINE) LOCK FLUSH IV SOLN 100 UNIT/ML 100 UNIT/ML
5 SOLUTION INTRAVENOUS EVERY 8 HOURS
Status: CANCELLED
Start: 2017-06-21

## 2017-06-13 RX ORDER — ALBUTEROL SULFATE 90 UG/1
1-2 AEROSOL, METERED RESPIRATORY (INHALATION)
Status: CANCELLED
Start: 2017-06-28

## 2017-06-13 RX ORDER — METHYLPREDNISOLONE SODIUM SUCCINATE 125 MG/2ML
125 INJECTION, POWDER, LYOPHILIZED, FOR SOLUTION INTRAMUSCULAR; INTRAVENOUS
Status: CANCELLED
Start: 2017-06-28

## 2017-06-13 RX ORDER — EPINEPHRINE 0.3 MG/.3ML
0.3 INJECTION SUBCUTANEOUS EVERY 5 MIN PRN
Status: CANCELLED | OUTPATIENT
Start: 2017-06-13

## 2017-06-13 RX ORDER — METHYLPREDNISOLONE SODIUM SUCCINATE 125 MG/2ML
125 INJECTION, POWDER, LYOPHILIZED, FOR SOLUTION INTRAMUSCULAR; INTRAVENOUS
Status: CANCELLED
Start: 2017-06-27

## 2017-06-13 RX ORDER — HEPARIN SODIUM (PORCINE) LOCK FLUSH IV SOLN 100 UNIT/ML 100 UNIT/ML
5 SOLUTION INTRAVENOUS EVERY 8 HOURS
Status: CANCELLED
Start: 2017-06-27

## 2017-06-13 RX ORDER — LORAZEPAM 2 MG/ML
0.5 INJECTION INTRAMUSCULAR EVERY 4 HOURS PRN
Status: CANCELLED
Start: 2017-06-21

## 2017-06-13 RX ORDER — ALBUTEROL SULFATE 90 UG/1
1-2 AEROSOL, METERED RESPIRATORY (INHALATION)
Status: CANCELLED
Start: 2017-06-13

## 2017-06-13 RX ORDER — ALBUTEROL SULFATE 0.83 MG/ML
2.5 SOLUTION RESPIRATORY (INHALATION)
Status: CANCELLED | OUTPATIENT
Start: 2017-06-13

## 2017-06-13 RX ORDER — METHYLPREDNISOLONE SODIUM SUCCINATE 125 MG/2ML
125 INJECTION, POWDER, LYOPHILIZED, FOR SOLUTION INTRAMUSCULAR; INTRAVENOUS
Status: CANCELLED
Start: 2017-06-13

## 2017-06-13 RX ORDER — ALBUTEROL SULFATE 90 UG/1
1-2 AEROSOL, METERED RESPIRATORY (INHALATION)
Status: CANCELLED
Start: 2017-06-20

## 2017-06-13 RX ORDER — MEPERIDINE HYDROCHLORIDE 25 MG/ML
25 INJECTION INTRAMUSCULAR; INTRAVENOUS; SUBCUTANEOUS EVERY 30 MIN PRN
Status: CANCELLED | OUTPATIENT
Start: 2017-06-13

## 2017-06-13 RX ORDER — SODIUM CHLORIDE 9 MG/ML
1000 INJECTION, SOLUTION INTRAVENOUS CONTINUOUS PRN
Status: CANCELLED
Start: 2017-06-13

## 2017-06-13 RX ORDER — SODIUM CHLORIDE 9 MG/ML
1000 INJECTION, SOLUTION INTRAVENOUS CONTINUOUS PRN
Status: CANCELLED
Start: 2017-06-14

## 2017-06-13 RX ORDER — ALBUTEROL SULFATE 0.83 MG/ML
2.5 SOLUTION RESPIRATORY (INHALATION)
Status: CANCELLED | OUTPATIENT
Start: 2017-06-28

## 2017-06-13 RX ADMIN — SODIUM CHLORIDE, PRESERVATIVE FREE 500 UNITS: 5 INJECTION INTRAVENOUS at 08:48

## 2017-06-13 RX ADMIN — CARFILZOMIB 44 MG: 60 INJECTION, POWDER, LYOPHILIZED, FOR SOLUTION INTRAVENOUS at 10:47

## 2017-06-13 RX ADMIN — SODIUM CHLORIDE 250 ML: 9 INJECTION, SOLUTION INTRAVENOUS at 10:16

## 2017-06-13 RX ADMIN — SODIUM CHLORIDE, PRESERVATIVE FREE 5 ML: 5 INJECTION INTRAVENOUS at 11:39

## 2017-06-13 RX ADMIN — DEXAMETHASONE SODIUM PHOSPHATE 4 MG: 10 INJECTION, SOLUTION INTRAMUSCULAR; INTRAVENOUS at 10:23

## 2017-06-13 ASSESSMENT — PAIN SCALES - GENERAL: PAINLEVEL: MODERATE PAIN (5)

## 2017-06-13 NOTE — LETTER
6/13/2017      RE: Anthony Thomasbaljit  [address] [place] [address]       HEMATOLOGY/ONCOLOGY PROGRESS NOTE  Jun 13, 2017    REASON FOR VISIT: add-on fatigue, confusion     Diagnosis: 71 year old gentleman with IgM lambda multiple myeloma originally diagnosed in 01/2012 as stage I, standard risk disease.   Treatment: Revlimid plus dexamethasone for 4-5 cycles but plateaued by late 03/2012.   - Velcade was added and he received another 2-3 cycles, achieving a good partial remission.      Transplant: Single auto after melphalan 200 mg/m2 preparative regimen on 01/09/2013  - Post-transplant course: unremarkable except for some mild steroid-induced hyperglycemia, gastritis, nausea and vomiting.      Maintenance: Lenalidomide at day-100 then developed a maculopapular rash. Lenalidomide was held and then we re-challenged him after about a month to 2 months at a lower dose (5 mg daily); rash returned.   - was started on maintenance Velcade, every other week through July 2014, when he was noted with abnormalities on myeloma studies drawn there. Noted with prostate cancer dx at about the time of relapse (see below).     Relapse: noted with return on M-spike in blood/urine with marrow involvement but negative PET.   - Started on retreatment with RVD on 8/27/14 with Revlimid at 15 mg 14 days of 21 days, dexamethasone 40 mg weekly and velcade weekly.Completed at total of 5 cycles without complication by 12/2014.   - Started Cycle 6 on inc'd Rev dosing of 20mg daily x 2 weeks on 12/11/14 which was complicated by pneumonia.  - Adm 12/21-1/10 for human metapneumovirus pneumonia complicated by anorexia, HTN, depression, anorexia with significant weight loss.   - Restarted Ernesto/Dex only on 2/4/15 with good tolerance but with thrombocytopenia.   - Bendamustine added to Velcade/dexamethasone on 5/21/15 due to disease progression  - Velcade discontinued on 7/9/2015 due to side effects (orthostasis)  - Schedule changed to  bendamustine 80 mg/m2 days 1 and 2 on 28-day cycle  - Cycle 1 tolerated poorly due to lightheadedness and weakness  - Cycle 2 dose reduced to 60 mg/m2 and pre-meds adjusted  - Cycle 5 received on 9/17/15.  - 10/1/2015 - increasing IgM, M-spike - started Pomalidomide 4mg/day (21 days out of 28 days) and weekly Decadron 20mg on Oct 1st, 2015.    - Carfilzomib added on 10/22/2015 making this CPD.  - C3-C6  received in [place]    - Returned to [facility] and resumed CPD here    - Adm: 4/12-4/14 with fever, confusion, neutropenia. Noted on MRI to have acute/subacute CVA and subacute/chronic CVA; started on Plavix, given brief course of Abx and GCSF prior to d/c.     - Continued on Carfilzomib and dexamethasone alone  - Pomalyst added back on 7/13/2016 for rising FLC  - Start daratumumab 11/10/16. Changed to every other week after 4 weekly treatments d/t profound fatigue and malaise.   - Adm 5/7-5/16 due to AMS, hypercalcemia, hyperuricemia, possible PNA, back pain, and JOSE MARIA. Started Kyprolis while inpatient.  - Re-admitted 5/25-/529 with recurrent AMS, found to have concomitant PNA    Current Treatment: Kyprolis IV + dexamethasone 20 mg days of Kyprolis. Day 15 given 5/24/17      INTERVAL HISTORY:    Yamil presents with his wife for follow-up today.    He is now at home, he was discharged from [facility] last Thursday. They have been doing well at home for the most part. Yamil is using the walker sometimes, but also walking independently when he can. He feels he is getting better. There was some miscommunication with the PT/OT agency and they haven't come out yet, but are scheduled to do so. Casey accidentally forgot the AM dose of Zyprexa and this helped his sleepiness, and he suffered no AMS with the missed dose. He has just been taking it nightly. Back pain is stable, trying to do scheduled Tylenol and Tramadol, which seems to be working. No leg weakness or paresthesias. He has no new symptoms otherwise. He is feeling ready to resume  "Kyprolis now. He hasn't had any dizziness, lightheadedness, fevers, chills, headaches, falls, vision changes, nausea, vomiting, GERD, abdominal pain, bowel changes, bleeding, or swelling. Remainder of ROS otherwise negative.    PHYSICAL EXAMINATION  /51  Pulse 104  Temp 98.5  F (36.9  C) (Oral)  Resp 14  Ht 1.626 m (5' 4.02\")  Wt 57.6 kg (127 lb)  SpO2 98%  BMI 21.79 kg/m2    Wt Readings from Last 10 Encounters:   06/13/17 57.6 kg (127 lb)   06/06/17 57.2 kg (126 lb)   05/29/17 56.7 kg (124 lb 14.4 oz)   05/23/17 59.2 kg (130 lb 8 oz)   05/19/17 59.1 kg (130 lb 6.4 oz)   05/16/17 59.1 kg (130 lb 3.2 oz)   01/26/17 64.6 kg (142 lb 6.4 oz)   01/12/17 64.7 kg (142 lb 10.2 oz)   01/06/17 65.1 kg (143 lb 8 oz)   01/05/17 64.5 kg (142 lb 3.2 oz)   General: AxOx4. Able to ambulate independently, albeit slow and cautiously. Using walker for longer distances. No acute distress. HEENT: PERRL, no palor or icterus. CVS: RRR. CHEST: CTAB, normal work of breathing ABDOMEN: soft non tender no masses palpated in a seated position.  NEURO: AAOX3  Grossly non focal neuro exam. SKIN: no obvious rashes. EXTREMITIES: No edema.     LABS:   Results for WILLIAN ARCINIEGA (MRN [id number]) as of 6/13/2017 09:41   Ref. Range 6/13/2017 08:56   Sodium Latest Ref Range: 133 - 144 mmol/L 139   Potassium Latest Ref Range: 3.4 - 5.3 mmol/L 4.0   Chloride Latest Ref Range: 94 - 109 mmol/L 104   Carbon Dioxide Latest Ref Range: 20 - 32 mmol/L 24   Urea Nitrogen Latest Ref Range: 7 - 30 mg/dL 26   Creatinine Latest Ref Range: 0.66 - 1.25 mg/dL 0.88   GFR Estimate Latest Ref Range: >60 mL/min/1.7m2 85   GFR Estimate If Black Latest Ref Range: >60 mL/min/1.7m2 >90...   Calcium Latest Ref Range: 8.5 - 10.1 mg/dL 9.2   Anion Gap Latest Ref Range: 3 - 14 mmol/L 10   Albumin Latest Ref Range: 3.4 - 5.0 g/dL 3.0 (L)   Protein Total Latest Ref Range: 6.8 - 8.8 g/dL 8.6   Bilirubin Total Latest Ref Range: 0.2 - 1.3 mg/dL 0.4   Alkaline " Phosphatase Latest Ref Range: 40 - 150 U/L 77   ALT Latest Ref Range: 0 - 70 U/L 20   AST Latest Ref Range: 0 - 45 U/L 18   Glucose Latest Ref Range: 70 - 99 mg/dL 158 (H)   WBC Latest Ref Range: 4.0 - 11.0 10e9/L 4.0   Hemoglobin Latest Ref Range: 13.3 - 17.7 g/dL 9.5 (L)   Hematocrit Latest Ref Range: 40.0 - 53.0 % 30.9 (L)   Platelet Count Latest Ref Range: 150 - 450 10e9/L 134 (L)      Ref. Range 5/8/2017 10:32 5/9/2017 08:18 5/19/2017 10:15 5/25/2017 14:40   Albumin Fraction Latest Ref Range: 3.7 - 5.1 g/dL 3.6 (L)  3.4 (L) 3.1 (L)   Alpha 1 Fraction Latest Ref Range: 0.2 - 0.4 g/dL 0.5 (H)  0.4 0.4   Alpha 2 Fraction Latest Ref Range: 0.5 - 0.9 g/dL 1.1 (H)  1.0 (H) 0.9   Beta Fraction Latest Ref Range: 0.6 - 1.0 g/dL 0.7  0.7 0.6   ELP Interpretation: Unknown (Note)...  Two monoclonal pr... Two monoclonal pr...   Gamma Fraction Latest Ref Range: 0.7 - 1.6 g/dL 2.4 (H)  2.4 (H) 2.1 (H)   IGA Latest Ref Range: 70 - 380 mg/dL <7 (L) <7... (L) <7 (L) 7 (L)   IGG Latest Ref Range: 695 - 1620 mg/dL 138 (L) 120 (L) 132 (L) 109 (L)   IGM Latest Ref Range: 60 - 265 mg/dL 3410 (H) 2960 (H) 3310 (H) 2910 (H)   Immunofixation ELP Unknown (Note)...  (Note)...    Kappa Free Lt Chain Latest Ref Range: 0.33 - 1.94 mg/dL <0.30... (L)  <0.30... (L) 0.34   Kappa Lambda Ratio Latest Ref Range: 0.26 - 1.65  Unable to Calculate  Unable to Calculate 0.01 (L)   Lambda Free Lt Chain Latest Ref Range: 0.57 - 2.63 mg/dL 31.00 (H)  29.25 (H) 29.00 (H)   Monoclonal Peak Latest Ref Range: 0.0 g/dL 2.1 (H)  2.2 (H) 1.9 (H)   Viscosity Index Latest Ref Range: 1.4 - 1.8  2.3 (H)   2.1 (H)     IMPRESSION/PLAN:  71 year old male with relapsed MM after autologous transplant (1/9/2013), with recent progression on daratumumab/pom/dex, with recent hospitalization 5/7-5/16 for back pain, JOSE MARIA,and  Hypercalcemia. Started on kyprolis IV 5/11 as well as po dex. He unfortunately developed worsening AMS and was re-admitted 5/25-5/29, found to have  PNA.      MM: Previously on edgardo/pom/dex while wintering in [place], progressive disease on SPEP inpatient (M spike 0.9 --> 2.1, IgM 3410)  - Received solumedrol 100 mg IV daily 5/8-5/10. Started PO dex 40 mg daily x 3 days on 5/11 with Kyprolis 5/11 and 5/12, and 5/23, subsequent doses held for AMS  - With improvements in performance status today, will resume Kyprolis, cycle 2 day 1.    AMS: AMS started early may in setting of metabolic derangements, uremia, and possible infection. Initially resolved, then returned ~5/24. Work-up showing PNA and UCx with enterococcus and CONS.  - Appears stable today, coherent speech and answering questions appropriately, alert and oriented  - Continue Zyprexa 5 mg at bedtime  - Holding off long-acting pain meds    Heme: Cytopenias 2/2 treatment and likely MM in setting of progression.Stable today.  - Continues on Plavix for history of CVA -- although will need to monitor platelets    Renal/FEN: Creat baseline ~0.8-1.0, stable today  -Hx Hypercalcemia: s/p pamidronate x 1 on 5/8, calcium corrects to  10 today  -Hx Hyperuricemia: s/p rasburicase x 1 on 5/8  -Encouraged protein/calorie supplements    ID: PNA and positive urine cx inpatient, presently afebrile w/o localizing s/s  - Completed course of Augmentin PO BID 6/4  - Continues ppx  mg BID    Back/rib pain: Started early May. Lumbar MRI at OSH showing mild-mod central and foraminal stenoses in lumbar spine, per patient and wife, there was no MM lesion there. Rib films inpatient negative, back films stable from prior imaging.  - Continues tramadol PRN and Tylenol PRN    CV: Continue Norvasc and Zocor.   - Avoid QTc prolonging agents (history of QTc prolongation with syncopal episodes)   Deconditioning: Improving. Continue PT/OT      Yamil will follow-up with Dr. Topete next week.    Leanne Reddy PA-C

## 2017-06-13 NOTE — PATIENT INSTRUCTIONS
Contact Numbers    [facility] [place]: [phone]  [facility] Triage:  [phone]    Call triage with chills and/or temperature greater than or equal to 100.5, uncontrolled nausea/vomiting, diarrhea, constipation, dizziness, shortness of breath, chest pain, bleeding, unexplained bruising, or any new/concerning symptoms, questions/concerns.     If you are having any concerning symptoms or wish to speak to a provider before your next infusion visit, please call your care coordinator or triage to notify them so we can adequately serve you.       After Hours: [phone]    If after hours, weekends, or holidays, call main hospital  and ask for Oncology doctor on call.         June 2017 Sunday Monday Tuesday Wednesday Thursday Friday Saturday                       1     2     3       4     5     6     [facility] D[facility]    1:15 PM   (15 min.)   [facility]   [facility]     [facility] RETURN    1:25 PM   (50 min.)   Leanne Reddy PA-C   [facility] 7     8     9     10       11     12     13     [facility] DRAW    8:45 AM   (15 min.)   [facility]   [facility] Draw     [facility] RETURN    9:05 AM   (50 min.)   Leanne Reddy PA-C   [facility]     [facility] ONC INFUSION 60   10:00 AM   (60 min.)   [facility] ONCOLOGY INFUSION   [facility] 14     [facility] ONC INFUSION 60    2:00 PM   (60 min.)   [facility] INFUSION   [facility] 15     16     17       18     19     20     [facility] DRAW    1:30 PM   (15 min.)   [facility] D[facility]   [facility] Lab Draw     [facility] ONC INFUSION 60    2:00 PM   (60 min.)   [facility]COL[facility] INFUSION   [facility] 21     [facility] ONC INFUSION 60    2:00 PM   (60 min.)   [facility] ON[facility] INFUSION   [facility] 22     23     24       25     26     27     28     29     30                     July 2017 Sunday Monday Tuesday Wednesday Thursday  Friday Saturday                                 1       2     3     4     5     6     7     8       9     10     11     12     13     14     [facility] RETURN ARRHYTHMIA   12:45 PM   (30 min.)   Dmitri Banks MD   [facility] 15       16     17     18     19     20     21     22       23     24     25     26     27     28     29       30     31                                           Lab Results:  Recent Results (from the past 12 hour(s))   CBC with platelets differential    Collection Time: 06/13/17  8:56 AM   Result Value Ref Range    WBC 4.0 4.0 - 11.0 10e9/L    RBC Count 3.04 (L) 4.4 - 5.9 10e12/L    Hemoglobin 9.5 (L) 13.3 - 17.7 g/dL    Hematocrit 30.9 (L) 40.0 - 53.0 %     (H) 78 - 100 fl    MCH 31.3 26.5 - 33.0 pg    MCHC 30.7 (L) 31.5 - 36.5 g/dL    RDW 18.1 (H) 10.0 - 15.0 %    Platelet Count 134 (L) 150 - 450 10e9/L    Diff Method Automated Method     % Neutrophils 83.7 %    % Lymphocytes 6.9 %    % Monocytes 8.2 %    % Eosinophils 0.5 %    % Basophils 0.2 %    % Immature Granulocytes 0.5 %    Nucleated RBCs 0 0 /100    Absolute Neutrophil 3.4 1.6 - 8.3 10e9/L    Absolute Lymphocytes 0.3 (L) 0.8 - 5.3 10e9/L    Absolute Monocytes 0.3 0.0 - 1.3 10e9/L    Absolute Eosinophils 0.0 0.0 - 0.7 10e9/L    Absolute Basophils 0.0 0.0 - 0.2 10e9/L    Abs Immature Granulocytes 0.0 0 - 0.4 10e9/L    Absolute Nucleated RBC 0.0     Anisocytosis Slight    Comprehensive metabolic panel    Collection Time: 06/13/17  8:56 AM   Result Value Ref Range    Sodium 139 133 - 144 mmol/L    Potassium 4.0 3.4 - 5.3 mmol/L    Chloride 104 94 - 109 mmol/L    Carbon Dioxide 24 20 - 32 mmol/L    Anion Gap 10 3 - 14 mmol/L    Glucose 158 (H) 70 - 99 mg/dL    Urea Nitrogen 26 7 - 30 mg/dL    Creatinine 0.88 0.66 - 1.25 mg/dL    GFR Estimate 85 >60 mL/min/1.7m2    GFR Estimate If Black >90   GFR Calc   >60 mL/min/1.7m2    Calcium 9.2 8.5 - 10.1 mg/dL    Bilirubin Total 0.4 0.2 - 1.3 mg/dL    Albumin 3.0  (L) 3.4 - 5.0 g/dL    Protein Total 8.6 6.8 - 8.8 g/dL    Alkaline Phosphatase 77 40 - 150 U/L    ALT 20 0 - 70 U/L    AST 18 0 - 45 U/L

## 2017-06-13 NOTE — PROGRESS NOTES
HEMATOLOGY/ONCOLOGY PROGRESS NOTE  Jun 13, 2017    REASON FOR VISIT: add-on fatigue, confusion     Diagnosis: 71 year old gentleman with IgM lambda multiple myeloma originally diagnosed in 01/2012 as stage I, standard risk disease.   Treatment: Revlimid plus dexamethasone for 4-5 cycles but plateaued by late 03/2012.   - Velcade was added and he received another 2-3 cycles, achieving a good partial remission.      Transplant: Single auto after melphalan 200 mg/m2 preparative regimen on 01/09/2013  - Post-transplant course: unremarkable except for some mild steroid-induced hyperglycemia, gastritis, nausea and vomiting.      Maintenance: Lenalidomide at day-100 then developed a maculopapular rash. Lenalidomide was held and then we re-challenged him after about a month to 2 months at a lower dose (5 mg daily); rash returned.   - was started on maintenance Velcade, every other week through July 2014, when he was noted with abnormalities on myeloma studies drawn there. Noted with prostate cancer dx at about the time of relapse (see below).     Relapse: noted with return on M-spike in blood/urine with marrow involvement but negative PET.   - Started on retreatment with RVD on 8/27/14 with Revlimid at 15 mg 14 days of 21 days, dexamethasone 40 mg weekly and velcade weekly.Completed at total of 5 cycles without complication by 12/2014.   - Started Cycle 6 on inc'd Rev dosing of 20mg daily x 2 weeks on 12/11/14 which was complicated by pneumonia.  - Adm 12/21-1/10 for human metapneumovirus pneumonia complicated by anorexia, HTN, depression, anorexia with significant weight loss.   - Restarted Ernesto/Dex only on 2/4/15 with good tolerance but with thrombocytopenia.   - Bendamustine added to Velcade/dexamethasone on 5/21/15 due to disease progression  - Velcade discontinued on 7/9/2015 due to side effects (orthostasis)  - Schedule changed to bendamustine 80 mg/m2 days 1 and 2 on 28-day cycle  - Cycle 1 tolerated poorly due to  lightheadedness and weakness  - Cycle 2 dose reduced to 60 mg/m2 and pre-meds adjusted  - Cycle 5 received on 9/17/15.  - 10/1/2015 - increasing IgM, M-spike - started Pomalidomide 4mg/day (21 days out of 28 days) and weekly Decadron 20mg on Oct 1st, 2015.    - Carfilzomib added on 10/22/2015 making this CPD.  - C3-C6  received in [place]    - Returned to [facility] and resumed CPD here    - Adm: 4/12-4/14 with fever, confusion, neutropenia. Noted on MRI to have acute/subacute CVA and subacute/chronic CVA; started on Plavix, given brief course of Abx and GCSF prior to d/c.     - Continued on Carfilzomib and dexamethasone alone  - Pomalyst added back on 7/13/2016 for rising FLC  - Start daratumumab 11/10/16. Changed to every other week after 4 weekly treatments d/t profound fatigue and malaise.   - Adm 5/7-5/16 due to AMS, hypercalcemia, hyperuricemia, possible PNA, back pain, and JOSE MARIA. Started Kyprolis while inpatient.  - Re-admitted 5/25-/529 with recurrent AMS, found to have concomitant PNA    Current Treatment: Kyprolis IV + dexamethasone 20 mg days of Kyprolis. Day 15 given 5/24/17      INTERVAL HISTORY:    Yamil presents with his wife for follow-up today.    He is now at home, he was discharged from [facility]CU last Thursday. They have been doing well at home for the most part. Yamil is using the walker sometimes, but also walking independently when he can. He feels he is getting better. There was some miscommunication with the PT/OT agency and they haven't come out yet, but are scheduled to do so. Casey accidentally forgot the AM dose of Zyprexa and this helped his sleepiness, and he suffered no AMS with the missed dose. He has just been taking it nightly. Back pain is stable, trying to do scheduled Tylenol and Tramadol, which seems to be working. No leg weakness or paresthesias. He has no new symptoms otherwise. He is feeling ready to resume Kyprolis now. He hasn't had any dizziness, lightheadedness, fevers, chills, headaches,  "falls, vision changes, nausea, vomiting, GERD, abdominal pain, bowel changes, bleeding, or swelling. Remainder of ROS otherwise negative.    PHYSICAL EXAMINATION  /51  Pulse 104  Temp 98.5  F (36.9  C) (Oral)  Resp 14  Ht 1.626 m (5' 4.02\")  Wt 57.6 kg (127 lb)  SpO2 98%  BMI 21.79 kg/m2    Wt Readings from Last 10 Encounters:   06/13/17 57.6 kg (127 lb)   06/06/17 57.2 kg (126 lb)   05/29/17 56.7 kg (124 lb 14.4 oz)   05/23/17 59.2 kg (130 lb 8 oz)   05/19/17 59.1 kg (130 lb 6.4 oz)   05/16/17 59.1 kg (130 lb 3.2 oz)   01/26/17 64.6 kg (142 lb 6.4 oz)   01/12/17 64.7 kg (142 lb 10.2 oz)   01/06/17 65.1 kg (143 lb 8 oz)   01/05/17 64.5 kg (142 lb 3.2 oz)   General: AxOx4. Able to ambulate independently, albeit slow and cautiously. Using walker for longer distances. No acute distress. HEENT: PERRL, no palor or icterus. CVS: RRR. CHEST: CTAB, normal work of breathing ABDOMEN: soft non tender no masses palpated in a seated position.  NEURO: AAOX3  Grossly non focal neuro exam. SKIN: no obvious rashes. EXTREMITIES: No edema.     LABS:   Results for WILLIAN ARCINIEGA (MRN [id number]) as of 6/13/2017 09:41   Ref. Range 6/13/2017 08:56   Sodium Latest Ref Range: 133 - 144 mmol/L 139   Potassium Latest Ref Range: 3.4 - 5.3 mmol/L 4.0   Chloride Latest Ref Range: 94 - 109 mmol/L 104   Carbon Dioxide Latest Ref Range: 20 - 32 mmol/L 24   Urea Nitrogen Latest Ref Range: 7 - 30 mg/dL 26   Creatinine Latest Ref Range: 0.66 - 1.25 mg/dL 0.88   GFR Estimate Latest Ref Range: >60 mL/min/1.7m2 85   GFR Estimate If Black Latest Ref Range: >60 mL/min/1.7m2 >90...   Calcium Latest Ref Range: 8.5 - 10.1 mg/dL 9.2   Anion Gap Latest Ref Range: 3 - 14 mmol/L 10   Albumin Latest Ref Range: 3.4 - 5.0 g/dL 3.0 (L)   Protein Total Latest Ref Range: 6.8 - 8.8 g/dL 8.6   Bilirubin Total Latest Ref Range: 0.2 - 1.3 mg/dL 0.4   Alkaline Phosphatase Latest Ref Range: 40 - 150 U/L 77   ALT Latest Ref Range: 0 - 70 U/L 20   AST " Latest Ref Range: 0 - 45 U/L 18   Glucose Latest Ref Range: 70 - 99 mg/dL 158 (H)   WBC Latest Ref Range: 4.0 - 11.0 10e9/L 4.0   Hemoglobin Latest Ref Range: 13.3 - 17.7 g/dL 9.5 (L)   Hematocrit Latest Ref Range: 40.0 - 53.0 % 30.9 (L)   Platelet Count Latest Ref Range: 150 - 450 10e9/L 134 (L)      Ref. Range 5/8/2017 10:32 5/9/2017 08:18 5/19/2017 10:15 5/25/2017 14:40   Albumin Fraction Latest Ref Range: 3.7 - 5.1 g/dL 3.6 (L)  3.4 (L) 3.1 (L)   Alpha 1 Fraction Latest Ref Range: 0.2 - 0.4 g/dL 0.5 (H)  0.4 0.4   Alpha 2 Fraction Latest Ref Range: 0.5 - 0.9 g/dL 1.1 (H)  1.0 (H) 0.9   Beta Fraction Latest Ref Range: 0.6 - 1.0 g/dL 0.7  0.7 0.6   ELP Interpretation: Unknown (Note)...  Two monoclonal pr... Two monoclonal pr...   Gamma Fraction Latest Ref Range: 0.7 - 1.6 g/dL 2.4 (H)  2.4 (H) 2.1 (H)   IGA Latest Ref Range: 70 - 380 mg/dL <7 (L) <7... (L) <7 (L) 7 (L)   IGG Latest Ref Range: 695 - 1620 mg/dL 138 (L) 120 (L) 132 (L) 109 (L)   IGM Latest Ref Range: 60 - 265 mg/dL 3410 (H) 2960 (H) 3310 (H) 2910 (H)   Immunofixation ELP Unknown (Note)...  (Note)...    Kappa Free Lt Chain Latest Ref Range: 0.33 - 1.94 mg/dL <0.30... (L)  <0.30... (L) 0.34   Kappa Lambda Ratio Latest Ref Range: 0.26 - 1.65  Unable to Calculate  Unable to Calculate 0.01 (L)   Lambda Free Lt Chain Latest Ref Range: 0.57 - 2.63 mg/dL 31.00 (H)  29.25 (H) 29.00 (H)   Monoclonal Peak Latest Ref Range: 0.0 g/dL 2.1 (H)  2.2 (H) 1.9 (H)   Viscosity Index Latest Ref Range: 1.4 - 1.8  2.3 (H)   2.1 (H)     IMPRESSION/PLAN:  71 year old male with relapsed MM after autologous transplant (1/9/2013), with recent progression on daratumumab/pom/dex, with recent hospitalization 5/7-5/16 for back pain, JOSE MARIA,and  Hypercalcemia. Started on kyprolis IV 5/11 as well as po dex. He unfortunately developed worsening AMS and was re-admitted 5/25-5/29, found to have PNA.      MM: Previously on edgardo/pom/dex while wintering in [place], progressive disease on SPEP  inpatient (M spike 0.9 --> 2.1, IgM 3410)  - Received solumedrol 100 mg IV daily 5/8-5/10. Started PO dex 40 mg daily x 3 days on 5/11 with Kyprolis 5/11 and 5/12, and 5/23, subsequent doses held for AMS  - With improvements in performance status today, will resume Kyprolis, cycle 2 day 1.    AMS: AMS started early may in setting of metabolic derangements, uremia, and possible infection. Initially resolved, then returned ~5/24. Work-up showing PNA and UCx with enterococcus and CONS.  - Appears stable today, coherent speech and answering questions appropriately, alert and oriented  - Continue Zyprexa 5 mg at bedtime  - Holding off long-acting pain meds    Heme: Cytopenias 2/2 treatment and likely MM in setting of progression.Stable today.  - Continues on Plavix for history of CVA -- although will need to monitor platelets    Renal/FEN: Creat baseline ~0.8-1.0, stable today  -Hx Hypercalcemia: s/p pamidronate x 1 on 5/8, calcium corrects to  10 today  -Hx Hyperuricemia: s/p rasburicase x 1 on 5/8  -Encouraged protein/calorie supplements    ID: PNA and positive urine cx inpatient, presently afebrile w/o localizing s/s  - Completed course of Augmentin PO BID 6/4  - Continues ppx  mg BID    Back/rib pain: Started early May. Lumbar MRI at OSH showing mild-mod central and foraminal stenoses in lumbar spine, per patient and wife, there was no MM lesion there. Rib films inpatient negative, back films stable from prior imaging.  - Continues tramadol PRN and Tylenol PRN    CV: Continue Norvasc and Zocor.   - Avoid QTc prolonging agents (history of QTc prolongation with syncopal episodes)   Deconditioning: Improving. Continue PT/OT      Yamil will follow-up with Dr. Topete next week.    Leanne Reddy PA-C

## 2017-06-13 NOTE — NURSING NOTE
"Oncology Rooming Note    June 13, 2017 9:18 AM   Anthony Carbone is a 71 year old male who presents for:    Chief Complaint   Patient presents with     Port Draw     Labs Drawn      Oncology Clinic Visit     Multiple Myeloma     Initial Vitals: /51  Pulse 104  Temp 98.5  F (36.9  C) (Oral)  Resp 14  Ht 1.626 m (5' 4.02\")  Wt 57.6 kg (127 lb)  SpO2 98%  BMI 21.79 kg/m2 Estimated body mass index is 21.79 kg/(m^2) as calculated from the following:    Height as of this encounter: 1.626 m (5' 4.02\").    Weight as of this encounter: 57.6 kg (127 lb). Body surface area is 1.61 meters squared.  Moderate Pain (5) Comment: Lower back   No LMP for male patient.  Allergies reviewed: Yes  Medications reviewed: Yes    Medications: Medication refills not needed today.  Pharmacy name entered into [facility]:    [id number] - [place] [id number] AT [facility] & [facility] [address]  [employer] [place], [place] - [address] [facility]  [facility] [address] [place], [place] - [address] AT [facility] [address]    Clinical concerns: No new concerns  Provider was notified.    7 minutes for nursing intake (face to face time)     Marleni Hernandez MA              "

## 2017-06-13 NOTE — MR AVS SNAPSHOT
After Visit Summary   6/13/2017    Anthony Carbone    MRN: [id number]           Patient Information     Date Of Birth          1945        Visit Information        Provider Department      6/13/2017 9:20 AM Leanne Reddy PA-C [facility]        Today's Diagnoses     Multiple myeloma in relapse (H)    -  1    Acute bilateral low back pain without sciatica           Follow-ups after your visit        Your next 10 appointments already scheduled     Jun 13, 2017 10:00 AM CDT   Infusion 60 with UC ONCOLOGY INFUSION, UC 14 ATC   [facility] [facility]    [address] [place] [address]   [phone]            Jun 14, 2017  2:00 PM CDT   Infusion 60 with UC ONCOLOGY INFUSION, UC 15 ATC   [facility] [facility]    [address] [place] [address]   [phone]            Jun 20, 2017  1:30 PM CDT   [facility] Lab Draw with UC [facility] LAB DRAW   [facility] ([place] [facility])    [address] [place] [address]   [phone]            Jun 20, 2017  2:00 PM CDT   Infusion 60 with UC ONCOLOGY INFUSION, UC 11 ATC   [facility] [facility]    [address] [place] [address]   [phone]            Jun 21, 2017  2:00 PM CDT   Infusion 60 with UC ONCOLOGY INFUSION, UC 15 ATC   [facility] [facility]    [address] [place] [address]   [phone]            Jul 14, 2017  1:00 PM CDT   (Arrive by 12:45 PM)   RETURN ARRHYTHMIA with Dmitri Banks MD   [facility] ([place] [facility])    [address] [place] [id number] [phone]              Who to contact     If  "you have questions or need follow up information about today's clinic visit or your schedule please contact [facility] directly at [phone].  Normal or non-critical lab and imaging results will be communicated to you by MyChart, letter or phone within 4 business days after the clinic has received the results. If you do not hear from us within 7 days, please contact the clinic through [employer] or phone. If you have a critical or abnormal lab result, we will notify you by phone as soon as possible.  Submit refill requests through [employer] or call your pharmacy and they will forward the refill request to us. Please allow 3 business days for your refill to be completed.          Additional Information About Your Visit        [employer] Information     [employer] gives you secure access to your electronic health record. If you see a primary care provider, you can also send messages to your care team and make appointments. If you have questions, please call your primary care clinic.  If you do not have a primary care provider, please call [phone] and they will assist you.        Care EveryWhere ID     This is your Care EveryWhere ID. This could be used by other organizations to access your [facility] medical records  [id number]        Your Vitals Were     Pulse Temperature Respirations Height Pulse Oximetry BMI (Body Mass Index)    104 98.5  F (36.9  C) (Oral) 14 1.626 m (5' 4.02\") 98% 21.79 kg/m2       Blood Pressure from Last 3 Encounters:   06/13/17 125/51   06/06/17 119/90   05/29/17 143/84    Weight from Last 3 Encounters:   06/13/17 57.6 kg (127 lb)   06/06/17 57.2 kg (126 lb)   05/29/17 56.7 kg (124 lb 14.4 oz)              Today, you had the following     No orders found for display       Primary Care Provider Office Phone # Fax #    Sanket Burciaga [phone]       [facility] [address] [facility] [address]        Thank you!     Thank you for choosing " [facility]  for your care. Our goal is always to provide you with excellent care. Hearing back from our patients is one way we can continue to improve our services. Please take a few minutes to complete the written survey that you may receive in the mail after your visit with us. Thank you!             Your Updated Medication List - Protect others around you: Learn how to safely use, store and throw away your medicines at www.disposemymeds.org.          This list is accurate as of: 6/13/17  9:58 AM.  Always use your most recent med list.                   Brand Name Dispense Instructions for use    acetaminophen 500 MG tablet    TYLENOL     Take 2 tablets (1,000 mg) by mouth every 6 hours as needed for mild pain       acyclovir 400 MG tablet    ZOVIRAX    60 tablet    Take 1 tablet (400 mg) by mouth 2 times daily       amLODIPine 5 MG tablet    NORVASC    30 tablet    Take one tablet at bedtime.       artificial saliva Liqd liquid      Swish and spit 3-5 mLs in mouth 4 times daily as needed for dry mouth       clopidogrel 75 MG tablet    PLAVIX    30 tablet    Take 1 tablet (75 mg) by mouth daily       esomeprazole 40 MG CR capsule    nexIUM    30 capsule    Take 1 capsule (40 mg) by mouth every morning (before breakfast)       HYDROmorphone 2 MG tablet    DILAUDID    30 tablet    Take 0.5 tablets (1 mg) by mouth every 3 hours as needed for moderate to severe pain       lidocaine 5 % Patch    LIDODERM    30 patch    Place 3 patches onto the skin every 24 hours       LORazepam 0.5 MG tablet    ATIVAN    30 tablet    Take 1 tablet (0.5 mg) by mouth every 6 hours as needed for nausea or anxiety.       midodrine 2.5 MG tablet    PROAMATINE    90 tablet    Take 1 tablet (2.5 mg) by mouth 3 times daily . HOLD due to elevated blood pressures during admission. Continue to hold until further advised by outpatient clinic team or if develops recurrent orthostatic hypotension.       OLANZapine 5 MG tablet     zyPREXA    60 tablet    Take 1 tablet (5 mg) by mouth 2 times daily       ondansetron 8 MG ODT tab    ZOFRAN-ODT    30 tablet    Take 1 tablet (8 mg) by mouth every 8 hours as needed for nausea       potassium chloride SA 20 MEQ CR tablet    K-DUR/KLOR-CON M    60 tablet    TAKE 1 TABLET BY MOUTH TWICE DAILY.       simethicone 80 MG chewable tablet    MYLICON    180 tablet    Take 2 tablets (160 mg) by mouth 4 times daily as needed for cramping or other (gas pain)       simvastatin 5 MG tablet    ZOCOR    30 tablet    Take 4 tablets (20 mg) by mouth daily       sucralfate 1 GM tablet    CARAFATE    120 tablet    Take 1 tablet (1 g) by mouth 4 times daily as needed       traMADol 50 MG tablet    ULTRAM    28 tablet    Take 0.5-1 tablets (25-50 mg) by mouth every 6 hours as needed for moderate pain . Recommend trying after Tylenol and before Dilaudid.

## 2017-06-13 NOTE — PROGRESS NOTES
Infusion Nursing Note:  Anthony Carbone presents today for Cycle 2, day 1 Kyprolis.    Patient seen by provider today: Yes: PREM Herrera    Note: Patient presents to clinic today feeling well with no questions.      Intravenous Access:  Implanted Port.    Treatment Conditions:  Lab Results   Component Value Date    HGB 9.5 06/13/2017     Lab Results   Component Value Date    WBC 4.0 06/13/2017      Lab Results   Component Value Date    ANEU 3.4 06/13/2017     Lab Results   Component Value Date     06/13/2017      Lab Results   Component Value Date     06/13/2017                   Lab Results   Component Value Date    POTASSIUM 4.0 06/13/2017           Lab Results   Component Value Date    MAG 2.2 05/25/2017            Lab Results   Component Value Date    CR 0.88 06/13/2017                   Lab Results   Component Value Date    JAZMIN 9.2 06/13/2017                Lab Results   Component Value Date    BILITOTAL 0.4 06/13/2017           Lab Results   Component Value Date    ALBUMIN 3.0 06/13/2017                    Lab Results   Component Value Date    ALT 20 06/13/2017           Lab Results   Component Value Date    AST 18 06/13/2017     Results reviewed, labs MET treatment parameters, ok to proceed with treatment.    Post Infusion Assessment:  Patient tolerated infusion without incident.  Blood return noted pre and post infusion.  Site patent and intact, free from redness, edema or discomfort.  No evidence of extravasations.  Access discontinued per patient preference.    Kyprolis end time: 1140    Discharge Plan:   Patient declined prescription refills.  Discharge instructions reviewed with: Patient.  Patient and/or family verbalized understanding of discharge instructions and all questions answered.  Copy of AVS reviewed with patient and/or family.  Patient will return 6/14/2017 for next appointment.  Departure Mode: Ambulatory.    Nelsy Nunez RN

## 2017-06-13 NOTE — MR AVS SNAPSHOT
After Visit Summary   6/13/2017    Anthony Carbone    MRN: [id number]           Patient Information     Date Of Birth          1945        Visit Information        Provider Department      6/13/2017 10:00 AM  14 ATC;  ONCOLOGY INFUSION [facility]        Today's Diagnoses     Multiple myeloma in relapse (H)    -  1      Care Instructions    Contact Numbers    [facility] Main Line: [phone]  [facility] Triage:  [phone]    Call triage with chills and/or temperature greater than or equal to 100.5, uncontrolled nausea/vomiting, diarrhea, constipation, dizziness, shortness of breath, chest pain, bleeding, unexplained bruising, or any new/concerning symptoms, questions/concerns.     If you are having any concerning symptoms or wish to speak to a provider before your next infusion visit, please call your care coordinator or triage to notify them so we can adequately serve you.       After Hours: [phone]    If after hours, weekends, or holidays, call main hospital  and ask for Oncology doctor on call.         June 2017 Sunday Monday Tuesday Wednesday Thursday Friday Saturday                       1     2     3       4     5     6     [facility]    1:15 PM   (15 min.)    [facility] DRAW   [facility] Lab Draw     [facility] RETURN    1:25 PM   (50 min.)   Leanne Reddy PA-C   [facility] 7     8     9     10       11     12     13     [facility]    8:45 AM   (15 min.)    [facility] DRAW   [facility] Lab Draw     [facility] RETURN    9:05 AM   (50 min.)   Leanne Reddy PA-C   [facility]     [facility] ONC INFUSION 60   10:00 AM   (60 min.)    ONCOLOGY INFUSION   [facility] 14     [facility] ONC INFUSION 60    2:00 PM   (60 min.)    ONCOLOGY INFUSION   [facility] 15     16     17       18     19     20     [facility]    1:30 PM   (15 min.)    [facility] LAB DRAW   [facility] Lab Draw     [facility] ONC INFUSION 60    2:00 PM   (60 min.)    ONCOLOGY INFUSION   [facility] 21     [facility] ONC INFUSION 60    2:00 PM   (60 min.)    ONCOLOGY INFUSION   [facility] 22     23     24       25     26     27     28     29     30 July 2017 Sunday Monday Tuesday Wednesday Thursday Friday Saturday                                 1       2     3     4     5     6     7     8       9     10     11     12     13     14     [facility] RETURN ARRHYTHMIA   12:45 PM   (30 min.)   Dmitri Banks MD   [facility] 15       16     17     18     19     20     21     22       23     24     25     26     27     28     29       30     31                                           Lab Results:  Recent Results (from the past 12 hour(s))   CBC with platelets differential    Collection Time: 06/13/17  8:56 AM   Result Value Ref Range    WBC 4.0 4.0 - 11.0 10e9/L    RBC Count 3.04 (L) 4.4 - 5.9 10e12/L    Hemoglobin 9.5 (L) 13.3 - 17.7 g/dL    Hematocrit 30.9 (L) 40.0 - 53.0 %     (H) 78 - 100 fl    MCH 31.3 26.5 - 33.0 pg    MCHC 30.7 (L) 31.5 - 36.5 g/dL    RDW 18.1 (H) 10.0 - 15.0 %    Platelet Count 134 (L) 150 - 450 10e9/L    Diff Method Automated Method     % Neutrophils 83.7 %    % Lymphocytes 6.9 %    % Monocytes 8.2 %    % Eosinophils 0.5 %    % Basophils 0.2 %    % Immature Granulocytes 0.5 %    Nucleated RBCs 0 0 /100    Absolute Neutrophil 3.4 1.6 - 8.3 10e9/L    Absolute Lymphocytes 0.3 (L) 0.8 - 5.3 10e9/L    Absolute Monocytes 0.3 0.0 - 1.3 10e9/L    Absolute Eosinophils 0.0 0.0 - 0.7 10e9/L    Absolute Basophils 0.0 0.0 - 0.2 10e9/L    Abs Immature Granulocytes 0.0 0 - 0.4 10e9/L    Absolute Nucleated RBC 0.0     Anisocytosis Slight    Comprehensive metabolic panel    Collection Time: 06/13/17  8:56 AM   Result Value Ref Range    Sodium 139 133 - 144 mmol/L    Potassium 4.0 3.4 - 5.3 mmol/L     Chloride 104 94 - 109 mmol/L    Carbon Dioxide 24 20 - 32 mmol/L    Anion Gap 10 3 - 14 mmol/L    Glucose 158 (H) 70 - 99 mg/dL    Urea Nitrogen 26 7 - 30 mg/dL    Creatinine 0.88 0.66 - 1.25 mg/dL    GFR Estimate 85 >60 mL/min/1.7m2    GFR Estimate If Black >90   GFR Calc   >60 mL/min/1.7m2    Calcium 9.2 8.5 - 10.1 mg/dL    Bilirubin Total 0.4 0.2 - 1.3 mg/dL    Albumin 3.0 (L) 3.4 - 5.0 g/dL    Protein Total 8.6 6.8 - 8.8 g/dL    Alkaline Phosphatase 77 40 - 150 U/L    ALT 20 0 - 70 U/L    AST 18 0 - 45 U/L               Follow-ups after your visit        Your next 10 appointments already scheduled     Jun 14, 2017  2:00 PM CDT   Infusion 60 with UC ONCOLOGY INFUSION, UC 15 ATC   [facility] ([facility])    [address] [place] [address]  2nd [facility] [id number] [phone]            Jun 20, 2017  1:30 PM CDT   [facility] Draw with [facility] DRAW   [facility] Lab Draw ([facility])    [address] [place] [address]  2nd [facility] [id number] [phone]            Jun 20, 2017  2:00 PM CDT   Infusion 60 with UC ONCOLOGY INFUSION, UC 11 ATC   [facility] ([facility])    [address]  2nd [facility] [id number] [phone]            Jun 21, 2017  2:00 PM CDT   Infusion 60 with UC ONCOLOGY INFUSION, UC 15 ATC   [facility] ([facility])    [address]  2nd [facility] [id number] [phone]            Jul 14, 2017  1:00 PM CDT   (Arrive by 12:45 PM)   RETURN ARRHYTHMIA with Dmitri Banks MD   [facility] ([facility])    [address] [place] [address]  3rd [facility] [id number] [phone]              Who to contact     If you have questions or need follow up information about today's clinic visit or your schedule please contact [facility] directly at [phone].  Normal or non-critical lab and imaging results will be communicated to you by MyChart, letter or phone within 4 business days after the clinic has received the results. If you do not hear from us within 7 days, please contact the clinic through [employer] or phone. If you have a critical or abnormal lab result, we will notify you by phone as soon as possible.  Submit refill requests through [employer] or call your pharmacy and they will forward the refill request to us. Please allow 3 business days for your refill to be completed.          Additional Information About Your Visit        [employer] Information     [employer] gives you secure access to your electronic health record. If you see a primary care provider, you can also send messages to your care team and make appointments. If you have questions, please call your primary care clinic.  If you do not have a primary care provider, please call [phone] and they will assist you.        Care EveryWhere ID     This is your Care EveryWhere ID. This could be used by other organizations to access your [facility] medical records  [id number]         Blood Pressure from Last 3 Encounters:   06/13/17 125/51   06/06/17 119/90   05/29/17 143/84    Weight from Last 3 Encounters:   06/13/17 57.6 kg (127 lb)   06/06/17 57.2 kg (126 lb)   05/29/17 56.7 kg (124 lb 14.4 oz)              We Performed the Following     CBC with platelets differential     Comprehensive metabolic panel        Primary Care Provider Office Phone # Fax #    Sanket Burciaga [phone]       [facility] [address] [facility] [facility] [address]        Thank you!     Thank you for choosing [facility]  for your care. Our goal is always to provide you with excellent care. Hearing back from our patients is one way we can continue to improve our services. Please take a few minutes to complete the written survey that you  may receive in the mail after your visit with us. Thank you!             Your Updated Medication List - Protect others around you: Learn how to safely use, store and throw away your medicines at www.disposemymeds.org.          This list is accurate as of: 6/13/17 10:49 AM.  Always use your most recent med list.                   Brand Name Dispense Instructions for use    acetaminophen 500 MG tablet    TYLENOL     Take 2 tablets (1,000 mg) by mouth every 6 hours as needed for mild pain       acyclovir 400 MG tablet    ZOVIRAX    60 tablet    Take 1 tablet (400 mg) by mouth 2 times daily       amLODIPine 5 MG tablet    NORVASC    30 tablet    Take one tablet at bedtime.       artificial saliva Liqd liquid      Swish and spit 3-5 mLs in mouth 4 times daily as needed for dry mouth       clopidogrel 75 MG tablet    PLAVIX    30 tablet    Take 1 tablet (75 mg) by mouth daily       esomeprazole 40 MG CR capsule    nexIUM    30 capsule    Take 1 capsule (40 mg) by mouth every morning (before breakfast)       HYDROmorphone 2 MG tablet    DILAUDID    30 tablet    Take 0.5 tablets (1 mg) by mouth every 3 hours as needed for moderate to severe pain       lidocaine 5 % Patch    LIDODERM    30 patch    Place 3 patches onto the skin every 24 hours       LORazepam 0.5 MG tablet    ATIVAN    30 tablet    Take 1 tablet (0.5 mg) by mouth every 6 hours as needed for nausea or anxiety.       midodrine 2.5 MG tablet    PROAMATINE    90 tablet    Take 1 tablet (2.5 mg) by mouth 3 times daily . HOLD due to elevated blood pressures during admission. Continue to hold until further advised by outpatient clinic team or if develops recurrent orthostatic hypotension.       OLANZapine 5 MG tablet    zyPREXA    60 tablet    Take 1 tablet (5 mg) by mouth 2 times daily       ondansetron 8 MG ODT tab    ZOFRAN-ODT    30 tablet    Take 1 tablet (8 mg) by mouth every 8 hours as needed for nausea       potassium chloride SA 20 MEQ CR tablet     K-DUR/KLOR-CON M    60 tablet    TAKE 1 TABLET BY MOUTH TWICE DAILY.       simethicone 80 MG chewable tablet    MYLICON    180 tablet    Take 2 tablets (160 mg) by mouth 4 times daily as needed for cramping or other (gas pain)       simvastatin 5 MG tablet    ZOCOR    30 tablet    Take 4 tablets (20 mg) by mouth daily       sucralfate 1 GM tablet    CARAFATE    120 tablet    Take 1 tablet (1 g) by mouth 4 times daily as needed       traMADol 50 MG tablet    ULTRAM    28 tablet    Take 0.5-1 tablets (25-50 mg) by mouth every 6 hours as needed for moderate pain . Recommend trying after Tylenol and before Dilaudid.

## 2017-06-14 ENCOUNTER — INFUSION THERAPY VISIT (OUTPATIENT)
Dept: ONCOLOGY | Facility: CLINIC | Age: 72
End: 2017-06-14
Attending: INTERNAL MEDICINE
Payer: MEDICARE

## 2017-06-14 VITALS
BODY MASS INDEX: 21.89 KG/M2 | HEART RATE: 87 BPM | DIASTOLIC BLOOD PRESSURE: 88 MMHG | WEIGHT: 127.6 LBS | RESPIRATION RATE: 18 BRPM | TEMPERATURE: 97.5 F | SYSTOLIC BLOOD PRESSURE: 137 MMHG | OXYGEN SATURATION: 96 %

## 2017-06-14 DIAGNOSIS — C90.02 MULTIPLE MYELOMA IN RELAPSE (H): Primary | ICD-10-CM

## 2017-06-14 PROCEDURE — 25000128 H RX IP 250 OP 636: Mod: ZF | Performed by: PHYSICIAN ASSISTANT

## 2017-06-14 PROCEDURE — 96413 CHEMO IV INFUSION 1 HR: CPT

## 2017-06-14 PROCEDURE — 96367 TX/PROPH/DG ADDL SEQ IV INF: CPT

## 2017-06-14 RX ORDER — HEPARIN SODIUM (PORCINE) LOCK FLUSH IV SOLN 100 UNIT/ML 100 UNIT/ML
5 SOLUTION INTRAVENOUS EVERY 8 HOURS
Status: DISCONTINUED | OUTPATIENT
Start: 2017-06-14 | End: 2017-06-14 | Stop reason: HOSPADM

## 2017-06-14 RX ADMIN — SODIUM CHLORIDE, PRESERVATIVE FREE 5 ML: 5 INJECTION INTRAVENOUS at 15:46

## 2017-06-14 RX ADMIN — SODIUM CHLORIDE 250 ML: 9 INJECTION, SOLUTION INTRAVENOUS at 14:28

## 2017-06-14 RX ADMIN — DEXAMETHASONE SODIUM PHOSPHATE 4 MG: 10 INJECTION, SOLUTION INTRAMUSCULAR; INTRAVENOUS at 14:28

## 2017-06-14 RX ADMIN — CARFILZOMIB 44 MG: 60 INJECTION, POWDER, LYOPHILIZED, FOR SOLUTION INTRAVENOUS at 14:51

## 2017-06-14 NOTE — PATIENT INSTRUCTIONS
Contact Numbers    [facility] Main Line: [phone]  [facility] Triage:  [phone]    Call triage with chills and/or temperature greater than or equal to 100.5, uncontrolled nausea/vomiting, diarrhea, constipation, dizziness, shortness of breath, chest pain, bleeding, unexplained bruising, or any new/concerning symptoms, questions/concerns.     If you are having any concerning symptoms or wish to speak to a provider before your next infusion visit, please call your care coordinator or triage to notify them so we can adequately serve you.       After Hours: [phone]    If after hours, weekends, or holidays, call main hospital  and ask for Oncology doctor on call.         June 2017 Sunday Monday Tuesday Wednesday Thursday Friday Saturday                       1     2     3       4     5     6     [facility]    1:15 PM   (15 min.)   [facility]   [facility] Lab Draw     [facility] RETURN    1:25 PM   (50 min.)   Leanne Reddy PA-C   [facility] 7     8     9     10       11     12     13     [facility] DRAW    8:45 AM   (15 min.)   [facility] DRAW   [facility] Draw     [facility] RETURN    9:05 AM   (50 min.)   Leanne Reddy PA-C   [facility]     [facility] ONC INFUSION 60   10:00 AM   (60 min.)    ONCOLOGY INFUSION   [facility] 14     [facility] ONC INFUSION 60    2:00 PM   (60 min.)    ONCOLOGY INFUSION   [facility] 15     16     17       18     19     20     [facility] DRAW    1:30 PM   (15 min.)   [facility] DRAW   [facility] Lab Draw     [facility] ONC INFUSION 60    2:00 PM   (60 min.)   UC ONCOLOGY INFUSION   [facility] 21     [facility] ONC INFUSION 60    2:00 PM   (60 min.)    ONCOLOGY INFUSION   [facility]     [facility] ONC RETURN    4:00 PM   (30 min.)   Kamari Topete MD   [facility] 22     23     24       25      26     27     [facility] DRAW    3:45 PM   (15 min.)   [facility] DRAW   [facility] Draw     [facility] RETURN    3:55 PM   (50 min.)   Leanne Reddy PA-C   [facility]     UMP ONC INFUSION 60    4:30 PM   (60 min.)    ONCOLOGY INFUSION   [facility] 28     [facility] DRAW    3:30 PM   (15 min.)   [facility] DRAW   [facility] Draw     [facility] ONC INFUSION 60    4:00 PM   (60 min.)    ONCOLOGY INFUSION   [facility] 29 30 July 2017 Sunday Monday Tuesday Wednesday Thursday Friday Saturday                                 1       2     3     4     5     6     7     8       9     10     11     [facility] D[facility]    2:45 PM   (15 min.)    [facility] DRAW   [facility] Draw     [facility] RETURN    3:05 PM   (50 min.)   Leanne Reddy PA-C   [facility]     [facility] ONC INFUSION 60    4:00 PM   (60 min.)   UC ONCOLOGY INFUSION   [facility] 12     [facility] DRAW    3:30 PM   (15 min.)    [facility]     [facility] ONC INFUSION 60    4:00 PM   (60 min.)    ONCOLOGY INFUSION   [facility] 13     14     [facility] RETURN ARRHYTHMIA   12:45 PM   (30 min.)   Dmitri Banks MD   [facility] 15       16     17     18     19     20     21     22       23     24     25     26     27     28     29       30     31                                           Lab Results:  No results found for this or any previous visit (from the past 12 hour(s)).

## 2017-06-14 NOTE — PROGRESS NOTES
Infusion Nursing Note:  Anthony Carbone presents today for Cycle 2, day 3 Kyprolis.    Patient seen by provider today: No    Note: Patient presents to clinic today feeling generally well with no questions.  Patient and patient's wife deny changes overnight.    Kyprolis stop time: 1526    Intravenous Access:  Implanted Port.    Treatment Conditions:  6/13/2017 labs reviewed    Post Infusion Assessment:  Patient tolerated infusion without incident.  Blood return noted pre and post infusion.  Site patent and intact, free from redness, edema or discomfort.  No evidence of extravasations.  Access discontinued per protocol.    Discharge Plan:   Patient declined prescription refills.  Discharge instructions reviewed with: Patient.  AVS to patient via [employer].  Patient will return 6/21/2017 for next appointment.   Ambulatory discharge.    Nelsy Nunez RN

## 2017-06-14 NOTE — MR AVS SNAPSHOT
After Visit Summary   6/14/2017    Anthony Carbone    MRN: [id number]           Patient Information     Date Of Birth          1945        Visit Information        Provider Department      6/14/2017 2:00 PM UC 15 ATC; UC ONCOLOGY INFUSION [facility]        Today's Diagnoses     Multiple myeloma in relapse (H)    -  1      Care Instructions    Contact Numbers    [facility] Main Line: [phone]  [facility] Triage:  [phone]    Call triage with chills and/or temperature greater than or equal to 100.5, uncontrolled nausea/vomiting, diarrhea, constipation, dizziness, shortness of breath, chest pain, bleeding, unexplained bruising, or any new/concerning symptoms, questions/concerns.     If you are having any concerning symptoms or wish to speak to a provider before your next infusion visit, please call your care coordinator or triage to notify them so we can adequately serve you.       After Hours: [phone]    If after hours, weekends, or holidays, call main hospital  and ask for Oncology doctor on call.         June 2017 Sunday Monday Tuesday Wednesday Thursday Friday Saturday                       1     2     3       4     5     6     [facility] D[facility]    1:15 PM   (15 min.)    [facility] DRAW   [facility] Lab Draw     [facility] RETURN    1:25 PM   (50 min.)   Leanne Reddy PA-C   [facility] 7     8     9     10       11     12     13     [facility]    8:45 AM   (15 min.)    [facility]     [facility] RETURN    9:05 AM   (50 min.)   Leanne Reddy PA-C   [facility]     [facility] ONC INFUSION 60   10:00 AM   (60 min.)    ONCOLOGY INFUSION   [facility] 14     [facility] ONC INFUSION 60    2:00 PM   (60 min.)    ONCOLOGY INFUSION   [facility] 15     16     17       18     19     20     [facility] DRAW    1:30 PM   (15 min.)     [facility]     [facility] ONC INFUSION 60    2:00 PM   (60 min.)    ONCOLOGY INFUSION   [facility] 21     [facility] ONC INFUSION 60    2:00 PM   (60 min.)    ONCOLOGY INFUSION   [facility]     [facility] ONC RETURN    4:00 PM   (30 min.)   Kamari Topete MD   [facility] 22     23     24       25     26     27     [facility] DRAW    3:45 PM   (15 min.)    [facility]   [facility]     [facility] RETURN    3:55 PM   (50 min.)   Leanne Reddy PA-C   [facility]     [facility] ONC INFUSION 60    4:30 PM   (60 min.)    ONCOLOGY INFUSION   [facility] 28     [facility] DRAW    3:30 PM   (15 min.)    [facility] LAB DRAW   [facility] Lab Draw     [facility] ONC INFUSION 60    4:00 PM   (60 min.)    ONCOLOGY INFUSION   [facility] 29 30 July 2017 Sunday Monday Tuesday Wednesday Thursday Friday Saturday                                 1       2     3     4     5     6     7     8       9     10     11     [facility] DRAW    2:45 PM   (15 min.)    [facility]   [facility]     [facility] RETURN    3:05 PM   (50 min.)   Leanne Reddy PA-C   [facility]     [facility] ONC INFUSION 60    4:00 PM   (60 min.)    ONCOLOGY INFUSION   [facility] 12     [facility] DRAW    3:30 PM   (15 min.)    [facility]RAW   [facility]     [facility] ONC INFUSION 60    4:00 PM   (60 min.)   UC ONCOLOGY INFUSION   [facility] 13     14     [facility] RETURN ARRHYTHMIA   12:45 PM   (30 min.)   Dmitri Banks MD   [facility] 15       16     17     18     19     20     21     22       23     24     25     26     27     28     29       30     31                                           Lab Results:  No results found for this or any previous visit  (from the past 12 hour(s)).            Follow-ups after your visit        Your next 10 appointments already scheduled     Jun 20, 2017  1:30 PM CDT   [facility] with [facility]   [facility])    [address] [place] [address]   [phone]            Jun 20, 2017  2:00 PM CDT   Infusion 60 with UC ONCOLOGY INFUSION, UC 11 ATC   [facility] ([facility])    [address] [place] [address]   [phone]            Jun 21, 2017  2:00 PM CDT   Infusion 60 with UC ONCOLOGY INFUSION, UC 15 ATC   [facility] ([facility])    [address] [place] [address]   [phone]            Jun 21, 2017  4:00 PM CDT   RETURN ONC with Kamari Topete MD   [facility] Marrow Trans[facility] ([facility])    [address] [place] [address]   [phone]            Jun 27, 2017  3:45 PM CDT   [facility] Lab Draw with [facility] DRAW   [facility] Draw ([facility])    [address] [place] [address]   [phone]            Jun 27, 2017  4:10 PM CDT   (Arrive by 3:55 PM)   Return Visit with Leanne Reddy PA-C   [facility] ([facility])    [address] [place] [address]   [phone]            Jun 27, 2017  4:30 PM CDT   Infusion 60 with UC ONCOLOGY INFUSION, UC 20 ATC   [facility] ([facility])    [address] [place] [address]   [phone]              Who to contact     If you have questions or need follow up information about today's clinic visit or your schedule please contact [facility] directly at  [phone].  Normal or non-critical lab and imaging results will be communicated to you by MyChart, letter or phone within 4 business days after the clinic has received the results. If you do not hear from us within 7 days, please contact the clinic through [employer] or phone. If you have a critical or abnormal lab result, we will notify you by phone as soon as possible.  Submit refill requests through [employer] or call your pharmacy and they will forward the refill request to us. Please allow 3 business days for your refill to be completed.          Additional Information About Your Visit        [employer] Information     [employer] gives you secure access to your electronic health record. If you see a primary care provider, you can also send messages to your care team and make appointments. If you have questions, please call your primary care clinic.  If you do not have a primary care provider, please call [phone] and they will assist you.        Care EveryWhere ID     This is your Care EveryWhere ID. This could be used by other organizations to access your [facility] medical records  [id number]        Your Vitals Were     Pulse Temperature Respirations Pulse Oximetry BMI (Body Mass Index)       87 97.5  F (36.4  C) (Oral) 18 96% 21.89 kg/m2        Blood Pressure from Last 3 Encounters:   06/14/17 137/88   06/13/17 125/51   06/06/17 119/90    Weight from Last 3 Encounters:   06/14/17 57.9 kg (127 lb 9.6 oz)   06/13/17 57.6 kg (127 lb)   06/06/17 57.2 kg (126 lb)              Today, you had the following     No orders found for display       Primary Care Provider Office Phone # Fax #    Sanket Gualberto [phone]       [facility] [address] [place] [address]        Thank you!     Thank you for choosing [facility]  for your care. Our goal is always to provide you with excellent care. Hearing back from our patients is one way we can continue to improve our  services. Please take a few minutes to complete the written survey that you may receive in the mail after your visit with us. Thank you!             Your Updated Medication List - Protect others around you: Learn how to safely use, store and throw away your medicines at www.disposemymeds.org.          This list is accurate as of: 6/14/17  4:32 PM.  Always use your most recent med list.                   Brand Name Dispense Instructions for use    acetaminophen 500 MG tablet    TYLENOL     Take 2 tablets (1,000 mg) by mouth every 6 hours as needed for mild pain       acyclovir 400 MG tablet    ZOVIRAX    60 tablet    Take 1 tablet (400 mg) by mouth 2 times daily       amLODIPine 5 MG tablet    NORVASC    30 tablet    Take one tablet at bedtime.       artificial saliva Liqd liquid      Swish and spit 3-5 mLs in mouth 4 times daily as needed for dry mouth       clopidogrel 75 MG tablet    PLAVIX    30 tablet    Take 1 tablet (75 mg) by mouth daily       esomeprazole 40 MG CR capsule    nexIUM    30 capsule    Take 1 capsule (40 mg) by mouth every morning (before breakfast)       HYDROmorphone 2 MG tablet    DILAUDID    30 tablet    Take 0.5 tablets (1 mg) by mouth every 3 hours as needed for moderate to severe pain       lidocaine 5 % Patch    LIDODERM    30 patch    Place 3 patches onto the skin every 24 hours       LORazepam 0.5 MG tablet    ATIVAN    30 tablet    Take 1 tablet (0.5 mg) by mouth every 6 hours as needed for nausea or anxiety.       midodrine 2.5 MG tablet    PROAMATINE    90 tablet    Take 1 tablet (2.5 mg) by mouth 3 times daily . HOLD due to elevated blood pressures during admission. Continue to hold until further advised by outpatient clinic team or if develops recurrent orthostatic hypotension.       OLANZapine 5 MG tablet    zyPREXA    60 tablet    Take 1 tablet (5 mg) by mouth 2 times daily       ondansetron 8 MG ODT tab    ZOFRAN-ODT    30 tablet    Take 1 tablet (8 mg) by mouth every 8 hours  as needed for nausea       potassium chloride SA 20 MEQ CR tablet    K-DUR/KLOR-CON M    60 tablet    TAKE 1 TABLET BY MOUTH TWICE DAILY.       simethicone 80 MG chewable tablet    MYLICON    180 tablet    Take 2 tablets (160 mg) by mouth 4 times daily as needed for cramping or other (gas pain)       simvastatin 5 MG tablet    ZOCOR    30 tablet    Take 4 tablets (20 mg) by mouth daily       sucralfate 1 GM tablet    CARAFATE    120 tablet    Take 1 tablet (1 g) by mouth 4 times daily as needed       traMADol 50 MG tablet    ULTRAM    28 tablet    Take 0.5-1 tablets (25-50 mg) by mouth every 6 hours as needed for moderate pain . Recommend trying after Tylenol and before Dilaudid.

## 2017-06-20 ENCOUNTER — INFUSION THERAPY VISIT (OUTPATIENT)
Dept: ONCOLOGY | Facility: CLINIC | Age: 72
End: 2017-06-20
Attending: INTERNAL MEDICINE
Payer: MEDICARE

## 2017-06-20 VITALS
RESPIRATION RATE: 16 BRPM | OXYGEN SATURATION: 97 % | BODY MASS INDEX: 21.91 KG/M2 | DIASTOLIC BLOOD PRESSURE: 71 MMHG | SYSTOLIC BLOOD PRESSURE: 109 MMHG | WEIGHT: 127.7 LBS | HEART RATE: 83 BPM | TEMPERATURE: 97.9 F

## 2017-06-20 DIAGNOSIS — C90.02 MULTIPLE MYELOMA IN RELAPSE (H): Primary | ICD-10-CM

## 2017-06-20 LAB
ANISOCYTOSIS BLD QL SMEAR: SLIGHT
BASOPHILS # BLD AUTO: 0 10E9/L (ref 0–0.2)
BASOPHILS NFR BLD AUTO: 0 %
DACRYOCYTES BLD QL SMEAR: SLIGHT
DIFFERENTIAL METHOD BLD: ABNORMAL
EOSINOPHIL # BLD AUTO: 0 10E9/L (ref 0–0.7)
EOSINOPHIL NFR BLD AUTO: 0 %
ERYTHROCYTE [DISTWIDTH] IN BLOOD BY AUTOMATED COUNT: 18.3 % (ref 10–15)
HCT VFR BLD AUTO: 28.7 % (ref 40–53)
HGB BLD-MCNC: 9 G/DL (ref 13.3–17.7)
LYMPHOCYTES # BLD AUTO: 0.1 10E9/L (ref 0.8–5.3)
LYMPHOCYTES NFR BLD AUTO: 1.8 %
MACROCYTES BLD QL SMEAR: PRESENT
MCH RBC QN AUTO: 31.6 PG (ref 26.5–33)
MCHC RBC AUTO-ENTMCNC: 31.4 G/DL (ref 31.5–36.5)
MCV RBC AUTO: 101 FL (ref 78–100)
MONOCYTES # BLD AUTO: 0.2 10E9/L (ref 0–1.3)
MONOCYTES NFR BLD AUTO: 5.5 %
NEUTROPHILS # BLD AUTO: 3.9 10E9/L (ref 1.6–8.3)
NEUTROPHILS NFR BLD AUTO: 92.7 %
PLATELET # BLD AUTO: 68 10E9/L (ref 150–450)
POIKILOCYTOSIS BLD QL SMEAR: SLIGHT
RBC # BLD AUTO: 2.85 10E12/L (ref 4.4–5.9)
WBC # BLD AUTO: 4.2 10E9/L (ref 4–11)

## 2017-06-20 PROCEDURE — 25000128 H RX IP 250 OP 636: Mod: ZF | Performed by: INTERNAL MEDICINE

## 2017-06-20 PROCEDURE — 36415 COLL VENOUS BLD VENIPUNCTURE: CPT | Performed by: PHYSICIAN ASSISTANT

## 2017-06-20 PROCEDURE — 25000128 H RX IP 250 OP 636: Mod: JW,ZF | Performed by: PHYSICIAN ASSISTANT

## 2017-06-20 PROCEDURE — 85025 COMPLETE CBC W/AUTO DIFF WBC: CPT | Performed by: PHYSICIAN ASSISTANT

## 2017-06-20 PROCEDURE — 96375 TX/PRO/DX INJ NEW DRUG ADDON: CPT

## 2017-06-20 PROCEDURE — 96413 CHEMO IV INFUSION 1 HR: CPT

## 2017-06-20 RX ORDER — HEPARIN SODIUM (PORCINE) LOCK FLUSH IV SOLN 100 UNIT/ML 100 UNIT/ML
5 SOLUTION INTRAVENOUS ONCE
Status: COMPLETED | OUTPATIENT
Start: 2017-06-20 | End: 2017-06-20

## 2017-06-20 RX ORDER — HEPARIN SODIUM (PORCINE) LOCK FLUSH IV SOLN 100 UNIT/ML 100 UNIT/ML
5 SOLUTION INTRAVENOUS EVERY 8 HOURS
Status: DISCONTINUED | OUTPATIENT
Start: 2017-06-20 | End: 2017-06-20 | Stop reason: HOSPADM

## 2017-06-20 RX ADMIN — SODIUM CHLORIDE, PRESERVATIVE FREE 5 ML: 5 INJECTION INTRAVENOUS at 13:57

## 2017-06-20 RX ADMIN — DEXAMETHASONE SODIUM PHOSPHATE 4 MG: 10 INJECTION, SOLUTION INTRAMUSCULAR; INTRAVENOUS at 14:50

## 2017-06-20 RX ADMIN — SODIUM CHLORIDE, PRESERVATIVE FREE 5 ML: 5 INJECTION INTRAVENOUS at 16:14

## 2017-06-20 RX ADMIN — SODIUM CHLORIDE 250 ML: 9 INJECTION, SOLUTION INTRAVENOUS at 14:50

## 2017-06-20 RX ADMIN — CARFILZOMIB 44 MG: 60 INJECTION, POWDER, LYOPHILIZED, FOR SOLUTION INTRAVENOUS at 15:17

## 2017-06-20 ASSESSMENT — PAIN SCALES - GENERAL: PAINLEVEL: NO PAIN (0)

## 2017-06-20 NOTE — PROGRESS NOTES
Infusion Nursing Note:  Pt presents today for C2 D8 Kyprolis.     present during visit today: Not Applicable.    Lab Results   Component Value Date    HGB 9.0 06/20/2017     Lab Results   Component Value Date    WBC 4.2 06/20/2017      Lab Results   Component Value Date    ANEU 3.9 06/20/2017     Lab Results   Component Value Date    PLT 68 06/20/2017      Lab Results   Component Value Date     06/13/2017                   Lab Results   Component Value Date    POTASSIUM 4.0 06/13/2017           Lab Results   Component Value Date    MAG 2.2 05/25/2017            Lab Results   Component Value Date    CR 0.88 06/13/2017                   Lab Results   Component Value Date    JAZMIN 9.2 06/13/2017                Lab Results   Component Value Date    BILITOTAL 0.4 06/13/2017           Lab Results   Component Value Date    ALBUMIN 3.0 06/13/2017                    Lab Results   Component Value Date    ALT 20 06/13/2017           Lab Results   Component Value Date    AST 18 06/13/2017     Results reviewed, labs MET treatment parameters, ok to proceed with treatment.    Note: Pt denies any fever or chills. States his back pain is somewhat managed with his PO pain meds. Rates it 6-7/10 at 1405 and took tramadol at about 1130-12. States this is a tolerable level and he would take Dilaudid if he needed it. Continues to be generally fatigued. Denies any chnages since seeing Leanne last week.  Proceed with treatment.    Intravenous Access:  Implanted Port.    Post Infusion Assessment:  Patient tolerated infusion without incident.  Blood return noted pre and post infusion.  Port flushed and dc'd intact at end of infusion per pt preference.    Discharge Plan:   Patient declined prescription refills.  Discharge instructions reviewed with: Patient and wife.  Patient and/or family verbalized understanding of discharge instructions and all questions answered.  Copy of AVS reviewed with patient and/or family.  Pt will  return tomorrow for D9 infusion and provider appt.

## 2017-06-20 NOTE — MR AVS SNAPSHOT
After Visit Summary   6/20/2017    Anthony Carbone    MRN: [id number]           Patient Information     Date Of Birth          1945        Visit Information        Provider Department      6/20/2017 2:00 PM UC 11 ATC; UC ONCOLOGY INFUSION [facility]        Today's Diagnoses     Multiple myeloma in relapse (H)    -  1      Care Instructions    Contact Numbers  [facility] [place] Hours: [phone]  [facility] [place] line: [phone]      Please call the triage or after hours line if you experience a temperature greater than or equal to 100.5, shaking chills, have uncontrolled nausea, vomiting and/or diarrhea, dizziness, shortness of breath, chest pain, bleeding, unexplained bruising, or if you have any other new/concerning symptoms, questions or concerns.      If you are having any concerning symptoms or wish to speak to a provider before your next infusion visit, please call to notify us so we can adequately serve you.     If you need a refill on a narcotic prescription or other medication, please call before your infusion appointment.                 June 2017 Sunday Monday Tuesday Wednesday Thursday Friday Saturday                       1     2     3       4     5     6     P [facility] DRAW    1:15 PM   (15 min.)    [facility] LAB DRAW   [facility] Lab Draw     P RETURN    1:25 PM   (50 min.)   Leanne Reddy PA-C   [facility] 7     8     9     10       11     12     13     P [facility] DRAW    8:45 AM   (15 min.)    [facility] DRAW   [facility] Lab Draw     UMP RETURN    9:05 AM   (50 min.)   [facility]   10:00 AM   (60 min.)    ONCOLOGY INFUSION   [facility] 14     UMP ONC INFUSION 60    2:00 PM   (60 min.)    ONCOLOGY INFUSION   [facility] 15     16     17       18     19     20     UMP  [facility] LAB DRAW    1:30 PM   (15 min.)   UC [facility] LAB DRAW   [facility] Lab Draw     UMP ONC INFUSION 60    2:00 PM   (60 min.)   UC ONCOLOGY INFUSION   [facility] 21     UMP ONC INFUSION 60    2:00 PM   (60 min.)    ONCOLOGY INFUSION   [facility]     UMP ONC RETURN    4:00 PM   (30 min.)   Kamari Topete MD   [facility] Blood and Marrow Transplant 22     23     24       25     26     27     UMP [facility] LAB DRAW    3:45 PM   (15 min.)   UC [facility] LAB DRAW   [facility] Lab Draw     UMP RETURN    3:55 PM   (50 min.)   Leanne Reddy PA-C   [facility]     UMP ONC INFUSION 60    4:30 PM   (60 min.)    ONCOLOGY INFUSION   [facility] 28     UMP [facility] LAB DRAW    3:30 PM   (15 min.)    [facility] LAB DRAW   [facility] Lab Draw     UMP ONC INFUSION 60    4:00 PM   (60 min.)    ONCOLOGY INFUSION   [facility] 29 30 July 2017 Sunday Monday Tuesday Wednesday Thursday Friday Saturday                                 1       2     3     4     5     6     7     8       9     10     11     UMP [facility] DRAW    2:45 PM   (15 min.)    [facility] LAB DRAW   [facility] Lab Draw     UMP RETURN    3:05 PM   (50 min.)   Leanne Reddy PA-C   [facility]     UMP ONC INFUSION 60    4:00 PM   (60 min.)    ONCOLOGY INFUSION   [facility] 12     UMP [facility] DRAW    3:30 PM   (15 min.)    [facility] LAB DRAW   [facility] Lab Draw     UMP ONC INFUSION 60    4:00 PM   (60 min.)   UC ONCOLOGY INFUSION   [facility] 13     14     UMP RETURN ARRHYTHMIA   12:45 PM   (30 min.)   Dmitri Banks MD   [facility] 15       16     17     18     19     20     21     22       23     24     25     26     27     28     29       30     31                                           Lab  Results:  Recent Results (from the past 12 hour(s))   CBC with platelets differential    Collection Time: 06/20/17  2:03 PM   Result Value Ref Range    WBC 4.2 4.0 - 11.0 10e9/L    RBC Count 2.85 (L) 4.4 - 5.9 10e12/L    Hemoglobin 9.0 (L) 13.3 - 17.7 g/dL    Hematocrit 28.7 (L) 40.0 - 53.0 %     (H) 78 - 100 fl    MCH 31.6 26.5 - 33.0 pg    MCHC 31.4 (L) 31.5 - 36.5 g/dL    RDW 18.3 (H) 10.0 - 15.0 %    Platelet Count 68 (L) 150 - 450 10e9/L    Diff Method Manual Differential     % Neutrophils 92.7 %    % Lymphocytes 1.8 %    % Monocytes 5.5 %    % Eosinophils 0.0 %    % Basophils 0.0 %    Absolute Neutrophil 3.9 1.6 - 8.3 10e9/L    Absolute Lymphocytes 0.1 (L) 0.8 - 5.3 10e9/L    Absolute Monocytes 0.2 0.0 - 1.3 10e9/L    Absolute Eosinophils 0.0 0.0 - 0.7 10e9/L    Absolute Basophils 0.0 0.0 - 0.2 10e9/L    Anisocytosis Slight     Poikilocytosis Slight     Teardrop Cells Slight     Macrocytes Present                Follow-ups after your visit        Your next 10 appointments already scheduled     Jun 21, 2017  2:00 PM CDT   Infusion 60 with  ONCOLOGY INFUSION, UC 15 ATC   [facility] ([facility])    [address] [place] [id number] [phone]            Jun 21, 2017  4:00 PM CDT   RETURN ONC with Kamari Topete MD   [facility] Blood and Marrow Transplant ([facility])    [address] [place] [address] [phone]            Jun 27, 2017  3:45 PM CDT   [facility] Draw with  [facility] DRAW   [facility]    [address] [place] [address] [phone]            Jun 27, 2017  4:10 PM CDT   (Arrive by 3:55 PM)   Return Visit with Leanne Reddy PA-C   [facility] ([facility])    [address] [address]    [phone]            Jun 27, 2017  4:30 PM CDT   Infusion 60 with UC ONCOLOGY INFUSION, UC 20 ATC   [facility] ([facility])    [address] [facility] [id number] [phone]            Jun 28, 2017  3:30 PM CDT   [facility] Lab Draw with UC [facility]RAW   [facility]    [address] [place] [id number] [phone]            Jun 28, 2017  4:00 PM CDT   Infusion 60 with UC ONCOLOGY INFUSION, UC 12 ATC   [facility] ([facility])    [address] [address] [phone]              Who to contact     If you have questions or need follow up information about today's clinic visit or your schedule please contact [facility] directly at [phone].  Normal or non-critical lab and imaging results will be communicated to you by Soundwavehart, letter or phone within 4 business days after the clinic has received the results. If you do not hear from us within 7 days, please contact the clinic through LYYNt or phone. If you have a critical or abnormal lab result, we will notify you by phone as soon as possible.  Submit refill requests through Ulaola or call your pharmacy and they will forward the refill request to us. Please allow 3 business days for your refill to be completed.          Additional Information About Your Visit        Ulaola Information     U[employer]ola gives you secure access to your electronic health record. If you see a primary care provider, you can also send messages to your care team and make appointments. If you have questions, please call your primary care clinic.  If you do not have a primary care provider, please call [phone] and they will assist you.        Care EveryWhere ID     This is your Care EveryWhere ID. This could be used by other  organizations to access your [place] medical records  [id number]        Your Vitals Were     Pulse Temperature Respirations Pulse Oximetry BMI (Body Mass Index)       83 97.9  F (36.6  C) (Oral) 16 97% 21.91 kg/m2        Blood Pressure from Last 3 Encounters:   06/20/17 109/71   06/14/17 137/88   06/13/17 125/51    Weight from Last 3 Encounters:   06/20/17 57.9 kg (127 lb 11.2 oz)   06/14/17 57.9 kg (127 lb 9.6 oz)   06/13/17 57.6 kg (127 lb)              We Performed the Following     CBC with platelets differential        Primary Care Provider Office Phone # Fax #    Sanket Gualberto [phone]       [facility] [address] [facility] [address]        Thank you!     Thank you for choosing [facility]  for your care. Our goal is always to provide you with excellent care. Hearing back from our patients is one way we can continue to improve our services. Please take a few minutes to complete the written survey that you may receive in the mail after your visit with us. Thank you!             Your Updated Medication List - Protect others around you: Learn how to safely use, store and throw away your medicines at www.disposemymeds.org.          This list is accurate as of: 6/20/17  3:26 PM.  Always use your most recent med list.                   Brand Name Dispense Instructions for use    acetaminophen 500 MG tablet    TYLENOL     Take 2 tablets (1,000 mg) by mouth every 6 hours as needed for mild pain       acyclovir 400 MG tablet    ZOVIRAX    60 tablet    Take 1 tablet (400 mg) by mouth 2 times daily       amLODIPine 5 MG tablet    NORVASC    30 tablet    Take one tablet at bedtime.       artificial saliva Liqd liquid      Swish and spit 3-5 mLs in mouth 4 times daily as needed for dry mouth       clopidogrel 75 MG tablet    PLAVIX    30 tablet    Take 1 tablet (75 mg) by mouth daily       esomeprazole 40 MG CR capsule    nexIUM    30 capsule    Take 1  capsule (40 mg) by mouth every morning (before breakfast)       HYDROmorphone 2 MG tablet    DILAUDID    30 tablet    Take 0.5 tablets (1 mg) by mouth every 3 hours as needed for moderate to severe pain       lidocaine 5 % Patch    LIDODERM    30 patch    Place 3 patches onto the skin every 24 hours       LORazepam 0.5 MG tablet    ATIVAN    30 tablet    Take 1 tablet (0.5 mg) by mouth every 6 hours as needed for nausea or anxiety.       midodrine 2.5 MG tablet    PROAMATINE    90 tablet    Take 1 tablet (2.5 mg) by mouth 3 times daily . HOLD due to elevated blood pressures during admission. Continue to hold until further advised by outpatient clinic team or if develops recurrent orthostatic hypotension.       OLANZapine 5 MG tablet    zyPREXA    60 tablet    Take 1 tablet (5 mg) by mouth 2 times daily       ondansetron 8 MG ODT tab    ZOFRAN-ODT    30 tablet    Take 1 tablet (8 mg) by mouth every 8 hours as needed for nausea       potassium chloride SA 20 MEQ CR tablet    K-DUR/KLOR-CON M    60 tablet    TAKE 1 TABLET BY MOUTH TWICE DAILY.       simethicone 80 MG chewable tablet    MYLICON    180 tablet    Take 2 tablets (160 mg) by mouth 4 times daily as needed for cramping or other (gas pain)       simvastatin 5 MG tablet    ZOCOR    30 tablet    Take 4 tablets (20 mg) by mouth daily       sucralfate 1 GM tablet    CARAFATE    120 tablet    Take 1 tablet (1 g) by mouth 4 times daily as needed       traMADol 50 MG tablet    ULTRAM    28 tablet    Take 0.5-1 tablets (25-50 mg) by mouth every 6 hours as needed for moderate pain . Recommend trying after Tylenol and before Dilaudid.

## 2017-06-20 NOTE — NURSING NOTE
Chief Complaint   Patient presents with     Port Draw     Labs drawn by RN from port. VS taken.      Port accessed by RN with 20g 3/4in Gripper needle. Labs drawn, port flushed with NS and Heparin.   Vianca Lares RN

## 2017-06-20 NOTE — PATIENT INSTRUCTIONS
Contact Numbers  [facility] Main Line/After Hours: [phone]  [facility] Triage line: [phone]      Please call the triage or after hours line if you experience a temperature greater than or equal to 100.5, shaking chills, have uncontrolled nausea, vomiting and/or diarrhea, dizziness, shortness of breath, chest pain, bleeding, unexplained bruising, or if you have any other new/concerning symptoms, questions or concerns.      If you are having any concerning symptoms or wish to speak to a provider before your next infusion visit, please call to notify us so we can adequately serve you.     If you need a refill on a narcotic prescription or other medication, please call before your infusion appointment.                 June 2017 Sunday Monday Tuesday Wednesday Thursday Friday Saturday                       1     2     3       4     5     6     [facility] DRAW    1:15 PM   (15 min.)    [facility] LAB DRAW   [facility] Lab Draw     UMP RETURN    1:25 PM   (50 min.)   Leanne Reddy PA-C   [facility] 7     8     9     10       11     12     13     UMP [facility] LAB DRAW    8:45 AM   (15 min.)    [facility] LAB DRAW   [facility] Mason[facility] Lab Draw     UMP RETURN    9:05 AM   (50 min.)   Leanne Reddy PA-C   [facility]     UMP ONC INFUSION 60   10:00 AM   (60 min.)    ONCOLOGY INFUSION   [facility] 14     UMP ONC INFUSION 60    2:00 PM   (60 min.)    ONCOLOGY INFUSION   [facility] 15     16     17       18     19     20     UMP [facility] LAB DRAW    1:30 PM   (15 min.)    [facility] LAB DRAW   [facility] Lab Draw     UMP ONC INFUSION 60    2:00 PM   (60 min.)    ONCOLOGY INFUSION   [facility] 21     UMP ONC INFUSION 60    2:00 PM   (60 min.)    ONCOLOGY INFUSION   [facility]     UMP ONC RETURN    4:00 PM   (30 min.)   Kamari Topete MD   [facility] Blood and Marrow  Transplant 22     23     24       25     26     27     UMP [facility] DRAW    3:45 PM   (15 min.)   UC [facility] LAB DRAW   [facility] Lab Draw     UMP RETURN    3:55 PM   (50 min.)   Leanne Reddy PA-C   [facility]     UMP ONC INFUSION 60    4:30 PM   (60 min.)   UC ONCOLOGY INFUSION   [facility] 28     UMP [facility] LAB DRAW    3:30 PM   (15 min.)   UC [facility] LAB DRAW   [facility] Lab Draw     UMP ONC INFUSION 60    4:00 PM   (60 min.)    ONCOLOGY INFUSION   [facility] 29 30 July 2017 Sunday Monday Tuesday Wednesday Thursday Friday Saturday                                 1       2     3     4     5     6     7     8       9     10     11     UMP [facility] LAB DRAW    2:45 PM   (15 min.)   UC [facility] LAB DRAW   [facility] Lab Draw     UMP RETURN    3:05 PM   (50 min.)   Leanne Reddy PA-C   [facility]     UMP ONC INFUSION 60    4:00 PM   (60 min.)   UC ONCOLOGY INFUSION   [facility] 12     UMP [facility] LAB DRAW    3:30 PM   (15 min.)   [facility]     UMP ONC INFUSION 60    4:00 PM   (60 min.)    ONCOLOGY INFUSION   [facility] 13     14     UMP RETURN ARRHYTHMIA   12:45 PM   (30 min.)   Dmitri Banks MD   [facility] 15       16     17     18     19     20     21     22       23     24     25     26     27     28     29       30     31                                           Lab Results:  Recent Results (from the past 12 hour(s))   CBC with platelets differential    Collection Time: 06/20/17  2:03 PM   Result Value Ref Range    WBC 4.2 4.0 - 11.0 10e9/L    RBC Count 2.85 (L) 4.4 - 5.9 10e12/L    Hemoglobin 9.0 (L) 13.3 - 17.7 g/dL    Hematocrit 28.7 (L) 40.0 - 53.0 %     (H) 78 - 100 fl    MCH 31.6 26.5 - 33.0 pg    MCHC 31.4 (L) 31.5 - 36.5 g/dL    RDW 18.3 (H) 10.0 -  15.0 %    Platelet Count 68 (L) 150 - 450 10e9/L    Diff Method Manual Differential     % Neutrophils 92.7 %    % Lymphocytes 1.8 %    % Monocytes 5.5 %    % Eosinophils 0.0 %    % Basophils 0.0 %    Absolute Neutrophil 3.9 1.6 - 8.3 10e9/L    Absolute Lymphocytes 0.1 (L) 0.8 - 5.3 10e9/L    Absolute Monocytes 0.2 0.0 - 1.3 10e9/L    Absolute Eosinophils 0.0 0.0 - 0.7 10e9/L    Absolute Basophils 0.0 0.0 - 0.2 10e9/L    Anisocytosis Slight     Poikilocytosis Slight     Teardrop Cells Slight     Macrocytes Present

## 2017-06-21 ENCOUNTER — INFUSION THERAPY VISIT (OUTPATIENT)
Dept: ONCOLOGY | Facility: CLINIC | Age: 72
End: 2017-06-21
Attending: INTERNAL MEDICINE
Payer: MEDICARE

## 2017-06-21 ENCOUNTER — OFFICE VISIT (OUTPATIENT)
Dept: TRANSPLANT | Facility: CLINIC | Age: 72
End: 2017-06-21
Attending: INTERNAL MEDICINE
Payer: MEDICARE

## 2017-06-21 VITALS
SYSTOLIC BLOOD PRESSURE: 152 MMHG | OXYGEN SATURATION: 98 % | DIASTOLIC BLOOD PRESSURE: 92 MMHG | HEART RATE: 70 BPM | RESPIRATION RATE: 18 BRPM | TEMPERATURE: 97.9 F

## 2017-06-21 VITALS
SYSTOLIC BLOOD PRESSURE: 152 MMHG | BODY MASS INDEX: 21.8 KG/M2 | WEIGHT: 127.7 LBS | TEMPERATURE: 97.9 F | DIASTOLIC BLOOD PRESSURE: 92 MMHG | HEART RATE: 70 BPM | OXYGEN SATURATION: 98 % | RESPIRATION RATE: 18 BRPM | HEIGHT: 64 IN

## 2017-06-21 DIAGNOSIS — Z53.9 ERRONEOUS ENCOUNTER--DISREGARD: Primary | ICD-10-CM

## 2017-06-21 DIAGNOSIS — C90.02 MULTIPLE MYELOMA IN RELAPSE (H): Primary | ICD-10-CM

## 2017-06-21 PROCEDURE — 99212 OFFICE O/P EST SF 10 MIN: CPT | Mod: 25

## 2017-06-21 PROCEDURE — 96413 CHEMO IV INFUSION 1 HR: CPT

## 2017-06-21 PROCEDURE — 25000128 H RX IP 250 OP 636: Mod: ZF | Performed by: PHYSICIAN ASSISTANT

## 2017-06-21 PROCEDURE — 99211 OFF/OP EST MAY X REQ PHY/QHP: CPT | Mod: ZF

## 2017-06-21 RX ORDER — HEPARIN SODIUM (PORCINE) LOCK FLUSH IV SOLN 100 UNIT/ML 100 UNIT/ML
5 SOLUTION INTRAVENOUS EVERY 8 HOURS
Status: DISCONTINUED | OUTPATIENT
Start: 2017-06-21 | End: 2017-06-21 | Stop reason: HOSPADM

## 2017-06-21 RX ADMIN — CARFILZOMIB 44 MG: 60 INJECTION, POWDER, LYOPHILIZED, FOR SOLUTION INTRAVENOUS at 15:23

## 2017-06-21 RX ADMIN — SODIUM CHLORIDE, PRESERVATIVE FREE 5 ML: 5 INJECTION INTRAVENOUS at 16:17

## 2017-06-21 RX ADMIN — SODIUM CHLORIDE 250 ML: 9 INJECTION, SOLUTION INTRAVENOUS at 15:23

## 2017-06-21 ASSESSMENT — PAIN SCALES - GENERAL: PAINLEVEL: NO PAIN (0)

## 2017-06-21 NOTE — NURSING NOTE
"Oncology Rooming Note    June 21, 2017 4:29 PM   Anthony Carbone is a 71 year old male who presents for:    Chief Complaint   Patient presents with     Oncology Clinic Visit     Multiple myeloma      Initial Vitals: BP (!) 152/92  Pulse 70  Temp 97.9  F (36.6  C) (Oral)  Resp 18  Ht 1.626 m (5' 4.02\")  Wt 57.9 kg (127 lb 11.2 oz)  SpO2 98%  BMI 21.91 kg/m2 Estimated body mass index is 21.91 kg/(m^2) as calculated from the following:    Height as of this encounter: 1.626 m (5' 4.02\").    Weight as of this encounter: 57.9 kg (127 lb 11.2 oz). Body surface area is 1.62 meters squared.  No Pain (0) Comment: Data Unavailable   No LMP for male patient.  Allergies reviewed: Yes  Medications reviewed: Yes    Medications: Medication refills not needed today.  Pharmacy name entered into [facility]:    [id number] [place] [address] [address] [facility] [address] [facility] [address]  [employer] [place], [place] - [address]  [facility] [address] [place], [place] - [address] AT [facility] [address]    Clinical concerns:  No new concerns  Provider was notified.    5 minutes for nursing intake (face to face time)     Marleni Hernandez MA              "

## 2017-06-21 NOTE — MR AVS SNAPSHOT
After Visit Summary   6/21/2017    Anthony Carbone    MRN: [id number]           Patient Information     Date Of Birth          1945        Visit Information        Provider Department      6/21/2017 4:00 PM Kamari Topete MD [facility]        Today's Diagnoses     ERRONEOUS ENCOUNTER--DISREGARD    -  1          [facility] ([facility])  [address] [place] [id number]  Phone: [phone]  Clinic Hours:   Monday-Thursday:7am to 7pm   Friday: 7am to 5pm   Weekends and holidays:    8am to noon (in general)  If your fever is 100.5  or greater,   call the clinic.  After hours call the   hospital at [phone] or   [phone]. Ask for the BMT   fellow on-call            Follow-ups after your visit        Your next 10 appointments already scheduled     Jun 27, 2017  3:45 PM CDT   [facility] Lab Draw with UC [facility] LAB DRAW   [facility] ([facility])    [address] [place] [id number] [phone]            Jun 27, 2017  4:10 PM CDT   (Arrive by 3:55 PM)   Return Visit with Leanne Reddy PA-C   [facility] ([facility])    [address] [place] [id number] [phone]            Jun 27, 2017  4:30 PM CDT   Infusion 60 with UC ONCOLOGY INFUSION, UC 20 ATC   [facility] ([facility])    [address] [place] [id number] [phone]            Jun 28, 2017  3:30 PM CDT   [facility] Lab Draw with UC MASONIC LAB DRAW   [facility])    [address] [place] [address]   [phone]            Jun 28, 2017  4:00 PM CDT   Infusion 60 with UC ONCOLOGY INFUSION, UC 12 ATC   [facility] ([facility])    [address] [place] [address]  [address] [address]   [phone]            Jul 11, 2017  2:45 PM CDT   Masonic Lab Draw with  MASONIC LAB DRAW   [facility] Lab Draw ([facility])    [address] [place] [address]   [phone]            Jul 11, 2017  3:20 PM CDT   (Arrive by 3:05 PM)   Return Visit with Leanne Reddy PA-C   [facility] ([facility])    [address] [place] [id number] [phone]            Jul 11, 2017  4:00 PM CDT   Infusion 60 with  ONCOLOGY INFUSION   [facility] ([facility])    [address] [place] [id number] [phone]              Who to contact     If you have questions or need follow up information about today's clinic visit or your schedule please contact [facility] BLOOD AND MARROW TRANSPLANT directly at [phone].  Normal or non-critical lab and imaging results will be communicated to you by Recombinehart, letter or phone within 4 business days after the clinic has received the results. If you do not hear from us within 7 days, please contact the clinic through XYDOt or phone. If you have a critical or abnormal lab result, we will notify you by phone as soon as possible.  Submit refill requests through [employer] or call your pharmacy and they will forward the refill request to us. Please allow 3 business days for your refill to be completed.          Additional Information About Your Visit        [employer] Information     [employer] gives you secure access to your electronic health record. If you see a primary care provider, you can also send messages to your care team and make appointments. If you have questions, please call your primary care clinic.  If you do not have a primary care provider, please call [phone] and they will assist you.        Care EveryWhere ID     This  "is your Care EveryWhere ID. This could be used by other organizations to access your [place] medical records  [id number]        Your Vitals Were     Pulse Temperature Respirations Height Pulse Oximetry BMI (Body Mass Index)    70 97.9  F (36.6  C) (Oral) 18 1.626 m (5' 4.02\") 98% 21.91 kg/m2       Blood Pressure from Last 3 Encounters:   06/21/17 (!) 152/92   06/21/17 (!) 152/92   06/20/17 109/71    Weight from Last 3 Encounters:   06/21/17 57.9 kg (127 lb 11.2 oz)   06/20/17 57.9 kg (127 lb 11.2 oz)   06/14/17 57.9 kg (127 lb 9.6 oz)              Today, you had the following     No orders found for display       Recent Review Flowsheet Data     BMT Recent Results Latest Ref Rng & Units 5/27/2017 5/28/2017 5/28/2017 5/29/2017 6/6/2017 6/13/2017 6/20/2017    WBC 4.0 - 11.0 10e9/L - 2.5(L) - 2.2(L) 5.9 4.0 4.2    Hemoglobin 13.3 - 17.7 g/dL - 9.5(L) - 9.9(L) 9.6(L) 9.5(L) 9.0(L)    Platelet Count 150 - 450 10e9/L - 87(L) - 95(L) 128(L) 134(L) 68(L)    Neutrophils (Absolute) 1.6 - 8.3 10e9/L - 2.2 - 1.9 5.0 3.4 3.9    INR 0.86 - 1.14 - - - - - - -    Sodium 133 - 144 mmol/L - 142 - 141 138 139 -    Potassium 3.4 - 5.3 mmol/L 3.4 2.9(L) 3.6 3.0(L) 4.5 4.0 -    Chloride 94 - 109 mmol/L - 108 - 107 106 104 -    Glucose 70 - 99 mg/dL - 88 - 73 139(H) 158(H) -    Urea Nitrogen 7 - 30 mg/dL - 8 - 5(L) 16 26 -    Creatinine 0.66 - 1.25 mg/dL - 0.90 - 0.66 1.08 0.88 -    Calcium (Total) 8.5 - 10.1 mg/dL - 8.0(L) - 8.3(L) 8.8 9.2 -    Protein (Total) 6.8 - 8.8 g/dL - - - - 8.7 8.6 -    Albumin 3.4 - 5.0 g/dL - - - - 3.0(L) 3.0(L) -    Alkaline Phosphatase 40 - 150 U/L - - - - 79 77 -    AST 0 - 45 U/L - - - - 11 18 -    ALT 0 - 70 U/L - - - - 17 20 -    MCV 78 - 100 fl - 97 - 97 101(H) 102(H) 101(H)               Primary Care Provider Office Phone # Fax #    Sanket Burciaga [phone]       [facility] [address] [facility] [address]        Equal Access to Services     ALBAN SHAH : " Hadii chalo kern hadyamelo Soomaali, waaxda luqadaha, qaybta kaalmada trinidad, jonny villa. So [facility] [phone].    ATENCIÓN: Si delores tao, tiene a todd disposición servicios gratuitos de asistencia lingüística. Llame al [phone].    We comply with applicable federal civil rights laws and [place] laws. We do not discriminate on the basis of race, color, national origin, age, disability sex, sexual orientation or gender identity.            Thank you!     Thank you for choosing [facility] BLOOD AND MARROW TRANSPLANT  for your care. Our goal is always to provide you with excellent care. Hearing back from our patients is one way we can continue to improve our services. Please take a few minutes to complete the written survey that you may receive in the mail after your visit with us. Thank you!             Your Updated Medication List - Protect others around you: Learn how to safely use, store and throw away your medicines at www.disposemymeds.org.          This list is accurate as of: 6/21/17 11:59 PM.  Always use your most recent med list.                   Brand Name Dispense Instructions for use Diagnosis    acetaminophen 500 MG tablet    TYLENOL     Take 2 tablets (1,000 mg) by mouth every 6 hours as needed for mild pain    Acute bilateral low back pain without sciatica, Multiple myeloma in relapse (H)       acyclovir 400 MG tablet    ZOVIRAX    60 tablet    Take 1 tablet (400 mg) by mouth 2 times daily    Multiple myeloma in relapse (H)       amLODIPine 5 MG tablet    NORVASC    30 tablet    Take one tablet at bedtime.    Essential hypertension       artificial saliva Liqd liquid      Swish and spit 3-5 mLs in mouth 4 times daily as needed for dry mouth    Dry mouth       clopidogrel 75 MG tablet    PLAVIX    30 tablet    Take 1 tablet (75 mg) by mouth daily    History of stroke       esomeprazole 40 MG CR capsule    nexIUM    30 capsule    Take 1 capsule (40 mg) by mouth every  morning (before breakfast)    Gastroesophageal reflux disease without esophagitis       HYDROmorphone 2 MG tablet    DILAUDID    30 tablet    Take 0.5 tablets (1 mg) by mouth every 3 hours as needed for moderate to severe pain    Multiple myeloma in relapse (H), Acute bilateral low back pain without sciatica       lidocaine 5 % Patch    LIDODERM    30 patch    Place 3 patches onto the skin every 24 hours    Multiple myeloma in relapse (H), Acute bilateral low back pain without sciatica       LORazepam 0.5 MG tablet    ATIVAN    30 tablet    Take 1 tablet (0.5 mg) by mouth every 6 hours as needed for nausea or anxiety.    Multiple myeloma in relapse (H), Delirium       midodrine 2.5 MG tablet    PROAMATINE    90 tablet    Take 1 tablet (2.5 mg) by mouth 3 times daily . HOLD due to elevated blood pressures during admission. Continue to hold until further advised by outpatient clinic team or if develops recurrent orthostatic hypotension.    Orthostatic hypotension, Orthostatic syncope       OLANZapine 5 MG tablet    zyPREXA    60 tablet    Take 1 tablet (5 mg) by mouth 2 times daily    Delirium, Multiple myeloma in relapse (H)       ondansetron 8 MG ODT tab    ZOFRAN-ODT    30 tablet    Take 1 tablet (8 mg) by mouth every 8 hours as needed for nausea    Nausea       potassium chloride SA 20 MEQ CR tablet    K-DUR/KLOR-CON M    60 tablet    TAKE 1 TABLET BY MOUTH TWICE DAILY.    Hypokalemia       simethicone 80 MG chewable tablet    MYLICON    180 tablet    Take 2 tablets (160 mg) by mouth 4 times daily as needed for cramping or other (gas pain)    Nausea       simvastatin 5 MG tablet    ZOCOR    30 tablet    Take 4 tablets (20 mg) by mouth daily    Mixed hyperlipidemia       sucralfate 1 GM tablet    CARAFATE    120 tablet    Take 1 tablet (1 g) by mouth 4 times daily as needed    Gastroesophageal reflux disease without esophagitis       traMADol 50 MG tablet    ULTRAM    28 tablet    Take 0.5-1 tablets (25-50 mg) by  mouth every 6 hours as needed for moderate pain . Recommend trying after Tylenol and before Dilaudid.    Acute bilateral low back pain without sciatica, Multiple myeloma in relapse (H)

## 2017-06-21 NOTE — MR AVS SNAPSHOT
After Visit Summary   6/21/2017    Anthony Carbone    MRN: [id number]           Patient Information     Date Of Birth          1945        Visit Information        Provider Department      6/21/2017 2:00 PM  15 ATC;  ONCOLOGY INFUSION [facility]        Today's Diagnoses     Multiple myeloma in relapse (H)    -  1      Care Instructions    Contact Numbers  [facility] Nurse Triage: [phone]  After Hours Nurse Line:  [phone]    Please call the [facility] Triage line if you experience a temperature greater than or equal to 100.5, shaking chills, have uncontrolled nausea, vomiting and/or diarrhea, dizziness, shortness of breath, chest pain, bleeding, unexplained bruising, or if you have any other new/concerning symptoms, questions or concerns.     If it is after hours, weekends, or holidays, please call either the after hours nurse line listed above.     If you are having any concerning symptoms or wish to speak to a provider before your next infusion visit, please call your care coordinator or triage to notify them so we can adequately serve you.     If you need a refill on a narcotic prescription or other medication, please call triage before your infusion appointment.         June 2017 Sunday Monday Tuesday Wednesday Thursday Friday Saturday                       1     2     3       4     5     6     [facility] MASONIC LAB DRAW    1:15 PM   (15 min.)    MASONIC LAB DRAW   [facility] Lab Draw     [facility] RETURN    1:25 PM   (50 min.)   Leanne Reddy PA-C   [facility] 7     8     9     10       11     12     13     [facility] MASONIC LAB DRAW    8:45 AM   (15 min.)    MASONIC LAB DRAW   [facility] Lab Draw     [facility] RETURN    9:05 AM   (50 min.)   Leanne Reddy PA-C   [facility] ONC INFUSION 60   10:00 AM   (60 min.)    ONCOLOGY INFUSION   [facility] 14     [facility]  ONC INFUSION 60    2:00 PM   (60 min.)   UC ONCOLOGY INFUSION   [facility] 15     16     17       18     19     20     [facility] MASONIC LAB DRAW    1:30 PM   (15 min.)    MASONIC LAB DRAW   [facility] Masonic Lab Draw     [facility] ONC INFUSION 60    2:00 PM   (60 min.)    ONCOLOGY INFUSION   [facility] 21     [facility] ONC INFUSION 60    2:00 PM   (60 min.)    ONCOLOGY INFUSION   [facility]     [facility] ONC RETURN    4:00 PM   (30 min.)   Kamari Topete MD   [facility] Blood and Marrow Transplant 22     23     24       25     26     27     [facility]IC LAB DRAW    3:45 PM   (15 min.)    MASONIC LAB DRAW   [facility] Lab Draw     [facility] RETURN    3:55 PM   (50 min.)   Leanne Reddy PA-C   [facility]     [facility] ONC INFUSION 60    4:30 PM   (60 min.)    ONCOLOGY INFUSION   [facility] 28     [facility]IC LAB DRAW    3:30 PM   (15 min.)    [facility]     [facility] ONC INFUSION 60    4:00 PM   (60 min.)    ONCOLOGY INFUSION   [facility] 29 30 July 2017 Sunday Monday Tuesday Wednesday Thursday Friday Saturday                                 1       2     3     4     5     6     7     8       9     10     11     [facility] LAB DRAW    2:45 PM   (15 min.)    [facility] LAB [facility]RAW   [facility]     [facility] RETURN    3:05 PM   (50 min.)   Leanne Reddy PA-C   [facility]     [facility] ONC INFUSION 60    4:00 PM   (60 min.)    ONCOLOGY INFUSION   [facility] 12     [facility] MASONIC LAB DRAW    3:30 PM   (15 min.)    [facility] LAB [facility]RAW   [facility]     [facility] ONC INFUSION 60    4:00 PM   (60 min.)    ONCOLOGY INFUSION   [facility] 13     14     [facility] RETURN ARRHYTHMIA   12:45 PM   (30 min.)   Dmitri Banks MD   [facility] 15       16     17     18     19      20     21     22       23     24     25     26     27     28     29       30     31                                           Lab Results:  No results found for this or any previous visit (from the past 12 hour(s)).            Follow-ups after your visit        Your next 10 appointments already scheduled     Jun 27, 2017  3:45 PM CDT   Masonic Lab Draw with UC MASONIC LAB DRAW   [facility] Masonic Lab Draw ([facility])    [address] [address]   [phone]            Jun 27, 2017  4:10 PM CDT   (Arrive by 3:55 PM)   Return Visit with Leanne Reddy PA-C   [facility] ([facility])    [address] [place] [address]   [phone]            Jun 27, 2017  4:30 PM CDT   Infusion 60 with UC ONCOLOGY INFUSION, UC 20 ATC   [facility] ([facility])    [address] [place] [address]   [phone]            Jun 28, 2017  3:30 PM CDT   M[facility]ic Lab Draw with UC MASONIC LAB DRAW   [facility] Masonic Lab Draw ([facility])    [address] [place] [address]   [phone]            Jun 28, 2017  4:00 PM CDT   Infusion 60 with UC ONCOLOGY INFUSION, UC 12 ATC   [facility] ([facility])    [address] [place] [address]   [phone]            Jul 11, 2017  2:45 PM CDT   M[facility]ic Lab Draw with UC MASONIC LAB DRAW   [facility] Masonic Lab Draw ([facility])    [address] [place] [address]   [phone]            Jul 11, 2017  3:20 PM CDT   (Arrive by 3:05 PM)   Return Visit with Leanne Reddy PA-C   [facility] ([facility])    [address] [place] [address]    [phone]            Jul 11, 2017  4:00 PM CDT   Infusion 60 with UC ONCOLOGY INFUSION   [facility] ([facility])    [address] [facility]  2nd Floor  [facility] [address] [phone]              Who to contact     If you have questions or need follow up information about today's clinic visit or your schedule please contact [facility] directly at [phone].  Normal or non-critical lab and imaging results will be communicated to you by [employer], letter or phone within 4 business days after the clinic has received the results. If you do not hear from us within 7 days, please contact the clinic through XV[employer] or phone. If you have a critical or abnormal lab result, we will notify you by phone as soon as possible.  Submit refill requests through [employer] or call your pharmacy and they will forward the refill request to us. Please allow 3 business days for your refill to be completed.          Additional Information About Your Visit        [employer] Information     [employer] gives you secure access to your electronic health record. If you see a primary care provider, you can also send messages to your care team and make appointments. If you have questions, please call your primary care clinic.  If you do not have a primary care provider, please call [phone] and they will assist you.        Care EveryWhere ID     This is your Care EveryWhere ID. This could be used by other organizations to access your [facility] medical records  [id number]        Your Vitals Were     Pulse Temperature Respirations Pulse Oximetry          70 97.9  F (36.6  C) (Oral) 18 98%         Blood Pressure from Last 3 Encounters:   06/21/17 (!) 152/92   06/20/17 109/71   06/14/17 137/88    Weight from Last 3 Encounters:   06/20/17 57.9 kg (127 lb 11.2 oz)   06/14/17 57.9 kg (127 lb 9.6 oz)   06/13/17 57.6 kg (127 lb)              Today, you had the following     No orders  found for display       Primary Care Provider Office Phone # Fax #    Sanket Burciaga [phone]       [facility] [address] [facility] [address]        Equal Access to Services     ALBAN SHAH : Hadii aad ku hadyamelo Soemilieali, waaxda luqadaha, qaybta kaalmada adesalo, ojnny felixkimberly victor baltazarmartha villa. So [facility] [phone].    ATENCIÓN: Si habla español, tiene a todd disposición servicios gratuitos de asistencia lingüística. Llame al [phone].    We comply with applicable federal civil rights laws and [place] laws. We do not discriminate on the basis of race, color, national origin, age, disability sex, sexual orientation or gender identity.            Thank you!     Thank you for choosing [facility] CANCER CLINIC  for your care. Our goal is always to provide you with excellent care. Hearing back from our patients is one way we can continue to improve our services. Please take a few minutes to complete the written survey that you may receive in the mail after your visit with us. Thank you!             Your Updated Medication List - Protect others around you: Learn how to safely use, store and throw away your medicines at www.disposemymeds.org.          This list is accurate as of: 6/21/17  4:23 PM.  Always use your most recent med list.                   Brand Name Dispense Instructions for use Diagnosis    acetaminophen 500 MG tablet    TYLENOL     Take 2 tablets (1,000 mg) by mouth every 6 hours as needed for mild pain    Acute bilateral low back pain without sciatica, Multiple myeloma in relapse (H)       acyclovir 400 MG tablet    ZOVIRAX    60 tablet    Take 1 tablet (400 mg) by mouth 2 times daily    Multiple myeloma in relapse (H)       amLODIPine 5 MG tablet    NORVASC    30 tablet    Take one tablet at bedtime.    Essential hypertension       artificial saliva Liqd liquid      Swish and spit 3-5 mLs in mouth 4 times daily as needed for dry mouth     Dry mouth       clopidogrel 75 MG tablet    PLAVIX    30 tablet    Take 1 tablet (75 mg) by mouth daily    History of stroke       esomeprazole 40 MG CR capsule    nexIUM    30 capsule    Take 1 capsule (40 mg) by mouth every morning (before breakfast)    Gastroesophageal reflux disease without esophagitis       HYDROmorphone 2 MG tablet    DILAUDID    30 tablet    Take 0.5 tablets (1 mg) by mouth every 3 hours as needed for moderate to severe pain    Multiple myeloma in relapse (H), Acute bilateral low back pain without sciatica       lidocaine 5 % Patch    LIDODERM    30 patch    Place 3 patches onto the skin every 24 hours    Multiple myeloma in relapse (H), Acute bilateral low back pain without sciatica       LORazepam 0.5 MG tablet    ATIVAN    30 tablet    Take 1 tablet (0.5 mg) by mouth every 6 hours as needed for nausea or anxiety.    Multiple myeloma in relapse (H), Delirium       midodrine 2.5 MG tablet    PROAMATINE    90 tablet    Take 1 tablet (2.5 mg) by mouth 3 times daily . HOLD due to elevated blood pressures during admission. Continue to hold until further advised by outpatient clinic team or if develops recurrent orthostatic hypotension.    Orthostatic hypotension, Orthostatic syncope       OLANZapine 5 MG tablet    zyPREXA    60 tablet    Take 1 tablet (5 mg) by mouth 2 times daily    Delirium, Multiple myeloma in relapse (H)       ondansetron 8 MG ODT tab    ZOFRAN-ODT    30 tablet    Take 1 tablet (8 mg) by mouth every 8 hours as needed for nausea    Nausea       potassium chloride SA 20 MEQ CR tablet    K-DUR/KLOR-CON M    60 tablet    TAKE 1 TABLET BY MOUTH TWICE DAILY.    Hypokalemia       simethicone 80 MG chewable tablet    MYLICON    180 tablet    Take 2 tablets (160 mg) by mouth 4 times daily as needed for cramping or other (gas pain)    Nausea       simvastatin 5 MG tablet    ZOCOR    30 tablet    Take 4 tablets (20 mg) by mouth daily    Mixed hyperlipidemia       sucralfate 1 GM  tablet    CARAFATE    120 tablet    Take 1 tablet (1 g) by mouth 4 times daily as needed    Gastroesophageal reflux disease without esophagitis       traMADol 50 MG tablet    ULTRAM    28 tablet    Take 0.5-1 tablets (25-50 mg) by mouth every 6 hours as needed for moderate pain . Recommend trying after Tylenol and before Dilaudid.    Acute bilateral low back pain without sciatica, Multiple myeloma in relapse (H)

## 2017-06-21 NOTE — PATIENT INSTRUCTIONS
Contact Numbers  [facility] Nurse Triage: [phone]  After Hours Nurse Line:  [phone]    Please call the [facility] Triage line if you experience a temperature greater than or equal to 100.5, shaking chills, have uncontrolled nausea, vomiting and/or diarrhea, dizziness, shortness of breath, chest pain, bleeding, unexplained bruising, or if you have any other new/concerning symptoms, questions or concerns.     If it is after hours, weekends, or holidays, please call either the after hours nurse line listed above.     If you are having any concerning symptoms or wish to speak to a provider before your next infusion visit, please call your care coordinator or triage to notify them so we can adequately serve you.     If you need a refill on a narcotic prescription or other medication, please call triage before your infusion appointment.         June 2017 Sunday Monday Tuesday Wednesday Thursday Friday Saturday                       1     2     3       4     5     6     [facility]    1:15 PM   (15 min.)    [facility]   [facility] Lab Draw     [facility] RETURN    1:25 PM   (50 min.)   Leanne Reddy PA-C   [facility] 7     8     9     10       11     12     13     [facility]    8:45 AM   (15 min.)    [facility] DRAW   [facility] Lab Draw     [facility] RETURN    9:05 AM   (50 min.)   Leanne Reddy PA-C   [facility]     [facility] ONC INFUSION 60   10:00 AM   (60 min.)    ONCOLOGY INFUSION   [facility] 14     [facility] ON[facility] INFUSION 60    2:00 PM   (60 min.)    ONCOLOGY INFUSION   [facility] 15     16     17       18     19     20     [facility]    1:30 PM   (15 min.)    [facility] DRAW   [facility] Lab Draw     [facility] ONC INFUSION 60    2:00 PM   (60 min.)    ONCOLOGY INFUSION   [facility] 21     [facility] ON[facility] INFUSION 60    2:00 PM   (60 min.)     ONCOLOGY INFUSION   [facility]     [facility] ONC RETURN    4:00 PM   (30 min.)   Kamari Topete MD   [facility] 22     23     24       25     26     27     [facility] D[facility]    3:45 PM   (15 min.)    [facility] DRAW   [facility] Draw     [facility] RETURN    3:55 PM   (50 min.)   Leanne Reddy PA-C   [facility]     [facility] ON[facility] INFUSION 60    4:30 PM   (60 min.)    ONCOLOGY INFUSION   [facility] 28     [facility] DRAW    3:30 PM   (15 min.)    [facility] DRAW   [facility] Draw     [facility] ONC INFUSION 60    4:00 PM   (60 min.)    ONCOLOGY INFUSION   [facility] 29 30 July 2017 Sunday Monday Tuesday Wednesday Thursday Friday Saturday                                 1       2     3     4     5     6     7     8       9     10     11     [facility]    2:45 PM   (15 min.)    [facility] DRAW   [facility] Draw     [facility] RETURN    3:05 PM   (50 min.)   Leanne Reddy PA-C   [facility]     [facility] ONC INFUSION 60    4:00 PM   (60 min.)    ONCOLOGY INFUSION   [facility] 12     [facility] DRAW    3:30 PM   (15 min.)    [facility] DRAW   [facility] Draw     [facility] ONC INFUSION 60    4:00 PM   (60 min.)    ONCOLOGY INFUSION   [facility] 13     14     [facility] RETURN ARRHYTHMIA   12:45 PM   (30 min.)   Dmitri Banks MD   [facility] 15       16     17     18     19     20     21     22       23     24     25     26     27     28     29       30     31                                           Lab Results:  No results found for this or any previous visit (from the past 12 hour(s)).

## 2017-06-21 NOTE — PROGRESS NOTES
HEMATOLOGY/ONCOLOGY PROGRESS NOTE  Jun 21, 2017    REASON FOR VISIT: add-on fatigue, confusion     Diagnosis: 71 year old gentleman with IgM lambda multiple myeloma originally diagnosed in 01/2012 as stage I, standard risk disease.   Treatment: Revlimid plus dexamethasone for 4-5 cycles but plateaued by late 03/2012.   - Velcade was added and he received another 2-3 cycles, achieving a good partial remission.      Transplant: Single auto after melphalan 200 mg/m2 preparative regimen on 01/09/2013  - Post-transplant course: unremarkable except for some mild steroid-induced hyperglycemia, gastritis, nausea and vomiting.      Maintenance: Lenalidomide at day-100 then developed a maculopapular rash. Lenalidomide was held and then we re-challenged him after about a month to 2 months at a lower dose (5 mg daily); rash returned.   - was started on maintenance Velcade, every other week through July 2014, when he was noted with abnormalities on myeloma studies drawn there. Noted with prostate cancer dx at about the time of relapse (see below).     Relapse: noted with return on M-spike in blood/urine with marrow involvement but negative PET.   - Started on retreatment with RVD on 8/27/14 with Revlimid at 15 mg 14 days of 21 days, dexamethasone 40 mg weekly and velcade weekly.Completed at total of 5 cycles without complication by 12/2014.   - Started Cycle 6 on inc'd Rev dosing of 20mg daily x 2 weeks on 12/11/14 which was complicated by pneumonia.  - Adm 12/21-1/10 for human metapneumovirus pneumonia complicated by anorexia, HTN, depression, anorexia with significant weight loss.   - Restarted Ernesto/Dex only on 2/4/15 with good tolerance but with thrombocytopenia.   - Bendamustine added to Velcade/dexamethasone on 5/21/15 due to disease progression  - Velcade discontinued on 7/9/2015 due to side effects (orthostasis)  - Schedule changed to bendamustine 80 mg/m2 days 1 and 2 on 28-day cycle  - Cycle 1 tolerated poorly due to  "lightheadedness and weakness  - Cycle 2 dose reduced to 60 mg/m2 and pre-meds adjusted  - Cycle 5 received on 9/17/15.  - 10/1/2015 - increasing IgM, M-spike - started Pomalidomide 4mg/day (21 days out of 28 days) and weekly Decadron 20mg on Oct 1st, 2015.    - Carfilzomib added on 10/22/2015 making this CPD.  - C3-C6  received in [place]    - Returned to [facility] and resumed CPD here    - Adm: 4/12-4/14 with fever, confusion, neutropenia. Noted on MRI to have acute/subacute CVA and subacute/chronic CVA; started on Plavix, given brief course of Abx and GCSF prior to d/c.     - Continued on Carfilzomib and dexamethasone alone  - Pomalyst added back on 7/13/2016 for rising FLC  - Start daratumumab 11/10/16. Changed to every other week after 4 weekly treatments d/t profound fatigue and malaise.   - Adm 5/7-5/16 due to AMS, hypercalcemia, hyperuricemia, possible PNA, back pain, and JOSE MARIA. Started Kyprolis while inpatient.  - Re-admitted 5/25-/529 with recurrent AMS, found to have concomitant PNA    Current Treatment: Kyprolis IV + dexamethasone 20 mg days of Kyprolis. Day 15 given 5/24/17      INTERVAL HISTORY:    Yamil presents with his wife for follow-up today. He remains back at home with a slow regaining of his strength.  Back pain is mostly controlled but it is there. Is working with PT. Mostly oriented these days but Casey says he will have his moments mostly in terms of action before thinking.  No falls at home.  Cough is better and he's not SOB. Holding AM dose of Zyprexa. No new symptoms otherwise.     Perhaps some fatigue from the Kyprolis but they are monitoring this.     Denied any dizziness, lightheadedness, fevers, chills, headaches, falls, vision changes, nausea, vomiting, GERD, abdominal pain, bowel changes, bleeding, or swelling. Remainder of ROS otherwise negative.    PHYSICAL EXAMINATION  BP (!) 152/92  Pulse 70  Temp 97.9  F (36.6  C) (Oral)  Resp 18  Ht 1.626 m (5' 4.02\")  Wt 57.9 kg (127 lb 11.2 oz)  " SpO2 98%  BMI 21.91 kg/m2  Wt Readings from Last 5 Encounters:   06/21/17 57.9 kg (127 lb 11.2 oz)   06/20/17 57.9 kg (127 lb 11.2 oz)   06/14/17 57.9 kg (127 lb 9.6 oz)   06/13/17 57.6 kg (127 lb)   06/06/17 57.2 kg (126 lb)     General: AxOx4. Able to ambulate independently, albeit slow and cautiously. No acute distress. HEENT: PERRL, no palor or icterus. CVS: RRR. CHEST: CTAB, normal work of breathing.  ABDOMEN: soft non tender no masses palpated in a seated position.  NEURO: AAOX3  Grossly non focal neuro exam. SKIN: no obvious rashes. EXTREMITIES: No edema.     LABS:   Recent Labs   Lab Test  06/20/17   1403   WBC  4.2   RBC  2.85*   HGB  9.0*   HCT  28.7*   MCV  101*   MCH  31.6   MCHC  31.4*   RDW  18.3*   PLT  68*     Recent Labs   Lab Test  06/13/17   0856  06/06/17   1342   NA  139  138   POTASSIUM  4.0  4.5   CHLORIDE  104  106   CO2  24  22   ANIONGAP  10  9   GLC  158*  139*   BUN  26  16   CR  0.88  1.08   JAZMIN  9.2  8.8       Recent Labs   Lab Test  06/13/17   0856   PROTTOTAL  8.6   ALBUMIN  3.0*   BILITOTAL  0.4   ALKPHOS  77   AST  18   ALT  20       IMPRESSION/PLAN:  71 year old male with relapsed MM after autologous transplant (1/9/2013), with recent progression on daratumumab/pom/dex, recent hospitalizations for back pain, OJSE MARIA,and  Hypercalcemia. Started on kyprolis IV 5/11 as well as po dex. He unfortunately developed worsening AMS and was re-admitted 5/25-5/29, found to have PNA. Now is at home and back on single agent kyprolis/dex.      MM: Previously on edgardo/pom/dex while wintering in [place], progressive disease on SPEP inpatient (M spike 0.9 --> 2.1, IgM 3410)  - Received solumedrol 100 mg IV daily 5/8-5/10. Started PO dex 40 mg daily x 3 days on 5/11 with Kyprolis 5/11 and 5/12, and 5/23, subsequent doses held for AMS  - Will continue for now with chemo as is. May need to try higher dose as well.     AMS: AMS started early may in setting of metabolic derangements, uremia, and possible  infection. Initially resolved, then returned ~5/24. Work-up showing PNA and UCx with enterococcus and CONS.  - Continue Zyprexa 5 mg at bedtime  - Holding off long-acting pain meds    Heme: Cytopenias 2/2 treatment and likely MM in setting of progression.Stable today.  - Continues on Plavix for history of CVA -- although will need to monitor platelets    Renal/FEN: Creat baseline ~0.8-1.0, as of late. Will check this again in the coming week.    -Hx Hypercalcemia: s/p pamidronate x 1 on 5/8.   -Hx Hyperuricemia: s/p rasburicase x 1 on 5/8  -Encouraged protein/calorie supplements    ID: PNA and positive urine cx inpatient, presently afebrile w/o localizing s/s  - Completed course of Augmentin PO BID 6/4  - Continues ppx  mg BID    Back/rib pain: Started early May. Lumbar MRI at OSH showing mild-mod central and foraminal stenoses in lumbar spine, per patient and wife, there was no MM lesion there. Rib films inpatient negative, back films stable from prior imaging.  - Continues tramadol PRN and Tylenol PRN    CV: Continue Norvasc (actually instructed to hold given h/o orthostasis) and Zocor.   - Avoid QTc prolonging agents (history of QTc prolongation with syncopal episodes)   Deconditioning: Improving. Continue PT/OT    RTC weekly for chemo.     BMcClune, DO

## 2017-06-21 NOTE — LETTER
6/21/2017      RE: Anthony Thomasbaljit  [address] [place] [address]       HEMATOLOGY/ONCOLOGY PROGRESS NOTE  Jun 21, 2017    REASON FOR VISIT: add-on fatigue, confusion     Diagnosis: 71 year old gentleman with IgM lambda multiple myeloma originally diagnosed in 01/2012 as stage I, standard risk disease.   Treatment: Revlimid plus dexamethasone for 4-5 cycles but plateaued by late 03/2012.   - Velcade was added and he received another 2-3 cycles, achieving a good partial remission.      Transplant: Single auto after melphalan 200 mg/m2 preparative regimen on 01/09/2013  - Post-transplant course: unremarkable except for some mild steroid-induced hyperglycemia, gastritis, nausea and vomiting.      Maintenance: Lenalidomide at day-100 then developed a maculopapular rash. Lenalidomide was held and then we re-challenged him after about a month to 2 months at a lower dose (5 mg daily); rash returned.   - was started on maintenance Velcade, every other week through July 2014, when he was noted with abnormalities on myeloma studies drawn there. Noted with prostate cancer dx at about the time of relapse (see below).     Relapse: noted with return on M-spike in blood/urine with marrow involvement but negative PET.   - Started on retreatment with RVD on 8/27/14 with Revlimid at 15 mg 14 days of 21 days, dexamethasone 40 mg weekly and velcade weekly.Completed at total of 5 cycles without complication by 12/2014.   - Started Cycle 6 on inc'd Rev dosing of 20mg daily x 2 weeks on 12/11/14 which was complicated by pneumonia.  - Adm 12/21-1/10 for human metapneumovirus pneumonia complicated by anorexia, HTN, depression, anorexia with significant weight loss.   - Restarted Ernesto/Dex only on 2/4/15 with good tolerance but with thrombocytopenia.   - Bendamustine added to Velcade/dexamethasone on 5/21/15 due to disease progression  - Velcade discontinued on 7/9/2015 due to side effects (orthostasis)  - Schedule changed to  bendamustine 80 mg/m2 days 1 and 2 on 28-day cycle  - Cycle 1 tolerated poorly due to lightheadedness and weakness  - Cycle 2 dose reduced to 60 mg/m2 and pre-meds adjusted  - Cycle 5 received on 9/17/15.  - 10/1/2015 - increasing IgM, M-spike - started Pomalidomide 4mg/day (21 days out of 28 days) and weekly Decadron 20mg on Oct 1st, 2015.    - Carfilzomib added on 10/22/2015 making this CPD.  - C3-C6  received in [place]    - Returned to [facility] and resumed CPD here    - Adm: 4/12-4/14 with fever, confusion, neutropenia. Noted on MRI to have acute/subacute CVA and subacute/chronic CVA; started on Plavix, given brief course of Abx and GCSF prior to d/c.     - Continued on Carfilzomib and dexamethasone alone  - Pomalyst added back on 7/13/2016 for rising FLC  - Start daratumumab 11/10/16. Changed to every other week after 4 weekly treatments d/t profound fatigue and malaise.   - Adm 5/7-5/16 due to AMS, hypercalcemia, hyperuricemia, possible PNA, back pain, and JOSE MARIA. Started Kyprolis while inpatient.  - Re-admitted 5/25-/529 with recurrent AMS, found to have concomitant PNA    Current Treatment: Kyprolis IV + dexamethasone 20 mg days of Kyprolis. Day 15 given 5/24/17      INTERVAL HISTORY:    Yamil presents with his wife for follow-up today. He remains back at home with a slow regaining of his strength.  Back pain is mostly controlled but it is there. Is working with PT. Mostly oriented these days but Casey says he will have his moments mostly in terms of action before thinking.  No falls at home.  Cough is better and he's not SOB. Holding AM dose of Zyprexa. No new symptoms otherwise.     Perhaps some fatigue from the Kyprolis but they are monitoring this.     Denied any dizziness, lightheadedness, fevers, chills, headaches, falls, vision changes, nausea, vomiting, GERD, abdominal pain, bowel changes, bleeding, or swelling. Remainder of ROS otherwise negative.    PHYSICAL EXAMINATION  BP (!) 152/92  Pulse 70  Temp 97.9  " F (36.6  C) (Oral)  Resp 18  Ht 1.626 m (5' 4.02\")  Wt 57.9 kg (127 lb 11.2 oz)  SpO2 98%  BMI 21.91 kg/m2  Wt Readings from Last 5 Encounters:   06/21/17 57.9 kg (127 lb 11.2 oz)   06/20/17 57.9 kg (127 lb 11.2 oz)   06/14/17 57.9 kg (127 lb 9.6 oz)   06/13/17 57.6 kg (127 lb)   06/06/17 57.2 kg (126 lb)     General: AxOx4. Able to ambulate independently, albeit slow and cautiously. No acute distress. HEENT: PERRL, no palor or icterus. CVS: RRR. CHEST: CTAB, normal work of breathing.  ABDOMEN: soft non tender no masses palpated in a seated position.  NEURO: AAOX3  Grossly non focal neuro exam. SKIN: no obvious rashes. EXTREMITIES: No edema.     LABS:   Recent Labs   Lab Test  06/20/17   1403   WBC  4.2   RBC  2.85*   HGB  9.0*   HCT  28.7*   MCV  101*   MCH  31.6   MCHC  31.4*   RDW  18.3*   PLT  68*     Recent Labs   Lab Test  06/13/17   0856  06/06/17   1342   NA  139  138   POTASSIUM  4.0  4.5   CHLORIDE  104  106   CO2  24  22   ANIONGAP  10  9   GLC  158*  139*   BUN  26  16   CR  0.88  1.08   JAZMIN  9.2  8.8       Recent Labs   Lab Test  06/13/17   0856   PROTTOTAL  8.6   ALBUMIN  3.0*   BILITOTAL  0.4   ALKPHOS  77   AST  18   ALT  20       IMPRESSION/PLAN:  71 year old male with relapsed MM after autologous transplant (1/9/2013), with recent progression on daratumumab/pom/dex, recent hospitalizations for back pain, JOSE MARIA,and  Hypercalcemia. Started on kyprolis IV 5/11 as well as po dex. He unfortunately developed worsening AMS and was re-admitted 5/25-5/29, found to have PNA. Now is at home and back on single agent kyprolis/dex.      MM: Previously on edgardo/pom/dex while wintering in [place], progressive disease on SPEP inpatient (M spike 0.9 --> 2.1, IgM 3410)  - Received solumedrol 100 mg IV daily 5/8-5/10. Started PO dex 40 mg daily x 3 days on 5/11 with Kyprolis 5/11 and 5/12, and 5/23, subsequent doses held for AMS  - Will continue for now with chemo as is. May need to try higher dose as well.     AMS: AMS " started early may in setting of metabolic derangements, uremia, and possible infection. Initially resolved, then returned ~5/24. Work-up showing PNA and UCx with enterococcus and CONS.  - Continue Zyprexa 5 mg at bedtime  - Holding off long-acting pain meds    Heme: Cytopenias 2/2 treatment and likely MM in setting of progression.Stable today.  - Continues on Plavix for history of CVA -- although will need to monitor platelets    Renal/FEN: Creat baseline ~0.8-1.0, as of late. Will check this again in the coming week.    -Hx Hypercalcemia: s/p pamidronate x 1 on 5/8.   -Hx Hyperuricemia: s/p rasburicase x 1 on 5/8  -Encouraged protein/calorie supplements    ID: PNA and positive urine cx inpatient, presently afebrile w/o localizing s/s  - Completed course of Augmentin PO BID 6/4  - Continues ppx  mg BID    Back/rib pain: Started early May. Lumbar MRI at OSH showing mild-mod central and foraminal stenoses in lumbar spine, per patient and wife, there was no MM lesion there. Rib films inpatient negative, back films stable from prior imaging.  - Continues tramadol PRN and Tylenol PRN    CV: Continue Norvasc (actually instructed to hold given h/o orthostasis) and Zocor.   - Avoid QTc prolonging agents (history of QTc prolongation with syncopal episodes)   Deconditioning: Improving. Continue PT/OT  RTC weekly for chemo.   BMcClune, DO

## 2017-06-21 NOTE — PROGRESS NOTES
Infusion Nursing Note:  Anthony Carbone presents today for D9 C2 Kyprolis.    Patient seen by provider today: No  Patient will see Dr. Topete in clinic after infusion appointment.    Intravenous Access:  Implanted Port.    Treatment Conditions:  Lab Results   Component Value Date    HGB 9.0 06/20/2017     Lab Results   Component Value Date    WBC 4.2 06/20/2017      Lab Results   Component Value Date    ANEU 3.9 06/20/2017     Lab Results   Component Value Date    PLT 68 06/20/2017      Lab Results   Component Value Date     06/13/2017                   Lab Results   Component Value Date    POTASSIUM 4.0 06/13/2017           Lab Results   Component Value Date    MAG 2.2 05/25/2017            Lab Results   Component Value Date    CR 0.88 06/13/2017                   Lab Results   Component Value Date    JAZMIN 9.2 06/13/2017                Lab Results   Component Value Date    BILITOTAL 0.4 06/13/2017           Lab Results   Component Value Date    ALBUMIN 3.0 06/13/2017                    Lab Results   Component Value Date    ALT 20 06/13/2017           Lab Results   Component Value Date    AST 18 06/13/2017     Results reviewed from 6/20/17, labs MET treatment parameters, ok to proceed with treatment.    Note: Patient presents to infusion for the above treatment. Denied any new issues or concerns overnight. Wife reported that patient has been taking oral decadron pre-Kyprolis and requested patient not receive IV decadron. Writer could not confirm order for oral decadron on the patient's medication list. Dr. Topete notified for further clarification.    TORB: Dr. Topete/Lorena Salmon RN, 6/21/17: Okay for patient to take oral decadron pre-Kyprolis instead of IV Decadron.    Writer requested Dr. Topete to reflect change in pre-medication in the treatment plan.    Kyprolis end time: 1616    Post Infusion Assessment:  Patient tolerated infusion without incident.  Blood return noted pre and post  infusion.  Site patent and intact, free from redness, edema or discomfort.  No evidence of extravasations.  Access discontinued per protocol.    Discharge Plan:   Patient declined prescription refills.  Discharge instructions reviewed with: Patient and Family.  Patient and/or family verbalized understanding of discharge instructions and all questions answered.  AVS to patient via [employer].  Patient will return 6/22/17 for next appointment.   Patient discharged in stable condition accompanied by: self and wife.  Departure Mode: Ambulatory.    Lorena Salmon RN

## 2017-06-27 ENCOUNTER — INFUSION THERAPY VISIT (OUTPATIENT)
Dept: ONCOLOGY | Facility: CLINIC | Age: 72
End: 2017-06-27
Attending: INTERNAL MEDICINE
Payer: MEDICARE

## 2017-06-27 ENCOUNTER — APPOINTMENT (OUTPATIENT)
Dept: LAB | Facility: CLINIC | Age: 72
End: 2017-06-27
Attending: INTERNAL MEDICINE
Payer: MEDICARE

## 2017-06-27 ENCOUNTER — ONCOLOGY VISIT (OUTPATIENT)
Dept: ONCOLOGY | Facility: CLINIC | Age: 72
End: 2017-06-27
Attending: PHYSICIAN ASSISTANT
Payer: MEDICARE

## 2017-06-27 VITALS
BODY MASS INDEX: 21.34 KG/M2 | DIASTOLIC BLOOD PRESSURE: 69 MMHG | OXYGEN SATURATION: 98 % | HEIGHT: 64 IN | SYSTOLIC BLOOD PRESSURE: 117 MMHG | TEMPERATURE: 97.3 F | RESPIRATION RATE: 14 BRPM | WEIGHT: 125 LBS | HEART RATE: 104 BPM

## 2017-06-27 DIAGNOSIS — C90.02 MULTIPLE MYELOMA IN RELAPSE (H): Primary | ICD-10-CM

## 2017-06-27 LAB
BASOPHILS # BLD AUTO: 0 10E9/L (ref 0–0.2)
BASOPHILS NFR BLD AUTO: 0.3 %
DIFFERENTIAL METHOD BLD: ABNORMAL
EOSINOPHIL # BLD AUTO: 0 10E9/L (ref 0–0.7)
EOSINOPHIL NFR BLD AUTO: 0.4 %
ERYTHROCYTE [DISTWIDTH] IN BLOOD BY AUTOMATED COUNT: 18.5 % (ref 10–15)
HCT VFR BLD AUTO: 30.3 % (ref 40–53)
HGB BLD-MCNC: 9.4 G/DL (ref 13.3–17.7)
IMM GRANULOCYTES # BLD: 0.1 10E9/L (ref 0–0.4)
IMM GRANULOCYTES NFR BLD: 0.6 %
LYMPHOCYTES # BLD AUTO: 0.6 10E9/L (ref 0.8–5.3)
LYMPHOCYTES NFR BLD AUTO: 7.8 %
MCH RBC QN AUTO: 31.5 PG (ref 26.5–33)
MCHC RBC AUTO-ENTMCNC: 31 G/DL (ref 31.5–36.5)
MCV RBC AUTO: 102 FL (ref 78–100)
MONOCYTES # BLD AUTO: 0.5 10E9/L (ref 0–1.3)
MONOCYTES NFR BLD AUTO: 6.2 %
NEUTROPHILS # BLD AUTO: 6.6 10E9/L (ref 1.6–8.3)
NEUTROPHILS NFR BLD AUTO: 84.7 %
NRBC # BLD AUTO: 0 10*3/UL
NRBC BLD AUTO-RTO: 0 /100
PLATELET # BLD AUTO: 54 10E9/L (ref 150–450)
RBC # BLD AUTO: 2.98 10E12/L (ref 4.4–5.9)
WBC # BLD AUTO: 7.7 10E9/L (ref 4–11)

## 2017-06-27 PROCEDURE — 96375 TX/PRO/DX INJ NEW DRUG ADDON: CPT

## 2017-06-27 PROCEDURE — 99214 OFFICE O/P EST MOD 30 MIN: CPT | Mod: ZP | Performed by: PHYSICIAN ASSISTANT

## 2017-06-27 PROCEDURE — 25000128 H RX IP 250 OP 636: Mod: ZF | Performed by: PHYSICIAN ASSISTANT

## 2017-06-27 PROCEDURE — 99212 OFFICE O/P EST SF 10 MIN: CPT | Mod: ZF

## 2017-06-27 PROCEDURE — 96413 CHEMO IV INFUSION 1 HR: CPT

## 2017-06-27 PROCEDURE — 85025 COMPLETE CBC W/AUTO DIFF WBC: CPT | Performed by: PHYSICIAN ASSISTANT

## 2017-06-27 PROCEDURE — 36415 COLL VENOUS BLD VENIPUNCTURE: CPT | Performed by: PHYSICIAN ASSISTANT

## 2017-06-27 RX ORDER — HEPARIN SODIUM (PORCINE) LOCK FLUSH IV SOLN 100 UNIT/ML 100 UNIT/ML
500 SOLUTION INTRAVENOUS ONCE
Status: COMPLETED | OUTPATIENT
Start: 2017-06-27 | End: 2017-06-27

## 2017-06-27 RX ORDER — HEPARIN SODIUM (PORCINE) LOCK FLUSH IV SOLN 100 UNIT/ML 100 UNIT/ML
5 SOLUTION INTRAVENOUS EVERY 8 HOURS
Status: DISCONTINUED | OUTPATIENT
Start: 2017-06-27 | End: 2017-06-27 | Stop reason: HOSPADM

## 2017-06-27 RX ADMIN — CARFILZOMIB 44 MG: 60 INJECTION, POWDER, LYOPHILIZED, FOR SOLUTION INTRAVENOUS at 17:15

## 2017-06-27 RX ADMIN — SODIUM CHLORIDE, PRESERVATIVE FREE 500 UNITS: 5 INJECTION INTRAVENOUS at 15:53

## 2017-06-27 RX ADMIN — SODIUM CHLORIDE 250 ML: 9 INJECTION, SOLUTION INTRAVENOUS at 16:52

## 2017-06-27 RX ADMIN — DEXAMETHASONE SODIUM PHOSPHATE 4 MG: 10 INJECTION, SOLUTION INTRAMUSCULAR; INTRAVENOUS at 16:52

## 2017-06-27 RX ADMIN — SODIUM CHLORIDE, PRESERVATIVE FREE 5 ML: 5 INJECTION INTRAVENOUS at 18:09

## 2017-06-27 ASSESSMENT — PAIN SCALES - GENERAL: PAINLEVEL: SEVERE PAIN (7)

## 2017-06-27 NOTE — MR AVS SNAPSHOT
After Visit Summary   6/27/2017    Anthony Carbone    MRN: [id number]           Patient Information     Date Of Birth          1945        Visit Information        Provider Department      6/27/2017 4:10 PM Leanne Reddy PA-C [facility]        Today's Diagnoses     Multiple myeloma in relapse (H)    -  1       Follow-ups after your visit        Your next 10 appointments already scheduled     Jun 28, 2017  3:30 PM CDT   Masonic Lab Draw with UC MASONIC LAB DRAW   [facility] Lab Draw ([facility])    [address] [address]   [phone]            Jun 28, 2017  4:00 PM CDT   Infusion 60 with UC ONCOLOGY INFUSION, UC 12 ATC   [facility] ([facility])    [address] [place] [address]   [phone]            Jul 11, 2017  2:45 PM CDT   Masonic Lab Draw with UC MASONIC LAB DRAW   [facility]    [address] [place] [address]   [phone]            Jul 11, 2017  3:20 PM CDT   (Arrive by 3:05 PM)   Return Visit with Leanne Reddy PA-C   [facility] ([facility])    [address] [place] [address]   [phone]            Jul 11, 2017  4:00 PM CDT   Infusion 60 with UC ONCOLOGY INFUSION, UC 12 ATC   [facility] ([facility])    [address] [place] [address]   [phone]            Jul 12, 2017  3:30 PM CDT   Masonic Lab Draw with UC MASONIC LAB DRAW   [facility] Masonic Lab Draw ([facility])    [address] [place] [address]   [phone]            Jul 12, 2017  4:00 PM CDT   Infusion 60 with UC ONCOLOGY INFUSION, UC 12 ATC   [facility]  "[facility] ([facility])    [address] [facility]  2nd Floor  [facility] [address] [phone]              Who to contact     If you have questions or need follow up information about today's clinic visit or your schedule please contact [facility] directly at [phone].  Normal or non-critical lab and imaging results will be communicated to you by MyChart, letter or phone within 4 business days after the clinic has received the results. If you do not hear from us within 7 days, please contact the clinic through Chainalyticshart or phone. If you have a critical or abnormal lab result, we will notify you by phone as soon as possible.  Submit refill requests through [employer] or call your pharmacy and they will forward the refill request to us. Please allow 3 business days for your refill to be completed.          Additional Information About Your Visit        ChainalyticsharTelkonet Information     [employer] gives you secure access to your electronic health record. If you see a primary care provider, you can also send messages to your care team and make appointments. If you have questions, please call your primary care clinic.  If you do not have a primary care provider, please call [phone] and they will assist you.        Care EveryWhere ID     This is your Care EveryWhere ID. This could be used by other organizations to access your [facility] medical records  [id number]        Your Vitals Were     Pulse Temperature Respirations Height Pulse Oximetry BMI (Body Mass Index)    104 97.3  F (36.3  C) (Oral) 14 1.626 m (5' 4.02\") 98% 21.45 kg/m2       Blood Pressure from Last 3 Encounters:   06/27/17 117/69   06/21/17 (!) 152/92   06/21/17 (!) 152/92    Weight from Last 3 Encounters:   06/27/17 56.7 kg (125 lb)   06/21/17 57.9 kg (127 lb 11.2 oz)   06/20/17 57.9 kg (127 lb 11.2 oz)              Today, you had the following     No orders found for display       Primary Care " Provider Office Phone # Fax #    Sanket Burciaga [phone]       [facility] [address] [facility] [address]        Equal Access to Services     ALBAN SHAH : Hadii aad ku hadyamelluh Sojose, waaxda luqadaha, qaybta kaalmada adeinesda, jonny felixkimberly amadorclaudia ledesmamartha villa. So [facility] [phone].    ATENCIÓN: Si habla español, tiene a todd disposición servicios gratuitos de asistencia lingüística. Llame al [phone].    We comply with applicable federal civil rights laws and [place] laws. We do not discriminate on the basis of race, color, national origin, age, disability sex, sexual orientation or gender identity.            Thank you!     Thank you for choosing [facility]  for your care. Our goal is always to provide you with excellent care. Hearing back from our patients is one way we can continue to improve our services. Please take a few minutes to complete the written survey that you may receive in the mail after your visit with us. Thank you!             Your Updated Medication List - Protect others around you: Learn how to safely use, store and throw away your medicines at www.disposemymeds.org.          This list is accurate as of: 6/27/17  5:40 PM.  Always use your most recent med list.                   Brand Name Dispense Instructions for use Diagnosis    acetaminophen 500 MG tablet    TYLENOL     Take 2 tablets (1,000 mg) by mouth every 6 hours as needed for mild pain    Acute bilateral low back pain without sciatica, Multiple myeloma in relapse (H)       acyclovir 400 MG tablet    ZOVIRAX    60 tablet    Take 1 tablet (400 mg) by mouth 2 times daily    Multiple myeloma in relapse (H)       amLODIPine 5 MG tablet    NORVASC    30 tablet    Take one tablet at bedtime.    Essential hypertension       artificial saliva Liqd liquid      Swish and spit 3-5 mLs in mouth 4 times daily as needed for dry mouth    Dry mouth       clopidogrel 75 MG  tablet    PLAVIX    30 tablet    Take 1 tablet (75 mg) by mouth daily    History of stroke       esomeprazole 40 MG CR capsule    nexIUM    30 capsule    Take 1 capsule (40 mg) by mouth every morning (before breakfast)    Gastroesophageal reflux disease without esophagitis       HYDROmorphone 2 MG tablet    DILAUDID    30 tablet    Take 0.5 tablets (1 mg) by mouth every 3 hours as needed for moderate to severe pain    Multiple myeloma in relapse (H), Acute bilateral low back pain without sciatica       lidocaine 5 % Patch    LIDODERM    30 patch    Place 3 patches onto the skin every 24 hours    Multiple myeloma in relapse (H), Acute bilateral low back pain without sciatica       LORazepam 0.5 MG tablet    ATIVAN    30 tablet    Take 1 tablet (0.5 mg) by mouth every 6 hours as needed for nausea or anxiety.    Multiple myeloma in relapse (H), Delirium       midodrine 2.5 MG tablet    PROAMATINE    90 tablet    Take 1 tablet (2.5 mg) by mouth 3 times daily . HOLD due to elevated blood pressures during admission. Continue to hold until further advised by outpatient clinic team or if develops recurrent orthostatic hypotension.    Orthostatic hypotension, Orthostatic syncope       OLANZapine 5 MG tablet    zyPREXA    60 tablet    Take 1 tablet (5 mg) by mouth 2 times daily    Delirium, Multiple myeloma in relapse (H)       ondansetron 8 MG ODT tab    ZOFRAN-ODT    30 tablet    Take 1 tablet (8 mg) by mouth every 8 hours as needed for nausea    Nausea       potassium chloride SA 20 MEQ CR tablet    K-DUR/KLOR-CON M    60 tablet    TAKE 1 TABLET BY MOUTH TWICE DAILY.    Hypokalemia       simethicone 80 MG chewable tablet    MYLICON    180 tablet    Take 2 tablets (160 mg) by mouth 4 times daily as needed for cramping or other (gas pain)    Nausea       simvastatin 5 MG tablet    ZOCOR    30 tablet    Take 4 tablets (20 mg) by mouth daily    Mixed hyperlipidemia       sucralfate 1 GM tablet    CARAFATE    120 tablet    Take  1 tablet (1 g) by mouth 4 times daily as needed    Gastroesophageal reflux disease without esophagitis       traMADol 50 MG tablet    ULTRAM    28 tablet    Take 0.5-1 tablets (25-50 mg) by mouth every 6 hours as needed for moderate pain . Recommend trying after Tylenol and before Dilaudid.    Acute bilateral low back pain without sciatica, Multiple myeloma in relapse (H)

## 2017-06-27 NOTE — MR AVS SNAPSHOT
After Visit Summary   6/27/2017    Anthony Carbone    MRN: [id number]           Patient Information     Date Of Birth          1945        Visit Information        Provider Department      6/27/2017 4:30 PM UC 20 ATC; UC ONCOLOGY INFUSION [facility]        Today's Diagnoses     Multiple myeloma in relapse (H)    -  1      [facility] [place] Main Line: [phone]    Call triage nurse with chills and/or temperature greater than or equal to 100.4, uncontrolled nausea/vomiting, diarrhea, constipation, dizziness, shortness of breath, chest pain, bleeding, unexplained bruising, or any new/concerning symptoms, questions/concerns.   If you are having any concerning symptoms or wish to speak to a provider before your next infusion visit, please call your care coordinator or triage to notify them so we can adequately serve you.   Triage Nurse Line: [phone]    If after hours, weekends, or holidays, call main hospital  and ask for Oncology doctor on call @ [phone]               June 2017 Sunday Monday Tuesday Wednesday Thursday Friday Saturday                       1     2     3       4     5     6     [facility] LAB DRAW    1:15 PM   (15 min.)    [facility]     [facility] RETURN    1:25 PM   (50 min.)   Leanne Reddy PA-C   [facility] 7     8     9     10       11     12     13     [facility] LAB DRAW    8:45 AM   (15 min.)    [facility]     [facility] RETURN    9:05 AM   (50 min.)   Leanne Reddy PA-C   [facility]     UMP ONC INFUSION 60   10:00 AM   (60 min.)    ONCOLOGY INFUSION   [facility] 14     [facility] ONC INFUSION 60    2:00 PM   (60 min.)    ONCOLOGY INFUSION   [facility] 15     16     17       18     19     20     [facility] MASON[facility] LAB DRAW    1:30 PM   (15 min.)    [facility] MASONIC LAB DRAW   [facility] Mason[facility] Lab Draw     UMP ONC INFUSION 60    2:00 PM   (60 min.)    ONCOLOGY INFUSION   [facility] 21     UMP ONC INFUSION 60    2:00 PM   (60 min.)    ONCOLOGY INFUSION   [facility]     UMP ONC RETURN    4:00 PM   (30 min.)   Kamari Topete MD   [facility] 22     23     24       25     26     27     [facility] MASON[facility] LAB DRAW    3:45 PM   (15 min.)    MASONIC LAB DRAW   [facility] Masonic Lab Draw     [facility] RETURN    3:55 PM   (50 min.)   Leanne Reddy PA-C   [facility]     UMP ONC INFUSION 60    4:30 PM   (60 min.)    ONCOLOGY INFUSION   [facility] 28     [facility] MASONIC LAB DRAW    3:30 PM   (15 min.)    [facility]     [facility] ONC INFUSION 60    4:00 PM   (60 min.)    ONCOLOGY INFUSION   [facility] 29 30 July 2017 Sunday Monday Tuesday Wednesday Thursday Friday Saturday                                 1       2     3     4     5     6     7     8       9     10     11     [facility] LAB DRAW    2:45 PM   (15 min.)    [facility]RAW   [facility]     [facility] RETURN    3:05 PM   (50 min.)   Leanne Reddy PA-C   [facility]     [facility] ONC INFUSION 60    4:00 PM   (60 min.)    ONCOLOGY INFUSION   [facility] 12     [facility] MASONIC LAB DRAW    3:30 PM   (15 min.)    MASONIC LAB DRAW   [facility] Masonic Lab Draw     [facility] ONC INFUSION 60    4:00 PM   (60 min.)    ONCOLOGY INFUSION   [facility] 13     14     15       16     17     18     [facility] MAS[facility] LAB DRAW    2:30 PM   (15 min.)    MASONIC LAB DRAW   [facility] Masonic Lab Draw     [facility] ONC INFUSION 60    3:00 PM   (60 min.)    ONCOLOGY INFUSION   [facility] 19     [facility] ONC INFUSION 60    3:00 PM   (60 min.)    ONCOLOGY INFUSION   [facility] 20     21     22       23     24     25     [facility] RETURN   11:35 AM   (50 min.)   Leanne Reddy PA-C   [facility]     UMP ONC INFUSION 60    1:00 PM   (60 min.)   UC ONCOLOGY INFUSION   [facility] 26     27     28     29       30     31     UMP RETURN ARRHYTHMIA    5:45 PM   (20 min.)   Dmitri Banks MD   [facility]                                       Lab Results:  Recent Results (from the past 12 hour(s))   CBC with platelets differential    Collection Time: 06/27/17  4:02 PM   Result Value Ref Range    WBC 7.7 4.0 - 11.0 10e9/L    RBC Count 2.98 (L) 4.4 - 5.9 10e12/L    Hemoglobin 9.4 (L) 13.3 - 17.7 g/dL    Hematocrit 30.3 (L) 40.0 - 53.0 %     (H) 78 - 100 fl    MCH 31.5 26.5 - 33.0 pg    MCHC 31.0 (L) 31.5 - 36.5 g/dL    RDW 18.5 (H) 10.0 - 15.0 %    Platelet Count 54 (L) 150 - 450 10e9/L    Diff Method Automated Method     % Neutrophils 84.7 %    % Lymphocytes 7.8 %    % Monocytes 6.2 %    % Eosinophils 0.4 %    % Basophils 0.3 %    % Immature Granulocytes 0.6 %    Nucleated RBCs 0 0 /100    Absolute Neutrophil 6.6 1.6 - 8.3 10e9/L    Absolute Lymphocytes 0.6 (L) 0.8 - 5.3 10e9/L    Absolute Monocytes 0.5 0.0 - 1.3 10e9/L    Absolute Eosinophils 0.0 0.0 - 0.7 10e9/L    Absolute Basophils 0.0 0.0 - 0.2 10e9/L    Abs Immature Granulocytes 0.1 0 - 0.4 10e9/L    Absolute Nucleated RBC 0.0              Follow-ups after your visit        Your next 10 appointments already scheduled     Jun 28, 2017  3:30 PM CDT   Masonic Lab Draw with  MASONIC LAB DRAW   [facility] Masonic Lab Draw ([facility])    [address] [address] [phone]            Jun 28, 2017  4:00 PM CDT   Infusion 60 with UC ONCOLOGY INFUSION, UC 12 ATC   [facility] ([facility])    [address] [facility]  2nd Floor  [facility] [address]    [phone]            Jul 11, 2017  2:45 PM CDT   Masonic Lab Draw with UC MASONIC LAB DRAW   [facility] Masonic Lab Draw ([facility])    [address] [place] [address]   [phone]            Jul 11, 2017  3:20 PM CDT   (Arrive by 3:05 PM)   Return Visit with Leanne Reddy PA-C   [facility] ([facility])    [address] [place] [address]   [phone]            Jul 11, 2017  4:00 PM CDT   Infusion 60 with UC ONCOLOGY INFUSION, UC 12 ATC   [facility] ([facility])    [address] [place] [address]   [phone]            Jul 12, 2017  3:30 PM CDT   Masonic Lab Draw with UC MASONIC LAB DRAW   [facility] Masonic Lab Draw ([facility])    [address] [place] [address]   [phone]            Jul 12, 2017  4:00 PM CDT   Infusion 60 with UC ONCOLOGY INFUSION, UC 12 ATC   [facility] ([facility])    [address] [place] [address] [phone]              Who to contact     If you have questions or need follow up information about today's clinic visit or your schedule please contact [facility] directly at [phone].  Normal or non-critical lab and imaging results will be communicated to you by MyChart, letter or phone within 4 business days after the clinic has received the results. If you do not hear from us within 7 days, please contact the clinic through MyChart or phone. If you have a critical or abnormal lab result, we will notify you by phone as soon as possible.  Submit refill requests through [employer] or call your pharmacy and they will forward the refill request to us. Please allow 3 business days for your refill to be completed.          Additional  Information About Your Visit        jellyfishhart Information     [employer] gives you secure access to your electronic health record. If you see a primary care provider, you can also send messages to your care team and make appointments. If you have questions, please call your primary care clinic.  If you do not have a primary care provider, please call [phone] and they will assist you.        Care EveryWhere ID     This is your Care EveryWhere ID. This could be used by other organizations to access your [facility] medical records  [id number]         Blood Pressure from Last 3 Encounters:   06/27/17 117/69   06/21/17 (!) 152/92   06/21/17 (!) 152/92    Weight from Last 3 Encounters:   06/27/17 56.7 kg (125 lb)   06/21/17 57.9 kg (127 lb 11.2 oz)   06/20/17 57.9 kg (127 lb 11.2 oz)              We Performed the Following     CBC with platelets differential        Primary Care Provider Office Phone # Fax #    Sanket Gualberto [phone]       [place] [address] [place] [id number]        Equal Access to Services     ARMANDO SHAH : Hadii chalo Davis, wacatherineda joo, qaybta albert abdi, jonny villa. So [facility] [phone].    ATENCIÓN: Si habla español, tiene a todd disposición servicios gratGreatCallos de asistencia lingüística. [facility] [phone].    We comply with applicable federal civil rights laws and [place] laws. We do not discriminate on the basis of race, color, national origin, age, disability sex, sexual orientation or gender identity.            Thank you!     Thank you for choosing [facility] CANCER [facility]  for your care. Our goal is always to provide you with excellent care. Hearing back from our patients is one way we can continue to improve our services. Please take a few minutes to complete the written survey that you may receive in the mail after your visit with us. Thank you!             Your Updated  Medication List - Protect others around you: Learn how to safely use, store and throw away your medicines at www.disposemymeds.org.          This list is accurate as of: 6/27/17  6:03 PM.  Always use your most recent med list.                   Brand Name Dispense Instructions for use Diagnosis    acetaminophen 500 MG tablet    TYLENOL     Take 2 tablets (1,000 mg) by mouth every 6 hours as needed for mild pain    Acute bilateral low back pain without sciatica, Multiple myeloma in relapse (H)       acyclovir 400 MG tablet    ZOVIRAX    60 tablet    Take 1 tablet (400 mg) by mouth 2 times daily    Multiple myeloma in relapse (H)       amLODIPine 5 MG tablet    NORVASC    30 tablet    Take one tablet at bedtime.    Essential hypertension       artificial saliva Liqd liquid      Swish and spit 3-5 mLs in mouth 4 times daily as needed for dry mouth    Dry mouth       clopidogrel 75 MG tablet    PLAVIX    30 tablet    Take 1 tablet (75 mg) by mouth daily    History of stroke       esomeprazole 40 MG CR capsule    nexIUM    30 capsule    Take 1 capsule (40 mg) by mouth every morning (before breakfast)    Gastroesophageal reflux disease without esophagitis       HYDROmorphone 2 MG tablet    DILAUDID    30 tablet    Take 0.5 tablets (1 mg) by mouth every 3 hours as needed for moderate to severe pain    Multiple myeloma in relapse (H), Acute bilateral low back pain without sciatica       lidocaine 5 % Patch    LIDODERM    30 patch    Place 3 patches onto the skin every 24 hours    Multiple myeloma in relapse (H), Acute bilateral low back pain without sciatica       LORazepam 0.5 MG tablet    ATIVAN    30 tablet    Take 1 tablet (0.5 mg) by mouth every 6 hours as needed for nausea or anxiety.    Multiple myeloma in relapse (H), Delirium       midodrine 2.5 MG tablet    PROAMATINE    90 tablet    Take 1 tablet (2.5 mg) by mouth 3 times daily . HOLD due to elevated blood pressures during admission. Continue to hold until  further advised by outpatient clinic team or if develops recurrent orthostatic hypotension.    Orthostatic hypotension, Orthostatic syncope       OLANZapine 5 MG tablet    zyPREXA    60 tablet    Take 1 tablet (5 mg) by mouth 2 times daily    Delirium, Multiple myeloma in relapse (H)       ondansetron 8 MG ODT tab    ZOFRAN-ODT    30 tablet    Take 1 tablet (8 mg) by mouth every 8 hours as needed for nausea    Nausea       potassium chloride SA 20 MEQ CR tablet    K-DUR/KLOR-CON M    60 tablet    TAKE 1 TABLET BY MOUTH TWICE DAILY.    Hypokalemia       simethicone 80 MG chewable tablet    MYLICON    180 tablet    Take 2 tablets (160 mg) by mouth 4 times daily as needed for cramping or other (gas pain)    Nausea       simvastatin 5 MG tablet    ZOCOR    30 tablet    Take 4 tablets (20 mg) by mouth daily    Mixed hyperlipidemia       sucralfate 1 GM tablet    CARAFATE    120 tablet    Take 1 tablet (1 g) by mouth 4 times daily as needed    Gastroesophageal reflux disease without esophagitis       traMADol 50 MG tablet    ULTRAM    28 tablet    Take 0.5-1 tablets (25-50 mg) by mouth every 6 hours as needed for moderate pain . Recommend trying after Tylenol and before Dilaudid.    Acute bilateral low back pain without sciatica, Multiple myeloma in relapse (H)

## 2017-06-27 NOTE — PROGRESS NOTES
Infusion Nursing Note:  Anthony Carbone presents today for Cycle 2 Day 15 Kyprolis.    Patient seen by provider today: Yes: Leanne AMARO   present during visit today: Not Applicable    Intravenous Access:  Implanted Port.    Treatment Conditions:  Lab Results   Component Value Date    HGB 9.4 06/27/2017     Lab Results   Component Value Date    WBC 7.7 06/27/2017      Lab Results   Component Value Date    ANEU 6.6 06/27/2017     Lab Results   Component Value Date    PLT 54 06/27/2017      Lab Results   Component Value Date     06/13/2017                   Lab Results   Component Value Date    POTASSIUM 4.0 06/13/2017           Lab Results   Component Value Date    MAG 2.2 05/25/2017            Lab Results   Component Value Date    CR 0.88 06/13/2017                   Lab Results   Component Value Date    JAZMIN 9.2 06/13/2017                Lab Results   Component Value Date    BILITOTAL 0.4 06/13/2017           Lab Results   Component Value Date    ALBUMIN 3.0 06/13/2017                    Lab Results   Component Value Date    ALT 20 06/13/2017           Lab Results   Component Value Date    AST 18 06/13/2017     Results reviewed, labs MET treatment parameters, ok to proceed with treatment.    Kyprolis stop time - 1800    Post Infusion Assessment:  Patient tolerated infusion without incident.  Blood return noted pre and post infusion.  Site patent and intact, free from redness, edema or discomfort.  No evidence of extravasations.  Access discontinued per protocol.    Discharge Plan:   Patient declined prescription refills.  Copy of AVS reviewed with patient and/or family.  Patient will return tomorrow for next appointment.  Patient discharged in stable condition accompanied by: wife.  Departure Mode: Ambulatory.  Face to Face time: 0.    Estrella Chou RN

## 2017-06-27 NOTE — PATIENT INSTRUCTIONS
[facility] Main Line: [phone]    Call triage nurse with chills and/or temperature greater than or equal to 100.4, uncontrolled nausea/vomiting, diarrhea, constipation, dizziness, shortness of breath, chest pain, bleeding, unexplained bruising, or any new/concerning symptoms, questions/concerns.   If you are having any concerning symptoms or wish to speak to a provider before your next infusion visit, please call your care coordinator or triage to notify them so we can adequately serve you.   Triage Nurse Line: [phone]    If after hours, weekends, or holidays, call main hospital  and ask for Oncology doctor on call @ [phone]               June 2017 Sunday Monday Tuesday Wednesday Thursday Friday Saturday                       1     2     3       4     5     6     [facility]RAW    1:15 PM   (15 min.)    [facility] DRAW   [facility] Lab Draw     [facility] RETURN    1:25 PM   (50 min.)   Leanne Reddy PA-C   [facility] 7     8     9     10       11     12     13     [facility]    8:45 AM   (15 min.)   [facility]   [facility] Draw     [facility] RETURN    9:05 AM   (50 min.)   Leanne Reddy PA-C   [facility]     [facility] ONC INFUSION 60   10:00 AM   (60 min.)    ONCOLOGY INFUSION   [facility] 14     [facility] ONC INFUSION 60    2:00 PM   (60 min.)    ONCOLOGY INFUSION   [facility] 15     16     17       18     19     20     [facility] DRAW    1:30 PM   (15 min.)   [facility] DRAW   [facility] Lab Draw     [facility] ONC INFUSION 60    2:00 PM   (60 min.)    ONCOLOGY INFUSION   [facility] 21     [facility] ONC INFUSION 60    2:00 PM   (60 min.)    ONCOLOGY INFUSION   [facility]     [facility] ONC RETURN    4:00 PM   (30 min.)   Kamari Topete MD   [facility] Blood and Marrow Transplant 22     23     24       25      26     27     [facility] DRAW    3:45 PM   (15 min.)    [facility] DRAW   [facility] Lab Draw     [facility] RETURN    3:55 PM   (50 min.)   Leanne Reddy PA-C   [facility]     [facility] ONC INFUSION 60    4:30 PM   (60 min.)    ONCOLOGY INFUSION   [facility] 28     [facility] DRAW    3:30 PM   (15 min.)    [facility] DRAW   [facility] Draw     [facility] ONC INFUSION 60    4:00 PM   (60 min.)   [facility] ONCOLOGY INFUSION   [facility] 29 30 July 2017 Sunday Monday Tuesday Wednesday Thursday Friday Saturday                                 1       2     3     4     5     6     7     8       9     10     11     [facility] DRAW    2:45 PM   (15 min.)    [facility] DRAW   [facility] Lab Draw     [facility] RETURN    3:05 PM   (50 min.)   Leanne Reddy PA-C   [facility]     [facility] ONC INFUSION 60    4:00 PM   (60 min.)   [facility] ONCOLOGY INFUSION   [facility] 12     [facility] DRAW    3:30 PM   (15 min.)    [facility] DRAW   [facility] Lab Draw     [facility] ONC INFUSION 60    4:00 PM   (60 min.)    ONCOLOGY INFUSION   [facility] 13     14     15       16     17     18     [facility] DRAW    2:30 PM   (15 min.)    [facility] DRAW   [facility] Lab Draw     [facility] ONC INFUSION 60    3:00 PM   (60 min.)    ONCOLOGY INFUSION   [facility] 19     [facility] ONC INFUSION 60    3:00 PM   (60 min.)    ONCOLOGY INFUSION   [facility] 20     21     22       23     24     25     [facility] RETURN   11:35 AM   (50 min.)   Leanne Reddy PA-C   [facility]     [facility] ONC INFUSION 60    1:00 PM   (60 min.)    ONCOLOGY INFUSION   [facility] 26     27     28     29       30     31     [facility] RETURN ARRHYTHMIA    5:45 PM   (20 min.)   Dmitri Banks MD   [facility]                                       Lab Results:  Recent Results (from the past 12 hour(s))   CBC with platelets differential    Collection Time: 06/27/17  4:02 PM   Result Value Ref Range    WBC 7.7 4.0 - 11.0 10e9/L    RBC Count 2.98 (L) 4.4 - 5.9 10e12/L    Hemoglobin 9.4 (L) 13.3 - 17.7 g/dL    Hematocrit 30.3 (L) 40.0 - 53.0 %     (H) 78 - 100 fl    MCH 31.5 26.5 - 33.0 pg    MCHC 31.0 (L) 31.5 - 36.5 g/dL    RDW 18.5 (H) 10.0 - 15.0 %    Platelet Count 54 (L) 150 - 450 10e9/L    Diff Method Automated Method     % Neutrophils 84.7 %    % Lymphocytes 7.8 %    % Monocytes 6.2 %    % Eosinophils 0.4 %    % Basophils 0.3 %    % Immature Granulocytes 0.6 %    Nucleated RBCs 0 0 /100    Absolute Neutrophil 6.6 1.6 - 8.3 10e9/L    Absolute Lymphocytes 0.6 (L) 0.8 - 5.3 10e9/L    Absolute Monocytes 0.5 0.0 - 1.3 10e9/L    Absolute Eosinophils 0.0 0.0 - 0.7 10e9/L    Absolute Basophils 0.0 0.0 - 0.2 10e9/L    Abs Immature Granulocytes 0.1 0 - 0.4 10e9/L    Absolute Nucleated RBC 0.0

## 2017-06-27 NOTE — PROGRESS NOTES
HEMATOLOGY/ONCOLOGY PROGRESS NOTE  Jun 27, 2017    REASON FOR VISIT: myeloma    Diagnosis: 71 year old gentleman with IgM lambda multiple myeloma originally diagnosed in 01/2012 as stage I, standard risk disease.   Treatment: Revlimid plus dexamethasone for 4-5 cycles but plateaued by late 03/2012.   - Velcade was added and he received another 2-3 cycles, achieving a good partial remission.      Transplant: Single auto after melphalan 200 mg/m2 preparative regimen on 01/09/2013  - Post-transplant course: unremarkable except for some mild steroid-induced hyperglycemia, gastritis, nausea and vomiting.      Maintenance: Lenalidomide at day-100 then developed a maculopapular rash. Lenalidomide was held and then we re-challenged him after about a month to 2 months at a lower dose (5 mg daily); rash returned.   - was started on maintenance Velcade, every other week through July 2014, when he was noted with abnormalities on myeloma studies drawn there. Noted with prostate cancer dx at about the time of relapse (see below).     Relapse: noted with return on M-spike in blood/urine with marrow involvement but negative PET.   - Started on retreatment with RVD on 8/27/14 with Revlimid at 15 mg 14 days of 21 days, dexamethasone 40 mg weekly and velcade weekly.Completed at total of 5 cycles without complication by 12/2014.   - Started Cycle 6 on inc'd Rev dosing of 20mg daily x 2 weeks on 12/11/14 which was complicated by pneumonia.  - Adm 12/21-1/10 for human metapneumovirus pneumonia complicated by anorexia, HTN, depression, anorexia with significant weight loss.   - Restarted Ernesto/Dex only on 2/4/15 with good tolerance but with thrombocytopenia.   - Bendamustine added to Velcade/dexamethasone on 5/21/15 due to disease progression  - Velcade discontinued on 7/9/2015 due to side effects (orthostasis)  - Schedule changed to bendamustine 80 mg/m2 days 1 and 2 on 28-day cycle  - Cycle 1 tolerated poorly due to lightheadedness and  "weakness  - Cycle 2 dose reduced to 60 mg/m2 and pre-meds adjusted  - Cycle 5 received on 9/17/15.  - 10/1/2015 - increasing IgM, M-spike - started Pomalidomide 4mg/day (21 days out of 28 days) and weekly Decadron 20mg on Oct 1st, 2015.    - Carfilzomib added on 10/22/2015 making this CPD.  - C3-C6  received in [place]    - Returned to [facility] and resumed CPD here    - Adm: 4/12-4/14 with fever, confusion, neutropenia. Noted on MRI to have acute/subacute CVA and subacute/chronic CVA; started on Plavix, given brief course of Abx and GCSF prior to d/c.     - Continued on Carfilzomib and dexamethasone alone  - Pomalyst added back on 7/13/2016 for rising FLC  - Start daratumumab 11/10/16. Changed to every other week after 4 weekly treatments d/t profound fatigue and malaise.   - Adm 5/7-5/16 due to AMS, hypercalcemia, hyperuricemia, possible PNA, back pain, and JOSE MARIA. Started Kyprolis while inpatient.  - Re-admitted 5/25-/529 with recurrent AMS, found to have concomitant PNA  - Resumed Kyprolis 6/13/2017      INTERVAL HISTORY:    Yamil presents with his wife for follow-up today.  He is overall doing well today with the exception of fatigue and back pain that started after an excursion to [place] recently. He had been having some fatigue with Kyprolis, but after this [place] trip, he was really wiped out. Improving, though. He also felt his back pain flared up with this visit. He has been doing the Tramadol 25 mg as needed, taking 1-2 times per day. This helps but often feels he is playing catch up with the pain. Remains without bowel/bladder dysfunction or any radicular symptoms. His wife tells me he actually did the entire trip to [place] without a walker, which may have been a little too much for him. He gets agitated and a little \"out of it\" when he took a dose of dilaudid the night they went to [place], so they haven't been using it. No confusion or AMS, but still needing reminders about what the plans are for the " following day. Otherwise, no concerns. He hasn't had any dizziness, lightheadedness, fevers, chills, headaches, falls, vision changes, nausea, vomiting, GERD, abdominal pain, bowel changes, bleeding, or swelling. Remainder of ROS otherwise negative.    PHYSICAL EXAMINATION  There were no vitals taken for this visit.    Wt Readings from Last 10 Encounters:   06/21/17 57.9 kg (127 lb 11.2 oz)   06/20/17 57.9 kg (127 lb 11.2 oz)   06/14/17 57.9 kg (127 lb 9.6 oz)   06/13/17 57.6 kg (127 lb)   06/06/17 57.2 kg (126 lb)   05/29/17 56.7 kg (124 lb 14.4 oz)   05/23/17 59.2 kg (130 lb 8 oz)   05/19/17 59.1 kg (130 lb 6.4 oz)   05/16/17 59.1 kg (130 lb 3.2 oz)   01/26/17 64.6 kg (142 lb 6.4 oz)   General: AxOx4. Able to ambulate independently, albeit slow and cautiously. Using walker for longer distances. No acute distress. HEENT: PERRL, no palor or icterus. CVS: RRR. CHEST: CTAB, normal work of breathing ABDOMEN: soft non tender no masses palpated in a seated position.  NEURO: AAOX3  Grossly non focal neuro exam. SKIN: no obvious rashes. EXTREMITIES: No edema.     LABS:   Results for WILLIAN ARCINIEGA (MRN [id number]) as of 6/27/2017 17:35   Ref. Range 6/20/2017 14:03 6/27/2017 16:02   WBC Latest Ref Range: 4.0 - 11.0 10e9/L 4.2 7.7   Hemoglobin Latest Ref Range: 13.3 - 17.7 g/dL 9.0 (L) 9.4 (L)   Hematocrit Latest Ref Range: 40.0 - 53.0 % 28.7 (L) 30.3 (L)   Platelet Count Latest Ref Range: 150 - 450 10e9/L 68 (L) 54 (L)       IMPRESSION/PLAN:  71 year old male with relapsed MM after autologous transplant (1/9/2013), with recent progression on daratumumab/pom/dex, with recent hospitalization 5/7-5/16 for back pain, JOSE MARIA,and  Hypercalcemia. Started on kyprolis IV 5/11 as well as po dex. He unfortunately developed worsening AMS and was re-admitted 5/25-5/29, found to have PNA. Now back on single agent Kyprolis/dex.      MM: Previously on edgardo/pom/dex while wintering in [place], progressive disease on SPEP inpatient (M spike  0.9 --> 2.1, IgM 3410) Received solumedrol 100 mg IV daily 5/8-5/10. Started PO dex 40 mg daily x 3 days on 5/11 with Kyprolis 5/11 and 5/12, and 5/23, subsequent doses held for AMS and PNA.  - Resumed Kyprolis/dex on 6/13. Today is cycle 2 day 15. He is having fatigue related to treatment, and also to recent trip to the [place], but is feeling well to proceed today.    AMS: AMS started early may in setting of metabolic derangements, uremia, and possible infection. Initially resolved, then returned ~5/24. Work-up showing PNA and UCx with enterococcus and CONS.  - Appears stable today, coherent speech and answering questions appropriately, alert and oriented  - Continue Zyprexa 5 mg at bedtime  - Holding off long-acting pain meds    Heme: Cytopenias 2/2 treatment and likely MM in setting of progression. Platelets 54 k today.  - Continues on Plavix for history of CVA -- message sent to Dr. Topete regarding holding parameters  - Will likely need to monitor next week.    Renal/FEN: Creat baseline ~0.8-1.0  -Hx Hypercalcemia: s/p pamidronate x 1 on 5/8  -Hx Hyperuricemia: s/p rasburicase x 1 on 5/8  -Encouraged protein/calorie supplements    ID: PNA and positive urine cx inpatient, presently afebrile w/o localizing s/s  - Completed course of Augmentin PO BID 6/4  - Continues ppx  mg BID    Back/rib pain: Started early May. Lumbar MRI at OSH showing mild-mod central and foraminal stenoses in lumbar spine, per patient and wife, there was no MM lesion there. Rib films inpatient negative, back films stable from prior imaging.  - Continues tramadol PRN and Tylenol PRN. Recommended he do the Tramadol 50 mg PRN scheduled when the pain is flaring up and prior to PT or excursions out of the home.    CV: Continue Norvasc and Zocor.   - Avoid QTc prolonging agents (history of QTc prolongation with syncopal episodes)   Deconditioning: Improving. Continue PT/OT        Leanne Reddy PA-C    Addendum:  Plan to hold  Plavix for platelets < 50. Because they were borderline on Tuesday, I asked Yamil and Casey to hold Yamil's Plavix for now, given his platelets will likely drop further. We will hold until his next appointment; he has been on Plavix for a presumed CVA in April 2016.  ECT

## 2017-06-27 NOTE — NURSING NOTE
"Oncology Rooming Note    June 27, 2017 4:21 PM   Anthony Carbone is a 71 year old male who presents for:    Chief Complaint   Patient presents with     Port Draw     Labs Drawn      Oncology Clinic Visit     Multiple myeloma- f/u     Initial Vitals: /69  Pulse 104  Temp 97.3  F (36.3  C) (Oral)  Resp 14  Ht 1.626 m (5' 4.02\")  Wt 56.7 kg (125 lb)  SpO2 98%  BMI 21.45 kg/m2 Estimated body mass index is 21.45 kg/(m^2) as calculated from the following:    Height as of this encounter: 1.626 m (5' 4.02\").    Weight as of this encounter: 56.7 kg (125 lb). Body surface area is 1.6 meters squared.  Severe Pain (7) Comment: back    No LMP for male patient.  Allergies reviewed: Yes  Medications reviewed: Yes    Medications: Medication refills not needed today.  Pharmacy name entered into [facility]:    [id number] - [place] [id number] [facility] [address] [facility]  [place], [place] - [address]  [facility] [address] [place], [place] - [address] AT [facility] [address]    Clinical concerns: no clinical concerns  provider was notified.    7 minutes for nursing intake (face to face time)     Paty Irvin CMA              "

## 2017-06-28 ENCOUNTER — INFUSION THERAPY VISIT (OUTPATIENT)
Dept: ONCOLOGY | Facility: CLINIC | Age: 72
End: 2017-06-28
Attending: INTERNAL MEDICINE
Payer: MEDICARE

## 2017-06-28 VITALS
HEART RATE: 91 BPM | DIASTOLIC BLOOD PRESSURE: 96 MMHG | RESPIRATION RATE: 16 BRPM | SYSTOLIC BLOOD PRESSURE: 158 MMHG | TEMPERATURE: 98.3 F

## 2017-06-28 DIAGNOSIS — C90.02 MULTIPLE MYELOMA IN RELAPSE (H): Primary | ICD-10-CM

## 2017-06-28 PROCEDURE — 25000128 H RX IP 250 OP 636: Mod: ZF | Performed by: PHYSICIAN ASSISTANT

## 2017-06-28 PROCEDURE — 96367 TX/PROPH/DG ADDL SEQ IV INF: CPT

## 2017-06-28 PROCEDURE — 96413 CHEMO IV INFUSION 1 HR: CPT

## 2017-06-28 RX ORDER — HEPARIN SODIUM (PORCINE) LOCK FLUSH IV SOLN 100 UNIT/ML 100 UNIT/ML
5 SOLUTION INTRAVENOUS EVERY 8 HOURS
Status: DISCONTINUED | OUTPATIENT
Start: 2017-06-28 | End: 2017-06-28 | Stop reason: HOSPADM

## 2017-06-28 RX ADMIN — CARFILZOMIB 44 MG: 60 INJECTION, POWDER, LYOPHILIZED, FOR SOLUTION INTRAVENOUS at 16:42

## 2017-06-28 RX ADMIN — DEXAMETHASONE SODIUM PHOSPHATE 4 MG: 10 INJECTION, SOLUTION INTRAMUSCULAR; INTRAVENOUS at 16:17

## 2017-06-28 RX ADMIN — SODIUM CHLORIDE 250 ML: 9 INJECTION, SOLUTION INTRAVENOUS at 16:17

## 2017-06-28 RX ADMIN — SODIUM CHLORIDE, PRESERVATIVE FREE 5 ML: 5 INJECTION INTRAVENOUS at 17:36

## 2017-06-28 NOTE — PROGRESS NOTES
"Infusion Nursing Note:  Anthony Carbone presents today for Cycle 2 Day 16 Kyprolis.    Patient seen by provider today: No   present during visit today: Not Applicable.    Note: Pt denies complaints today, reports that he is feeling \"fair\", and that his pain is \"medium\" today. Per patient's wife, he was up over night due to the steroid, but took a long nap today.     Intravenous Access:  Implanted Port.    Treatment Conditions:  Lab Results   Component Value Date    HGB 9.4 06/27/2017     Lab Results   Component Value Date    WBC 7.7 06/27/2017      Lab Results   Component Value Date    ANEU 6.6 06/27/2017     Lab Results   Component Value Date    PLT 54 06/27/2017      Lab Results   Component Value Date     06/13/2017                   Lab Results   Component Value Date    POTASSIUM 4.0 06/13/2017           Lab Results   Component Value Date    MAG 2.2 05/25/2017            Lab Results   Component Value Date    CR 0.88 06/13/2017                   Lab Results   Component Value Date    JAZMIN 9.2 06/13/2017                Lab Results   Component Value Date    BILITOTAL 0.4 06/13/2017           Lab Results   Component Value Date    ALBUMIN 3.0 06/13/2017                    Lab Results   Component Value Date    ALT 20 06/13/2017           Lab Results   Component Value Date    AST 18 06/13/2017     Results reviewed, labs MET treatment parameters on 6/26/17, ok to proceed with treatment.  Not Applicable.    Post Infusion Assessment:  Patient tolerated infusion without incident.  Blood return noted pre and post infusion.  Site patent and intact, free from redness, edema or discomfort.  No evidence of extravasations.  Access discontinued per protocol.    Discharge Plan:   Patient declined prescription refills.  Discharge instructions reviewed with: Patient and Family.  Patient and/or family verbalized understanding of discharge instructions and all questions answered.  AVS to patient via [employer]  " Patient will return 7/11/17 for next appointment.   Patient discharged in stable condition accompanied by: self and wife.  Departure Mode: Ambulatory.  Face to Face time: 0 min.    GELY Ceja Stop time: [id number]

## 2017-06-28 NOTE — MR AVS SNAPSHOT
After Visit Summary   6/28/2017    Anthony Carbone    MRN: [id number]           Patient Information     Date Of Birth          1945        Visit Information        Provider Department      6/28/2017 4:00 PM UC 12 ATC; UC ONCOLOGY INFUSION [facility]        Today's Diagnoses     Multiple myeloma in relapse (H)    -  1      Care Instructions    Contact Numbers    [facility] [place]: [phone]  [facility] Triage:  [phone]    Call triage with chills and/or temperature greater than or equal to 100.5, uncontrolled nausea/vomiting, diarrhea, constipation, dizziness, shortness of breath, chest pain, bleeding, unexplained bruising, or any new/concerning symptoms, questions/concerns.     If you are having any concerning symptoms or wish to speak to a provider before your next infusion visit, please call your care coordinator or triage to notify them so we can adequately serve you.       After Hours: [phone]    If after hours, weekends, or holidays, call main hospital  and ask for Oncology doctor on call.           June 2017 Sunday Monday Tuesday Wednesday Thursday Friday Saturday                       1     2     3       4     5     6     [facility]    1:15 PM   (15 min.)    [facility] LAB DRAW   [facility] Lab Draw     [facility] RETURN    1:25 PM   (50 min.)   Leanne Reddy PA-C   [facility] 7     8     9     10       11     12     13     [facility]    8:45 AM   (15 min.)    [facility] DRAW   [facility] Lab Draw     [facility] RETURN    9:05 AM   (50 min.)   Leanne Reddy PA-C   [facility]     [facility] ONC INFUSION 60   10:00 AM   (60 min.)    ONCOLOGY INFUSION   [facility] 14     [facility] ONC INFUSION 60    2:00 PM   (60 min.)    ONCOLOGY INFUSION   [facility] 15     16     17       18     19     20     [facility]    1:30 PM   (15 min.)     [facility] LAB DRAW   [facility] Lab Draw     UMP ONC INFUSION 60    2:00 PM   (60 min.)    ONCOLOGY INFUSION   [facility] 21     [facility] ONC INFUSION 60    2:00 PM   (60 min.)    ONCOLOGY INFUSION   [facility]     UMP ONC RETURN    4:00 PM   (30 min.)   Kamari Topete MD   [facility] Blood and Marrow Transplant 22     23     24       25     26     27     [facility]    3:45 PM   (15 min.)    [facility]     [facility] RETURN    3:55 PM   (50 min.)   Leanne Reddy PA-C   [facility]     [facility] ONC INFUSION 60    4:30 PM   (60 min.)    ONCOLOGY INFUSION   [facility] 28     [facility] ONC INFUSION 60    4:00 PM   (60 min.)    ONCOLOGY INFUSION   [facility] 29 30 July 2017 Sunday Monday Tuesday Wednesday Thursday Friday Saturday                                 1       2     3     4     5     6     7     8       9     10     11     [facility] D[facility]    2:45 PM   (15 min.)    [facility] LAB DRAW   [facility] Lab Draw     UMP RETURN    3:05 PM   (50 min.)   Leanne Reddy PA-C   [facility]     UMP ONC INFUSION 60    4:00 PM   (60 min.)    ONCOLOGY INFUSION   [facility] 12     [facility] DRAW    3:30 PM   (15 min.)    [facility]     [facility] ONC INFUSION 60    4:00 PM   (60 min.)   UC ONCOLOGY INFUSION   [facility] 13     14     15       16     17     18     [facility] DRAW    2:30 PM   (15 min.)    [facility]   [facility]     [facility] ONC INFUSION 60    3:00 PM   (60 min.)    ONCOLOGY INFUSION   [facility] 19     [facility] ONC INFUSION 60    3:00 PM   (60 min.)    ONCOLOGY INFUSION   [facility] 20     21     22       23     24     25     [facility]   11:15 AM    (15 min.)   [facility] DRAW   [facility] Lab Draw     UMP RETURN   11:35 AM   (50 min.)   Leanne Reddy PA-C   [facility]     UMP ONC INFUSION 60    1:00 PM   (60 min.)   UC ONCOLOGY INFUSION   [facility] 26     27     28     29       30     31     UMP RETURN ARRHYTHMIA    5:45 PM   (20 min.)   Dmitri Banks MD   [facility]                                       Lab Results:  No results found for this or any previous visit (from the past 12 hour(s)).            Follow-ups after your visit        Your next 10 appointments already scheduled     Jul 11, 2017  2:45 PM CDT   [facility] Draw with [facility]RAW   [facility]    [address] [place] [id number]   [phone]            Jul 11, 2017  3:20 PM CDT   (Arrive by 3:05 PM)   Return Visit with Leanne Reddy PA-C   [facility] ([facility])    [address] [place] [address]   [phone]            Jul 11, 2017  4:00 PM CDT   Infusion 60 with UC ONCOLOGY INFUSION, UC 12 ATC   [facility] ([facility])    [address] [address]   [phone]            Jul 12, 2017  3:30 PM CDT   [facility] Lab Draw with [facility] LAB DRAW   [facility] Lab Draw ([facility])    [address] [address]   [phone]            Jul 12, 2017  4:00 PM CDT   Infusion 60 with UC ONCOLOGY INFUSION, UC 12 ATC   [facility] ([facility])    [address] [address]   [phone]            Jul 18, 2017  2:30 PM CDT   [facility] Lab Draw with [facility] LAB DRAW   [facility] Lab Draw ([facility])    [address] [facility] [address] [phone]            Jul 18, 2017  3:00 PM CDT   Infusion 60 with UC ONCOLOGY INFUSION, UC 15 ATC   [facility] ([facility])    [address]  [address] [facility] [id number] [phone]              Who to contact     If you have questions or need follow up information about today's clinic visit or your schedule please contact [facility] directly at [phone].  Normal or non-critical lab and imaging results will be communicated to you by [email or web address], letter or phone within 4 business days after the clinic has received the results. If you do not hear from us within 7 days, please contact the clinic through Veacont or phone. If you have a critical or abnormal lab result, we will notify you by phone as soon as possible.  Submit refill requests through The Dodo or call your pharmacy and they will forward the refill request to us. Please allow 3 business days for your refill to be completed.          Additional Information About Your Visit        [email or web address] Information     The Dodo gives you secure access to your electronic health record. If you see a primary care provider, you can also send messages to your care team and make appointments. If you have questions, please call your primary care clinic.  If you do not have a primary care provider, please call [phone] and they will assist you.        Care EveryWhere ID     This is your Care EveryWhere ID. This could be used by other organizations to access your [place] medical records  [id number]        Your Vitals Were     Pulse Temperature Respirations             91 98.3  F (36.8  C) (Oral) 16          Blood Pressure from Last 3 Encounters:   06/28/17 (!) 158/96   06/27/17 117/69   06/21/17 (!) 152/92    Weight from Last 3 Encounters:   06/27/17 56.7 kg (125 lb)   06/21/17 57.9 kg (127 lb 11.2 oz)   06/20/17 57.9 kg (127 lb 11.2 oz)               Today, you had the following     No orders found for display       Primary Care Provider Office Phone # Fax #    Sanket Burciaga [phone]       [facility] [address] [facility] [address]        Equal Access to Services     ALBAN SHAH : Hadii aad ku hadyamelluh Soomaali, waaxda luqadaha, qaybta kaalmada adeinesda, jonny paola isidrokimberly pricepetrmartha villa. So [facility] [phone].    ATENCIÓN: Si habla español, tiene a todd disposición servicios gratuitos de asistencia lingüística. Llame al [phone].    We comply with applicable federal civil rights laws and [place] laws. We do not discriminate on the basis of race, color, national origin, age, disability sex, sexual orientation or gender identity.            Thank you!     Thank you for choosing [facility]  for your care. Our goal is always to provide you with excellent care. Hearing back from our patients is one way we can continue to improve our services. Please take a few minutes to complete the written survey that you may receive in the mail after your visit with us. Thank you!             Your Updated Medication List - Protect others around you: Learn how to safely use, store and throw away your medicines at www.disposemymeds.org.          This list is accurate as of: 6/28/17  7:05 PM.  Always use your most recent med list.                   Brand Name Dispense Instructions for use Diagnosis    acetaminophen 500 MG tablet    TYLENOL     Take 2 tablets (1,000 mg) by mouth every 6 hours as needed for mild pain    Acute bilateral low back pain without sciatica, Multiple myeloma in relapse (H)       acyclovir 400 MG tablet    ZOVIRAX    60 tablet    Take 1 tablet (400 mg) by mouth 2 times daily    Multiple myeloma in relapse (H)       amLODIPine 5 MG tablet    NORVASC    30 tablet    Take one tablet at bedtime.    Essential hypertension       artificial saliva Liqd liquid      Swish and  spit 3-5 mLs in mouth 4 times daily as needed for dry mouth    Dry mouth       clopidogrel 75 MG tablet    PLAVIX    30 tablet    Take 1 tablet (75 mg) by mouth daily    History of stroke       esomeprazole 40 MG CR capsule    nexIUM    30 capsule    Take 1 capsule (40 mg) by mouth every morning (before breakfast)    Gastroesophageal reflux disease without esophagitis       HYDROmorphone 2 MG tablet    DILAUDID    30 tablet    Take 0.5 tablets (1 mg) by mouth every 3 hours as needed for moderate to severe pain    Multiple myeloma in relapse (H), Acute bilateral low back pain without sciatica       lidocaine 5 % Patch    LIDODERM    30 patch    Place 3 patches onto the skin every 24 hours    Multiple myeloma in relapse (H), Acute bilateral low back pain without sciatica       LORazepam 0.5 MG tablet    ATIVAN    30 tablet    Take 1 tablet (0.5 mg) by mouth every 6 hours as needed for nausea or anxiety.    Multiple myeloma in relapse (H), Delirium       midodrine 2.5 MG tablet    PROAMATINE    90 tablet    Take 1 tablet (2.5 mg) by mouth 3 times daily . HOLD due to elevated blood pressures during admission. Continue to hold until further advised by outpatient clinic team or if develops recurrent orthostatic hypotension.    Orthostatic hypotension, Orthostatic syncope       OLANZapine 5 MG tablet    zyPREXA    60 tablet    Take 1 tablet (5 mg) by mouth 2 times daily    Delirium, Multiple myeloma in relapse (H)       ondansetron 8 MG ODT tab    ZOFRAN-ODT    30 tablet    Take 1 tablet (8 mg) by mouth every 8 hours as needed for nausea    Nausea       potassium chloride SA 20 MEQ CR tablet    K-DUR/KLOR-CON M    60 tablet    TAKE 1 TABLET BY MOUTH TWICE DAILY.    Hypokalemia       simethicone 80 MG chewable tablet    MYLICON    180 tablet    Take 2 tablets (160 mg) by mouth 4 times daily as needed for cramping or other (gas pain)    Nausea       simvastatin 5 MG tablet    ZOCOR    30 tablet    Take 4 tablets (20 mg) by  mouth daily    Mixed hyperlipidemia       sucralfate 1 GM tablet    CARAFATE    120 tablet    Take 1 tablet (1 g) by mouth 4 times daily as needed    Gastroesophageal reflux disease without esophagitis       traMADol 50 MG tablet    ULTRAM    28 tablet    Take 0.5-1 tablets (25-50 mg) by mouth every 6 hours as needed for moderate pain . Recommend trying after Tylenol and before Dilaudid.    Acute bilateral low back pain without sciatica, Multiple myeloma in relapse (H)

## 2017-06-28 NOTE — PATIENT INSTRUCTIONS
Contact Numbers    [facility] Main Line: [phone]  [facility] Triage:  [phone]    Call triage with chills and/or temperature greater than or equal to 100.5, uncontrolled nausea/vomiting, diarrhea, constipation, dizziness, shortness of breath, chest pain, bleeding, unexplained bruising, or any new/concerning symptoms, questions/concerns.     If you are having any concerning symptoms or wish to speak to a provider before your next infusion visit, please call your care coordinator or triage to notify them so we can adequately serve you.       After Hours: [phone]    If after hours, weekends, or holidays, call main hospital  and ask for Oncology doctor on call.           June 2017 Sunday Monday Tuesday Wednesday Thursday Friday Saturday                       1     2     3       4     5     6     [facility] D[facility]    1:15 PM   (15 min.)   [facility] DRAW   [facility] Lab Draw     [facility] RETURN    1:25 PM   (50 min.)   Leanne Reddy PA-C   [facility] 7     8     9     10       11     12     13     [facility] DRAW    8:45 AM   (15 min.)   [facility] DRAW   [facility] Lab Draw     UMP RETURN    9:05 AM   (50 min.)   Leanne Reddy PA-C   [facility]     UMP ONC INFUSION 60   10:00 AM   (60 min.)    ONCOLOGY INFUSION   [facility] 14     [facility] ONC INFUSION 60    2:00 PM   (60 min.)    ONCOLOGY INFUSION   [facility] 15     16     17       18     19     20     [facility] DRAW    1:30 PM   (15 min.)   [facility] DRAW   [facility] Lab Draw     [facility] ONC INFUSION 60    2:00 PM   (60 min.)   UC ONCOLOGY INFUSION   [facility] 21     [facility] ONC INFUSION 60    2:00 PM   (60 min.)    ONCOLOGY INFUSION   [facility]     UMP ONC RETURN    4:00 PM   (30 min.)   Kamari Topete MD   [facility] Blood and Marrow Transplant 22     23     24       25      26     27     [facility] DRAW    3:45 PM   (15 min.)   [facility] DRAW   [facility] Lab Draw     UMP RETURN    3:55 PM   (50 min.)   Leanne Reddy PA-C   [facility]     UMP ONC INFUSION 60    4:30 PM   (60 min.)    ONCOLOGY INFUSION   [facility] 28     UMP ONC INFUSION 60    4:00 PM   (60 min.)   UC ONCOLOGY INFUSION   [facility] 29 30 July 2017 Sunday Monday Tuesday Wednesday Thursday Friday Saturday                                 1       2     3     4     5     6     7     8       9     10     11     [facility] DRAW    2:45 PM   (15 min.)    M[facility]IC LAB DRAW   [facility] Lab Draw     UMP RETURN    3:05 PM   (50 min.)   Leanne Reddy PA-C   [facility]     UMP ONC INFUSION 60    4:00 PM   (60 min.)    ONCOLOGY INFUSION   [facility] 12     [facility] DRAW    3:30 PM   (15 min.)    [facility]IC LAB DRAW   [facility] Lab Draw     UMP ONC INFUSION 60    4:00 PM   (60 min.)    ONCOLOGY INFUSION   [facility] 13     14     15       16     17     18     [facility] DRAW    2:30 PM   (15 min.)    [facility] LAB DRAW   [facility] Lab Draw     [facility] ONC INFUSION 60    3:00 PM   (60 min.)    ONCOLOGY INFUSION   [facility] 19     [facility] ONC INFUSION 60    3:00 PM   (60 min.)    ONCOLOGY INFUSION   [facility] 20     21     22       23     24     25     [facility] DRAW   11:15 AM   (15 min.)    [facility] LAB DRAW   [facility] Lab Draw     UMP RETURN   11:35 AM   (50 min.)   eLanne Reddy PA-C   [facility]     UMP ONC INFUSION 60    1:00 PM   (60 min.)    ONCOLOGY INFUSION   [facility] 26     27     28     29       30     31     UMP RETURN ARRHYTHMIA    5:45 PM   (20 min.)   Dmitri Banks MD   [facility]                                       Lab Results:  No results found for this or any previous visit (from the past 12 hour(s)).

## 2017-07-11 NOTE — PROGRESS NOTES
HEMATOLOGY/ONCOLOGY PROGRESS NOTE  Jul 11, 2017    REASON FOR VISIT: myeloma    Diagnosis: 71 year old gentleman with IgM lambda multiple myeloma originally diagnosed in 01/2012 as stage I, standard risk disease.   Treatment: Revlimid plus dexamethasone for 4-5 cycles but plateaued by late 03/2012.   - Velcade was added and he received another 2-3 cycles, achieving a good partial remission.      Transplant: Single auto after melphalan 200 mg/m2 preparative regimen on 01/09/2013  - Post-transplant course: unremarkable except for some mild steroid-induced hyperglycemia, gastritis, nausea and vomiting.      Maintenance: Lenalidomide at day-100 then developed a maculopapular rash. Lenalidomide was held and then we re-challenged him after about a month to 2 months at a lower dose (5 mg daily); rash returned.   - was started on maintenance Velcade, every other week through July 2014, when he was noted with abnormalities on myeloma studies drawn there. Noted with prostate cancer dx at about the time of relapse (see below).     Relapse: noted with return on M-spike in blood/urine with marrow involvement but negative PET.   - Started on retreatment with RVD on 8/27/14 with Revlimid at 15 mg 14 days of 21 days, dexamethasone 40 mg weekly and velcade weekly.Completed at total of 5 cycles without complication by 12/2014.   - Started Cycle 6 on inc'd Rev dosing of 20mg daily x 2 weeks on 12/11/14 which was complicated by pneumonia.  - Adm 12/21-1/10 for human metapneumovirus pneumonia complicated by anorexia, HTN, depression, anorexia with significant weight loss.   - Restarted Ernesto/Dex only on 2/4/15 with good tolerance but with thrombocytopenia.   - Bendamustine added to Velcade/dexamethasone on 5/21/15 due to disease progression  - Velcade discontinued on 7/9/2015 due to side effects (orthostasis)  - Schedule changed to bendamustine 80 mg/m2 days 1 and 2 on 28-day cycle  - Cycle 1 tolerated poorly due to lightheadedness and  weakness  - Cycle 2 dose reduced to 60 mg/m2 and pre-meds adjusted  - Cycle 5 received on 9/17/15.  - 10/1/2015 - increasing IgM, M-spike - started Pomalidomide 4mg/day (21 days out of 28 days) and weekly Decadron 20mg on Oct 1st, 2015.    - Carfilzomib added on 10/22/2015 making this CPD.  - C3-C6  received in [place]    - Returned to [facility] and resumed CPD here    - Adm: 4/12-4/14 with fever, confusion, neutropenia. Noted on MRI to have acute/subacute CVA and subacute/chronic CVA; started on Plavix, given brief course of Abx and GCSF prior to d/c.     - Continued on Carfilzomib and dexamethasone alone  - Pomalyst added back on 7/13/2016 for rising FLC  - Start daratumumab 11/10/16. Changed to every other week after 4 weekly treatments d/t profound fatigue and malaise.   - Adm 5/7-5/16 due to AMS, hypercalcemia, hyperuricemia, possible PNA, back pain, and JOSE MARIA. Started Kyprolis while inpatient.  - Re-admitted 5/25-/529 with recurrent AMS, found to have concomitant PNA  - Resumed Kyprolis 6/13/2017      INTERVAL HISTORY:    Yamil presents with his wife for follow-up today.  Nothing new since last visit. Back pain stable, better controlled with the Tramadol 50 mg scheduled. Remains without radicular symptoms. Continues on PT, does feels lightly more winded with exertion than normal, but denies SOB at rest, chest pain, or palpitations. He remains fatigued, takes 2 naps per day most days, but overall feels he is managing well. No bleeding. Otherwise, no concerns. He hasn't had any dizziness, lightheadedness, fevers, chills, headaches, falls, vision changes, nausea, vomiting, GERD, abdominal pain, bowel changes, or swelling. Remainder of ROS otherwise negative.    PHYSICAL EXAMINATION  /89  Pulse 80  Temp 98.4  F (36.9  C) (Oral)  Wt 57.9 kg (127 lb 11.2 oz)  SpO2 96%  BMI 21.91 kg/m2    Wt Readings from Last 10 Encounters:   07/11/17 57.9 kg (127 lb 11.2 oz)   06/27/17 56.7 kg (125 lb)   06/21/17 57.9 kg (127 lb  11.2 oz)   06/20/17 57.9 kg (127 lb 11.2 oz)   06/14/17 57.9 kg (127 lb 9.6 oz)   06/13/17 57.6 kg (127 lb)   06/06/17 57.2 kg (126 lb)   05/29/17 56.7 kg (124 lb 14.4 oz)   05/23/17 59.2 kg (130 lb 8 oz)   05/19/17 59.1 kg (130 lb 6.4 oz)   General: AxOx4. Able to ambulate independently, albeit slow and cautiously. Using walker for longer distances. No acute distress. HEENT: PERRL, no palor or icterus. CVS: RRR. CHEST: CTAB, normal work of breathing ABDOMEN: soft non tender no masses palpated in a seated position.  NEURO: AAOX3  Grossly non focal neuro exam. SKIN: no obvious rashes. EXTREMITIES: No edema.     LABS:   Results for WILLIAN ARCINIEGA (MRN [id number]) as of 7/11/2017 15:24   Ref. Range 6/20/2017 14:03 6/27/2017 16:02 7/11/2017 15:14   WBC Latest Ref Range: 4.0 - 11.0 10e9/L 4.2 7.7 5.5   Hemoglobin Latest Ref Range: 13.3 - 17.7 g/dL 9.0 (L) 9.4 (L) 8.1 (L)   Hematocrit Latest Ref Range: 40.0 - 53.0 % 28.7 (L) 30.3 (L) 26.6 (L)   Platelet Count Latest Ref Range: 150 - 450 10e9/L 68 (L) 54 (L) 93 (L)     IMPRESSION/PLAN:  71 year old male with relapsed MM after autologous transplant (1/9/2013), with recent progression on daratumumab/pom/dex, with recent hospitalization 5/7-5/16 for back pain, JOSE MARIA,and  Hypercalcemia. Started on kyprolis IV 5/11 as well as po dex. He unfortunately developed worsening AMS and was re-admitted 5/25-5/29, found to have PNA. Now back on single agent Kyprolis/dex.      MM: Previously on edgardo/pom/dex while wintering in [place], progressive disease on SPEP inpatient (M spike 0.9 --> 2.1, IgM 3410) Received solumedrol 100 mg IV daily 5/8-5/10. Started PO dex 40 mg daily x 3 days on 5/11 with Kyprolis 5/11 and 5/12, and 5/23, subsequent doses held for AMS and PNA.  - Resumed Kyprolis/dex on 6/13. Today is cycle 3 day 1. He continues to tolerate this overall well. We will continue today.  - Myeloma labs in process today    AMS: AMS started early may in setting of metabolic  derangements, uremia, and possible infection. Initially resolved, then returned ~5/24. Work-up showing PNA and UCx with enterococcus and CONS.  - Appears stable today, coherent speech and answering questions appropriately, alert and oriented  - Continue Zyprexa 5 mg at bedtime  - Holding off long-acting pain meds    Heme: Cytopenias 2/2 treatment and likely MM in setting of progression. Platelets better, but hemoglobin low at 8.1 in absence of any bleeding. Could be related to treatment versus myeloma, will await myeloma labs and arrange for transfusion hopefully for tomorrow, otherwise later this week.  - On Plavix, hold for platelets < 50. May resume today.    Renal/FEN: Creat baseline ~0.8-1.0  -Hx Hypercalcemia: s/p pamidronate x 1 on 5/8  -Hx Hyperuricemia: s/p rasburicase x 1 on 5/8  -Encouraged protein/calorie supplements. Weight stable today.    ID: PNA and positive urine cx inpatient, presently afebrile w/o localizing s/s  - Completed course of Augmentin PO BID 6/4  - Continues ppx  mg BID    Back/rib pain: Started early May. Lumbar MRI at OSH showing mild-mod central and foraminal stenoses in lumbar spine, per patient and wife, there was no MM lesion there. Rib films inpatient negative, back films stable from prior imaging.  - Continues tramadol PRN and Tylenol PRN.     CV: Continue Norvasc and Zocor.   - Avoid QTc prolonging agents (history of QTc prolongation with syncopal episodes)   Deconditioning: Improving. Continue PT/OT        Leanne Reddy PA-C

## 2017-07-11 NOTE — NURSING NOTE
Chief Complaint   Patient presents with     Oncology Clinic Visit     Multiple myeloma      Port Draw     MM, labs collected via portacath.

## 2017-07-11 NOTE — PATIENT INSTRUCTIONS
Contact Numbers    [facility] Main Line: [phone]  [facility] Triage:  [phone]    Call triage with chills and/or temperature greater than or equal to 100.5, uncontrolled nausea/vomiting, diarrhea, constipation, dizziness, shortness of breath, chest pain, bleeding, unexplained bruising, or any new/concerning symptoms, questions/concerns.     If you are having any concerning symptoms or wish to speak to a provider before your next infusion visit, please call your care coordinator or triage to notify them so we can adequately serve you.       After Hours: [phone]    If after hours, weekends, or holidays, call main hospital  and ask for Oncology doctor on call.           July 2017 Sunday Monday Tuesday Wednesday Thursday Friday Saturday                                 1       2     3     4     5     6     7     8       9     10     11     [facility]    2:45 PM   (15 min.)    [facility] DRAW   [facility] Lab Draw     [facility] RETURN    3:05 PM   (50 min.)   Leanne Reddy PA-C [facility]     [facility] ONC INFUSION 60    4:00 PM   (60 min.)    ONCOLOGY INFUSION   [facility] 12     [facility] D[facility]    3:30 PM   (15 min.)    [facility] DRAW   [facility] Lab Draw     [facility] ONC INFUSION 60    4:00 PM   (60 min.)    ONCOLOGY INFUSION   [facility] 13     14     15       16     17     18     [facility]    2:30 PM   (15 min.)    [facility] DRAW   [facility] Lab Draw     [facility] ONC INFUSION 60    3:00 PM   (60 min.)    ONCOLOGY INFUSION   [facility] 19     [facility] ONC INFUSION 60    3:00 PM   (60 min.)    ONCOLOGY INFUSION   [facility] 20     21     22       23     24     25     [facility]   11:15 AM   (15 min.)    [facility] DRAW   [facility] Lab Draw     [facility] RETURN   11:35 AM   (50 min.)   Leanne Reddy PA-C   [facility]     [facility] ONC INFUSION 60    1:00 PM   (60 min.)    ONCOLOGY INFUSION   [facility] 26     [facility] ONC INFUSION 60   12:00 PM   (60 min.)   [facility] ON[facility] INFUSION   [facility] 27     28     29       30     31     [facility] RETURN ARRHYTHMIA    5:45 PM   (20 min.)   Dmitri Banks MD   [facility]                                     August 2017 Sunday Monday Tuesday Wednesday Thursday Friday Saturday             1     2     3     PHACOEMULSIFICATION CLEAR CORNEA WITH STANDARD INTRAOCULAR LENS IMPLANT    1:30 PM   Tesfaye Yusuf MD    [facility] 4     5       6     7     8     9     [facility]    3:30 PM   (15 min.)    [facility] DRAW   [facility] Lab Draw     [facility] ONC INFUSION 60    4:00 PM   (60 min.)    ONCOLOGY INFUSION   [facility]     [facility] ONC RETURN    5:00 PM   (30 min.)   Kamari Topete MD   [facility] Mar[facility] Trans[facility] 10     [facility] ONC INFUSION 60   12:00 PM   (60 min.)    ONCOLOGY INFUSION   [facility] 11     12       13     14     15     16     17     [facility] D[facility]   12:30 PM   (15 min.)    [facility] DRAW   [facility] Lab Draw     [facility] ONC INFUSION 60    1:00 PM   (60 min.)    ONCOLOGY INFUSION   [facility] 18     [facility] ONC INFUSION 60    1:00 PM   (60 min.)    ONCOLOGY INFUSION   [facility] 19       20     21     22     23     24     [facility] DRAW   12:30 PM   (15 min.)    [facility] DRAW   [facility] Lab Draw     [facility] ONC INFUSION 60    1:00 PM   (60 min.)    ONCOLOGY INFUSION   [facility] 25     [facility] ONC INFUSION 60    1:00 PM   (60 min.)    ONCOLOGY INFUSION   [facility] 26       27     28     29     30     31                           Recent Results (from the past 24 hour(s))   CBC with platelets differential    Collection Time: 07/11/17  3:14 PM   Result Value Ref  Range    WBC 5.5 4.0 - 11.0 10e9/L    RBC Count 2.53 (L) 4.4 - 5.9 10e12/L    Hemoglobin 8.1 (L) 13.3 - 17.7 g/dL    Hematocrit 26.6 (L) 40.0 - 53.0 %     (H) 78 - 100 fl    MCH 32.0 26.5 - 33.0 pg    MCHC 30.5 (L) 31.5 - 36.5 g/dL    RDW 19.6 (H) 10.0 - 15.0 %    Platelet Count 93 (L) 150 - 450 10e9/L    Diff Method Manual Differential     % Neutrophils 87.7 %    % Lymphocytes 4.4 %    % Monocytes 4.4 %    % Eosinophils 2.6 %    % Basophils 0.9 %    Absolute Neutrophil 4.8 1.6 - 8.3 10e9/L    Absolute Lymphocytes 0.2 (L) 0.8 - 5.3 10e9/L    Absolute Monocytes 0.2 0.0 - 1.3 10e9/L    Absolute Eosinophils 0.1 0.0 - 0.7 10e9/L    Absolute Basophils 0.0 0.0 - 0.2 10e9/L    Anisocytosis Moderate     Macrocytes Present    Comprehensive metabolic panel    Collection Time: 07/11/17  3:14 PM   Result Value Ref Range    Sodium 135 133 - 144 mmol/L    Potassium 4.4 3.4 - 5.3 mmol/L    Chloride 102 94 - 109 mmol/L    Carbon Dioxide 25 20 - 32 mmol/L    Anion Gap 8 3 - 14 mmol/L    Glucose 130 (H) 70 - 99 mg/dL    Urea Nitrogen 23 7 - 30 mg/dL    Creatinine 1.05 0.66 - 1.25 mg/dL    GFR Estimate 69 >60 mL/min/1.7m2    GFR Estimate If Black 84 >60 mL/min/1.7m2    Calcium 9.2 8.5 - 10.1 mg/dL    Bilirubin Total 0.4 0.2 - 1.3 mg/dL    Albumin 3.0 (L) 3.4 - 5.0 g/dL    Protein Total 8.5 6.8 - 8.8 g/dL    Alkaline Phosphatase 56 40 - 150 U/L    ALT 14 0 - 70 U/L    AST 9 0 - 45 U/L

## 2017-07-11 NOTE — MR AVS SNAPSHOT
After Visit Summary   7/11/2017    Anthony Carbone    MRN: [id number]           Patient Information     Date Of Birth          1945        Visit Information        Provider Department      7/11/2017 3:20 PM Leanne Reddy PA-C [facility]        Today's Diagnoses     Multiple myeloma not having achieved remission (H)    -  1    Acute bilateral low back pain without sciatica        Multiple myeloma in relapse (H)           Follow-ups after your visit        Your next 10 appointments already scheduled     Jul 12, 2017 11:00 AM CDT   Infusion 60 with UC ONCOLOGY INFUSION, UC 29 ATC   [facility] ([facility])    [address]  [address] [facility] [address]   [phone]            Jul 12, 2017  3:30 PM CDT   [facility] with [facility] DRAW   [facility] ([facility])    [address] [address]   [phone]            Jul 18, 2017  2:30 PM CDT   [facility] Draw with [facility] DRAW   [facility] ([facility])    [address] [address]   [phone]            Jul 18, 2017  3:00 PM CDT   Infusion 60 with UC ONCOLOGY INFUSION, UC 15 ATC   [facility] ([facility])    [address] [address]   [phone]            Jul 19, 2017  3:00 PM CDT   Infusion 60 with UC ONCOLOGY INFUSION, UC 26 ATC   [facility] ([facility])    [address] [address]   [phone]            Jul 25, 2017 11:15 AM CDT   [facility] Draw with [facility] DRAW   [facility] ([facility])    [address] [address]   [phone]             Jul 25, 2017 11:50 AM CDT   (Arrive by 11:35 AM)   Return Visit with Leanne Reddy PA-C   [facility] ([facility])    [address] [facility]  [address] [facility] [address] [phone]              Future tests that were ordered for you today     Open Standing Orders        Priority Remaining Interval Expires Ordered    Red blood cell prepare order unit Routine 99/100 CONDITIONAL (SPECIFY) BLOOD  7/11/2017            Who to contact     If you have questions or need follow up information about today's clinic visit or your schedule please contact [facility] directly at [phone].  Normal or non-critical lab and imaging results will be communicated to you by [employer], letter or phone within 4 business days after the clinic has received the results. If you do not hear from us within 7 days, please contact the clinic through [employer] or phone. If you have a critical or abnormal lab result, we will notify you by phone as soon as possible.  Submit refill requests through [employer] or call your pharmacy and they will forward the refill request to us. Please allow 3 business days for your refill to be completed.          Additional Information About Your Visit        MyChart Information     [employer] gives you secure access to your electronic health record. If you see a primary care provider, you can also send messages to your care team and make appointments. If you have questions, please call your primary care clinic.  If you do not have a primary care provider, please call [phone] and they will assist you.        Care EveryWhere ID     This is your Care EveryWhere ID. This could be used by other organizations to access your [facility] medical records  [id number]        Your Vitals Were     Pulse Temperature Pulse Oximetry BMI (Body Mass Index)          80 98.4  F (36.9  C) (Oral) 96% 21.91 kg/m2         Blood Pressure from Last 3  Encounters:   07/11/17 147/89   06/28/17 (!) 158/96   06/27/17 117/69    Weight from Last 3 Encounters:   07/11/17 57.9 kg (127 lb 11.2 oz)   06/27/17 56.7 kg (125 lb)   06/21/17 57.9 kg (127 lb 11.2 oz)              We Performed the Following     CBC with platelets differential     Comprehensive metabolic panel     Kappa and lambda light chain (Serum)     Protein electrophoresis          Today's Medication Changes          These changes are accurate as of: 7/11/17  5:54 PM.  If you have any questions, ask your nurse or doctor.               Start taking these medicines.        Dose/Directions    dexamethasone 4 MG tablet   Commonly known as:  DECADRON   Used for:  Multiple myeloma not having achieved remission (H)   Started by:  Leanne Reddy PA-C        Take 20 mg days 1,2, 8, 9, 15, 16 on days of kyprolis   Quantity:  30 tablet   Refills:  3       traMADol 50 MG tablet   Commonly known as:  ULTRAM   Used for:  Acute bilateral low back pain without sciatica, Multiple myeloma in relapse (H)   Started by:  Leanne Reddy PA-C        Dose:  25-50 mg   Take 0.5-1 tablets (25-50 mg) by mouth every 6 hours as needed for moderate pain . Recommend trying after Tylenol and before Dilaudid.   Quantity:  60 tablet   Refills:  3            Where to get your medicines      These medications were sent to [facility] [id number] [place] [address] AT [facility] & [address] [address]     Phone:  [phone]     dexamethasone 4 MG tablet         Some of these will need a paper prescription and others can be bought over the counter.  Ask your nurse if you have questions.     Bring a paper prescription for each of these medications     traMADol 50 MG tablet                Primary Care Provider Office Phone # Fax #    Sanket Burciaga [phone]       [facility] [address] [facility] [address]        Equal Access to  Services     [facility]: Hadii chalo kern davidluh Sojose, waaxda luqadaha, qaybta kaalmada perryinesabhijit, jonny jurado . So [facility] [phone].    ATENCIÓN: Si delores tao, tiene a todd disposición servicios gratuitos de asistencia lingüística. Llame al [phone].    We comply with applicable federal civil rights laws and [place] laws. We do not discriminate on the basis of race, color, national origin, age, disability sex, sexual orientation or gender identity.            Thank you!     Thank you for choosing [facility]  for your care. Our goal is always to provide you with excellent care. Hearing back from our patients is one way we can continue to improve our services. Please take a few minutes to complete the written survey that you may receive in the mail after your visit with us. Thank you!             Your Updated Medication List - Protect others around you: Learn how to safely use, store and throw away your medicines at www.disposemymeds.org.          This list is accurate as of: 7/11/17  5:54 PM.  Always use your most recent med list.                   Brand Name Dispense Instructions for use Diagnosis    acetaminophen 500 MG tablet    TYLENOL     Take 2 tablets (1,000 mg) by mouth every 6 hours as needed for mild pain    Acute bilateral low back pain without sciatica, Multiple myeloma in relapse (H)       acyclovir 400 MG tablet    ZOVIRAX    60 tablet    Take 1 tablet (400 mg) by mouth 2 times daily    Multiple myeloma in relapse (H)       amLODIPine 5 MG tablet    NORVASC    30 tablet    Take one tablet at bedtime.    Essential hypertension       artificial saliva Liqd liquid      Swish and spit 3-5 mLs in mouth 4 times daily as needed for dry mouth    Dry mouth       clopidogrel 75 MG tablet    PLAVIX    30 tablet    Take 1 tablet (75 mg) by mouth daily    History of stroke       dexamethasone 4 MG tablet    DECADRON    30 tablet    Take 20 mg days 1,2,  8, 9, 15, 16 on days of kyprolis    Multiple myeloma not having achieved remission (H)       esomeprazole 40 MG CR capsule    nexIUM    30 capsule    Take 1 capsule (40 mg) by mouth every morning (before breakfast)    Gastroesophageal reflux disease without esophagitis       HYDROmorphone 2 MG tablet    DILAUDID    30 tablet    Take 0.5 tablets (1 mg) by mouth every 3 hours as needed for moderate to severe pain    Multiple myeloma in relapse (H), Acute bilateral low back pain without sciatica       lidocaine 5 % Patch    LIDODERM    30 patch    Place 3 patches onto the skin every 24 hours    Multiple myeloma in relapse (H), Acute bilateral low back pain without sciatica       LORazepam 0.5 MG tablet    ATIVAN    30 tablet    Take 1 tablet (0.5 mg) by mouth every 6 hours as needed for nausea or anxiety.    Multiple myeloma in relapse (H), Delirium       midodrine 2.5 MG tablet    PROAMATINE    90 tablet    Take 1 tablet (2.5 mg) by mouth 3 times daily . HOLD due to elevated blood pressures during admission. Continue to hold until further advised by outpatient clinic team or if develops recurrent orthostatic hypotension.    Orthostatic hypotension, Orthostatic syncope       OLANZapine 5 MG tablet    zyPREXA    60 tablet    Take 1 tablet (5 mg) by mouth 2 times daily    Delirium, Multiple myeloma in relapse (H)       ondansetron 8 MG ODT tab    ZOFRAN-ODT    30 tablet    Take 1 tablet (8 mg) by mouth every 8 hours as needed for nausea    Nausea       potassium chloride SA 20 MEQ CR tablet    K-DUR/KLOR-CON M    60 tablet    TAKE 1 TABLET BY MOUTH TWICE DAILY.    Hypokalemia       simethicone 80 MG chewable tablet    MYLICON    180 tablet    Take 2 tablets (160 mg) by mouth 4 times daily as needed for cramping or other (gas pain)    Nausea       simvastatin 5 MG tablet    ZOCOR    30 tablet    Take 4 tablets (20 mg) by mouth daily    Mixed hyperlipidemia       sucralfate 1 GM tablet    CARAFATE    120 tablet    Take 1  tablet (1 g) by mouth 4 times daily as needed    Gastroesophageal reflux disease without esophagitis       traMADol 50 MG tablet    ULTRAM    60 tablet    Take 0.5-1 tablets (25-50 mg) by mouth every 6 hours as needed for moderate pain . Recommend trying after Tylenol and before Dilaudid.    Acute bilateral low back pain without sciatica, Multiple myeloma in relapse (H)

## 2017-07-11 NOTE — NURSING NOTE
"Oncology Rooming Note    July 11, 2017 3:20 PM   Anthony Carbone is a 71 year old male who presents for:    Chief Complaint   Patient presents with     Oncology Clinic Visit     Multiple myeloma      Port Draw     MM, labs collected via portacath.      Initial Vitals: /89  Pulse 80  Temp 98.4  F (36.9  C) (Oral)  Wt 57.9 kg (127 lb 11.2 oz)  SpO2 96%  BMI 21.91 kg/m2 Estimated body mass index is 21.91 kg/(m^2) as calculated from the following:    Height as of 6/27/17: 1.626 m (5' 4.02\").    Weight as of this encounter: 57.9 kg (127 lb 11.2 oz). Body surface area is 1.62 meters squared.  Data Unavailable Comment: Data Unavailable   No LMP for male patient.  Allergies reviewed: Yes  Medications reviewed: Yes    Medications: Medication refills not needed today.  Pharmacy name entered into [employer]:    [id number] [place] [address] [facility] [address] [facility]  [place], [place] - [address] [facility]  [facility] [address] [place], [place] - [address] [address] AT [facility] OF [address]    Clinical concerns:      6 minutes for nursing intake (face to face time)     Yanni Chilel CMA              "

## 2017-07-11 NOTE — LETTER
7/11/2017      RE: Anthony Carbone  [address] [place] [address]       HEMATOLOGY/ONCOLOGY PROGRESS NOTE  Jul 11, 2017    REASON FOR VISIT: myeloma    Diagnosis: 71 year old gentleman with IgM lambda multiple myeloma originally diagnosed in 01/2012 as stage I, standard risk disease.   Treatment: Revlimid plus dexamethasone for 4-5 cycles but plateaued by late 03/2012.   - Velcade was added and he received another 2-3 cycles, achieving a good partial remission.      Transplant: Single auto after melphalan 200 mg/m2 preparative regimen on 01/09/2013  - Post-transplant course: unremarkable except for some mild steroid-induced hyperglycemia, gastritis, nausea and vomiting.      Maintenance: Lenalidomide at day-100 then developed a maculopapular rash. Lenalidomide was held and then we re-challenged him after about a month to 2 months at a lower dose (5 mg daily); rash returned.   - was started on maintenance Velcade, every other week through July 2014, when he was noted with abnormalities on myeloma studies drawn there. Noted with prostate cancer dx at about the time of relapse (see below).     Relapse: noted with return on M-spike in blood/urine with marrow involvement but negative PET.   - Started on retreatment with RVD on 8/27/14 with Revlimid at 15 mg 14 days of 21 days, dexamethasone 40 mg weekly and velcade weekly.Completed at total of 5 cycles without complication by 12/2014.   - Started Cycle 6 on inc'd Rev dosing of 20mg daily x 2 weeks on 12/11/14 which was complicated by pneumonia.  - Adm 12/21-1/10 for human metapneumovirus pneumonia complicated by anorexia, HTN, depression, anorexia with significant weight loss.   - Restarted Ernesto/Dex only on 2/4/15 with good tolerance but with thrombocytopenia.   - Bendamustine added to Velcade/dexamethasone on 5/21/15 due to disease progression  - Velcade discontinued on 7/9/2015 due to side effects (orthostasis)  - Schedule changed to bendamustine 80 mg/m2  days 1 and 2 on 28-day cycle  - Cycle 1 tolerated poorly due to lightheadedness and weakness  - Cycle 2 dose reduced to 60 mg/m2 and pre-meds adjusted  - Cycle 5 received on 9/17/15.  - 10/1/2015 - increasing IgM, M-spike - started Pomalidomide 4mg/day (21 days out of 28 days) and weekly Decadron 20mg on Oct 1st, 2015.    - Carfilzomib added on 10/22/2015 making this CPD.  - C3-C6  received in [place]    - Returned to [facility] and resumed CPD here    - Adm: 4/12-4/14 with fever, confusion, neutropenia. Noted on MRI to have acute/subacute CVA and subacute/chronic CVA; started on Plavix, given brief course of Abx and GCSF prior to d/c.     - Continued on Carfilzomib and dexamethasone alone  - Pomalyst added back on 7/13/2016 for rising FLC  - Start daratumumab 11/10/16. Changed to every other week after 4 weekly treatments d/t profound fatigue and malaise.   - Adm 5/7-5/16 due to AMS, hypercalcemia, hyperuricemia, possible PNA, back pain, and JOSE MARIA. Started Kyprolis while inpatient.  - Re-admitted 5/25-/529 with recurrent AMS, found to have concomitant PNA  - Resumed Kyprolis 6/13/2017      INTERVAL HISTORY:    Yamil presents with his wife for follow-up today.  Nothing new since last visit. Back pain stable, better controlled with the Tramadol 50 mg scheduled. Remains without radicular symptoms. Continues on PT, does feels lightly more winded with exertion than normal, but denies SOB at rest, chest pain, or palpitations. He remains fatigued, takes 2 naps per day most days, but overall feels he is managing well. No bleeding. Otherwise, no concerns. He hasn't had any dizziness, lightheadedness, fevers, chills, headaches, falls, vision changes, nausea, vomiting, GERD, abdominal pain, bowel changes, or swelling. Remainder of ROS otherwise negative.    PHYSICAL EXAMINATION  /89  Pulse 80  Temp 98.4  F (36.9  C) (Oral)  Wt 57.9 kg (127 lb 11.2 oz)  SpO2 96%  BMI 21.91 kg/m2    Wt Readings from Last 10 Encounters:    07/11/17 57.9 kg (127 lb 11.2 oz)   06/27/17 56.7 kg (125 lb)   06/21/17 57.9 kg (127 lb 11.2 oz)   06/20/17 57.9 kg (127 lb 11.2 oz)   06/14/17 57.9 kg (127 lb 9.6 oz)   06/13/17 57.6 kg (127 lb)   06/06/17 57.2 kg (126 lb)   05/29/17 56.7 kg (124 lb 14.4 oz)   05/23/17 59.2 kg (130 lb 8 oz)   05/19/17 59.1 kg (130 lb 6.4 oz)   General: AxOx4. Able to ambulate independently, albeit slow and cautiously. Using walker for longer distances. No acute distress. HEENT: PERRL, no palor or icterus. CVS: RRR. CHEST: CTAB, normal work of breathing ABDOMEN: soft non tender no masses palpated in a seated position.  NEURO: AAOX3  Grossly non focal neuro exam. SKIN: no obvious rashes. EXTREMITIES: No edema.     LABS:   Results for WILLIAN ARCINIEGA (MRN [id number]) as of 7/11/2017 15:24   Ref. Range 6/20/2017 14:03 6/27/2017 16:02 7/11/2017 15:14   WBC Latest Ref Range: 4.0 - 11.0 10e9/L 4.2 7.7 5.5   Hemoglobin Latest Ref Range: 13.3 - 17.7 g/dL 9.0 (L) 9.4 (L) 8.1 (L)   Hematocrit Latest Ref Range: 40.0 - 53.0 % 28.7 (L) 30.3 (L) 26.6 (L)   Platelet Count Latest Ref Range: 150 - 450 10e9/L 68 (L) 54 (L) 93 (L)     IMPRESSION/PLAN:  71 year old male with relapsed MM after autologous transplant (1/9/2013), with recent progression on daratumumab/pom/dex, with recent hospitalization 5/7-5/16 for back pain, JOSE MARIA,and  Hypercalcemia. Started on kyprolis IV 5/11 as well as po dex. He unfortunately developed worsening AMS and was re-admitted 5/25-5/29, found to have PNA. Now back on single agent Kyprolis/dex.      MM: Previously on edgardo/pom/dex while wintering in [place], progressive disease on SPEP inpatient (M spike 0.9 --> 2.1, IgM 3410) Received solumedrol 100 mg IV daily 5/8-5/10. Started PO dex 40 mg daily x 3 days on 5/11 with Kyprolis 5/11 and 5/12, and 5/23, subsequent doses held for AMS and PNA.  - Resumed Kyprolis/dex on 6/13. Today is cycle 3 day 1. He continues to tolerate this overall well. We will continue today.  -  Myeloma labs in process today    AMS: AMS started early may in setting of metabolic derangements, uremia, and possible infection. Initially resolved, then returned ~5/24. Work-up showing PNA and UCx with enterococcus and CONS.  - Appears stable today, coherent speech and answering questions appropriately, alert and oriented  - Continue Zyprexa 5 mg at bedtime  - Holding off long-acting pain meds    Heme: Cytopenias 2/2 treatment and likely MM in setting of progression. Platelets better, but hemoglobin low at 8.1 in absence of any bleeding. Could be related to treatment versus myeloma, will await myeloma labs and arrange for transfusion hopefully for tomorrow, otherwise later this week.  - On Plavix, hold for platelets < 50. May resume today.    Renal/FEN: Creat baseline ~0.8-1.0  -Hx Hypercalcemia: s/p pamidronate x 1 on 5/8  -Hx Hyperuricemia: s/p rasburicase x 1 on 5/8  -Encouraged protein/calorie supplements. Weight stable today.    ID: PNA and positive urine cx inpatient, presently afebrile w/o localizing s/s  - Completed course of Augmentin PO BID 6/4  - Continues ppx  mg BID    Back/rib pain: Started early May. Lumbar MRI at OSH showing mild-mod central and foraminal stenoses in lumbar spine, per patient and wife, there was no MM lesion there. Rib films inpatient negative, back films stable from prior imaging.  - Continues tramadol PRN and Tylenol PRN.     CV: Continue Norvasc and Zocor.   - Avoid QTc prolonging agents (history of QTc prolongation with syncopal episodes)   Deconditioning: Improving. Continue PT/OT        Leanne Reddy PA-C

## 2017-07-11 NOTE — MR AVS SNAPSHOT
After Visit Summary   7/11/2017    Anthony Carbone    MRN: [id number]           Patient Information     Date Of Birth          1945        Visit Information        Provider Department      7/11/2017 4:00 PM  12 ATC;  ONCOLOGY INFUSION [facility]        Today's Diagnoses     Multiple myeloma in relapse (H)    -  1      Care Instructions    Contact Numbers    [facility] Main Line: [phone]  [facility] Triage:  [phone]    Call triage with chills and/or temperature greater than or equal to 100.5, uncontrolled nausea/vomiting, diarrhea, constipation, dizziness, shortness of breath, chest pain, bleeding, unexplained bruising, or any new/concerning symptoms, questions/concerns.     If you are having any concerning symptoms or wish to speak to a provider before your next infusion visit, please call your care coordinator or triage to notify them so we can adequately serve you.       After Hours: [phone]    If after hours, weekends, or holidays, call main hospital  and ask for Oncology doctor on call.           July 2017 Sunday Monday Tuesday Wednesday Thursday Friday Saturday                                 1       2     3     4     5     6     7     8       9     10     11     [facility] LAB DRAW    2:45 PM   (15 min.)    [facility] LAB DRAW   [facility] Lab Draw     [facility] RETURN    3:05 PM   (50 min.)   Leanne Reddy PA-C   [facility]     [facility] ONC INFUSION 60    4:00 PM   (60 min.)    ONCOLOGY INFUSION   [facility] 12     [facility] DRAW    3:30 PM   (15 min.)    [facility] LAB DRAW   [facility] Lab Draw     [facility] ONC INFUSION 60    4:00 PM   (60 min.)    ONCOLOGY INFUSION   [facility] 13     14     15       16     17     18     [facility] DRAW    2:30 PM   (15 min.)    [facility] LAB DRAW   [facility] Lab Draw     [facility] ONC INFUSION 60    3:00 PM   (60 min.)     ONCOLOGY INFUSION   [facility] 19     [facility] ONC INFUSION 60    3:00 PM   (60 min.)    ONCOLOGY INFUSION   [facility] 20     21     22       23     24     25     [facility] DRAW   11:15 AM   (15 min.)    [facility] LAB DRAW   [facility] Lab Draw     [facility] RETURN   11:35 AM   (50 min.)   Leanne Reddy PA-C   [facility]     [facility] ONC INFUSION 60    1:00 PM   (60 min.)   UC ONCOLOGY INFUSION   [facility] 26     [facility] ONC INFUSION 60   12:00 PM   (60 min.)   UC ONCOLOGY INFUSION   [facility] 27     28     29       30     31     [facility] RETURN ARRHYTHMIA    5:45 PM   (20 min.)   Dmitir Banks MD   [facility]                                     August 2017 Sunday Monday Tuesday Wednesday Thursday Friday Saturday             1     2     3     PHACOEMULSIFICATION CLEAR CORNEA WITH STANDARD INTRAOCULAR LENS IMPLANT    1:30 PM   Tesfaye Yusuf MD   [facility] 4     5       6     7     8     9     [facility] DRAW    3:30 PM   (15 min.)    [facility] LAB DRAW   [facility] Lab Draw     [facility] ONC INFUSION 60    4:00 PM   (60 min.)    ONCOLOGY INFUSION   [facility]     [facility] ONC RETURN    5:00 PM   (30 min.)   Kamari Topete MD   [facility] 10     [facility] ONC INFUSION 60   12:00 PM   (60 min.)    ONCOLOGY INFUSION   [facility] 11     12       13     14     15     16     17     [facility] DRAW   12:30 PM   (15 min.)    [facility] LAB DRAW   [facility] Lab Draw     [facility] ONC INFUSION 60    1:00 PM   (60 min.)    ONCOLOGY INFUSION   [facility] 18     [facility] ONC INFUSION 60    1:00 PM   (60 min.)    ONCOLOGY INFUSION   [facility] 19       20     21     22     23     24     [facility] DRAW   12:30 PM   (15 min.)    [facility]RAW   [facility]     [facility] ONC  INFUSION 60    1:00 PM   (60 min.)    ONCOLOGY INFUSION   [facility] 25     [facility] ONC INFUSION 60    1:00 PM   (60 min.)    ONCOLOGY INFUSION   [facility] 26       27     28     29     30     31                           Recent Results (from the past 24 hour(s))   CBC with platelets differential    Collection Time: 07/11/17  3:14 PM   Result Value Ref Range    WBC 5.5 4.0 - 11.0 10e9/L    RBC Count 2.53 (L) 4.4 - 5.9 10e12/L    Hemoglobin 8.1 (L) 13.3 - 17.7 g/dL    Hematocrit 26.6 (L) 40.0 - 53.0 %     (H) 78 - 100 fl    MCH 32.0 26.5 - 33.0 pg    MCHC 30.5 (L) 31.5 - 36.5 g/dL    RDW 19.6 (H) 10.0 - 15.0 %    Platelet Count 93 (L) 150 - 450 10e9/L    Diff Method Manual Differential     % Neutrophils 87.7 %    % Lymphocytes 4.4 %    % Monocytes 4.4 %    % Eosinophils 2.6 %    % Basophils 0.9 %    Absolute Neutrophil 4.8 1.6 - 8.3 10e9/L    Absolute Lymphocytes 0.2 (L) 0.8 - 5.3 10e9/L    Absolute Monocytes 0.2 0.0 - 1.3 10e9/L    Absolute Eosinophils 0.1 0.0 - 0.7 10e9/L    Absolute Basophils 0.0 0.0 - 0.2 10e9/L    Anisocytosis Moderate     Macrocytes Present    Comprehensive metabolic panel    Collection Time: 07/11/17  3:14 PM   Result Value Ref Range    Sodium 135 133 - 144 mmol/L    Potassium 4.4 3.4 - 5.3 mmol/L    Chloride 102 94 - 109 mmol/L    Carbon Dioxide 25 20 - 32 mmol/L    Anion Gap 8 3 - 14 mmol/L    Glucose 130 (H) 70 - 99 mg/dL    Urea Nitrogen 23 7 - 30 mg/dL    Creatinine 1.05 0.66 - 1.25 mg/dL    GFR Estimate 69 >60 mL/min/1.7m2    GFR Estimate If Black 84 >60 mL/min/1.7m2    Calcium 9.2 8.5 - 10.1 mg/dL    Bilirubin Total 0.4 0.2 - 1.3 mg/dL    Albumin 3.0 (L) 3.4 - 5.0 g/dL    Protein Total 8.5 6.8 - 8.8 g/dL    Alkaline Phosphatase 56 40 - 150 U/L    ALT 14 0 - 70 U/L    AST 9 0 - 45 U/L               Follow-ups after your visit        Your next 10 appointments already scheduled     Jul 12, 2017 11:00 AM CDT   Infusion 60 with UC ONCOLOGY  INFUSION, UC 29 ATC   [facility]    [address]  2nd Floor  [facility] [address]   [phone]            Jul 12, 2017  3:30 PM CDT   [facility] Draw with UC [facility] LAB DRAW   [facility] Draw ([facility])    [address]  2nd Floor  [facility] [address]   [phone]            Jul 18, 2017  2:30 PM CDT   [facility] Lab Draw with UC [facility] LAB DRAW   [facility] Draw ([facility])    [address]  [address] [facility] [address]   [phone]            Jul 18, 2017  3:00 PM CDT   Infusion 60 with UC ONCOLOGY INFUSION, UC 15 ATC   [facility]    [address] [place] [address]   [phone]            Jul 19, 2017  3:00 PM CDT   Infusion 60 with UC ONCOLOGY INFUSION, UC 26 ATC   [facility]    [address] [place] [address]   [phone]            Jul 25, 2017 11:15 AM CDT   [facility] Lab Draw with UC [facility] LAB DRAW   [facility] Draw ([facility])    [address] [place] [address]   [phone]            Jul 25, 2017 11:50 AM CDT   (Arrive by 11:35 AM)   Return Visit with Leanne Reddy PA-C   [facility]    [address] [place] [address]   [phone]              Future tests that were ordered for you today     Open Standing Orders        Priority Remaining Interval Expires Ordered    Red blood cell prepare order unit Routine 99/100 CONDITIONAL (SPECIFY) BLOOD  7/11/2017            Who to contact     If you have questions or need follow up information about today's clinic visit or your schedule please contact [facility] directly at [phone].  Normal or non-critical lab and imaging results will be communicated to you by MyChart, letter or phone within 4 business days after the clinic has received the results. If you do not hear from us within 7 days, please contact the clinic through [employer] or phone. If you have a critical or abnormal lab result, we will notify you by phone as soon as possible.  Submit refill requests through [employer] or call your pharmacy and they will forward the refill request to us. Please allow 3 business days for your refill to be completed.          Additional Information About Your Visit        [employer] Information     [employer] gives you secure access to your electronic health record. If you see a primary care provider, you can also send messages to your care team and make appointments. If you have questions, please call your primary care clinic.  If you do not have a primary care provider, please call [phone] and they will assist you.        Care EveryWhere ID     This is your Care EveryWhere ID. This could be used by other organizations to access your [facility] medical records  [id number]         Blood Pressure from Last 3 Encounters:   07/11/17 147/89   06/28/17 (!) 158/96   06/27/17 117/69    Weight from Last 3 Encounters:   07/11/17 57.9 kg (127 lb 11.2 oz)   06/27/17 56.7 kg (125 lb)   06/21/17 57.9 kg (127 lb 11.2 oz)              We Performed the Following     ABO/Rh type and screen          Today's Medication Changes          These changes are accurate as of: 7/11/17  7:18 PM.  If you have any questions, ask your nurse or doctor.               Start taking these medicines.        Dose/Directions    dexamethasone 4 MG tablet   Commonly known as:  DECADRON   Used for:  Multiple myeloma not having achieved remission (H)   Started by:  Leanne Reddy PA-C        Take 20 mg days 1,2, 8, 9, 15, 16 on days of kyprolis   Quantity:  30 tablet   Refills:  3        traMADol 50 MG tablet   Commonly known as:  ULTRAM   Used for:  Acute bilateral low back pain without sciatica, Multiple myeloma in relapse (H)   Started by:  Leanne Reddy PA-C        Dose:  25-50 mg   Take 0.5-1 tablets (25-50 mg) by mouth every 6 hours as needed for moderate pain . Recommend trying after Tylenol and before Dilaudid.   Quantity:  60 tablet   Refills:  3            Where to get your medicines      These medications were sent to [facility] [id number] [place] [address] AT [facility] & [address] [address]     Phone:  [phone]     dexamethasone 4 MG tablet         Some of these will need a paper prescription and others can be bought over the counter.  Ask your nurse if you have questions.     Bring a paper prescription for each of these medications     traMADol 50 MG tablet                Primary Care Provider Office Phone # Fax #    Sanket Gualberto [phone]       [facility] [address] [facility] [address]        Equal Access to Services     [facility] Hospital[facility]ILFRED AH: Hadii chalo kern hadasho Soomaali, waaxda luqadaha, qaybta kaalmada adeegyaabhijit, jonny jurado . So [facility] [phone].    ATENCIÓN: Si habla español, tiene a todd disposición servicios gratuitos de asistencia lingüística. Campbellame al [phone].    We comply with applicable federal civil rights laws and [place] laws. We do not discriminate on the basis of race, color, national origin, age, disability sex, sexual orientation or gender identity.            Thank you!     Thank you for choosing [facility]  for your care. Our goal is always to provide you with excellent care. Hearing back from our patients is one way we can continue to improve our services. Please take a few minutes to complete the written survey that you may receive in the mail after your visit with us. Thank you!             Your  Updated Medication List - Protect others around you: Learn how to safely use, store and throw away your medicines at www.disposemymeds.org.          This list is accurate as of: 7/11/17  7:18 PM.  Always use your most recent med list.                   Brand Name Dispense Instructions for use Diagnosis    acetaminophen 500 MG tablet    TYLENOL     Take 2 tablets (1,000 mg) by mouth every 6 hours as needed for mild pain    Acute bilateral low back pain without sciatica, Multiple myeloma in relapse (H)       acyclovir 400 MG tablet    ZOVIRAX    60 tablet    Take 1 tablet (400 mg) by mouth 2 times daily    Multiple myeloma in relapse (H)       amLODIPine 5 MG tablet    NORVASC    30 tablet    Take one tablet at bedtime.    Essential hypertension       artificial saliva Liqd liquid      Swish and spit 3-5 mLs in mouth 4 times daily as needed for dry mouth    Dry mouth       clopidogrel 75 MG tablet    PLAVIX    30 tablet    Take 1 tablet (75 mg) by mouth daily    History of stroke       dexamethasone 4 MG tablet    DECADRON    30 tablet    Take 20 mg days 1,2, 8, 9, 15, 16 on days of kyprolis    Multiple myeloma not having achieved remission (H)       esomeprazole 40 MG CR capsule    nexIUM    30 capsule    Take 1 capsule (40 mg) by mouth every morning (before breakfast)    Gastroesophageal reflux disease without esophagitis       HYDROmorphone 2 MG tablet    DILAUDID    30 tablet    Take 0.5 tablets (1 mg) by mouth every 3 hours as needed for moderate to severe pain    Multiple myeloma in relapse (H), Acute bilateral low back pain without sciatica       lidocaine 5 % Patch    LIDODERM    30 patch    Place 3 patches onto the skin every 24 hours    Multiple myeloma in relapse (H), Acute bilateral low back pain without sciatica       LORazepam 0.5 MG tablet    ATIVAN    30 tablet    Take 1 tablet (0.5 mg) by mouth every 6 hours as needed for nausea or anxiety.    Multiple myeloma in relapse (H), Delirium       midodrine  2.5 MG tablet    PROAMATINE    90 tablet    Take 1 tablet (2.5 mg) by mouth 3 times daily . HOLD due to elevated blood pressures during admission. Continue to hold until further advised by outpatient clinic team or if develops recurrent orthostatic hypotension.    Orthostatic hypotension, Orthostatic syncope       OLANZapine 5 MG tablet    zyPREXA    60 tablet    Take 1 tablet (5 mg) by mouth 2 times daily    Delirium, Multiple myeloma in relapse (H)       ondansetron 8 MG ODT tab    ZOFRAN-ODT    30 tablet    Take 1 tablet (8 mg) by mouth every 8 hours as needed for nausea    Nausea       potassium chloride SA 20 MEQ CR tablet    K-DUR/KLOR-CON M    60 tablet    TAKE 1 TABLET BY MOUTH TWICE DAILY.    Hypokalemia       simethicone 80 MG chewable tablet    MYLICON    180 tablet    Take 2 tablets (160 mg) by mouth 4 times daily as needed for cramping or other (gas pain)    Nausea       simvastatin 5 MG tablet    ZOCOR    30 tablet    Take 4 tablets (20 mg) by mouth daily    Mixed hyperlipidemia       sucralfate 1 GM tablet    CARAFATE    120 tablet    Take 1 tablet (1 g) by mouth 4 times daily as needed    Gastroesophageal reflux disease without esophagitis       traMADol 50 MG tablet    ULTRAM    60 tablet    Take 0.5-1 tablets (25-50 mg) by mouth every 6 hours as needed for moderate pain . Recommend trying after Tylenol and before Dilaudid.    Acute bilateral low back pain without sciatica, Multiple myeloma in relapse (H)

## 2017-07-11 NOTE — PROGRESS NOTES
Infusion Nursing Note:    Patient presents today for Cycle 3 Day 1 Kyprolis.  Arrived with spouse.  Patient met with Leanne AMARO prior to infusion.    Lab Results   Component Value Date    HGB 8.1 07/11/2017     Lab Results   Component Value Date    WBC 5.5 07/11/2017      Lab Results   Component Value Date    ANEU 4.8 07/11/2017     Lab Results   Component Value Date    PLT 93 07/11/2017      Lab Results   Component Value Date     07/11/2017                   Lab Results   Component Value Date    POTASSIUM 4.4 07/11/2017           Lab Results   Component Value Date    MAG 2.2 05/25/2017            Lab Results   Component Value Date    CR 1.05 07/11/2017                   Lab Results   Component Value Date    JAZMIN 9.2 07/11/2017                Lab Results   Component Value Date    BILITOTAL 0.4 07/11/2017           Lab Results   Component Value Date    ALBUMIN 3.0 07/11/2017                    Lab Results   Component Value Date    ALT 14 07/11/2017           Lab Results   Component Value Date    AST 9 07/11/2017     Results reviewed, labs MET treatment parameters, ok to proceed with treatment.  Blood transfusion consent signed today 7/11/17.    Note:  7/10/17 1600 TORB: Ok to proceed with chemotherapy today. Arrange for RBC transfusion tomorrow. Leanne AMARO/Malina Melgoza RN    Type/Screen added on to cbc today and RBC prepared for tomorrow. Infusion moved up to 1100 instead of 4pm, patient aware.     Intravenous Access:  Implanted Port.    Post Infusion Assessment:  Patient tolerated infusion without incident.  Blood return noted pre and post infusion.  No evidence of extravasations.  Access discontinued per protocol per pt request.    Discharge Plan:   Prescription refills given for ultram and decadron, sent to patient local pharmacy.  Discharge instructions reviewed with: Patient.  AVS to patient via IntroBridge.  Patient will return tomorrow at 1100 for next appointment.   Patient discharged in stable  condition accompanied by: wife.  Departure Mode: Ambulatory.    Kyprolis stop time 4241

## 2017-07-12 NOTE — PROGRESS NOTES
Infusion Nursing Note:  Anthony Carbone presents today for D2 C3 Kyprolis, 2 units PRBCs.    Patient seen by provider today: No  Was seen in clinic by PREM Herrera on 7/11/17    Intravenous Access:  Implanted Port.    Treatment Conditions:  Lab Results   Component Value Date    HGB 8.1 07/11/2017     Lab Results   Component Value Date    WBC 5.5 07/11/2017      Lab Results   Component Value Date    ANEU 4.8 07/11/2017     Lab Results   Component Value Date    PLT 93 07/11/2017      Lab Results   Component Value Date     07/11/2017                   Lab Results   Component Value Date    POTASSIUM 4.4 07/11/2017           Lab Results   Component Value Date    MAG 2.2 05/25/2017            Lab Results   Component Value Date    CR 1.05 07/11/2017                   Lab Results   Component Value Date    JAZMIN 9.2 07/11/2017                Lab Results   Component Value Date    BILITOTAL 0.4 07/11/2017           Lab Results   Component Value Date    ALBUMIN 3.0 07/11/2017                    Lab Results   Component Value Date    ALT 14 07/11/2017           Lab Results   Component Value Date    AST 9 07/11/2017     Results reviewed, labs MET treatment parameters, ok to proceed with treatment.  Blood transfusion consent signed 7/11/17.    Note: Patient denied any changes overnight or this morning. Verified patient took oral decadron on arrival to  Infusion today.    Kyprolis end time: 1255    Patient's blood pressure remained elevated throughout transfusion of first unit PRBC. (See MAR). At the end of the transfusion /92. Patient asymptomatic. Dr. Topete notified.    TORB: Dr. Topete/Lorena Salmon RN, 7/11/17 @ 1520: Give Hydralazine 10 mg IV x1 for high blood pressure (170/90). May repeat x1.    20 minutes after hydralazine given, BP decreased to 159/92. Second unit of PRBC started and infused at a slower rate per patient tolerance.      Post Infusion Assessment:  Patient tolerated infusion without  incident.  Blood return noted pre and post infusion.  Site patent and intact, free from redness, edema or discomfort.  No evidence of extravasations.  Access discontinued per protocol.    Discharge Plan:   Patient declined prescription refills.  Discharge instructions reviewed with: Patient and Family.  Patient and/or family verbalized understanding of discharge instructions and all questions answered.  AVS to patient via RetAPPsHART.  Patient will return 7/18/17 for next appointment.   Patient discharged in stable condition accompanied by: self and wife.  Departure Mode: Ambulatory.    Lorena Salmon RN

## 2017-07-12 NOTE — PROGRESS NOTES
SPIRITUAL HEALTH SERVICES  SPIRITUAL ASSESSMENT Progress Note  [facility] [place]    PRIMARY FOCUS:     Goals of care    Symptom/pain management    Emotional/spiritual/Nondenominational distress    Support for coping    ILLNESS CIRCUMSTANCES:   Reflective conversation shared with Yamil and his wife (Casey) integrating elements of his advanced illness and family narratives.     Context of Serious Illness/Symptom(s) - Yamil and Casey provided an update about Yamil's current treatment regimen and what it means for them.    Resources for Support - Casey; extended family    DISTRESS:     Emotional/Spiritual/Existential Distress -   o Fatigue: Discussed the fatiguing nature/effect of his current treatments and the limits it places on his life. Casey commented on how, for example, he hasn't been able to do the yard work he enjoys. Yamil and Casey also talked about how his back pain significantly limits his mobility as well. Notable, too, was Yamil offering that some of the physical pain medication he takes also contributes to his feeling of fatigue.  o Grief: Also talked about his sister's death and traveling to [place] for her  and to reconnect with his niece and nephew.    Yazidism Distress - Discussed and declined.    Social/Cultural/Economic Distress - not discussed.    SPIRIT (Coping):     Uatsdin/Milagros - Cheondoism milagros remains important for coping    Spiritual Practice(s) - Prayer was shared.    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    Meaning/Sense-Making - not discussed.    PLAN: I will follow-up as Yamil receives ongoing care within the [facility].    Nick Pace MDiv, [facility]  Staff   Pager [phone]

## 2017-07-12 NOTE — MR AVS SNAPSHOT
After Visit Summary   7/12/2017    Anthony Carbone    MRN: [id number]           Patient Information     Date Of Birth          1945        Visit Information        Provider Department      7/12/2017 11:00 AM  29 ATC;  ONCOLOGY INFUSION [facility]        Today's Diagnoses     Multiple myeloma in relapse (H)    -  1      Care Instructions    Contact Numbers  [facility] Nurse Triage: [phone]  After Hours Nurse Line:  [phone]    Please call the [facility] Triage line if you experience a temperature greater than or equal to 100.5, shaking chills, have uncontrolled nausea, vomiting and/or diarrhea, dizziness, shortness of breath, chest pain, bleeding, unexplained bruising, or if you have any other new/concerning symptoms, questions or concerns.     If it is after hours, weekends, or holidays, please call either the after hours nurse line listed above.     If you are having any concerning symptoms or wish to speak to a provider before your next infusion visit, please call your care coordinator or triage to notify them so we can adequately serve you.     If you need a refill on a narcotic prescription or other medication, please call triage before your infusion appointment.         July 2017 Sunday Monday Tuesday Wednesday Thursday Friday Saturday                                 1       2     3     4     5     6     7     8       9     10     11     [facility] LAB DRAW    2:45 PM   (15 min.)    [facility] LAB DRAW   [facility] Lab Draw     [facility] RETURN    3:05 PM   (50 min.)   Leanne Reddy PA-C   [facility] ONC INFUSION 60    4:00 PM   (60 min.)    ONCOLOGY INFUSION   [facility] 12     [facility] ONC INFUSION 60   11:00 AM   (60 min.)    ONCOLOGY INFUSION   [facility] DRAW    3:30 PM   (15 min.)   [facility]   [facility] Lab Draw 13      14     15       16     17     18     [facility] D[facility]    2:30 PM   (15 min.)    [facility] LAB DRAW   [facility] Lab Draw     [facility] ONC INFUSION 60    3:00 PM   (60 min.)    ONCOLOGY INFUSION   [facility] 19     [facility] ONC INFUSION 60    3:00 PM   (60 min.)    ONCOLOGY INFUSION   [facility] 20     21     22       23     24     25     [facility] DRAW   11:15 AM   (15 min.)    [facility] LAB DRAW   [facility] Lab Draw     [facility] RETURN   11:35 AM   (50 min.)   Leanne Reddy PA-C   [facility]     [facility] ONC INFUSION 60    1:00 PM   (60 min.)    ONCOLOGY INFUSION   [facility] 26     [facility] ONC INFUSION 60   12:00 PM   (60 min.)    ONCOLOGY INFUSION   [facility] 27     PHACOEMULSIFICATION CLEAR CORNEA WITH STANDARD INTRAOCULAR LENS IMPLANT   12:30 PM   Tesfaye Yusuf MD    [facility] 28     29       30     31     [facility] RETURN ARRHYTHMIA    5:45 PM   (20 min.)   Dmitri Banks MD   [facility]                                     August 2017 Sunday Monday Tuesday Wednesday Thursday Friday Saturday             1     2     3     PHACOEMULSIFICATION CLEAR CORNEA WITH STANDARD INTRAOCULAR LENS IMPLANT    1:30 PM   Tesfaye Yusuf MD    [facility] 4     5       6     7     8     9     [facility] DRAW    3:30 PM   (15 min.)    [facility] LAB DRAW   [facility] Lab Draw     [facility] ONC INFUSION 60    4:00 PM   (60 min.)    ONCOLOGY INFUSION   [facility]     [facility] ONC RETURN    5:00 PM   (30 min.)   Kamari Topete MD   [facility] Marrow Trans[facility] 10     [facility] ONC INFUSION 60   12:00 PM   (60 min.)    ONCOLOGY INFUSION   [facility] 11     12       13     14     15     16     17     [facility] DRAW   12:30 PM   (15 min.)    [facility] LAB DRAW   [facility] Lab Draw     [facility] ONC INFUSION 60    1:00 PM   (60 min.)    ONCOLOGY  INFUSION   [facility] 18     [facility] ONC INFUSION 60    1:00 PM   (60 min.)   [facility] ONCOLOGY INFUSION   [facility] 19       20     21     22     23     24     [facility] DRAW   12:30 PM   (15 min.)   [facility] DRAW   [facility] Lab Draw     [facility] ONC INFUSION 60    1:00 PM   (60 min.)   [facility] ONCOLOGY INFUSION   [facility] 25     [facility] ONC INFUSION 60    1:00 PM   (60 min.)   [facility] ONCOLOGY INFUSION   [facility] 26       27     28     29     30     31                            Lab Results:  No results found for this or any previous visit (from the past 12 hour(s)).            Follow-ups after your visit        Your next 10 appointments already scheduled     Jul 18, 2017  2:30 PM CDT   [facility] Draw with [facility]RAW   [facility])    [address] [place] [address]   [phone]            Jul 18, 2017  3:00 PM CDT   Infusion 60 with UC ONCOLOGY INFUSION, [facility] 15 ATC   [facility])    [address] [place] [address]   [phone]            Jul 19, 2017  3:00 PM CDT   Infusion 60 with UC ONCOLOGY INFUSION, UC 26 ATC   [facility] ([facility])    [address] [place] [address]   [phone]            Jul 25, 2017 11:15 AM CDT   [facility] Lab Draw with [facility] LAB DRAW   [facility] Lab Draw ([facility])    [address] [place] [address]   [phone]            Jul 25, 2017 11:50 AM CDT   (Arrive by 11:35 AM)   Return Visit with Leanne Reddy PA-C   [facility] ([facility])    [address] [place] [address]   [phone]            Jul 25, 2017  1:00 PM CDT    Infusion 60 with UC ONCOLOGY INFUSION, UC 11 ATC   [facility] ([facility])    [address] [facility] [address] [phone]            Jul 26, 2017 12:00 PM CDT   Infusion 60 with UC ONCOLOGY INFUSION   [facility] ([facility])    [address]  [address] [facility] [address] [phone]              Future tests that were ordered for you today     Open Standing Orders        Priority Remaining Interval Expires Ordered    Red blood cell prepare order unit Routine 99/100 CONDITIONAL (SPECIFY) BLOOD  7/11/2017            Who to contact     If you have questions or need follow up information about today's clinic visit or your schedule please contact [facility] directly at [phone].  Normal or non-critical lab and imaging results will be communicated to you by Onfidohart, letter or phone within 4 business days after the clinic has received the results. If you do not hear from us within 7 days, please contact the clinic through [email or web address] or phone. If you have a critical or abnormal lab result, we will notify you by phone as soon as possible.  Submit refill requests through Q Design or call your pharmacy and they will forward the refill request to us. Please allow 3 business days for your refill to be completed.          Additional Information About Your Visit        Onfidohart Information     Q Design gives you secure access to your electronic health record. If you see a primary care provider, you can also send messages to your care team and make appointments. If you have questions, please call your primary care clinic.  If you do not have a primary care provider, please call [phone] and they will assist you.        Care EveryWhere ID     This is your Care EveryWhere ID. This could be used by other organizations to access your [facility] medical records  [id number]         Your Vitals Were     Pulse Temperature Respirations Pulse Oximetry          78 97.8  F (36.6  C) (Tympanic) 16 98%         Blood Pressure from Last 3 Encounters:   07/12/17 160/90   07/11/17 147/89   06/28/17 (!) 158/96    Weight from Last 3 Encounters:   07/11/17 57.9 kg (127 lb 11.2 oz)   06/27/17 56.7 kg (125 lb)   06/21/17 57.9 kg (127 lb 11.2 oz)              We Performed the Following     Transfuse red blood cell unit     Transfuse red blood cell unit        Primary Care Provider Office Phone # Fax #    Sanket Burciaga [phone]       [facility] [address] [id number]        Equal Access to Services     ALBAN SHAH : Maribel pardo Sojose, waaxda luqadaha, qaybta kaalmada adeegyada, jonny jurado . So [facility] [phone].    ATENCIÓN: Si habla español, tiene a todd disposición servicios gratuitos de asistencia lingüística. Llame al [phone].    We comply with applicable federal civil rights laws and [place] laws. We do not discriminate on the basis of race, color, national origin, age, disability sex, sexual orientation or gender identity.            Thank you!     Thank you for choosing [facility]  for your care. Our goal is always to provide you with excellent care. Hearing back from our patients is one way we can continue to improve our services. Please take a few minutes to complete the written survey that you may receive in the mail after your visit with us. Thank you!             Your Updated Medication List - Protect others around you: Learn how to safely use, store and throw away your medicines at www.disposemymeds.org.          This list is accurate as of: 7/12/17  6:35 PM.  Always use your most recent med list.                   Brand Name Dispense Instructions for use Diagnosis    acetaminophen 500 MG tablet    TYLENOL     Take 2 tablets (1,000 mg) by mouth every 6 hours as needed for mild  pain    Acute bilateral low back pain without sciatica, Multiple myeloma in relapse (H)       acyclovir 400 MG tablet    ZOVIRAX    60 tablet    Take 1 tablet (400 mg) by mouth 2 times daily    Multiple myeloma in relapse (H)       amLODIPine 5 MG tablet    NORVASC    30 tablet    Take one tablet at bedtime.    Essential hypertension       artificial saliva Liqd liquid      Swish and spit 3-5 mLs in mouth 4 times daily as needed for dry mouth    Dry mouth       clopidogrel 75 MG tablet    PLAVIX    30 tablet    Take 1 tablet (75 mg) by mouth daily    History of stroke       dexamethasone 4 MG tablet    DECADRON    30 tablet    Take 20 mg days 1,2, 8, 9, 15, 16 on days of kyprolis    Multiple myeloma not having achieved remission (H)       esomeprazole 40 MG CR capsule    nexIUM    30 capsule    Take 1 capsule (40 mg) by mouth every morning (before breakfast)    Gastroesophageal reflux disease without esophagitis       HYDROmorphone 2 MG tablet    DILAUDID    30 tablet    Take 0.5 tablets (1 mg) by mouth every 3 hours as needed for moderate to severe pain    Multiple myeloma in relapse (H), Acute bilateral low back pain without sciatica       lidocaine 5 % Patch    LIDODERM    30 patch    Place 3 patches onto the skin every 24 hours    Multiple myeloma in relapse (H), Acute bilateral low back pain without sciatica       LORazepam 0.5 MG tablet    ATIVAN    30 tablet    Take 1 tablet (0.5 mg) by mouth every 6 hours as needed for nausea or anxiety.    Multiple myeloma in relapse (H), Delirium       midodrine 2.5 MG tablet    PROAMATINE    90 tablet    Take 1 tablet (2.5 mg) by mouth 3 times daily . HOLD due to elevated blood pressures during admission. Continue to hold until further advised by outpatient clinic team or if develops recurrent orthostatic hypotension.    Orthostatic hypotension, Orthostatic syncope       OLANZapine 5 MG tablet    zyPREXA    60 tablet    Take 1 tablet (5 mg) by mouth 2 times daily     Delirium, Multiple myeloma in relapse (H)       ondansetron 8 MG ODT tab    ZOFRAN-ODT    30 tablet    Take 1 tablet (8 mg) by mouth every 8 hours as needed for nausea    Nausea       potassium chloride SA 20 MEQ CR tablet    K-DUR/KLOR-CON M    60 tablet    TAKE 1 TABLET BY MOUTH TWICE DAILY.    Hypokalemia       simethicone 80 MG chewable tablet    MYLICON    180 tablet    Take 2 tablets (160 mg) by mouth 4 times daily as needed for cramping or other (gas pain)    Nausea       simvastatin 5 MG tablet    ZOCOR    30 tablet    Take 4 tablets (20 mg) by mouth daily    Mixed hyperlipidemia       sucralfate 1 GM tablet    CARAFATE    120 tablet    Take 1 tablet (1 g) by mouth 4 times daily as needed    Gastroesophageal reflux disease without esophagitis       traMADol 50 MG tablet    ULTRAM    60 tablet    Take 0.5-1 tablets (25-50 mg) by mouth every 6 hours as needed for moderate pain . Recommend trying after Tylenol and before Dilaudid.    Acute bilateral low back pain without sciatica, Multiple myeloma in relapse (H)

## 2017-07-18 NOTE — MR AVS SNAPSHOT
After Visit Summary   7/18/2017    Anthony Carbone    MRN: [id number]           Patient Information     Date Of Birth          1945        Visit Information        Provider Department      7/18/2017 12:10 PM Lashay Romero PA-C [facility]        Today's Diagnoses     Multiple myeloma not having achieved remission (H)    -  1    Other fatigue        Sundowning        Urinary tract infection, site unspecified           Follow-ups after your visit        Your next 10 appointments already scheduled     Jul 25, 2017 11:15 AM CDT   [facility] Draw with [facility] DRAW   [facility] Draw ([facility])    [address]  2nd Floor  [facility] [address]   [phone]            Jul 25, 2017 11:50 AM CDT   (Arrive by 11:35 AM)   Return Visit with Leanne Reddy PA-C   [facility] ([facility])    [address]  [address] [facility] [address]   [phone]            Jul 25, 2017  1:00 PM CDT   Infusion 60 with UC ONCOLOGY INFUSION, UC 11 ATC   [facility] ([facility])    [address]  [address] [facility] [address]   [phone]            Jul 26, 2017 12:00 PM CDT   Infusion 60 with UC ONCOLOGY INFUSION, UC 28 ATC   [facility] ([facility])    [address]  [address] [facility] [address]   [phone]            Jul 27, 2017   Procedure with Tesfaye Yusuf MD   [facility] PeriOP Services (--)    [address]  [facility] [address]   [phone]            Jul 31, 2017  6:00 PM CDT   (Arrive by 5:45 PM)   RETURN ARRHYTHMIA with Dmitri Banks MD   [facility])    [address]  [address] [facility] [address]   [phone]            Aug 03, 2017  "  Procedure with Tesfaye Yusuf MD   [facility] (--)    [address]  [place] [address] [phone]            Aug 09, 2017  3:30 PM CDT   [facility] Lab Draw with  [facility] LAB DRAW   [facility] Draw ([facility])    [address]  2nd [address]  [facility] [address] [phone]              Who to contact     If you have questions or need follow up information about today's clinic visit or your schedule please contact [facility] directly at [phone].  Normal or non-critical lab and imaging results will be communicated to you by MyChart, letter or phone within 4 business days after the clinic has received the results. If you do not hear from us within 7 days, please contact the clinic through [facility] or phone. If you have a critical or abnormal lab result, we will notify you by phone as soon as possible.  Submit refill requests through [facility] or call your pharmacy and they will forward the refill request to us. Please allow 3 business days for your refill to be completed.          Additional Information About Your Visit        [email or web address] Information     [facility] gives you secure access to your electronic health record. If you see a primary care provider, you can also send messages to your care team and make appointments. If you have questions, please call your primary care clinic.  If you do not have a primary care provider, please call [phone] and they will assist you.        Care EveryWhere ID     This is your Care EveryWhere ID. This could be used by other organizations to access your [place] medical records  [id number]        Your Vitals Were     Pulse Temperature Respirations Height Pulse Oximetry BMI (Body Mass Index)    77 98.9  F (37.2  C) (Oral) 16 1.626 m (5' 4.02\") 95% 21.7 kg/m2       Blood Pressure from Last 3 Encounters:   07/19/17 (!) 171/98   07/18/17 133/77   07/12/17 160/90    " Weight from Last 3 Encounters:   07/18/17 57.4 kg (126 lb 8 oz)   07/11/17 57.9 kg (127 lb 11.2 oz)   06/27/17 56.7 kg (125 lb)              We Performed the Following     Routine UA with micro reflex to culture     Urine Culture Aerobic Bacterial          Today's Medication Changes          These changes are accurate as of: 7/18/17 11:59 PM.  If you have any questions, ask your nurse or doctor.               Start taking these medicines.        Dose/Directions    amoxicillin-clavulanate 875-125 MG per tablet   Commonly known as:  AUGMENTIN   Used for:  Urinary tract infection, site unspecified   Started by:  Lashay Romero PA-C        Dose:  1 tablet   Take 1 tablet by mouth 2 times daily   Quantity:  14 tablet   Refills:  0       methylphenidate 5 MG tablet   Commonly known as:  RITALIN   Used for:  Other fatigue   Started by:  Lashay Romero PA-C        Dose:  5 mg   Take 1 tablet (5 mg) by mouth 2 times daily On hold   Quantity:  15 tablet   Refills:  0         These medicines have changed or have updated prescriptions.        Dose/Directions    * clopidogrel 75 MG tablet   Commonly known as:  PLAVIX   This may have changed:  Another medication with the same name was added. Make sure you understand how and when to take each.   Used for:  History of stroke        Dose:  75 mg   Take 1 tablet (75 mg) by mouth daily   Quantity:  30 tablet   Refills:  0       * clopidogrel 75 MG tablet   Commonly known as:  PLAVIX   This may have changed:  You were already taking a medication with the same name, and this prescription was added. Make sure you understand how and when to take each.   Used for:  History of stroke   Changed by:  Dmitri Banks MD        TAKE 1 TABLET BY MOUTH EVERY DAY   Quantity:  90 tablet   Refills:  0       * Notice:  This list has 2 medication(s) that are the same as other medications prescribed for you. Read the directions carefully, and ask your doctor or other care  provider to review them with you.         Where to get your medicines      These medications were sent to [place], [place] - [address]  [address], [facility] [address]    Hours:  TRANSPLANT PHONE NUMBER [phone] Phone:  [phone]     amoxicillin-clavulanate 875-125 MG per tablet         These medications were sent to [facility] [id number] [place] [address] AT [address] [address]     Phone:  [phone]     clopidogrel 75 MG tablet                Primary Care Provider Office Phone # Fax #    Sanket Gualberto [phone]       [facility] [[email or web address]] [address] [address]        Equal Access to Services     ALBAN SHAH : Hadii chalo hernandezo Sojose, waaxda luqadaha, qaybta kaalmada adesalo, jonny villa. So [facility] [phone].    ATENCIÓN: Si habla español, tiene a todd disposición servicios gratuitos de asistencia lingüística. Parminder al [phone].    We comply with applicable federal civil rights laws and [place] laws. We do not discriminate on the basis of race, color, national origin, age, disability sex, sexual orientation or gender identity.            Thank you!     Thank you for choosing [facility]  for your care. Our goal is always to provide you with excellent care. Hearing back from our patients is one way we can continue to improve our services. Please take a few minutes to complete the written survey that you may receive in the mail after your visit with us. Thank you!             Your Updated Medication List - Protect others around you: Learn how to safely use, store and throw away your medicines at www.disposemymeds.org.          This list is accurate as of: 7/18/17 11:59 PM.  Always use your most recent med list.                   Brand Name Dispense Instructions for  use Diagnosis    acetaminophen 500 MG tablet    TYLENOL     Take 2 tablets (1,000 mg) by mouth every 6 hours as needed for mild pain    Acute bilateral low back pain without sciatica, Multiple myeloma in relapse (H)       acyclovir 400 MG tablet    ZOVIRAX    60 tablet    Take 1 tablet (400 mg) by mouth 2 times daily    Multiple myeloma in relapse (H)       amLODIPine 5 MG tablet    NORVASC    30 tablet    Take one tablet at bedtime.    Essential hypertension       amoxicillin-clavulanate 875-125 MG per tablet    AUGMENTIN    14 tablet    Take 1 tablet by mouth 2 times daily    Urinary tract infection, site unspecified       artificial saliva Liqd liquid      Swish and spit 3-5 mLs in mouth 4 times daily as needed for dry mouth    Dry mouth       * clopidogrel 75 MG tablet    PLAVIX    30 tablet    Take 1 tablet (75 mg) by mouth daily    History of stroke       * clopidogrel 75 MG tablet    PLAVIX    90 tablet    TAKE 1 TABLET BY MOUTH EVERY DAY    History of stroke       dexamethasone 4 MG tablet    DECADRON    30 tablet    Take 20 mg days 1,2, 8, 9, 15, 16 on days of kyprolis    Multiple myeloma not having achieved remission (H)       esomeprazole 40 MG CR capsule    nexIUM    30 capsule    Take 1 capsule (40 mg) by mouth every morning (before breakfast)    Gastroesophageal reflux disease without esophagitis       HYDROmorphone 2 MG tablet    DILAUDID    30 tablet    Take 0.5 tablets (1 mg) by mouth every 3 hours as needed for moderate to severe pain    Multiple myeloma in relapse (H), Acute bilateral low back pain without sciatica       lidocaine 5 % Patch    LIDODERM    30 patch    Place 3 patches onto the skin every 24 hours    Multiple myeloma in relapse (H), Acute bilateral low back pain without sciatica       LORazepam 0.5 MG tablet    ATIVAN    30 tablet    Take 1 tablet (0.5 mg) by mouth every 6 hours as needed for nausea or anxiety.    Multiple myeloma in relapse (H), Delirium       methylphenidate 5 MG  tablet    RITALIN    15 tablet    Take 1 tablet (5 mg) by mouth 2 times daily On hold    Other fatigue       midodrine 2.5 MG tablet    PROAMATINE    90 tablet    Take 1 tablet (2.5 mg) by mouth 3 times daily . HOLD due to elevated blood pressures during admission. Continue to hold until further advised by outpatient clinic team or if develops recurrent orthostatic hypotension.    Orthostatic hypotension, Orthostatic syncope       OLANZapine 5 MG tablet    zyPREXA    60 tablet    Take 1 tablet (5 mg) by mouth 2 times daily    Delirium, Multiple myeloma in relapse (H)       ondansetron 8 MG ODT tab    ZOFRAN-ODT    30 tablet    Take 1 tablet (8 mg) by mouth every 8 hours as needed for nausea    Nausea       potassium chloride SA 20 MEQ CR tablet    K-DUR/KLOR-CON M    60 tablet    TAKE 1 TABLET BY MOUTH TWICE DAILY.    Hypokalemia       simethicone 80 MG chewable tablet    MYLICON    180 tablet    Take 2 tablets (160 mg) by mouth 4 times daily as needed for cramping or other (gas pain)    Nausea       simvastatin 5 MG tablet    ZOCOR    30 tablet    Take 4 tablets (20 mg) by mouth daily    Mixed hyperlipidemia       sucralfate 1 GM tablet    CARAFATE    120 tablet    Take 1 tablet (1 g) by mouth 4 times daily as needed    Gastroesophageal reflux disease without esophagitis       traMADol 50 MG tablet    ULTRAM    60 tablet    Take 0.5-1 tablets (25-50 mg) by mouth every 6 hours as needed for moderate pain . Recommend trying after Tylenol and before Dilaudid.    Acute bilateral low back pain without sciatica, Multiple myeloma in relapse (H)       * Notice:  This list has 2 medication(s) that are the same as other medications prescribed for you. Read the directions carefully, and ask your doctor or other care provider to review them with you.

## 2017-07-18 NOTE — PROGRESS NOTES
HEMATOLOGY/ONCOLOGY PROGRESS NOTE  Jul 18, 2017    REASON FOR VISIT: myeloma    Diagnosis: 71 year old gentleman with IgM lambda multiple myeloma originally diagnosed in 01/2012 as stage I, standard risk disease.   Treatment: Revlimid plus dexamethasone for 4-5 cycles but plateaued by late 03/2012.   - Velcade was added and he received another 2-3 cycles, achieving a good partial remission.      Transplant: Single auto after melphalan 200 mg/m2 preparative regimen on 01/09/2013  - Post-transplant course: unremarkable except for some mild steroid-induced hyperglycemia, gastritis, nausea and vomiting.      Maintenance: Lenalidomide at day-100 then developed a maculopapular rash. Lenalidomide was held and then we re-challenged him after about a month to 2 months at a lower dose (5 mg daily); rash returned.   - was started on maintenance Velcade, every other week through July 2014, when he was noted with abnormalities on myeloma studies drawn there. Noted with prostate cancer dx at about the time of relapse (see below).     Relapse: noted with return on M-spike in blood/urine with marrow involvement but negative PET.   - Started on retreatment with RVD on 8/27/14 with Revlimid at 15 mg 14 days of 21 days, dexamethasone 40 mg weekly and velcade weekly.Completed at total of 5 cycles without complication by 12/2014.   - Started Cycle 6 on inc'd Rev dosing of 20mg daily x 2 weeks on 12/11/14 which was complicated by pneumonia.  - Adm 12/21-1/10 for human metapneumovirus pneumonia complicated by anorexia, HTN, depression, anorexia with significant weight loss.   - Restarted Ernesto/Dex only on 2/4/15 with good tolerance but with thrombocytopenia.   - Bendamustine added to Velcade/dexamethasone on 5/21/15 due to disease progression  - Velcade discontinued on 7/9/2015 due to side effects (orthostasis)  - Schedule changed to bendamustine 80 mg/m2 days 1 and 2 on 28-day cycle  - Cycle 1 tolerated poorly due to lightheadedness and  weakness  - Cycle 2 dose reduced to 60 mg/m2 and pre-meds adjusted  - Cycle 5 received on 9/17/15.  - 10/1/2015 - increasing IgM, M-spike - started Pomalidomide 4mg/day (21 days out of 28 days) and weekly Decadron 20mg on Oct 1st, 2015.    - Carfilzomib added on 10/22/2015 making this CPD.  - C3-C6  received in [place]    - Returned to [facility] and resumed CPD here    - Adm: 4/12-4/14 with fever, confusion, neutropenia. Noted on MRI to have acute/subacute CVA and subacute/chronic CVA; started on Plavix, given brief course of Abx and GCSF prior to d/c.     - Continued on Carfilzomib and dexamethasone alone  - Pomalyst added back on 7/13/2016 for rising FLC  - Start daratumumab 11/10/16. Changed to every other week after 4 weekly treatments d/t profound fatigue and malaise.   - Adm 5/7-5/16 due to AMS, hypercalcemia, hyperuricemia, possible PNA, back pain, and JOSE MARIA. Started Kyprolis while inpatient.  - Re-admitted 5/25-/529 with recurrent AMS, found to have concomitant PNA  - Resumed Kyprolis 6/13/2017  -Kyprolis cycle 3 day 1 given on 7/11/17  -Here for cycle 3 day 8 today with concerns regarding worsening fatigue and on/off waxing waning mental status changes.       INTERVAL HISTORY:    Mr. Carbone is here with his wife. Within the past week he has been more fatigued. He was sleeping about 4-6 hours during the day. A few hours in the morning and afternoon. Now he is napping closer to 6 hours/day. It is not uncommon for him to be more tired after the kyprolis which he received last week, but seems a bit worse. He also did not find much improvement following the blood transfusion last week. They also feel his forgetfulness, sundowning is more pronounced as well. Usually this occurs in the afternoon/evening. He will get confused about the day and sometimes have a difficult time keeping details straight.     He slipped on an area rug over the weekend. He fell and hit his back but not his head. He initially had some back  "pain and bruising but back pain went away. PT/OT had been working with him until last week. But since they stopped he has been too tired to continue the exercises. His appetite is low. He gets in some fluids, but not enough. He will drink some water and has been eating fruit and popsicles for other sources of hydration.     No fevers/chills/sweats, HA, vision changes, cough, congestion, sore throat, chest pain, SOB, GIVENS, n/v, abdominal pain, stool or urinary changes, swelling, bleeding, rash, new aches/pains, change in mood.       PHYSICAL EXAMINATION  /77 (BP Location: Right arm, Patient Position: Sitting)  Pulse 77  Temp 98.9  F (37.2  C) (Oral)  Resp 16  Ht 1.626 m (5' 4.02\")  Wt 57.4 kg (126 lb 8 oz)  SpO2 95%  BMI 21.7 kg/m2    Wt Readings from Last 10 Encounters:   07/18/17 57.4 kg (126 lb 8 oz)   07/11/17 57.9 kg (127 lb 11.2 oz)   06/27/17 56.7 kg (125 lb)   06/21/17 57.9 kg (127 lb 11.2 oz)   06/20/17 57.9 kg (127 lb 11.2 oz)   06/14/17 57.9 kg (127 lb 9.6 oz)   06/13/17 57.6 kg (127 lb)   06/06/17 57.2 kg (126 lb)   05/29/17 56.7 kg (124 lb 14.4 oz)   05/23/17 59.2 kg (130 lb 8 oz)   General: AxOx4. Able to ambulate independently, albeit slow and cautiously.  No acute distress. HEENT: PERRL, no palor or icterus. CVS: RRR with occasional premature beat. CHEST: CTAB, normal work of breathing ABDOMEN: soft non tender no masses     NEURO: AAOX3  CN 2-12 intact. Motor and sensation in tact. Strength 4/5 in upper and lower extremities. Gets to and from the exam table un assisted. SKIN: some bruising over lower back but no palpable tenderness EXTREMITIES: No edema.     LABS:   Results for WILLIAN ARCINIEGA (MRN [id number]) as of 7/20/2017 06:05   Ref. Range 7/11/2017 15:14 7/18/2017 12:02   Sodium Latest Ref Range: 133 - 144 mmol/L 135 137   Potassium Latest Ref Range: 3.4 - 5.3 mmol/L 4.4 4.7   Chloride Latest Ref Range: 94 - 109 mmol/L 102 104   Carbon Dioxide Latest Ref Range: 20 - 32 " mmol/L 25 26   Urea Nitrogen Latest Ref Range: 7 - 30 mg/dL 23 20   Creatinine Latest Ref Range: 0.66 - 1.25 mg/dL 1.05 0.88   GFR Estimate Latest Ref Range: >60 mL/min/1.7m2 69 85   GFR Estimate If Black Latest Ref Range: >60 mL/min/1.7m2 84 >90...   Calcium Latest Ref Range: 8.5 - 10.1 mg/dL 9.2 9.4   Anion Gap Latest Ref Range: 3 - 14 mmol/L 8 7   Albumin Latest Ref Range: 3.4 - 5.0 g/dL 3.0 (L) 2.8 (L)   Protein Total Latest Ref Range: 6.8 - 8.8 g/dL 8.5 8.9 (H)   Bilirubin Total Latest Ref Range: 0.2 - 1.3 mg/dL 0.4 0.4   Alkaline Phosphatase Latest Ref Range: 40 - 150 U/L 56 53   ALT Latest Ref Range: 0 - 70 U/L 14 13   AST Latest Ref Range: 0 - 45 U/L 9 6   Results for WILLIAN ARCINIEGA (MRN [id number]) as of 7/20/2017 06:05   Ref. Range 7/11/2017 15:14 7/18/2017 12:02   Glucose Latest Ref Range: 70 - 99 mg/dL 130 (H) 91     Results for HANSACHARLYWILLIAN LARES (MRN [id number]) as of 7/20/2017 06:05   Ref. Range 6/27/2017 16:02 7/11/2017 15:14 7/18/2017 12:02   WBC Latest Ref Range: 4.0 - 11.0 10e9/L 7.7 5.5 4.1   Hemoglobin Latest Ref Range: 13.3 - 17.7 g/dL 9.4 (L) 8.1 (L) 11.5 (L)   Hematocrit Latest Ref Range: 40.0 - 53.0 % 30.3 (L) 26.6 (L) 36.2 (L)   Platelet Count Latest Ref Range: 150 - 450 10e9/L 54 (L) 93 (L) 50 (L)   RBC Count Latest Ref Range: 4.4 - 5.9 10e12/L 2.98 (L) 2.53 (L) 3.65 (L)   MCV Latest Ref Range: 78 - 100 fl 102 (H) 105 (H) 99   MCH Latest Ref Range: 26.5 - 33.0 pg 31.5 32.0 31.5   MCHC Latest Ref Range: 31.5 - 36.5 g/dL 31.0 (L) 30.5 (L) 31.8   RDW Latest Ref Range: 10.0 - 15.0 % 18.5 (H) 19.6 (H) 19.1 (H)   Diff Method Unknown Automated Method Manual Differential Manual Differential   % Neutrophils Latest Units: % 84.7 87.7 78.9   % Lymphocytes Latest Units: % 7.8 4.4 9.6   % Monocytes Latest Units: % 6.2 4.4 11.5   % Eosinophils Latest Units: % 0.4 2.6 0.0   % Basophils Latest Units: % 0.3 0.9 0.0   % Immature Granulocytes Latest Units: % 0.6     Nucleated RBCs Latest Ref  Range: 0 /100 0     Absolute Neutrophil Latest Ref Range: 1.6 - 8.3 10e9/L 6.6 4.8 3.2   Absolute Lymphocytes Latest Ref Range: 0.8 - 5.3 10e9/L 0.6 (L) 0.2 (L) 0.4 (L)   Absolute Monocytes Latest Ref Range: 0.0 - 1.3 10e9/L 0.5 0.2 0.5   Absolute Eosinophils Latest Ref Range: 0.0 - 0.7 10e9/L 0.0 0.1 0.0   Absolute Basophils Latest Ref Range: 0.0 - 0.2 10e9/L 0.0 0.0 0.0   Abs Immature Granulocytes Latest Ref Range: 0 - 0.4 10e9/L 0.1     Absolute Nucleated RBC Unknown 0.0     Results for WILLIAN ARCINIEGA (MRN [id number]) as of 7/20/2017 06:05   Ref. Range 5/25/2017 22:10 7/18/2017 12:38   Color Urine Unknown Light Yellow Yellow   Appearance Urine Unknown Clear Slightly Cloudy   Glucose Urine Latest Ref Range: NEG mg/dL Negative Negative   Bilirubin Urine Latest Ref Range: NEG  Negative Negative   Ketones Urine Latest Ref Range: NEG mg/dL Negative Negative   Specific Gravity Urine Latest Ref Range: 1.003 - 1.035  1.013 1.015   pH Urine Latest Ref Range: 5.0 - 7.0 pH 5.0 5.0   Protein Albumin Urine Latest Ref Range: NEG mg/dL Negative Negative   Urobilinogen mg/dL Latest Ref Range: 0.0 - 2.0 mg/dL Normal 0.0   Nitrite Urine Latest Ref Range: NEG  Negative Negative   Blood Urine Latest Ref Range: NEG  Negative Negative   Leukocyte Esterase Urine Latest Ref Range: NEG  Negative Small (A)   Source Unknown Midstream Urine Midstream Urine   WBC Urine Latest Ref Range: 0 - 2 /HPF 0 40 (H)   RBC Urine Latest Ref Range: 0 - 2 /HPF <1 6 (H)   Squamous Epithelial /HPF Urine Latest Ref Range: 0 - 1 /HPF  1   Transitional Epi Latest Ref Range: 0 - 1 /HPF <1    Mucous Urine Latest Ref Range: NEG /LPF Present (A) Present (A)   Hyaline Casts Latest Ref Range: 0 - 2 /LPF 1 48 (H)         IMPRESSION/PLAN:  71 year old male with relapsed MM after autologous transplant (1/9/2013), with recent progression on daratumumab/pom/dex, with recent hospitalization 5/7-5/16 for back pain, JOSE MARIA,and  Hypercalcemia. Started on kyprolis IV  5/11 as well as po dex. He unfortunately developed worsening AMS and was re-admitted 5/25-5/29, found to have PNA. Now back on single agent Kyprolis/dex.       MM: Previously on edgardo/pom/dex while wintering in [place], progressive disease on SPEP inpatient (M spike 0.9 --> 2.1, IgM 3410) Received solumedrol 100 mg IV daily 5/8-5/10. Started PO dex 40 mg daily x 3 days on 5/11 with Kyprolis 5/11 and 5/12, and 5/23, subsequent doses held for AMS and PNA.  - Resumed Kyprolis/dex on 6/13. Today is cycle 3 day 8. Seems to tolerate kyprolis okay with some fatigue  - Myeloma labs drawn last week with an increase in his M spike.   -seen today with increased fatigue and mild worsening of mental status/sundowning-could be due to progression  -discussed with Dr. Topete. Due to overall performance status would be hesitant to add an agent, to treat with kyprolis as planned today and tomorrow and to f/u with Dr. Topete next week to discuss in greater detail. ECOG 2+    AMS: AMS started early may in setting of metabolic derangements, uremia, and possible infection. Initially resolved, then returned ~5/24. Work-up showing PNA and UCx with enterococcus and CONS.  -more pronounced this past week. Pt answered questions appropriately in clinic and was able to provide decent hx with minimal supplementation from his wife. Non-focal exam. Progression? Infection? Dehydration? Fatigue?   -.UA suggestive of possible UTI. Hx of long QT and allergy to bactrim, renal function on cusp for use of nitrofurantoin. Discussed with pharmacy and will start augmentin and then follow culture.    - Continue Zyprexa 5 mg at bedtime  - Holding off long-acting pain meds     Fatigue: ongoing but sleeping more this past week, was napping 4-6 hours/day and now closer to averaging 6 hours/day. Also feeling too tired to perform his regular PT exercises. Progression? Kyprolis? Dehydration? Lesser nutritional intake? UTI?   -discussed trial of ritalin and will start  with 5mg BID if needed  -treating for possible UTI  -give 1L IVFs-hyaline casts in urine   -encourage PT exercises    Heme: Cytopenias 2/2 treatment and likely MM in setting of progression. Platelets lower today at 50. Similar to previous cycles. Hgb better, 11.5. 2 units given last week for hgb 8.1   - On Plavix, hold for platelets < 50. Advised to hold again today    Renal/FEN: Creat baseline ~0.8-1.0  -Hx Hypercalcemia: s/p pamidronate x 1 on 5/8  -Hx Hyperuricemia: s/p rasburicase x 1 on 5/8  -Encouraged protein/calorie supplements. Weight stable today. Lytes and Cr okay today.      ID:   - Continues ppx  mg BID  -treated for UTI given todays UA and symptoms of fatigue and increased fogginess/sundowning      Back/rib pain: Started early May. Lumbar MRI at OSH showing mild-mod central and foraminal stenoses in lumbar spine, per patient and wife, there was no MM lesion there. Rib films inpatient negative, back films stable from prior imaging.  - Continues tramadol PRN and Tylenol PRN. No changes.      CV: Continue zocor. Norvasc held as BP had normalized. BP today okay. Continue to monitor.   - Avoid QTc prolonging agents (history of QTc prolongation with syncopal episodes)     Deconditioning: Had improved. Now with more fatigue this past week and finished with PT/OT. Encourage ongoing exerices.     It is my privilege to be involved in the care of the above patient.     Lashay Romero PA-C  [facility] [address] [address]  [phone]

## 2017-07-18 NOTE — PATIENT INSTRUCTIONS
Contact Numbers  [facility]: [phone]    After Hours:  [phone]  Triage: [phone]    Please call the [facility] Triage line if you experience a temperature greater than or equal to 100.5, shaking chills, have uncontrolled nausea, vomiting and/or diarrhea, dizziness, shortness of breath, chest pain, bleeding, unexplained bruising, or if you have any other new/concerning symptoms, questions or concerns.     If it is after hours, weekends, or holidays, please call the main hospital  at  [phone] and ask to speak to the Oncology doctor on call.     If you are having any concerning symptoms or wish to speak to a provider before your next infusion visit, please call your care coordinator or triage to notify them so we can adequately serve you.     If you need a refill on a narcotic prescription or other medication, please call triage before your infusion appointment.         July 2017 Sunday Monday Tuesday Wednesday Thursday Friday Saturday                                 1       2     3     4     5     6     7     8       9     10     11     [facility] DRAW    2:45 PM   (15 min.)   [facility] DRAW   [facility] Lab Draw     [facility] RETURN    3:05 PM   (50 min.)   Leanne Reddy PA-C   [facility] ONC INFUSION 60    4:00 PM   (60 min.)    ONCOLOGY INFUSION   [facility] 12     [facility] ONC INFUSION 60   11:00 AM   (60 min.)    ONCOLOGY INFUSION   [facility] DRAW    3:30 PM   (15 min.)   [facility] DRAW   [facility] Lab Draw 13     14     15       16     17     18     [facility] DRAW   11:15 AM   (15 min.)    [facility] DRAW   [facility] Lab Draw     [facility] RETURN   11:55 AM   (50 min.)   Lashay Romero PA-C   [facility] ONC INFUSION 60    3:00 PM   (60 min.)    ONCOLOGY INFUSION   [facility] 19     [facility] ONC  INFUSION 60    3:00 PM   (60 min.)   [facility] ONCOLOGY INFUSION   [facility] 20     21     22       23     24     25     [facility] DRAW   11:15 AM   (15 min.)   [facility] DRAW   [facility] Lab Draw     [facility] RETURN   11:35 AM   (50 min.)   Leanne Reddy PA-C   [facility]     [facility] ONC INFUSION 60    1:00 PM   (60 min.)    ONCOLOGY INFUSION   [facility] 26     [facility] ONC INFUSION 60   12:00 PM   (60 min.)   [facility] ONCOLOGY INFUSION   [facility] 27     PHACOEMULSIFICATION CLEAR CORNEA WITH STANDARD INTRAOCULAR LENS IMPLANT   12:30 PM   Tesfaye Yusuf MD    [facility] 28     29       30     31     [facility] RETURN ARRHYTHMIA    5:45 PM   (20 min.)   Dmitri Banks MD   [facility]                                     August 2017 Sunday Monday Tuesday Wednesday Thursday Friday Saturday             1     2     3     PHACOEMULSIFICATION CLEAR CORNEA WITH STANDARD INTRAOCULAR LENS IMPLANT    1:30 PM   Tesfaye Yusuf MD    [facility] 4     5       6     7     8     9     [facility] DRAW    3:30 PM   (15 min.)    [facility] DRAW   [facility] Lab Draw     [facility] ONC INFUSION 60    4:00 PM   (60 min.)    ONCOLOGY INFUSION   [facility]     [facility] ONC RETURN    5:00 PM   (30 min.)   Kamari Topete MD   [facility] Trans[facility] 10     [facility] ONC INFUSION 60   12:00 PM   (60 min.)    ONCOLOGY INFUSION   [facility] 11     12       13     14     15     16     17     [facility] DRAW   12:30 PM   (15 min.)    [facility] DRAW   [facility] Lab Draw     [facility] ONC INFUSION 60    1:00 PM   (60 min.)    ONCOLOGY INFUSION   [facility] 18     [facility] ONC INFUSION 60    1:00 PM   (60 min.)    ONCOLOGY INFUSION   [facility] 19       20     21     22     23     24     [facility] DRAW   12:30 PM   (15 min.)   [facility]   [facility] Lab Draw     [facility] ONC INFUSION 60    1:00 PM   (60 min.)    ONCOLOGY INFUSION   [facility] 25     [facility] ONC INFUSION 60    1:00 PM   (60 min.)    ONCOLOGY INFUSION   [facility] 26       27     28     29     30     31                           Recent Results (from the past 24 hour(s))   CBC with platelets differential    Collection Time: 07/18/17 12:02 PM   Result Value Ref Range    WBC 4.1 4.0 - 11.0 10e9/L    RBC Count 3.65 (L) 4.4 - 5.9 10e12/L    Hemoglobin 11.5 (L) 13.3 - 17.7 g/dL    Hematocrit 36.2 (L) 40.0 - 53.0 %    MCV 99 78 - 100 fl    MCH 31.5 26.5 - 33.0 pg    MCHC 31.8 31.5 - 36.5 g/dL    RDW 19.1 (H) 10.0 - 15.0 %    Platelet Count 50 (L) 150 - 450 10e9/L    Diff Method Manual Differential     % Neutrophils 78.9 %    % Lymphocytes 9.6 %    % Monocytes 11.5 %    % Eosinophils 0.0 %    % Basophils 0.0 %    Absolute Neutrophil 3.2 1.6 - 8.3 10e9/L    Absolute Lymphocytes 0.4 (L) 0.8 - 5.3 10e9/L    Absolute Monocytes 0.5 0.0 - 1.3 10e9/L    Absolute Eosinophils 0.0 0.0 - 0.7 10e9/L    Absolute Basophils 0.0 0.0 - 0.2 10e9/L    Anisocytosis Moderate     Poikilocytosis Slight    Comprehensive metabolic panel    Collection Time: 07/18/17 12:02 PM   Result Value Ref Range    Sodium 137 133 - 144 mmol/L    Potassium 4.7 3.4 - 5.3 mmol/L    Chloride 104 94 - 109 mmol/L    Carbon Dioxide 26 20 - 32 mmol/L    Anion Gap 7 3 - 14 mmol/L    Glucose 91 70 - 99 mg/dL    Urea Nitrogen 20 7 - 30 mg/dL    Creatinine 0.88 0.66 - 1.25 mg/dL    GFR Estimate 85 >60 mL/min/1.7m2    GFR Estimate If Black >90   GFR Calc   >60 mL/min/1.7m2    Calcium 9.4 8.5 - 10.1 mg/dL    Bilirubin Total 0.4 0.2 - 1.3 mg/dL    Albumin 2.8 (L) 3.4 - 5.0 g/dL    Protein Total 8.9 (H) 6.8 - 8.8 g/dL    Alkaline Phosphatase 53 40 - 150 U/L    ALT 13 0 - 70 U/L    AST 6 0 - 45 U/L   Routine UA with micro reflex to culture    Collection Time: 07/18/17 12:38 PM   Result  Value Ref Range    Color Urine Yellow     Appearance Urine Slightly Cloudy     Glucose Urine Negative NEG mg/dL    Bilirubin Urine Negative NEG    Ketones Urine Negative NEG mg/dL    Specific Gravity Urine 1.015 1.003 - 1.035    Blood Urine Negative NEG    pH Urine 5.0 5.0 - 7.0 pH    Protein Albumin Urine Negative NEG mg/dL    Urobilinogen mg/dL 0.0 0.0 - 2.0 mg/dL    Nitrite Urine Negative NEG    Leukocyte Esterase Urine Small (A) NEG    Source Midstream Urine     WBC Urine 40 (H) 0 - 2 /HPF    RBC Urine 6 (H) 0 - 2 /HPF    Squamous Epithelial /HPF Urine 1 0 - 1 /HPF    Mucous Urine Present (A) NEG /LPF    Hyaline Casts 48 (H) 0 - 2 /LPF   Urine Culture Aerobic Bacterial    Collection Time: 07/18/17 12:38 PM   Result Value Ref Range    Specimen Description Midstream Urine     Special Requests Specimen received in preservative     Culture Micro Pending     Micro Report Status Pending

## 2017-07-18 NOTE — LETTER
7/18/2017      RE: Anthony Carbone  [address] [place] [address]       HEMATOLOGY/ONCOLOGY PROGRESS NOTE  Jul 18, 2017    REASON FOR VISIT: myeloma    Diagnosis: 71 year old gentleman with IgM lambda multiple myeloma originally diagnosed in 01/2012 as stage I, standard risk disease.   Treatment: Revlimid plus dexamethasone for 4-5 cycles but plateaued by late 03/2012.   - Velcade was added and he received another 2-3 cycles, achieving a good partial remission.      Transplant: Single auto after melphalan 200 mg/m2 preparative regimen on 01/09/2013  - Post-transplant course: unremarkable except for some mild steroid-induced hyperglycemia, gastritis, nausea and vomiting.      Maintenance: Lenalidomide at day-100 then developed a maculopapular rash. Lenalidomide was held and then we re-challenged him after about a month to 2 months at a lower dose (5 mg daily); rash returned.   - was started on maintenance Velcade, every other week through July 2014, when he was noted with abnormalities on myeloma studies drawn there. Noted with prostate cancer dx at about the time of relapse (see below).     Relapse: noted with return on M-spike in blood/urine with marrow involvement but negative PET.   - Started on retreatment with RVD on 8/27/14 with Revlimid at 15 mg 14 days of 21 days, dexamethasone 40 mg weekly and velcade weekly.Completed at total of 5 cycles without complication by 12/2014.   - Started Cycle 6 on inc'd Rev dosing of 20mg daily x 2 weeks on 12/11/14 which was complicated by pneumonia.  - Adm 12/21-1/10 for human metapneumovirus pneumonia complicated by anorexia, HTN, depression, anorexia with significant weight loss.   - Restarted Ernesto/Dex only on 2/4/15 with good tolerance but with thrombocytopenia.   - Bendamustine added to Velcade/dexamethasone on 5/21/15 due to disease progression  - Velcade discontinued on 7/9/2015 due to side effects (orthostasis)  - Schedule changed to bendamustine 80 mg/m2  days 1 and 2 on 28-day cycle  - Cycle 1 tolerated poorly due to lightheadedness and weakness  - Cycle 2 dose reduced to 60 mg/m2 and pre-meds adjusted  - Cycle 5 received on 9/17/15.  - 10/1/2015 - increasing IgM, M-spike - started Pomalidomide 4mg/day (21 days out of 28 days) and weekly Decadron 20mg on Oct 1st, 2015.    - Carfilzomib added on 10/22/2015 making this CPD.  - C3-C6  received in [place]    - Returned to [facility] and resumed CPD here    - Adm: 4/12-4/14 with fever, confusion, neutropenia. Noted on MRI to have acute/subacute CVA and subacute/chronic CVA; started on Plavix, given brief course of Abx and GCSF prior to d/c.     - Continued on Carfilzomib and dexamethasone alone  - Pomalyst added back on 7/13/2016 for rising FLC  - Start daratumumab 11/10/16. Changed to every other week after 4 weekly treatments d/t profound fatigue and malaise.   - Adm 5/7-5/16 due to AMS, hypercalcemia, hyperuricemia, possible PNA, back pain, and JOSE MARIA. Started Kyprolis while inpatient.  - Re-admitted 5/25-/529 with recurrent AMS, found to have concomitant PNA  - Resumed Kyprolis 6/13/2017  -Kyprolis cycle 3 day 1 given on 7/11/17  -Here for cycle 3 day 8 today with concerns regarding worsening fatigue and on/off waxing waning mental status changes.       INTERVAL HISTORY:    Mr. Carbone is here with his wife. Within the past week he has been more fatigued. He was sleeping about 4-6 hours during the day. A few hours in the morning and afternoon. Now he is napping closer to 6 hours/day. It is not uncommon for him to be more tired after the kyprolis which he received last week, but seems a bit worse. He also did not find much improvement following the blood transfusion last week. They also feel his forgetfulness, sundowning is more pronounced as well. Usually this occurs in the afternoon/evening. He will get confused about the day and sometimes have a difficult time keeping details straight.     He slipped on an area rug over  "the weekend. He fell and hit his back but not his head. He initially had some back pain and bruising but back pain went away. PT/OT had been working with him until last week. But since they stopped he has been too tired to continue the exercises. His appetite is low. He gets in some fluids, but not enough. He will drink some water and has been eating fruit and popsicles for other sources of hydration.     No fevers/chills/sweats, HA, vision changes, cough, congestion, sore throat, chest pain, SOB, GIVENS, n/v, abdominal pain, stool or urinary changes, swelling, bleeding, rash, new aches/pains, change in mood.       PHYSICAL EXAMINATION  /77 (BP Location: Right arm, Patient Position: Sitting)  Pulse 77  Temp 98.9  F (37.2  C) (Oral)  Resp 16  Ht 1.626 m (5' 4.02\")  Wt 57.4 kg (126 lb 8 oz)  SpO2 95%  BMI 21.7 kg/m2    Wt Readings from Last 10 Encounters:   07/18/17 57.4 kg (126 lb 8 oz)   07/11/17 57.9 kg (127 lb 11.2 oz)   06/27/17 56.7 kg (125 lb)   06/21/17 57.9 kg (127 lb 11.2 oz)   06/20/17 57.9 kg (127 lb 11.2 oz)   06/14/17 57.9 kg (127 lb 9.6 oz)   06/13/17 57.6 kg (127 lb)   06/06/17 57.2 kg (126 lb)   05/29/17 56.7 kg (124 lb 14.4 oz)   05/23/17 59.2 kg (130 lb 8 oz)   General: AxOx4. Able to ambulate independently, albeit slow and cautiously.  No acute distress. HEENT: PERRL, no palor or icterus. CVS: RRR with occasional premature beat. CHEST: CTAB, normal work of breathing ABDOMEN: soft non tender no masses     NEURO: AAOX3  CN 2-12 intact. Motor and sensation in tact. Strength 4/5 in upper and lower extremities. Gets to and from the exam table un assisted. SKIN: some bruising over lower back but no palpable tenderness EXTREMITIES: No edema.     LABS:   Results for SCHARPING, WILLIAN MINAL (MRN [id number]) as of 7/20/2017 06:05   Ref. Range 7/11/2017 15:14 7/18/2017 12:02   Sodium Latest Ref Range: 133 - 144 mmol/L 135 137   Potassium Latest Ref Range: 3.4 - 5.3 mmol/L 4.4 4.7   Chloride Latest " Ref Range: 94 - 109 mmol/L 102 104   Carbon Dioxide Latest Ref Range: 20 - 32 mmol/L 25 26   Urea Nitrogen Latest Ref Range: 7 - 30 mg/dL 23 20   Creatinine Latest Ref Range: 0.66 - 1.25 mg/dL 1.05 0.88   GFR Estimate Latest Ref Range: >60 mL/min/1.7m2 69 85   GFR Estimate If Black Latest Ref Range: >60 mL/min/1.7m2 84 >90...   Calcium Latest Ref Range: 8.5 - 10.1 mg/dL 9.2 9.4   Anion Gap Latest Ref Range: 3 - 14 mmol/L 8 7   Albumin Latest Ref Range: 3.4 - 5.0 g/dL 3.0 (L) 2.8 (L)   Protein Total Latest Ref Range: 6.8 - 8.8 g/dL 8.5 8.9 (H)   Bilirubin Total Latest Ref Range: 0.2 - 1.3 mg/dL 0.4 0.4   Alkaline Phosphatase Latest Ref Range: 40 - 150 U/L 56 53   ALT Latest Ref Range: 0 - 70 U/L 14 13   AST Latest Ref Range: 0 - 45 U/L 9 6   Results for WILLIAN ARCINIEGA (MRN [id number]) as of 7/20/2017 06:05   Ref. Range 7/11/2017 15:14 7/18/2017 12:02   Glucose Latest Ref Range: 70 - 99 mg/dL 130 (H) 91     Results for WILLIAN ARCINIEGA (MRN [id number]) as of 7/20/2017 06:05   Ref. Range 6/27/2017 16:02 7/11/2017 15:14 7/18/2017 12:02   WBC Latest Ref Range: 4.0 - 11.0 10e9/L 7.7 5.5 4.1   Hemoglobin Latest Ref Range: 13.3 - 17.7 g/dL 9.4 (L) 8.1 (L) 11.5 (L)   Hematocrit Latest Ref Range: 40.0 - 53.0 % 30.3 (L) 26.6 (L) 36.2 (L)   Platelet Count Latest Ref Range: 150 - 450 10e9/L 54 (L) 93 (L) 50 (L)   RBC Count Latest Ref Range: 4.4 - 5.9 10e12/L 2.98 (L) 2.53 (L) 3.65 (L)   MCV Latest Ref Range: 78 - 100 fl 102 (H) 105 (H) 99   MCH Latest Ref Range: 26.5 - 33.0 pg 31.5 32.0 31.5   MCHC Latest Ref Range: 31.5 - 36.5 g/dL 31.0 (L) 30.5 (L) 31.8   RDW Latest Ref Range: 10.0 - 15.0 % 18.5 (H) 19.6 (H) 19.1 (H)   Diff Method Unknown Automated Method Manual Differential Manual Differential   % Neutrophils Latest Units: % 84.7 87.7 78.9   % Lymphocytes Latest Units: % 7.8 4.4 9.6   % Monocytes Latest Units: % 6.2 4.4 11.5   % Eosinophils Latest Units: % 0.4 2.6 0.0   % Basophils Latest Units: % 0.3 0.9  0.0   % Immature Granulocytes Latest Units: % 0.6     Nucleated RBCs Latest Ref Range: 0 /100 0     Absolute Neutrophil Latest Ref Range: 1.6 - 8.3 10e9/L 6.6 4.8 3.2   Absolute Lymphocytes Latest Ref Range: 0.8 - 5.3 10e9/L 0.6 (L) 0.2 (L) 0.4 (L)   Absolute Monocytes Latest Ref Range: 0.0 - 1.3 10e9/L 0.5 0.2 0.5   Absolute Eosinophils Latest Ref Range: 0.0 - 0.7 10e9/L 0.0 0.1 0.0   Absolute Basophils Latest Ref Range: 0.0 - 0.2 10e9/L 0.0 0.0 0.0   Abs Immature Granulocytes Latest Ref Range: 0 - 0.4 10e9/L 0.1     Absolute Nucleated RBC Unknown 0.0     Results for WILLIAN ARCINIEGA MINAL (MRN [id number]) as of 7/20/2017 06:05   Ref. Range 5/25/2017 22:10 7/18/2017 12:38   Color Urine Unknown Light Yellow Yellow   Appearance Urine Unknown Clear Slightly Cloudy   Glucose Urine Latest Ref Range: NEG mg/dL Negative Negative   Bilirubin Urine Latest Ref Range: NEG  Negative Negative   Ketones Urine Latest Ref Range: NEG mg/dL Negative Negative   Specific Gravity Urine Latest Ref Range: 1.003 - 1.035  1.013 1.015   pH Urine Latest Ref Range: 5.0 - 7.0 pH 5.0 5.0   Protein Albumin Urine Latest Ref Range: NEG mg/dL Negative Negative   Urobilinogen mg/dL Latest Ref Range: 0.0 - 2.0 mg/dL Normal 0.0   Nitrite Urine Latest Ref Range: NEG  Negative Negative   Blood Urine Latest Ref Range: NEG  Negative Negative   Leukocyte Esterase Urine Latest Ref Range: NEG  Negative Small (A)   Source Unknown Midstream Urine Midstream Urine   WBC Urine Latest Ref Range: 0 - 2 /HPF 0 40 (H)   RBC Urine Latest Ref Range: 0 - 2 /HPF <1 6 (H)   Squamous Epithelial /HPF Urine Latest Ref Range: 0 - 1 /HPF  1   Transitional Epi Latest Ref Range: 0 - 1 /HPF <1    Mucous Urine Latest Ref Range: NEG /LPF Present (A) Present (A)   Hyaline Casts Latest Ref Range: 0 - 2 /LPF 1 48 (H)         IMPRESSION/PLAN:  71 year old male with relapsed MM after autologous transplant (1/9/2013), with recent progression on daratumumab/pom/dex, with recent  hospitalization 5/7-5/16 for back pain, JOSE MARIA,and  Hypercalcemia. Started on kyprolis IV 5/11 as well as po dex. He unfortunately developed worsening AMS and was re-admitted 5/25-5/29, found to have PNA. Now back on single agent Kyprolis/dex.       MM: Previously on edgardo/pom/dex while wintering in [place], progressive disease on SPEP inpatient (M spike 0.9 --> 2.1, IgM 3410) Received solumedrol 100 mg IV daily 5/8-5/10. Started PO dex 40 mg daily x 3 days on 5/11 with Kyprolis 5/11 and 5/12, and 5/23, subsequent doses held for AMS and PNA.  - Resumed Kyprolis/dex on 6/13. Today is cycle 3 day  8. Seems to tolerate kyprolis okay with some fatigue  - Myeloma labs drawn last week with an increase in his M spike.   -seen today with increased fatigue and mild worsening of mental status/sundowning-could be due to progression  -discussed with Dr. Topete. Due to overall performance status would be hesitant to add an agent, to treat with kyprolis as planned today and tomorrow and to f/u with Dr. Topete next week to discuss in greater detail. ECOG 2+    AMS: AMS started early may in setting of metabolic derangements, uremia, and possible infection. Initially resolved, then returned ~5/24. Work-up showing PNA and UCx with enterococcus and CONS.  -more pronounced this past week. Pt answered questions appropriately in clinic and was able to provide decent hx with minimal supplementation from his wife. Non-focal exam. Progression? Infection? Dehydration? Fatigue?   -.UA suggestive of possible UTI. Hx of long QT and allergy to bactrim, renal function on cusp for use of nitrofurantoin. Discussed with pharmacy and will start augmentin and then follow culture.    - Continue Zyprexa 5 mg at bedtime  - Holding off long-acting pain meds     Fatigue: ongoing but sleeping more this past week, was napping 4-6 hours/day and now closer to averaging 6 hours/day. Also feeling too tired to perform his regular PT exercises. Progression? Kyprolis?  Dehydration? Lesser nutritional intake? UTI?   -discussed trial of ritalin and will start with 5mg BID if needed  -treating for possible UTI  -give 1L IVFs-hyaline casts in urine   -encourage PT exercises    Heme: Cytopenias 2/2 treatment and likely MM in setting of progression. Platelets lower today at 50. Similar to previous cycles. Hgb better, 11.5. 2 units given last week for hgb 8.1   - On Plavix, hold for platelets < 50. Advised to hold again today    Renal/FEN: Creat baseline ~0.8-1.0  -Hx Hypercalcemia: s/p pamidronate x 1 on 5/8  -Hx Hyperuricemia: s/p rasburicase x 1 on 5/8  -Encouraged protein/calorie supplements. Weight stable today. Lytes and Cr okay today.      ID:   - Continues ppx  mg BID  -treated for UTI given todays UA and symptoms of fatigue and increased fogginess/sundowning      Back/rib pain: Started early May. Lumbar MRI at OSH showing mild-mod central and foraminal stenoses in lumbar spine, per patient and wife, there was no MM lesion there. Rib films inpatient negative, back films stable from prior imaging.  - Continues tramadol PRN and Tylenol PRN. No changes.      CV: Continue  zocor. Norvasc held as BP had normalized. BP today okay. Continue to monitor.   - Avoid QTc prolonging agents (history of QTc prolongation with syncopal episodes)     Deconditioning:  Had improved. Now with more fatigue this past week and finished with PT/OT. Encourage ongoing exerices.     It is my privilege to be involved in the care of the above patient.     Lashay Romero PA-C  [facility]  [address] [place] [id number]  [phone]

## 2017-07-18 NOTE — PROGRESS NOTES
Infusion Nursing Note:  Anthony Carbone presents today for C3D8 Kyprolis-1 L NS.    Patient seen by provider today: Yes: PREM Oakes    Treatment Conditions:  Lab Results   Component Value Date    HGB 11.5 07/18/2017     Lab Results   Component Value Date    WBC 4.1 07/18/2017      Lab Results   Component Value Date    ANEU 3.2 07/18/2017     Lab Results   Component Value Date    PLT 50 07/18/2017      Lab Results   Component Value Date     07/18/2017                   Lab Results   Component Value Date    POTASSIUM 4.7 07/18/2017                   Lab Results   Component Value Date    CR 0.88 07/18/2017                   Lab Results   Component Value Date    JAZMIN 9.4 07/18/2017                Lab Results   Component Value Date    BILITOTAL 0.4 07/18/2017           Lab Results   Component Value Date    ALBUMIN 2.8 07/18/2017                    Lab Results   Component Value Date    ALT 13 07/18/2017           Lab Results   Component Value Date    AST 6 07/18/2017     Results reviewed, labs MET treatment parameters, ok to proceed with treatment.    Intravenous Access:  Implanted Port.  Access dc'd at time of discharge.      Note:   TORB 7/18/17@1400 PREM Oakes-Jessica Melo RN  --1 L NS IV today  Results reviewed, copy given to patient.  Proceed with treatment.    Copy of AVS given to patient. + Blood return from PORT pre and post infusion.  Tolerated infusion without incident. Augmentin and Ritalin Prescriptions filled today.   D/C in care of spouse.  Pt will return 7/19 for next appointment.       Jessica Melo RN  Kyprolis STOP TIME:[id number]

## 2017-07-18 NOTE — NURSING NOTE
"Oncology Rooming Note    July 18, 2017 12:30 PM   Anthony Carbone is a 71 year old male who presents for:    Chief Complaint   Patient presents with     Port Draw     MM, labs collected via portacath.      Oncology Clinic Visit     Return visit related to Multiple Myeloma     Initial Vitals: /77 (BP Location: Right arm, Patient Position: Sitting)  Pulse 77  Temp 98.9  F (37.2  C) (Oral)  Resp 16  Ht 1.626 m (5' 4.02\")  Wt 57.4 kg (126 lb 8 oz)  SpO2 95%  BMI 21.7 kg/m2 Estimated body mass index is 21.7 kg/(m^2) as calculated from the following:    Height as of this encounter: 1.626 m (5' 4.02\").    Weight as of this encounter: 57.4 kg (126 lb 8 oz). Body surface area is 1.61 meters squared.  No Pain (0) Comment: Data Unavailable   No LMP for male patient.  Allergies reviewed: Yes  Medications reviewed: Yes    Medications: Medication refills not needed today.  Pharmacy name entered into EPIC:    [id number] [place] [address] [address] [address] [facility] [address]  [employer] [place], [place] - [address] [facility]  [id number] - [facility] [address] [address] AT [facility] [address]    Clinical concerns: No new concerns Provider was NOT notified.    10 minutes for nursing intake (face to face time)     Jeanna Diaz LPN            "

## 2017-07-18 NOTE — MR AVS SNAPSHOT
After Visit Summary   7/18/2017    Anthony Carbone    MRN: [id number]           Patient Information     Date Of Birth          1945        Visit Information        Provider Department      7/18/2017 3:00 PM  15 ATC;  ONCOLOGY INFUSION [facility]        Today's Diagnoses     Multiple myeloma in relapse (H)    -  1      Care Instructions    Contact Numbers  [facility]: [phone]    After Hours:  [phone]  Triage: [phone]    Please call the [facility] Triage line if you experience a temperature greater than or equal to 100.5, shaking chills, have uncontrolled nausea, vomiting and/or diarrhea, dizziness, shortness of breath, chest pain, bleeding, unexplained bruising, or if you have any other new/concerning symptoms, questions or concerns.     If it is after hours, weekends, or holidays, please call the main hospital  at  [phone] and ask to speak to the Oncology doctor on call.     If you are having any concerning symptoms or wish to speak to a provider before your next infusion visit, please call your care coordinator or triage to notify them so we can adequately serve you.     If you need a refill on a narcotic prescription or other medication, please call triage before your infusion appointment.         July 2017 Sunday Monday Tuesday Wednesday Thursday Friday Saturday                                 1       2     3     4     5     6     7     8       9     10     11     [facility] LAB DRAW    2:45 PM   (15 min.)   [facility] LAB DRAW   [facility] Lab Draw     P RETURN    3:05 PM   (50 min.)   Leanne Reddy PA-C   [facility] ONC INFUSION 60    4:00 PM   (60 min.)    ONCOLOGY INFUSION   [facility] 12     [facility] ONC INFUSION 60   11:00 AM   (60 min.)    ONCOLOGY INFUSION   [facility] LAB DRAW    3:30 PM   (15 min.)   [facility]  LAB DRAW   [facility] Lab Draw 13     14     15       16     17     18     [facility] DRAW   11:15 AM   (15 min.)    [facility] LAB DRAW   [facility] Lab Draw     [facility] RETURN   11:55 AM   (50 min.)   Lashay Romero PA-C   [facility]     [facility] ONC INFUSION 60    3:00 PM   (60 min.)    ONCOLOGY INFUSION   [facility] 19     [facility] ONC INFUSION 60    3:00 PM   (60 min.)    ONCOLOGY INFUSION   [facility] 20     21     22       23     24     25     [facility] LAB DRAW   11:15 AM   (15 min.)    [facility] LAB DRAW   [facility] Lab Draw     [facility] RETURN   11:35 AM   (50 min.)   Leanne Reddy PA-C   [facility]     [facility] ONC INFUSION 60    1:00 PM   (60 min.)    ONCOLOGY INFUSION   [facility] 26     [facility] ONC INFUSION 60   12:00 PM   (60 min.)    ONCOLOGY INFUSION   [facility] 27     PHACOEMULSIFICATION CLEAR CORNEA WITH STANDARD INTRAOCULAR LENS IMPLANT   12:30 PM   Tesfaye Yusuf MD    [facility] 28     29       30     31     [facility] RETURN ARRHYTHMIA    5:45 PM   (20 min.)   Dmitri Banks MD   [facility]                                     August 2017 Sunday Monday Tuesday Wednesday Thursday Friday Saturday             1     2     3     PHACOEMULSIFICATION CLEAR CORNEA WITH STANDARD INTRAOCULAR LENS IMPLANT    1:30 PM   Tesfaye Yusuf MD   [facility] 4     5       6     7     8     9     [facility] LAB DRAW    3:30 PM   (15 min.)    [facility] LAB DRAW   [facility] Lab Draw     [facility] ONC INFUSION 60    4:00 PM   (60 min.)    ONCOLOGY INFUSION   [facility]     UMP ONC RETURN    5:00 PM   (30 min.)   Kamari Topete MD   [facility] 10     [facility] ONC INFUSION 60   12:00 PM   (60 min.)    ONCOLOGY INFUSION   [facility] 11     12       13     14     15     16     17     [facility]  LAB DRAW   12:30 PM   (15 min.)   [facility] LAB DRAW   [facility] Lab Draw     [facility] ONC INFUSION 60    1:00 PM   (60 min.)    ONCOLOGY INFUSION   [facility] 18     [facility] ONC INFUSION 60    1:00 PM   (60 min.)    ONCOLOGY INFUSION   [facility] 19       20     21     22     23     24     [facility] LAB DRAW   12:30 PM   (15 min.)   [facility] LAB DRAW   [facility] Lab Draw     [facility] ONC INFUSION 60    1:00 PM   (60 min.)    ONCOLOGY INFUSION   [facility] 25     [facility] ONC INFUSION 60    1:00 PM   (60 min.)    ONCOLOGY INFUSION   [facility] 26       27     28     29     30     31                           Recent Results (from the past 24 hour(s))   CBC with platelets differential    Collection Time: 07/18/17 12:02 PM   Result Value Ref Range    WBC 4.1 4.0 - 11.0 10e9/L    RBC Count 3.65 (L) 4.4 - 5.9 10e12/L    Hemoglobin 11.5 (L) 13.3 - 17.7 g/dL    Hematocrit 36.2 (L) 40.0 - 53.0 %    MCV 99 78 - 100 fl    MCH 31.5 26.5 - 33.0 pg    MCHC 31.8 31.5 - 36.5 g/dL    RDW 19.1 (H) 10.0 - 15.0 %    Platelet Count 50 (L) 150 - 450 10e9/L    Diff Method Manual Differential     % Neutrophils 78.9 %    % Lymphocytes 9.6 %    % Monocytes 11.5 %    % Eosinophils 0.0 %    % Basophils 0.0 %    Absolute Neutrophil 3.2 1.6 - 8.3 10e9/L    Absolute Lymphocytes 0.4 (L) 0.8 - 5.3 10e9/L    Absolute Monocytes 0.5 0.0 - 1.3 10e9/L    Absolute Eosinophils 0.0 0.0 - 0.7 10e9/L    Absolute Basophils 0.0 0.0 - 0.2 10e9/L    Anisocytosis Moderate     Poikilocytosis Slight    Comprehensive metabolic panel    Collection Time: 07/18/17 12:02 PM   Result Value Ref Range    Sodium 137 133 - 144 mmol/L    Potassium 4.7 3.4 - 5.3 mmol/L    Chloride 104 94 - 109 mmol/L    Carbon Dioxide 26 20 - 32 mmol/L    Anion Gap 7 3 - 14 mmol/L    Glucose 91 70 - 99 mg/dL    Urea Nitrogen 20 7 - 30 mg/dL    Creatinine 0.88 0.66 - 1.25 mg/dL    GFR Estimate 85 >60  mL/min/1.7m2    GFR Estimate If Black >90   GFR Calc   >60 mL/min/1.7m2    Calcium 9.4 8.5 - 10.1 mg/dL    Bilirubin Total 0.4 0.2 - 1.3 mg/dL    Albumin 2.8 (L) 3.4 - 5.0 g/dL    Protein Total 8.9 (H) 6.8 - 8.8 g/dL    Alkaline Phosphatase 53 40 - 150 U/L    ALT 13 0 - 70 U/L    AST 6 0 - 45 U/L   Routine UA with micro reflex to culture    Collection Time: 07/18/17 12:38 PM   Result Value Ref Range    Color Urine Yellow     Appearance Urine Slightly Cloudy     Glucose Urine Negative NEG mg/dL    Bilirubin Urine Negative NEG    Ketones Urine Negative NEG mg/dL    Specific Gravity Urine 1.015 1.003 - 1.035    Blood Urine Negative NEG    pH Urine 5.0 5.0 - 7.0 pH    Protein Albumin Urine Negative NEG mg/dL    Urobilinogen mg/dL 0.0 0.0 - 2.0 mg/dL    Nitrite Urine Negative NEG    Leukocyte Esterase Urine Small (A) NEG    Source Midstream Urine     WBC Urine 40 (H) 0 - 2 /HPF    RBC Urine 6 (H) 0 - 2 /HPF    Squamous Epithelial /HPF Urine 1 0 - 1 /HPF    Mucous Urine Present (A) NEG /LPF    Hyaline Casts 48 (H) 0 - 2 /LPF   Urine Culture Aerobic Bacterial    Collection Time: 07/18/17 12:38 PM   Result Value Ref Range    Specimen Description Midstream Urine     Special Requests Specimen received in preservative     Culture Micro Pending     Micro Report Status Pending                  Follow-ups after your visit        Your next 10 appointments already scheduled     Jul 19, 2017  3:00 PM CDT   Infusion 60 with  ONCOLOGY INFUSION, UC 26 ATC   [facility] ([facility])    [address] [place] [address]  2nd [facility] [address] [phone]            Jul 25, 2017 11:15 AM CDT   [facility] Lab Draw with  [facility] LAB DRAW   [facility] Lab Draw ([facility])    [address] [place] [address]   [phone]            Jul 25, 2017 11:50 AM CDT   (Arrive by 11:35 AM)   Return Visit with Leanne Vidales  ALPHONSO Reddy   [facility] ([facility])    [address]  2nd Floor  [facility] [address] [phone]            Jul 25, 2017  1:00 PM CDT   Infusion 60 with UC ONCOLOGY INFUSION, UC 11 ATC   [facility] ([facility])    [address]  2nd Floor  [facility] [address] [phone]            Jul 26, 2017 12:00 PM CDT   Infusion 60 with UC ONCOLOGY INFUSION, UC 28 ATC   [facility] ([facility])    [address]  2nd Floor  [facility] [address] [phone]            Jul 27, 2017   Procedure with Tesfaye Yusuf MD   [facility] (--)    [address]  [facility] [address]   [phone]            Jul 31, 2017  6:00 PM CDT   (Arrive by 5:45 PM)   RETURN ARRHYTHMIA with Dmitri Banks MD   [facility] ([facility])    [address] [place] [address]  3rd Floor  [facility] [address] [phone]              Who to contact     If you have questions or need follow up information about today's clinic visit or your schedule please contact [facility] directly at [phone].  Normal or non-critical lab and imaging results will be communicated to you by WriteLatexhart, letter or phone within 4 business days after the clinic has received the results. If you do not hear from us within 7 days, please contact the clinic through WriteLatexhart or phone. If you have a critical or abnormal lab result, we will notify you by phone as soon as possible.  Submit refill requests through [facility] [facility] or call your pharmacy and they will forward the refill request to us. Please allow 3 business days for your refill to be completed.          Additional Information About Your Visit        [employer]ry Information     [facility] [facility] gives you secure access to your electronic health record. If you see a  primary care provider, you can also send messages to your care team and make appointments. If you have questions, please call your primary care clinic.  If you do not have a primary care provider, please call [phone] and they will assist you.        Care EveryWhere ID     This is your Care EveryWhere ID. This could be used by other organizations to access your [facility] medical records  [id number]         Blood Pressure from Last 3 Encounters:   07/18/17 133/77   07/12/17 160/90   07/11/17 147/89    Weight from Last 3 Encounters:   07/18/17 57.4 kg (126 lb 8 oz)   07/11/17 57.9 kg (127 lb 11.2 oz)   06/27/17 56.7 kg (125 lb)              We Performed the Following     CBC with platelets differential     Comprehensive metabolic panel          Today's Medication Changes          These changes are accurate as of: 7/18/17  4:14 PM.  If you have any questions, ask your nurse or doctor.               Start taking these medicines.        Dose/Directions    amoxicillin-clavulanate 875-125 MG per tablet   Commonly known as:  AUGMENTIN   Used for:  Urinary tract infection, site unspecified   Started by:  Lashay Romero PA-C        Dose:  1 tablet   Take 1 tablet by mouth 2 times daily   Quantity:  14 tablet   Refills:  0       methylphenidate 5 MG tablet   Commonly known as:  RITALIN   Used for:  Other fatigue   Started by:  Lashay Romero PA-C        Dose:  5 mg   Take 1 tablet (5 mg) by mouth 2 times daily   Quantity:  15 tablet   Refills:  0            Where to get your medicines      These medications were sent to [facility] [place], [place] - [address] [facility]  [address] [place] [address] [address] [address]    Hours:  TRANSPLANT PHONE NUMBER [phone] Phone:  [phone]     amoxicillin-clavulanate 875-125 MG per tablet         Some of these will need a paper prescription and others can be bought over the counter.  Ask your nurse if you have questions.      Bring a paper prescription for each of these medications     methylphenidate 5 MG tablet                Primary Care Provider Office Phone # Fax #    Sanket Burciaga [phone]       [facility] [address] [facility] [address]        Equal Access to Services     ALBAN SHAH : Hadii aad ku hadyamelluh Sojose, waaxda luqadaha, qaybta kaalmada adeegyada, jonny guevara isidrokimberly pricepetrmartha villa. So [facility] [phone].    ATENCIÓN: Si habla español, tiene a todd disposición servicios gratuitos de asistencia lingüística. Llame al [phone].    We comply with applicable federal civil rights laws and [place] laws. We do not discriminate on the basis of race, color, national origin, age, disability sex, sexual orientation or gender identity.            Thank you!     Thank you for choosing [facility]  for your care. Our goal is always to provide you with excellent care. Hearing back from our patients is one way we can continue to improve our services. Please take a few minutes to complete the written survey that you may receive in the mail after your visit with us. Thank you!             Your Updated Medication List - Protect others around you: Learn how to safely use, store and throw away your medicines at www.disposemymeds.org.          This list is accurate as of: 7/18/17  4:14 PM.  Always use your most recent med list.                   Brand Name Dispense Instructions for use Diagnosis    acetaminophen 500 MG tablet    TYLENOL     Take 2 tablets (1,000 mg) by mouth every 6 hours as needed for mild pain    Acute bilateral low back pain without sciatica, Multiple myeloma in relapse (H)       acyclovir 400 MG tablet    ZOVIRAX    60 tablet    Take 1 tablet (400 mg) by mouth 2 times daily    Multiple myeloma in relapse (H)       amLODIPine 5 MG tablet    NORVASC    30 tablet    Take one tablet at bedtime.    Essential hypertension       amoxicillin-clavulanate  875-125 MG per tablet    AUGMENTIN    14 tablet    Take 1 tablet by mouth 2 times daily    Urinary tract infection, site unspecified       artificial saliva Liqd liquid      Swish and spit 3-5 mLs in mouth 4 times daily as needed for dry mouth    Dry mouth       clopidogrel 75 MG tablet    PLAVIX    30 tablet    Take 1 tablet (75 mg) by mouth daily    History of stroke       dexamethasone 4 MG tablet    DECADRON    30 tablet    Take 20 mg days 1,2, 8, 9, 15, 16 on days of kyprolis    Multiple myeloma not having achieved remission (H)       esomeprazole 40 MG CR capsule    nexIUM    30 capsule    Take 1 capsule (40 mg) by mouth every morning (before breakfast)    Gastroesophageal reflux disease without esophagitis       HYDROmorphone 2 MG tablet    DILAUDID    30 tablet    Take 0.5 tablets (1 mg) by mouth every 3 hours as needed for moderate to severe pain    Multiple myeloma in relapse (H), Acute bilateral low back pain without sciatica       lidocaine 5 % Patch    LIDODERM    30 patch    Place 3 patches onto the skin every 24 hours    Multiple myeloma in relapse (H), Acute bilateral low back pain without sciatica       LORazepam 0.5 MG tablet    ATIVAN    30 tablet    Take 1 tablet (0.5 mg) by mouth every 6 hours as needed for nausea or anxiety.    Multiple myeloma in relapse (H), Delirium       methylphenidate 5 MG tablet    RITALIN    15 tablet    Take 1 tablet (5 mg) by mouth 2 times daily    Other fatigue       midodrine 2.5 MG tablet    PROAMATINE    90 tablet    Take 1 tablet (2.5 mg) by mouth 3 times daily . HOLD due to elevated blood pressures during admission. Continue to hold until further advised by outpatient clinic team or if develops recurrent orthostatic hypotension.    Orthostatic hypotension, Orthostatic syncope       OLANZapine 5 MG tablet    zyPREXA    60 tablet    Take 1 tablet (5 mg) by mouth 2 times daily    Delirium, Multiple myeloma in relapse (H)       ondansetron 8 MG ODT tab    ZOFRAN-ODT     30 tablet    Take 1 tablet (8 mg) by mouth every 8 hours as needed for nausea    Nausea       potassium chloride SA 20 MEQ CR tablet    K-DUR/KLOR-CON M    60 tablet    TAKE 1 TABLET BY MOUTH TWICE DAILY.    Hypokalemia       simethicone 80 MG chewable tablet    MYLICON    180 tablet    Take 2 tablets (160 mg) by mouth 4 times daily as needed for cramping or other (gas pain)    Nausea       simvastatin 5 MG tablet    ZOCOR    30 tablet    Take 4 tablets (20 mg) by mouth daily    Mixed hyperlipidemia       sucralfate 1 GM tablet    CARAFATE    120 tablet    Take 1 tablet (1 g) by mouth 4 times daily as needed    Gastroesophageal reflux disease without esophagitis       traMADol 50 MG tablet    ULTRAM    60 tablet    Take 0.5-1 tablets (25-50 mg) by mouth every 6 hours as needed for moderate pain . Recommend trying after Tylenol and before Dilaudid.    Acute bilateral low back pain without sciatica, Multiple myeloma in relapse (H)

## 2017-07-19 NOTE — PATIENT INSTRUCTIONS
Contact Numbers  [facility]: [phone]    After Hours:  [phone]  Triage: [phone]    [facility] if you experience a temperature greater than or equal to 100.5, shaking chills, have uncontrolled nausea, vomiting and/or diarrhea, dizziness, shortness of breath, chest pain, bleeding, unexplained bruising, or if you have any other new/concerning symptoms, questions or concerns.     If it is after hours, weekends, or holidays, please call the main hospital  at  [phone] and ask to speak to the Oncology doctor on call.     If you are having any concerning symptoms or wish to speak to a provider before your next infusion visit, please call your care coordinator or triage to notify them so we can adequately serve you.     If you need a refill on a narcotic prescription or other medication, please call triage before your infusion appointment.         July 2017 Sunday Monday Tuesday Wednesday Thursday Friday Saturday                                 1       2     3     4     5     6     7     8       9     10     11     [facility] LAB DRAW    2:45 PM   (15 min.)   [facility] LAB DRAW   [facility] Lab Draw     [facility] RETURN    3:05 PM   (50 min.)   Leanne Reddy PA-C   [facility] ONC INFUSION 60    4:00 PM   (60 min.)    ONCOLOGY INFUSION   [facility] 12     [facility] ONC INFUSION 60   11:00 AM   (60 min.)    ONCOLOGY INFUSION   [facility] LAB DRAW    3:30 PM   (15 min.)   [facility] LAB DRAW   [facility] Lab Draw 13     14     15       16     17     18     [facility] LAB DRAW   11:15 AM   (15 min.)    [facility] LAB DRAW   [facility] Lab Draw     [facility] RETURN   11:55 AM   (50 min.)   Lashay Romero PA-C   [facility] ONC INFUSION 60    3:00 PM   (60 min.)    ONCOLOGY INFUSION   [facility] 19     [facility] ONC  INFUSION 60    3:00 PM   (60 min.)   UC ONCOLOGY INFUSION   [facility] 20     21     22       23     24     25     [facility] LAB DRAW   11:15 AM   (15 min.)   [facility] LAB DRAW   [facility] Lab Draw     [facility] RETURN   11:35 AM   (50 min.)   Leanne Reddy PA-C   [facility]     [facility] ONC INFUSION 60    1:00 PM   (60 min.)    ONCOLOGY INFUSION   [facility] 26     [facility] ONC INFUSION 60   12:00 PM   (60 min.)   [facility] ONCOLOGY INFUSION   [facility] 27     PHACOEMULSIFICATION CLEAR CORNEA WITH STANDARD INTRAOCULAR LENS IMPLANT   12:30 PM   Tesfaye Yusuf MD    EC 28     29       30     31     [facility] RETURN ARRHYTHMIA    5:45 PM   (20 min.)   Dmitri Banks MD   [facility]                                     August 2017 Sunday Monday Tuesday Wednesday Thursday Friday Saturday             1     2     3     PHACOEMULSIFICATION CLEAR CORNEA WITH STANDARD INTRAOCULAR LENS IMPLANT    1:30 PM   Tesfaye Yusuf MD    [facility] 4     5       6     7     8     9     [facility] LAB DRAW    3:30 PM   (15 min.)    [facility] LAB DRAW   [facility] Lab Draw     [facility] ONC INFUSION 60    4:00 PM   (60 min.)    ONCOLOGY INFUSION   [facility]     UMP ONC RETURN    5:00 PM   (30 min.)   Kamari Topete MD   [facility] 10     [facility] ONC INFUSION 60   12:00 PM   (60 min.)    ONCOLOGY INFUSION   [facility] 11     12       13     14     15     16     17     [facility] LAB DRAW   12:30 PM   (15 min.)    [facility] LAB DRAW   [facility] Lab Draw     [facility] ONC INFUSION 60    1:00 PM   (60 min.)    ONCOLOGY INFUSION   [facility] 18     [facility] ONC INFUSION 60    1:00 PM   (60 min.)    ONCOLOGY INFUSION   [facility] 19       20     21     22     23     24     [facility] LAB DRAW   12:30 PM   (15 min.)   [facility]   [facility] Lab Draw     [facility] ONC INFUSION 60    1:00 PM   (60 min.)    ONCOLOGY INFUSION   [facility] 25     [facility] ONC INFUSION 60    1:00 PM   (60 min.)    ONCOLOGY INFUSION   [facility] 26       27     28     29     30     31                            Lab Results:  No results found for this or any previous visit (from the past 12 hour(s)).

## 2017-07-19 NOTE — PROGRESS NOTES
Infusion Nursing Note:  Anthony Carbone presents today for C3 D9 Kyprolis.    Patient seen by provider today: No   present during visit today: Not Applicable.    Note: Pt's BP was elevated at 178/11. Writer spoke with Lashay AMARO who did come to infusion and assess the patient. She is having him resume the amlodipine that was recently put on hold. The patients wife with monitor his BP twice a day and bring those numbers to his appt. with the provider next week. He will also stop taking Ritalin for now.    Intravenous Access:  Implanted Port.    Treatment Conditions:  No labs drawn today. Labs were last drawn 7/18/17.      Post Infusion Assessment:  Patient tolerated infusion without incident.  Blood return noted pre and post infusion.  Site patent and intact, free from redness, edema or discomfort.  No evidence of extravasations.  Access discontinued per protocol.    Discharge Plan:   Patient declined prescription refills.  Patient and/or family verbalized understanding of discharge instructions and all questions answered.  Copy of AVS reviewed with patient and/or family.  Patient will return 7/25/17 for labs, provider visit and C3 D15 Kyprolis.  Patient discharged in stable condition accompanied by: wife.  Departure Mode: Ambulatory.    Chelsea Duggan RN      Kyprolis stop time:  1730

## 2017-07-20 PROBLEM — N39.0 URINARY TRACT INFECTION: Status: ACTIVE | Noted: 2017-01-01

## 2017-07-20 NOTE — PROGRESS NOTES
Called Mrs. Carbone to let her know that Mr. Carbone's US/Sensitivity came back. Informed her that we need to switch antibiotics and that he needs to STOP the Augmentin and switch to Cefdinir 300 MG PO BID for 7 days. Prescription was sent to local pharmacy. Wife verablized understanding and will call if patient has any questions, comments, or concerns.

## 2017-07-20 NOTE — TELEPHONE ENCOUNTER
Called from infusion about elevated /101. Pt took 20mg oral dex yesterday and received kyprolis and IVFs. Today pt has not taken his dex but did start ritalin this afternoon. Recent BPS have been more elevated. He had been on norvasc 5mg hs but this was discontinued weeks ago when BP was doing well at a TCU. Recently values appear more elevated. Yesterday BP fairly normal but patient otherwise clinically dehydrated.     Recommended kyprolis today with normal 4mg premed. To resume 5mg norvasc hs. Don't take oral dex today unless BP <150/90 this evening. Start to take BP morning and night and bring in values next week. Hold ritalin and will re-evaluate pending week's record of BPs.   Wife and pt verbalized understanding and know symptoms of elevated BP and were given parameters when to call oncall with elevated BP values.     Lashay Romero PA-C

## 2017-07-24 NOTE — TELEPHONE ENCOUNTER
"Social Work received referral from oncology distress screening to connect with patient regarding concerns about resources.  SW spoke with patient and wife over the phone to provide support. Wife and patient talked about patient having concerns that he cannot participate in his usual self-care activities and hobbies due to his medical condition and limitations.  He indicated he generally enjoys active outdoor activities but is unable to do them at this time.  Additionally, wife shared that due to cataracts, patient has limited vision at this time and is unable to \"read the newspaper, a book or watch tv for distractions.\" Social work brainstormed with patient and wife other activities patient may be able to do with limited vision.  Additionally, family indicated patient will be having cataract surgery soon which will hopefully allow him to partake in a wider range of activities.  Wife also asked about how to make an appointment with palliative care.  SW talked with family about different methods, including explaining how to use MyChart.   No other needs indicated at this time. SW provided contact information to patient for any other questions, needs and concerns.    Soo Yeon Han, [facility]  [phone]      "

## 2017-07-25 NOTE — MR AVS SNAPSHOT
After Visit Summary   7/25/2017    Anthony Carbone    MRN: [id number]           Patient Information     Date Of Birth          1945        Visit Information        Provider Department      7/25/2017 11:50 AM Leanne Reddy PA-C [facility]        Today's Diagnoses     Multiple myeloma in relapse (H)           Follow-ups after your visit        Your next 10 appointments already scheduled     Jul 25, 2017  1:00 PM CDT   Infusion 60 with UC ONCOLOGY INFUSION, UC 11 ATC   [facility] ([facility])    [address]  2nd Floor  [facility] [address]   [phone]            Jul 26, 2017 12:00 PM CDT   Infusion 60 with UC ONCOLOGY INFUSION, UC 28 ATC   [facility] ([facility])    [address] [place] [address]  [address] [facility] [address] [phone]            Jul 27, 2017   Procedure with Tesfaye Yusuf MD   [facility] (--)    [address]  [facility] [address]   [phone]            Jul 31, 2017  6:00 PM CDT   (Arrive by 5:45 PM)   RETURN ARRHYTHMIA with Dmitri Banks MD   [facility] ([facility])    [address] [place] [address]  3rd Floor  [facility] [address] [phone]            Aug 03, 2017   Procedure with Tesfaye Yusuf MD   [facility] (--)    [address]  [facility] [address]   [phone]            Aug 09, 2017  3:30 PM CDT   [facility] Draw with [facility] DRAW   [facility]    [address] [place] [address]  2nd Floor  [facility] [address] [phone]            Aug 09, 2017  4:00 PM CDT   Infusion 60 with UC ONCOLOGY INFUSION, UC 12 ATC   [facility] ([facility])    [address] [place] [address]  [address] [facility] [address]    [phone]              Future tests that were ordered for you today     Open Future Orders        Priority Expected Expires Ordered    CBC with platelets differential Routine 7/28/2017 9/25/2017 7/25/2017    Comprehensive metabolic panel Routine 7/28/2017 9/25/2017 7/25/2017            Who to contact     If you have questions or need follow up information about today's clinic visit or your schedule please contact [facility] directly at [phone].  Normal or non-critical lab and imaging results will be communicated to you by [facility], letter or phone within 4 business days after the clinic has received the results. If you do not hear from us within 7 days, please contact the clinic through [employer]as[employer]gymt or phone. If you have a critical or abnormal lab result, we will notify you by phone as soon as possible.  Submit refill requests through [employer] or call your pharmacy and they will forward the refill request to us. Please allow 3 business days for your refill to be completed.          Additional Information About Your Visit        [employer] Information     [employer] gives you secure access to your electronic health record. If you see a primary care provider, you can also send messages to your care team and make appointments. If you have questions, please call your primary care clinic.  If you do not have a primary care provider, please call [phone] and they will assist you.        Care EveryWhere ID     This is your Care EveryWhere ID. This could be used by other organizations to access your [place] medical records  [id number]        Your Vitals Were     Pulse Temperature Respirations Pulse Oximetry BMI (Body Mass Index)       84 97.5  F (36.4  C) (Tympanic) 16 95% 21.18 kg/m2        Blood Pressure from Last 3 Encounters:   07/25/17 117/69   07/19/17 (!) 171/98   07/18/17 133/77    Weight from Last 3 Encounters:   07/25/17 56 kg (123 lb 7.3 oz)   07/18/17 57.4 kg (126 lb 8 oz)   07/11/17 57.9  kg (127 lb 11.2 oz)              We Performed the Following     Comprehensive metabolic panel        Primary Care Provider Office Phone # Fax #    Sanket Burciaga [phone]       [facility] [address] [address]        Equal Access to Services     ALBAN SHAH : Hadii chalo kern hadyamelo Soomaali, waaxda luqadaha, qaybta kaalmada adeegyada, waxdannie maggiein hayfishn kayleigh baltazarmartha villa. So [facility] [phone].    ATENCIÓN: Si habla español, tiene a todd disposición servicios gratuitos de asistencia lingüística. Llame al [phone].    We comply with applicable federal civil rights laws and [place] laws. We do not discriminate on the basis of race, color, national origin, age, disability sex, sexual orientation or gender identity.            Thank you!     Thank you for choosing [facility]  for your care. Our goal is always to provide you with excellent care. Hearing back from our patients is one way we can continue to improve our services. Please take a few minutes to complete the written survey that you may receive in the mail after your visit with us. Thank you!             Your Updated Medication List - Protect others around you: Learn how to safely use, store and throw away your medicines at www.disposemymeds.org.          This list is accurate as of: 7/25/17 12:52 PM.  Always use your most recent med list.                   Brand Name Dispense Instructions for use Diagnosis    acetaminophen 500 MG tablet    TYLENOL     Take 2 tablets (1,000 mg) by mouth every 6 hours as needed for mild pain    Acute bilateral low back pain without sciatica, Multiple myeloma in relapse (H)       acyclovir 400 MG tablet    ZOVIRAX    60 tablet    Take 1 tablet (400 mg) by mouth 2 times daily    Multiple myeloma in relapse (H)       amLODIPine 5 MG tablet    NORVASC    30 tablet    Take one tablet at bedtime.    Essential hypertension       amoxicillin-clavulanate  875-125 MG per tablet    AUGMENTIN    14 tablet    Take 1 tablet by mouth 2 times daily    Urinary tract infection, site unspecified       artificial saliva Liqd liquid      Swish and spit 3-5 mLs in mouth 4 times daily as needed for dry mouth    Dry mouth       cefdinir 300 MG capsule    OMNICEF    14 capsule    Take 1 capsule (300 mg) by mouth 2 times daily for 7 days    Urinary tract infection       * clopidogrel 75 MG tablet    PLAVIX    30 tablet    Take 1 tablet (75 mg) by mouth daily    History of stroke       * clopidogrel 75 MG tablet    PLAVIX    90 tablet    TAKE 1 TABLET BY MOUTH EVERY DAY    History of stroke       dexamethasone 4 MG tablet    DECADRON    30 tablet    Take 20 mg days 1,2, 8, 9, 15, 16 on days of kyprolis    Multiple myeloma not having achieved remission (H)       esomeprazole 40 MG CR capsule    nexIUM    30 capsule    Take 1 capsule (40 mg) by mouth every morning (before breakfast)    Gastroesophageal reflux disease without esophagitis       HYDROmorphone 2 MG tablet    DILAUDID    30 tablet    Take 0.5 tablets (1 mg) by mouth every 3 hours as needed for moderate to severe pain    Multiple myeloma in relapse (H), Acute bilateral low back pain without sciatica       lidocaine 5 % Patch    LIDODERM    30 patch    Place 3 patches onto the skin every 24 hours    Multiple myeloma in relapse (H), Acute bilateral low back pain without sciatica       LORazepam 0.5 MG tablet    ATIVAN    30 tablet    Take 1 tablet (0.5 mg) by mouth every 6 hours as needed for nausea or anxiety.    Multiple myeloma in relapse (H), Delirium       methylphenidate 5 MG tablet    RITALIN    15 tablet    Take 1 tablet (5 mg) by mouth 2 times daily On hold    Other fatigue       midodrine 2.5 MG tablet    PROAMATINE    90 tablet    Take 1 tablet (2.5 mg) by mouth 3 times daily . HOLD due to elevated blood pressures during admission. Continue to hold until further advised by outpatient clinic team or if develops  recurrent orthostatic hypotension.    Orthostatic hypotension, Orthostatic syncope       OLANZapine 5 MG tablet    zyPREXA    60 tablet    Take 1 tablet (5 mg) by mouth 2 times daily    Delirium, Multiple myeloma in relapse (H)       ondansetron 8 MG ODT tab    ZOFRAN-ODT    30 tablet    Take 1 tablet (8 mg) by mouth every 8 hours as needed for nausea    Nausea       potassium chloride SA 20 MEQ CR tablet    K-DUR/KLOR-CON M    60 tablet    TAKE 1 TABLET BY MOUTH TWICE DAILY.    Hypokalemia       simethicone 80 MG chewable tablet    MYLICON    180 tablet    Take 2 tablets (160 mg) by mouth 4 times daily as needed for cramping or other (gas pain)    Nausea       simvastatin 5 MG tablet    ZOCOR    30 tablet    Take 4 tablets (20 mg) by mouth daily    Mixed hyperlipidemia       sucralfate 1 GM tablet    CARAFATE    120 tablet    Take 1 tablet (1 g) by mouth 4 times daily as needed    Gastroesophageal reflux disease without esophagitis       traMADol 50 MG tablet    ULTRAM    60 tablet    Take 0.5-1 tablets (25-50 mg) by mouth every 6 hours as needed for moderate pain . Recommend trying after Tylenol and before Dilaudid.    Acute bilateral low back pain without sciatica, Multiple myeloma in relapse (H)       * Notice:  This list has 2 medication(s) that are the same as other medications prescribed for you. Read the directions carefully, and ask your doctor or other care provider to review them with you.

## 2017-07-25 NOTE — TELEPHONE ENCOUNTER
DATE:  7/25/2017   TIME OF RECEIPT FROM LAB:  12:05  LAB TEST:  Platelet  LAB VALUE:  47  RESULTS GIVEN WITH READ-BACK TO (PROVIDER):  KATY [email or web address]-[email or web address]  TIME LAB VALUE REPORTED TO PROVIDER:   12:07

## 2017-07-25 NOTE — MR AVS SNAPSHOT
After Visit Summary   7/25/2017    Anthony Carbone    MRN: [id number]           Patient Information     Date Of Birth          1945        Visit Information        Provider Department      7/25/2017 1:00 PM  11 ATC;  ONCOLOGY INFUSION [facility]        Today's Diagnoses     Multiple myeloma in relapse (H)    -  1      Care Instructions    Contact Numbers    [facility] Main Line: [phone]  [facility] Triage:  [phone]    Call triage with chills and/or temperature greater than or equal to 100.5, uncontrolled nausea/vomiting, diarrhea, constipation, dizziness, shortness of breath, chest pain, bleeding, unexplained bruising, or any new/concerning symptoms, questions/concerns.     If you are having any concerning symptoms or wish to speak to a provider before your next infusion visit, please call your care coordinator or triage to notify them so we can adequately serve you.       After Hours: [phone]    If after hours, weekends, or holidays, call main hospital  and ask for Oncology doctor on call.         July 2017 Sunday Monday Tuesday Wednesday Thursday Friday Saturday                                 1       2     3     4     5     6     7     8       9     10     11     [facility] LAB DRAW    2:45 PM   (15 min.)    [facility]   [facility]     [facility] RETURN    3:05 PM   (50 min.)   Leanne Reddy PA-C   [facility] ONC INFUSION 60    4:00 PM   (60 min.)    ONCOLOGY INFUSION   [facility] 12     [facility] ONC INFUSION 60   11:00 AM   (60 min.)    ONCOLOGY INFUSION   [facility] DRAW    3:30 PM   (15 min.)    [facility]   [facility] 13     14     15       16     17     18     [facility] DRAW   11:15 AM   (15 min.)    [facility] LAB DRAW   [facility] Lab Draw     [facility] RETURN   11:55 AM   (50 min.)   Lashay Romero  ALPHONSO Reich   [facility]     UMP ONC INFUSION 60    3:00 PM   (60 min.)   UC ONCOLOGY INFUSION   [facility] 19     UMP ONC INFUSION 60    3:00 PM   (60 min.)   UC ONCOLOGY INFUSION   [facility] 20     21     22       23     24     25     [facility] DRAW   11:15 AM   (15 min.)   [facility] DRAW   [facility] Lab Draw     UMP RETURN   11:35 AM   (50 min.)   Leanne Reddy PA-C   [facility]     UMP ONC INFUSION 60    1:00 PM   (60 min.)   UC ONCOLOGY INFUSION   [facility] 26     UMP ONC INFUSION 60   12:00 PM   (60 min.)   UC ONCOLOGY INFUSION   [facility] 27     PHACOEMULSIFICATION CLEAR CORNEA WITH STANDARD INTRAOCULAR LENS IMPLANT   12:30 PM   Tesfaye Yusuf MD   [facility] 28     29       30     31     UMP RETURN ARRHYTHMIA    5:45 PM   (20 min.)   Dmitri Banks MD   [facility]                                     August 2017 Sunday Monday Tuesday Wednesday Thursday Friday Saturday             1     2     3     PHACOEMULSIFICATION CLEAR CORNEA WITH STANDARD INTRAOCULAR LENS IMPLANT    1:30 PM   Tesfaye Yusuf MD   [facility] 4     5       6     7     8     9     [facility] DRAW    3:30 PM   (15 min.)   [facility] LAB DRAW   [facility] Lab Draw     UMP ONC INFUSION 60    4:00 PM   (60 min.)    ONCOLOGY INFUSION   [facility]     UMP ONC RETURN    5:00 PM   (30 min.)   Kamari Topete MD   [facility] Trans[facility] 10     UMP ONC INFUSION 60   12:00 PM   (60 min.)   UC ONCOLOGY INFUSION   [facility] 11     12       13     14     15     16     17     [facility] DRAW   12:30 PM   (15 min.)   [facility] DRAW   [facility] Lab Draw     UMP ONC INFUSION 60    1:00 PM   (60 min.)   UC ONCOLOGY INFUSION   [facility] 18     UMP ONC INFUSION 60    1:00 PM   (60  min.)    ONCOLOGY INFUSION   [facility] 19       20     21     22     23     24     [facility] DRAW   12:30 PM   (15 min.)   [facility] LAB DRAW   [facility] Lab Draw     [facility] ONC INFUSION 60    1:00 PM   (60 min.)    ONCOLOGY INFUSION   [facility] 25     [facility]C INFUSION 60    1:00 PM   (60 min.)    ONCOLOGY INFUSION   [facility] 26       27     28     29     30     31                            Lab Results:  Recent Results (from the past 12 hour(s))   CBC with platelets differential    Collection Time: 07/25/17 11:32 AM   Result Value Ref Range    WBC 5.4 4.0 - 11.0 10e9/L    RBC Count 3.45 (L) 4.4 - 5.9 10e12/L    Hemoglobin 10.8 (L) 13.3 - 17.7 g/dL    Hematocrit 34.8 (L) 40.0 - 53.0 %     (H) 78 - 100 fl    MCH 31.3 26.5 - 33.0 pg    MCHC 31.0 (L) 31.5 - 36.5 g/dL    RDW 18.6 (H) 10.0 - 15.0 %    Platelet Count 47 (LL) 150 - 450 10e9/L    Diff Method Manual Differential     % Neutrophils 77.0 %    % Lymphocytes 15.0 %    % Monocytes 8.0 %    % Eosinophils 0.0 %    % Basophils 0.0 %    Absolute Neutrophil 4.2 1.6 - 8.3 10e9/L    Absolute Lymphocytes 0.8 0.8 - 5.3 10e9/L    Absolute Monocytes 0.4 0.0 - 1.3 10e9/L    Absolute Eosinophils 0.0 0.0 - 0.7 10e9/L    Absolute Basophils 0.0 0.0 - 0.2 10e9/L    Anisocytosis Slight     Macrocytes Present    Comprehensive metabolic panel    Collection Time: 07/25/17 11:32 AM   Result Value Ref Range    Sodium 136 133 - 144 mmol/L    Potassium 4.9 3.4 - 5.3 mmol/L    Chloride 105 94 - 109 mmol/L    Carbon Dioxide 23 20 - 32 mmol/L    Anion Gap 8 3 - 14 mmol/L    Glucose 90 70 - 99 mg/dL    Urea Nitrogen 26 7 - 30 mg/dL    Creatinine 1.06 0.66 - 1.25 mg/dL    GFR Estimate 68 >60 mL/min/1.7m2    GFR Estimate If Black 83 >60 mL/min/1.7m2    Calcium 9.5 8.5 - 10.1 mg/dL    Bilirubin Total 0.4 0.2 - 1.3 mg/dL    Albumin 2.8 (L) 3.4 - 5.0 g/dL    Protein Total 9.4 (H) 6.8 - 8.8 g/dL    Alkaline  Phosphatase 46 40 - 150 U/L    ALT 18 0 - 70 U/L    AST 9 0 - 45 U/L               Follow-ups after your visit        Your next 10 appointments already scheduled     Jul 26, 2017 12:00 PM CDT   Infusion 60 with UC ONCOLOGY INFUSION, UC 28 ATC   [facility] ([facility])    [address]  [address] [facility] [id number] [phone]            Jul 27, 2017   Procedure with Tesfaye Yusuf MD   [facility] Per[facility] Services (--)    [address]  [facility] [address] [phone]            Jul 31, 2017  6:00 PM CDT   (Arrive by 5:45 PM)   RETURN ARRHYTHMIA with Dmtiri Banks MD   [facility] ([facility])    [address] [place] [address]  [address] [facility] [id number] [phone]            Aug 03, 2017   Procedure with Tesfaye Yusuf MD   [facility] (--)    [address]  [facility] [address] [phone]            Aug 09, 2017  3:30 PM CDT   [facility] Draw with UC [facility] LAB DRAW   [facility] ([facility])    [address] [place] [id number] [phone]            Aug 09, 2017  4:00 PM CDT   Infusion 60 with UC ONCOLOGY INFUSION, UC 12 ATC   [facility] ([facility])    [address] [place] [address]  [address] [facility] [id number] [phone]            Aug 09, 2017  5:00 PM CDT   RETURN ONC with Kamari Topete MD   [facility] Mar[facility] ([facility])    [address] [place] [id number] [phone]            Aug 10, 2017 12:00 PM CDT   Infusion 60 with UC ONCOLOGY INFUSION   [facility] ([facility])    [address] [place] [address]  [address] [facility] [id number] [phone]              Future tests that were ordered for you  today     Open Future Orders        Priority Expected Expires Ordered    CBC with platelets differential Routine 7/28/2017 9/25/2017 7/25/2017    Comprehensive metabolic panel Routine 7/28/2017 9/25/2017 7/25/2017            Who to contact     If you have questions or need follow up information about today's clinic visit or your schedule please contact [facility] directly at [phone].  Normal or non-critical lab and imaging results will be communicated to you by Super[employer], letter or phone within 4 business days after the clinic has received the results. If you do not hear from us within 7 days, please contact the clinic through [employer] or phone. If you have a critical or abnormal lab result, we will notify you by phone as soon as possible.  Submit refill requests through [facility] or call your pharmacy and they will forward the refill request to us. Please allow 3 business days for your refill to be completed.          Additional Information About Your Visit        Superfishhart Information     [facility] gives you secure access to your electronic health record. If you see a primary care provider, you can also send messages to your care team and make appointments. If you have questions, please call your primary care clinic.  If you do not have a primary care provider, please call [phone] and they will assist you.        Care EveryWhere ID     This is your Care EveryWhere ID. This could be used by other organizations to access your [place] medical records  [id number]         Blood Pressure from Last 3 Encounters:   07/25/17 117/69   07/19/17 (!) 171/98   07/18/17 133/77    Weight from Last 3 Encounters:   07/25/17 56 kg (123 lb 7.3 oz)   07/18/17 57.4 kg (126 lb 8 oz)   07/11/17 57.9 kg (127 lb 11.2 oz)              We Performed the Following     CBC with platelets differential        Primary Care Provider Office Phone # Fax #    Sanket Burciaga [phone]       [facility]  [address] [facility] [address]        Equal Access to Services     ALBAN SHAH : Hadii aad ku hadyamelluh Sojose, wacatherineda luqadaha, qaybta kaalmada trinidad, jonny pricepetrmartha villa. So [facility] [phone].    ATENCIÓN: Si habla español, tiene a todd disposición servicios gratuitos de asistencia lingüística. [facility] [phone].    We comply with applicable federal civil rights laws and [place] laws. We do not discriminate on the basis of race, color, national origin, age, disability sex, sexual orientation or gender identity.            Thank you!     Thank you for choosing [facility]  for your care. Our goal is always to provide you with excellent care. Hearing back from our patients is one way we can continue to improve our services. Please take a few minutes to complete the written survey that you may receive in the mail after your visit with us. Thank you!             Your Updated Medication List - Protect others around you: Learn how to safely use, store and throw away your medicines at www.disposemymeds.org.          This list is accurate as of: 7/25/17  2:21 PM.  Always use your most recent med list.                   Brand Name Dispense Instructions for use Diagnosis    acetaminophen 500 MG tablet    TYLENOL     Take 2 tablets (1,000 mg) by mouth every 6 hours as needed for mild pain    Acute bilateral low back pain without sciatica, Multiple myeloma in relapse (H)       acyclovir 400 MG tablet    ZOVIRAX    60 tablet    Take 1 tablet (400 mg) by mouth 2 times daily    Multiple myeloma in relapse (H)       amLODIPine 5 MG tablet    NORVASC    30 tablet    Take one tablet at bedtime.    Essential hypertension       amoxicillin-clavulanate 875-125 MG per tablet    AUGMENTIN    14 tablet    Take 1 tablet by mouth 2 times daily    Urinary tract infection, site unspecified       artificial saliva Liqd liquid      Swish and spit 3-5 mLs in mouth 4 times daily  as needed for dry mouth    Dry mouth       cefdinir 300 MG capsule    OMNICEF    14 capsule    Take 1 capsule (300 mg) by mouth 2 times daily for 7 days    Urinary tract infection       * clopidogrel 75 MG tablet    PLAVIX    30 tablet    Take 1 tablet (75 mg) by mouth daily    History of stroke       * clopidogrel 75 MG tablet    PLAVIX    90 tablet    TAKE 1 TABLET BY MOUTH EVERY DAY    History of stroke       dexamethasone 4 MG tablet    DECADRON    30 tablet    Take 20 mg days 1,2, 8, 9, 15, 16 on days of kyprolis    Multiple myeloma not having achieved remission (H)       esomeprazole 40 MG CR capsule    nexIUM    30 capsule    Take 1 capsule (40 mg) by mouth every morning (before breakfast)    Gastroesophageal reflux disease without esophagitis       HYDROmorphone 2 MG tablet    DILAUDID    30 tablet    Take 0.5 tablets (1 mg) by mouth every 3 hours as needed for moderate to severe pain    Multiple myeloma in relapse (H), Acute bilateral low back pain without sciatica       lidocaine 5 % Patch    LIDODERM    30 patch    Place 3 patches onto the skin every 24 hours    Multiple myeloma in relapse (H), Acute bilateral low back pain without sciatica       LORazepam 0.5 MG tablet    ATIVAN    30 tablet    Take 1 tablet (0.5 mg) by mouth every 6 hours as needed for nausea or anxiety.    Multiple myeloma in relapse (H), Delirium       methylphenidate 5 MG tablet    RITALIN    15 tablet    Take 1 tablet (5 mg) by mouth 2 times daily On hold    Other fatigue       midodrine 2.5 MG tablet    PROAMATINE    90 tablet    Take 1 tablet (2.5 mg) by mouth 3 times daily . HOLD due to elevated blood pressures during admission. Continue to hold until further advised by outpatient clinic team or if develops recurrent orthostatic hypotension.    Orthostatic hypotension, Orthostatic syncope       OLANZapine 5 MG tablet    zyPREXA    60 tablet    Take 1 tablet (5 mg) by mouth 2 times daily    Delirium, Multiple myeloma in relapse  (H)       ondansetron 8 MG ODT tab    ZOFRAN-ODT    30 tablet    Take 1 tablet (8 mg) by mouth every 8 hours as needed for nausea    Nausea       potassium chloride SA 20 MEQ CR tablet    K-DUR/KLOR-CON M    60 tablet    TAKE 1 TABLET BY MOUTH TWICE DAILY.    Hypokalemia       simethicone 80 MG chewable tablet    MYLICON    180 tablet    Take 2 tablets (160 mg) by mouth 4 times daily as needed for cramping or other (gas pain)    Nausea       simvastatin 5 MG tablet    ZOCOR    30 tablet    Take 4 tablets (20 mg) by mouth daily    Mixed hyperlipidemia       sucralfate 1 GM tablet    CARAFATE    120 tablet    Take 1 tablet (1 g) by mouth 4 times daily as needed    Gastroesophageal reflux disease without esophagitis       traMADol 50 MG tablet    ULTRAM    60 tablet    Take 0.5-1 tablets (25-50 mg) by mouth every 6 hours as needed for moderate pain . Recommend trying after Tylenol and before Dilaudid.    Acute bilateral low back pain without sciatica, Multiple myeloma in relapse (H)       * Notice:  This list has 2 medication(s) that are the same as other medications prescribed for you. Read the directions carefully, and ask your doctor or other care provider to review them with you.

## 2017-07-25 NOTE — NURSING NOTE
Labs drawn via port a cath.  Flushed with saline and heparin.  Covered with transparent dressing.  VS done.  Pt tolerated well.    Colleen Up  RN

## 2017-07-25 NOTE — PATIENT INSTRUCTIONS
Contact Numbers    [facility] [place]: [phone]  [facility] Triage:  [phone]    Call triage with chills and/or temperature greater than or equal to 100.5, uncontrolled nausea/vomiting, diarrhea, constipation, dizziness, shortness of breath, chest pain, bleeding, unexplained bruising, or any new/concerning symptoms, questions/concerns.     If you are having any concerning symptoms or wish to speak to a provider before your next infusion visit, please call your care coordinator or triage to notify them so we can adequately serve you.       After Hours: [phone]    If after hours, weekends, or holidays, call main hospital  and ask for Oncology doctor on call.         July 2017 Sunday Monday Tuesday Wednesday Thursday Friday Saturday                                 1       2     3     4     5     6     7     8       9     10     11     [facility] DRAW    2:45 PM   (15 min.)    [facility]     [facility] RETURN    3:05 PM   (50 min.)   Leanne Reddy PA-C   [facility]    4:00 PM   (60 min.)    ONCOLOGY INFUSION   [facility] 12     UMP ONC INFUSION 60   11:00 AM   (60 min.)    [facility]US[facility]   [facility]    3:30 PM   (15 min.)   [facility] LAB DRAW   [facility] Lab Draw 13     14     15       16     17     18     [facility] LAB DRAW   11:15 AM   (15 min.)    [facility] LAB DRAW   [facility] Lab Draw     UMP RETURN   11:55 AM   (50 min.)   Lashay Romero PA-C   [facility]     UMP ONC INFUSION 60    3:00 PM   (60 min.)   UC ONCOLOGY INFUSION   [facility] 19     [facility] ONC INFUSION 60    3:00 PM   (60 min.)   UC ONCOLOGY INFUSION   [facility] 20     21     22       23     24     25     [facility] DRAW   11:15 AM   (15 min.)    [facility]     [facility] RETURN   11:35 AM   (50 min.)   Leanne Reddy PA-C   [facility]     UMP ONC INFUSION 60    1:00 PM   (60 min.)   UC ONCOLOGY INFUSION   [facility] 26     UMP ONC INFUSION 60   12:00 PM   (60 min.)   UC ONCOLOGY INFUSION   [facility] 27     PHACOEMULSIFICATION CLEAR CORNEA WITH STANDARD INTRAOCULAR LENS IMPLANT   12:30 PM   Tesfaye Yusuf MD    EC 28     29       30     31     UMP RETURN ARRHYTHMIA    5:45 PM   (20 min.)   Dmitri Banks MD   [facility]                                     August 2017 Sunday Monday Tuesday Wednesday Thursday Friday Saturday             1     2     3     PHACOEMULSIFICATION CLEAR CORNEA WITH STANDARD INTRAOCULAR LENS IMPLANT    1:30 PM   Tesfaye Yusuf MD    EC 4     5       6     7     8     9     [facility] DRAW    3:30 PM   (15 min.)    [facility] LAB DRAW   [facility] Lab Draw     UMP ONC INFUSION 60    4:00 PM   (60 min.)    ONCOLOGY INFUSION   [facility]     UMP ONC RETURN    5:00 PM   (30 min.)   Kamari Topete MD   [facility] Blood [facility] Marrow Transplant 10     UMP ONC INFUSION 60   12:00 PM   (60 min.)    ONCOLOGY INFUSION   [facility] 11     12       13     14     15     16     17     [facility] DRAW   12:30 PM   (15 min.)   [facility] LAB DRAW   [facility] Lab Draw     UMP ONC INFUSION 60    1:00 PM   (60 min.)    ONCOLOGY INFUSION   [facility] 18     UMP ONC INFUSION 60    1:00 PM   (60 min.)    ONCOLOGY INFUSION   [facility] 19       20     21     22     23     24     [facility] DRAW   12:30 PM   (15 min.)    [facility] LAB DRAW   [facility] Lab Draw     UMP ONC INFUSION 60    1:00 PM   (60 min.)    ONCOLOGY INFUSION   [facility] 25     UMP ONC INFUSION 60    1:00 PM   (60 min.)    ONCOLOGY INFUSION   [facility]  [facility] 26       27     28     29     30     31                            Lab Results:  Recent Results (from the past 12 hour(s))   CBC with platelets differential    Collection Time: 07/25/17 11:32 AM   Result Value Ref Range    WBC 5.4 4.0 - 11.0 10e9/L    RBC Count 3.45 (L) 4.4 - 5.9 10e12/L    Hemoglobin 10.8 (L) 13.3 - 17.7 g/dL    Hematocrit 34.8 (L) 40.0 - 53.0 %     (H) 78 - 100 fl    MCH 31.3 26.5 - 33.0 pg    MCHC 31.0 (L) 31.5 - 36.5 g/dL    RDW 18.6 (H) 10.0 - 15.0 %    Platelet Count 47 (LL) 150 - 450 10e9/L    Diff Method Manual Differential     % Neutrophils 77.0 %    % Lymphocytes 15.0 %    % Monocytes 8.0 %    % Eosinophils 0.0 %    % Basophils 0.0 %    Absolute Neutrophil 4.2 1.6 - 8.3 10e9/L    Absolute Lymphocytes 0.8 0.8 - 5.3 10e9/L    Absolute Monocytes 0.4 0.0 - 1.3 10e9/L    Absolute Eosinophils 0.0 0.0 - 0.7 10e9/L    Absolute Basophils 0.0 0.0 - 0.2 10e9/L    Anisocytosis Slight     Macrocytes Present    Comprehensive metabolic panel    Collection Time: 07/25/17 11:32 AM   Result Value Ref Range    Sodium 136 133 - 144 mmol/L    Potassium 4.9 3.4 - 5.3 mmol/L    Chloride 105 94 - 109 mmol/L    Carbon Dioxide 23 20 - 32 mmol/L    Anion Gap 8 3 - 14 mmol/L    Glucose 90 70 - 99 mg/dL    Urea Nitrogen 26 7 - 30 mg/dL    Creatinine 1.06 0.66 - 1.25 mg/dL    GFR Estimate 68 >60 mL/min/1.7m2    GFR Estimate If Black 83 >60 mL/min/1.7m2    Calcium 9.5 8.5 - 10.1 mg/dL    Bilirubin Total 0.4 0.2 - 1.3 mg/dL    Albumin 2.8 (L) 3.4 - 5.0 g/dL    Protein Total 9.4 (H) 6.8 - 8.8 g/dL    Alkaline Phosphatase 46 40 - 150 U/L    ALT 18 0 - 70 U/L    AST 9 0 - 45 U/L

## 2017-07-25 NOTE — PROGRESS NOTES
HEMATOLOGY/ONCOLOGY PROGRESS NOTE  Jul 25, 2017    REASON FOR VISIT: myeloma    Diagnosis: 71 year old gentleman with IgM lambda multiple myeloma originally diagnosed in 01/2012 as stage I, standard risk disease.   Treatment: Revlimid plus dexamethasone for 4-5 cycles but plateaued by late 03/2012.   - Velcade was added and he received another 2-3 cycles, achieving a good partial remission.      Transplant: Single auto after melphalan 200 mg/m2 preparative regimen on 01/09/2013  - Post-transplant course: unremarkable except for some mild steroid-induced hyperglycemia, gastritis, nausea and vomiting.      Maintenance: Lenalidomide at day-100 then developed a maculopapular rash. Lenalidomide was held and then we re-challenged him after about a month to 2 months at a lower dose (5 mg daily); rash returned.   - was started on maintenance Velcade, every other week through July 2014, when he was noted with abnormalities on myeloma studies drawn there. Noted with prostate cancer dx at about the time of relapse (see below).     Relapse: noted with return on M-spike in blood/urine with marrow involvement but negative PET.   - Started on retreatment with RVD on 8/27/14 with Revlimid at 15 mg 14 days of 21 days, dexamethasone 40 mg weekly and velcade weekly.Completed at total of 5 cycles without complication by 12/2014.   - Started Cycle 6 on inc'd Rev dosing of 20mg daily x 2 weeks on 12/11/14 which was complicated by pneumonia.  - Adm 12/21-1/10 for human metapneumovirus pneumonia complicated by anorexia, HTN, depression, anorexia with significant weight loss.   - Restarted Ernesto/Dex only on 2/4/15 with good tolerance but with thrombocytopenia.   - Bendamustine added to Velcade/dexamethasone on 5/21/15 due to disease progression  - Velcade discontinued on 7/9/2015 due to side effects (orthostasis)  - Schedule changed to bendamustine 80 mg/m2 days 1 and 2 on 28-day cycle  - Cycle 1 tolerated poorly due to lightheadedness and  weakness  - Cycle 2 dose reduced to 60 mg/m2 and pre-meds adjusted  - Cycle 5 received on 9/17/15.  - 10/1/2015 - increasing IgM, M-spike - started Pomalidomide 4mg/day (21 days out of 28 days) and weekly Decadron 20mg on Oct 1st, 2015.    - Carfilzomib added on 10/22/2015 making this CPD.  - C3-C6  received in [place]    - Returned to [facility] and resumed CPD here    - Adm: 4/12-4/14 with fever, confusion, neutropenia. Noted on MRI to have acute/subacute CVA and subacute/chronic CVA; started on Plavix, given brief course of Abx and GCSF prior to d/c.     - Continued on Carfilzomib and dexamethasone alone  - Pomalyst added back on 7/13/2016 for rising FLC  - Start daratumumab 11/10/16. Changed to every other week after 4 weekly treatments d/t profound fatigue and malaise.   - Adm 5/7-5/16 due to AMS, hypercalcemia, hyperuricemia, possible PNA, back pain, and JOSE MARIA. Started Kyprolis while inpatient.  - Re-admitted 5/25-/529 with recurrent AMS, found to have concomitant PNA  - Resumed Kyprolis 6/13/2017    INTERVAL HISTORY:    Mr. Carbone is here with his wife. Last week was rough; he had increased weakness, fatigue, and confusion. He was started on an antibiotic for possible UTI. He had confusion on Saturday again, but nothing too significant since that time. The last two days have been overall slightly better in regards to the fatigue and confusion. He had some episodic diarrhea once daily that Casey gave him Imodium for, with good effect. He has been eating better over the last few days. He reports stable back pain and no other new sites of pain. No fevers/chills/sweats, HA, vision changes, cough, congestion, sore throat, chest pain, SOB, GIVENS, n/v, abdominal pain, urinary changes, swelling, bleeding, rash, or change in mood.       PHYSICAL EXAMINATION  /69  Pulse 84  Temp 97.5  F (36.4  C) (Tympanic)  Resp 16  Wt 56 kg (123 lb 7.3 oz)  SpO2 95%  BMI 21.18 kg/m2    Wt Readings from Last 10 Encounters:    17 56 kg (123 lb 7.3 oz)   17 57.4 kg (126 lb 8 oz)   17 57.9 kg (127 lb 11.2 oz)   17 56.7 kg (125 lb)   17 57.9 kg (127 lb 11.2 oz)   17 57.9 kg (127 lb 11.2 oz)   17 57.9 kg (127 lb 9.6 oz)   17 57.6 kg (127 lb)   17 57.2 kg (126 lb)   17 56.7 kg (124 lb 14.4 oz)   General: Alert. Oriented to name, , location, but unable to provide date. Later in visit did guess the year correctly. Able to ambulate independently, albeit slow and cautiously.  No acute distress. HEENT: PERRL, no palor or icterus. CVS: RRR with occasional premature beat. CHEST: CTAB, normal work of breathing ABDOMEN: soft non tender no masses     NEURO: AAOX3  CN 2-12 intact. Motor and sensation in tact. Strength 4/5 in upper and lower extremities. Gets to and from the exam table un assisted. SKIN: some bruising over lower back but no palpable tenderness EXTREMITIES: No edema.     LABS:   Results for WILLIAN ARCINIEGA (MRN [id number]) as of 2017 12:51   Ref. Range 2017 11:32   Sodium Latest Ref Range: 133 - 144 mmol/L 136   Potassium Latest Ref Range: 3.4 - 5.3 mmol/L 4.9   Chloride Latest Ref Range: 94 - 109 mmol/L 105   Carbon Dioxide Latest Ref Range: 20 - 32 mmol/L 23   Urea Nitrogen Latest Ref Range: 7 - 30 mg/dL 26   Creatinine Latest Ref Range: 0.66 - 1.25 mg/dL 1.06   GFR Estimate Latest Ref Range: >60 mL/min/1.7m2 68   GFR Estimate If Black Latest Ref Range: >60 mL/min/1.7m2 83   Calcium Latest Ref Range: 8.5 - 10.1 mg/dL 9.5   Anion Gap Latest Ref Range: 3 - 14 mmol/L 8   Albumin Latest Ref Range: 3.4 - 5.0 g/dL 2.8 (L)   Protein Total Latest Ref Range: 6.8 - 8.8 g/dL 9.4 (H)   Bilirubin Total Latest Ref Range: 0.2 - 1.3 mg/dL 0.4   Alkaline Phosphatase Latest Ref Range: 40 - 150 U/L 46   ALT Latest Ref Range: 0 - 70 U/L 18   AST Latest Ref Range: 0 - 45 U/L 9   Glucose Latest Ref Range: 70 - 99 mg/dL 90   WBC Latest Ref Range: 4.0 - 11.0 10e9/L 5.4    Hemoglobin Latest Ref Range: 13.3 - 17.7 g/dL 10.8 (L)   Hematocrit Latest Ref Range: 40.0 - 53.0 % 34.8 (L)   Platelet Count Latest Ref Range: 150 - 450 10e9/L 47 (LL)     IMPRESSION/PLAN:  71 year old male with relapsed MM after autologous transplant (1/9/2013), with recent progression on daratumumab/pom/dex, with recent hospitalization 5/7-5/16 for back pain, JOSE MARIA,and  Hypercalcemia. Started on kyprolis IV 5/11 as well as po dex. He unfortunately developed worsening AMS and was re-admitted 5/25-5/29, found to have PNA. Now back on single agent Kyprolis/dex.        MM: Previously on edgardo/pom/dex while wintering in [place], progressive disease on [facility] inpatient (M spike 0.9 --> 2.1, IgM 3410) Received solumedrol 100 mg IV daily 5/8-5/10. Started PO dex 40 mg daily x 3 days on 5/11 with Kyprolis 5/11 and 5/12, and 5/23, subsequent doses held for AMS and PNA.  - Resumed Kyprolis/dex on 6/13. Today is cycle 3 day 15. He had increasing light chains starting cycle 3, however due to poor performance status, opted to continue with current treatment. We will continue today. Dr. Topete to meet with him later this week to discuss next steps.    AMS: AMS started early may in setting of metabolic derangements, uremia, and possible infection. Initially resolved, then returned ~5/24. Work-up showing PNA and UCx with enterococcus and CONS. Again more pronounced starting last week in setting of poss UTI. Remains intermittently confused despite antibiotics, however this historically has been the case in setting of prior AMS related to infections. He will continue to monitor and alert us of any worsening symptoms.  - Continue Zyprexa 5 mg at bedtime  - Holding off long-acting pain meds     Fatigue: ongoing but sleeping more this past week, was napping 4-6 hours/day and now closer to averaging 6 hours/day. No change with antibiotics, although the last 48 hours may have been slightly better He felt better with IVF. Could be related to  rising light chains.  - IVF again today  - May consider Ritalin, discuss next visit    Heme: Cytopenias 2/2 treatment and likely MM in setting of progression. Periodically needing pRBC.  - On Plavix, hold for platelets < 50. Advised to hold again today  - Transfuse to keep hemoglobin > 8    Renal/FEN: Creat baseline ~0.8-1.0 Stable today.  -Hx Hypercalcemia: s/p pamidronate x 1 on 5/8. Calcium corrects to 10.4 today. Will give NS and monitor later this week.  -Hx Hyperuricemia: s/p rasburicase x 1 on 5/8  -Encouraged protein/calorie supplements. Weight stable today. Lytes and Cr okay today.      ID: Recently treated for UTI, no urinary symptoms or fevers. No other active infectious issues at this time.  - Continues ppx  mg BID     Back/rib pain: Started early May. Lumbar MRI at OSH showing mild-mod central and foraminal stenoses in lumbar spine, per patient and wife, there was no MM lesion there. Rib films inpatient negative, back films stable from prior imaging.  - Continues tramadol PRN and Tylenol PRN. No changes.      CV: Continue zocor. Norvasc added back in last week due to elevated pressures. Normotensive today.  - Avoid QTc prolonging agents (history of QTc prolongation with syncopal episodes)     Deconditioning: Had improved. Now with more fatigue this past week and finished with PT/OT. Encourage ongoing exerices.      Leanne Reddy PA-C

## 2017-07-25 NOTE — LETTER
7/25/2017      RE: Anthony Carbone  [address] [place] [address]       HEMATOLOGY/ONCOLOGY PROGRESS NOTE  Jul 25, 2017    REASON FOR VISIT: myeloma    Diagnosis: 71 year old gentleman with IgM lambda multiple myeloma originally diagnosed in 01/2012 as stage I, standard risk disease.   Treatment: Revlimid plus dexamethasone for 4-5 cycles but plateaued by late 03/2012.   - Velcade was added and he received another 2-3 cycles, achieving a good partial remission.      Transplant: Single auto after melphalan 200 mg/m2 preparative regimen on 01/09/2013  - Post-transplant course: unremarkable except for some mild steroid-induced hyperglycemia, gastritis, nausea and vomiting.      Maintenance: Lenalidomide at day-100 then developed a maculopapular rash. Lenalidomide was held and then we re-challenged him after about a month to 2 months at a lower dose (5 mg daily); rash returned.   - was started on maintenance Velcade, every other week through July 2014, when he was noted with abnormalities on myeloma studies drawn there. Noted with prostate cancer dx at about the time of relapse (see below).     Relapse: noted with return on M-spike in blood/urine with marrow involvement but negative PET.   - Started on retreatment with RVD on 8/27/14 with Revlimid at 15 mg 14 days of 21 days, dexamethasone 40 mg weekly and velcade weekly.Completed at total of 5 cycles without complication by 12/2014.   - Started Cycle 6 on inc'd Rev dosing of 20mg daily x 2 weeks on 12/11/14 which was complicated by pneumonia.  - Adm 12/21-1/10 for human metapneumovirus pneumonia complicated by anorexia, HTN, depression, anorexia with significant weight loss.   - Restarted Ernesto/Dex only on 2/4/15 with good tolerance but with thrombocytopenia.   - Bendamustine added to Velcade/dexamethasone on 5/21/15 due to disease progression  - Velcade discontinued on 7/9/2015 due to side effects (orthostasis)  - Schedule changed to bendamustine 80 mg/m2  days 1 and 2 on 28-day cycle  - Cycle 1 tolerated poorly due to lightheadedness and weakness  - Cycle 2 dose reduced to 60 mg/m2 and pre-meds adjusted  - Cycle 5 received on 9/17/15.  - 10/1/2015 - increasing IgM, M-spike - started Pomalidomide 4mg/day (21 days out of 28 days) and weekly Decadron 20mg on Oct 1st, 2015.    - Carfilzomib added on 10/22/2015 making this CPD.  - C3-C6  received in [place]    - Returned to [facility] and resumed CPD here    - Adm: 4/12-4/14 with fever, confusion, neutropenia. Noted on MRI to have acute/subacute CVA and subacute/chronic CVA; started on Plavix, given brief course of Abx and GCSF prior to d/c.     - Continued on Carfilzomib and dexamethasone alone  - Pomalyst added back on 7/13/2016 for rising FLC  - Start daratumumab 11/10/16. Changed to every other week after 4 weekly treatments d/t profound fatigue and malaise.   - Adm 5/7-5/16 due to AMS, hypercalcemia, hyperuricemia, possible PNA, back pain, and JOSE MARIA. Started Kyprolis while inpatient.  - Re-admitted 5/25-/529 with recurrent AMS, found to have concomitant PNA  - Resumed Kyprolis 6/13/2017    INTERVAL HISTORY:    Mr. Carbone is here with his wife. Last week was rough; he had increased weakness, fatigue, and confusion. He was started on an antibiotic for possible UTI. He had confusion on Saturday again, but nothing too significant since that time. The last two days have been overall slightly better in regards to the fatigue and confusion. He had some episodic diarrhea once daily that Casey gave him Imodium for, with good effect. He has been eating better over the last few days. He reports stable back pain and no other new sites of pain. No fevers/chills/sweats, HA, vision changes, cough, congestion, sore throat, chest pain, SOB, GIVENS, n/v, abdominal pain, urinary changes, swelling, bleeding, rash, or change in mood.       PHYSICAL EXAMINATION  /69  Pulse 84  Temp 97.5  F (36.4  C) (Tympanic)  Resp 16  Wt 56 kg (123 lb  7.3 oz)  SpO2 95%  BMI 21.18 kg/m2    Wt Readings from Last 10 Encounters:   17 56 kg (123 lb 7.3 oz)   17 57.4 kg (126 lb 8 oz)   17 57.9 kg (127 lb 11.2 oz)   17 56.7 kg (125 lb)   17 57.9 kg (127 lb 11.2 oz)   17 57.9 kg (127 lb 11.2 oz)   17 57.9 kg (127 lb 9.6 oz)   17 57.6 kg (127 lb)   17 57.2 kg (126 lb)   17 56.7 kg (124 lb 14.4 oz)   General: Alert. Oriented to name, , location, but unable to provide date. Later in visit did guess the year correctly. Able to ambulate independently, albeit slow and cautiously.  No acute distress. HEENT: PERRL, no palor or icterus. CVS: RRR with occasional premature beat. CHEST: CTAB, normal work of breathing ABDOMEN: soft non tender no masses     NEURO: AAOX3  CN 2-12 intact. Motor and sensation in tact. Strength 4/5 in upper and lower extremities. Gets to and from the exam table un assisted. SKIN: some bruising over lower back but no palpable tenderness EXTREMITIES: No edema.     LABS:   Results for WILLIAN ARCINIEGA (MRN [id number]) as of 2017 12:51   Ref. Range 2017 11:32   Sodium Latest Ref Range: 133 - 144 mmol/L 136   Potassium Latest Ref Range: 3.4 - 5.3 mmol/L 4.9   Chloride Latest Ref Range: 94 - 109 mmol/L 105   Carbon Dioxide Latest Ref Range: 20 - 32 mmol/L 23   Urea Nitrogen Latest Ref Range: 7 - 30 mg/dL 26   Creatinine Latest Ref Range: 0.66 - 1.25 mg/dL 1.06   GFR Estimate Latest Ref Range: >60 mL/min/1.7m2 68   GFR Estimate If Black Latest Ref Range: >60 mL/min/1.7m2 83   Calcium Latest Ref Range: 8.5 - 10.1 mg/dL 9.5   Anion Gap Latest Ref Range: 3 - 14 mmol/L 8   Albumin Latest Ref Range: 3.4 - 5.0 g/dL 2.8 (L)   Protein Total Latest Ref Range: 6.8 - 8.8 g/dL 9.4 (H)   Bilirubin Total Latest Ref Range: 0.2 - 1.3 mg/dL 0.4   Alkaline Phosphatase Latest Ref Range: 40 - 150 U/L 46   ALT Latest Ref Range: 0 - 70 U/L 18   AST Latest Ref Range: 0 - 45 U/L 9   Glucose Latest Ref  Range: 70 - 99 mg/dL 90   WBC Latest Ref Range: 4.0 - 11.0 10e9/L 5.4   Hemoglobin Latest Ref Range: 13.3 - 17.7 g/dL 10.8 (L)   Hematocrit Latest Ref Range: 40.0 - 53.0 % 34.8 (L)   Platelet Count Latest Ref Range: 150 - 450 10e9/L 47 (LL)     IMPRESSION/PLAN:  71 year old male with relapsed MM after autologous transplant (1/9/2013), with recent progression on daratumumab/pom/dex, with recent hospitalization 5/7-5/16 for back pain, JOSE MARIA,and  Hypercalcemia. Started on kyprolis IV 5/11 as well as po dex. He unfortunately developed worsening AMS and was re-admitted 5/25-5/29, found to have PNA. Now back on single agent Kyprolis/dex.        MM: Previously on edgardo/pom/dex while wintering in [place], progressive disease on [facility] inpatient (M spike 0.9 --> 2.1, IgM 3410) Received solumedrol 100 mg IV daily 5/8-5/10. Started PO dex 40 mg daily x 3 days on 5/11 with Kyprolis 5/11 and 5/12, and 5/23, subsequent doses held for AMS and PNA.  - Resumed Kyprolis/dex on 6/13. Today is cycle 3 day  15. He had increasing light chains starting cycle 3, however due to poor performance status, opted to continue with current treatment. We will continue today. Dr. Topete to meet with him later this week to discuss next steps.    AMS: AMS started early may in setting of metabolic derangements, uremia, and possible infection. Initially resolved, then returned ~5/24. Work-up showing PNA and UCx with enterococcus and CONS. Again more pronounced starting last week in setting of poss UTI. Remains intermittently confused despite antibiotics, however this historically has been the case in setting of prior AMS related to infections. He will continue to monitor and alert us of any worsening symptoms.  - Continue Zyprexa 5 mg at bedtime  - Holding off long-acting pain meds     Fatigue: ongoing but sleeping more this past week, was napping 4-6 hours/day and now closer to averaging 6 hours/day. No change with antibiotics, although the last 48 hours may  have been slightly better He felt better with IVF. Could be related to rising light chains.  - IVF again today  - May consider Ritalin, discuss next visit    Heme: Cytopenias 2/2 treatment and likely MM in setting of progression. Periodically needing pRBC.  - On Plavix, hold for platelets < 50. Advised to hold again today  - Transfuse to keep hemoglobin > 8    Renal/FEN: Creat baseline ~0.8-1.0 Stable today.  -Hx Hypercalcemia: s/p pamidronate x 1 on 5/8. Calcium corrects to 10.4 today. Will give NS and monitor later this week.  -Hx Hyperuricemia: s/p rasburicase x 1 on 5/8  -Encouraged protein/calorie supplements. Weight stable today. Lytes and Cr okay today.      ID: Recently treated for UTI, no urinary symptoms or fevers. No other active infectious issues at this time.  - Continues ppx  mg BID     Back/rib pain: Started early May. Lumbar MRI at OSH showing mild-mod central and foraminal stenoses in lumbar spine, per patient and wife, there was no MM lesion there. Rib films inpatient negative, back films stable from prior imaging.  - Continues tramadol PRN and Tylenol PRN. No changes.      CV: Continue zocor. Norvasc added back in last week due to elevated pressures. Normotensive today.  - Avoid QTc prolonging agents (history of QTc prolongation with syncopal episodes)     Deconditioning: Had improved. Now with more fatigue this past week and finished with PT/OT. Encourage ongoing exerices.      Leanne Reddy PA-C

## 2017-07-25 NOTE — PROGRESS NOTES
Infusion Nursing Note:  Anthony Carbone presents today for Cycle 3, day 15 Kyprolis and 1L IVF.    Patient seen by provider today: Yes: PREM Herrera    Note: Patient presents to clinic today feeling well with no questions.  TORB 7/25/2017 1240 PREM Costa/JEN Nunez RN: Okay to administer Kyprolis with platelets=47.  Administer 1L NS IV with Kyprolis today.     Intravenous Access:  Implanted Port.    Treatment Conditions:  Lab Results   Component Value Date    HGB 10.8 07/25/2017     Lab Results   Component Value Date    WBC 5.4 07/25/2017      Lab Results   Component Value Date    ANEU 4.2 07/25/2017     Lab Results   Component Value Date    PLT 47 07/25/2017      Lab Results   Component Value Date     07/25/2017                   Lab Results   Component Value Date    POTASSIUM 4.9 07/25/2017           Lab Results   Component Value Date    MAG 2.2 05/25/2017            Lab Results   Component Value Date    CR 1.06 07/25/2017                   Lab Results   Component Value Date    JAZMIN 9.5 07/25/2017                Lab Results   Component Value Date    BILITOTAL 0.4 07/25/2017           Lab Results   Component Value Date    ALBUMIN 2.8 07/25/2017                    Lab Results   Component Value Date    ALT 18 07/25/2017           Lab Results   Component Value Date    AST 9 07/25/2017     Results reviewed, labs MET treatment parameters, ok to proceed with treatment.    Post Infusion Assessment:  Patient tolerated infusion without incident.  Blood return noted pre and post infusion.  Site patent and intact, free from redness, edema or discomfort.  No evidence of extravasations.  Access discontinued per protocol.  Kyprolis stop time: 1415    Discharge Plan:   Patient declined prescription refills.  Discharge instructions reviewed with: Patient.  Patient and/or family verbalized understanding of discharge instructions and all questions answered.  Copy of AVS reviewed with patient and/or  family.  Patient will return 7/26/2017 for next appointment.  Departure Mode: Ambulatory.    Nelsy Nunez RN

## 2017-07-25 NOTE — NURSING NOTE
"Oncology Rooming Note    July 25, 2017 11:54 AM   Anthony Carbone is a 71 year old male who presents for:    Chief Complaint   Patient presents with     Port Draw     Port labs     Oncology Clinic Visit     Return fro Multi Myeloma      Initial Vitals: /69  Pulse 84  Temp 97.5  F (36.4  C) (Tympanic)  Resp 16  Wt 56 kg (123 lb 7.3 oz)  SpO2 95%  BMI 21.18 kg/m2 Estimated body mass index is 21.18 kg/(m^2) as calculated from the following:    Height as of 7/18/17: 1.626 m (5' 4.02\").    Weight as of this encounter: 56 kg (123 lb 7.3 oz). Body surface area is 1.59 meters squared.  No Pain (0) Comment: Data Unavailable   No LMP for male patient.  Allergies reviewed: Yes  Medications reviewed: Yes    Medications: Medication refills not needed today.  Pharmacy name entered into [facility]:    [id number] - [facility] [address] [facility] [address] [facility] [facility] [address]  [facility] [place], [place] - [address]  [facility] [address] [place], [place] - [address] AT [facility] [address]    Clinical concerns: Maureen Reddy was notified.    6  minutes for nursing intake (face to face time)     Vilma Hook MA              "

## 2017-07-25 NOTE — LETTER
7/25/2017       RE: Anthony Carbone  [address] [place] [address]     Dear Colleague,    Thank you for referring your patient, Anthony Carbone, to the [facility]. Please see a copy of my visit note below.    HEMATOLOGY/ONCOLOGY PROGRESS NOTE  Jul 25, 2017    REASON FOR VISIT: myeloma    Diagnosis: 71 year old gentleman with IgM lambda multiple myeloma originally diagnosed in 01/2012 as stage I, standard risk disease.   Treatment: Revlimid plus dexamethasone for 4-5 cycles but plateaued by late 03/2012.   - Velcade was added and he received another 2-3 cycles, achieving a good partial remission.      Transplant: Single auto after melphalan 200 mg/m2 preparative regimen on 01/09/2013  - Post-transplant course: unremarkable except for some mild steroid-induced hyperglycemia, gastritis, nausea and vomiting.      Maintenance: Lenalidomide at day-100 then developed a maculopapular rash. Lenalidomide was held and then we re-challenged him after about a month to 2 months at a lower dose (5 mg daily); rash returned.   - was started on maintenance Velcade, every other week through July 2014, when he was noted with abnormalities on myeloma studies drawn there. Noted with prostate cancer dx at about the time of relapse (see below).     Relapse: noted with return on M-spike in blood/urine with marrow involvement but negative PET.   - Started on retreatment with RVD on 8/27/14 with Revlimid at 15 mg 14 days of 21 days, dexamethasone 40 mg weekly and velcade weekly.Completed at total of 5 cycles without complication by 12/2014.   - Started Cycle 6 on inc'd Rev dosing of 20mg daily x 2 weeks on 12/11/14 which was complicated by pneumonia.  - Adm 12/21-1/10 for human metapneumovirus pneumonia complicated by anorexia, HTN, depression, anorexia with significant weight loss.   - Restarted Ernesto/Dex only on 2/4/15 with good tolerance but with thrombocytopenia.   - Bendamustine added to  Velcade/dexamethasone on 5/21/15 due to disease progression  - Velcade discontinued on 7/9/2015 due to side effects (orthostasis)  - Schedule changed to bendamustine 80 mg/m2 days 1 and 2 on 28-day cycle  - Cycle 1 tolerated poorly due to lightheadedness and weakness  - Cycle 2 dose reduced to 60 mg/m2 and pre-meds adjusted  - Cycle 5 received on 9/17/15.  - 10/1/2015 - increasing IgM, M-spike - started Pomalidomide 4mg/day (21 days out of 28 days) and weekly Decadron 20mg on Oct 1st, 2015.    - Carfilzomib added on 10/22/2015 making this CPD.  - C3-C6  received in [place]    - Returned to [facility] and resumed CPD here    - Adm: 4/12-4/14 with fever, confusion, neutropenia. Noted on MRI to have acute/subacute CVA and subacute/chronic CVA; started on Plavix, given brief course of Abx and GCSF prior to d/c.     - Continued on Carfilzomib and dexamethasone alone  - Pomalyst added back on 7/13/2016 for rising FLC  - Start daratumumab 11/10/16. Changed to every other week after 4 weekly treatments d/t profound fatigue and malaise.   - Adm 5/7-5/16 due to AMS, hypercalcemia, hyperuricemia, possible PNA, back pain, and JOSE MARIA. Started Kyprolis while inpatient.  - Re-admitted 5/25-/529 with recurrent AMS, found to have concomitant PNA  - Resumed Kyprolis 6/13/2017    INTERVAL HISTORY:    Mr. Carbone is here with his wife. Last week was rough; he had increased weakness, fatigue, and confusion. He was started on an antibiotic for possible UTI. He had confusion on Saturday again, but nothing too significant since that time. The last two days have been overall slightly better in regards to the fatigue and confusion. He had some episodic diarrhea once daily that Casey gave him Imodium for, with good effect. He has been eating better over the last few days. He reports stable back pain and no other new sites of pain. No fevers/chills/sweats, HA, vision changes, cough, congestion, sore throat, chest pain, SOB, GIVENS, n/v, abdominal pain,  urinary changes, swelling, bleeding, rash, or change in mood.       PHYSICAL EXAMINATION  /69  Pulse 84  Temp 97.5  F (36.4  C) (Tympanic)  Resp 16  Wt 56 kg (123 lb 7.3 oz)  SpO2 95%  BMI 21.18 kg/m2    Wt Readings from Last 10 Encounters:   17 56 kg (123 lb 7.3 oz)   17 57.4 kg (126 lb 8 oz)   17 57.9 kg (127 lb 11.2 oz)   17 56.7 kg (125 lb)   17 57.9 kg (127 lb 11.2 oz)   17 57.9 kg (127 lb 11.2 oz)   17 57.9 kg (127 lb 9.6 oz)   17 57.6 kg (127 lb)   17 57.2 kg (126 lb)   17 56.7 kg (124 lb 14.4 oz)   General: Alert. Oriented to name, , location, but unable to provide date. Later in visit did guess the year correctly. Able to ambulate independently, albeit slow and cautiously.  No acute distress. HEENT: PERRL, no palor or icterus. CVS: RRR with occasional premature beat. CHEST: CTAB, normal work of breathing ABDOMEN: soft non tender no masses     NEURO: AAOX3  CN 2-12 intact. Motor and sensation in tact. Strength 4/5 in upper and lower extremities. Gets to and from the exam table un assisted. SKIN: some bruising over lower back but no palpable tenderness EXTREMITIES: No edema.     LABS:   Results for HANSACHARLYWILLIAN LARES (MRN [id number]) as of 2017 12:51   Ref. Range 2017 11:32   Sodium Latest Ref Range: 133 - 144 mmol/L 136   Potassium Latest Ref Range: 3.4 - 5.3 mmol/L 4.9   Chloride Latest Ref Range: 94 - 109 mmol/L 105   Carbon Dioxide Latest Ref Range: 20 - 32 mmol/L 23   Urea Nitrogen Latest Ref Range: 7 - 30 mg/dL 26   Creatinine Latest Ref Range: 0.66 - 1.25 mg/dL 1.06   GFR Estimate Latest Ref Range: >60 mL/min/1.7m2 68   GFR Estimate If Black Latest Ref Range: >60 mL/min/1.7m2 83   Calcium Latest Ref Range: 8.5 - 10.1 mg/dL 9.5   Anion Gap Latest Ref Range: 3 - 14 mmol/L 8   Albumin Latest Ref Range: 3.4 - 5.0 g/dL 2.8 (L)   Protein Total Latest Ref Range: 6.8 - 8.8 g/dL 9.4 (H)   Bilirubin Total Latest Ref Range:  0.2 - 1.3 mg/dL 0.4   Alkaline Phosphatase Latest Ref Range: 40 - 150 U/L 46   ALT Latest Ref Range: 0 - 70 U/L 18   AST Latest Ref Range: 0 - 45 U/L 9   Glucose Latest Ref Range: 70 - 99 mg/dL 90   WBC Latest Ref Range: 4.0 - 11.0 10e9/L 5.4   Hemoglobin Latest Ref Range: 13.3 - 17.7 g/dL 10.8 (L)   Hematocrit Latest Ref Range: 40.0 - 53.0 % 34.8 (L)   Platelet Count Latest Ref Range: 150 - 450 10e9/L 47 (LL)     IMPRESSION/PLAN:  71 year old male with relapsed MM after autologous transplant (1/9/2013), with recent progression on daratumumab/pom/dex, with recent hospitalization 5/7-5/16 for back pain, JOSE MARIA,and  Hypercalcemia. Started on kyprolis IV 5/11 as well as po dex. He unfortunately developed worsening AMS and was re-admitted 5/25-5/29, found to have PNA. Now back on single agent Kyprolis/dex.        MM: Previously on edgardo/pom/dex while wintering in [place], progressive disease on [facility] inpatient (M spike 0.9 --> 2.1, IgM 3410) Received solumedrol 100 mg IV daily 5/8-5/10. Started PO dex 40 mg daily x 3 days on 5/11 with Kyprolis 5/11 and 5/12, and 5/23, subsequent doses held for AMS and PNA.  - Resumed Kyprolis/dex on 6/13. Today is cycle 3 day  15. He had increasing light chains starting cycle 3, however due to poor performance status, opted to continue with current treatment. We will continue today. Dr. Topete to meet with him later this week to discuss next steps.    AMS: AMS started early may in setting of metabolic derangements, uremia, and possible infection. Initially resolved, then returned ~5/24. Work-up showing PNA and UCx with enterococcus and CONS. Again more pronounced starting last week in setting of poss UTI. Remains intermittently confused despite antibiotics, however this historically has been the case in setting of prior AMS related to infections. He will continue to monitor and alert us of any worsening symptoms.  - Continue Zyprexa 5 mg at bedtime  - Holding off long-acting pain  meds     Fatigue: ongoing but sleeping more this past week, was napping 4-6 hours/day and now closer to averaging 6 hours/day. No change with antibiotics, although the last 48 hours may have been slightly better He felt better with IVF. Could be related to rising light chains.  - IVF again today  - May consider Ritalin, discuss next visit    Heme: Cytopenias 2/2 treatment and likely MM in setting of progression. Periodically needing pRBC.  - On Plavix, hold for platelets < 50. Advised to hold again today  - Transfuse to keep hemoglobin > 8    Renal/FEN: Creat baseline ~0.8-1.0 Stable today.  -Hx Hypercalcemia: s/p pamidronate x 1 on 5/8. Calcium corrects to 10.4 today. Will give NS and monitor later this week.  -Hx Hyperuricemia: s/p rasburicase x 1 on 5/8  -Encouraged protein/calorie supplements. Weight stable today. Lytes and Cr okay today.      ID: Recently treated for UTI, no urinary symptoms or fevers. No other active infectious issues at this time.  - Continues ppx  mg BID     Back/rib pain: Started early May. Lumbar MRI at OSH showing mild-mod central and foraminal stenoses in lumbar spine, per patient and wife, there was no MM lesion there. Rib films inpatient negative, back films stable from prior imaging.  - Continues tramadol PRN and Tylenol PRN. No changes.      CV: Continue zocor. Norvasc added back in last week due to elevated pressures. Normotensive today.  - Avoid QTc prolonging agents (history of QTc prolongation with syncopal episodes)     Deconditioning: Had improved. Now with more fatigue this past week and finished with PT/OT. Encourage ongoing exerices.      Leanne Reddy PA-C        Again, thank you for allowing me to participate in the care of your patient.      Sincerely,    Leanne Reddy PA-C

## 2017-07-26 NOTE — MR AVS SNAPSHOT
After Visit Summary   7/26/2017    Anthony Carbone    MRN: [id number]           Patient Information     Date Of Birth          1945        Visit Information        Provider Department      7/26/2017 12:00 PM  28 ATC;  ONCOLOGY INFUSION [facility]        Today's Diagnoses     Multiple myeloma in relapse (H)    -  1      Care Instructions    Contact Numbers    [facility] [place]: [phone]  [facility] [place]:  [phone]    Call triage with chills and/or temperature greater than or equal to 100.5, uncontrolled nausea/vomiting, diarrhea, constipation, dizziness, shortness of breath, chest pain, bleeding, unexplained bruising, or any new/concerning symptoms, questions/concerns.     If you are having any concerning symptoms or wish to speak to a provider before your next infusion visit, please call your care coordinator or triage to notify them so we can adequately serve you.       After Hours: [phone]    If after hours, weekends, or holidays, call main hospital  and ask for Oncology doctor on call.         July 2017 Sunday Monday Tuesday Wednesday Thursday Friday Saturday                                 1       2     3     4     5     6     7     8       9     10     11     [facility] LAB DRAW    2:45 PM   (15 min.)    [facility]   [facility]     [facility] RETURN    3:05 PM   (50 min.)   Leanne Reddy PA-C   [facility] ONC INFUSION 60    4:00 PM   (60 min.)    ONCOLOGY INFUSION   [facility] 12     [facility] ONC INFUSION 60   11:00 AM   (60 min.)    ONCOLOGY INFUSION   [facility] LAB DRAW    3:30 PM   (15 min.)    [facility]RAW   [facility] 13     14     15       16     17     18     [facility] LAB DRAW   11:15 AM   (15 min.)    M[facility]ONIC LAB DRAW   [facility] Masonic Lab Draw     [facility] RETURN   11:55 AM   (50 min.)   Lashay Romero  ALPHONSO Reich   [facility]     UMP ONC INFUSION 60    3:00 PM   (60 min.)   UC ONCOLOGY INFUSION   [facility] 19     UMP ONC INFUSION 60    3:00 PM   (60 min.)   UC ONCOLOGY INFUSION   [facility] 20     21     22       23     24     25     [facility] LAB DRAW   11:15 AM   (15 min.)    [facility] LAB DRAW   [facility] Lab Draw     UMP RETURN   11:35 AM   (50 min.)   Leanne Reddy PA-C   [facility]     UMP ONC INFUSION 60    1:00 PM   (60 min.)    ONCOLOGY INFUSION   [facility] 26     [facility] ONC INFUSION 60   12:00 PM   (60 min.)    ONCOLOGY INFUSION   [facility] 27     PHACOEMULSIFICATION CLEAR CORNEA WITH STANDARD INTRAOCULAR LENS IMPLANT   12:30 PM   Tesfaye Yusuf MD    [facility] 28     [facility] DRAW    1:00 PM   (15 min.)    [facility] LAB DRAW   [facility] Lab Draw     UMP ONC RETURN    1:30 PM   (30 min.)    BMT DOM   [facility] Blood and Marrow Transplant 29       30     31     UMP RETURN ARRHYTHMIA    5:45 PM   (20 min.)   Dmitri Banks MD   [facility] Heart [facility]                                     August 2017 Sunday Monday Tuesday Wednesday Thursday Friday Saturday             1     2     3     PHACOEMULSIFICATION CLEAR CORNEA WITH STANDARD INTRAOCULAR LENS IMPLANT    1:30 PM   Tesfaye Yusuf MD    [facility] 4     5       6     7     8     9     [facility] DRAW    3:30 PM   (15 min.)    [facility] LAB DRAW   [facility] Lab Draw     UMP ONC INFUSION 60    4:00 PM   (60 min.)   UC ONCOLOGY INFUSION   [facility]     UMP ONC RETURN    5:00 PM   (30 min.)   Kamari Topete MD   [facility] Blood and Marrow Transplant 10     UMP ONC INFUSION 60   12:00 PM   (60 min.)   UC ONCOLOGY INFUSION   [facility] 11     12       13     14     15     16     17     [facility] LAB DRAW   12:30 PM   (15 min.)     [facility] LAB DRAW   [facility] Masonic Lab Draw     UMP ONC INFUSION 60    1:00 PM   (60 min.)    ONCOLOGY INFUSION   [facility] 18     UMP ONC INFUSION 60    1:00 PM   (60 min.)    ONCOLOGY INFUSION   [facility] 19       20     21     22     23     24     [facility] LAB DRAW   12:30 PM   (15 min.)   UC [facility] LAB DRAW   [facility] Masonic Lab Draw     UMP ONC INFUSION 60    1:00 PM   (60 min.)   UC ONCOLOGY INFUSION   [facility] 25     UMP ONC INFUSION 60    1:00 PM   (60 min.)    ONCOLOGY INFUSION   [facility] 26       27     28     29     30     31                            Lab Results:  No results found for this or any previous visit (from the past 12 hour(s)).            Follow-ups after your visit        Your next 10 appointments already scheduled     Jul 27, 2017   Procedure with Tesfaye Yusuf MD   [facility] PeriOP Services (--)    [address]  [facility] [address]   [phone]            Jul 28, 2017  1:00 PM CDT   [facility] Lab Draw with  MASONIC LAB DRAW   [facility] Mason[facility] Lab Draw ([facility])    [address]  2nd Floor  [facility] [address]   [phone]            Jul 28, 2017  1:30 PM CDT   RETURN ONC with UC BMT DOM   [facility] Blood and Marrow Transplant ([facility])    [address]  2nd Floor  [facility] [address]   [phone]            Jul 31, 2017  6:00 PM CDT   (Arrive by 5:45 PM)   RETURN ARRHYTHMIA with Dmitri Banks MD   [facility] Heart Care ([facility])    [address] [place] [address]  3rd Floor  [facility] [address]   [phone]            Aug 03, 2017   Procedure with Tesfaye Yusuf MD   [facility] PeriOP Services (--)    [address]  [place] [address]   [phone]            Aug 09, 2017  3:30 PM CDT   [facility]AW   [facility] Lab Draw ([facility])    [address] [address]   [phone]            Aug 09, 2017  4:00 PM CDT   Infusion 60 with UC ONCOLOGY INFUSION, UC 12 ATC   [facility] ([facility])    [address] [address]   [phone]            Aug 09, 2017  5:00 PM CDT   RETURN ONC with Kamari Topete MD   [facility] Blood and Marrow Transplant ([facility])    [address] [place] [address]   [phone]            Aug 10, 2017 12:00 PM CDT   Infusion 60 with UC ONCOLOGY INFUSION   [facility] ([facility])    [address] [place] [address]   [phone]              Future tests that were ordered for you today     Open Future Orders        Priority Expected Expires Ordered    CBC with platelets differential Routine 7/28/2017 9/25/2017 7/25/2017    Comprehensive metabolic panel Routine 7/28/2017 9/25/2017 7/25/2017            Who to contact     If you have questions or need follow up information about today's clinic visit or your schedule please contact [facility]CER [facility] directly at [phone].  Normal or non-critical lab and imaging results will be communicated to you by Appbymehart, letter or phone within 4 business days after the clinic has received the results. If you do not hear from us within 7 days, please contact the clinic through Appbymehart or phone. If you have a critical or abnormal lab result, we will notify you by phone as soon as possible.  Submit refill requests through [employer] or call your pharmacy and they will forward the refill request to us. Please allow 3 business days for your refill to be completed.          Additional Information About Your Visit        [employer] Information     [employer] gives you secure access  "to your electronic health record. If you see a primary care provider, you can also send messages to your care team and make appointments. If you have questions, please call your primary care clinic.  If you do not have a primary care provider, please call [phone] and they will assist you.        Care EveryWhere ID     This is your Care EveryWhere ID. This could be used by other organizations to access your [place] medical records  [id number]        Your Vitals Were     Pulse Temperature Respirations Height Pulse Oximetry BMI (Body Mass Index)    84 97.5  F (36.4  C) (Oral) 20 1.554 m (5' 1.2\") 98% 23.86 kg/m2       Blood Pressure from Last 3 Encounters:   07/26/17 (!) 157/98   07/25/17 117/69   07/19/17 (!) 171/98    Weight from Last 3 Encounters:   07/26/17 57.7 kg (127 lb 1.6 oz)   07/25/17 56 kg (123 lb 7.3 oz)   07/18/17 57.4 kg (126 lb 8 oz)              Today, you had the following     No orders found for display         Today's Medication Changes          These changes are accurate as of: 7/26/17 12:44 PM.  If you have any questions, ask your nurse or doctor.               These medicines have changed or have updated prescriptions.        Dose/Directions    traMADol 50 MG tablet   Commonly known as:  ULTRAM   This may have changed:    - how much to take  - additional instructions   Used for:  Acute bilateral low back pain without sciatica, Multiple myeloma in relapse (H)        Dose:  25-50 mg   Take 0.5-1 tablets (25-50 mg) by mouth every 6 hours as needed for moderate pain . Recommend trying after Tylenol and before Dilaudid.   Quantity:  60 tablet   Refills:  3                Primary Care Provider Office Phone # Fax #    Sanket Burciaga [phone]       [facility] [address] [facility] [address]        Equal Access to Services     ALBAN SHAH AH: Maribel Davis, wanadine aguirreqjose, qaybta jonny drummond " la'fishn daisy. So [facility] [phone].    ATENCIÓN: Si habla dinh, tiene a todd disposición servicios gratuitos de asistencia lingüística. Parminder al [phone].    We comply with applicable federal civil rights laws and [place] laws. We do not discriminate on the basis of race, color, national origin, age, disability sex, sexual orientation or gender identity.            Thank you!     Thank you for choosing [facility]  for your care. Our goal is always to provide you with excellent care. Hearing back from our patients is one way we can continue to improve our services. Please take a few minutes to complete the written survey that you may receive in the mail after your visit with us. Thank you!             Your Updated Medication List - Protect others around you: Learn how to safely use, store and throw away your medicines at www.disposemymeds.org.          This list is accurate as of: 7/26/17 12:44 PM.  Always use your most recent med list.                   Brand Name Dispense Instructions for use Diagnosis    acetaminophen 500 MG tablet    TYLENOL     Take 2 tablets (1,000 mg) by mouth every 6 hours as needed for mild pain    Acute bilateral low back pain without sciatica, Multiple myeloma in relapse (H)       acyclovir 400 MG tablet    ZOVIRAX    60 tablet    Take 1 tablet (400 mg) by mouth 2 times daily    Multiple myeloma in relapse (H)       amLODIPine 5 MG tablet    NORVASC    30 tablet    Take one tablet at bedtime.    Essential hypertension       amoxicillin-clavulanate 875-125 MG per tablet    AUGMENTIN    14 tablet    Take 1 tablet by mouth 2 times daily    Urinary tract infection, site unspecified       artificial saliva Liqd liquid      Swish and spit 3-5 mLs in mouth 4 times daily as needed for dry mouth    Dry mouth       cefdinir 300 MG capsule    OMNICEF    14 capsule    Take 1 capsule (300 mg) by mouth 2 times daily for 7 days    Urinary tract infection       * clopidogrel 75 MG  tablet    PLAVIX    30 tablet    Take 1 tablet (75 mg) by mouth daily    History of stroke       * clopidogrel 75 MG tablet    PLAVIX    90 tablet    TAKE 1 TABLET BY MOUTH EVERY DAY    History of stroke       dexamethasone 4 MG tablet    DECADRON    30 tablet    Take 20 mg days 1,2, 8, 9, 15, 16 on days of kyprolis    Multiple myeloma not having achieved remission (H)       DOCUSATE SODIUM PO      Take 100 mg by mouth 2 times daily as needed for constipation        esomeprazole 40 MG CR capsule    nexIUM    30 capsule    Take 1 capsule (40 mg) by mouth every morning (before breakfast)    Gastroesophageal reflux disease without esophagitis       FLUCONAZOLE PO      Take 200 mg by mouth daily        FLUDROCORTISONE ACETATE PO      Take 100 mcg by mouth daily        HYDROmorphone 2 MG tablet    DILAUDID    30 tablet    Take 0.5 tablets (1 mg) by mouth every 3 hours as needed for moderate to severe pain    Multiple myeloma in relapse (H), Acute bilateral low back pain without sciatica       lidocaine 5 % Patch    LIDODERM    30 patch    Place 3 patches onto the skin every 24 hours    Multiple myeloma in relapse (H), Acute bilateral low back pain without sciatica       LORazepam 0.5 MG tablet    ATIVAN    30 tablet    Take 1 tablet (0.5 mg) by mouth every 6 hours as needed for nausea or anxiety.    Multiple myeloma in relapse (H), Delirium       methylphenidate 5 MG tablet    RITALIN    15 tablet    Take 1 tablet (5 mg) by mouth 2 times daily On hold    Other fatigue       midodrine 2.5 MG tablet    PROAMATINE    90 tablet    Take 1 tablet (2.5 mg) by mouth 3 times daily . HOLD due to elevated blood pressures during admission. Continue to hold until further advised by outpatient clinic team or if develops recurrent orthostatic hypotension.    Orthostatic hypotension, Orthostatic syncope       mupirocin 2 % ointment    BACTROBAN     Apply topically daily as needed        OLANZapine 5 MG tablet    zyPREXA    60 tablet     Take 1 tablet (5 mg) by mouth 2 times daily    Delirium, Multiple myeloma in relapse (H)       ondansetron 8 MG ODT tab    ZOFRAN-ODT    30 tablet    Take 1 tablet (8 mg) by mouth every 8 hours as needed for nausea    Nausea       potassium chloride 20 MEQ Packet    KLOR-CON     Take 20 mEq by mouth 2 times daily        potassium chloride SA 20 MEQ CR tablet    K-DUR/KLOR-CON M    60 tablet    TAKE 1 TABLET BY MOUTH TWICE DAILY.    Hypokalemia       PROCHLORPERAZINE MALEATE PO      Take 10 mg by mouth every 6 hours as needed for nausea or vomiting        simethicone 80 MG chewable tablet    MYLICON    180 tablet    Take 2 tablets (160 mg) by mouth 4 times daily as needed for cramping or other (gas pain)    Nausea       simvastatin 5 MG tablet    ZOCOR    30 tablet    Take 4 tablets (20 mg) by mouth daily    Mixed hyperlipidemia       sucralfate 1 GM tablet    CARAFATE    120 tablet    Take 1 tablet (1 g) by mouth 4 times daily as needed    Gastroesophageal reflux disease without esophagitis       traMADol 50 MG tablet    ULTRAM    60 tablet    Take 0.5-1 tablets (25-50 mg) by mouth every 6 hours as needed for moderate pain . Recommend trying after Tylenol and before Dilaudid.    Acute bilateral low back pain without sciatica, Multiple myeloma in relapse (H)       * Notice:  This list has 2 medication(s) that are the same as other medications prescribed for you. Read the directions carefully, and ask your doctor or other care provider to review them with you.

## 2017-07-26 NOTE — PATIENT INSTRUCTIONS
Contact Numbers    [facility] [place]: [phone]  [facility] [place]:  [phone]    Call triage with chills and/or temperature greater than or equal to 100.5, uncontrolled nausea/vomiting, diarrhea, constipation, dizziness, shortness of breath, chest pain, bleeding, unexplained bruising, or any new/concerning symptoms, questions/concerns.     If you are having any concerning symptoms or wish to speak to a provider before your next infusion visit, please call your care coordinator or triage to notify them so we can adequately serve you.       After Hours: [phone]    If after hours, weekends, or holidays, call main hospital  and ask for Oncology doctor on call.         July 2017 Sunday Monday Tuesday Wednesday Thursday Friday Saturday                                 1       2     3     4     5     6     7     8       9     10     11     [facility] DRAW    2:45 PM   (15 min.)    [facility]   [facility]     [facility] RETURN    3:05 PM   (50 min.)   Leanne Reddy PA-C   [facility]    4:00 PM   (60 min.)    ONCOLOGY INFUSION   [facility] 12     UMP ONC INFUSION 60   11:00 AM   (60 min.)    ONCOLOGY INFUSION   [facility]    3:30 PM   (15 min.)   [facility] LAB DRAW   [facility] Lab Draw 13     14     15       16     17     18     [facility] LAB DRAW   11:15 AM   (15 min.)    M[facility]IC LAB DRAW   [facility] Masonic Lab Draw     UMP RETURN   11:55 AM   (50 min.)   Lashay Romero PA-C   [facility]     UMP ONC INFUSION 60    3:00 PM   (60 min.)   UC ONCOLOGY INFUSION   [facility] 19     UMP ONC INFUSION 60    3:00 PM   (60 min.)   UC ONCOLOGY INFUSION   [facility] 20     21     22       23     24     25     [facility] LAB DRAW   11:15 AM   (15 min.)    [facility]   [facility]     [facility] RETURN   11:35 AM   (50 min.)   Leanne Reddy PA-C   [facility]     UMP ONC INFUSION 60    1:00 PM   (60 min.)    ONCOLOGY INFUSION   [facility] 26     UMP ONC INFUSION 60   12:00 PM   (60 min.)   UC ONCOLOGY INFUSION   [facility] 27     PHACOEMULSIFICATION CLEAR CORNEA WITH STANDARD INTRAOCULAR LENS IMPLANT   12:30 PM   Tesfaye Yusuf MD    EC 28     [facility] LAB DRAW    1:00 PM   (15 min.)    [facility]IC LAB DRAW   [facility] Lab Draw     UMP ONC RETURN    1:30 PM   (30 min.)    BMT DOM   [facility] Blood and Marrow Transplant 29       30     31     UMP RETURN ARRHYTHMIA    5:45 PM   (20 min.)   Dmitri Banks MD   [facility]                                     August 2017 Sunday Monday Tuesday Wednesday Thursday Friday Saturday             1     2     3     PHACOEMULSIFICATION CLEAR CORNEA WITH STANDARD INTRAOCULAR LENS IMPLANT    1:30 PM   Tesfaye Yusuf MD    EC 4     5       6     7     8     9     [facility] LAB DRAW    3:30 PM   (15 min.)    MASONIC LAB DRAW   [facility] Lab Draw     UMP ONC INFUSION 60    4:00 PM   (60 min.)    ONCOLOGY INFUSION   [facility]     UMP ONC RETURN    5:00 PM   (30 min.)   Kamari Topete MD   [facility] Blood and Marrow Transplant 10     UMP ONC INFUSION 60   12:00 PM   (60 min.)    ONCOLOGY INFUSION   [facility] 11     12       13     14     15     16     17     [facility] LAB DRAW   12:30 PM   (15 min.)    MASONIC LAB DRAW   [facility] Lab Draw     UMP ONC INFUSION 60    1:00 PM   (60 min.)    ONCOLOGY INFUSION   [facility] 18     UMP ONC INFUSION 60    1:00 PM   (60 min.)    ONCOLOGY INFUSION   [facility] 19       20     21     22     23     24     [facility] LAB DRAW   12:30 PM   (15 min.)   [facility]      [facility] ONC INFUSION 60    1:00 PM   (60 min.)    ONCOLOGY INFUSION   [facility] 25     [facility] ONC INFUSION 60    1:00 PM   (60 min.)    ONCOLOGY INFUSION   [facility] 26       27     28     29     30     31                            Lab Results:  No results found for this or any previous visit (from the past 12 hour(s)).

## 2017-07-27 NOTE — DISCHARGE INSTRUCTIONS
POST-OPERATIVE CARE FOLLOWING CATARACT SURGERY    DR. NEELAM HONEYCUTT  [facility]  [phone]    Postoperative medications: After surgery, you will use several different eye drop medications. You may have either the brand specific form or generic of each type used.     Begin your eye drops today as directed.  You should instill the drop, close the eye without blinking and keep closed for 3 minutes allowing the drop to absorb.  It is fine to instill all 3 of the drops consecutively, waiting the 3 minutes in between each one. Place the shield for sleep.  In the morning, instill 1 drop of all 3 eye drops before your post-op appointment.      Antibiotic  Moxeza - is an antibiotic drop that is used to minimize the risk of infection. Instill your first drop at bedtime tonight, then 2 times daily for one week.    Anti-inflammatory   Prolesna - use it once daily until gone, beginning today.    Steroid  Durezol - is a steroid drop used to minimize inflammation and modulate healing.  It should be used 2 times daily until gone, beginning with your first drop at bedtime tonight.        Do not rub the operated eye. Wear the eye shield during sleeping hours for one week.      Light sensitivity may be noticed. Sunglasses may be worn for comfort.      Some discomfort and irritation may be noticed. Acetaminophen (Tylenol) or Ibuprofen (Advil) may be taken for discomfort.      Avoid bending over, strenuous activity or heavy lifting for one week.      Keep the operated eye dry.      You may wash your hair, bathe or shower, but keep the operated eye closed while doing so.      Use medication exactly as prescribed by your doctor.  You may restart your regular home medications.      Bring all materials and medications to the clinic on your first post-operative visit.      Call the doctor s office if any of the following should occur:  -  any sudden vision change  -  nausea or severe headache  -  increase in pain not  controlled  -  increased amount of floaters (black spots in front of vision)         -  or signs of infection (pus, increasing redness or tenderness)            Revised 12/10/2015      [facility] Discharge  Instructions  Anesthesia (Eye Care Center)   Adult Discharge Instructions    For 24 hours after surgery    1. Get plenty of rest.  Make arrangements to have a responsible adult stay with you for at least 6 hours after you leave the hospital.  2. Do not drive or use heavy equipment for 24 hours.    3. Do not drink alcohol for 24 hours.  4. Do not sign legal documents or make important decisions for 24 hours.  5. Avoid strenuous or risky activities. You may feel lightheaded.  If so, sit for a few minutes before standing.  Have someone help you get up.   6. Conscious sedation patients may resume a regular diet..  7. Any questions of medical nature, call your physician.

## 2017-07-27 NOTE — ANESTHESIA PREPROCEDURE EVALUATION
Anesthesia Evaluation     . Pt has had prior anesthetic.     No history of anesthetic complications          ROS/MED HX    ENT/Pulmonary:     (+), resolved may : . .   (-) recent URI   Neurologic:     (+)CVA TIA     Cardiovascular: Comment: occ palpitations    (+) hypertension--CAD, --. : . . fainting (syncope) (orthostatic hypotension, on meds). :. .       METS/Exercise Tolerance:     Hematologic:         Musculoskeletal:         GI/Hepatic:     (+) GERD Asymptomatic on medication,       Renal/Genitourinary:         Endo:         Psychiatric:         Infectious Disease:         Malignancy:   (+) Malignancy (multipl;e myeloma)           Other:                     Physical Exam  Normal systems: dental    Airway   Mallampati: II  TM distance: >3 FB  Neck ROM: full    Dental     Cardiovascular   Rhythm and rate: regular      Pulmonary    breath sounds clear to auscultation                    Anesthesia Plan      History & Physical Review  History and physical reviewed and following examination; no interval change.    ASA Status:  3 .        Plan for MAC with Intravenous induction. Reason for MAC:  Procedure to face, neck, head or breast    Can go at 2:00 PM      Postoperative Care      Consents  Anesthetic plan, risks, benefits and alternatives discussed with:  Patient..                          .

## 2017-07-27 NOTE — ANESTHESIA POSTPROCEDURE EVALUATION
Patient: Anthony Carbone    Procedure(s):  LEFT EYE PHACOEMULSIFICATION CLEAR CORNEA WITH STANDARD INTRAOCULAR LENS IMPLANT  - Wound Class: I-Clean    Diagnosis:CATARACT  Diagnosis Additional Information: No value filed.    Anesthesia Type:  MAC    Note:  Anesthesia Post Evaluation    Patient location during evaluation: PACU  Patient participation: Able to fully participate in evaluation  Level of consciousness: awake  Pain management: adequate  Airway patency: patent  Cardiovascular status: acceptable  Respiratory status: acceptable  Hydration status: acceptable  PONV: none     Anesthetic complications: None          Last vitals:  Vitals:    07/27/17 1122 07/27/17 1448   BP: (!) 163/101 (!) (P) 158/96   Resp: 18 (P) 16   Temp: 36.4  C (97.5  F)    SpO2: 97%          Electronically Signed By: Rhea Flores  July 27, 2017  2:59 PM

## 2017-07-27 NOTE — IP AVS SNAPSHOT
MRN:[id number]                      After Visit Summary   7/27/2017    Anthony Carbone    MRN: [id number]           Thank you!     Thank you for choosing [facility] for your care. Our goal is always to provide you with excellent care. Hearing back from our patients is one way we can continue to improve our services. Please take a few minutes to complete the written survey that you may receive in the mail after you visit with us. Thank you!        Patient Information     Date Of Birth          1945        About your hospital stay     You were admitted on:  July 27, 2017 You last received care in the:  [facility]    You were discharged on:  July 27, 2017       Who to Call     For medical emergencies, please call 911.  For non-urgent questions about your medical care, please call your primary care provider or clinic, [phone]  For questions related to your surgery, please call your surgery clinic        Attending Provider     Provider Specialty    Tesfaye Yusuf MD Surgery       Primary Care Provider Office Phone # Fax #    Sanket Burciaga [phone]      Your next 10 appointments already scheduled     Jul 28, 2017  1:00 PM CDT   [facility] Lab Draw with  [facility] LAB DRAW   [facility] Lab Draw ([facility] [place])    [address]  [address]nd [facility] [id number] [phone]            Jul 28, 2017  1:30 PM CDT   RETURN ONC with  BMT DOM   [facility] Blood and Marrow Transplant ([facility] [place])    [address]  [address] [facility] [id number] [phone]            Jul 31, 2017  6:00 PM CDT   (Arrive by 5:45 PM)   RETURN ARRHYTHMIA with Dmitri Banks MD   [facility] Heart Care ([facility])    [address]  3rd Floor  [facility] [address] [phone]            Aug 03, 2017   Procedure with Tesfaye Yusuf MD   [facility] PeriOP Services (--)    [address]  [facility] [address]   [phone]            Aug 09, 2017  3:30 PM CDT   [facility] Lab Draw with UC [facility] LAB DRAW   [facility] Lab Draw ([facility])    [address] [address]   [phone]            Aug 09, 2017  4:00 PM CDT   Infusion 60 with UC ONCOLOGY INFUSION, UC 12 ATC   [facility] ([facility])    [address] [address]   [phone]            Aug 09, 2017  5:00 PM CDT   RETURN ONC with Kamair Topete MD   [facility] Blood and Marrow Transplant ([facility])    [address] [place] [id number] [phone]            Aug 10, 2017 12:00 PM CDT   Infusion 60 with UC ONCOLOGY INFUSION, UC 28 ATC   [facility] ([facility])    [address] [address] [phone]              Further instructions from your care team           POST-OPERATIVE CARE FOLLOWING CATARACT SURGERY    DR. NEELAM HONEYCUTT  [facility]  [phone]    Postoperative medications: After surgery, you will use several different eye drop medications. You may have either the brand specific form or generic of each type used.     Begin your eye drops today as directed.  You should instill the drop, close the eye without blinking and keep closed for 3 minutes allowing the drop to absorb.  It is fine to instill all 3 of the drops consecutively, waiting the 3 minutes in between each one. Place the shield for sleep.  In the morning, instill 1 drop of all 3 eye drops before your post-op appointment.      Antibiotic  Moxeza - is an antibiotic drop that is used to minimize the risk of infection. Instill your first drop at bedtime tonight, then 2 times daily for one week.    Anti-inflammatory   Prolesna - use  it once daily until gone, beginning today.    Steroid  Durezol - is a steroid drop used to minimize inflammation and modulate healing.  It should be used 2 times daily until gone, beginning with your first drop at bedtime tonight.        Do not rub the operated eye. Wear the eye shield during sleeping hours for one week.      Light sensitivity may be noticed. Sunglasses may be worn for comfort.      Some discomfort and irritation may be noticed. Acetaminophen (Tylenol) or Ibuprofen (Advil) may be taken for discomfort.      Avoid bending over, strenuous activity or heavy lifting for one week.      Keep the operated eye dry.      You may wash your hair, bathe or shower, but keep the operated eye closed while doing so.      Use medication exactly as prescribed by your doctor.  You may restart your regular home medications.      Bring all materials and medications to the clinic on your first post-operative visit.      Call the doctor s office if any of the following should occur:  -  any sudden vision change  -  nausea or severe headache  -  increase in pain not controlled  -  increased amount of floaters (black spots in front of vision)         -  or signs of infection (pus, increasing redness or tenderness)            Revised 12/10/2015      [facility] Discharge  Instructions  Anesthesia (Eye Care Center)   Adult Discharge Instructions    For 24 hours after surgery    1. Get plenty of rest.  Make arrangements to have a responsible adult stay with you for at least 6 hours after you leave the hospital.  2. Do not drive or use heavy equipment for 24 hours.    3. Do not drink alcohol for 24 hours.  4. Do not sign legal documents or make important decisions for 24 hours.  5. Avoid strenuous or risky activities. You may feel lightheaded.  If so, sit for a few minutes before standing.  Have someone help you get up.   6. Conscious sedation patients may resume a regular diet..  7. Any  "questions of medical nature, call your physician.    Pending Results     No orders found from 7/25/2017 to 7/28/2017.            Admission Information     Date & Time Provider Department Dept. Phone    7/27/2017 Tesfaye Yusuf MD [facility] [phone]      Your Vitals Were     Blood Pressure Temperature Respirations Height Weight Pulse Oximetry    163/101 97.5  F (36.4  C) (Temporal) 18 0.628 m (2' 0.71\") 56 kg (123 lb 6.4 oz) 97%    BMI (Body Mass Index)                   142.15 kg/m2           MyChart Information     [facility] gives you secure access to your electronic health record. If you see a primary care provider, you can also send messages to your care team and make appointments. If you have questions, please call your primary care clinic.  If you do not have a primary care provider, please call [phone] and they will assist you.        Care EveryWhere ID     This is your Care EveryWhere ID. This could be used by other organizations to access your [facility] medical records  [id number]        Equal Access to Services     ARMANDO SAHH : Hadii chalo Davis, waaxda luqadaha, qaybta kaallakshmi abdi, jonny villa. So [facility] [phone].    ATENCIÓN: Si habla español, tiene a todd disposición servicios gratuitos de asistencia lingüística. Parminder al [phone].    We comply with applicable federal civil rights laws and [place] laws. We do not discriminate on the basis of race, color, national origin, age, disability sex, sexual orientation or gender identity.               Review of your medicines      UNREVIEWED medicines. Ask your doctor about these medicines        Dose / Directions    acetaminophen 500 MG tablet   Commonly known as:  TYLENOL   Used for:  Acute bilateral low back pain without sciatica, Multiple myeloma in relapse (H)        Dose:  1000 mg   Take 2 tablets (1,000 mg) by mouth every 6 hours as needed for mild pain   Refills:  0 "       acyclovir 400 MG tablet   Commonly known as:  ZOVIRAX   Indication:  Unsure of dose   Used for:  Multiple myeloma in relapse (H)        Dose:  400 mg   Take 1 tablet (400 mg) by mouth 2 times daily   Quantity:  60 tablet   Refills:  0       amLODIPine 5 MG tablet   Commonly known as:  NORVASC   Used for:  Essential hypertension        Take one tablet at bedtime.   Quantity:  30 tablet   Refills:  0       amoxicillin-clavulanate 875-125 MG per tablet   Commonly known as:  AUGMENTIN   Used for:  Urinary tract infection, site unspecified        Dose:  1 tablet   Take 1 tablet by mouth 2 times daily   Quantity:  14 tablet   Refills:  0       artificial saliva Liqd liquid   Used for:  Dry mouth        Dose:  3-5 mL   Swish and spit 3-5 mLs in mouth 4 times daily as needed for dry mouth   Refills:  0       cefdinir 300 MG capsule   Commonly known as:  OMNICEF   Used for:  Urinary tract infection        Dose:  300 mg   Take 1 capsule (300 mg) by mouth 2 times daily for 7 days   Quantity:  14 capsule   Refills:  0       * clopidogrel 75 MG tablet   Commonly known as:  PLAVIX   Used for:  History of stroke        Dose:  75 mg   Take 1 tablet (75 mg) by mouth daily   Quantity:  30 tablet   Refills:  0       * clopidogrel 75 MG tablet   Commonly known as:  PLAVIX   Used for:  History of stroke        TAKE 1 TABLET BY MOUTH EVERY DAY   Quantity:  90 tablet   Refills:  0       dexamethasone 4 MG tablet   Commonly known as:  DECADRON   Used for:  Multiple myeloma not having achieved remission (H)        Take 20 mg days 1,2, 8, 9, 15, 16 on days of kyprolis   Quantity:  30 tablet   Refills:  3       DOCUSATE SODIUM PO        Dose:  100 mg   Take 100 mg by mouth 2 times daily as needed for constipation   Refills:  0       esomeprazole 40 MG CR capsule   Commonly known as:  nexIUM   Used for:  Gastroesophageal reflux disease without esophagitis        Dose:  40 mg   Take 1 capsule (40 mg) by mouth every morning (before  breakfast)   Quantity:  30 capsule   Refills:  0       FLUCONAZOLE PO        Dose:  200 mg   Take 200 mg by mouth daily   Refills:  0       FLUDROCORTISONE ACETATE PO        Dose:  100 mcg   Take 100 mcg by mouth daily   Refills:  0       HYDROmorphone 2 MG tablet   Commonly known as:  DILAUDID   Used for:  Multiple myeloma in relapse (H), Acute bilateral low back pain without sciatica        Dose:  1 mg   Take 0.5 tablets (1 mg) by mouth every 3 hours as needed for moderate to severe pain   Quantity:  30 tablet   Refills:  0       lidocaine 5 % Patch   Commonly known as:  LIDODERM   Used for:  Multiple myeloma in relapse (H), Acute bilateral low back pain without sciatica        Dose:  3 patch   Place 3 patches onto the skin every 24 hours   Quantity:  30 patch   Refills:  0       LORazepam 0.5 MG tablet   Commonly known as:  ATIVAN   Used for:  Multiple myeloma in relapse (H), Delirium        Dose:  0.5 mg   Take 1 tablet (0.5 mg) by mouth every 6 hours as needed for nausea or anxiety.   Quantity:  30 tablet   Refills:  0       methylphenidate 5 MG tablet   Commonly known as:  RITALIN   Used for:  Other fatigue        Dose:  5 mg   Take 1 tablet (5 mg) by mouth 2 times daily On hold   Quantity:  15 tablet   Refills:  0       midodrine 2.5 MG tablet   Commonly known as:  PROAMATINE   Used for:  Orthostatic hypotension, Orthostatic syncope        Dose:  2.5 mg   Take 1 tablet (2.5 mg) by mouth 3 times daily . HOLD due to elevated blood pressures during admission. Continue to hold until further advised by outpatient clinic team or if develops recurrent orthostatic hypotension.   Quantity:  90 tablet   Refills:  3       mupirocin 2 % ointment   Commonly known as:  BACTROBAN        Apply topically daily as needed   Refills:  0       OLANZapine 5 MG tablet   Commonly known as:  zyPREXA   Used for:  Delirium, Multiple myeloma in relapse (H)        Dose:  5 mg   Take 1 tablet (5 mg) by mouth 2 times daily   Quantity:  60  tablet   Refills:  0       ondansetron 8 MG ODT tab   Commonly known as:  ZOFRAN-ODT   Used for:  Nausea        Dose:  8 mg   Take 1 tablet (8 mg) by mouth every 8 hours as needed for nausea   Quantity:  30 tablet   Refills:  3       potassium chloride 20 MEQ Packet   Commonly known as:  KLOR-CON        Dose:  20 mEq   Take 20 mEq by mouth 2 times daily   Refills:  0       potassium chloride SA 20 MEQ CR tablet   Commonly known as:  K-DUR/KLOR-CON M   Used for:  Hypokalemia        TAKE 1 TABLET BY MOUTH TWICE DAILY.   Quantity:  60 tablet   Refills:  0       PROCHLORPERAZINE MALEATE PO        Dose:  10 mg   Take 10 mg by mouth every 6 hours as needed for nausea or vomiting   Refills:  0       simethicone 80 MG chewable tablet   Commonly known as:  MYLICON   Used for:  Nausea        Dose:  160 mg   Take 2 tablets (160 mg) by mouth 4 times daily as needed for cramping or other (gas pain)   Quantity:  180 tablet   Refills:  0       simvastatin 5 MG tablet   Commonly known as:  ZOCOR   Used for:  Mixed hyperlipidemia        Dose:  20 mg   Take 4 tablets (20 mg) by mouth daily   Quantity:  30 tablet   Refills:  0       sucralfate 1 GM tablet   Commonly known as:  CARAFATE   Used for:  Gastroesophageal reflux disease without esophagitis        Dose:  1 g   Take 1 tablet (1 g) by mouth 4 times daily as needed   Quantity:  120 tablet   Refills:  1       traMADol 50 MG tablet   Commonly known as:  ULTRAM   Used for:  Acute bilateral low back pain without sciatica, Multiple myeloma in relapse (H)        Dose:  25-50 mg   Take 0.5-1 tablets (25-50 mg) by mouth every 6 hours as needed for moderate pain . Recommend trying after Tylenol and before Dilaudid.   Quantity:  60 tablet   Refills:  3       * Notice:  This list has 2 medication(s) that are the same as other medications prescribed for you. Read the directions carefully, and ask your doctor or other care provider to review them with you.             Protect others around  you: Learn how to safely use, store and throw away your medicines at www.disposemymeds.org.             Medication List: This is a list of all your medications and when to take them. Check marks below indicate your daily home schedule. Keep this list as a reference.      Medications           Morning Afternoon Evening Bedtime As Needed    acetaminophen 500 MG tablet   Commonly known as:  TYLENOL   Take 2 tablets (1,000 mg) by mouth every 6 hours as needed for mild pain                                acyclovir 400 MG tablet   Commonly known as:  ZOVIRAX   Take 1 tablet (400 mg) by mouth 2 times daily                                amLODIPine 5 MG tablet   Commonly known as:  NORVASC   Take one tablet at bedtime.                                amoxicillin-clavulanate 875-125 MG per tablet   Commonly known as:  AUGMENTIN   Take 1 tablet by mouth 2 times daily                                artificial saliva Liqd liquid   Swish and spit 3-5 mLs in mouth 4 times daily as needed for dry mouth                                cefdinir 300 MG capsule   Commonly known as:  OMNICEF   Take 1 capsule (300 mg) by mouth 2 times daily for 7 days                                * clopidogrel 75 MG tablet   Commonly known as:  PLAVIX   Take 1 tablet (75 mg) by mouth daily                                * clopidogrel 75 MG tablet   Commonly known as:  PLAVIX   TAKE 1 TABLET BY MOUTH EVERY DAY                                dexamethasone 4 MG tablet   Commonly known as:  DECADRON   Take 20 mg days 1,2, 8, 9, 15, 16 on days of kyprolis                                DOCUSATE SODIUM PO   Take 100 mg by mouth 2 times daily as needed for constipation                                esomeprazole 40 MG CR capsule   Commonly known as:  nexIUM   Take 1 capsule (40 mg) by mouth every morning (before breakfast)                                FLUCONAZOLE PO   Take 200 mg by mouth daily                                FLUDROCORTISONE ACETATE PO   Take  100 mcg by mouth daily                                HYDROmorphone 2 MG tablet   Commonly known as:  DILAUDID   Take 0.5 tablets (1 mg) by mouth every 3 hours as needed for moderate to severe pain                                lidocaine 5 % Patch   Commonly known as:  LIDODERM   Place 3 patches onto the skin every 24 hours                                LORazepam 0.5 MG tablet   Commonly known as:  ATIVAN   Take 1 tablet (0.5 mg) by mouth every 6 hours as needed for nausea or anxiety.                                methylphenidate 5 MG tablet   Commonly known as:  RITALIN   Take 1 tablet (5 mg) by mouth 2 times daily On hold                                midodrine 2.5 MG tablet   Commonly known as:  PROAMATINE   Take 1 tablet (2.5 mg) by mouth 3 times daily . HOLD due to elevated blood pressures during admission. Continue to hold until further advised by outpatient clinic team or if develops recurrent orthostatic hypotension.                                mupirocin 2 % ointment   Commonly known as:  BACTROBAN   Apply topically daily as needed                                OLANZapine 5 MG tablet   Commonly known as:  zyPREXA   Take 1 tablet (5 mg) by mouth 2 times daily                                ondansetron 8 MG ODT tab   Commonly known as:  ZOFRAN-ODT   Take 1 tablet (8 mg) by mouth every 8 hours as needed for nausea                                potassium chloride 20 MEQ Packet   Commonly known as:  KLOR-CON   Take 20 mEq by mouth 2 times daily                                potassium chloride SA 20 MEQ CR tablet   Commonly known as:  K-DUR/KLOR-CON M   TAKE 1 TABLET BY MOUTH TWICE DAILY.                                PROCHLORPERAZINE MALEATE PO   Take 10 mg by mouth every 6 hours as needed for nausea or vomiting                                simethicone 80 MG chewable tablet   Commonly known as:  MYLICON   Take 2 tablets (160 mg) by mouth 4 times daily as needed for cramping or other (gas pain)                                 simvastatin 5 MG tablet   Commonly known as:  ZOCOR   Take 4 tablets (20 mg) by mouth daily                                sucralfate 1 GM tablet   Commonly known as:  CARAFATE   Take 1 tablet (1 g) by mouth 4 times daily as needed                                traMADol 50 MG tablet   Commonly known as:  ULTRAM   Take 0.5-1 tablets (25-50 mg) by mouth every 6 hours as needed for moderate pain . Recommend trying after Tylenol and before Dilaudid.                                * Notice:  This list has 2 medication(s) that are the same as other medications prescribed for you. Read the directions carefully, and ask your doctor or other care provider to review them with you.

## 2017-07-27 NOTE — OP NOTE
[facility]  Ophthalmology Operative Note  PRE-OPERATIVE DIAGNOSIS: Cataract of the Left eye.   POST-OPERATIVE DIAGNOSIS: Pseudophakia of the Left eye.   OPERATION: Phacoemulsification with posterior chamber intraocular lens Left eye.   SURGEON: NEELAM HONEYCUTT MD  ANESTHESIA: Combined MAC with Topical  ESTIMATED BLOOD LOSS: Less than 1 cc   COMPLICATIONS: None.   INDICATIONS FOR OPERATION: Prior to the procedure being undertaken, risks, benefits and alternatives were discussed fully with Anthony Carbone. All of the patient's questions were answered to his satisfaction and he agreed to go through with the surgery. His best corrected visual acuity in the Left eye prior to surgery was approximately 20/100.   OPERATIVE PROCEDURE: The patient was given dilation and anesthetic jelly to the Left eye in the preoperative holding area. The patient was then brought into the operating suite and the left eye was prepped and draped in the usual sterile manner. A paracentesis tract was made inferotemporally and the anterior chamber was filled with methylparaben free lidocaine. Next, the anterior chamber was filled with Viscoat.   A clear corneal incision was made temporally using a 2.4 mm slit knife and a continuous tear capsulorrhexis was performed. The nucleus was hydrodissected and removed with phacoemulsification. The remaining cortex was removed with irrigation and aspiration. The posterior capsule was polished.   Next, the capsular bag was filled with Provisc and an intraocular lens was injected into the capsular bag. The lens centered nicely. The viscoelastic was then removed with irrigation and aspiration. The anterior chamber was filled with BSS. The wounds were checked and found to be water tight. The eye was palpated and felt to be of normal eye pressure. One drop each of Durezol, Moxeza, and Prolensa were instilled into the left eye. The eye was shielded and the patient left the operating  room in good condition.       Implant Name Type Inv. Item Serial No.  Lot No. LRB No. Used   EYE IMP IOL RENATA PCL TECNIS [id number] 22.0 Lens/Eye Implant EYE IMP IOL RENATA PCL TECNIS ZCB00 22.0 [id number] [employer]   Left 1       Tesfaye Yusuf MD

## 2017-07-28 NOTE — NURSING NOTE
"Chief Complaint   Patient presents with     RECHECK     Multiple myeloma      Port Draw     port accessed and labs drawn by rn.  vs taken.      Port accessed with 20g 3/4\" gripper needle, labs drawn, port flushed with saline and heparin, port de-accessed.  vitals checked, pt checked in for next appointment.  Sujata Mosquera RN    "

## 2017-07-28 NOTE — PROGRESS NOTES
Infusion Nursing Note:  Anthony Carbone presents today for IVF.    Patient seen by provider today: Yes: Dr. Topete   present during visit today: Not Applicable.    Note: Pt given 1L NS.  Tolerated well.    Intravenous Access:  Implanted Port.    Treatment Conditions:  Not Applicable.      Post Infusion Assessment:  Patient tolerated infusion without incident.  Blood return noted pre and post infusion.  Site patent and intact, free from redness, edema or discomfort.  No evidence of extravasations.    Discharge Plan:   Discharge instructions reviewed with: Patient and Family.  Patient and/or family verbalized understanding of discharge instructions and all questions answered.    Colleen Up RN

## 2017-07-28 NOTE — MR AVS SNAPSHOT
After Visit Summary   7/28/2017    Anthony Carbone    MRN: [id number]           Patient Information     Date Of Birth          1945        Visit Information        Provider Department      7/28/2017 2:00 PM UC 7 ATC; UC BMT INFUSION [facility] Blood and Marrow Transplant        Today's Diagnoses     Multiple myeloma in relapse (H)    -  1          [facility] ([facility])  [address] [place] [address]  Phone: [phone]  Clinic Hours:   Monday-Thursday:7am to 7pm   Friday: 7am to 5pm   Weekends and holidays:    8am to noon (in general)  If your fever is 100.5  or greater,   call the clinic.  After hours call the   hospital at [phone] or   [phone]. Ask for the BMT   fellow on-call            Follow-ups after your visit        Your next 10 appointments already scheduled     Jul 31, 2017  6:00 PM CDT   (Arrive by 5:45 PM)   RETURN ARRHYTHMIA with Dmitri Banks MD   [facility] ([facility])    [address] [place] [address]  [address] [facility] [id number] [phone]            Aug 03, 2017   Procedure with Tesfaye Yusuf MD   [facility] (--)    [address]  [facility] [address]   [phone]            Aug 09, 2017  3:30 PM CDT   [facility] Lab Draw with UC [facility] LAB DRAW   [facility] Draw ([facility])    [address] [place] [address]  [address] [facility] [id number] [phone]            Aug 09, 2017  4:00 PM CDT   Infusion 60 with UC ONCOLOGY INFUSION, UC 12 ATC   [facility] ([facility])    [address] [place] [id number] [phone]            Aug 09, 2017  5:00 PM CDT   RETURN ONC with Kamari Topete MD   [facility] Marrow Transplant ([facility])    [address] [place] [id number] [phone]             Aug 10, 2017 12:00 PM CDT   Infusion 60 with UC ONCOLOGY INFUSION, UC 28 ATC   [facility] ([facility])    [address]  2nd [address]  [facility] [address] [phone]            Aug 17, 2017 12:30 PM CDT   [facility] Lab Draw with UC [facility] LAB DRAW   [facility]    [address]  2nd [facility] [id number] [phone]            Aug 17, 2017  1:00 PM CDT   Infusion 60 with UC ONCOLOGY INFUSION   [facility] ([facility])    [address]  2nd [facility] [address] [phone]              Who to contact     If you have questions or need follow up information about today's clinic visit or your schedule please contact [facility]PL[facility] directly at [phone].  Normal or non-critical lab and imaging results will be communicated to you by sofatutorhart, letter or phone within 4 business days after the clinic has received the results. If you do not hear from us within 7 days, please contact the clinic through [employer] or phone. If you have a critical or abnormal lab result, we will notify you by phone as soon as possible.  Submit refill requests through [employer] or call your pharmacy and they will forward the refill request to us. Please allow 3 business days for your refill to be completed.          Additional Information About Your Visit        sofatutorharPimovation Information     [facility] [employer] gives you secure access to your electronic health record. If you see a primary care provider, you can also send messages to your care team and make appointments. If you have questions, please call your primary care clinic.  If you do not have a primary care provider, please call [phone] and they will assist you.        Care EveryWhere ID     This is your Care EveryWhere ID. This could be used by other organizations to access your  [facility] medical records  [id number]         Blood Pressure from Last 3 Encounters:   07/28/17 103/67   07/27/17 (!) 153/92   07/26/17 147/89    Weight from Last 3 Encounters:   07/28/17 56.2 kg (124 lb)   07/26/17 56 kg (123 lb 6.4 oz)   07/26/17 57.7 kg (127 lb 1.6 oz)              Today, you had the following     No orders found for display         Today's Medication Changes          These changes are accurate as of: 7/28/17  2:53 PM.  If you have any questions, ask your nurse or doctor.               Start taking these medicines.        Dose/Directions    venetoclax 100 MG tablet CHEMOTHERAPY   Commonly known as:  VENCLEXTA   Used for:  Multiple myeloma in relapse (H)        Dose:  800 mg   Take 8 tablets (800 mg) by mouth daily   Quantity:  100 tablet   Refills:  3         These medicines have changed or have updated prescriptions.        Dose/Directions    traMADol 50 MG tablet   Commonly known as:  ULTRAM   This may have changed:    - how much to take  - additional instructions   Used for:  Acute bilateral low back pain without sciatica, Multiple myeloma in relapse (H)        Dose:  25-50 mg   Take 0.5-1 tablets (25-50 mg) by mouth every 6 hours as needed for moderate pain . Recommend trying after Tylenol and before Dilaudid.   Quantity:  60 tablet   Refills:  3            Where to get your medicines      These medications were sent to [facility] [address] [address]    Hours:  TRANSPLANT PHONE NUMBER [phone] Phone:  [phone]     venetoclax 100 MG tablet CHEMOTHERAPY                Recent Review Flowsheet Data     BMT Recent Results Latest Ref Rng & Units 6/13/2017 6/20/2017 6/27/2017 7/11/2017 7/18/2017 7/25/2017 7/28/2017    WBC 4.0 - 11.0 10e9/L 4.0 4.2 7.7 5.5 4.1 5.4 10.8    Hemoglobin 13.3 - 17.7 g/dL 9.5(L) 9.0(L) 9.4(L) 8.1(L) 11.5(L) 10.8(L) 10.3(L)    Platelet Count 150 - 450 10e9/L 134(L)  68(L) 54(L) 93(L) 50(L) 47(LL) 35(LL)    Platelets 150 - 450 10:9/L - - - - - - -    Neutrophils (Absolute) 1.6 - 8.3 10e9/L 3.4 3.9 6.6 4.8 3.2 4.2 10.2(H)    Blasts (Absolute) 10e9/L - - - - - - -    INR 0.86 - 1.14 - - - - - - -    Sodium 133 - 144 mmol/L 139 - - 135 137 136 136    Potassium 3.4 - 5.3 mmol/L 4.0 - - 4.4 4.7 4.9 4.4    Chloride 94 - 109 mmol/L 104 - - 102 104 105 105    Glucose 70 - 99 mg/dL 158(H) - - 130(H) 91 90 134(H)    Urea Nitrogen 7 - 30 mg/dL 26 - - 23 20 26 36(H)    Creatinine 0.66 - 1.25 mg/dL 0.88 - - 1.05 0.88 1.06 1.39(H)    Calcium (Total) 8.5 - 10.1 mg/dL 9.2 - - 9.2 9.4 9.5 8.8    Protein (Total) 6.8 - 8.8 g/dL 8.6 - - 8.5 8.9(H) 9.4(H) 9.1(H)    Albumin 3.4 - 5.0 g/dL 3.0(L) - - 3.0(L) 2.8(L) 2.8(L) 2.8(L)    Bilirubin (Direct) 0.0 - 0.2 mg/dL - - - - - - -    Alkaline Phosphatase 40 - 150 U/L 77 - - 56 53 46 43    AST 0 - 45 U/L 18 - - 9 6 9 9    ALT 0 - 70 U/L 20 - - 14 13 18 14    MCV 78 - 100 fl 102(H) 101(H) 102(H) 105(H) 99 101(H) 100               Primary Care Provider Office Phone # Fax #    Sanket Gualberto [phone]       [facility] [address] [address]        Equal Access to Services     ARMANDO SHAH AH: richy Peñada luqadaha, kylah abdi, jonny villa. So [facility] [phone].    ATENCIÓN: Si habla español, tiene a todd disposición servicios gratuitos de asistencia lingüística. Llame al [phone].    We comply with applicable federal civil rights laws and [place] laws. We do not discriminate on the basis of race, color, national origin, age, disability sex, sexual orientation or gender identity.            Thank you!     Thank you for choosing [facility]LOOD [facility]  for your care. Our goal is always to provide you with excellent care. Hearing back from our patients is one way we can continue to improve our services. Please take a few  minutes to complete the written survey that you may receive in the mail after your visit with us. Thank you!             Your Updated Medication List - Protect others around you: Learn how to safely use, store and throw away your medicines at www.disposemymeds.org.          This list is accurate as of: 7/28/17  2:53 PM.  Always use your most recent med list.                   Brand Name Dispense Instructions for use Diagnosis    acetaminophen 500 MG tablet    TYLENOL     Take 2 tablets (1,000 mg) by mouth every 6 hours as needed for mild pain    Acute bilateral low back pain without sciatica, Multiple myeloma in relapse (H)       acyclovir 400 MG tablet    ZOVIRAX    60 tablet    Take 1 tablet (400 mg) by mouth 2 times daily    Multiple myeloma in relapse (H)       amLODIPine 5 MG tablet    NORVASC    30 tablet    Take one tablet at bedtime.    Essential hypertension       amoxicillin-clavulanate 875-125 MG per tablet    AUGMENTIN    14 tablet    Take 1 tablet by mouth 2 times daily    Urinary tract infection, site unspecified       artificial saliva Liqd liquid      Swish and spit 3-5 mLs in mouth 4 times daily as needed for dry mouth    Dry mouth       * clopidogrel 75 MG tablet    PLAVIX    30 tablet    Take 1 tablet (75 mg) by mouth daily    History of stroke       * clopidogrel 75 MG tablet    PLAVIX    90 tablet    TAKE 1 TABLET BY MOUTH EVERY DAY    History of stroke       dexamethasone 4 MG tablet    DECADRON    30 tablet    Take 20 mg days 1,2, 8, 9, 15, 16 on days of kyprolis    Multiple myeloma not having achieved remission (H)       DOCUSATE SODIUM PO      Take 100 mg by mouth 2 times daily as needed for constipation        esomeprazole 40 MG CR capsule    nexIUM    30 capsule    Take 1 capsule (40 mg) by mouth every morning (before breakfast)    Gastroesophageal reflux disease without esophagitis       FLUCONAZOLE PO      Take 200 mg by mouth daily        FLUDROCORTISONE ACETATE PO      Take 100 mcg by  mouth daily        HYDROmorphone 2 MG tablet    DILAUDID    30 tablet    Take 0.5 tablets (1 mg) by mouth every 3 hours as needed for moderate to severe pain    Multiple myeloma in relapse (H), Acute bilateral low back pain without sciatica       lidocaine 5 % Patch    LIDODERM    30 patch    Place 3 patches onto the skin every 24 hours    Multiple myeloma in relapse (H), Acute bilateral low back pain without sciatica       LORazepam 0.5 MG tablet    ATIVAN    30 tablet    Take 1 tablet (0.5 mg) by mouth every 6 hours as needed for nausea or anxiety.    Multiple myeloma in relapse (H), Delirium       methylphenidate 5 MG tablet    RITALIN    15 tablet    Take 1 tablet (5 mg) by mouth 2 times daily On hold    Other fatigue       midodrine 2.5 MG tablet    PROAMATINE    90 tablet    Take 1 tablet (2.5 mg) by mouth 3 times daily . HOLD due to elevated blood pressures during admission. Continue to hold until further advised by outpatient clinic team or if develops recurrent orthostatic hypotension.    Orthostatic hypotension, Orthostatic syncope       mupirocin 2 % ointment    BACTROBAN     Apply topically daily as needed        OLANZapine 5 MG tablet    zyPREXA    60 tablet    Take 1 tablet (5 mg) by mouth 2 times daily    Delirium, Multiple myeloma in relapse (H)       ondansetron 8 MG ODT tab    ZOFRAN-ODT    30 tablet    Take 1 tablet (8 mg) by mouth every 8 hours as needed for nausea    Nausea       potassium chloride 20 MEQ Packet    KLOR-CON     Take 20 mEq by mouth 2 times daily        potassium chloride SA 20 MEQ CR tablet    K-DUR/KLOR-CON M    60 tablet    TAKE 1 TABLET BY MOUTH TWICE DAILY.    Hypokalemia       PROCHLORPERAZINE MALEATE PO      Take 10 mg by mouth every 6 hours as needed for nausea or vomiting        simethicone 80 MG chewable tablet    MYLICON    180 tablet    Take 2 tablets (160 mg) by mouth 4 times daily as needed for cramping or other (gas pain)    Nausea       simvastatin 5 MG tablet     ZOCOR    30 tablet    Take 4 tablets (20 mg) by mouth daily    Mixed hyperlipidemia       sucralfate 1 GM tablet    CARAFATE    120 tablet    Take 1 tablet (1 g) by mouth 4 times daily as needed    Gastroesophageal reflux disease without esophagitis       traMADol 50 MG tablet    ULTRAM    60 tablet    Take 0.5-1 tablets (25-50 mg) by mouth every 6 hours as needed for moderate pain . Recommend trying after Tylenol and before Dilaudid.    Acute bilateral low back pain without sciatica, Multiple myeloma in relapse (H)       venetoclax 100 MG tablet CHEMOTHERAPY    VENCLEXTA    100 tablet    Take 8 tablets (800 mg) by mouth daily    Multiple myeloma in relapse (H)       * Notice:  This list has 2 medication(s) that are the same as other medications prescribed for you. Read the directions carefully, and ask your doctor or other care provider to review them with you.

## 2017-07-28 NOTE — NURSING NOTE
"Oncology Rooming Note    July 28, 2017 12:24 PM   Anthony Carbone is a 71 year old male who presents for:    Chief Complaint   Patient presents with     RECHECK     Multiple myeloma      Port Draw     port accessed and labs drawn by rn.  vs taken.      Initial Vitals: /67 (BP Location: Right arm, Patient Position: Chair, Cuff Size: Adult Regular)  Pulse 110  Temp 98.1  F (36.7  C) (Oral)  Resp 18  Ht 1.554 m (5' 1.18\")  Wt 56.2 kg (124 lb)  SpO2 97%  BMI 23.29 kg/m2 Estimated body mass index is 23.29 kg/(m^2) as calculated from the following:    Height as of this encounter: 1.554 m (5' 1.18\").    Weight as of this encounter: 56.2 kg (124 lb). Body surface area is 1.56 meters squared.  No Pain (0) Comment: Data Unavailable   No LMP for male patient.  Allergies reviewed: Yes  Medications reviewed: Yes    Medications: Medication refills not needed today.  Pharmacy name entered into [facility]:    [id number] [place] [address] [facility] [address] [facility] [address]  [employer] [place], [place] - [address] [facility]  [facility] [address] [facility] [address] [address] AT [facility] [address]    Clinical concerns: Pt has been feeling very weak since Wednesday, July 26. provider was notified.    5 minutes for nursing intake (face to face time)     Marleni Hernandez MA              "

## 2017-07-28 NOTE — MR AVS SNAPSHOT
After Visit Summary   7/28/2017    Anthony Arciniega    MRN: [id number]           Patient Information     Date Of Birth          1945        Visit Information        Provider Department      7/28/2017 12:00 PM [facility]        Today's Diagnoses     Multiple myeloma in relapse (H)              [facility]  [address] [place] [address]  Phone: [phone]  Clinic Hours:   Monday-Thursday:7am to 7pm   Friday: 7am to 5pm   Weekends and holidays:    8am to noon (in general)  If your fever is 100.5  or greater,   call the clinic.  After hours call the   hospital at [phone] or   [phone]. Ask for the BMT   fellow on-call           Care Instructions    No instructions as of 3:40PM.          Follow-ups after your visit        Additional Services     HOSPICE REFERRAL       Order classes of: [facility], [facility] and [facility] will route to the Home Care and Hospice Referral Pool.  Home Care or Hospice will then contact the patient to schedule their appointment.**    If you do not hear from Home Care and Hospice, or you would like to call to schedule, please call the referring place of service that your provider has listed below.  ______________________________________________________________________    Your provider has referred you to: [facility]: [facility] [phone]   [email or web address]    Extended Emergency Contact Information  Primary Emergency Contact: RORO ARCINIEGA  Address: [address] [place] [address] [address] [place] [address]  Home Phone: [phone]  Mobile Phone: [phone]  Relation: Spouse    Patient Anticipated Discharge Date: 7/28/17   RN, PT, HHA to begin 24 - 48 hours after discharge.  PLEASE EVALUATE AND TREAT (Evaluation timeline is 24 - 48 hrs. Please call if there is need for a variance to this timeline).    REASON  FOR REFERRAL: Hospice - Diagnosis: refractory multiple myeloma    ADDITIONAL SERVICES NEEDED: none    OTHER PERTINENT INFORMATION: Patient was last seen by provider on 7/28/17 for refractory MM.    Current Outpatient Prescriptions:  potassium chloride (KLOR-CON) 20 MEQ Packet, Take 20 mEq by mouth 2 times daily, Disp: , Rfl:   DOCUSATE SODIUM PO, Take 100 mg by mouth 2 times daily as needed for constipation, Disp: , Rfl:   PROCHLORPERAZINE MALEATE PO, Take 10 mg by mouth every 6 hours as needed for nausea or vomiting, Disp: , Rfl:   FLUCONAZOLE PO, Take 200 mg by mouth daily, Disp: , Rfl:   FLUDROCORTISONE ACETATE PO, Take 100 mcg by mouth daily, Disp: , Rfl:   mupirocin (BACTROBAN) 2 % ointment, Apply topically daily as needed, Disp: , Rfl:   methylphenidate (RITALIN) 5 MG tablet, Take 1 tablet (5 mg) by mouth 2 times daily On hold, Disp: 15 tablet, Rfl: 0  clopidogrel (PLAVIX) 75 MG tablet, TAKE 1 TABLET BY MOUTH EVERY DAY, Disp: 90 tablet, Rfl: 0  amoxicillin-clavulanate (AUGMENTIN) 875-125 MG per tablet, Take 1 tablet by mouth 2 times daily, Disp: 14 tablet, Rfl: 0  dexamethasone (DECADRON) 4 MG tablet, Take 20 mg days 1,2, 8, 9, 15, 16 on days of kyprolis, Disp: 30 tablet, Rfl: 3  traMADol (ULTRAM) 50 MG tablet, Take 0.5-1 tablets (25-50 mg) by mouth every 6 hours as needed for moderate pain . Recommend trying after Tylenol and before Dilaudid. (Patient taking differently: Take 25 mg by mouth every 6 hours as needed for moderate pain . Recommend trying after Tylenol and before Dilaudid.), Disp: 60 tablet, Rfl: 3  acyclovir (ZOVIRAX) 400 MG tablet, Take 1 tablet (400 mg) by mouth 2 times daily, Disp: 60 tablet, Rfl: 0  potassium chloride SA (K-DUR/KLOR-CON M) 20 MEQ CR tablet, TAKE 1 TABLET BY MOUTH TWICE DAILY., Disp: 60 tablet, Rfl: 0  HYDROmorphone (DILAUDID) 2 MG tablet, Take 0.5 tablets (1 mg) by mouth every 3 hours as needed for moderate to severe pain, Disp: 30 tablet, Rfl: 0  LORazepam (ATIVAN) 0.5 MG  tablet, Take 1 tablet (0.5 mg) by mouth every 6 hours as needed for nausea or anxiety., Disp: 30 tablet, Rfl: 0  artificial saliva (BIOTENE DRY MOUTHWASH) LIQD liquid, Swish and spit 3-5 mLs in mouth 4 times daily as needed for dry mouth, Disp: , Rfl:   midodrine (PROAMATINE) 2.5 MG tablet, Take 1 tablet (2.5 mg) by mouth 3 times daily . HOLD due to elevated blood pressures during admission. Continue to hold until further advised by outpatient clinic team or if develops recurrent orthostatic hypotension., Disp: 90 tablet, Rfl: 3  acetaminophen (TYLENOL) 500 MG tablet, Take 2 tablets (1,000 mg) by mouth every 6 hours as needed for mild pain, Disp: , Rfl:   ondansetron (ZOFRAN-ODT) 8 MG ODT tab, Take 1 tablet (8 mg) by mouth every 8 hours as needed for nausea, Disp: 30 tablet, Rfl: 3  simvastatin (ZOCOR) 5 MG tablet, Take 4 tablets (20 mg) by mouth daily, Disp: 30 tablet, Rfl:   OLANZapine (ZYPREXA) 5 MG tablet, Take 1 tablet (5 mg) by mouth 2 times daily, Disp: 60 tablet, Rfl: 0  amLODIPine (NORVASC) 5 MG tablet, Take one tablet at bedtime., Disp: 30 tablet, Rfl: 0  simethicone (MYLICON) 80 MG chewable tablet, Take 2 tablets (160 mg) by mouth 4 times daily as needed for cramping or other (gas pain), Disp: 180 tablet, Rfl:   clopidogrel (PLAVIX) 75 MG tablet, Take 1 tablet (75 mg) by mouth daily, Disp: 30 tablet, Rfl: 0  esomeprazole (NEXIUM) 40 MG CR capsule, Take 1 capsule (40 mg) by mouth every morning (before breakfast), Disp: 30 capsule, Rfl: 0  sucralfate (CARAFATE) 1 GM tablet, Take 1 tablet (1 g) by mouth 4 times daily as needed, Disp: 120 tablet, Rfl: 1  lidocaine (LIDODERM) 5 % Patch, Place 3 patches onto the skin every 24 hours, Disp: 30 patch, Rfl: 0      Patient Active Problem List:     Multiple myeloma (H)     Hypertension     Multiple myeloma (H)     Neutropenic fever (H)     Encounter for long-term current use of medication     GERD (gastroesophageal reflux disease)     Pneumonia     Syncope      Syncope, unspecified syncope type     Myeloma (H)     Altered mental status     Neoplasm of uncertain behavior of skin     AK (actinic keratosis)     Back pain     Urinary tract infection      Documentation of Face to Face and Certification for Home Health Services    I certify that patient, Anthony Carbone is under my care and that I, or a Nurse Practitioner or Physician's Assistant working with me, had a face-to-face encounter that meets the physician face-to-face encounter requirements with this patient on: 7/28/2017.    This encounter with the patient was in whole, or in part, for the following medical condition, which is the primary reason for Home Health Care: refractory MM.    I certify that, based on my findings, the following services are medically necessary Home Health Services: Nursing    My clinical findings support the need for the above services because: hospice    Further, I certify that my clinical findings support that this patient is homebound (i.e. absences from home require considerable and taxing effort and are for medical reasons or Islam services or infrequently or of short duration when for other reasons) because: Requires assistance of another person or specialized equipment to access medical services because patient: Is prone to wander/get lost without assistance..    Based on the above findings, I certify that this patient is confined to the home and needs intermittent skilled nursing care, physical therapy and/or speech therapy.  The patient is under my care, and I have initiated the establishment of the plan of care.  This patient will be followed by a physician who will periodically review the plan of care.    Physician/Provider to provide follow up care: Sanket Burciaga certified Physician at time of discharge: Dr. Kamari Topete    Please be aware that coverage of these services is subject to the terms and limitations of your health insurance plan.   Call member services at your health plan with any benefit or coverage questions.                  Your next 10 appointments already scheduled     Jul 31, 2017  6:00 PM CDT   (Arrive by 5:45 PM)   RETURN ARRHYTHMIA with Dmitri Banks MD   [facility] ([facility])    [address] [place] [address]  [address] [facility] [address]   [phone]            Aug 03, 2017   Procedure with Tesfaye Yusuf MD   [facility] (--)    [address]  [facility] [address]   [phone]            Aug 09, 2017  3:30 PM CDT   [facility] Draw with [facility] LAB DRAW   [facility] ([facility])    [address] [place] [address]   [phone]            Aug 09, 2017  4:00 PM CDT   Infusion 60 with UC ONCOLOGY INFUSION, UC 12 ATC   [facility] ([facility])    [address] [place] [address]   [phone]            Aug 09, 2017  5:00 PM CDT   RETURN ONC with Kamari Topete MD   [facility] ([facility])    [address] [place] [address]   [phone]            Aug 10, 2017 12:00 PM CDT   Infusion 60 with UC ONCOLOGY INFUSION, UC 28 ATC   [facility] ([facility])    [address] [place] [address]   [phone]            Aug 17, 2017 12:30 PM CDT   [facility] Lab Draw with UC [facility]ON[facility] LAB DRAW   [facility] ([facility])    [address] [place] [address]   [phone]            Aug 17, 2017  1:00 PM CDT   Infusion 60 with UC ONCOLOGY INFUSION   [facility] ([facility])    [address] [place] [address]   [phone]              Who to  "contact     If you have questions or need follow up information about today's clinic visit or your schedule please contact [facility] directly at [phone].  Normal or non-critical lab and imaging results will be communicated to you by Cause.ithart, letter or phone within 4 business days after the clinic has received the results. If you do not hear from us within 7 days, please contact the clinic through [employer] or phone. If you have a critical or abnormal lab result, we will notify you by phone as soon as possible.  Submit refill requests through [employer] or call your pharmacy and they will forward the refill request to us. Please allow 3 business days for your refill to be completed.          Additional Information About Your Visit        [employer]ow Information     [employer] gives you secure access to your electronic health record. If you see a primary care provider, you can also send messages to your care team and make appointments. If you have questions, please call your primary care clinic.  If you do not have a primary care provider, please call [phone] and they will assist you.        Care EveryWhere ID     This is your Care EveryWhere ID. This could be used by other organizations to access your [facility] medical records  [id number]        Your Vitals Were     Pulse Temperature Respirations Height Pulse Oximetry BMI (Body Mass Index)    110 98.1  F (36.7  C) (Oral) 18 1.554 m (5' 1.18\") 97% 23.29 kg/m2       Blood Pressure from Last 3 Encounters:   07/28/17 103/67   07/27/17 (!) 153/92   07/26/17 147/89    Weight from Last 3 Encounters:   07/28/17 56.2 kg (124 lb)   07/26/17 56 kg (123 lb 6.4 oz)   07/26/17 57.7 kg (127 lb 1.6 oz)              We Performed the Following     CBC with platelets differential     Comprehensive metabolic panel     HOSPICE REFERRAL          Today's Medication Changes          These changes are accurate as of: 7/28/17  3:51 PM.  If you have any questions, ask " your nurse or doctor.               Start taking these medicines.        Dose/Directions    venetoclax 100 MG tablet CHEMOTHERAPY   Commonly known as:  VENCLEXTA   Used for:  Multiple myeloma in relapse (H)        Dose:  800 mg   Take 8 tablets (800 mg) by mouth daily   Quantity:  100 tablet   Refills:  3         These medicines have changed or have updated prescriptions.        Dose/Directions    traMADol 50 MG tablet   Commonly known as:  ULTRAM   This may have changed:    - how much to take  - additional instructions   Used for:  Acute bilateral low back pain without sciatica, Multiple myeloma in relapse (H)        Dose:  25-50 mg   Take 0.5-1 tablets (25-50 mg) by mouth every 6 hours as needed for moderate pain . Recommend trying after Tylenol and before Dilaudid.   Quantity:  60 tablet   Refills:  3            Where to get your medicines      These medications were sent to [facility] [address] [place] [address] [address] [address]    Hours:  TRANSPLANT PHONE NUMBER [phone] Phone:  [phone]     venetoclax 100 MG tablet CHEMOTHERAPY                Recent Review Flowsheet Data     BMT Recent Results Latest Ref Rng & Units 6/13/2017 6/20/2017 6/27/2017 7/11/2017 7/18/2017 7/25/2017 7/28/2017    WBC 4.0 - 11.0 10e9/L 4.0 4.2 7.7 5.5 4.1 5.4 10.8    Hemoglobin 13.3 - 17.7 g/dL 9.5(L) 9.0(L) 9.4(L) 8.1(L) 11.5(L) 10.8(L) 10.3(L)    Platelet Count 150 - 450 10e9/L 134(L) 68(L) 54(L) 93(L) 50(L) 47(LL) 35(LL)    Platelets 150 - 450 10:9/L - - - - - - -    Neutrophils (Absolute) 1.6 - 8.3 10e9/L 3.4 3.9 6.6 4.8 3.2 4.2 10.2(H)    Blasts (Absolute) 10e9/L - - - - - - -    INR 0.86 - 1.14 - - - - - - -    Sodium 133 - 144 mmol/L 139 - - 135 137 136 136    Potassium 3.4 - 5.3 mmol/L 4.0 - - 4.4 4.7 4.9 4.4    Chloride 94 - 109 mmol/L 104 - - 102 104 105 105    Glucose 70 - 99 mg/dL 158(H) - - 130(H) 91 90 134(H)    Urea Nitrogen 7 - 30 mg/dL  26 - - 23 20 26 36(H)    Creatinine 0.66 - 1.25 mg/dL 0.88 - - 1.05 0.88 1.06 1.39(H)    Calcium (Total) 8.5 - 10.1 mg/dL 9.2 - - 9.2 9.4 9.5 8.8    Protein (Total) 6.8 - 8.8 g/dL 8.6 - - 8.5 8.9(H) 9.4(H) 9.1(H)    Albumin 3.4 - 5.0 g/dL 3.0(L) - - 3.0(L) 2.8(L) 2.8(L) 2.8(L)    Bilirubin (Direct) 0.0 - 0.2 mg/dL - - - - - - -    Alkaline Phosphatase 40 - 150 U/L 77 - - 56 53 46 43    AST 0 - 45 U/L 18 - - 9 6 9 9    ALT 0 - 70 U/L 20 - - 14 13 18 14    MCV 78 - 100 fl 102(H) 101(H) 102(H) 105(H) 99 101(H) 100               Primary Care Provider Office Phone # Fax #    Sanket Burciaga [phone]       [facility] [address] [id number]        Equal Access to Services     ALBAN SHAH AH: Maribel hernandezo Sojose, waaxda luqadaha, qaybta kaalmada adeegyada, jonny villa. So [facility] [phone].    ATENCIÓN: Si habla español, tiene a todd disposición servicios gratuitos de asistencia lingüística. Llame al [phone].    We comply with applicable federal civil rights laws and [place] laws. We do not discriminate on the basis of race, color, national origin, age, disability sex, sexual orientation or gender identity.            Thank you!     Thank you for choosing [facility]  for your care. Our goal is always to provide you with excellent care. Hearing back from our patients is one way we can continue to improve our services. Please take a few minutes to complete the written survey that you may receive in the mail after your visit with us. Thank you!             Your Updated Medication List - Protect others around you: Learn how to safely use, store and throw away your medicines at www.disposemymeds.org.          This list is accurate as of: 7/28/17  3:51 PM.  Always use your most recent med list.                   Brand Name Dispense Instructions for use Diagnosis    acetaminophen 500 MG tablet    TYLENOL      Take 2 tablets (1,000 mg) by mouth every 6 hours as needed for mild pain    Acute bilateral low back pain without sciatica, Multiple myeloma in relapse (H)       acyclovir 400 MG tablet    ZOVIRAX    60 tablet    Take 1 tablet (400 mg) by mouth 2 times daily    Multiple myeloma in relapse (H)       amLODIPine 5 MG tablet    NORVASC    30 tablet    Take one tablet at bedtime.    Essential hypertension       amoxicillin-clavulanate 875-125 MG per tablet    AUGMENTIN    14 tablet    Take 1 tablet by mouth 2 times daily    Urinary tract infection, site unspecified       artificial saliva Liqd liquid      Swish and spit 3-5 mLs in mouth 4 times daily as needed for dry mouth    Dry mouth       * clopidogrel 75 MG tablet    PLAVIX    30 tablet    Take 1 tablet (75 mg) by mouth daily    History of stroke       * clopidogrel 75 MG tablet    PLAVIX    90 tablet    TAKE 1 TABLET BY MOUTH EVERY DAY    History of stroke       dexamethasone 4 MG tablet    DECADRON    30 tablet    Take 20 mg days 1,2, 8, 9, 15, 16 on days of kyprolis    Multiple myeloma not having achieved remission (H)       DOCUSATE SODIUM PO      Take 100 mg by mouth 2 times daily as needed for constipation        esomeprazole 40 MG CR capsule    nexIUM    30 capsule    Take 1 capsule (40 mg) by mouth every morning (before breakfast)    Gastroesophageal reflux disease without esophagitis       FLUCONAZOLE PO      Take 200 mg by mouth daily        FLUDROCORTISONE ACETATE PO      Take 100 mcg by mouth daily        HYDROmorphone 2 MG tablet    DILAUDID    30 tablet    Take 0.5 tablets (1 mg) by mouth every 3 hours as needed for moderate to severe pain    Multiple myeloma in relapse (H), Acute bilateral low back pain without sciatica       lidocaine 5 % Patch    LIDODERM    30 patch    Place 3 patches onto the skin every 24 hours    Multiple myeloma in relapse (H), Acute bilateral low back pain without sciatica       LORazepam 0.5 MG tablet    ATIVAN    30 tablet     Take 1 tablet (0.5 mg) by mouth every 6 hours as needed for nausea or anxiety.    Multiple myeloma in relapse (H), Delirium       methylphenidate 5 MG tablet    RITALIN    15 tablet    Take 1 tablet (5 mg) by mouth 2 times daily On hold    Other fatigue       midodrine 2.5 MG tablet    PROAMATINE    90 tablet    Take 1 tablet (2.5 mg) by mouth 3 times daily . HOLD due to elevated blood pressures during admission. Continue to hold until further advised by outpatient clinic team or if develops recurrent orthostatic hypotension.    Orthostatic hypotension, Orthostatic syncope       mupirocin 2 % ointment    BACTROBAN     Apply topically daily as needed        OLANZapine 5 MG tablet    zyPREXA    60 tablet    Take 1 tablet (5 mg) by mouth 2 times daily    Delirium, Multiple myeloma in relapse (H)       ondansetron 8 MG ODT tab    ZOFRAN-ODT    30 tablet    Take 1 tablet (8 mg) by mouth every 8 hours as needed for nausea    Nausea       potassium chloride 20 MEQ Packet    KLOR-CON     Take 20 mEq by mouth 2 times daily        potassium chloride SA 20 MEQ CR tablet    K-DUR/KLOR-CON M    60 tablet    TAKE 1 TABLET BY MOUTH TWICE DAILY.    Hypokalemia       PROCHLORPERAZINE MALEATE PO      Take 10 mg by mouth every 6 hours as needed for nausea or vomiting        simethicone 80 MG chewable tablet    MYLICON    180 tablet    Take 2 tablets (160 mg) by mouth 4 times daily as needed for cramping or other (gas pain)    Nausea       simvastatin 5 MG tablet    ZOCOR    30 tablet    Take 4 tablets (20 mg) by mouth daily    Mixed hyperlipidemia       sucralfate 1 GM tablet    CARAFATE    120 tablet    Take 1 tablet (1 g) by mouth 4 times daily as needed    Gastroesophageal reflux disease without esophagitis       traMADol 50 MG tablet    ULTRAM    60 tablet    Take 0.5-1 tablets (25-50 mg) by mouth every 6 hours as needed for moderate pain . Recommend trying after Tylenol and before Dilaudid.    Acute bilateral low back pain  without sciatica, Multiple myeloma in relapse (H)       venetoclax 100 MG tablet CHEMOTHERAPY    VENCLEXTA    100 tablet    Take 8 tablets (800 mg) by mouth daily    Multiple myeloma in relapse (H)       * Notice:  This list has 2 medication(s) that are the same as other medications prescribed for you. Read the directions carefully, and ask your doctor or other care provider to review them with you.

## 2017-07-28 NOTE — PROGRESS NOTES
HEMATOLOGY/ONCOLOGY PROGRESS NOTE  Jul 28, 2017    REASON FOR VISIT: relapsed MM. M-spike elevated 7/11/17    Diagnosis: 71 year old gentleman with IgM lambda multiple myeloma originally diagnosed in 01/2012 as stage I, standard risk disease.   Treatment: Revlimid plus dexamethasone for 4-5 cycles but plateaued by late 03/2012.   - Velcade was added and he received another 2-3 cycles, achieving a good partial remission.      Transplant: Single auto after melphalan 200 mg/m2 preparative regimen on 01/09/2013  - Post-transplant course: unremarkable except for some mild steroid-induced hyperglycemia, gastritis, nausea and vomiting.      Maintenance: Lenalidomide at day-100 then developed a maculopapular rash. Lenalidomide was held and then we re-challenged him after about a month to 2 months at a lower dose (5 mg daily); rash returned.   - was started on maintenance Velcade, every other week through July 2014, when he was noted with abnormalities on myeloma studies drawn there. Noted with prostate cancer dx at about the time of relapse (see below).     Relapse: noted with return on M-spike in blood/urine with marrow involvement but negative PET.   - Started on retreatment with RVD on 8/27/14 with Revlimid at 15 mg 14 days of 21 days, dexamethasone 40 mg weekly and velcade weekly.Completed at total of 5 cycles without complication by 12/2014.   - Started Cycle 6 on inc'd Rev dosing of 20mg daily x 2 weeks on 12/11/14 which was complicated by pneumonia.  - Adm 12/21-1/10 for human metapneumovirus pneumonia complicated by anorexia, HTN, depression, anorexia with significant weight loss.   - Restarted Ernesto/Dex only on 2/4/15 with good tolerance but with thrombocytopenia.   - Bendamustine added to Velcade/dexamethasone on 5/21/15 due to disease progression  - Velcade discontinued on 7/9/2015 due to side effects (orthostasis)  - Schedule changed to bendamustine 80 mg/m2 days 1 and 2 on 28-day cycle  - Cycle 1 tolerated poorly  due to lightheadedness and weakness  - Cycle 2 dose reduced to 60 mg/m2 and pre-meds adjusted  - Cycle 5 received on 9/17/15.  - 10/1/2015 - increasing IgM, M-spike - started Pomalidomide 4mg/day (21 days out of 28 days) and weekly Decadron 20mg on Oct 1st, 2015.    - Carfilzomib added on 10/22/2015 making this CPD.  - C3-C6  received in [place]    - Returned to [facility] and resumed CPD here    - Adm: 4/12-4/14 with fever, confusion, neutropenia. Noted on MRI to have acute/subacute CVA and subacute/chronic CVA; started on Plavix, given brief course of Abx and GCSF prior to d/c.     - Continued on Carfilzomib and dexamethasone alone  - Pomalyst added back on 7/13/2016 for rising FLC  - Start daratumumab 11/10/16. Changed to every other week after 4 weekly treatments d/t profound fatigue and malaise.   - Adm 5/7-5/16 due to AMS, hypercalcemia, hyperuricemia, possible PNA, back pain, and JOSE MARIA. Started Kyprolis while inpatient.  - Re-admitted 5/25-/529 with recurrent AMS, found to have concomitant PNA    Current Treatment: Kyprolis IV + dexamethasone 20 mg days of Kyprolis.       INTERVAL HISTORY:    Yamil presents with his wife for follow-up today. He is currently at home after TCU stay. He is very quiet and appears fatigued today. His wife reports that she gave him Zyprexa this morning d/t 'confusion' at home. He normally only gets this at night. He has had several falls in the past few weeks. Per his wife, 'there isn't very good quality of life at this point.' He is using a wheelchair for mobility today. The pt was very quiet during my exam but did perk up somewhat when Dr. Topete interviewed him. Has had poor intake recently per his wife and labs today indicate dehydration. No change in chronic pain and remains on scheduled tramadol.     Remainder of ROS otherwise negative but limited by pt fatigue.    PHYSICAL EXAMINATION  /67 (BP Location: Right arm, Patient Position: Chair, Cuff Size: Adult Regular)  Pulse 110   "Temp 98.1  F (36.7  C) (Oral)  Resp 18  Ht 1.554 m (5' 1.18\")  Wt 56.2 kg (124 lb)  SpO2 97%  BMI 23.29 kg/m2  Wt Readings from Last 5 Encounters:   07/28/17 56.2 kg (124 lb)   07/26/17 56 kg (123 lb 6.4 oz)   07/26/17 57.7 kg (127 lb 1.6 oz)   07/25/17 56 kg (123 lb 7.3 oz)   07/18/17 57.4 kg (126 lb 8 oz)     General: hypokinetic, NAD  HEENT: No scleral icterus, MMM  Pulm: CTAB  CV: RRR, no m/r/g  Abd: soft, nontender, BS+  Extremities: no edema  Neuro: Alert, minimally conversant, using wheelchair   Psych: flat affect, appears very fatigued     LABS:   Results for orders placed or performed in visit on 07/28/17 (from the past 24 hour(s))   CBC with platelets differential   Result Value Ref Range    WBC 10.8 4.0 - 11.0 10e9/L    RBC Count 3.29 (L) 4.4 - 5.9 10e12/L    Hemoglobin 10.3 (L) 13.3 - 17.7 g/dL    Hematocrit 32.8 (L) 40.0 - 53.0 %     78 - 100 fl    MCH 31.3 26.5 - 33.0 pg    MCHC 31.4 (L) 31.5 - 36.5 g/dL    RDW 18.2 (H) 10.0 - 15.0 %    Platelet Count 35 (LL) 150 - 450 10e9/L    Diff Method Pending        IMPRESSION/PLAN:  71 year old male with relapsed MM after autologous transplant (1/9/2013), with recent progression on daratumumab/pom/dex, hospitalizations for back pain, JOSE MARIA,and  Hypercalcemia. Started on kyprolis IV 5/11 as well as po dex. He unfortunately developed worsening AMS and was re-admitted 5/25-5/29, found to have PNA. Now is at home. M spike worsening on carfilzomib/dex.      MM: Previously on edgardo/pom/dex while wintering in [place], progressive disease on SPEP inpatient (M spike 0.9 --> 2.1, IgM 3410)  - Received solumedrol 100 mg IV daily 5/8-5/10. Started PO dex 40 mg daily x 3 days on 5/11 with Kyprolis 5/11 and 5/12, and 5/23, subsequent doses held for AMS  -Discussed hospice vs venetoclax, which is not approved for MM but has shown acitivity particularly in t(11:14) mutation. Pt and wife agreeable to hospice consult and will call to touch base if they want to trial " venetoclax given that he has t(11;14).    AMS: AMS started early may in setting of metabolic derangements, uremia, and possible infection. Initially resolved, then returned ~5/24. Work-up showing PNA and UCx with enterococcus and CONS.  - Continue Zyprexa 5 mg at bedtime    Heme: Cytopenias 2/2 treatment and likely MM in setting of progression.Stable today.  - Continues on Plavix for history of CVA -- although will need to monitor platelets    Renal/FEN: Creat baseline ~1,4 today with elevated BUN of 36. Give 1L NS. Has had poor intake recently  -Hx Hypercalcemia: s/p pamidronate x 1 on 5/8.   -Hx Hyperuricemia: s/p rasburicase x 1 on 5/8  -Encouraged protein/calorie supplements    ID: PNA and positive urine cx inpatient, presently afebrile w/o localizing s/s  - Completed course of Augmentin PO BID 6/4  - Continues ppx  mg BID    Back/rib pain: Started early May. Lumbar MRI at OSH showing mild-mod central and foraminal stenoses in lumbar spine, per patient and wife, there was no MM lesion there. Rib films inpatient negative, back films stable from prior imaging.  - Continues tramadol PRN and Tylenol PRN    CV: Continue Norvasc (actually instructed to hold given h/o orthostasis) and Zocor.   - Avoid QTc prolonging agents (history of QTc prolongation with syncopal episodes). Has appt with cardiology next week    Rebecca Box MD, fellow  Seen with Dr. Topete    Staff Addendum: Patient was seen and examined by me. All labs, VS and pertinent images were reviewed with the patient's wife. Plans for care were mutually developed and outlined above with my direct input. I edited the note as appropriate.   Overall, I feel, and this seems corroborate by Casey today, that Yamil is more a hospice candidate than a treatment candidate at this junction.  Most days his pain seems well controlled but his fluctuation mental status and loss of independent care ability make quality of life quite lacking in this case currently.  They  will hear what hospice has to offer and give that vs. a possible trial of Venetoclax consideration.  We will be in touch by email and Casey has my cell phone also in case more direct communication is needed.  I will be in touch with her re: any needed f/up here.   Kamari Topete, DO

## 2017-07-31 NOTE — TELEPHONE ENCOUNTER
Prior Authorization Approval    Authorization Effective Date: 4/30/2017  Authorization Expiration Date: 7/28/2018  Medication: Venclexta Starter Pack - Approved  Approved Dose/Quantity: Starter Pack #42 per 28 days  Reference #: [id number]   Insurance Company: [employer] - Phone [phone] Fax [phone]  Expected CoPay: $113.87     CoPay Card Available: No   Foundation Assistance Needed: available - will seek if needed  Which Pharmacy is filling the prescription (Not needed for infusion/clinic administered): [facility] [place], [place] - [address] [place] [address] [phone]  Pharmacy Notified: No  Patient Notified: No

## 2017-08-04 NOTE — PROGRESS NOTES
Called Mrs. Carboen this morning to verify that she had received a call from homecare/hospice as she called yesterday stating that she had not heard anything yet.  She confirmed that she had and that a  and nurse were coming today at 9:30 to the house. Informed her that if she needed anything to just call.  She also asked that we would cancel the chemo appointments as they will not be doing chemo anymore. She did ask to keep the follow up appointment with Dr. Topete on 8/9/17.  Will cancel chemo appointments and update Dr. Topete.

## 2017-08-08 NOTE — TELEPHONE ENCOUNTER
Acyclovir (Zovirax) 400 mg tablet     Last Written Prescription Date: 7/6/2017  Last Fill Quantity: 60, # refills: 0  Last Office Visit with G, P or [facility] prescribing provider: 7/28/2017 with Dr. Topete  Next Appt: 8/9/2017 with Dr. Topete        Creatinine   Date Value Ref Range Status   07/28/2017 1.39 (H) 0.66 - 1.25 mg/dL Final

## 2017-08-09 NOTE — PROGRESS NOTES
HEMATOLOGY/ONCOLOGY PROGRESS NOTE  Aug 9, 2017    REASON FOR VISIT: relapsed MM. M-spike elevated 7/11/17    Diagnosis: 71 year old gentleman with IgM lambda multiple myeloma originally diagnosed in 01/2012 as stage I, standard risk disease.   Treatment: Revlimid plus dexamethasone for 4-5 cycles but plateaued by late 03/2012.   - Velcade was added and he received another 2-3 cycles, achieving a good partial remission.      Transplant: Single auto after melphalan 200 mg/m2 preparative regimen on 01/09/2013  - Post-transplant course: unremarkable except for some mild steroid-induced hyperglycemia, gastritis, nausea and vomiting.      Maintenance: Lenalidomide at day-100 then developed a maculopapular rash. Lenalidomide was held and then we re-challenged him after about a month to 2 months at a lower dose (5 mg daily); rash returned.   - was started on maintenance Velcade, every other week through July 2014, when he was noted with abnormalities on myeloma studies drawn there. Noted with prostate cancer dx at about the time of relapse (see below).     Relapse: noted with return on M-spike in blood/urine with marrow involvement but negative PET.   - Started on retreatment with RVD on 8/27/14 with Revlimid at 15 mg 14 days of 21 days, dexamethasone 40 mg weekly and velcade weekly.Completed at total of 5 cycles without complication by 12/2014.   - Started Cycle 6 on inc'd Rev dosing of 20mg daily x 2 weeks on 12/11/14 which was complicated by pneumonia.  - Adm 12/21-1/10 for human metapneumovirus pneumonia complicated by anorexia, HTN, depression, anorexia with significant weight loss.   - Restarted Ernesto/Dex only on 2/4/15 with good tolerance but with thrombocytopenia.   - Bendamustine added to Velcade/dexamethasone on 5/21/15 due to disease progression  - Velcade discontinued on 7/9/2015 due to side effects (orthostasis)  - Schedule changed to bendamustine 80 mg/m2 days 1 and 2 on 28-day cycle  - Cycle 1 tolerated poorly  due to lightheadedness and weakness  - Cycle 2 dose reduced to 60 mg/m2 and pre-meds adjusted  - Cycle 5 received on 9/17/15.  - 10/1/2015 - increasing IgM, M-spike - started Pomalidomide 4mg/day (21 days out of 28 days) and weekly Decadron 20mg on Oct 1st, 2015.    - Carfilzomib added on 10/22/2015 making this CPD.  - C3-C6  received in [place]    - Returned to [facility] and resumed CPD here    - Adm: 4/12-4/14 with fever, confusion, neutropenia. Noted on MRI to have acute/subacute CVA and subacute/chronic CVA; started on Plavix, given brief course of Abx and GCSF prior to d/c.     - Continued on Carfilzomib and dexamethasone alone  - Pomalyst added back on 7/13/2016 for rising FLC  - Start daratumumab 11/10/16. Changed to every other week after 4 weekly treatments d/t profound fatigue and malaise.   - Adm 5/7-5/16 due to AMS, hypercalcemia, hyperuricemia, possible PNA, back pain, and JOSE MARIA. Started Kyprolis while inpatient.  - Re-admitted 5/25-/529 with recurrent AMS, found to have concomitant PNA  - Last Treatment: Kyprolis IV + dexamethasone 20 mg days of Kyprolis.       INTERVAL HISTORY:    INTERVAL HISTORY:  Yamil follows up with Casey today for followup.  We had hospice come out to their home and talk about services that they could provide last week.  Overall, Yamil has since then improved to the point where mentally he is much more interactive and even eating and drinking a little bit better.  His pain seems controlled for the most part and he is much more interactive at home.  He is not having obviously worsening pain, fever, chills or other infectious-like symptoms.  He was able to walk in to the clinic today and Casey feels like things have improved at least for now though there remain times of confusion and he's fallen once since they were last in.     The rest of a complete 10-point review of systems was negative.      PHYSICAL EXAMINATION  BP (!) 141/93 (BP Location: Right arm, Patient Position: Right side, Cuff  Size: Adult Regular)  Pulse 101  Temp 98.3  F (36.8  C) (Oral)  Wt 54.4 kg (120 lb)  SpO2 96%  BMI 21.43 kg/m2  Wt Readings from Last 5 Encounters:   08/09/17 54.4 kg (120 lb)   07/28/17 56.2 kg (124 lb)   07/26/17 56 kg (123 lb 6.4 oz)   07/26/17 57.7 kg (127 lb 1.6 oz)   07/25/17 56 kg (123 lb 7.3 oz)     Examination was deferred today, as this was a counseling session.      LABS:   No results found for this or any previous visit (from the past 24 hour(s)).       IMPRESSION AND PLAN:  The patient is a 71-year-old gentleman with relapsed refractory multiple myeloma who is considering hospice care.      We did not draw any labs on Yamil today, as I was unsure of where Casey and he were headed in terms of Yamil's overall goals of care.  Right now they are also not certain about where they want to go with things.  I talked to them about options that I thought were the most tolerable including venetoclax therapy which might be easy enough for him to tolerate at home, but which I told them would obligate me to check labs least 2-3 times a week to make sure that there were no significant side effects.  I listed off the major side effects including kidney failure, cytopenias, edema and infections and also spoke with them about panobinostat which I think could also be a possibility if Yamil's overall condition continues to improve.  I told him, however, energy is obviously expended in getting back and forth to the clinic from home, and Yamil remains sleeping and resting a fair amount at home already.  Thus, I told them that really the toxicity and energy expenditure of these therapies are may not be worth the benefit of what we know about their response rates (20-40% chance of responding0.  I talked to them about the reason to consider hospice is that Yamil gets control back over his care and gets to be in his house amongst his own things which has always been important from conversations that he and I have had over the last 4  years.       For the time being, we decided that they will go home and again re-weigh their considerations.  If they desire this therapy, we will get labs in short order as Yamil's creatinine was high last time, and we will need to recheck his counts as he was noted to have dropping platelets.  I told them to get through these therapies we would need to commit him to transfusions which they do not allow on hospice and which would be needed in his case.  Right no,  I felt things were stable enough to let him go home and to give this careful consideration.  Casey indicated they would contact me within the week.    BMcClbraulio, DO    Note: 30 min visit with >50% spent in face-to-face counseling.

## 2017-08-09 NOTE — MR AVS SNAPSHOT
After Visit Summary   8/9/2017    Anthony Carbone    MRN: [id number]           Patient Information     Date Of Birth          1945        Visit Information        Provider Department      8/9/2017 5:00 PM Kamari Topete MD [facility] and Marrow Transplant        Today's Diagnoses     Multiple myeloma in relapse (H)    -  1          [facility] ([facility]  [address] [place] [address]  Phone: [phone]  Clinic Hours:   Monday-Thursday:7am to 7pm   Friday: 7am to 5pm   Weekends and holidays:    8am to noon (in general)  If your fever is 100.5  or greater,   call the clinic.  After hours call the   hospital at [phone] or   [phone]. Ask for the BMT   fellow on-call            Follow-ups after your visit        Who to contact     If you have questions or need follow up information about today's clinic visit or your schedule please contact [facility] BLOOD AND MARROW TRANSPLANT directly at [phone].  Normal or non-critical lab and imaging results will be communicated to you by NotaryActhart, letter or phone within 4 business days after the clinic has received the results. If you do not hear from us within 7 days, please contact the clinic through AVIcode or phone. If you have a critical or abnormal lab result, we will notify you by phone as soon as possible.  Submit refill requests through AVIcode or call your pharmacy and they will forward the refill request to us. Please allow 3 business days for your refill to be completed.          Additional Information About Your Visit        NotaryActhart Information     AVIcode gives you secure access to your electronic health record. If you see a primary care provider, you can also send messages to your care team and make appointments. If you have questions, please call your primary care clinic.  If you do not have a primary care provider, please call [phone] and they will assist you.        Care EveryWhere  ID     This is your Care EveryWhere ID. This could be used by other organizations to access your [facility] medical records  [id number]        Your Vitals Were     Pulse Temperature Pulse Oximetry BMI (Body Mass Index)          101 98.3  F (36.8  C) (Oral) 96% 21.43 kg/m2         Blood Pressure from Last 3 Encounters:   08/09/17 (!) 141/93   07/28/17 103/67   07/27/17 (!) 153/92    Weight from Last 3 Encounters:   08/09/17 54.4 kg (120 lb)   07/28/17 56.2 kg (124 lb)   07/26/17 56 kg (123 lb 6.4 oz)              Today, you had the following     No orders found for display         Today's Medication Changes          These changes are accurate as of: 8/9/17 11:59 PM.  If you have any questions, ask your nurse or doctor.               These medicines have changed or have updated prescriptions.        Dose/Directions    traMADol 50 MG tablet   Commonly known as:  ULTRAM   This may have changed:    - how much to take  - additional instructions   Used for:  Acute bilateral low back pain without sciatica, Multiple myeloma in relapse (H)        Dose:  25-50 mg   Take 0.5-1 tablets (25-50 mg) by mouth every 6 hours as needed for moderate pain . Recommend trying after Tylenol and before Dilaudid.   Quantity:  60 tablet   Refills:  3                Recent Review Flowsheet Data     BMT Recent Results Latest Ref Rng & Units 6/13/2017 6/20/2017 6/27/2017 7/11/2017 7/18/2017 7/25/2017 7/28/2017    WBC 4.0 - 11.0 10e9/L 4.0 4.2 7.7 5.5 4.1 5.4 10.8    Hemoglobin 13.3 - 17.7 g/dL 9.5(L) 9.0(L) 9.4(L) 8.1(L) 11.5(L) 10.8(L) 10.3(L)    Platelet Count 150 - 450 10e9/L 134(L) 68(L) 54(L) 93(L) 50(L) 47(LL) 35(LL)    Platelets 150 - 450 10:9/L - - - - - - -    Neutrophils (Absolute) 1.6 - 8.3 10e9/L 3.4 3.9 6.6 4.8 3.2 4.2 10.2(H)    Blasts (Absolute) 10e9/L - - - - - - -    INR 0.86 - 1.14 - - - - - - -    Sodium 133 - 144 mmol/L 139 - - 135 137 136 136    Potassium 3.4 - 5.3 mmol/L 4.0 - - 4.4 4.7 4.9 4.4    Chloride 94 - 109 mmol/L  104 - - 102 104 105 105    Glucose 70 - 99 mg/dL 158(H) - - 130(H) 91 90 134(H)    Urea Nitrogen 7 - 30 mg/dL 26 - - 23 20 26 36(H)    Creatinine 0.66 - 1.25 mg/dL 0.88 - - 1.05 0.88 1.06 1.39(H)    Calcium (Total) 8.5 - 10.1 mg/dL 9.2 - - 9.2 9.4 9.5 8.8    Protein (Total) 6.8 - 8.8 g/dL 8.6 - - 8.5 8.9(H) 9.4(H) 9.1(H)    Albumin 3.4 - 5.0 g/dL 3.0(L) - - 3.0(L) 2.8(L) 2.8(L) 2.8(L)    Bilirubin (Direct) 0.0 - 0.2 mg/dL - - - - - - -    Alkaline Phosphatase 40 - 150 U/L 77 - - 56 53 46 43    AST 0 - 45 U/L 18 - - 9 6 9 9    ALT 0 - 70 U/L 20 - - 14 13 18 14    MCV 78 - 100 fl 102(H) 101(H) 102(H) 105(H) 99 101(H) 100               Primary Care Provider Office Phone # Fax #    Sanket Burciaga [phone]       [facility] [address] [address]        Equal Access to Services     ALBAN SHAH AH: Hadii chalo hernandezo Sojose, waaxda luqadaha, qaybta kaalmajonny obrien . So [facility] [phone].    ATENCIÓN: Si habla español, tiene a todd disposición servicios gratuitos de asistencia lingüística. Llame al [phone].    We comply with applicable federal civil rights laws and [place] laws. We do not discriminate on the basis of race, color, national origin, age, disability sex, sexual orientation or gender identity.            Thank you!     Thank you for choosing [facility] BLOOD AND MARROW TRANSPLANT  for your care. Our goal is always to provide you with excellent care. Hearing back from our patients is one way we can continue to improve our services. Please take a few minutes to complete the written survey that you may receive in the mail after your visit with us. Thank you!             Your Updated Medication List - Protect others around you: Learn how to safely use, store and throw away your medicines at www.disposemymeds.org.          This list is accurate as of: 8/9/17 11:59 PM.  Always use your most recent med list.                    Brand Name Dispense Instructions for use Diagnosis    acetaminophen 500 MG tablet    TYLENOL     Take 2 tablets (1,000 mg) by mouth every 6 hours as needed for mild pain    Acute bilateral low back pain without sciatica, Multiple myeloma in relapse (H)       acyclovir 400 MG tablet    ZOVIRAX    60 tablet    Take 1 tablet (400 mg) by mouth 2 times daily    Multiple myeloma in relapse (H)       amLODIPine 5 MG tablet    NORVASC    30 tablet    Take one tablet at bedtime.    Essential hypertension       amoxicillin-clavulanate 875-125 MG per tablet    AUGMENTIN    14 tablet    Take 1 tablet by mouth 2 times daily    Urinary tract infection, site unspecified       artificial saliva Liqd liquid      Swish and spit 3-5 mLs in mouth 4 times daily as needed for dry mouth    Dry mouth       clopidogrel 75 MG tablet    PLAVIX    90 tablet    TAKE 1 TABLET BY MOUTH EVERY DAY    History of stroke       dexamethasone 4 MG tablet    DECADRON    30 tablet    Take 20 mg days 1,2, 8, 9, 15, 16 on days of kyprolis    Multiple myeloma not having achieved remission (H)       DOCUSATE SODIUM PO      Take 100 mg by mouth 2 times daily as needed for constipation        esomeprazole 40 MG CR capsule    nexIUM    30 capsule    Take 1 capsule (40 mg) by mouth every morning (before breakfast)    Gastroesophageal reflux disease without esophagitis       FLUCONAZOLE PO      Take 200 mg by mouth daily        FLUDROCORTISONE ACETATE PO      Take 100 mcg by mouth daily        HYDROmorphone 2 MG tablet    DILAUDID    30 tablet    Take 0.5 tablets (1 mg) by mouth every 3 hours as needed for moderate to severe pain    Multiple myeloma in relapse (H), Acute bilateral low back pain without sciatica       lidocaine 5 % Patch    LIDODERM    30 patch    Place 3 patches onto the skin every 24 hours    Multiple myeloma in relapse (H), Acute bilateral low back pain without sciatica       LORazepam 0.5 MG tablet    ATIVAN    30 tablet    Take 1  tablet (0.5 mg) by mouth every 6 hours as needed for nausea or anxiety.    Multiple myeloma in relapse (H), Delirium       methylphenidate 5 MG tablet    RITALIN    15 tablet    Take 1 tablet (5 mg) by mouth 2 times daily On hold    Other fatigue       midodrine 2.5 MG tablet    PROAMATINE    90 tablet    Take 1 tablet (2.5 mg) by mouth 3 times daily . HOLD due to elevated blood pressures during admission. Continue to hold until further advised by outpatient clinic team or if develops recurrent orthostatic hypotension.    Orthostatic hypotension, Orthostatic syncope       mupirocin 2 % ointment    BACTROBAN     Apply topically daily as needed        OLANZapine 5 MG tablet    zyPREXA    60 tablet    Take 1 tablet (5 mg) by mouth 2 times daily    Delirium, Multiple myeloma in relapse (H)       ondansetron 8 MG ODT tab    ZOFRAN-ODT    30 tablet    Take 1 tablet (8 mg) by mouth every 8 hours as needed for nausea    Nausea       potassium chloride 20 MEQ Packet    KLOR-CON     Take 20 mEq by mouth 2 times daily        potassium chloride SA 20 MEQ CR tablet    K-DUR/KLOR-CON M    60 tablet    TAKE 1 TABLET BY MOUTH TWICE DAILY.    Hypokalemia       PROCHLORPERAZINE MALEATE PO      Take 10 mg by mouth every 6 hours as needed for nausea or vomiting        simethicone 80 MG chewable tablet    MYLICON    180 tablet    Take 2 tablets (160 mg) by mouth 4 times daily as needed for cramping or other (gas pain)    Nausea       simvastatin 5 MG tablet    ZOCOR    30 tablet    Take 4 tablets (20 mg) by mouth daily    Mixed hyperlipidemia       sucralfate 1 GM tablet    CARAFATE    120 tablet    Take 1 tablet (1 g) by mouth 4 times daily as needed    Gastroesophageal reflux disease without esophagitis       traMADol 50 MG tablet    ULTRAM    60 tablet    Take 0.5-1 tablets (25-50 mg) by mouth every 6 hours as needed for moderate pain . Recommend trying after Tylenol and before Dilaudid.    Acute bilateral low back pain without  sciatica, Multiple myeloma in relapse (H)       venetoclax 100 MG tablet CHEMOTHERAPY    VENCLEXTA    100 tablet    Take 8 tablets (800 mg) by mouth daily    Multiple myeloma in relapse (H)

## 2017-08-09 NOTE — NURSING NOTE
"Oncology Rooming Note    August 9, 2017 4:57 PM   Anthony Carbone is a 71 year old male who presents for:    Chief Complaint   Patient presents with     Oncology Clinic Visit     Pt with MM--here for MD visit     Initial Vitals: BP (!) 141/93 (BP Location: Right arm, Patient Position: Right side, Cuff Size: Adult Regular)  Pulse 101  Temp 98.3  F (36.8  C) (Oral)  Wt 54.4 kg (120 lb)  SpO2 96%  BMI 21.43 kg/m2 Estimated body mass index is 21.43 kg/(m^2) as calculated from the following:    Height as of 8/2/17: 1.594 m (5' 2.75\").    Weight as of this encounter: 54.4 kg (120 lb). Body surface area is 1.55 meters squared.  No Pain (0) Comment: Data Unavailable   No LMP for male patient.  Allergies reviewed: Yes  Medications reviewed: Yes    Medications: Medication refills not needed today.  Pharmacy name entered into [facility]:    [id number] [place] [id number] [address] [facility] & [facility] [address]  [facility] [place], [place] - [address]  [facility] [address] [place], [place] - [address] [address] AT [facility] OF [address]    Clinical concerns: none     6 minutes for nursing intake (face to face time)     Christy Lynn MA              "

## 2017-08-16 NOTE — TELEPHONE ENCOUNTER
Potassium chloride     Last Written Prescription Date: 7/6/17  Last Fill Quantity: 60, # refills: 0  Last Office Visit with [facility], [facility] or [facility] prescribing provider: 8/9/17 with Dr. Topete  No future appointments     Potassium   Date Value Ref Range Status   07/28/2017 4.4 3.4 - 5.3 mmol/L Final     Creatinine   Date Value Ref Range Status   07/28/2017 1.39 (H) 0.66 - 1.25 mg/dL Final     BP Readings from Last 3 Encounters:   08/09/17 (!) 141/93   07/28/17 103/67   07/27/17 (!) 153/92

## 2017-08-16 NOTE — MR AVS SNAPSHOT
After Visit Summary   8/16/2017    Anthony Carbone    MRN: [id number]           Patient Information     Date Of Birth          1945        Visit Information        Provider Department      8/16/2017 2:30 PM  INFUSION CHAIR 3 [facility]        Today's Diagnoses     Multiple myeloma not having achieved remission (H)           Follow-ups after your visit        Who to contact     If you have questions or need follow up information about today's clinic visit or your schedule please contact [facility] directly at [phone].  Normal or non-critical lab and imaging results will be communicated to you by OneWheelhart, letter or phone within 4 business days after the clinic has received the results. If you do not hear from us within 7 days, please contact the clinic through [employer] or phone. If you have a critical or abnormal lab result, we will notify you by phone as soon as possible.  Submit refill requests through [employer] or call your pharmacy and they will forward the refill request to us. Please allow 3 business days for your refill to be completed.          Additional Information About Your Visit        MyChart Information     [employer] gives you secure access to your electronic health record. If you see a primary care provider, you can also send messages to your care team and make appointments. If you have questions, please call your primary care clinic.  If you do not have a primary care provider, please call [phone] and they will assist you.        Care EveryWhere ID     This is your Care EveryWhere ID. This could be used by other organizations to access your [facility] medical records  [id number]         Blood Pressure from Last 3 Encounters:   08/09/17 (!) 141/93   07/28/17 103/67   07/27/17 (!) 153/92    Weight from Last 3 Encounters:   08/09/17 54.4 kg (120 lb)   07/28/17 56.2 kg (124 lb)   07/26/17 56 kg (123 lb 6.4  oz)              We Performed the Following     *CBC with platelets differential     ABO/Rh type and screen     Comprehensive metabolic panel     Uric acid          Today's Medication Changes          These changes are accurate as of: 8/16/17  2:41 PM.  If you have any questions, ask your nurse or doctor.               These medicines have changed or have updated prescriptions.        Dose/Directions    traMADol 50 MG tablet   Commonly known as:  ULTRAM   This may have changed:    - how much to take  - additional instructions   Used for:  Acute bilateral low back pain without sciatica, Multiple myeloma in relapse (H)        Dose:  25-50 mg   Take 0.5-1 tablets (25-50 mg) by mouth every 6 hours as needed for moderate pain . Recommend trying after Tylenol and before Dilaudid.   Quantity:  60 tablet   Refills:  3                Primary Care Provider Office Phone # Fax #    Sanket Burciaga [phone]       [email or web address] [address] [address]        Equal Access to Services     ALBAN SHAH : Hadii chalo parod Sojose, waaxda luqjose, qaybta kaalmada trinidad, jonny jurado . So [facility] [phone].    ATENCIÓN: Si habla español, tiene a todd disposición servicios gratuitos de asistencia lingüística. Parminder al [phone].    We comply with applicable federal civil rights laws and [place] laws. We do not discriminate on the basis of race, color, national origin, age, disability sex, sexual orientation or gender identity.            Thank you!     Thank you for choosing [facility]  for your care. Our goal is always to provide you with excellent care. Hearing back from our patients is one way we can continue to improve our services. Please take a few minutes to complete the written survey that you may receive in the mail after your visit with us. Thank you!             Your Updated Medication List - Protect  others around you: Learn how to safely use, store and throw away your medicines at www.disposemymeds.org.          This list is accurate as of: 8/16/17  2:41 PM.  Always use your most recent med list.                   Brand Name Dispense Instructions for use Diagnosis    acetaminophen 500 MG tablet    TYLENOL     Take 2 tablets (1,000 mg) by mouth every 6 hours as needed for mild pain    Acute bilateral low back pain without sciatica, Multiple myeloma in relapse (H)       acyclovir 400 MG tablet    ZOVIRAX    60 tablet    Take 1 tablet (400 mg) by mouth 2 times daily    Multiple myeloma in relapse (H)       amLODIPine 5 MG tablet    NORVASC    30 tablet    Take one tablet at bedtime.    Essential hypertension       amoxicillin-clavulanate 875-125 MG per tablet    AUGMENTIN    14 tablet    Take 1 tablet by mouth 2 times daily    Urinary tract infection, site unspecified       artificial saliva Liqd liquid      Swish and spit 3-5 mLs in mouth 4 times daily as needed for dry mouth    Dry mouth       clopidogrel 75 MG tablet    PLAVIX    90 tablet    TAKE 1 TABLET BY MOUTH EVERY DAY    History of stroke       dexamethasone 4 MG tablet    DECADRON    30 tablet    Take 20 mg days 1,2, 8, 9, 15, 16 on days of kyprolis    Multiple myeloma not having achieved remission (H)       DOCUSATE SODIUM PO      Take 100 mg by mouth 2 times daily as needed for constipation        esomeprazole 40 MG CR capsule    nexIUM    30 capsule    Take 1 capsule (40 mg) by mouth every morning (before breakfast)    Gastroesophageal reflux disease without esophagitis       FLUCONAZOLE PO      Take 200 mg by mouth daily        FLUDROCORTISONE ACETATE PO      Take 100 mcg by mouth daily        HYDROmorphone 2 MG tablet    DILAUDID    30 tablet    Take 0.5 tablets (1 mg) by mouth every 3 hours as needed for moderate to severe pain    Multiple myeloma in relapse (H), Acute bilateral low back pain without sciatica       lidocaine 5 % Patch    LIDODERM     30 patch    Place 3 patches onto the skin every 24 hours    Multiple myeloma in relapse (H), Acute bilateral low back pain without sciatica       LORazepam 0.5 MG tablet    ATIVAN    30 tablet    Take 1 tablet (0.5 mg) by mouth every 6 hours as needed for nausea or anxiety.    Multiple myeloma in relapse (H), Delirium       methylphenidate 5 MG tablet    RITALIN    15 tablet    Take 1 tablet (5 mg) by mouth 2 times daily On hold    Other fatigue       midodrine 2.5 MG tablet    PROAMATINE    90 tablet    Take 1 tablet (2.5 mg) by mouth 3 times daily . HOLD due to elevated blood pressures during admission. Continue to hold until further advised by outpatient clinic team or if develops recurrent orthostatic hypotension.    Orthostatic hypotension, Orthostatic syncope       mupirocin 2 % ointment    BACTROBAN     Apply topically daily as needed        OLANZapine 5 MG tablet    zyPREXA    60 tablet    Take 1 tablet (5 mg) by mouth 2 times daily    Delirium, Multiple myeloma in relapse (H)       ondansetron 8 MG ODT tab    ZOFRAN-ODT    30 tablet    Take 1 tablet (8 mg) by mouth every 8 hours as needed for nausea    Nausea       potassium chloride 20 MEQ Packet    KLOR-CON     Take 20 mEq by mouth 2 times daily        potassium chloride SA 20 MEQ CR tablet    K-DUR/KLOR-CON M    60 tablet    TAKE 1 TABLET BY MOUTH TWICE DAILY.    Hypokalemia       PROCHLORPERAZINE MALEATE PO      Take 10 mg by mouth every 6 hours as needed for nausea or vomiting        simethicone 80 MG chewable tablet    MYLICON    180 tablet    Take 2 tablets (160 mg) by mouth 4 times daily as needed for cramping or other (gas pain)    Nausea       simvastatin 5 MG tablet    ZOCOR    30 tablet    Take 4 tablets (20 mg) by mouth daily    Mixed hyperlipidemia       sucralfate 1 GM tablet    CARAFATE    120 tablet    Take 1 tablet (1 g) by mouth 4 times daily as needed    Gastroesophageal reflux disease without esophagitis       traMADol 50 MG tablet     ULTRAM    60 tablet    Take 0.5-1 tablets (25-50 mg) by mouth every 6 hours as needed for moderate pain . Recommend trying after Tylenol and before Dilaudid.    Acute bilateral low back pain without sciatica, Multiple myeloma in relapse (H)       venetoclax 100 MG tablet CHEMOTHERAPY    VENCLEXTA    100 tablet    Take 8 tablets (800 mg) by mouth daily    Multiple myeloma in relapse (H)

## 2017-08-16 NOTE — PROGRESS NOTES
Infusion Nursing Note:  Anthony Carbone presents today for port labs.    Patient seen by provider today: No   present during visit today: Not Applicable.    Note: N/A.    Intravenous Access:  Labs drawn without difficulty.  Implanted Port.    Treatment Conditions:  Not Applicable.      Post Infusion Assessment:  Blood return noted pre and post infusion.  Site patent and intact, free from redness, edema or discomfort.  No evidence of extravasations.  Access discontinued per protocol.    Discharge Plan:   Patient discharged in stable condition accompanied by: self.  Departure Mode: Ambulatory.    Vianca Hassan RN

## 2017-08-22 NOTE — TELEPHONE ENCOUNTER
Patient has been approved to receive free Venclexta through the [employer] to [facility] for dates 8/21/2017-8/21/2018. Patient Case ID: [id number]

## 2017-08-24 NOTE — ORAL ONC MGMT
Oral Chemotherapy Monitoring Program    Primary Oncologist: Dr. Topete  Primary Oncology Clinic: [facility]  Cancer Diagnosis: Multiple Myeloma    Drug: Venclexta 800mg daily; continuously   Start Date: 2017  Dose is appropriate for patients:  BSA   Expected duration of therapy: Until disease progression or unacceptable toxicity    Drug Interaction Assessment:     Chemotherapy Regimen has been checked for drug/drug interactions using all known current medication lists.  The following Drug/Drug interaction has been identified:    Title Venetoclax / Fluconazole (Moderate)  Risk Rating D: Consider therapy modification  Summary Fluconazole (Moderate) may increase the serum concentration of Venetoclax. Severity Major Reliability Rating Fair   Patient Management Reduce the venetoclax dose by at least 50% in patients requiring concomitant treatment with moderate Fluconazole. Monitor patients receiving these combinations closely for evidence of excessive venetoclax effects (eg, hematologic toxicity, tumor lysis syndrome). Resume the previous venetoclax dose 2 to 3 days after moderate CY inhibitor discontinuation.    Patient 800mg dose reflects a dose reduction.    Lab Monitoring Plan  C1D1+   Call, CMP, CBC C2D1+ Call, CMP, CBC C3D1+ Call, CMP, CBC C4D1+ Call, CMP, CBC C5D1+ Call, CMP, CBC C6D1+ Call, CMP, CBC   C1D8+ CMP, CBC C2D8+  C3D8+  C4D8+  C5D8+  C6D8+    C1D15+ Call,  CMP, CBC C2D15+  C3D15+  C4D15+  C5D15+  C6D15+    C1D22+ CMP, CBC C2D22+  C3D22+  C4D22+  C5D22+  C6D22+        Subjective/Objective:  Anthony Horne Tona is a 71 year old male contacted by phone for an initial visit for oral chemotherapy education.  Today, teaching was done with his wife Louise.    ORAL CHEMOTHERAPY 10/1/2015   Drug Name Pomalyst (Pomalidomide)   Current Dosage 4mg   Current Schedule Daily   Cycle Details 3 weeks on 1 week off   Start Date of Last Cycle 2015   Doses missed in last 2 weeks 0   Home  "BPs not needed         Vitals:  BP:   BP Readings from Last 1 Encounters:   08/09/17 (!) 141/93     Wt Readings from Last 1 Encounters:   08/09/17 54.4 kg (120 lb)     Estimated body surface area is 1.55 meters squared as calculated from the following:    Height as of 8/2/17: 1.594 m (5' 2.75\").    Weight as of 8/9/17: 54.4 kg (120 lb).      Labs:  Lab Results   Component Value Date     08/16/2017      Lab Results   Component Value Date    POTASSIUM 4.2 08/16/2017     Lab Results   Component Value Date    JAMZIN 8.9 08/16/2017     Lab Results   Component Value Date    MAG 2.2 05/25/2017     Lab Results   Component Value Date    PHOS 2.3 05/13/2017     Lab Results   Component Value Date    ALBUMIN 2.7 08/16/2017     Lab Results   Component Value Date    BUN 18 08/16/2017     Lab Results   Component Value Date    CR 1.41 08/16/2017       Lab Results   Component Value Date    AST 10 08/16/2017     Lab Results   Component Value Date    ALT 12 08/16/2017     Lab Results   Component Value Date    BILITOTAL 0.3 08/16/2017       Lab Results   Component Value Date    WBC 3.7 08/16/2017     Lab Results   Component Value Date    HGB 8.2 08/16/2017     Lab Results   Component Value Date     08/16/2017     Lab Results   Component Value Date    ANEU 2.6 08/16/2017       Assessment:  Patient is appropriate to start therapy.    Plan:  Basic chemotherapy teaching was reviewed with the patient and the patient's caregiver including indication, start date of therapy, dose, administration, adverse effects, missed doses, food and drug interactions, monitoring, side effect management, office contact information, and safe handling. Written materials were provided and all questions answered.    Follow-Up:  1 week     [email or web address]  [facility]  Oral Chemotherapy Program  [phone]  [email or web address]        "

## 2017-08-29 NOTE — LETTER
8/29/2017      RE: Anthony Carbone  [address] [place] [address]       HEMATOLOGY/ONCOLOGY PROGRESS NOTE  Aug 29, 2017    REASON FOR VISIT: myeloma    Diagnosis: 71 year old gentleman with IgM lambda multiple myeloma originally diagnosed in 01/2012 as stage I, standard risk disease.   Treatment: Revlimid plus dexamethasone for 4-5 cycles but plateaued by late 03/2012.   - Velcade was added and he received another 2-3 cycles, achieving a good partial remission.      Transplant: Single auto after melphalan 200 mg/m2 preparative regimen on 01/09/2013  - Post-transplant course: unremarkable except for some mild steroid-induced hyperglycemia, gastritis, nausea and vomiting.      Maintenance: Lenalidomide at day-100 then developed a maculopapular rash. Lenalidomide was held and then we re-challenged him after about a month to 2 months at a lower dose (5 mg daily); rash returned.   - was started on maintenance Velcade, every other week through July 2014, when he was noted with abnormalities on myeloma studies drawn there. Noted with prostate cancer dx at about the time of relapse (see below).     Relapse: noted with return on M-spike in blood/urine with marrow involvement but negative PET.   - Started on retreatment with RVD on 8/27/14 with Revlimid at 15 mg 14 days of 21 days, dexamethasone 40 mg weekly and velcade weekly.Completed at total of 5 cycles without complication by 12/2014.   - Started Cycle 6 on inc'd Rev dosing of 20mg daily x 2 weeks on 12/11/14 which was complicated by pneumonia.  - Adm 12/21-1/10 for human metapneumovirus pneumonia complicated by anorexia, HTN, depression, anorexia with significant weight loss.   - Restarted Ernesto/Dex only on 2/4/15 with good tolerance but with thrombocytopenia.   - Bendamustine added to Velcade/dexamethasone on 5/21/15 due to disease progression  - Velcade discontinued on 7/9/2015 due to side effects (orthostasis)  - Schedule changed to bendamustine 80 mg/m2  days 1 and 2 on 28-day cycle  - Cycle 1 tolerated poorly due to lightheadedness and weakness  - Cycle 2 dose reduced to 60 mg/m2 and pre-meds adjusted  - Cycle 5 received on 9/17/15.  - 10/1/2015 - increasing IgM, M-spike - started Pomalidomide 4mg/day (21 days out of 28 days) and weekly Decadron 20mg on Oct 1st, 2015.    - Carfilzomib added on 10/22/2015 making this CPD.  - C3-C6  received in [place]    - Returned to [facility] and resumed CPD here    - Adm: 4/12-4/14 with fever, confusion, neutropenia. Noted on MRI to have acute/subacute CVA and subacute/chronic CVA; started on Plavix, given brief course of Abx and GCSF prior to d/c.     - Continued on Carfilzomib and dexamethasone alone  - Pomalyst added back on 7/13/2016 for rising FLC  - Start daratumumab 11/10/16. Changed to every other week after 4 weekly treatments d/t profound fatigue and malaise.   - Adm 5/7-5/16 due to AMS, hypercalcemia, hyperuricemia, possible PNA, back pain, and JOSE MARIA. Started Kyprolis while inpatient.  - Re-admitted 5/25-/529 with recurrent AMS, found to have concomitant PNA  - Resumed Kyprolis 6/13/2017    INTERVAL HISTORY:    Mr. Carbone is here with his wife.     Overall, feeling well. A little tired. No SOB or chest pain. Tolerating the Venclexta without issues, currently on the 800 mg dosing. Thinks maybe he has had occasional mild nausea on it but nothing else noted. Confusion continues to wax and wane per his wife. APpetite also waxes and wanes. Back pain stable and no new pains. No fevers/chills/sweats, HA, vision changes, cough, congestion, sore throat, chest pain, SOB, GIVENS, vomiting, abdominal pain, urinary changes, swelling, bleeding, rash.       PHYSICAL EXAMINATION  There were no vitals taken for this visit.    Wt Readings from Last 10 Encounters:   08/09/17 54.4 kg (120 lb)   07/28/17 56.2 kg (124 lb)   07/26/17 56 kg (123 lb 6.4 oz)   07/26/17 57.7 kg (127 lb 1.6 oz)   07/25/17 56 kg (123 lb 7.3 oz)   07/18/17 57.4 kg (126 lb  "8 oz)   17 57.9 kg (127 lb 11.2 oz)   17 56 kg (123 lb 6.4 oz)   17 56.7 kg (125 lb)   17 57.9 kg (127 lb 11.2 oz)   General: Alert. Oriented to name, , location, but unable to provide date. Reported year as \"77\". Able to ambulate independently, albeit slow and cautiously.  No acute distress. HEENT: PERRL, no palor or icterus. CVS: RRR with occasional premature beat. CHEST: CTAB, normal work of breathing ABDOMEN: soft non tender no masses     NEURO: AAOX3  CN 2-12 intact. Motor and sensation in tact. Strength 4/5 in upper and lower extremities. Gets to and from the exam table un assisted. SKIN: no bleeding or brusiing, no rashes EXTREMITIES: No edema.     LABS:   RResults for WILLIAN ARCINIEGA (MRN [id number]) as of 2017 11:57   Ref. Range 2017 10:15 2017 14:40 2017 15:14   Kappa Free Lt Chain Latest Ref Range: 0.33 - 1.94 mg/dL <0.30... (L) 0.34 <0.30... (L)   Kappa Lambda Ratio Latest Ref Range: 0.26 - 1.65  Unable to Calculate 0.01 (L) Unable to Calculate   Lambda Free Lt Chain Latest Ref Range: 0.57 - 2.63 mg/dL 29.25 (H) 29.00 (H) 32.00 (H)   Monoclonal Peak Latest Ref Range: 0.0 g/dL 2.2 (H) 1.9 (H) 2.4 (H)       IMPRESSION/PLAN:  71 year old male with relapsed MM after autologous transplant (2013), status-post multiple salvage regimens with progressive disease.    MM: Progressive disease on Kyprolis/dex. He met with Dr. Topete and hospice was discussed, but patient then started on Venclexta 800 mg daily. Tolerating overall well at this time. Message sent to Dr. Topete re: dose increase. For now, will plan on return in one week with follow-up.    AMS: AMS started early may in setting of metabolic derangements, uremia, and possible infection. Initially resolved, then returned ~. Work-up showing PNA and UCx with enterococcus and CONS. Remains intermittently confused, stable today.  - Continue Zyprexa 5 mg at bedtime  - Holding off long-acting pain " meds     Fatigue: Recently a little worse, likely exacerbated by anemia. Transfuse as below    Heme: Cytopenias 2/2 treatment and likely MM in setting of progression. Periodically needing pRBC.  - On Plavix, hold for platelets < 50. Continue for now, pending his clinical course, may need to discuss just holding this all together as the potential harm may outweigh risk at some point  - Transfuse to keep hemoglobin > 8    Renal/FEN: Creat baseline ~0.8-1.0. Stable today.  -Encouraged protein/calorie supplements.      ID: Presently afebrile w/o any localizing infectious s/s  - Continues ppx  mg BID     Back/rib pain: Started early May. Lumbar MRI at OSH showing mild-mod central and foraminal stenoses in lumbar spine, per patient and wife, there was no MM lesion there. Rib films inpatient negative, back films stable from prior imaging.  - Continues tramadol PRN and Tylenol PRN. No changes.      CV: Continue zocor and norvasc.   - Avoid QTc prolonging agents (history of QTc prolongation with syncopal episodes)          Leanne Reddy PA-C

## 2017-08-29 NOTE — PROGRESS NOTES
HEMATOLOGY/ONCOLOGY PROGRESS NOTE  Aug 29, 2017    REASON FOR VISIT: myeloma    Diagnosis: 71 year old gentleman with IgM lambda multiple myeloma originally diagnosed in 01/2012 as stage I, standard risk disease.   Treatment: Revlimid plus dexamethasone for 4-5 cycles but plateaued by late 03/2012.   - Velcade was added and he received another 2-3 cycles, achieving a good partial remission.      Transplant: Single auto after melphalan 200 mg/m2 preparative regimen on 01/09/2013  - Post-transplant course: unremarkable except for some mild steroid-induced hyperglycemia, gastritis, nausea and vomiting.      Maintenance: Lenalidomide at day-100 then developed a maculopapular rash. Lenalidomide was held and then we re-challenged him after about a month to 2 months at a lower dose (5 mg daily); rash returned.   - was started on maintenance Velcade, every other week through July 2014, when he was noted with abnormalities on myeloma studies drawn there. Noted with prostate cancer dx at about the time of relapse (see below).     Relapse: noted with return on M-spike in blood/urine with marrow involvement but negative PET.   - Started on retreatment with RVD on 8/27/14 with Revlimid at 15 mg 14 days of 21 days, dexamethasone 40 mg weekly and velcade weekly.Completed at total of 5 cycles without complication by 12/2014.   - Started Cycle 6 on inc'd Rev dosing of 20mg daily x 2 weeks on 12/11/14 which was complicated by pneumonia.  - Adm 12/21-1/10 for human metapneumovirus pneumonia complicated by anorexia, HTN, depression, anorexia with significant weight loss.   - Restarted Ernesto/Dex only on 2/4/15 with good tolerance but with thrombocytopenia.   - Bendamustine added to Velcade/dexamethasone on 5/21/15 due to disease progression  - Velcade discontinued on 7/9/2015 due to side effects (orthostasis)  - Schedule changed to bendamustine 80 mg/m2 days 1 and 2 on 28-day cycle  - Cycle 1 tolerated poorly due to lightheadedness and  weakness  - Cycle 2 dose reduced to 60 mg/m2 and pre-meds adjusted  - Cycle 5 received on 9/17/15.  - 10/1/2015 - increasing IgM, M-spike - started Pomalidomide 4mg/day (21 days out of 28 days) and weekly Decadron 20mg on Oct 1st, 2015.    - Carfilzomib added on 10/22/2015 making this CPD.  - C3-C6  received in [place]    - Returned to [facility] and resumed CPD here    - Adm: 4/12-4/14 with fever, confusion, neutropenia. Noted on MRI to have acute/subacute CVA and subacute/chronic CVA; started on Plavix, given brief course of Abx and GCSF prior to d/c.     - Continued on Carfilzomib and dexamethasone alone  - Pomalyst added back on 7/13/2016 for rising FLC  - Start daratumumab 11/10/16. Changed to every other week after 4 weekly treatments d/t profound fatigue and malaise.   - Adm 5/7-5/16 due to AMS, hypercalcemia, hyperuricemia, possible PNA, back pain, and JOSE MARIA. Started Kyprolis while inpatient.  - Re-admitted 5/25-/529 with recurrent AMS, found to have concomitant PNA  - Resumed Kyprolis 6/13/2017    INTERVAL HISTORY:    Mr. Carbone is here with his wife.     Overall, feeling well. A little tired. No SOB or chest pain. Tolerating the Venclexta without issues, currently on the 800 mg dosing. Thinks maybe he has had occasional mild nausea on it but nothing else noted. Confusion continues to wax and wane per his wife. APpetite also waxes and wanes. Back pain stable and no new pains. No fevers/chills/sweats, HA, vision changes, cough, congestion, sore throat, chest pain, SOB, GIVENS, vomiting, abdominal pain, urinary changes, swelling, bleeding, rash.       PHYSICAL EXAMINATION  There were no vitals taken for this visit.    Wt Readings from Last 10 Encounters:   08/09/17 54.4 kg (120 lb)   07/28/17 56.2 kg (124 lb)   07/26/17 56 kg (123 lb 6.4 oz)   07/26/17 57.7 kg (127 lb 1.6 oz)   07/25/17 56 kg (123 lb 7.3 oz)   07/18/17 57.4 kg (126 lb 8 oz)   07/11/17 57.9 kg (127 lb 11.2 oz)   08/02/17 56 kg (123 lb 6.4 oz)  "  17 56.7 kg (125 lb)   17 57.9 kg (127 lb 11.2 oz)   General: Alert. Oriented to name, , location, but unable to provide date. Reported year as \"77\". Able to ambulate independently, albeit slow and cautiously.  No acute distress. HEENT: PERRL, no palor or icterus. CVS: RRR with occasional premature beat. CHEST: CTAB, normal work of breathing ABDOMEN: soft non tender no masses     NEURO: AAOX3  CN 2-12 intact. Motor and sensation in tact. Strength 4/5 in upper and lower extremities. Gets to and from the exam table un assisted. SKIN: no bleeding or brusiing, no rashes EXTREMITIES: No edema.     LABS:   RResults for WILLIAN ARCINIEGA (MRN [id number]) as of 2017 11:57   Ref. Range 2017 10:15 2017 14:40 2017 15:14   Kappa Free Lt Chain Latest Ref Range: 0.33 - 1.94 mg/dL <0.30... (L) 0.34 <0.30... (L)   Kappa Lambda Ratio Latest Ref Range: 0.26 - 1.65  Unable to Calculate 0.01 (L) Unable to Calculate   Lambda Free Lt Chain Latest Ref Range: 0.57 - 2.63 mg/dL 29.25 (H) 29.00 (H) 32.00 (H)   Monoclonal Peak Latest Ref Range: 0.0 g/dL 2.2 (H) 1.9 (H) 2.4 (H)       IMPRESSION/PLAN:  71 year old male with relapsed MM after autologous transplant (2013), status-post multiple salvage regimens with progressive disease.    MM: Progressive disease on Kyprolis/dex. He met with Dr. Topete and hospice was discussed, but patient then started on Venclexta 800 mg daily. Tolerating overall well at this time. Message sent to Dr. Topete re: dose increase. For now, will plan on return in one week with follow-up.    AMS: AMS started early may in setting of metabolic derangements, uremia, and possible infection. Initially resolved, then returned ~. Work-up showing PNA and UCx with enterococcus and CONS. Remains intermittently confused, stable today.  - Continue Zyprexa 5 mg at bedtime  - Holding off long-acting pain meds     Fatigue: Recently a little worse, likely exacerbated by anemia. " Transfuse as below    Heme: Cytopenias 2/2 treatment and likely MM in setting of progression. Periodically needing pRBC.  - On Plavix, hold for platelets < 50. Continue for now, pending his clinical course, may need to discuss just holding this all together as the potential harm may outweigh risk at some point  - Transfuse to keep hemoglobin > 8    Renal/FEN: Creat baseline ~0.8-1.0. Stable today.  -Encouraged protein/calorie supplements.      ID: Presently afebrile w/o any localizing infectious s/s  - Continues ppx  mg BID     Back/rib pain: Started early May. Lumbar MRI at OSH showing mild-mod central and foraminal stenoses in lumbar spine, per patient and wife, there was no MM lesion there. Rib films inpatient negative, back films stable from prior imaging.  - Continues tramadol PRN and Tylenol PRN. No changes.      CV: Continue zocor and norvasc.   - Avoid QTc prolonging agents (history of QTc prolongation with syncopal episodes)          Leanne Reddy PA-C

## 2017-08-29 NOTE — NURSING NOTE
Chief Complaint   Patient presents with     Blood Draw     labs collected from port, port accessed with 20 gauge 3/4 inch gripper needle, line flushed with NS and heparin     Deb Ghosh RN

## 2017-08-29 NOTE — MR AVS SNAPSHOT
After Visit Summary   8/29/2017    Anthony Carbone    MRN: [id number]           Patient Information     Date Of Birth          1945        Visit Information        Provider Department      8/29/2017 5:50 PM Leanne Reddy PA-C [facility]        Today's Diagnoses     Multiple myeloma not having achieved remission (H)    -  1       Follow-ups after your visit        Your next 10 appointments already scheduled     Aug 31, 2017   Procedure with Tesfaye Yusuf MD   [facility] (--)    [address]  [facility] [address] [phone]              Who to contact     If you have questions or need follow up information about today's clinic visit or your schedule please contact [facility] directly at [phone].  Normal or non-critical lab and imaging results will be communicated to you by MyChart, letter or phone within 4 business days after the clinic has received the results. If you do not hear from us within 7 days, please contact the clinic through MyChart or phone. If you have a critical or abnormal lab result, we will notify you by phone as soon as possible.  Submit refill requests through [facility] or call your pharmacy and they will forward the refill request to us. Please allow 3 business days for your refill to be completed.          Additional Information About Your Visit        MyChart Information     [facility] gives you secure access to your electronic health record. If you see a primary care provider, you can also send messages to your care team and make appointments. If you have questions, please call your primary care clinic.  If you do not have a primary care provider, please call [phone] and they will assist you.        Care EveryWhere ID     This is your Care EveryWhere ID. This could be used by other organizations to access your [facility] medical records  [id number]        Your  "Vitals Were     Pulse Temperature Respirations Height Pulse Oximetry BMI (Body Mass Index)    55 97.9  F (36.6  C) (Oral) 16 1.594 m (5' 2.75\") 91% 21.71 kg/m2       Blood Pressure from Last 3 Encounters:   08/29/17 124/78   08/09/17 (!) 141/93   07/28/17 103/67    Weight from Last 3 Encounters:   08/29/17 55.2 kg (121 lb 9.6 oz)   08/09/17 54.4 kg (120 lb)   07/28/17 56.2 kg (124 lb)              We Performed the Following     CBC with platelets differential     Comprehensive metabolic panel     Uric acid          Today's Medication Changes          These changes are accurate as of: 8/29/17  5:51 PM.  If you have any questions, ask your nurse or doctor.               These medicines have changed or have updated prescriptions.        Dose/Directions    traMADol 50 MG tablet   Commonly known as:  ULTRAM   This may have changed:    - how much to take  - additional instructions   Used for:  Acute bilateral low back pain without sciatica, Multiple myeloma in relapse (H)        Dose:  25-50 mg   Take 0.5-1 tablets (25-50 mg) by mouth every 6 hours as needed for moderate pain . Recommend trying after Tylenol and before Dilaudid.   Quantity:  60 tablet   Refills:  3         Stop taking these medicines if you haven't already. Please contact your care team if you have questions.     amoxicillin-clavulanate 875-125 MG per tablet   Commonly known as:  AUGMENTIN   Stopped by:  Leanne Reddy PA-C           artificial saliva Liqd liquid   Stopped by:  Leanne Reddy PA-C           potassium chloride 20 MEQ Packet   Commonly known as:  KLOR-CON   Stopped by:  Leanne Reddy PA-C                    Primary Care Provider Office Phone # Fax #    Sanket Gualberto [phone]       [facility] [address] [facility] [address]        Equal Access to Services     ALBAN SHAH AH: Maribel Davis, waaxda luqadaha, qaybta kaalmada adeegyada, jonny guevara " erika priceyancy lamynorkimberly ah. So [facility] [phone].    ATENCIÓN: Si maribella dinh, tiene a todd disposición servicios gratuitos de asistencia lingüística. Parminder posada [phone].    We comply with applicable federal civil rights laws and [place] laws. We do not discriminate on the basis of race, color, national origin, age, disability sex, sexual orientation or gender identity.            Thank you!     Thank you for choosing [facility]  for your care. Our goal is always to provide you with excellent care. Hearing back from our patients is one way we can continue to improve our services. Please take a few minutes to complete the written survey that you may receive in the mail after your visit with us. Thank you!             Your Updated Medication List - Protect others around you: Learn how to safely use, store and throw away your medicines at www.disposemymeds.org.          This list is accurate as of: 8/29/17  5:51 PM.  Always use your most recent med list.                   Brand Name Dispense Instructions for use Diagnosis    acetaminophen 500 MG tablet    TYLENOL     Take 2 tablets (1,000 mg) by mouth every 6 hours as needed for mild pain    Acute bilateral low back pain without sciatica, Multiple myeloma in relapse (H)       acyclovir 400 MG tablet    ZOVIRAX    60 tablet    Take 1 tablet (400 mg) by mouth 2 times daily    Multiple myeloma in relapse (H)       amLODIPine 5 MG tablet    NORVASC    30 tablet    Take one tablet at bedtime.    Essential hypertension       clopidogrel 75 MG tablet    PLAVIX    90 tablet    TAKE 1 TABLET BY MOUTH EVERY DAY    History of stroke       dexamethasone 4 MG tablet    DECADRON    30 tablet    Take 20 mg days 1,2, 8, 9, 15, 16 on days of kyprolis    Multiple myeloma not having achieved remission (H)       DOCUSATE SODIUM PO      Take 100 mg by mouth 2 times daily as needed for constipation        esomeprazole 40 MG CR capsule    nexIUM    30 capsule     Take 1 capsule (40 mg) by mouth every morning (before breakfast)    Gastroesophageal reflux disease without esophagitis       FLUCONAZOLE PO      Take 200 mg by mouth daily        FLUDROCORTISONE ACETATE PO      Take 100 mcg by mouth daily        HYDROmorphone 2 MG tablet    DILAUDID    30 tablet    Take 0.5 tablets (1 mg) by mouth every 3 hours as needed for moderate to severe pain    Multiple myeloma in relapse (H), Acute bilateral low back pain without sciatica       lidocaine 5 % Patch    LIDODERM    30 patch    Place 3 patches onto the skin every 24 hours    Multiple myeloma in relapse (H), Acute bilateral low back pain without sciatica       LORazepam 0.5 MG tablet    ATIVAN    30 tablet    Take 1 tablet (0.5 mg) by mouth every 6 hours as needed for nausea or anxiety.    Multiple myeloma in relapse (H), Delirium       methylphenidate 5 MG tablet    RITALIN    15 tablet    Take 1 tablet (5 mg) by mouth 2 times daily On hold    Other fatigue       midodrine 2.5 MG tablet    PROAMATINE    90 tablet    Take 1 tablet (2.5 mg) by mouth 3 times daily . HOLD due to elevated blood pressures during admission. Continue to hold until further advised by outpatient clinic team or if develops recurrent orthostatic hypotension.    Orthostatic hypotension, Orthostatic syncope       mupirocin 2 % ointment    BACTROBAN     Apply topically daily as needed        OLANZapine 5 MG tablet    zyPREXA    60 tablet    Take 1 tablet (5 mg) by mouth 2 times daily    Delirium, Multiple myeloma in relapse (H)       ondansetron 8 MG ODT tab    ZOFRAN-ODT    30 tablet    Take 1 tablet (8 mg) by mouth every 8 hours as needed for nausea    Nausea       potassium chloride SA 20 MEQ CR tablet    K-DUR/KLOR-CON M    60 tablet    TAKE 1 TABLET BY MOUTH TWICE DAILY.    Hypokalemia       PROCHLORPERAZINE MALEATE PO      Take 10 mg by mouth every 6 hours as needed for nausea or vomiting        simethicone 80 MG chewable tablet    MYLICON    180 tablet     Take 2 tablets (160 mg) by mouth 4 times daily as needed for cramping or other (gas pain)    Nausea       simvastatin 5 MG tablet    ZOCOR    30 tablet    Take 4 tablets (20 mg) by mouth daily    Mixed hyperlipidemia       sucralfate 1 GM tablet    CARAFATE    120 tablet    Take 1 tablet (1 g) by mouth 4 times daily as needed    Gastroesophageal reflux disease without esophagitis       traMADol 50 MG tablet    ULTRAM    60 tablet    Take 0.5-1 tablets (25-50 mg) by mouth every 6 hours as needed for moderate pain . Recommend trying after Tylenol and before Dilaudid.    Acute bilateral low back pain without sciatica, Multiple myeloma in relapse (H)       venetoclax 100 MG tablet CHEMOTHERAPY    VENCLEXTA    100 tablet    Take 8 tablets (800 mg) by mouth daily    Multiple myeloma in relapse (H)

## 2017-08-29 NOTE — NURSING NOTE
"Oncology Rooming Note    August 29, 2017 5:04 PM   Anthony Carbone is a 71 year old male who presents for:    Chief Complaint   Patient presents with     Blood Draw     labs collected from port, port accessed with 20 gauge 3/4 inch gripper needle, line flushed with NS and heparin     Oncology Clinic Visit     Follow up-Multiple Myeloma     Initial Vitals: /78  Pulse 55  Temp 97.9  F (36.6  C) (Oral)  Resp 16  Ht 1.594 m (5' 2.75\")  Wt 55.2 kg (121 lb 9.6 oz)  SpO2 91%  BMI 21.71 kg/m2 Estimated body mass index is 21.71 kg/(m^2) as calculated from the following:    Height as of this encounter: 1.594 m (5' 2.75\").    Weight as of this encounter: 55.2 kg (121 lb 9.6 oz). Body surface area is 1.56 meters squared.  No Pain (0) Comment: Data Unavailable   No LMP for male patient.  Allergies reviewed: Yes  Medications reviewed: Yes    Medications: Medication refills not needed today.  Pharmacy name entered into [facility]:    [id number] - [place] [address] AT [facility] [address] [facility] [address]  [place], [place] - [address] [facility]  [facility] [address] [place], [place] - [address] [address] AT [facility] [address]    Clinical concerns: Lab Results Leanne Reddy was notified.    10 minutes for nursing intake (face to face time)     Tamara Arguelles LPN              "

## 2017-08-30 NOTE — PROGRESS NOTES
Patient has completed first 5 days of Venclexta yesterday. (8/25/17 through 8/29/17)  Patient was takeing 800 MG daily and has tolerated it well.  Labs from yesterday are fairly stable with the exception of his Hgb which dropped.  Patient is schedule to get transfusions of PRBCs tomorrow.  Per Dr. Topete he should continue on another 5 days of Venclexta at 800 MG daily (8/30/17 through 9/3/17)  He is schedule to RTC to see Leanne Reddy PA-C on Wednesday 9/6/17 for labs and follow up.  Patient's wife verbalized understanding of plan and was grateful for the follow up call.     Results for WILLIAN ARCINIEGA (MRN [id number]) as of 8/30/2017 16:25   Ref. Range 8/29/2017 16:47   Sodium Latest Ref Range: 133 - 144 mmol/L 137   Potassium Latest Ref Range: 3.4 - 5.3 mmol/L 4.1   Chloride Latest Ref Range: 94 - 109 mmol/L 103   Carbon Dioxide Latest Ref Range: 20 - 32 mmol/L 27   Urea Nitrogen Latest Ref Range: 7 - 30 mg/dL 20   Creatinine Latest Ref Range: 0.66 - 1.25 mg/dL 0.99   GFR Estimate Latest Ref Range: >60 mL/min/1.7m2 74   GFR Estimate If Black Latest Ref Range: >60 mL/min/1.7m2 89   Calcium Latest Ref Range: 8.5 - 10.1 mg/dL 8.9   Anion Gap Latest Ref Range: 3 - 14 mmol/L 7   Albumin Latest Ref Range: 3.4 - 5.0 g/dL 3.1 (L)   Protein Total Latest Ref Range: 6.8 - 8.8 g/dL 9.1 (H)   Bilirubin Total Latest Ref Range: 0.2 - 1.3 mg/dL 0.6   Alkaline Phosphatase Latest Ref Range: 40 - 150 U/L 45   ALT Latest Ref Range: 0 - 70 U/L 14   AST Latest Ref Range: 0 - 45 U/L 10   Uric Acid Latest Ref Range: 3.5 - 7.2 mg/dL 6.5   Glucose Latest Ref Range: 70 - 99 mg/dL 112 (H)   WBC Latest Ref Range: 4.0 - 11.0 10e9/L 4.7   Hemoglobin Latest Ref Range: 13.3 - 17.7 g/dL 7.6 (L)   Hematocrit Latest Ref Range: 40.0 - 53.0 % 24.9 (L)   Platelet Count Latest Ref Range: 150 - 450 10e9/L 122 (L)   RBC Count Latest Ref Range: 4.4 - 5.9 10e12/L 2.32 (L)   MCV Latest Ref Range: 78 - 100 fl 107 (H)   MCH Latest Ref Range: 26.5  - 33.0 pg 32.8   MCHC Latest Ref Range: 31.5 - 36.5 g/dL 30.5 (L)   RDW Latest Ref Range: 10.0 - 15.0 % 21.2 (H)   Diff Method Unknown Automated Method   % Neutrophils Latest Units: % 84.2   % Lymphocytes Latest Units: % 4.9   % Monocytes Latest Units: % 9.2   % Eosinophils Latest Units: % 1.3   % Basophils Latest Units: % 0.2   % Immature Granulocytes Latest Units: % 0.2   Nucleated RBCs Latest Ref Range: 0 /100 0   Absolute Neutrophil Latest Ref Range: 1.6 - 8.3 10e9/L 3.9   Absolute Lymphocytes Latest Ref Range: 0.8 - 5.3 10e9/L 0.2 (L)   Absolute Monocytes Latest Ref Range: 0.0 - 1.3 10e9/L 0.4   Absolute Eosinophils Latest Ref Range: 0.0 - 0.7 10e9/L 0.1   Absolute Basophils Latest Ref Range: 0.0 - 0.2 10e9/L 0.0   Abs Immature Granulocytes Latest Ref Range: 0 - 0.4 10e9/L 0.0   Absolute Nucleated RBC Unknown 0.0

## 2017-08-30 NOTE — PROGRESS NOTES
Called patient to see if he could come in today to get labs drawn to help expidite his transfusion appointment for tomorrow.  Per patient and wife he is able to come in at 3:30 PM today. Will schedule patient lab appointment accordingly.

## 2017-08-30 NOTE — NURSING NOTE
Chief Complaint   Patient presents with     Port Draw     labs drawn     Port accessed using aseptic technique. Blood aspirated without difficulty. Labs drawn. Clinic labs drawn and sent to first floor lab.     Transfusion labs (2 red no gel 10 cc and 4 purple tubes) collected and sent with order to first floor lab whom will send to [facility] blood bank. Blood bank to send to [place]. Port flushed with 20cc 0.9% NS. Locked with 100U/ ml heparin (5 ml). Port de-accessed. Pressure and bandage applied. Pt tolerated well.     ARY Briceno, RN, BSN

## 2017-08-31 NOTE — MR AVS SNAPSHOT
After Visit Summary   8/31/2017    Anthony Carbone    MRN: [id number]           Patient Information     Date Of Birth          1945        Visit Information        Provider Department      8/31/2017 8:30 AM UC 19 ATC;  ONCOLOGY INFUSION [facility]        Today's Diagnoses     Multiple myeloma in relapse (H)    -  1      Care Instructions    Contact numbers:  Triage Main Line: [phone]  After hours: [phone]    Call with chills and/or temperature greater than or equal to 100.5 and questions or concerns.    If after hours, weekends, or holidays, call main hospital  at  [phone] and ask for Oncology doctor on call.           August 2017 Sunday Monday Tuesday Wednesday Thursday Friday Saturday             1     2     3     4     5       6     7     8     9     [facility] D[facility]    4:30 PM   (15 min.)    [facility]   [facility]     [facility] RETURN    5:00 PM   (30 min.)   Kamari Topete MD   [facility] 10     11     12       13     14     15     16     Outpatient Visit    8:01 AM   Kamari Topete MD   [facility]     LEVEL 1    2:30 PM   (60 min.)    INFUSION CHAIR 3   [facility] 17     18     19       20     21     22     23     24     25     26       27     28     29     [facility] DRAW    5:00 PM   (15 min.)    [facility] DRAW   [facility] Draw     [facility] RETURN    5:35 PM   (50 min.)   Leanne Reddy PA-C   [facility] 30     [facility] DRAW    3:30 PM   (15 min.)    [facility] DRAW   [facility] Draw 31     [facility] ONC INFUSION 360    8:30 AM   (360 min.)   UC ONCOLOGY INFUSION   [facility]                      September 2017 Sunday Monday Tuesday Wednesday Thursday Friday Saturday                            1     2       3     4     5     6     [facility]  DRAW    8:15 AM   (15 min.)   [facility] DRAW   [facility] Draw     [facility] RETURN    8:35 AM   (50 min.)   Leanne Reddy PA-C   [facility] 7     8     9       10     11     12     13     14     15     16       17     18     19     20     21     PHACOEMULSIFICATION CLEAR CORNEA WITH STANDARD INTRAOCULAR LENS IMPLANT    2:10 PM   Tesfaye Yusuf MD   [facility] 22     23       24     25     26     27     [facility] DRAW    4:30 PM   (15 min.)   [facility] D[facility]   [facility] Draw     [facility] ONC RETURN    5:00 PM   (30 min.)   Kamari Topete MD   [facility] Blood [facility] Marrow Transplant 28     29     30                  Lab Results:  No results found for this or any previous visit (from the past 12 hour(s)).            Follow-ups after your visit        Your next 10 appointments already scheduled     Sep 06, 2017  8:15 AM CDT   [facility] Lab Draw with  [facility] DRAW   [facility] Draw ([facility])    [address] [place] [id number] [phone]            Sep 06, 2017  8:50 AM CDT   (Arrive by 8:35 AM)   Return Visit with Leanne Reddy PA-C   [facility] ([facility])    [address] [place] [id number] [phone]            Sep 21, 2017   Procedure with Tesfaye Yusuf MD   [facility] (--)    [address]  [facility] [address]   [phone]            Sep 27, 2017  4:30 PM CDT   [facility] Lab Draw with  [facility] DRAW   [facility] Draw ([facility])    [address] [place] [id number] [phone]            Sep 27, 2017  5:00 PM CDT   RETURN ONC with Kamari Topete MD   [facility] Blood and Marrow Transplant ([facility])    [address] [place] [id number]    [phone]              Future tests that were ordered for you today     Open Standing Orders        Priority Remaining Interval Expires Ordered    Transfuse red blood cell unit Routine 1/2 TRANSFUSE 2 UNITS  8/31/2017    Red blood cell prepare order unit Routine 99/100 CONDITIONAL (SPECIFY) BLOOD  8/30/2017            Who to contact     If you have questions or need follow up information about today's clinic visit or your schedule please contact [facility] directly at [phone].  Normal or non-critical lab and imaging results will be communicated to you by [employer], letter or phone within 4 business days after the clinic has received the results. If you do not hear from us within 7 days, please contact the clinic through [employer] or phone. If you have a critical or abnormal lab result, we will notify you by phone as soon as possible.  Submit refill requests through [employer] or call your pharmacy and they will forward the refill request to us. Please allow 3 business days for your refill to be completed.          Additional Information About Your Visit        [employer] Information     [employer] gives you secure access to your electronic health record. If you see a primary care provider, you can also send messages to your care team and make appointments. If you have questions, please call your primary care clinic.  If you do not have a primary care provider, please call [phone] and they will assist you.        Care EveryWhere ID     This is your Care EveryWhere ID. This could be used by other organizations to access your [place] medical records  [id number]        Your Vitals Were     Pulse Temperature Respirations Pulse Oximetry          77 99.3  F (37.4  C) (Oral) 18 96%         Blood Pressure from Last 3 Encounters:   08/31/17 109/71   08/29/17 124/78   08/09/17 (!) 141/93    Weight from Last 3 Encounters:   08/29/17 55.2 kg (121 lb 9.6 oz)   08/09/17 54.4 kg (120 lb)   07/28/17 56.2 kg  (124 lb)              We Performed the Following     please draw 2 10 cc red no gel and 4 purple tubes and send to [facility] blood bank, who will send to [facility]: Laboratory Miscellaneous Order     Transfuse red blood cell unit          Today's Medication Changes          These changes are accurate as of: 8/31/17  9:30 AM.  If you have any questions, ask your nurse or doctor.               These medicines have changed or have updated prescriptions.        Dose/Directions    OLANZapine 5 MG tablet   Commonly known as:  zyPREXA   This may have changed:  when to take this   Used for:  Delirium, Multiple myeloma in relapse (H)        Dose:  5 mg   Take 1 tablet (5 mg) by mouth 2 times daily   Quantity:  60 tablet   Refills:  0       traMADol 50 MG tablet   Commonly known as:  ULTRAM   This may have changed:    - how much to take  - additional instructions   Used for:  Acute bilateral low back pain without sciatica, Multiple myeloma in relapse (H)        Dose:  25-50 mg   Take 0.5-1 tablets (25-50 mg) by mouth every 6 hours as needed for moderate pain . Recommend trying after Tylenol and before Dilaudid.   Quantity:  60 tablet   Refills:  3                Primary Care Provider Office Phone # Fax #    Sanket Burciaga [phone]       [facility] [address] [facility] [address]        Equal Access to Services     ALBAN SHAH AH: Maribel Davis, wacatherineda joo, qaybta kaalmada adeegyaabhijit, jonny villa. So [facility] [phone].    ATENCIÓN: Si habla español, tiene a todd disposición servicios gratuitos de asistencia lingüística. Llame al [phone].    We comply with applicable federal civil rights laws and [place] laws. We do not discriminate on the basis of race, color, national origin, age, disability sex, sexual orientation or gender identity.            Thank you!     Thank you for choosing [facility]  for your care.  Our goal is always to provide you with excellent care. Hearing back from our patients is one way we can continue to improve our services. Please take a few minutes to complete the written survey that you may receive in the mail after your visit with us. Thank you!             Your Updated Medication List - Protect others around you: Learn how to safely use, store and throw away your medicines at www.disposemymeds.org.          This list is accurate as of: 8/31/17  9:30 AM.  Always use your most recent med list.                   Brand Name Dispense Instructions for use Diagnosis    acyclovir 400 MG tablet    ZOVIRAX    60 tablet    Take 1 tablet (400 mg) by mouth 2 times daily    Multiple myeloma in relapse (H)       amLODIPine 5 MG tablet    NORVASC    30 tablet    Take one tablet at bedtime.    Essential hypertension       clopidogrel 75 MG tablet    PLAVIX    90 tablet    TAKE 1 TABLET BY MOUTH EVERY DAY    History of stroke       DOCUSATE SODIUM PO      Take 100 mg by mouth 2 times daily as needed for constipation        esomeprazole 40 MG CR capsule    nexIUM    30 capsule    Take 1 capsule (40 mg) by mouth every morning (before breakfast)    Gastroesophageal reflux disease without esophagitis       HYDROmorphone 2 MG tablet    DILAUDID    30 tablet    Take 0.5 tablets (1 mg) by mouth every 3 hours as needed for moderate to severe pain    Multiple myeloma in relapse (H), Acute bilateral low back pain without sciatica       lidocaine 5 % Patch    LIDODERM    30 patch    Place 3 patches onto the skin every 24 hours    Multiple myeloma in relapse (H), Acute bilateral low back pain without sciatica       LORazepam 0.5 MG tablet    ATIVAN    30 tablet    Take 1 tablet (0.5 mg) by mouth every 6 hours as needed for nausea or anxiety.    Multiple myeloma in relapse (H), Delirium       methylphenidate 5 MG tablet    RITALIN    15 tablet    Take 1 tablet (5 mg) by mouth 2 times daily On hold    Other fatigue        midodrine 2.5 MG tablet    PROAMATINE    90 tablet    Take 1 tablet (2.5 mg) by mouth 3 times daily . HOLD due to elevated blood pressures during admission. Continue to hold until further advised by outpatient clinic team or if develops recurrent orthostatic hypotension.    Orthostatic hypotension, Orthostatic syncope       mupirocin 2 % ointment    BACTROBAN     Apply topically daily as needed        OLANZapine 5 MG tablet    zyPREXA    60 tablet    Take 1 tablet (5 mg) by mouth 2 times daily    Delirium, Multiple myeloma in relapse (H)       ondansetron 8 MG ODT tab    ZOFRAN-ODT    30 tablet    Take 1 tablet (8 mg) by mouth every 8 hours as needed for nausea    Nausea       potassium chloride SA 20 MEQ CR tablet    K-DUR/KLOR-CON M    60 tablet    TAKE 1 TABLET BY MOUTH TWICE DAILY.    Hypokalemia       simethicone 80 MG chewable tablet    MYLICON    180 tablet    Take 2 tablets (160 mg) by mouth 4 times daily as needed for cramping or other (gas pain)    Nausea       simvastatin 5 MG tablet    ZOCOR    30 tablet    Take 4 tablets (20 mg) by mouth daily    Mixed hyperlipidemia       sucralfate 1 GM tablet    CARAFATE    120 tablet    Take 1 tablet (1 g) by mouth 4 times daily as needed    Gastroesophageal reflux disease without esophagitis       traMADol 50 MG tablet    ULTRAM    60 tablet    Take 0.5-1 tablets (25-50 mg) by mouth every 6 hours as needed for moderate pain . Recommend trying after Tylenol and before Dilaudid.    Acute bilateral low back pain without sciatica, Multiple myeloma in relapse (H)       venetoclax 100 MG tablet CHEMOTHERAPY    VENCLEXTA    100 tablet    Take 8 tablets (800 mg) by mouth daily    Multiple myeloma in relapse (H)

## 2017-08-31 NOTE — PROGRESS NOTES
Infusion Nursing Note:  Anthony Carbone presents for 2 units PRBC    Note: pt feeling okay today, a little fatigued with his hgb being low, but other than that, no new issues or concerns to report.    Treatment Conditions:  Lab Results   Component Value Date    HGB 7.6 08/29/2017     Lab Results   Component Value Date    WBC 4.7 08/29/2017      Lab Results   Component Value Date    ANEU 3.9 08/29/2017     Lab Results   Component Value Date     08/29/2017      Lab Results   Component Value Date     08/29/2017                   Lab Results   Component Value Date    POTASSIUM 4.1 08/29/2017           Lab Results   Component Value Date    MAG 2.2 05/25/2017            Lab Results   Component Value Date    CR 0.99 08/29/2017                   Lab Results   Component Value Date    JAZMIN 8.9 08/29/2017                Lab Results   Component Value Date    BILITOTAL 0.6 08/29/2017           Lab Results   Component Value Date    ALBUMIN 3.1 08/29/2017                    Lab Results   Component Value Date    ALT 14 08/29/2017           Lab Results   Component Value Date    AST 10 08/29/2017     Results reviewed, labs MET treatment parameters, ok to proceed with treatment.  Blood transfusion consent signed 7/11/17.    Intravenous Access:  Implanted Port.    Post Infusion Assessment:  Patient tolerated infusion without incident.  Blood return noted pre and post infusion.  No evidence of extravasations.  Access discontinued per protocol.    Discharge Plan:   Patient declined prescription refills.  Discharge instructions reviewed with: Patient and Family.  Patient and/or family verbalized understanding of discharge instructions and all questions answered.  Copy of AVS reviewed with patient and/or family.  Patient will return 9/6 for next appointment.  Patient discharged in stable condition accompanied by: self and wife.    Cassandra Burkett RN

## 2017-08-31 NOTE — PATIENT INSTRUCTIONS
Contact numbers:  Triage Main Line: [phone]  After hours: [phone]    Call with chills and/or temperature greater than or equal to 100.5 and questions or concerns.    If after hours, weekends, or holidays, call main hospital  at  [phone] and ask for Oncology doctor on call.           August 2017 Sunday Monday Tuesday Wednesday Thursday Friday Saturday             1     2     3     4     5       6     7     8     9     [facility] D[facility]    4:30 PM   (15 min.)    [facility]     [facility] RETURN    5:00 PM   (30 min.)   Kamari Topete MD   [facility] Blood and Marrow Transplant 10     11     12       13     14     15     16     Outpatient Visit    8:01 AM   Kamari Topete MD   [facility]     LEVEL 1    2:30 PM   (60 min.)    INFUSION CHAIR 3   [facility] 17     18     19       20     21     22     23     24     25     26       27     28     29     [facility] DRAW    5:00 PM   (15 min.)    [facility] LAB DRAW   [facility] Draw     [facility] RETURN    5:35 PM   (50 min.)   Leanne Reddy PA-C   [facility] 30     [facility] DRAW    3:30 PM   (15 min.)    [facility] LAB DRAW   [facility] Draw 31     [facility] ONC INFUSION 360    8:30 AM   (360 min.)   [facility] ONCOLOGY INFUSION   [facility]                      September 2017 Sunday Monday Tuesday Wednesday Thursday Friday Saturday                            1     2       3     4     5     6     [facility] DRAW    8:15 AM   (15 min.)    [facility]     [facility] RETURN    8:35 AM   (50 min.)   Leanne Reddy PA-C   [facility] 7     8     9       10     11     12     13     14     15     16       17     18     19     20     21     PHACOEMULSIFICATION CLEAR CORNEA WITH STANDARD INTRAOCULAR LENS IMPLANT    2:10 PM   Tesfaye Yusuf,  MD   [facility] 22     23       24     25     26     27     [facility] D[facility]    4:30 PM   (15 min.)    [facility] LAB DRAW   [facility] Lab Draw     [facility] ON[facility] RETURN    5:00 PM   (30 min.)   Kamari Topete MD   [facility] Blood and Marrow Transplant 28     29     30                  Lab Results:  No results found for this or any previous visit (from the past 12 hour(s)).

## 2017-09-06 NOTE — MR AVS SNAPSHOT
After Visit Summary   9/6/2017    Anthony Carbone    MRN: [id number]           Patient Information     Date Of Birth          1945        Visit Information        Provider Department      9/6/2017 8:50 AM Leanne Reddy PA-C [facility]        Today's Diagnoses     Multiple myeloma in relapse (H)           Follow-ups after your visit        Your next 10 appointments already scheduled     Sep 21, 2017   Procedure with Tesfaye Yusuf MD   [facility] (--)    [address]  [facility] [address]   [phone]            Sep 27, 2017  4:30 PM CDT   [facility] Draw with  [facility]   [facility]    [address] [place] [id number] [phone]            Sep 27, 2017  5:00 PM CDT   RETURN ONC with Kamari Topete MD   [facility] and Marrow Transplant ([facility])    [address] [place] [id number] [phone]              Who to contact     If you have questions or need follow up information about today's clinic visit or your schedule please contact [facility] directly at [phone].  Normal or non-critical lab and imaging results will be communicated to you by Neptunehart, letter or phone within 4 business days after the clinic has received the results. If you do not hear from us within 7 days, please contact the clinic through MyChart or phone. If you have a critical or abnormal lab result, we will notify you by phone as soon as possible.  Submit refill requests through [employer] Logic or call your pharmacy and they will forward the refill request to us. Please allow 3 business days for your refill to be completed.          Additional Information About Your Visit        NeptuneharSaperion Information     [employer] gives you secure access to your electronic health record. If you  "see a primary care provider, you can also send messages to your care team and make appointments. If you have questions, please call your primary care clinic.  If you do not have a primary care provider, please call [phone] and they will assist you.        Care EveryWhere ID     This is your Care EveryWhere ID. This could be used by other organizations to access your [facility] medical records  [id number]        Your Vitals Were     Pulse Temperature Respirations Height Pulse Oximetry BMI (Body Mass Index)    84 98.3  F (36.8  C) (Oral) 18 1.594 m (5' 2.76\") 95% 22.14 kg/m2       Blood Pressure from Last 3 Encounters:   09/06/17 123/76   08/31/17 134/80   08/29/17 124/78    Weight from Last 3 Encounters:   09/06/17 56.2 kg (124 lb)   08/29/17 55.2 kg (121 lb 9.6 oz)   08/09/17 54.4 kg (120 lb)              We Performed the Following     CBC with platelets and differential     Comprehensive metabolic panel          Today's Medication Changes          These changes are accurate as of: 9/6/17  9:58 AM.  If you have any questions, ask your nurse or doctor.               These medicines have changed or have updated prescriptions.        Dose/Directions    OLANZapine 5 MG tablet   Commonly known as:  zyPREXA   This may have changed:  when to take this   Used for:  Delirium, Multiple myeloma in relapse (H)        Dose:  5 mg   Take 1 tablet (5 mg) by mouth 2 times daily   Quantity:  60 tablet   Refills:  0       traMADol 50 MG tablet   Commonly known as:  ULTRAM   This may have changed:    - how much to take  - additional instructions   Used for:  Acute bilateral low back pain without sciatica, Multiple myeloma in relapse (H)        Dose:  25-50 mg   Take 0.5-1 tablets (25-50 mg) by mouth every 6 hours as needed for moderate pain . Recommend trying after Tylenol and before Dilaudid.   Quantity:  60 tablet   Refills:  3                Primary Care Provider Office Phone # Fax #    Sanket Gualberto [phone] " [phone]       [facility] [address] [facility] [address]        Equal Access to Services     ALBAN SHAH : Hadii chalo Davis, wanadine ge, peesrinivas diazrobinabhijit abdi, jonny guevara haybecky pricepetrmartha villa. So [facility] [phone].    ATENCIÓN: Si habla español, tiene a todd disposición servicios gratuitos de asistencia lingüística. Llame al [phone].    We comply with applicable federal civil rights laws and [place] laws. We do not discriminate on the basis of race, color, national origin, age, disability sex, sexual orientation or gender identity.            Thank you!     Thank you for choosing [facility]  for your care. Our goal is always to provide you with excellent care. Hearing back from our patients is one way we can continue to improve our services. Please take a few minutes to complete the written survey that you may receive in the mail after your visit with us. Thank you!             Your Updated Medication List - Protect others around you: Learn how to safely use, store and throw away your medicines at www.disposemymeds.org.          This list is accurate as of: 9/6/17  9:58 AM.  Always use your most recent med list.                   Brand Name Dispense Instructions for use Diagnosis    acyclovir 400 MG tablet    ZOVIRAX    60 tablet    Take 1 tablet (400 mg) by mouth 2 times daily    Multiple myeloma in relapse (H)       amLODIPine 5 MG tablet    NORVASC    30 tablet    Take one tablet at bedtime.    Essential hypertension       clopidogrel 75 MG tablet    PLAVIX    90 tablet    TAKE 1 TABLET BY MOUTH EVERY DAY    History of stroke       DOCUSATE SODIUM PO      Take 100 mg by mouth 2 times daily as needed for constipation        esomeprazole 40 MG CR capsule    nexIUM    30 capsule    Take 1 capsule (40 mg) by mouth every morning (before breakfast)    Gastroesophageal reflux disease without esophagitis       HYDROmorphone 2 MG  tablet    DILAUDID    30 tablet    Take 0.5 tablets (1 mg) by mouth every 3 hours as needed for moderate to severe pain    Multiple myeloma in relapse (H), Acute bilateral low back pain without sciatica       lidocaine 5 % Patch    LIDODERM    30 patch    Place 3 patches onto the skin every 24 hours    Multiple myeloma in relapse (H), Acute bilateral low back pain without sciatica       LORazepam 0.5 MG tablet    ATIVAN    30 tablet    Take 1 tablet (0.5 mg) by mouth every 6 hours as needed for nausea or anxiety.    Multiple myeloma in relapse (H), Delirium       methylphenidate 5 MG tablet    RITALIN    15 tablet    Take 1 tablet (5 mg) by mouth 2 times daily On hold    Other fatigue       midodrine 2.5 MG tablet    PROAMATINE    90 tablet    Take 1 tablet (2.5 mg) by mouth 3 times daily . HOLD due to elevated blood pressures during admission. Continue to hold until further advised by outpatient clinic team or if develops recurrent orthostatic hypotension.    Orthostatic hypotension, Orthostatic syncope       mupirocin 2 % ointment    BACTROBAN     Apply topically daily as needed        OLANZapine 5 MG tablet    zyPREXA    60 tablet    Take 1 tablet (5 mg) by mouth 2 times daily    Delirium, Multiple myeloma in relapse (H)       ondansetron 8 MG ODT tab    ZOFRAN-ODT    30 tablet    Take 1 tablet (8 mg) by mouth every 8 hours as needed for nausea    Nausea       potassium chloride SA 20 MEQ CR tablet    K-DUR/KLOR-CON M    60 tablet    TAKE 1 TABLET BY MOUTH TWICE DAILY.    Hypokalemia       simethicone 80 MG chewable tablet    MYLICON    180 tablet    Take 2 tablets (160 mg) by mouth 4 times daily as needed for cramping or other (gas pain)    Nausea       simvastatin 5 MG tablet    ZOCOR    30 tablet    Take 4 tablets (20 mg) by mouth daily    Mixed hyperlipidemia       sucralfate 1 GM tablet    CARAFATE    120 tablet    Take 1 tablet (1 g) by mouth 4 times daily as needed    Gastroesophageal reflux disease  without esophagitis       traMADol 50 MG tablet    ULTRAM    60 tablet    Take 0.5-1 tablets (25-50 mg) by mouth every 6 hours as needed for moderate pain . Recommend trying after Tylenol and before Dilaudid.    Acute bilateral low back pain without sciatica, Multiple myeloma in relapse (H)       venetoclax 100 MG tablet CHEMOTHERAPY    VENCLEXTA    100 tablet    Take 8 tablets (800 mg) by mouth daily    Multiple myeloma in relapse (H)

## 2017-09-06 NOTE — NURSING NOTE
"Oncology Rooming Note    September 6, 2017 9:08 AM   Anthony Carbone is a 71 year old male who presents for:    Chief Complaint   Patient presents with     Port Draw     labs drawn     Oncology Clinic Visit     Multiple Myeloma F/U     Initial Vitals: /76  Pulse 84  Temp 98.3  F (36.8  C) (Oral)  Resp 18  Ht 1.594 m (5' 2.76\")  Wt 56.2 kg (124 lb)  SpO2 95%  BMI 22.14 kg/m2 Estimated body mass index is 22.14 kg/(m^2) as calculated from the following:    Height as of this encounter: 1.594 m (5' 2.76\").    Weight as of this encounter: 56.2 kg (124 lb). Body surface area is 1.58 meters squared.  No Pain (0) Comment: Data Unavailable   No LMP for male patient.  Allergies reviewed: Yes  Medications reviewed: Yes    Medications: MEDICATION REFILLS NEEDED TODAY. Provider was notified.  Pharmacy name entered into [facility]:    [id number] [place] [address] [facility] [address] [facility]  [employer] [place], [place] [address] [facility]  [facility] [id number] [address] [address] AT [facility] [address]    Clinical concerns: JEN+ Leanne Reddy was notified.    7 minutes for nursing intake (face to face time)     Elsy Ruiz LPN              "

## 2017-09-06 NOTE — PROGRESS NOTES
HEMATOLOGY/ONCOLOGY PROGRESS NOTE  Sep 6, 2017    REASON FOR VISIT: myeloma    Diagnosis: 71 year old gentleman with IgM lambda multiple myeloma originally diagnosed in 01/2012 as stage I, standard risk disease.   Treatment: Revlimid plus dexamethasone for 4-5 cycles but plateaued by late 03/2012.   - Velcade was added and he received another 2-3 cycles, achieving a good partial remission.      Transplant: Single auto after melphalan 200 mg/m2 preparative regimen on 01/09/2013  - Post-transplant course: unremarkable except for some mild steroid-induced hyperglycemia, gastritis, nausea and vomiting.      Maintenance: Lenalidomide at day-100 then developed a maculopapular rash. Lenalidomide was held and then we re-challenged him after about a month to 2 months at a lower dose (5 mg daily); rash returned.   - was started on maintenance Velcade, every other week through July 2014, when he was noted with abnormalities on myeloma studies drawn there. Noted with prostate cancer dx at about the time of relapse (see below).     Relapse: noted with return on M-spike in blood/urine with marrow involvement but negative PET.   - Started on retreatment with RVD on 8/27/14 with Revlimid at 15 mg 14 days of 21 days, dexamethasone 40 mg weekly and velcade weekly.Completed at total of 5 cycles without complication by 12/2014.   - Started Cycle 6 on inc'd Rev dosing of 20mg daily x 2 weeks on 12/11/14 which was complicated by pneumonia.  - Adm 12/21-1/10 for human metapneumovirus pneumonia complicated by anorexia, HTN, depression, anorexia with significant weight loss.   - Restarted Ernesto/Dex only on 2/4/15 with good tolerance but with thrombocytopenia.   - Bendamustine added to Velcade/dexamethasone on 5/21/15 due to disease progression  - Velcade discontinued on 7/9/2015 due to side effects (orthostasis)  - Schedule changed to bendamustine 80 mg/m2 days 1 and 2 on 28-day cycle  - Cycle 1 tolerated poorly due to lightheadedness and  "weakness  - Cycle 2 dose reduced to 60 mg/m2 and pre-meds adjusted  - Cycle 5 received on 9/17/15.  - 10/1/2015 - increasing IgM, M-spike - started Pomalidomide 4mg/day (21 days out of 28 days) and weekly Decadron 20mg on Oct 1st, 2015.    - Carfilzomib added on 10/22/2015 making this CPD.  - C3-C6  received in [place]    - Returned to [facility] and resumed CPD here    - Adm: 4/12-4/14 with fever, confusion, neutropenia. Noted on MRI to have acute/subacute CVA and subacute/chronic CVA; started on Plavix, given brief course of Abx and GCSF prior to d/c.     - Continued on Carfilzomib and dexamethasone alone  - Pomalyst added back on 7/13/2016 for rising FLC  - Start daratumumab 11/10/16. Changed to every other week after 4 weekly treatments d/t profound fatigue and malaise.   - Adm 5/7-5/16 due to AMS, hypercalcemia, hyperuricemia, possible PNA, back pain, and JOSE MARIA. Started Kyprolis while inpatient.  - Re-admitted 5/25-/529 with recurrent AMS, found to have concomitant PNA  - Resumed Kyprolis 6/13/2017. Stopped due to worsening performance status and progressive myeloma.  - Started Venclexta 800 mg 8/25/2017    INTERVAL HISTORY:    Mr. Carbone is here with his wife.     Since receiving the blood transfusion last week, he has felt much better overall and in terms of energy. By the next day, his energy was great. He reports it \"made a huge difference.\" Fatigue is really not a bother right now. He is tolerating the Venclexta well. He has noticed some mild dryness to his skin but no rashes. Mild occasional nausea, mostly doesn't need to use aynthing but has taken Zofran with good relief when needed. Back pain is actually better too, has managed to go longer stretches without the Tramadol without any worsening pain. No new pains. No fevers/chills/sweats, HA, vision changes, cough, congestion, sore throat, chest pain, SOB, GIEVNS, vomiting, abdominal pain, urinary changes, swelling, bleeding, rash.       PHYSICAL EXAMINATION  BP " "123/76  Pulse 84  Temp 98.3  F (36.8  C) (Oral)  Resp 18  Ht 1.594 m (5' 2.76\")  Wt 56.2 kg (124 lb)  SpO2 95%  BMI 22.14 kg/m2    Wt Readings from Last 10 Encounters:   17 56.2 kg (124 lb)   17 55.2 kg (121 lb 9.6 oz)   17 54.4 kg (120 lb)   17 56.2 kg (124 lb)   17 56 kg (123 lb 6.4 oz)   17 57.7 kg (127 lb 1.6 oz)   17 56 kg (123 lb 7.3 oz)   17 57.4 kg (126 lb 8 oz)   17 57.9 kg (127 lb 11.2 oz)   17 56 kg (123 lb 6.4 oz)   General: Alert. Oriented to name, , location, but unable to provide date. Able to ambulate independently, albeit slow and cautiously.  No acute distress. HEENT: PERRL, no palor or icterus. CVS: RRR with occasional premature beat. CHEST: CTAB, normal work of breathing ABDOMEN: soft non tender no masses     NEURO: AAOX3  CN 2-12 intact. Motor and sensation in tact. Strength 4/5 in upper and lower extremities. Gets to and from the exam table un assisted. SKIN: no bleeding or brusiing, no rashes EXTREMITIES: No edema.     LABS:   Results for WILLIAN ARCINIEGA (MRN [id number]) as of 2017 09:07   Ref. Range 2017 08:48   WBC Latest Ref Range: 4.0 - 11.0 10e9/L 2.5 (L)   Hemoglobin Latest Ref Range: 13.3 - 17.7 g/dL 10.2 (L)   Hematocrit Latest Ref Range: 40.0 - 53.0 % 32.3 (L)   Platelet Count Latest Ref Range: 150 - 450 10e9/L 126 (L)   Results for WILLIAN ARCINIEGA (MRN [id number]) as of 2017 09:28   Ref. Range 2017 08:48   Sodium Latest Ref Range: 133 - 144 mmol/L 138   Potassium Latest Ref Range: 3.4 - 5.3 mmol/L 4.0   Chloride Latest Ref Range: 94 - 109 mmol/L 103   Carbon Dioxide Latest Ref Range: 20 - 32 mmol/L 25   Urea Nitrogen Latest Ref Range: 7 - 30 mg/dL 17   Creatinine Latest Ref Range: 0.66 - 1.25 mg/dL 0.82   GFR Estimate Latest Ref Range: >60 mL/min/1.7m2 >90   GFR Estimate If Black Latest Ref Range: >60 mL/min/1.7m2 >90   Calcium Latest Ref Range: 8.5 - 10.1 mg/dL 9.5   Anion Gap " Latest Ref Range: 3 - 14 mmol/L 9   Albumin Latest Ref Range: 3.4 - 5.0 g/dL 3.0 (L)   Protein Total Latest Ref Range: 6.8 - 8.8 g/dL 8.7   Bilirubin Total Latest Ref Range: 0.2 - 1.3 mg/dL 0.5   Alkaline Phosphatase Latest Ref Range: 40 - 150 U/L 43   ALT Latest Ref Range: 0 - 70 U/L 13   AST Latest Ref Range: 0 - 45 U/L 10   Glucose Latest Ref Range: 70 - 99 mg/dL 123 (H)   Results for WILLIAN ARCINIEGA (MRN [id number]) as of 9/6/2017 08:14   Ref. Range 5/25/2017 14:40 7/11/2017 15:14 8/30/2017 15:53   Monoclonal Peak Latest Ref Range: 0.0 g/dL 1.9 (H) 2.4 (H) 2.8 (H)     IMPRESSION/PLAN:  71 year old male with relapsed MM after autologous transplant (1/9/2013), status-post multiple salvage regimens with progressive disease.    MM: Progressive disease on Kyprolis/dex. He met with Dr. Topete and hospice was discussed, but patient then started on Venclexta 800 mg daily on 8/25. He is tolerating this extremely well with mild occasional nausea and some mild dry skin, otherwise is actually feeling better on the medication than prior. Clinically, he has improved and laboratory-wise, I am hopeful that he may be responding given his rising hemoglobin (beyond what was expected with pRBC) and also declining total protein. Plan to continue the 800 mg dosing for now. He will see Dr. Topete in a few weeks unless interval concerns arise.    AMS: AMS started early may in setting of metabolic derangements, uremia, and possible infection. Initially resolved, then returned ~5/24. Work-up showing PNA and UCx with enterococcus and CONS. Remains intermittently confused, stable today.  - Continue Zyprexa 5 mg at bedtime  - Holding off long-acting pain meds     Fatigue: Recently a little worse in setting of anemia, improved dramatically with pRBC    Heme: Cytopenias 2/2 treatment and likely MM in setting of progression. Periodically needing pRBC, last given on 8/31 for hgb 7.6. Hemoglobin today is improved at 10.2, hopefully  this is a positive sign given that it is over the expected correction with 2 units.   - Continue to monitor labs weekly for now, should hemoglobin remain stable on Venclexta, can decrease frequency of checks  - On Plavix, hold for platelets < 50. Continue for now, pending his clinical course, may need to discuss just holding this all together as the potential harm may outweigh risk at some point  - Transfuse to keep hemoglobin > 8    Renal/FEN: Creat baseline ~0.8-1.0. Stable today. Weight a little better today.  -Encouraged protein/calorie supplements.      ID: Presently afebrile w/o any localizing infectious s/s  - Continues ppx  mg BID     Back/rib pain: Started early May. Lumbar MRI at OSH showing mild-mod central and foraminal stenoses in lumbar spine, per patient and wife, there was no MM lesion there. Rib films inpatient negative, back films stable from prior imaging. Pain has actually been better recenty  - Continues tramadol PRN and Tylenol PRN. No changes.      CV: Continue zocor and norvasc.   - Avoid QTc prolonging agents (history of QTc prolongation with syncopal episodes)       I spent >25 minutes with the patient, with over 50% of the time spent counseling or coordinating their care as described above.     Leanne Reddy PA-C

## 2017-09-06 NOTE — NURSING NOTE
Chief Complaint   Patient presents with     Port Draw     labs drawn     Port accessed using aseptic technique. Blood aspirated without difficulty. Labs drawn. Port flushed with 20cc 0.9% NS. Locked with 100U/ ml heparin (5 ml). Port de-accessed. Pressure and bandage applied. Pt tolerated well.   ARY Briceno, RN, BSN

## 2017-09-06 NOTE — LETTER
9/6/2017      RE: Anthony Carbone  [address] [place] [address]       HEMATOLOGY/ONCOLOGY PROGRESS NOTE  Sep 6, 2017    REASON FOR VISIT: myeloma    Diagnosis: 71 year old gentleman with IgM lambda multiple myeloma originally diagnosed in 01/2012 as stage I, standard risk disease.   Treatment: Revlimid plus dexamethasone for 4-5 cycles but plateaued by late 03/2012.   - Velcade was added and he received another 2-3 cycles, achieving a good partial remission.      Transplant: Single auto after melphalan 200 mg/m2 preparative regimen on 01/09/2013  - Post-transplant course: unremarkable except for some mild steroid-induced hyperglycemia, gastritis, nausea and vomiting.      Maintenance: Lenalidomide at day-100 then developed a maculopapular rash. Lenalidomide was held and then we re-challenged him after about a month to 2 months at a lower dose (5 mg daily); rash returned.   - was started on maintenance Velcade, every other week through July 2014, when he was noted with abnormalities on myeloma studies drawn there. Noted with prostate cancer dx at about the time of relapse (see below).     Relapse: noted with return on M-spike in blood/urine with marrow involvement but negative PET.   - Started on retreatment with RVD on 8/27/14 with Revlimid at 15 mg 14 days of 21 days, dexamethasone 40 mg weekly and velcade weekly.Completed at total of 5 cycles without complication by 12/2014.   - Started Cycle 6 on inc'd Rev dosing of 20mg daily x 2 weeks on 12/11/14 which was complicated by pneumonia.  - Adm 12/21-1/10 for human metapneumovirus pneumonia complicated by anorexia, HTN, depression, anorexia with significant weight loss.   - Restarted Ernesto/Dex only on 2/4/15 with good tolerance but with thrombocytopenia.   - Bendamustine added to Velcade/dexamethasone on 5/21/15 due to disease progression  - Velcade discontinued on 7/9/2015 due to side effects (orthostasis)  - Schedule changed to bendamustine 80 mg/m2  "days 1 and 2 on 28-day cycle  - Cycle 1 tolerated poorly due to lightheadedness and weakness  - Cycle 2 dose reduced to 60 mg/m2 and pre-meds adjusted  - Cycle 5 received on 9/17/15.  - 10/1/2015 - increasing IgM, M-spike - started Pomalidomide 4mg/day (21 days out of 28 days) and weekly Decadron 20mg on Oct 1st, 2015.    - Carfilzomib added on 10/22/2015 making this CPD.  - C3-C6  received in [place]    - Returned to [facility] and resumed CPD here    - Adm: 4/12-4/14 with fever, confusion, neutropenia. Noted on MRI to have acute/subacute CVA and subacute/chronic CVA; started on Plavix, given brief course of Abx and GCSF prior to d/c.     - Continued on Carfilzomib and dexamethasone alone  - Pomalyst added back on 7/13/2016 for rising FLC  - Start daratumumab 11/10/16. Changed to every other week after 4 weekly treatments d/t profound fatigue and malaise.   - Adm 5/7-5/16 due to AMS, hypercalcemia, hyperuricemia, possible PNA, back pain, and JOSE MARIA. Started Kyprolis while inpatient.  - Re-admitted 5/25-/529 with recurrent AMS, found to have concomitant PNA  - Resumed Kyprolis 6/13/2017. Stopped due to worsening performance status and progressive myeloma.  - Started Venclexta 800 mg 8/25/2017    INTERVAL HISTORY:    Mr. Carbone is here with his wife.     Since receiving the blood transfusion last week, he has felt much better overall and in terms of energy. By the next day, his energy was great. He reports it \"made a huge difference.\" Fatigue is really not a bother right now. He is tolerating the Venclexta well. He has noticed some mild dryness to his skin but no rashes. Mild occasional nausea, mostly doesn't need to use aynthing but has taken Zofran with good relief when needed. Back pain is actually better too, has managed to go longer stretches without the Tramadol without any worsening pain. No new pains. No fevers/chills/sweats, HA, vision changes, cough, congestion, sore throat, chest pain, SOB, GIVENS, vomiting, " "abdominal pain, urinary changes, swelling, bleeding, rash.       PHYSICAL EXAMINATION  /76  Pulse 84  Temp 98.3  F (36.8  C) (Oral)  Resp 18  Ht 1.594 m (5' 2.76\")  Wt 56.2 kg (124 lb)  SpO2 95%  BMI 22.14 kg/m2    Wt Readings from Last 10 Encounters:   17 56.2 kg (124 lb)   17 55.2 kg (121 lb 9.6 oz)   17 54.4 kg (120 lb)   17 56.2 kg (124 lb)   17 56 kg (123 lb 6.4 oz)   17 57.7 kg (127 lb 1.6 oz)   17 56 kg (123 lb 7.3 oz)   17 57.4 kg (126 lb 8 oz)   17 57.9 kg (127 lb 11.2 oz)   17 56 kg (123 lb 6.4 oz)   General: Alert. Oriented to name, , location, but unable to provide date. Able to ambulate independently, albeit slow and cautiously.  No acute distress. HEENT: PERRL, no palor or icterus. CVS: RRR with occasional premature beat. CHEST: CTAB, normal work of breathing ABDOMEN: soft non tender no masses     NEURO: AAOX3  CN 2-12 intact. Motor and sensation in tact. Strength 4/5 in upper and lower extremities. Gets to and from the exam table un assisted. SKIN: no bleeding or brusiing, no rashes EXTREMITIES: No edema.     LABS:   Results for WILLIAN ARCINIEGA (MRN [id number]) as of 2017 09:07   Ref. Range 2017 08:48   WBC Latest Ref Range: 4.0 - 11.0 10e9/L 2.5 (L)   Hemoglobin Latest Ref Range: 13.3 - 17.7 g/dL 10.2 (L)   Hematocrit Latest Ref Range: 40.0 - 53.0 % 32.3 (L)   Platelet Count Latest Ref Range: 150 - 450 10e9/L 126 (L)   Results for WILLIAN ARCINIEGA (MRN [id number]) as of 2017 09:28   Ref. Range 2017 08:48   Sodium Latest Ref Range: 133 - 144 mmol/L 138   Potassium Latest Ref Range: 3.4 - 5.3 mmol/L 4.0   Chloride Latest Ref Range: 94 - 109 mmol/L 103   Carbon Dioxide Latest Ref Range: 20 - 32 mmol/L 25   Urea Nitrogen Latest Ref Range: 7 - 30 mg/dL 17   Creatinine Latest Ref Range: 0.66 - 1.25 mg/dL 0.82   GFR Estimate Latest Ref Range: >60 mL/min/1.7m2 >90   GFR Estimate If Black Latest Ref " Range: >60 mL/min/1.7m2 >90   Calcium Latest Ref Range: 8.5 - 10.1 mg/dL 9.5   Anion Gap Latest Ref Range: 3 - 14 mmol/L 9   Albumin Latest Ref Range: 3.4 - 5.0 g/dL 3.0 (L)   Protein Total Latest Ref Range: 6.8 - 8.8 g/dL 8.7   Bilirubin Total Latest Ref Range: 0.2 - 1.3 mg/dL 0.5   Alkaline Phosphatase Latest Ref Range: 40 - 150 U/L 43   ALT Latest Ref Range: 0 - 70 U/L 13   AST Latest Ref Range: 0 - 45 U/L 10   Glucose Latest Ref Range: 70 - 99 mg/dL 123 (H)   Results for WILLIAN ARCINIEGA (MRN [id number]) as of 9/6/2017 08:14   Ref. Range 5/25/2017 14:40 7/11/2017 15:14 8/30/2017 15:53   Monoclonal Peak Latest Ref Range: 0.0 g/dL 1.9 (H) 2.4 (H) 2.8 (H)     IMPRESSION/PLAN:  71 year old male with relapsed MM after autologous transplant (1/9/2013), status-post multiple salvage regimens with progressive disease.    MM: Progressive disease on Kyprolis/dex. He met with Dr. Topete and hospice was discussed, but patient then started on Venclexta 800 mg daily on 8/25. He is tolerating this extremely well with mild occasional nausea and some mild dry skin, otherwise is actually feeling better on the medication than prior. Clinically, he has improved and laboratory-wise, I am hopeful that he may be responding given his rising hemoglobin (beyond what was expected with pRBC) and also declining total protein. Plan to continue the 800 mg dosing for now. He will see Dr. Topete in a few weeks unless interval concerns arise.    AMS: AMS started early may in setting of metabolic derangements, uremia, and possible infection. Initially resolved, then returned ~5/24. Work-up showing PNA and UCx with enterococcus and CONS. Remains intermittently confused, stable today.  - Continue Zyprexa 5 mg at bedtime  - Holding off long-acting pain meds     Fatigue: Recently a little worse in setting of anemia, improved dramatically with pRBC    Heme: Cytopenias 2/2 treatment and likely MM in setting of progression. Periodically needing  pRBC, last given on 8/31 for hgb 7.6. Hemoglobin today is improved at 10.2, hopefully this is a positive sign given that it is over the expected correction with 2 units.   - Continue to monitor labs weekly for now, should hemoglobin remain stable on Venclexta, can decrease frequency of checks  - On Plavix, hold for platelets < 50. Continue for now, pending his clinical course, may need to discuss just holding this all together as the potential harm may outweigh risk at some point  - Transfuse to keep hemoglobin > 8    Renal/FEN: Creat baseline ~0.8-1.0. Stable today. Weight a little better today.  -Encouraged protein/calorie supplements.      ID: Presently afebrile w/o any localizing infectious s/s  - Continues ppx  mg BID     Back/rib pain: Started early May. Lumbar MRI at OSH showing mild-mod central and foraminal stenoses in lumbar spine, per patient and wife, there was no MM lesion there. Rib films inpatient negative, back films stable from prior imaging. Pain has actually been better recenty  - Continues tramadol PRN and Tylenol PRN. No changes.      CV: Continue zocor and norvasc.   - Avoid QTc prolonging agents (history of QTc prolongation with syncopal episodes)       I spent >25 minutes with the patient, with over 50% of the time spent counseling or coordinating their care as described above.     Leanne Reddy PA-C

## 2017-09-21 NOTE — ANESTHESIA POSTPROCEDURE EVALUATION
Patient: Anthony Carbone    Procedure(s):  RIGHT EYE PHACOEMULSIFICATION CLEAR CORNEA WITH STANDARD INTRAOCULAR LENS IMPLANT - Wound Class: I-Clean    Diagnosis:RIGHT EYE CATARACT  Diagnosis Additional Information: No value filed.    Anesthesia Type:  MAC    Note:  Anesthesia Post Evaluation    Patient location during evaluation: PACU  Patient participation: Able to fully participate in evaluation  Level of consciousness: awake  Pain management: adequate  Airway patency: patent  Cardiovascular status: acceptable  Respiratory status: acceptable  Hydration status: acceptable  PONV: none     Anesthetic complications: None          Last vitals:  Vitals:    09/21/17 1327 09/21/17 1412 09/21/17 1416   BP: (!) 145/91 (!) 128/91 130/82   Resp: 16 16 16   Temp: 36.4  C (97.6  F)     SpO2: 94% 95% 94%         Electronically Signed By: Taqueria Tristan MD  September 21, 2017  4:32 PM

## 2017-09-21 NOTE — IP AVS SNAPSHOT
MRN:[id number]                      After Visit Summary   9/21/2017    Anthony Carbone    MRN: [id number]           Thank you!     Thank you for choosing [facility] for your care. Our goal is always to provide you with excellent care. Hearing back from our patients is one way we can continue to improve our services. Please take a few minutes to complete the written survey that you may receive in the mail after you visit with us. Thank you!        Patient Information     Date Of Birth          1945        About your hospital stay     You were admitted on:  September 21, 2017 You last received care in the:  [facility]    You were discharged on:  September 21, 2017       Who to Call     For medical emergencies, please call 911.  For non-urgent questions about your medical care, please call your primary care provider or clinic, [phone]  For questions related to your surgery, please call your surgery clinic        Attending Provider     Provider Specialty    Tesfaye Yusuf MD Surgery       Primary Care Provider Office Phone # Fax #    Sanket Burciaga [phone]      Your next 10 appointments already scheduled     Sep 27, 2017  4:30 PM CDT   [facility] Draw with  [facility]   [facility] Draw ([facility])    [address] [place] [id number] [phone]            Sep 27, 2017  5:00 PM CDT   RETURN ONC with Kamari Topete MD   [facility] Blood and Marrow Transplant ([facility])    [address] [place] [id number] [phone]            Sep 29, 2017  1:00 PM CDT   TELEMEDICINE with Adina Medrano [facility] ([facility])    [address] [place] [id number] [phone]           Note: this is not an onsite visit; there is no need to come to  the facility.              Further instructions from your care team       [facility] Discharge  Instructions  Anesthesia (Eye Care Center)   Adult Discharge Instructions    For 24 hours after surgery    1. Get plenty of rest.  Make arrangements to have a responsible adult stay with you for at least 6 hours after you leave the hospital.  2. Do not drive or use heavy equipment for 24 hours.    3. Do not drink alcohol for 24 hours.  4. Do not sign legal documents or make important decisions for 24 hours.  5. Avoid strenuous or risky activities. You may feel lightheaded.  If so, sit for a few minutes before standing.  Have someone help you get up.   6. Conscious sedation patients may resume a regular diet..  7. Any questions of medical nature, call your physician.        POST-OPERATIVE CARE FOLLOWING CATARACT SURGERY    DR. NEELAM HONEYCUTT  [facility]  [phone]    Postoperative medications: After surgery, you will use several different eye drop medications. You may have either the brand specific form or generic of each type used.     Begin your eye drops today as directed.  You should instill the drop, close the eye without blinking and keep closed for 3 minutes allowing the drop to absorb.  It is fine to instill all 3 of the drops consecutively, waiting the 3 minutes in between each one. Place the shield for sleep.  In the morning, instill 1 drop of all 3 eye drops before your post-op appointment.      Antibiotic  Moxeza - is an antibiotic drop that is used to minimize the risk of infection. Instill your first drop at bedtime tonight, then 2 times daily for one week.         Anti-inflammatory   Prolensa - use it once daily until gone, beginning today.    Steroid  Durezol - is a steroid drop used to minimize inflammation and modulate healing.  It should be used 2 times daily until gone, beginning with your first drop at bedtime tonight.          Do not rub the operated eye.  "Wear the eye shield during sleeping hours for one week.      Light sensitivity may be noticed. Sunglasses may be worn for comfort.      Some discomfort and irritation may be noticed. Acetaminophen (Tylenol) or Ibuprofen (Advil) may be taken for discomfort.      Avoid bending over, strenuous activity or heavy lifting for one week.      Keep the operated eye dry.      You may wash your hair, bathe or shower, but keep the operated eye closed while doing so.      Use medication exactly as prescribed by your doctor.  You may restart your regular home medications.      Bring all materials and medications to the clinic on your first post-operative visit.      Call the doctor s office if any of the following should occur:  -  any sudden vision change  -  nausea or severe headache  -  increase in pain not controlled  -  increased amount of floaters (black spots in front of vision)         -  or signs of infection (pus, increasing redness or tenderness)            Revised 12/10/2015    Pending Results     No orders found from 9/19/2017 to 9/22/2017.            Admission Information     Date & Time Provider Department Dept. Phone    9/21/2017 Tesfaye Yusuf MD [facility] [phone]      Your Vitals Were     Blood Pressure Temperature Respirations Height Weight Pulse Oximetry    130/82 97.6  F (36.4  C) (Temporal) 16 1.594 m (5' 2.75\") 56 kg (123 lb 6.4 oz) 94%    BMI (Body Mass Index)                   22.03 kg/m2           MyChart Information     [employer] gives you secure access to your electronic health record. If you see a primary care provider, you can also send messages to your care team and make appointments. If you have questions, please call your primary care clinic.  If you do not have a primary care provider, please call [phone] and they will assist you.        Care EveryWhere ID     This is your Care EveryWhere ID. This could be used by other organizations to access your [place] " medical records  [id number]        Equal Access to Services     ALBAN SHAH : Hadii chalo kern davidluh Soemilieali, waaxda luqadaha, qaybta karobinabhijit abdi, jonny pricepetrmartha villa. So [facility] [phone].    ATENCIÓN: Si habla español, tiene a todd disposición servicios gratuitos de asistencia lingüística. Llame al [phone].    We comply with applicable federal civil rights laws and [place] laws. We do not discriminate on the basis of race, color, national origin, age, disability sex, sexual orientation or gender identity.               Review of your medicines      UNREVIEWED medicines. Ask your doctor about these medicines        Dose / Directions    acyclovir 400 MG tablet   Commonly known as:  ZOVIRAX   Indication:  Unsure of dose   Used for:  Multiple myeloma in relapse (H)        Dose:  400 mg   Take 1 tablet (400 mg) by mouth 2 times daily   Quantity:  60 tablet   Refills:  3       * amLODIPine 5 MG tablet   Commonly known as:  NORVASC   Used for:  Essential hypertension        Take one tablet at bedtime.   Quantity:  30 tablet   Refills:  0       * amLODIPine 5 MG tablet   Commonly known as:  NORVASC   Used for:  Essential hypertension        TAKE 1 TABLET BY MOUTH AT BEDTIME   Quantity:  90 tablet   Refills:  1       clopidogrel 75 MG tablet   Commonly known as:  PLAVIX   Used for:  History of stroke        TAKE 1 TABLET BY MOUTH EVERY DAY   Quantity:  90 tablet   Refills:  0       DOCUSATE SODIUM PO        Dose:  100 mg   Take 100 mg by mouth 2 times daily as needed for constipation   Refills:  0       esomeprazole 40 MG CR capsule   Commonly known as:  nexIUM   Used for:  Gastroesophageal reflux disease without esophagitis        Dose:  40 mg   Take 1 capsule (40 mg) by mouth every morning (before breakfast)   Quantity:  30 capsule   Refills:  0       HYDROmorphone 2 MG tablet   Commonly known as:  DILAUDID   Used for:  Multiple myeloma in relapse (H), Acute bilateral low back pain  without sciatica        Dose:  1 mg   Take 0.5 tablets (1 mg) by mouth every 3 hours as needed for moderate to severe pain   Quantity:  30 tablet   Refills:  0       lidocaine 5 % Patch   Commonly known as:  LIDODERM   Used for:  Multiple myeloma in relapse (H), Acute bilateral low back pain without sciatica        Dose:  3 patch   Place 3 patches onto the skin every 24 hours   Quantity:  30 patch   Refills:  0       LORazepam 0.5 MG tablet   Commonly known as:  ATIVAN   Used for:  Multiple myeloma in relapse (H), Delirium        Dose:  0.5 mg   Take 1 tablet (0.5 mg) by mouth every 6 hours as needed for nausea or anxiety.   Quantity:  30 tablet   Refills:  0       methylphenidate 5 MG tablet   Commonly known as:  RITALIN   Used for:  Other fatigue        Dose:  5 mg   Take 1 tablet (5 mg) by mouth 2 times daily On hold   Quantity:  15 tablet   Refills:  0       midodrine 2.5 MG tablet   Commonly known as:  PROAMATINE   Used for:  Orthostatic hypotension, Orthostatic syncope        Dose:  2.5 mg   Take 1 tablet (2.5 mg) by mouth 3 times daily . HOLD due to elevated blood pressures during admission. Continue to hold until further advised by outpatient clinic team or if develops recurrent orthostatic hypotension.   Quantity:  90 tablet   Refills:  3       mupirocin 2 % ointment   Commonly known as:  BACTROBAN        Apply topically daily as needed   Refills:  0       OLANZapine 5 MG tablet   Commonly known as:  zyPREXA   Used for:  Delirium, Multiple myeloma in relapse (H)        Dose:  5 mg   Take 1 tablet (5 mg) by mouth 2 times daily   Quantity:  60 tablet   Refills:  0       ondansetron 8 MG ODT tab   Commonly known as:  ZOFRAN-ODT   Used for:  Nausea        Dose:  8 mg   Take 1 tablet (8 mg) by mouth every 8 hours as needed for nausea   Quantity:  30 tablet   Refills:  3       potassium chloride SA 20 MEQ CR tablet   Commonly known as:  K-DUR/KLOR-CON M   Used for:  Hypokalemia        TAKE 1 TABLET BY MOUTH TWICE  DAILY.   Quantity:  60 tablet   Refills:  0       simethicone 80 MG chewable tablet   Commonly known as:  MYLICON   Used for:  Nausea        Dose:  160 mg   Take 2 tablets (160 mg) by mouth 4 times daily as needed for cramping or other (gas pain)   Quantity:  180 tablet   Refills:  0       simvastatin 5 MG tablet   Commonly known as:  ZOCOR   Used for:  Mixed hyperlipidemia        Dose:  20 mg   Take 4 tablets (20 mg) by mouth daily   Quantity:  30 tablet   Refills:  0       sucralfate 1 GM tablet   Commonly known as:  CARAFATE   Used for:  Gastroesophageal reflux disease without esophagitis        Dose:  1 g   Take 1 tablet (1 g) by mouth 4 times daily as needed   Quantity:  120 tablet   Refills:  1       traMADol 50 MG tablet   Commonly known as:  ULTRAM   Used for:  Acute bilateral low back pain without sciatica, Multiple myeloma in relapse (H)        Dose:  25-50 mg   Take 0.5-1 tablets (25-50 mg) by mouth every 6 hours as needed for moderate pain . Recommend trying after Tylenol and before Dilaudid.   Quantity:  60 tablet   Refills:  3       venetoclax 100 MG tablet CHEMOTHERAPY   Commonly known as:  VENCLEXTA   Used for:  Multiple myeloma in relapse (H)        Dose:  800 mg   Take 8 tablets (800 mg) by mouth daily   Quantity:  100 tablet   Refills:  3       * Notice:  This list has 2 medication(s) that are the same as other medications prescribed for you. Read the directions carefully, and ask your doctor or other care provider to review them with you.             Protect others around you: Learn how to safely use, store and throw away your medicines at www.disposemymeds.org.             Medication List: This is a list of all your medications and when to take them. Check marks below indicate your daily home schedule. Keep this list as a reference.      Medications           Morning Afternoon Evening Bedtime As Needed    acyclovir 400 MG tablet   Commonly known as:  ZOVIRAX   Take 1 tablet (400 mg) by mouth 2  times daily                                * amLODIPine 5 MG tablet   Commonly known as:  NORVASC   Take one tablet at bedtime.                                * amLODIPine 5 MG tablet   Commonly known as:  NORVASC   TAKE 1 TABLET BY MOUTH AT BEDTIME                                clopidogrel 75 MG tablet   Commonly known as:  PLAVIX   TAKE 1 TABLET BY MOUTH EVERY DAY                                DOCUSATE SODIUM PO   Take 100 mg by mouth 2 times daily as needed for constipation                                esomeprazole 40 MG CR capsule   Commonly known as:  nexIUM   Take 1 capsule (40 mg) by mouth every morning (before breakfast)                                HYDROmorphone 2 MG tablet   Commonly known as:  DILAUDID   Take 0.5 tablets (1 mg) by mouth every 3 hours as needed for moderate to severe pain                                lidocaine 5 % Patch   Commonly known as:  LIDODERM   Place 3 patches onto the skin every 24 hours                                LORazepam 0.5 MG tablet   Commonly known as:  ATIVAN   Take 1 tablet (0.5 mg) by mouth every 6 hours as needed for nausea or anxiety.                                methylphenidate 5 MG tablet   Commonly known as:  RITALIN   Take 1 tablet (5 mg) by mouth 2 times daily On hold                                midodrine 2.5 MG tablet   Commonly known as:  PROAMATINE   Take 1 tablet (2.5 mg) by mouth 3 times daily . HOLD due to elevated blood pressures during admission. Continue to hold until further advised by outpatient clinic team or if develops recurrent orthostatic hypotension.                                mupirocin 2 % ointment   Commonly known as:  BACTROBAN   Apply topically daily as needed                                OLANZapine 5 MG tablet   Commonly known as:  zyPREXA   Take 1 tablet (5 mg) by mouth 2 times daily                                ondansetron 8 MG ODT tab   Commonly known as:  ZOFRAN-ODT   Take 1 tablet (8 mg) by mouth every 8 hours as  needed for nausea                                potassium chloride SA 20 MEQ CR tablet   Commonly known as:  K-DUR/KLOR-CON M   TAKE 1 TABLET BY MOUTH TWICE DAILY.                                simethicone 80 MG chewable tablet   Commonly known as:  MYLICON   Take 2 tablets (160 mg) by mouth 4 times daily as needed for cramping or other (gas pain)                                simvastatin 5 MG tablet   Commonly known as:  ZOCOR   Take 4 tablets (20 mg) by mouth daily                                sucralfate 1 GM tablet   Commonly known as:  CARAFATE   Take 1 tablet (1 g) by mouth 4 times daily as needed                                traMADol 50 MG tablet   Commonly known as:  ULTRAM   Take 0.5-1 tablets (25-50 mg) by mouth every 6 hours as needed for moderate pain . Recommend trying after Tylenol and before Dilaudid.                                venetoclax 100 MG tablet CHEMOTHERAPY   Commonly known as:  VENCLEXTA   Take 8 tablets (800 mg) by mouth daily                                * Notice:  This list has 2 medication(s) that are the same as other medications prescribed for you. Read the directions carefully, and ask your doctor or other care provider to review them with you.

## 2017-09-21 NOTE — ANESTHESIA CARE TRANSFER NOTE
Patient: Anthony Carbone    Procedure(s):  RIGHT EYE PHACOEMULSIFICATION CLEAR CORNEA WITH STANDARD INTRAOCULAR LENS IMPLANT - Wound Class: I-Clean    Diagnosis: RIGHT EYE CATARACT  Diagnosis Additional Information: No value filed.    Anesthesia Type:   MAC     Note:  Airway :Room Air  Patient transferred to:PACU  Comments: Uneventful transport to [facility]  IV patent  Pt responds appropriately to command  Pt comfortable  Report to RN      Vitals: (Last set prior to Anesthesia Care Transfer)    CRNA VITALS  9/21/2017 1340 - 9/21/2017 1414      9/21/2017             Ht Rate: 68    SpO2: 100 %    Resp Rate (set): 10                Electronically Signed By: ARIANA DICKENS CRNA  September 21, 2017  2:14 PM

## 2017-09-21 NOTE — ANESTHESIA PREPROCEDURE EVALUATION
Anesthesia Evaluation     . Pt has had prior anesthetic.     No history of anesthetic complications          ROS/MED HX    ENT/Pulmonary:      (-) sleep apnea   Neurologic:     (+)CVA without deficits    Cardiovascular: Comment: Orthostatic hypotension    (+) hypertension--CAD, --. : . . fainting (syncope). :. Irregular Heartbeat/Palpitations, .       METS/Exercise Tolerance:     Hematologic: Comments: Multiple myeloma)        Musculoskeletal:         GI/Hepatic:     (+) GERD Asymptomatic on medication,       Renal/Genitourinary:         Endo:      (-) Type I DM   Psychiatric:         Infectious Disease:         Malignancy:         Other:                     Physical Exam  Normal systems: dental    Airway   Mallampati: III  TM distance: >3 FB  Neck ROM: full    Dental     Cardiovascular   Rhythm and rate: regular and normal      Pulmonary    breath sounds clear to auscultation                    Anesthesia Plan      History & Physical Review  History and physical reviewed and following examination; no interval change.    ASA Status:  3 .    NPO Status:  > 8 hours    Plan for MAC with Intravenous induction. Reason for MAC:  Procedure to face, neck, head or breast  PONV prophylaxis:  Ondansetron (or other 5HT-3)  percedex      Postoperative Care      Consents  Anesthetic plan, risks, benefits and alternatives discussed with:  Patient..                          .

## 2017-09-21 NOTE — OP NOTE
[facility]  Ophthalmology Operative Note  PRE-OPERATIVE DIAGNOSIS: Cataract of the Right eye.   POST-OPERATIVE DIAGNOSIS: Pseudophakia of the Right eye.   OPERATION: Phacoemulsification with posterior chamber intraocular lens Right eye.   SURGEON: NEELAM HONEYCUTT MD  ANESTHESIA: Combined MAC with Topical  ESTIMATED BLOOD LOSS: Less than 1 cc   COMPLICATIONS: None.   INDICATIONS FOR OPERATION: Prior to the procedure being undertaken, risks, benefits and alternatives were discussed fully with Anthony Carbone. All of the patient's questions were answered to his satisfaction and he agreed to go through with the surgery. His best corrected visual acuity in the Right eye prior to surgery was approximately 20/60.   OPERATIVE PROCEDURE: The patient was given dilation and anesthetic jelly to the Right eye in the preoperative holding area. The patient was then brought into the operating suite and the right eye was prepped and draped in the usual sterile manner. A paracentesis tract was made superotemporally and the anterior chamber was filled with methylparaben free lidocaine. Next, the anterior chamber was filled with Viscoat.   A clear corneal incision was made temporally using a 2.4 mm slit knife and a continuous tear capsulorrhexis was performed. The nucleus was hydrodissected and removed with phacoemulsification. The remaining cortex was removed with irrigation and aspiration. The posterior capsule was polished.   Next, the capsular bag was filled with Provisc and an intraocular lens was injected into the capsular bag. The lens centered nicely. The viscoelastic was then removed with irrigation and aspiration. The anterior chamber was filled with BSS. The wounds were checked and found to be water tight. The eye was palpated and felt to be of normal eye pressure. One drop each of Durezol, Moxeza, and Prolensa were instilled into the right eye. The eye was shielded and the patient left the  operating room in good condition.       Implant Name Type Inv. Item Serial No.  Lot No. LRB No. Used   EYE IMP IOL RENATA PCL TECNIS [id number] 21.5 Lens/Eye Implant EYE IMP IOL RENATA PCL TECNIS ZCB00 21.5 [id number] [employer]   Right 1       Tesfaye Yusuf MD

## 2017-09-21 NOTE — DISCHARGE INSTRUCTIONS
[facility]esthesia [facility] Discharge  Instructions  Anesthesia (Eye Care Center)   Adult Discharge Instructions    For 24 hours after surgery    1. Get plenty of rest.  Make arrangements to have a responsible adult stay with you for at least 6 hours after you leave the hospital.  2. Do not drive or use heavy equipment for 24 hours.    3. Do not drink alcohol for 24 hours.  4. Do not sign legal documents or make important decisions for 24 hours.  5. Avoid strenuous or risky activities. You may feel lightheaded.  If so, sit for a few minutes before standing.  Have someone help you get up.   6. Conscious sedation patients may resume a regular diet..  7. Any questions of medical nature, call your physician.        POST-OPERATIVE CARE FOLLOWING CATARACT SURGERY    DR. NEELAM HONEYCUTT  [facility]  [phone]    Postoperative medications: After surgery, you will use several different eye drop medications. You may have either the brand specific form or generic of each type used.     Begin your eye drops today as directed.  You should instill the drop, close the eye without blinking and keep closed for 3 minutes allowing the drop to absorb.  It is fine to instill all 3 of the drops consecutively, waiting the 3 minutes in between each one. Place the shield for sleep.  In the morning, instill 1 drop of all 3 eye drops before your post-op appointment.      Antibiotic  Moxeza - is an antibiotic drop that is used to minimize the risk of infection. Instill your first drop at bedtime tonight, then 2 times daily for one week.         Anti-inflammatory   Prolensa - use it once daily until gone, beginning today.    Steroid  Durezol - is a steroid drop used to minimize inflammation and modulate healing.  It should be used 2 times daily until gone, beginning with your first drop at bedtime tonight.          Do not rub the operated eye. Wear the eye shield during sleeping hours for one week.      Light  sensitivity may be noticed. Sunglasses may be worn for comfort.      Some discomfort and irritation may be noticed. Acetaminophen (Tylenol) or Ibuprofen (Advil) may be taken for discomfort.      Avoid bending over, strenuous activity or heavy lifting for one week.      Keep the operated eye dry.      You may wash your hair, bathe or shower, but keep the operated eye closed while doing so.      Use medication exactly as prescribed by your doctor.  You may restart your regular home medications.      Bring all materials and medications to the clinic on your first post-operative visit.      Call the doctor s office if any of the following should occur:  -  any sudden vision change  -  nausea or severe headache  -  increase in pain not controlled  -  increased amount of floaters (black spots in front of vision)         -  or signs of infection (pus, increasing redness or tenderness)            Revised 12/10/2015

## 2017-09-21 NOTE — IP AVS SNAPSHOT
[facility]    [address]    [place] [address]    Phone:  [phone]    Fax:  [phone]                                       After Visit Summary   9/21/2017    Anthony Carbone    MRN: [id number]           After Visit Summary Signature Page     I have received my discharge instructions, and my questions have been answered. I have discussed any challenges I see with this plan with the nurse or doctor.    ..........................................................................................................................................  Patient/Patient Representative Signature      ..........................................................................................................................................  Patient Representative Print Name and Relationship to Patient    ..................................................               ................................................  Date                                            Time    ..........................................................................................................................................  Reviewed by Signature/Title    ...................................................              ..............................................  Date                                                            Time

## 2017-09-27 NOTE — MR AVS SNAPSHOT
After Visit Summary   9/27/2017    Anthony Carbone    MRN: [id number]           Patient Information     Date Of Birth          1945        Visit Information        Provider Department      9/27/2017 5:00 PM Kamari Topete MD [facility] Blood and Marrow Transplant        Today's Diagnoses     Multiple myeloma in relapse (H)    -  1    Encounter for long-term current use of medication        Delirium              [facility] ([facility])  [address] [place] [id number]  Phone: [phone]  Clinic Hours:   Monday-Thursday:7am to 7pm   Friday: 7am to 5pm   Weekends and holidays:    8am to noon (in general)  If your fever is 100.5  or greater,   call the clinic.  After hours call the   hospital at [phone] or   [phone]. Ask for the BMT   fellow on-call            Follow-ups after your visit        Your next 10 appointments already scheduled     Oct 03, 2017  2:30 PM CDT   [facility] Lab Draw with UC MASONIC LAB DRAW   [facility] ([facility])    [address] [place] [address]   [phone]            Oct 03, 2017  3:00 PM CDT   Infusion 60 with UC ONCOLOGY INFUSION, UC 15 ATC   [facility] ([facility])    [address] [place] [address]   [phone]            Oct 19, 2017 10:15 AM CDT   Masonic Lab Draw with UC MASONIC LAB DRAW   [facility] Masonic Lab Draw ([facility])    [address] [place] [address]   [phone]            Oct 19, 2017 10:50 AM CDT   (Arrive by 10:35 AM)   Return Visit with Leanne Reddy PA-C   [facility] ([facility])    [address] [place] [address]   [phone]            Nov 08, 2017 11:00 AM CST   M[facility]ic Lab Draw with UC MASONIC LAB DRAW   [facility]  Lab Draw ([facility])    [address]  [address] [facility] [address] [phone]            Nov 08, 2017 11:30 AM CST   RETURN ONC with UC BMT DOM   [facility] Blood and Marrow Transplant ([facility])    [address]  [address] [facility] [address] [phone]              Who to contact     If you have questions or need follow up information about today's clinic visit or your schedule please contact [facility] BLOOD AND MARROW TRANSPLANT directly at [phone].  Normal or non-critical lab and imaging results will be communicated to you by XINTEChart, letter or phone within 4 business days after the clinic has received the results. If you do not hear from us within 7 days, please contact the clinic through BioMaxt or phone. If you have a critical or abnormal lab result, we will notify you by phone as soon as possible.  Submit refill requests through Bomberbot or call your pharmacy and they will forward the refill request to us. Please allow 3 business days for your refill to be completed.          Additional Information About Your Visit        MyChart Information     Bomberbot gives you secure access to your electronic health record. If you see a primary care provider, you can also send messages to your care team and make appointments. If you have questions, please call your primary care clinic.  If you do not have a primary care provider, please call [phone] and they will assist you.        Care EveryWhere ID     This is your Care EveryWhere ID. This could be used by other organizations to access your [place] medical records  [id number]        Your Vitals Were     Pulse Temperature Pulse Oximetry BMI (Body Mass Index)          76 98.1  F (36.7  C) (Oral) 94% 22.36 kg/m2         Blood Pressure from Last 3 Encounters:   09/27/17 128/76   09/21/17 130/82   09/06/17 123/76    Weight from Last 3 Encounters:   09/27/17 56.8 kg (125 lb 3.2  oz)   09/20/17 56 kg (123 lb 6.4 oz)   09/06/17 56.2 kg (124 lb)              We Performed the Following     CBC with platelets and differential     Comprehensive metabolic panel     IgM     Protein electrophoresis          Today's Medication Changes          These changes are accurate as of: 9/27/17 11:59 PM.  If you have any questions, ask your nurse or doctor.               These medicines have changed or have updated prescriptions.        Dose/Directions    amLODIPine 5 MG tablet   Commonly known as:  NORVASC   This may have changed:  Another medication with the same name was removed. Continue taking this medication, and follow the directions you see here.   Used for:  Essential hypertension   Changed by:  Dmitri Banks MD        TAKE 1 TABLET BY MOUTH AT BEDTIME   Quantity:  90 tablet   Refills:  1       OLANZapine 5 MG tablet   Commonly known as:  zyPREXA   This may have changed:  when to take this   Used for:  Delirium, Multiple myeloma in relapse (H)        Dose:  5 mg   Take 1 tablet (5 mg) by mouth 2 times daily   Quantity:  60 tablet   Refills:  0       traMADol 50 MG tablet   Commonly known as:  ULTRAM   This may have changed:    - how much to take  - additional instructions   Used for:  Acute bilateral low back pain without sciatica, Multiple myeloma in relapse (H)        Dose:  25-50 mg   Take 0.5-1 tablets (25-50 mg) by mouth every 6 hours as needed for moderate pain . Recommend trying after Tylenol and before Dilaudid.   Quantity:  60 tablet   Refills:  3         Stop taking these medicines if you haven't already. Please contact your care team if you have questions.     lidocaine 5 % Patch   Commonly known as:  LIDODERM   Stopped by:  Kamari Topete MD           methylphenidate 5 MG tablet   Commonly known as:  RITALIN   Stopped by:  Kamari Topete MD           midodrine 2.5 MG tablet   Commonly known as:  PROAMATINE   Stopped by:  Kamari Topete MD           simethicone 80 MG  chewable tablet   Commonly known as:  MYLICON   Stopped by:  Kamari Topete MD                    Recent Review Flowsheet Data     BMT Recent Results Latest Ref Rng & Units 7/18/2017 7/25/2017 7/28/2017 8/16/2017 8/29/2017 9/6/2017 9/27/2017    WBC 4.0 - 11.0 10e9/L 4.1 5.4 10.8 3.7(L) 4.7 2.5(L) 2.1(L)    Hemoglobin 13.3 - 17.7 g/dL 11.5(L) 10.8(L) 10.3(L) 8.2(L) 7.6(L) 10.2(L) 10.3(L)    Platelet Count 150 - 450 10e9/L 50(L) 47(LL) 35(LL) 120(L) 122(L) 126(L) 158    Platelets 150 - 450 10:9/L - - - - - - -    Neutrophils (Absolute) 1.6 - 8.3 10e9/L 3.2 4.2 10.2(H) 2.6 3.9 1.8 1.6    Blasts (Absolute) 10e9/L - - - - - - -    INR 0.86 - 1.14 - - - - - - -    Sodium 133 - 144 mmol/L 137 136 136 137 137 138 140    Potassium 3.4 - 5.3 mmol/L 4.7 4.9 4.4 4.2 4.1 4.0 3.6    Chloride 94 - 109 mmol/L 104 105 105 104 103 103 103    Glucose 70 - 99 mg/dL 91 90 134(H) 157(H) 112(H) 123(H) 98    Urea Nitrogen 7 - 30 mg/dL 20 26 36(H) 18 20 17 17    Creatinine 0.66 - 1.25 mg/dL 0.88 1.06 1.39(H) 1.41(H) 0.99 0.82 1.05    Calcium (Total) 8.5 - 10.1 mg/dL 9.4 9.5 8.8 8.9 8.9 9.5 9.2    Protein (Total) 6.8 - 8.8 g/dL 8.9(H) 9.4(H) 9.1(H) 9.1(H) 9.1(H) 8.7 8.1    Albumin 3.4 - 5.0 g/dL 2.8(L) 2.8(L) 2.8(L) 2.7(L) 3.1(L) 3.0(L) 3.5    Bilirubin (Direct) 0.0 - 0.2 mg/dL - - - - - - -    Alkaline Phosphatase 40 - 150 U/L 53 46 43 51 45 43 50    AST 0 - 45 U/L 6 9 9 10 10 10 9    ALT 0 - 70 U/L 13 18 14 12 14 13 18    MCV 78 - 100 fl 99 101(H) 100 101(H) 107(H) 102(H) 104(H)               Primary Care Provider Office Phone # Fax #    Sanket Gualberto [phone]       [place] [address] [facility] [address]        Equal Access to Services     ARMANDO SHAH : Maribel Davis, conrad ge, jonny lisa. [facility] [phone].    ATENCIÓN: Si habla español, tiene a todd disposición servicios gratuitos de asistencia  lingüística. Parminder al [phone].    We comply with applicable federal civil rights laws and [place] laws. We do not discriminate on the basis of race, color, national origin, age, disability, sex, sexual orientation, or gender identity.            Thank you!     Thank you for choosing [facility] BLOOD AND MARROW TRANSPLANT  for your care. Our goal is always to provide you with excellent care. Hearing back from our patients is one way we can continue to improve our services. Please take a few minutes to complete the written survey that you may receive in the mail after your visit with us. Thank you!             Your Updated Medication List - Protect others around you: Learn how to safely use, store and throw away your medicines at www.disposemymeds.org.          This list is accurate as of: 9/27/17 11:59 PM.  Always use your most recent med list.                   Brand Name Dispense Instructions for use Diagnosis    acyclovir 400 MG tablet    ZOVIRAX    60 tablet    Take 1 tablet (400 mg) by mouth 2 times daily    Multiple myeloma in relapse (H)       amLODIPine 5 MG tablet    NORVASC    90 tablet    TAKE 1 TABLET BY MOUTH AT BEDTIME    Essential hypertension       clopidogrel 75 MG tablet    PLAVIX    90 tablet    TAKE 1 TABLET BY MOUTH EVERY DAY    History of stroke       DOCUSATE SODIUM PO      Take 100 mg by mouth 2 times daily as needed for constipation        esomeprazole 40 MG CR capsule    nexIUM    30 capsule    Take 1 capsule (40 mg) by mouth every morning (before breakfast)    Gastroesophageal reflux disease without esophagitis       HYDROmorphone 2 MG tablet    DILAUDID    30 tablet    Take 0.5 tablets (1 mg) by mouth every 3 hours as needed for moderate to severe pain    Multiple myeloma in relapse (H), Acute bilateral low back pain without sciatica       LORazepam 0.5 MG tablet    ATIVAN    30 tablet    Take 1 tablet (0.5 mg) by mouth every 6 hours as needed for nausea or anxiety.    Multiple  myeloma in relapse (H), Delirium       mupirocin 2 % ointment    BACTROBAN     Apply topically daily as needed        OLANZapine 5 MG tablet    zyPREXA    60 tablet    Take 1 tablet (5 mg) by mouth 2 times daily    Delirium, Multiple myeloma in relapse (H)       ondansetron 8 MG ODT tab    ZOFRAN-ODT    30 tablet    Take 1 tablet (8 mg) by mouth every 8 hours as needed for nausea    Nausea       potassium chloride SA 20 MEQ CR tablet    K-DUR/KLOR-CON M    60 tablet    TAKE 1 TABLET BY MOUTH TWICE DAILY.    Hypokalemia       simvastatin 5 MG tablet    ZOCOR    30 tablet    Take 4 tablets (20 mg) by mouth daily    Mixed hyperlipidemia       sucralfate 1 GM tablet    CARAFATE    120 tablet    Take 1 tablet (1 g) by mouth 4 times daily as needed    Gastroesophageal reflux disease without esophagitis       traMADol 50 MG tablet    ULTRAM    60 tablet    Take 0.5-1 tablets (25-50 mg) by mouth every 6 hours as needed for moderate pain . Recommend trying after Tylenol and before Dilaudid.    Acute bilateral low back pain without sciatica, Multiple myeloma in relapse (H)       venetoclax 100 MG tablet CHEMOTHERAPY    VENCLEXTA    100 tablet    Take 8 tablets (800 mg) by mouth daily    Multiple myeloma in relapse (H)

## 2017-09-27 NOTE — PROGRESS NOTES
HEMATOLOGY/ONCOLOGY PROGRESS NOTE  Sep 27, 2017    REASON FOR VISIT: myeloma    Diagnosis: 71 year old gentleman with IgM lambda multiple myeloma originally diagnosed in 01/2012 as stage I, standard risk disease.   Treatment: Revlimid plus dexamethasone for 4-5 cycles but plateaued by late 03/2012.   - Velcade was added and he received another 2-3 cycles, achieving a good partial remission.      Transplant: Single auto after melphalan 200 mg/m2 preparative regimen on 01/09/2013  - Post-transplant course: unremarkable except for some mild steroid-induced hyperglycemia, gastritis, nausea and vomiting.      Maintenance: Lenalidomide at day-100 then developed a maculopapular rash. Lenalidomide was held and then we re-challenged him after about a month to 2 months at a lower dose (5 mg daily); rash returned.   - was started on maintenance Velcade, every other week through July 2014, when he was noted with abnormalities on myeloma studies drawn there. Noted with prostate cancer dx at about the time of relapse (see below).     Relapse: noted with return on M-spike in blood/urine with marrow involvement but negative PET.   - Started on retreatment with RVD on 8/27/14 with Revlimid at 15 mg 14 days of 21 days, dexamethasone 40 mg weekly and velcade weekly.Completed at total of 5 cycles without complication by 12/2014.   - Started Cycle 6 on inc'd Rev dosing of 20mg daily x 2 weeks on 12/11/14 which was complicated by pneumonia.  - Adm 12/21-1/10 for human metapneumovirus pneumonia complicated by anorexia, HTN, depression, anorexia with significant weight loss.   - Restarted Ernesto/Dex only on 2/4/15 with good tolerance but with thrombocytopenia.   - Bendamustine added to Velcade/dexamethasone on 5/21/15 due to disease progression  - Velcade discontinued on 7/9/2015 due to side effects (orthostasis)  - Schedule changed to bendamustine 80 mg/m2 days 1 and 2 on 28-day cycle  - Cycle 1 tolerated poorly due to lightheadedness and  weakness  - Cycle 2 dose reduced to 60 mg/m2 and pre-meds adjusted  - Cycle 5 received on 9/17/15.  - 10/1/2015 - increasing IgM, M-spike - started Pomalidomide 4mg/day (21 days out of 28 days) and weekly Decadron 20mg on Oct 1st, 2015.    - Carfilzomib added on 10/22/2015 making this CPD.  - C3-C6  received in [place]    - Returned to [facility] and resumed CPD here    - Adm: 4/12-4/14 with fever, confusion, neutropenia. Noted on MRI to have acute/subacute CVA and subacute/chronic CVA; started on Plavix, given brief course of Abx and GCSF prior to d/c.     - Continued on Carfilzomib and dexamethasone alone  - Pomalyst added back on 7/13/2016 for rising FLC  - Start daratumumab 11/10/16. Changed to every other week after 4 weekly treatments d/t profound fatigue and malaise.   - Adm 5/7-5/16 due to AMS, hypercalcemia, hyperuricemia, possible PNA, back pain, and JOSE MARIA. Started Kyprolis while inpatient.  - Re-admitted 5/25-/529 with recurrent AMS, found to have concomitant PNA  - Resumed Kyprolis 6/13/2017. Stopped due to worsening performance status and progressive myeloma.  - Started Venclexta 800 mg 8/25/2017    INTERVAL HISTORY:  Tolerating the medicine still very well. Feels the best he's felt in some time.  Still takes naps daily nearly but Casey states he's more active and was even out on the riding  this week. No bleeding seen.  Back pain is controlled on his current medication regimen.  No new pains. No fevers/chills/sweats, HA, vision changes, cough, congestion, sore throat, chest pain, SOB, GIVENS, vomiting, abdominal pain, urinary changes, swelling, bleeding, rash.  Some very vivid dreams and he's still taking the Zyprexa at nighttime. Rest of 10 point ROS was negative today.     PHYSICAL EXAMINATION  /76 (BP Location: Right arm, Patient Position: Chair, Cuff Size: Adult Regular)  Pulse 76  Temp 98.1  F (36.7  C) (Oral)  Wt 56.8 kg (125 lb 3.2 oz)  SpO2 94%  BMI 22.36 kg/m2    Wt Readings from Last  10 Encounters:   17 56.8 kg (125 lb 3.2 oz)   17 56 kg (123 lb 6.4 oz)   17 56.2 kg (124 lb)   17 55.2 kg (121 lb 9.6 oz)   17 54.4 kg (120 lb)   17 56.2 kg (124 lb)   17 56 kg (123 lb 6.4 oz)   17 57.7 kg (127 lb 1.6 oz)   17 56 kg (123 lb 7.3 oz)   17 57.4 kg (126 lb 8 oz)     General: Alert. Oriented to name, , location.  Able to ambulate independently, cautiously.  No acute distress. HEENT: PERRL, no palor or icterus. CVS: RRR. CHEST: CTAB, normal work of breathing ABDOMEN: soft non tender no masses    NEURO: AAOX3  CN 2-12 intact. Motor and sensation in tact. Strength 4/5 in upper and lower extremities. Gets to and from the exam table un assisted. SKIN: no bleeding or brusiing, no rashes EXTREMITIES: No edema.     LABS: His hemogram showed a white cell count of 2.1 with an ANC of 1.6, a hemoglobin of 10.3 and platelets of 158,000.  Metabolic panel was completely normal and, more surprisingly, his albumin is up to 3.5.     2017 10:15 2017 14:40 2017 15:14 2017 15:53 2017 16:44   IGM 3310 (H) 2910 (H)  4260 (H) 2620 (H)   [place] Free Lt Chain <0.30... (L) 0.34 <0.30... (L)     Kappa Lambda Ratio Unable to Calculate 0.01 (L) Unable to Calculate     Lambda Free Lt Chain 29.25 (H) 29.00 (H) 32.00 (H)     Monoclonal Peak 2.2 (H) 1.9 (H) 2.4 (H) 2.8 (H) 1.8 (H)       IMPRESSION/PLAN: The patient is a 71-year-old gentleman with relapsed IgM multiple myeloma after autotransplant in 2013, status post multiple salvage regimens with progressive disease on all of them, and now on salvage venetoclax as a single agent.       I discussed the above therapy with Yamil and Ravin, and they both would like to continue it.  I talked to them about the possibility of adding Velcade and dexamethasone if his IgM or M-spike are not down as I expect them to be.  There is good phase I data for this, and I told them that this may in truth not be  beneficial but is certainly worthwhile to try given how he is now feeling.  I okayed him going off the potassium today.  He is to continue the Plavix, as his platelets are quite stable now.  We talked about the Zyprexa and whether or not he really needs it.  I told them they can cut that to 2.5 mg nightly if they so desire, though Yamil is not sure that he does.  I encouraged him to continue drinking as his creatinine is a bit up today, but I do want to start him on Zometa which we will schedule for next Wednesday or Thursday.  He will stay on his acyclovir, continue the pain medications including the p.r.n. tramadol and Dilaudid, and he remains on his Zocor and Norvasc without change.  I administered the high-dose flu vaccine for them today, and we will see them back next week for the Zometa.  He will then follow up with Leanne 3 weeks from today and with me in 6 weeks from today.       BMcClune, DO

## 2017-09-27 NOTE — NURSING NOTE
Chief Complaint   Patient presents with     Port Draw     labs drawn via port by RN     /76 (BP Location: Right arm, Patient Position: Chair, Cuff Size: Adult Regular)  Pulse 76  Temp 98.1  F (36.7  C) (Oral)  Wt 56.8 kg (125 lb 3.2 oz)  SpO2 94%  BMI 22.36 kg/m2    Vitals taken. Port accessed by RN. Labs collected and sent. Line flushed with NS & Heparin. Pt tolerated well. Pt checked in for next appointment.    Iris Gutiérrez RN

## 2017-09-27 NOTE — NURSING NOTE
"Oncology Rooming Note    September 27, 2017 5:03 PM   Anthony Carbone is a 71 year old male who presents for:    Chief Complaint   Patient presents with     Port Draw     labs drawn via port by RN     RECHECK     MM pre bmt txp here for provider visit.      Initial Vitals: /76 (BP Location: Right arm, Patient Position: Chair, Cuff Size: Adult Regular)  Pulse 76  Temp 98.1  F (36.7  C) (Oral)  Wt 56.8 kg (125 lb 3.2 oz)  SpO2 94%  BMI 22.36 kg/m2 Estimated body mass index is 22.36 kg/(m^2) as calculated from the following:    Height as of 9/20/17: 1.594 m (5' 2.75\").    Weight as of this encounter: 56.8 kg (125 lb 3.2 oz). Body surface area is 1.59 meters squared.  No Pain (0) Comment: Data Unavailable   No LMP for male patient.  Allergies reviewed: Yes  Medications reviewed: Yes    Medications: Medication refills not needed today.  Pharmacy name entered into [facility]:    [id number] - [place] [id number] AT [facility] [address]  [facility] [place], [place] - [address] [facility]  [id number] - [place], [place] - [address] [address] AT [facility] [address]    Clinical concerns: None  5 minutes for nursing intake (face to face time)     Alvarado Alonzo RN              "

## 2017-09-27 NOTE — NURSING NOTE
"Injectable Influenza Immunization Documentation    1.  Has the patient received the information for the injectable influenza vaccine? YES     2. Is the patient 6 months of age or older? YES     3. Does the patient have any of the following contraindications?         Severe allergy to eggs?  No     Severe allergic reaction to previous influenza vaccines?  No   Severe allergy to latex?  No       History of Guillain-Marathon syndrome?  No     Currently have a temperature greater than 100.4F?  No        4.  Severely egg allergic patients should have flu vaccine eligibility assessed by an MD, RN, or pharmacist, and those who received flu vaccine should be observed for 15 min by an MD, RN, Pharmacist, Medical Technician, or member of clinic staff.\": YES    5. Latex-allergic patients should be given latex-free influenza vaccine Yes. Please reference the Vaccine latex table to determine if your clinic s product is latex-containing.       Vaccination given by Alexia Shearer CMA          "

## 2017-09-27 NOTE — NURSING NOTE
Flu vaccination given today in right arm. Patient tolerated well.  Alexia Shearer, [facility]

## 2017-09-29 NOTE — PROGRESS NOTES
SUBJECTIVE/OBJECTIVE:                           Anthony Carbone is a 71 year old male called for an initial visit for Medication Therapy Management.  He was referred to me from Dr Kamari Topete.     Chief Complaint: medication review.    The primary oncologist for this patient is Dr Kamari Topete.  The PCP for this patient is Dr Sanket Burciaga.    Cancer diagnosis: Myeloma    Allergies/ADRs: Reviewed in Epic  Tobacco: No tobacco use  Alcohol: not currently using    PMH: Reviewed in Epic    Medication Adherence: takes medications 4 times per day, uses a pillbox and medication list, misses medication doses rarely.    Myeloma:  Current medications include: Venclexta 800mg (3d834mc) PO daily. Patient started this 8/25/17 and states this is going pretty well for him.  He reports that he does have a little nausea, that he controls with Zofran PRN (usually 0-2 doses per day) [isn't needing the lorazepam now].  He also has some mild fatigue.  He denies diarrhea and edema.  He is also taking Zyprexa 5mg daily for sleep/delerium/nausea.    Infection prophylaxis:  Current medications include: acyclovir 400mg BID.  No side effect reported.    Hypertension: Current medications include amlodipine 5mg QHS.  Patient does not self-monitor BP.  Patient reports no current medication side effects.    CAD:  Current medications include: Plavix 75mg daily, and simvastatin 20mg daily.  Patient denies bleeding/bruising complications, and no muscle weakness/pain.    Cancer related pain:  Current medications include: tramadol 50mg Q6-8hours regularly.  This is controlling his pain pretty well, unless he misses a dose.  No sleepiness/unsteadiness or other side effects reported.      GERD: Current medications include: Nexium (esomeprazole) 40mg daily.  Pt c/o no current symptoms.  Patient feels that current regimen is effective.  Other PPIs (ie Prilosec) don't work as well for him.      Current labs include:BP Readings from Last 3  "Encounters:   09/27/17 128/76   09/21/17 130/82   09/06/17 123/76     Latest Ht and Wt:  Wt Readings from Last 2 Encounters:   09/27/17 125 lb 3.2 oz (56.8 kg)   09/20/17 123 lb 6.4 oz (56 kg)     Ht Readings from Last 2 Encounters:   09/20/17 5' 2.75\" (1.594 m)   09/06/17 5' 2.76\" (1.594 m)        Latest vitals:  There were no vitals taken for this visit. (phone visit)    Current labs include:  Lab Results   Component Value Date    BUN 17 09/27/2017     Lab Results   Component Value Date    CR 1.05 09/27/2017     Lab Results   Component Value Date    POTASSIUM 3.6 09/27/2017     Lab Results   Component Value Date    ALT 18 09/27/2017     Lab Results   Component Value Date    AST 9 09/27/2017     Lab Results   Component Value Date    BILITOTAL 0.5 09/27/2017     Lab Results   Component Value Date    ALBUMIN 3.5 09/27/2017     Lab Results   Component Value Date    WBC 2.1 09/27/2017     Lab Results   Component Value Date    HGB 10.3 09/27/2017     Lab Results   Component Value Date     09/27/2017     Absolute Neutrophil   Date Value Ref Range Status   09/27/2017 1.6 1.6 - 8.3 10e9/L Final       Most Recent Immunizations   Administered Date(s) Administered     HIB 01/07/2014     HepB 01/07/2014     Influenza (High Dose) 3 valent vaccine 09/27/2017     Influenza (IIV3) 10/01/2012     Pneumococcal (PCV 13) 01/07/2014     Poliovirus, inactivated (IPV) 01/07/2014     TDAP Vaccine (Boostrix) 01/07/2014       ASSESSMENT:                             Current medications were reviewed today.       Medication Adherence: no issues identified    Myeloma: Stable. Patient is tolerating Venclexta quite well.  No changes at this time.    Infection prophylaxis: Stable. Antiviral prophylaxis appropriate.    Hypertension: Stable. Patient is meeting BP goal of < 140/90mmHg.     CAD: Stable. No changes needed at this time.    Cancer related pain: Improved. Current treatment effective and patient seems satisfied with his pain " control.    GERD: Stable.  Current treatment is effective.      PLAN:                            No medications changes today.    I spent 30 minutes with this patient today. A copy of the visit note was provided to the patient's primary care provider.    Will follow up in 1-2 months.    The patient declined a summary of these recommendations as an after visit summary.     Adina Medrano, PharmD, BCOP, BCPS  Clinical Pharmacy Specialist/  Oncology Medication Therapy Management Pharmacist  [facility]  Pager [phone]  Phone [phone]

## 2017-10-01 NOTE — PATIENT INSTRUCTIONS
Recommendations from today's MTM visit:                                                    MTM (medication therapy management) is a service provided by a clinical pharmacist designed to help you get the most of out of your medicines.   Today we reviewed what your medicines are for, how to know if they are working, that your medicines are safe and how to make your medicine regimen as easy as possible.     1. No medication changes today.    Next MTM visit: 1-2 months    To schedule another MTM appointment, please call the clinic directly or you may call the MTM scheduling line at [phone] or toll-free at [phone].     My Clinical Pharmacist's contact information:                                                      It was a pleasure seeing you today!  Please feel free to contact me with any questions or concerns you have.      Adina Medrano, PharmD, BCOP, BCPS  Clinical Pharmacy Specialist/  Oncology Medication Therapy Management Pharmacist  [facility]  Pager [phone]  Phone [phone]          You may receive a survey about the MTM services you received.  I would appreciate your feedback to help me serve you better in the future. Please fill it out and return it when you can. Your comments will be anonymous.

## 2017-10-03 NOTE — MR AVS SNAPSHOT
After Visit Summary   10/3/2017    Anthony Carbone    MRN: [id number]           Patient Information     Date Of Birth          1945        Visit Information        Provider Department      10/3/2017 3:00 PM UC 15 ATC; [facility] ONCOLOGY INFUSION [facility]        Today's Diagnoses     Multiple myeloma in relapse (H)    -  1      Care Instructions          October 2017 Sunday Monday Tuesday Wednesday Thursday Friday Saturday   1     2     3     [facility]    2:30 PM   (15 min.)    [facility] LAB DRAW   [facility] Lab Draw     [facility] ONC INFUSION 60    3:00 PM   (60 min.)    ONCOLOGY INFUSION   [facility] 4     5     6     7       8     9     10     11     12     13     14       15     16     17     18     19     [facility] DRAW   10:15 AM   (15 min.)    [facility] LAB DRAW   [facility] Lab Draw     [facility] RETURN   10:35 AM   (50 min.)   Leanne Reddy PA-C   [facility] 20     21       22     23     24     25     26     27     28       29     30     31                                    November 2017 Sunday Monday Tuesday Wednesday Thursday Friday Saturday                  1     2     3     4       5     6     7     8     [facility] D[facility]   11:00 AM   (15 min.)    [facility] LAB DRAW   [facility] Lab Draw     [facility] ONC RETURN   11:30 AM   (30 min.)    BMT DOM   [facility] Blood and Marrow Transplant 9     10     11       12     13     14     15     16     17     18       19     20     21     22     23     24     25       26     27     28     29  Happy Birthday!     30                            Lab Results:  Recent Results (from the past 12 hour(s))   Comprehensive metabolic panel    Collection Time: 10/03/17  2:38 PM   Result Value Ref Range    Sodium 139 133 - 144 mmol/L    Potassium 3.6 3.4 - 5.3 mmol/L    Chloride 104 94 - 109 mmol/L    Carbon Dioxide 26 20 - 32 mmol/L    Anion Gap 9 3 - 14  mmol/L    Glucose 117 (H) 70 - 99 mg/dL    Urea Nitrogen 17 7 - 30 mg/dL    Creatinine 0.79 0.66 - 1.25 mg/dL    GFR Estimate >90 >60 mL/min/1.7m2    GFR Estimate If Black >90 >60 mL/min/1.7m2    Calcium 9.0 8.5 - 10.1 mg/dL    Bilirubin Total 0.8 0.2 - 1.3 mg/dL    Albumin 3.4 3.4 - 5.0 g/dL    Protein Total 8.2 6.8 - 8.8 g/dL    Alkaline Phosphatase 55 40 - 150 U/L    ALT 16 0 - 70 U/L    AST 10 0 - 45 U/L   CBC with platelets and differential    Collection Time: 10/03/17  2:38 PM   Result Value Ref Range    WBC 2.2 (L) 4.0 - 11.0 10e9/L    RBC Count 3.21 (L) 4.4 - 5.9 10e12/L    Hemoglobin 10.7 (L) 13.3 - 17.7 g/dL    Hematocrit 33.2 (L) 40.0 - 53.0 %     (H) 78 - 100 fl    MCH 33.3 (H) 26.5 - 33.0 pg    MCHC 32.2 31.5 - 36.5 g/dL    RDW 17.6 (H) 10.0 - 15.0 %    Platelet Count 151 150 - 450 10e9/L    Diff Method Automated Method     % Neutrophils 70.9 %    % Lymphocytes 9.7 %    % Monocytes 18.4 %    % Eosinophils 0.0 %    % Basophils 0.5 %    % Immature Granulocytes 0.5 %    Nucleated RBCs 0 0 /100    Absolute Neutrophil 1.5 (L) 1.6 - 8.3 10e9/L    Absolute Lymphocytes 0.2 (L) 0.8 - 5.3 10e9/L    Absolute Monocytes 0.4 0.0 - 1.3 10e9/L    Absolute Eosinophils 0.0 0.0 - 0.7 10e9/L    Absolute Basophils 0.0 0.0 - 0.2 10e9/L    Abs Immature Granulocytes 0.0 0 - 0.4 10e9/L    Absolute Nucleated RBC 0.0              Follow-ups after your visit        Your next 10 appointments already scheduled     Oct 19, 2017 10:15 AM [facility]   [facility] Lab Draw with  [facility] LAB DRAW   [facility] Lab Draw ([facility])    [address] [address]   [phone]            Oct 19, 2017 10:50 AM CDT   (Arrive by 10:35 AM)   Return Visit with Leanne Reddy PA-C   [facility] ([facility])    [address] [place] [address]   [phone]            Nov 08, 2017 11:00 AM [facility] Lab  Draw with [facility] LAB DRAW   [facility] Lab Draw ([facility])    [address]  [address]  [facility] [address] [phone]            Nov 08, 2017 11:30 AM CST   RETURN ONC with  BMT DOM   [facility] Blood and Marrow Transplant ([facility])    [address]  [address] [facility] [id number] [phone]              Who to contact     If you have questions or need follow up information about today's clinic visit or your schedule please contact [facility]CER CLINIC directly at [phone].  Normal or non-critical lab and imaging results will be communicated to you by [employer], letter or phone within 4 business days after the clinic has received the results. If you do not hear from us within 7 days, please contact the clinic through [employer] or phone. If you have a critical or abnormal lab result, we will notify you by phone as soon as possible.  Submit refill requests through [employer]uber or call your pharmacy and they will forward the refill request to us. Please allow 3 business days for your refill to be completed.          Additional Information About Your Visit        [employer] Information     [employer]uber gives you secure access to your electronic health record. If you see a primary care provider, you can also send messages to your care team and make appointments. If you have questions, please call your primary care clinic.  If you do not have a primary care provider, please call [phone] and they will assist you.        Care EveryWhere ID     This is your Care EveryWhere ID. This could be used by other organizations to access your [facility] medical records  [id number]        Your Vitals Were     Pulse Temperature Respirations Pulse Oximetry BMI (Body Mass Index)       89 97.9  F (36.6  C) (Oral) 16 95% 22.11 kg/m2        Blood Pressure from Last 3 Encounters:   10/03/17 107/70   09/27/17 128/76   09/21/17 130/82    Weight  from Last 3 Encounters:   10/03/17 56.2 kg (123 lb 12.8 oz)   09/27/17 56.8 kg (125 lb 3.2 oz)   09/20/17 56 kg (123 lb 6.4 oz)              We Performed the Following     CBC with platelets and differential     Comprehensive metabolic panel          Today's Medication Changes          These changes are accurate as of: 10/3/17  3:58 PM.  If you have any questions, ask your nurse or doctor.               These medicines have changed or have updated prescriptions.        Dose/Directions    OLANZapine 5 MG tablet   Commonly known as:  zyPREXA   This may have changed:  when to take this   Used for:  Delirium, Multiple myeloma in relapse (H)        Dose:  5 mg   Take 1 tablet (5 mg) by mouth 2 times daily   Quantity:  60 tablet   Refills:  0       traMADol 50 MG tablet   Commonly known as:  ULTRAM   This may have changed:    - how much to take  - additional instructions   Used for:  Acute bilateral low back pain without sciatica, Multiple myeloma in relapse (H)        Dose:  25-50 mg   Take 0.5-1 tablets (25-50 mg) by mouth every 6 hours as needed for moderate pain . Recommend trying after Tylenol and before Dilaudid.   Quantity:  60 tablet   Refills:  3                Primary Care Provider Office Phone # Fax #    Sanket Gualberto [phone]       [facility] [address] [facility] [address]        Equal Access to Services     ALBAN SHAH AH: Maribel pardo Sojose, waaxda luqadaha, qaybta kaalmada adeegyada, jonny villa. So [facility] [phone].    ATENCIÓN: Si habla español, tiene a todd disposición servicios gratuitos de asistencia lingüística. Llame al [phone].    We comply with applicable federal civil rights laws and [place] laws. We do not discriminate on the basis of race, color, national origin, age, disability, sex, sexual orientation, or gender identity.            Thank you!     Thank you for choosing [facility] CANCER [facility]   for your care. Our goal is always to provide you with excellent care. Hearing back from our patients is one way we can continue to improve our services. Please take a few minutes to complete the written survey that you may receive in the mail after your visit with us. Thank you!             Your Updated Medication List - Protect others around you: Learn how to safely use, store and throw away your medicines at www.disposemymeds.org.          This list is accurate as of: 10/3/17  3:58 PM.  Always use your most recent med list.                   Brand Name Dispense Instructions for use Diagnosis    acyclovir 400 MG tablet    ZOVIRAX    60 tablet    Take 1 tablet (400 mg) by mouth 2 times daily    Multiple myeloma in relapse (H)       amLODIPine 5 MG tablet    NORVASC    90 tablet    TAKE 1 TABLET BY MOUTH AT BEDTIME    Essential hypertension       clopidogrel 75 MG tablet    PLAVIX    90 tablet    TAKE 1 TABLET BY MOUTH EVERY DAY    History of stroke       esomeprazole 40 MG CR capsule    nexIUM    30 capsule    Take 1 capsule (40 mg) by mouth every morning (before breakfast)    Gastroesophageal reflux disease without esophagitis       LORazepam 0.5 MG tablet    ATIVAN    30 tablet    Take 1 tablet (0.5 mg) by mouth every 6 hours as needed for nausea or anxiety.    Multiple myeloma in relapse (H), Delirium       OLANZapine 5 MG tablet    zyPREXA    60 tablet    Take 1 tablet (5 mg) by mouth 2 times daily    Delirium, Multiple myeloma in relapse (H)       ondansetron 8 MG ODT tab    ZOFRAN-ODT    30 tablet    Take 1 tablet (8 mg) by mouth every 8 hours as needed for nausea    Nausea       SIMVASTATIN PO      Take 20 mg by mouth At Bedtime        traMADol 50 MG tablet    ULTRAM    60 tablet    Take 0.5-1 tablets (25-50 mg) by mouth every 6 hours as needed for moderate pain . Recommend trying after Tylenol and before Dilaudid.    Acute bilateral low back pain without sciatica, Multiple myeloma in relapse (H)        venetoclax 100 MG tablet CHEMOTHERAPY    VENCLEXTA    100 tablet    Take 8 tablets (800 mg) by mouth daily    Multiple myeloma in relapse (H)

## 2017-10-03 NOTE — PROGRESS NOTES
Infusion Nursing Note:  Anthony Horne Tona presents today for Zometa    Patient seen by provider today: No   present during visit today: Not Applicable.    Note: N/A.    Intravenous Access:  Implanted Port.    Treatment Conditions:  Lab Results   Component Value Date    HGB 10.7 10/03/2017     Lab Results   Component Value Date    WBC 2.2 10/03/2017      Lab Results   Component Value Date    ANEU 1.5 10/03/2017     Lab Results   Component Value Date     10/03/2017      Lab Results   Component Value Date     10/03/2017                   Lab Results   Component Value Date    POTASSIUM 3.6 10/03/2017           Lab Results   Component Value Date    MAG 2.2 05/25/2017            Lab Results   Component Value Date    CR 0.79 10/03/2017                   Lab Results   Component Value Date    JAZMIN 9.0 10/03/2017                Lab Results   Component Value Date    BILITOTAL 0.8 10/03/2017           Lab Results   Component Value Date    ALBUMIN 3.4 10/03/2017                    Lab Results   Component Value Date    ALT 16 10/03/2017           Lab Results   Component Value Date    AST 10 10/03/2017     Results reviewed, labs MET treatment parameters, ok to proceed with treatment.    Teaching done regarding Zometa and written information provided      Post Infusion Assessment:  Patient tolerated infusion without incident. Zometa stopped at 15:49  Blood return noted pre and post infusion.  Site patent and intact, free from redness, edema or discomfort.  No evidence of extravasations.  Access discontinued per protocol.    Discharge Plan:   Patient declined prescription refills.  Discharge instructions reviewed with: Patient and Family.  Patient and/or family verbalized understanding of discharge instructions and all questions answered.  Copy of AVS reviewed with patient and/or family.  Patient will return 10/19 for next appointment.  Patient discharged in stable condition accompanied by: self and  wife.  Departure Mode: Ambulatory.    Emma Chavez RN

## 2017-10-03 NOTE — PATIENT INSTRUCTIONS
October 2017 Sunday Monday Tuesday Wednesday Thursday Friday Saturday   1     2     3     [facility] D[facility]    2:30 PM   (15 min.)    [facility] LAB DRAW   [facility] Lab Draw     [facility] ONC INFUSION 60    3:00 PM   (60 min.)    ONCOLOGY INFUSION   [facility] 4     5     6     7       8     9     10     11     12     13     14       15     16     17     18     19     [facility] DRAW   10:15 AM   (15 min.)    [facility] LAB DRAW   [facility] Lab Draw     [facility] RETURN   10:35 AM   (50 min.)   Leanne Reddy PA-C   [facility] 20     21       22     23     24     25     26     27     28       29     30     31                                    November 2017 Sunday Monday Tuesday Wednesday Thursday Friday Saturday                  1     2     3     4       5     6     7     8     [facility] DRAW   11:00 AM   (15 min.)    [facility] LAB DRAW   [facility]     [facility] ONC RETURN   11:30 AM   (30 min.)    BMT DOM   [facility] Blood and Marrow Transplant 9     10     11       12     13     14     15     16     17     18       19     20     21     22     23     24     25       26     27     28     29  Happy Birthday!     30                            Lab Results:  Recent Results (from the past 12 hour(s))   Comprehensive metabolic panel    Collection Time: 10/03/17  2:38 PM   Result Value Ref Range    Sodium 139 133 - 144 mmol/L    Potassium 3.6 3.4 - 5.3 mmol/L    Chloride 104 94 - 109 mmol/L    Carbon Dioxide 26 20 - 32 mmol/L    Anion Gap 9 3 - 14 mmol/L    Glucose 117 (H) 70 - 99 mg/dL    Urea Nitrogen 17 7 - 30 mg/dL    Creatinine 0.79 0.66 - 1.25 mg/dL    GFR Estimate >90 >60 mL/min/1.7m2    GFR Estimate If Black >90 >60 mL/min/1.7m2    Calcium 9.0 8.5 - 10.1 mg/dL    Bilirubin Total 0.8 0.2 - 1.3 mg/dL    Albumin 3.4 3.4 - 5.0 g/dL    Protein Total 8.2 6.8 - 8.8 g/dL    Alkaline Phosphatase 55 40 - 150 U/L    ALT 16 0 - 70 U/L     AST 10 0 - 45 U/L   CBC with platelets and differential    Collection Time: 10/03/17  2:38 PM   Result Value Ref Range    WBC 2.2 (L) 4.0 - 11.0 10e9/L    RBC Count 3.21 (L) 4.4 - 5.9 10e12/L    Hemoglobin 10.7 (L) 13.3 - 17.7 g/dL    Hematocrit 33.2 (L) 40.0 - 53.0 %     (H) 78 - 100 fl    MCH 33.3 (H) 26.5 - 33.0 pg    MCHC 32.2 31.5 - 36.5 g/dL    RDW 17.6 (H) 10.0 - 15.0 %    Platelet Count 151 150 - 450 10e9/L    Diff Method Automated Method     % Neutrophils 70.9 %    % Lymphocytes 9.7 %    % Monocytes 18.4 %    % Eosinophils 0.0 %    % Basophils 0.5 %    % Immature Granulocytes 0.5 %    Nucleated RBCs 0 0 /100    Absolute Neutrophil 1.5 (L) 1.6 - 8.3 10e9/L    Absolute Lymphocytes 0.2 (L) 0.8 - 5.3 10e9/L    Absolute Monocytes 0.4 0.0 - 1.3 10e9/L    Absolute Eosinophils 0.0 0.0 - 0.7 10e9/L    Absolute Basophils 0.0 0.0 - 0.2 10e9/L    Abs Immature Granulocytes 0.0 0 - 0.4 10e9/L    Absolute Nucleated RBC 0.0

## 2017-10-03 NOTE — NURSING NOTE
"Chief Complaint   Patient presents with     Port Draw     port accessed and labs drawn by rn.  vs taken.      Port accessed with 20g 3/4\" gripper needle, labs drawn, port flushed with saline and heparin, vitals checked, pt checked in for next appointment.  Sujata Mosquera RN    "

## 2017-10-06 NOTE — DISCHARGE INSTRUCTIONS
Take the below medications as prescribed.    New Prescriptions    OXYCODONE (ROXICODONE) 5 MG IR TABLET    Take 1 tablet (5 mg) by mouth every 6 hours as needed for moderate to severe pain       1. -Take acetaminophen 500 to 1000 mg by mouth every 4 to 6 hours as needed for pain or fever.  Do not take more than 4000 mg in 24 hours.  Do not take within 6 hours of another acetaminophen containing medication such as norco (vicodin) or percocet.  2. Take tramadol as prescribed.  Use oxycodone if you have breakthrough pain.  3. Please follow-up with [facility].  4. Please return to the ED as needed for new or worsening symptoms such as reinjury, uncontrollable pain, severe headache, vomiting, focal weakness, any other concerning symptoms.

## 2017-10-06 NOTE — ED AVS SNAPSHOT
Emergency Department    [address] [facility] [address]    Phone:  [phone]    Fax:  [phone]                                       Anthony Carbone   MRN: [id number]    Department:   Emergency Department   Date of Visit:  10/6/2017           Patient Information     Date Of Birth          1945        Your diagnoses for this visit were:     Other closed displaced fracture of proximal end of left humerus, initial encounter        You were seen by Gavin Welsh MD.      Follow-up Information     Follow up with  Emergency Department.    Specialty:  EMERGENCY MEDICINE    Why:  As needed    Contact information:    [address] [facility] [address] [phone]        Follow up with Orthopaedics, [facility].    Contact information:    [address] [address] [phone]          Discharge Instructions       Take the below medications as prescribed.    New Prescriptions    OXYCODONE (ROXICODONE) 5 MG IR TABLET    Take 1 tablet (5 mg) by mouth every 6 hours as needed for moderate to severe pain       1. -Take acetaminophen 500 to 1000 mg by mouth every 4 to 6 hours as needed for pain or fever.  Do not take more than 4000 mg in 24 hours.  Do not take within 6 hours of another acetaminophen containing medication such as norco (vicodin) or percocet.  2. Take tramadol as prescribed.  Use oxycodone if you have breakthrough pain.  3. Please follow-up with [facility].  4. Please return to the ED as needed for new or worsening symptoms such as reinjury, uncontrollable pain, severe headache, vomiting, focal weakness, any other concerning symptoms.      Discharge References/Attachments     HUMERUS FRACTURE, UNDERSTANDING (ENGLISH)      Future Appointments        Provider Department Dept Phone Center    10/19/2017 10:15 AM [facility] Lab Draw [facility] [employer] Lab Draw [phone] [facility]    10/19/2017 10:50 AM Leanne Reddy  ALPHONSO CAMARGO [facility] [phone] [facility]    11/8/2017 11:00 AM [facility] Lab Draw [facility] Lab Draw [phone] [facility]    11/8/2017 11:30 AM BMT DOM [facility] Blood and Marrow Transplant [phone] [facility]      24 Hour Appointment Hotline       To make an appointment at any [facility], call [phone] ([phone]). If you don't have a family doctor or clinic, we will help you find one. [facility] are conveniently located to serve the needs of you and your family.          ED Discharge Orders     MR Shoulder Left w/o Contrast       Administration of IV contrast (contrast agent, dose, and amount) will be tailored to this examination per the appropriate written protocol listed in the Protocol E-Book, or by the supervising imaging provider.                     Review of your medicines      START taking        Dose / Directions Last dose taken    oxyCODONE 5 MG IR tablet   Commonly known as:  ROXICODONE   Dose:  5 mg   Quantity:  15 tablet        Take 1 tablet (5 mg) by mouth every 6 hours as needed for moderate to severe pain   Refills:  0          Our records show that you are taking the medicines listed below. If these are incorrect, please call your family doctor or clinic.        Dose / Directions Last dose taken    acyclovir 400 MG tablet   Commonly known as:  ZOVIRAX   Dose:  400 mg   Indication:  Unsure of dose   Quantity:  60 tablet        Take 1 tablet (400 mg) by mouth 2 times daily   Refills:  3        amLODIPine 5 MG tablet   Commonly known as:  NORVASC   Quantity:  90 tablet        TAKE 1 TABLET BY MOUTH AT BEDTIME   Refills:  1        clopidogrel 75 MG tablet   Commonly known as:  PLAVIX   Quantity:  90 tablet        TAKE 1 TABLET BY MOUTH EVERY DAY   Refills:  0        esomeprazole 40 MG CR capsule   Commonly known as:  nexIUM   Dose:  40 mg   Quantity:  30 capsule        Take 1 capsule (40 mg) by mouth every morning (before breakfast)   Refills:  0         LORazepam 0.5 MG tablet   Commonly known as:  ATIVAN   Dose:  0.5 mg   Quantity:  30 tablet        Take 1 tablet (0.5 mg) by mouth every 6 hours as needed for nausea or anxiety.   Refills:  0        OLANZapine 5 MG tablet   Commonly known as:  zyPREXA   Dose:  5 mg   Quantity:  60 tablet        Take 1 tablet (5 mg) by mouth 2 times daily   Refills:  0        ondansetron 8 MG ODT tab   Commonly known as:  ZOFRAN-ODT   Dose:  8 mg   Quantity:  30 tablet        Take 1 tablet (8 mg) by mouth every 8 hours as needed for nausea   Refills:  3        SIMVASTATIN PO   Dose:  20 mg        Take 20 mg by mouth At Bedtime   Refills:  0        traMADol 50 MG tablet   Commonly known as:  ULTRAM   Dose:  25-50 mg   Quantity:  60 tablet        Take 0.5-1 tablets (25-50 mg) by mouth every 6 hours as needed for moderate pain . Recommend trying after Tylenol and before Dilaudid.   Refills:  3        venetoclax 100 MG tablet CHEMOTHERAPY   Commonly known as:  VENCLEXTA   Dose:  800 mg   Quantity:  100 tablet        Take 8 tablets (800 mg) by mouth daily   Refills:  3                Prescriptions were sent or printed at these locations (1 Prescription)                   Other Prescriptions                Printed at Department/Unit printer (1 of 1)         oxyCODONE (ROXICODONE) 5 MG IR tablet                Procedures and tests performed during your visit     Shoulder XR, G/E 3 views, left    Sling      Orders Needing Specimen Collection     None      Pending Results     No orders found from 10/4/2017 to 10/7/2017.            Pending Culture Results     No orders found from 10/4/2017 to 10/7/2017.            Pending Results Instructions     If you had any lab results that were not finalized at the time of your Discharge, you can call the ED Lab Result RN at [phone]. You will be contacted by this team for any positive Lab results or changes in treatment. The nurses are available 7 days a week from 10A to 6:30P.  You can leave a  message 24 hours per day and they will return your call.        Test Results From Your Hospital Stay        10/6/2017 11:08 AM      Narrative     XR SHOULDER LT G/E 3 VW 10/6/2017 11:03 AM    HISTORY: Fall.    COMPARISON: None.        Impression     IMPRESSION: Mildly displaced oblique fracture of the left proximal  humeral diaphysis. Mild degenerative changes of the acromioclavicular  joint.    YANNA HARRIS MD                Clinical Quality Measure: Blood Pressure Screening     Your blood pressure was checked while you were in the emergency department today. The last reading we obtained was  BP: 115/73 . Please read the guidelines below about what these numbers mean and what you should do about them.  If your systolic blood pressure (the top number) is less than 120 and your diastolic blood pressure (the bottom number) is less than 80, then your blood pressure is normal. There is nothing more that you need to do about it.  If your systolic blood pressure (the top number) is 120-139 or your diastolic blood pressure (the bottom number) is 80-89, your blood pressure may be higher than it should be. You should have your blood pressure rechecked within a year by a primary care provider.  If your systolic blood pressure (the top number) is 140 or greater or your diastolic blood pressure (the bottom number) is 90 or greater, you may have high blood pressure. High blood pressure is treatable, but if left untreated over time it can put you at risk for heart attack, stroke, or kidney failure. You should have your blood pressure rechecked by a primary care provider within the next 4 weeks.  If your provider in the emergency department today gave you specific instructions to follow-up with your doctor or provider even sooner than that, you should follow that instruction and not wait for up to 4 weeks for your follow-up visit.        Thank you for choosing [facility]       Thank you for choosing [facility] for your care. Our goal  is always to provide you with excellent care. Hearing back from our patients is one way we can continue to improve our services. Please take a few minutes to complete the written survey that you may receive in the mail after you visit with us. Thank you!        [employer] Information     [employer] gives you secure access to your electronic health record. If you see a primary care provider, you can also send messages to your care team and make appointments. If you have questions, please call your primary care clinic.  If you do not have a primary care provider, please call [phone] and they will assist you.        Care EveryWhere ID     This is your Care EveryWhere ID. This could be used by other organizations to access your [facility] medical records  [id number]        Equal Access to Services     ALBAN SHAH : Maribel Davis, conrad ge, kylah abdi, jonny villa. So [facility] [phone].    ATENCIÓN: Si habla español, tiene a todd disposición servicios gratuitos de asistencia lingüística. Llame al [phone].    We comply with applicable federal civil rights laws and [place] laws. We do not discriminate on the basis of race, color, national origin, age, disability, sex, sexual orientation, or gender identity.            After Visit Summary       This is your record. Keep this with you and show to your community pharmacist(s) and doctor(s) at your next visit.

## 2017-10-06 NOTE — ED AVS SNAPSHOT
Emergency Department    [address] [place] [address]    Phone:  [phone]    Fax:  [phone]                                       Anthony Carbone   MRN: [id number]    Department:   Emergency Department   Date of Visit:  10/6/2017           After Visit Summary Signature Page     I have received my discharge instructions, and my questions have been answered. I have discussed any challenges I see with this plan with the nurse or doctor.    ..........................................................................................................................................  Patient/Patient Representative Signature      ..........................................................................................................................................  Patient Representative Print Name and Relationship to Patient    ..................................................               ................................................  Date                                            Time    ..........................................................................................................................................  Reviewed by Signature/Title    ...................................................              ..............................................  Date                                                            Time

## 2017-10-09 NOTE — PROGRESS NOTES
Called to follow up with patient after a visit to the ED w/ possible broken left arm.  Patient had MRI today and is following up with Ortho on Wednesday.   Pain has been a slight issue but the tramadol has been overall helping.  Patient and wife will call if they have any questions, comments, or concerns.

## 2017-10-13 NOTE — TELEPHONE ENCOUNTER
Pt's wife called in requesting a refill of Tramadol. Pt recently fractured arm and has been using medication with good pain relief. Dr. Elias ok'd refill. Refill called in to [facility] pharmacy in [place]. Pt updated.

## 2017-10-19 NOTE — PROGRESS NOTES
Oncology/Hematology Visit Note  Oct 19, 2017    Reason for Visit: Follow up of multiple myeloma    History of Present Illness: Anthony Carbone is a 71 year old male with multiple myeloma. He is currently undergoing treatment with Venclexta . Briefly, his oncologic history is as follows:     Diagnosis: 71 year old gentleman with IgM lambda multiple myeloma originally diagnosed in 01/2012 as stage I, standard risk disease.   Treatment: Revlimid plus dexamethasone for 4-5 cycles but plateaued by late 03/2012.   - Velcade was added and he received another 2-3 cycles, achieving a good partial remission.       Transplant: Single auto after melphalan 200 mg/m2 preparative regimen on 01/09/2013  - Post-transplant course: unremarkable except for some mild steroid-induced hyperglycemia, gastritis, nausea and vomiting.       Maintenance: Lenalidomide at day-100 then developed a maculopapular rash. Lenalidomide was held and then we re-challenged him after about a month to 2 months at a lower dose (5 mg daily); rash returned.   - was started on maintenance Velcade, every other week through July 2014, when he was noted with abnormalities on myeloma studies drawn there. Noted with prostate cancer dx at about the time of relapse (see below).      Relapse: noted with return on M-spike in blood/urine with marrow involvement but negative PET.   - Started on retreatment with RVD on 8/27/14 with Revlimid at 15 mg 14 days of 21 days, dexamethasone 40 mg weekly and velcade weekly.Completed at total of 5 cycles without complication by 12/2014.   - Started Cycle 6 on inc'd Rev dosing of 20mg daily x 2 weeks on 12/11/14 which was complicated by pneumonia.  - Adm 12/21-1/10 for human metapneumovirus pneumonia complicated by anorexia, HTN, depression, anorexia with significant weight loss.   - Restarted Ernesto/Dex only on 2/4/15 with good tolerance but with thrombocytopenia.   - Bendamustine added to Velcade/dexamethasone on 5/21/15 due  to disease progression  - Velcade discontinued on 7/9/2015 due to side effects (orthostasis)  - Schedule changed to bendamustine 80 mg/m2 days 1 and 2 on 28-day cycle  - Cycle 1 tolerated poorly due to lightheadedness and weakness  - Cycle 2 dose reduced to 60 mg/m2 and pre-meds adjusted  - Cycle 5 received on 9/17/15.  - 10/1/2015 - increasing IgM, M-spike - started Pomalidomide 4mg/day (21 days out of 28 days) and weekly Decadron 20mg on Oct 1st, 2015.    - Carfilzomib added on 10/22/2015 making this CPD.  - C3-C6  received in [place]    - Returned to [facility] and resumed CPD here    - Adm: 4/12-4/14 with fever, confusion, neutropenia. Noted on MRI to have acute/subacute CVA and subacute/chronic CVA; started on Plavix, given brief course of Abx and GCSF prior to d/c.     - Continued on Carfilzomib and dexamethasone alone  - Pomalyst added back on 7/13/2016 for rising FLC  - Start daratumumab 11/10/16. Changed to every other week after 4 weekly treatments d/t profound fatigue and malaise.   - Adm 5/7-5/16 due to AMS, hypercalcemia, hyperuricemia, possible PNA, back pain, and JOSE MARIA. Started Kyprolis while inpatient.  - Re-admitted 5/25-/529 with recurrent AMS, found to have concomitant PNA  - Resumed Kyprolis 6/13/2017. Stopped due to worsening performance status and progressive myeloma.  - Started Venclexta 800 mg 8/25/2017.    Interval History:  Mr. Carbone returns to clinic today with his wife. He recently broke his left arm after a piece of fence fell on him when he was trying to lift it. The fracture is being followed by an outside orthopedic clinic. He continues to have pain in this arm that is well controlled with tramadol 50mg 3x daily and Tylenol 500mg 4x daily. He states his back and rib pain has slightly improved, though it may be because he is more focused on his arm.    He continues to have mild nausea with the Venclexta. This is controlled with Zofran once daily. He also has decreased appetite and his wife  wonders if the nausea has something to do with this. He has been consistently losing weight since spring. He denies vomiting and has intermittent diarrhea that is quickly managed with Imodium. He continues to have fatigue, though he is up doing more during the day. His wife states his mental status is stable and she hasn't noticed any recurrence of AMS or confusion. Dr. Topete did suggest decreasing his Zyprexa to 2.5mg at bedtime last visit due to nightmares, and this has helped.    Review of Systems:  Patient denies fevers, chills, night sweats, headaches, dizziness, vision or hearing changes, new lumps or bumps, chest pain, shortness of breath, cough, abdominal pain, vomiting, changes to bowel or bladder, swelling of extremities, bleeding issues, or rash.    Current Outpatient Prescriptions   Medication Sig Dispense Refill     traMADol (ULTRAM) 50 MG tablet Take 1 tablet (50 mg) by mouth every 6 hours as needed for moderate pain . Recommend trying after Tylenol and before Dilaudid. 60 tablet 3     oxyCODONE (ROXICODONE) 5 MG IR tablet Take 1 tablet (5 mg) by mouth every 6 hours as needed for moderate to severe pain 15 tablet 0     SIMVASTATIN PO Take 20 mg by mouth At Bedtime       amLODIPine (NORVASC) 5 MG tablet TAKE 1 TABLET BY MOUTH AT BEDTIME 90 tablet 1     acyclovir (ZOVIRAX) 400 MG tablet Take 1 tablet (400 mg) by mouth 2 times daily 60 tablet 3     venetoclax (VENCLEXTA) 100 MG tablet CHEMOTHERAPY Take 8 tablets (800 mg) by mouth daily 100 tablet 3     clopidogrel (PLAVIX) 75 MG tablet TAKE 1 TABLET BY MOUTH EVERY DAY 90 tablet 0     LORazepam (ATIVAN) 0.5 MG tablet Take 1 tablet (0.5 mg) by mouth every 6 hours as needed for nausea or anxiety. 30 tablet 0     ondansetron (ZOFRAN-ODT) 8 MG ODT tab Take 1 tablet (8 mg) by mouth every 8 hours as needed for nausea 30 tablet 3     OLANZapine (ZYPREXA) 5 MG tablet Take 1 tablet (5 mg) by mouth 2 times daily (Patient taking differently: Take 5 mg by mouth  daily ) 60 tablet 0     esomeprazole (NEXIUM) 40 MG CR capsule Take 1 capsule (40 mg) by mouth every morning (before breakfast) 30 capsule 0       Physical Examination:  /74 (BP Location: Right arm, Patient Position: Sitting, Cuff Size: Adult Regular)  Pulse 80  Temp 98.2  F (36.8  C) (Oral)  Resp 16  Wt 54.4 kg (119 lb 14.4 oz)  SpO2 96%  BMI 20.58 kg/m2  Wt Readings from Last 10 Encounters:   10/06/17 54.4 kg (120 lb)   10/03/17 56.2 kg (123 lb 12.8 oz)   09/27/17 56.8 kg (125 lb 3.2 oz)   09/20/17 56 kg (123 lb 6.4 oz)   09/06/17 56.2 kg (124 lb)   08/29/17 55.2 kg (121 lb 9.6 oz)   08/09/17 54.4 kg (120 lb)   07/28/17 56.2 kg (124 lb)   07/26/17 56 kg (123 lb 6.4 oz)   07/26/17 57.7 kg (127 lb 1.6 oz)     Constitutional: Well-appearing male in no acute distress.  Eyes: EOMI, PERRL. No scleral icterus.  ENT: Oral mucosa is moist without lesions. Thrush noted on the tongue that is not able to be scraped off.  Lymphatic: Neck is supple without cervical or supraclavicular lymphadenopathy.   Cardiovascular: Regular rate and rhythm. No murmurs, gallops, or rubs. No peripheral edema.  Respiratory: Clear to auscultation bilaterally. No wheezes or crackles.  Gastrointestinal: Bowel sounds present. Abdomen soft, non-tender. No palpable hepatosplenomegaly or masses.   Neurologic: Grossly intact. Able to engage in conversation with appropriate responses to questions. Able to walk unassisted, rises from chair slowly.  Skin: No rashes, petechiae, or bruising noted on exposed skin.    Laboratory Data:    Results for HANSACHARLYWILLIAN LARES (MRN [id number]) as of 10/19/2017 15:15   10/19/2017 11:02   Sodium 138   Potassium 2.8 (L)   Chloride 100   Carbon Dioxide 28   Urea Nitrogen 18   Creatinine 0.96   GFR Estimate 77   GFR Estimate If Black >90   Calcium 8.7   Anion Gap 9   Albumin 3.3 (L)   Protein Total 8.2   Bilirubin Total 0.4   Alkaline Phosphatase 57   ALT 18   AST 12   Glucose 122 (H)   WBC 3.5 (L)    Hemoglobin 10.2 (L)   Hematocrit 31.7 (L)   Platelet Count 150   RBC Count 3.07 (L)    (H)   MCH 33.2 (H)   MCHC 32.2   RDW 16.4 (H)   Diff Method Automated Method   % Neutrophils 79.6   % Lymphocytes 6.0   % Monocytes 13.8   % Eosinophils 0.0   % Basophils 0.3   % Immature Granulocytes 0.3   Nucleated RBCs 0   Absolute Neutrophil 2.8   Absolute Lymphocytes 0.2 (L)   Absolute Monocytes 0.5   Absolute Eosinophils 0.0   Absolute Basophils 0.0   Abs Immature Granulocytes 0.0   Absolute Nucleated RBC 0.0     Assessment and Plan:    1. Multiple myeloma, relapsed, currently on treatment with Venclexta  Patient is overall tolerating the Venclexta well, with typical side effects of nausea and fatigue. Discussed taking his Zofran 30min before meals to further assist with nausea control and appetite. Myeloma labs from today are pending. Per Dr. Topete's last note if these were to increase we could consider restarting Velcade/Dexamethasone with the Venclexta. They have an appointment with him scheduled the beginning of November.    2. Altered mental status, improved  Prior work-up thought secondary to metabolic derangements, uremia, and possible an infection. Per his wife, his mental status has greatly improved and he does not seemed confused during today's visit. There may have been an improvement with decreasing the Zyprexa to 2.5mg, so continue this dose at bedtime.    3. Fatigue, stable  Continue to monitor. Hgb has been stable.     4. Heme  History of cytopenias secondary to treatment and multiple myeloma. Counts have remained very stable the last two months. Continue to monitor, but reassuring given stability of both platelets and hemoglobin.    5. FEN  Creat normal at 0.96. Potassium is low to 2.8 (common complication of Venclexta) as patient stopped his potassium the end of September. Instructed him to take 60meq today and then restart 20meq daily. Will be rechecked when he sees Dr. Topete in  November.    Patient has consistently lost weight this year. He was in the mid 130s this spring, and today is 119lbs. We had a long discussion regarding his nutrition and ways to improve calorie and protein intake. Patient and his wife are going to focus on nutrition the next few weeks. Should he continue to lose weight, will need to have him see a dietician and consider starting an appetite stimulant (not Zyprexa given intolerance at higher doses).     6. ID  Acyclovir 400mg BID. Thrush noted on exam today and prescription was given today for Nystatin swish and spit. Instructed patient to use until symptoms resolve, and then continue for 3 more days. No other ID concerns.    7. Bone  Started Zometa 10/3/17. Due every 3 months (next dose January). Recent left humerus fracture, being followed by [facility] orthopedics. Continue tramadol and tylenol as needed for pain. Denies any new bony pains.     David Espana PA-C  [facility] [place] [address]  [phone]      I saw the patient and performed the ROS and exam myself. The assessment and plan were mutually discussed and this note was edited to reflect my findings.    Leanne Reddy PA-C  Hematology, Oncology, and Transplant  [facility] [place] [address]  [phone]    Addendum:  Results for WILLIAN ARCINIEGA (MRN [id number]) as of 10/24/2017 10:04     M spike and light chains both increased. REviewed with Dr. Conrad. Plan to add weekly Velcade with dex 20 mg weekly. I will have Yamil return to clinic to review that.Attempted to call patient but unable to reach them. Will send a MyChart and have our RN, Anila, follow-up with them tomorrow. FOr now, I will schedule to see me next week with Velcade.     Ref. Range 9/27/2017 16:44 10/19/2017 11:02   Monoclonal Peak Latest Ref Range: 0.0 g/dL 1.8 (H) 2.1 (H)     ECT

## 2017-10-19 NOTE — NURSING NOTE
"Chief Complaint   Patient presents with     Oncology Clinic Visit     Multiple Myeloma     Port Draw     port accessed and labs drawn by rn.  vs taken.     Port accessed with 20g 3/4\" gripper needle, labs drawn, port flushed with saline and heparin, port de-accessed.  vitals checked, pt checked in for next appointment.  Sujata Mosquera RN    "

## 2017-10-19 NOTE — MR AVS SNAPSHOT
After Visit Summary   10/19/2017    Anthony Carbone    MRN: [id number]           Patient Information     Date Of Birth          1945        Visit Information        Provider Department      10/19/2017 10:50 AM Leanne Reddy PA-C [facility]        Today's Diagnoses     Multiple myeloma not having achieved remission (H)    -  1    Multiple myeloma in relapse (H)        Delirium        Thrush        Hypokalemia           Follow-ups after your visit        Your next 10 appointments already scheduled     Nov 08, 2017 11:00 AM CST   [facility] with  [facility]   [facility] ([facility])    [address] [place] [address] [id number] [phone]            Nov 08, 2017 11:30 AM CST   RETURN ONC with  BMT DOM   [facility] and Marrow Transplant ([facility])    [address] [place] [address] [id number] [phone]              Who to contact     If you have questions or need follow up information about today's clinic visit or your schedule please contact [facility] directly at [phone].  Normal or non-critical lab and imaging results will be communicated to you by MyChart, letter or phone within 4 business days after the clinic has received the results. If you do not hear from us within 7 days, please contact the clinic through [employer] or phone. If you have a critical or abnormal lab result, we will notify you by phone as soon as possible.  Submit refill requests through [employer] or call your pharmacy and they will forward the refill request to us. Please allow 3 business days for your refill to be completed.          Additional Information About Your Visit        MyChart Information     [employer] gives you secure access to your electronic health record. If you see a primary care provider, you can also send messages to your  care team and make appointments. If you have questions, please call your primary care clinic.  If you do not have a primary care provider, please call [phone] and they will assist you.        Care EveryWhere ID     This is your Care EveryWhere ID. This could be used by other organizations to access your [place] medical records  [id number]        Your Vitals Were     Pulse Temperature Respirations Pulse Oximetry BMI (Body Mass Index)       80 98.2  F (36.8  C) (Oral) 16 96% 20.58 kg/m2        Blood Pressure from Last 3 Encounters:   10/19/17 110/74   10/06/17 124/76   10/03/17 107/70    Weight from Last 3 Encounters:   10/19/17 54.4 kg (119 lb 14.4 oz)   10/06/17 54.4 kg (120 lb)   10/03/17 56.2 kg (123 lb 12.8 oz)              We Performed the Following     CBC with platelets and differential     Comprehensive metabolic panel     Kappa and lambda light chain (Serum)     Protein electrophoresis          Today's Medication Changes          These changes are accurate as of: 10/19/17 11:59 PM.  If you have any questions, ask your nurse or doctor.               Start taking these medicines.        Dose/Directions    nystatin 994200 UNIT/ML suspension   Commonly known as:  MYCOSTATIN   Used for:  Thrush   Started by:  Leanne Reddy PA-C        Dose:  467233 Units   Take 5 mLs (500,000 Units) by mouth 4 times daily Swish and spit. Continue until symptoms resolve and then take for 3 more days.   Quantity:  473 mL   Refills:  3         These medicines have changed or have updated prescriptions.        Dose/Directions    OLANZapine 5 MG tablet   Commonly known as:  zyPREXA   This may have changed:    - how much to take  - when to take this   Used for:  Delirium, Multiple myeloma in relapse (H)   Changed by:  Leanne Reddy PA-C        Dose:  2.5 mg   Take 0.5 tablets (2.5 mg) by mouth At Bedtime   Quantity:  60 tablet   Refills:  0            Where to get your medicines      These medications  were sent to [id number] - [place], [place] - [address] AT [facility] [address]  [address], [facility] [address]     Phone:  [phone]     nystatin [id number] UNIT/ML suspension                Primary Care Provider Office Phone # Fax #    Sanket Burciaga [phone]       [facility] [address] [facility] [address]        Equal Access to Services     ALBAN SHAH : Hadii aad ku hadasho Soomaali, waaxda luqadaha, qaybta kaalmada adeegyada, waxay idiin hayaan adeeg kharamartha lamarcelina . So [facility] [phone].    ATENCIÓN: Si habla español, tiene a todd disposición servicios gratuitos de asistencia lingüística. [facility] [phone].    We comply with applicable federal civil rights laws and [place] laws. We do not discriminate on the basis of race, color, national origin, age, disability, sex, sexual orientation, or gender identity.            Thank you!     Thank you for choosing [facility]  for your care. Our goal is always to provide you with excellent care. Hearing back from our patients is one way we can continue to improve our services. Please take a few minutes to complete the written survey that you may receive in the mail after your visit with us. Thank you!             Your Updated Medication List - Protect others around you: Learn how to safely use, store and throw away your medicines at www.disposemymeds.org.          This list is accurate as of: 10/19/17 11:59 PM.  Always use your most recent med list.                   Brand Name Dispense Instructions for use Diagnosis    acyclovir 400 MG tablet    ZOVIRAX    60 tablet    Take 1 tablet (400 mg) by mouth 2 times daily    Multiple myeloma in relapse (H)       amLODIPine 5 MG tablet    NORVASC    90 tablet    TAKE 1 TABLET BY MOUTH AT BEDTIME    Essential hypertension       clopidogrel 75 MG tablet    PLAVIX    90 tablet    TAKE 1 TABLET BY MOUTH EVERY DAY    History of  stroke       esomeprazole 40 MG CR capsule    nexIUM    30 capsule    Take 1 capsule (40 mg) by mouth every morning (before breakfast)    Gastroesophageal reflux disease without esophagitis       LORazepam 0.5 MG tablet    ATIVAN    30 tablet    Take 1 tablet (0.5 mg) by mouth every 6 hours as needed for nausea or anxiety.    Multiple myeloma in relapse (H), Delirium       nystatin 040965 UNIT/ML suspension    MYCOSTATIN    473 mL    Take 5 mLs (500,000 Units) by mouth 4 times daily Swish and spit. Continue until symptoms resolve and then take for 3 more days.    Thrush       OLANZapine 5 MG tablet    zyPREXA    60 tablet    Take 0.5 tablets (2.5 mg) by mouth At Bedtime    Delirium, Multiple myeloma in relapse (H)       ondansetron 8 MG ODT tab    ZOFRAN-ODT    30 tablet    Take 1 tablet (8 mg) by mouth every 8 hours as needed for nausea    Nausea       oxyCODONE 5 MG IR tablet    ROXICODONE    15 tablet    Take 1 tablet (5 mg) by mouth every 6 hours as needed for moderate to severe pain        potassium chloride SA 20 MEQ CR tablet    K-DUR/KLOR-CON M    60 tablet    TAKE 1 TABLET BY MOUTH DAILY.    Hypokalemia       SIMVASTATIN PO      Take 20 mg by mouth At Bedtime        traMADol 50 MG tablet    ULTRAM    60 tablet    Take 1 tablet (50 mg) by mouth every 6 hours as needed for moderate pain . Recommend trying after Tylenol and before Dilaudid.    Acute bilateral low back pain without sciatica, Multiple myeloma in relapse (H)       venetoclax 100 MG tablet CHEMOTHERAPY    VENCLEXTA    100 tablet    Take 8 tablets (800 mg) by mouth daily    Multiple myeloma in relapse (H)

## 2017-10-19 NOTE — LETTER
10/19/2017      RE: Anthony Carbone  [address] [place] [address]       Oncology/Hematology Visit Note  Oct 19, 2017    Reason for Visit: Follow up of multiple myeloma    History of Present Illness: Anthony Carbone is a 71 year old male with multiple myeloma. He is currently undergoing treatment with Venclexta . Briefly, his oncologic history is as follows:     Diagnosis: 71 year old gentleman with IgM lambda multiple myeloma originally diagnosed in 01/2012 as stage I, standard risk disease.   Treatment: Revlimid plus dexamethasone for 4-5 cycles but plateaued by late 03/2012.   - Velcade was added and he received another 2-3 cycles, achieving a good partial remission.       Transplant: Single auto after melphalan 200 mg/m2 preparative regimen on 01/09/2013  - Post-transplant course: unremarkable except for some mild steroid-induced hyperglycemia, gastritis, nausea and vomiting.       Maintenance: Lenalidomide at day-100 then developed a maculopapular rash. Lenalidomide was held and then we re-challenged him after about a month to 2 months at a lower dose (5 mg daily); rash returned.   - was started on maintenance Velcade, every other week through July 2014, when he was noted with abnormalities on myeloma studies drawn there. Noted with prostate cancer dx at about the time of relapse (see below).      Relapse: noted with return on M-spike in blood/urine with marrow involvement but negative PET.   - Started on retreatment with RVD on 8/27/14 with Revlimid at 15 mg 14 days of 21 days, dexamethasone 40 mg weekly and velcade weekly.Completed at total of 5 cycles without complication by 12/2014.   - Started Cycle 6 on inc'd Rev dosing of 20mg daily x 2 weeks on 12/11/14 which was complicated by pneumonia.  - Adm 12/21-1/10 for human metapneumovirus pneumonia complicated by anorexia, HTN, depression, anorexia with significant weight loss.   - Restarted Ernesto/Dex only on 2/4/15 with good tolerance but  with thrombocytopenia.   - Bendamustine added to Velcade/dexamethasone on 5/21/15 due to disease progression  - Velcade discontinued on 7/9/2015 due to side effects (orthostasis)  - Schedule changed to bendamustine 80 mg/m2 days 1 and 2 on 28-day cycle  - Cycle 1 tolerated poorly due to lightheadedness and weakness  - Cycle 2 dose reduced to 60 mg/m2 and pre-meds adjusted  - Cycle 5 received on 9/17/15.  - 10/1/2015 - increasing IgM, M-spike - started Pomalidomide 4mg/day (21 days out of 28 days) and weekly Decadron 20mg on Oct 1st, 2015.    - Carfilzomib added on 10/22/2015 making this CPD.  - C3-C6  received in [place]    - Returned to [facility] and resumed CPD here    - Adm: 4/12-4/14 with fever, confusion, neutropenia. Noted on MRI to have acute/subacute CVA and subacute/chronic CVA; started on Plavix, given brief course of Abx and GCSF prior to d/c.     - Continued on Carfilzomib and dexamethasone alone  - Pomalyst added back on 7/13/2016 for rising FLC  - Start daratumumab 11/10/16. Changed to every other week after 4 weekly treatments d/t profound fatigue and malaise.   - Adm 5/7-5/16 due to AMS, hypercalcemia, hyperuricemia, possible PNA, back pain, and JOSE MARIA. Started Kyprolis while inpatient.  - Re-admitted 5/25-/529 with recurrent AMS, found to have concomitant PNA  - Resumed Kyprolis 6/13/2017. Stopped due to worsening performance status and progressive myeloma.  - Started Venclexta 800 mg 8/25/2017.    Interval History:  Mr. Carbone returns to clinic today with his wife. He recently broke his left arm after a piece of fence fell on him when he was trying to lift it. The fracture is being followed by an outside orthopedic clinic. He continues to have pain in this arm that is well controlled with tramadol 50mg 3x daily and Tylenol 500mg 4x daily. He states his back and rib pain has slightly improved, though it may be because he is more focused on his arm.    He continues to have mild nausea with the Venclexta.  This is controlled with Zofran once daily. He also has decreased appetite and his wife wonders if the nausea has something to do with this. He has been consistently losing weight since spring. He denies vomiting and has intermittent diarrhea that is quickly managed with Imodium. He continues to have fatigue, though he is up doing more during the day. His wife states his mental status is stable and she hasn't noticed any recurrence of AMS or confusion. Dr. Topete did suggest decreasing his Zyprexa to 2.5mg at bedtime last visit due to nightmares, and this has helped.    Review of Systems:  Patient denies fevers, chills, night sweats, headaches, dizziness, vision or hearing changes, new lumps or bumps, chest pain, shortness of breath, cough, abdominal pain, vomiting, changes to bowel or bladder, swelling of extremities, bleeding issues, or rash.    Current Outpatient Prescriptions   Medication Sig Dispense Refill     traMADol (ULTRAM) 50 MG tablet Take 1 tablet (50 mg) by mouth every 6 hours as needed for moderate pain . Recommend trying after Tylenol and before Dilaudid. 60 tablet 3     oxyCODONE (ROXICODONE) 5 MG IR tablet Take 1 tablet (5 mg) by mouth every 6 hours as needed for moderate to severe pain 15 tablet 0     SIMVASTATIN PO Take 20 mg by mouth At Bedtime       amLODIPine (NORVASC) 5 MG tablet TAKE 1 TABLET BY MOUTH AT BEDTIME 90 tablet 1     acyclovir (ZOVIRAX) 400 MG tablet Take 1 tablet (400 mg) by mouth 2 times daily 60 tablet 3     venetoclax (VENCLEXTA) 100 MG tablet CHEMOTHERAPY Take 8 tablets (800 mg) by mouth daily 100 tablet 3     clopidogrel (PLAVIX) 75 MG tablet TAKE 1 TABLET BY MOUTH EVERY DAY 90 tablet 0     LORazepam (ATIVAN) 0.5 MG tablet Take 1 tablet (0.5 mg) by mouth every 6 hours as needed for nausea or anxiety. 30 tablet 0     ondansetron (ZOFRAN-ODT) 8 MG ODT tab Take 1 tablet (8 mg) by mouth every 8 hours as needed for nausea 30 tablet 3     OLANZapine (ZYPREXA) 5 MG tablet Take 1  tablet (5 mg) by mouth 2 times daily (Patient taking differently: Take 5 mg by mouth daily ) 60 tablet 0     esomeprazole (NEXIUM) 40 MG CR capsule Take 1 capsule (40 mg) by mouth every morning (before breakfast) 30 capsule 0       Physical Examination:  /74 (BP Location: Right arm, Patient Position: Sitting, Cuff Size: Adult Regular)  Pulse 80  Temp 98.2  F (36.8  C) (Oral)  Resp 16  Wt 54.4 kg (119 lb 14.4 oz)  SpO2 96%  BMI 20.58 kg/m2  Wt Readings from Last 10 Encounters:   10/06/17 54.4 kg (120 lb)   10/03/17 56.2 kg (123 lb 12.8 oz)   09/27/17 56.8 kg (125 lb 3.2 oz)   09/20/17 56 kg (123 lb 6.4 oz)   09/06/17 56.2 kg (124 lb)   08/29/17 55.2 kg (121 lb 9.6 oz)   08/09/17 54.4 kg (120 lb)   07/28/17 56.2 kg (124 lb)   07/26/17 56 kg (123 lb 6.4 oz)   07/26/17 57.7 kg (127 lb 1.6 oz)     Constitutional: Well-appearing male in no acute distress.  Eyes: EOMI, PERRL. No scleral icterus.  ENT: Oral mucosa is moist without lesions. Thrush noted on the tongue that is not able to be scraped off.  Lymphatic: Neck is supple without cervical or supraclavicular lymphadenopathy.   Cardiovascular: Regular rate and rhythm. No murmurs, gallops, or rubs. No peripheral edema.  Respiratory: Clear to auscultation bilaterally. No wheezes or crackles.  Gastrointestinal: Bowel sounds present. Abdomen soft, non-tender. No palpable hepatosplenomegaly or masses.   Neurologic: Grossly intact. Able to engage in conversation with appropriate responses to questions. Able to walk unassisted, rises from chair slowly.  Skin: No rashes, petechiae, or bruising noted on exposed skin.    Laboratory Data:    Results for RENAELUDA WILLIAN MINAL (MRN [id number]) as of 10/19/2017 15:15   10/19/2017 11:02   Sodium 138   Potassium 2.8 (L)   Chloride 100   Carbon Dioxide 28   Urea Nitrogen 18   Creatinine 0.96   GFR Estimate 77   GFR Estimate If Black >90   Calcium 8.7   Anion Gap 9   Albumin 3.3 (L)   Protein Total 8.2   Bilirubin Total  0.4   Alkaline Phosphatase 57   ALT 18   AST 12   Glucose 122 (H)   WBC 3.5 (L)   Hemoglobin 10.2 (L)   Hematocrit 31.7 (L)   Platelet Count 150   RBC Count 3.07 (L)    (H)   MCH 33.2 (H)   MCHC 32.2   RDW 16.4 (H)   Diff Method Automated Method   % Neutrophils 79.6   % Lymphocytes 6.0   % Monocytes 13.8   % Eosinophils 0.0   % Basophils 0.3   % Immature Granulocytes 0.3   Nucleated RBCs 0   Absolute Neutrophil 2.8   Absolute Lymphocytes 0.2 (L)   Absolute Monocytes 0.5   Absolute Eosinophils 0.0   Absolute Basophils 0.0   Abs Immature Granulocytes 0.0   Absolute Nucleated RBC 0.0     Assessment and Plan:    1. Multiple myeloma, relapsed, currently on treatment with Venclexta  Patient is overall tolerating the Venclexta well, with typical side effects of nausea and fatigue. Discussed taking his Zofran 30min before meals to further assist with nausea control and appetite. Myeloma labs from today are pending. Per Dr. Topete's last note if these were to increase we could consider restarting Velcade/Dexamethasone with the Venclexta. They have an appointment with him scheduled the beginning of November.    2. Altered mental status, improved  Prior work-up thought secondary to metabolic derangements, uremia, and possible an infection. Per his wife, his mental status has greatly improved and he does not seemed confused during today's visit. There may have been an improvement with decreasing the Zyprexa to 2.5mg, so continue this dose at bedtime.    3. Fatigue, stable  Continue to monitor. Hgb has been stable.     4. Heme  History of cytopenias secondary to treatment and multiple myeloma. Counts have remained very stable the last two months. Continue to monitor, but reassuring given stability of both platelets and hemoglobin.    5. FEN  Creat normal at 0.96. Potassium is low to 2.8 (common complication of Venclexta) as patient stopped his potassium the end of September. Instructed him to take 60meq today and then  restart 20meq daily. Will be rechecked when he sees Dr. Conrad in November.    Patient has consistently lost weight this year. He was in the mid 130s this spring, and today is 119lbs. We had a long discussion regarding his nutrition and ways to improve calorie and protein intake. Patient and his wife are going to focus on nutrition the next few weeks. Should he continue to lose weight, will need to have him see a dietician and consider starting an appetite stimulant (not Zyprexa given intolerance at higher doses).     6. ID  Acyclovir 400mg BID. Thrush noted on exam today and prescription was given today for Nystatin swish and spit. Instructed patient to use until symptoms resolve, and then continue for 3 more days. No other ID concerns.    7. Bone  Started Zometa 10/3/17. Due every 3 months (next dose January). Recent left humerus fracture, being followed by [facility] orthopedics. Continue tramadol and tylenol as needed for pain. Denies any new bony pains.     David Espana PA-C  [facility] [place] [phone]      I saw the patient and performed the ROS and exam myself. The assessment and plan were mutually discussed and this note was edited to reflect my findings.    Leanne Reddy PA-C  Hematology, Oncology, and Transplant  [facility] [place] [phone]    Addendum:  Results for WILLIAN ARCINIEGA (MRN [id number]) as of 10/24/2017 10:04     M spike and light chains both increased. REviewed with Dr. Conrad. Plan to add weekly Velcade with dex 20 mg weekly. I will have Yamil return to clinic to review that.Attempted to call patient but unable to reach them. Will send a MyChart and have our RN, Anila, follow-up with them tomorrow. FOr now, I will schedule to see me next week with Velcade.     Ref. Range 9/27/2017 16:44 10/19/2017 11:02   Monoclonal Peak Latest Ref Range: 0.0 g/dL 1.8 (H) 2.1 (H)      ECT      Leanne Reddy PA-C

## 2017-10-19 NOTE — LETTER
10/19/2017       RE: Anthony Carbone  [address]  [place] [address]     Dear Colleague,    Thank you for referring your patient, Anthony Carbone, to the [facility]. Please see a copy of my visit note below.    Oncology/Hematology Visit Note  Oct 19, 2017    Reason for Visit: Follow up of multiple myeloma    History of Present Illness: Anthony Carbone is a 71 year old male with multiple myeloma. He is currently undergoing treatment with Venclexta . Briefly, his oncologic history is as follows:     Diagnosis: 71 year old gentleman with IgM lambda multiple myeloma originally diagnosed in 01/2012 as stage I, standard risk disease.   Treatment: Revlimid plus dexamethasone for 4-5 cycles but plateaued by late 03/2012.   - Velcade was added and he received another 2-3 cycles, achieving a good partial remission.       Transplant: Single auto after melphalan 200 mg/m2 preparative regimen on 01/09/2013  - Post-transplant course: unremarkable except for some mild steroid-induced hyperglycemia, gastritis, nausea and vomiting.       Maintenance: Lenalidomide at day-100 then developed a maculopapular rash. Lenalidomide was held and then we re-challenged him after about a month to 2 months at a lower dose (5 mg daily); rash returned.   - was started on maintenance Velcade, every other week through July 2014, when he was noted with abnormalities on myeloma studies drawn there. Noted with prostate cancer dx at about the time of relapse (see below).      Relapse: noted with return on M-spike in blood/urine with marrow involvement but negative PET.   - Started on retreatment with RVD on 8/27/14 with Revlimid at 15 mg 14 days of 21 days, dexamethasone 40 mg weekly and velcade weekly.Completed at total of 5 cycles without complication by 12/2014.   - Started Cycle 6 on inc'd Rev dosing of 20mg daily x 2 weeks on 12/11/14 which was complicated by pneumonia.  - Adm 12/21-1/10 for human  metapneumovirus pneumonia complicated by anorexia, HTN, depression, anorexia with significant weight loss.   - Restarted Ernesto/Dex only on 2/4/15 with good tolerance but with thrombocytopenia.   - Bendamustine added to Velcade/dexamethasone on 5/21/15 due to disease progression  - Velcade discontinued on 7/9/2015 due to side effects (orthostasis)  - Schedule changed to bendamustine 80 mg/m2 days 1 and 2 on 28-day cycle  - Cycle 1 tolerated poorly due to lightheadedness and weakness  - Cycle 2 dose reduced to 60 mg/m2 and pre-meds adjusted  - Cycle 5 received on 9/17/15.  - 10/1/2015 - increasing IgM, M-spike - started Pomalidomide 4mg/day (21 days out of 28 days) and weekly Decadron 20mg on Oct 1st, 2015.    - Carfilzomib added on 10/22/2015 making this CPD.  - C3-C6  received in [place]    - Returned to [facility] and resumed CPD here    - Adm: 4/12-4/14 with fever, confusion, neutropenia. Noted on MRI to have acute/subacute CVA and subacute/chronic CVA; started on Plavix, given brief course of Abx and GCSF prior to d/c.     - Continued on Carfilzomib and dexamethasone alone  - Pomalyst added back on 7/13/2016 for rising FLC  - Start daratumumab 11/10/16. Changed to every other week after 4 weekly treatments d/t profound fatigue and malaise.   - Adm 5/7-5/16 due to AMS, hypercalcemia, hyperuricemia, possible PNA, back pain, and JOSE MARIA. Started Kyprolis while inpatient.  - Re-admitted 5/25-/529 with recurrent AMS, found to have concomitant PNA  - Resumed Kyprolis 6/13/2017. Stopped due to worsening performance status and progressive myeloma.  - Started Venclexta 800 mg 8/25/2017.    Interval History:  Mr. Carbone returns to clinic today with his wife. He recently broke his left arm after a piece of fence fell on him when he was trying to lift it. The fracture is being followed by an outside orthopedic clinic. He continues to have pain in this arm that is well controlled with tramadol 50mg 3x daily and Tylenol 500mg 4x daily.  He states his back and rib pain has slightly improved, though it may be because he is more focused on his arm.    He continues to have mild nausea with the Venclexta. This is controlled with Zofran once daily. He also has decreased appetite and his wife wonders if the nausea has something to do with this. He has been consistently losing weight since spring. He denies vomiting and has intermittent diarrhea that is quickly managed with Imodium. He continues to have fatigue, though he is up doing more during the day. His wife states his mental status is stable and she hasn't noticed any recurrence of AMS or confusion. Dr. Topete did suggest decreasing his Zyprexa to 2.5mg at bedtime last visit due to nightmares, and this has helped.    Review of Systems:  Patient denies fevers, chills, night sweats, headaches, dizziness, vision or hearing changes, new lumps or bumps, chest pain, shortness of breath, cough, abdominal pain, vomiting, changes to bowel or bladder, swelling of extremities, bleeding issues, or rash.    Current Outpatient Prescriptions   Medication Sig Dispense Refill     traMADol (ULTRAM) 50 MG tablet Take 1 tablet (50 mg) by mouth every 6 hours as needed for moderate pain . Recommend trying after Tylenol and before Dilaudid. 60 tablet 3     oxyCODONE (ROXICODONE) 5 MG IR tablet Take 1 tablet (5 mg) by mouth every 6 hours as needed for moderate to severe pain 15 tablet 0     SIMVASTATIN PO Take 20 mg by mouth At Bedtime       amLODIPine (NORVASC) 5 MG tablet TAKE 1 TABLET BY MOUTH AT BEDTIME 90 tablet 1     acyclovir (ZOVIRAX) 400 MG tablet Take 1 tablet (400 mg) by mouth 2 times daily 60 tablet 3     venetoclax (VENCLEXTA) 100 MG tablet CHEMOTHERAPY Take 8 tablets (800 mg) by mouth daily 100 tablet 3     clopidogrel (PLAVIX) 75 MG tablet TAKE 1 TABLET BY MOUTH EVERY DAY 90 tablet 0     LORazepam (ATIVAN) 0.5 MG tablet Take 1 tablet (0.5 mg) by mouth every 6 hours as needed for nausea or anxiety. 30 tablet  0     ondansetron (ZOFRAN-ODT) 8 MG ODT tab Take 1 tablet (8 mg) by mouth every 8 hours as needed for nausea 30 tablet 3     OLANZapine (ZYPREXA) 5 MG tablet Take 1 tablet (5 mg) by mouth 2 times daily (Patient taking differently: Take 5 mg by mouth daily ) 60 tablet 0     esomeprazole (NEXIUM) 40 MG CR capsule Take 1 capsule (40 mg) by mouth every morning (before breakfast) 30 capsule 0       Physical Examination:  /74 (BP Location: Right arm, Patient Position: Sitting, Cuff Size: Adult Regular)  Pulse 80  Temp 98.2  F (36.8  C) (Oral)  Resp 16  Wt 54.4 kg (119 lb 14.4 oz)  SpO2 96%  BMI 20.58 kg/m2  Wt Readings from Last 10 Encounters:   10/06/17 54.4 kg (120 lb)   10/03/17 56.2 kg (123 lb 12.8 oz)   09/27/17 56.8 kg (125 lb 3.2 oz)   09/20/17 56 kg (123 lb 6.4 oz)   09/06/17 56.2 kg (124 lb)   08/29/17 55.2 kg (121 lb 9.6 oz)   08/09/17 54.4 kg (120 lb)   07/28/17 56.2 kg (124 lb)   07/26/17 56 kg (123 lb 6.4 oz)   07/26/17 57.7 kg (127 lb 1.6 oz)     Constitutional: Well-appearing male in no acute distress.  Eyes: EOMI, PERRL. No scleral icterus.  ENT: Oral mucosa is moist without lesions. Thrush noted on the tongue that is not able to be scraped off.  Lymphatic: Neck is supple without cervical or supraclavicular lymphadenopathy.   Cardiovascular: Regular rate and rhythm. No murmurs, gallops, or rubs. No peripheral edema.  Respiratory: Clear to auscultation bilaterally. No wheezes or crackles.  Gastrointestinal: Bowel sounds present. Abdomen soft, non-tender. No palpable hepatosplenomegaly or masses.   Neurologic: Grossly intact. Able to engage in conversation with appropriate responses to questions. Able to walk unassisted, rises from chair slowly.  Skin: No rashes, petechiae, or bruising noted on exposed skin.    Laboratory Data:    Results for SCHARPING, WILLIAN MINAL (MRN [id number]) as of 10/19/2017 15:15   10/19/2017 11:02   Sodium 138   Potassium 2.8 (L)   Chloride 100   Carbon Dioxide 28    Urea Nitrogen 18   Creatinine 0.96   GFR Estimate 77   GFR Estimate If Black >90   Calcium 8.7   Anion Gap 9   Albumin 3.3 (L)   Protein Total 8.2   Bilirubin Total 0.4   Alkaline Phosphatase 57   ALT 18   AST 12   Glucose 122 (H)   WBC 3.5 (L)   Hemoglobin 10.2 (L)   Hematocrit 31.7 (L)   Platelet Count 150   RBC Count 3.07 (L)    (H)   MCH 33.2 (H)   MCHC 32.2   RDW 16.4 (H)   Diff Method Automated Method   % Neutrophils 79.6   % Lymphocytes 6.0   % Monocytes 13.8   % Eosinophils 0.0   % Basophils 0.3   % Immature Granulocytes 0.3   Nucleated RBCs 0   Absolute Neutrophil 2.8   Absolute Lymphocytes 0.2 (L)   Absolute Monocytes 0.5   Absolute Eosinophils 0.0   Absolute Basophils 0.0   Abs Immature Granulocytes 0.0   Absolute Nucleated RBC 0.0     Assessment and Plan:    1. Multiple myeloma, relapsed, currently on treatment with Venclexta  Patient is overall tolerating the Venclexta well, with typical side effects of nausea and fatigue. Discussed taking his Zofran 30min before meals to further assist with nausea control and appetite. Myeloma labs from today are pending. Per Dr. Topete's last note if these were to increase we could consider restarting Velcade/Dexamethasone with the Venclexta. They have an appointment with him scheduled the beginning of November.    2. Altered mental status, improved  Prior work-up thought secondary to metabolic derangements, uremia, and possible an infection. Per his wife, his mental status has greatly improved and he does not seemed confused during today's visit. There may have been an improvement with decreasing the Zyprexa to 2.5mg, so continue this dose at bedtime.    3. Fatigue, stable  Continue to monitor. Hgb has been stable.     4. Heme  History of cytopenias secondary to treatment and multiple myeloma. Counts have remained very stable the last two months. Continue to monitor, but reassuring given stability of both platelets and hemoglobin.    5. FEN  Creat normal at  0.96. Potassium is low to 2.8 (common complication of Venclexta) as patient stopped his potassium the end of September. Instructed him to take 60meq today and then restart 20meq daily. Will be rechecked when he sees Dr. Topete in November.    Patient has consistently lost weight this year. He was in the mid 130s this spring, and today is 119lbs. We had a long discussion regarding his nutrition and ways to improve calorie and protein intake. Patient and his wife are going to focus on nutrition the next few weeks. Should he continue to lose weight, will need to have him see a dietician and consider starting an appetite stimulant (not Zyprexa given intolerance at higher doses).     6. ID  Acyclovir 400mg BID. Thrush noted on exam today and prescription was given today for Nystatin swish and spit. Instructed patient to use until symptoms resolve, and then continue for 3 more days. No other ID concerns.    7. Bone  Started Zometa 10/3/17. Due every 3 months (next dose January). Recent left humerus fracture, being followed by [facility] orthopedics. Continue tramadol and tylenol as needed for pain. Denies any new bony pains.     David Espana PA-C  [place] [address]  [phone]      I saw the patient and performed the ROS and exam myself. The assessment and plan were mutually discussed and this note was edited to reflect my findings.    Leanne Reddy PA-C  Hematology, Oncology, and Transplant  [facility] [place] [phone]    Addendum:  Results for WILLIAN ARCINIEGA (MRN [id number]) as of 10/24/2017 10:04     M spike and light chains both increased. Will review with Dr. Topete   Ref. Range 9/27/2017 16:44 10/19/2017 11:02   Monoclonal Peak Latest Ref Range: 0.0 g/dL 1.8 (H) 2.1 (H)     ECT      Again, thank you for allowing me to participate in the care of your patient.      Sincerely,    Leanne Reddy,  ALPHONSO

## 2017-10-19 NOTE — NURSING NOTE
"Oncology Rooming Note    October 19, 2017 11:06 AM   Anthony Carbone is a 71 year old male who presents for:    Chief Complaint   Patient presents with     Oncology Clinic Visit     Multiple Myeloma     Initial Vitals: /74 (BP Location: Right arm, Patient Position: Sitting, Cuff Size: Adult Regular)  Pulse 80  Temp 98.2  F (36.8  C) (Oral)  Resp 16  Wt 54.4 kg (119 lb 14.4 oz)  SpO2 96%  BMI 20.58 kg/m2 Estimated body mass index is 20.58 kg/(m^2) as calculated from the following:    Height as of 10/6/17: 1.626 m (5' 4\").    Weight as of this encounter: 54.4 kg (119 lb 14.4 oz). Body surface area is 1.57 meters squared.  Severe Pain (7) Comment: Data Unavailable   No LMP for male patient.  Allergies reviewed: Yes  Medications reviewed: Yes    Medications: Medication refills not needed today.  Pharmacy name entered into [facility]:    [id number] - [place] [id number] AT [facility] [address]  [place], [place] - [address] [facility]  [facility] [id number] [facility] [address] AT [facility] [address]    Clinical concerns:      6 minutes for nursing intake (face to face time)     Yanni Chilel CMA              "

## 2017-10-31 NOTE — PROGRESS NOTES
HEMATOLOGY/ONCOLOGY PROGRESS NOTE  Nov 1, 2017    REASON FOR VISIT: myeloma    Diagnosis: 71 year old gentleman with IgM lambda multiple myeloma originally diagnosed in 01/2012 as stage I, standard risk disease.   Treatment: Revlimid plus dexamethasone for 4-5 cycles but plateaued by late 03/2012.   - Velcade was added and he received another 2-3 cycles, achieving a good partial remission.      Transplant: Single auto after melphalan 200 mg/m2 preparative regimen on 01/09/2013  - Post-transplant course: unremarkable except for some mild steroid-induced hyperglycemia, gastritis, nausea and vomiting.      Maintenance: Lenalidomide at day-100 then developed a maculopapular rash. Lenalidomide was held and then we re-challenged him after about a month to 2 months at a lower dose (5 mg daily); rash returned.   - was started on maintenance Velcade, every other week through July 2014, when he was noted with abnormalities on myeloma studies drawn there. Noted with prostate cancer dx at about the time of relapse (see below).     Relapse: noted with return on M-spike in blood/urine with marrow involvement but negative PET.   - Started on retreatment with RVD on 8/27/14 with Revlimid at 15 mg 14 days of 21 days, dexamethasone 40 mg weekly and velcade weekly.Completed at total of 5 cycles without complication by 12/2014.   - Started Cycle 6 on inc'd Rev dosing of 20mg daily x 2 weeks on 12/11/14 which was complicated by pneumonia.  - Adm 12/21-1/10 for human metapneumovirus pneumonia complicated by anorexia, HTN, depression, anorexia with significant weight loss.   - Restarted Ernesto/Dex only on 2/4/15 with good tolerance but with thrombocytopenia.   - Bendamustine added to Velcade/dexamethasone on 5/21/15 due to disease progression  - Velcade discontinued on 7/9/2015 due to side effects (orthostasis)  - Schedule changed to bendamustine 80 mg/m2 days 1 and 2 on 28-day cycle  - Cycle 1 tolerated poorly due to lightheadedness and  weakness  - Cycle 2 dose reduced to 60 mg/m2 and pre-meds adjusted  - Cycle 5 received on 9/17/15.  - 10/1/2015 - increasing IgM, M-spike - started Pomalidomide 4mg/day (21 days out of 28 days) and weekly Decadron 20mg on Oct 1st, 2015.    - Carfilzomib added on 10/22/2015 making this CPD.  - C3-C6  received in [place]    - Returned to [facility] and resumed CPD here    - Adm: 4/12-4/14 with fever, confusion, neutropenia. Noted on MRI to have acute/subacute CVA and subacute/chronic CVA; started on Plavix, given brief course of Abx and GCSF prior to d/c.     - Continued on Carfilzomib and dexamethasone alone  - Pomalyst added back on 7/13/2016 for rising FLC  - Start daratumumab 11/10/16. Changed to every other week after 4 weekly treatments d/t profound fatigue and malaise.   - Adm 5/7-5/16 due to AMS, hypercalcemia, hyperuricemia, possible PNA, back pain, and JOSE MARIA. Started Kyprolis while inpatient.  - Re-admitted 5/25-/529 with recurrent AMS, found to have concomitant PNA  - Resumed Kyprolis 6/13/2017. Stopped due to worsening performance status and progressive myeloma.  - Started Venclexta 800 mg 8/25/2017    INTERVAL HISTORY:    Mr. Carbone is here with his wife.     No new concerns since the last visit. Main concern right now is ongoing arm pain s/p the fracture. He is managing with Tramadol. Good news is that the back pain has been better though. Nausea is mild and seems better with Zofran before meals. Eating is still a chore. Energy is fair, but stable. No fevers/chills/sweats, HA, vision changes, cough, congestion, sore throat, chest pain, SOB, GIVENS, vomiting, abdominal pain, urinary changes, swelling, bleeding, rash.       PHYSICAL EXAMINATION  /67 (BP Location: Right arm, Cuff Size: Adult Small)  Pulse 90  Temp 98  F (36.7  C) (Oral)  Resp 16  Wt 54.1 kg (119 lb 3.2 oz)  SpO2 91%  BMI 20.46 kg/m2    Wt Readings from Last 10 Encounters:   11/01/17 54.1 kg (119 lb 3.2 oz)   10/19/17 54.4 kg (119 lb 14.4  oz)   10/06/17 54.4 kg (120 lb)   10/03/17 56.2 kg (123 lb 12.8 oz)   17 56.8 kg (125 lb 3.2 oz)   17 56 kg (123 lb 6.4 oz)   17 56.2 kg (124 lb)   17 55.2 kg (121 lb 9.6 oz)   17 54.4 kg (120 lb)   17 56.2 kg (124 lb)   General: Alert. Oriented to name, , location, but unable to provide date. Able to ambulate independently, albeit slow and cautiously.  No acute distress. HEENT: PERRL, no palor or icterus. CVS: RRR with occasional premature beat. CHEST: CTAB, normal work of breathing ABDOMEN: soft non tender no masses     NEURO: AAOX3  CN 2-12 intact MSK: L arm in sling. SKIN: no bleeding or brusiing, no rashes EXTREMITIES: No edema.     LABS:   Results for WILLIAN ARCINIEGA (MRN [id number]) as of 2017 10:09   Ref. Range 10/19/2017 11:02 2017 09:34   WBC Latest Ref Range: 4.0 - 11.0 10e9/L 3.5 (L) 3.3 (L)   Hemoglobin Latest Ref Range: 13.3 - 17.7 g/dL 10.2 (L) 9.7 (L)   Hematocrit Latest Ref Range: 40.0 - 53.0 % 31.7 (L) 30.9 (L)   Platelet Count Latest Ref Range: 150 - 450 10e9/L 150 140 (L)     IMPRESSION/PLAN:  71 year old male with relapsed MM after autologous transplant (2013), status-post multiple salvage regimens with progressive disease.    MM: Continues on Venclexta, which he tolerates well and initally had good response, but with rising light chains last week. Plan to add in weekly Velcade at 1 mg/m2 with Dex 20 mg weekly. We discussed to watch for weakness, neuropathy, or any new symptoms. He has follow-up scheduled with Dr. Topete next week.     AMS: AMS started early May in setting of metabolic derangements, uremia, and possible infection.  Remains intermittently confused over the past few months but improved significantly, stable today.  - Continue Zyprexa 2.5 mg at bedtime  - Holding off long-acting pain meds     Fatigue: Recently a little worse in setting of anemia, improved dramatically with pRBC    Heme: Cytopenias 2/2 treatment and  likely MM in setting of progression. Stable today.  - On Plavix, hold for platelets < 50. Continue for now, pending his clinical course, may need to discuss just holding this all together as the potential harm may outweigh risk at some point  - Transfuse to keep hemoglobin > 8    Renal/FEN: Creat baseline ~0.8-1.0. Stable today.  -Encouraged protein/calorie supplements.      ID: Presently afebrile w/o any localizing infectious s/s  - Continues ppx  mg BID     Back/rib pain: Started early May. Lumbar MRI at OSH showing mild-mod central and foraminal stenoses in lumbar spine, per patient and wife, there was no MM lesion there. Rib films inpatient negative, back films stable from prior imaging. Pain has actually been better recenty  - Continues tramadol PRN and Tylenol PRN. No changes.      CV: Continue zocor and norvasc.   - Avoid QTc prolonging agents (history of QTc prolongation with syncopal episodes)       I spent >25 minutes with the patient, with over 50% of the time spent counseling or coordinating their care as described above.     Leanne Reddy PA-C

## 2017-11-01 NOTE — MR AVS SNAPSHOT
After Visit Summary   11/1/2017    Anthony Carbone    MRN: [id number]           Patient Information     Date Of Birth          1945        Visit Information        Provider Department      11/1/2017 12:00 PM UC 28 ATC; UC ONCOLOGY INFUSION [facility]        Today's Diagnoses     Multiple myeloma in relapse (H)    -  1       Follow-ups after your visit        Your next 10 appointments already scheduled     Nov 08, 2017 11:00 AM CST   Masonic Lab Draw with UC MASONIC LAB DRAW   [facility] ([facility])    [address] [address]   [phone]            Nov 08, 2017 11:30 AM CST   RETURN ONC with UC BMT DOM   [facility] Blood and Marrow Transplant ([facility])    [address] [address]   [phone]            Nov 08, 2017 12:00 PM CST   Infusion 60 with UC ONCOLOGY INFUSION, UC 32 ATC   [facility] ([facility])    [address] [place] [address]   [phone]            Nov 15, 2017  1:00 PM CST   Masonic Lab Draw with UC MASONIC LAB DRAW   [facility] Masonic Lab Draw ([facility])    [address] [address]   [phone]            Nov 15, 2017  1:30 PM CST   Infusion 60 with UC ONCOLOGY INFUSION, UC 28 ATC   [facility] ([facility])    [address] [address]   [phone]            Nov 21, 2017  4:10 PM CST   (Arrive by 3:55 PM)   Return Visit with Leanne Reddy PA-C   [facility] ([facility])    [address] [address]   [phone]            Nov 21, 2017  5:00 PM CST   Infusion 60 with UC ONCOLOGY INFUSION, UC 24 ATC   [facility]  Cancer Clinic ([facility])    [address] [facility]  2nd Floor  [facility] [address] [phone]              Who to contact     If you have questions or need follow up information about today's clinic visit or your schedule please contact [facility]ER CL[facility] directly at [phone].  Normal or non-critical lab and imaging results will be communicated to you by MyChart, letter or phone within 4 business days after the clinic has received the results. If you do not hear from us within 7 days, please contact the clinic through [employer] or phone. If you have a critical or abnormal lab result, we will notify you by phone as soon as possible.  Submit refill requests through [facility] or call your pharmacy and they will forward the refill request to us. Please allow 3 business days for your refill to be completed.          Additional Information About Your Visit        [employer] Information     [facility] gives you secure access to your electronic health record. If you see a primary care provider, you can also send messages to your care team and make appointments. If you have questions, please call your primary care clinic.  If you do not have a primary care provider, please call [phone] and they will assist you.        Care EveryWhere ID     This is your Care EveryWhere ID. This could be used by other organizations to access your [place] medical records  [id number]         Blood Pressure from Last 3 Encounters:   11/01/17 108/67   10/19/17 110/74   10/06/17 124/76    Weight from Last 3 Encounters:   11/01/17 54.1 kg (119 lb 3.2 oz)   10/19/17 54.4 kg (119 lb 14.4 oz)   10/06/17 54.4 kg (120 lb)              We Performed the Following     CBC with platelets and differential     Comprehensive metabolic panel          Today's Medication Changes          These changes are accurate as of: 11/1/17  3:40 PM.  If you have any questions, ask your nurse or doctor.               Start  taking these medicines.        Dose/Directions    dexamethasone 4 MG tablet   Commonly known as:  DECADRON   Used for:  Multiple myeloma not having achieved remission (H)   Started by:  Leanne Reddy PA-C        Take 20 mg weekly on day of Velcade   Quantity:  40 tablet   Refills:  3            Where to get your medicines      These medications were sent to "[facility] - [place], [place] [facility] [address] [facility] [address] AT [facility] & [facility] 7  [address] [address], [facility] [address]     Phone:  [phone]     dexamethasone 4 MG tablet                Primary Care Provider Office Phone # Fax #    Sanket Burciaga [phone]       [facility] [address] [facility] [address]        Equal Access to Services     ALBAN SHAH : aMribel hernandezo Sojose, waaxda luqadaha, qaybta kaalmada adeegyada, jonny villa. [facility] [phone].    ATENCIÓN: Si habla español, tiene a todd disposición servicios gratuitos de asistencia lingüística. [facility] [phone].    We comply with applicable federal civil rights laws and [place] laws. We do not discriminate on the basis of race, color, national origin, age, disability, sex, sexual orientation, or gender identity.            Thank you!     Thank you for choosing [facility] CANCER [facility]  for your care. Our goal is always to provide you with excellent care. Hearing back from our patients is one way we can continue to improve our services. Please take a few minutes to complete the written survey that you may receive in the mail after your visit with us. Thank you!             Your Updated Medication List - Protect others around you: Learn how to safely use, store and throw away your medicines at www.disposemymeds.org.          This list is accurate as of: 11/1/17  3:40 PM.  Always use your most recent med list.                   Brand Name Dispense Instructions for use Diagnosis     acyclovir 400 MG tablet    ZOVIRAX    60 tablet    Take 1 tablet (400 mg) by mouth 2 times daily    Multiple myeloma in relapse (H)       amLODIPine 5 MG tablet    NORVASC    90 tablet    TAKE 1 TABLET BY MOUTH AT BEDTIME    Essential hypertension       clopidogrel 75 MG tablet    PLAVIX    90 tablet    TAKE 1 TABLET BY MOUTH EVERY DAY    History of stroke       dexamethasone 4 MG tablet    DECADRON    40 tablet    Take 20 mg weekly on day of Velcade    Multiple myeloma not having achieved remission (H)       esomeprazole 40 MG CR capsule    nexIUM    30 capsule    Take 1 capsule (40 mg) by mouth every morning (before breakfast)    Gastroesophageal reflux disease without esophagitis       LORazepam 0.5 MG tablet    ATIVAN    30 tablet    Take 1 tablet (0.5 mg) by mouth every 6 hours as needed for nausea or anxiety.    Multiple myeloma in relapse (H), Delirium       nystatin 087947 UNIT/ML suspension    MYCOSTATIN    473 mL    Take 5 mLs (500,000 Units) by mouth 4 times daily Swish and spit. Continue until symptoms resolve and then take for 3 more days.    Thrush       OLANZapine 5 MG tablet    zyPREXA    60 tablet    Take 0.5 tablets (2.5 mg) by mouth At Bedtime    Delirium, Multiple myeloma in relapse (H)       ondansetron 8 MG ODT tab    ZOFRAN-ODT    30 tablet    Take 1 tablet (8 mg) by mouth every 8 hours as needed for nausea    Nausea       oxyCODONE 5 MG IR tablet    ROXICODONE    15 tablet    Take 1 tablet (5 mg) by mouth every 6 hours as needed for moderate to severe pain        potassium chloride SA 20 MEQ CR tablet    K-DUR/KLOR-CON M    60 tablet    TAKE 1 TABLET BY MOUTH DAILY.    Hypokalemia       SIMVASTATIN PO      Take 20 mg by mouth At Bedtime        traMADol 50 MG tablet    ULTRAM    60 tablet    Take 1 tablet (50 mg) by mouth every 6 hours as needed for moderate pain . Recommend trying after Tylenol and before Dilaudid.    Acute bilateral low back pain without sciatica, Multiple myeloma in relapse  (H)       venetoclax 100 MG tablet CHEMOTHERAPY    VENCLEXTA    100 tablet    Take 8 tablets (800 mg) by mouth daily    Multiple myeloma in relapse (H)

## 2017-11-01 NOTE — MR AVS SNAPSHOT
After Visit Summary   11/1/2017    Anthony Carbone    MRN: [id number]           Patient Information     Date Of Birth          1945        Visit Information        Provider Department      11/1/2017 9:40 AM Leanne Reddy PA-C [facility]        Today's Diagnoses     Multiple myeloma not having achieved remission (H)    -  1       Follow-ups after your visit        Your next 10 appointments already scheduled     Nov 01, 2017 12:00 PM CDT   Infusion 60 with  ONCOLOGY INFUSION, UC 28 ATC   [facility] ([facility])    [address] [place] [address] [phone]            Nov 08, 2017 11:00 AM CST   Masonic Lab Draw with [facility] LAB DRAW   [facility] Lab Draw ([facility])    [address] [place] [id number] [phone]            Nov 08, 2017 11:30 AM CST   RETURN ONC with  BMT DOM   [facility] Blood and Marrow Transplant ([facility])    [address] [place] [id number] [phone]              Who to contact     If you have questions or need follow up information about today's clinic visit or your schedule please contact [facility] directly at [phone].  Normal or non-critical lab and imaging results will be communicated to you by MyChart, letter or phone within 4 business days after the clinic has received the results. If you do not hear from us within 7 days, please contact the clinic through MyChart or phone. If you have a critical or abnormal lab result, we will notify you by phone as soon as possible.  Submit refill requests through [employer] or call your pharmacy and they will forward the refill request to us. Please allow 3 business days for your refill to be completed.          Additional Information About Your Visit        [facility]  Information     [employer] gives you secure access to your electronic health record. If you see a primary care provider, you can also send messages to your care team and make appointments. If you have questions, please call your primary care clinic.  If you do not have a primary care provider, please call [phone] and they will assist you.        Care EveryWhere ID     This is your Care EveryWhere ID. This could be used by other organizations to access your [place] medical records  [id number]        Your Vitals Were     Pulse Temperature Respirations Pulse Oximetry BMI (Body Mass Index)       90 98  F (36.7  C) (Oral) 16 91% 20.46 kg/m2        Blood Pressure from Last 3 Encounters:   11/01/17 108/67   10/19/17 110/74   10/06/17 124/76    Weight from Last 3 Encounters:   11/01/17 54.1 kg (119 lb 3.2 oz)   10/19/17 54.4 kg (119 lb 14.4 oz)   10/06/17 54.4 kg (120 lb)              Today, you had the following     No orders found for display         Today's Medication Changes          These changes are accurate as of: 11/1/17 11:35 AM.  If you have any questions, ask your nurse or doctor.               Start taking these medicines.        Dose/Directions    dexamethasone 4 MG tablet   Commonly known as:  DECADRON   Used for:  Multiple myeloma not having achieved remission (H)   Started by:  Leanne Reddy PA-C        Take 20 mg weekly on day of Velcade   Quantity:  40 tablet   Refills:  3            Where to get your medicines      These medications were sent to [facility] [address] [place] [address] AT [address] [address]     Phone:  [phone]     dexamethasone 4 MG tablet                Primary Care Provider Office Phone # Fax #    Sanket Burciaga [phone]       [facility] [address] [facility] [address]        Equal Access to Services     ALBAN SHAH AH: Maribel Davis,  wacatherineabhijit gusmanjose, peeta kamonika abdi, jonny glaserkimberly ah. [facility] [phone].    ATENCIÓN: Si delores tao, tiene a todd disposición servicios gratuitos de asistencia lingüística. [facility] al [phone].    We comply with applicable federal civil rights laws and [place] laws. We do not discriminate on the basis of race, color, national origin, age, disability, sex, sexual orientation, or gender identity.            Thank you!     Thank you for choosing [facility] CANCER [facility]  for your care. Our goal is always to provide you with excellent care. Hearing back from our patients is one way we can continue to improve our services. Please take a few minutes to complete the written survey that you may receive in the mail after your visit with us. Thank you!             Your Updated Medication List - Protect others around you: Learn how to safely use, store and throw away your medicines at www.disposemymeds.org.          This list is accurate as of: 11/1/17 11:35 AM.  Always use your most recent med list.                   Brand Name Dispense Instructions for use Diagnosis    acyclovir 400 MG tablet    ZOVIRAX    60 tablet    Take 1 tablet (400 mg) by mouth 2 times daily    Multiple myeloma in relapse (H)       amLODIPine 5 MG tablet    NORVASC    90 tablet    TAKE 1 TABLET BY MOUTH AT BEDTIME    Essential hypertension       clopidogrel 75 MG tablet    PLAVIX    90 tablet    TAKE 1 TABLET BY MOUTH EVERY DAY    History of stroke       dexamethasone 4 MG tablet    DECADRON    40 tablet    Take 20 mg weekly on day of Velcade    Multiple myeloma not having achieved remission (H)       esomeprazole 40 MG CR capsule    nexIUM    30 capsule    Take 1 capsule (40 mg) by mouth every morning (before breakfast)    Gastroesophageal reflux disease without esophagitis       LORazepam 0.5 MG tablet    ATIVAN    30 tablet    Take 1 tablet (0.5 mg) by mouth every 6 hours as needed for nausea or  anxiety.    Multiple myeloma in relapse (H), Delirium       nystatin [id number] UNIT/ML suspension    MYCOSTATIN    473 mL    Take 5 mLs (500,000 Units) by mouth 4 times daily Swish and spit. Continue until symptoms resolve and then take for 3 more days.    Thrush       OLANZapine 5 MG tablet    zyPREXA    60 tablet    Take 0.5 tablets (2.5 mg) by mouth At Bedtime    Delirium, Multiple myeloma in relapse (H)       ondansetron 8 MG ODT tab    ZOFRAN-ODT    30 tablet    Take 1 tablet (8 mg) by mouth every 8 hours as needed for nausea    Nausea       oxyCODONE 5 MG IR tablet    ROXICODONE    15 tablet    Take 1 tablet (5 mg) by mouth every 6 hours as needed for moderate to severe pain        potassium chloride SA 20 MEQ CR tablet    K-DUR/KLOR-CON M    60 tablet    TAKE 1 TABLET BY MOUTH DAILY.    Hypokalemia       SIMVASTATIN PO      Take 20 mg by mouth At Bedtime        traMADol 50 MG tablet    ULTRAM    60 tablet    Take 1 tablet (50 mg) by mouth every 6 hours as needed for moderate pain . Recommend trying after Tylenol and before Dilaudid.    Acute bilateral low back pain without sciatica, Multiple myeloma in relapse (H)       venetoclax 100 MG tablet CHEMOTHERAPY    VENCLEXTA    100 tablet    Take 8 tablets (800 mg) by mouth daily    Multiple myeloma in relapse (H)

## 2017-11-01 NOTE — PROGRESS NOTES
Infusion Nursing Note:  Anthony Carbone presents today for Cycle 1 Day 1 Velcade (patient has had before).    Patient seen by provider today: Yes: PREM Herrera   present during visit today: Not Applicable.    Intravenous Access:  Implanted Port.    Treatment Conditions:  Lab Results   Component Value Date    HGB 9.7 11/01/2017     Lab Results   Component Value Date    WBC 3.3 11/01/2017      Lab Results   Component Value Date    ANEU 2.5 11/01/2017     Lab Results   Component Value Date     11/01/2017      Lab Results   Component Value Date     11/01/2017                   Lab Results   Component Value Date    POTASSIUM 4.0 11/01/2017           Lab Results   Component Value Date    MAG 2.2 05/25/2017            Lab Results   Component Value Date    CR 1.03 11/01/2017                   Lab Results   Component Value Date    JAZMIN 9.1 11/01/2017                Lab Results   Component Value Date    BILITOTAL 0.3 11/01/2017           Lab Results   Component Value Date    ALBUMIN 3.4 11/01/2017                    Lab Results   Component Value Date    ALT 16 11/01/2017           Lab Results   Component Value Date    AST 9 11/01/2017     Results reviewed, labs MET treatment parameters, ok to proceed with treatment.     Post Infusion Assessment:  Patient tolerated ONE injection in RUQ of abdomen without incident.    Discharge Plan:   Patient declined prescription refills.  AVS to patient via [employer].  Patient will return 11/8/17 for next appointment.   Patient discharged in stable condition accompanied by: wife.  Departure Mode: Ambulatory.    Kaylen Chamberlain RN

## 2017-11-01 NOTE — NURSING NOTE
"Oncology Rooming Note    November 1, 2017 9:48 AM   Anthony Carbone is a 71 year old male who presents for:    Chief Complaint   Patient presents with     Port Draw     Labs drawn from port by RN. Line flushed with saline and heparin. Vs taken and pt checked in for appt     Oncology Clinic Visit     Patient is here for Multiple myeloma 3 wk f/u      Initial Vitals: /67 (BP Location: Right arm, Cuff Size: Adult Small)  Pulse 90  Temp 98  F (36.7  C) (Oral)  Resp 16  Wt 54.1 kg (119 lb 3.2 oz)  SpO2 91%  BMI 20.46 kg/m2 Estimated body mass index is 20.46 kg/(m^2) as calculated from the following:    Height as of 10/6/17: 1.626 m (5' 4\").    Weight as of this encounter: 54.1 kg (119 lb 3.2 oz). Body surface area is 1.56 meters squared.  Severe Pain (7) Comment: Data Unavailable   No LMP for male patient.  Allergies reviewed: Yes  Medications reviewed: Yes    Medications: Medication refills not needed today.  Pharmacy name entered into [facility]:    [id number] - [place] [address] AT [facility] 7  [employer] [place], [place] - [address] [facility]  [facility] - [place], [place] - [address] AT [facility] [address]    Clinical concerns: Patient complained of right shoulder and Upper arm pain 7/10. Vitals taken in lab today     6 minutes for nursing intake (face to face time)     Yisel Longoria LPN            "

## 2017-11-01 NOTE — LETTER
11/1/2017      RE: Anthony Carbone  [address] [place] [place] [address]       HEMATOLOGY/ONCOLOGY PROGRESS NOTE  Nov 1, 2017    REASON FOR VISIT: myeloma    Diagnosis: 71 year old gentleman with IgM lambda multiple myeloma originally diagnosed in 01/2012 as stage I, standard risk disease.   Treatment: Revlimid plus dexamethasone for 4-5 cycles but plateaued by late 03/2012.   - Velcade was added and he received another 2-3 cycles, achieving a good partial remission.      Transplant: Single auto after melphalan 200 mg/m2 preparative regimen on 01/09/2013  - Post-transplant course: unremarkable except for some mild steroid-induced hyperglycemia, gastritis, nausea and vomiting.      Maintenance: Lenalidomide at day-100 then developed a maculopapular rash. Lenalidomide was held and then we re-challenged him after about a month to 2 months at a lower dose (5 mg daily); rash returned.   - was started on maintenance Velcade, every other week through July 2014, when he was noted with abnormalities on myeloma studies drawn there. Noted with prostate cancer dx at about the time of relapse (see below).     Relapse: noted with return on M-spike in blood/urine with marrow involvement but negative PET.   - Started on retreatment with RVD on 8/27/14 with Revlimid at 15 mg 14 days of 21 days, dexamethasone 40 mg weekly and velcade weekly.Completed at total of 5 cycles without complication by 12/2014.   - Started Cycle 6 on inc'd Rev dosing of 20mg daily x 2 weeks on 12/11/14 which was complicated by pneumonia.  - Adm 12/21-1/10 for human metapneumovirus pneumonia complicated by anorexia, HTN, depression, anorexia with significant weight loss.   - Restarted Ernesto/Dex only on 2/4/15 with good tolerance but with thrombocytopenia.   - Bendamustine added to Velcade/dexamethasone on 5/21/15 due to disease progression  - Velcade discontinued on 7/9/2015 due to side effects (orthostasis)  - Schedule changed to bendamustine 80 mg/m2  days 1 and 2 on 28-day cycle  - Cycle 1 tolerated poorly due to lightheadedness and weakness  - Cycle 2 dose reduced to 60 mg/m2 and pre-meds adjusted  - Cycle 5 received on 9/17/15.  - 10/1/2015 - increasing IgM, M-spike - started Pomalidomide 4mg/day (21 days out of 28 days) and weekly Decadron 20mg on Oct 1st, 2015.    - Carfilzomib added on 10/22/2015 making this CPD.  - C3-C6  received in [place]    - Returned to [facility] and resumed CPD here    - Adm: 4/12-4/14 with fever, confusion, neutropenia. Noted on MRI to have acute/subacute CVA and subacute/chronic CVA; started on Plavix, given brief course of Abx and GCSF prior to d/c.     - Continued on Carfilzomib and dexamethasone alone  - Pomalyst added back on 7/13/2016 for rising FLC  - Start daratumumab 11/10/16. Changed to every other week after 4 weekly treatments d/t profound fatigue and malaise.   - Adm 5/7-5/16 due to AMS, hypercalcemia, hyperuricemia, possible PNA, back pain, and JOSE MARIA. Started Kyprolis while inpatient.  - Re-admitted 5/25-/529 with recurrent AMS, found to have concomitant PNA  - Resumed Kyprolis 6/13/2017. Stopped due to worsening performance status and progressive myeloma.  - Started Venclexta 800 mg 8/25/2017    INTERVAL HISTORY:    Mr. Carbone is here with his wife.     No new concerns since the last visit. Main concern right now is ongoing arm pain s/p the fracture. He is managing with Tramadol. Good news is that the back pain has been better though. Nausea is mild and seems better with Zofran before meals. Eating is still a chore. Energy is fair, but stable. No fevers/chills/sweats, HA, vision changes, cough, congestion, sore throat, chest pain, SOB, GIVENS, vomiting, abdominal pain, urinary changes, swelling, bleeding, rash.       PHYSICAL EXAMINATION  /67 (BP Location: Right arm, Cuff Size: Adult Small)  Pulse 90  Temp 98  F (36.7  C) (Oral)  Resp 16  Wt 54.1 kg (119 lb 3.2 oz)  SpO2 91%  BMI 20.46 kg/m2    Wt Readings from  Last 10 Encounters:   17 54.1 kg (119 lb 3.2 oz)   10/19/17 54.4 kg (119 lb 14.4 oz)   10/06/17 54.4 kg (120 lb)   10/03/17 56.2 kg (123 lb 12.8 oz)   17 56.8 kg (125 lb 3.2 oz)   17 56 kg (123 lb 6.4 oz)   17 56.2 kg (124 lb)   17 55.2 kg (121 lb 9.6 oz)   17 54.4 kg (120 lb)   17 56.2 kg (124 lb)   General: Alert. Oriented to name, , location, but unable to provide date. Able to ambulate independently, albeit slow and cautiously.  No acute distress. HEENT: PERRL, no palor or icterus. CVS: RRR with occasional premature beat. CHEST: CTAB, normal work of breathing ABDOMEN: soft non tender no masses     NEURO: AAOX3  CN 2-12 intact MSK: L arm in sling. SKIN: no bleeding or brusiing, no rashes EXTREMITIES: No edema.     LABS:   Results for WILLIAN ARCINIEGA (MRN [id number]) as of 2017 10:09   Ref. Range 10/19/2017 11:02 2017 09:34   WBC Latest Ref Range: 4.0 - 11.0 10e9/L 3.5 (L) 3.3 (L)   Hemoglobin Latest Ref Range: 13.3 - 17.7 g/dL 10.2 (L) 9.7 (L)   Hematocrit Latest Ref Range: 40.0 - 53.0 % 31.7 (L) 30.9 (L)   Platelet Count Latest Ref Range: 150 - 450 10e9/L 150 140 (L)     IMPRESSION/PLAN:  71 year old male with relapsed MM after autologous transplant (2013), status-post multiple salvage regimens with progressive disease.    MM: Continues on Venclexta, which he tolerates well and initally had good response, but with rising light chains last week. Plan to add in weekly Velcade at 1 mg/m2 with Dex 20 mg weekly. We discussed to watch for weakness, neuropathy, or any new symptoms. He has follow-up scheduled with Dr. Topete next week.     AMS: AMS started early May in setting of metabolic derangements, uremia, and possible infection.  Remains intermittently confused over the past few months but improved significantly, stable today.  - Continue Zyprexa 2.5 mg at bedtime  - Holding off long-acting pain meds     Fatigue: Recently a little worse in  setting of anemia, improved dramatically with pRBC    Heme: Cytopenias 2/2 treatment and likely MM in setting of progression. Stable today.  - On Plavix, hold for platelets < 50. Continue for now, pending his clinical course, may need to discuss just holding this all together as the potential harm may outweigh risk at some point  - Transfuse to keep hemoglobin > 8    Renal/FEN: Creat baseline ~0.8-1.0. Stable today.  -Encouraged protein/calorie supplements.      ID: Presently afebrile w/o any localizing infectious s/s  - Continues ppx  mg BID     Back/rib pain: Started early May. Lumbar MRI at OSH showing mild-mod central and foraminal stenoses in lumbar spine, per patient and wife, there was no MM lesion there. Rib films inpatient negative, back films stable from prior imaging. Pain has actually been better recenty  - Continues tramadol PRN and Tylenol PRN. No changes.      CV: Continue zocor and norvasc.   - Avoid QTc prolonging agents (history of QTc prolongation with syncopal episodes)       I spent >25 minutes with the patient, with over 50% of the time spent counseling or coordinating their care as described above.     Leanne Reddy PA-C

## 2017-11-01 NOTE — NURSING NOTE
"Chief Complaint   Patient presents with     Port Draw     Labs drawn from port by RN. Line flushed with saline and heparin. Vs taken and pt checked in for appt     Port accessed with 20g 3/4\" gripper needle by RN, labs collected, line flushed with saline and heparin.  Vitals taken. Pt checked in for appointment(s).    Kathya Nicholson RN  "

## 2017-11-07 NOTE — TELEPHONE ENCOUNTER
Potassium chloride      Last Written Prescription Date: 8/17/17  Last Fill Quantity: 60, # refills: 0  Last Office Visit with [facility] or [facility] prescribing provider: 11/1/17 with Leanne AMARO  Next office visit: 11/21/17 with Leanne AMARO       Potassium   Date Value Ref Range Status   11/01/2017 4.0 3.4 - 5.3 mmol/L Final     Creatinine   Date Value Ref Range Status   11/01/2017 1.03 0.66 - 1.25 mg/dL Final     BP Readings from Last 3 Encounters:   11/01/17 108/67   10/19/17 110/74   10/06/17 124/76

## 2017-11-08 NOTE — MR AVS SNAPSHOT
After Visit Summary   11/8/2017    Anthony Carbone    MRN: [id number]           Patient Information     Date Of Birth          1945        Visit Information        Provider Department      11/8/2017 11:30 AM UC BMT DOM [facility] Blood and Marrow Transplant        Today's Diagnoses     Hypokalemia        Multiple myeloma in relapse (H)              [facility] ([facility])  [address] [place] [address]  Phone: [phone]  Clinic Hours:   Monday-Thursday:7am to 7pm   Friday: 7am to 5pm   Weekends and holidays:    8am to noon (in general)  If your fever is 100.5  or greater,   call the clinic.  After hours call the   hospital at [phone] or   [phone]. Ask for the BMT   fellow on-call            Follow-ups after your visit        Your next 10 appointments already scheduled     Nov 16, 2017  8:45 AM CST   [facility] Lab Draw with  [facility] LAB DRAW   [facility] Masonic Lab Draw ([facility])    [address] [place] [address]   [phone]            Nov 16, 2017  9:10 AM CST   (Arrive by 8:55 AM)   Return Visit with Leanne Reddy PA-C   [facility] ([facility])    [address] [place] [address]   [phone]            Nov 16, 2017 10:00 AM CST   Infusion 60 with UC ONCOLOGY INFUSION, UC 31 ATC   [facility] ([facility])    [address] [place] [address]   [phone]            Nov 21, 2017  3:30 PM CST   [facility] Lab Draw with  [facility] LAB DRAW   [facility]    [address] [place] [address]   [phone]            Nov 21, 2017  4:10 PM CST   (Arrive by 3:55 PM)   Return Visit with Leanne Reddy PA-C   [facility] ([facility])    [address]  [address]nd [facility] [address] [phone]            Nov 21, 2017  5:00 PM CST   Infusion 60 with UC ONCOLOGY INFUSION, UC 24 ATC   [facility] ([facility])    [address] [place] [address]  2nd [facility] [address] [phone]              Who to contact     If you have questions or need follow up information about today's clinic visit or your schedule please contact [facility] BLOOD AND MARROW TRANSPLANT directly at [phone].  Normal or non-critical lab and imaging results will be communicated to you by [employer], letter or phone within 4 business days after the clinic has received the results. If you do not hear from us within 7 days, please contact the clinic through [employer] or phone. If you have a critical or abnormal lab result, we will notify you by phone as soon as possible.  Submit refill requests through [employer] or call your pharmacy and they will forward the refill request to us. Please allow 3 business days for your refill to be completed.          Additional Information About Your Visit        [employer] Information     [employer] gives you secure access to your electronic health record. If you see a primary care provider, you can also send messages to your care team and make appointments. If you have questions, please call your primary care clinic.  If you do not have a primary care provider, please call [phone] and they will assist you.        Care EveryWhere ID     This is your Care EveryWhere ID. This could be used by other organizations to access your [place] medical records  [id number]        Your Vitals Were     Pulse Temperature Respirations Pulse Oximetry BMI (Body Mass Index)       87 97.8  F (36.6  C) (Oral) 16 97% 20.22 kg/m2        Blood Pressure from Last 3 Encounters:   11/08/17 118/79   11/01/17 108/67   10/19/17 110/74    Weight from Last 3 Encounters:   11/08/17 53.4 kg (117 lb 12.8 oz)    11/01/17 54.1 kg (119 lb 3.2 oz)   10/19/17 54.4 kg (119 lb 14.4 oz)              Today, you had the following     No orders found for display         Where to get your medicines      These medications were sent to [id number] - [place], [place] - [address] AT [facility]  [address] [address], [facility] [address]     Phone:  [phone]     potassium chloride SA 20 MEQ CR tablet          Recent Review Flowsheet Data     BMT Recent Results Latest Ref Rng & Units 8/29/2017 9/6/2017 9/27/2017 10/3/2017 10/19/2017 11/1/2017 11/8/2017    WBC 4.0 - 11.0 10e9/L 4.7 2.5(L) 2.1(L) 2.2(L) 3.5(L) 3.3(L) 3.8(L)    Hemoglobin 13.3 - 17.7 g/dL 7.6(L) 10.2(L) 10.3(L) 10.7(L) 10.2(L) 9.7(L) 9.7(L)    Platelet Count 150 - 450 10e9/L 122(L) 126(L) 158 151 150 140(L) 111(L)    Platelets 150 - 450 10:9/L - - - - - - -    Neutrophils (Absolute) 1.6 - 8.3 10e9/L 3.9 1.8 1.6 1.5(L) 2.8 2.5 2.9    Blasts (Absolute) 10e9/L - - - - - - -    INR 0.86 - 1.14 - - - - - - -    Sodium 133 - 144 mmol/L 137 138 140 139 138 138 136    Potassium 3.4 - 5.3 mmol/L 4.1 4.0 3.6 3.6 2.8(L) 4.0 4.3    Chloride 94 - 109 mmol/L 103 103 103 104 100 105 105    Glucose 70 - 99 mg/dL 112(H) 123(H) 98 117(H) 122(H) 129(H) 106(H)    Urea Nitrogen 7 - 30 mg/dL 20 17 17 17 18 18 22    Creatinine 0.66 - 1.25 mg/dL 0.99 0.82 1.05 0.79 0.96 1.03 1.23    Calcium (Total) 8.5 - 10.1 mg/dL 8.9 9.5 9.2 9.0 8.7 9.1 9.1    Protein (Total) 6.8 - 8.8 g/dL 9.1(H) 8.7 8.1 8.2 8.2 8.6 9.2(H)    Albumin 3.4 - 5.0 g/dL 3.1(L) 3.0(L) 3.5 3.4 3.3(L) 3.4 3.3(L)    Bilirubin (Direct) 0.0 - 0.2 mg/dL - - - - - - -    Alkaline Phosphatase 40 - 150 U/L 45 43 50 55 57 67 69    AST 0 - 45 U/L 10 10 9 10 12 9 8    ALT 0 - 70 U/L 14 13 18 16 18 16 20    MCV 78 - 100 fl 107(H) 102(H) 104(H) 103(H) 103(H) 106(H) 107(H)               Primary Care Provider Office Phone # Fax #    Sanket Burciaga [phone]       [facility] [address]  [facility] [address]        Equal Access to Services     [facility] ALYSSA : Hadii aad ku hadyamelluh Tejali, wacatherineda luqdyanaha, qaybta joerobinjonny obrien. So [facility] [phone].    ATENCIÓN: Si habla español, tiene a todd disposición servicios gratuitos de asistencia lingüística. Campbellame al [phone].    We comply with applicable federal civil rights laws and [place] laws. We do not discriminate on the basis of race, color, national origin, age, disability, sex, sexual orientation, or gender identity.            Thank you!     Thank you for choosing [facility] BLOOD AND MARROW TRANSPLANT  for your care. Our goal is always to provide you with excellent care. Hearing back from our patients is one way we can continue to improve our services. Please take a few minutes to complete the written survey that you may receive in the mail after your visit with us. Thank you!             Your Updated Medication List - Protect others around you: Learn how to safely use, store and throw away your medicines at www.disposemymeds.org.          This list is accurate as of: 11/8/17  5:17 PM.  Always use your most recent med list.                   Brand Name Dispense Instructions for use Diagnosis    acyclovir 400 MG tablet    ZOVIRAX    60 tablet    Take 1 tablet (400 mg) by mouth 2 times daily    Multiple myeloma in relapse (H)       amLODIPine 5 MG tablet    NORVASC    90 tablet    TAKE 1 TABLET BY MOUTH AT BEDTIME    Essential hypertension       clopidogrel 75 MG tablet    PLAVIX    90 tablet    TAKE 1 TABLET BY MOUTH EVERY DAY    History of stroke       dexamethasone 4 MG tablet    DECADRON    40 tablet    Take 20 mg weekly on day of Velcade    Multiple myeloma not having achieved remission (H)       esomeprazole 40 MG CR capsule    nexIUM    30 capsule    Take 1 capsule (40 mg) by mouth every morning (before breakfast)    Gastroesophageal reflux disease without esophagitis        LORazepam 0.5 MG tablet    ATIVAN    30 tablet    Take 1 tablet (0.5 mg) by mouth every 6 hours as needed for nausea or anxiety.    Multiple myeloma in relapse (H), Delirium       nystatin 824725 UNIT/ML suspension    MYCOSTATIN    473 mL    Take 5 mLs (500,000 Units) by mouth 4 times daily Swish and spit. Continue until symptoms resolve and then take for 3 more days.    Thrush       OLANZapine 5 MG tablet    zyPREXA    60 tablet    Take 0.5 tablets (2.5 mg) by mouth At Bedtime    Delirium, Multiple myeloma in relapse (H)       ondansetron 8 MG ODT tab    ZOFRAN-ODT    30 tablet    Take 1 tablet (8 mg) by mouth every 8 hours as needed for nausea    Nausea       oxyCODONE IR 5 MG tablet    ROXICODONE    15 tablet    Take 1 tablet (5 mg) by mouth every 6 hours as needed for moderate to severe pain        potassium chloride SA 20 MEQ CR tablet    K-DUR/KLOR-CON M    30 tablet    Take 1 tablet (20 mEq) by mouth daily    Hypokalemia       SIMVASTATIN PO      Take 20 mg by mouth At Bedtime        traMADol 50 MG tablet    ULTRAM    60 tablet    Take 1 tablet (50 mg) by mouth every 6 hours as needed for moderate pain . Recommend trying after Tylenol and before Dilaudid.    Acute bilateral low back pain without sciatica, Multiple myeloma in relapse (H)       venetoclax 100 MG tablet CHEMOTHERAPY    VENCLEXTA    100 tablet    Take 8 tablets (800 mg) by mouth daily    Multiple myeloma in relapse (H)

## 2017-11-08 NOTE — PROGRESS NOTES
Infusion Nursing Note:  Anthony Carbone presents today for Cycle 1 Day 8 Velcade, Dexamethasone.    Patient seen by provider today: Yes: Dr. Topete   present during visit today: Not Applicable.    Intravenous Access:  Implanted Port.    Treatment Conditions:  Lab Results   Component Value Date    HGB 9.7 11/08/2017     Lab Results   Component Value Date    WBC 3.8 11/08/2017      Lab Results   Component Value Date    ANEU 2.9 11/08/2017     Lab Results   Component Value Date     11/08/2017      Results reviewed, labs MET treatment parameters, ok to proceed with treatment.    Post Infusion Assessment:  Patient tolerated infusion without incident.  Patient tolerated injection in LUQ of abdomen without incident.  Blood return noted pre and post infusion.  Access discontinued per protocol.    Discharge Plan:   Patient declined prescription refills.  Copy of AVS reviewed with patient and/or family.  Patient will return 11/16/17 for next appointment.  Patient discharged in stable condition accompanied by: self and wife.  Departure Mode: Ambulatory.    Kaylen Chamberlain RN

## 2017-11-08 NOTE — NURSING NOTE
Chief Complaint   Patient presents with     Port Draw     Vitals taken, port accessed.  Labs drawn, line flushed with NS and heparin.  Pt tolerated procedure.  Checked in for next appt.     Shelly Oliver RN

## 2017-11-08 NOTE — MR AVS SNAPSHOT
After Visit Summary   11/8/2017    Anthony Carbone    MRN: [id number]           Patient Information     Date Of Birth          1945        Visit Information        Provider Department      11/8/2017 12:00 PM UC 32 ATC;  ONCOLOGY INFUSION [facility]        Today's Diagnoses     Multiple myeloma in relapse (H)    -  1      Care Instructions    Contact Numbers    [facility] Main Line: [phone]  [facility] Triage:  [phone]    Call triage with chills and/or temperature greater than or equal to 100.5, uncontrolled nausea/vomiting, diarrhea, constipation, dizziness, shortness of breath, chest pain, bleeding, unexplained bruising, or any new/concerning symptoms, questions/concerns.     If you are having any concerning symptoms or wish to speak to a provider before your next infusion visit, please call your care coordinator or triage to notify them so we can adequately serve you.       After Hours: [phone]    If after hours, weekends, or holidays, call main hospital  and ask for Oncology doctor on call.         November 2017 Sunday Monday Tuesday Wednesday Thursday Friday Saturday                  1     [facility]    9:00 AM   (15 min.)    [facility] LAB DRAW   [facility] Lab Draw     [facility] RETURN    9:25 AM   (50 min.)   Leanne Reddy PA-C   [facility]     [facility] ONC INFUSION 60   12:00 PM   (60 min.)    ONCOLOGY INFUSION   [facility] 2     3     4       5     6     7     8     [facility] DRAW   11:00 AM   (15 min.)    [facility] LAB DRAW   [facility] Lab Draw     [facility] ONC RETURN   11:30 AM   (30 min.)    BMT DOM   [facility] Blood and Marrow Transplant     [facility] ONC INFUSION 60   12:00 PM   (60 min.)    ONCOLOGY INFUSION   [facility] 9     10     11       12     13     14     15     16     [facility] DRAW    8:45 AM   (15 min.)    [facility] LAB DRAW   [facility]  Lab Draw     [facility] RETURN    8:55 AM   (50 min.)   [facility]   10:00 AM   (60 min.)    ONCOLOGY INFUSION   [facility] 17     18       19     20     21     [facility] DRAW    3:30 PM   (15 min.)    [facility] LAB DRAW   [facility] Lab Draw     [facility] RETURN    3:55 PM   (50 min.)   [facility] 60    5:00 PM   (60 min.)    ONCOLOGY INFUSION   [facility] 22     23     24     25       26     27     28     29  Happy Birthday!     30 December 2017 Sunday Monday Tuesday Wednesday Thursday Friday Saturday                            1     2       3     4     5     6     7     8     9       10     11     12     13     14     15     16       17     18     19     20     21     22     23       24     25     26     27     28     29     30       31                                               Recent Results (from the past 24 hour(s))   CBC with platelets differential    Collection Time: 11/08/17 11:35 AM   Result Value Ref Range    WBC 3.8 (L) 4.0 - 11.0 10e9/L    RBC Count 2.90 (L) 4.4 - 5.9 10e12/L    Hemoglobin 9.7 (L) 13.3 - 17.7 g/dL    Hematocrit 31.1 (L) 40.0 - 53.0 %     (H) 78 - 100 fl    MCH 33.4 (H) 26.5 - 33.0 pg    MCHC 31.2 (L) 31.5 - 36.5 g/dL    RDW 15.9 (H) 10.0 - 15.0 %    Platelet Count 111 (L) 150 - 450 10e9/L    Diff Method Automated Method     % Neutrophils 77.8 %    % Lymphocytes 5.1 %    % Monocytes 16.5 %    % Eosinophils 0.0 %    % Basophils 0.3 %    % Immature Granulocytes 0.3 %    Nucleated RBCs 0 0 /100    Absolute Neutrophil 2.9 1.6 - 8.3 10e9/L    Absolute Lymphocytes 0.2 (L) 0.8 - 5.3 10e9/L    Absolute Monocytes 0.6 0.0 - 1.3 10e9/L    Absolute Eosinophils 0.0 0.0 - 0.7 10e9/L    Absolute Basophils 0.0 0.0 - 0.2 10e9/L    Abs Immature Granulocytes 0.0 0 - 0.4 10e9/L     Absolute Nucleated RBC 0.0    Comprehensive metabolic panel    Collection Time: 11/08/17 11:35 AM   Result Value Ref Range    Sodium 136 133 - 144 mmol/L    Potassium 4.3 3.4 - 5.3 mmol/L    Chloride 105 94 - 109 mmol/L    Carbon Dioxide 24 20 - 32 mmol/L    Anion Gap 8 3 - 14 mmol/L    Glucose 106 (H) 70 - 99 mg/dL    Urea Nitrogen 22 7 - 30 mg/dL    Creatinine 1.23 0.66 - 1.25 mg/dL    GFR Estimate 58 (L) >60 mL/min/1.7m2    GFR Estimate If Black 70 >60 mL/min/1.7m2    Calcium 9.1 8.5 - 10.1 mg/dL    Bilirubin Total 0.3 0.2 - 1.3 mg/dL    Albumin 3.3 (L) 3.4 - 5.0 g/dL    Protein Total 9.2 (H) 6.8 - 8.8 g/dL    Alkaline Phosphatase 69 40 - 150 U/L    ALT 20 0 - 70 U/L    AST 8 0 - 45 U/L                 Follow-ups after your visit        Your next 10 appointments already scheduled     Nov 16, 2017  8:45 AM CST   [facility] Lab Draw with  [facility] LAB DRAW   [facility] Lab Draw ([facility])    [address] [place] [address]   [phone]            Nov 16, 2017  9:10 AM CST   (Arrive by 8:55 AM)   Return Visit with ALPHONSO Coffey [facility] ([facility])    [address] [place] [address]   [phone]            Nov 16, 2017 10:00 AM CST   Infusion 60 with UC ONCOLOGY INFUSION, UC 31 ATC   [facility] ([facility])    [address] [place] [address]   [phone]            Nov 21, 2017  3:30 PM CST   [facility] Lab Draw with  [facility] LAB DRAW   [facility] Lab Draw ([facility])    [address] [place] [address]   [phone]            Nov 21, 2017  4:10 PM CST   (Arrive by 3:55 PM)   Return Visit with ALPHONSO oCffey [facility] ([facility])    [address] [place] [address]  Floor  [facility] [address] [phone]            Nov 21, 2017  5:00 PM CST   Infusion 60 with UC ONCOLOGY INFUSION, UC 24 ATC   [facility] ([facility])    [address]  2nd Floor  [facility] [address] [phone]              Who to contact     If you have questions or need follow up information about today's clinic visit or your schedule please contact [facility] directly at [phone].  Normal or non-critical lab and imaging results will be communicated to you by [employer], letter or phone within 4 business days after the clinic has received the results. If you do not hear from us within 7 days, please contact the clinic through [employer] or phone. If you have a critical or abnormal lab result, we will notify you by phone as soon as possible.  Submit refill requests through [employer] or call your pharmacy and they will forward the refill request to us. Please allow 3 business days for your refill to be completed.          Additional Information About Your Visit        [employer] Information     [employer] gives you secure access to your electronic health record. If you see a primary care provider, you can also send messages to your care team and make appointments. If you have questions, please call your primary care clinic.  If you do not have a primary care provider, please call [phone] and they will assist you.        Care EveryWhere ID     This is your Care EveryWhere ID. This could be used by other organizations to access your [place] medical records  [id number]         Blood Pressure from Last 3 Encounters:   11/08/17 118/79   11/01/17 108/67   10/19/17 110/74    Weight from Last 3 Encounters:   11/08/17 53.4 kg (117 lb 12.8 oz)   11/01/17 54.1 kg (119 lb 3.2 oz)   10/19/17 54.4 kg (119 lb 14.4 oz)              We Performed the Following     CBC with platelets differential     Comprehensive metabolic panel          Where  to get your medicines      These medications were sent to [facility] - [place], [place] - [address] AT [facility] [address]  [address], [facility] [address]     Phone:  [phone]     potassium chloride SA 20 MEQ CR tablet          Primary Care Provider Office Phone # Fax #    Sanket Burciaga [phone]       [facility] [address] [facility] [address]        Equal Access to Services     ALBAN SHAH : Hadii aad ku hadasho Soomaali, waaxda luqadaha, qaybta kaalmada adeegyada, waxay idiin hayaan adeeg tony villa. So [facility] [phone].    ATENCIÓN: Si habla español, tiene a todd disposición servicios gratuitos de asistencia lingüística. [facility] [phone].    We comply with applicable federal civil rights laws and [place] laws. We do not discriminate on the basis of race, color, national origin, age, disability, sex, sexual orientation, or gender identity.            Thank you!     Thank you for choosing [facility] CANCER [facility]  for your care. Our goal is always to provide you with excellent care. Hearing back from our patients is one way we can continue to improve our services. Please take a few minutes to complete the written survey that you may receive in the mail after your visit with us. Thank you!             Your Updated Medication List - Protect others around you: Learn how to safely use, store and throw away your medicines at www.disposemymeds.org.          This list is accurate as of: 11/8/17  1:06 PM.  Always use your most recent med list.                   Brand Name Dispense Instructions for use Diagnosis    acyclovir 400 MG tablet    ZOVIRAX    60 tablet    Take 1 tablet (400 mg) by mouth 2 times daily    Multiple myeloma in relapse (H)       amLODIPine 5 MG tablet    NORVASC    90 tablet    TAKE 1 TABLET BY MOUTH AT BEDTIME    Essential hypertension       clopidogrel 75 MG tablet    PLAVIX    90 tablet    TAKE 1  TABLET BY MOUTH EVERY DAY    History of stroke       dexamethasone 4 MG tablet    DECADRON    40 tablet    Take 20 mg weekly on day of Velcade    Multiple myeloma not having achieved remission (H)       esomeprazole 40 MG CR capsule    nexIUM    30 capsule    Take 1 capsule (40 mg) by mouth every morning (before breakfast)    Gastroesophageal reflux disease without esophagitis       LORazepam 0.5 MG tablet    ATIVAN    30 tablet    Take 1 tablet (0.5 mg) by mouth every 6 hours as needed for nausea or anxiety.    Multiple myeloma in relapse (H), Delirium       nystatin 836192 UNIT/ML suspension    MYCOSTATIN    473 mL    Take 5 mLs (500,000 Units) by mouth 4 times daily Swish and spit. Continue until symptoms resolve and then take for 3 more days.    Thrush       OLANZapine 5 MG tablet    zyPREXA    60 tablet    Take 0.5 tablets (2.5 mg) by mouth At Bedtime    Delirium, Multiple myeloma in relapse (H)       ondansetron 8 MG ODT tab    ZOFRAN-ODT    30 tablet    Take 1 tablet (8 mg) by mouth every 8 hours as needed for nausea    Nausea       oxyCODONE IR 5 MG tablet    ROXICODONE    15 tablet    Take 1 tablet (5 mg) by mouth every 6 hours as needed for moderate to severe pain        potassium chloride SA 20 MEQ CR tablet    K-DUR/KLOR-CON M    30 tablet    Take 1 tablet (20 mEq) by mouth daily    Hypokalemia       SIMVASTATIN PO      Take 20 mg by mouth At Bedtime        traMADol 50 MG tablet    ULTRAM    60 tablet    Take 1 tablet (50 mg) by mouth every 6 hours as needed for moderate pain . Recommend trying after Tylenol and before Dilaudid.    Acute bilateral low back pain without sciatica, Multiple myeloma in relapse (H)       venetoclax 100 MG tablet CHEMOTHERAPY    VENCLEXTA    100 tablet    Take 8 tablets (800 mg) by mouth daily    Multiple myeloma in relapse (H)

## 2017-11-08 NOTE — PROGRESS NOTES
HEMATOLOGY/ONCOLOGY PROGRESS NOTE  Nov 8, 2017    REASON FOR VISIT: myeloma    Diagnosis: 71 year old gentleman with IgM lambda multiple myeloma originally diagnosed in 01/2012 as stage I, standard risk disease.   Treatment: Revlimid plus dexamethasone for 4-5 cycles but plateaued by late 03/2012.   - Velcade was added and he received another 2-3 cycles, achieving a good partial remission.      Transplant: Single auto after melphalan 200 mg/m2 preparative regimen on 01/09/2013  - Post-transplant course: unremarkable except for some mild steroid-induced hyperglycemia, gastritis, nausea and vomiting.      Maintenance: Lenalidomide at day-100 then developed a maculopapular rash. Lenalidomide was held and then we re-challenged him after about a month to 2 months at a lower dose (5 mg daily); rash returned.   - was started on maintenance Velcade, every other week through July 2014, when he was noted with abnormalities on myeloma studies drawn there. Noted with prostate cancer dx at about the time of relapse (see below).     Relapse: noted with return on M-spike in blood/urine with marrow involvement but negative PET.   - Started on retreatment with RVD on 8/27/14 with Revlimid at 15 mg 14 days of 21 days, dexamethasone 40 mg weekly and velcade weekly.Completed at total of 5 cycles without complication by 12/2014.   - Started Cycle 6 on inc'd Rev dosing of 20mg daily x 2 weeks on 12/11/14 which was complicated by pneumonia.  - Adm 12/21-1/10 for human metapneumovirus pneumonia complicated by anorexia, HTN, depression, anorexia with significant weight loss.   - Restarted Ernesto/Dex only on 2/4/15 with good tolerance but with thrombocytopenia.   - Bendamustine added to Velcade/dexamethasone on 5/21/15 due to disease progression  - Velcade discontinued on 7/9/2015 due to side effects (orthostasis)  - Schedule changed to bendamustine 80 mg/m2 days 1 and 2 on 28-day cycle  - Cycle 1 tolerated poorly due to lightheadedness and  weakness  - Cycle 2 dose reduced to 60 mg/m2 and pre-meds adjusted  - Cycle 5 received on 9/17/15.  - 10/1/2015 - increasing IgM, M-spike - started Pomalidomide 4mg/day (21 days out of 28 days) and weekly Decadron 20mg on Oct 1st, 2015.    - Carfilzomib added on 10/22/2015 making this CPD.  - C3-C6  received in [place]    - Returned to [facility] and resumed CPD here    - Adm: 4/12-4/14 with fever, confusion, neutropenia. Noted on MRI to have acute/subacute CVA and subacute/chronic CVA; started on Plavix, given brief course of Abx and GCSF prior to d/c.     - Continued on Carfilzomib and dexamethasone alone  - Pomalyst added back on 7/13/2016 for rising FLC  - Start daratumumab 11/10/16. Changed to every other week after 4 weekly treatments d/t profound fatigue and malaise.   - Adm 5/7-5/16 due to AMS, hypercalcemia, hyperuricemia, possible PNA, back pain, and JOSE MARIA. Started Kyprolis while inpatient.  - Re-admitted 5/25-/529 with recurrent AMS, found to have concomitant PNA  - Resumed Kyprolis 6/13/2017. Stopped due to worsening performance status and progressive myeloma.  - Started Venclexta 800 mg 8/25/2017; Velcade added 11/1/17    INTERVAL HISTORY:  Mr. Carbone is here with his wife. Overall well. Cant believe how much better he continues to feel. Still with some pain in arm where the fracture occurred but this is slowly better.  Virtually no back pain now which is nice.  Is eating smaller portions but he is eating. No real N/V, loose stools since starting the Velcade. No worsening neuropathy, trips or falls. Rest of 10 point ROS negative.     PHYSICAL EXAMINATION  /79 (BP Location: Right arm, Patient Position: Sitting, Cuff Size: Adult Regular)  Pulse 87  Temp 97.8  F (36.6  C) (Oral)  Resp 16  Wt 53.4 kg (117 lb 12.8 oz)  SpO2 97%  BMI 20.22 kg/m2    Wt Readings from Last 5 Encounters:   11/08/17 53.4 kg (117 lb 12.8 oz)   11/01/17 54.1 kg (119 lb 3.2 oz)   10/19/17 54.4 kg (119 lb 14.4 oz)   10/06/17  54.4 kg (120 lb)   10/03/17 56.2 kg (123 lb 12.8 oz)       General: Alert. Oriented. Jovial.  Able to ambulate independently, albeit slow and cautiously.  No acute distress. HEENT: PERRL, no palor or icterus. CVS: RRR. CHEST: CTAB, normal work of breathing ABDOMEN: soft non tender no masses.    NEURO: AAOX3  CN 2-12 intact MSK: L arm not in sling this visit. SKIN: no bleeding or brusiing, no rashes. EXTREMITIES: No edema.     LABS:   CBC RESULTS:   Recent Labs   Lab Test  11/08/17   1135   WBC  3.8*   RBC  2.90*   HGB  9.7*   HCT  31.1*   MCV  107*   MCH  33.4*   MCHC  31.2*   RDW  15.9*   PLT  111*     Recent Labs   Lab Test  11/08/17   1135  11/01/17   0934   NA  136  138   POTASSIUM  4.3  4.0   CHLORIDE  105  105   CO2  24  24   ANIONGAP  8  10   GLC  106*  129*   BUN  22  18   CR  1.23  1.03   JAZMIN  9.1  9.1     Liver Function Studies -   Recent Labs   Lab Test  11/08/17   1135   PROTTOTAL  9.2*   ALBUMIN  3.3*   BILITOTAL  0.3   ALKPHOS  69   AST  8   ALT  20        5/25/2017 14:40 7/11/2017 15:14 8/30/2017 15:53 9/27/2017 16:44 10/19/2017 11:02   IGM 2910 (H)  4260 (H) 2620 (H)    Kappa Free Lt Chain 0.34 <0.30... (L)   <0.30 (L)   Kappa Lambda Ratio 0.01 (L) Unable to Calculate   Unable to Calculate   Lambda Free Lt Chain 29.00 (H) 32.00 (H)   51.50 (H)   Monoclonal Peak 1.9 (H) 2.4 (H) 2.8 (H) 1.8 (H) 2.1 (H)   Viscosity Index 2.1 (H)         IMPRESSION/PLAN:  71 year old male with relapsed MM after autologous transplant (1/9/2013), status-post multiple salvage regimens with progressive disease.    MM: Continues on Venclexta, which he tolerates well and initally had good response, but with rising light chains. Velcade added and we'll monitor for the response (if any) to this. Velcade at 1 mg/m2 with Dex 20 mg weekly.   - I do worry given that his total protein is creeping up.     AMS: AMS started early May in setting of metabolic derangements, uremia, and possible infection.  Remains intermittently confused  over the past few months but improved significantly over last few weeks.   - Continue Zyprexa 2.5 mg at bedtime.      Fatigue: Anemic but likely it was pain and chemo that added up on him.   - will monitor.     Heme: Cytopenias 2/2 treatment and likely MM in setting of progression. Stable today.  - On Plavix, hold for platelets < 50. Continue for now, pending his clinical course, may need to discuss just holding this all together as the potential harm may outweigh risk at some point  - Transfuse to keep hemoglobin > 8    Renal/FEN: Creat baseline ~0.8-1.0. A little up today. Will check again in 1 week.   -Encouraged protein/calorie supplements.      ID: Presently afebrile w/o any localizing infectious s/s  - Continues ppx  mg BID  - flu shot already this season.    Back/rib pain: Started early May. Lumbar MRI at OSH showing mild-mod central and foraminal stenoses in lumbar spine, per patient and wife, there was no MM lesion there. Rib films inpatient negative, back films stable from prior imaging. Pain has actually been better recenty  - Continues tramadol PRN and Tylenol PRN. No changes.      CV: Continue zocor and norvasc.   - Avoid QTc prolonging agents (history of QTc prolongation with syncopal episodes)     RTC in 1 week for labs and chemo.    BMcClune, DO

## 2017-11-08 NOTE — PATIENT INSTRUCTIONS
Contact Numbers    [facility] Main Line: [phone]  [facility] Triage:  [phone]    Call triage with chills and/or temperature greater than or equal to 100.5, uncontrolled nausea/vomiting, diarrhea, constipation, dizziness, shortness of breath, chest pain, bleeding, unexplained bruising, or any new/concerning symptoms, questions/concerns.     If you are having any concerning symptoms or wish to speak to a provider before your next infusion visit, please call your care coordinator or triage to notify them so we can adequately serve you.       After Hours: [phone]    If after hours, weekends, or holidays, call main hospital  and ask for Oncology doctor on call.         November 2017 Sunday Monday Tuesday Wednesday Thursday Friday Saturday                  1     [facility] DRAW    9:00 AM   (15 min.)    [facility] LAB DRAW   [facility] Lab Draw     [facility] RETURN    9:25 AM   (50 min.)   Leanne Reddy PA-C   [facility]     UMP ONC INFUSION 60   12:00 PM   (60 min.)    ONCOLOGY INFUSION   [facility] 2     3     4       5     6     7     8     [facility] DRAW   11:00 AM   (15 min.)    [facility] LAB DRAW   [facility] Lab Draw     [facility] ONC RETURN   11:30 AM   (30 min.)    BMT DOM   [facility] Blood [facility] Transplant     [facility] ONC INFUSION 60   12:00 PM   (60 min.)    ONCOLOGY INFUSION   [facility] 9     10     11       12     13     14     15     16     [facility] DRAW    8:45 AM   (15 min.)    [facility] LAB DRAW   [facility] Draw     [facility] RETURN    8:55 AM   (50 min.)   Leanne Reddy PA-C   [facility]     UMP ONC INFUSION 60   10:00 AM   (60 min.)    ONCOLOGY INFUSION   [facility] 17     18       19     20     21     [facility] DRAW    3:30 PM   (15 min.)    [facility] LAB DRAW   [facility] Lab Draw     UMP RETURN    3:55 PM   (50 min.)    Leanne Reddy PA-C [facility]     UMP ONC INFUSION 60    5:00 PM   (60 min.)   UC ONCOLOGY INFUSION   [facility] 22     23     24     25       26     27     28     29  Happy Birthday!     30 December 2017 Sunday Monday Tuesday Wednesday Thursday Friday Saturday                            1     2       3     4     5     6     7     8     9       10     11     12     13     14     15     16       17     18     19     20     21     22     23       24     25     26     27     28     29     30       31                                               Recent Results (from the past 24 hour(s))   CBC with platelets differential    Collection Time: 11/08/17 11:35 AM   Result Value Ref Range    WBC 3.8 (L) 4.0 - 11.0 10e9/L    RBC Count 2.90 (L) 4.4 - 5.9 10e12/L    Hemoglobin 9.7 (L) 13.3 - 17.7 g/dL    Hematocrit 31.1 (L) 40.0 - 53.0 %     (H) 78 - 100 fl    MCH 33.4 (H) 26.5 - 33.0 pg    MCHC 31.2 (L) 31.5 - 36.5 g/dL    RDW 15.9 (H) 10.0 - 15.0 %    Platelet Count 111 (L) 150 - 450 10e9/L    Diff Method Automated Method     % Neutrophils 77.8 %    % Lymphocytes 5.1 %    % Monocytes 16.5 %    % Eosinophils 0.0 %    % Basophils 0.3 %    % Immature Granulocytes 0.3 %    Nucleated RBCs 0 0 /100    Absolute Neutrophil 2.9 1.6 - 8.3 10e9/L    Absolute Lymphocytes 0.2 (L) 0.8 - 5.3 10e9/L    Absolute Monocytes 0.6 0.0 - 1.3 10e9/L    Absolute Eosinophils 0.0 0.0 - 0.7 10e9/L    Absolute Basophils 0.0 0.0 - 0.2 10e9/L    Abs Immature Granulocytes 0.0 0 - 0.4 10e9/L    Absolute Nucleated RBC 0.0    Comprehensive metabolic panel    Collection Time: 11/08/17 11:35 AM   Result Value Ref Range    Sodium 136 133 - 144 mmol/L    Potassium 4.3 3.4 - 5.3 mmol/L    Chloride 105 94 - 109 mmol/L    Carbon Dioxide 24 20 - 32 mmol/L    Anion Gap 8 3 - 14 mmol/L    Glucose 106 (H) 70 - 99 mg/dL    Urea Nitrogen 22 7 - 30 mg/dL    Creatinine 1.23 0.66 - 1.25  mg/dL    GFR Estimate 58 (L) >60 mL/min/1.7m2    GFR Estimate If Black 70 >60 mL/min/1.7m2    Calcium 9.1 8.5 - 10.1 mg/dL    Bilirubin Total 0.3 0.2 - 1.3 mg/dL    Albumin 3.3 (L) 3.4 - 5.0 g/dL    Protein Total 9.2 (H) 6.8 - 8.8 g/dL    Alkaline Phosphatase 69 40 - 150 U/L    ALT 20 0 - 70 U/L    AST 8 0 - 45 U/L

## 2017-11-08 NOTE — NURSING NOTE
"Oncology Rooming Note    November 8, 2017 12:03 PM   Anthony Carbone is a 71 year old male who presents for:    Chief Complaint   Patient presents with     Port Draw     Oncology Clinic Visit     Pt is here for MM--here for labs and to see MD     Initial Vitals: /79 (BP Location: Right arm, Patient Position: Sitting, Cuff Size: Adult Regular)  Pulse 87  Temp 97.8  F (36.6  C) (Oral)  Resp 16  Wt 53.4 kg (117 lb 12.8 oz)  SpO2 97%  BMI 20.22 kg/m2 Estimated body mass index is 20.22 kg/(m^2) as calculated from the following:    Height as of 10/6/17: 1.626 m (5' 4\").    Weight as of this encounter: 53.4 kg (117 lb 12.8 oz). Body surface area is 1.55 meters squared.  No Pain (0) Comment: Data Unavailable   No LMP for male patient.  Allergies reviewed:  Medications reviewed:   Medications:  Pharmacy name entered into [facility]:    [id number] - [place], [place] - [address] AT [facility] & [facility] [address]  [place], [place] - [address] [facility]  [facility] [address] [place], [place] - [address] AT [facility] OF [address]    Rooming not done as Provider came into room    Clinical concerns: no     6 minutes for nursing intake (face to face time)     Christy Lynn MA              "

## 2017-11-15 NOTE — PROGRESS NOTES
Oncology/Hematology Visit Note  Nov 16, 2017     Reason for Visit: Follow up of multiple myeloma     History of Present Illness: Anthony Carbone is a 71 year old male with multiple myeloma. He is currently undergoing treatment with Venclexta . Briefly, his oncologic history is as follows:      Diagnosis: 71 year old gentleman with IgM lambda multiple myeloma originally diagnosed in 01/2012 as stage I, standard risk disease.   Treatment: Revlimid plus dexamethasone for 4-5 cycles but plateaued by late 03/2012.   - Velcade was added and he received another 2-3 cycles, achieving a good partial remission.       Transplant: Single auto after melphalan 200 mg/m2 preparative regimen on 01/09/2013  - Post-transplant course: unremarkable except for some mild steroid-induced hyperglycemia, gastritis, nausea and vomiting.       Maintenance: Lenalidomide at day-100 then developed a maculopapular rash. Lenalidomide was held and then we re-challenged him after about a month to 2 months at a lower dose (5 mg daily); rash returned.   - was started on maintenance Velcade, every other week through July 2014, when he was noted with abnormalities on myeloma studies drawn there. Noted with prostate cancer dx at about the time of relapse (see below).      Relapse: noted with return on M-spike in blood/urine with marrow involvement but negative PET.   - Started on retreatment with RVD on 8/27/14 with Revlimid at 15 mg 14 days of 21 days, dexamethasone 40 mg weekly and velcade weekly.Completed at total of 5 cycles without complication by 12/2014.   - Started Cycle 6 on inc'd Rev dosing of 20mg daily x 2 weeks on 12/11/14 which was complicated by pneumonia.  - Adm 12/21-1/10 for human metapneumovirus pneumonia complicated by anorexia, HTN, depression, anorexia with significant weight loss.   - Restarted Ernesto/Dex only on 2/4/15 with good tolerance but with thrombocytopenia.   - Bendamustine added to Velcade/dexamethasone on 5/21/15  due to disease progression  - Velcade discontinued on 7/9/2015 due to side effects (orthostasis)  - Schedule changed to bendamustine 80 mg/m2 days 1 and 2 on 28-day cycle  - Cycle 1 tolerated poorly due to lightheadedness and weakness  - Cycle 2 dose reduced to 60 mg/m2 and pre-meds adjusted  - Cycle 5 received on 9/17/15.  - 10/1/2015 - increasing IgM, M-spike - started Pomalidomide 4mg/day (21 days out of 28 days) and weekly Decadron 20mg on Oct 1st, 2015.    - Carfilzomib added on 10/22/2015 making this CPD.  - C3-C6  received in [place]    - Returned to [facility] and resumed CPD here    - Adm: 4/12-4/14 with fever, confusion, neutropenia. Noted on MRI to have acute/subacute CVA and subacute/chronic CVA; started on Plavix, given brief course of Abx and GCSF prior to d/c.     - Continued on Carfilzomib and dexamethasone alone  - Pomalyst added back on 7/13/2016 for rising FLC  - Start daratumumab 11/10/16. Changed to every other week after 4 weekly treatments d/t profound fatigue and malaise.   - Adm 5/7-5/16 due to AMS, hypercalcemia, hyperuricemia, possible PNA, back pain, and JOSE MARIA. Started Kyprolis while inpatient.  - Re-admitted 5/25-/529 with recurrent AMS, found to have concomitant PNA  - Resumed Kyprolis 6/13/2017. Stopped due to worsening performance status and progressive myeloma.  - Started Venclexta 800 mg 8/25/2017.  -Added weekly velcade plus 20mg dex due to rising light chains, day 1 on 11/1/17    Interval History:  Mr. Carbone returns to clinic today with his wife. He states overall things have been going well since starting the Velcade. He is still fatigued, but this is stable overall. He continues to struggle with poor appetite and he and his wife have been working on ways to add calories and protein to his diet. He does admit to only drinking 24oz fluid daily. He is taking Zofran on a daily basis as a preventative measure, and has vomited 1-2 per week. This is not affecting his ability to eat. His arm  pain has improved and his back pain is well controlled with 2 tramadol per day. His mental status has remained stable and his wife has not noticed any changes.     Review of Systems:  Patient denies fevers, chills, night sweats, unexplained weight changes, headaches, dizziness, vision or hearing changes, new lumps or bumps, chest pain, shortness of breath, cough, abdominal pain, changes to bowel or bladder, swelling of extremities, bleeding issues, or rash.    Current Outpatient Prescriptions   Medication Sig Dispense Refill     potassium chloride SA (K-DUR/KLOR-CON M) 20 MEQ CR tablet Take 1 tablet (20 mEq) by mouth daily 30 tablet 3     dexamethasone (DECADRON) 4 MG tablet Take 20 mg weekly on day of Velcade 40 tablet 3     OLANZapine (ZYPREXA) 5 MG tablet Take 0.5 tablets (2.5 mg) by mouth At Bedtime 60 tablet 0     nystatin (MYCOSTATIN) 966385 UNIT/ML suspension Take 5 mLs (500,000 Units) by mouth 4 times daily Swish and spit. Continue until symptoms resolve and then take for 3 more days. 473 mL 3     traMADol (ULTRAM) 50 MG tablet Take 1 tablet (50 mg) by mouth every 6 hours as needed for moderate pain . Recommend trying after Tylenol and before Dilaudid. 60 tablet 3     oxyCODONE (ROXICODONE) 5 MG IR tablet Take 1 tablet (5 mg) by mouth every 6 hours as needed for moderate to severe pain 15 tablet 0     SIMVASTATIN PO Take 20 mg by mouth At Bedtime       amLODIPine (NORVASC) 5 MG tablet TAKE 1 TABLET BY MOUTH AT BEDTIME 90 tablet 1     acyclovir (ZOVIRAX) 400 MG tablet Take 1 tablet (400 mg) by mouth 2 times daily 60 tablet 3     venetoclax (VENCLEXTA) 100 MG tablet CHEMOTHERAPY Take 8 tablets (800 mg) by mouth daily 100 tablet 3     clopidogrel (PLAVIX) 75 MG tablet TAKE 1 TABLET BY MOUTH EVERY DAY 90 tablet 0     LORazepam (ATIVAN) 0.5 MG tablet Take 1 tablet (0.5 mg) by mouth every 6 hours as needed for nausea or anxiety. 30 tablet 0     ondansetron (ZOFRAN-ODT) 8 MG ODT tab Take 1 tablet (8 mg) by mouth  every 8 hours as needed for nausea 30 tablet 3     esomeprazole (NEXIUM) 40 MG CR capsule Take 1 capsule (40 mg) by mouth every morning (before breakfast) 30 capsule 0       Physical Examination:  /75  Pulse 96  Temp 97.6  F (36.4  C) (Oral)  Resp 14  Wt 53 kg (116 lb 12.8 oz)  SpO2 96%  BMI 20.05 kg/m2  Wt Readings from Last 10 Encounters:   11/08/17 53.4 kg (117 lb 12.8 oz)   11/01/17 54.1 kg (119 lb 3.2 oz)   10/19/17 54.4 kg (119 lb 14.4 oz)   10/06/17 54.4 kg (120 lb)   10/03/17 56.2 kg (123 lb 12.8 oz)   09/27/17 56.8 kg (125 lb 3.2 oz)   09/20/17 56 kg (123 lb 6.4 oz)   09/06/17 56.2 kg (124 lb)   08/29/17 55.2 kg (121 lb 9.6 oz)   08/09/17 54.4 kg (120 lb)     Constitutional: Well-appearing male in no acute distress.  Eyes: EOMI, PERRL. No scleral icterus.  ENT: Oral mucosa is moist without lesions or thrush.   Lymphatic: Neck is supple without cervical or supraclavicular lymphadenopathy.  Cardiovascular: Regular rate and rhythm. No murmurs, gallops, or rubs. No peripheral edema.  Respiratory: Clear to auscultation bilaterally. No wheezes or crackles.  Gastrointestinal: Bowel sounds present. Abdomen soft, non-tender. No palpable hepatosplenomegaly or masses. Abdominal exam limited by sitting in chair.  Neurologic: Cranial nerves II through XII are grossly intact.  Skin: No rashes, petechiae, or bruising noted on exposed skin.    Laboratory Data:  Results for HANSACHARLYWILLIAN LARES (MRN [id number]) as of 11/16/2017 14:21   11/16/2017 08:45 11/16/2017 08:46   Sodium  137   Potassium  3.9   Chloride  104   Carbon Dioxide  25   Urea Nitrogen  23   Creatinine  1.49 (H)   GFR Estimate  46 (L)   GFR Estimate If Black  56 (L)   Calcium  8.9   Anion Gap  9   Albumin  3.1 (L)   Protein Total  8.5   Bilirubin Total  0.3   Alkaline Phosphatase  63   ALT  15   AST  6   Glucose  121 (H)   WBC 3.1 (L)    Hemoglobin 9.4 (L)    Hematocrit 29.9 (L)    Platelet Count 79 (L)    RBC Count 2.79 (L)     (H)     MCH 33.7 (H)    MCHC 31.4 (L)    RDW 15.6 (H)    Diff Method Automated Method    % Neutrophils 76.0    % Lymphocytes 5.1    % Monocytes 18.6    % Eosinophils 0.0    % Basophils 0.0    % Immature Granulocytes 0.3    Nucleated RBCs 0    Absolute Neutrophil 2.4    Absolute Lymphocytes 0.2 (L)    Absolute Monocytes 0.6    Absolute Eosinophils 0.0    Absolute Basophils 0.0    Abs Immature Granulocytes 0.0    Absolute Nucleated RBC 0.0          Assessment and Plan:  1. Multiple myeloma, currently on treatment with Venetoclax, Velcade, and Dex  Continue 800mg daily of Venetoclax, Velcade 1mg/m2 weekly, and Dex 20mg weekly. Tolerating Velcade well.  He has had an up-trending of his creat the past few visits, today at 1.49. Possible this is dehydration given only 24oz daily fluid intake, but also concern that his myleoma may be progressing. If the creat is still elevated next week despite increasing fluid intake and giving IVF today, will need to recheck light chains earlier to assess for progression. Can move forward with Velcade today.     2. Altered mental status  Thought 2/2 metabolic derangements, uremia, ad possible infection. Today he is clear and his wife denies any AMS. Continue Zyprexa 2.5mg at bedtime and avoid CNS altering medications.    3. Fatigue, stable  Previously due to anemia. No changes to energy level since starting velcade/dex. Continue to monitor.     4. Heme  Counts have been stabel for the last two months and hgb today is 9.4. Plt however have decreased to 79. On Plavix for possible history of stroke in 2016. Hold if plt <50. Could consider stopping Plavix all together given debatable stroke history and bleeding risk. Did inform patient and wife to watch for bleeding over the weekend.    5. FEN  Weight continues to decrease, 116lbs today. Continued to discuss ways to add calories to diet. Dietician referral placed. Stressed the importance of maintaining his weight and strength during  treatment.    Potassium stable. Continue PO 20meq daily. Creat has been up-trending, 1.49 today. Dehydrations vs MM progression? Will give 1L IVF with today's treatment and recheck next week as mentioned above.    6. ID  Thrush has resolved. ACV 400mg BID ppx. No fevers. ANC stable at 2.4.     7. Bone  Due for next Zometa in January. Recent left humerus fracture is healing well and significantly less painful. Back/rib pain well controlled with tramadol. No new bony pains.     7. CV  Continue Zocor and Norvasc and avoid QT prolonging agents. May EKG shows Qtx 450. Given he is taking Zofran more regularly, consider checking EKG at future visit.     8. Follow-Up  Sees Leanne on 11/21  Weekly Labs and Velcade  See Dr. Topete in one month     David Espana PA-C  [facility]  [address] [place] [id number] [phone]      The patient was seen in conjunction with David Espana PA-C who served as a scribe for today's visit. I have reviewed the note and agree with the above findings and plan. -ECT

## 2017-11-16 NOTE — MR AVS SNAPSHOT
After Visit Summary   11/16/2017    Anthony Carbone    MRN: [id number]           Patient Information     Date Of Birth          1945        Visit Information        Provider Department      11/16/2017 10:00 AM UC 31 ATC;  ONCOLOGY INFUSION [facility]        Today's Diagnoses     Multiple myeloma in relapse (H)    -  1      Care Instructions    Contact numbers:  Triage Main Line: [phone]  After hours: [phone]    Call with chills and/or temperature greater than or equal to 100.5 and questions or concerns.    If after hours, weekends, or holidays, call main hospital  at  [phone] and ask for Oncology doctor on call.           November 2017 Sunday Monday Tuesday Wednesday Thursday Friday Saturday 1     [facility] DRAW    9:00 AM   (15 min.)    [facility]     [facility] RETURN    9:25 AM   (50 min.)   Leanne Reddy PA-C   [facility]     [facility] ONC INFUSION 60   12:00 PM   (60 min.)    ONCOLOGY INFUSION   [facility] 2     3     4       5     6     7     8     [facility] DRAW   11:00 AM   (15 min.)    [facility]     [facility] ONC RETURN   11:30 AM   (30 min.)    BMT DOM   [facility] Blood and Marrow Transplant     [facility] ONC INFUSION 60   12:00 PM   (60 min.)    ONCOLOGY INFUSION   [facility] 9     10     11       12     13     14     15     16     [facility] DRAW    8:45 AM   (15 min.)    [facility] LAB DRAW   [facility] Lab Draw     UMP RETURN    8:55 AM   (50 min.)   Leanne Reddy PA-C   [facility]     [facility] ONC INFUSION 60   10:00 AM   (60 min.)    ONCOLOGY INFUSION   [facility] 17     18       19     20     21     [facility] DRAW    3:30 PM   (15 min.)    [facility] LAB DRAW   [facility] Lab Draw     [facility] RETURN    3:55  PM   (50 min.)   Leanne Reddy PA-C [facility]     UMP ONC INFUSION 60    5:00 PM   (60 min.)   UC ONCOLOGY INFUSION   [facility] 22     23     24     25       26     27     28     29  Happy Birthday!     30     [facility] LAB DRAW   11:30 AM   (15 min.)   [facility] LAB DRAW   [facility] Mason[facility] Lab Draw     UMP ONC INFUSION 60   12:00 PM   (60 min.)   UC ONCOLOGY INFUSION   [facility]                      December 2017 Sunday Monday Tuesday Wednesday Thursday Friday Saturday                            1     2       3     4     UMP NEW   10:45 AM   (60 min.)   Harleen Ramirez RD   [facility] 5     6     7     [facility] LAB DRAW   10:15 AM   (15 min.)   [facility] DRAW   [facility] Lab Draw     [facility] RETURN   10:35 AM   (50 min.)   Leanne Reddy PA-C   [facility]     [facility] ONC INFUSION 60   12:00 PM   (60 min.)   UC ONCOLOGY INFUSION   [facility] 8     9       10     11     12     13     [facility] LAB DRAW    4:45 PM   (15 min.)   [facility] LAB DRAW   [facility] Lab Draw     UMP ONC RETURN    5:30 PM   (30 min.)   Kamari Topete MD   [facility] Blood and Marrow Transplant 14     [facility] ONC INFUSION 60    3:00 PM   (60 min.)   UC ONCOLOGY INFUSION   [facility] 15     16       17     18     19     20     21     22     23       24     25     26     27     28     29     30       31                                                Lab Results:  Recent Results (from the past 12 hour(s))   CBC with platelets differential    Collection Time: 11/16/17  8:45 AM   Result Value Ref Range    WBC 3.1 (L) 4.0 - 11.0 10e9/L    RBC Count 2.79 (L) 4.4 - 5.9 10e12/L    Hemoglobin 9.4 (L) 13.3 - 17.7 g/dL    Hematocrit 29.9 (L) 40.0 - 53.0 %     (H) 78 - 100 fl    MCH 33.7 (H) 26.5 - 33.0 pg    MCHC 31.4 (L) 31.5 - 36.5 g/dL    RDW 15.6  (H) 10.0 - 15.0 %    Platelet Count 79 (L) 150 - 450 10e9/L    Diff Method Automated Method     % Neutrophils 76.0 %    % Lymphocytes 5.1 %    % Monocytes 18.6 %    % Eosinophils 0.0 %    % Basophils 0.0 %    % Immature Granulocytes 0.3 %    Nucleated RBCs 0 0 /100    Absolute Neutrophil 2.4 1.6 - 8.3 10e9/L    Absolute Lymphocytes 0.2 (L) 0.8 - 5.3 10e9/L    Absolute Monocytes 0.6 0.0 - 1.3 10e9/L    Absolute Eosinophils 0.0 0.0 - 0.7 10e9/L    Absolute Basophils 0.0 0.0 - 0.2 10e9/L    Abs Immature Granulocytes 0.0 0 - 0.4 10e9/L    Absolute Nucleated RBC 0.0    Comprehensive metabolic panel    Collection Time: 11/16/17  8:46 AM   Result Value Ref Range    Sodium 137 133 - 144 mmol/L    Potassium 3.9 3.4 - 5.3 mmol/L    Chloride 104 94 - 109 mmol/L    Carbon Dioxide 25 20 - 32 mmol/L    Anion Gap 9 3 - 14 mmol/L    Glucose 121 (H) 70 - 99 mg/dL    Urea Nitrogen 23 7 - 30 mg/dL    Creatinine 1.49 (H) 0.66 - 1.25 mg/dL    GFR Estimate 46 (L) >60 mL/min/1.7m2    GFR Estimate If Black 56 (L) >60 mL/min/1.7m2    Calcium 8.9 8.5 - 10.1 mg/dL    Bilirubin Total 0.3 0.2 - 1.3 mg/dL    Albumin 3.1 (L) 3.4 - 5.0 g/dL    Protein Total 8.5 6.8 - 8.8 g/dL    Alkaline Phosphatase 63 40 - 150 U/L    ALT 15 0 - 70 U/L    AST 6 0 - 45 U/L               Follow-ups after your visit        Your next 10 appointments already scheduled     Nov 21, 2017  3:30 PM CST   [facility] Lab Draw with [facility] LAB DRAW   [facility] ([facility])    [address] [address] [phone]            Nov 21, 2017  4:10 PM CST   (Arrive by 3:55 PM)   Return Visit with Leanne Reddy PA-C   [facility])    [address]  2nd Floor  [facility] [address] [phone]            Nov 21, 2017  5:00 PM CST   Infusion 60 with UC ONCOLOGY INFUSION, UC 24 ATC   [facility]  [facility])    [address] [place] [address]   [phone]            Nov 30, 2017 11:30 AM CST   Masonic Lab Draw with  MASONIC LAB DRAW   [facility] Lab Draw ([facility])    [address] [place] [address]   [phone]            Nov 30, 2017 12:00 PM CST   Infusion 60 with UC ONCOLOGY INFUSION, UC 28 ATC   [facility] ([facility])    [address] [place] [address]   [phone]            Dec 04, 2017 11:00 AM CST   (Arrive by 10:45 AM)   New Patient Visit with Harleen Summers RD   [facility] ([facility])    [address] [place] [address]   [phone]            Dec 07, 2017 10:15 AM CST   [facility]ic Lab Draw with  [facility] LAB DRAW   [facility])    [address] [place] [address]   [phone]            Dec 07, 2017 10:50 AM CST   (Arrive by 10:35 AM)   Return Visit with Leanne Reddy PA-C   [facility] ([facility])    [address] [place] [address]   [phone]              Who to contact     If you have questions or need follow up information about today's clinic visit or your schedule please contact [facility] directly at [phone].  Normal or non-critical lab and imaging results will be communicated to you by MyChart, letter or phone within 4 business days after the clinic has received the results. If you do not hear from us within 7 days, please contact the clinic through MyChart or phone. If you have a critical or abnormal lab result, we will notify you by phone as soon as possible.  Submit refill requests through [employer] or call your pharmacy and they will forward the  refill request to us. Please allow 3 business days for your refill to be completed.          Additional Information About Your Visit        [employer] Information     [employer] gives you secure access to your electronic health record. If you see a primary care provider, you can also send messages to your care team and make appointments. If you have questions, please call your primary care clinic.  If you do not have a primary care provider, please call [phone] and they will assist you.        Care EveryWhere ID     This is your Care EveryWhere ID. This could be used by other organizations to access your [place] medical records  [id number]         Blood Pressure from Last 3 Encounters:   11/16/17 112/75   11/08/17 118/79   11/01/17 108/67    Weight from Last 3 Encounters:   11/16/17 53 kg (116 lb 12.8 oz)   11/08/17 53.4 kg (117 lb 12.8 oz)   11/01/17 54.1 kg (119 lb 3.2 oz)              We Performed the Following     CBC with platelets differential        Primary Care Provider Office Phone # Fax #    Sanket Gualberto [phone]       [facility] [address] [id number]        Equal Access to Services     ALBAN SHAH : Hadii chalo hernandezo Sojose, waaxda luqadaha, qaybta kaalmada adeclaudiayada, jonny villa. So [facility] [phone].    ATENCIÓN: Si habla español, tiene a todd disposición servicios gratuitos de asistencia lingüística. [facility] [phone].    We comply with applicable federal civil rights laws and [place] laws. We do not discriminate on the basis of race, color, national origin, age, disability, sex, sexual orientation, or gender identity.            Thank you!     Thank you for choosing [facility]  for your care. Our goal is always to provide you with excellent care. Hearing back from our patients is one way we can continue to improve our services. Please take a few minutes to complete the written survey  that you may receive in the mail after your visit with us. Thank you!             Your Updated Medication List - Protect others around you: Learn how to safely use, store and throw away your medicines at www.disposemymeds.org.          This list is accurate as of: 11/16/17 11:29 AM.  Always use your most recent med list.                   Brand Name Dispense Instructions for use Diagnosis    acyclovir 400 MG tablet    ZOVIRAX    60 tablet    Take 1 tablet (400 mg) by mouth 2 times daily    Multiple myeloma in relapse (H)       amLODIPine 5 MG tablet    NORVASC    90 tablet    TAKE 1 TABLET BY MOUTH AT BEDTIME    Essential hypertension       clopidogrel 75 MG tablet    PLAVIX    90 tablet    TAKE 1 TABLET BY MOUTH EVERY DAY    History of stroke       dexamethasone 4 MG tablet    DECADRON    40 tablet    Take 20 mg weekly on day of Velcade    Multiple myeloma not having achieved remission (H)       esomeprazole 40 MG CR capsule    nexIUM    30 capsule    Take 1 capsule (40 mg) by mouth every morning (before breakfast)    Gastroesophageal reflux disease without esophagitis       LORazepam 0.5 MG tablet    ATIVAN    30 tablet    Take 1 tablet (0.5 mg) by mouth every 6 hours as needed for nausea or anxiety.    Multiple myeloma in relapse (H), Delirium       nystatin 623116 UNIT/ML suspension    MYCOSTATIN    473 mL    Take 5 mLs (500,000 Units) by mouth 4 times daily Swish and spit. Continue until symptoms resolve and then take for 3 more days.    Thrush       OLANZapine 5 MG tablet    zyPREXA    60 tablet    Take 0.5 tablets (2.5 mg) by mouth At Bedtime    Delirium, Multiple myeloma in relapse (H)       ondansetron 8 MG ODT tab    ZOFRAN-ODT    30 tablet    Take 1 tablet (8 mg) by mouth every 8 hours as needed for nausea    Nausea       oxyCODONE IR 5 MG tablet    ROXICODONE    15 tablet    Take 1 tablet (5 mg) by mouth every 6 hours as needed for moderate to severe pain        potassium chloride SA 20 MEQ CR tablet     K-DUR/KLOR-CON M    30 tablet    Take 1 tablet (20 mEq) by mouth daily    Hypokalemia       SIMVASTATIN PO      Take 20 mg by mouth At Bedtime        traMADol 50 MG tablet    ULTRAM    60 tablet    Take 1 tablet (50 mg) by mouth every 6 hours as needed for moderate pain . Recommend trying after Tylenol and before Dilaudid.    Acute bilateral low back pain without sciatica, Multiple myeloma in relapse (H)       venetoclax 100 MG tablet CHEMOTHERAPY    VENCLEXTA    100 tablet    Take 8 tablets (800 mg) by mouth daily    Multiple myeloma in relapse (H)

## 2017-11-16 NOTE — PROGRESS NOTES
Infusion Nursing Note:  Anthony Carbone presents for C1D15 Velcade, 1L NS  Met with PREM Herrera before infusion.    Note: TORB- PREM Alcaraz/Cassandra Burkett RN-- give 1L NS today as well    Treatment Conditions:  Lab Results   Component Value Date    HGB 9.4 11/16/2017     Lab Results   Component Value Date    WBC 3.1 11/16/2017      Lab Results   Component Value Date    ANEU 2.4 11/16/2017     Lab Results   Component Value Date    PLT 79 11/16/2017      Lab Results   Component Value Date     11/16/2017                   Lab Results   Component Value Date    POTASSIUM 3.9 11/16/2017           Lab Results   Component Value Date    MAG 2.2 05/25/2017            Lab Results   Component Value Date    CR 1.49 11/16/2017                   Lab Results   Component Value Date    JAZMIN 8.9 11/16/2017                Lab Results   Component Value Date    BILITOTAL 0.3 11/16/2017           Lab Results   Component Value Date    ALBUMIN 3.1 11/16/2017                    Lab Results   Component Value Date    ALT 15 11/16/2017           Lab Results   Component Value Date    AST 6 11/16/2017     Results reviewed, labs MET treatment parameters, ok to proceed with treatment.    Intravenous Access:  Implanted Port.    Post Infusion Assessment:  Patient tolerated infusion without incident.  Patient tolerated injection without incident to RLQ  Blood return noted pre and post infusion.  No evidence of extravasations.  Access discontinued per protocol.    Discharge Plan:   Patient declined prescription refills.  Discharge instructions reviewed with: Patient and Family.  Patient and/or family verbalized understanding of discharge instructions and all questions answered.  AVS to patient via [employer].  Patient will return 11/21 for next appointment.   Patient discharged in stable condition accompanied by: self and wife.    Cassandra Burkett RN    NS stopped at 11:30

## 2017-11-16 NOTE — NURSING NOTE
"Chief Complaint   Patient presents with     Port Draw     port accessed and labs drawn by rn.  vs taken.     Port accessed with 20g 3/4\" gripper needle, labs drawn, port flushed with saline and heparin, vitals checked.  Sujata Mosquera RN    "

## 2017-11-16 NOTE — NURSING NOTE
Port accessed and labs drawn by RN.  JEFF obtained vitals and checked pt in for next appt.    See flow-sheets,     Yanni Chilel CMA (AAMA)

## 2017-11-16 NOTE — PROGRESS NOTES
SPIRITUAL HEALTH SERVICES  SPIRITUAL ASSESSMENT Progress Note  [facility]    Reviewed documentation. Short visit with Yamil and his wife (Casey). Brief conversation as he was transitioning to a clinic visit with his provider. Discussed meanings related to him switching his chemo to an oral form and while that may allow them to travel to [place], they also wondered about how that influences his disease trajectory. I will follow-up with Yamil as he receives ongoing therapies at the [facility].    Nick Pace MDiv, [facility]  Staff   Pager [phone]

## 2017-11-16 NOTE — MR AVS SNAPSHOT
After Visit Summary   11/16/2017    Anthony Carbone    MRN: [id number]           Patient Information     Date Of Birth          1945        Visit Information        Provider Department      11/16/2017 9:10 AM Leanne Reddy PA-C [facility]        Today's Diagnoses     Medication monitoring encounter    -  1    Multiple myeloma in relapse (H)           Follow-ups after your visit        Additional Services     NUTRITION REFERRAL       Your provider has referred you to: [facility]: [facility] (on call location)  [facility] [phone]   [email or web address]    Please be aware that coverage of these services is subject to the terms and limitations of your health insurance plan.  Call member services at your health plan with any benefit or coverage questions.      Please bring the following with you to your appointment:    (1) This referral request  (2) Any documents given to you regarding this referral  (3) Any specific questions you have about diet and/or food choices                  Your next 10 appointments already scheduled     Nov 21, 2017  3:30 PM CST   [facility] Lab Draw with  [facility] LAB DRAW   [facility] ([facility])    [address] [place] [address]   [phone]            Nov 21, 2017  4:10 PM CST   (Arrive by 3:55 PM)   Return Visit with Leanne Reddy PA-C   [facility] ([facility])    [address] [place] [address]   [phone]            Nov 21, 2017  5:00 PM CST   Infusion 60 with UC ONCOLOGY INFUSION, UC 24 ATC   [facility] ([facility])    [address] [place] [address]   [phone]            Nov 30, 2017 11:30 AM CST   Masonic Lab Draw with UC MASONIC LAB DRAW   [facility] Lab Draw ([facility])    [address] [place] [address]   [phone]            Nov 30, 2017 12:00 PM CST   Infusion 60 with UC ONCOLOGY INFUSION, UC 28 ATC   [facility] ([facility])    [address] [place] [address]   [phone]            Dec 04, 2017 11:00 AM CST   (Arrive by 10:45 AM)   New Patient Visit with Harleen Summers RD   [facility] ([facility])    [address] [place] [address]   [phone]            Dec 07, 2017 10:15 AM CST   Masonic Lab Draw with  MASONIC LAB DRAW   [facility] Lab Draw ([facility])    [address] [place] [address]   [phone]            Dec 07, 2017 10:50 AM CST   (Arrive by 10:35 AM)   Return Visit with Leanne Reddy PA-C   [facility] ([facility])    [address] [place] [address]   [phone]              Future tests that were ordered for you today     Open Future Orders        Priority Expected Expires Ordered    EKG 12-lead complete w/read - Clinics Routine 11/21/2017 11/29/2017 11/16/2017            Who to contact     If you have questions or need follow up information about today's clinic visit or your schedule please contact [facility] directly at [phone].  Normal or non-critical lab and imaging results will be communicated to you by MyChart, letter or phone within 4 business days after the clinic has received the results. If you do not hear from us within 7 days, please contact the clinic through MyChart or phone. If you have a critical or abnormal lab result, we will notify you by phone as soon as possible.  Submit refill requests through [employer] or call your pharmacy and they  will forward the refill request to us. Please allow 3 business days for your refill to be completed.          Additional Information About Your Visit        Orlebar Brownhart Information     [employer] gives you secure access to your electronic health record. If you see a primary care provider, you can also send messages to your care team and make appointments. If you have questions, please call your primary care clinic.  If you do not have a primary care provider, please call [phone] and they will assist you.        Care EveryWhere ID     This is your Care EveryWhere ID. This could be used by other organizations to access your [place] medical records  [id number]        Your Vitals Were     Pulse Temperature Respirations Pulse Oximetry BMI (Body Mass Index)       96 97.6  F (36.4  C) (Oral) 14 96% 20.05 kg/m2        Blood Pressure from Last 3 Encounters:   11/16/17 112/75   11/08/17 118/79   11/01/17 108/67    Weight from Last 3 Encounters:   11/16/17 53 kg (116 lb 12.8 oz)   11/08/17 53.4 kg (117 lb 12.8 oz)   11/01/17 54.1 kg (119 lb 3.2 oz)              We Performed the Following     Comprehensive metabolic panel     NUTRITION REFERRAL        Primary Care Provider Office Phone # Fax #    Sanket Gualberto [phone]       [facility] [address] [facility] [address]        Equal Access to Services     ALBAN SHAH : Hadii chalo Davis, waaxda luqadaha, qaybta kaalmada trinidad, jonny villa. So [facility] [phone].    ATENCIÓN: Si habla español, tiene a todd disposición servicios gratuitos de asistencia lingüística. Llmary ellen al [phone].    We comply with applicable federal civil rights laws and [place] laws. We do not discriminate on the basis of race, color, national origin, age, disability, sex, sexual orientation, or gender identity.            Thank you!     Thank you for choosing [facility]  for your care. Our goal  is always to provide you with excellent care. Hearing back from our patients is one way we can continue to improve our services. Please take a few minutes to complete the written survey that you may receive in the mail after your visit with us. Thank you!             Your Updated Medication List - Protect others around you: Learn how to safely use, store and throw away your medicines at www.disposemymeds.org.          This list is accurate as of: 11/16/17  3:00 PM.  Always use your most recent med list.                   Brand Name Dispense Instructions for use Diagnosis    acyclovir 400 MG tablet    ZOVIRAX    60 tablet    Take 1 tablet (400 mg) by mouth 2 times daily    Multiple myeloma in relapse (H)       amLODIPine 5 MG tablet    NORVASC    90 tablet    TAKE 1 TABLET BY MOUTH AT BEDTIME    Essential hypertension       clopidogrel 75 MG tablet    PLAVIX    90 tablet    TAKE 1 TABLET BY MOUTH EVERY DAY    History of stroke       dexamethasone 4 MG tablet    DECADRON    40 tablet    Take 20 mg weekly on day of Velcade    Multiple myeloma not having achieved remission (H)       esomeprazole 40 MG CR capsule    nexIUM    30 capsule    Take 1 capsule (40 mg) by mouth every morning (before breakfast)    Gastroesophageal reflux disease without esophagitis       LORazepam 0.5 MG tablet    ATIVAN    30 tablet    Take 1 tablet (0.5 mg) by mouth every 6 hours as needed for nausea or anxiety.    Multiple myeloma in relapse (H), Delirium       nystatin 264447 UNIT/ML suspension    MYCOSTATIN    473 mL    Take 5 mLs (500,000 Units) by mouth 4 times daily Swish and spit. Continue until symptoms resolve and then take for 3 more days.    Thrush       OLANZapine 5 MG tablet    zyPREXA    60 tablet    Take 0.5 tablets (2.5 mg) by mouth At Bedtime    Delirium, Multiple myeloma in relapse (H)       ondansetron 8 MG ODT tab    ZOFRAN-ODT    30 tablet    Take 1 tablet (8 mg) by mouth every 8 hours as needed for nausea    Nausea        oxyCODONE IR 5 MG tablet    ROXICODONE    15 tablet    Take 1 tablet (5 mg) by mouth every 6 hours as needed for moderate to severe pain        potassium chloride SA 20 MEQ CR tablet    K-DUR/KLOR-CON M    30 tablet    Take 1 tablet (20 mEq) by mouth daily    Hypokalemia       SIMVASTATIN PO      Take 20 mg by mouth At Bedtime        traMADol 50 MG tablet    ULTRAM    60 tablet    Take 1 tablet (50 mg) by mouth every 6 hours as needed for moderate pain . Recommend trying after Tylenol and before Dilaudid.    Acute bilateral low back pain without sciatica, Multiple myeloma in relapse (H)       venetoclax 100 MG tablet CHEMOTHERAPY    VENCLEXTA    100 tablet    Take 8 tablets (800 mg) by mouth daily    Multiple myeloma in relapse (H)

## 2017-11-16 NOTE — LETTER
11/16/2017       RE: Anthony Carbone  [address]  [place] [address]     Dear Colleague,    Thank you for referring your patient, Anthony Carbone, to the [facility]. Please see a copy of my visit note below.    Oncology/Hematology Visit Note  Nov 16, 2017     Reason for Visit: Follow up of multiple myeloma     History of Present Illness: Anthony Carbone is a 71 year old male with multiple myeloma. He is currently undergoing treatment with Venclexta . Briefly, his oncologic history is as follows:      Diagnosis: 71 year old gentleman with IgM lambda multiple myeloma originally diagnosed in 01/2012 as stage I, standard risk disease.   Treatment: Revlimid plus dexamethasone for 4-5 cycles but plateaued by late 03/2012.   - Velcade was added and he received another 2-3 cycles, achieving a good partial remission.       Transplant: Single auto after melphalan 200 mg/m2 preparative regimen on 01/09/2013  - Post-transplant course: unremarkable except for some mild steroid-induced hyperglycemia, gastritis, nausea and vomiting.       Maintenance: Lenalidomide at day-100 then developed a maculopapular rash. Lenalidomide was held and then we re-challenged him after about a month to 2 months at a lower dose (5 mg daily); rash returned.   - was started on maintenance Velcade, every other week through July 2014, when he was noted with abnormalities on myeloma studies drawn there. Noted with prostate cancer dx at about the time of relapse (see below).      Relapse: noted with return on M-spike in blood/urine with marrow involvement but negative PET.   - Started on retreatment with RVD on 8/27/14 with Revlimid at 15 mg 14 days of 21 days, dexamethasone 40 mg weekly and velcade weekly.Completed at total of 5 cycles without complication by 12/2014.   - Started Cycle 6 on inc'd Rev dosing of 20mg daily x 2 weeks on 12/11/14 which was complicated by pneumonia.  - Adm 12/21-1/10 for human  metapneumovirus pneumonia complicated by anorexia, HTN, depression, anorexia with significant weight loss.   - Restarted Ernesto/Dex only on 2/4/15 with good tolerance but with thrombocytopenia.   - Bendamustine added to Velcade/dexamethasone on 5/21/15 due to disease progression  - Velcade discontinued on 7/9/2015 due to side effects (orthostasis)  - Schedule changed to bendamustine 80 mg/m2 days 1 and 2 on 28-day cycle  - Cycle 1 tolerated poorly due to lightheadedness and weakness  - Cycle 2 dose reduced to 60 mg/m2 and pre-meds adjusted  - Cycle 5 received on 9/17/15.  - 10/1/2015 - increasing IgM, M-spike - started Pomalidomide 4mg/day (21 days out of 28 days) and weekly Decadron 20mg on Oct 1st, 2015.    - Carfilzomib added on 10/22/2015 making this CPD.  - C3-C6  received in [place]    - Returned to [facility] and resumed CPD here    - Adm: 4/12-4/14 with fever, confusion, neutropenia. Noted on MRI to have acute/subacute CVA and subacute/chronic CVA; started on Plavix, given brief course of Abx and GCSF prior to d/c.     - Continued on Carfilzomib and dexamethasone alone  - Pomalyst added back on 7/13/2016 for rising FLC  - Start daratumumab 11/10/16. Changed to every other week after 4 weekly treatments d/t profound fatigue and malaise.   - Adm 5/7-5/16 due to AMS, hypercalcemia, hyperuricemia, possible PNA, back pain, and JOSE MARIA. Started Kyprolis while inpatient.  - Re-admitted 5/25-/529 with recurrent AMS, found to have concomitant PNA  - Resumed Kyprolis 6/13/2017. Stopped due to worsening performance status and progressive myeloma.  - Started Venclexta 800 mg 8/25/2017.  -Added weekly velcade plus 20mg dex due to rising light chains, day 1 on 11/1/17    Interval History:  Mr. Carbone returns to clinic today with his wife. He states overall things have been going well since starting the Velcade. He is still fatigued, but this is stable overall. He continues to struggle with poor appetite and he and his wife have been  working on ways to add calories and protein to his diet. He does admit to only drinking 24oz fluid daily. He is taking Zofran on a daily basis as a preventative measure, and has vomited 1-2 per week. This is not affecting his ability to eat. His arm pain has improved and his back pain is well controlled with 2 tramadol per day. His mental status has remained stable and his wife has not noticed any changes.     Review of Systems:  Patient denies fevers, chills, night sweats, unexplained weight changes, headaches, dizziness, vision or hearing changes, new lumps or bumps, chest pain, shortness of breath, cough, abdominal pain, changes to bowel or bladder, swelling of extremities, bleeding issues, or rash.    Current Outpatient Prescriptions   Medication Sig Dispense Refill     potassium chloride SA (K-DUR/KLOR-CON M) 20 MEQ CR tablet Take 1 tablet (20 mEq) by mouth daily 30 tablet 3     dexamethasone (DECADRON) 4 MG tablet Take 20 mg weekly on day of Velcade 40 tablet 3     OLANZapine (ZYPREXA) 5 MG tablet Take 0.5 tablets (2.5 mg) by mouth At Bedtime 60 tablet 0     nystatin (MYCOSTATIN) 240378 UNIT/ML suspension Take 5 mLs (500,000 Units) by mouth 4 times daily Swish and spit. Continue until symptoms resolve and then take for 3 more days. 473 mL 3     traMADol (ULTRAM) 50 MG tablet Take 1 tablet (50 mg) by mouth every 6 hours as needed for moderate pain . Recommend trying after Tylenol and before Dilaudid. 60 tablet 3     oxyCODONE (ROXICODONE) 5 MG IR tablet Take 1 tablet (5 mg) by mouth every 6 hours as needed for moderate to severe pain 15 tablet 0     SIMVASTATIN PO Take 20 mg by mouth At Bedtime       amLODIPine (NORVASC) 5 MG tablet TAKE 1 TABLET BY MOUTH AT BEDTIME 90 tablet 1     acyclovir (ZOVIRAX) 400 MG tablet Take 1 tablet (400 mg) by mouth 2 times daily 60 tablet 3     venetoclax (VENCLEXTA) 100 MG tablet CHEMOTHERAPY Take 8 tablets (800 mg) by mouth daily 100 tablet 3     clopidogrel (PLAVIX) 75 MG  tablet TAKE 1 TABLET BY MOUTH EVERY DAY 90 tablet 0     LORazepam (ATIVAN) 0.5 MG tablet Take 1 tablet (0.5 mg) by mouth every 6 hours as needed for nausea or anxiety. 30 tablet 0     ondansetron (ZOFRAN-ODT) 8 MG ODT tab Take 1 tablet (8 mg) by mouth every 8 hours as needed for nausea 30 tablet 3     esomeprazole (NEXIUM) 40 MG CR capsule Take 1 capsule (40 mg) by mouth every morning (before breakfast) 30 capsule 0       Physical Examination:  /75  Pulse 96  Temp 97.6  F (36.4  C) (Oral)  Resp 14  Wt 53 kg (116 lb 12.8 oz)  SpO2 96%  BMI 20.05 kg/m2  Wt Readings from Last 10 Encounters:   11/08/17 53.4 kg (117 lb 12.8 oz)   11/01/17 54.1 kg (119 lb 3.2 oz)   10/19/17 54.4 kg (119 lb 14.4 oz)   10/06/17 54.4 kg (120 lb)   10/03/17 56.2 kg (123 lb 12.8 oz)   09/27/17 56.8 kg (125 lb 3.2 oz)   09/20/17 56 kg (123 lb 6.4 oz)   09/06/17 56.2 kg (124 lb)   08/29/17 55.2 kg (121 lb 9.6 oz)   08/09/17 54.4 kg (120 lb)     Constitutional: Well-appearing male in no acute distress.  Eyes: EOMI, PERRL. No scleral icterus.  ENT: Oral mucosa is moist without lesions or thrush.   Lymphatic: Neck is supple without cervical or supraclavicular lymphadenopathy.  Cardiovascular: Regular rate and rhythm. No murmurs, gallops, or rubs. No peripheral edema.  Respiratory: Clear to auscultation bilaterally. No wheezes or crackles.  Gastrointestinal: Bowel sounds present. Abdomen soft, non-tender. No palpable hepatosplenomegaly or masses. Abdominal exam limited by sitting in chair.  Neurologic: Cranial nerves II through XII are grossly intact.  Skin: No rashes, petechiae, or bruising noted on exposed skin.    Laboratory Data:  Results for WILLIAN ARCINIEGA (MRN [id number]) as of 11/16/2017 14:21   11/16/2017 08:45 11/16/2017 08:46   Sodium  137   Potassium  3.9   Chloride  104   Carbon Dioxide  25   Urea Nitrogen  23   Creatinine  1.49 (H)   GFR Estimate  46 (L)   GFR Estimate If Black  56 (L)   Calcium  8.9   Anion Gap  9    Albumin  3.1 (L)   Protein Total  8.5   Bilirubin Total  0.3   Alkaline Phosphatase  63   ALT  15   AST  6   Glucose  121 (H)   WBC 3.1 (L)    Hemoglobin 9.4 (L)    Hematocrit 29.9 (L)    Platelet Count 79 (L)    RBC Count 2.79 (L)     (H)    MCH 33.7 (H)    MCHC 31.4 (L)    RDW 15.6 (H)    Diff Method Automated Method    % Neutrophils 76.0    % Lymphocytes 5.1    % Monocytes 18.6    % Eosinophils 0.0    % Basophils 0.0    % Immature Granulocytes 0.3    Nucleated RBCs 0    Absolute Neutrophil 2.4    Absolute Lymphocytes 0.2 (L)    Absolute Monocytes 0.6    Absolute Eosinophils 0.0    Absolute Basophils 0.0    Abs Immature Granulocytes 0.0    Absolute Nucleated RBC 0.0          Assessment and Plan:  1. Multiple myeloma, currently on treatment with Venetoclax, Velcade, and Dex  Continue 800mg daily of Venetoclax, Velcade 1mg/m2 weekly, and Dex 20mg weekly. Tolerating Velcade well.  He has had an up-trending of his creat the past few visits, today at 1.49. Possible this is dehydration given only 24oz daily fluid intake, but also concern that his myleoma may be progressing. If the creat is still elevated next week despite increasing fluid intake and giving IVF today, will need to recheck light chains earlier to assess for progression. Can move forward with Velcade today.     2. Altered mental status  Thought 2/2 metabolic derangements, uremia, ad possible infection. Today he is clear and his wife denies any AMS. Continue Zyprexa 2.5mg at bedtime and avoid CNS altering medications.    3. Fatigue, stable  Previously due to anemia. No changes to energy level since starting velcade/dex. Continue to monitor.     4. Heme  Counts have been stabel for the last two months and hgb today is 9.4. Plt however have decreased to 79. On Plavix for possible history of stroke in 2016. Hold if plt <50. Could consider stopping Plavix all together given debatable stroke history and bleeding risk. Did inform patient and wife to  watch for bleeding over the weekend.    5. FEN  Weight continues to decrease, 116lbs today. Continued to discuss ways to add calories to diet. Dietician referral placed. Stressed the importance of maintaining his weight and strength during treatment.    Potassium stable. Continue PO 20meq daily. Creat has been up-trending, 1.49 today. Dehydrations vs MM progression? Will give 1L IVF with today's treatment and recheck next week as mentioned above.    6. ID  Thrush has resolved. ACV 400mg BID ppx. No fevers. ANC stable at 2.4.     7. Bone  Due for next Zometa in January. Recent left humerus fracture is healing well and significantly less painful. Back/rib pain well controlled with tramadol. No new bony pains.     7. CV  Continue Zocor and Norvasc and avoid QT prolonging agents. May EKG shows Qtx 450. Given he is taking Zofran more regularly, consider checking EKG at future visit.     8. Follow-Up  Sees Leanne on 11/21  Weekly Labs and Velcade  See Dr. Topete in one month     David Espana PA-C  [facility]  [address] [place] [address]  [phone]      The patient was seen in conjunction with David Espana PA-C who served as a scribe for today's visit. I have reviewed the note and agree with the above findings and plan. -ECT      Again, thank you for allowing me to participate in the care of your patient.      Sincerely,    Leanne Reddy PA-C

## 2017-11-16 NOTE — NURSING NOTE
"Oncology Rooming Note    November 16, 2017 9:00 AM   Anthony Carbone is a 71 year old male who presents for:    Chief Complaint   Patient presents with     Port Draw     port accessed and labs drawn by rn.  vs taken.     Oncology Clinic Visit     Return for Multiple Myeloma      Initial Vitals: /75  Pulse 96  Temp 97.6  F (36.4  C) (Oral)  Resp 14  Wt 53 kg (116 lb 12.8 oz)  SpO2 96%  BMI 20.05 kg/m2 Estimated body mass index is 20.05 kg/(m^2) as calculated from the following:    Height as of 10/6/17: 1.626 m (5' 4\").    Weight as of this encounter: 53 kg (116 lb 12.8 oz). Body surface area is 1.55 meters squared.  No Pain (0) Comment: Data Unavailable   No LMP for male patient.  Allergies reviewed: Yes  Medications reviewed: Yes    Medications: Medication refills not needed today.  Pharmacy name entered into [facility]:    [id number] [place] [address] [facility] [address] [facility] [address]  [facility] [place], [place] - [address] [facility]  [facility] [address] [place], [place] - [address] [address] AT [facility] [address]    Clinical concerns: no concerns  Maureen was notified.    6 minutes for nursing intake (face to face time)     Vilma Hook MA              "

## 2017-11-16 NOTE — PATIENT INSTRUCTIONS
Contact numbers:  Triage Main Line: [phone]  After hours: [phone]    Call with chills and/or temperature greater than or equal to 100.5 and questions or concerns.    If after hours, weekends, or holidays, call main hospital  at  [phone] and ask for Oncology doctor on call.           November 2017 Sunday Monday Tuesday Wednesday Thursday Friday Saturday 1     [facility]    9:00 AM   (15 min.)   [facility]   [facility] Lab Draw     [facility] RETURN    9:25 AM   (50 min.)   Leanne Reddy PA-C   [facility]     UMP ONC INFUSION 60   12:00 PM   (60 min.)    ONCOLOGY INFUSION   [facility] 2     3     4       5     6     7     8     [facility] DRAW   11:00 AM   (15 min.)   [facility]   [facility] Lab Draw     UMP ONC RETURN   11:30 AM   (30 min.)   UC BMT DOM   [facility] Blood and Marrow Transplant     UMP ONC INFUSION 60   12:00 PM   (60 min.)    ONCOLOGY INFUSION   [facility] 9     10     11       12     13     14     15     16     [facility] DRAW    8:45 AM   (15 min.)   [facility] DRAW   [facility] Lab Draw     UMP RETURN    8:55 AM   (50 min.)   Leanne Reddy PA-C   [facility]     UMP ONC INFUSION 60   10:00 AM   (60 min.)    ONCOLOGY INFUSION   [facility] 17     18       19     20     21     [facility] DRAW    3:30 PM   (15 min.)   [facility] DRAW   [facility] Lab Draw     UMP RETURN    3:55 PM   (50 min.)   Leanne Reddy PA-C   [facility]     UMP ONC INFUSION 60    5:00 PM   (60 min.)    ONCOLOGY INFUSION   [facility] 22     23     24     25       26     27     28     29  Happy Birthday!     30     [facility] DRAW   11:30 AM   (15 min.)   [facility] DRAW   [facility] Lab Draw     UMP ONC INFUSION 60   12:00 PM    (60 min.)   [facility] ONCOLOGY INFUSION   [facility]                      December 2017 Sunday Monday Tuesday Wednesday Thursday Friday Saturday                            1     2       3     4     UMP NEW   10:45 AM   (60 min.)   Harleen Ramirez RD   [facility] 5     6     7     [facility] DRAW   10:15 AM   (15 min.)    [facility]ONIC LAB DRAW   [facility] Lab Draw     UMP RETURN   10:35 AM   (50 min.)   Leanne Reddy PA-C   [facility]     [facility] ONC INFUSION 60   12:00 PM   (60 min.)    ONCOLOGY INFUSION   [facility] 8     9       10     11     12     13     [facility] DRAW    4:45 PM   (15 min.)   [facility] DRAW   [facility] Lab Draw     [facility] ONC RETURN    5:30 PM   (30 min.)   Kamari Topete MD   [facility] Blood and Marrow Transplant 14     [facility] ONC INFUSION 60    3:00 PM   (60 min.)    ONCOLOGY INFUSION   [facility] 15     16       17     18     19     20     21     22     23       24     25     26     27     28     29     30       31                                                Lab Results:  Recent Results (from the past 12 hour(s))   CBC with platelets differential    Collection Time: 11/16/17  8:45 AM   Result Value Ref Range    WBC 3.1 (L) 4.0 - 11.0 10e9/L    RBC Count 2.79 (L) 4.4 - 5.9 10e12/L    Hemoglobin 9.4 (L) 13.3 - 17.7 g/dL    Hematocrit 29.9 (L) 40.0 - 53.0 %     (H) 78 - 100 fl    MCH 33.7 (H) 26.5 - 33.0 pg    MCHC 31.4 (L) 31.5 - 36.5 g/dL    RDW 15.6 (H) 10.0 - 15.0 %    Platelet Count 79 (L) 150 - 450 10e9/L    Diff Method Automated Method     % Neutrophils 76.0 %    % Lymphocytes 5.1 %    % Monocytes 18.6 %    % Eosinophils 0.0 %    % Basophils 0.0 %    % Immature Granulocytes 0.3 %    Nucleated RBCs 0 0 /100    Absolute Neutrophil 2.4 1.6 - 8.3 10e9/L    Absolute Lymphocytes 0.2 (L) 0.8 - 5.3 10e9/L    Absolute Monocytes 0.6 0.0 - 1.3  10e9/L    Absolute Eosinophils 0.0 0.0 - 0.7 10e9/L    Absolute Basophils 0.0 0.0 - 0.2 10e9/L    Abs Immature Granulocytes 0.0 0 - 0.4 10e9/L    Absolute Nucleated RBC 0.0    Comprehensive metabolic panel    Collection Time: 11/16/17  8:46 AM   Result Value Ref Range    Sodium 137 133 - 144 mmol/L    Potassium 3.9 3.4 - 5.3 mmol/L    Chloride 104 94 - 109 mmol/L    Carbon Dioxide 25 20 - 32 mmol/L    Anion Gap 9 3 - 14 mmol/L    Glucose 121 (H) 70 - 99 mg/dL    Urea Nitrogen 23 7 - 30 mg/dL    Creatinine 1.49 (H) 0.66 - 1.25 mg/dL    GFR Estimate 46 (L) >60 mL/min/1.7m2    GFR Estimate If Black 56 (L) >60 mL/min/1.7m2    Calcium 8.9 8.5 - 10.1 mg/dL    Bilirubin Total 0.3 0.2 - 1.3 mg/dL    Albumin 3.1 (L) 3.4 - 5.0 g/dL    Protein Total 8.5 6.8 - 8.8 g/dL    Alkaline Phosphatase 63 40 - 150 U/L    ALT 15 0 - 70 U/L    AST 6 0 - 45 U/L

## 2017-11-21 NOTE — MR AVS SNAPSHOT
After Visit Summary   11/21/2017    Anthony Carbone    MRN: [id number]           Patient Information     Date Of Birth          1945        Visit Information        Provider Department      11/21/2017 5:00 PM  24 ATC;  ONCOLOGY INFUSION [facility]        Today's Diagnoses     Multiple myeloma in relapse (H)    -  1      Care Instructions    Contact Numbers    [facility] [place]: [phone]  [facility] Triage:  [phone]    Call triage with chills and/or temperature greater than or equal to 100.5, uncontrolled nausea/vomiting, diarrhea, constipation, dizziness, shortness of breath, chest pain, bleeding, unexplained bruising, or any new/concerning symptoms, questions/concerns.     If you are having any concerning symptoms or wish to speak to a provider before your next infusion visit, please call your care coordinator or triage to notify them so we can adequately serve you.       After Hours: [phone]    If after hours, weekends, or holidays, call main hospital  and ask for Oncology doctor on call.         November 2017 Sunday Monday Tuesday Wednesday Thursday Friday Saturday                  1     [facility]    9:00 AM   (15 min.)    [facility]ONIC LAB DRAW   [facility] Lab Draw     [facility] RETURN    9:25 AM   (50 min.)   Leanne Reddy PA-C   [facility]     UMP ONC INFUSION 60   12:00 PM   (60 min.)    ONCOLOGY INFUSION   [facility] 2     3     4       5     6     7     8     [facility] DRAW   11:00 AM   (15 min.)    [facility] LAB DRAW   [facility] Lab Draw     UMP ONC RETURN   11:30 AM   (30 min.)    BMT DOM   [facility] Blood and Marrow Transplant     UMP ONC INFUSION 60   12:00 PM   (60 min.)    ONCOLOGY INFUSION   [facility] 9     10     11       12     13     14     15     16     [facility] DRAW    8:45 AM   (15 min.)    [facility] LAB DRAW   [facility]  [facility] Lab Draw     UMP RETURN    8:55 AM   (50 min.)   Leanne Reddy PA-C   [facility]     UMP ONC INFUSION 60   10:00 AM   (60 min.)    ONCOLOGY INFUSION   [facility] 17     18       19     20     21     [facility] DRAW    3:30 PM   (15 min.)    [facility]   [facility]     [facility] RETURN    3:55 PM   (50 min.)   Leanne Reddy PA-C   [facility]     UMP ONC INFUSION 60    5:00 PM   (60 min.)    ONCOLOGY INFUSION   [facility] 22     23     24     25       26     27     28     29  Happy Birthday!     30     [facility] DRAW   11:30 AM   (15 min.)    [facility] DRAW   [facility]     [facility] ONC INFUSION 60   12:00 PM   (60 min.)    ONCOLOGY INFUSION   [facility]                      December 2017 Sunday Monday Tuesday Wednesday Thursday Friday Saturday                            1     2       3     4     UMP NEW   10:45 AM   (60 min.)   Harleen Ramirez RD   [facility] 5     6     7     [facility] DRAW   10:15 AM   (15 min.)    MASONIC LAB DRAW   [facility] Masonic Lab Draw     UMP RETURN   10:35 AM   (50 min.)   Leanne Reddy PA-C   [facility]     UMP ONC INFUSION 60   12:00 PM   (60 min.)    ONCOLOGY INFUSION   [facility] 8     9       10     11     12     13     [facility] DRAW    4:45 PM   (15 min.)    [facility] DRAW   [facility]     [facility] ONC RETURN    5:30 PM   (30 min.)   Kamari Topete MD   [facility] Blood and Marrow Transplant 14     [facility] ONC INFUSION 60    3:00 PM   (60 min.)    ONCOLOGY INFUSION   [facility] 15     16       17     18     19     20     21     22     23       24     25     26     27     28     29     30       31                                                Lab Results:  Recent Results  (from the past 12 hour(s))   CBC with platelets differential    Collection Time: 11/21/17  3:52 PM   Result Value Ref Range    WBC 4.0 4.0 - 11.0 10e9/L    RBC Count 2.84 (L) 4.4 - 5.9 10e12/L    Hemoglobin 9.5 (L) 13.3 - 17.7 g/dL    Hematocrit 30.1 (L) 40.0 - 53.0 %     (H) 78 - 100 fl    MCH 33.5 (H) 26.5 - 33.0 pg    MCHC 31.6 31.5 - 36.5 g/dL    RDW 15.6 (H) 10.0 - 15.0 %    Platelet Count 62 (L) 150 - 450 10e9/L    Diff Method Automated Method     % Neutrophils 77.1 %    % Lymphocytes 5.0 %    % Monocytes 17.1 %    % Eosinophils 0.0 %    % Basophils 0.0 %    % Immature Granulocytes 0.8 %    Nucleated RBCs 0 0 /100    Absolute Neutrophil 3.1 1.6 - 8.3 10e9/L    Absolute Lymphocytes 0.2 (L) 0.8 - 5.3 10e9/L    Absolute Monocytes 0.7 0.0 - 1.3 10e9/L    Absolute Eosinophils 0.0 0.0 - 0.7 10e9/L    Absolute Basophils 0.0 0.0 - 0.2 10e9/L    Abs Immature Granulocytes 0.0 0 - 0.4 10e9/L    Absolute Nucleated RBC 0.0    Comprehensive metabolic panel    Collection Time: 11/21/17  3:52 PM   Result Value Ref Range    Sodium 139 133 - 144 mmol/L    Potassium 4.2 3.4 - 5.3 mmol/L    Chloride 107 94 - 109 mmol/L    Carbon Dioxide 22 20 - 32 mmol/L    Anion Gap 10 3 - 14 mmol/L    Glucose 101 (H) 70 - 99 mg/dL    Urea Nitrogen 22 7 - 30 mg/dL    Creatinine 1.35 (H) 0.66 - 1.25 mg/dL    GFR Estimate 52 (L) >60 mL/min/1.7m2    GFR Estimate If Black 63 >60 mL/min/1.7m2    Calcium 8.5 8.5 - 10.1 mg/dL    Bilirubin Total 0.2 0.2 - 1.3 mg/dL    Albumin 3.0 (L) 3.4 - 5.0 g/dL    Protein Total 8.6 6.8 - 8.8 g/dL    Alkaline Phosphatase 55 40 - 150 U/L    ALT 15 0 - 70 U/L    AST 6 0 - 45 U/L               Follow-ups after your visit        Your next 10 appointments already scheduled     Nov 30, 2017 11:30 AM [facility]   [facility] Lab Draw with  MASONIC LAB DRAW   [facility] Mason[facility] Lab Draw ([facility])    [address] [id number] [phone]            Nov 30, 2017 12:00  PM CST   Infusion 60 with UC ONCOLOGY INFUSION, UC 28 ATC   [facility] ([facility])    [address] [facility]  2nd [facility] [address] [phone]            Dec 04, 2017 11:00 AM CST   (Arrive by 10:45 AM)   New Patient Visit with Harleen Summers RD   [facility] ([facility])    [address] [id number] [phone]            Dec 07, 2017 10:15 AM CST   M[facility]ic Lab Draw with [facility] LAB DRAW   [facility] Masonic Lab Draw ([facility])    [address] [place] [id number] [phone]            Dec 07, 2017 10:50 AM CST   (Arrive by 10:35 AM)   Return Visit with Leanne Reddy PA-C   [facility] ([facility])    [address] [place] [id number] [phone]            Dec 07, 2017 12:00 PM CST   Infusion 60 with UC ONCOLOGY INFUSION, UC 28 ATC   [facility] ([facility])    [address] [place] [id number] [phone]            Dec 13, 2017  4:45 PM CST   [facility] Lab Draw with [facility]ON[facility] LAB DRAW   [facility] Mason[facility] Lab Draw ([facility])    [address] [place] [id number] [phone]            Dec 13, 2017  5:30 PM CST   RETURN ONC with Kamari Topete MD   [facility] Blood and Marrow Transplant ([facility])    [address] [place] [id number] [phone]              Who to contact     If you have questions or need follow up information about today's clinic visit or your schedule please contact [facility] directly at [phone].  Normal or non-critical lab and imaging results will be communicated to you by MyChart, letter or phone  within 4 business days after the clinic has received the results. If you do not hear from us within 7 days, please contact the clinic through [email or web address] or phone. If you have a critical or abnormal lab result, we will notify you by phone as soon as possible.  Submit refill requests through [email or web address] or call your pharmacy and they will forward the refill request to us. Please allow 3 business days for your refill to be completed.          Additional Information About Your Visit        [employer] Information     [email or web address] gives you secure access to your electronic health record. If you see a primary care provider, you can also send messages to your care team and make appointments. If you have questions, please call your primary care clinic.  If you do not have a primary care provider, please call [phone] and they will assist you.        Care EveryWhere ID     This is your Care EveryWhere ID. This could be used by other organizations to access your [facility] medical records  [id number]         Blood Pressure from Last 3 Encounters:   11/21/17 125/80   11/16/17 112/75   11/08/17 118/79    Weight from Last 3 Encounters:   11/21/17 53.7 kg (118 lb 4.8 oz)   11/16/17 53 kg (116 lb 12.8 oz)   11/08/17 53.4 kg (117 lb 12.8 oz)              We Performed the Following     CBC with platelets differential     Comprehensive metabolic panel     Kappa and lambda light chain     Protein electrophoresis        Primary Care Provider Office Phone # Fax #    Sanket Burciaga [phone]       [place] [address] [facility] [address]        Equal Access to Services     ALBAN SHAH : Hadii aad ku hadasho Soomaali, waaxda luqadaha, qaybta kaalmada adeegyada, jonny villa. So [facility] [phone].    ATENCIÓN: Si habla español, tiene a todd disposición servicios gratuitos de asistencia lingüística. Llame al [phone].    We comply with applicable federal civil rights laws and  [place] laws. We do not discriminate on the basis of race, color, national origin, age, disability, sex, sexual orientation, or gender identity.            Thank you!     Thank you for choosing [facility]  for your care. Our goal is always to provide you with excellent care. Hearing back from our patients is one way we can continue to improve our services. Please take a few minutes to complete the written survey that you may receive in the mail after your visit with us. Thank you!             Your Updated Medication List - Protect others around you: Learn how to safely use, store and throw away your medicines at www.disposemymeds.org.          This list is accurate as of: 11/21/17  5:28 PM.  Always use your most recent med list.                   Brand Name Dispense Instructions for use Diagnosis    acyclovir 400 MG tablet    ZOVIRAX    60 tablet    Take 1 tablet (400 mg) by mouth 2 times daily    Multiple myeloma in relapse (H)       amLODIPine 5 MG tablet    NORVASC    90 tablet    TAKE 1 TABLET BY MOUTH AT BEDTIME    Essential hypertension       clopidogrel 75 MG tablet    PLAVIX    90 tablet    TAKE 1 TABLET BY MOUTH EVERY DAY    History of stroke       dexamethasone 4 MG tablet    DECADRON    40 tablet    Take 20 mg weekly on day of Velcade    Multiple myeloma not having achieved remission (H)       esomeprazole 40 MG CR capsule    nexIUM    30 capsule    Take 1 capsule (40 mg) by mouth every morning (before breakfast)    Gastroesophageal reflux disease without esophagitis       LORazepam 0.5 MG tablet    ATIVAN    30 tablet    Take 1 tablet (0.5 mg) by mouth every 6 hours as needed for nausea or anxiety.    Multiple myeloma in relapse (H), Delirium       nystatin 086617 UNIT/ML suspension    MYCOSTATIN    473 mL    Take 5 mLs (500,000 Units) by mouth 4 times daily Swish and spit. Continue until symptoms resolve and then take for 3 more days.    Thrush       OLANZapine 5 MG tablet     zyPREXA    60 tablet    Take 0.5 tablets (2.5 mg) by mouth At Bedtime    Delirium, Multiple myeloma in relapse (H)       ondansetron 8 MG ODT tab    ZOFRAN-ODT    30 tablet    Take 1 tablet (8 mg) by mouth every 8 hours as needed for nausea    Nausea       oxyCODONE IR 5 MG tablet    ROXICODONE    15 tablet    Take 1 tablet (5 mg) by mouth every 6 hours as needed for moderate to severe pain        potassium chloride SA 20 MEQ CR tablet    K-DUR/KLOR-CON M    30 tablet    Take 1 tablet (20 mEq) by mouth daily    Hypokalemia       SIMVASTATIN PO      Take 20 mg by mouth At Bedtime        traMADol 50 MG tablet    ULTRAM    60 tablet    Take 1 tablet (50 mg) by mouth every 6 hours as needed for moderate pain . Recommend trying after Tylenol and before Dilaudid.    Acute bilateral low back pain without sciatica, Multiple myeloma in relapse (H)       venetoclax 100 MG tablet CHEMOTHERAPY    VENCLEXTA    100 tablet    Take 8 tablets (800 mg) by mouth daily    Multiple myeloma in relapse (H)

## 2017-11-21 NOTE — NURSING NOTE
"Oncology Rooming Note    November 21, 2017 4:10 PM   Anthony Carbone is a 71 year old male who presents for:    Chief Complaint   Patient presents with     Port Draw     Port labs collected by RN.      Oncology Clinic Visit     Return for Multiple Myeloma, Tx      Initial Vitals: /80 (BP Location: Right arm, Patient Position: Sitting)  Pulse 91  Temp 98.6  F (37  C) (Oral)  Resp 24  Wt 53.7 kg (118 lb 4.8 oz)  SpO2 97%  BMI 20.31 kg/m2 Estimated body mass index is 20.31 kg/(m^2) as calculated from the following:    Height as of 10/6/17: 1.626 m (5' 4\").    Weight as of this encounter: 53.7 kg (118 lb 4.8 oz). Body surface area is 1.56 meters squared.  No Pain (0) Comment: Data Unavailable   No LMP for male patient.  Allergies reviewed: Yes  Medications reviewed: Yes    Medications: Medication refills not needed today.  Pharmacy name entered into [facility]:    [id number] - [address] AT [facility] & [facility] [address]  [facility] [place], [place] - [address] [facility]  [id number] - [place], [place] - [address] AT [facility] OF [address]    Clinical concerns: results  Maureen was notified.    6 minutes for nursing intake (face to face time)     Vilma Hook MA              "

## 2017-11-21 NOTE — PROGRESS NOTES
Infusion Nursing Note:  Anthony Carbone presents today for IVF--Velcade deferred.    Patient seen by provider today: Yes: PREM Herrera    Note: Patient presents to clinic today with no questions.  TORB 11/21/2017 1645 PREM Costa/JEN Nunez RN: No Velcade today, administer 1 liter IVF.       Intravenous Access:  Implanted Port.    Treatment Conditions:  Lab Results   Component Value Date    HGB 9.5 11/21/2017     Lab Results   Component Value Date    WBC 4.0 11/21/2017      Lab Results   Component Value Date    ANEU 3.1 11/21/2017     Lab Results   Component Value Date    PLT 62 11/21/2017      Lab Results   Component Value Date     11/21/2017                   Lab Results   Component Value Date    POTASSIUM 4.2 11/21/2017           Lab Results   Component Value Date    MAG 2.2 05/25/2017            Lab Results   Component Value Date    CR 1.35 11/21/2017                   Lab Results   Component Value Date    JAZMIN 8.5 11/21/2017                Lab Results   Component Value Date    BILITOTAL 0.2 11/21/2017           Lab Results   Component Value Date    ALBUMIN 3.0 11/21/2017                    Lab Results   Component Value Date    ALT 15 11/21/2017           Lab Results   Component Value Date    AST 6 11/21/2017     Results reviewed, labs MET treatment parameters, ok to proceed with treatment.    Post Infusion Assessment:  Patient tolerated infusion without incident.  Blood return noted pre and post infusion.  Site patent and intact, free from redness, edema or discomfort.  No evidence of extravasations.  Access discontinued per protocol.    Discharge Plan:   Patient declined prescription refills.  Discharge instructions reviewed with: Patient.  Patient and/or family verbalized understanding of discharge instructions and all questions answered.  Copy of AVS reviewed with patient and/or family.  Patient will return 11/30/2017 for next appointment.  Departure Mode: Ambulatory.    Nelsy  GELY Nunez

## 2017-11-21 NOTE — PATIENT INSTRUCTIONS
Contact Numbers    [facility] Main Line: [phone]  [facility] Triage:  [phone]    Call triage with chills and/or temperature greater than or equal to 100.5, uncontrolled nausea/vomiting, diarrhea, constipation, dizziness, shortness of breath, chest pain, bleeding, unexplained bruising, or any new/concerning symptoms, questions/concerns.     If you are having any concerning symptoms or wish to speak to a provider before your next infusion visit, please call your care coordinator or triage to notify them so we can adequately serve you.       After Hours: [phone]    If after hours, weekends, or holidays, call main hospital  and ask for Oncology doctor on call.         November 2017 Sunday Monday Tuesday Wednesday Thursday Friday Saturday                  1     [facility]    9:00 AM   (15 min.)    [facility]   [facility]     [facility] RETURN    9:25 AM   (50 min.)   Leanne Reddy PA-C   [facility]     [facility] ONC INFUSION 60   12:00 PM   (60 min.)    ONCOLOGY INFUSION   [facility] 2     3     4       5     6     7     8     [facility]   11:00 AM   (15 min.)    [facility]   [facility]     [facility] ONC RETURN   11:30 AM   (30 min.)    BMT DOM   [facility]     [facility] ONC INFUSION 60   12:00 PM   (60 min.)    ONCOLOGY INFUSION   [facility] 9     10     11       12     13     14     15     16     [facility]    8:45 AM   (15 min.)    [facility]   [facility]     [facility] RETURN    8:55 AM   (50 min.)   Leanne Reddy PA-C   [facility]     [facility] ONC INFUSION 60   10:00 AM   (60 min.)    ONCOLOGY INFUSION   [facility] 17     18       19     20     21     [facility]    3:30 PM   (15 min.)    [facility]RAW   [facility]     [facility] RETURN    3:55 PM   (50 min.)    Leanne Reddy PA-C [facility]     [facility] ONC INFUSION 60    5:00 PM   (60 min.)   [facility]FUSION   [facility] 22     23     24     25       26     27     28     29  Happy Birthday!     30     [facility]   11:30 AM   (15 min.)    [facility] DRAW   [facility]     [facility] ONC INFUSION 60   12:00 PM   (60 min.)   [facility] ONCOLOGY INFUSION   [facility]                      December 2017 Sunday Monday Tuesday Wednesday Thursday Friday Saturday                            1     2       3     4     [facility] NEW   10:45 AM   (60 min.)   Harleen Ramirez RD   [facility] 5     6     7     [facility]   10:15 AM   (15 min.)    [facility]   [facility]     [facility] RETURN   10:35 AM   (50 min.)   Leanne Reddy PA-C   [facility]     [facility] ONC INFUSION 60   12:00 PM   (60 min.)   [facility] ONCOLOGY INFUSION   [facility] 8     9       10     11     12     13     [facility]    4:45 PM   (15 min.)    [facility] DRAW   [facility]     [facility] ONC RETURN    5:30 PM   (30 min.)   Kamari Topete MD   [facility] 14     [facility] ONC INFUSION 60    3:00 PM   (60 min.)    ONCOLOGY INFUSION   [facility] 15     16       17     18     19     20     21     22     23       24     25     26     27     28     29     30       31                                                Lab Results:  Recent Results (from the past 12 hour(s))   CBC with platelets differential    Collection Time: 11/21/17  3:52 PM   Result Value Ref Range    WBC 4.0 4.0 - 11.0 10e9/L    RBC Count 2.84 (L) 4.4 - 5.9 10e12/L    Hemoglobin 9.5 (L) 13.3 - 17.7 g/dL    Hematocrit 30.1 (L) 40.0 - 53.0 %     (H) 78 - 100 fl    MCH 33.5 (H) 26.5 - 33.0 pg    MCHC 31.6 31.5 - 36.5 g/dL    RDW 15.6 (H) 10.0 - 15.0 %     Platelet Count 62 (L) 150 - 450 10e9/L    Diff Method Automated Method     % Neutrophils 77.1 %    % Lymphocytes 5.0 %    % Monocytes 17.1 %    % Eosinophils 0.0 %    % Basophils 0.0 %    % Immature Granulocytes 0.8 %    Nucleated RBCs 0 0 /100    Absolute Neutrophil 3.1 1.6 - 8.3 10e9/L    Absolute Lymphocytes 0.2 (L) 0.8 - 5.3 10e9/L    Absolute Monocytes 0.7 0.0 - 1.3 10e9/L    Absolute Eosinophils 0.0 0.0 - 0.7 10e9/L    Absolute Basophils 0.0 0.0 - 0.2 10e9/L    Abs Immature Granulocytes 0.0 0 - 0.4 10e9/L    Absolute Nucleated RBC 0.0    Comprehensive metabolic panel    Collection Time: 11/21/17  3:52 PM   Result Value Ref Range    Sodium 139 133 - 144 mmol/L    Potassium 4.2 3.4 - 5.3 mmol/L    Chloride 107 94 - 109 mmol/L    Carbon Dioxide 22 20 - 32 mmol/L    Anion Gap 10 3 - 14 mmol/L    Glucose 101 (H) 70 - 99 mg/dL    Urea Nitrogen 22 7 - 30 mg/dL    Creatinine 1.35 (H) 0.66 - 1.25 mg/dL    GFR Estimate 52 (L) >60 mL/min/1.7m2    GFR Estimate If Black 63 >60 mL/min/1.7m2    Calcium 8.5 8.5 - 10.1 mg/dL    Bilirubin Total 0.2 0.2 - 1.3 mg/dL    Albumin 3.0 (L) 3.4 - 5.0 g/dL    Protein Total 8.6 6.8 - 8.8 g/dL    Alkaline Phosphatase 55 40 - 150 U/L    ALT 15 0 - 70 U/L    AST 6 0 - 45 U/L

## 2017-11-21 NOTE — PROGRESS NOTES
HEMATOLOGY/ONCOLOGY PROGRESS NOTE  Nov 21, 2017    REASON FOR VISIT: myeloma    Diagnosis: 71 year old gentleman with IgM lambda multiple myeloma originally diagnosed in 01/2012 as stage I, standard risk disease.   Treatment: Revlimid plus dexamethasone for 4-5 cycles but plateaued by late 03/2012.   - Velcade was added and he received another 2-3 cycles, achieving a good partial remission.      Transplant: Single auto after melphalan 200 mg/m2 preparative regimen on 01/09/2013  - Post-transplant course: unremarkable except for some mild steroid-induced hyperglycemia, gastritis, nausea and vomiting.      Maintenance: Lenalidomide at day-100 then developed a maculopapular rash. Lenalidomide was held and then we re-challenged him after about a month to 2 months at a lower dose (5 mg daily); rash returned.   - was started on maintenance Velcade, every other week through July 2014, when he was noted with abnormalities on myeloma studies drawn there. Noted with prostate cancer dx at about the time of relapse (see below).     Relapse: noted with return on M-spike in blood/urine with marrow involvement but negative PET.   - Started on retreatment with RVD on 8/27/14 with Revlimid at 15 mg 14 days of 21 days, dexamethasone 40 mg weekly and velcade weekly.Completed at total of 5 cycles without complication by 12/2014.   - Started Cycle 6 on inc'd Rev dosing of 20mg daily x 2 weeks on 12/11/14 which was complicated by pneumonia.  - Adm 12/21-1/10 for human metapneumovirus pneumonia complicated by anorexia, HTN, depression, anorexia with significant weight loss.   - Restarted Ernesto/Dex only on 2/4/15 with good tolerance but with thrombocytopenia.   - Bendamustine added to Velcade/dexamethasone on 5/21/15 due to disease progression  - Velcade discontinued on 7/9/2015 due to side effects (orthostasis)  - Schedule changed to bendamustine 80 mg/m2 days 1 and 2 on 28-day cycle  - Cycle 1 tolerated poorly due to lightheadedness and  "weakness  - Cycle 2 dose reduced to 60 mg/m2 and pre-meds adjusted  - Cycle 5 received on 9/17/15.  - 10/1/2015 - increasing IgM, M-spike - started Pomalidomide 4mg/day (21 days out of 28 days) and weekly Decadron 20mg on Oct 1st, 2015.    - Carfilzomib added on 10/22/2015 making this CPD.  - C3-C6  received in [place]    - Returned to [facility] and resumed CPD here    - Adm: 4/12-4/14 with fever, confusion, neutropenia. Noted on MRI to have acute/subacute CVA and subacute/chronic CVA; started on Plavix, given brief course of Abx and GCSF prior to d/c.     - Continued on Carfilzomib and dexamethasone alone  - Pomalyst added back on 7/13/2016 for rising FLC  - Start daratumumab 11/10/16. Changed to every other week after 4 weekly treatments d/t profound fatigue and malaise.   - Adm 5/7-5/16 due to AMS, hypercalcemia, hyperuricemia, possible PNA, back pain, and JOSE MARIA. Started Kyprolis while inpatient.  - Re-admitted 5/25-/529 with recurrent AMS, found to have concomitant PNA  - Resumed Kyprolis 6/13/2017. Stopped due to worsening performance status and progressive myeloma.  - Started Venclexta 800 mg 8/25/2017  - Added weekly Velcade with dexamethasone 20 mg weekly due to rising light chains on 11/1/2017    INTERVAL HISTORY:    Mr. Carbone is here with his wife.     He has had a rough week since he was seen last. After his last Velcade, he started to get some increased confusion, achiness, felt like he \"had the flu\". He had increased nausea and diarrhea, and also started to get a sore throat. He was fatigued beyond his baseline and spent a lot of time in bed. In the last 24 hours, he has felt back to normal. Energy is baseline now. Diarrhea, nausea resolved. No longer feeling achy. No sore throat. No fevers, chills, cough, or SOB. Still not eating and drinking that well. Back pain remains much better and needing tramadol much less, has not even needed a dose today. No bleeding, chest pain, new sites of pain, swelling. No " other concerns.    PHYSICAL EXAMINATION  /80 (BP Location: Right arm, Patient Position: Sitting)  Pulse 91  Temp 98.6  F (37  C) (Oral)  Resp 24  Wt 53.7 kg (118 lb 4.8 oz)  SpO2 97%  BMI 20.31 kg/m2    Wt Readings from Last 10 Encounters:   17 53.7 kg (118 lb 4.8 oz)   17 53 kg (116 lb 12.8 oz)   17 53.4 kg (117 lb 12.8 oz)   17 54.1 kg (119 lb 3.2 oz)   10/19/17 54.4 kg (119 lb 14.4 oz)   10/06/17 54.4 kg (120 lb)   10/03/17 56.2 kg (123 lb 12.8 oz)   17 56.8 kg (125 lb 3.2 oz)   17 56 kg (123 lb 6.4 oz)   17 56.2 kg (124 lb)   General: Alert. Oriented to name, , location,  Able to ambulate independently, albeit slow and cautiously.  No acute distress. HEENT: PERRL, no palor or icterus. CVS: RRR with occasional premature beat. CHEST: CTAB, normal work of breathing ABDOMEN: soft non tender no masses     NEURO: AAOX3  CN 2-12 intact  SKIN: no bleeding or brusiing, no rashes EXTREMITIES: No edema.     LABS:   Results for WILLIAN ARCINIEGA (MRN [id number]) as of 2017 16:59   Ref. Range 2017 11:35 2017 08:45 2017 08:46 2017 15:52   Creatinine Latest Ref Range: 0.66 - 1.25 mg/dL 1.23  1.49 (H) 1.35 (H)   Results for WILLIAN ARCINIEGA (MRN [id number]) as of 2017 16:59   Ref. Range 2017 11:35 2017 08:45 2017 08:46 2017 15:52   WBC Latest Ref Range: 4.0 - 11.0 10e9/L 3.8 (L) 3.1 (L)  4.0   Hemoglobin Latest Ref Range: 13.3 - 17.7 g/dL 9.7 (L) 9.4 (L)  9.5 (L)   Hematocrit Latest Ref Range: 40.0 - 53.0 % 31.1 (L) 29.9 (L)  30.1 (L)   Platelet Count Latest Ref Range: 150 - 450 10e9/L 111 (L) 79 (L)  62 (L)     IMPRESSION/PLAN:  71 year old male with relapsed MM after autologous transplant (2013), status-post multiple salvage regimens with progressive disease.    MM: Continues on Venclexta, with weekly Velcade/dex 20 mg added on  due to rising light chains. I am concerned that Yamil may be  progressing given the rise in creatinine concomitantly with the worsening thrombocytopenia. Will check MM labs today. Additionally, Yamil recently had what sounds like a viral gastroenteritis and is only starting to feel better in the last 24 hours, and with that was increased confusion, agitation, fatigue. Today, he feels at baseline. We mutually decided to hold off Velcade today. He will call us with any interval concerns.    AMS: AMS started early May in setting of metabolic derangements, uremia, and possible infection.  Remains intermittently confused over the past few months but improved significantly, worsened over the weekend but then resolved. stable today.  - Continue Zyprexa 2.5 mg at bedtime  - Holding off long-acting pain meds    Heme: Cytopenias 2/2 treatment and likely MM in setting of progression, with recent worsening of thrombocytopenia. Will monitor.  - On Plavix, hold for platelets < 50. Recommended to go ahead and hold Plavix today.  - Transfuse to keep hemoglobin > 8    Renal/FEN: Creat baseline ~0.8-1.0, recently has been increased beyond baseline. Slightly improved today at 1.3. Will give a liter of NS. Concern this is MM related, though he has not been drinking adequate fluids. Continue to push fluids.  -Encouraged protein/calorie supplements.      ID: Presently afebrile w/o any localizing infectious s/s. Recently with nausea, diarrhea, body aches; all resolved now.  - Continues ppx  mg BID     Back/rib pain: Started early May. Lumbar MRI at OSH showing mild-mod central and foraminal stenoses in lumbar spine, per patient and wife, there was no MM lesion there. Rib films inpatient negative, back films stable from prior imaging. Pain has actually been better recently  - Continues tramadol PRN and Tylenol PRN. No changes.      CV: Continue zocor and norvasc.   - Avoid QTc prolonging agents (history of QTc prolongation with syncopal episodes)       I spent >25 minutes with the patient, with  over 50% of the time spent counseling or coordinating their care as described above.     Leanne Reddy PA-C

## 2017-11-21 NOTE — LETTER
11/21/2017      RE: Anthony Carbone  [address] [place] [address]       HEMATOLOGY/ONCOLOGY PROGRESS NOTE  Nov 21, 2017    REASON FOR VISIT: myeloma    Diagnosis: 71 year old gentleman with IgM lambda multiple myeloma originally diagnosed in 01/2012 as stage I, standard risk disease.   Treatment: Revlimid plus dexamethasone for 4-5 cycles but plateaued by late 03/2012.   - Velcade was added and he received another 2-3 cycles, achieving a good partial remission.      Transplant: Single auto after melphalan 200 mg/m2 preparative regimen on 01/09/2013  - Post-transplant course: unremarkable except for some mild steroid-induced hyperglycemia, gastritis, nausea and vomiting.      Maintenance: Lenalidomide at day-100 then developed a maculopapular rash. Lenalidomide was held and then we re-challenged him after about a month to 2 months at a lower dose (5 mg daily); rash returned.   - was started on maintenance Velcade, every other week through July 2014, when he was noted with abnormalities on myeloma studies drawn there. Noted with prostate cancer dx at about the time of relapse (see below).     Relapse: noted with return on M-spike in blood/urine with marrow involvement but negative PET.   - Started on retreatment with RVD on 8/27/14 with Revlimid at 15 mg 14 days of 21 days, dexamethasone 40 mg weekly and velcade weekly.Completed at total of 5 cycles without complication by 12/2014.   - Started Cycle 6 on inc'd Rev dosing of 20mg daily x 2 weeks on 12/11/14 which was complicated by pneumonia.  - Adm 12/21-1/10 for human metapneumovirus pneumonia complicated by anorexia, HTN, depression, anorexia with significant weight loss.   - Restarted Ernesto/Dex only on 2/4/15 with good tolerance but with thrombocytopenia.   - Bendamustine added to Velcade/dexamethasone on 5/21/15 due to disease progression  - Velcade discontinued on 7/9/2015 due to side effects (orthostasis)  - Schedule changed to bendamustine 80 mg/m2  "days 1 and 2 on 28-day cycle  - Cycle 1 tolerated poorly due to lightheadedness and weakness  - Cycle 2 dose reduced to 60 mg/m2 and pre-meds adjusted  - Cycle 5 received on 9/17/15.  - 10/1/2015 - increasing IgM, M-spike - started Pomalidomide 4mg/day (21 days out of 28 days) and weekly Decadron 20mg on Oct 1st, 2015.    - Carfilzomib added on 10/22/2015 making this CPD.  - C3-C6  received in [place]    - Returned to [facility] and resumed CPD here    - Adm: 4/12-4/14 with fever, confusion, neutropenia. Noted on MRI to have acute/subacute CVA and subacute/chronic CVA; started on Plavix, given brief course of Abx and GCSF prior to d/c.     - Continued on Carfilzomib and dexamethasone alone  - Pomalyst added back on 7/13/2016 for rising FLC  - Start daratumumab 11/10/16. Changed to every other week after 4 weekly treatments d/t profound fatigue and malaise.   - Adm 5/7-5/16 due to AMS, hypercalcemia, hyperuricemia, possible PNA, back pain, and JOSE MARIA. Started Kyprolis while inpatient.  - Re-admitted 5/25-/529 with recurrent AMS, found to have concomitant PNA  - Resumed Kyprolis 6/13/2017. Stopped due to worsening performance status and progressive myeloma.  - Started Venclexta 800 mg 8/25/2017  - Added weekly Velcade with dexamethasone 20 mg weekly due to rising light chains on 11/1/2017    INTERVAL HISTORY:    Mr. Carbone is here with his wife.     He has had a rough week since he was seen last. After his last Velcade, he started to get some increased confusion, achiness, felt like he \"had the flu\". He had increased nausea and diarrhea, and also started to get a sore throat. He was fatigued beyond his baseline and spent a lot of time in bed. In the last 24 hours, he has felt back to normal. Energy is baseline now. Diarrhea, nausea resolved. No longer feeling achy. No sore throat. No fevers, chills, cough, or SOB. Still not eating and drinking that well. Back pain remains much better and needing tramadol much less, has not " even needed a dose today. No bleeding, chest pain, new sites of pain, swelling. No other concerns.    PHYSICAL EXAMINATION  /80 (BP Location: Right arm, Patient Position: Sitting)  Pulse 91  Temp 98.6  F (37  C) (Oral)  Resp 24  Wt 53.7 kg (118 lb 4.8 oz)  SpO2 97%  BMI 20.31 kg/m2    Wt Readings from Last 10 Encounters:   17 53.7 kg (118 lb 4.8 oz)   17 53 kg (116 lb 12.8 oz)   17 53.4 kg (117 lb 12.8 oz)   17 54.1 kg (119 lb 3.2 oz)   10/19/17 54.4 kg (119 lb 14.4 oz)   10/06/17 54.4 kg (120 lb)   10/03/17 56.2 kg (123 lb 12.8 oz)   17 56.8 kg (125 lb 3.2 oz)   17 56 kg (123 lb 6.4 oz)   17 56.2 kg (124 lb)   General: Alert. Oriented to name, , location,  Able to ambulate independently, albeit slow and cautiously.  No acute distress. HEENT: PERRL, no palor or icterus. CVS: RRR with occasional premature beat. CHEST: CTAB, normal work of breathing ABDOMEN: soft non tender no masses     NEURO: AAOX3  CN 2-12 intact  SKIN: no bleeding or brusiing, no rashes EXTREMITIES: No edema.     LABS:   Results for HANSACHARLYLUDA WILLIAN FONTAINE (MRN [id number]) as of 2017 16:59   Ref. Range 2017 11:35 2017 08:45 2017 08:46 2017 15:52   Creatinine Latest Ref Range: 0.66 - 1.25 mg/dL 1.23  1.49 (H) 1.35 (H)   Results for HANSACHARLYWILLIAN LARES (MRN [id number]) as of 2017 16:59   Ref. Range 2017 11:35 2017 08:45 2017 08:46 2017 15:52   WBC Latest Ref Range: 4.0 - 11.0 10e9/L 3.8 (L) 3.1 (L)  4.0   Hemoglobin Latest Ref Range: 13.3 - 17.7 g/dL 9.7 (L) 9.4 (L)  9.5 (L)   Hematocrit Latest Ref Range: 40.0 - 53.0 % 31.1 (L) 29.9 (L)  30.1 (L)   Platelet Count Latest Ref Range: 150 - 450 10e9/L 111 (L) 79 (L)  62 (L)     IMPRESSION/PLAN:  71 year old male with relapsed MM after autologous transplant (2013), status-post multiple salvage regimens with progressive disease.    MM: Continues on Venclexta, with weekly Velcade/dex  20 mg added on 11/1 due to rising light chains. I am concerned that Yamil may be progressing given the rise in creatinine concomitantly with the worsening thrombocytopenia. Will check MM labs today. Additionally, Yamil recently had what sounds like a viral gastroenteritis and is only starting to feel better in the last 24 hours, and with that was increased confusion, agitation, fatigue. Today, he feels at baseline. We mutually decided to hold off Velcade today. He will call us with any interval concerns.    AMS: AMS started early May in setting of metabolic derangements, uremia, and possible infection.  Remains intermittently confused over the past few months but improved significantly, worsened over the weekend but then resolved. stable today.  - Continue Zyprexa 2.5 mg at bedtime  - Holding off long-acting pain meds    Heme: Cytopenias 2/2 treatment and likely MM in setting of progression, with recent worsening of thrombocytopenia. Will monitor.  - On Plavix, hold for platelets < 50. Recommended to go ahead and hold Plavix today.  - Transfuse to keep hemoglobin > 8    Renal/FEN: Creat baseline ~0.8-1.0, recently has been increased beyond baseline. Slightly improved today at 1.3. Will give a liter of NS. Concern this is MM related, though he has not been drinking adequate fluids. Continue to push fluids.  -Encouraged protein/calorie supplements.      ID: Presently afebrile w/o any localizing infectious s/s. Recently with nausea, diarrhea, body aches; all resolved now.  - Continues ppx  mg BID     Back/rib pain: Started early May. Lumbar MRI at OSH showing mild-mod central and foraminal stenoses in lumbar spine, per patient and wife, there was no MM lesion there. Rib films inpatient negative, back films stable from prior imaging. Pain has actually been better recently  - Continues tramadol PRN and Tylenol PRN. No changes.      CV: Continue zocor and norvasc.   - Avoid QTc prolonging agents (history of QTc  prolongation with syncopal episodes)       I spent >25 minutes with the patient, with over 50% of the time spent counseling or coordinating their care as described above.     ALPHONSO Herrera PA-C

## 2017-11-21 NOTE — MR AVS SNAPSHOT
After Visit Summary   11/21/2017    Anthony Carbone    MRN: [id number]           Patient Information     Date Of Birth          1945        Visit Information        Provider Department      11/21/2017 4:10 PM Leanne Reddy PA-C [facility]        Today's Diagnoses     Multiple myeloma in relapse (H)    -  1       Follow-ups after your visit        Your next 10 appointments already scheduled     Nov 30, 2017 11:30 AM CST   [facility] Lab Draw with [facility] LAB DRAW   [facility]    [address] [place] [address]   [phone]            Nov 30, 2017 12:00 PM CST   Infusion 60 with UC ONCOLOGY INFUSION, UC 28 ATC   [facility] ([facility])    [address] [place] [address]   [phone]            Dec 04, 2017 11:00 AM CST   (Arrive by 10:45 AM)   New Patient Visit with Harleen Summers RD   [facility] ([facility])    [address] [place] [address]   [phone]            Dec 07, 2017 10:15 AM CST   M[facility]ic Lab Draw with UC [facility] LAB DRAW   [facility]    [address] [place] [address]   [phone]            Dec 07, 2017 10:50 AM CST   (Arrive by 10:35 AM)   Return Visit with Leanne Reddy PA-C   [facility] ([facility])    [address] [place] [address]   [phone]            Dec 07, 2017 12:00 PM CST   Infusion 60 with UC ONCOLOGY INFUSION, UC 28 ATC   [facility] ([facility])    [address] [place] [address]   [phone]            Dec 13, 2017  4:45 PM CST   Masonic Lab Draw with  [facility] LAB DRAW   [facility] Lab Draw ([facility])    [address] [address]  [address] [facility] [address] [phone]            Dec 13, 2017  5:30 PM CST   RETURN ONC with Kamari Topete MD   [facility] Blood and Marrow Transplant ([facility])    [address] [address] [phone]              Future tests that were ordered for you today     Open Future Orders        Priority Expected Expires Ordered    Protein electrophoresis Routine  11/21/2018 11/21/2017    [place] and lambda light chain Routine  2/21/2018 11/21/2017            Who to contact     If you have questions or need follow up information about today's clinic visit or your schedule please contact [facility] directly at [phone].  Normal or non-critical lab and imaging results will be communicated to you by MyChart, letter or phone within 4 business days after the clinic has received the results. If you do not hear from us within 7 days, please contact the clinic through Given.tohart or phone. If you have a critical or abnormal lab result, we will notify you by phone as soon as possible.  Submit refill requests through Factorli or call your pharmacy and they will forward the refill request to us. Please allow 3 business days for your refill to be completed.          Additional Information About Your Visit        MyChart Information     Factorli gives you secure access to your electronic health record. If you see a primary care provider, you can also send messages to your care team and make appointments. If you have questions, please call your primary care clinic.  If you do not have a primary care provider, please call [phone] and they will assist you.        Care EveryWhere ID     This is your Care EveryWhere ID. This could be used by other organizations to access your [place] medical records  [id number]        Your  Vitals Were     Pulse Temperature Respirations Pulse Oximetry BMI (Body Mass Index)       91 98.6  F (37  C) (Oral) 16 97% 20.31 kg/m2        Blood Pressure from Last 3 Encounters:   11/21/17 125/80   11/16/17 112/75   11/08/17 118/79    Weight from Last 3 Encounters:   11/21/17 53.7 kg (118 lb 4.8 oz)   11/16/17 53 kg (116 lb 12.8 oz)   11/08/17 53.4 kg (117 lb 12.8 oz)               Primary Care Provider Office Phone # Fax #    Sanket Burciaga [phone]       [facility] [address] [facility] [address]        Equal Access to Services     ALBAN SHAH : Maribel Davis, wacatherineda luqadaha, qaybta kaalmada adeegyaabhijit, jonny villa. So [facility] [phone].    ATENCIÓN: Si habla español, tiene a todd disposición servicios gratuitos de asistencia lingüística. [facility] [phone].    We comply with applicable federal civil rights laws and [place] laws. We do not discriminate on the basis of race, color, national origin, age, disability, sex, sexual orientation, or gender identity.            Thank you!     Thank you for choosing [facility]  for your care. Our goal is always to provide you with excellent care. Hearing back from our patients is one way we can continue to improve our services. Please take a few minutes to complete the written survey that you may receive in the mail after your visit with us. Thank you!             Your Updated Medication List - Protect others around you: Learn how to safely use, store and throw away your medicines at www.disposemymeds.org.          This list is accurate as of: 11/21/17  5:02 PM.  Always use your most recent med list.                   Brand Name Dispense Instructions for use Diagnosis    acyclovir 400 MG tablet    ZOVIRAX    60 tablet    Take 1 tablet (400 mg) by mouth 2 times daily    Multiple myeloma in relapse (H)       amLODIPine 5 MG tablet    NORVASC    90 tablet     TAKE 1 TABLET BY MOUTH AT BEDTIME    Essential hypertension       clopidogrel 75 MG tablet    PLAVIX    90 tablet    TAKE 1 TABLET BY MOUTH EVERY DAY    History of stroke       dexamethasone 4 MG tablet    DECADRON    40 tablet    Take 20 mg weekly on day of Velcade    Multiple myeloma not having achieved remission (H)       esomeprazole 40 MG CR capsule    nexIUM    30 capsule    Take 1 capsule (40 mg) by mouth every morning (before breakfast)    Gastroesophageal reflux disease without esophagitis       LORazepam 0.5 MG tablet    ATIVAN    30 tablet    Take 1 tablet (0.5 mg) by mouth every 6 hours as needed for nausea or anxiety.    Multiple myeloma in relapse (H), Delirium       nystatin 457437 UNIT/ML suspension    MYCOSTATIN    473 mL    Take 5 mLs (500,000 Units) by mouth 4 times daily Swish and spit. Continue until symptoms resolve and then take for 3 more days.    Thrush       OLANZapine 5 MG tablet    zyPREXA    60 tablet    Take 0.5 tablets (2.5 mg) by mouth At Bedtime    Delirium, Multiple myeloma in relapse (H)       ondansetron 8 MG ODT tab    ZOFRAN-ODT    30 tablet    Take 1 tablet (8 mg) by mouth every 8 hours as needed for nausea    Nausea       oxyCODONE IR 5 MG tablet    ROXICODONE    15 tablet    Take 1 tablet (5 mg) by mouth every 6 hours as needed for moderate to severe pain        potassium chloride SA 20 MEQ CR tablet    K-DUR/KLOR-CON M    30 tablet    Take 1 tablet (20 mEq) by mouth daily    Hypokalemia       SIMVASTATIN PO      Take 20 mg by mouth At Bedtime        traMADol 50 MG tablet    ULTRAM    60 tablet    Take 1 tablet (50 mg) by mouth every 6 hours as needed for moderate pain . Recommend trying after Tylenol and before Dilaudid.    Acute bilateral low back pain without sciatica, Multiple myeloma in relapse (H)       venetoclax 100 MG tablet CHEMOTHERAPY    VENCLEXTA    100 tablet    Take 8 tablets (800 mg) by mouth daily    Multiple myeloma in relapse (H)

## 2017-11-30 NOTE — NURSING NOTE
"Chief Complaint   Patient presents with     Port Draw     labs drawn from port by rn.  vs taken     Port accessed with 20 gauge 3/4\" gripper needle and labs drawn by rn.  Port flushed with NS and heparin.  Pt tolerated well.  VS taken.  Pt checked in for next appt.  Kelly Forte RN      "

## 2017-11-30 NOTE — MR AVS SNAPSHOT
After Visit Summary   11/30/2017    Anthony Carbone    MRN: [id number]           Patient Information     Date Of Birth          1945        Visit Information        Provider Department      11/30/2017 12:00 PM  28 ATC;  ONCOLOGY INFUSION [facility]        Today's Diagnoses     Multiple myeloma in relapse (H)    -  1      Care Instructions    Contact Numbers    [facility] Main Line: [phone]  [facility] Triage:  [phone]    Call triage with chills and/or temperature greater than or equal to 100.5, uncontrolled nausea/vomiting, diarrhea, constipation, dizziness, shortness of breath, chest pain, bleeding, unexplained bruising, or any new/concerning symptoms, questions/concerns.     If you are having any concerning symptoms or wish to speak to a provider before your next infusion visit, please call your care coordinator or triage to notify them so we can adequately serve you.       After Hours: [phone]    If after hours, weekends, or holidays, call main hospital  and ask for Oncology doctor on call.           December 2017 Sunday Monday Tuesday Wednesday Thursday Friday Saturday                            1     2       3     4     UMP NEW   10:45 AM   (60 min.)   Harleen Ramirez RD   [facility] 5     6     7     P MASONIC LAB DRAW   10:15 AM   (15 min.)    MASONIC LAB DRAW   [facility] Lab Draw     UMP RETURN   10:35 AM   (50 min.)   Leanne Reddy PA-C   [facility]     UMP ONC INFUSION 60   12:00 PM   (60 min.)    ONCOLOGY INFUSION   [facility] 8     9       10     11     12     13     P MASONIC LAB DRAW    4:45 PM   (15 min.)    MASONIC LAB DRAW   [facility] Lab Draw     P ONC RETURN    5:30 PM   (30 min.)   Kamari Topete MD   [facility] Blood and Marrow Transplant 14     UMP ONC INFUSION 60    3:00 PM   (60 min.)    ONCOLOGY INFUSION   [facility]  [facility] 15     16       17     18     19     20     21     22     23       24     25     26     27     28     29     30       31                                               Recent Results (from the past 24 hour(s))   CBC with platelets differential    Collection Time: 11/30/17 11:58 AM   Result Value Ref Range    WBC 2.9 (L) 4.0 - 11.0 10e9/L    RBC Count 2.67 (L) 4.4 - 5.9 10e12/L    Hemoglobin 8.9 (L) 13.3 - 17.7 g/dL    Hematocrit 28.6 (L) 40.0 - 53.0 %     (H) 78 - 100 fl    MCH 33.3 (H) 26.5 - 33.0 pg    MCHC 31.1 (L) 31.5 - 36.5 g/dL    RDW 15.3 (H) 10.0 - 15.0 %    Platelet Count 76 (L) 150 - 450 10e9/L    % Neutrophils 77.0 %    % Lymphocytes 5.0 %    % Monocytes 18.0 %    Diff Method Manual Differential     Absolute Neutrophil 2.3 1.6 - 8.3 10e9/L    Absolute Lymphocytes 0.1 (L) 0.8 - 5.3 10e9/L    Absolute Monocytes 0.5 0.0 - 1.3 10e9/L   Comprehensive metabolic panel    Collection Time: 11/30/17 11:58 AM   Result Value Ref Range    Sodium 140 133 - 144 mmol/L    Potassium 4.2 3.4 - 5.3 mmol/L    Chloride 109 94 - 109 mmol/L    Carbon Dioxide 22 20 - 32 mmol/L    Anion Gap 9 3 - 14 mmol/L    Glucose 86 70 - 99 mg/dL    Urea Nitrogen 23 7 - 30 mg/dL    Creatinine 1.37 (H) 0.66 - 1.25 mg/dL    GFR Estimate 51 (L) >60 mL/min/1.7m2    GFR Estimate If Black 61 >60 mL/min/1.7m2    Calcium 8.0 (L) 8.5 - 10.1 mg/dL    Bilirubin Total 0.3 0.2 - 1.3 mg/dL    Albumin 3.0 (L) 3.4 - 5.0 g/dL    Protein Total 8.4 6.8 - 8.8 g/dL    Alkaline Phosphatase 51 40 - 150 U/L    ALT 14 0 - 70 U/L    AST 8 0 - 45 U/L             Follow-ups after your visit        Your next 10 appointments already scheduled     Dec 04, 2017 11:00 AM CST   (Arrive by 10:45 AM)   New Patient Visit with Harleen Summers RD   [facility] ([facility])    [address] [place] [address]   [phone]            Dec 07, 2017 10:15 AM [facility]  Draw with UC MASONIC LAB DRAW   [facility] Masonic Lab Draw ([facility])    [address] [place] [address]   [phone]            Dec 07, 2017 10:50 AM CST   (Arrive by 10:35 AM)   Return Visit with Leanne Reddy PA-C   [facility] ([facility])    [address] [place] [address] [phone]            Dec 07, 2017 12:00 PM CST   Infusion 60 with UC ONCOLOGY INFUSION, UC 28 ATC   [facility] ([facility])    [address] [place] [address] [phone]            Dec 13, 2017  4:45 PM CST   M[facility]ic Lab Draw with UC MASONIC LAB DRAW   [facility] Mason[facility] Lab Draw ([facility])    [address] [place] [address] [phone]            Dec 13, 2017  5:30 PM CST   RETURN ONC with Kamari Topete MD   [facility] Blood and Marrow Transplant ([facility])    [address] [place] [address] [phone]            Dec 14, 2017  3:00 PM CST   Infusion 60 with UC ONCOLOGY INFUSION, UC 26 ATC   [facility] ([facility])    [address] [place] [address] [phone]              Who to contact     If you have questions or need follow up information about today's clinic visit or your schedule please contact [facility] directly at [phone].  Normal or non-critical lab and imaging results will be communicated to you by MyChart, letter or phone within 4 business days after the clinic has received the results. If you do not hear from us within 7 days, please contact the clinic through MyChart or phone. If you have a critical or abnormal lab result, we will notify you by phone as soon as possible.  Submit refill requests through  ESKY or call your pharmacy and they will forward the refill request to us. Please allow 3 business days for your refill to be completed.          Additional Information About Your Visit        MyChart Information     ESKY gives you secure access to your electronic health record. If you see a primary care provider, you can also send messages to your care team and make appointments. If you have questions, please call your primary care clinic.  If you do not have a primary care provider, please call [phone] and they will assist you.        Care EveryWhere ID     This is your Care EveryWhere ID. This could be used by other organizations to access your [place] medical records  [id number]        Your Vitals Were     Pulse Temperature Respirations Pulse Oximetry BMI (Body Mass Index)       84 98.8  F (37.1  C) (Oral) 16 99% 20.49 kg/m2        Blood Pressure from Last 3 Encounters:   11/30/17 132/86   11/21/17 125/80   11/16/17 112/75    Weight from Last 3 Encounters:   11/30/17 54.2 kg (119 lb 6.4 oz)   11/21/17 53.7 kg (118 lb 4.8 oz)   11/16/17 53 kg (116 lb 12.8 oz)              We Performed the Following     CBC with platelets and differential     CBC with platelets differential     Comprehensive metabolic panel     Kappa and lambda light chain (Serum)     Protein electrophoresis        Primary Care Provider Office Phone # Fax #    Sanket Burciaga [phone]       [place] [address] [facility] [address]        Equal Access to Services     ALBAN SHAH : Hadii chalo hernandezo Sojose, waaxda luqadaha, qaybta kaalmada adeegyada, waxay paola james adeclaudia villa. So [facility] [phone].    ATENCIÓN: Si habla español, tiene a todd disposición servicios gratuitos de asistencia lingüística. Parminder al [phone].    We comply with applicable federal civil rights laws and [place] laws. We do not discriminate on the basis of race, color, national origin, age,  disability, sex, sexual orientation, or gender identity.            Thank you!     Thank you for choosing [facility]  for your care. Our goal is always to provide you with excellent care. Hearing back from our patients is one way we can continue to improve our services. Please take a few minutes to complete the written survey that you may receive in the mail after your visit with us. Thank you!             Your Updated Medication List - Protect others around you: Learn how to safely use, store and throw away your medicines at www.disposemymeds.org.          This list is accurate as of: 11/30/17 12:55 PM.  Always use your most recent med list.                   Brand Name Dispense Instructions for use Diagnosis    acyclovir 400 MG tablet    ZOVIRAX    60 tablet    Take 1 tablet (400 mg) by mouth 2 times daily    Multiple myeloma in relapse (H)       amLODIPine 5 MG tablet    NORVASC    90 tablet    TAKE 1 TABLET BY MOUTH AT BEDTIME    Essential hypertension       clopidogrel 75 MG tablet    PLAVIX    90 tablet    TAKE 1 TABLET BY MOUTH EVERY DAY    History of stroke       dexamethasone 4 MG tablet    DECADRON    40 tablet    Take 20 mg weekly on day of Velcade    Multiple myeloma not having achieved remission (H)       esomeprazole 40 MG CR capsule    nexIUM    30 capsule    Take 1 capsule (40 mg) by mouth every morning (before breakfast)    Gastroesophageal reflux disease without esophagitis       LORazepam 0.5 MG tablet    ATIVAN    30 tablet    Take 1 tablet (0.5 mg) by mouth every 6 hours as needed for nausea or anxiety.    Multiple myeloma in relapse (H), Delirium       nystatin [id number] UNIT/ML suspension    MYCOSTATIN    473 mL    Take 5 mLs (500,000 Units) by mouth 4 times daily Swish and spit. Continue until symptoms resolve and then take for 3 more days.    Thrush       OLANZapine 5 MG tablet    zyPREXA    60 tablet    Take 0.5 tablets (2.5 mg) by mouth At Bedtime    Delirium, Multiple  myeloma in relapse (H)       ondansetron 8 MG ODT tab    ZOFRAN-ODT    30 tablet    Take 1 tablet (8 mg) by mouth every 8 hours as needed for nausea    Nausea       oxyCODONE IR 5 MG tablet    ROXICODONE    15 tablet    Take 1 tablet (5 mg) by mouth every 6 hours as needed for moderate to severe pain        potassium chloride SA 20 MEQ CR tablet    K-DUR/KLOR-CON M    30 tablet    Take 1 tablet (20 mEq) by mouth daily    Hypokalemia       SIMVASTATIN PO      Take 20 mg by mouth At Bedtime        traMADol 50 MG tablet    ULTRAM    60 tablet    Take 1 tablet (50 mg) by mouth every 6 hours as needed for moderate pain . Recommend trying after Tylenol and before Dilaudid.    Acute bilateral low back pain without sciatica, Multiple myeloma in relapse (H)       venetoclax 100 MG tablet CHEMOTHERAPY    VENCLEXTA    100 tablet    Take 8 tablets (800 mg) by mouth daily    Multiple myeloma in relapse (H)

## 2017-11-30 NOTE — PROGRESS NOTES
Infusion Nursing Note:  Anthony Carbone presents today for Cycle 2 Day 1 Velcade.    Patient seen by provider today: No    Note: Patient states he is doing well today. Hoping to go to [place] in December. Denies fevers, chills, signs/symptoms of bleeding. Pt educated on thrombocytopenic precautions, verbalized understanding. No other concerns at this time.     Intravenous Access:  No Intravenous access at this visit.    Treatment Conditions:  Lab Results   Component Value Date    HGB 8.9 11/30/2017     Lab Results   Component Value Date    WBC 2.9 11/30/2017      Lab Results   Component Value Date    ANEU 2.3 11/30/2017     Lab Results   Component Value Date    PLT 76 11/30/2017      Results reviewed, labs MET treatment parameters, ok to proceed with treatment.      Post Infusion Assessment:  Patient tolerated Velcade injection to Left Lower Abdomen without incident.    Discharge Plan:   Patient declined prescription refills.  Discharge instructions reviewed with: Patient.  Patient and family verbalized understanding of discharge instructions and all questions answered.  Copy of AVS reviewed with patient and family.  Patient will return 12/7/17 for next infusion appointment.  Patient discharged in stable condition accompanied by: wife.  Face to Face time: 0.    EMILY BAZZI, RN

## 2017-11-30 NOTE — PATIENT INSTRUCTIONS
Contact Numbers    [facility] Main Line: [phone]  [facility] Triage:  [phone]    Call triage with chills and/or temperature greater than or equal to 100.5, uncontrolled nausea/vomiting, diarrhea, constipation, dizziness, shortness of breath, chest pain, bleeding, unexplained bruising, or any new/concerning symptoms, questions/concerns.     If you are having any concerning symptoms or wish to speak to a provider before your next infusion visit, please call your care coordinator or triage to notify them so we can adequately serve you.       After Hours: [phone]    If after hours, weekends, or holidays, call main hospital  and ask for Oncology doctor on call.           December 2017 Sunday Monday Tuesday Wednesday Thursday Friday Saturday                            1     2       3     4     UMP NEW   10:45 AM   (60 min.)   Harleen Ramirez RD   [facility] 5     6     7     P MASONIC LAB DRAW   10:15 AM   (15 min.)    MASONIC LAB DRAW   [facility] Lab Draw     UMP RETURN   10:35 AM   (50 min.)   [facility]   12:00 PM   (60 min.)    ONCOLOGY INFUSION   [facility] 8     9       10     11     12     13     P MASONIC LAB DRAW    4:45 PM   (15 min.)    MASONIC LAB DRAW   [facility] Lab Draw     UMP ONC RETURN    5:30 PM   (30 min.)   Kamari Topete MD   [facility] Blood and Marrow Transplant 14     P ONC INFUSION 60    3:00 PM   (60 min.)    ONCOLOGY INFUSION   [facility] 15     16       17     18     19     20     21     22     23       24     25     26     27     28     29     30       31                                               Recent Results (from the past 24 hour(s))   CBC with platelets differential    Collection Time: 11/30/17 11:58 AM   Result Value Ref Range    WBC 2.9 (L) 4.0 - 11.0 10e9/L    RBC Count 2.67 (L) 4.4 - 5.9  10e12/L    Hemoglobin 8.9 (L) 13.3 - 17.7 g/dL    Hematocrit 28.6 (L) 40.0 - 53.0 %     (H) 78 - 100 fl    MCH 33.3 (H) 26.5 - 33.0 pg    MCHC 31.1 (L) 31.5 - 36.5 g/dL    RDW 15.3 (H) 10.0 - 15.0 %    Platelet Count 76 (L) 150 - 450 10e9/L    % Neutrophils 77.0 %    % Lymphocytes 5.0 %    % Monocytes 18.0 %    Diff Method Manual Differential     Absolute Neutrophil 2.3 1.6 - 8.3 10e9/L    Absolute Lymphocytes 0.1 (L) 0.8 - 5.3 10e9/L    Absolute Monocytes 0.5 0.0 - 1.3 10e9/L   Comprehensive metabolic panel    Collection Time: 11/30/17 11:58 AM   Result Value Ref Range    Sodium 140 133 - 144 mmol/L    Potassium 4.2 3.4 - 5.3 mmol/L    Chloride 109 94 - 109 mmol/L    Carbon Dioxide 22 20 - 32 mmol/L    Anion Gap 9 3 - 14 mmol/L    Glucose 86 70 - 99 mg/dL    Urea Nitrogen 23 7 - 30 mg/dL    Creatinine 1.37 (H) 0.66 - 1.25 mg/dL    GFR Estimate 51 (L) >60 mL/min/1.7m2    GFR Estimate If Black 61 >60 mL/min/1.7m2    Calcium 8.0 (L) 8.5 - 10.1 mg/dL    Bilirubin Total 0.3 0.2 - 1.3 mg/dL    Albumin 3.0 (L) 3.4 - 5.0 g/dL    Protein Total 8.4 6.8 - 8.8 g/dL    Alkaline Phosphatase 51 40 - 150 U/L    ALT 14 0 - 70 U/L    AST 8 0 - 45 U/L

## 2017-12-07 NOTE — PROGRESS NOTES
Infusion Nursing Note:  Anthony Carbone presents today for IV fluids.  *Chemo Held*  Patient seen by provider today: Yes: Leanne AMARO    Treatment Conditions:  Lab Results   Component Value Date    HGB 8.8 12/07/2017     Lab Results   Component Value Date    WBC 2.6 12/07/2017      Lab Results   Component Value Date    ANEU 1.9 12/07/2017     Lab Results   Component Value Date    PLT 58 12/07/2017      Lab Results   Component Value Date     12/07/2017                   Lab Results   Component Value Date    POTASSIUM 4.6 12/07/2017           Lab Results   Component Value Date    MAG 2.2 05/25/2017            Lab Results   Component Value Date    CR 2.01 12/07/2017                   Lab Results   Component Value Date    JAZMIN 7.7 12/07/2017                Lab Results   Component Value Date    BILITOTAL 0.2 12/07/2017           Lab Results   Component Value Date    ALBUMIN 2.6 12/07/2017                    Lab Results   Component Value Date    ALT 11 12/07/2017           Lab Results   Component Value Date    AST 8 12/07/2017           Intravenous Access:  Implanted Port.    Note:     12/7/17 TORB Leanne AMARO / Kiesha Pringle RN:  No chemo today.  Give 1 liter NS IV today  Pt should take Decadron 20 mg PO x 4 days (starting today) -pt and wife aware and have decadron at home.  Pt should return tomorrow for BMP recheck and IV fluids.  Send C.Diff culture during infusion if able.   If pt unable to leave C.Diff specimen today, he should  supplies in lab for take home and return specimen tomorrow.      Post Infusion Assessment:  Patient tolerated infusion without incident.  Blood return noted pre and post infusion.  Access discontinued per protocol.    Discharge Plan:   Patient declined prescription refills.  Discharge instructions reviewed with: Patient.  Patient and/or family verbalized understanding of discharge instructions and all questions answered.  Copy of AVS reviewed with patient  and/or family.  Patient will return 12/8/17 for next appointment.  Patient discharged in stable condition accompanied by: wife.  Departure Mode: Ambulatory.  Face to Face time: 0.    Kiesha Pringle RN

## 2017-12-07 NOTE — PROGRESS NOTES
HEMATOLOGY/ONCOLOGY PROGRESS NOTE  Dec 7, 2017    REASON FOR VISIT: myeloma    Diagnosis: 71 year old gentleman with IgM lambda multiple myeloma originally diagnosed in 01/2012 as stage I, standard risk disease.   Treatment: Revlimid plus dexamethasone for 4-5 cycles but plateaued by late 03/2012.   - Velcade was added and he received another 2-3 cycles, achieving a good partial remission.      Transplant: Single auto after melphalan 200 mg/m2 preparative regimen on 01/09/2013  - Post-transplant course: unremarkable except for some mild steroid-induced hyperglycemia, gastritis, nausea and vomiting.      Maintenance: Lenalidomide at day-100 then developed a maculopapular rash. Lenalidomide was held and then we re-challenged him after about a month to 2 months at a lower dose (5 mg daily); rash returned.   - was started on maintenance Velcade, every other week through July 2014, when he was noted with abnormalities on myeloma studies drawn there. Noted with prostate cancer dx at about the time of relapse (see below).     Relapse: noted with return on M-spike in blood/urine with marrow involvement but negative PET.   - Started on retreatment with RVD on 8/27/14 with Revlimid at 15 mg 14 days of 21 days, dexamethasone 40 mg weekly and velcade weekly.Completed at total of 5 cycles without complication by 12/2014.   - Started Cycle 6 on inc'd Rev dosing of 20mg daily x 2 weeks on 12/11/14 which was complicated by pneumonia.  - Adm 12/21-1/10 for human metapneumovirus pneumonia complicated by anorexia, HTN, depression, anorexia with significant weight loss.   - Restarted Ernesto/Dex only on 2/4/15 with good tolerance but with thrombocytopenia.   - Bendamustine added to Velcade/dexamethasone on 5/21/15 due to disease progression  - Velcade discontinued on 7/9/2015 due to side effects (orthostasis)  - Schedule changed to bendamustine 80 mg/m2 days 1 and 2 on 28-day cycle  - Cycle 1 tolerated poorly due to lightheadedness and  "weakness  - Cycle 2 dose reduced to 60 mg/m2 and pre-meds adjusted  - Cycle 5 received on 9/17/15.  - 10/1/2015 - increasing IgM, M-spike - started Pomalidomide 4mg/day (21 days out of 28 days) and weekly Decadron 20mg on Oct 1st, 2015.    - Carfilzomib added on 10/22/2015 making this CPD.  - C3-C6  received in [place]    - Returned to [facility] and resumed CPD here    - Adm: 4/12-4/14 with fever, confusion, neutropenia. Noted on MRI to have acute/subacute CVA and subacute/chronic CVA; started on Plavix, given brief course of Abx and GCSF prior to d/c.     - Continued on Carfilzomib and dexamethasone alone  - Pomalyst added back on 7/13/2016 for rising FLC  - Start daratumumab 11/10/16. Changed to every other week after 4 weekly treatments d/t profound fatigue and malaise.   - Adm 5/7-5/16 due to AMS, hypercalcemia, hyperuricemia, possible PNA, back pain, and JOSE MARIA. Started Kyprolis while inpatient.  - Re-admitted 5/25-/529 with recurrent AMS, found to have concomitant PNA  - Resumed Kyprolis 6/13/2017. Stopped due to worsening performance status and progressive myeloma.  - Started Venclexta 800 mg 8/25/2017  - Added weekly Velcade with dexamethasone 20 mg weekly due to rising light chains on 11/1/2017    INTERVAL HISTORY:    Mr. Carbone is here with his wife.   When asked how things are going, both reply, \"not good\".  Over the weekend, Yamil developed worsening nausea, which ended up leading to vomiting on 2 occasions on Monday. No nausea or vomiting since that time. At the same time, he developed some intermittent diarrhea, 2-3 times daily, once through the night last night, but no further episodes today. No fevers or change in baseline chills. No abdominal pains presently, but notes that after each Velcade infusion, he gets 2-3 days of a poorly defined abdominal pain, without any GERD symptoms.     Yamil notes he continues to feel fatigued, more so in this past week. Eating and drinking is still less than baseline. Ate " "half of a bagel this morning with a glass of OJ. Interestingly his back pain remains largely improved. He is no longer even taking tramadol. He started PT for the arm and he thinks that has been helpful. No confusion recently.    Remainder of ROS otherwise was negative for cough, SOB, chest pain, bleeding, or swelling.    PHYSICAL EXAMINATION  /67 (BP Location: Right arm, Patient Position: Sitting, Cuff Size: Adult Regular)  Pulse 95  Temp 98.4  F (36.9  C) (Oral)  Resp 16  Ht 1.626 m (5' 4.02\")  Wt 53 kg (116 lb 12.8 oz)  SpO2 98%  BMI 20.04 kg/m2    Wt Readings from Last 10 Encounters:   17 53 kg (116 lb 12.8 oz)   17 54.2 kg (119 lb 6.4 oz)   17 53.7 kg (118 lb 4.8 oz)   17 53 kg (116 lb 12.8 oz)   17 53.4 kg (117 lb 12.8 oz)   17 54.1 kg (119 lb 3.2 oz)   10/19/17 54.4 kg (119 lb 14.4 oz)   10/06/17 54.4 kg (120 lb)   10/03/17 56.2 kg (123 lb 12.8 oz)   17 56.8 kg (125 lb 3.2 oz)   General: Alert. Oriented to name, , location,  Able to ambulate independently, albeit slow and cautiously.  No acute distress. HEENT: PERRL, no palor or icterus. CVS: RRR with occasional premature beat. CHEST: CTAB, normal work of breathing ABDOMEN: soft non tender no masses     NEURO: AAOX3  CN 2-12 intact  SKIN: no bleeding or brusiing, no rashes EXTREMITIES: No edema.     LABS:   Results for WILLIAN ARCINIEGA (MRN [id number]) as of 2017 08:24   Ref. Range 2017 09:34 2017 11:35 2017 08:46 2017 15:52 2017 11:58   Creatinine Latest Ref Range: 0.66 - 1.25 mg/dL 1.03 1.23 1.49 (H) 1.35 (H) 1.37 (H)   Results for WILLIAN ARCINIEGA (MRN [id number]) as of 2017 08:24   Ref. Range 2017 16:44 10/19/2017 11:02 2017 17:20 2017 11:58   Monoclonal Peak Latest Ref Range: 0.0 g/dL 1.8 (H) 2.1 (H) 2.5 (H) 2.3 (H)   Results for WILLIAN ARCINIEGA (MRN [id number]) as of 2017 08:24   Ref. Range 10/19/2017 11:02 " 11/21/2017 17:20 11/30/2017 11:58   Lambda Free Lt Chain Latest Ref Range: 0.57 - 2.63 mg/dL 51.50 (H) 144.50 (H) 137.50 (H)   Results for WILLIAN ARCINIEGA (MRN [id number]) as of 12/7/2017 08:24   Ref. Range 10/19/2017 11:02 11/1/2017 09:34 11/8/2017 11:35 11/16/2017 08:45 11/21/2017 15:52 11/30/2017 11:58   Platelet Count Latest Ref Range: 150 - 450 10e9/L 150 140 (L) 111 (L) 79 (L) 62 (L) 76 (L)     IMPRESSION/PLAN:  71 year old male with relapsed MM after autologous transplant (1/9/2013), status-post multiple salvage regimens with progressive disease.    MM: Continues on Venclexta, with weekly Velcade/dex 20 mg added on 11/1 due to rising light chains. Yamil's light chains and Mspike have increased over the last few weeks, concomitantly with rising creatinine and worsening thrombocytopenia. Plan initially today was to transition to CyBorD; however we've got a couple things going on today. He has had diarrhea intermittently (last episode overnight) and vomiting (last on Monday), concerning for a possible viral gastroenteritis given he has not had these symptoms previously on this regimen. His creatinine is worse today, which could be from volume losses versus progressive MM. He has worsening fatigue, again this could be multifactorial. Plan to hold Velcade and Venclexta today and start dex 20 mg today and for the next 3 days. He is already scheduled to see Dr. Topete next week. We did do counseling on CyBorD in the event he is fit to start that next week. We did also discuss the option to not pursue further treatment; Yamil has significant fatigue and abdominal pain that he attributes to Velcade.     AMS: AMS started early May in setting of metabolic derangements, uremia, and possible infection.  Remains intermittently confused over the past few months but improved significantly, worsened over the weekend but then resolved. Stable today.  - Continue Zyprexa 2.5 mg at bedtime  - Holding off long-acting pain  meds    Heme: Cytopenias 2/2 treatment and likely MM in setting of progression, with recent worsening of thrombocytopenia. Will monitor.   - On Plavix, hold for platelets < 50. For now, with worsening TCP, planning on holding Plavix until further notice. He has had over a year of treatment on Plavix for suspected stroke, and I think at this time the risks outweigh benefit.  - Transfuse to keep hemoglobin > 8    Renal/FEN: Creat baseline ~0.8-1.0, recently has been increased beyond baseline, likely secondary to progressive myeloma. As above, worsened to 2 today, likely related to MM and recent volume losses. Will give a liter of NS today and tomorrow.  -Encouraged protein/calorie supplements.      ID: Presently afebrile w/o any localizing infectious s/s. Recently with nausea, diarrhea, body aches; all resolved now.  - Continues ppx  mg BID     Back/rib pain: Started early May. Lumbar MRI at OSH showing mild-mod central and foraminal stenoses in lumbar spine, per patient and wife, there was no MM lesion there. Rib films inpatient negative, back films stable from prior imaging. Pain has actually improved since started venotclax, no longer even using tramadol.    Abdominal pain: started with initiating velcade. Unclear if it could actually be related to dex? No GERD symptoms, lasts 2-3 days after each Velcade injection. Suggested he try the tramadol if needed. Asked that he monitor with holding Velcade today but doing Dex, see if recurs.    Fatigue: Worsening over the last few likes, likely multifactorial in setting of recent nausea, vomiting, diarrhea; treatment, myeloma, anemia. I asked Yamil and his wife to monitor this week with holding the venclexta, velcade.     CV: Continue zocor and norvasc.   - Avoid QTc prolonging agents (history of QTc prolongation with syncopal episodes)     GI: Historically only mild nausea on this regimen, with worsening over the weekend and with one day of emesis x2(12/4), and onset  of diarrhea, episodic 2-3x daily, last episode last night. Plan to check stool culture, C diff if diarrhea persists. He will  a kit on his way out today.    I spent >40 minutes with the patient, with over 50% of the time spent counseling or coordinating their care as described above.     Leanne Reddy PA-C

## 2017-12-07 NOTE — MR AVS SNAPSHOT
After Visit Summary   12/7/2017    Anthony Carbone    MRN: [id number]           Patient Information     Date Of Birth          1945        Visit Information        Provider Department      12/7/2017 12:00 PM  28 ATC;  ONCOLOGY INFUSION [facility]        Today's Diagnoses     Multiple myeloma in relapse (H)    -  1      Care Instructions    Contact Numbers    [facility] Main Line: [phone]  [facility] Triage:  [phone]    Call triage with chills and/or temperature greater than or equal to 100.5, uncontrolled nausea/vomiting, diarrhea, constipation, dizziness, shortness of breath, chest pain, bleeding, unexplained bruising, or any new/concerning symptoms, questions/concerns.     If you are having any concerning symptoms or wish to speak to a provider before your next infusion visit, please call your care coordinator or triage to notify them so we can adequately serve you.       After Hours: [phone]    If after hours, weekends, or holidays, call main hospital  and ask for Oncology doctor on call.           December 2017 Sunday Monday Tuesday Wednesday Thursday Friday Saturday                            1     2       3     4     5     6     7     P [facility] DRAW   10:15 AM   (15 min.)    [facility] DRAW   [facility] Lab Draw     [facility] RETURN   10:35 AM   (50 min.)   Leanne Reddy PA-C   [facility]     [facility] ONC INFUSION 60   12:00 PM   (60 min.)    ONCOLOGY INFUSION   [facility] 8     [facility] DRAW    2:30 PM   (15 min.)    [facility] DRAW   [facility] Lab Draw     [facility] ONC INFUSION 120    3:00 PM   (120 min.)    ONCOLOGY INFUSION   [facility] 9       10     11     12     13     [facility] DRAW    4:45 PM   (15 min.)    [facility] DRAW   [facility] Lab Draw     [facility] ONC RETURN    5:30 PM   (30 min.)   Kamari Topete MD   [facility] Blood and  [facility] 14     P ONC INFUSION 60    3:00 PM   (60 min.)    ONCOLOGY INFUSION   [facility] 15     16       17     18     19     20     21     22     23       24     25     26     27     28     29     30       31                                              January 2018 Sunday Monday Tuesday Wednesday Thursday Friday Saturday        1     2     3     4     5     6       7     8     9     10     11     12     13       14     15     16     17     18     19     20       21     22     23     24     25     26     27       28     29     30     31                                Recent Results (from the past 24 hour(s))   CBC with platelets differential    Collection Time: 12/07/17 10:25 AM   Result Value Ref Range    WBC 2.6 (L) 4.0 - 11.0 10e9/L    RBC Count 2.62 (L) 4.4 - 5.9 10e12/L    Hemoglobin 8.8 (L) 13.3 - 17.7 g/dL    Hematocrit 28.0 (L) 40.0 - 53.0 %     (H) 78 - 100 fl    MCH 33.6 (H) 26.5 - 33.0 pg    MCHC 31.4 (L) 31.5 - 36.5 g/dL    RDW 15.3 (H) 10.0 - 15.0 %    Platelet Count 58 (L) 150 - 450 10e9/L    Diff Method Automated Method     % Neutrophils 72.8 %    % Lymphocytes 5.8 %    % Monocytes 20.6 %    % Eosinophils 0.0 %    % Basophils 0.0 %    % Immature Granulocytes 0.8 %    Nucleated RBCs 0 0 /100    Absolute Neutrophil 1.9 1.6 - 8.3 10e9/L    Absolute Lymphocytes 0.2 (L) 0.8 - 5.3 10e9/L    Absolute Monocytes 0.5 0.0 - 1.3 10e9/L    Absolute Eosinophils 0.0 0.0 - 0.7 10e9/L    Absolute Basophils 0.0 0.0 - 0.2 10e9/L    Abs Immature Granulocytes 0.0 0 - 0.4 10e9/L    Absolute Nucleated RBC 0.0     Anisocytosis Slight     Macrocytes Present     Platelet Estimate Confirming automated cell count    Comprehensive metabolic panel    Collection Time: 12/07/17 10:25 AM   Result Value Ref Range    Sodium 141 133 - 144 mmol/L    Potassium 4.6 3.4 - 5.3 mmol/L    Chloride 110 (H) 94 - 109 mmol/L    Carbon Dioxide 19 (L) 20 - 32 mmol/L    Anion Gap 11 3 - 14 mmol/L     Glucose 158 (H) 70 - 99 mg/dL    Urea Nitrogen 28 7 - 30 mg/dL    Creatinine 2.01 (H) 0.66 - 1.25 mg/dL    GFR Estimate 33 (L) >60 mL/min/1.7m2    GFR Estimate If Black 39 (L) >60 mL/min/1.7m2    Calcium 7.7 (L) 8.5 - 10.1 mg/dL    Bilirubin Total 0.2 0.2 - 1.3 mg/dL    Albumin 2.6 (L) 3.4 - 5.0 g/dL    Protein Total 8.5 6.8 - 8.8 g/dL    Alkaline Phosphatase 44 40 - 150 U/L    ALT 11 0 - 70 U/L    AST 8 0 - 45 U/L               Follow-ups after your visit        Your next 10 appointments already scheduled     Dec 08, 2017  2:30 PM CST   [facility] Draw with  [facility] DRAW   [facility] Draw ([facility])    [address] [place] [id number] [phone]            Dec 08, 2017  3:00 PM CST   Infusion 120 with UC ONCOLOGY INFUSION, UC 16 ATC   [facility] ([facility])    [address] [place] [id number] [phone]            Dec 13, 2017  4:45 PM CST   [facility] Draw with  [facility] DRAW   [facility] Draw ([facility])    [address] [place] [id number] [phone]            Dec 13, 2017  5:30 PM CST   RETURN ONC with Kamari Topete MD   [facility] Blood and Marrow Transplant ([facility])    [address] [place] [address] [id number] [phone]            Dec 14, 2017  3:00 PM CST   Infusion 60 with UC ONCOLOGY INFUSION, UC 26 ATC   [facility] ([facility])    [address] [place] [id number] [phone]              Future tests that were ordered for you today     Open Future Orders        Priority Expected Expires Ordered    CBC with platelets differential Routine 12/13/2017 1/7/2018 12/7/2017    Comprehensive metabolic panel Routine 12/13/201713/2017 1/7/2018 12/7/2017    Basic metabolic panel  Routine 12/8/2017 12/7/2018 12/7/2017    Clostridium difficile toxin B PCR Routine  1/7/2018 12/7/2017    Enteric Bacteria and Virus Panel by FELIPE Stool Routine  12/7/2018 12/7/2017    Clostridium difficile toxin B Routine  1/6/2018 12/7/2017    Enteric Bacteria and Virus Panel by FELIPE Stool Routine  12/7/2018 12/7/2017            Who to contact     If you have questions or need follow up information about today's clinic visit or your schedule please contact [facility] directly at [phone].  Normal or non-critical lab and imaging results will be communicated to you by KVK TEAMhart, letter or phone within 4 business days after the clinic has received the results. If you do not hear from us within 7 days, please contact the clinic through Flittot or phone. If you have a critical or abnormal lab result, we will notify you by phone as soon as possible.  Submit refill requests through Big Contacts or call your pharmacy and they will forward the refill request to us. Please allow 3 business days for your refill to be completed.          Additional Information About Your Visit        MyChart Information     Big Contacts gives you secure access to your electronic health record. If you see a primary care provider, you can also send messages to your care team and make appointments. If you have questions, please call your primary care clinic.  If you do not have a primary care provider, please call [phone] and they will assist you.        Care EveryWhere ID     This is your Care EveryWhere ID. This could be used by other organizations to access your [facility] medical records  [id number]         Blood Pressure from Last 3 Encounters:   12/07/17 110/67   11/30/17 132/86   11/21/17 125/80    Weight from Last 3 Encounters:   12/07/17 53 kg (116 lb 12.8 oz)   11/30/17 54.2 kg (119 lb 6.4 oz)   11/21/17 53.7 kg (118 lb 4.8 oz)              We Performed the Following     CBC with platelets differential          Where to  get your medicines      These medications were sent to [facility] - [place], [place] - [address] AT [facility] [address]  [address], [facility] [address]     Phone:  [phone]     acyclovir 400 MG tablet          Primary Care Provider Office Phone # Fax #    Sanket Burciaga [phone]       [facility] [address] [facility] [address]        Equal Access to Services     ALBAN SHAH : Hadii aad ku hadasho Soomaali, waaxda luqadaha, qaybta kaalmada adeegyada, waxay idiin hayaan adeeg tony villa. So [facility] [phone].    ATENCIÓN: Si habla español, tiene a todd disposición servicios gratuitos de asistencia lingüística. [facility] [phone].    We comply with applicable federal civil rights laws and [place] laws. We do not discriminate on the basis of race, color, national origin, age, disability, sex, sexual orientation, or gender identity.            Thank you!     Thank you for choosing [facility]  for your care. Our goal is always to provide you with excellent care. Hearing back from our patients is one way we can continue to improve our services. Please take a few minutes to complete the written survey that you may receive in the mail after your visit with us. Thank you!             Your Updated Medication List - Protect others around you: Learn how to safely use, store and throw away your medicines at www.disposemymeds.org.          This list is accurate as of: 12/7/17  1:07 PM.  Always use your most recent med list.                   Brand Name Dispense Instructions for use Diagnosis    acyclovir 400 MG tablet    ZOVIRAX    60 tablet    Take 1 tablet (400 mg) by mouth 2 times daily    Multiple myeloma in relapse (H)       amLODIPine 5 MG tablet    NORVASC    90 tablet    TAKE 1 TABLET BY MOUTH AT BEDTIME    Essential hypertension       clopidogrel 75 MG tablet    PLAVIX    90 tablet    TAKE 1 TABLET BY MOUTH  EVERY DAY    History of stroke       dexamethasone 4 MG tablet    DECADRON    40 tablet    Take 20 mg weekly on day of Velcade    Multiple myeloma not having achieved remission (H)       esomeprazole 40 MG CR capsule    nexIUM    30 capsule    Take 1 capsule (40 mg) by mouth every morning (before breakfast)    Gastroesophageal reflux disease without esophagitis       LORazepam 0.5 MG tablet    ATIVAN    30 tablet    Take 1 tablet (0.5 mg) by mouth every 6 hours as needed for nausea or anxiety.    Multiple myeloma in relapse (H), Delirium       nystatin 794306 UNIT/ML suspension    MYCOSTATIN    473 mL    Take 5 mLs (500,000 Units) by mouth 4 times daily Swish and spit. Continue until symptoms resolve and then take for 3 more days.    Thrush       OLANZapine 5 MG tablet    zyPREXA    60 tablet    Take 0.5 tablets (2.5 mg) by mouth At Bedtime    Delirium, Multiple myeloma in relapse (H)       ondansetron 8 MG ODT tab    ZOFRAN-ODT    30 tablet    Take 1 tablet (8 mg) by mouth every 8 hours as needed for nausea    Nausea       oxyCODONE IR 5 MG tablet    ROXICODONE    15 tablet    Take 1 tablet (5 mg) by mouth every 6 hours as needed for moderate to severe pain        potassium chloride SA 20 MEQ CR tablet    K-DUR/KLOR-CON M    30 tablet    Take 1 tablet (20 mEq) by mouth daily    Hypokalemia       SIMVASTATIN PO      Take 20 mg by mouth At Bedtime        traMADol 50 MG tablet    ULTRAM    60 tablet    Take 1 tablet (50 mg) by mouth every 6 hours as needed for moderate pain . Recommend trying after Tylenol and before Dilaudid.    Acute bilateral low back pain without sciatica, Multiple myeloma in relapse (H)       venetoclax 100 MG tablet CHEMOTHERAPY    VENCLEXTA    100 tablet    Take 8 tablets (800 mg) by mouth daily    Multiple myeloma in relapse (H)

## 2017-12-07 NOTE — MR AVS SNAPSHOT
After Visit Summary   12/7/2017    Anthony Carbone    MRN: [id number]           Patient Information     Date Of Birth          1945        Visit Information        Provider Department      12/7/2017 10:50 AM Leanne Reddy PA-C [facility]        Today's Diagnoses     Multiple myeloma in relapse (H)    -  1    Vomiting and diarrhea           Follow-ups after your visit        Your next 10 appointments already scheduled     Dec 08, 2017  3:00 PM CST   Infusion 120 with UC ONCOLOGY INFUSION, UC 16 ATC   [facility] ([facility])    [address] [place] [id number]   [phone]            Dec 13, 2017  4:45 PM CST   [facility] Draw with  [facility] DRAW   [facility] Lab Draw ([facility])    [address] [place] [id number]   [phone]            Dec 13, 2017  5:30 PM CST   RETURN ONC with Kamari Topete MD   [facility] Blood and Marrow Transplant ([facility])    [address] [place] [id number]   [phone]            Dec 14, 2017  3:00 PM CST   Infusion 60 with UC ONCOLOGY INFUSION, UC 26 ATC   [facility] ([facility])    [address] [place] [id number]   [phone]              Future tests that were ordered for you today     Open Future Orders        Priority Expected Expires Ordered    CBC with platelets differential Routine 12/13/2017 1/7/2018 12/7/2017    Comprehensive metabolic panel Routine 12/13/2017 1/7/2018 12/7/2017    Basic metabolic panel Routine 12/8/2017 12/7/2018 12/7/2017    Clostridium difficile toxin B PCR Routine  1/7/2018 12/7/2017    Enteric Bacteria and Virus Panel by FELIPE Stool Routine  12/7/2018 12/7/2017    Clostridium difficile toxin B Routine  1/6/2018 12/7/2017    Enteric  "Bacteria and Virus Panel by FELIPE Stool Routine  12/7/2018 12/7/2017            Who to contact     If you have questions or need follow up information about today's clinic visit or your schedule please contact [facility] directly at [phone].  Normal or non-critical lab and imaging results will be communicated to you by menuvoxhart, letter or phone within 4 business days after the clinic has received the results. If you do not hear from us within 7 days, please contact the clinic through menuvoxhart or phone. If you have a critical or abnormal lab result, we will notify you by phone as soon as possible.  Submit refill requests through [employer] or call your pharmacy and they will forward the refill request to us. Please allow 3 business days for your refill to be completed.          Additional Information About Your Visit        menuvoxhart Information     [employer] gives you secure access to your electronic health record. If you see a primary care provider, you can also send messages to your care team and make appointments. If you have questions, please call your primary care clinic.  If you do not have a primary care provider, please call [phone] and they will assist you.        Care EveryWhere ID     This is your Care EveryWhere ID. This could be used by other organizations to access your [facility] medical records  [id number]        Your Vitals Were     Pulse Temperature Respirations Height Pulse Oximetry BMI (Body Mass Index)    95 98.4  F (36.9  C) (Oral) 16 1.626 m (5' 4.02\") 98% 20.04 kg/m2       Blood Pressure from Last 3 Encounters:   12/07/17 110/67   11/30/17 132/86   11/21/17 125/80    Weight from Last 3 Encounters:   12/07/17 53 kg (116 lb 12.8 oz)   11/30/17 54.2 kg (119 lb 6.4 oz)   11/21/17 53.7 kg (118 lb 4.8 oz)              We Performed the Following     Comprehensive metabolic panel          Where to get your medicines      These medications were sent to [email or web address] [id number] - " [place], [place] - [address] AT [facility] [address]  [address] [facility] [address] [address]     Phone:  [phone]     acyclovir 400 MG tablet          Primary Care Provider Office Phone # Fax #    Sanket Burciaga [phone]       [facility] [address] [facility] [address]        Equal Access to Services     ALBAN SHAH : Hadii aad ku hadasho Soomaali, waaxda luqadaha, qaybta kaalmada adeegyada, waxay idiin hayaan adeeg kharash la'fishn ah. So [facility] [phone].    ATENCIÓN: Si habla espjoshua, tiene a todd disposición servicios gratuitos de asistencia lingüística. [facility] [phone].    We comply with applicable federal civil rights laws and [place] laws. We do not discriminate on the basis of race, color, national origin, age, disability, sex, sexual orientation, or gender identity.            Thank you!     Thank you for choosing [facility]  for your care. Our goal is always to provide you with excellent care. Hearing back from our patients is one way we can continue to improve our services. Please take a few minutes to complete the written survey that you may receive in the mail after your visit with us. Thank you!             Your Updated Medication List - Protect others around you: Learn how to safely use, store and throw away your medicines at www.disposemymeds.org.          This list is accurate as of: 12/7/17 12:09 PM.  Always use your most recent med list.                   Brand Name Dispense Instructions for use Diagnosis    acyclovir 400 MG tablet    ZOVIRAX    60 tablet    Take 1 tablet (400 mg) by mouth 2 times daily    Multiple myeloma in relapse (H)       amLODIPine 5 MG tablet    NORVASC    90 tablet    TAKE 1 TABLET BY MOUTH AT BEDTIME    Essential hypertension       clopidogrel 75 MG tablet    PLAVIX    90 tablet    TAKE 1 TABLET BY MOUTH EVERY DAY    History of stroke       dexamethasone 4 MG tablet    DECADRON    40  tablet    Take 20 mg weekly on day of Velcade    Multiple myeloma not having achieved remission (H)       esomeprazole 40 MG CR capsule    nexIUM    30 capsule    Take 1 capsule (40 mg) by mouth every morning (before breakfast)    Gastroesophageal reflux disease without esophagitis       LORazepam 0.5 MG tablet    ATIVAN    30 tablet    Take 1 tablet (0.5 mg) by mouth every 6 hours as needed for nausea or anxiety.    Multiple myeloma in relapse (H), Delirium       nystatin [id number] UNIT/ML suspension    MYCOSTATIN    473 mL    Take 5 mLs (500,000 Units) by mouth 4 times daily Swish and spit. Continue until symptoms resolve and then take for 3 more days.    Thrush       OLANZapine 5 MG tablet    zyPREXA    60 tablet    Take 0.5 tablets (2.5 mg) by mouth At Bedtime    Delirium, Multiple myeloma in relapse (H)       ondansetron 8 MG ODT tab    ZOFRAN-ODT    30 tablet    Take 1 tablet (8 mg) by mouth every 8 hours as needed for nausea    Nausea       oxyCODONE IR 5 MG tablet    ROXICODONE    15 tablet    Take 1 tablet (5 mg) by mouth every 6 hours as needed for moderate to severe pain        potassium chloride SA 20 MEQ CR tablet    K-DUR/KLOR-CON M    30 tablet    Take 1 tablet (20 mEq) by mouth daily    Hypokalemia       SIMVASTATIN PO      Take 20 mg by mouth At Bedtime        traMADol 50 MG tablet    ULTRAM    60 tablet    Take 1 tablet (50 mg) by mouth every 6 hours as needed for moderate pain . Recommend trying after Tylenol and before Dilaudid.    Acute bilateral low back pain without sciatica, Multiple myeloma in relapse (H)       venetoclax 100 MG tablet CHEMOTHERAPY    VENCLEXTA    100 tablet    Take 8 tablets (800 mg) by mouth daily    Multiple myeloma in relapse (H)

## 2017-12-07 NOTE — LETTER
12/7/2017      RE: Anthony Carbone  [address] [place] [address]       HEMATOLOGY/ONCOLOGY PROGRESS NOTE  Dec 7, 2017    REASON FOR VISIT: myeloma    Diagnosis: 71 year old gentleman with IgM lambda multiple myeloma originally diagnosed in 01/2012 as stage I, standard risk disease.   Treatment: Revlimid plus dexamethasone for 4-5 cycles but plateaued by late 03/2012.   - Velcade was added and he received another 2-3 cycles, achieving a good partial remission.      Transplant: Single auto after melphalan 200 mg/m2 preparative regimen on 01/09/2013  - Post-transplant course: unremarkable except for some mild steroid-induced hyperglycemia, gastritis, nausea and vomiting.      Maintenance: Lenalidomide at day-100 then developed a maculopapular rash. Lenalidomide was held and then we re-challenged him after about a month to 2 months at a lower dose (5 mg daily); rash returned.   - was started on maintenance Velcade, every other week through July 2014, when he was noted with abnormalities on myeloma studies drawn there. Noted with prostate cancer dx at about the time of relapse (see below).     Relapse: noted with return on M-spike in blood/urine with marrow involvement but negative PET.   - Started on retreatment with RVD on 8/27/14 with Revlimid at 15 mg 14 days of 21 days, dexamethasone 40 mg weekly and velcade weekly.Completed at total of 5 cycles without complication by 12/2014.   - Started Cycle 6 on inc'd Rev dosing of 20mg daily x 2 weeks on 12/11/14 which was complicated by pneumonia.  - Adm 12/21-1/10 for human metapneumovirus pneumonia complicated by anorexia, HTN, depression, anorexia with significant weight loss.   - Restarted Ernesto/Dex only on 2/4/15 with good tolerance but with thrombocytopenia.   - Bendamustine added to Velcade/dexamethasone on 5/21/15 due to disease progression  - Velcade discontinued on 7/9/2015 due to side effects (orthostasis)  - Schedule changed to bendamustine 80 mg/m2  "days 1 and 2 on 28-day cycle  - Cycle 1 tolerated poorly due to lightheadedness and weakness  - Cycle 2 dose reduced to 60 mg/m2 and pre-meds adjusted  - Cycle 5 received on 9/17/15.  - 10/1/2015 - increasing IgM, M-spike - started Pomalidomide 4mg/day (21 days out of 28 days) and weekly Decadron 20mg on Oct 1st, 2015.    - Carfilzomib added on 10/22/2015 making this CPD.  - C3-C6  received in [place]    - Returned to [facility] and resumed CPD here    - Adm: 4/12-4/14 with fever, confusion, neutropenia. Noted on MRI to have acute/subacute CVA and subacute/chronic CVA; started on Plavix, given brief course of Abx and GCSF prior to d/c.     - Continued on Carfilzomib and dexamethasone alone  - Pomalyst added back on 7/13/2016 for rising FLC  - Start daratumumab 11/10/16. Changed to every other week after 4 weekly treatments d/t profound fatigue and malaise.   - Adm 5/7-5/16 due to AMS, hypercalcemia, hyperuricemia, possible PNA, back pain, and JOSE MARIA. Started Kyprolis while inpatient.  - Re-admitted 5/25-/529 with recurrent AMS, found to have concomitant PNA  - Resumed Kyprolis 6/13/2017. Stopped due to worsening performance status and progressive myeloma.  - Started Venclexta 800 mg 8/25/2017  - Added weekly Velcade with dexamethasone 20 mg weekly due to rising light chains on 11/1/2017    INTERVAL HISTORY:    Mr. Carbone is here with his wife.   When asked how things are going, both reply, \"not good\".  Over the weekend, Yamil developed worsening nausea, which ended up leading to vomiting on 2 occasions on Monday. No nausea or vomiting since that time. At the same time, he developed some intermittent diarrhea, 2-3 times daily, once through the night last night, but no further episodes today. No fevers or change in baseline chills. No abdominal pains presently, but notes that after each Velcade infusion, he gets 2-3 days of a poorly defined abdominal pain, without any GERD symptoms.     Yamil notes he continues to feel fatigued, " "more so in this past week. Eating and drinking is still less than baseline. Ate half of a bagel this morning with a glass of OJ. Interestingly his back pain remains largely improved. He is no longer even taking tramadol. He started PT for the arm and he thinks that has been helpful. No confusion recently.    Remainder of ROS otherwise was negative for cough, SOB, chest pain, bleeding, or swelling.    PHYSICAL EXAMINATION  /67 (BP Location: Right arm, Patient Position: Sitting, Cuff Size: Adult Regular)  Pulse 95  Temp 98.4  F (36.9  C) (Oral)  Resp 16  Ht 1.626 m (5' 4.02\")  Wt 53 kg (116 lb 12.8 oz)  SpO2 98%  BMI 20.04 kg/m2    Wt Readings from Last 10 Encounters:   17 53 kg (116 lb 12.8 oz)   17 54.2 kg (119 lb 6.4 oz)   17 53.7 kg (118 lb 4.8 oz)   17 53 kg (116 lb 12.8 oz)   17 53.4 kg (117 lb 12.8 oz)   17 54.1 kg (119 lb 3.2 oz)   10/19/17 54.4 kg (119 lb 14.4 oz)   10/06/17 54.4 kg (120 lb)   10/03/17 56.2 kg (123 lb 12.8 oz)   17 56.8 kg (125 lb 3.2 oz)   General: Alert. Oriented to name, , location,  Able to ambulate independently, albeit slow and cautiously.  No acute distress. HEENT: PERRL, no palor or icterus. CVS: RRR with occasional premature beat. CHEST: CTAB, normal work of breathing ABDOMEN: soft non tender no masses     NEURO: AAOX3  CN 2-12 intact  SKIN: no bleeding or brusiing, no rashes EXTREMITIES: No edema.     LABS:   Results for WILLIAN ARCINIEGA (MRN [id number]) as of 2017 08:24   Ref. Range 2017 09:34 2017 11:35 2017 08:46 2017 15:52 2017 11:58   Creatinine Latest Ref Range: 0.66 - 1.25 mg/dL 1.03 1.23 1.49 (H) 1.35 (H) 1.37 (H)   Results for WILLIAN ARCINIEGA (MRN [id number]) as of 2017 08:24   Ref. Range 2017 16:44 10/19/2017 11:02 2017 17:20 2017 11:58   Monoclonal Peak Latest Ref Range: 0.0 g/dL 1.8 (H) 2.1 (H) 2.5 (H) 2.3 (H)   Results for WILLIAN ARCINIEGA" MINAL (MRN [id number]) as of 12/7/2017 08:24   Ref. Range 10/19/2017 11:02 11/21/2017 17:20 11/30/2017 11:58   Lambda Free Lt Chain Latest Ref Range: 0.57 - 2.63 mg/dL 51.50 (H) 144.50 (H) 137.50 (H)   Results for WILLIAN ARCINIEGA (MRN [id number]) as of 12/7/2017 08:24   Ref. Range 10/19/2017 11:02 11/1/2017 09:34 11/8/2017 11:35 11/16/2017 08:45 11/21/2017 15:52 11/30/2017 11:58   Platelet Count Latest Ref Range: 150 - 450 10e9/L 150 140 (L) 111 (L) 79 (L) 62 (L) 76 (L)     IMPRESSION/PLAN:  71 year old male with relapsed MM after autologous transplant (1/9/2013), status-post multiple salvage regimens with progressive disease.    MM: Continues on Venclexta, with weekly Velcade/dex 20 mg added on 11/1 due to rising light chains. Yamil's light chains and Mspike have increased over the last few weeks, concomitantly with rising creatinine and worsening thrombocytopenia. Plan initially today was to transition to CyBorD; however we've got a couple things going on today. He has had diarrhea intermittently (last episode overnight) and vomiting (last on Monday), concerning for a possible viral gastroenteritis given he has not had these symptoms previously on this regimen. His creatinine is worse today, which could be from volume losses versus progressive MM. He has worsening fatigue, again this could be multifactorial. Plan to hold Velcade and Venclexta today and start dex 20 mg today and for the next 3 days. He is already scheduled to see Dr. Topete next week. We did do counseling on CyBorD in the event he is fit to start that next week. We did also discuss the option to not pursue further treatment; Yamil has significant fatigue and abdominal pain that he attributes to Velcade.     AMS: AMS started early May in setting of metabolic derangements, uremia, and possible infection.  Remains intermittently confused over the past few months but improved significantly, worsened over the weekend but then resolved. Stable  today.  - Continue Zyprexa 2.5 mg at bedtime  - Holding off long-acting pain meds    Heme: Cytopenias 2/2 treatment and likely MM in setting of progression, with recent worsening of thrombocytopenia. Will monitor.   - On Plavix, hold for platelets < 50. For now, with worsening TCP, planning on holding Plavix until further notice. He has had over a year of treatment on Plavix for suspected stroke, and I think at this time the risks outweigh benefit.  - Transfuse to keep hemoglobin > 8    Renal/FEN: Creat baseline ~0.8-1.0, recently has been increased beyond baseline, likely secondary to progressive myeloma. As above, worsened to 2 today, likely related to MM and recent volume losses. Will give a liter of NS today and tomorrow.  -Encouraged protein/calorie supplements.      ID: Presently afebrile w/o any localizing infectious s/s. Recently with nausea, diarrhea, body aches; all resolved now.  - Continues ppx  mg BID     Back/rib pain: Started early May. Lumbar MRI at OSH showing mild-mod central and foraminal stenoses in lumbar spine, per patient and wife, there was no MM lesion there. Rib films inpatient negative, back films stable from prior imaging. Pain has actually improved since started venotclax, no longer even using tramadol.    Abdominal pain: started with initiating velcade. Unclear if it could actually be related to dex? No GERD symptoms, lasts 2-3 days after each Velcade injection. Suggested he try the tramadol if needed. Asked that he monitor with holding Velcade today but doing Dex, see if recurs.    Fatigue: Worsening over the last few likes, likely multifactorial in setting of recent nausea, vomiting, diarrhea; treatment, myeloma, anemia. I asked Yamil and his wife to monitor this week with holding the venclexta, velcade.     CV: Continue zocor and norvasc.   - Avoid QTc prolonging agents (history of QTc prolongation with syncopal episodes)     GI: Historically only mild nausea on this regimen,  with worsening over the weekend and with one day of emesis x2(12/4), and onset of diarrhea, episodic 2-3x daily, last episode last night. Plan to check stool culture, C diff if diarrhea persists. He will  a kit on his way out today.    I spent >40 minutes with the patient, with over 50% of the time spent counseling or coordinating their care as described above.     PACO HerreraC

## 2017-12-07 NOTE — PATIENT INSTRUCTIONS
Contact Numbers    [facility] Main Line: [phone]  [facility] Triage:  [phone]    Call triage with chills and/or temperature greater than or equal to 100.5, uncontrolled nausea/vomiting, diarrhea, constipation, dizziness, shortness of breath, chest pain, bleeding, unexplained bruising, or any new/concerning symptoms, questions/concerns.     If you are having any concerning symptoms or wish to speak to a provider before your next infusion visit, please call your care coordinator or triage to notify them so we can adequately serve you.       After Hours: [phone]    If after hours, weekends, or holidays, call main hospital  and ask for Oncology doctor on call.           December 2017 Sunday Monday Tuesday Wednesday Thursday Friday Saturday                            1     2       3     4     5     6     7     [facility] DRAW   10:15 AM   (15 min.)    [facility] DRAW   [facility] Lab Draw     [facility] RETURN   10:35 AM   (50 min.)   Leanne Reddy PA-C   [facility]     [facility] ONC INFUSION 60   12:00 PM   (60 min.)    ONCOLOGY INFUSION   [facility] 8     [facility] DRAW    2:30 PM   (15 min.)    [facility] DRAW   [facility] Lab Draw     [facility] ONC INFUSION 120    3:00 PM   (120 min.)    ONCOLOGY INFUSION   [facility] 9       10     11     12     13     [facility] DRAW    4:45 PM   (15 min.)    [facility] DRAW   [facility] Lab Draw     [facility] ONC RETURN    5:30 PM   (30 min.)   Kamari Topete MD   [facility] Blood and Marrow Transplant 14     [facility] ONC INFUSION 60    3:00 PM   (60 min.)    ONCOLOGY INFUSION   [facility] 15     16       17     18     19     20     21     22     23       24     25     26     27     28     29     30       31 January 2018 Sunday Monday Tuesday Wednesday Thursday Friday Saturday        1     2     3     4      5     6       7     8     9     10     11     12     13       14     15     16     17     18     19     20       21     22     23     24     25     26     27       28     29     30     31                                Recent Results (from the past 24 hour(s))   CBC with platelets differential    Collection Time: 12/07/17 10:25 AM   Result Value Ref Range    WBC 2.6 (L) 4.0 - 11.0 10e9/L    RBC Count 2.62 (L) 4.4 - 5.9 10e12/L    Hemoglobin 8.8 (L) 13.3 - 17.7 g/dL    Hematocrit 28.0 (L) 40.0 - 53.0 %     (H) 78 - 100 fl    MCH 33.6 (H) 26.5 - 33.0 pg    MCHC 31.4 (L) 31.5 - 36.5 g/dL    RDW 15.3 (H) 10.0 - 15.0 %    Platelet Count 58 (L) 150 - 450 10e9/L    Diff Method Automated Method     % Neutrophils 72.8 %    % Lymphocytes 5.8 %    % Monocytes 20.6 %    % Eosinophils 0.0 %    % Basophils 0.0 %    % Immature Granulocytes 0.8 %    Nucleated RBCs 0 0 /100    Absolute Neutrophil 1.9 1.6 - 8.3 10e9/L    Absolute Lymphocytes 0.2 (L) 0.8 - 5.3 10e9/L    Absolute Monocytes 0.5 0.0 - 1.3 10e9/L    Absolute Eosinophils 0.0 0.0 - 0.7 10e9/L    Absolute Basophils 0.0 0.0 - 0.2 10e9/L    Abs Immature Granulocytes 0.0 0 - 0.4 10e9/L    Absolute Nucleated RBC 0.0     Anisocytosis Slight     Macrocytes Present     Platelet Estimate Confirming automated cell count    Comprehensive metabolic panel    Collection Time: 12/07/17 10:25 AM   Result Value Ref Range    Sodium 141 133 - 144 mmol/L    Potassium 4.6 3.4 - 5.3 mmol/L    Chloride 110 (H) 94 - 109 mmol/L    Carbon Dioxide 19 (L) 20 - 32 mmol/L    Anion Gap 11 3 - 14 mmol/L    Glucose 158 (H) 70 - 99 mg/dL    Urea Nitrogen 28 7 - 30 mg/dL    Creatinine 2.01 (H) 0.66 - 1.25 mg/dL    GFR Estimate 33 (L) >60 mL/min/1.7m2    GFR Estimate If Black 39 (L) >60 mL/min/1.7m2    Calcium 7.7 (L) 8.5 - 10.1 mg/dL    Bilirubin Total 0.2 0.2 - 1.3 mg/dL    Albumin 2.6 (L) 3.4 - 5.0 g/dL    Protein Total 8.5 6.8 - 8.8 g/dL    Alkaline Phosphatase 44 40 - 150 U/L    ALT 11 0 - 70 U/L     AST 8 0 - 45 U/L

## 2017-12-08 NOTE — MR AVS SNAPSHOT
After Visit Summary   12/8/2017    Anthony Carbone    MRN: [id number]           Patient Information     Date Of Birth          1945        Visit Information        Provider Department      12/8/2017 3:00 PM  16 ATC;  ONCOLOGY INFUSION [facility]        Today's Diagnoses     Multiple myeloma in relapse (H)    -  1    Vomiting and diarrhea          Care [facility] Main Line: [phone]    Call triage nurse with chills and/or temperature greater than or equal to 100.4, uncontrolled nausea/vomiting, diarrhea, constipation, dizziness, shortness of breath, chest pain, bleeding, unexplained bruising, or any new/concerning symptoms, questions/concerns.   If you are having any concerning symptoms or wish to speak to a provider before your next infusion visit, please call your care coordinator or triage to notify them so we can adequately serve you.   Triage Nurse Line: [phone]    If after hours, weekends, or holidays, call main hospital  and ask for Oncology doctor on call @ [phone]               December 2017 Sunday Monday Tuesday Wednesday Thursday Friday Saturday                            1     2       3     4     5     6     7     P [facility] DRAW   10:15 AM   (15 min.)    [facility] DRAW   [facility] Lab Draw     [facility] RETURN   10:35 AM   (50 min.)   Leanne Reddy PA-C   [facility]     [facility] ONC INFUSION 60   12:00 PM   (60 min.)    ONCOLOGY INFUSION   [facility] 8     [facility]    2:30 PM   (15 min.)    [facility]     [facility] ONC INFUSION 120    3:00 PM   (120 min.)    ONCOLOGY INFUSION   [facility] 9       10     11     12     13     [facility]    4:45 PM   (15 min.)   [facility] DRAW   [facility] Lab Draw     UMP ONC RETURN    5:30 PM   (30 min.)   Kamari Topete,  MD   [facility] Blood and Marrow Transplant 14     UMP ONC INFUSION 60    3:00 PM   (60 min.)   UC ONCOLOGY INFUSION   [facility] 15     16       17     18     19     20     21     22     23       24     25     26     27     28     29     30       31                                              January 2018 Sunday Monday Tuesday Wednesday Thursday Friday Saturday        1     2     3     4     5     6       7     8     9     10     11     12     13       14     15     16     17     18     19     20       21     22     23     24     25     26     27       28     29     30     31                                 Lab Results:  Recent Results (from the past 12 hour(s))   Basic metabolic panel    Collection Time: 12/08/17  2:31 PM   Result Value Ref Range    Sodium 138 133 - 144 mmol/L    Potassium 4.8 3.4 - 5.3 mmol/L    Chloride 108 94 - 109 mmol/L    Carbon Dioxide 21 20 - 32 mmol/L    Anion Gap 9 3 - 14 mmol/L    Glucose 165 (H) 70 - 99 mg/dL    Urea Nitrogen 30 7 - 30 mg/dL    Creatinine 1.77 (H) 0.66 - 1.25 mg/dL    GFR Estimate 38 (L) >60 mL/min/1.7m2    GFR Estimate If Black 46 (L) >60 mL/min/1.7m2    Calcium 7.6 (L) 8.5 - 10.1 mg/dL             Follow-ups after your visit        Your next 10 appointments already scheduled     Dec 13, 2017  4:45 PM CST   [facility] Draw with  [facility] DRAW   [facility] Lab Draw ([facility])    [address] [place] [id number] [phone]            Dec 13, 2017  5:30 PM CST   RETURN ONC with Kamari Topete MD   [facility] Blood and Marrow Transplant ([facility])    [address] [place] [id number] [phone]            Dec 14, 2017  3:00 PM CST   Infusion 60 with UC ONCOLOGY INFUSION, UC 26 ATC   [facility] ([facility])    [address] [place] [id number] [phone]               Future tests that were ordered for you today     Open Future Orders        Priority Expected Expires Ordered    CBC with platelets differential Routine 12/13/2017 1/7/2018 12/7/2017    Comprehensive metabolic panel Routine 12/13/2017 1/7/2018 12/7/2017    Clostridium difficile toxin B PCR Routine  1/7/2018 12/7/2017    Enteric Bacteria and Virus Panel by FELIPE Stool Routine  12/7/2018 12/7/2017    Clostridium difficile toxin B Routine  1/6/2018 12/7/2017    Enteric Bacteria and Virus Panel by FELIPE Stool Routine  12/7/2018 12/7/2017            Who to contact     If you have questions or need follow up information about today's clinic visit or your schedule please contact [facility] directly at [phone].  Normal or non-critical lab and imaging results will be communicated to you by MyChart, letter or phone within 4 business days after the clinic has received the results. If you do not hear from us within 7 days, please contact the clinic through [employer] or phone. If you have a critical or abnormal lab result, we will notify you by phone as soon as possible.  Submit refill requests through [employer] or call your pharmacy and they will forward the refill request to us. Please allow 3 business days for your refill to be completed.          Additional Information About Your Visit        [employer] Information     [employer] gives you secure access to your electronic health record. If you see a primary care provider, you can also send messages to your care team and make appointments. If you have questions, please call your primary care clinic.  If you do not have a primary care provider, please call [phone] and they will assist you.        Care EveryWhere ID     This is your Care EveryWhere ID. This could be used by other organizations to access your [place] medical records  [id number]        Your Vitals Were     Pulse Temperature Respirations Pulse Oximetry BMI (Body Mass Index)       84  97.4  F (36.3  C) (Oral) 16 98% 20.43 kg/m2        Blood Pressure from Last 3 Encounters:   12/08/17 140/85   12/07/17 110/67   11/30/17 132/86    Weight from Last 3 Encounters:   12/08/17 54 kg (119 lb 1.6 oz)   12/07/17 53 kg (116 lb 12.8 oz)   11/30/17 54.2 kg (119 lb 6.4 oz)              We Performed the Following     Basic metabolic panel        Primary Care Provider Office Phone # Fax #    Sanket Burciaga [phone]       [facility] [address] [facility] [address]        Equal Access to Services     ALBAN SHAH : Maribel Davis, conrad ge, kylah abdi, jonny villa. So [facility] [phone].    ATENCIÓN: Si habla español, tiene a todd disposición servicios gratuitos de asistencia lingüística. Llame al [phone].    We comply with applicable federal civil rights laws and [place] laws. We do not discriminate on the basis of race, color, national origin, age, disability, sex, sexual orientation, or gender identity.            Thank you!     Thank you for choosing [facility]  for your care. Our goal is always to provide you with excellent care. Hearing back from our patients is one way we can continue to improve our services. Please take a few minutes to complete the written survey that you may receive in the mail after your visit with us. Thank you!             Your Updated Medication List - Protect others around you: Learn how to safely use, store and throw away your medicines at www.disposemymeds.org.          This list is accurate as of: 12/8/17  4:34 PM.  Always use your most recent med list.                   Brand Name Dispense Instructions for use Diagnosis    acyclovir 400 MG tablet    ZOVIRAX    60 tablet    Take 1 tablet (400 mg) by mouth 2 times daily    Multiple myeloma in relapse (H)       amLODIPine 5 MG tablet    NORVASC    90 tablet    TAKE 1 TABLET BY MOUTH AT BEDTIME     Essential hypertension       clopidogrel 75 MG tablet    PLAVIX    90 tablet    TAKE 1 TABLET BY MOUTH EVERY DAY    History of stroke       dexamethasone 4 MG tablet    DECADRON    40 tablet    Take 20 mg weekly on day of Velcade    Multiple myeloma not having achieved remission (H)       esomeprazole 40 MG CR capsule    nexIUM    30 capsule    Take 1 capsule (40 mg) by mouth every morning (before breakfast)    Gastroesophageal reflux disease without esophagitis       LORazepam 0.5 MG tablet    ATIVAN    30 tablet    Take 1 tablet (0.5 mg) by mouth every 6 hours as needed for nausea or anxiety.    Multiple myeloma in relapse (H), Delirium       nystatin 468035 UNIT/ML suspension    MYCOSTATIN    473 mL    Take 5 mLs (500,000 Units) by mouth 4 times daily Swish and spit. Continue until symptoms resolve and then take for 3 more days.    Thrush       OLANZapine 5 MG tablet    zyPREXA    60 tablet    Take 0.5 tablets (2.5 mg) by mouth At Bedtime    Delirium, Multiple myeloma in relapse (H)       ondansetron 8 MG ODT tab    ZOFRAN-ODT    30 tablet    Take 1 tablet (8 mg) by mouth every 8 hours as needed for nausea    Nausea       oxyCODONE IR 5 MG tablet    ROXICODONE    15 tablet    Take 1 tablet (5 mg) by mouth every 6 hours as needed for moderate to severe pain        potassium chloride SA 20 MEQ CR tablet    K-DUR/KLOR-CON M    30 tablet    Take 1 tablet (20 mEq) by mouth daily    Hypokalemia       SIMVASTATIN PO      Take 20 mg by mouth At Bedtime        traMADol 50 MG tablet    ULTRAM    60 tablet    Take 1 tablet (50 mg) by mouth every 6 hours as needed for moderate pain . Recommend trying after Tylenol and before Dilaudid.    Acute bilateral low back pain without sciatica, Multiple myeloma in relapse (H)       venetoclax 100 MG tablet CHEMOTHERAPY    VENCLEXTA    100 tablet    Take 8 tablets (800 mg) by mouth daily    Multiple myeloma in relapse (H)

## 2017-12-08 NOTE — PROGRESS NOTES
Infusion Nursing Note:  Anthony Carbone presents today for IV Fluids.    Patient seen by provider today: No   present during visit today: Not Applicable.    Note: Offers no complaints.  Creat 1.77 today (down from 2.01 yesterday)    Intravenous Access:  Implanted Port.    Treatment Conditions:  Last Basic Metabolic Panel:  Lab Results   Component Value Date     12/08/2017      Lab Results   Component Value Date    POTASSIUM 4.8 12/08/2017     Lab Results   Component Value Date    CHLORIDE 108 12/08/2017     Lab Results   Component Value Date    JAZMIN 7.6 12/08/2017     Lab Results   Component Value Date    CO2 21 12/08/2017     Lab Results   Component Value Date    BUN 30 12/08/2017     Lab Results   Component Value Date    CR 1.77 12/08/2017     Lab Results   Component Value Date     12/08/2017         Post Infusion Assessment:  Patient tolerated infusion without incident.  Blood return noted pre and post infusion.  Site patent and intact, free from redness, edema or discomfort.  No evidence of extravasations.  Access discontinued per protocol.    Discharge Plan:   Patient declined prescription refills.  AVS to patient via [employer].  Patient will return Wednesday for labs/MD visit, Thursday for chemo for next appointment.   I/B to Anila Kelsey RNCC, Leanne AMARO, Dr. Topete regarding today's creat.  If need to return soon than Wednesday, please follow up with Yamil and wife.  Patient discharged in stable condition accompanied by: wife.  Departure Mode: Ambulatory.  Face to Face time: 0.    Estrella Chou RN

## 2017-12-08 NOTE — PATIENT INSTRUCTIONS
[facility] Main Line: [phone]    Call triage nurse with chills and/or temperature greater than or equal to 100.4, uncontrolled nausea/vomiting, diarrhea, constipation, dizziness, shortness of breath, chest pain, bleeding, unexplained bruising, or any new/concerning symptoms, questions/concerns.   If you are having any concerning symptoms or wish to speak to a provider before your next infusion visit, please call your care coordinator or triage to notify them so we can adequately serve you.   Triage Nurse Line: [phone]    If after hours, weekends, or holidays, call main hospital  and ask for Oncology doctor on call @ [phone]               December 2017 Sunday Monday Tuesday Wednesday Thursday Friday Saturday                            1     2       3     4     5     6     7     [facility] D[facility]   10:15 AM   (15 min.)    [facility] DRAW   [facility] Lab Draw     [facility] RETURN   10:35 AM   (50 min.)   Leanne Reddy PA-C   [facility]     UMP ONC INFUSION 60   12:00 PM   (60 min.)    ONCOLOGY INFUSION   [facility] 8     [facility] DRAW    2:30 PM   (15 min.)    [facility] DRAW   [facility] Draw     [facility] ONC INFUSION 120    3:00 PM   (120 min.)    ONCOLOGY INFUSION   [facility] 9       10     11     12     13     [facility] DRAW    4:45 PM   (15 min.)    [facility] DRAW   [facility] Lab Draw     UMP ONC RETURN    5:30 PM   (30 min.)   Kamari Topete MD   [facility] Blood and Marrow Transplant 14     [facility] ONC INFUSION 60    3:00 PM   (60 min.)    ONCOLOGY INFUSION   [facility] 15     16       17     18     19     20     21     22     23       24     25     26     27     28     29     30       31 January 2018 Sunday Monday Tuesday Wednesday Thursday Friday Saturday        1     2     3     4     5      6       7     8     9     10     11     12     13       14     15     16     17     18     19     20       21     22     23     24     25     26     27       28     29     30     31                                 Lab Results:  Recent Results (from the past 12 hour(s))   Basic metabolic panel    Collection Time: 12/08/17  2:31 PM   Result Value Ref Range    Sodium 138 133 - 144 mmol/L    Potassium 4.8 3.4 - 5.3 mmol/L    Chloride 108 94 - 109 mmol/L    Carbon Dioxide 21 20 - 32 mmol/L    Anion Gap 9 3 - 14 mmol/L    Glucose 165 (H) 70 - 99 mg/dL    Urea Nitrogen 30 7 - 30 mg/dL    Creatinine 1.77 (H) 0.66 - 1.25 mg/dL    GFR Estimate 38 (L) >60 mL/min/1.7m2    GFR Estimate If Black 46 (L) >60 mL/min/1.7m2    Calcium 7.6 (L) 8.5 - 10.1 mg/dL

## 2017-12-11 NOTE — TELEPHONE ENCOUNTER
Spouse calling with update. Pt was last seen 12/7. Velcade and venclexta were held and pt was given dex 20 mg to take for 4 days. He finished yesterday.   Spouse states he hasn't perked up and appetite is not better. He eats and drinks small amounts. He is weak and mostly lays around all day and mood is down.  No diarrhea or fevers. No chest pain or new pain. She thinks his breathing was faster. She counted his respirations at 23/minute.  I let her know 16-20 was normal.  Spouse felt better after talking through her concerns.  Pt will se Dr Topete 12/13.

## 2017-12-13 NOTE — NURSING NOTE
"Oncology Rooming Note    December 13, 2017 5:23 PM   Anthony Carbone is a 72 year old male who presents for:    Chief Complaint   Patient presents with     RECHECK     Multiple myeloma      Initial Vitals: Pulse 97  Temp 97.6  F (36.4  C) (Oral)  Resp 16  Ht 1.626 m (5' 4.02\")  Wt 53.5 kg (118 lb)  SpO2 98%  BMI 20.24 kg/m2 Estimated body mass index is 20.24 kg/(m^2) as calculated from the following:    Height as of this encounter: 1.626 m (5' 4.02\").    Weight as of this encounter: 53.5 kg (118 lb). Body surface area is 1.55 meters squared.  No Pain (0) Comment: Data Unavailable   No LMP for male patient.  Allergies reviewed: Yes  Medications reviewed: Yes    Medications: Medication refills not needed today.  Pharmacy name entered into EPIC:    [id number] [address] [facility] [address] [facility] [address]  [email or web address] [place] [address] [facility]  [facility] [id number] [place], [place] - [address] AT [facility] [address]    Clinical concerns:  No new concerns  Provider was notified.    5 minutes for nursing intake (face to face time)     Marleni Hernandez MA              "

## 2017-12-13 NOTE — MR AVS SNAPSHOT
After Visit Summary   12/13/2017    Anthony Carbone    MRN: [id number]           Patient Information     Date Of Birth          1945        Visit Information        Provider Department      12/13/2017 6:30 PM  12 ATC;  ONCOLOGY INFUSION [facility]        Today's Diagnoses     Multiple myeloma in relapse (H)    -  1      Care Instructions    Contact Numbers    [facility] [place]: [phone]  [facility] Triage:  [phone]    Call triage with chills and/or temperature greater than or equal to 100.5, uncontrolled nausea/vomiting, diarrhea, constipation, dizziness, shortness of breath, chest pain, bleeding, unexplained bruising, or any new/concerning symptoms, questions/concerns.     If you are having any concerning symptoms or wish to speak to a provider before your next infusion visit, please call your care coordinator or triage to notify them so we can adequately serve you.       After Hours: [phone]    If after hours, weekends, or holidays, call main hospital  and ask for Oncology doctor on call.         December 2017 Sunday Monday Tuesday Wednesday Thursday Friday Saturday                            1     2       3     4     5     6     7     [facility] DRAW   10:15 AM   (15 min.)    [facility] DRAW   [facility] Lab Draw     [facility] RETURN   10:35 AM   (50 min.)   Leanne Reddy PA-C   [facility]     [facility] ONC INFUSION 60   12:00 PM   (60 min.)    ONCOLOGY INFUSION   [facility] 8     [facility] DRAW    2:30 PM   (15 min.)    [facility] DRAW   [facility] Lab Draw     [facility] ONC INFUSION 120    3:00 PM   (120 min.)    ONCOLOGY INFUSION   [facility] 9       10     11     12     13     [facility] DRAW    4:45 PM   (15 min.)    [facility] DRAW   [facility] Lab Draw     [facility] ONC RETURN    5:30 PM   (30 min.)   Kamari Topete MD   [facility]     [facility] ONC INFUSION 120    6:30 PM   (120 min.)   [facility] ONCOLOGY INFUSION   [facility] 14     [facility] ONC INFUSION 120    3:00 PM   (120 min.)   [facility] ONCOLOGY INFUSION   [facility] 15     16       17     18     19     20     21     22     23       24     25     26     27     28     29     30       31                                              January 2018 Sunday Monday Tuesday Wednesday Thursday Friday Saturday        1     2     3     4     5     6       7     8     9     10     11     12     13       14     15     16     17     18     19     20       21     22     23     24     25     26     27       28     29     30     31                                 Lab Results:  Recent Results (from the past 12 hour(s))   Comprehensive metabolic panel    Collection Time: 12/13/17  4:58 PM   Result Value Ref Range    Sodium 139 133 - 144 mmol/L    Potassium 4.1 3.4 - 5.3 mmol/L    Chloride 108 94 - 109 mmol/L    Carbon Dioxide 21 20 - 32 mmol/L    Anion Gap 10 3 - 14 mmol/L    Glucose 115 (H) 70 - 99 mg/dL    Urea Nitrogen 37 (H) 7 - 30 mg/dL    Creatinine 2.10 (H) 0.66 - 1.25 mg/dL    GFR Estimate 31 (L) >60 mL/min/1.7m2    GFR Estimate If Black 38 (L) >60 mL/min/1.7m2    Calcium 7.2 (L) 8.5 - 10.1 mg/dL    Bilirubin Total 0.2 0.2 - 1.3 mg/dL    Albumin 2.3 (L) 3.4 - 5.0 g/dL    Protein Total 9.0 (H) 6.8 - 8.8 g/dL    Alkaline Phosphatase 42 40 - 150 U/L    ALT 28 0 - 70 U/L    AST 14 0 - 45 U/L   CBC with platelets and differential    Collection Time: 12/13/17  4:58 PM   Result Value Ref Range    WBC 4.5 4.0 - 11.0 10e9/L    RBC Count 2.67 (L) 4.4 - 5.9 10e12/L    Hemoglobin 8.9 (L) 13.3 - 17.7 g/dL    Hematocrit 28.2 (L) 40.0 - 53.0 %     (H) 78 - 100 fl    MCH 33.3 (H) 26.5 - 33.0 pg    MCHC 31.6 31.5 - 36.5 g/dL    RDW 15.6 (H) 10.0 - 15.0 %    Platelet Count 52 (L) 150 - 450 10e9/L    Diff Method Manual Differential     % Neutrophils 90.4 %    % Lymphocytes  3.5 %    % Monocytes 5.2 %    % Eosinophils 0.0 %    % Basophils 0.0 %    % Myelocytes 0.9 %    Absolute Neutrophil 4.1 1.6 - 8.3 10e9/L    Absolute Lymphocytes 0.2 (L) 0.8 - 5.3 10e9/L    Absolute Monocytes 0.2 0.0 - 1.3 10e9/L    Absolute Eosinophils 0.0 0.0 - 0.7 10e9/L    Absolute Basophils 0.0 0.0 - 0.2 10e9/L    Absolute Myelocytes 0.0 0 10e9/L    Anisocytosis Slight     Macrocytes Present     Platelet Estimate Confirming automated cell count                Follow-ups after your visit        Your next 10 appointments already scheduled     Dec 14, 2017  3:00 PM CST   Infusion 120 with UC ONCOLOGY INFUSION, UC 26 ATC   [facility] ([facility] [place])    [address] [address] [phone]              Who to contact     If you have questions or need follow up information about today's clinic visit or your schedule please contact [facility] directly at [phone].  Normal or non-critical lab and imaging results will be communicated to you by Mosaic Mallhart, letter or phone within 4 business days after the clinic has received the results. If you do not hear from us within 7 days, please contact the clinic through LemonC[employer] or phone. If you have a critical or abnormal lab result, we will notify you by phone as soon as possible.  Submit refill requests through [employer] or call your pharmacy and they will forward the refill request to us. Please allow 3 business days for your refill to be completed.          Additional Information About Your Visit        MyChart Information     [employer] gives you secure access to your electronic health record. If you see a primary care provider, you can also send messages to your care team and make appointments. If you have questions, please call your primary care clinic.  If you do not have a primary care provider, please call [phone] and they will assist you.        Care EveryWhere ID      This is your Care EveryWhere ID. This could be used by other organizations to access your [place] medical records  [id number]         Blood Pressure from Last 3 Encounters:   12/13/17 124/80   12/08/17 140/85   12/07/17 110/67    Weight from Last 3 Encounters:   12/13/17 53.5 kg (118 lb)   12/08/17 54 kg (119 lb 1.6 oz)   12/07/17 53 kg (116 lb 12.8 oz)              Today, you had the following     No orders found for display       Primary Care Provider Office Phone # Fax #    Sanket Burciaga [phone]       [facility] [address] [facility] [address]        Equal Access to Services     ALBAN SHAH : Maribel Davis, wanadine ge, qaybta kaalmada trinidad, jonny villa. So [facility] [phone].    ATENCIÓN: Si habla español, tiene a todd disposición servicios gratuitos de asistencia lingüística. [facility] [phone].    We comply with applicable federal civil rights laws and [place] laws. We do not discriminate on the basis of race, color, national origin, age, disability, sex, sexual orientation, or gender identity.            Thank you!     Thank you for choosing [facility]  for your care. Our goal is always to provide you with excellent care. Hearing back from our patients is one way we can continue to improve our services. Please take a few minutes to complete the written survey that you may receive in the mail after your visit with us. Thank you!             Your Updated Medication List - Protect others around you: Learn how to safely use, store and throw away your medicines at www.disposemymeds.org.          This list is accurate as of: 12/13/17  7:04 PM.  Always use your most recent med list.                   Brand Name Dispense Instructions for use Diagnosis    acyclovir 400 MG tablet    ZOVIRAX    60 tablet    Take 1 tablet (400 mg) by mouth 2 times daily    Multiple myeloma in relapse (H)        amLODIPine 5 MG tablet    NORVASC    90 tablet    TAKE 1 TABLET BY MOUTH AT BEDTIME    Essential hypertension       clopidogrel 75 MG tablet    PLAVIX    90 tablet    TAKE 1 TABLET BY MOUTH EVERY DAY    History of stroke       dexamethasone 4 MG tablet    DECADRON    40 tablet    Take 20 mg weekly on day of Velcade    Multiple myeloma not having achieved remission (H)       esomeprazole 40 MG CR capsule    nexIUM    30 capsule    Take 1 capsule (40 mg) by mouth every morning (before breakfast)    Gastroesophageal reflux disease without esophagitis       LORazepam 0.5 MG tablet    ATIVAN    30 tablet    Take 1 tablet (0.5 mg) by mouth every 6 hours as needed for nausea or anxiety.    Multiple myeloma in relapse (H), Delirium       nystatin 692387 UNIT/ML suspension    MYCOSTATIN    473 mL    Take 5 mLs (500,000 Units) by mouth 4 times daily Swish and spit. Continue until symptoms resolve and then take for 3 more days.    Thrush       OLANZapine 5 MG tablet    zyPREXA    60 tablet    Take 0.5 tablets (2.5 mg) by mouth At Bedtime    Delirium, Multiple myeloma in relapse (H)       ondansetron 8 MG ODT tab    ZOFRAN-ODT    30 tablet    Take 1 tablet (8 mg) by mouth every 8 hours as needed for nausea    Nausea       oxyCODONE IR 5 MG tablet    ROXICODONE    15 tablet    Take 1 tablet (5 mg) by mouth every 6 hours as needed for moderate to severe pain        potassium chloride SA 20 MEQ CR tablet    K-DUR/KLOR-CON M    30 tablet    Take 1 tablet (20 mEq) by mouth daily    Hypokalemia       SIMVASTATIN PO      Take 20 mg by mouth At Bedtime        traMADol 50 MG tablet    ULTRAM    60 tablet    Take 1 tablet (50 mg) by mouth every 6 hours as needed for moderate pain . Recommend trying after Tylenol and before Dilaudid.    Acute bilateral low back pain without sciatica, Multiple myeloma in relapse (H)       venetoclax 100 MG tablet CHEMOTHERAPY    VENCLEXTA    100 tablet    Take 8 tablets (800 mg) by mouth daily    Multiple  myeloma in relapse (H)

## 2017-12-13 NOTE — MR AVS SNAPSHOT
After Visit Summary   12/13/2017    Anthony Carbone    MRN: [id number]           Patient Information     Date Of Birth          1945        Visit Information        Provider Department      12/13/2017 5:30 PM Kamari Topete MD [facility]        Today's Diagnoses     SOB (shortness of breath)    -  1    Multiple myeloma in relapse (H)              [facility] ([facility]  [address] [place] [address]  Phone: [phone]  Clinic Hours:   Monday-Thursday:7am to 7pm   Friday: 7am to 5pm   Weekends and holidays:    8am to noon (in general)  If your fever is 100.5  or greater,   call the clinic.  After hours call the   hospital at [phone] or   [phone]. Ask for the BMT   fellow on-call            Follow-ups after your visit        Your next 10 appointments already scheduled     Dec 19, 2017  5:15 PM CST   Lab with  LAB   [facility] ([facility])    [address] [place] [address] [phone]            Dec 19, 2017  5:50 PM CST   (Arrive by 5:35 PM)   Return Visit with Leanne Reddy PA-C   [facility] ([facility])    [address] [place] [address] [phone]              Who to contact     If you have questions or need follow up information about today's clinic visit or your schedule please contact [facility] directly at [phone].  Normal or non-critical lab and imaging results will be communicated to you by MyChart, letter or phone within 4 business days after the clinic has received the results. If you do not hear from us within 7 days, please contact the clinic through MyChart or phone. If you have a critical or abnormal lab result, we will notify you by phone as soon as possible.  Submit refill requests through [employer] or call your pharmacy and they  "will forward the refill request to us. Please allow 3 business days for your refill to be completed.          Additional Information About Your Visit        Nepris Information     [employer]ep[employer] gives you secure access to your electronic health record. If you see a primary care provider, you can also send messages to your care team and make appointments. If you have questions, please call your primary care clinic.  If you do not have a primary care provider, please call [phone] and they will assist you.        Care EveryWhere ID     This is your Care EveryWhere ID. This could be used by other organizations to access your [place] medical records  [id number]        Your Vitals Were     Pulse Temperature Respirations Height Pulse Oximetry BMI (Body Mass Index)    97 97.6  F (36.4  C) (Oral) 16 1.626 m (5' 4.02\") 98% 20.24 kg/m2       Blood Pressure from Last 3 Encounters:   12/13/17 124/80   12/08/17 140/85   12/07/17 110/67    Weight from Last 3 Encounters:   12/13/17 53.5 kg (118 lb)   12/08/17 54 kg (119 lb 1.6 oz)   12/07/17 53 kg (116 lb 12.8 oz)              We Performed the Following     CBC with platelets and differential     Comprehensive metabolic panel        Recent Review Flowsheet Data     BMT Recent Results Latest Ref Rng & Units 11/8/2017 11/16/2017 11/21/2017 11/30/2017 12/7/2017 12/8/2017 12/13/2017    WBC 4.0 - 11.0 10e9/L 3.8(L) 3.1(L) 4.0 2.9(L) 2.6(L) - 4.5    Hemoglobin 13.3 - 17.7 g/dL 9.7(L) 9.4(L) 9.5(L) 8.9(L) 8.8(L) - 8.9(L)    Platelet Count 150 - 450 10e9/L 111(L) 79(L) 62(L) 76(L) 58(L) - 52(L)    Platelets 150 - 450 10:9/L - - - - - - -    Neutrophils (Absolute) 1.6 - 8.3 10e9/L 2.9 2.4 3.1 2.3 1.9 - 4.1    Blasts (Absolute) 10e9/L - - - - - - -    INR 0.86 - 1.14 - - - - - - -    Sodium 133 - 144 mmol/L 136 137 139 140 141 138 139    Potassium 3.4 - 5.3 mmol/L 4.3 3.9 4.2 4.2 4.6 4.8 4.1    Chloride 94 - 109 mmol/L 105 104 107 109 110(H) 108 108    Glucose 70 - 99 mg/dL 106(H) 121(H) " 101(H) 86 158(H) 165(H) 115(H)    Urea Nitrogen 7 - 30 mg/dL 22 23 22 23 28 30 37(H)    Creatinine 0.66 - 1.25 mg/dL 1.23 1.49(H) 1.35(H) 1.37(H) 2.01(H) 1.77(H) 2.10(H)    Calcium (Total) 8.5 - 10.1 mg/dL 9.1 8.9 8.5 8.0(L) 7.7(L) 7.6(L) 7.2(L)    Protein (Total) 6.8 - 8.8 g/dL 9.2(H) 8.5 8.6 8.4 8.5 - 9.0(H)    Albumin 3.4 - 5.0 g/dL 3.3(L) 3.1(L) 3.0(L) 3.0(L) 2.6(L) - 2.3(L)    Bilirubin (Direct) 0.0 - 0.2 mg/dL - - - - - - -    Alkaline Phosphatase 40 - 150 U/L 69 63 55 51 44 - 42    AST 0 - 45 U/L 8 6 6 8 8 - 14    ALT 0 - 70 U/L 20 15 15 14 11 - 28    MCV 78 - 100 fl 107(H) 107(H) 106(H) 107(H) 107(H) - 106(H)               Primary Care Provider Office Phone # Fax #    Sanket Burciaga [phone]       [facility] [address] [facility] [address]        Equal Access to Services     [facility] CenterWILFRED AH: Hadii chalo hernandezo Sojose, waaxda luqadaha, qaybta kaalmada adeegyada, jonny idiant villa. So [facility] [phone].    ATENCIÓN: Si maribella dinh, tiene a todd disposición servicios gratuitos de asistencia lingüística. Llame al [phone].    We comply with applicable federal civil rights laws and [place] laws. We do not discriminate on the basis of race, color, national origin, age, disability, sex, sexual orientation, or gender identity.            Thank you!     Thank you for choosing [facility]  for your care. Our goal is always to provide you with excellent care. Hearing back from our patients is one way we can continue to improve our services. Please take a few minutes to complete the written survey that you may receive in the mail after your visit with us. Thank you!             Your Updated Medication List - Protect others around you: Learn how to safely use, store and throw away your medicines at www.disposemymeds.org.          This list is accurate as of: 12/13/17 11:59 PM.  Always use your most recent med list.                    Brand Name Dispense Instructions for use Diagnosis    acyclovir 400 MG tablet    ZOVIRAX    60 tablet    Take 1 tablet (400 mg) by mouth 2 times daily    Multiple myeloma in relapse (H)       amLODIPine 5 MG tablet    NORVASC    90 tablet    TAKE 1 TABLET BY MOUTH AT BEDTIME    Essential hypertension       clopidogrel 75 MG tablet    PLAVIX    90 tablet    TAKE 1 TABLET BY MOUTH EVERY DAY    History of stroke       dexamethasone 4 MG tablet    DECADRON    40 tablet    Take 20 mg weekly on day of Velcade    Multiple myeloma not having achieved remission (H)       esomeprazole 40 MG CR capsule    nexIUM    30 capsule    Take 1 capsule (40 mg) by mouth every morning (before breakfast)    Gastroesophageal reflux disease without esophagitis       LORazepam 0.5 MG tablet    ATIVAN    30 tablet    Take 1 tablet (0.5 mg) by mouth every 6 hours as needed for nausea or anxiety.    Multiple myeloma in relapse (H), Delirium       nystatin [id number] UNIT/ML suspension    MYCOSTATIN    473 mL    Take 5 mLs (500,000 Units) by mouth 4 times daily Swish and spit. Continue until symptoms resolve and then take for 3 more days.    Thrush       OLANZapine 5 MG tablet    zyPREXA    60 tablet    Take 0.5 tablets (2.5 mg) by mouth At Bedtime    Delirium, Multiple myeloma in relapse (H)       ondansetron 8 MG ODT tab    ZOFRAN-ODT    30 tablet    Take 1 tablet (8 mg) by mouth every 8 hours as needed for nausea    Nausea       oxyCODONE IR 5 MG tablet    ROXICODONE    15 tablet    Take 1 tablet (5 mg) by mouth every 6 hours as needed for moderate to severe pain        potassium chloride SA 20 MEQ CR tablet    K-DUR/KLOR-CON M    30 tablet    Take 1 tablet (20 mEq) by mouth daily    Hypokalemia       SIMVASTATIN PO      Take 20 mg by mouth At Bedtime        traMADol 50 MG tablet    ULTRAM    60 tablet    Take 1 tablet (50 mg) by mouth every 6 hours as needed for moderate pain . Recommend trying after Tylenol and before Dilaudid.     Acute bilateral low back pain without sciatica, Multiple myeloma in relapse (H)       venetoclax 100 MG tablet CHEMOTHERAPY    VENCLEXTA    100 tablet    Take 8 tablets (800 mg) by mouth daily    Multiple myeloma in relapse (H)

## 2017-12-14 NOTE — PROGRESS NOTES
HEMATOLOGY/ONCOLOGY PROGRESS NOTE  Dec 13, 2017    REASON FOR VISIT: myeloma    Diagnosis: 71 year old gentleman with IgM lambda multiple myeloma originally diagnosed in 01/2012 as stage I, standard risk disease.   Treatment: Revlimid plus dexamethasone for 4-5 cycles but plateaued by late 03/2012.   - Velcade was added and he received another 2-3 cycles, achieving a good partial remission.      Transplant: Single auto after melphalan 200 mg/m2 preparative regimen on 01/09/2013  - Post-transplant course: unremarkable except for some mild steroid-induced hyperglycemia, gastritis, nausea and vomiting.      Maintenance: Lenalidomide at day-100 then developed a maculopapular rash. Lenalidomide was held and then we re-challenged him after about a month to 2 months at a lower dose (5 mg daily); rash returned.   - was started on maintenance Velcade, every other week through July 2014, when he was noted with abnormalities on myeloma studies drawn there. Noted with prostate cancer dx at about the time of relapse (see below).     Relapse: noted with return on M-spike in blood/urine with marrow involvement but negative PET.   - Started on retreatment with RVD on 8/27/14 with Revlimid at 15 mg 14 days of 21 days, dexamethasone 40 mg weekly and velcade weekly.Completed at total of 5 cycles without complication by 12/2014.   - Started Cycle 6 on inc'd Rev dosing of 20mg daily x 2 weeks on 12/11/14 which was complicated by pneumonia.  - Adm 12/21-1/10 for human metapneumovirus pneumonia complicated by anorexia, HTN, depression, anorexia with significant weight loss.   - Restarted Ernesto/Dex only on 2/4/15 with good tolerance but with thrombocytopenia.   - Bendamustine added to Velcade/dexamethasone on 5/21/15 due to disease progression  - Velcade discontinued on 7/9/2015 due to side effects (orthostasis)  - Schedule changed to bendamustine 80 mg/m2 days 1 and 2 on 28-day cycle  - Cycle 1 tolerated poorly due to lightheadedness and  "weakness  - Cycle 2 dose reduced to 60 mg/m2 and pre-meds adjusted  - Cycle 5 received on 9/17/15.  - 10/1/2015 - increasing IgM, M-spike - started Pomalidomide 4mg/day (21 days out of 28 days) and weekly Decadron 20mg on Oct 1st, 2015.    - Carfilzomib added on 10/22/2015 making this CPD.  - C3-C6  received in [place]    - Returned to [facility] and resumed CPD here    - Adm: 4/12-4/14 with fever, confusion, neutropenia. Noted on MRI to have acute/subacute CVA and subacute/chronic CVA; started on Plavix, given brief course of Abx and GCSF prior to d/c.     - Continued on Carfilzomib and dexamethasone alone  - Pomalyst added back on 7/13/2016 for rising FLC  - Start daratumumab 11/10/16. Changed to every other week after 4 weekly treatments d/t profound fatigue and malaise.   - Adm 5/7-5/16 due to AMS, hypercalcemia, hyperuricemia, possible PNA, back pain, and JOSE MARIA. Started Kyprolis while inpatient.  - Re-admitted 5/25-/529 with recurrent AMS, found to have concomitant PNA  - Resumed Kyprolis 6/13/2017. Stopped due to worsening performance status and progressive myeloma.  - Started Venclexta 800 mg 8/25/2017  - Added weekly Velcade with dexamethasone 20 mg weekly due to rising light chains on 11/1/2017    INTERVAL HISTORY:  Yamil is here with Casey today. He remained weak since most of the past week despite chemo being held.  He is just maybe starting to turn a corner in terms of eating/drinking/nausea.  Doesn't feel that Velcade is doing him good as it takes so much out of him for several days after the injection.  He remains without bony pains. Resting a lot per Casey. They've continued to hold PT for now d/t energy but hope to get back to it soon.   Remainder of ROS otherwise was negative for cough, chest pain, bleeding, or swelling. Some GIVENS last week worse, better (a little) this week.     PHYSICAL EXAMINATION  /80  Pulse 97  Temp 97.6  F (36.4  C) (Oral)  Resp 16  Ht 1.626 m (5' 4.02\")  Wt 53.5 kg (118 lb)  " SpO2 98%  BMI 20.24 kg/m2    Wt Readings from Last 10 Encounters:   17 53.5 kg (118 lb)   17 54 kg (119 lb 1.6 oz)   17 53 kg (116 lb 12.8 oz)   17 54.2 kg (119 lb 6.4 oz)   17 53.7 kg (118 lb 4.8 oz)   17 53 kg (116 lb 12.8 oz)   17 53.4 kg (117 lb 12.8 oz)   17 54.1 kg (119 lb 3.2 oz)   10/19/17 54.4 kg (119 lb 14.4 oz)   10/06/17 54.4 kg (120 lb)   General: Alert. Oriented to name, , location,  Able to ambulate independently, albeit slow and cautiously.  No acute distress. HEENT: PERRL, no palor or icterus. CVS: RRR with occasional premature beat. CHEST: CTAB, normal work of breathing ABDOMEN: soft non tender no masses   NEURO: AAOX3  CN 2-12 intact  SKIN: no bleeding or brusiing, no rashes EXTREMITIES: No edema.     LABS:   CBC RESULTS:   Recent Labs   Lab Test  17   1658   WBC  4.5   RBC  2.67*   HGB  8.9*   HCT  28.2*   MCV  106*   MCH  33.3*   MCHC  31.6   RDW  15.6*   PLT  52*     Recent Labs   Lab Test  17   1658  17   1431   NA  139  138   POTASSIUM  4.1  4.8   CHLORIDE  108  108   CO2  21  21   ANIONGAP  10  9   GLC  115*  165*   BUN  37*  30   CR  2.10*  1.77*   JAZMIN  7.2*  7.6*     Liver Function Studies -   Recent Labs   Lab Test  17   1658   PROTTOTAL  9.0*   ALBUMIN  2.3*   BILITOTAL  0.2   ALKPHOS  42   AST  14   ALT  28       IMPRESSION/PLAN:  72 year old male with relapsed MM after autologous transplant (2013), status-post multiple salvage regimens with progressive disease.    MM: Last regimen Venclexta, with weekly Velcade/dex 20 mg added on  due to rising light chains.  - Light chains and creatinine have continued to climb.  Counesled Yamil and Casey on options- trial of different therapy which would onely be temporizing,  Hospice, or clinical trial elsewhere if he could find one.  After much discussion and because he is feeling otherwise well, they wanted to try something else at this time.  I offered Cytoxan weekly  to which I will add PRednisone QOD. Talked about side effect profile and expectations with it.  Talked about little meaningful response but that it was a class of drugs he's not seen yet.  Also talked about trial of CHOP which has been effective in some of my refractory patients but which would demand a better performance status than what he has right now.  - Talked about HD in the setting of ARF and how many Renal physicians may not offer HD in this setting and how the institution of this could be life changing tying Yamil to medical clinics more frequently during his week. I suggested that focusing on quality time vs. Quantity of time is something that they should consider right now.   - Talked about getting to [place] and I told them all the medicines I'm giving him can be given there too such that if he wanted to go there, I would be supportive.     AMS: AMS started early May in setting of metabolic derangements, uremia, and possible infection.  Remains intermittently confused over the past few months but improved significantly.  Stable today.  - Continue Zyprexa 2.5 mg at bedtime  - Holding off long-acting pain meds    Heme: Cytopenias 2/2 treatment and likely MM in setting of progression, with recent worsening of thrombocytopenia. Will monitor.   - On Plavix, hold for platelets < 50. For now, with worsening TCP, planning on holding Plavix until further notice. He has had over a year of treatment on Plavix for suspected stroke  - Transfuse to keep hemoglobin > 8; expect counts to worsen some over the next few weeks with the Cytoxan.     Renal/FEN: Creat baseline ~0.8-1.0, recently has been increased beyond baseline, likely secondary to progressive myeloma. As above, worsened to 2 today, likely related to MM and recent volume losses. Will give a liter of NS today and tomorrow with the Cytoxan.   -Encouraged protein/calorie supplements.      ID: Presently afebrile w/o any localizing infectious s/s. Recently with nausea,  diarrhea, body aches; all resolved now.  - Continues ppx  mg BID     Back/rib pain: Started early May. Lumbar MRI at OSH showing mild-mod central and foraminal stenoses in lumbar spine, per patient and wife, there was no MM lesion there. Rib films inpatient negative, back films stable from prior imaging.   - Well controlled now.     Abdominal pain: Will see if this improved with stopping the Velcade.     Fatigue: Worsening over the last few likes, likely multifactorial. Will see how he does on the Cytoxan.      CV: Continue zocor and norvasc.   - Avoid QTc prolonging agents (history of QTc prolongation with syncopal episodes)     GI: Historically only mild nausea on this regimen, with worsening over the weekend and with one day of emesis x2(12/4), and onset of diarrhea, episodic 2-3x daily, last episode last night. Plan to check stool culture, C diff if diarrhea persists. He will  a kit on his way out today.    Bolus tonight.  Tomorrow will give Cytoxan instead of Velcade. RNs/Pharmacy need to know and I will enter order tonight.  CXR tomorrow.   RTC on Mon for labs/ALEXANDER     [employer], DO

## 2017-12-14 NOTE — PROGRESS NOTES
Infusion Nursing Note:  Anthony Carbone presents today for IVF.    Patient seen by provider today: Yes: Dr. Topete    Note: Patient presents to clinic today feeling well with no questions.      Intravenous Access:  Implanted Port.    Treatment Conditions:  Lab Results   Component Value Date    HGB 8.9 12/13/2017     Lab Results   Component Value Date    WBC 4.5 12/13/2017      Lab Results   Component Value Date    ANEU 4.1 12/13/2017     Lab Results   Component Value Date    PLT 52 12/13/2017      Lab Results   Component Value Date     12/13/2017                   Lab Results   Component Value Date    POTASSIUM 4.1 12/13/2017           Lab Results   Component Value Date    MAG 2.2 05/25/2017            Lab Results   Component Value Date    CR 2.10 12/13/2017                   Lab Results   Component Value Date    JAZMIN 7.2 12/13/2017                Lab Results   Component Value Date    BILITOTAL 0.2 12/13/2017           Lab Results   Component Value Date    ALBUMIN 2.3 12/13/2017                    Lab Results   Component Value Date    ALT 28 12/13/2017           Lab Results   Component Value Date    AST 14 12/13/2017     Results reviewed, labs MET treatment parameters, ok to proceed with treatment.    Post Infusion Assessment:  Patient tolerated infusion without incident.  Blood return noted pre and post infusion.  Site patent and intact, free from redness, edema or discomfort.  No evidence of extravasations.  Access discontinued per protocol.    Discharge Plan:   Patient declined prescription refills.  Discharge instructions reviewed with: Patient.  Patient and/or family verbalized understanding of discharge instructions and all questions answered.  AVS to patient via [employer].  Patient will return 12/14/2017 for next appointment.   Departure Mode: Ambulatory.    Nelsy Nunez RN

## 2017-12-14 NOTE — MR AVS SNAPSHOT
After Visit Summary   12/14/2017    Anthony Carbone    MRN: [id number]           Patient Information     Date Of Birth          1945        Visit Information        Provider Department      12/14/2017 3:00 PM  26 ATC;  ONCOLOGY INFUSION [facility]        Today's Diagnoses     Multiple myeloma in relapse (H)    -  1      Care Instructions    Contact Numbers    [facility] [place]: [phone]  [facility] Triage:  [phone]    Call triage with chills and/or temperature greater than or equal to 100.5, uncontrolled nausea/vomiting, diarrhea, constipation, dizziness, shortness of breath, chest pain, bleeding, unexplained bruising, or any new/concerning symptoms, questions/concerns.     If you are having any concerning symptoms or wish to speak to a provider before your next infusion visit, please call your care coordinator or triage to notify them so we can adequately serve you.       After Hours: [phone]    If after hours, weekends, or holidays, call main hospital  and ask for Oncology doctor on call.           December 2017 Sunday Monday Tuesday Wednesday Thursday Friday Saturday                            1     2       3     4     5     6     7     [facility] DRAW   10:15 AM   (15 min.)    [facility] DRAW   [facility] Lab Draw     [facility] RETURN   10:35 AM   (50 min.)   Leanne Reddy PA-C   [facility]     [facility] ONC INFUSION 60   12:00 PM   (60 min.)    ONCOLOGY INFUSION   [facility] 8     [facility] DRAW    2:30 PM   (15 min.)    [facility] DRAW   [facility] Lab Draw     [facility] ONC INFUSION 120    3:00 PM   (120 min.)    ONCOLOGY INFUSION   [facility] 9       10     11     12     13     [facility] DRAW    4:45 PM   (15 min.)    [facility] DRAW   [facility] Lab Draw     [facility] ONC RETURN    5:30 PM   (30 min.)   Kamari Topete MD   [facility] Blood [facility]     P ONC INFUSION 120    6:30 PM   (120 min.)   [facility] ONCOLOGY INFUSION   [facility] 14     UM ONC INFUSION 120    3:00 PM   (120 min.)    ONCOLOGY INFUSION   [facility]     XR CHEST 2 VIEWS    4:45 PM   (15 min.)   XR1   [facility] Xray 15     16       17     18     19     LAB WITH  CLINIC    5:15 PM   (15 min.)    LAB   [facility] Lab     [facility] RETURN    5:35 PM   (50 min.)   Leanne Reddy PA-C [facility] 20     21     22     23       24     25     26     27     28     29     30       31                                              January 2018 Sunday Monday Tuesday Wednesday Thursday Friday Saturday        1     2     3     4     5     6       7     8     9     10     11     12     13       14     15     16     17     18     19     20       21     22     23     24     25     26     27       28     29     30     31                                     Follow-ups after your visit        Your next 10 appointments already scheduled     Dec 14, 2017  5:00 PM CST   (Arrive by 4:45 PM)   XR CHEST 2 VIEWS with XR[address] [place] [address] Xray ([facility])    [address] [place] [address] [phone]           Please bring a list of your current medicines to your exam. (Include vitamins, minerals and over-thecounter medicines.) Leave your valuables at home.  Tell your doctor if there is a chance you may be pregnant.  You do not need to do anything special for this exam.            Dec 19, 2017  5:15 PM CST   Lab with  LAB   [facility] Lab ([facility])    [address] [place] [address] [phone]            Dec 19, 2017  5:50 PM CST   (Arrive by 5:35 PM)   Return Visit with ALPHONSO Coffey [facility] ([facility])    [address] [place] [address]  Se  2nd Floor  [facility] [address] [phone]              Future tests that were ordered for you today     Open Future Orders        Priority Expected Expires Ordered    X-ray Chest 2 vws Routine 12/14/2017 12/14/2018 12/14/2017            Who to contact     If you have questions or need follow up information about today's clinic visit or your schedule please contact [facility] directly at [phone].  Normal or non-critical lab and imaging results will be communicated to you by [employer], letter or phone within 4 business days after the clinic has received the results. If you do not hear from us within 7 days, please contact the clinic through [employer] or phone. If you have a critical or abnormal lab result, we will notify you by phone as soon as possible.  Submit refill requests through [employer] or call your pharmacy and they will forward the refill request to us. Please allow 3 business days for your refill to be completed.          Additional Information About Your Visit        [employer] Information     [employer] gives you secure access to your electronic health record. If you see a primary care provider, you can also send messages to your care team and make appointments. If you have questions, please call your primary care clinic.  If you do not have a primary care provider, please call [phone] and they will assist you.        Care EveryWhere ID     This is your Care EveryWhere ID. This could be used by other organizations to access your [facility] medical records  [id number]         Blood Pressure from Last 3 Encounters:   12/13/17 124/80   12/08/17 140/85   12/07/17 110/67    Weight from Last 3 Encounters:   12/13/17 53.5 kg (118 lb)   12/08/17 54 kg (119 lb 1.6 oz)   12/07/17 53 kg (116 lb 12.8 oz)              Today, you had the following     No orders found for display       Primary Care Provider Office Phone # Fax #    Sanket Burciaga [phone]       [facility] [address] [facility] [address]        Equal Access to Services     ALBAN SHAH : Hadii aad ku hadyamelluh Sojose, wacatherineda joo, qaольгаta jaswantmaabhijit abdi, jonny pricepetrmartha villa. So [facility] [phone].    ATENCIÓN: Si habla español, tiene a todd disposición servicios gratuitos de asistencia lingüística. Llame al [phone].    We comply with applicable federal civil rights laws and [place] laws. We do not discriminate on the basis of race, color, national origin, age, disability, sex, sexual orientation, or gender identity.            Thank you!     Thank you for choosing [facility]  for your care. Our goal is always to provide you with excellent care. Hearing back from our patients is one way we can continue to improve our services. Please take a few minutes to complete the written survey that you may receive in the mail after your visit with us. Thank you!             Your Updated Medication List - Protect others around you: Learn how to safely use, store and throw away your medicines at www.disposemymeds.org.          This list is accurate as of: 12/14/17  4:26 PM.  Always use your most recent med list.                   Brand Name Dispense Instructions for use Diagnosis    acyclovir 400 MG tablet    ZOVIRAX    60 tablet    Take 1 tablet (400 mg) by mouth 2 times daily    Multiple myeloma in relapse (H)       amLODIPine 5 MG tablet    NORVASC    90 tablet    TAKE 1 TABLET BY MOUTH AT BEDTIME    Essential hypertension       clopidogrel 75 MG tablet    PLAVIX    90 tablet    TAKE 1 TABLET BY MOUTH EVERY DAY    History of stroke       dexamethasone 4 MG tablet    DECADRON    40 tablet    Take 20 mg weekly on day of Velcade    Multiple myeloma not having achieved remission (H)       esomeprazole 40 MG CR capsule    nexIUM    30 capsule    Take 1 capsule (40 mg) by mouth every morning (before breakfast)    Gastroesophageal reflux disease without  esophagitis       LORazepam 0.5 MG tablet    ATIVAN    30 tablet    Take 1 tablet (0.5 mg) by mouth every 6 hours as needed for nausea or anxiety.    Multiple myeloma in relapse (H), Delirium       nystatin [id number] UNIT/ML suspension    MYCOSTATIN    473 mL    Take 5 mLs (500,000 Units) by mouth 4 times daily Swish and spit. Continue until symptoms resolve and then take for 3 more days.    Thrush       OLANZapine 5 MG tablet    zyPREXA    60 tablet    Take 0.5 tablets (2.5 mg) by mouth At Bedtime    Delirium, Multiple myeloma in relapse (H)       ondansetron 8 MG ODT tab    ZOFRAN-ODT    30 tablet    Take 1 tablet (8 mg) by mouth every 8 hours as needed for nausea    Nausea       oxyCODONE IR 5 MG tablet    ROXICODONE    15 tablet    Take 1 tablet (5 mg) by mouth every 6 hours as needed for moderate to severe pain        potassium chloride SA 20 MEQ CR tablet    K-DUR/KLOR-CON M    30 tablet    Take 1 tablet (20 mEq) by mouth daily    Hypokalemia       SIMVASTATIN PO      Take 20 mg by mouth At Bedtime        traMADol 50 MG tablet    ULTRAM    60 tablet    Take 1 tablet (50 mg) by mouth every 6 hours as needed for moderate pain . Recommend trying after Tylenol and before Dilaudid.    Acute bilateral low back pain without sciatica, Multiple myeloma in relapse (H)       venetoclax 100 MG tablet CHEMOTHERAPY    VENCLEXTA    100 tablet    Take 8 tablets (800 mg) by mouth daily    Multiple myeloma in relapse (H)

## 2017-12-14 NOTE — PATIENT INSTRUCTIONS
Contact Numbers    [facility] Main Line: [phone]  [facility] Triage:  [phone]    Call triage with chills and/or temperature greater than or equal to 100.5, uncontrolled nausea/vomiting, diarrhea, constipation, dizziness, shortness of breath, chest pain, bleeding, unexplained bruising, or any new/concerning symptoms, questions/concerns.     If you are having any concerning symptoms or wish to speak to a provider before your next infusion visit, please call your care coordinator or triage to notify them so we can adequately serve you.       After Hours: [phone]    If after hours, weekends, or holidays, call main hospital  and ask for Oncology doctor on call.           December 2017 Sunday Monday Tuesday Wednesday Thursday Friday Saturday                            1     2       3     4     5     6     7     [facility] D[facility]   10:15 AM   (15 min.)    [facility] DRAW   [facility] Lab Draw     [facility] RETURN   10:35 AM   (50 min.)   Leanne Reddy PA-C   [facility]     [facility] ONC INFUSION 60   12:00 PM   (60 min.)    ONCOLOGY INFUSION   [facility] 8     [facility] DRAW    2:30 PM   (15 min.)    [facility] DRAW   [facility] Lab Draw     [facility] ONC INFUSION 120    3:00 PM   (120 min.)    ONCOLOGY INFUSION   [facility] 9       10     11     12     13     [facility] DRAW    4:45 PM   (15 min.)    [facility] DRAW   [facility] Lab Draw     [facility] ONC RETURN    5:30 PM   (30 min.)   Kamari Topete MD   [facility] Blood and [facility]     [facility] ONC INFUSION 120    6:30 PM   (120 min.)    ONCOLOGY INFUSION   [facility] 14     [facility] ONC INFUSION 120    3:00 PM   (120 min.)    ONCOLOGY INFUSION   [facility]     XR CHEST 2 VIEWS    4:45 PM   (15 min.)   UCXR1   [facility] Xray 15     16       17     18     19     LAB WITH  CLINIC    5:15 PM   (15 min.)     LAB    [facility]     [facility] RETURN    5:35 PM   (50 min.)   Leanne Reddy PA-C   [facility] 20     21     22     23       24     25     26     27     28     29     30       31                                              January 2018 Sunday Monday Tuesday Wednesday Thursday Friday Saturday        1     2     3     4     5     6       7     8     9     10     11     12     13       14     15     16     17     18     19     20       21     22     23     24     25     26     27       28     29     30     31

## 2017-12-14 NOTE — PROGRESS NOTES
Infusion Nursing Note:  Anthony Carbone presents today for cycle 2, day 1 (per treatment plan) cytoxan.    Patient seen by provider today: No   present during visit today: Not Applicable.    Note: Examined by Dr. Topete yesterday; reports no changes. Will proceed with treatment.    Intravenous Access:  Implanted Port.    Treatment Conditions:  Lab Results   Component Value Date    HGB 8.9 12/13/2017     Lab Results   Component Value Date    WBC 4.5 12/13/2017      Lab Results   Component Value Date    ANEU 4.1 12/13/2017     Lab Results   Component Value Date    PLT 52 12/13/2017      Lab Results   Component Value Date     12/13/2017                   Lab Results   Component Value Date    POTASSIUM 4.1 12/13/2017           Lab Results   Component Value Date    MAG 2.2 05/25/2017            Lab Results   Component Value Date    CR 2.10 12/13/2017                   Lab Results   Component Value Date    JAZMIN 7.2 12/13/2017                Lab Results   Component Value Date    BILITOTAL 0.2 12/13/2017           Lab Results   Component Value Date    ALBUMIN 2.3 12/13/2017                    Lab Results   Component Value Date    ALT 28 12/13/2017           Lab Results   Component Value Date    AST 14 12/13/2017     Results reviewed from 12/13, labs MET treatment parameters, ok to proceed with treatment.    Post Infusion Assessment:  Patient tolerated infusion without incident.  Blood return noted pre and post infusion.  Site patent and intact, free from redness, edema or discomfort.  No evidence of extravasations.  Access discontinued per protocol.    Discharge Plan:   Patient declined prescription refills.  Discharge instructions reviewed with: Patient.  Patient and/or family verbalized understanding of discharge instructions and all questions answered.  Copy of AVS reviewed with patient and/or family.  Patient will return 12/19 for next appointment.  Patient discharged in stable condition  accompanied by: daughter.  Departure Mode: Ambulatory.    Gautam Burger RN

## 2017-12-14 NOTE — PATIENT INSTRUCTIONS
Contact Numbers    [facility] Main Line: [phone]  [facility] Triage:  [phone]    Call triage with chills and/or temperature greater than or equal to 100.5, uncontrolled nausea/vomiting, diarrhea, constipation, dizziness, shortness of breath, chest pain, bleeding, unexplained bruising, or any new/concerning symptoms, questions/concerns.     If you are having any concerning symptoms or wish to speak to a provider before your next infusion visit, please call your care coordinator or triage to notify them so we can adequately serve you.       After Hours: [phone]    If after hours, weekends, or holidays, call main hospital  and ask for Oncology doctor on call.         December 2017 Sunday Monday Tuesday Wednesday Thursday Friday Saturday                            1     2       3     4     5     6     7     [facility] DRAW   10:15 AM   (15 min.)    [facility] DRAW   [facility] Lab Draw     [facility] RETURN   10:35 AM   (50 min.)   Leanne Reddy PA-C   [facility]     [facility] ONC INFUSION 60   12:00 PM   (60 min.)    ONCOLOGY INFUSION   [facility] 8     [facility] DRAW    2:30 PM   (15 min.)    [facility] DRAW   [facility] Lab Draw     [facility] ONC INFUSION 120    3:00 PM   (120 min.)    ONCOLOGY INFUSION   [facility] 9       10     11     12     13     [facility] DRAW    4:45 PM   (15 min.)    [facility] DRAW   [facility] Lab Draw     [facility] ONC RETURN    5:30 PM   (30 min.)   Kamari Topete MD   [facility]     [facility] ONC INFUSION 120    6:30 PM   (120 min.)    ONCOLOGY INFUSION   [facility] 14     [facility] ONC INFUSION 120    3:00 PM   (120 min.)    ONCOLOGY INFUSION   [facility] 15     16       17     18     19     20     21     22     23       24     25     26     27     28     29     30       31                                               January 2018 Sunday Monday Tuesday Wednesday Thursday Friday Saturday        1     2     3     4     5     6       7     8     9     10     11     12     13       14     15     16     17     18     19     20       21     22     23     24     25     26     27       28     29     30     31                                 Lab Results:  Recent Results (from the past 12 hour(s))   Comprehensive metabolic panel    Collection Time: 12/13/17  4:58 PM   Result Value Ref Range    Sodium 139 133 - 144 mmol/L    Potassium 4.1 3.4 - 5.3 mmol/L    Chloride 108 94 - 109 mmol/L    Carbon Dioxide 21 20 - 32 mmol/L    Anion Gap 10 3 - 14 mmol/L    Glucose 115 (H) 70 - 99 mg/dL    Urea Nitrogen 37 (H) 7 - 30 mg/dL    Creatinine 2.10 (H) 0.66 - 1.25 mg/dL    GFR Estimate 31 (L) >60 mL/min/1.7m2    GFR Estimate If Black 38 (L) >60 mL/min/1.7m2    Calcium 7.2 (L) 8.5 - 10.1 mg/dL    Bilirubin Total 0.2 0.2 - 1.3 mg/dL    Albumin 2.3 (L) 3.4 - 5.0 g/dL    Protein Total 9.0 (H) 6.8 - 8.8 g/dL    Alkaline Phosphatase 42 40 - 150 U/L    ALT 28 0 - 70 U/L    AST 14 0 - 45 U/L   CBC with platelets and differential    Collection Time: 12/13/17  4:58 PM   Result Value Ref Range    WBC 4.5 4.0 - 11.0 10e9/L    RBC Count 2.67 (L) 4.4 - 5.9 10e12/L    Hemoglobin 8.9 (L) 13.3 - 17.7 g/dL    Hematocrit 28.2 (L) 40.0 - 53.0 %     (H) 78 - 100 fl    MCH 33.3 (H) 26.5 - 33.0 pg    MCHC 31.6 31.5 - 36.5 g/dL    RDW 15.6 (H) 10.0 - 15.0 %    Platelet Count 52 (L) 150 - 450 10e9/L    Diff Method Manual Differential     % Neutrophils 90.4 %    % Lymphocytes 3.5 %    % Monocytes 5.2 %    % Eosinophils 0.0 %    % Basophils 0.0 %    % Myelocytes 0.9 %    Absolute Neutrophil 4.1 1.6 - 8.3 10e9/L    Absolute Lymphocytes 0.2 (L) 0.8 - 5.3 10e9/L    Absolute Monocytes 0.2 0.0 - 1.3 10e9/L    Absolute Eosinophils 0.0 0.0 - 0.7 10e9/L    Absolute Basophils 0.0 0.0 - 0.2 10e9/L    Absolute Myelocytes 0.0 0 10e9/L    Anisocytosis Slight     Macrocytes  Present     Platelet Estimate Confirming automated cell count

## 2017-12-19 NOTE — MR AVS SNAPSHOT
After Visit Summary   12/19/2017    Anthony Carbone    MRN: [id number]           Patient Information     Date Of Birth          1945        Visit Information        Provider Department      12/19/2017 5:50 PM Leanne Reddy PA-C [facility]        Today's Diagnoses     Multiple myeloma not having achieved remission (H)    -  1       Follow-ups after your visit        Who to contact     If you have questions or need follow up information about today's clinic visit or your schedule please contact [facility] directly at [phone].  Normal or non-critical lab and imaging results will be communicated to you by [employer], letter or phone within 4 business days after the clinic has received the results. If you do not hear from us within 7 days, please contact the clinic through [employer] or phone. If you have a critical or abnormal lab result, we will notify you by phone as soon as possible.  Submit refill requests through [employer] or call your pharmacy and they will forward the refill request to us. Please allow 3 business days for your refill to be completed.          Additional Information About Your Visit        MyChart Information     [employer] gives you secure access to your electronic health record. If you see a primary care provider, you can also send messages to your care team and make appointments. If you have questions, please call your primary care clinic.  If you do not have a primary care provider, please call [phone] and they will assist you.        Care EveryWhere ID     This is your Care EveryWhere ID. This could be used by other organizations to access your [facility] medical records  [id number]        Your Vitals Were     Pulse Temperature Respirations Pulse Oximetry BMI (Body Mass Index)       64 98  F (36.7  C) (Tympanic) 16 96% 20.04 kg/m2        Blood Pressure from Last 3 Encounters:   12/19/17 120/81   12/13/17 124/80    12/08/17 140/85    Weight from Last 3 Encounters:   12/19/17 53 kg (116 lb 12.8 oz)   12/13/17 53.5 kg (118 lb)   12/08/17 54 kg (119 lb 1.6 oz)                 Today's Medication Changes          These changes are accurate as of: 12/19/17  6:32 PM.  If you have any questions, ask your nurse or doctor.               Start taking these medicines.        Dose/Directions    predniSONE 50 MG tablet   Commonly known as:  DELTASONE   Used for:  Multiple myeloma not having achieved remission (H)   Started by:  Leanne Reddy PA-C        Take 50 mg every other day   Quantity:  30 tablet   Refills:  1            Where to get your medicines      These medications were sent to [facility] [id number] [place] [address] AT [facility] [address]  [address] [place] [address]     Phone:  [phone]     predniSONE 50 MG tablet                Primary Care Provider Office Phone # Fax #    Sanket Burciaga [phone]       [place] [address] [facility] [address]        Equal Access to Services     ARMANDO SHAH : Hadii chalo kern hadasho Sojose, waaxda luqadaha, qaybta kaalmada adesalo, jonny jurado . So [facility] [phone].    ATENCIÓN: Si habla español, tiene a todd disposición servicios gratuitos de asistencia lingüística. [facility] [phone].    We comply with applicable federal civil rights laws and [place] laws. We do not discriminate on the basis of race, color, national origin, age, disability, sex, sexual orientation, or gender identity.            Thank you!     Thank you for choosing [facility]  for your care. Our goal is always to provide you with excellent care. Hearing back from our patients is one way we can continue to improve our services. Please take a few minutes to complete the written survey that you may receive in the mail after your visit with us. Thank you!              Your Updated Medication List - Protect others around you: Learn how to safely use, store and throw away your medicines at www.disposemymeds.org.          This list is accurate as of: 12/19/17  6:32 PM.  Always use your most recent med list.                   Brand Name Dispense Instructions for use Diagnosis    acyclovir 400 MG tablet    ZOVIRAX    60 tablet    Take 1 tablet (400 mg) by mouth 2 times daily    Multiple myeloma in relapse (H)       amLODIPine 5 MG tablet    NORVASC    90 tablet    TAKE 1 TABLET BY MOUTH AT BEDTIME    Essential hypertension       clopidogrel 75 MG tablet    PLAVIX    90 tablet    TAKE 1 TABLET BY MOUTH EVERY DAY    History of stroke       dexamethasone 4 MG tablet    DECADRON    40 tablet    Take 20 mg weekly on day of Velcade    Multiple myeloma not having achieved remission (H)       esomeprazole 40 MG CR capsule    nexIUM    30 capsule    Take 1 capsule (40 mg) by mouth every morning (before breakfast)    Gastroesophageal reflux disease without esophagitis       LORazepam 0.5 MG tablet    ATIVAN    30 tablet    Take 1 tablet (0.5 mg) by mouth every 6 hours as needed for nausea or anxiety.    Multiple myeloma in relapse (H), Delirium       nystatin 500083 UNIT/ML suspension    MYCOSTATIN    473 mL    Take 5 mLs (500,000 Units) by mouth 4 times daily Swish and spit. Continue until symptoms resolve and then take for 3 more days.    Thrush       OLANZapine 5 MG tablet    zyPREXA    60 tablet    Take 0.5 tablets (2.5 mg) by mouth At Bedtime    Delirium, Multiple myeloma in relapse (H)       ondansetron 8 MG ODT tab    ZOFRAN-ODT    30 tablet    Take 1 tablet (8 mg) by mouth every 8 hours as needed for nausea    Nausea       oxyCODONE IR 5 MG tablet    ROXICODONE    15 tablet    Take 1 tablet (5 mg) by mouth every 6 hours as needed for moderate to severe pain        potassium chloride SA 20 MEQ CR tablet    K-DUR/KLOR-CON M    30 tablet    Take 1 tablet (20 mEq) by mouth daily     Hypokalemia       predniSONE 50 MG tablet    DELTASONE    30 tablet    Take 50 mg every other day    Multiple myeloma not having achieved remission (H)       SIMVASTATIN PO      Take 20 mg by mouth At Bedtime        traMADol 50 MG tablet    ULTRAM    60 tablet    Take 1 tablet (50 mg) by mouth every 6 hours as needed for moderate pain . Recommend trying after Tylenol and before Dilaudid.    Acute bilateral low back pain without sciatica, Multiple myeloma in relapse (H)       venetoclax 100 MG tablet CHEMOTHERAPY    VENCLEXTA    100 tablet    Take 8 tablets (800 mg) by mouth daily    Multiple myeloma in relapse (H)

## 2017-12-19 NOTE — PROGRESS NOTES
Port-a-cath accessed for lab draw. Pt tolerated procedure without incident. Port flushed per protocol and needle left intact for provider appt. Pt discharged with wife to provider appt.

## 2017-12-19 NOTE — NURSING NOTE
"Oncology Rooming Note    December 19, 2017 5:36 PM   Anthony Carbone is a 72 year old male who presents for:    Chief Complaint   Patient presents with     Oncology Clinic Visit     Multiple Myeloma f/u      Initial Vitals: /81  Pulse 64  Temp 98  F (36.7  C) (Tympanic)  Resp 16  Wt 53 kg (116 lb 12.8 oz)  SpO2 96%  BMI 20.04 kg/m2 Estimated body mass index is 20.04 kg/(m^2) as calculated from the following:    Height as of 12/13/17: 1.626 m (5' 4.02\").    Weight as of this encounter: 53 kg (116 lb 12.8 oz). Body surface area is 1.55 meters squared.  No Pain (0) Comment: Data Unavailable   No LMP for male patient.  Allergies reviewed: Yes  Medications reviewed: Yes    Medications: Medication refills not needed today.  Pharmacy name entered into EPIC:    [id number] - [facility] [address] [facility] [address] AT [facility] 7  [place], [place] - [address] [facility]  [facility] [address] [place], [place] - [address] [address] AT [facility] [address]    Clinical concerns: No concerns  Provider was NOT notified.    7 minutes for nursing intake (face to face time)     Ida De Leon              "

## 2017-12-19 NOTE — PROGRESS NOTES
HEMATOLOGY/ONCOLOGY PROGRESS NOTE  Dec 19, 2017    REASON FOR VISIT: myeloma    Diagnosis: 72 year old gentleman with IgM lambda multiple myeloma originally diagnosed in 01/2012 as stage I, standard risk disease.   Treatment: Revlimid plus dexamethasone for 4-5 cycles but plateaued by late 03/2012.   - Velcade was added and he received another 2-3 cycles, achieving a good partial remission.      Transplant: Single auto after melphalan 200 mg/m2 preparative regimen on 01/09/2013  - Post-transplant course: unremarkable except for some mild steroid-induced hyperglycemia, gastritis, nausea and vomiting.      Maintenance: Lenalidomide at day-100 then developed a maculopapular rash. Lenalidomide was held and then we re-challenged him after about a month to 2 months at a lower dose (5 mg daily); rash returned.   - was started on maintenance Velcade, every other week through July 2014, when he was noted with abnormalities on myeloma studies drawn there. Noted with prostate cancer dx at about the time of relapse (see below).     Relapse: noted with return on M-spike in blood/urine with marrow involvement but negative PET.   - Started on retreatment with RVD on 8/27/14 with Revlimid at 15 mg 14 days of 21 days, dexamethasone 40 mg weekly and velcade weekly.Completed at total of 5 cycles without complication by 12/2014.   - Started Cycle 6 on inc'd Rev dosing of 20mg daily x 2 weeks on 12/11/14 which was complicated by pneumonia.  - Adm 12/21-1/10 for human metapneumovirus pneumonia complicated by anorexia, HTN, depression, anorexia with significant weight loss.   - Restarted Ernesto/Dex only on 2/4/15 with good tolerance but with thrombocytopenia.   - Bendamustine added to Velcade/dexamethasone on 5/21/15 due to disease progression  - Velcade discontinued on 7/9/2015 due to side effects (orthostasis)  - Schedule changed to bendamustine 80 mg/m2 days 1 and 2 on 28-day cycle  - Cycle 1 tolerated poorly due to lightheadedness and  "weakness  - Cycle 2 dose reduced to 60 mg/m2 and pre-meds adjusted  - Cycle 5 received on 9/17/15.  - 10/1/2015 - increasing IgM, M-spike - started Pomalidomide 4mg/day (21 days out of 28 days) and weekly Decadron 20mg on Oct 1st, 2015.    - Carfilzomib added on 10/22/2015 making this CPD.  - C3-C6  received in [place]    - Returned to [facility] and resumed CPD here    - Adm: 4/12-4/14 with fever, confusion, neutropenia. Noted on MRI to have acute/subacute CVA and subacute/chronic CVA; started on Plavix, given brief course of Abx and GCSF prior to d/c.     - Continued on Carfilzomib and dexamethasone alone  - Pomalyst added back on 7/13/2016 for rising FLC  - Start daratumumab 11/10/16. Changed to every other week after 4 weekly treatments d/t profound fatigue and malaise.   - Adm 5/7-5/16 due to AMS, hypercalcemia, hyperuricemia, possible PNA, back pain, and JOSE MARIA. Started Kyprolis while inpatient.  - Re-admitted 5/25-/529 with recurrent AMS, found to have concomitant PNA  - Resumed Kyprolis 6/13/2017. Stopped due to worsening performance status and progressive myeloma.  - Started Venclexta 800 mg 8/25/2017  - Added weekly Velcade with dexamethasone 20 mg weekly due to rising light chains on 11/1/2017  - Started weekly Cytoxan with prednisone QOD on 12/13    INTERVAL HISTORY:    Mr. Carbone is here with his wife.   He has had fatigue that has been ongoing, but did worsen after receiving the Cytoxan.  He isn't sure if he wasn't to continue with the Cytoxan based on how he is feeling, he has been spending a lot of time on the cough. The abdominal pain he had previously with Velcade remains resolved. Occasional diarrhea. No fevers, chills, cough, mouth sores, change in GIVENS, chest pain, nausea, vomiting, constipation, bleeding, or swelling.    When asked what his goals are, he reflects that his goal is to \"feel better\".    Remainder of 10-pt ROS otherwise is negative.    PHYSICAL EXAMINATION  /81  Pulse 64  Temp 98 " " F (36.7  C) (Tympanic)  Resp 16  Wt 53 kg (116 lb 12.8 oz)  SpO2 96%  BMI 20.04 kg/m2    Wt Readings from Last 10 Encounters:   17 53 kg (116 lb 12.8 oz)   17 53.5 kg (118 lb)   17 54 kg (119 lb 1.6 oz)   17 53 kg (116 lb 12.8 oz)   17 54.2 kg (119 lb 6.4 oz)   17 53.7 kg (118 lb 4.8 oz)   17 53 kg (116 lb 12.8 oz)   17 53.4 kg (117 lb 12.8 oz)   17 54.1 kg (119 lb 3.2 oz)   10/19/17 54.4 kg (119 lb 14.4 oz)   General: Alert. Oriented to name, , location,  Able to ambulate independently, albeit slow and cautiously.  No acute distress. HEENT: PERRL, no palor or icterus. CVS: RRR with occasional premature beat. CHEST: CTAB, normal work of breathing ABDOMEN: soft non tender no masses     NEURO: AAOX3  CN 2-12 intact  SKIN: no bleeding or brusiing, no rashes EXTREMITIES: No edema.     LABS:   Results for WILLIAN ARCINIEGA (MRN [id number]) as of 2017 18:07   Ref. Range 2017 10:25 2017 14:31 2017 16:58 2017 17:00 2017 17:20   WBC Latest Ref Range: 4.0 - 11.0 10e9/L 2.6 (L)  4.5  4.6   Hemoglobin Latest Ref Range: 13.3 - 17.7 g/dL 8.8 (L)  8.9 (L)  8.5 (L)   Hematocrit Latest Ref Range: 40.0 - 53.0 % 28.0 (L)  28.2 (L)  26.4 (L)   Platelet Count Latest Ref Range: 150 - 450 10e9/L 58 (L)  52 (L)  25 (LL)     IMPRESSION/PLAN:  72 year old male with relapsed MM after autologous transplant (2013), status-post multiple salvage regimens with progressive disease.    MM: With rising light chains, worsening renal function, and worsening TCP, started weekly cytoxan with prednisone QOD on . He experience acute worsening of baseline fatigue with this. We had a long discussion regarding goals, and Yamil's goal is to \"feel better.\" We talked about the option of hospice. Ultimately, Yamil would like to continue with Cytoxan for now this week and we will re-evaluate next week. I did discuss treatment parameters with Dr. Topete due " to platelets 25k today, would plan to continue with treatment later this week assuming that level stayed relatively stable. We will start prednisone 50 mg QOD as per protocol with this regimen. If tolerating well, we can attempt increasing this to 100 mg. I will see Yamil back in one week for close follow-up.    Heme: Cytopenias 2/2 treatment and likely MM, now worsened with Cytoxan.   - Previously on Plavix, remains on hold given thrombocytopenia. If platelets do improve, likely will continue to hold given risk versus benefit at this point (diagnosis of stroke was already a little elusive and he has been on treatment > 1 year)  - Transfuse to keep hemoglobin > 8, platelets > 10k. Will likely need to increase to twice weekly labs with Cytoxan, will monitor later this week.    Renal/FEN: Creat baseline ~0.8-1.0, recently has been increased beyond baseline, likely secondary to progressive myeloma. Remains stably elevated at 1.98 today.  -Encouraged protein/calorie supplements.    . Recently with nausea, diarrhea, body aches; all resolved now.    ID: Presently afebrile w/o any localizing infectious s/s. Recently with nausea, diarrhea, body aches; all resolved now.  - Continues ppx  mg BID     Back/rib pain: Started early May. Lumbar MRI at OSH showing mild-mod central and foraminal stenoses in lumbar spine, per patient and wife, there was no MM lesion there. Rib films inpatient negative, back films stable from prior imaging. Pain has actually improved since started venotclax, no longer even using tramadol.    Fatigue: Worsening over the last few weeks, worsened more acutely after Cytoxan. Likely multifactorial in setting of treatment, MM, deconditioning, anemia.      CV: Continue zocor and norvasc.   - Avoid QTc prolonging agents (history of QTc prolongation with syncopal episodes)     AMS: AMS started early May in setting of metabolic derangements, uremia, and possible infection.  Remains intermittently confused  over the past few months but improved significantly, worsened over the weekend but then resolved. Stable today.  - Continue Zyprexa 2.5 mg at bedtime  - Holding off long-acting pain meds      I spent >25 minutes with the patient, with over 50% of the time spent counseling or coordinating their care as described above.         Leanne Reddy PA-C

## 2017-12-19 NOTE — LETTER
12/19/2017      RE: Anthony Carbone  [address] [place] [address]       HEMATOLOGY/ONCOLOGY PROGRESS NOTE  Dec 19, 2017    REASON FOR VISIT: myeloma    Diagnosis: 72 year old gentleman with IgM lambda multiple myeloma originally diagnosed in 01/2012 as stage I, standard risk disease.   Treatment: Revlimid plus dexamethasone for 4-5 cycles but plateaued by late 03/2012.   - Velcade was added and he received another 2-3 cycles, achieving a good partial remission.      Transplant: Single auto after melphalan 200 mg/m2 preparative regimen on 01/09/2013  - Post-transplant course: unremarkable except for some mild steroid-induced hyperglycemia, gastritis, nausea and vomiting.      Maintenance: Lenalidomide at day-100 then developed a maculopapular rash. Lenalidomide was held and then we re-challenged him after about a month to 2 months at a lower dose (5 mg daily); rash returned.   - was started on maintenance Velcade, every other week through July 2014, when he was noted with abnormalities on myeloma studies drawn there. Noted with prostate cancer dx at about the time of relapse (see below).     Relapse: noted with return on M-spike in blood/urine with marrow involvement but negative PET.   - Started on retreatment with RVD on 8/27/14 with Revlimid at 15 mg 14 days of 21 days, dexamethasone 40 mg weekly and velcade weekly.Completed at total of 5 cycles without complication by 12/2014.   - Started Cycle 6 on inc'd Rev dosing of 20mg daily x 2 weeks on 12/11/14 which was complicated by pneumonia.  - Adm 12/21-1/10 for human metapneumovirus pneumonia complicated by anorexia, HTN, depression, anorexia with significant weight loss.   - Restarted Ernesto/Dex only on 2/4/15 with good tolerance but with thrombocytopenia.   - Bendamustine added to Velcade/dexamethasone on 5/21/15 due to disease progression  - Velcade discontinued on 7/9/2015 due to side effects (orthostasis)  - Schedule changed to bendamustine 80 mg/m2  "days 1 and 2 on 28-day cycle  - Cycle 1 tolerated poorly due to lightheadedness and weakness  - Cycle 2 dose reduced to 60 mg/m2 and pre-meds adjusted  - Cycle 5 received on 9/17/15.  - 10/1/2015 - increasing IgM, M-spike - started Pomalidomide 4mg/day (21 days out of 28 days) and weekly Decadron 20mg on Oct 1st, 2015.    - Carfilzomib added on 10/22/2015 making this CPD.  - C3-C6  received in [place]    - Returned to [facility] and resumed CPD here    - Adm: 4/12-4/14 with fever, confusion, neutropenia. Noted on MRI to have acute/subacute CVA and subacute/chronic CVA; started on Plavix, given brief course of Abx and GCSF prior to d/c.     - Continued on Carfilzomib and dexamethasone alone  - Pomalyst added back on 7/13/2016 for rising FLC  - Start daratumumab 11/10/16. Changed to every other week after 4 weekly treatments d/t profound fatigue and malaise.   - Adm 5/7-5/16 due to AMS, hypercalcemia, hyperuricemia, possible PNA, back pain, and JOSE MARIA. Started Kyprolis while inpatient.  - Re-admitted 5/25-/529 with recurrent AMS, found to have concomitant PNA  - Resumed Kyprolis 6/13/2017. Stopped due to worsening performance status and progressive myeloma.  - Started Venclexta 800 mg 8/25/2017  - Added weekly Velcade with dexamethasone 20 mg weekly due to rising light chains on 11/1/2017  - Started weekly Cytoxan with prednisone QOD on 12/13    INTERVAL HISTORY:    Mr. Carbone is here with his wife.   He has had fatigue that has been ongoing, but did worsen after receiving the Cytoxan.  He isn't sure if he wasn't to continue with the Cytoxan based on how he is feeling, he has been spending a lot of time on the cough. The abdominal pain he had previously with Velcade remains resolved. Occasional diarrhea. No fevers, chills, cough, mouth sores, change in GIVENS, chest pain, nausea, vomiting, constipation, bleeding, or swelling.    When asked what his goals are, he reflects that his goal is to \"feel better\".    Remainder of 10-pt " "ROS otherwise is negative.    PHYSICAL EXAMINATION  /81  Pulse 64  Temp 98  F (36.7  C) (Tympanic)  Resp 16  Wt 53 kg (116 lb 12.8 oz)  SpO2 96%  BMI 20.04 kg/m2    Wt Readings from Last 10 Encounters:   17 53 kg (116 lb 12.8 oz)   17 53.5 kg (118 lb)   17 54 kg (119 lb 1.6 oz)   17 53 kg (116 lb 12.8 oz)   17 54.2 kg (119 lb 6.4 oz)   17 53.7 kg (118 lb 4.8 oz)   17 53 kg (116 lb 12.8 oz)   17 53.4 kg (117 lb 12.8 oz)   17 54.1 kg (119 lb 3.2 oz)   10/19/17 54.4 kg (119 lb 14.4 oz)   General: Alert. Oriented to name, , location,  Able to ambulate independently, albeit slow and cautiously.  No acute distress. HEENT: PERRL, no palor or icterus. CVS: RRR with occasional premature beat. CHEST: CTAB, normal work of breathing ABDOMEN: soft non tender no masses     NEURO: AAOX3  CN 2-12 intact  SKIN: no bleeding or brusiing, no rashes EXTREMITIES: No edema.     LABS:   Results for WILLIAN ARCINIEGA (MRN [id number]) as of 2017 18:07   Ref. Range 2017 10:25 2017 14:31 2017 16:58 2017 17:00 2017 17:20   WBC Latest Ref Range: 4.0 - 11.0 10e9/L 2.6 (L)  4.5  4.6   Hemoglobin Latest Ref Range: 13.3 - 17.7 g/dL 8.8 (L)  8.9 (L)  8.5 (L)   Hematocrit Latest Ref Range: 40.0 - 53.0 % 28.0 (L)  28.2 (L)  26.4 (L)   Platelet Count Latest Ref Range: 150 - 450 10e9/L 58 (L)  52 (L)  25 (LL)     IMPRESSION/PLAN:  72 year old male with relapsed MM after autologous transplant (2013), status-post multiple salvage regimens with progressive disease.    MM: With rising light chains, worsening renal function, and worsening TCP, started weekly cytoxan with prednisone QOD on . He experience acute worsening of baseline fatigue with this. We had a long discussion regarding goals, and Yamil's goal is to \"feel better.\" We talked about the option of hospice. Ultimately, Yamil would like to continue with Cytoxan for now this week and we " will re-evaluate next week. I did discuss treatment parameters with Dr. Topete due to platelets 25k today, would plan to continue with treatment later this week assuming that level stayed relatively stable. We will start prednisone 50 mg QOD as per protocol with this regimen. If tolerating well, we can attempt increasing this to 100 mg. I will see Yamil back in one week for close follow-up.    Heme: Cytopenias 2/2 treatment and likely MM, now worsened with Cytoxan.   - Previously on Plavix, remains on hold given thrombocytopenia. If platelets do improve, likely will continue to hold given risk versus benefit at this point (diagnosis of stroke was already a little elusive and he has been on treatment > 1 year)  - Transfuse to keep hemoglobin > 8, platelets > 10k. Will likely need to increase to twice weekly labs with Cytoxan, will monitor later this week.    Renal/FEN: Creat baseline ~0.8-1.0, recently has been increased beyond baseline, likely secondary to progressive myeloma. Remains stably elevated at 1.98 today.  -Encouraged protein/calorie supplements.    . Recently with nausea, diarrhea, body aches; all resolved now.    ID: Presently afebrile w/o any localizing infectious s/s. Recently with nausea, diarrhea, body aches; all resolved now.  - Continues ppx  mg BID     Back/rib pain: Started early May. Lumbar MRI at OSH showing mild-mod central and foraminal stenoses in lumbar spine, per patient and wife, there was no MM lesion there. Rib films inpatient negative, back films stable from prior imaging. Pain has actually improved since started venotclax, no longer even using tramadol.    Fatigue: Worsening over the last few weeks, worsened more acutely after Cytoxan. Likely multifactorial in setting of treatment, MM, deconditioning, anemia.      CV: Continue zocor and norvasc.   - Avoid QTc prolonging agents (history of QTc prolongation with syncopal episodes)     AMS: AMS started early May in setting of  metabolic derangements, uremia, and possible infection.  Remains intermittently confused over the past few months but improved significantly, worsened over the weekend but then resolved. Stable today.  - Continue Zyprexa 2.5 mg at bedtime  - Holding off long-acting pain meds      I spent >25 minutes with the patient, with over 50% of the time spent counseling or coordinating their care as described above.         ALPHONSO Herrera PA-C

## 2017-12-20 NOTE — NURSING NOTE
Port de-accessed in clinic per provider at 6:00 pm, confirmed port was heparin locked in MAR.  Angeles Mejia, CMA

## 2017-12-22 NOTE — PROGRESS NOTES
Infusion Nursing Note:  Anthony Carbone presents today for cycle 2 day 15 Cytoxan.    Patient seen by provider today: No   present during visit today: Not Applicable.    Note:     TORB: 12/22/17 1051 Dr. Tay./Alexia Petersen RN:  *Ok to proceed with treatment today with plt 28.   *Pt to see Leanne on Tuesday with labs and then get labs and treatment on Thursday if appropriate.      Dr. Tay covering for Efrem until 12/22/17, but out of the office next week. Page Anila Care coordinator to see who is covering for Dr. Topete next week as he is out as well.    Last zometa 10/3, q3 months, not due yet    Intravenous Access:  Implanted Port.    Treatment Conditions:  Lab Results   Component Value Date    HGB 8.5 12/22/2017     Lab Results   Component Value Date    WBC 4.2 12/22/2017      Lab Results   Component Value Date    ANEU 3.9 12/22/2017     Lab Results   Component Value Date    PLT 28 12/22/2017      Lab Results   Component Value Date     12/22/2017                   Lab Results   Component Value Date    POTASSIUM 4.9 12/22/2017           Lab Results   Component Value Date    MAG 2.2 05/25/2017            Lab Results   Component Value Date    CR 2.03 12/22/2017                   Lab Results   Component Value Date    JAZMIN 8.5 12/22/2017                Lab Results   Component Value Date    BILITOTAL 0.2 12/22/2017           Lab Results   Component Value Date    ALBUMIN 2.4 12/22/2017                    Lab Results   Component Value Date    ALT 16 12/22/2017           Lab Results   Component Value Date    AST 8 12/22/2017     Results reviewed, labs did NOT meet treatment parameters: platelets TORB listed above.    Post Infusion Assessment:  Patient tolerated infusion without incident.  Blood return noted pre and post infusion.  Site patent and intact, free from redness, edema or discomfort.  No evidence of extravasations.  Access discontinued per protocol.    Discharge Plan:    Patient declined prescription refills.  Discharge instructions reviewed with: Patient and Family.  Patient and/or family verbalized understanding of discharge instructions and all questions answered.  Copy of AVS reviewed with patient and/or family.  Patient will return 12/26 for apt with Leanne and infusion 12/28/17  Patient discharged in stable condition accompanied by: wife.  Departure Mode: Ambulatory.    GO GARCIA RN

## 2017-12-22 NOTE — PATIENT INSTRUCTIONS
Contact Numbers    [facility] Main Line: [phone]  [facility] Triage:  [phone]    Call triage with chills and/or temperature greater than or equal to 100.5, uncontrolled nausea/vomiting, diarrhea, constipation, dizziness, shortness of breath, chest pain, bleeding, unexplained bruising, or any new/concerning symptoms, questions/concerns.     If you are having any concerning symptoms or wish to speak to a provider before your next infusion visit, please call your care coordinator or triage to notify them so we can adequately serve you.       After Hours: [phone]    If after hours, weekends, or holidays, call main hospital  and ask for Oncology doctor on call.           December 2017 Sunday Monday Tuesday Wednesday Thursday Friday Saturday                            1     2       3     4     5     6     7     [facility] DRAW   10:15 AM   (15 min.)    [facility] DRAW   [facility] Lab Draw     [facility] RETURN   10:35 AM   (50 min.)   Leanne Reddy PA-C   [facility]     [facility] ONC INFUSION 60   12:00 PM   (60 min.)    ONCOLOGY INFUSION   [facility] 8     [facility] DRAW    2:30 PM   (15 min.)    [facility] DRAW   [facility]     [facility] ONC INFUSION 120    3:00 PM   (120 min.)    ONCOLOGY INFUSION   [facility] 9       10     11     12     13     [facility] DRAW    4:45 PM   (15 min.)    [facility] DRAW   [facility] Lab Draw     [facility] ONC RETURN    5:30 PM   (30 min.)   Kamari Topete MD   [facility]     [facility] ONC INFUSION 120    6:30 PM   (120 min.)    ONCOLOGY INFUSION   [facility] 14     [facility] ONC INFUSION 120    3:00 PM   (120 min.)    ONCOLOGY INFUSION   [facility]     XR CHEST 2 VIEWS    4:45 PM   (15 min.)   [id number]   [facility] Xray 15     16       17     18     19     [facility] DRAW    5:30 PM   (15 min.)     [facility] DRAW   [facility] Lab Draw     [facility] RETURN    5:35 PM   (50 min.)   Leanne Reddy PA-C   [facility] 20     21     22     [facility] DRAW   10:00 AM   (15 min.)    [facility] DRAW   [facility] Lab Draw     [facility] ONC INFUSION 120   10:30 AM   (120 min.)   [facility] ONCOLOGY INFUSION   [facility] 23       24     25     26     [facility] DRAW    8:45 AM   (15 min.)    [facility] DRAW   [facility] Lab Draw     [facility] RETURN    9:05 AM   (50 min.)   Leanne Reddy PA-C   [facility] 27     28     [facility] DRAW    3:00 PM   (15 min.)   [facility] DRAW   [facility] Lab Draw     [facility] ONC INFUSION 120    3:30 PM   (120 min.)    ONCOLOGY INFUSION   [facility] 29     30       31 January 2018 Sunday Monday Tuesday Wednesday Thursday Friday Saturday        1     2     3     4     [facility] RETURN    1:45 PM   (50 min.)   Leanne Reddy PA-C   [facility] 5     6       7     8     9     10     11     [facility] RETURN   12:55 PM   (50 min.)   Leanne Reddy PA-C   [facility]     [facility] ONC INFUSION 120    2:00 PM   (120 min.)    ONCOLOGY INFUSION   [facility] 12     13       14     15     16     17     18     [facility] RETURN    1:45 PM   (50 min.)   Leanne Reddy PA-C   [facility]     [facility] ONC INFUSION 120    3:00 PM   (120 min.)    ONCOLOGY INFUSION   [facility] 19     [facility] NEW    7:45 AM   (60 min.)   Gaston Bowie MD   [facility] 20       21     22     23     24     25     [facility] RETURN    1:45 PM   (50 min.)   Leanne Reddy PA-C   [facility]     [facility] ONC INFUSION 120    3:00 PM   (120 min.)    ONCOLOGY INFUSION   [facility] 26     27        28     29     30     31                                Recent Results (from the past 24 hour(s))   CBC with platelets differential    Collection Time: 12/22/17 10:10 AM   Result Value Ref Range    WBC 4.2 4.0 - 11.0 10e9/L    RBC Count 2.55 (L) 4.4 - 5.9 10e12/L    Hemoglobin 8.5 (L) 13.3 - 17.7 g/dL    Hematocrit 27.1 (L) 40.0 - 53.0 %     (H) 78 - 100 fl    MCH 33.3 (H) 26.5 - 33.0 pg    MCHC 31.4 (L) 31.5 - 36.5 g/dL    RDW 15.1 (H) 10.0 - 15.0 %    Platelet Count 28 (LL) 150 - 450 10e9/L    Diff Method Automated Method     % Neutrophils 95.0 %    % Lymphocytes 1.4 %    % Monocytes 2.7 %    % Eosinophils 0.0 %    % Basophils 0.2 %    % Immature Granulocytes 0.7 %    Nucleated RBCs 0 0 /100    Absolute Neutrophil 3.9 1.6 - 8.3 10e9/L    Absolute Lymphocytes 0.1 (L) 0.8 - 5.3 10e9/L    Absolute Monocytes 0.1 0.0 - 1.3 10e9/L    Absolute Eosinophils 0.0 0.0 - 0.7 10e9/L    Absolute Basophils 0.0 0.0 - 0.2 10e9/L    Abs Immature Granulocytes 0.0 0 - 0.4 10e9/L    Absolute Nucleated RBC 0.0    Comprehensive metabolic panel    Collection Time: 12/22/17 10:10 AM   Result Value Ref Range    Sodium 136 133 - 144 mmol/L    Potassium 4.9 3.4 - 5.3 mmol/L    Chloride 108 94 - 109 mmol/L    Carbon Dioxide 17 (L) 20 - 32 mmol/L    Anion Gap 12 3 - 14 mmol/L    Glucose 161 (H) 70 - 99 mg/dL    Urea Nitrogen 28 7 - 30 mg/dL    Creatinine 2.03 (H) 0.66 - 1.25 mg/dL    GFR Estimate 32 (L) >60 mL/min/1.7m2    GFR Estimate If Black 39 (L) >60 mL/min/1.7m2    Calcium 8.5 8.5 - 10.1 mg/dL    Bilirubin Total 0.2 0.2 - 1.3 mg/dL    Albumin 2.4 (L) 3.4 - 5.0 g/dL    Protein Total 10.3 (H) 6.8 - 8.8 g/dL    Alkaline Phosphatase 44 40 - 150 U/L    ALT 16 0 - 70 U/L    AST 8 0 - 45 U/L

## 2017-12-22 NOTE — MR AVS SNAPSHOT
After Visit Summary   12/22/2017    Anthony Carbone    MRN: [id number]           Patient Information     Date Of Birth          1945        Visit Information        Provider Department      12/22/2017 10:30 AM  31 ATC;  ONCOLOGY INFUSION [facility]        Today's Diagnoses     Multiple myeloma in relapse (H)    -  1      Care Instructions    Contact Numbers    [facility] Main Line: [phone]  [facility] Triage:  [phone]    Call triage with chills and/or temperature greater than or equal to 100.5, uncontrolled nausea/vomiting, diarrhea, constipation, dizziness, shortness of breath, chest pain, bleeding, unexplained bruising, or any new/concerning symptoms, questions/concerns.     If you are having any concerning symptoms or wish to speak to a provider before your next infusion visit, please call your care coordinator or triage to notify them so we can adequately serve you.       After Hours: [phone]    If after hours, weekends, or holidays, call main hospital  and ask for Oncology doctor on call.           December 2017 Sunday Monday Tuesday Wednesday Thursday Friday Saturday                            1     2       3     4     5     6     7     [facility] DRAW   10:15 AM   (15 min.)   [facility]   [facility] Lab Draw     [facility] RETURN   10:35 AM   (50 min.)   Leanne Reddy PA-C   [facility]     [facility] ONC INFUSION 60   12:00 PM   (60 min.)    ONCOLOGY INFUSION   [facility] 8     [facility] DRAW    2:30 PM   (15 min.)    [facility] DRAW   [facility] Lab Draw     [facility] ONC INFUSION 120    3:00 PM   (120 min.)    ONCOLOGY INFUSION   [facility] 9       10     11     12     13     [facility] D[facility]    4:45 PM   (15 min.)    [facility] DRAW   [facility] Lab Draw     [facility] ONC RETURN    5:30 PM   (30 min.)   Kamari Topete MD   [facility] Blood and  [facility]     [facility] ONC INFUSION 120    6:30 PM   (120 min.)   UC ONCOLOGY INFUSION   [facility] 14     [facility] ONC INFUSION 120    3:00 PM   (120 min.)    ONCOLOGY INFUSION   [facility]     XR CHEST 2 VIEWS    4:45 PM   (15 min.)   UCXR1   [facility] Xray 15     16       17     18     19     [facility] DRAW    5:30 PM   (15 min.)    [facility] DRAW   [facility] Draw     [facility] RETURN    5:35 PM   (50 min.)   Leanne Reddy PA-C   [facility] 20     21     22     [facility] DRAW   10:00 AM   (15 min.)    [facility] DRAW   [facility] Draw     [facility] ONC INFUSION 120   10:30 AM   (120 min.)    ONCOLOGY INFUSION   [facility] 23       24     25     26     [facility] DRAW    8:45 AM   (15 min.)    [facility] DRAW   [facility] Lab Draw     [facility] RETURN    9:05 AM   (50 min.)   Leanne Reddy PA-C   [facility] 27     28     [facility] DRAW    3:00 PM   (15 min.)    [facility] DRAW   [facility] Lab Draw     [facility] ONC INFUSION 120    3:30 PM   (120 min.)    ONCOLOGY INFUSION   [facility] 29 30 31 January 2018 Sunday Monday Tuesday Wednesday Thursday Friday Saturday        1     2     3     4     [facility] RETURN    1:45 PM   (50 min.)   Leanne Reddy PA-C   [facility] 5     6       7     8     9     10     11     [facility] RETURN   12:55 PM   (50 min.)   Leanne Reddy PA-C   [facility]     [facility] ONC INFUSION 120    2:00 PM   (120 min.)   UC ONCOLOGY INFUSION   [facility] 12     13       14     15     16     17     18     [facility] RETURN    1:45 PM   (50 min.)   Leanne Reddy PA-C   [facility]     [facility] ONC INFUSION 120    3:00 PM   (120 min.)    ONCOLOGY  INFUSION   [facility] 19     UMP NEW    7:45 AM   (60 min.)   Gaston Bowie MD   [facility] 20       21     22     23     24     25     [facility] RETURN    1:45 PM   (50 min.)   Leanne Reddy PA-C   [facility] ONC INFUSION 120    3:00 PM   (120 min.)   UC ONCOLOGY INFUSION   [facility] 26     27       28     29     30     31                                Recent Results (from the past 24 hour(s))   CBC with platelets differential    Collection Time: 12/22/17 10:10 AM   Result Value Ref Range    WBC 4.2 4.0 - 11.0 10e9/L    RBC Count 2.55 (L) 4.4 - 5.9 10e12/L    Hemoglobin 8.5 (L) 13.3 - 17.7 g/dL    Hematocrit 27.1 (L) 40.0 - 53.0 %     (H) 78 - 100 fl    MCH 33.3 (H) 26.5 - 33.0 pg    MCHC 31.4 (L) 31.5 - 36.5 g/dL    RDW 15.1 (H) 10.0 - 15.0 %    Platelet Count 28 (LL) 150 - 450 10e9/L    Diff Method Automated Method     % Neutrophils 95.0 %    % Lymphocytes 1.4 %    % Monocytes 2.7 %    % Eosinophils 0.0 %    % Basophils 0.2 %    % Immature Granulocytes 0.7 %    Nucleated RBCs 0 0 /100    Absolute Neutrophil 3.9 1.6 - 8.3 10e9/L    Absolute Lymphocytes 0.1 (L) 0.8 - 5.3 10e9/L    Absolute Monocytes 0.1 0.0 - 1.3 10e9/L    Absolute Eosinophils 0.0 0.0 - 0.7 10e9/L    Absolute Basophils 0.0 0.0 - 0.2 10e9/L    Abs Immature Granulocytes 0.0 0 - 0.4 10e9/L    Absolute Nucleated RBC 0.0    Comprehensive metabolic panel    Collection Time: 12/22/17 10:10 AM   Result Value Ref Range    Sodium 136 133 - 144 mmol/L    Potassium 4.9 3.4 - 5.3 mmol/L    Chloride 108 94 - 109 mmol/L    Carbon Dioxide 17 (L) 20 - 32 mmol/L    Anion Gap 12 3 - 14 mmol/L    Glucose 161 (H) 70 - 99 mg/dL    Urea Nitrogen 28 7 - 30 mg/dL    Creatinine 2.03 (H) 0.66 - 1.25 mg/dL    GFR Estimate 32 (L) >60 mL/min/1.7m2    GFR Estimate If Black 39 (L) >60 mL/min/1.7m2    Calcium 8.5 8.5 - 10.1 mg/dL    Bilirubin Total 0.2 0.2 - 1.3 mg/dL     Albumin 2.4 (L) 3.4 - 5.0 g/dL    Protein Total 10.3 (H) 6.8 - 8.8 g/dL    Alkaline Phosphatase 44 40 - 150 U/L    ALT 16 0 - 70 U/L    AST 8 0 - 45 U/L                 Follow-ups after your visit        Your next 10 appointments already scheduled     Dec 26, 2017  8:45 AM CST   [facility] with [facility]   [facility])    [address] [place] [id number]   [phone]            Dec 26, 2017  9:20 AM CST   (Arrive by 9:05 AM)   Return Visit with Leanne Reddy PA-C   [facility] ([facility])    [address] [place] [address]   [phone]            Dec 28, 2017  3:00 PM CST   [facility] Draw with [facility]   [facility]    [address] [place] [address]   [phone]            Dec 28, 2017  3:30 PM CST   Infusion 120 with UC ONCOLOGY INFUSION, UC 13 ATC   [facility] ([facility])    [address] [place] [id number] [phone]            Jan 04, 2018  2:00 PM CST   (Arrive by 1:45 PM)   Return Visit with Leanne Reddy PA-C   [facility] ([facility])    [address] [place] [address]   [phone]            Jan 11, 2018  1:10 PM CST   (Arrive by 12:55 PM)   Return Visit with Leanne Reddy PA-C   [facility] ([facility])    [address] [place] [address]   [phone]            Jan 11, 2018  2:00 PM CST   Infusion 120 with UC ONCOLOGY INFUSION, UC 14 ATC   [facility] ([facility])    [address] [place] [address]   [phone]            Jan  18, 2018  2:00 PM CST   (Arrive by 1:45 PM)   Return Visit with Leanne Reddy PA-C   [facility] ([facility])    [address]  [address] [facility] [address] [phone]              Who to contact     If you have questions or need follow up information about today's clinic visit or your schedule please contact [facility] directly at [phone].  Normal or non-critical lab and imaging results will be communicated to you by [employer], letter or phone within 4 business days after the clinic has received the results. If you do not hear from us within 7 days, please contact the clinic through [employer] or phone. If you have a critical or abnormal lab result, we will notify you by phone as soon as possible.  Submit refill requests through [employer] or call your pharmacy and they will forward the refill request to us. Please allow 3 business days for your refill to be completed.          Additional Information About Your Visit        [employer] Information     [employer] gives you secure access to your electronic health record. If you see a primary care provider, you can also send messages to your care team and make appointments. If you have questions, please call your primary care clinic.  If you do not have a primary care provider, please call [phone] and they will assist you.        Care EveryWhere ID     This is your Care EveryWhere ID. This could be used by other organizations to access your [place] medical records  [id number]        Your Vitals Were     Pulse Temperature Respirations Pulse Oximetry BMI (Body Mass Index)       95 98  F (36.7  C) (Oral) 16 97% 19.49 kg/m2        Blood Pressure from Last 3 Encounters:   12/22/17 130/81   12/19/17 120/81   12/13/17 124/80    Weight from Last 3 Encounters:   12/22/17 51.5 kg (113 lb 9.6 oz)   12/19/17 53 kg (116 lb 12.8 oz)   12/13/17 53.5 kg (118 lb)              We Performed the  Following     CBC with platelets differential     Comprehensive metabolic panel        Primary Care Provider Office Phone # Fax #    Sanket Burciaga [phone]       [facility] [address] [facility] [address]        Equal Access to Services     ALBAN SHAH : Hadii aad ku hadyamelo Soemilieali, waaxda luqadaha, qaybta kaalmada adeegyada, jonny james perryclaudia jimenez adrien villa. So [facility] [phone].    ATENCIÓN: Si habla español, tiene a todd disposición servicios gratuitos de asistencia lingüística. Llame al [phone].    We comply with applicable federal civil rights laws and [place] laws. We do not discriminate on the basis of race, color, national origin, age, disability, sex, sexual orientation, or gender identity.            Thank you!     Thank you for choosing [facility]  for your care. Our goal is always to provide you with excellent care. Hearing back from our patients is one way we can continue to improve our services. Please take a few minutes to complete the written survey that you may receive in the mail after your visit with us. Thank you!             Your Updated Medication List - Protect others around you: Learn how to safely use, store and throw away your medicines at www.disposemymeds.org.          This list is accurate as of: 12/22/17  5:00 PM.  Always use your most recent med list.                   Brand Name Dispense Instructions for use Diagnosis    acyclovir 400 MG tablet    ZOVIRAX    60 tablet    Take 1 tablet (400 mg) by mouth 2 times daily    Multiple myeloma in relapse (H)       amLODIPine 5 MG tablet    NORVASC    90 tablet    TAKE 1 TABLET BY MOUTH AT BEDTIME    Essential hypertension       clopidogrel 75 MG tablet    PLAVIX    90 tablet    TAKE 1 TABLET BY MOUTH EVERY DAY    History of stroke       dexamethasone 4 MG tablet    DECADRON    40 tablet    Take 20 mg weekly on day of Velcade    Multiple myeloma not having  achieved remission (H)       esomeprazole 40 MG CR capsule    nexIUM    30 capsule    Take 1 capsule (40 mg) by mouth every morning (before breakfast)    Gastroesophageal reflux disease without esophagitis       LORazepam 0.5 MG tablet    ATIVAN    30 tablet    Take 1 tablet (0.5 mg) by mouth every 6 hours as needed for nausea or anxiety.    Multiple myeloma in relapse (H), Delirium       nystatin [id number] UNIT/ML suspension    MYCOSTATIN    473 mL    Take 5 mLs (500,000 Units) by mouth 4 times daily Swish and spit. Continue until symptoms resolve and then take for 3 more days.    Thrush       OLANZapine 5 MG tablet    zyPREXA    60 tablet    Take 0.5 tablets (2.5 mg) by mouth At Bedtime    Delirium, Multiple myeloma in relapse (H)       ondansetron 8 MG ODT tab    ZOFRAN-ODT    30 tablet    Take 1 tablet (8 mg) by mouth every 8 hours as needed for nausea    Nausea       oxyCODONE IR 5 MG tablet    ROXICODONE    15 tablet    Take 1 tablet (5 mg) by mouth every 6 hours as needed for moderate to severe pain        potassium chloride SA 20 MEQ CR tablet    K-DUR/KLOR-CON M    30 tablet    Take 1 tablet (20 mEq) by mouth daily    Hypokalemia       predniSONE 50 MG tablet    DELTASONE    30 tablet    Take 50 mg every other day    Multiple myeloma not having achieved remission (H)       SIMVASTATIN PO      Take 20 mg by mouth At Bedtime        traMADol 50 MG tablet    ULTRAM    60 tablet    Take 1 tablet (50 mg) by mouth every 6 hours as needed for moderate pain . Recommend trying after Tylenol and before Dilaudid.    Acute bilateral low back pain without sciatica, Multiple myeloma in relapse (H)       venetoclax 100 MG tablet CHEMOTHERAPY    VENCLEXTA    100 tablet    Take 8 tablets (800 mg) by mouth daily    Multiple myeloma in relapse (H)

## 2017-12-24 NOTE — PROGRESS NOTES
ORAL CHEMOTHERAPY DISCONTINUATION       Primary Oncologist:  Dr. Topete  Primary Oncology Clinic: [facility]  Cancer Diagnosis:  Multiple Myeloma  Therapy History:  Drug: Venclexta 800mg daily; continuously   Start Date: 08/25/2017  Therapy Ended On:  12/13/2017  Reason For Discontinuation: disease progression    Additional Notes:  Thank you for the opportunity to be a part in this patient's oral chemotherapy. The oncology pharmacy will no longer be following this patient for oral chemotherapy. If there are any questions or the plan changes, feel free to contact us.    Marsha Gleason, Pharmacy Intern  Oral Chemotherapy Monitoring Program  [facility]  [phone]

## 2017-12-26 NOTE — PROGRESS NOTES
Infusion Nursing Note:  Anthony Carbone presents today for 1 unit PRBCs.    Patient seen by provider today: Yes, PREM Herrera    Note: Patient seen by Leanne Reddy in clinic today. Add on for 1 unit PRBC.    TORB: Okay to transfuse only 1 unit of blood on 12/26/17. Pt will have labs rechecked on 12/28/17 Brooke Leone RN/Leanne AMARO.    Intravenous Access:  Implanted Port.    Treatment Conditions:  Lab Results   Component Value Date    HGB 7.4 12/26/2017     Lab Results   Component Value Date    WBC 2.3 12/26/2017      Lab Results   Component Value Date    ANEU 2.2 12/26/2017     Lab Results   Component Value Date    PLT 27 12/26/2017      Blood transfusion consent signed 7/11/17.      Post Transfusion Assessment:  Patient tolerated transfusion without incident.  Blood return noted pre and post infusion.  Access discontinued per protocol.    Discharge Plan:   Patient declined prescription refills.  Discharge instructions reviewed with: Patient.  Patient verbalized understanding of discharge instructions and all questions answered.  AVS to patient via [address] [employer].  Patient will return 12/28/17 for next appointment.   Patient discharged in stable condition accompanied by: wife.  Face to Face time: 0.    EMILY BAZZI, RN

## 2017-12-26 NOTE — PATIENT INSTRUCTIONS
Contact Numbers    [facility] Main Line: [phone]  [facility] Triage:  [phone]    Call triage with chills and/or temperature greater than or equal to 100.5, uncontrolled nausea/vomiting, diarrhea, constipation, dizziness, shortness of breath, chest pain, bleeding, unexplained bruising, or any new/concerning symptoms, questions/concerns.     If you are having any concerning symptoms or wish to speak to a provider before your next infusion visit, please call your care coordinator or triage to notify them so we can adequately serve you.       After Hours: [phone]    If after hours, weekends, or holidays, call main hospital  and ask for Oncology doctor on call.         December 2017 Sunday Monday Tuesday Wednesday Thursday Friday Saturday                            1     2       3     4     5     6     7     [facility] DRAW   10:15 AM   (15 min.)    [facility] DRAW   [facility] Lab Draw     [facility] RETURN   10:35 AM   (50 min.)   Leanne Reddy PA-C   [facility]     [facility] ONC INFUSION 60   12:00 PM   (60 min.)    ONCOLOGY INFUSION   [facility] 8     [facility] DRAW    2:30 PM   (15 min.)    [facility]   [facility]     [facility] ONC INFUSION 120    3:00 PM   (120 min.)    ONCOLOGY INFUSION   [facility] 9       10     11     12     13     [facility] DRAW    4:45 PM   (15 min.)    [facility] DRAW   [facility] Lab Draw     [facility] ONC RETURN    5:30 PM   (30 min.)   Kamari Topete MD   [facility]     [facility] ONC INFUSION 120    6:30 PM   (120 min.)    ONCOLOGY INFUSION   [facility] 14     [facility] ONC INFUSION 120    3:00 PM   (120 min.)    ONCOLOGY INFUSION   [facility]     XR CHEST 2 VIEWS    4:45 PM   (15 min.)   [id number]   [facility] Xray 15     16       17     18     19     [facility] DRAW    5:30 PM   (15 min.)     [facility] DRAW   [facility] Draw     [facility] RETURN    5:35 PM   (50 min.)   Leanne Reddy PA-C [facility] 20     21     22     [facility] DRAW   10:00 AM   (15 min.)    [facility] DRAW   [facility] Draw     [facility] ONC INFUSION 120   10:30 AM   (120 min.)    ONCOLOGY INFUSION   [facility] 23       24     25     26     [facility] DRAW    8:45 AM   (15 min.)    [facility] LAB DRAW   [facility] Lab Draw     [facility] RETURN    9:05 AM   (50 min.)   Leanne Reddy PA-C   [facility]     [facility] ONC INFUSION 180   12:30 PM   (180 min.)    ONCOLOGY INFUSION   [facility] 27     28     [facility] DRAW    3:00 PM   (15 min.)    [facility]RAW   [facility]     [facility] ONC INFUSION 120    3:30 PM   (120 min.)    ONCOLOGY INFUSION   [facility] 29     30       31 January 2018 Sunday Monday Tuesday Wednesday Thursday Friday Saturday        1     2     LEVEL 1    3:00 PM   (60 min.)    INFUSION CHAIR 11   [facility] 3     4     [facility] DRAW    1:15 PM   (15 min.)    [facility]RAW   [facility]     [facility] RETURN    1:45 PM   (50 min.)   Leanne Reddy PA-C   [facility] 5     6       7     8     LEVEL 5    9:00 AM   (300 min.)    INFUSION CHAIR 17   [facility] 9     10     11     [facility] DRAW   12:45 PM   (15 min.)    [facility] DRAW   [facility] Draw     [facility] RETURN   12:55 PM   (50 min.)   Leanne Reddy PA-C   [facility]     [facility] ONC INFUSION 120    2:00 PM   (120 min.)    ONCOLOGY INFUSION   [facility] 12     13       14     15     16     17     18     [facility] DRAW    1:30 PM   (15 min.)    [facility] LAB DRAW    [facility] Lab Draw     P RETURN    1:45 PM   (50 min.)   Leanne Reddy PA-C   [facility] ONC INFUSION 120    3:00 PM   (120 min.)   [facility] ONCOLOGY INFUSION   [facility] 19     UMP NEW    7:45 AM   (60 min.)   Gaston Bowie MD   [facility] 20       21     22     23     24     25     [facility] LAB DRAW    1:30 PM   (15 min.)    [facility] LAB DRAW   [facility] Lab Draw     [facility] RETURN    1:45 PM   (50 min.)   Leanne Reddy PA-C   [facility] ONC INFUSION 120    3:00 PM   (120 min.)    ONCOLOGY INFUSION   [facility] 26     27       28     29     30     31                                Recent Results (from the past 24 hour(s))   Comprehensive metabolic panel    Collection Time: 12/26/17  9:02 AM   Result Value Ref Range    Sodium 136 133 - 144 mmol/L    Potassium 4.6 3.4 - 5.3 mmol/L    Chloride 107 94 - 109 mmol/L    Carbon Dioxide 17 (L) 20 - 32 mmol/L    Anion Gap 11 3 - 14 mmol/L    Glucose 232 (H) 70 - 99 mg/dL    Urea Nitrogen 33 (H) 7 - 30 mg/dL    Creatinine 1.68 (H) 0.66 - 1.25 mg/dL    GFR Estimate 40 (L) >60 mL/min/1.7m2    GFR Estimate If Black 49 (L) >60 mL/min/1.7m2    Calcium 8.2 (L) 8.5 - 10.1 mg/dL    Bilirubin Total 0.3 0.2 - 1.3 mg/dL    Albumin 2.3 (L) 3.4 - 5.0 g/dL    Protein Total 10.0 (H) 6.8 - 8.8 g/dL    Alkaline Phosphatase 38 (L) 40 - 150 U/L    ALT 14 0 - 70 U/L    AST 7 0 - 45 U/L   CBC with platelets and differential    Collection Time: 12/26/17  9:02 AM   Result Value Ref Range    WBC 2.3 (L) 4.0 - 11.0 10e9/L    RBC Count 2.27 (L) 4.4 - 5.9 10e12/L    Hemoglobin 7.4 (L) 13.3 - 17.7 g/dL    Hematocrit 24.5 (L) 40.0 - 53.0 %     (H) 78 - 100 fl    MCH 32.6 26.5 - 33.0 pg    MCHC 30.2 (L) 31.5 - 36.5 g/dL    RDW 15.2 (H) 10.0 - 15.0 %    Platelet Count 27 (LL) 150 - 450 10e9/L    Diff Method Automated Method     %  Neutrophils 92.7 %    % Lymphocytes 2.1 %    % Monocytes 4.3 %    % Eosinophils 0.0 %    % Basophils 0.0 %    % Immature Granulocytes 0.9 %    Nucleated RBCs 0 0 /100    Absolute Neutrophil 2.2 1.6 - 8.3 10e9/L    Absolute Lymphocytes 0.1 (L) 0.8 - 5.3 10e9/L    Absolute Monocytes 0.1 0.0 - 1.3 10e9/L    Absolute Eosinophils 0.0 0.0 - 0.7 10e9/L    Absolute Basophils 0.0 0.0 - 0.2 10e9/L    Abs Immature Granulocytes 0.0 0 - 0.4 10e9/L    Absolute Nucleated RBC 0.0    ABO/Rh type and screen    Collection Time: 12/26/17  9:02 AM   Result Value Ref Range    ABO O     RH(D) Pos     Antibody Screen Pos (A)     Test Valid Only At          [facility]    Specimen Expires 12/29/2017     Blood Bank Comment       Delay in availability of Red Blood Cells called to  Lashay Longoria in onc inf. 12/26/17 @ 1200 AY  PT given KRISTIE on 1/26/17, which is known to cause pos jarek screen. All clinically   significant abys rulled out. 12/26/17. AY     Blood component    Collection Time: 12/26/17  9:02 AM   Result Value Ref Range    Unit Number [id number]     Blood Component Type Red Blood Cells LeukoReduced Irradiated     Division Number 00     Status of Unit Ready for patient 12/26/2017 1416     Blood Product Code [id number]     Unit Status AMY    Blood component    Collection Time: 12/26/17  9:02 AM   Result Value Ref Range    Unit Number [id number]     Blood Component Type Red Blood Cells LeukoReduced Irradiated     Division Number 00     Status of Unit Released to care unit 12/26/2017 1420     Blood Product Code [id number]     Unit Status ISS        After Your Blood Transfusion  Discharge Instructions  After you leave  After a blood transfusion (received red blood cells, platelets, plasma, cryo or granulocytes), you will need to watch for signs of a reaction for the next 48 hours.  Call your clinic or 911 (or go to the Emergency room) if you have any signs of a reaction:    Shaking or  chills    Fever (temperature above 100.0 F)    Headache    Nausea    Hives    Itching    Swelling of the face or feeling flushed    Ongoing dry cough (nothing is coughed up)    Trouble breathing or wheezing  Some signs of a reaction won't show up for a few days or up to 4 weeks.   These may include:    Fatigue    Dizziness    Pink or red urine  For informational purposes only. Not to replace the advice of your health care provider.   Copyright   2015 [facility]. All rights reserved. [employer] [id number] - Rev 07/16.

## 2017-12-26 NOTE — NURSING NOTE
"Oncology Rooming Note    December 26, 2017 9:11 AM   Anthony Carbone is a 72 year old male who presents for:    Chief Complaint   Patient presents with     Port Draw     Labs drawn via port by RN. Line flushed and hep locked. VS taken.     Oncology Clinic Visit     Follow up-Multiple Myeloma     Initial Vitals: /71 (BP Location: Right arm, Patient Position: Sitting, Cuff Size: Adult Regular)  Pulse 97  Temp 97.6  F (36.4  C) (Oral)  Resp 18  Ht 1.626 m (5' 4\")  Wt 51.8 kg (114 lb 4.8 oz)  SpO2 98%  BMI 19.62 kg/m2 Estimated body mass index is 19.62 kg/(m^2) as calculated from the following:    Height as of this encounter: 1.626 m (5' 4\").    Weight as of this encounter: 51.8 kg (114 lb 4.8 oz). Body surface area is 1.53 meters squared.  No Pain (0) Comment: Data Unavailable   No LMP for male patient.  Allergies reviewed: Yes  Medications reviewed: Yes    Medications: Medication refills not needed today.  Pharmacy name entered into [facility]:    [id number] - [place] [id number] AT [facility] [address] [facility] [address]  [facility] [place], [place] - [address] [facility] [facility]  [id number] [place], [place] - [address] AT [facility] [address]    Clinical concerns: No Concerns Leanne Reddy was notified.    10 minutes for nursing intake (face to face time)     Tamara Arguelles LPN              "

## 2017-12-26 NOTE — MR AVS SNAPSHOT
After Visit Summary   12/26/2017    Anthony Carbone    MRN: [id number]           Patient Information     Date Of Birth          1945        Visit Information        Provider Department      12/26/2017 12:30 PM  23 ATC;  ONCOLOGY INFUSION [facility]        Today's Diagnoses     Multiple myeloma in relapse (H)    -  1      Care Instructions    Contact Numbers    [facility] Main [place]: [phone]  [facility] Triage:  [phone]    Call triage with chills and/or temperature greater than or equal to 100.5, uncontrolled nausea/vomiting, diarrhea, constipation, dizziness, shortness of breath, chest pain, bleeding, unexplained bruising, or any new/concerning symptoms, questions/concerns.     If you are having any concerning symptoms or wish to speak to a provider before your next infusion visit, please call your care coordinator or triage to notify them so we can adequately serve you.       After Hours: [phone]    If after hours, weekends, or holidays, call main hospital  and ask for Oncology doctor on call.         December 2017 Sunday Monday Tuesday Wednesday Thursday Friday Saturday                            1     2       3     4     5     6     7     P [facility] DRAW   10:15 AM   (15 min.)    [facility] DRAW   [facility] Lab Draw     UMP RETURN   10:35 AM   (50 min.)   Leanne Reddy PA-C   [facility]     [facility] ONC INFUSION 60   12:00 PM   (60 min.)    ONCOLOGY INFUSION   [facility] 8     [facility]    2:30 PM   (15 min.)    [facility] DRAW   [facility] Lab Draw     UMP ONC INFUSION 120    3:00 PM   (120 min.)    ONCOLOGY INFUSION   [facility] 9       10     11     12     13     [facility]RAW    4:45 PM   (15 min.)    [facility] DRAW   [facility] Lab Draw     UMP ONC RETURN    5:30 PM   (30 min.)   Kamari Topete MD   [facility] Blood and  [facility]     UMP ONC INFUSION 120    6:30 PM   (120 min.)    ONCOLOGY INFUSION   [facility] 14     UMP ONC INFUSION 120    3:00 PM   (120 min.)    ONCOLOGY INFUSION   [facility]     XR CHEST 2 VIEWS    4:45 PM   (15 min.)   UCXR1   [facility] Xray 15     16       17     18     19     [facility] DRAW    5:30 PM   (15 min.)    [facility] LAB DRAW   [facility] Draw     UMP RETURN    5:35 PM   (50 min.)   Leanne Reddy PA-C   [facility] 20     21     22     [facility] DRAW   10:00 AM   (15 min.)    [facility] DRAW   [facility] Draw     UMP ONC INFUSION 120   10:30 AM   (120 min.)    ONCOLOGY INFUSION   [facility] 23       24     25     26     [facility] DRAW    8:45 AM   (15 min.)    [facility] DRAW   [facility] Draw     UMP RETURN    9:05 AM   (50 min.)   Leanne Reddy PA-C   [facility]     UMP ONC INFUSION 180   12:30 PM   (180 min.)    ONCOLOGY INFUSION   [facility] 27     28     [facility] DRAW    3:00 PM   (15 min.)    [facility] DRAW   [facility] Draw     UMP ONC INFUSION 120    3:30 PM   (120 min.)    ONCOLOGY INFUSION   [facility] 29     30       31 January 2018 Sunday Monday Tuesday Wednesday Thursday Friday Saturday        1     2     LEVEL 1    3:00 PM   (60 min.)    INFUSION CHAIR 11   [facility] 3     4     [facility] DRAW    1:15 PM   (15 min.)    [facility] DRAW   [facility] Draw     UMP RETURN    1:45 PM   (50 min.)   Leanne Reddy PA-C   [facility] 5     6       7     8     LEVEL 5    9:00 AM   (300 min.)    INFUSION CHAIR 17   [facility] 9     10     11     [facility] DRAW    12:45 PM   (15 min.)   [facility] DRAW   [facility] Draw     UMP RETURN   12:55 PM   (50 min.)   Leanne Reddy PA-C   [facility]     UMP ONC INFUSION 120    2:00 PM   (120 min.)   UC ONCOLOGY INFUSION   [facility] 12     13       14     15     16     17     18     [facility] DRAW    1:30 PM   (15 min.)   [facility] DRAW   [facility] Lab Draw     UMP RETURN    1:45 PM   (50 min.)   Leanne Reddy PA-C   [facility]     UMP ONC INFUSION 120    3:00 PM   (120 min.)   UC ONCOLOGY INFUSION   [facility] 19     UMP NEW    7:45 AM   (60 min.)   Gaston Bowie MD   [facility] 20       21     22     23     24     25     [facility] DRAW    1:30 PM   (15 min.)    [facility] LAB DRAW   [facility] Lab Draw     UMP RETURN    1:45 PM   (50 min.)   Leanne Reddy PA-C   [facility]     UMP ONC INFUSION 120    3:00 PM   (120 min.)   UC ONCOLOGY INFUSION   [facility] 26     27       28     29     30     31                                Recent Results (from the past 24 hour(s))   Comprehensive metabolic panel    Collection Time: 12/26/17  9:02 AM   Result Value Ref Range    Sodium 136 133 - 144 mmol/L    Potassium 4.6 3.4 - 5.3 mmol/L    Chloride 107 94 - 109 mmol/L    Carbon Dioxide 17 (L) 20 - 32 mmol/L    Anion Gap 11 3 - 14 mmol/L    Glucose 232 (H) 70 - 99 mg/dL    Urea Nitrogen 33 (H) 7 - 30 mg/dL    Creatinine 1.68 (H) 0.66 - 1.25 mg/dL    GFR Estimate 40 (L) >60 mL/min/1.7m2    GFR Estimate If Black 49 (L) >60 mL/min/1.7m2    Calcium 8.2 (L) 8.5 - 10.1 mg/dL    Bilirubin Total 0.3 0.2 - 1.3 mg/dL    Albumin 2.3 (L) 3.4 - 5.0 g/dL    Protein Total 10.0 (H) 6.8 - 8.8 g/dL    Alkaline Phosphatase 38 (L) 40 - 150 U/L    ALT 14 0 - 70 U/L    AST 7 0 - 45 U/L   CBC with platelets and differential    Collection Time:  12/26/17  9:02 AM   Result Value Ref Range    WBC 2.3 (L) 4.0 - 11.0 10e9/L    RBC Count 2.27 (L) 4.4 - 5.9 10e12/L    Hemoglobin 7.4 (L) 13.3 - 17.7 g/dL    Hematocrit 24.5 (L) 40.0 - 53.0 %     (H) 78 - 100 fl    MCH 32.6 26.5 - 33.0 pg    MCHC 30.2 (L) 31.5 - 36.5 g/dL    RDW 15.2 (H) 10.0 - 15.0 %    Platelet Count 27 (LL) 150 - 450 10e9/L    Diff Method Automated Method     % Neutrophils 92.7 %    % Lymphocytes 2.1 %    % Monocytes 4.3 %    % Eosinophils 0.0 %    % Basophils 0.0 %    % Immature Granulocytes 0.9 %    Nucleated RBCs 0 0 /100    Absolute Neutrophil 2.2 1.6 - 8.3 10e9/L    Absolute Lymphocytes 0.1 (L) 0.8 - 5.3 10e9/L    Absolute Monocytes 0.1 0.0 - 1.3 10e9/L    Absolute Eosinophils 0.0 0.0 - 0.7 10e9/L    Absolute Basophils 0.0 0.0 - 0.2 10e9/L    Abs Immature Granulocytes 0.0 0 - 0.4 10e9/L    Absolute Nucleated RBC 0.0    ABO/Rh type and screen    Collection Time: 12/26/17  9:02 AM   Result Value Ref Range    ABO O     RH(D) Pos     Antibody Screen Pos (A)     Test Valid Only At          [facility]    Specimen Expires 12/29/2017     Blood Bank Comment       Delay in availability of Red Blood Cells called to  Lashay Longoria in onc inf. 12/26/17 @ 1200 AY  PT given KRISTIE on 1/26/17, which is known to cause pos jarek screen. All clinically   significant abys rulled out. 12/26/17. AY     Blood component    Collection Time: 12/26/17  9:02 AM   Result Value Ref Range    Unit Number [id number]     Blood Component Type Red Blood Cells LeukoReduced Irradiated     Division Number 00     Status of Unit Ready for patient 12/26/2017 1416     Blood Product Code [id number]     Unit Status AMY    Blood component    Collection Time: 12/26/17  9:02 AM   Result Value Ref Range    Unit Number [id number]     Blood Component Type Red Blood Cells LeukoReduced Irradiated     Division Number 00     Status of Unit Released to care unit 12/26/2017 1420     Blood  Product Code [id number]     Unit Status ISS        After Your Blood Transfusion  Discharge Instructions  After you leave  After a blood transfusion (received red blood cells, platelets, plasma, cryo or granulocytes), you will need to watch for signs of a reaction for the next 48 hours.  Call your clinic or 911 (or go to the Emergency room) if you have any signs of a reaction:    Shaking or chills    Fever (temperature above 100.0 F)    Headache    Nausea    Hives    Itching    Swelling of the face or feeling flushed    Ongoing dry cough (nothing is coughed up)    Trouble breathing or wheezing  Some signs of a reaction won't show up for a few days or up to 4 weeks.   These may include:    Fatigue    Dizziness    Pink or red urine  For informational purposes only. Not to replace the advice of your health care provider.   Copyright   2015 [employer] All rights reserved. [employer] 727436 - Rev 07/16.            Follow-ups after your visit        Your next 10 appointments already scheduled     Dec 28, 2017  3:00 PM CST   [facility] Draw with  [facility] DRAW   [facility] ([facility])    [address] [place] [id number] [phone]            Dec 28, 2017  3:30 PM CST   Infusion 120 with  ONCOLOGY INFUSION, UC 13 ATC   [facility] ([facility])    [address] [place] [id number] [phone]            Jan 02, 2018  3:00 PM CST   Level 1 with  INFUSION CHAIR 11   [facility] ([facility])    [facility]  [address]  [facility] [address]   [phone]            Jan 04, 2018  1:15 PM CST   [facility] Lab Draw with UC [facility] LAB DRAW   [facility] ([facility])    [address] [place] [phone]             Jan 04, 2018  2:00 PM CST   (Arrive by 1:45 PM)   Return Visit with ALPHONSO Coffey [facility] ([facility])    [address] [place] [phone]            Jan 08, 2018  9:00 AM CST   Level 5 with SH INFUSION CHAIR 17   [facility] ([facility])    [facility]  [address]  [facility] [address]   [phone]            Jan 11, 2018 12:45 PM CST   [facility] Lab Draw with  MASONIC LAB DRAW   [facility] Lab Draw ([facility])    [address] [place] [id number] [phone]            Jan 11, 2018  1:10 PM CST   (Arrive by 12:55 PM)   Return Visit with Leanne Reddy PA-C   [facility] ([facility])    [address] [place] [phone]            Jan 11, 2018  2:00 PM CST   Infusion 120 with  ONCOLOGY INFUSION   [facility] ([facility])    [address] [place] [phone]              Future tests that were ordered for you today     Open Standing Orders        Priority Remaining Interval Expires Ordered    Transfuse red blood cell unit Routine 1/2 TRANSFUSE 2 UNITS  12/26/2017    Red blood cell prepare order unit Routine 99/100 CONDITIONAL (SPECIFY) BLOOD  12/26/2017            Who to contact     If you have questions or need follow up information about today's clinic visit or your schedule please contact [facility] directly at [phone].  Normal or non-critical lab and imaging results will be communicated to you by MyChart, letter or phone within 4 business days after the clinic has received the results. If you do not hear from us within 7 days, please contact the clinic through  [employer] or phone. If you have a critical or abnormal lab result, we will notify you by phone as soon as possible.  Submit refill requests through [employer] or call your pharmacy and they will forward the refill request to us. Please allow 3 business days for your refill to be completed.          Additional Information About Your Visit        [employer] Information     [employer] gives you secure access to your electronic health record. If you see a primary care provider, you can also send messages to your care team and make appointments. If you have questions, please call your primary care clinic.  If you do not have a primary care provider, please call [phone] and they will assist you.        Care EveryWhere ID     This is your Care EveryWhere ID. This could be used by other organizations to access your [facility] medical records  [id number]        Your Vitals Were     Pulse Temperature Respirations Pulse Oximetry          73 98.6  F (37  C) (Oral) 16 98%         Blood Pressure from Last 3 Encounters:   12/26/17 143/89   12/26/17 117/71   12/22/17 130/81    Weight from Last 3 Encounters:   12/26/17 51.8 kg (114 lb 4.8 oz)   12/22/17 51.5 kg (113 lb 9.6 oz)   12/19/17 53 kg (116 lb 12.8 oz)              We Performed the Following     Transfuse red blood cell unit        Primary Care Provider Office Phone # Fax #    Sanket Burciaga [phone]       [facility] [address] [facility] [address]        Equal Access to Services     ALBAN SHAH : Hadii chalo ku hadasho Soomaali, waaxda luqadaha, qaybta kaalmada adeegyada, jonny villa. So [facility] [phone].    ATENCIÓN: Si habla dinh, tiene a todd disposición servicios gratuitos de asistencia lingüística. Llame al [phone].    We comply with applicable federal civil rights laws and [place] laws. We do not discriminate on the basis of race, color, national origin, age, disability, sex, sexual  orientation, or gender identity.            Thank you!     Thank you for choosing [facility]  for your care. Our goal is always to provide you with excellent care. Hearing back from our patients is one way we can continue to improve our services. Please take a few minutes to complete the written survey that you may receive in the mail after your visit with us. Thank you!             Your Updated Medication List - Protect others around you: Learn how to safely use, store and throw away your medicines at www.disposemymeds.org.          This list is accurate as of: 12/26/17  4:09 PM.  Always use your most recent med list.                   Brand Name Dispense Instructions for use Diagnosis    acyclovir 400 MG tablet    ZOVIRAX    60 tablet    Take 1 tablet (400 mg) by mouth 2 times daily    Multiple myeloma in relapse (H)       amLODIPine 5 MG tablet    NORVASC    90 tablet    TAKE 1 TABLET BY MOUTH AT BEDTIME    Essential hypertension       clopidogrel 75 MG tablet    PLAVIX    90 tablet    TAKE 1 TABLET BY MOUTH EVERY DAY    History of stroke       dexamethasone 4 MG tablet    DECADRON    40 tablet    Take 20 mg weekly on day of Velcade    Multiple myeloma not having achieved remission (H)       esomeprazole 40 MG CR capsule    nexIUM    30 capsule    Take 1 capsule (40 mg) by mouth every morning (before breakfast)    Gastroesophageal reflux disease without esophagitis       LORazepam 0.5 MG tablet    ATIVAN    30 tablet    Take 1 tablet (0.5 mg) by mouth every 6 hours as needed for nausea or anxiety.    Multiple myeloma in relapse (H), Delirium       nystatin [id number] UNIT/ML suspension    MYCOSTATIN    473 mL    Take 5 mLs (500,000 Units) by mouth 4 times daily Swish and spit. Continue until symptoms resolve and then take for 3 more days.    Thrush       OLANZapine 5 MG tablet    zyPREXA    60 tablet    Take 0.5 tablets (2.5 mg) by mouth At Bedtime    Delirium, Multiple myeloma in relapse (H)        ondansetron 8 MG ODT tab    ZOFRAN-ODT    30 tablet    Take 1 tablet (8 mg) by mouth every 8 hours as needed for nausea    Nausea       oxyCODONE IR 5 MG tablet    ROXICODONE    15 tablet    Take 1 tablet (5 mg) by mouth every 6 hours as needed for moderate to severe pain        potassium chloride SA 20 MEQ CR tablet    K-DUR/KLOR-CON M    30 tablet    Take 1 tablet (20 mEq) by mouth daily    Hypokalemia       predniSONE 50 MG tablet    DELTASONE    30 tablet    Take 50 mg every other day    Multiple myeloma not having achieved remission (H)       SIMVASTATIN PO      Take 20 mg by mouth At Bedtime        traMADol 50 MG tablet    ULTRAM    60 tablet    Take 1 tablet (50 mg) by mouth every 6 hours as needed for moderate pain . Recommend trying after Tylenol and before Dilaudid.    Acute bilateral low back pain without sciatica, Multiple myeloma in relapse (H)       venetoclax 100 MG tablet CHEMOTHERAPY    VENCLEXTA    100 tablet    Take 8 tablets (800 mg) by mouth daily    Multiple myeloma in relapse (H)

## 2017-12-26 NOTE — NURSING NOTE
Chief Complaint   Patient presents with     Port Draw     Labs drawn via port by RN. Line flushed and hep locked. VS taken.     Elisabet Redmond RN

## 2017-12-26 NOTE — MR AVS SNAPSHOT
After Visit Summary   12/26/2017    Anthony Carbone    MRN: [id number]           Patient Information     Date Of Birth          1945        Visit Information        Provider Department      12/26/2017 9:20 AM Leanne Reddy PA-C [facility]        Today's Diagnoses     Multiple myeloma in relapse (H)    -  1       Follow-ups after your visit        Your next 10 appointments already scheduled     Dec 26, 2017 12:30 PM CST   Infusion 180 with UC ONCOLOGY INFUSION, UC 23 ATC   [facility] ([facility])    [address] [place] [id number]   [phone]            Dec 28, 2017  3:00 PM CST   Masonic Lab Draw with UC MASONIC LAB DRAW   [facility])    [address] [place] [id number]   [phone]            Dec 28, 2017  3:30 PM CST   Infusion 120 with UC ONCOLOGY INFUSION, UC 13 ATC   [facility] ([facility])    [address] [place] [phone]   [phone]            Jan 04, 2018  2:00 PM CST   (Arrive by 1:45 PM)   Return Visit with Leanne Reddy PA-C   [facility] ([facility])    [address] [place] [id number]   [phone]            Jan 11, 2018 12:45 PM CST   Masonic Lab Draw with UC MASONIC LAB DRAW   [facility] Masonic Lab Draw ([facility])    [address] [place] [phone]   [phone]            Jan 11, 2018  1:10 PM CST   (Arrive by 12:55 PM)   Return Visit with Leanne Reddy PA-C   [facility] ([facility])    [address] [place] [id number]   [phone]            Jan 11, 2018  2:00 PM CST   Infusion 120 with UC  "ONCOLOGY INFUSION, UC 14 ATC   [facility] ([facility])    [address]  2nd Floor  [facility] [address] [phone]              Future tests that were ordered for you today     Open Standing Orders        Priority Remaining Interval Expires Ordered    Red blood cell prepare order unit Routine 99/100 CONDITIONAL (SPECIFY) BLOOD  12/26/2017            Who to contact     If you have questions or need follow up information about today's clinic visit or your schedule please contact [facility] directly at [phone].  Normal or non-critical lab and imaging results will be communicated to you by [email or web address], letter or phone within 4 business days after the clinic has received the results. If you do not hear from us within 7 days, please contact the clinic through [facility] or phone. If you have a critical or abnormal lab result, we will notify you by phone as soon as possible.  Submit refill requests through [address] or call your pharmacy and they will forward the refill request to us. Please allow 3 business days for your refill to be completed.          Additional Information About Your Visit        [email or web address] Information     [address] gives you secure access to your electronic health record. If you see a primary care provider, you can also send messages to your care team and make appointments. If you have questions, please call your primary care clinic.  If you do not have a primary care provider, please call [phone] and they will assist you.        Care EveryWhere ID     This is your Care EveryWhere ID. This could be used by other organizations to access your [place] medical records  [id number]        Your Vitals Were     Pulse Temperature Respirations Height Pulse Oximetry BMI (Body Mass Index)    97 97.6  F (36.4  C) (Oral) 18 1.626 m (5' 4\") 98% 19.62 kg/m2       Blood Pressure from Last 3 Encounters:   12/26/17 117/71   12/22/17 130/81 "   12/19/17 120/81    Weight from Last 3 Encounters:   12/26/17 51.8 kg (114 lb 4.8 oz)   12/22/17 51.5 kg (113 lb 9.6 oz)   12/19/17 53 kg (116 lb 12.8 oz)              We Performed the Following     ABO/Rh type and screen        Primary Care Provider Office Phone # Fax #    Sanket Burciaga [phone]       [facility] [address] [address]        Equal Access to Services     ALBAN SHAH : Hadii aad ku hadasho Soomaali, waaxda luqadaha, qaybta kaalmada adeegyada, jonny guevara haybecky jurado . So [facility] [phone].    ATENCIÓN: Si habla español, tiene a todd disposición servicios gratuitos de asistencia lingüística. [facility] [phone].    We comply with applicable federal civil rights laws and [place] laws. We do not discriminate on the basis of race, color, national origin, age, disability, sex, sexual orientation, or gender identity.            Thank you!     Thank you for choosing [facility]  for your care. Our goal is always to provide you with excellent care. Hearing back from our patients is one way we can continue to improve our services. Please take a few minutes to complete the written survey that you may receive in the mail after your visit with us. Thank you!             Your Updated Medication List - Protect others around you: Learn how to safely use, store and throw away your medicines at www.disposemymeds.org.          This list is accurate as of: 12/26/17  9:52 AM.  Always use your most recent med list.                   Brand Name Dispense Instructions for use Diagnosis    acyclovir 400 MG tablet    ZOVIRAX    60 tablet    Take 1 tablet (400 mg) by mouth 2 times daily    Multiple myeloma in relapse (H)       amLODIPine 5 MG tablet    NORVASC    90 tablet    TAKE 1 TABLET BY MOUTH AT BEDTIME    Essential hypertension       clopidogrel 75 MG tablet    PLAVIX    90 tablet    TAKE 1 TABLET BY MOUTH EVERY DAY    History  of stroke       dexamethasone 4 MG tablet    DECADRON    40 tablet    Take 20 mg weekly on day of Velcade    Multiple myeloma not having achieved remission (H)       esomeprazole 40 MG CR capsule    nexIUM    30 capsule    Take 1 capsule (40 mg) by mouth every morning (before breakfast)    Gastroesophageal reflux disease without esophagitis       LORazepam 0.5 MG tablet    ATIVAN    30 tablet    Take 1 tablet (0.5 mg) by mouth every 6 hours as needed for nausea or anxiety.    Multiple myeloma in relapse (H), Delirium       nystatin [id number] UNIT/ML suspension    MYCOSTATIN    473 mL    Take 5 mLs (500,000 Units) by mouth 4 times daily Swish and spit. Continue until symptoms resolve and then take for 3 more days.    Thrush       OLANZapine 5 MG tablet    zyPREXA    60 tablet    Take 0.5 tablets (2.5 mg) by mouth At Bedtime    Delirium, Multiple myeloma in relapse (H)       ondansetron 8 MG ODT tab    ZOFRAN-ODT    30 tablet    Take 1 tablet (8 mg) by mouth every 8 hours as needed for nausea    Nausea       oxyCODONE IR 5 MG tablet    ROXICODONE    15 tablet    Take 1 tablet (5 mg) by mouth every 6 hours as needed for moderate to severe pain        potassium chloride SA 20 MEQ CR tablet    K-DUR/KLOR-CON M    30 tablet    Take 1 tablet (20 mEq) by mouth daily    Hypokalemia       predniSONE 50 MG tablet    DELTASONE    30 tablet    Take 50 mg every other day    Multiple myeloma not having achieved remission (H)       SIMVASTATIN PO      Take 20 mg by mouth At Bedtime        traMADol 50 MG tablet    ULTRAM    60 tablet    Take 1 tablet (50 mg) by mouth every 6 hours as needed for moderate pain . Recommend trying after Tylenol and before Dilaudid.    Acute bilateral low back pain without sciatica, Multiple myeloma in relapse (H)       venetoclax 100 MG tablet CHEMOTHERAPY    VENCLEXTA    100 tablet    Take 8 tablets (800 mg) by mouth daily    Multiple myeloma in relapse (H)

## 2017-12-26 NOTE — PROGRESS NOTES
HEMATOLOGY/ONCOLOGY PROGRESS NOTE  Dec 26, 2017    REASON FOR VISIT: myeloma    Diagnosis: 72 year old gentleman with IgM lambda multiple myeloma originally diagnosed in 01/2012 as stage I, standard risk disease.   Treatment: Revlimid plus dexamethasone for 4-5 cycles but plateaued by late 03/2012.   - Velcade was added and he received another 2-3 cycles, achieving a good partial remission.      Transplant: Single auto after melphalan 200 mg/m2 preparative regimen on 01/09/2013  - Post-transplant course: unremarkable except for some mild steroid-induced hyperglycemia, gastritis, nausea and vomiting.      Maintenance: Lenalidomide at day-100 then developed a maculopapular rash. Lenalidomide was held and then we re-challenged him after about a month to 2 months at a lower dose (5 mg daily); rash returned.   - was started on maintenance Velcade, every other week through July 2014, when he was noted with abnormalities on myeloma studies drawn there. Noted with prostate cancer dx at about the time of relapse (see below).     Relapse: noted with return on M-spike in blood/urine with marrow involvement but negative PET.   - Started on retreatment with RVD on 8/27/14 with Revlimid at 15 mg 14 days of 21 days, dexamethasone 40 mg weekly and velcade weekly.Completed at total of 5 cycles without complication by 12/2014.   - Started Cycle 6 on inc'd Rev dosing of 20mg daily x 2 weeks on 12/11/14 which was complicated by pneumonia.  - Adm 12/21-1/10 for human metapneumovirus pneumonia complicated by anorexia, HTN, depression, anorexia with significant weight loss.   - Restarted Ernesto/Dex only on 2/4/15 with good tolerance but with thrombocytopenia.   - Bendamustine added to Velcade/dexamethasone on 5/21/15 due to disease progression  - Velcade discontinued on 7/9/2015 due to side effects (orthostasis)  - Schedule changed to bendamustine 80 mg/m2 days 1 and 2 on 28-day cycle  - Cycle 1 tolerated poorly due to lightheadedness and  weakness  - Cycle 2 dose reduced to 60 mg/m2 and pre-meds adjusted  - Cycle 5 received on 9/17/15.  - 10/1/2015 - increasing IgM, M-spike - started Pomalidomide 4mg/day (21 days out of 28 days) and weekly Decadron 20mg on Oct 1st, 2015.    - Carfilzomib added on 10/22/2015 making this CPD.  - C3-C6  received in [place]    - Returned to [facility] and resumed CPD here    - Adm: 4/12-4/14 with fever, confusion, neutropenia. Noted on MRI to have acute/subacute CVA and subacute/chronic CVA; started on Plavix, given brief course of Abx and GCSF prior to d/c.     - Continued on Carfilzomib and dexamethasone alone  - Pomalyst added back on 7/13/2016 for rising FLC  - Start daratumumab 11/10/16. Changed to every other week after 4 weekly treatments d/t profound fatigue and malaise.   - Adm 5/7-5/16 due to AMS, hypercalcemia, hyperuricemia, possible PNA, back pain, and JOSE MARIA. Started Kyprolis while inpatient.  - Re-admitted 5/25-/529 with recurrent AMS, found to have concomitant PNA  - Resumed Kyprolis 6/13/2017. Stopped due to worsening performance status and progressive myeloma.  - Started Venclexta 800 mg 8/25/2017  - Added weekly Velcade with dexamethasone 20 mg weekly due to rising light chains on 11/1/2017  - Started weekly Cytoxan with prednisone QOD on 12/13    INTERVAL HISTORY:    Mr. Carbone is here with his wife.   He feels like he is actually doing a lot better recently. Energy is better. Appetite is somewhat improved. Overall, he is feeling well and wishes to continue with chemotherapy. Back pain remains resolved and no new pains. One episode of diarrhea since last visit, resolved with half dose of imodium. No fevers, chills, cough, mouth sores, change in GIVENS, chest pain, nausea, vomiting, constipation, bleeding, or swelling.  Remainder of 10-pt ROS otherwise is negative.    PHYSICAL EXAMINATION  /71 (BP Location: Right arm, Patient Position: Sitting, Cuff Size: Adult Regular)  Pulse 97  Temp 97.6  F (36.4  C)  "(Oral)  Resp 18  Ht 1.626 m (5' 4\")  Wt 51.8 kg (114 lb 4.8 oz)  SpO2 98%  BMI 19.62 kg/m2    Wt Readings from Last 10 Encounters:   17 51.8 kg (114 lb 4.8 oz)   17 51.5 kg (113 lb 9.6 oz)   17 53 kg (116 lb 12.8 oz)   17 53.5 kg (118 lb)   17 54 kg (119 lb 1.6 oz)   17 53 kg (116 lb 12.8 oz)   17 54.2 kg (119 lb 6.4 oz)   17 53.7 kg (118 lb 4.8 oz)   17 53 kg (116 lb 12.8 oz)   17 53.4 kg (117 lb 12.8 oz)   General: Alert. Oriented to name, , location,  Able to ambulate independently, albeit slow and cautiously.  No acute distress. HEENT: PERRL, no palor or icterus. CVS: RRR with occasional premature beat. CHEST: CTAB, normal work of breathing ABDOMEN: soft non tender no masses     NEURO: AAOX3  CN 2-12 intact  SKIN: no bleeding or brusiing, no rashes EXTREMITIES: No edema.     LABS:   Results for WILLIAN ARCINIEGA (MRN [id number]) as of 2017 09:49   Ref. Range 2017 09:02   Sodium Latest Ref Range: 133 - 144 mmol/L 136   Potassium Latest Ref Range: 3.4 - 5.3 mmol/L 4.6   Chloride Latest Ref Range: 94 - 109 mmol/L 107   Carbon Dioxide Latest Ref Range: 20 - 32 mmol/L 17 (L)   Urea Nitrogen Latest Ref Range: 7 - 30 mg/dL 33 (H)   Creatinine Latest Ref Range: 0.66 - 1.25 mg/dL 1.68 (H)   GFR Estimate Latest Ref Range: >60 mL/min/1.7m2 40 (L)   GFR Estimate If Black Latest Ref Range: >60 mL/min/1.7m2 49 (L)   Calcium Latest Ref Range: 8.5 - 10.1 mg/dL 8.2 (L)   Anion Gap Latest Ref Range: 3 - 14 mmol/L 11   Albumin Latest Ref Range: 3.4 - 5.0 g/dL 2.3 (L)   Protein Total Latest Ref Range: 6.8 - 8.8 g/dL 10.0 (H)   Bilirubin Total Latest Ref Range: 0.2 - 1.3 mg/dL 0.3   Alkaline Phosphatase Latest Ref Range: 40 - 150 U/L 38 (L)   ALT Latest Ref Range: 0 - 70 U/L 14   AST Latest Ref Range: 0 - 45 U/L 7   Glucose Latest Ref Range: 70 - 99 mg/dL 232 (H)   WBC Latest Ref Range: 4.0 - 11.0 10e9/L 2.3 (L)   Hemoglobin Latest Ref Range: 13.3 " "- 17.7 g/dL 7.4 (L)   Hematocrit Latest Ref Range: 40.0 - 53.0 % 24.5 (L)   Platelet Count Latest Ref Range: 150 - 450 10e9/L 27 (LL)     IMPRESSION/PLAN:  72 year old male with relapsed MM after autologous transplant (1/9/2013), status-post multiple salvage regimens with progressive disease.    MM: With rising light chains, worsening renal function, and worsening TCP, started weekly cytoxan with prednisone 50  mg QOD on 12/14. At our last visit, we discussed goals of care (his goal is to \"feel better\" and hospice. Yamil wished to give this regimen a little more time before opting for hospice. Today, he is overall feeling better with improvements in fatigue and appetite. He wishes to continue with chemotherapy this week. His creat has improved, so hopefully this is a good indicator for response. We will continue weekly follow-up for now.    Heme: Cytopenias 2/2 treatment and likely MM, now worsened with Cytoxan.   - Previously on Plavix, remains on hold given thrombocytopenia. If platelets do improve, likely will continue to hold given risk versus benefit at this point (diagnosis of stroke was already a little elusive and he has been on treatment > 1 year)  - Transfuse to keep hemoglobin > 8, platelets > 10k.  Will get pRBC today  - Twice weekly checks for now    Renal/FEN: Creat baseline ~0.8-1.0, recently has been increased beyond baseline, likely secondary to progressive myeloma. Much improved today.  -Encouraged protein/calorie supplements.    . Recently with nausea, diarrhea, body aches; all resolved now.    ID: Presently afebrile w/o any localizing infectious s/s.   - Continues ppx  mg BID     Back/rib pain: Started early May. Lumbar MRI at OSH showing mild-mod central and foraminal stenoses in lumbar spine, per patient and wife, there was no MM lesion there. Rib films inpatient negative, back films stable from prior imaging. Pain remains largely resolved now and he is not using any pain meds.    Fatigue: " Worsening over the last few weeks, worsened more acutely after Cytoxan. Likely multifactorial in setting of treatment, MM, deconditioning, anemia.      CV: Continue zocor and norvasc.   - Avoid QTc prolonging agents (history of QTc prolongation with syncopal episodes)     AMS: AMS started early May in setting of metabolic derangements, uremia, and possible infection.  Remains intermittently confused over the past few months but improved significantly, worsened over the weekend but then resolved. Stable today.  - Continue Zyprexa 2.5 mg at bedtime  - Holding off long-acting pain meds      I spent >25 minutes with the patient, with over 50% of the time spent counseling or coordinating their care as described above.         Leanne Reddy PA-C

## 2017-12-26 NOTE — LETTER
12/26/2017      RE: Anthony Carbone  [address] [place] [address]       HEMATOLOGY/ONCOLOGY PROGRESS NOTE  Dec 26, 2017    REASON FOR VISIT: myeloma    Diagnosis: 72 year old gentleman with IgM lambda multiple myeloma originally diagnosed in 01/2012 as stage I, standard risk disease.   Treatment: Revlimid plus dexamethasone for 4-5 cycles but plateaued by late 03/2012.   - Velcade was added and he received another 2-3 cycles, achieving a good partial remission.      Transplant: Single auto after melphalan 200 mg/m2 preparative regimen on 01/09/2013  - Post-transplant course: unremarkable except for some mild steroid-induced hyperglycemia, gastritis, nausea and vomiting.      Maintenance: Lenalidomide at day-100 then developed a maculopapular rash. Lenalidomide was held and then we re-challenged him after about a month to 2 months at a lower dose (5 mg daily); rash returned.   - was started on maintenance Velcade, every other week through July 2014, when he was noted with abnormalities on myeloma studies drawn there. Noted with prostate cancer dx at about the time of relapse (see below).     Relapse: noted with return on M-spike in blood/urine with marrow involvement but negative PET.   - Started on retreatment with RVD on 8/27/14 with Revlimid at 15 mg 14 days of 21 days, dexamethasone 40 mg weekly and velcade weekly.Completed at total of 5 cycles without complication by 12/2014.   - Started Cycle 6 on inc'd Rev dosing of 20mg daily x 2 weeks on 12/11/14 which was complicated by pneumonia.  - Adm 12/21-1/10 for human metapneumovirus pneumonia complicated by anorexia, HTN, depression, anorexia with significant weight loss.   - Restarted Ernesto/Dex only on 2/4/15 with good tolerance but with thrombocytopenia.   - Bendamustine added to Velcade/dexamethasone on 5/21/15 due to disease progression  - Velcade discontinued on 7/9/2015 due to side effects (orthostasis)  - Schedule changed to bendamustine 80 mg/m2  days 1 and 2 on 28-day cycle  - Cycle 1 tolerated poorly due to lightheadedness and weakness  - Cycle 2 dose reduced to 60 mg/m2 and pre-meds adjusted  - Cycle 5 received on 9/17/15.  - 10/1/2015 - increasing IgM, M-spike - started Pomalidomide 4mg/day (21 days out of 28 days) and weekly Decadron 20mg on Oct 1st, 2015.    - Carfilzomib added on 10/22/2015 making this CPD.  - C3-C6  received in [place]    - Returned to [facility] and resumed CPD here    - Adm: 4/12-4/14 with fever, confusion, neutropenia. Noted on MRI to have acute/subacute CVA and subacute/chronic CVA; started on Plavix, given brief course of Abx and GCSF prior to d/c.     - Continued on Carfilzomib and dexamethasone alone  - Pomalyst added back on 7/13/2016 for rising FLC  - Start daratumumab 11/10/16. Changed to every other week after 4 weekly treatments d/t profound fatigue and malaise.   - Adm 5/7-5/16 due to AMS, hypercalcemia, hyperuricemia, possible PNA, back pain, and JOSE MARIA. Started Kyprolis while inpatient.  - Re-admitted 5/25-/529 with recurrent AMS, found to have concomitant PNA  - Resumed Kyprolis 6/13/2017. Stopped due to worsening performance status and progressive myeloma.  - Started Venclexta 800 mg 8/25/2017  - Added weekly Velcade with dexamethasone 20 mg weekly due to rising light chains on 11/1/2017  - Started weekly Cytoxan with prednisone QOD on 12/13    INTERVAL HISTORY:    Mr. Carbone is here with his wife.   He feels like he is actually doing a lot better recently. Energy is better. Appetite is somewhat improved. Overall, he is feeling well and wishes to continue with chemotherapy. Back pain remains resolved and no new pains. One episode of diarrhea since last visit, resolved with half dose of imodium. No fevers, chills, cough, mouth sores, change in GIVENS, chest pain, nausea, vomiting, constipation, bleeding, or swelling.  Remainder of 10-pt ROS otherwise is negative.    PHYSICAL EXAMINATION  /71 (BP Location: Right arm,  "Patient Position: Sitting, Cuff Size: Adult Regular)  Pulse 97  Temp 97.6  F (36.4  C) (Oral)  Resp 18  Ht 1.626 m (5' 4\")  Wt 51.8 kg (114 lb 4.8 oz)  SpO2 98%  BMI 19.62 kg/m2    Wt Readings from Last 10 Encounters:   17 51.8 kg (114 lb 4.8 oz)   17 51.5 kg (113 lb 9.6 oz)   17 53 kg (116 lb 12.8 oz)   17 53.5 kg (118 lb)   17 54 kg (119 lb 1.6 oz)   17 53 kg (116 lb 12.8 oz)   17 54.2 kg (119 lb 6.4 oz)   17 53.7 kg (118 lb 4.8 oz)   17 53 kg (116 lb 12.8 oz)   17 53.4 kg (117 lb 12.8 oz)   General: Alert. Oriented to name, , location,  Able to ambulate independently, albeit slow and cautiously.  No acute distress. HEENT: PERRL, no palor or icterus. CVS: RRR with occasional premature beat. CHEST: CTAB, normal work of breathing ABDOMEN: soft non tender no masses     NEURO: AAOX3  CN 2-12 intact  SKIN: no bleeding or brusiing, no rashes EXTREMITIES: No edema.     LABS:   Results for WILLIAN ARCINIEGA (MRN [id number]) as of 2017 09:49   Ref. Range 2017 09:02   Sodium Latest Ref Range: 133 - 144 mmol/L 136   Potassium Latest Ref Range: 3.4 - 5.3 mmol/L 4.6   Chloride Latest Ref Range: 94 - 109 mmol/L 107   Carbon Dioxide Latest Ref Range: 20 - 32 mmol/L 17 (L)   Urea Nitrogen Latest Ref Range: 7 - 30 mg/dL 33 (H)   Creatinine Latest Ref Range: 0.66 - 1.25 mg/dL 1.68 (H)   GFR Estimate Latest Ref Range: >60 mL/min/1.7m2 40 (L)   GFR Estimate If Black Latest Ref Range: >60 mL/min/1.7m2 49 (L)   Calcium Latest Ref Range: 8.5 - 10.1 mg/dL 8.2 (L)   Anion Gap Latest Ref Range: 3 - 14 mmol/L 11   Albumin Latest Ref Range: 3.4 - 5.0 g/dL 2.3 (L)   Protein Total Latest Ref Range: 6.8 - 8.8 g/dL 10.0 (H)   Bilirubin Total Latest Ref Range: 0.2 - 1.3 mg/dL 0.3   Alkaline Phosphatase Latest Ref Range: 40 - 150 U/L 38 (L)   ALT Latest Ref Range: 0 - 70 U/L 14   AST Latest Ref Range: 0 - 45 U/L 7   Glucose Latest Ref Range: 70 - 99 mg/dL 232 " "(H)   WBC Latest Ref Range: 4.0 - 11.0 10e9/L 2.3 (L)   Hemoglobin Latest Ref Range: 13.3 - 17.7 g/dL 7.4 (L)   Hematocrit Latest Ref Range: 40.0 - 53.0 % 24.5 (L)   Platelet Count Latest Ref Range: 150 - 450 10e9/L 27 (LL)     IMPRESSION/PLAN:  72 year old male with relapsed MM after autologous transplant (1/9/2013), status-post multiple salvage regimens with progressive disease.    MM: With rising light chains, worsening renal function, and worsening TCP, started weekly cytoxan with prednisone 50  mg QOD on 12/14. At our last visit, we discussed goals of care (his goal is to \"feel better\" and hospice. Yamil wished to give this regimen a little more time before opting for hospice. Today, he is overall feeling better with improvements in fatigue and appetite. He wishes to continue with chemotherapy this week. His creat has improved, so hopefully this is a good indicator for response. We will continue weekly follow-up for now.    Heme: Cytopenias 2/2 treatment and likely MM, now worsened with Cytoxan.   - Previously on Plavix, remains on hold given thrombocytopenia. If platelets do improve, likely will continue to hold given risk versus benefit at this point (diagnosis of stroke was already a little elusive and he has been on treatment > 1 year)  - Transfuse to keep hemoglobin > 8, platelets > 10k.  Will get pRBC today  - Twice weekly checks for now    Renal/FEN: Creat baseline ~0.8-1.0, recently has been increased beyond baseline, likely secondary to progressive myeloma. Much improved today.  -Encouraged protein/calorie supplements.    . Recently with nausea, diarrhea, body aches; all resolved now.    ID: Presently afebrile w/o any localizing infectious s/s.   - Continues ppx  mg BID     Back/rib pain: Started early May. Lumbar MRI at OSH showing mild-mod central and foraminal stenoses in lumbar spine, per patient and wife, there was no MM lesion there. Rib films inpatient negative, back films stable from prior " imaging. Pain remains largely resolved now and he is not using any pain meds.    Fatigue: Worsening over the last few weeks, worsened more acutely after Cytoxan. Likely multifactorial in setting of treatment, MM, deconditioning, anemia.      CV: Continue zocor and norvasc.   - Avoid QTc prolonging agents (history of QTc prolongation with syncopal episodes)     AMS: AMS started early May in setting of metabolic derangements, uremia, and possible infection.  Remains intermittently confused over the past few months but improved significantly, worsened over the weekend but then resolved. Stable today.  - Continue Zyprexa 2.5 mg at bedtime  - Holding off long-acting pain meds      I spent >25 minutes with the patient, with over 50% of the time spent counseling or coordinating their care as described above.         Leanne Reddy PA-C

## 2017-12-29 NOTE — PATIENT INSTRUCTIONS
Contact Numbers  [facility] Nurse Triage: [phone]  After Hours Nurse Line:  [phone]    Please call the [facility] Nurse Triage line or after hours number if you experience a temperature greater than or equal to 100.5, shaking chills, have uncontrolled nausea, vomiting and/or diarrhea, dizziness, shortness of breath, chest pain, bleeding, unexplained bruising, or if you have any other new/concerning symptoms, questions or concerns.     If you are having any concerning symptoms or wish to speak to a provider before your next infusion visit, please call your care coordinator or triage to notify them so we can adequately serve you.     If you need a refill on a narcotic prescription or other medication, please call triage before your infusion appointment.           December 2017 Sunday Monday Tuesday Wednesday Thursday Friday Saturday                            1     2       3     4     5     6     7     [facility] DRAW   10:15 AM   (15 min.)    [facility] DRAW   [facility] Lab Draw     [facility] RETURN   10:35 AM   (50 min.)   Leanne Reddy PA-C   [facility]   12:00 PM   (60 min.)    ONCOLOGY INFUSION   [facility] 8     [facility] DRAW    2:30 PM   (15 min.)    [facility] DRAW   [facility] Lab Draw     [facility] ONC INFUSION 120    3:00 PM   (120 min.)    ONCOLOGY INFUSION   [facility] 9       10     11     12     13     [facility] DRAW    4:45 PM   (15 min.)    [facility] DRAW   [facility] Lab Draw     [facility] ONC RETURN    5:30 PM   (30 min.)   Kamari Topete MD   [facility] Blood and Marrow Transplant     [facility] ONC INFUSION 120    6:30 PM   (120 min.)    ONCOLOGY INFUSION   [facility] 14     [facility] ONC INFUSION 120    3:00 PM   (120 min.)    ONCOLOGY INFUSION   [facility]     XR CHEST 2 VIEWS    4:45 PM   (15 min.)   [id number]     [facility] Xray 15     16       17     18     19     [facility] DRAW    5:30 PM   (15 min.)    [facility] DRAW   [facility] Draw     [facility] RETURN    5:35 PM   (50 min.)   Leanne Reddy PA-C [facility] 20     21     22     [facility] DRAW   10:00 AM   (15 min.)    [facility] DRAW   [facility] Draw     [facility] ONC INFUSION 120   10:30 AM   (120 min.)    ONCOLOGY INFUSION   [facility] 23       24     25     26     [facility] DRAW    8:45 AM   (15 min.)    [facility] DRAW   [facility] Lab Draw     [facility] RETURN    9:05 AM   (50 min.)   Leanne Reddy PA-C [facility]     [facility] ONC INFUSION 180   12:30 PM   (180 min.)    ONCOLOGY INFUSION   [facility] 27     28     29     [facility] DRAW   11:15 AM   (15 min.)    [facility] DRAW   [facility] Lab Draw     [facility] ONC INFUSION 120   12:00 PM   (120 min.)    ONCOLOGY INFUSION   [facility] 30 31 January 2018 Sunday Monday Tuesday Wednesday Thursday Friday Saturday        1     2     LEVEL 1    3:00 PM   (60 min.)    INFUSION CHAIR 11   [facility] 3     4     [facility] DRAW    1:15 PM   (15 min.)    [facility] DRAW   [facility] Draw     [facility] RETURN    1:45 PM   (50 min.)   Leanne Reddy PA-C [facility] 5     6       7     8     LEVEL 5    9:00 AM   (300 min.)    INFUSION CHAIR 17   [facility] 9     10     11     [facility] DRAW   12:45 PM   (15 min.)    [facility] DRAW   [facility] Draw     [facility] RETURN   12:55 PM   (50 min.)   Leanne Reddy PA-C [facility]     [facility] ONC INFUSION 120    2:00 PM   (120 min.)    ONCOLOGY INFUSION   [facility] 12      13       14     15     16     17     18     [facility] DRAW    1:30 PM   (15 min.)    [facility] LAB DRAW   [facility] Lab Draw     [facility] RETURN    1:45 PM   (50 min.)   Leanne Reddy PA-C   [facility]     [facility] ONC INFUSION 120    3:00 PM   (120 min.)   [facility] ONCOLOGY INFUSION   [facility] 19     [facility] NEW    7:45 AM   (60 min.)   Gaston Bowie MD   [facility] 20       21     22     23     24     25     [facility] DRAW    1:30 PM   (15 min.)    [facility] LAB DRAW   [facility] Lab Draw     [facility] RETURN    1:45 PM   (50 min.)   Leanne Reddy PA-C   [facility]    3:00 PM   (120 min.)    ONCOLOGY INFUSION   [facility] 26     27       28     29     30     31                                Recent Results (from the past 24 hour(s))   Platelets prepare order unit    Collection Time: 12/29/17  1:00 AM   Result Value Ref Range    Blood Component Type PLT Pheresis     Units Ordered 1    Blood component    Collection Time: 12/29/17  1:00 AM   Result Value Ref Range    Unit Number [id number]     Blood Component Type PlateletPheresis LeukoReduced Irradiated     Division Number 00     Status of Unit Ready for patient 12/29/2017 1231     Blood Product Code [id number]     Unit Status AMY    Comprehensive metabolic panel    Collection Time: 12/29/17 11:53 AM   Result Value Ref Range    Sodium 138 133 - 144 mmol/L    Potassium 4.5 3.4 - 5.3 mmol/L    Chloride 108 94 - 109 mmol/L    Carbon Dioxide 19 (L) 20 - 32 mmol/L    Anion Gap 10 3 - 14 mmol/L    Glucose 103 (H) 70 - 99 mg/dL    Urea Nitrogen 32 (H) 7 - 30 mg/dL    Creatinine 1.74 (H) 0.66 - 1.25 mg/dL    GFR Estimate 39 (L) >60 mL/min/1.7m2    GFR Estimate If Black 47 (L) >60 mL/min/1.7m2    Calcium 8.6 8.5 - 10.1 mg/dL    Bilirubin Total 0.3 0.2 - 1.3 mg/dL    Albumin 2.4 (L) 3.4 - 5.0 g/dL    Protein Total  10.6 (H) 6.8 - 8.8 g/dL    Alkaline Phosphatase 42 40 - 150 U/L    ALT 26 0 - 70 U/L    AST 16 0 - 45 U/L   CBC with platelets and differential    Collection Time: 12/29/17 11:53 AM   Result Value Ref Range    WBC 2.1 (L) 4.0 - 11.0 10e9/L    RBC Count 2.61 (L) 4.4 - 5.9 10e12/L    Hemoglobin 8.6 (L) 13.3 - 17.7 g/dL    Hematocrit 27.3 (L) 40.0 - 53.0 %     (H) 78 - 100 fl    MCH 33.0 26.5 - 33.0 pg    MCHC 31.5 31.5 - 36.5 g/dL    RDW 16.2 (H) 10.0 - 15.0 %    Platelet Count 32 (LL) 150 - 450 10e9/L    Diff Method Automated Method     % Neutrophils 75.9 %    % Lymphocytes 5.3 %    % Monocytes 17.4 %    % Eosinophils 0.0 %    % Basophils 0.0 %    % Immature Granulocytes 1.4 %    Nucleated RBCs 0 0 /100    Absolute Neutrophil 1.6 1.6 - 8.3 10e9/L    Absolute Lymphocytes 0.1 (L) 0.8 - 5.3 10e9/L    Absolute Monocytes 0.4 0.0 - 1.3 10e9/L    Absolute Eosinophils 0.0 0.0 - 0.7 10e9/L    Absolute Basophils 0.0 0.0 - 0.2 10e9/L    Abs Immature Granulocytes 0.0 0 - 0.4 10e9/L    Absolute Nucleated RBC 0.0     Platelet Estimate Confirming automated cell count

## 2017-12-29 NOTE — MR AVS SNAPSHOT
After Visit Summary   12/29/2017    Anthony Carbone    MRN: [id number]           Patient Information     Date Of Birth          1945        Visit Information        Provider Department      12/29/2017 12:00 PM  11 ATC;  ONCOLOGY INFUSION [facility]        Today's Diagnoses     Multiple myeloma in relapse (H)    -  1      Care Instructions    Contact Numbers  [facility] Nurse Triage: [phone]  After Hours Nurse Line:  [phone]    Please call the [facility] Nurse Triage line or after hours number if you experience a temperature greater than or equal to 100.5, shaking chills, have uncontrolled nausea, vomiting and/or diarrhea, dizziness, shortness of breath, chest pain, bleeding, unexplained bruising, or if you have any other new/concerning symptoms, questions or concerns.     If you are having any concerning symptoms or wish to speak to a provider before your next infusion visit, please call your care coordinator or triage to notify them so we can adequately serve you.     If you need a refill on a narcotic prescription or other medication, please call triage before your infusion appointment.           December 2017 Sunday Monday Tuesday Wednesday Thursday Friday Saturday                            1     2       3     4     5     6     7     [facility] LAB DRAW   10:15 AM   (15 min.)   [facility] DRAW   [facility] Draw     [facility] RETURN   10:35 AM   (50 min.)   Leanne Reddy PA-C   [facility]   12:00 PM   (60 min.)    ONCOLOGY INFUSION   [facility] 8     [facility] DRAW    2:30 PM   (15 min.)   [facility] DRAW   [facility] Lab Draw     [facility] ONC INFUSION 120    3:00 PM   (120 min.)    ONCOLOGY INFUSION   [facility] 9       10     11     12     13     [facility] DRAW    4:45 PM   (15 min.)    [facility] LAB DRAW   [facility]  [facility] Lab Draw     UMP ONC RETURN    5:30 PM   (30 min.)   Kamari Topete MD   [facility] Blood and Marrow Transplant     [facility] ONC INFUSION 120    6:30 PM   (120 min.)    ONCOLOGY INFUSION   [facility] 14     UMP ONC INFUSION 120    3:00 PM   (120 min.)    ONCOLOGY INFUSION   [facility]     XR CHEST 2 VIEWS    4:45 PM   (15 min.)   UCXR1   [facility] Xray 15     16       17     18     19     [facility] DRAW    5:30 PM   (15 min.)    [facility] LAB DRAW   [facility] Lab Draw     [facility] RETURN    5:35 PM   (50 min.)   Leanne Reddy PA-C   [facility] 20     21     22     [facility] DRAW   10:00 AM   (15 min.)    [facility] LAB DRAW   [facility] Draw     [facility] ONC INFUSION 120   10:30 AM   (120 min.)    ONCOLOGY INFUSION   [facility] 23       24     25     26     [facility] DRAW    8:45 AM   (15 min.)    [facility]     [facility] RETURN    9:05 AM   (50 min.)   Leanne Reddy PA-C   [facility]     UMP ONC INFUSION 180   12:30 PM   (180 min.)    ONCOLOGY INFUSION   [facility] 27     28     29     [facility] DRAW   11:15 AM   (15 min.)    [facility] LAB DRAW   [facility] Draw     [facility] ONC INFUSION 120   12:00 PM   (120 min.)    ONCOLOGY INFUSION   [facility] 30 31 January 2018 Sunday Monday Tuesday Wednesday Thursday Friday Saturday        1     2     LEVEL 1    3:00 PM   (60 min.)    INFUSION CHAIR 11   [facility] 3     4     [facility] DRAW    1:15 PM   (15 min.)    [facility] LAB DRAW   [facility] Lab Draw     UMP RETURN    1:45 PM   (50 min.)   Leanne Reddy PA-C   [facility] 5     6       7     8     LEVEL 5    9:00 AM   (300 min.)     INFUSION CHAIR 17   [facility] 9     10     11     [facility] DRAW   12:45 PM   (15 min.)    M[facility] LAB DRAW   [facility] Mason[facility] Lab Draw     UMP RETURN   12:55 PM   (50 min.)   Leanne Reddy PA-C   [facility]     UMP ONC INFUSION 120    2:00 PM   (120 min.)    ONCOLOGY INFUSION   [facility] 12     13       14     15     16     17     18     [facility] DRAW    1:30 PM   (15 min.)    [facility] LAB DRAW   [facility] Lab Draw     UMP RETURN    1:45 PM   (50 min.)   Leanne Reddy PA-C   [facility]     UMP ONC INFUSION 120    3:00 PM   (120 min.)    ONCOLOGY INFUSION   [facility] 19     UMP NEW    7:45 AM   (60 min.)   Gaston Bowie MD   [facility] 20       21     22     23     24     25     [facility] DRAW    1:30 PM   (15 min.)    M[facility] LAB DRAW   [facility] Lab Draw     UMP RETURN    1:45 PM   (50 min.)   Leanne Reddy PA-C   [facility]     UMP ONC INFUSION 120    3:00 PM   (120 min.)    ONCOLOGY INFUSION   [facility] 26     27       28     29     30     31                                Recent Results (from the past 24 hour(s))   Platelets prepare order unit    Collection Time: 12/29/17  1:00 AM   Result Value Ref Range    Blood Component Type PLT Pheresis     Units Ordered 1    Blood component    Collection Time: 12/29/17  1:00 AM   Result Value Ref Range    Unit Number [id number]     Blood Component Type PlateletPheresis LeukoReduced Irradiated     Division Number 00     Status of Unit Ready for patient 12/29/2017 1231     Blood Product Code [id number]     Unit Status AMY    Comprehensive metabolic panel    Collection Time: 12/29/17 11:53 AM   Result Value Ref Range    Sodium 138 133 - 144 mmol/L    Potassium 4.5 3.4 - 5.3 mmol/L    Chloride 108 94 - 109 mmol/L     Carbon Dioxide 19 (L) 20 - 32 mmol/L    Anion Gap 10 3 - 14 mmol/L    Glucose 103 (H) 70 - 99 mg/dL    Urea Nitrogen 32 (H) 7 - 30 mg/dL    Creatinine 1.74 (H) 0.66 - 1.25 mg/dL    GFR Estimate 39 (L) >60 mL/min/1.7m2    GFR Estimate If Black 47 (L) >60 mL/min/1.7m2    Calcium 8.6 8.5 - 10.1 mg/dL    Bilirubin Total 0.3 0.2 - 1.3 mg/dL    Albumin 2.4 (L) 3.4 - 5.0 g/dL    Protein Total 10.6 (H) 6.8 - 8.8 g/dL    Alkaline Phosphatase 42 40 - 150 U/L    ALT 26 0 - 70 U/L    AST 16 0 - 45 U/L   CBC with platelets and differential    Collection Time: 12/29/17 11:53 AM   Result Value Ref Range    WBC 2.1 (L) 4.0 - 11.0 10e9/L    RBC Count 2.61 (L) 4.4 - 5.9 10e12/L    Hemoglobin 8.6 (L) 13.3 - 17.7 g/dL    Hematocrit 27.3 (L) 40.0 - 53.0 %     (H) 78 - 100 fl    MCH 33.0 26.5 - 33.0 pg    MCHC 31.5 31.5 - 36.5 g/dL    RDW 16.2 (H) 10.0 - 15.0 %    Platelet Count 32 (LL) 150 - 450 10e9/L    Diff Method Automated Method     % Neutrophils 75.9 %    % Lymphocytes 5.3 %    % Monocytes 17.4 %    % Eosinophils 0.0 %    % Basophils 0.0 %    % Immature Granulocytes 1.4 %    Nucleated RBCs 0 0 /100    Absolute Neutrophil 1.6 1.6 - 8.3 10e9/L    Absolute Lymphocytes 0.1 (L) 0.8 - 5.3 10e9/L    Absolute Monocytes 0.4 0.0 - 1.3 10e9/L    Absolute Eosinophils 0.0 0.0 - 0.7 10e9/L    Absolute Basophils 0.0 0.0 - 0.2 10e9/L    Abs Immature Granulocytes 0.0 0 - 0.4 10e9/L    Absolute Nucleated RBC 0.0     Platelet Estimate Confirming automated cell count                  Follow-ups after your visit        Your next 10 appointments already scheduled     Jan 02, 2018  3:00 PM CST   Level 1 with  INFUSION CHAIR 11   [facility] ([facility])    [facility]  [address]  [place] [address]   [phone]            Jan 04, 2018  1:15 PM CST   [facility] Lab Draw with  MASONIC LAB DRAW   [facility] Lab Draw ([facility])     [address] [address]   [phone]            Jan 04, 2018  2:00 PM CST   (Arrive by 1:45 PM)   Return Visit with ALPHONSO Coffey [facility] ([facility])    [address] [place] [address]   [phone]            Jan 08, 2018  9:00 AM CST   Level 5 with SH INFUSION CHAIR [address] [place] [address] [facility] ([facility])    [facility] [employer]  [address]  [facility] [address]   [phone]            Jan 11, 2018 12:45 PM CST   [facility] Lab Draw with  MASONIC LAB DRAW   [facility] Mason[facility] Lab Draw ([facility])    [address] [place] [address]   [phone]            Jan 11, 2018  1:10 PM CST   (Arrive by 12:55 PM)   Return Visit with ALPHONSO Coffey [facility] ([facility])    [address] [place] [address]   [phone]            Jan 11, 2018  2:00 PM CST   Infusion 120 with UC ONCOLOGY INFUSION, UC 14 ATC   [facility] ([facility])    [address] [place] [address]   [phone]            Jan 18, 2018  1:30 PM CST   [facility] Lab Draw with UC MASONIC LAB DRAW   [facility] Lab Draw ([facility])    [address] [place] [address]   [phone]            Jan 18, 2018  2:00 PM CST   (Arrive by 1:45 PM)   Return Visit with ALPHONSO Coffey [facility] ([facility])    [address] [place] [address]   [phone]              Future tests that were ordered for you today     Open Standing Orders        Priority Remaining Interval Expires Ordered    Transfuse red blood cell unit  Routine 1/2 TRANSFUSE 2 UNITS  12/29/2017    Platelets prepare order unit Routine 99/100 CONDITIONAL (SPECIFY) BLOOD  12/28/2017    Red blood cell prepare order unit Routine 99/100 CONDITIONAL (SPECIFY) BLOOD  12/28/2017            Who to contact     If you have questions or need follow up information about today's clinic visit or your schedule please contact [facility] directly at [phone].  Normal or non-critical lab and imaging results will be communicated to you by [employer], letter or phone within 4 business days after the clinic has received the results. If you do not hear from us within 7 days, please contact the clinic through [employer] or phone. If you have a critical or abnormal lab result, we will notify you by phone as soon as possible.  Submit refill requests through [employer] or call your pharmacy and they will forward the refill request to us. Please allow 3 business days for your refill to be completed.          Additional Information About Your Visit        [employer] Information     [employer] gives you secure access to your electronic health record. If you see a primary care provider, you can also send messages to your care team and make appointments. If you have questions, please call your primary care clinic.  If you do not have a primary care provider, please call [phone] and they will assist you.        Care EveryWhere ID     This is your Care EveryWhere ID. This could be used by other organizations to access your [facility] medical records  [id number]        Your Vitals Were     Pulse Temperature Respirations Pulse Oximetry BMI (Body Mass Index)       75 98.9  F (37.2  C) (Oral) 16 98% 19.38 kg/m2        Blood Pressure from Last 3 Encounters:   12/29/17 123/80   12/29/17 117/82   12/26/17 143/89    Weight from Last 3 Encounters:   12/29/17 51.2 kg (112 lb 14.2 oz)   12/26/17 51.8 kg (114 lb 4.8 oz)   12/22/17 51.5 kg (113 lb 9.6 oz)              We Performed the Following      ABO/Rh type and screen     Blood component     CBC with platelets and differential     Comprehensive metabolic panel     Platelets prepare order unit     Transfuse red blood cell unit        Primary Care Provider Office Phone # Fax #    Sanket Burciaga [phone]       [facility] [address] [facility] [address]        Equal Access to Services     ALBAN SHAH : Hadii aad ku hadasho Soomaali, waaxda luqadaha, qaybta kaalmada adeegyada, jonny pricepetrmartha villa. So [facility] [phone].    ATENCIÓN: Si habla español, tiene a todd disposición servicios gratuitos de asistencia lingüística. [facility] [phone].    We comply with applicable federal civil rights laws and [place] laws. We do not discriminate on the basis of race, color, national origin, age, disability, sex, sexual orientation, or gender identity.            Thank you!     Thank you for choosing [facility]  for your care. Our goal is always to provide you with excellent care. Hearing back from our patients is one way we can continue to improve our services. Please take a few minutes to complete the written survey that you may receive in the mail after your visit with us. Thank you!             Your Updated Medication List - Protect others around you: Learn how to safely use, store and throw away your medicines at www.disposemymeds.org.          This list is accurate as of: 12/29/17  4:06 PM.  Always use your most recent med list.                   Brand Name Dispense Instructions for use Diagnosis    acyclovir 400 MG tablet    ZOVIRAX    60 tablet    Take 1 tablet (400 mg) by mouth 2 times daily    Multiple myeloma in relapse (H)       amLODIPine 5 MG tablet    NORVASC    90 tablet    TAKE 1 TABLET BY MOUTH AT BEDTIME    Essential hypertension       clopidogrel 75 MG tablet    PLAVIX    90 tablet    TAKE 1 TABLET BY MOUTH EVERY DAY    History of stroke       dexamethasone 4 MG  tablet    DECADRON    40 tablet    Take 20 mg weekly on day of Velcade    Multiple myeloma not having achieved remission (H)       esomeprazole 40 MG CR capsule    nexIUM    30 capsule    Take 1 capsule (40 mg) by mouth every morning (before breakfast)    Gastroesophageal reflux disease without esophagitis       LORazepam 0.5 MG tablet    ATIVAN    30 tablet    Take 1 tablet (0.5 mg) by mouth every 6 hours as needed for nausea or anxiety.    Multiple myeloma in relapse (H), Delirium       nystatin [id number] UNIT/ML suspension    MYCOSTATIN    473 mL    Take 5 mLs (500,000 Units) by mouth 4 times daily Swish and spit. Continue until symptoms resolve and then take for 3 more days.    Thrush       OLANZapine 5 MG tablet    zyPREXA    60 tablet    Take 0.5 tablets (2.5 mg) by mouth At Bedtime    Delirium, Multiple myeloma in relapse (H)       ondansetron 8 MG ODT tab    ZOFRAN-ODT    30 tablet    Take 1 tablet (8 mg) by mouth every 8 hours as needed for nausea    Nausea       oxyCODONE IR 5 MG tablet    ROXICODONE    15 tablet    Take 1 tablet (5 mg) by mouth every 6 hours as needed for moderate to severe pain        potassium chloride SA 20 MEQ CR tablet    K-DUR/KLOR-CON M    30 tablet    Take 1 tablet (20 mEq) by mouth daily    Hypokalemia       predniSONE 50 MG tablet    DELTASONE    30 tablet    Take 50 mg every other day    Multiple myeloma not having achieved remission (H)       SIMVASTATIN PO      Take 20 mg by mouth At Bedtime        traMADol 50 MG tablet    ULTRAM    60 tablet    Take 1 tablet (50 mg) by mouth every 6 hours as needed for moderate pain . Recommend trying after Tylenol and before Dilaudid.    Acute bilateral low back pain without sciatica, Multiple myeloma in relapse (H)       venetoclax 100 MG tablet CHEMOTHERAPY    VENCLEXTA    100 tablet    Take 8 tablets (800 mg) by mouth daily    Multiple myeloma in relapse (H)

## 2017-12-29 NOTE — PROGRESS NOTES
Infusion Nursing Note:  Anthony Carbone presents today for Cycle 2 Day 21 Cyclophosphamide and 1 unit pRBCs.    Patient seen by provider today: No    Intravenous Access:  Implanted Port.    Treatment Conditions:  Lab Results   Component Value Date    HGB 8.6 12/29/2017     Lab Results   Component Value Date    WBC 2.1 12/29/2017      Lab Results   Component Value Date    ANEU 1.6 12/29/2017     Lab Results   Component Value Date    PLT 32 12/29/2017      Lab Results   Component Value Date     12/29/2017                   Lab Results   Component Value Date    POTASSIUM 4.5 12/29/2017           Lab Results   Component Value Date    MAG 2.2 05/25/2017            Lab Results   Component Value Date    CR 1.74 12/29/2017                   Lab Results   Component Value Date    JAZMIN 8.6 12/29/2017                Lab Results   Component Value Date    BILITOTAL 0.3 12/29/2017           Lab Results   Component Value Date    ALBUMIN 2.4 12/29/2017                    Lab Results   Component Value Date    ALT 26 12/29/2017           Lab Results   Component Value Date    AST 16 12/29/2017     Leanne Reddy PA-C notified of patient's lab results today.    Blood transfusion consent signed 7/11/17.      Note:  Patient states he continues to feel very fatigued and has dyspnea on exertion.  Patient states other than that, there have been no changes in anything from earlier this week.    12/29/17 1235 TORB:  Leanne Reddy PA-C/David Crain RN  - OK to give 2nd unit of pRBCs today with Hgb recheck of 8.6 as patient is still symptomatic  - OK to give chemo today with Platelets of 32    Post Infusion Assessment:  Patient tolerated infusion without incident.  Blood return noted pre and post infusion.  Access discontinued per protocol.    Discharge Plan:   Patient declined prescription refills.  Discharge instructions reviewed with: Patient and Family.  Patient and/or family verbalized understanding of discharge  instructions and all questions answered.  AVS to patient via [employer].  Patient will return 1/11/18 for next appointment.   Patient discharged in stable condition accompanied by: wife.  Departure Mode: Ambulatory.  Face to Face time: 3 minutes.    NELLI CASTRO RN

## 2018-01-01 ENCOUNTER — ONCOLOGY VISIT (OUTPATIENT)
Dept: ONCOLOGY | Facility: CLINIC | Age: 73
End: 2018-01-01
Attending: INTERNAL MEDICINE
Payer: MEDICARE

## 2018-01-01 ENCOUNTER — APPOINTMENT (OUTPATIENT)
Dept: LAB | Facility: CLINIC | Age: 73
End: 2018-01-01
Attending: INTERNAL MEDICINE
Payer: MEDICARE

## 2018-01-01 ENCOUNTER — HOSPITAL ENCOUNTER (OUTPATIENT)
Facility: CLINIC | Age: 73
Setting detail: SPECIMEN
Discharge: HOME | End: 2018-01-08
Attending: INTERNAL MEDICINE | Admitting: INTERNAL MEDICINE
Payer: MEDICARE

## 2018-01-01 ENCOUNTER — ONCOLOGY VISIT (OUTPATIENT)
Dept: ONCOLOGY | Facility: CLINIC | Age: 73
DRG: 809 | End: 2018-01-01
Attending: PHYSICIAN ASSISTANT
Payer: MEDICARE

## 2018-01-01 ENCOUNTER — OFFICE VISIT (OUTPATIENT)
Dept: PALLIATIVE CARE | Facility: CLINIC | Age: 73
End: 2018-01-01
Attending: INTERNAL MEDICINE
Payer: MEDICARE

## 2018-01-01 ENCOUNTER — OFFICE VISIT (OUTPATIENT)
Dept: TRANSPLANT | Facility: CLINIC | Age: 73
End: 2018-01-01
Attending: INTERNAL MEDICINE
Payer: MEDICARE

## 2018-01-01 ENCOUNTER — ONCOLOGY VISIT (OUTPATIENT)
Dept: ONCOLOGY | Facility: CLINIC | Age: 73
End: 2018-01-01
Attending: PHYSICIAN ASSISTANT
Payer: MEDICARE

## 2018-01-01 ENCOUNTER — INFUSION THERAPY VISIT (OUTPATIENT)
Dept: ONCOLOGY | Facility: CLINIC | Age: 73
DRG: 809 | End: 2018-01-01
Attending: INTERNAL MEDICINE
Payer: MEDICARE

## 2018-01-01 ENCOUNTER — APPOINTMENT (OUTPATIENT)
Dept: PHYSICAL THERAPY | Facility: CLINIC | Age: 73
DRG: 809 | End: 2018-01-01
Attending: INTERNAL MEDICINE
Payer: MEDICARE

## 2018-01-01 ENCOUNTER — INFUSION THERAPY VISIT (OUTPATIENT)
Dept: INFUSION THERAPY | Facility: CLINIC | Age: 73
End: 2018-01-01
Attending: INTERNAL MEDICINE
Payer: MEDICARE

## 2018-01-01 ENCOUNTER — HOSPITAL ENCOUNTER (OUTPATIENT)
Facility: CLINIC | Age: 73
Setting detail: SPECIMEN
Discharge: HOME | End: 2018-01-02
Attending: INTERNAL MEDICINE | Admitting: INTERNAL MEDICINE
Payer: MEDICARE

## 2018-01-01 ENCOUNTER — CARE COORDINATION (OUTPATIENT)
Dept: ONCOLOGY | Facility: CLINIC | Age: 73
End: 2018-01-01

## 2018-01-01 ENCOUNTER — HOSPITAL ENCOUNTER (INPATIENT)
Facility: CLINIC | Age: 73
LOS: 2 days | Discharge: HOME | DRG: 809 | End: 2018-01-15
Attending: EMERGENCY MEDICINE | Admitting: INTERNAL MEDICINE
Payer: MEDICARE

## 2018-01-01 ENCOUNTER — TELEPHONE (OUTPATIENT)
Dept: ONCOLOGY | Facility: CLINIC | Age: 73
End: 2018-01-01

## 2018-01-01 ENCOUNTER — INFUSION THERAPY VISIT (OUTPATIENT)
Dept: INFUSION THERAPY | Facility: CLINIC | Age: 73
End: 2018-01-01
Attending: PHYSICIAN ASSISTANT
Payer: MEDICARE

## 2018-01-01 ENCOUNTER — APPOINTMENT (OUTPATIENT)
Dept: GENERAL RADIOLOGY | Facility: CLINIC | Age: 73
DRG: 809 | End: 2018-01-01
Attending: EMERGENCY MEDICINE
Payer: MEDICARE

## 2018-01-01 ENCOUNTER — MEDICAL CORRESPONDENCE (OUTPATIENT)
Dept: HEALTH INFORMATION MANAGEMENT | Facility: CLINIC | Age: 73
End: 2018-01-01

## 2018-01-01 VITALS
DIASTOLIC BLOOD PRESSURE: 71 MMHG | RESPIRATION RATE: 16 BRPM | TEMPERATURE: 98.7 F | SYSTOLIC BLOOD PRESSURE: 104 MMHG | OXYGEN SATURATION: 96 % | HEART RATE: 75 BPM

## 2018-01-01 VITALS
SYSTOLIC BLOOD PRESSURE: 110 MMHG | BODY MASS INDEX: 19.04 KG/M2 | TEMPERATURE: 97.7 F | HEART RATE: 101 BPM | DIASTOLIC BLOOD PRESSURE: 67 MMHG | OXYGEN SATURATION: 95 % | WEIGHT: 111 LBS | RESPIRATION RATE: 16 BRPM

## 2018-01-01 VITALS
OXYGEN SATURATION: 95 % | RESPIRATION RATE: 16 BRPM | HEIGHT: 64 IN | BODY MASS INDEX: 19.68 KG/M2 | SYSTOLIC BLOOD PRESSURE: 113 MMHG | HEART RATE: 80 BPM | TEMPERATURE: 98 F | DIASTOLIC BLOOD PRESSURE: 79 MMHG | WEIGHT: 115.3 LBS

## 2018-01-01 VITALS
TEMPERATURE: 98.1 F | OXYGEN SATURATION: 97 % | SYSTOLIC BLOOD PRESSURE: 138 MMHG | HEIGHT: 64 IN | WEIGHT: 116.3 LBS | DIASTOLIC BLOOD PRESSURE: 84 MMHG | RESPIRATION RATE: 20 BRPM | BODY MASS INDEX: 19.85 KG/M2 | HEART RATE: 84 BPM

## 2018-01-01 VITALS
BODY MASS INDEX: 19.82 KG/M2 | DIASTOLIC BLOOD PRESSURE: 83 MMHG | OXYGEN SATURATION: 96 % | HEIGHT: 64 IN | HEART RATE: 87 BPM | WEIGHT: 116.1 LBS | TEMPERATURE: 98 F | SYSTOLIC BLOOD PRESSURE: 132 MMHG | RESPIRATION RATE: 16 BRPM

## 2018-01-01 VITALS
OXYGEN SATURATION: 95 % | BODY MASS INDEX: 20.13 KG/M2 | WEIGHT: 117.3 LBS | RESPIRATION RATE: 16 BRPM | DIASTOLIC BLOOD PRESSURE: 69 MMHG | HEART RATE: 89 BPM | SYSTOLIC BLOOD PRESSURE: 105 MMHG | TEMPERATURE: 98.2 F

## 2018-01-01 VITALS
SYSTOLIC BLOOD PRESSURE: 132 MMHG | HEIGHT: 64 IN | TEMPERATURE: 97.6 F | HEART RATE: 88 BPM | DIASTOLIC BLOOD PRESSURE: 80 MMHG | OXYGEN SATURATION: 96 % | WEIGHT: 116.2 LBS | BODY MASS INDEX: 19.84 KG/M2

## 2018-01-01 VITALS
SYSTOLIC BLOOD PRESSURE: 120 MMHG | OXYGEN SATURATION: 97 % | RESPIRATION RATE: 18 BRPM | DIASTOLIC BLOOD PRESSURE: 76 MMHG | HEART RATE: 96 BPM | WEIGHT: 112.8 LBS | TEMPERATURE: 98.8 F | BODY MASS INDEX: 19.35 KG/M2

## 2018-01-01 VITALS
TEMPERATURE: 98.5 F | WEIGHT: 119.27 LBS | OXYGEN SATURATION: 95 % | SYSTOLIC BLOOD PRESSURE: 123 MMHG | RESPIRATION RATE: 16 BRPM | DIASTOLIC BLOOD PRESSURE: 81 MMHG | HEART RATE: 76 BPM | BODY MASS INDEX: 20.47 KG/M2

## 2018-01-01 VITALS
SYSTOLIC BLOOD PRESSURE: 143 MMHG | OXYGEN SATURATION: 96 % | BODY MASS INDEX: 19.81 KG/M2 | HEART RATE: 85 BPM | WEIGHT: 116 LBS | HEIGHT: 64 IN | TEMPERATURE: 98 F | DIASTOLIC BLOOD PRESSURE: 85 MMHG | RESPIRATION RATE: 16 BRPM

## 2018-01-01 VITALS
HEART RATE: 98 BPM | HEIGHT: 64 IN | TEMPERATURE: 97.2 F | RESPIRATION RATE: 18 BRPM | SYSTOLIC BLOOD PRESSURE: 109 MMHG | WEIGHT: 110.7 LBS | OXYGEN SATURATION: 96 % | BODY MASS INDEX: 18.9 KG/M2 | DIASTOLIC BLOOD PRESSURE: 75 MMHG

## 2018-01-01 VITALS
WEIGHT: 112.1 LBS | RESPIRATION RATE: 16 BRPM | BODY MASS INDEX: 19.14 KG/M2 | HEART RATE: 74 BPM | SYSTOLIC BLOOD PRESSURE: 132 MMHG | OXYGEN SATURATION: 96 % | DIASTOLIC BLOOD PRESSURE: 66 MMHG | TEMPERATURE: 98.1 F | HEIGHT: 64 IN

## 2018-01-01 VITALS
SYSTOLIC BLOOD PRESSURE: 123 MMHG | HEART RATE: 109 BPM | OXYGEN SATURATION: 96 % | HEIGHT: 64 IN | WEIGHT: 111.9 LBS | DIASTOLIC BLOOD PRESSURE: 79 MMHG | TEMPERATURE: 96.3 F | BODY MASS INDEX: 19.1 KG/M2

## 2018-01-01 VITALS
OXYGEN SATURATION: 95 % | WEIGHT: 108.2 LBS | TEMPERATURE: 98 F | HEART RATE: 105 BPM | SYSTOLIC BLOOD PRESSURE: 116 MMHG | BODY MASS INDEX: 18.56 KG/M2 | RESPIRATION RATE: 18 BRPM | DIASTOLIC BLOOD PRESSURE: 82 MMHG

## 2018-01-01 VITALS
DIASTOLIC BLOOD PRESSURE: 79 MMHG | TEMPERATURE: 97.8 F | BODY MASS INDEX: 19.87 KG/M2 | WEIGHT: 116.4 LBS | OXYGEN SATURATION: 97 % | HEART RATE: 73 BPM | SYSTOLIC BLOOD PRESSURE: 127 MMHG | HEIGHT: 64 IN | RESPIRATION RATE: 16 BRPM

## 2018-01-01 VITALS
TEMPERATURE: 98.4 F | WEIGHT: 111.9 LBS | BODY MASS INDEX: 19.21 KG/M2 | SYSTOLIC BLOOD PRESSURE: 127 MMHG | HEART RATE: 76 BPM | OXYGEN SATURATION: 94 % | RESPIRATION RATE: 16 BRPM | DIASTOLIC BLOOD PRESSURE: 72 MMHG

## 2018-01-01 VITALS
TEMPERATURE: 97.9 F | DIASTOLIC BLOOD PRESSURE: 82 MMHG | OXYGEN SATURATION: 96 % | HEIGHT: 64 IN | SYSTOLIC BLOOD PRESSURE: 121 MMHG | WEIGHT: 114.5 LBS | BODY MASS INDEX: 19.55 KG/M2 | HEART RATE: 99 BPM

## 2018-01-01 VITALS
OXYGEN SATURATION: 96 % | SYSTOLIC BLOOD PRESSURE: 122 MMHG | RESPIRATION RATE: 16 BRPM | BODY MASS INDEX: 20.47 KG/M2 | HEART RATE: 73 BPM | WEIGHT: 119.27 LBS | TEMPERATURE: 98.7 F | DIASTOLIC BLOOD PRESSURE: 71 MMHG

## 2018-01-01 VITALS
BODY MASS INDEX: 18.56 KG/M2 | HEART RATE: 113 BPM | WEIGHT: 108.7 LBS | OXYGEN SATURATION: 97 % | HEIGHT: 64 IN | DIASTOLIC BLOOD PRESSURE: 74 MMHG | TEMPERATURE: 96.9 F | SYSTOLIC BLOOD PRESSURE: 101 MMHG

## 2018-01-01 VITALS
SYSTOLIC BLOOD PRESSURE: 107 MMHG | TEMPERATURE: 97.5 F | DIASTOLIC BLOOD PRESSURE: 70 MMHG | RESPIRATION RATE: 16 BRPM | HEART RATE: 92 BPM

## 2018-01-01 VITALS
DIASTOLIC BLOOD PRESSURE: 84 MMHG | RESPIRATION RATE: 16 BRPM | TEMPERATURE: 98.7 F | OXYGEN SATURATION: 96 % | HEART RATE: 63 BPM | SYSTOLIC BLOOD PRESSURE: 158 MMHG

## 2018-01-01 VITALS
SYSTOLIC BLOOD PRESSURE: 126 MMHG | HEART RATE: 88 BPM | DIASTOLIC BLOOD PRESSURE: 76 MMHG | OXYGEN SATURATION: 100 % | RESPIRATION RATE: 16 BRPM | TEMPERATURE: 99.2 F

## 2018-01-01 VITALS
HEART RATE: 72 BPM | HEIGHT: 64 IN | SYSTOLIC BLOOD PRESSURE: 120 MMHG | OXYGEN SATURATION: 97 % | DIASTOLIC BLOOD PRESSURE: 75 MMHG | WEIGHT: 115.2 LBS | BODY MASS INDEX: 19.67 KG/M2 | TEMPERATURE: 98.2 F

## 2018-01-01 VITALS
HEART RATE: 97 BPM | SYSTOLIC BLOOD PRESSURE: 133 MMHG | OXYGEN SATURATION: 95 % | TEMPERATURE: 98.2 F | DIASTOLIC BLOOD PRESSURE: 83 MMHG | BODY MASS INDEX: 19.72 KG/M2 | WEIGHT: 115.5 LBS | RESPIRATION RATE: 24 BRPM | HEIGHT: 64 IN

## 2018-01-01 VITALS
HEART RATE: 80 BPM | DIASTOLIC BLOOD PRESSURE: 63 MMHG | TEMPERATURE: 98.1 F | OXYGEN SATURATION: 97 % | WEIGHT: 115.1 LBS | BODY MASS INDEX: 19.65 KG/M2 | HEIGHT: 64 IN | RESPIRATION RATE: 16 BRPM | SYSTOLIC BLOOD PRESSURE: 96 MMHG

## 2018-01-01 VITALS
TEMPERATURE: 97.4 F | OXYGEN SATURATION: 99 % | WEIGHT: 112.9 LBS | HEART RATE: 82 BPM | BODY MASS INDEX: 19.27 KG/M2 | SYSTOLIC BLOOD PRESSURE: 118 MMHG | HEIGHT: 64 IN | DIASTOLIC BLOOD PRESSURE: 78 MMHG | RESPIRATION RATE: 16 BRPM

## 2018-01-01 VITALS
OXYGEN SATURATION: 97 % | DIASTOLIC BLOOD PRESSURE: 67 MMHG | WEIGHT: 110.8 LBS | HEIGHT: 64 IN | HEART RATE: 100 BPM | TEMPERATURE: 98.2 F | BODY MASS INDEX: 18.92 KG/M2 | SYSTOLIC BLOOD PRESSURE: 102 MMHG | RESPIRATION RATE: 17 BRPM

## 2018-01-01 VITALS — RESPIRATION RATE: 16 BRPM

## 2018-01-01 VITALS
OXYGEN SATURATION: 97 % | HEART RATE: 85 BPM | RESPIRATION RATE: 16 BRPM | TEMPERATURE: 98.3 F | DIASTOLIC BLOOD PRESSURE: 68 MMHG | BODY MASS INDEX: 19.44 KG/M2 | WEIGHT: 113.3 LBS | SYSTOLIC BLOOD PRESSURE: 128 MMHG

## 2018-01-01 VITALS — WEIGHT: 111.7 LBS | BODY MASS INDEX: 19.16 KG/M2

## 2018-01-01 VITALS
TEMPERATURE: 98 F | DIASTOLIC BLOOD PRESSURE: 91 MMHG | SYSTOLIC BLOOD PRESSURE: 155 MMHG | OXYGEN SATURATION: 98 % | RESPIRATION RATE: 18 BRPM | HEART RATE: 57 BPM

## 2018-01-01 VITALS
TEMPERATURE: 97.5 F | RESPIRATION RATE: 18 BRPM | HEART RATE: 72 BPM | OXYGEN SATURATION: 96 % | SYSTOLIC BLOOD PRESSURE: 148 MMHG | DIASTOLIC BLOOD PRESSURE: 87 MMHG

## 2018-01-01 VITALS
RESPIRATION RATE: 16 BRPM | TEMPERATURE: 98 F | DIASTOLIC BLOOD PRESSURE: 62 MMHG | HEART RATE: 72 BPM | SYSTOLIC BLOOD PRESSURE: 113 MMHG | OXYGEN SATURATION: 97 %

## 2018-01-01 DIAGNOSIS — C90.00 MULTIPLE MYELOMA, REMISSION STATUS UNSPECIFIED (H): ICD-10-CM

## 2018-01-01 DIAGNOSIS — C90.02 MULTIPLE MYELOMA IN RELAPSE (H): ICD-10-CM

## 2018-01-01 DIAGNOSIS — C90.00 MULTIPLE MYELOMA, REMISSION STATUS UNSPECIFIED (H): Primary | ICD-10-CM

## 2018-01-01 DIAGNOSIS — C90.02 MULTIPLE MYELOMA IN RELAPSE (H): Primary | ICD-10-CM

## 2018-01-01 DIAGNOSIS — Z78.9 AMERICAN DIABETES ASSOCIATION (ADA) 1100 CALORIE DIET: Primary | ICD-10-CM

## 2018-01-01 DIAGNOSIS — C90.00 MYELOMA (H): ICD-10-CM

## 2018-01-01 DIAGNOSIS — E78.5 HYPERLIPIDEMIA: ICD-10-CM

## 2018-01-01 DIAGNOSIS — M54.50 ACUTE BILATERAL LOW BACK PAIN WITHOUT SCIATICA: ICD-10-CM

## 2018-01-01 DIAGNOSIS — N18.9 CHRONIC RENAL FAILURE, UNSPECIFIED CKD STAGE: ICD-10-CM

## 2018-01-01 DIAGNOSIS — R11.0 NAUSEA: ICD-10-CM

## 2018-01-01 DIAGNOSIS — C90.00 MULTIPLE MYELOMA (H): ICD-10-CM

## 2018-01-01 DIAGNOSIS — Z79.899 ENCOUNTER FOR LONG-TERM CURRENT USE OF MEDICATION: ICD-10-CM

## 2018-01-01 DIAGNOSIS — I10 ESSENTIAL HYPERTENSION: ICD-10-CM

## 2018-01-01 DIAGNOSIS — Z79.899 NEED FOR PROPHYLACTIC CHEMOTHERAPY: ICD-10-CM

## 2018-01-01 DIAGNOSIS — R50.9 FEVER, UNSPECIFIED FEVER CAUSE: ICD-10-CM

## 2018-01-01 DIAGNOSIS — N17.9 AKI (ACUTE KIDNEY INJURY) (H): ICD-10-CM

## 2018-01-01 DIAGNOSIS — R73.9 ELEVATED BLOOD SUGAR: ICD-10-CM

## 2018-01-01 DIAGNOSIS — Z78.9 AMERICAN DIABETES ASSOCIATION (ADA) 1100 CALORIE DIET: ICD-10-CM

## 2018-01-01 LAB
ABO + RH BLD: NORMAL
ALBUMIN SERPL ELPH-MCNC: 3.1 G/DL (ref 3.7–5.1)
ALBUMIN SERPL ELPH-MCNC: 3.1 G/DL (ref 3.7–5.1)
ALBUMIN SERPL ELPH-MCNC: 3.2 G/DL (ref 3.7–5.1)
ALBUMIN SERPL ELPH-MCNC: 3.3 G/DL (ref 3.7–5.1)
ALBUMIN SERPL ELPH-MCNC: 3.3 G/DL (ref 3.7–5.1)
ALBUMIN SERPL ELPH-MCNC: 3.4 G/DL (ref 3.7–5.1)
ALBUMIN SERPL-MCNC: 2 G/DL (ref 3.4–5)
ALBUMIN SERPL-MCNC: 2.2 G/DL (ref 3.4–5)
ALBUMIN SERPL-MCNC: 2.3 G/DL (ref 3.4–5)
ALBUMIN SERPL-MCNC: 2.3 G/DL (ref 3.4–5)
ALBUMIN SERPL-MCNC: 2.4 G/DL (ref 3.4–5)
ALBUMIN SERPL-MCNC: 2.5 G/DL (ref 3.4–5)
ALBUMIN SERPL-MCNC: 2.6 G/DL (ref 3.4–5)
ALBUMIN SERPL-MCNC: 2.7 G/DL (ref 3.4–5)
ALBUMIN SERPL-MCNC: 2.8 G/DL (ref 3.4–5)
ALBUMIN SERPL-MCNC: 2.8 G/DL (ref 3.4–5)
ALBUMIN UR-MCNC: 30 MG/DL
ALP SERPL-CCNC: 34 U/L (ref 40–150)
ALP SERPL-CCNC: 36 U/L (ref 40–150)
ALP SERPL-CCNC: 40 U/L (ref 40–150)
ALP SERPL-CCNC: 41 U/L (ref 40–150)
ALP SERPL-CCNC: 41 U/L (ref 40–150)
ALP SERPL-CCNC: 42 U/L (ref 40–150)
ALP SERPL-CCNC: 42 U/L (ref 40–150)
ALP SERPL-CCNC: 43 U/L (ref 40–150)
ALP SERPL-CCNC: 43 U/L (ref 40–150)
ALP SERPL-CCNC: 44 U/L (ref 40–150)
ALP SERPL-CCNC: 44 U/L (ref 40–150)
ALP SERPL-CCNC: 45 U/L (ref 40–150)
ALP SERPL-CCNC: 46 U/L (ref 40–150)
ALP SERPL-CCNC: 47 U/L (ref 40–150)
ALP SERPL-CCNC: 47 U/L (ref 40–150)
ALP SERPL-CCNC: 48 U/L (ref 40–150)
ALP SERPL-CCNC: 49 U/L (ref 40–150)
ALP SERPL-CCNC: 51 U/L (ref 40–150)
ALP SERPL-CCNC: 52 U/L (ref 40–150)
ALPHA1 GLOB SERPL ELPH-MCNC: 0.4 G/DL (ref 0.2–0.4)
ALPHA1 GLOB SERPL ELPH-MCNC: 0.5 G/DL (ref 0.2–0.4)
ALPHA1 GLOB SERPL ELPH-MCNC: 0.5 G/DL (ref 0.2–0.4)
ALPHA2 GLOB SERPL ELPH-MCNC: 0.9 G/DL (ref 0.5–0.9)
ALPHA2 GLOB SERPL ELPH-MCNC: 1.1 G/DL (ref 0.5–0.9)
ALPHA2 GLOB SERPL ELPH-MCNC: 1.1 G/DL (ref 0.5–0.9)
ALPHA2 GLOB SERPL ELPH-MCNC: 1.2 G/DL (ref 0.5–0.9)
ALPHA2 GLOB SERPL ELPH-MCNC: 1.2 G/DL (ref 0.5–0.9)
ALPHA2 GLOB SERPL ELPH-MCNC: 1.3 G/DL (ref 0.5–0.9)
ALT SERPL W P-5'-P-CCNC: 10 U/L (ref 0–70)
ALT SERPL W P-5'-P-CCNC: 11 U/L (ref 0–70)
ALT SERPL W P-5'-P-CCNC: 12 U/L (ref 0–70)
ALT SERPL W P-5'-P-CCNC: 14 U/L (ref 0–70)
ALT SERPL W P-5'-P-CCNC: 14 U/L (ref 0–70)
ALT SERPL W P-5'-P-CCNC: 15 U/L (ref 0–70)
ALT SERPL W P-5'-P-CCNC: 16 U/L (ref 0–70)
ALT SERPL W P-5'-P-CCNC: 17 U/L (ref 0–70)
ALT SERPL W P-5'-P-CCNC: 18 U/L (ref 0–70)
ALT SERPL W P-5'-P-CCNC: 20 U/L (ref 0–70)
ALT SERPL W P-5'-P-CCNC: 25 U/L (ref 0–70)
ALT SERPL W P-5'-P-CCNC: 26 U/L (ref 0–70)
ALT SERPL W P-5'-P-CCNC: 26 U/L (ref 0–70)
ALT SERPL W P-5'-P-CCNC: 28 U/L (ref 0–70)
ALT SERPL W P-5'-P-CCNC: 31 U/L (ref 0–70)
ALT SERPL W P-5'-P-CCNC: 31 U/L (ref 0–70)
ALT SERPL W P-5'-P-CCNC: 9 U/L (ref 0–70)
ANION GAP SERPL CALCULATED.3IONS-SCNC: 10 MMOL/L (ref 3–14)
ANION GAP SERPL CALCULATED.3IONS-SCNC: 11 MMOL/L (ref 3–14)
ANION GAP SERPL CALCULATED.3IONS-SCNC: 12 MMOL/L (ref 3–14)
ANION GAP SERPL CALCULATED.3IONS-SCNC: 13 MMOL/L (ref 3–14)
ANION GAP SERPL CALCULATED.3IONS-SCNC: 5 MMOL/L (ref 3–14)
ANION GAP SERPL CALCULATED.3IONS-SCNC: 7 MMOL/L (ref 3–14)
ANION GAP SERPL CALCULATED.3IONS-SCNC: 8 MMOL/L (ref 3–14)
ANION GAP SERPL CALCULATED.3IONS-SCNC: 9 MMOL/L (ref 3–14)
ANISOCYTOSIS BLD QL SMEAR: SLIGHT
APPEARANCE UR: CLEAR
AST SERPL W P-5'-P-CCNC: 10 U/L (ref 0–45)
AST SERPL W P-5'-P-CCNC: 12 U/L (ref 0–45)
AST SERPL W P-5'-P-CCNC: 14 U/L (ref 0–45)
AST SERPL W P-5'-P-CCNC: 16 U/L (ref 0–45)
AST SERPL W P-5'-P-CCNC: 6 U/L (ref 0–45)
AST SERPL W P-5'-P-CCNC: 7 U/L (ref 0–45)
AST SERPL W P-5'-P-CCNC: 8 U/L (ref 0–45)
AST SERPL W P-5'-P-CCNC: 9 U/L (ref 0–45)
B-GLOBULIN SERPL ELPH-MCNC: 0.6 G/DL (ref 0.6–1)
B-GLOBULIN SERPL ELPH-MCNC: 0.7 G/DL (ref 0.6–1)
BACTERIA SPEC CULT: NO GROWTH
BASOPHILS # BLD AUTO: 0 10E9/L (ref 0–0.2)
BASOPHILS NFR BLD AUTO: 0 %
BASOPHILS NFR BLD AUTO: 0.2 %
BASOPHILS NFR BLD AUTO: 0.2 %
BASOPHILS NFR BLD AUTO: 0.3 %
BASOPHILS NFR BLD AUTO: 0.4 %
BASOPHILS NFR BLD AUTO: 0.5 %
BASOPHILS NFR BLD AUTO: 0.5 %
BILIRUB SERPL-MCNC: 0.2 MG/DL (ref 0.2–1.3)
BILIRUB SERPL-MCNC: 0.3 MG/DL (ref 0.2–1.3)
BILIRUB SERPL-MCNC: 0.4 MG/DL (ref 0.2–1.3)
BILIRUB SERPL-MCNC: 0.5 MG/DL (ref 0.2–1.3)
BILIRUB UR QL STRIP: NEGATIVE
BLD GP AB SCN SERPL QL: NORMAL
BLD PROD TYP BPU: NORMAL
BLD UNIT ID BPU: 0
BLOOD BANK CMNT PATIENT-IMP: NORMAL
BLOOD PRODUCT CODE: NORMAL
BPU ID: NORMAL
BUN SERPL-MCNC: 22 MG/DL (ref 7–30)
BUN SERPL-MCNC: 25 MG/DL (ref 7–30)
BUN SERPL-MCNC: 26 MG/DL (ref 7–30)
BUN SERPL-MCNC: 27 MG/DL (ref 7–30)
BUN SERPL-MCNC: 28 MG/DL (ref 7–30)
BUN SERPL-MCNC: 29 MG/DL (ref 7–30)
BUN SERPL-MCNC: 29 MG/DL (ref 7–30)
BUN SERPL-MCNC: 31 MG/DL (ref 7–30)
BUN SERPL-MCNC: 32 MG/DL (ref 7–30)
BUN SERPL-MCNC: 34 MG/DL (ref 7–30)
BUN SERPL-MCNC: 36 MG/DL (ref 7–30)
BUN SERPL-MCNC: 37 MG/DL (ref 7–30)
BUN SERPL-MCNC: 37 MG/DL (ref 7–30)
BUN SERPL-MCNC: 38 MG/DL (ref 7–30)
BUN SERPL-MCNC: 43 MG/DL (ref 7–30)
BUN SERPL-MCNC: 44 MG/DL (ref 7–30)
BUN SERPL-MCNC: 45 MG/DL (ref 7–30)
BUN SERPL-MCNC: 47 MG/DL (ref 7–30)
BUN SERPL-MCNC: 49 MG/DL (ref 7–30)
BUN SERPL-MCNC: 50 MG/DL (ref 7–30)
BUN SERPL-MCNC: 54 MG/DL (ref 7–30)
BURR CELLS BLD QL SMEAR: SLIGHT
CALCIUM SERPL-MCNC: 7.1 MG/DL (ref 8.5–10.1)
CALCIUM SERPL-MCNC: 7.2 MG/DL (ref 8.5–10.1)
CALCIUM SERPL-MCNC: 7.3 MG/DL (ref 8.5–10.1)
CALCIUM SERPL-MCNC: 7.6 MG/DL (ref 8.5–10.1)
CALCIUM SERPL-MCNC: 7.7 MG/DL (ref 8.5–10.1)
CALCIUM SERPL-MCNC: 7.9 MG/DL (ref 8.5–10.1)
CALCIUM SERPL-MCNC: 7.9 MG/DL (ref 8.5–10.1)
CALCIUM SERPL-MCNC: 8 MG/DL (ref 8.5–10.1)
CALCIUM SERPL-MCNC: 8.1 MG/DL (ref 8.5–10.1)
CALCIUM SERPL-MCNC: 8.2 MG/DL (ref 8.5–10.1)
CALCIUM SERPL-MCNC: 8.3 MG/DL (ref 8.5–10.1)
CALCIUM SERPL-MCNC: 8.3 MG/DL (ref 8.5–10.1)
CALCIUM SERPL-MCNC: 8.4 MG/DL (ref 8.5–10.1)
CALCIUM SERPL-MCNC: 8.5 MG/DL (ref 8.5–10.1)
CALCIUM SERPL-MCNC: 8.7 MG/DL (ref 8.5–10.1)
CALCIUM SERPL-MCNC: 8.8 MG/DL (ref 8.5–10.1)
CALCIUM SERPL-MCNC: 8.9 MG/DL (ref 8.5–10.1)
CALCIUM SERPL-MCNC: 9 MG/DL (ref 8.5–10.1)
CALCIUM SERPL-MCNC: 9.1 MG/DL (ref 8.5–10.1)
CALCIUM SERPL-MCNC: 9.1 MG/DL (ref 8.5–10.1)
CALCIUM SERPL-MCNC: 9.2 MG/DL (ref 8.5–10.1)
CHLORIDE SERPL-SCNC: 102 MMOL/L (ref 94–109)
CHLORIDE SERPL-SCNC: 103 MMOL/L (ref 94–109)
CHLORIDE SERPL-SCNC: 104 MMOL/L (ref 94–109)
CHLORIDE SERPL-SCNC: 104 MMOL/L (ref 94–109)
CHLORIDE SERPL-SCNC: 105 MMOL/L (ref 94–109)
CHLORIDE SERPL-SCNC: 106 MMOL/L (ref 94–109)
CHLORIDE SERPL-SCNC: 107 MMOL/L (ref 94–109)
CHLORIDE SERPL-SCNC: 107 MMOL/L (ref 94–109)
CHLORIDE SERPL-SCNC: 108 MMOL/L (ref 94–109)
CHLORIDE SERPL-SCNC: 109 MMOL/L (ref 94–109)
CHLORIDE SERPL-SCNC: 109 MMOL/L (ref 94–109)
CHLORIDE SERPL-SCNC: 114 MMOL/L (ref 94–109)
CHLORIDE SERPL-SCNC: 115 MMOL/L (ref 94–109)
CHLORIDE SERPL-SCNC: 116 MMOL/L (ref 94–109)
CHOLEST SERPL-MCNC: <200 MG/DL
CO2 BLDCOV-SCNC: 18 MMOL/L (ref 21–28)
CO2 SERPL-SCNC: 16 MMOL/L (ref 20–32)
CO2 SERPL-SCNC: 17 MMOL/L (ref 20–32)
CO2 SERPL-SCNC: 17 MMOL/L (ref 20–32)
CO2 SERPL-SCNC: 18 MMOL/L (ref 20–32)
CO2 SERPL-SCNC: 18 MMOL/L (ref 20–32)
CO2 SERPL-SCNC: 19 MMOL/L (ref 20–32)
CO2 SERPL-SCNC: 20 MMOL/L (ref 20–32)
CO2 SERPL-SCNC: 20 MMOL/L (ref 20–32)
CO2 SERPL-SCNC: 21 MMOL/L (ref 20–32)
CO2 SERPL-SCNC: 22 MMOL/L (ref 20–32)
CO2 SERPL-SCNC: 25 MMOL/L (ref 20–32)
CO2 SERPL-SCNC: 26 MMOL/L (ref 20–32)
COLOR UR AUTO: ABNORMAL
CREAT SERPL-MCNC: 1.46 MG/DL (ref 0.66–1.25)
CREAT SERPL-MCNC: 1.6 MG/DL (ref 0.66–1.25)
CREAT SERPL-MCNC: 1.65 MG/DL (ref 0.66–1.25)
CREAT SERPL-MCNC: 1.65 MG/DL (ref 0.66–1.25)
CREAT SERPL-MCNC: 1.76 MG/DL (ref 0.66–1.25)
CREAT SERPL-MCNC: 1.81 MG/DL (ref 0.66–1.25)
CREAT SERPL-MCNC: 1.83 MG/DL (ref 0.66–1.25)
CREAT SERPL-MCNC: 1.84 MG/DL (ref 0.66–1.25)
CREAT SERPL-MCNC: 1.88 MG/DL (ref 0.66–1.25)
CREAT SERPL-MCNC: 1.94 MG/DL (ref 0.66–1.25)
CREAT SERPL-MCNC: 1.96 MG/DL (ref 0.66–1.25)
CREAT SERPL-MCNC: 1.96 MG/DL (ref 0.66–1.25)
CREAT SERPL-MCNC: 2.22 MG/DL (ref 0.66–1.25)
CREAT SERPL-MCNC: 2.25 MG/DL (ref 0.66–1.25)
CREAT SERPL-MCNC: 2.3 MG/DL (ref 0.66–1.25)
CREAT SERPL-MCNC: 2.33 MG/DL (ref 0.66–1.25)
CREAT SERPL-MCNC: 2.42 MG/DL (ref 0.66–1.25)
CREAT SERPL-MCNC: 2.59 MG/DL (ref 0.66–1.25)
CREAT SERPL-MCNC: 2.67 MG/DL (ref 0.66–1.25)
CREAT SERPL-MCNC: 2.7 MG/DL (ref 0.66–1.25)
CREAT SERPL-MCNC: 2.82 MG/DL (ref 0.66–1.25)
CREAT SERPL-MCNC: 3.34 MG/DL (ref 0.66–1.25)
CREAT SERPL-MCNC: 3.39 MG/DL (ref 0.66–1.25)
CREAT SERPL-MCNC: 3.4 MG/DL (ref 0.66–1.25)
CREAT SERPL-MCNC: 3.4 MG/DL (ref 0.66–1.25)
CRP SERPL-MCNC: 120 MG/L (ref 0–8)
DIFFERENTIAL METHOD BLD: ABNORMAL
EOSINOPHIL # BLD AUTO: 0 10E9/L (ref 0–0.7)
EOSINOPHIL NFR BLD AUTO: 0 %
EOSINOPHIL NFR BLD AUTO: 0.2 %
EOSINOPHIL NFR BLD AUTO: 0.3 %
EOSINOPHIL NFR BLD AUTO: 0.4 %
EOSINOPHIL NFR BLD AUTO: 0.5 %
EOSINOPHIL NFR BLD AUTO: 0.6 %
EOSINOPHIL NFR BLD AUTO: 0.9 %
EOSINOPHIL NFR BLD AUTO: 1.2 %
ERYTHROCYTE [DISTWIDTH] IN BLOOD BY AUTOMATED COUNT: 16.4 % (ref 10–15)
ERYTHROCYTE [DISTWIDTH] IN BLOOD BY AUTOMATED COUNT: 16.5 % (ref 10–15)
ERYTHROCYTE [DISTWIDTH] IN BLOOD BY AUTOMATED COUNT: 16.9 % (ref 10–15)
ERYTHROCYTE [DISTWIDTH] IN BLOOD BY AUTOMATED COUNT: 17 % (ref 10–15)
ERYTHROCYTE [DISTWIDTH] IN BLOOD BY AUTOMATED COUNT: 17.1 % (ref 10–15)
ERYTHROCYTE [DISTWIDTH] IN BLOOD BY AUTOMATED COUNT: 17.2 % (ref 10–15)
ERYTHROCYTE [DISTWIDTH] IN BLOOD BY AUTOMATED COUNT: 17.3 % (ref 10–15)
ERYTHROCYTE [DISTWIDTH] IN BLOOD BY AUTOMATED COUNT: 17.4 % (ref 10–15)
ERYTHROCYTE [DISTWIDTH] IN BLOOD BY AUTOMATED COUNT: 17.7 % (ref 10–15)
ERYTHROCYTE [DISTWIDTH] IN BLOOD BY AUTOMATED COUNT: 17.7 % (ref 10–15)
ERYTHROCYTE [DISTWIDTH] IN BLOOD BY AUTOMATED COUNT: 17.9 % (ref 10–15)
ERYTHROCYTE [DISTWIDTH] IN BLOOD BY AUTOMATED COUNT: 18 % (ref 10–15)
ERYTHROCYTE [DISTWIDTH] IN BLOOD BY AUTOMATED COUNT: 18.1 % (ref 10–15)
ERYTHROCYTE [DISTWIDTH] IN BLOOD BY AUTOMATED COUNT: 18.2 % (ref 10–15)
ERYTHROCYTE [DISTWIDTH] IN BLOOD BY AUTOMATED COUNT: 18.2 % (ref 10–15)
ERYTHROCYTE [DISTWIDTH] IN BLOOD BY AUTOMATED COUNT: 18.4 % (ref 10–15)
ERYTHROCYTE [DISTWIDTH] IN BLOOD BY AUTOMATED COUNT: 18.5 % (ref 10–15)
ERYTHROCYTE [DISTWIDTH] IN BLOOD BY AUTOMATED COUNT: 18.7 % (ref 10–15)
ERYTHROCYTE [DISTWIDTH] IN BLOOD BY AUTOMATED COUNT: 18.8 % (ref 10–15)
ERYTHROCYTE [DISTWIDTH] IN BLOOD BY AUTOMATED COUNT: 18.9 % (ref 10–15)
ERYTHROCYTE [DISTWIDTH] IN BLOOD BY AUTOMATED COUNT: 19.4 % (ref 10–15)
ERYTHROCYTE [DISTWIDTH] IN BLOOD BY AUTOMATED COUNT: 20.3 % (ref 10–15)
ERYTHROCYTE [DISTWIDTH] IN BLOOD BY AUTOMATED COUNT: 21.6 % (ref 10–15)
FLUAV H1 2009 PAND RNA SPEC QL NAA+PROBE: NEGATIVE
FLUAV H1 RNA SPEC QL NAA+PROBE: NEGATIVE
FLUAV H3 RNA SPEC QL NAA+PROBE: NEGATIVE
FLUAV RNA SPEC QL NAA+PROBE: NEGATIVE
FLUAV+FLUBV AG SPEC QL: NEGATIVE
FLUAV+FLUBV AG SPEC QL: NEGATIVE
FLUBV RNA SPEC QL NAA+PROBE: NEGATIVE
GAMMA GLOB SERPL ELPH-MCNC: 2.6 G/DL (ref 0.7–1.6)
GAMMA GLOB SERPL ELPH-MCNC: 3.1 G/DL (ref 0.7–1.6)
GAMMA GLOB SERPL ELPH-MCNC: 3.6 G/DL (ref 0.7–1.6)
GAMMA GLOB SERPL ELPH-MCNC: 4.5 G/DL (ref 0.7–1.6)
GAMMA GLOB SERPL ELPH-MCNC: 4.8 G/DL (ref 0.7–1.6)
GAMMA GLOB SERPL ELPH-MCNC: 4.9 G/DL (ref 0.7–1.6)
GFR SERPL CREATININE-BSD FRML MDRD: 18 ML/MIN/1.7M2
GFR SERPL CREATININE-BSD FRML MDRD: 22 ML/MIN/1.7M2
GFR SERPL CREATININE-BSD FRML MDRD: 23 ML/MIN/1.7M2
GFR SERPL CREATININE-BSD FRML MDRD: 23 ML/MIN/1.7M2
GFR SERPL CREATININE-BSD FRML MDRD: 24 ML/MIN/1.7M2
GFR SERPL CREATININE-BSD FRML MDRD: 26 ML/MIN/1.7M2
GFR SERPL CREATININE-BSD FRML MDRD: 28 ML/MIN/1.7M2
GFR SERPL CREATININE-BSD FRML MDRD: 28 ML/MIN/1.7M2
GFR SERPL CREATININE-BSD FRML MDRD: 29 ML/MIN/1.7M2
GFR SERPL CREATININE-BSD FRML MDRD: 29 ML/MIN/1.7M2
GFR SERPL CREATININE-BSD FRML MDRD: 34 ML/MIN/1.7M2
GFR SERPL CREATININE-BSD FRML MDRD: 35 ML/MIN/1.7M2
GFR SERPL CREATININE-BSD FRML MDRD: 36 ML/MIN/1.7M2
GFR SERPL CREATININE-BSD FRML MDRD: 36 ML/MIN/1.7M2
GFR SERPL CREATININE-BSD FRML MDRD: 37 ML/MIN/1.7M2
GFR SERPL CREATININE-BSD FRML MDRD: 38 ML/MIN/1.7M2
GFR SERPL CREATININE-BSD FRML MDRD: 41 ML/MIN/1.7M2
GFR SERPL CREATININE-BSD FRML MDRD: 41 ML/MIN/1.7M2
GFR SERPL CREATININE-BSD FRML MDRD: 42 ML/MIN/1.7M2
GFR SERPL CREATININE-BSD FRML MDRD: 47 ML/MIN/1.7M2
GLUCOSE SERPL-MCNC: 101 MG/DL (ref 70–99)
GLUCOSE SERPL-MCNC: 103 MG/DL (ref 70–99)
GLUCOSE SERPL-MCNC: 110 MG/DL (ref 70–99)
GLUCOSE SERPL-MCNC: 114 MG/DL (ref 70–99)
GLUCOSE SERPL-MCNC: 117 MG/DL (ref 70–99)
GLUCOSE SERPL-MCNC: 130 MG/DL (ref 70–99)
GLUCOSE SERPL-MCNC: 133 MG/DL (ref 70–99)
GLUCOSE SERPL-MCNC: 133 MG/DL (ref 70–99)
GLUCOSE SERPL-MCNC: 139 MG/DL (ref 70–99)
GLUCOSE SERPL-MCNC: 140 MG/DL (ref 70–99)
GLUCOSE SERPL-MCNC: 148 MG/DL (ref 70–99)
GLUCOSE SERPL-MCNC: 149 MG/DL (ref 70–99)
GLUCOSE SERPL-MCNC: 151 MG/DL (ref 70–99)
GLUCOSE SERPL-MCNC: 168 MG/DL (ref 70–99)
GLUCOSE SERPL-MCNC: 168 MG/DL (ref 70–99)
GLUCOSE SERPL-MCNC: 179 MG/DL (ref 70–99)
GLUCOSE SERPL-MCNC: 185 MG/DL (ref 70–99)
GLUCOSE SERPL-MCNC: 201 MG/DL (ref 70–99)
GLUCOSE SERPL-MCNC: 211 MG/DL (ref 70–99)
GLUCOSE SERPL-MCNC: 220 MG/DL (ref 70–99)
GLUCOSE SERPL-MCNC: 234 MG/DL (ref 70–99)
GLUCOSE SERPL-MCNC: 271 MG/DL (ref 70–99)
GLUCOSE SERPL-MCNC: 275 MG/DL (ref 70–99)
GLUCOSE SERPL-MCNC: 90 MG/DL (ref 70–99)
GLUCOSE SERPL-MCNC: 92 MG/DL (ref 70–99)
GLUCOSE SERPL-MCNC: 93 MG/DL (ref 70–99)
GLUCOSE SERPL-MCNC: 98 MG/DL (ref 70–99)
GLUCOSE UR STRIP-MCNC: NEGATIVE MG/DL
HADV DNA SPEC QL NAA+PROBE: NEGATIVE
HADV DNA SPEC QL NAA+PROBE: NEGATIVE
HBA1C MFR BLD: 6.3 % (ref 0–5.6)
HCT VFR BLD AUTO: 21.7 % (ref 40–53)
HCT VFR BLD AUTO: 22.5 % (ref 40–53)
HCT VFR BLD AUTO: 24 % (ref 40–53)
HCT VFR BLD AUTO: 24.1 % (ref 40–53)
HCT VFR BLD AUTO: 25.7 % (ref 40–53)
HCT VFR BLD AUTO: 26 % (ref 40–53)
HCT VFR BLD AUTO: 26.4 % (ref 40–53)
HCT VFR BLD AUTO: 26.6 % (ref 40–53)
HCT VFR BLD AUTO: 26.6 % (ref 40–53)
HCT VFR BLD AUTO: 26.9 % (ref 40–53)
HCT VFR BLD AUTO: 27.1 % (ref 40–53)
HCT VFR BLD AUTO: 27.4 % (ref 40–53)
HCT VFR BLD AUTO: 28 % (ref 40–53)
HCT VFR BLD AUTO: 28.2 % (ref 40–53)
HCT VFR BLD AUTO: 28.4 % (ref 40–53)
HCT VFR BLD AUTO: 28.7 % (ref 40–53)
HCT VFR BLD AUTO: 28.8 % (ref 40–53)
HCT VFR BLD AUTO: 29.6 % (ref 40–53)
HCT VFR BLD AUTO: 29.6 % (ref 40–53)
HCT VFR BLD AUTO: 29.9 % (ref 40–53)
HCT VFR BLD AUTO: 30.1 % (ref 40–53)
HCT VFR BLD AUTO: 30.2 % (ref 40–53)
HCT VFR BLD AUTO: 30.3 % (ref 40–53)
HCT VFR BLD AUTO: 30.5 % (ref 40–53)
HCT VFR BLD AUTO: 30.8 % (ref 40–53)
HCT VFR BLD AUTO: 30.9 % (ref 40–53)
HCT VFR BLD AUTO: 31 % (ref 40–53)
HCT VFR BLD AUTO: 32 % (ref 40–53)
HCT VFR BLD AUTO: 33 % (ref 40–53)
HDLC SERPL-MCNC: 54 MG/DL
HGB BLD-MCNC: 10 G/DL (ref 13.3–17.7)
HGB BLD-MCNC: 10.1 G/DL (ref 13.3–17.7)
HGB BLD-MCNC: 10.3 G/DL (ref 13.3–17.7)
HGB BLD-MCNC: 10.4 G/DL (ref 13.3–17.7)
HGB BLD-MCNC: 10.7 G/DL (ref 13.3–17.7)
HGB BLD-MCNC: 6.8 G/DL (ref 13.3–17.7)
HGB BLD-MCNC: 7.3 G/DL (ref 13.3–17.7)
HGB BLD-MCNC: 7.5 G/DL (ref 13.3–17.7)
HGB BLD-MCNC: 7.9 G/DL (ref 13.3–17.7)
HGB BLD-MCNC: 7.9 G/DL (ref 13.3–17.7)
HGB BLD-MCNC: 8.2 G/DL (ref 13.3–17.7)
HGB BLD-MCNC: 8.3 G/DL (ref 13.3–17.7)
HGB BLD-MCNC: 8.3 G/DL (ref 13.3–17.7)
HGB BLD-MCNC: 8.4 G/DL (ref 13.3–17.7)
HGB BLD-MCNC: 8.6 G/DL (ref 13.3–17.7)
HGB BLD-MCNC: 8.9 G/DL (ref 13.3–17.7)
HGB BLD-MCNC: 8.9 G/DL (ref 13.3–17.7)
HGB BLD-MCNC: 9.1 G/DL (ref 13.3–17.7)
HGB BLD-MCNC: 9.2 G/DL (ref 13.3–17.7)
HGB BLD-MCNC: 9.2 G/DL (ref 13.3–17.7)
HGB BLD-MCNC: 9.3 G/DL (ref 13.3–17.7)
HGB BLD-MCNC: 9.3 G/DL (ref 13.3–17.7)
HGB BLD-MCNC: 9.4 G/DL (ref 13.3–17.7)
HGB BLD-MCNC: 9.5 G/DL (ref 13.3–17.7)
HGB BLD-MCNC: 9.6 G/DL (ref 13.3–17.7)
HGB BLD-MCNC: 9.7 G/DL (ref 13.3–17.7)
HGB BLD-MCNC: 9.8 G/DL (ref 13.3–17.7)
HGB UR QL STRIP: NEGATIVE
HMPV RNA SPEC QL NAA+PROBE: NEGATIVE
HPIV1 RNA SPEC QL NAA+PROBE: NEGATIVE
HPIV2 RNA SPEC QL NAA+PROBE: NEGATIVE
HPIV3 RNA SPEC QL NAA+PROBE: NEGATIVE
IMM GRANULOCYTES # BLD: 0 10E9/L (ref 0–0.4)
IMM GRANULOCYTES # BLD: 0.1 10E9/L (ref 0–0.4)
IMM GRANULOCYTES NFR BLD: 0.3 %
IMM GRANULOCYTES NFR BLD: 0.4 %
IMM GRANULOCYTES NFR BLD: 0.4 %
IMM GRANULOCYTES NFR BLD: 0.5 %
IMM GRANULOCYTES NFR BLD: 0.5 %
IMM GRANULOCYTES NFR BLD: 0.6 %
IMM GRANULOCYTES NFR BLD: 0.7 %
IMM GRANULOCYTES NFR BLD: 0.8 %
IMM GRANULOCYTES NFR BLD: 0.8 %
IMM GRANULOCYTES NFR BLD: 1 %
IMM GRANULOCYTES NFR BLD: 1.1 %
IMM GRANULOCYTES NFR BLD: 1.1 %
IMM GRANULOCYTES NFR BLD: 1.2 %
IMM GRANULOCYTES NFR BLD: 1.2 %
IMM GRANULOCYTES NFR BLD: 1.4 %
IMM GRANULOCYTES NFR BLD: 1.8 %
IMM GRANULOCYTES NFR BLD: 1.9 %
IMM GRANULOCYTES NFR BLD: 1.9 %
IMM GRANULOCYTES NFR BLD: 2.1 %
IMM PLASMA CELLS NFR BLD: 0.9 %
INTERPRETATION ECG - MUSE: NORMAL
KAPPA LC UR-MCNC: <0.3 MG/DL (ref 0.33–1.94)
KAPPA LC/LAMBDA SER: ABNORMAL {RATIO} (ref 0.26–1.65)
KETONES UR STRIP-MCNC: NEGATIVE MG/DL
LACTATE BLD-SCNC: 1.2 MMOL/L (ref 0.7–2.1)
LACTATE BLD-SCNC: 1.3 MMOL/L (ref 0.7–2)
LAMBDA LC SERPL-MCNC: 228.25 MG/DL (ref 0.57–2.63)
LAMBDA LC SERPL-MCNC: 257 MG/DL (ref 0.57–2.63)
LAMBDA LC SERPL-MCNC: 260 MG/DL (ref 0.57–2.63)
LAMBDA LC SERPL-MCNC: 299.5 MG/DL (ref 0.57–2.63)
LAMBDA LC SERPL-MCNC: 339 MG/DL (ref 0.57–2.63)
LAMBDA LC SERPL-MCNC: 345 MG/DL (ref 0.57–2.63)
LDLC SERPL CALC-MCNC: 127 MG/DL
LEUKOCYTE ESTERASE UR QL STRIP: NEGATIVE
LYMPHOCYTES # BLD AUTO: 0 10E9/L (ref 0.8–5.3)
LYMPHOCYTES # BLD AUTO: 0.1 10E9/L (ref 0.8–5.3)
LYMPHOCYTES # BLD AUTO: 0.2 10E9/L (ref 0.8–5.3)
LYMPHOCYTES # BLD AUTO: 0.3 10E9/L (ref 0.8–5.3)
LYMPHOCYTES # BLD AUTO: 0.5 10E9/L (ref 0.8–5.3)
LYMPHOCYTES # BLD AUTO: 0.5 10E9/L (ref 0.8–5.3)
LYMPHOCYTES NFR BLD AUTO: 0 %
LYMPHOCYTES NFR BLD AUTO: 1 %
LYMPHOCYTES NFR BLD AUTO: 1.6 %
LYMPHOCYTES NFR BLD AUTO: 1.7 %
LYMPHOCYTES NFR BLD AUTO: 1.9 %
LYMPHOCYTES NFR BLD AUTO: 13.7 %
LYMPHOCYTES NFR BLD AUTO: 13.8 %
LYMPHOCYTES NFR BLD AUTO: 2.1 %
LYMPHOCYTES NFR BLD AUTO: 2.3 %
LYMPHOCYTES NFR BLD AUTO: 2.3 %
LYMPHOCYTES NFR BLD AUTO: 2.5 %
LYMPHOCYTES NFR BLD AUTO: 2.6 %
LYMPHOCYTES NFR BLD AUTO: 28.7 %
LYMPHOCYTES NFR BLD AUTO: 3.5 %
LYMPHOCYTES NFR BLD AUTO: 3.5 %
LYMPHOCYTES NFR BLD AUTO: 3.8 %
LYMPHOCYTES NFR BLD AUTO: 3.8 %
LYMPHOCYTES NFR BLD AUTO: 4.3 %
LYMPHOCYTES NFR BLD AUTO: 4.3 %
LYMPHOCYTES NFR BLD AUTO: 5 %
LYMPHOCYTES NFR BLD AUTO: 5.2 %
LYMPHOCYTES NFR BLD AUTO: 5.4 %
LYMPHOCYTES NFR BLD AUTO: 6.1 %
LYMPHOCYTES NFR BLD AUTO: 7.2 %
LYMPHOCYTES NFR BLD AUTO: 7.2 %
LYMPHOCYTES NFR BLD AUTO: 7.5 %
LYMPHOCYTES NFR BLD AUTO: 7.6 %
LYMPHOCYTES NFR BLD AUTO: 8.1 %
M PROTEIN SERPL ELPH-MCNC: 2.4 G/DL
M PROTEIN SERPL ELPH-MCNC: 2.9 G/DL
M PROTEIN SERPL ELPH-MCNC: 3.4 G/DL
M PROTEIN SERPL ELPH-MCNC: 4.4 G/DL
M PROTEIN SERPL ELPH-MCNC: 4.6 G/DL
M PROTEIN SERPL ELPH-MCNC: 4.6 G/DL
MACROCYTES BLD QL SMEAR: PRESENT
MAGNESIUM SERPL-MCNC: 1.7 MG/DL (ref 1.6–2.3)
MAGNESIUM SERPL-MCNC: 1.8 MG/DL (ref 1.6–2.3)
MAGNESIUM SERPL-MCNC: 2.3 MG/DL (ref 1.6–2.3)
MCH RBC QN AUTO: 30.4 PG (ref 26.5–33)
MCH RBC QN AUTO: 30.6 PG (ref 26.5–33)
MCH RBC QN AUTO: 30.9 PG (ref 26.5–33)
MCH RBC QN AUTO: 31 PG (ref 26.5–33)
MCH RBC QN AUTO: 31.2 PG (ref 26.5–33)
MCH RBC QN AUTO: 31.4 PG (ref 26.5–33)
MCH RBC QN AUTO: 31.5 PG (ref 26.5–33)
MCH RBC QN AUTO: 31.6 PG (ref 26.5–33)
MCH RBC QN AUTO: 31.8 PG (ref 26.5–33)
MCH RBC QN AUTO: 31.9 PG (ref 26.5–33)
MCH RBC QN AUTO: 32 PG (ref 26.5–33)
MCH RBC QN AUTO: 32.1 PG (ref 26.5–33)
MCH RBC QN AUTO: 32.3 PG (ref 26.5–33)
MCH RBC QN AUTO: 32.4 PG (ref 26.5–33)
MCH RBC QN AUTO: 32.5 PG (ref 26.5–33)
MCH RBC QN AUTO: 32.6 PG (ref 26.5–33)
MCH RBC QN AUTO: 32.6 PG (ref 26.5–33)
MCH RBC QN AUTO: 32.8 PG (ref 26.5–33)
MCH RBC QN AUTO: 32.9 PG (ref 26.5–33)
MCH RBC QN AUTO: 33.1 PG (ref 26.5–33)
MCH RBC QN AUTO: 33.3 PG (ref 26.5–33)
MCH RBC QN AUTO: 33.6 PG (ref 26.5–33)
MCHC RBC AUTO-ENTMCNC: 30.4 G/DL (ref 31.5–36.5)
MCHC RBC AUTO-ENTMCNC: 30.4 G/DL (ref 31.5–36.5)
MCHC RBC AUTO-ENTMCNC: 30.7 G/DL (ref 31.5–36.5)
MCHC RBC AUTO-ENTMCNC: 31.1 G/DL (ref 31.5–36.5)
MCHC RBC AUTO-ENTMCNC: 31.1 G/DL (ref 31.5–36.5)
MCHC RBC AUTO-ENTMCNC: 31.2 G/DL (ref 31.5–36.5)
MCHC RBC AUTO-ENTMCNC: 31.2 G/DL (ref 31.5–36.5)
MCHC RBC AUTO-ENTMCNC: 31.3 G/DL (ref 31.5–36.5)
MCHC RBC AUTO-ENTMCNC: 31.4 G/DL (ref 31.5–36.5)
MCHC RBC AUTO-ENTMCNC: 31.6 G/DL (ref 31.5–36.5)
MCHC RBC AUTO-ENTMCNC: 31.7 G/DL (ref 31.5–36.5)
MCHC RBC AUTO-ENTMCNC: 31.8 G/DL (ref 31.5–36.5)
MCHC RBC AUTO-ENTMCNC: 31.8 G/DL (ref 31.5–36.5)
MCHC RBC AUTO-ENTMCNC: 31.9 G/DL (ref 31.5–36.5)
MCHC RBC AUTO-ENTMCNC: 32.1 G/DL (ref 31.5–36.5)
MCHC RBC AUTO-ENTMCNC: 32.1 G/DL (ref 31.5–36.5)
MCHC RBC AUTO-ENTMCNC: 32.3 G/DL (ref 31.5–36.5)
MCHC RBC AUTO-ENTMCNC: 32.4 G/DL (ref 31.5–36.5)
MCHC RBC AUTO-ENTMCNC: 32.8 G/DL (ref 31.5–36.5)
MCHC RBC AUTO-ENTMCNC: 32.8 G/DL (ref 31.5–36.5)
MCHC RBC AUTO-ENTMCNC: 33.1 G/DL (ref 31.5–36.5)
MCHC RBC AUTO-ENTMCNC: 33.3 G/DL (ref 31.5–36.5)
MCHC RBC AUTO-ENTMCNC: 33.5 G/DL (ref 31.5–36.5)
MCV RBC AUTO: 100 FL (ref 78–100)
MCV RBC AUTO: 101 FL (ref 78–100)
MCV RBC AUTO: 101 FL (ref 78–100)
MCV RBC AUTO: 102 FL (ref 78–100)
MCV RBC AUTO: 103 FL (ref 78–100)
MCV RBC AUTO: 104 FL (ref 78–100)
MCV RBC AUTO: 105 FL (ref 78–100)
MCV RBC AUTO: 106 FL (ref 78–100)
MCV RBC AUTO: 96 FL (ref 78–100)
MCV RBC AUTO: 97 FL (ref 78–100)
MCV RBC AUTO: 98 FL (ref 78–100)
MCV RBC AUTO: 99 FL (ref 78–100)
METAMYELOCYTES # BLD: 0 10E9/L
METAMYELOCYTES NFR BLD MANUAL: 1 %
MICROBIOLOGIST REVIEW: NORMAL
MONOCYTES # BLD AUTO: 0.1 10E9/L (ref 0–1.3)
MONOCYTES # BLD AUTO: 0.2 10E9/L (ref 0–1.3)
MONOCYTES # BLD AUTO: 0.3 10E9/L (ref 0–1.3)
MONOCYTES # BLD AUTO: 0.4 10E9/L (ref 0–1.3)
MONOCYTES # BLD AUTO: 0.6 10E9/L (ref 0–1.3)
MONOCYTES # BLD AUTO: 0.6 10E9/L (ref 0–1.3)
MONOCYTES NFR BLD AUTO: 10.3 %
MONOCYTES NFR BLD AUTO: 11 %
MONOCYTES NFR BLD AUTO: 11.2 %
MONOCYTES NFR BLD AUTO: 11.6 %
MONOCYTES NFR BLD AUTO: 11.7 %
MONOCYTES NFR BLD AUTO: 11.9 %
MONOCYTES NFR BLD AUTO: 12 %
MONOCYTES NFR BLD AUTO: 13.2 %
MONOCYTES NFR BLD AUTO: 13.2 %
MONOCYTES NFR BLD AUTO: 13.7 %
MONOCYTES NFR BLD AUTO: 14.6 %
MONOCYTES NFR BLD AUTO: 15.7 %
MONOCYTES NFR BLD AUTO: 15.7 %
MONOCYTES NFR BLD AUTO: 2.5 %
MONOCYTES NFR BLD AUTO: 3.3 %
MONOCYTES NFR BLD AUTO: 3.7 %
MONOCYTES NFR BLD AUTO: 4 %
MONOCYTES NFR BLD AUTO: 5.3 %
MONOCYTES NFR BLD AUTO: 5.6 %
MONOCYTES NFR BLD AUTO: 5.6 %
MONOCYTES NFR BLD AUTO: 5.8 %
MONOCYTES NFR BLD AUTO: 6.2 %
MONOCYTES NFR BLD AUTO: 8.1 %
MONOCYTES NFR BLD AUTO: 8.2 %
MONOCYTES NFR BLD AUTO: 8.6 %
MONOCYTES NFR BLD AUTO: 8.8 %
MONOCYTES NFR BLD AUTO: 9 %
MONOCYTES NFR BLD AUTO: 9.6 %
MUCOUS THREADS #/AREA URNS LPF: PRESENT /LPF
MYELOCYTES # BLD: 0 10E9/L
MYELOCYTES NFR BLD MANUAL: 1.7 %
NEUTROPHILS # BLD AUTO: 1.1 10E9/L (ref 1.6–8.3)
NEUTROPHILS # BLD AUTO: 1.4 10E9/L (ref 1.6–8.3)
NEUTROPHILS # BLD AUTO: 1.4 10E9/L (ref 1.6–8.3)
NEUTROPHILS # BLD AUTO: 1.5 10E9/L (ref 1.6–8.3)
NEUTROPHILS # BLD AUTO: 1.7 10E9/L (ref 1.6–8.3)
NEUTROPHILS # BLD AUTO: 1.7 10E9/L (ref 1.6–8.3)
NEUTROPHILS # BLD AUTO: 1.8 10E9/L (ref 1.6–8.3)
NEUTROPHILS # BLD AUTO: 1.9 10E9/L (ref 1.6–8.3)
NEUTROPHILS # BLD AUTO: 1.9 10E9/L (ref 1.6–8.3)
NEUTROPHILS # BLD AUTO: 2.1 10E9/L (ref 1.6–8.3)
NEUTROPHILS # BLD AUTO: 2.3 10E9/L (ref 1.6–8.3)
NEUTROPHILS # BLD AUTO: 2.5 10E9/L (ref 1.6–8.3)
NEUTROPHILS # BLD AUTO: 2.6 10E9/L (ref 1.6–8.3)
NEUTROPHILS # BLD AUTO: 2.6 10E9/L (ref 1.6–8.3)
NEUTROPHILS # BLD AUTO: 2.8 10E9/L (ref 1.6–8.3)
NEUTROPHILS # BLD AUTO: 2.8 10E9/L (ref 1.6–8.3)
NEUTROPHILS # BLD AUTO: 2.9 10E9/L (ref 1.6–8.3)
NEUTROPHILS # BLD AUTO: 3 10E9/L (ref 1.6–8.3)
NEUTROPHILS # BLD AUTO: 3.1 10E9/L (ref 1.6–8.3)
NEUTROPHILS # BLD AUTO: 3.5 10E9/L (ref 1.6–8.3)
NEUTROPHILS # BLD AUTO: 3.5 10E9/L (ref 1.6–8.3)
NEUTROPHILS # BLD AUTO: 3.6 10E9/L (ref 1.6–8.3)
NEUTROPHILS # BLD AUTO: 3.6 10E9/L (ref 1.6–8.3)
NEUTROPHILS # BLD AUTO: 3.7 10E9/L (ref 1.6–8.3)
NEUTROPHILS # BLD AUTO: 3.9 10E9/L (ref 1.6–8.3)
NEUTROPHILS NFR BLD AUTO: 60.1 %
NEUTROPHILS NFR BLD AUTO: 69.6 %
NEUTROPHILS NFR BLD AUTO: 77.6 %
NEUTROPHILS NFR BLD AUTO: 78.9 %
NEUTROPHILS NFR BLD AUTO: 79.4 %
NEUTROPHILS NFR BLD AUTO: 80 %
NEUTROPHILS NFR BLD AUTO: 80.1 %
NEUTROPHILS NFR BLD AUTO: 80.6 %
NEUTROPHILS NFR BLD AUTO: 80.6 %
NEUTROPHILS NFR BLD AUTO: 81.1 %
NEUTROPHILS NFR BLD AUTO: 81.6 %
NEUTROPHILS NFR BLD AUTO: 82.7 %
NEUTROPHILS NFR BLD AUTO: 84.6 %
NEUTROPHILS NFR BLD AUTO: 85.1 %
NEUTROPHILS NFR BLD AUTO: 85.2 %
NEUTROPHILS NFR BLD AUTO: 85.2 %
NEUTROPHILS NFR BLD AUTO: 85.5 %
NEUTROPHILS NFR BLD AUTO: 85.8 %
NEUTROPHILS NFR BLD AUTO: 85.8 %
NEUTROPHILS NFR BLD AUTO: 86.1 %
NEUTROPHILS NFR BLD AUTO: 87 %
NEUTROPHILS NFR BLD AUTO: 87.4 %
NEUTROPHILS NFR BLD AUTO: 89 %
NEUTROPHILS NFR BLD AUTO: 90.1 %
NEUTROPHILS NFR BLD AUTO: 92.7 %
NEUTROPHILS NFR BLD AUTO: 93.6 %
NEUTROPHILS NFR BLD AUTO: 94.7 %
NEUTROPHILS NFR BLD AUTO: 95.3 %
NITRATE UR QL: NEGATIVE
NONHDLC SERPL-MCNC: 146 MG/DL
NRBC # BLD AUTO: 0 10*3/UL
NRBC BLD AUTO-RTO: 0 /100
NUM BPU REQUESTED: 2
PCO2 BLDV: 27 MM HG (ref 40–50)
PH BLDV: 7.42 PH (ref 7.32–7.43)
PH UR STRIP: 5.5 PH (ref 5–7)
PHOSPHATE SERPL-MCNC: 3.6 MG/DL (ref 2.5–4.5)
PLASMA CELLS # BLD MANUAL: 0 10E9/L
PLATELET # BLD AUTO: 21 10E9/L (ref 150–450)
PLATELET # BLD AUTO: 21 10E9/L (ref 150–450)
PLATELET # BLD AUTO: 22 10E9/L (ref 150–450)
PLATELET # BLD AUTO: 26 10E9/L (ref 150–450)
PLATELET # BLD AUTO: 27 10E9/L (ref 150–450)
PLATELET # BLD AUTO: 31 10E9/L (ref 150–450)
PLATELET # BLD AUTO: 32 10E9/L (ref 150–450)
PLATELET # BLD AUTO: 33 10E9/L (ref 150–450)
PLATELET # BLD AUTO: 34 10E9/L (ref 150–450)
PLATELET # BLD AUTO: 35 10E9/L (ref 150–450)
PLATELET # BLD AUTO: 35 10E9/L (ref 150–450)
PLATELET # BLD AUTO: 40 10E9/L (ref 150–450)
PLATELET # BLD AUTO: 41 10E9/L (ref 150–450)
PLATELET # BLD AUTO: 41 10E9/L (ref 150–450)
PLATELET # BLD AUTO: 42 10E9/L (ref 150–450)
PLATELET # BLD AUTO: 45 10E9/L (ref 150–450)
PLATELET # BLD AUTO: 45 10E9/L (ref 150–450)
PLATELET # BLD AUTO: 49 10E9/L (ref 150–450)
PLATELET # BLD AUTO: 49 10E9/L (ref 150–450)
PLATELET # BLD AUTO: 50 10E9/L (ref 150–450)
PLATELET # BLD AUTO: 51 10E9/L (ref 150–450)
PLATELET # BLD AUTO: 52 10E9/L (ref 150–450)
PLATELET # BLD AUTO: 53 10E9/L (ref 150–450)
PLATELET # BLD AUTO: 57 10E9/L (ref 150–450)
PLATELET # BLD EST: ABNORMAL 10*3/UL
PO2 BLDV: 31 MM HG (ref 25–47)
POIKILOCYTOSIS BLD QL SMEAR: SLIGHT
POIKILOCYTOSIS BLD QL SMEAR: SLIGHT
POLYCHROMASIA BLD QL SMEAR: SLIGHT
POTASSIUM SERPL-SCNC: 3.5 MMOL/L (ref 3.4–5.3)
POTASSIUM SERPL-SCNC: 3.5 MMOL/L (ref 3.4–5.3)
POTASSIUM SERPL-SCNC: 3.6 MMOL/L (ref 3.4–5.3)
POTASSIUM SERPL-SCNC: 3.6 MMOL/L (ref 3.4–5.3)
POTASSIUM SERPL-SCNC: 3.7 MMOL/L (ref 3.4–5.3)
POTASSIUM SERPL-SCNC: 3.7 MMOL/L (ref 3.4–5.3)
POTASSIUM SERPL-SCNC: 3.9 MMOL/L (ref 3.4–5.3)
POTASSIUM SERPL-SCNC: 4.1 MMOL/L (ref 3.4–5.3)
POTASSIUM SERPL-SCNC: 4.1 MMOL/L (ref 3.4–5.3)
POTASSIUM SERPL-SCNC: 4.2 MMOL/L (ref 3.4–5.3)
POTASSIUM SERPL-SCNC: 4.3 MMOL/L (ref 3.4–5.3)
POTASSIUM SERPL-SCNC: 4.3 MMOL/L (ref 3.4–5.3)
POTASSIUM SERPL-SCNC: 4.4 MMOL/L (ref 3.4–5.3)
POTASSIUM SERPL-SCNC: 4.5 MMOL/L (ref 3.4–5.3)
POTASSIUM SERPL-SCNC: 4.8 MMOL/L (ref 3.4–5.3)
POTASSIUM SERPL-SCNC: 5 MMOL/L (ref 3.4–5.3)
POTASSIUM SERPL-SCNC: 5 MMOL/L (ref 3.4–5.3)
POTASSIUM SERPL-SCNC: 5.3 MMOL/L (ref 3.4–5.3)
PROT PATTERN SERPL ELPH-IMP: ABNORMAL
PROT SERPL-MCNC: 10 G/DL (ref 6.8–8.8)
PROT SERPL-MCNC: 10.1 G/DL (ref 6.8–8.8)
PROT SERPL-MCNC: 10.1 G/DL (ref 6.8–8.8)
PROT SERPL-MCNC: 10.3 G/DL (ref 6.8–8.8)
PROT SERPL-MCNC: 10.3 G/DL (ref 6.8–8.8)
PROT SERPL-MCNC: 10.5 G/DL (ref 6.8–8.8)
PROT SERPL-MCNC: 10.6 G/DL (ref 6.8–8.8)
PROT SERPL-MCNC: 10.8 G/DL (ref 6.8–8.8)
PROT SERPL-MCNC: 10.8 G/DL (ref 6.8–8.8)
PROT SERPL-MCNC: 10.9 G/DL (ref 6.8–8.8)
PROT SERPL-MCNC: 11.2 G/DL (ref 6.8–8.8)
PROT SERPL-MCNC: 7.8 G/DL (ref 6.8–8.8)
PROT SERPL-MCNC: 8.1 G/DL (ref 6.8–8.8)
PROT SERPL-MCNC: 8.1 G/DL (ref 6.8–8.8)
PROT SERPL-MCNC: 8.4 G/DL (ref 6.8–8.8)
PROT SERPL-MCNC: 8.4 G/DL (ref 6.8–8.8)
PROT SERPL-MCNC: 8.5 G/DL (ref 6.8–8.8)
PROT SERPL-MCNC: 8.5 G/DL (ref 6.8–8.8)
PROT SERPL-MCNC: 9 G/DL (ref 6.8–8.8)
PROT SERPL-MCNC: 9.2 G/DL (ref 6.8–8.8)
PROT SERPL-MCNC: 9.7 G/DL (ref 6.8–8.8)
PROT SERPL-MCNC: 9.7 G/DL (ref 6.8–8.8)
PROT SERPL-MCNC: 9.8 G/DL (ref 6.8–8.8)
RBC # BLD AUTO: 2.12 10E12/L (ref 4.4–5.9)
RBC # BLD AUTO: 2.24 10E12/L (ref 4.4–5.9)
RBC # BLD AUTO: 2.37 10E12/L (ref 4.4–5.9)
RBC # BLD AUTO: 2.38 10E12/L (ref 4.4–5.9)
RBC # BLD AUTO: 2.5 10E12/L (ref 4.4–5.9)
RBC # BLD AUTO: 2.58 10E12/L (ref 4.4–5.9)
RBC # BLD AUTO: 2.61 10E12/L (ref 4.4–5.9)
RBC # BLD AUTO: 2.63 10E12/L (ref 4.4–5.9)
RBC # BLD AUTO: 2.66 10E12/L (ref 4.4–5.9)
RBC # BLD AUTO: 2.67 10E12/L (ref 4.4–5.9)
RBC # BLD AUTO: 2.69 10E12/L (ref 4.4–5.9)
RBC # BLD AUTO: 2.71 10E12/L (ref 4.4–5.9)
RBC # BLD AUTO: 2.72 10E12/L (ref 4.4–5.9)
RBC # BLD AUTO: 2.74 10E12/L (ref 4.4–5.9)
RBC # BLD AUTO: 2.77 10E12/L (ref 4.4–5.9)
RBC # BLD AUTO: 2.86 10E12/L (ref 4.4–5.9)
RBC # BLD AUTO: 2.88 10E12/L (ref 4.4–5.9)
RBC # BLD AUTO: 2.88 10E12/L (ref 4.4–5.9)
RBC # BLD AUTO: 2.9 10E12/L (ref 4.4–5.9)
RBC # BLD AUTO: 2.97 10E12/L (ref 4.4–5.9)
RBC # BLD AUTO: 2.98 10E12/L (ref 4.4–5.9)
RBC # BLD AUTO: 3.03 10E12/L (ref 4.4–5.9)
RBC # BLD AUTO: 3.06 10E12/L (ref 4.4–5.9)
RBC # BLD AUTO: 3.07 10E12/L (ref 4.4–5.9)
RBC # BLD AUTO: 3.07 10E12/L (ref 4.4–5.9)
RBC # BLD AUTO: 3.11 10E12/L (ref 4.4–5.9)
RBC # BLD AUTO: 3.14 10E12/L (ref 4.4–5.9)
RBC # BLD AUTO: 3.21 10E12/L (ref 4.4–5.9)
RBC # BLD AUTO: 3.35 10E12/L (ref 4.4–5.9)
RBC #/AREA URNS AUTO: 1 /HPF (ref 0–2)
RBC MORPH BLD: ABNORMAL
RHINOVIRUS RNA SPEC QL NAA+PROBE: NEGATIVE
RSV RNA SPEC QL NAA+PROBE: NEGATIVE
RSV RNA SPEC QL NAA+PROBE: NEGATIVE
SAO2 % BLDV FROM PO2: 62 %
SODIUM SERPL-SCNC: 132 MMOL/L (ref 133–144)
SODIUM SERPL-SCNC: 134 MMOL/L (ref 133–144)
SODIUM SERPL-SCNC: 135 MMOL/L (ref 133–144)
SODIUM SERPL-SCNC: 136 MMOL/L (ref 133–144)
SODIUM SERPL-SCNC: 137 MMOL/L (ref 133–144)
SODIUM SERPL-SCNC: 138 MMOL/L (ref 133–144)
SODIUM SERPL-SCNC: 139 MMOL/L (ref 133–144)
SODIUM SERPL-SCNC: 140 MMOL/L (ref 133–144)
SODIUM SERPL-SCNC: 141 MMOL/L (ref 133–144)
SODIUM SERPL-SCNC: 142 MMOL/L (ref 133–144)
SOURCE: ABNORMAL
SP GR UR STRIP: 1.01 (ref 1–1.03)
SPECIMEN EXP DATE BLD: NORMAL
SPECIMEN SOURCE: NORMAL
TRANSFUSION STATUS PATIENT QL: NORMAL
TRIGL SERPL-MCNC: 92 MG/DL
UROBILINOGEN UR STRIP-MCNC: NORMAL MG/DL (ref 0–2)
WBC # BLD AUTO: 1.7 10E9/L (ref 4–11)
WBC # BLD AUTO: 1.8 10E9/L (ref 4–11)
WBC # BLD AUTO: 1.9 10E9/L (ref 4–11)
WBC # BLD AUTO: 1.9 10E9/L (ref 4–11)
WBC # BLD AUTO: 2 10E9/L (ref 4–11)
WBC # BLD AUTO: 2.1 10E9/L (ref 4–11)
WBC # BLD AUTO: 2.1 10E9/L (ref 4–11)
WBC # BLD AUTO: 2.3 10E9/L (ref 4–11)
WBC # BLD AUTO: 2.3 10E9/L (ref 4–11)
WBC # BLD AUTO: 2.4 10E9/L (ref 4–11)
WBC # BLD AUTO: 2.5 10E9/L (ref 4–11)
WBC # BLD AUTO: 2.8 10E9/L (ref 4–11)
WBC # BLD AUTO: 2.8 10E9/L (ref 4–11)
WBC # BLD AUTO: 2.9 10E9/L (ref 4–11)
WBC # BLD AUTO: 3 10E9/L (ref 4–11)
WBC # BLD AUTO: 3.2 10E9/L (ref 4–11)
WBC # BLD AUTO: 3.2 10E9/L (ref 4–11)
WBC # BLD AUTO: 3.4 10E9/L (ref 4–11)
WBC # BLD AUTO: 3.5 10E9/L (ref 4–11)
WBC # BLD AUTO: 3.5 10E9/L (ref 4–11)
WBC # BLD AUTO: 3.6 10E9/L (ref 4–11)
WBC # BLD AUTO: 3.6 10E9/L (ref 4–11)
WBC # BLD AUTO: 3.7 10E9/L (ref 4–11)
WBC # BLD AUTO: 4 10E9/L (ref 4–11)
WBC # BLD AUTO: 4 10E9/L (ref 4–11)
WBC # BLD AUTO: 4.1 10E9/L (ref 4–11)
WBC # BLD AUTO: 4.1 10E9/L (ref 4–11)
WBC # BLD AUTO: 4.2 10E9/L (ref 4–11)
WBC # BLD AUTO: 4.9 10E9/L (ref 4–11)
WBC #/AREA URNS AUTO: <1 /HPF (ref 0–2)

## 2018-01-01 PROCEDURE — 86900 BLOOD TYPING SEROLOGIC ABO: CPT | Performed by: INTERNAL MEDICINE

## 2018-01-01 PROCEDURE — 96361 HYDRATE IV INFUSION ADD-ON: CPT | Performed by: EMERGENCY MEDICINE

## 2018-01-01 PROCEDURE — 80053 COMPREHEN METABOLIC PANEL: CPT | Performed by: PHYSICIAN ASSISTANT

## 2018-01-01 PROCEDURE — 85025 COMPLETE CBC W/AUTO DIFF WBC: CPT | Performed by: PHYSICIAN ASSISTANT

## 2018-01-01 PROCEDURE — 80048 BASIC METABOLIC PNL TOTAL CA: CPT | Performed by: INTERNAL MEDICINE

## 2018-01-01 PROCEDURE — 25000132 ZZH RX MED GY IP 250 OP 250 PS 637: Mod: GY | Performed by: EMERGENCY MEDICINE

## 2018-01-01 PROCEDURE — 85025 COMPLETE CBC W/AUTO DIFF WBC: CPT | Performed by: INTERNAL MEDICINE

## 2018-01-01 PROCEDURE — 96413 CHEMO IV INFUSION 1 HR: CPT

## 2018-01-01 PROCEDURE — 99213 OFFICE O/P EST LOW 20 MIN: CPT | Mod: ZP | Performed by: PHYSICIAN ASSISTANT

## 2018-01-01 PROCEDURE — 99215 OFFICE O/P EST HI 40 MIN: CPT | Mod: ZP | Performed by: INTERNAL MEDICINE

## 2018-01-01 PROCEDURE — 25000128 H RX IP 250 OP 636: Mod: ZF | Performed by: PHYSICIAN ASSISTANT

## 2018-01-01 PROCEDURE — 25000128 H RX IP 250 OP 636: Mod: ZF | Performed by: INTERNAL MEDICINE

## 2018-01-01 PROCEDURE — 99214 OFFICE O/P EST MOD 30 MIN: CPT | Performed by: PHYSICIAN ASSISTANT

## 2018-01-01 PROCEDURE — 96375 TX/PRO/DX INJ NEW DRUG ADDON: CPT

## 2018-01-01 PROCEDURE — 87040 BLOOD CULTURE FOR BACTERIA: CPT | Performed by: EMERGENCY MEDICINE

## 2018-01-01 PROCEDURE — 86850 RBC ANTIBODY SCREEN: CPT | Performed by: INTERNAL MEDICINE

## 2018-01-01 PROCEDURE — 36591 DRAW BLOOD OFF VENOUS DEVICE: CPT

## 2018-01-01 PROCEDURE — 99285 EMERGENCY DEPT VISIT HI MDM: CPT | Mod: 25 | Performed by: EMERGENCY MEDICINE

## 2018-01-01 PROCEDURE — 86850 RBC ANTIBODY SCREEN: CPT | Performed by: PHYSICIAN ASSISTANT

## 2018-01-01 PROCEDURE — 86901 BLOOD TYPING SEROLOGIC RH(D): CPT | Performed by: PHYSICIAN ASSISTANT

## 2018-01-01 PROCEDURE — 25000128 H RX IP 250 OP 636: Performed by: EMERGENCY MEDICINE

## 2018-01-01 PROCEDURE — 86140 C-REACTIVE PROTEIN: CPT | Performed by: EMERGENCY MEDICINE

## 2018-01-01 PROCEDURE — 99214 OFFICE O/P EST MOD 30 MIN: CPT | Mod: ZP | Performed by: PHYSICIAN ASSISTANT

## 2018-01-01 PROCEDURE — 83883 ASSAY NEPHELOMETRY NOT SPEC: CPT | Performed by: INTERNAL MEDICINE

## 2018-01-01 PROCEDURE — 86923 COMPATIBILITY TEST ELECTRIC: CPT | Performed by: INTERNAL MEDICINE

## 2018-01-01 PROCEDURE — G0463 HOSPITAL OUTPT CLINIC VISIT: HCPCS

## 2018-01-01 PROCEDURE — 84165 PROTEIN E-PHORESIS SERUM: CPT | Performed by: INTERNAL MEDICINE

## 2018-01-01 PROCEDURE — G0463 HOSPITAL OUTPT CLINIC VISIT: HCPCS | Mod: ZF

## 2018-01-01 PROCEDURE — 94642 AEROSOL INHALATION TREATMENT: CPT

## 2018-01-01 PROCEDURE — 36430 TRANSFUSION BLD/BLD COMPNT: CPT

## 2018-01-01 PROCEDURE — 00000402 ZZHCL STATISTIC TOTAL PROTEIN: Performed by: INTERNAL MEDICINE

## 2018-01-01 PROCEDURE — 80053 COMPREHEN METABOLIC PANEL: CPT | Performed by: INTERNAL MEDICINE

## 2018-01-01 PROCEDURE — 84165 PROTEIN E-PHORESIS SERUM: CPT | Performed by: PHYSICIAN ASSISTANT

## 2018-01-01 PROCEDURE — 87633 RESP VIRUS 12-25 TARGETS: CPT | Performed by: INTERNAL MEDICINE

## 2018-01-01 PROCEDURE — 84100 ASSAY OF PHOSPHORUS: CPT | Performed by: INTERNAL MEDICINE

## 2018-01-01 PROCEDURE — 87040 BLOOD CULTURE FOR BACTERIA: CPT | Performed by: INTERNAL MEDICINE

## 2018-01-01 PROCEDURE — 25000128 H RX IP 250 OP 636: Performed by: INTERNAL MEDICINE

## 2018-01-01 PROCEDURE — 80061 LIPID PANEL: CPT | Performed by: FAMILY MEDICINE

## 2018-01-01 PROCEDURE — 85025 COMPLETE CBC W/AUTO DIFF WBC: CPT | Performed by: FAMILY MEDICINE

## 2018-01-01 PROCEDURE — 25000128 H RX IP 250 OP 636: Performed by: PHYSICIAN ASSISTANT

## 2018-01-01 PROCEDURE — 86902 BLOOD TYPE ANTIGEN DONOR EA: CPT | Performed by: PHYSICIAN ASSISTANT

## 2018-01-01 PROCEDURE — 86923 COMPATIBILITY TEST ELECTRIC: CPT | Performed by: PHYSICIAN ASSISTANT

## 2018-01-01 PROCEDURE — 86901 BLOOD TYPING SEROLOGIC RH(D): CPT | Performed by: INTERNAL MEDICINE

## 2018-01-01 PROCEDURE — P9040 RBC LEUKOREDUCED IRRADIATED: HCPCS | Performed by: PHYSICIAN ASSISTANT

## 2018-01-01 PROCEDURE — 00000402 ZZHCL STATISTIC TOTAL PROTEIN: Performed by: PHYSICIAN ASSISTANT

## 2018-01-01 PROCEDURE — G0463 HOSPITAL OUTPT CLINIC VISIT: HCPCS | Mod: 25

## 2018-01-01 PROCEDURE — 36592 COLLECT BLOOD FROM PICC: CPT | Performed by: INTERNAL MEDICINE

## 2018-01-01 PROCEDURE — 12000001 ZZH R&B MED SURG/OB UMMC

## 2018-01-01 PROCEDURE — 85027 COMPLETE CBC AUTOMATED: CPT | Performed by: EMERGENCY MEDICINE

## 2018-01-01 PROCEDURE — 25000132 ZZH RX MED GY IP 250 OP 250 PS 637: Mod: GY | Performed by: INTERNAL MEDICINE

## 2018-01-01 PROCEDURE — A9270 NON-COVERED ITEM OR SERVICE: HCPCS | Mod: GY | Performed by: INTERNAL MEDICINE

## 2018-01-01 PROCEDURE — 83883 ASSAY NEPHELOMETRY NOT SPEC: CPT | Performed by: PHYSICIAN ASSISTANT

## 2018-01-01 PROCEDURE — 25000128 H RX IP 250 OP 636: Mod: JW,ZF | Performed by: PHYSICIAN ASSISTANT

## 2018-01-01 PROCEDURE — P9037 PLATE PHERES LEUKOREDU IRRAD: HCPCS | Performed by: STUDENT IN AN ORGANIZED HEALTH CARE EDUCATION/TRAINING PROGRAM

## 2018-01-01 PROCEDURE — 97530 THERAPEUTIC ACTIVITIES: CPT | Mod: GP

## 2018-01-01 PROCEDURE — 99285 EMERGENCY DEPT VISIT HI MDM: CPT | Mod: Z6 | Performed by: EMERGENCY MEDICINE

## 2018-01-01 PROCEDURE — 85025 COMPLETE CBC W/AUTO DIFF WBC: CPT | Performed by: NURSE PRACTITIONER

## 2018-01-01 PROCEDURE — 71046 X-RAY EXAM CHEST 2 VIEWS: CPT

## 2018-01-01 PROCEDURE — A9270 NON-COVERED ITEM OR SERVICE: HCPCS | Mod: GY | Performed by: EMERGENCY MEDICINE

## 2018-01-01 PROCEDURE — 40000193 ZZH STATISTIC PT WARD VISIT

## 2018-01-01 PROCEDURE — 83735 ASSAY OF MAGNESIUM: CPT | Performed by: INTERNAL MEDICINE

## 2018-01-01 PROCEDURE — 86922 COMPATIBILITY TEST ANTIGLOB: CPT | Performed by: PHYSICIAN ASSISTANT

## 2018-01-01 PROCEDURE — P9040 RBC LEUKOREDUCED IRRADIATED: HCPCS | Performed by: INTERNAL MEDICINE

## 2018-01-01 PROCEDURE — 96365 THER/PROPH/DIAG IV INF INIT: CPT | Performed by: EMERGENCY MEDICINE

## 2018-01-01 PROCEDURE — 96366 THER/PROPH/DIAG IV INF ADDON: CPT | Performed by: EMERGENCY MEDICINE

## 2018-01-01 PROCEDURE — 36415 COLL VENOUS BLD VENIPUNCTURE: CPT | Performed by: INTERNAL MEDICINE

## 2018-01-01 PROCEDURE — 96360 HYDRATION IV INFUSION INIT: CPT

## 2018-01-01 PROCEDURE — 97161 PT EVAL LOW COMPLEX 20 MIN: CPT | Mod: GP

## 2018-01-01 PROCEDURE — 80048 BASIC METABOLIC PNL TOTAL CA: CPT | Performed by: NURSE PRACTITIONER

## 2018-01-01 PROCEDURE — 96361 HYDRATE IV INFUSION ADD-ON: CPT

## 2018-01-01 PROCEDURE — 97116 GAIT TRAINING THERAPY: CPT | Mod: GP

## 2018-01-01 PROCEDURE — 86902 BLOOD TYPE ANTIGEN DONOR EA: CPT | Performed by: INTERNAL MEDICINE

## 2018-01-01 PROCEDURE — 80048 BASIC METABOLIC PNL TOTAL CA: CPT | Performed by: EMERGENCY MEDICINE

## 2018-01-01 PROCEDURE — 83605 ASSAY OF LACTIC ACID: CPT | Performed by: EMERGENCY MEDICINE

## 2018-01-01 PROCEDURE — 99215 OFFICE O/P EST HI 40 MIN: CPT | Mod: ZP | Performed by: PHYSICIAN ASSISTANT

## 2018-01-01 PROCEDURE — 25000125 ZZHC RX 250: Mod: ZF | Performed by: PHYSICIAN ASSISTANT

## 2018-01-01 PROCEDURE — 82803 BLOOD GASES ANY COMBINATION: CPT

## 2018-01-01 PROCEDURE — 93005 ELECTROCARDIOGRAM TRACING: CPT

## 2018-01-01 PROCEDURE — 86900 BLOOD TYPING SEROLOGIC ABO: CPT | Performed by: PHYSICIAN ASSISTANT

## 2018-01-01 PROCEDURE — 36415 COLL VENOUS BLD VENIPUNCTURE: CPT | Performed by: EMERGENCY MEDICINE

## 2018-01-01 PROCEDURE — 83605 ASSAY OF LACTIC ACID: CPT

## 2018-01-01 PROCEDURE — 80053 COMPREHEN METABOLIC PANEL: CPT | Performed by: EMERGENCY MEDICINE

## 2018-01-01 PROCEDURE — 25000128 H RX IP 250 OP 636: Mod: JW,ZF | Performed by: INTERNAL MEDICINE

## 2018-01-01 PROCEDURE — 93010 ELECTROCARDIOGRAM REPORT: CPT | Mod: ZP | Performed by: INTERNAL MEDICINE

## 2018-01-01 PROCEDURE — 80053 COMPREHEN METABOLIC PANEL: CPT | Performed by: FAMILY MEDICINE

## 2018-01-01 PROCEDURE — 85025 COMPLETE CBC W/AUTO DIFF WBC: CPT | Performed by: EMERGENCY MEDICINE

## 2018-01-01 PROCEDURE — 87804 INFLUENZA ASSAY W/OPTIC: CPT | Performed by: EMERGENCY MEDICINE

## 2018-01-01 PROCEDURE — 25000128 H RX IP 250 OP 636: Performed by: NURSE PRACTITIONER

## 2018-01-01 PROCEDURE — 81001 URINALYSIS AUTO W/SCOPE: CPT | Performed by: EMERGENCY MEDICINE

## 2018-01-01 PROCEDURE — 83036 HEMOGLOBIN GLYCOSYLATED A1C: CPT | Performed by: FAMILY MEDICINE

## 2018-01-01 RX ORDER — MEPERIDINE HYDROCHLORIDE 25 MG/ML
25 INJECTION INTRAMUSCULAR; INTRAVENOUS; SUBCUTANEOUS EVERY 30 MIN PRN
Status: CANCELLED | OUTPATIENT
Start: 2018-01-01

## 2018-01-01 RX ORDER — HEPARIN SODIUM (PORCINE) LOCK FLUSH IV SOLN 100 UNIT/ML 100 UNIT/ML
5 SOLUTION INTRAVENOUS ONCE
Status: CANCELLED | OUTPATIENT
Start: 2018-01-01

## 2018-01-01 RX ORDER — ALBUTEROL SULFATE 0.83 MG/ML
2.5 SOLUTION RESPIRATORY (INHALATION)
Status: CANCELLED | OUTPATIENT
Start: 2018-01-01

## 2018-01-01 RX ORDER — HEPARIN SODIUM (PORCINE) LOCK FLUSH IV SOLN 100 UNIT/ML 100 UNIT/ML
500 SOLUTION INTRAVENOUS EVERY 8 HOURS
Status: DISCONTINUED | OUTPATIENT
Start: 2018-01-01 | End: 2018-01-01 | Stop reason: HOSPADM

## 2018-01-01 RX ORDER — SODIUM CHLORIDE 9 MG/ML
1000 INJECTION, SOLUTION INTRAVENOUS CONTINUOUS PRN
Status: CANCELLED
Start: 2018-01-01

## 2018-01-01 RX ORDER — LORAZEPAM 2 MG/ML
0.5 INJECTION INTRAMUSCULAR EVERY 4 HOURS PRN
Status: CANCELLED
Start: 2018-01-01

## 2018-01-01 RX ORDER — ALBUTEROL SULFATE 0.83 MG/ML
2.5 SOLUTION RESPIRATORY (INHALATION) ONCE
Status: CANCELLED
Start: 2018-01-01 | End: 2018-01-01

## 2018-01-01 RX ORDER — HEPARIN SODIUM (PORCINE) LOCK FLUSH IV SOLN 100 UNIT/ML 100 UNIT/ML
5 SOLUTION INTRAVENOUS ONCE
Status: CANCELLED
Start: 2018-01-01 | End: 2018-01-01

## 2018-01-01 RX ORDER — DIPHENHYDRAMINE HYDROCHLORIDE 50 MG/ML
50 INJECTION INTRAMUSCULAR; INTRAVENOUS
Status: CANCELLED
Start: 2018-01-01

## 2018-01-01 RX ORDER — METHYLPREDNISOLONE SODIUM SUCCINATE 125 MG/2ML
125 INJECTION, POWDER, LYOPHILIZED, FOR SOLUTION INTRAMUSCULAR; INTRAVENOUS
Status: CANCELLED
Start: 2018-01-01

## 2018-01-01 RX ORDER — PENTAMIDINE ISETHIONATE 300 MG/300MG
300 INHALANT RESPIRATORY (INHALATION)
Status: DISCONTINUED | OUTPATIENT
Start: 2018-01-01 | End: 2018-01-01 | Stop reason: HOSPADM

## 2018-01-01 RX ORDER — EPINEPHRINE 0.3 MG/.3ML
0.3 INJECTION SUBCUTANEOUS EVERY 5 MIN PRN
Status: CANCELLED | OUTPATIENT
Start: 2018-01-01

## 2018-01-01 RX ORDER — HEPARIN SODIUM (PORCINE) LOCK FLUSH IV SOLN 100 UNIT/ML 100 UNIT/ML
500 SOLUTION INTRAVENOUS EVERY 8 HOURS
Status: CANCELLED
Start: 2018-01-01

## 2018-01-01 RX ORDER — ALBUTEROL SULFATE 90 UG/1
1-2 AEROSOL, METERED RESPIRATORY (INHALATION)
Status: CANCELLED
Start: 2018-01-01

## 2018-01-01 RX ORDER — AMLODIPINE BESYLATE 5 MG/1
TABLET ORAL
Qty: 90 TABLET | Refills: 0 | OUTPATIENT
Start: 2018-01-01

## 2018-01-01 RX ORDER — HEPARIN SODIUM (PORCINE) LOCK FLUSH IV SOLN 100 UNIT/ML 100 UNIT/ML
5 SOLUTION INTRAVENOUS ONCE
Status: COMPLETED | OUTPATIENT
Start: 2018-01-01 | End: 2018-01-01

## 2018-01-01 RX ORDER — EPINEPHRINE 1 MG/ML
0.3 INJECTION, SOLUTION, CONCENTRATE INTRAVENOUS EVERY 5 MIN PRN
Status: CANCELLED | OUTPATIENT
Start: 2018-01-01

## 2018-01-01 RX ORDER — ONDANSETRON 2 MG/ML
16 INJECTION INTRAMUSCULAR; INTRAVENOUS ONCE
Status: CANCELLED
Start: 2018-01-01 | End: 2018-01-01

## 2018-01-01 RX ORDER — ONDANSETRON 2 MG/ML
8 INJECTION INTRAMUSCULAR; INTRAVENOUS ONCE
Status: CANCELLED
Start: 2018-01-01 | End: 2018-01-01

## 2018-01-01 RX ORDER — HEPARIN SODIUM (PORCINE) LOCK FLUSH IV SOLN 100 UNIT/ML 100 UNIT/ML
5 SOLUTION INTRAVENOUS
Status: COMPLETED | OUTPATIENT
Start: 2018-01-01 | End: 2018-01-01

## 2018-01-01 RX ORDER — ACETAMINOPHEN 325 MG/1
650 TABLET ORAL EVERY 4 HOURS PRN
Status: DISCONTINUED | OUTPATIENT
Start: 2018-01-01 | End: 2018-01-01

## 2018-01-01 RX ORDER — EPINEPHRINE 1 MG/ML
0.3 INJECTION, SOLUTION INTRAMUSCULAR; SUBCUTANEOUS EVERY 5 MIN PRN
Status: CANCELLED | OUTPATIENT
Start: 2018-01-01

## 2018-01-01 RX ORDER — LIDOCAINE 40 MG/G
CREAM TOPICAL
Status: DISCONTINUED | OUTPATIENT
Start: 2018-01-01 | End: 2018-01-01

## 2018-01-01 RX ORDER — PENTAMIDINE ISETHIONATE 300 MG/300MG
300 INHALANT RESPIRATORY (INHALATION)
Status: CANCELLED
Start: 2018-01-01

## 2018-01-01 RX ORDER — POLYETHYLENE GLYCOL 3350 17 G/17G
17 POWDER, FOR SOLUTION ORAL DAILY
Status: DISCONTINUED | OUTPATIENT
Start: 2018-01-01 | End: 2018-01-01 | Stop reason: HOSPADM

## 2018-01-01 RX ORDER — OLANZAPINE 2.5 MG/1
2.5 TABLET, FILM COATED ORAL AT BEDTIME
Status: DISCONTINUED | OUTPATIENT
Start: 2018-01-01 | End: 2018-01-01 | Stop reason: HOSPADM

## 2018-01-01 RX ORDER — PREDNISONE 50 MG/1
50 TABLET ORAL EVERY OTHER DAY
Qty: 15 TABLET | Refills: 0 | Status: SHIPPED | OUTPATIENT
Start: 2018-01-01

## 2018-01-01 RX ORDER — LOPERAMIDE HCL 2 MG
CAPSULE ORAL
Qty: 30 CAPSULE | Refills: 0 | Status: SHIPPED | OUTPATIENT
Start: 2018-01-01 | End: 2018-09-25

## 2018-01-01 RX ORDER — NALOXONE HYDROCHLORIDE 0.4 MG/ML
.1-.4 INJECTION, SOLUTION INTRAMUSCULAR; INTRAVENOUS; SUBCUTANEOUS
Status: DISCONTINUED | OUTPATIENT
Start: 2018-01-01 | End: 2018-01-01 | Stop reason: HOSPADM

## 2018-01-01 RX ORDER — LORAZEPAM 2 MG/ML
0.5 INJECTION INTRAMUSCULAR EVERY 4 HOURS PRN
Status: CANCELLED | OUTPATIENT
Start: 2018-01-01

## 2018-01-01 RX ORDER — PREDNISONE 50 MG/1
50 TABLET ORAL EVERY OTHER DAY
Qty: 15 TABLET | Refills: 0 | Status: SHIPPED | OUTPATIENT
Start: 2018-01-01 | End: 2018-01-01

## 2018-01-01 RX ORDER — ACETAMINOPHEN 325 MG/1
650 TABLET ORAL EVERY 4 HOURS PRN
Status: DISCONTINUED | OUTPATIENT
Start: 2018-01-01 | End: 2018-01-01 | Stop reason: HOSPADM

## 2018-01-01 RX ORDER — ACYCLOVIR 400 MG/1
400 TABLET ORAL 2 TIMES DAILY
Qty: 60 TABLET | Refills: 0 | Status: SHIPPED | OUTPATIENT
Start: 2018-01-01 | End: 2018-01-01

## 2018-01-01 RX ORDER — SODIUM CHLORIDE 9 MG/ML
INJECTION, SOLUTION INTRAVENOUS CONTINUOUS
Status: ACTIVE | OUTPATIENT
Start: 2018-01-01 | End: 2018-01-01

## 2018-01-01 RX ORDER — AMLODIPINE BESYLATE 5 MG/1
TABLET ORAL
Qty: 90 TABLET | Refills: 0 | Status: SHIPPED | OUTPATIENT
Start: 2018-01-01

## 2018-01-01 RX ORDER — HEPARIN SODIUM (PORCINE) LOCK FLUSH IV SOLN 100 UNIT/ML 100 UNIT/ML
5 SOLUTION INTRAVENOUS EVERY 8 HOURS
Status: DISCONTINUED | OUTPATIENT
Start: 2018-01-01 | End: 2018-01-01 | Stop reason: HOSPADM

## 2018-01-01 RX ORDER — PREDNISONE 50 MG/1
50 TABLET ORAL EVERY OTHER DAY
Start: 2018-01-01 | End: 2018-01-01

## 2018-01-01 RX ORDER — ONDANSETRON 2 MG/ML
8 INJECTION INTRAMUSCULAR; INTRAVENOUS ONCE
Status: DISCONTINUED | OUTPATIENT
Start: 2018-01-01 | End: 2018-01-01 | Stop reason: HOSPADM

## 2018-01-01 RX ORDER — HEPARIN SODIUM (PORCINE) LOCK FLUSH IV SOLN 100 UNIT/ML 100 UNIT/ML
5 SOLUTION INTRAVENOUS ONCE
Status: DISCONTINUED | OUTPATIENT
Start: 2018-01-01 | End: 2018-01-01 | Stop reason: HOSPADM

## 2018-01-01 RX ORDER — HEPARIN SODIUM (PORCINE) LOCK FLUSH IV SOLN 100 UNIT/ML 100 UNIT/ML
5 SOLUTION INTRAVENOUS EVERY 8 HOURS
Status: CANCELLED
Start: 2018-01-01

## 2018-01-01 RX ORDER — OXYCODONE HYDROCHLORIDE 5 MG/1
5 TABLET ORAL EVERY 6 HOURS PRN
Status: DISCONTINUED | OUTPATIENT
Start: 2018-01-01 | End: 2018-01-01 | Stop reason: HOSPADM

## 2018-01-01 RX ORDER — LORAZEPAM 2 MG/ML
0.5 INJECTION INTRAMUSCULAR EVERY 4 HOURS PRN
Status: DISCONTINUED | OUTPATIENT
Start: 2018-01-01 | End: 2018-01-01 | Stop reason: HOSPADM

## 2018-01-01 RX ORDER — HEPARIN SODIUM (PORCINE) LOCK FLUSH IV SOLN 100 UNIT/ML 100 UNIT/ML
500 SOLUTION INTRAVENOUS EVERY 8 HOURS
Status: CANCELLED | OUTPATIENT
Start: 2018-01-01

## 2018-01-01 RX ORDER — HEPARIN SODIUM,PORCINE 10 UNIT/ML
5-10 VIAL (ML) INTRAVENOUS
Status: DISCONTINUED | OUTPATIENT
Start: 2018-01-01 | End: 2018-01-01 | Stop reason: HOSPADM

## 2018-01-01 RX ORDER — HEPARIN SODIUM (PORCINE) LOCK FLUSH IV SOLN 100 UNIT/ML 100 UNIT/ML
5 SOLUTION INTRAVENOUS
Status: DISCONTINUED | OUTPATIENT
Start: 2018-01-01 | End: 2018-01-01 | Stop reason: HOSPADM

## 2018-01-01 RX ORDER — LORAZEPAM 0.5 MG/1
0.5 TABLET ORAL EVERY 6 HOURS PRN
Status: DISCONTINUED | OUTPATIENT
Start: 2018-01-01 | End: 2018-01-01 | Stop reason: HOSPADM

## 2018-01-01 RX ORDER — PROCHLORPERAZINE MALEATE 10 MG
10 TABLET ORAL EVERY 6 HOURS PRN
Qty: 30 TABLET | Refills: 3 | Status: SHIPPED | OUTPATIENT
Start: 2018-01-01 | End: 2018-09-25

## 2018-01-01 RX ORDER — ONDANSETRON 4 MG/1
8 TABLET, ORALLY DISINTEGRATING ORAL EVERY 8 HOURS PRN
Status: DISCONTINUED | OUTPATIENT
Start: 2018-01-01 | End: 2018-01-01 | Stop reason: HOSPADM

## 2018-01-01 RX ORDER — TRAMADOL HYDROCHLORIDE 50 MG/1
50 TABLET ORAL EVERY 6 HOURS PRN
Qty: 60 TABLET | Refills: 3 | Status: SHIPPED | OUTPATIENT
Start: 2018-01-01

## 2018-01-01 RX ORDER — ACETAMINOPHEN 325 MG/1
650 TABLET ORAL EVERY 4 HOURS PRN
Qty: 100 TABLET | COMMUNITY
Start: 2018-01-01

## 2018-01-01 RX ORDER — AMLODIPINE BESYLATE 5 MG/1
5 TABLET ORAL AT BEDTIME
Status: DISCONTINUED | OUTPATIENT
Start: 2018-01-01 | End: 2018-01-01 | Stop reason: HOSPADM

## 2018-01-01 RX ORDER — ONDANSETRON 2 MG/ML
8 INJECTION INTRAMUSCULAR; INTRAVENOUS ONCE
Status: CANCELLED | OUTPATIENT
Start: 2018-01-01 | End: 2018-01-01

## 2018-01-01 RX ORDER — ZOLEDRONIC ACID 0.04 MG/ML
4 INJECTION, SOLUTION INTRAVENOUS ONCE
Status: DISCONTINUED | OUTPATIENT
Start: 2018-01-01 | End: 2018-01-01 | Stop reason: CLARIF

## 2018-01-01 RX ORDER — ALBUTEROL SULFATE 0.83 MG/ML
2.5 SOLUTION RESPIRATORY (INHALATION) ONCE
Status: COMPLETED | OUTPATIENT
Start: 2018-01-01 | End: 2018-01-01

## 2018-01-01 RX ORDER — ZOLEDRONIC ACID 0.04 MG/ML
4 INJECTION, SOLUTION INTRAVENOUS ONCE
Status: CANCELLED | OUTPATIENT
Start: 2018-01-01 | End: 2018-01-01

## 2018-01-01 RX ORDER — HEPARIN SODIUM (PORCINE) LOCK FLUSH IV SOLN 100 UNIT/ML 100 UNIT/ML
5 SOLUTION INTRAVENOUS EVERY 8 HOURS PRN
Status: DISCONTINUED | OUTPATIENT
Start: 2018-01-01 | End: 2018-01-01 | Stop reason: HOSPADM

## 2018-01-01 RX ORDER — ONDANSETRON 8 MG/1
8 TABLET, FILM COATED ORAL EVERY 8 HOURS PRN
Qty: 10 TABLET | Refills: 3 | Status: SHIPPED | OUTPATIENT
Start: 2018-01-01 | End: 2018-09-25

## 2018-01-01 RX ORDER — SIMVASTATIN 20 MG
20 TABLET ORAL AT BEDTIME
Status: DISCONTINUED | OUTPATIENT
Start: 2018-01-01 | End: 2018-01-01 | Stop reason: HOSPADM

## 2018-01-01 RX ORDER — PANTOPRAZOLE SODIUM 40 MG/1
40 TABLET, DELAYED RELEASE ORAL
Status: DISCONTINUED | OUTPATIENT
Start: 2018-01-01 | End: 2018-01-01 | Stop reason: HOSPADM

## 2018-01-01 RX ORDER — ACYCLOVIR 400 MG/1
400 TABLET ORAL 2 TIMES DAILY
Qty: 60 TABLET | Refills: 7 | Status: SHIPPED | OUTPATIENT
Start: 2018-01-01 | End: 2018-09-25

## 2018-01-01 RX ORDER — OXYCODONE HYDROCHLORIDE 5 MG/1
5 TABLET ORAL EVERY 6 HOURS PRN
Status: DISCONTINUED | OUTPATIENT
Start: 2018-01-01 | End: 2018-01-01

## 2018-01-01 RX ORDER — ONDANSETRON 8 MG/1
8 TABLET, ORALLY DISINTEGRATING ORAL EVERY 8 HOURS PRN
Qty: 30 TABLET | Refills: 3 | Status: SHIPPED | OUTPATIENT
Start: 2018-01-01

## 2018-01-01 RX ORDER — ACYCLOVIR 400 MG/1
400 TABLET ORAL 2 TIMES DAILY
Qty: 60 TABLET | Refills: 2 | Status: SHIPPED | OUTPATIENT
Start: 2018-01-01

## 2018-01-01 RX ORDER — TRAMADOL HYDROCHLORIDE 50 MG/1
50 TABLET ORAL EVERY 6 HOURS PRN
Status: DISCONTINUED | OUTPATIENT
Start: 2018-01-01 | End: 2018-01-01 | Stop reason: HOSPADM

## 2018-01-01 RX ORDER — HEPARIN SODIUM (PORCINE) LOCK FLUSH IV SOLN 100 UNIT/ML 100 UNIT/ML
500 SOLUTION INTRAVENOUS ONCE
Status: COMPLETED | OUTPATIENT
Start: 2018-01-01 | End: 2018-01-01

## 2018-01-01 RX ORDER — CEFEPIME 1 G/50ML
2 INJECTION, SOLUTION INTRAVENOUS EVERY 24 HOURS
Status: DISCONTINUED | OUTPATIENT
Start: 2018-01-01 | End: 2018-01-01

## 2018-01-01 RX ORDER — HEPARIN SODIUM (PORCINE) LOCK FLUSH IV SOLN 100 UNIT/ML 100 UNIT/ML
5 SOLUTION INTRAVENOUS DAILY PRN
Status: DISCONTINUED | OUTPATIENT
Start: 2018-01-01 | End: 2018-01-01 | Stop reason: HOSPADM

## 2018-01-01 RX ADMIN — SODIUM CHLORIDE, PRESERVATIVE FREE 5 ML: 5 INJECTION INTRAVENOUS at 10:58

## 2018-01-01 RX ADMIN — OLANZAPINE 2.5 MG: 2.5 TABLET, FILM COATED ORAL at 21:02

## 2018-01-01 RX ADMIN — CYCLOPHOSPHAMIDE 350 MG: 500 INJECTION, POWDER, FOR SOLUTION INTRAVENOUS; ORAL at 09:11

## 2018-01-01 RX ADMIN — SODIUM CHLORIDE, PRESERVATIVE FREE 5 ML: 5 INJECTION INTRAVENOUS at 09:58

## 2018-01-01 RX ADMIN — POLYETHYLENE GLYCOL 3350 17 G: 17 POWDER, FOR SOLUTION ORAL at 07:57

## 2018-01-01 RX ADMIN — SODIUM CHLORIDE: 9 INJECTION, SOLUTION INTRAVENOUS at 06:01

## 2018-01-01 RX ADMIN — SODIUM CHLORIDE, PRESERVATIVE FREE 5 ML: 5 INJECTION INTRAVENOUS at 12:02

## 2018-01-01 RX ADMIN — SODIUM CHLORIDE 250 ML: 9 INJECTION, SOLUTION INTRAVENOUS at 11:36

## 2018-01-01 RX ADMIN — SODIUM CHLORIDE 500 ML: 9 INJECTION, SOLUTION INTRAVENOUS at 12:37

## 2018-01-01 RX ADMIN — SODIUM CHLORIDE 250 ML: 9 INJECTION, SOLUTION INTRAVENOUS at 15:20

## 2018-01-01 RX ADMIN — SODIUM CHLORIDE, PRESERVATIVE FREE 5 ML: 5 INJECTION INTRAVENOUS at 13:35

## 2018-01-01 RX ADMIN — OLANZAPINE 2.5 MG: 2.5 TABLET, FILM COATED ORAL at 21:51

## 2018-01-01 RX ADMIN — ACETAMINOPHEN 650 MG: 325 TABLET ORAL at 06:03

## 2018-01-01 RX ADMIN — PANTOPRAZOLE SODIUM 40 MG: 40 TABLET, DELAYED RELEASE ORAL at 08:08

## 2018-01-01 RX ADMIN — SODIUM CHLORIDE 250 ML: 9 INJECTION, SOLUTION INTRAVENOUS at 15:46

## 2018-01-01 RX ADMIN — SODIUM CHLORIDE, PRESERVATIVE FREE 5 ML: 5 INJECTION INTRAVENOUS at 13:21

## 2018-01-01 RX ADMIN — DEXAMETHASONE SODIUM PHOSPHATE 20 MG: 10 INJECTION, SOLUTION INTRAMUSCULAR; INTRAVENOUS at 14:25

## 2018-01-01 RX ADMIN — DEXAMETHASONE SODIUM PHOSPHATE 20 MG: 10 INJECTION, SOLUTION INTRAMUSCULAR; INTRAVENOUS at 12:37

## 2018-01-01 RX ADMIN — SODIUM CHLORIDE, PRESERVATIVE FREE 5 ML: 5 INJECTION INTRAVENOUS at 15:54

## 2018-01-01 RX ADMIN — SODIUM CHLORIDE, PRESERVATIVE FREE 5 ML: 5 INJECTION INTRAVENOUS at 11:33

## 2018-01-01 RX ADMIN — POLYETHYLENE GLYCOL 3350 17 G: 17 POWDER, FOR SOLUTION ORAL at 08:09

## 2018-01-01 RX ADMIN — CEFEPIME 2 G: 1 INJECTION, SOLUTION INTRAVENOUS at 03:00

## 2018-01-01 RX ADMIN — CYCLOPHOSPHAMIDE 350 MG: 500 INJECTION, POWDER, FOR SOLUTION INTRAVENOUS; ORAL at 15:56

## 2018-01-01 RX ADMIN — DEXAMETHASONE SODIUM PHOSPHATE 20 MG: 10 INJECTION, SOLUTION INTRAMUSCULAR; INTRAVENOUS at 15:18

## 2018-01-01 RX ADMIN — SODIUM CHLORIDE, PRESERVATIVE FREE 5 ML: 5 INJECTION INTRAVENOUS at 09:54

## 2018-01-01 RX ADMIN — SODIUM CHLORIDE, PRESERVATIVE FREE 5 ML: 5 INJECTION INTRAVENOUS at 15:24

## 2018-01-01 RX ADMIN — SODIUM CHLORIDE 1000 ML: 9 INJECTION, SOLUTION INTRAVENOUS at 11:00

## 2018-01-01 RX ADMIN — SODIUM CHLORIDE, PRESERVATIVE FREE 5 ML: 5 INJECTION INTRAVENOUS at 09:38

## 2018-01-01 RX ADMIN — SODIUM CHLORIDE, PRESERVATIVE FREE 500 UNITS: 5 INJECTION INTRAVENOUS at 11:09

## 2018-01-01 RX ADMIN — SODIUM CHLORIDE, PRESERVATIVE FREE 5 ML: 5 INJECTION INTRAVENOUS at 14:24

## 2018-01-01 RX ADMIN — SIMVASTATIN 20 MG: 20 TABLET, FILM COATED ORAL at 21:02

## 2018-01-01 RX ADMIN — SODIUM CHLORIDE 1000 ML: 9 INJECTION, SOLUTION INTRAVENOUS at 12:12

## 2018-01-01 RX ADMIN — SODIUM CHLORIDE, PRESERVATIVE FREE 5 ML: 5 INJECTION INTRAVENOUS at 12:39

## 2018-01-01 RX ADMIN — SODIUM CHLORIDE, PRESERVATIVE FREE 5 ML: 5 INJECTION INTRAVENOUS at 23:38

## 2018-01-01 RX ADMIN — SODIUM CHLORIDE 1000 ML: 9 INJECTION, SOLUTION INTRAVENOUS at 14:03

## 2018-01-01 RX ADMIN — CYCLOPHOSPHAMIDE 350 MG: 500 INJECTION, POWDER, FOR SOLUTION INTRAVENOUS; ORAL at 15:31

## 2018-01-01 RX ADMIN — SODIUM CHLORIDE, PRESERVATIVE FREE 5 ML: 5 INJECTION INTRAVENOUS at 14:03

## 2018-01-01 RX ADMIN — Medication 500 UNITS: at 15:52

## 2018-01-01 RX ADMIN — CYCLOPHOSPHAMIDE 350 MG: 500 INJECTION, POWDER, FOR SOLUTION INTRAVENOUS; ORAL at 12:44

## 2018-01-01 RX ADMIN — SODIUM CHLORIDE 500 ML: 9 INJECTION, SOLUTION INTRAVENOUS at 08:36

## 2018-01-01 RX ADMIN — SODIUM CHLORIDE 1000 ML: 9 INJECTION, SOLUTION INTRAVENOUS at 10:37

## 2018-01-01 RX ADMIN — AMLODIPINE BESYLATE 5 MG: 5 TABLET ORAL at 21:02

## 2018-01-01 RX ADMIN — SODIUM CHLORIDE, PRESERVATIVE FREE 500 UNITS: 5 INJECTION INTRAVENOUS at 12:34

## 2018-01-01 RX ADMIN — ALBUTEROL SULFATE 2.5 MG: 2.5 SOLUTION RESPIRATORY (INHALATION) at 13:54

## 2018-01-01 RX ADMIN — SODIUM CHLORIDE 1000 ML: 9 INJECTION, SOLUTION INTRAVENOUS at 12:06

## 2018-01-01 RX ADMIN — ALBUTEROL SULFATE 2.5 MG: 2.5 SOLUTION RESPIRATORY (INHALATION) at 12:26

## 2018-01-01 RX ADMIN — SODIUM CHLORIDE 1000 ML: 9 INJECTION, SOLUTION INTRAVENOUS at 22:42

## 2018-01-01 RX ADMIN — SODIUM CHLORIDE, PRESERVATIVE FREE 5 ML: 5 INJECTION INTRAVENOUS at 11:07

## 2018-01-01 RX ADMIN — CYCLOPHOSPHAMIDE 350 MG: 500 INJECTION, POWDER, FOR SOLUTION INTRAVENOUS; ORAL at 12:36

## 2018-01-01 RX ADMIN — SODIUM CHLORIDE 250 ML: 9 INJECTION, SOLUTION INTRAVENOUS at 14:13

## 2018-01-01 RX ADMIN — Medication 5 ML: at 10:17

## 2018-01-01 RX ADMIN — ACETAMINOPHEN 650 MG: 325 TABLET ORAL at 23:09

## 2018-01-01 RX ADMIN — SODIUM CHLORIDE, PRESERVATIVE FREE 5 ML: 5 INJECTION INTRAVENOUS at 16:46

## 2018-01-01 RX ADMIN — PENTAMIDINE ISETHIONATE 300 MG: 300 INHALANT RESPIRATORY (INHALATION) at 14:40

## 2018-01-01 RX ADMIN — CYCLOPHOSPHAMIDE 350 MG: 500 INJECTION, POWDER, FOR SOLUTION INTRAVENOUS; ORAL at 11:40

## 2018-01-01 RX ADMIN — SIMVASTATIN 20 MG: 20 TABLET, FILM COATED ORAL at 21:51

## 2018-01-01 RX ADMIN — Medication 500 UNITS: at 13:49

## 2018-01-01 RX ADMIN — SODIUM CHLORIDE, PRESERVATIVE FREE 5 ML: 5 INJECTION INTRAVENOUS at 10:29

## 2018-01-01 RX ADMIN — SODIUM CHLORIDE, PRESERVATIVE FREE 5 ML: 5 INJECTION INTRAVENOUS at 16:15

## 2018-01-01 RX ADMIN — SODIUM CHLORIDE, PRESERVATIVE FREE 5 ML: 5 INJECTION INTRAVENOUS at 14:46

## 2018-01-01 RX ADMIN — CYCLOPHOSPHAMIDE 350 MG: 500 INJECTION, POWDER, FOR SOLUTION INTRAVENOUS; ORAL at 15:24

## 2018-01-01 RX ADMIN — SODIUM CHLORIDE, PRESERVATIVE FREE 5 ML: 5 INJECTION INTRAVENOUS at 00:30

## 2018-01-01 RX ADMIN — SODIUM CHLORIDE, PRESERVATIVE FREE 5 ML: 5 INJECTION INTRAVENOUS at 15:41

## 2018-01-01 RX ADMIN — SODIUM CHLORIDE: 9 INJECTION, SOLUTION INTRAVENOUS at 10:53

## 2018-01-01 RX ADMIN — POLYETHYLENE GLYCOL 3350 17 G: 17 POWDER, FOR SOLUTION ORAL at 09:24

## 2018-01-01 RX ADMIN — SODIUM CHLORIDE 250 ML: 9 INJECTION, SOLUTION INTRAVENOUS at 14:25

## 2018-01-01 RX ADMIN — SODIUM CHLORIDE, PRESERVATIVE FREE 5 ML: 5 INJECTION INTRAVENOUS at 15:58

## 2018-01-01 RX ADMIN — CYCLOPHOSPHAMIDE 350 MG: 500 INJECTION, POWDER, FOR SOLUTION INTRAVENOUS; ORAL at 11:08

## 2018-01-01 RX ADMIN — SODIUM CHLORIDE, PRESERVATIVE FREE 500 UNITS: 5 INJECTION INTRAVENOUS at 10:11

## 2018-01-01 RX ADMIN — HEPARIN SODIUM (PORCINE) LOCK FLUSH IV SOLN 100 UNIT/ML 500 UNITS: 100 SOLUTION at 16:00

## 2018-01-01 RX ADMIN — AMLODIPINE BESYLATE 5 MG: 5 TABLET ORAL at 21:51

## 2018-01-01 RX ADMIN — ZOLEDRONIC ACID 3 MG: 0.8 INJECTION, SOLUTION, CONCENTRATE INTRAVENOUS at 15:05

## 2018-01-01 RX ADMIN — SODIUM CHLORIDE, PRESERVATIVE FREE 5 ML: 5 INJECTION INTRAVENOUS at 10:36

## 2018-01-01 RX ADMIN — SODIUM CHLORIDE, PRESERVATIVE FREE 500 UNITS: 5 INJECTION INTRAVENOUS at 17:50

## 2018-01-01 RX ADMIN — PENTAMIDINE ISETHIONATE 300 MG: 300 INHALANT RESPIRATORY (INHALATION) at 12:39

## 2018-01-01 RX ADMIN — Medication 500 UNITS: at 13:45

## 2018-01-01 RX ADMIN — SODIUM CHLORIDE, PRESERVATIVE FREE 500 UNITS: 5 INJECTION INTRAVENOUS at 14:57

## 2018-01-01 RX ADMIN — SODIUM CHLORIDE 250 ML: 9 INJECTION, SOLUTION INTRAVENOUS at 15:01

## 2018-01-01 RX ADMIN — SODIUM CHLORIDE, PRESERVATIVE FREE 5 ML: 5 INJECTION INTRAVENOUS at 12:46

## 2018-01-01 RX ADMIN — SODIUM CHLORIDE, PRESERVATIVE FREE 5 ML: 5 INJECTION INTRAVENOUS at 13:58

## 2018-01-01 RX ADMIN — CYCLOPHOSPHAMIDE 350 MG: 500 INJECTION, POWDER, FOR SOLUTION INTRAVENOUS; ORAL at 14:26

## 2018-01-01 RX ADMIN — CYCLOPHOSPHAMIDE 350 MG: 500 INJECTION, POWDER, FOR SOLUTION INTRAVENOUS; ORAL at 15:22

## 2018-01-01 RX ADMIN — SODIUM CHLORIDE, PRESERVATIVE FREE 5 ML: 5 INJECTION INTRAVENOUS at 12:52

## 2018-01-01 RX ADMIN — SODIUM CHLORIDE, PRESERVATIVE FREE 5 ML: 5 INJECTION INTRAVENOUS at 07:08

## 2018-01-01 RX ADMIN — SODIUM CHLORIDE 1000 ML: 9 INJECTION, SOLUTION INTRAVENOUS at 21:12

## 2018-01-01 RX ADMIN — CYCLOPHOSPHAMIDE 350 MG: 2 INJECTION, POWDER, FOR SOLUTION INTRAVENOUS; ORAL at 14:14

## 2018-01-01 RX ADMIN — SODIUM CHLORIDE, PRESERVATIVE FREE 5 ML: 5 INJECTION INTRAVENOUS at 11:21

## 2018-01-01 RX ADMIN — SODIUM CHLORIDE 500 ML: 9 INJECTION, SOLUTION INTRAVENOUS at 11:12

## 2018-01-01 RX ADMIN — SODIUM CHLORIDE, PRESERVATIVE FREE 500 UNITS: 5 INJECTION INTRAVENOUS at 16:30

## 2018-01-01 RX ADMIN — HEPARIN SODIUM (PORCINE) LOCK FLUSH IV SOLN 100 UNIT/ML 5 ML: 100 SOLUTION at 13:12

## 2018-01-01 RX ADMIN — SODIUM CHLORIDE, PRESERVATIVE FREE 5 ML: 5 INJECTION INTRAVENOUS at 09:32

## 2018-01-01 RX ADMIN — ESOMEPRAZOLE MAGNESIUM 40 MG: 40 CAPSULE, DELAYED RELEASE PELLETS ORAL at 07:57

## 2018-01-01 RX ADMIN — SODIUM CHLORIDE, PRESERVATIVE FREE 5 ML: 5 INJECTION INTRAVENOUS at 15:40

## 2018-01-01 RX ADMIN — CYCLOPHOSPHAMIDE 350 MG: 500 INJECTION, POWDER, FOR SOLUTION INTRAVENOUS; ORAL at 14:48

## 2018-01-01 RX ADMIN — SODIUM CHLORIDE 500 ML: 9 INJECTION, SOLUTION INTRAVENOUS at 15:06

## 2018-01-01 RX ADMIN — CYCLOPHOSPHAMIDE 350 MG: 500 INJECTION, POWDER, FOR SOLUTION INTRAVENOUS; ORAL at 11:56

## 2018-01-01 RX ADMIN — PANTOPRAZOLE SODIUM 40 MG: 40 TABLET, DELAYED RELEASE ORAL at 09:24

## 2018-01-01 RX ADMIN — CYCLOPHOSPHAMIDE 350 MG: 500 INJECTION, POWDER, FOR SOLUTION INTRAVENOUS; ORAL at 10:53

## 2018-01-01 RX ADMIN — DEXAMETHASONE SODIUM PHOSPHATE 20 MG: 10 INJECTION, SOLUTION INTRAMUSCULAR; INTRAVENOUS at 15:02

## 2018-01-01 RX ADMIN — SODIUM CHLORIDE, PRESERVATIVE FREE 5 ML: 5 INJECTION INTRAVENOUS at 08:58

## 2018-01-01 RX ADMIN — SODIUM CHLORIDE, PRESERVATIVE FREE 500 UNITS: 5 INJECTION INTRAVENOUS at 12:52

## 2018-01-01 RX ADMIN — SODIUM CHLORIDE, PRESERVATIVE FREE 500 UNITS: 5 INJECTION INTRAVENOUS at 11:49

## 2018-01-01 RX ADMIN — SODIUM CHLORIDE 250 ML: 9 INJECTION, SOLUTION INTRAVENOUS at 13:03

## 2018-01-01 RX ADMIN — CYCLOPHOSPHAMIDE 350 MG: 500 INJECTION, POWDER, FOR SOLUTION INTRAVENOUS; ORAL at 13:19

## 2018-01-01 RX ADMIN — SODIUM CHLORIDE 1000 ML: 9 INJECTION, SOLUTION INTRAVENOUS at 12:00

## 2018-01-01 ASSESSMENT — ENCOUNTER SYMPTOMS
CONSTIPATION: 0
EYE REDNESS: 0
ABDOMINAL PAIN: 0
MUSCLE CRAMPS: 0
BOWEL INCONTINENCE: 1
DIARRHEA: 1
JOINT SWELLING: 0
DECREASED APPETITE: 1
STIFFNESS: 0
EYE REDNESS: 0
VOMITING: 1
CHILLS: 0
HEARTBURN: 0
SPUTUM PRODUCTION: 1
JAUNDICE: 0
BLOATING: 0
EYE REDNESS: 0
RHINORRHEA: 1
NIGHT SWEATS: 0
DEPRESSION: 0
ALTERED TEMPERATURE REGULATION: 1
EYE WATERING: 1
TROUBLE SWALLOWING: 0
HALLUCINATIONS: 0
WEIGHT GAIN: 0
CONSTIPATION: 0
BLOATING: 0
SPUTUM PRODUCTION: 1
SHORTNESS OF BREATH: 0
HEMATURIA: 0
DIARRHEA: 1
HEADACHES: 0
COUGH DISTURBING SLEEP: 1
COUGH: 1
DECREASED CONCENTRATION: 1
POLYDIPSIA: 0
TASTE DISTURBANCE: 0
SWOLLEN GLANDS: 0
JAUNDICE: 0
HEMATURIA: 0
NAUSEA: 0
CHILLS: 1
WHEEZING: 1
DECREASED APPETITE: 1
HEMOPTYSIS: 0
SINUS PAIN: 1
INSOMNIA: 0
RECTAL PAIN: 0
DYSURIA: 0
SPUTUM PRODUCTION: 1
NIGHT SWEATS: 0
DYSPNEA ON EXERTION: 0
DEPRESSION: 0
FEVER: 1
EYE WATERING: 1
SHORTNESS OF BREATH: 0
SINUS CONGESTION: 1
WEIGHT GAIN: 0
POLYPHAGIA: 0
SORE THROAT: 0
FATIGUE: 1
HEMATURIA: 0
NIGHT SWEATS: 0
WEIGHT LOSS: 1
WEIGHT LOSS: 0
NAUSEA: 0
TASTE DISTURBANCE: 0
EYE IRRITATION: 0
POSTURAL DYSPNEA: 0
HEMOPTYSIS: 0
FEVER: 0
ARTHRALGIAS: 0
CONSTIPATION: 0
DYSPNEA ON EXERTION: 0
DIARRHEA: 1
DIARRHEA: 1
DYSPNEA ON EXERTION: 1
FATIGUE: 1
HEARTBURN: 0
ALTERED TEMPERATURE REGULATION: 1
SPUTUM PRODUCTION: 1
INCREASED ENERGY: 0
DIFFICULTY URINATING: 0
ALTERED TEMPERATURE REGULATION: 1
DIFFICULTY URINATING: 0
DOUBLE VISION: 0
HEMOPTYSIS: 0
WEIGHT LOSS: 1
TASTE DISTURBANCE: 0
DEPRESSION: 0
COUGH: 1
NECK STIFFNESS: 0
FLANK PAIN: 0
POLYPHAGIA: 0
NECK MASS: 0
HEARTBURN: 0
CHILLS: 1
INSOMNIA: 0
DECREASED APPETITE: 1
DOUBLE VISION: 0
DYSURIA: 0
WHEEZING: 0
BRUISES/BLEEDS EASILY: 1
WHEEZING: 0
ABDOMINAL PAIN: 1
POLYDIPSIA: 0
INCREASED ENERGY: 0
PANIC: 0
DYSPNEA ON EXERTION: 0
FEVER: 1
BLOATING: 0
ABDOMINAL PAIN: 0
DECREASED CONCENTRATION: 1
NECK PAIN: 0
BACK PAIN: 1
POLYPHAGIA: 0
RECTAL PAIN: 0
JAUNDICE: 0
SINUS PAIN: 1
DECREASED APPETITE: 0
SMELL DISTURBANCE: 0
EYE IRRITATION: 0
RECTAL PAIN: 0
NAUSEA: 0
POSTURAL DYSPNEA: 0
WEIGHT GAIN: 0
BOWEL INCONTINENCE: 1
WEIGHT LOSS: 1
HALLUCINATIONS: 0
JAUNDICE: 0
SNORES LOUDLY: 1
ABDOMINAL PAIN: 1
SINUS CONGESTION: 1
DIFFICULTY URINATING: 0
BOWEL INCONTINENCE: 0
INCREASED ENERGY: 0
SHORTNESS OF BREATH: 0
WEIGHT GAIN: 0
WEIGHT GAIN: 0
NAUSEA: 0
COUGH: 1
DIFFICULTY URINATING: 0
WEIGHT GAIN: 0
POSTURAL DYSPNEA: 0
FLANK PAIN: 0
FEVER: 1
BLOATING: 0
COLOR CHANGE: 0
ABDOMINAL PAIN: 1
NERVOUS/ANXIOUS: 1
FEVER: 0
HEMOPTYSIS: 0
INCREASED ENERGY: 0
CHILLS: 0
CONSTIPATION: 0
SMELL DISTURBANCE: 0
SINUS PRESSURE: 1
TROUBLE SWALLOWING: 0
NECK MASS: 0
SNORES LOUDLY: 1
EYE IRRITATION: 0
JAUNDICE: 0
BOWEL INCONTINENCE: 0
ALTERED TEMPERATURE REGULATION: 1
ARTHRALGIAS: 0
POLYPHAGIA: 0
FATIGUE: 1
BLOOD IN STOOL: 0
FATIGUE: 1
ALTERED TEMPERATURE REGULATION: 1
DIARRHEA: 1
EYE PAIN: 0
SINUS CONGESTION: 1
COUGH DISTURBING SLEEP: 1
SHORTNESS OF BREATH: 0
NECK MASS: 0
SWOLLEN GLANDS: 0
COUGH: 1
BOWEL INCONTINENCE: 1
POLYDIPSIA: 0
NIGHT SWEATS: 0
COUGH DISTURBING SLEEP: 1
WHEEZING: 0
PANIC: 0
TROUBLE SWALLOWING: 0
FEVER: 0
COUGH DISTURBING SLEEP: 1
FEVER: 1
SORE THROAT: 0
POLYPHAGIA: 0
BLOOD IN STOOL: 0
DECREASED APPETITE: 0
MYALGIAS: 0
VOMITING: 1
CHILLS: 0
POSTURAL DYSPNEA: 0
SNORES LOUDLY: 1
NAUSEA: 0
FLANK PAIN: 0
POLYDIPSIA: 0
FATIGUE: 1
COUGH: 1
CONFUSION: 0
DIARRHEA: 1
CHILLS: 1
SMELL DISTURBANCE: 0
DECREASED CONCENTRATION: 1
FATIGUE: 1
VOMITING: 1
HOARSE VOICE: 0
EYE PAIN: 0
BLOOD IN STOOL: 0
EYE REDNESS: 0
DOUBLE VISION: 0
INCREASED ENERGY: 1
DYSURIA: 0
WEIGHT LOSS: 1
HALLUCINATIONS: 0
BRUISES/BLEEDS EASILY: 1
BRUISES/BLEEDS EASILY: 1
SNORES LOUDLY: 1
POLYDIPSIA: 0
NERVOUS/ANXIOUS: 1
SORE THROAT: 0
NERVOUS/ANXIOUS: 1
DECREASED APPETITE: 1
NIGHT SWEATS: 0
ABDOMINAL PAIN: 0
BLOATING: 0
SINUS PAIN: 1
WEIGHT LOSS: 1
EYE PAIN: 0
HEARTBURN: 0
VOMITING: 0
CONSTIPATION: 0
POLYDIPSIA: 0
VOMITING: 0
INCREASED ENERGY: 0
POLYPHAGIA: 0
BLOOD IN STOOL: 0
NIGHT SWEATS: 0
MUSCLE WEAKNESS: 0
RECTAL PAIN: 0
SHORTNESS OF BREATH: 0
BLOOD IN STOOL: 0
SORE THROAT: 0
INSOMNIA: 0
ALTERED TEMPERATURE REGULATION: 1
HOARSE VOICE: 0
HEARTBURN: 0
NAUSEA: 0
RECTAL PAIN: 0
PANIC: 0
SWOLLEN GLANDS: 0
HALLUCINATIONS: 0
HOARSE VOICE: 0
EYE WATERING: 1

## 2018-01-01 ASSESSMENT — PAIN SCALES - GENERAL
PAINLEVEL: NO PAIN (0)

## 2018-01-01 ASSESSMENT — PATIENT HEALTH QUESTIONNAIRE - PHQ9: SUM OF ALL RESPONSES TO PHQ QUESTIONS 1-9: 8

## 2018-01-02 NOTE — PROGRESS NOTES
Infusion Nursing Note:  Anthony Carbone presents today for port labs.    Patient seen by provider today: No   present during visit today: Not Applicable.    Note: N/A.    Intravenous Access:  Implanted Port.    Treatment Conditions:  Not Applicable.      Post Infusion Assessment:  Patient tolerated injection without incident.    Discharge Plan:   Patient and/or family verbalized understanding of discharge instructions and all questions answered.  AVS to patient via [employer].  Patient will return for next appointment.   Patient discharged in stable condition accompanied by: self.    Savanna Quinonez RN

## 2018-01-02 NOTE — MR AVS SNAPSHOT
After Visit Summary   1/2/2018    Anthony Carbone    MRN: [id number]           Patient Information     Date Of Birth          1945        Visit Information        Provider Department      1/2/2018 3:00 PM  INFUSION CHAIR 11 [facility]        Today's Diagnoses     Multiple myeloma in relapse (H)    -  1    Multiple myeloma (H)        Myeloma (H)           Follow-ups after your visit        Your next 10 appointments already scheduled     Jan 04, 2018  1:15 PM CST   Masonic Lab Draw with UC MASONIC LAB DRAW   [facility] Masonic Lab Draw ([facility])    [address] [place] [address]   [phone]            Jan 04, 2018  2:00 PM CST   (Arrive by 1:45 PM)   Return Visit with Leanne Reddy PA-C   [facility] ([facility])    [address] [place] [address]   [phone]            Jan 08, 2018  9:00 AM CST   Level 5 with  INFUSION CHAIR 17   [facility] ([facility])    [facility]  [address] [address]   [phone]            Jan 11, 2018 12:45 PM CST   Masonic Lab Draw with UC MASONIC LAB DRAW   [facility] Masonic Lab Draw ([facility])    [address] [place] [id number] [phone]            Jan 11, 2018  1:10 PM CST   (Arrive by 12:55 PM)   Return Visit with Leanne Reddy PA-C   [facility] ([facility])    [address] [place] [address] [phone]            Jan 11, 2018  2:00 PM CST   Infusion 120 with UC ONCOLOGY INFUSION, UC 14 ATC   [facility] ([facility])    [address] [place] [address] [phone]             Jan 18, 2018  1:30 PM CST   Masonic Lab Draw with  MASONIC LAB DRAW   [facility] Lab Draw ([facility])    [address] [facility] [facility]  2nd Floor  [facility] [id number] [phone]            Jan 18, 2018  2:00 PM CST   (Arrive by 1:45 PM)   Return Visit with Leanne Reddy PA-C   [facility] ([facility])    [address]  2nd [facility] [id number] [phone]            Jan 18, 2018  3:00 PM CST   Infusion 120 with  ONCOLOGY INFUSION   [facility] ([facility])    [address]  2nd Floor  [facility] [id number] [phone]              Who to contact     If you have questions or need follow up information about today's clinic visit or your schedule please contact [facility] directly at [phone].  Normal or non-critical lab and imaging results will be communicated to you by TruantTodayhart, letter or phone within 4 business days after the clinic has received the results. If you do not hear from us within 7 days, please contact the clinic through Indelsult or phone. If you have a critical or abnormal lab result, we will notify you by phone as soon as possible.  Submit refill requests through Instacover or call your pharmacy and they will forward the refill request to us. Please allow 3 business days for your refill to be completed.          Additional Information About Your Visit        TruantTodayharWorksurfers Information     Instacover gives you secure access to your electronic health record. If you see a primary care provider, you can also send messages to your care team and make appointments. If you have questions, please call your primary care clinic.  If you do not have a primary care provider, please call [phone] and they will assist you.        Care EveryWhere ID     This is your Care EveryWhere ID. This could be used by other  organizations to access your [facility] medical records  [id number]         Blood Pressure from Last 3 Encounters:   12/29/17 123/80   12/29/17 117/82   12/26/17 143/89    Weight from Last 3 Encounters:   12/29/17 51.2 kg (112 lb 14.2 oz)   12/26/17 51.8 kg (114 lb 4.8 oz)   12/22/17 51.5 kg (113 lb 9.6 oz)              We Performed the Following     CBC with platelets and differential     Comprehensive metabolic panel        Primary Care Provider Office Phone # Fax #    Sanket Burciaga [phone]       [facility] [address] [facility] [address]        Equal Access to Services     ALBAN SHAH : Maribel Davis, conrad ge, qaclaudia kaalmaabhijit abdi, jonny villa. So [facility] [phone].    ATENCIÓN: Si habla español, tiene a todd disposición servicios gratuitos de asistencia lingüística. Llame al [phone].    We comply with applicable federal civil rights laws and [place] laws. We do not discriminate on the basis of race, color, national origin, age, disability, sex, sexual orientation, or gender identity.            Thank you!     Thank you for choosing [facility] [place] [facility] AND [facility]  for your care. Our goal is always to provide you with excellent care. Hearing back from our patients is one way we can continue to improve our services. Please take a few minutes to complete the written survey that you may receive in the mail after your visit with us. Thank you!             Your Updated Medication List - Protect others around you: Learn how to safely use, store and throw away your medicines at www.disposemymeds.org.          This list is accurate as of: 1/2/18  3:55 PM.  Always use your most recent med list.                   Brand Name Dispense Instructions for use Diagnosis    acyclovir 400 MG tablet    ZOVIRAX    60 tablet    Take 1 tablet (400 mg) by mouth 2 times daily    Multiple myeloma in relapse (H)        amLODIPine 5 MG tablet    NORVASC    90 tablet    TAKE 1 TABLET BY MOUTH AT BEDTIME    Essential hypertension       clopidogrel 75 MG tablet    PLAVIX    90 tablet    TAKE 1 TABLET BY MOUTH EVERY DAY    History of stroke       dexamethasone 4 MG tablet    DECADRON    40 tablet    Take 20 mg weekly on day of Velcade    Multiple myeloma not having achieved remission (H)       esomeprazole 40 MG CR capsule    nexIUM    30 capsule    Take 1 capsule (40 mg) by mouth every morning (before breakfast)    Gastroesophageal reflux disease without esophagitis       LORazepam 0.5 MG tablet    ATIVAN    30 tablet    Take 1 tablet (0.5 mg) by mouth every 6 hours as needed for nausea or anxiety.    Multiple myeloma in relapse (H), Delirium       nystatin 446345 UNIT/ML suspension    MYCOSTATIN    473 mL    Take 5 mLs (500,000 Units) by mouth 4 times daily Swish and spit. Continue until symptoms resolve and then take for 3 more days.    Thrush       OLANZapine 5 MG tablet    zyPREXA    60 tablet    Take 0.5 tablets (2.5 mg) by mouth At Bedtime    Delirium, Multiple myeloma in relapse (H)       ondansetron 8 MG ODT tab    ZOFRAN-ODT    30 tablet    Take 1 tablet (8 mg) by mouth every 8 hours as needed for nausea    Nausea       oxyCODONE IR 5 MG tablet    ROXICODONE    15 tablet    Take 1 tablet (5 mg) by mouth every 6 hours as needed for moderate to severe pain        potassium chloride SA 20 MEQ CR tablet    K-DUR/KLOR-CON M    30 tablet    Take 1 tablet (20 mEq) by mouth daily    Hypokalemia       predniSONE 50 MG tablet    DELTASONE    30 tablet    Take 50 mg every other day    Multiple myeloma not having achieved remission (H)       SIMVASTATIN PO      Take 20 mg by mouth At Bedtime        traMADol 50 MG tablet    ULTRAM    60 tablet    Take 1 tablet (50 mg) by mouth every 6 hours as needed for moderate pain . Recommend trying after Tylenol and before Dilaudid.    Acute bilateral low back pain without sciatica, Multiple myeloma  in relapse (H)       venetoclax 100 MG tablet CHEMOTHERAPY    VENCLEXTA    100 tablet    Take 8 tablets (800 mg) by mouth daily    Multiple myeloma in relapse (H)

## 2018-01-04 NOTE — NURSING NOTE
"Oncology Rooming Note    January 4, 2018 1:41 PM   Anthony Carbone is a 72 year old male who presents for:    Chief Complaint   Patient presents with     Port Draw     Labs drawn from port by RN. Line flushed with saline and heparin. Vs taken     Oncology Clinic Visit     F/U Multiple Myeloma     Initial Vitals: /78 (BP Location: Right arm, Cuff Size: Adult Small)  Pulse 82  Temp 97.4  F (36.3  C) (Oral)  Resp 16  Ht 1.626 m (5' 4.02\")  Wt 51.2 kg (112 lb 14.4 oz)  SpO2 99%  BMI 19.37 kg/m2 Estimated body mass index is 19.37 kg/(m^2) as calculated from the following:    Height as of this encounter: 1.626 m (5' 4.02\").    Weight as of this encounter: 51.2 kg (112 lb 14.4 oz). Body surface area is 1.52 meters squared.  No Pain (0) Comment: Data Unavailable   No LMP for male patient.  Allergies reviewed: Yes  Medications reviewed: Yes    Medications: Medication refills not needed today.  Pharmacy name entered into [facility]:    [id number] - [place], [place] - [address] [facility] [facility] [address]  [employer] [place], [place] - [address] [facility]  [facility] [address] [place], [place] - [address] AT [facility] [address]    Clinical concerns: none Leanne was NOT notified.    10 minutes for nursing intake (face to face time)     MATTHEW KRAMER LPN            "

## 2018-01-04 NOTE — PATIENT INSTRUCTIONS
At your visit today, we discussed your risk for falls and preventive options.    Fall Prevention  Falls often occur due to slipping, tripping or losing your balance. Millions of people fall every year and injure themselves. Here are ways to reduce your risk of falling again.    Think about your fall, was there anything that caused your fall that can be fixed, removed, or replaced?    Make your home safe by keeping walkways clear of objects you may trip over.    Use non-slip pads under rugs. Do not use area rugs or small throw rugs.    Use non-slip mats in bathtubs and showers.    Install handrails and lights on staircases.    Do not walk in poorly lit areas.    Do not stand on chairs or wobbly ladders.    Use caution when reaching overhead or looking upward. This position can cause a loss of balance.    Be sure your shoes fit properly, have non-slip bottoms and are in good condition.     Wear shoes both inside and out. Avoid going barefoot or wearing slippers.    Be cautious when going up and down stairs, curbs, and when walking on uneven sidewalks.    If your balance is poor, consider using a cane or walker.    If your fall was related to alcohol use, stop or limit alcohol intake.     If your fall was related to use of sleeping medicines, talk to your doctor about this. You may need to reduce your dosage at bedtime if you awaken during the night to go to the bathroom.      To reduce the need for nighttime bathroom trips:    Avoid drinking fluids for several hours before going to bed    Empty your bladder before going to bed    Men can keep a urinal at the bedside    Stay as active as you can. Balance, flexibility, strength, and endurance all come from exercise. They all play a role in preventing falls. Ask your healthcare provider which types of activity are right for you.    Get your vision checked on a regular basis.    If you have pets, know where they are before you stand up or walk so you don't trip over  them.    Use night lights.  Date Last Reviewed: 11/5/2015 2000-2017 [employer]. [address] [place] [address], [place] [address]. All rights reserved. This information is not intended as a substitute for professional medical care. Always follow your healthcare professional's instructions.

## 2018-01-04 NOTE — PROGRESS NOTES
HEMATOLOGY/ONCOLOGY PROGRESS NOTE  Jan 4, 2018    REASON FOR VISIT: myeloma    Diagnosis: 72 year old gentleman with IgM lambda multiple myeloma originally diagnosed in 01/2012 as stage I, standard risk disease.   Treatment: Revlimid plus dexamethasone for 4-5 cycles but plateaued by late 03/2012.   - Velcade was added and he received another 2-3 cycles, achieving a good partial remission.      Transplant: Single auto after melphalan 200 mg/m2 preparative regimen on 01/09/2013  - Post-transplant course: unremarkable except for some mild steroid-induced hyperglycemia, gastritis, nausea and vomiting.      Maintenance: Lenalidomide at day-100 then developed a maculopapular rash. Lenalidomide was held and then we re-challenged him after about a month to 2 months at a lower dose (5 mg daily); rash returned.   - was started on maintenance Velcade, every other week through July 2014, when he was noted with abnormalities on myeloma studies drawn there. Noted with prostate cancer dx at about the time of relapse (see below).     Relapse: noted with return on M-spike in blood/urine with marrow involvement but negative PET.   - Started on retreatment with RVD on 8/27/14 with Revlimid at 15 mg 14 days of 21 days, dexamethasone 40 mg weekly and velcade weekly.Completed at total of 5 cycles without complication by 12/2014.   - Started Cycle 6 on inc'd Rev dosing of 20mg daily x 2 weeks on 12/11/14 which was complicated by pneumonia.  - Adm 12/21-1/10 for human metapneumovirus pneumonia complicated by anorexia, HTN, depression, anorexia with significant weight loss.   - Restarted Ernesto/Dex only on 2/4/15 with good tolerance but with thrombocytopenia.   - Bendamustine added to Velcade/dexamethasone on 5/21/15 due to disease progression  - Velcade discontinued on 7/9/2015 due to side effects (orthostasis)  - Schedule changed to bendamustine 80 mg/m2 days 1 and 2 on 28-day cycle  - Cycle 1 tolerated poorly due to lightheadedness and  weakness  - Cycle 2 dose reduced to 60 mg/m2 and pre-meds adjusted  - Cycle 5 received on 9/17/15.  - 10/1/2015 - increasing IgM, M-spike - started Pomalidomide 4mg/day (21 days out of 28 days) and weekly Decadron 20mg on Oct 1st, 2015.    - Carfilzomib added on 10/22/2015 making this CPD.  - C3-C6  received in [place]    - Returned to [facility] and resumed CPD here    - Adm: 4/12-4/14 with fever, confusion, neutropenia. Noted on MRI to have acute/subacute CVA and subacute/chronic CVA; started on Plavix, given brief course of Abx and GCSF prior to d/c.     - Continued on Carfilzomib and dexamethasone alone  - Pomalyst added back on 7/13/2016 for rising FLC  - Start daratumumab 11/10/16. Changed to every other week after 4 weekly treatments d/t profound fatigue and malaise.   - Adm 5/7-5/16 due to AMS, hypercalcemia, hyperuricemia, possible PNA, back pain, and JOSE MARIA. Started Kyprolis while inpatient.  - Re-admitted 5/25-/529 with recurrent AMS, found to have concomitant PNA  - Resumed Kyprolis 6/13/2017. Stopped due to worsening performance status and progressive myeloma.  - Started Venclexta 800 mg 8/25/2017  - Added weekly Velcade with dexamethasone 20 mg weekly due to rising light chains on 11/1/2017  - Started weekly Cytoxan with prednisone QOD on 12/13    INTERVAL HISTORY:    Mr. Carbone is here with his wife.   Again, feels he is actually doing pretty well of late. Fatigue is a bit better. Only big issue remains diminished appetite,though he does attempt to eat. Back pain remains resolve.d Continues with one-two episodes of diarrhea per week, out of the blue. Uses imodium when needed and that solves it. No abdominal pain or cramping. About 4 days ago, he did contract a cold, but feels he has been on the upswing with just a mild residual cough now. He never had fevers or chills.  No mouth sores, change in GIVENS, chest pain, nausea, vomiting, constipation, bleeding, or swelling.  Remainder of 10-pt ROS otherwise is  "negative.    PHYSICAL EXAMINATION  /78 (BP Location: Right arm, Cuff Size: Adult Small)  Pulse 82  Temp 97.4  F (36.3  C) (Oral)  Resp 16  Ht 1.626 m (5' 4.02\")  Wt 51.2 kg (112 lb 14.4 oz)  SpO2 99%  BMI 19.37 kg/m2    Wt Readings from Last 10 Encounters:   18 51.2 kg (112 lb 14.4 oz)   17 51.2 kg (112 lb 14.2 oz)   17 51.8 kg (114 lb 4.8 oz)   17 51.5 kg (113 lb 9.6 oz)   17 53 kg (116 lb 12.8 oz)   17 53.5 kg (118 lb)   17 54 kg (119 lb 1.6 oz)   17 53 kg (116 lb 12.8 oz)   17 54.2 kg (119 lb 6.4 oz)   17 53.7 kg (118 lb 4.8 oz)   General: Alert. Oriented to name, , location,  Able to ambulate independently, albeit slow and cautiously.  No acute distress. HEENT: PERRL, no palor or icterus. CVS: RRR with occasional premature beat. CHEST: CTAB, normal work of breathing ABDOMEN: soft non tender no masses     NEURO: AAOX3  CN 2-12 intact  SKIN: no bleeding or brusiing, no rashes EXTREMITIES: No edema.     LABS:   Results for RENAELUDAWILLIAN (MRN [id number]) as of 2018 14:01   Ref. Range 2017 11:53 2018 15:30 2018 13:26   Urea Nitrogen Latest Ref Range: 7 - 30 mg/dL 32 (H) 36 (H) 32 (H)   Creatinine Latest Ref Range: 0.66 - 1.25 mg/dL 1.74 (H) 1.84 (H) 1.83 (H)   GFR Estimate Latest Ref Range: >60 mL/min/1.7m2 39 (L) 36 (L) 36 (L)   GFR Estimate If Black Latest Ref Range: >60 mL/min/1.7m2 47 (L) 44 (L) 44 (L)   Calcium Latest Ref Range: 8.5 - 10.1 mg/dL 8.6 9.0 8.7   Anion Gap Latest Ref Range: 3 - 14 mmol/L 10 5 11   Albumin Latest Ref Range: 3.4 - 5.0 g/dL 2.4 (L) 2.2 (L) 2.4 (L)   Protein Total Latest Ref Range: 6.8 - 8.8 g/dL 10.6 (H) 10.8 (H) 10.8 (H)   Bilirubin Total Latest Ref Range: 0.2 - 1.3 mg/dL 0.3 0.2 0.2   Alkaline Phosphatase Latest Ref Range: 40 - 150 U/L 42 43 45   ALT Latest Ref Range: 0 - 70 U/L 26 26 31   AST Latest Ref Range: 0 - 45 U/L 16 16 12   Results for WILLIAN ARCINIEGA (MRN " "[id number]) as of 1/4/2018 13:45   Ref. Range 12/29/2017 11:53 1/2/2018 15:30 1/4/2018 13:26   WBC Latest Ref Range: 4.0 - 11.0 10e9/L 2.1 (L) 2.3 (L) 2.4 (L)   Hemoglobin Latest Ref Range: 13.3 - 17.7 g/dL 8.6 (L) 9.4 (L) 9.1 (L)   Hematocrit Latest Ref Range: 40.0 - 53.0 % 27.3 (L) 28.4 (L) 29.6 (L)   Platelet Count Latest Ref Range: 150 - 450 10e9/L 32 (LL) 35 (LL) 26 (LL)     IMPRESSION/PLAN:  72 year old male with relapsed MM after autologous transplant (1/9/2013), status-post multiple salvage regimens with progressive disease.    MM: With rising light chains, worsening renal function, and worsening TCP, started weekly cytoxan with prednisone 50  mg QOD on 12/14. MM labs drawn recently demonstrated increase, and total protein remains elevated, however we didn't draw baseline labs, and these were obtained after only 3 doses of Cytoxan. He had only been on the prednisone for about one week when those were drawn. SInce he is doing well, we can continue and follow-up these labs soon. We have discussed goals of care at previous visits (his goal is to \"feel better\") and hospice. Today, Yamil continues to feel decent and wishes to continue with treatment for now. .  We will continue to check in weekly with him. Should this regimen become too difficult, or if the light chains continue to increase, will re-address hospice.  - Zometa today    Heme: Cytopenias 2/2 treatment and likely MM, now worsened with Cytoxan.   - Previously on Plavix, remains on hold given thrombocytopenia, indefinitely  - Transfuse to keep hemoglobin > 8, platelets > 10k.   - Twice weekly checks for now    Renal/FEN: Creat baseline ~0.8-1.0, recently has been increased beyond baseline, likely secondary to progressive myeloma. Stably elevated above baseline.  -Encouraged protein/calorie supplements.   -discussed appetite stimulants including medical cannabis, increasing his Zyprexa from 2.5 to 5  (previously on that for AMS). He preferred the latter " and will follow-up closely. Discussed calorie dense options.   -I'll give him an additional 500 cc IVF today    ID: Presently afebrile, getting over a cold. MOnitor for worsening.  - Continues ppx  mg BID     Back/rib pain: Started early May. Lumbar MRI at OSH showing mild-mod central and foraminal stenoses in lumbar spine, per patient and wife, there was no MM lesion there. Rib films inpatient negative, back films stable from prior imaging. Pain remains largely resolved now and he is not using any pain meds.    Fatigue: Stable today. Likely multifactorial in setting of treatment, MM, deconditioning, anemia.      CV: Continue zocor and norvasc.   - Avoid QTc prolonging agents (history of QTc prolongation with syncopal episodes)     AMS: AMS started early May in setting of metabolic derangements, uremia, and possible infection.  Remains intermittently confused over the past few months but improved significantly, worsened over the weekend but then resolved. Stable today.  - Continue Zyprexa 2.5 mg at bedtime, increasing to 5 mg for appetite  - Holding off long-acting pain meds      I spent >25 minutes with the patient, with over 50% of the time spent counseling or coordinating their care as described above.         Leanne Reddy PA-C

## 2018-01-04 NOTE — LETTER
1/4/2018      RE: Anthony Carbone  [address] [place] [address]       HEMATOLOGY/ONCOLOGY PROGRESS NOTE  Jan 4, 2018    REASON FOR VISIT: myeloma    Diagnosis: 72 year old gentleman with IgM lambda multiple myeloma originally diagnosed in 01/2012 as stage I, standard risk disease.   Treatment: Revlimid plus dexamethasone for 4-5 cycles but plateaued by late 03/2012.   - Velcade was added and he received another 2-3 cycles, achieving a good partial remission.      Transplant: Single auto after melphalan 200 mg/m2 preparative regimen on 01/09/2013  - Post-transplant course: unremarkable except for some mild steroid-induced hyperglycemia, gastritis, nausea and vomiting.      Maintenance: Lenalidomide at day-100 then developed a maculopapular rash. Lenalidomide was held and then we re-challenged him after about a month to 2 months at a lower dose (5 mg daily); rash returned.   - was started on maintenance Velcade, every other week through July 2014, when he was noted with abnormalities on myeloma studies drawn there. Noted with prostate cancer dx at about the time of relapse (see below).     Relapse: noted with return on M-spike in blood/urine with marrow involvement but negative PET.   - Started on retreatment with RVD on 8/27/14 with Revlimid at 15 mg 14 days of 21 days, dexamethasone 40 mg weekly and velcade weekly.Completed at total of 5 cycles without complication by 12/2014.   - Started Cycle 6 on inc'd Rev dosing of 20mg daily x 2 weeks on 12/11/14 which was complicated by pneumonia.  - Adm 12/21-1/10 for human metapneumovirus pneumonia complicated by anorexia, HTN, depression, anorexia with significant weight loss.   - Restarted Ernesto/Dex only on 2/4/15 with good tolerance but with thrombocytopenia.   - Bendamustine added to Velcade/dexamethasone on 5/21/15 due to disease progression  - Velcade discontinued on 7/9/2015 due to side effects (orthostasis)  - Schedule changed to bendamustine 80 mg/m2  days 1 and 2 on 28-day cycle  - Cycle 1 tolerated poorly due to lightheadedness and weakness  - Cycle 2 dose reduced to 60 mg/m2 and pre-meds adjusted  - Cycle 5 received on 9/17/15.  - 10/1/2015 - increasing IgM, M-spike - started Pomalidomide 4mg/day (21 days out of 28 days) and weekly Decadron 20mg on Oct 1st, 2015.    - Carfilzomib added on 10/22/2015 making this CPD.  - C3-C6  received in [place]    - Returned to [facility] and resumed CPD here    - Adm: 4/12-4/14 with fever, confusion, neutropenia. Noted on MRI to have acute/subacute CVA and subacute/chronic CVA; started on Plavix, given brief course of Abx and GCSF prior to d/c.     - Continued on Carfilzomib and dexamethasone alone  - Pomalyst added back on 7/13/2016 for rising FLC  - Start daratumumab 11/10/16. Changed to every other week after 4 weekly treatments d/t profound fatigue and malaise.   - Adm 5/7-5/16 due to AMS, hypercalcemia, hyperuricemia, possible PNA, back pain, and JOSE MARIA. Started Kyprolis while inpatient.  - Re-admitted 5/25-/529 with recurrent AMS, found to have concomitant PNA  - Resumed Kyprolis 6/13/2017. Stopped due to worsening performance status and progressive myeloma.  - Started Venclexta 800 mg 8/25/2017  - Added weekly Velcade with dexamethasone 20 mg weekly due to rising light chains on 11/1/2017  - Started weekly Cytoxan with prednisone QOD on 12/13    INTERVAL HISTORY:    Mr. Carbone is here with his wife.   Again, feels he is actually doing pretty well of late. Fatigue is a bit better. Only big issue remains diminished appetite,though he does attempt to eat. Back pain remains resolve.d Continues with one-two episodes of diarrhea per week, out of the blue. Uses imodium when needed and that solves it. No abdominal pain or cramping. About 4 days ago, he did contract a cold, but feels he has been on the upswing with just a mild residual cough now. He never had fevers or chills.  No mouth sores, change in GIVENS, chest pain, nausea,  "vomiting, constipation, bleeding, or swelling.  Remainder of 10-pt ROS otherwise is negative.    PHYSICAL EXAMINATION  /78 (BP Location: Right arm, Cuff Size: Adult Small)  Pulse 82  Temp 97.4  F (36.3  C) (Oral)  Resp 16  Ht 1.626 m (5' 4.02\")  Wt 51.2 kg (112 lb 14.4 oz)  SpO2 99%  BMI 19.37 kg/m2    Wt Readings from Last 10 Encounters:   18 51.2 kg (112 lb 14.4 oz)   17 51.2 kg (112 lb 14.2 oz)   17 51.8 kg (114 lb 4.8 oz)   17 51.5 kg (113 lb 9.6 oz)   17 53 kg (116 lb 12.8 oz)   17 53.5 kg (118 lb)   17 54 kg (119 lb 1.6 oz)   17 53 kg (116 lb 12.8 oz)   17 54.2 kg (119 lb 6.4 oz)   17 53.7 kg (118 lb 4.8 oz)   General: Alert. Oriented to name, , location,  Able to ambulate independently, albeit slow and cautiously.  No acute distress. HEENT: PERRL, no palor or icterus. CVS: RRR with occasional premature beat. CHEST: CTAB, normal work of breathing ABDOMEN: soft non tender no masses     NEURO: AAOX3  CN 2-12 intact  SKIN: no bleeding or brusiing, no rashes EXTREMITIES: No edema.     LABS:   Results for WILLIAN ARCINIEGA (MRN [id number]) as of 2018 14:01   Ref. Range 2017 11:53 2018 15:30 2018 13:26   Urea Nitrogen Latest Ref Range: 7 - 30 mg/dL 32 (H) 36 (H) 32 (H)   Creatinine Latest Ref Range: 0.66 - 1.25 mg/dL 1.74 (H) 1.84 (H) 1.83 (H)   GFR Estimate Latest Ref Range: >60 mL/min/1.7m2 39 (L) 36 (L) 36 (L)   GFR Estimate If Black Latest Ref Range: >60 mL/min/1.7m2 47 (L) 44 (L) 44 (L)   Calcium Latest Ref Range: 8.5 - 10.1 mg/dL 8.6 9.0 8.7   Anion Gap Latest Ref Range: 3 - 14 mmol/L 10 5 11   Albumin Latest Ref Range: 3.4 - 5.0 g/dL 2.4 (L) 2.2 (L) 2.4 (L)   Protein Total Latest Ref Range: 6.8 - 8.8 g/dL 10.6 (H) 10.8 (H) 10.8 (H)   Bilirubin Total Latest Ref Range: 0.2 - 1.3 mg/dL 0.3 0.2 0.2   Alkaline Phosphatase Latest Ref Range: 40 - 150 U/L 42 43 45   ALT Latest Ref Range: 0 - 70 U/L 26 26 31   AST " "Latest Ref Range: 0 - 45 U/L 16 16 12   Results for WILLIAN ARCINIEGA (MRN [id number]) as of 1/4/2018 13:45   Ref. Range 12/29/2017 11:53 1/2/2018 15:30 1/4/2018 13:26   WBC Latest Ref Range: 4.0 - 11.0 10e9/L 2.1 (L) 2.3 (L) 2.4 (L)   Hemoglobin Latest Ref Range: 13.3 - 17.7 g/dL 8.6 (L) 9.4 (L) 9.1 (L)   Hematocrit Latest Ref Range: 40.0 - 53.0 % 27.3 (L) 28.4 (L) 29.6 (L)   Platelet Count Latest Ref Range: 150 - 450 10e9/L 32 (LL) 35 (LL) 26 (LL)     IMPRESSION/PLAN:  72 year old male with relapsed MM after autologous transplant (1/9/2013), status-post multiple salvage regimens with progressive disease.    MM: With rising light chains, worsening renal function, and worsening TCP, started weekly cytoxan with prednisone 50  mg QOD on 12/14. MM labs drawn recently demonstrated increase, and total protein remains elevated, however we didn't draw baseline labs, and these were obtained after only 3 doses of Cytoxan. He had only been on the prednisone for about one week when those were drawn. SInce he is doing well, we can continue and follow-up these labs soon. We have discussed goals of care at previous visits (his goal is to \"feel better\") and hospice. Today, Yamil continues to feel decent and wishes to continue with treatment for now. .  We will continue to check in weekly with him. Should this regimen become too difficult, or if the light chains continue to increase, will re-address hospice.  - Zometa today    Heme: Cytopenias 2/2 treatment and likely MM, now worsened with Cytoxan.   - Previously on Plavix, remains on hold given thrombocytopenia, indefinitely  - Transfuse to keep hemoglobin > 8, platelets > 10k.   - Twice weekly checks for now    Renal/FEN: Creat baseline ~0.8-1.0, recently has been increased beyond baseline, likely secondary to progressive myeloma. Stably elevated above baseline.  -Encouraged protein/calorie supplements.   -discussed appetite stimulants including medical cannabis, increasing " his Zyprexa from 2.5 to 5  (previously on that for AMS). He preferred the latter and will follow-up closely. Discussed calorie dense options.   -I'll give him an additional 500 cc IVF today    ID: Presently afebrile, getting over a cold. MOnitor for worsening.  - Continues ppx  mg BID     Back/rib pain: Started early May. Lumbar MRI at OSH showing mild-mod central and foraminal stenoses in lumbar spine, per patient and wife, there was no MM lesion there. Rib films inpatient negative, back films stable from prior imaging. Pain remains largely resolved now and he is not using any pain meds.    Fatigue: Stable today. Likely multifactorial in setting of treatment, MM, deconditioning, anemia.      CV: Continue zocor and norvasc.   - Avoid QTc prolonging agents (history of QTc prolongation with syncopal episodes)     AMS: AMS started early May in setting of metabolic derangements, uremia, and possible infection.  Remains intermittently confused over the past few months but improved significantly, worsened over the weekend but then resolved. Stable today.  - Continue Zyprexa 2.5 mg at bedtime, increasing to 5 mg for appetite  - Holding off long-acting pain meds      I spent >25 minutes with the patient, with over 50% of the time spent counseling or coordinating their care as described above.         Leanne Reddy PA-C

## 2018-01-04 NOTE — PROGRESS NOTES
Infusion Nursing Note:  Anthony Carbone presents today for Cycle 3 Day 1 Cytoxan, IVF.    Patient seen by provider today: Yes: PREM Herrera    Note: Pt received 500 ml NS Bolus today due to elevated creatinine. Zometa due today but not given due to elevated creatinine/creatinine clearance.    Intravenous Access:  Implanted Port.    Treatment Conditions:  Lab Results   Component Value Date    HGB 9.1 01/04/2018     Lab Results   Component Value Date    WBC 2.4 01/04/2018      Lab Results   Component Value Date    ANEU 1.9 01/04/2018     Lab Results   Component Value Date    PLT 26 01/04/2018      Lab Results   Component Value Date     01/04/2018                   Lab Results   Component Value Date    POTASSIUM 4.3 01/04/2018           Lab Results   Component Value Date    MAG 2.2 05/25/2017            Lab Results   Component Value Date    CR 1.83 01/04/2018                   Lab Results   Component Value Date    JAZMIN 8.7 01/04/2018                Lab Results   Component Value Date    BILITOTAL 0.2 01/04/2018           Lab Results   Component Value Date    ALBUMIN 2.4 01/04/2018                    Lab Results   Component Value Date    ALT 31 01/04/2018           Lab Results   Component Value Date    AST 12 01/04/2018     Results reviewed, labs MET treatment parameters, ok to proceed with treatment.      Post Infusion Assessment:  Patient tolerated infusion without incident.  Blood return noted pre and post infusion.  Site patent and intact, free from redness, edema or discomfort.  No evidence of extravasations. Access discontinued per protocol.    Discharge Plan:   Copy of AVS reviewed with patient and/or family.  Patient will return 1/11 for next appointment.  Patient discharged in stable condition accompanied by: wife.  Departure Mode: Ambulatory.    Rhea Alcantar RN

## 2018-01-04 NOTE — NURSING NOTE
"Chief Complaint   Patient presents with     Port Draw     Labs drawn from port by RN. Line flushed with saline and heparin. Vs taken     Port accessed with 20g 3/4\" gripper needle by RN, labs collected, line flushed with saline and heparin.  Vitals taken.    Kathya Nicholson, RN  "

## 2018-01-04 NOTE — LETTER
1/4/2018       RE: Anthony Carbone  [address] [place] [place] [address]     Dear Colleague,    Thank you for referring your patient, Anthony Carbone, to the [facility]. Please see a copy of my visit note below.    HEMATOLOGY/ONCOLOGY PROGRESS NOTE  Jan 4, 2018    REASON FOR VISIT: myeloma    Diagnosis: 72 year old gentleman with IgM lambda multiple myeloma originally diagnosed in 01/2012 as stage I, standard risk disease.   Treatment: Revlimid plus dexamethasone for 4-5 cycles but plateaued by late 03/2012.   - Velcade was added and he received another 2-3 cycles, achieving a good partial remission.      Transplant: Single auto after melphalan 200 mg/m2 preparative regimen on 01/09/2013  - Post-transplant course: unremarkable except for some mild steroid-induced hyperglycemia, gastritis, nausea and vomiting.      Maintenance: Lenalidomide at day-100 then developed a maculopapular rash. Lenalidomide was held and then we re-challenged him after about a month to 2 months at a lower dose (5 mg daily); rash returned.   - was started on maintenance Velcade, every other week through July 2014, when he was noted with abnormalities on myeloma studies drawn there. Noted with prostate cancer dx at about the time of relapse (see below).     Relapse: noted with return on M-spike in blood/urine with marrow involvement but negative PET.   - Started on retreatment with RVD on 8/27/14 with Revlimid at 15 mg 14 days of 21 days, dexamethasone 40 mg weekly and velcade weekly.Completed at total of 5 cycles without complication by 12/2014.   - Started Cycle 6 on inc'd Rev dosing of 20mg daily x 2 weeks on 12/11/14 which was complicated by pneumonia.  - Adm 12/21-1/10 for human metapneumovirus pneumonia complicated by anorexia, HTN, depression, anorexia with significant weight loss.   - Restarted Ernesto/Dex only on 2/4/15 with good tolerance but with thrombocytopenia.   - Bendamustine added to  Velcade/dexamethasone on 5/21/15 due to disease progression  - Velcade discontinued on 7/9/2015 due to side effects (orthostasis)  - Schedule changed to bendamustine 80 mg/m2 days 1 and 2 on 28-day cycle  - Cycle 1 tolerated poorly due to lightheadedness and weakness  - Cycle 2 dose reduced to 60 mg/m2 and pre-meds adjusted  - Cycle 5 received on 9/17/15.  - 10/1/2015 - increasing IgM, M-spike - started Pomalidomide 4mg/day (21 days out of 28 days) and weekly Decadron 20mg on Oct 1st, 2015.    - Carfilzomib added on 10/22/2015 making this CPD.  - C3-C6  received in [place]    - Returned to [facility] and resumed CPD here    - Adm: 4/12-4/14 with fever, confusion, neutropenia. Noted on MRI to have acute/subacute CVA and subacute/chronic CVA; started on Plavix, given brief course of Abx and GCSF prior to d/c.     - Continued on Carfilzomib and dexamethasone alone  - Pomalyst added back on 7/13/2016 for rising FLC  - Start daratumumab 11/10/16. Changed to every other week after 4 weekly treatments d/t profound fatigue and malaise.   - Adm 5/7-5/16 due to AMS, hypercalcemia, hyperuricemia, possible PNA, back pain, and JOSE MARIA. Started Kyprolis while inpatient.  - Re-admitted 5/25-/529 with recurrent AMS, found to have concomitant PNA  - Resumed Kyprolis 6/13/2017. Stopped due to worsening performance status and progressive myeloma.  - Started Venclexta 800 mg 8/25/2017  - Added weekly Velcade with dexamethasone 20 mg weekly due to rising light chains on 11/1/2017  - Started weekly Cytoxan with prednisone QOD on 12/13    INTERVAL HISTORY:    Mr. Carbone is here with his wife.   Again, feels he is actually doing pretty well of late. Fatigue is a bit better. Only big issue remains diminished appetite,though he does attempt to eat. Back pain remains resolve.d Continues with one-two episodes of diarrhea per week, out of the blue. Uses imodium when needed and that solves it. No abdominal pain or cramping. About 4 days ago, he did  "contract a cold, but feels he has been on the upswing with just a mild residual cough now. He never had fevers or chills.  No mouth sores, change in GIVENS, chest pain, nausea, vomiting, constipation, bleeding, or swelling.  Remainder of 10-pt ROS otherwise is negative.    PHYSICAL EXAMINATION  /78 (BP Location: Right arm, Cuff Size: Adult Small)  Pulse 82  Temp 97.4  F (36.3  C) (Oral)  Resp 16  Ht 1.626 m (5' 4.02\")  Wt 51.2 kg (112 lb 14.4 oz)  SpO2 99%  BMI 19.37 kg/m2    Wt Readings from Last 10 Encounters:   18 51.2 kg (112 lb 14.4 oz)   17 51.2 kg (112 lb 14.2 oz)   17 51.8 kg (114 lb 4.8 oz)   17 51.5 kg (113 lb 9.6 oz)   17 53 kg (116 lb 12.8 oz)   17 53.5 kg (118 lb)   17 54 kg (119 lb 1.6 oz)   17 53 kg (116 lb 12.8 oz)   17 54.2 kg (119 lb 6.4 oz)   17 53.7 kg (118 lb 4.8 oz)   General: Alert. Oriented to name, , location,  Able to ambulate independently, albeit slow and cautiously.  No acute distress. HEENT: PERRL, no palor or icterus. CVS: RRR with occasional premature beat. CHEST: CTAB, normal work of breathing ABDOMEN: soft non tender no masses     NEURO: AAOX3  CN 2-12 intact  SKIN: no bleeding or brusiing, no rashes EXTREMITIES: No edema.     LABS:   Results for WILLIAN ARCINIEGA (MRN [id number]) as of 2018 14:01   Ref. Range 2017 11:53 2018 15:30 2018 13:26   Urea Nitrogen Latest Ref Range: 7 - 30 mg/dL 32 (H) 36 (H) 32 (H)   Creatinine Latest Ref Range: 0.66 - 1.25 mg/dL 1.74 (H) 1.84 (H) 1.83 (H)   GFR Estimate Latest Ref Range: >60 mL/min/1.7m2 39 (L) 36 (L) 36 (L)   GFR Estimate If Black Latest Ref Range: >60 mL/min/1.7m2 47 (L) 44 (L) 44 (L)   Calcium Latest Ref Range: 8.5 - 10.1 mg/dL 8.6 9.0 8.7   Anion Gap Latest Ref Range: 3 - 14 mmol/L 10 5 11   Albumin Latest Ref Range: 3.4 - 5.0 g/dL 2.4 (L) 2.2 (L) 2.4 (L)   Protein Total Latest Ref Range: 6.8 - 8.8 g/dL 10.6 (H) 10.8 (H) 10.8 (H) " "  Bilirubin Total Latest Ref Range: 0.2 - 1.3 mg/dL 0.3 0.2 0.2   Alkaline Phosphatase Latest Ref Range: 40 - 150 U/L 42 43 45   ALT Latest Ref Range: 0 - 70 U/L 26 26 31   AST Latest Ref Range: 0 - 45 U/L 16 16 12   Results for WILLIAN ARCINIEGA (MRN [id number]) as of 1/4/2018 13:45   Ref. Range 12/29/2017 11:53 1/2/2018 15:30 1/4/2018 13:26   WBC Latest Ref Range: 4.0 - 11.0 10e9/L 2.1 (L) 2.3 (L) 2.4 (L)   Hemoglobin Latest Ref Range: 13.3 - 17.7 g/dL 8.6 (L) 9.4 (L) 9.1 (L)   Hematocrit Latest Ref Range: 40.0 - 53.0 % 27.3 (L) 28.4 (L) 29.6 (L)   Platelet Count Latest Ref Range: 150 - 450 10e9/L 32 (LL) 35 (LL) 26 (LL)     IMPRESSION/PLAN:  72 year old male with relapsed MM after autologous transplant (1/9/2013), status-post multiple salvage regimens with progressive disease.    MM: With rising light chains, worsening renal function, and worsening TCP, started weekly cytoxan with prednisone 50  mg QOD on 12/14. MM labs drawn recently demonstrated increase, and total protein remains elevated, however we didn't draw baseline labs, and these were obtained after only 3 doses of Cytoxan. He had only been on the prednisone for about one week when those were drawn. SInce he is doing well, we can continue and follow-up these labs soon. We have discussed goals of care at previous visits (his goal is to \"feel better\") and hospice. Today, Yamil continues to feel decent and wishes to continue with treatment for now. .  We will continue to check in weekly with him. Should this regimen become too difficult, or if the light chains continue to increase, will re-address hospice.  - Zometa today    Heme: Cytopenias 2/2 treatment and likely MM, now worsened with Cytoxan.   - Previously on Plavix, remains on hold given thrombocytopenia, indefinitely  - Transfuse to keep hemoglobin > 8, platelets > 10k.   - Twice weekly checks for now    Renal/FEN: Creat baseline ~0.8-1.0, recently has been increased beyond baseline, likely " secondary to progressive myeloma. Stably elevated above baseline.  -Encouraged protein/calorie supplements.   -discussed appetite stimulants including medical cannabis, increasing his Zyprexa from 2.5 to 5  (previously on that for AMS). He preferred the latter and will follow-up closely. Discussed calorie dense options.   -I'll give him an additional 500 cc IVF today    ID: Presently afebrile, getting over a cold. MOnitor for worsening.  - Continues ppx  mg BID     Back/rib pain: Started early May. Lumbar MRI at OSH showing mild-mod central and foraminal stenoses in lumbar spine, per patient and wife, there was no MM lesion there. Rib films inpatient negative, back films stable from prior imaging. Pain remains largely resolved now and he is not using any pain meds.    Fatigue: Worsening over the last few weeks, worsened more acutely after Cytoxan. Likely multifactorial in setting of treatment, MM, deconditioning, anemia.      CV: Continue zocor and norvasc.   - Avoid QTc prolonging agents (history of QTc prolongation with syncopal episodes)     AMS: AMS started early May in setting of metabolic derangements, uremia, and possible infection.  Remains intermittently confused over the past few months but improved significantly, worsened over the weekend but then resolved. Stable today.  - Continue Zyprexa 2.5 mg at bedtime, increasing to 5 mg for appetite  - Holding off long-acting pain meds      I spent >25 minutes with the patient, with over 50% of the time spent counseling or coordinating their care as described above.         Leanne Reddy PA-C        Again, thank you for allowing me to participate in the care of your patient.      Sincerely,    Leanne Reddy PA-C

## 2018-01-04 NOTE — MR AVS SNAPSHOT
After Visit Summary   1/4/2018    Anthony Carbone    MRN: [id number]           Patient Information     Date Of Birth          1945        Visit Information        Provider Department      1/4/2018 3:00 PM UC 10 ATC; UC ONCOLOGY INFUSION Formerly [facility]        Today's Diagnoses     Multiple myeloma in relapse (H)    -  1       Follow-ups after your visit        Your next 10 appointments already scheduled     Jan 08, 2018  9:00 AM CST   Level 5 with SH INFUSION CHAIR [address] [place] [address] [facility] ([facility])    [facility]  [address]  [facility] [address]   [phone]            Jan 11, 2018 12:45 PM CST   Masonic Lab Draw with UC MASONIC LAB DRAW   [facility]    [address] [place] [address]  [address]  [facility] [address]   [phone]            Jan 11, 2018  1:10 PM CST   (Arrive by 12:55 PM)   Return Visit with Leanne Reddy PA-C   [facility] ([facility])    [address] [place] [address]  [address]  [facility] [address]   [phone]            Jan 11, 2018  2:00 PM CST   Infusion 120 with UC ONCOLOGY INFUSION, UC 14 ATC   [facility] ([facility])    [address]  [address]  [facility] [address]   [phone]            Jan 18, 2018  1:30 PM CST   Masonic Lab Draw with UC MASONIC LAB DRAW   [facility]    [address] [place] [address]  [address]  [facility] [address]   [phone]            Jan 18, 2018  2:00 PM CST   (Arrive by 1:45 PM)   Return Visit with Leanne Reddy PA-C   [facility] ([facility])    [address] [place] [address]  [address]  [facility] [address]   [phone]            Jan 18, 2018  3:00 PM CST   Infusion 120 with UC  ONCOLOGY INFUSION, UC 23 ATC   [facility] ([facility])    [address]  [address]  [facility] [address] [phone]            Jan 19, 2018  8:00 AM CST   (Arrive by 7:45 AM)   New Patient Visit with Gaston Bowie MD   [facility] ([facility])    [address]  [address]  [facility] [address] [phone]              Who to contact     If you have questions or need follow up information about today's clinic visit or your schedule please contact [facility] directly at [phone].  Normal or non-critical lab and imaging results will be communicated to you by HBCShart, letter or phone within 4 business days after the clinic has received the results. If you do not hear from us within 7 days, please contact the clinic through HBCShart or phone. If you have a critical or abnormal lab result, we will notify you by phone as soon as possible.  Submit refill requests through Astrid or call your pharmacy and they will forward the refill request to us. Please allow 3 business days for your refill to be completed.          Additional Information About Your Visit        HBCSharProducteev Information     Astrid gives you secure access to your electronic health record. If you see a primary care provider, you can also send messages to your care team and make appointments. If you have questions, please call your primary care clinic.  If you do not have a primary care provider, please call [phone] and they will assist you.        Care EveryWhere ID     This is your Care EveryWhere ID. This could be used by other organizations to access your [place] medical records  [id number]         Blood Pressure from Last 3 Encounters:   01/04/18 118/78   12/29/17 123/80   12/29/17 117/82    Weight from Last 3 Encounters:   01/04/18 51.2 kg (112 lb 14.4 oz)   12/29/17 51.2 kg (112 lb 14.2 oz)   12/26/17  51.8 kg (114 lb 4.8 oz)              We Performed the Following     CBC with platelets differential     Comprehensive metabolic panel        Primary Care Provider Office Phone # Fax #    Sanket Burciaga [phone]       [facility] [address] [facility] [address]        Equal Access to Services     ALBAN SHAH : Hadii chalo ku hadasho Soomaali, waaxda luqadaha, qaybta kaalmada adeegyada, waxay maggiein haybecky pricepetrmartha villa. So [facility] [phone].    ATENCIÓN: Si habla español, tiene a todd disposición servicios gratuitos de asistencia lingüística. Parminder al [phone].    We comply with applicable federal civil rights laws and [place] laws. We do not discriminate on the basis of race, color, national origin, age, disability, sex, sexual orientation, or gender identity.            Thank you!     Thank you for choosing [facility]  for your care. Our goal is always to provide you with excellent care. Hearing back from our patients is one way we can continue to improve our services. Please take a few minutes to complete the written survey that you may receive in the mail after your visit with us. Thank you!             Your Updated Medication List - Protect others around you: Learn how to safely use, store and throw away your medicines at www.disposemymeds.org.          This list is accurate as of: 1/4/18  4:24 PM.  Always use your most recent med list.                   Brand Name Dispense Instructions for use Diagnosis    acyclovir 400 MG tablet    ZOVIRAX    60 tablet    Take 1 tablet (400 mg) by mouth 2 times daily    Multiple myeloma in relapse (H)       amLODIPine 5 MG tablet    NORVASC    90 tablet    TAKE 1 TABLET BY MOUTH AT BEDTIME    Essential hypertension       clopidogrel 75 MG tablet    PLAVIX    90 tablet    TAKE 1 TABLET BY MOUTH EVERY DAY    History of stroke       dexamethasone 4 MG tablet    DECADRON    40 tablet    Take 20 mg weekly on  day of Velcade    Multiple myeloma not having achieved remission (H)       esomeprazole 40 MG CR capsule    nexIUM    30 capsule    Take 1 capsule (40 mg) by mouth every morning (before breakfast)    Gastroesophageal reflux disease without esophagitis       LORazepam 0.5 MG tablet    ATIVAN    30 tablet    Take 1 tablet (0.5 mg) by mouth every 6 hours as needed for nausea or anxiety.    Multiple myeloma in relapse (H), Delirium       nystatin 871902 UNIT/ML suspension    MYCOSTATIN    473 mL    Take 5 mLs (500,000 Units) by mouth 4 times daily Swish and spit. Continue until symptoms resolve and then take for 3 more days.    Thrush       OLANZapine 5 MG tablet    zyPREXA    60 tablet    Take 0.5 tablets (2.5 mg) by mouth At Bedtime    Delirium, Multiple myeloma in relapse (H)       ondansetron 8 MG ODT tab    ZOFRAN-ODT    30 tablet    Take 1 tablet (8 mg) by mouth every 8 hours as needed for nausea    Nausea       oxyCODONE IR 5 MG tablet    ROXICODONE    15 tablet    Take 1 tablet (5 mg) by mouth every 6 hours as needed for moderate to severe pain        potassium chloride SA 20 MEQ CR tablet    K-DUR/KLOR-CON M    30 tablet    Take 1 tablet (20 mEq) by mouth daily    Hypokalemia       predniSONE 50 MG tablet    DELTASONE    30 tablet    Take 50 mg every other day    Multiple myeloma not having achieved remission (H)       SIMVASTATIN PO      Take 20 mg by mouth At Bedtime        traMADol 50 MG tablet    ULTRAM    60 tablet    Take 1 tablet (50 mg) by mouth every 6 hours as needed for moderate pain . Recommend trying after Tylenol and before Dilaudid.    Acute bilateral low back pain without sciatica, Multiple myeloma in relapse (H)       venetoclax 100 MG tablet CHEMOTHERAPY    VENCLEXTA    100 tablet    Take 8 tablets (800 mg) by mouth daily    Multiple myeloma in relapse (H)

## 2018-01-08 NOTE — PROGRESS NOTES
Infusion Nursing Note:  Anthony Carbone presents today for Labs only. Patient seen by provider today: No   present during visit today: Not Applicable.    Note: N/A.    Intravenous Access:  Implanted Port.    Treatment Conditions:  Lab Results   Component Value Date    HGB 9.3 01/08/2018     Lab Results   Component Value Date    WBC 2.8 01/08/2018      Lab Results   Component Value Date    ANEU 2.3 01/08/2018     Lab Results   Component Value Date    PLT 41 01/08/2018      Results reviewed, labs did NOT meet treatment parameters: No need for blood or platelet transfusion.    Post Infusion Assessment:  Blood return noted.  Site patent and intact, free from redness, edema or discomfort.  No evidence of extravasations.  Access discontinued per protocol.    Discharge Plan: Discharge instructions reviewed with: Patient.  Patient and/or family verbalized understanding of discharge instructions and all questions answered.  AVS to patient via WaveSyndicateT.  Patient will return 1/11/2018 for next appointment.   Patient discharged in stable condition accompanied by: self and wife.  Departure Mode: Ambulatory.    Christi Quinn RN

## 2018-01-08 NOTE — MR AVS SNAPSHOT
After Visit Summary   1/8/2018    Anthony Carbone    MRN: [id number]           Patient Information     Date Of Birth          1945        Visit Information        Provider Department      1/8/2018 9:00 AM  INFUSION CHAIR [facility]        Today's Diagnoses     Multiple myeloma in relapse (H)    -  1       Follow-ups after your visit        Your next 10 appointments already scheduled     Jan 11, 2018 12:45 PM CST   Masonic Lab Draw with UC MASONIC LAB DRAW   [facility] Masonic Lab Draw ([facility])    [address] [place] [address]  [address]  [facility] [address]   [phone]            Jan 11, 2018  1:10 PM CST   (Arrive by 12:55 PM)   Return Visit with Leanne Reddy PA-C   [facility] ([facility])    [address] [place] [address]  [address]  [facility] [address]   [phone]            Jan 11, 2018  2:00 PM CST   Infusion 120 with UC ONCOLOGY INFUSION, UC 14 ATC   [facility] ([facility])    [address] [place] [address]  [address]  [facility] [address]   [phone]            Jan 18, 2018  1:30 PM CST   Masonic Lab Draw with UC MASONIC LAB DRAW   [facility] Masonic Lab Draw ([facility])    [address] [place] [address]  [address]  [facility] [address]   [phone]            Jan 18, 2018  2:00 PM CST   (Arrive by 1:45 PM)   Return Visit with Leanne Reddy PA-C   [facility] ([facility])    [address] [place] [address]  [address]  [facility] [address]   [phone]            Jan 18, 2018  3:00 PM CST   Infusion 120 with UC ONCOLOGY INFUSION, UC 23 ATC   [facility] ([facility])    [address] [place] [address]  [address]  [facility] [address]   [phone]            Jan 19, 2018  8:00 AM CST   (Arrive by 7:45 AM)   New  Patient Visit with Gaston Bowie MD   [facility] ([facility])    [address]  [address]  [facility] [address] [phone]            Jan 25, 2018  1:30 PM [facility]   Masonic Lab Draw with  MASONIC LAB DRAW   [facility]    [address]  [address]  [facility] [address] [phone]              Who to contact     If you have questions or need follow up information about today's clinic visit or your schedule please contact [facility] directly at [phone].  Normal or non-critical lab and imaging results will be communicated to you by MyChart, letter or phone within 4 business days after the clinic has received the results. If you do not hear from us within 7 days, please contact the clinic through Biomodahart or phone. If you have a critical or abnormal lab result, we will notify you by phone as soon as possible.  Submit refill requests through Chicory or call your pharmacy and they will forward the refill request to us. Please allow 3 business days for your refill to be completed.          Additional Information About Your Visit        Biomodahart Information     Chicory gives you secure access to your electronic health record. If you see a primary care provider, you can also send messages to your care team and make appointments. If you have questions, please call your primary care clinic.  If you do not have a primary care provider, please call [phone] and they will assist you.        Care EveryWhere ID     This is your Care EveryWhere ID. This could be used by other organizations to access your [place] medical records  [id number]        Your Vitals Were     Pulse Temperature Respirations             92 97.5  F (36.4  C) (Oral) 16          Blood Pressure from Last 3 Encounters:   01/08/18 107/70   01/04/18 118/78   12/29/17 123/80    Weight from  Last 3 Encounters:   01/04/18 51.2 kg (112 lb 14.4 oz)   12/29/17 51.2 kg (112 lb 14.2 oz)   12/26/17 51.8 kg (114 lb 4.8 oz)              We Performed the Following     CBC with platelets and differential        Primary Care Provider Office Phone # Fax #    Sanket Burciaga [phone]       [facility] [address] [facility] [address]        Equal Access to Services     ALBAN SHAH : Hadii aad ku hadasho Soomaali, waaxda luqadaha, qaybta kaalmada adeegyada, waxay maggiein hayfishn kayleigh villa. So [facility] [phone].    ATENCIÓN: Si habla español, tiene a todd disposición servicios gratuitos de asistencia lingüística. [facility] [phone].    We comply with applicable federal civil rights laws and [place] laws. We do not discriminate on the basis of race, color, national origin, age, disability, sex, sexual orientation, or gender identity.            Thank you!     Thank you for choosing [facility] AND [facility]  for your care. Our goal is always to provide you with excellent care. Hearing back from our patients is one way we can continue to improve our services. Please take a few minutes to complete the written survey that you may receive in the mail after your visit with us. Thank you!             Your Updated Medication List - Protect others around you: Learn how to safely use, store and throw away your medicines at www.disposemymeds.org.          This list is accurate as of: 1/8/18 10:27 AM.  Always use your most recent med list.                   Brand Name Dispense Instructions for use Diagnosis    acyclovir 400 MG tablet    ZOVIRAX    60 tablet    Take 1 tablet (400 mg) by mouth 2 times daily    Multiple myeloma in relapse (H)       amLODIPine 5 MG tablet    NORVASC    90 tablet    TAKE 1 TABLET BY MOUTH AT BEDTIME    Essential hypertension       clopidogrel 75 MG tablet    PLAVIX    90 tablet    TAKE 1 TABLET BY MOUTH EVERY DAY    History of  stroke       dexamethasone 4 MG tablet    DECADRON    40 tablet    Take 20 mg weekly on day of Velcade    Multiple myeloma not having achieved remission (H)       esomeprazole 40 MG CR capsule    nexIUM    30 capsule    Take 1 capsule (40 mg) by mouth every morning (before breakfast)    Gastroesophageal reflux disease without esophagitis       LORazepam 0.5 MG tablet    ATIVAN    30 tablet    Take 1 tablet (0.5 mg) by mouth every 6 hours as needed for nausea or anxiety.    Multiple myeloma in relapse (H), Delirium       nystatin [id number] UNIT/ML suspension    MYCOSTATIN    473 mL    Take 5 mLs (500,000 Units) by mouth 4 times daily Swish and spit. Continue until symptoms resolve and then take for 3 more days.    Thrush       OLANZapine 5 MG tablet    zyPREXA    60 tablet    Take 0.5 tablets (2.5 mg) by mouth At Bedtime    Delirium, Multiple myeloma in relapse (H)       ondansetron 8 MG ODT tab    ZOFRAN-ODT    30 tablet    Take 1 tablet (8 mg) by mouth every 8 hours as needed for nausea    Nausea       oxyCODONE IR 5 MG tablet    ROXICODONE    15 tablet    Take 1 tablet (5 mg) by mouth every 6 hours as needed for moderate to severe pain        potassium chloride SA 20 MEQ CR tablet    K-DUR/KLOR-CON M    30 tablet    Take 1 tablet (20 mEq) by mouth daily    Hypokalemia       predniSONE 50 MG tablet    DELTASONE    30 tablet    Take 50 mg every other day    Multiple myeloma not having achieved remission (H)       SIMVASTATIN PO      Take 20 mg by mouth At Bedtime        traMADol 50 MG tablet    ULTRAM    60 tablet    Take 1 tablet (50 mg) by mouth every 6 hours as needed for moderate pain . Recommend trying after Tylenol and before Dilaudid.    Acute bilateral low back pain without sciatica, Multiple myeloma in relapse (H)       venetoclax 100 MG tablet CHEMOTHERAPY    VENCLEXTA    100 tablet    Take 8 tablets (800 mg) by mouth daily    Multiple myeloma in relapse (H)

## 2018-01-11 NOTE — NURSING NOTE
"Oncology Rooming Note    January 11, 2018 1:04 PM   Anthony Carbone is a 72 year old male who presents for:    Chief Complaint   Patient presents with     Port Draw     labs drawn via port by RN     Oncology Clinic Visit     Return: Multiple Myeloma     Initial Vitals: /74 (BP Location: Left arm, Patient Position: Chair, Cuff Size: Adult Regular)  Pulse 113  Temp 96.9  F (36.1  C) (Oral)  Ht 1.626 m (5' 4\")  Wt 49.3 kg (108 lb 11.2 oz)  SpO2 97%  BMI 18.66 kg/m2 Estimated body mass index is 18.66 kg/(m^2) as calculated from the following:    Height as of this encounter: 1.626 m (5' 4\").    Weight as of this encounter: 49.3 kg (108 lb 11.2 oz). Body surface area is 1.49 meters squared.  No Pain (0) Comment: Data Unavailable   No LMP for male patient.  Allergies reviewed: Yes  Medications reviewed: Yes    Medications: Medication refills not needed today.  Pharmacy name entered into [facility]:    [id number] - [place] [address] AT [facility] [address]  [place], [place] - [address]  [id number] - [place], [place] - [address] AT [facility] [address]    Clinical concerns: No New Concerns    5 minutes for nursing intake (face to face time)     SIMEON Ventura      "

## 2018-01-11 NOTE — MR AVS SNAPSHOT
After Visit Summary   1/11/2018    Anthony Carbone    MRN: [id number]           Patient Information     Date Of Birth          1945        Visit Information        Provider Department      1/11/2018 1:10 PM Leanne Reddy PA-C [facility]        Today's Diagnoses     Multiple myeloma in relapse (H)           Follow-ups after your visit        Your next 10 appointments already scheduled     Jan 12, 2018  2:00 PM CST   [facility] Lab Draw with [facility] LAB DRAW   [facility] ([facility])    [address] [place] [address]  [address]  [facility] [address]   [phone]            Jan 12, 2018  2:30 PM CST   Infusion 120 with UC ONCOLOGY INFUSION, UC 27 ATC   [facility] ([facility])    [address] [place] [address]  [address]  [facility] [address]   [phone]            Jan 18, 2018  1:30 PM CST   [facility] Lab Draw with [facility] LAB DRAW   [facility] ([facility])    [address] [place] [address]  [address]  [facility] [address]   [phone]            Jan 18, 2018  2:00 PM CST   (Arrive by 1:45 PM)   Return Visit with Leanne Reddy PA-C   [facility] ([facility])    [address] [place] [address]  [address]  [facility] [address]   [phone]            Jan 18, 2018  3:00 PM CST   Infusion 120 with UC ONCOLOGY INFUSION, UC 23 ATC   [facility] ([facility])    [address] [place] [address]  [address]  [facility] [address]   [phone]            Jan 19, 2018  8:00 AM CST   (Arrive by 7:45 AM)   New Patient Visit with Gaston Bowie MD   [facility] ([facility])    [address] [place] [address]  [address]  [facility] [address]   [phone]            Jan 25, 2018  1:30 PM CST   [facility] Lab Draw with UC [facility]  LAB DRAW   [facility] ([facility])    [address]  [address]  [facility] [id number] [phone]            Jan 25, 2018  2:00 PM CST   (Arrive by 1:45 PM)   Return Visit with Leanne Reddy PA-C   [facility] ([facility])    [address]  [address]  [facility] [id number] [phone]              Future tests that were ordered for you today     Open Future Orders        Priority Expected Expires Ordered    Basic metabolic panel Routine 1/12/2018 2/11/2018 1/11/2018    CBC with platelets differential Routine 1/12/2018 2/11/2018 1/11/2018            Who to contact     If you have questions or need follow up information about today's clinic visit or your schedule please contact [facility] directly at [phone].  Normal or non-critical lab and imaging results will be communicated to you by MyChart, letter or phone within 4 business days after the clinic has received the results. If you do not hear from us within 7 days, please contact the clinic through Emergent Labst or phone. If you have a critical or abnormal lab result, we will notify you by phone as soon as possible.  Submit refill requests through [employer] or call your pharmacy and they will forward the refill request to us. Please allow 3 business days for your refill to be completed.          Additional Information About Your Visit        [employer] Information     [employer] gives you secure access to your electronic health record. If you see a primary care provider, you can also send messages to your care team and make appointments. If you have questions, please call your primary care clinic.  If you do not have a primary care provider, please call [phone] and they will assist you.        Care EveryWhere ID     This is your Care EveryWhere ID. This could be used by other organizations to access your [facility]  "records  [id number]        Your Vitals Were     Pulse Temperature Height Pulse Oximetry BMI (Body Mass Index)       113 96.9  F (36.1  C) (Oral) 1.626 m (5' 4\") 97% 18.66 kg/m2        Blood Pressure from Last 3 Encounters:   01/11/18 101/74   01/08/18 107/70   01/04/18 118/78    Weight from Last 3 Encounters:   01/11/18 49.3 kg (108 lb 11.2 oz)   01/04/18 51.2 kg (112 lb 14.4 oz)   12/29/17 51.2 kg (112 lb 14.2 oz)              We Performed the Following     Colonoscopy - HIM Scan        Primary Care Provider Office Phone # Fax #    Sanket Burciaga [phone]       [facility] [address] [address]        Equal Access to Services     ALBAN SHAH : Hadii chalo Davis, waaxda luqadaha, qaybta kaalmada adesalo, jonny jurado . So [facility] [phone].    ATENCIÓN: Si habla español, tiene a todd disposición servicios gratuitos de asistencia lingüística. Llame al [phone].    We comply with applicable federal civil rights laws and [place] laws. We do not discriminate on the basis of race, color, national origin, age, disability, sex, sexual orientation, or gender identity.            Thank you!     Thank you for choosing [facility]  for your care. Our goal is always to provide you with excellent care. Hearing back from our patients is one way we can continue to improve our services. Please take a few minutes to complete the written survey that you may receive in the mail after your visit with us. Thank you!             Your Updated Medication List - Protect others around you: Learn how to safely use, store and throw away your medicines at www.disposemymeds.org.          This list is accurate as of: 1/11/18  3:20 PM.  Always use your most recent med list.                   Brand Name Dispense Instructions for use Diagnosis    acyclovir 400 MG tablet    ZOVIRAX    60 tablet    Take 1 tablet (400 mg) by mouth 2 " times daily    Multiple myeloma in relapse (H)       amLODIPine 5 MG tablet    NORVASC    90 tablet    TAKE 1 TABLET BY MOUTH AT BEDTIME    Essential hypertension       clopidogrel 75 MG tablet    PLAVIX    90 tablet    TAKE 1 TABLET BY MOUTH EVERY DAY    History of stroke       dexamethasone 4 MG tablet    DECADRON    40 tablet    Take 20 mg weekly on day of Velcade    Multiple myeloma not having achieved remission (H)       esomeprazole 40 MG CR capsule    nexIUM    30 capsule    Take 1 capsule (40 mg) by mouth every morning (before breakfast)    Gastroesophageal reflux disease without esophagitis       LORazepam 0.5 MG tablet    ATIVAN    30 tablet    Take 1 tablet (0.5 mg) by mouth every 6 hours as needed for nausea or anxiety.    Multiple myeloma in relapse (H), Delirium       nystatin 811227 UNIT/ML suspension    MYCOSTATIN    473 mL    Take 5 mLs (500,000 Units) by mouth 4 times daily Swish and spit. Continue until symptoms resolve and then take for 3 more days.    Thrush       OLANZapine 5 MG tablet    zyPREXA    60 tablet    Take 0.5 tablets (2.5 mg) by mouth At Bedtime    Delirium, Multiple myeloma in relapse (H)       ondansetron 8 MG ODT tab    ZOFRAN-ODT    30 tablet    Take 1 tablet (8 mg) by mouth every 8 hours as needed for nausea    Nausea       oxyCODONE IR 5 MG tablet    ROXICODONE    15 tablet    Take 1 tablet (5 mg) by mouth every 6 hours as needed for moderate to severe pain        potassium chloride SA 20 MEQ CR tablet    K-DUR/KLOR-CON M    30 tablet    Take 1 tablet (20 mEq) by mouth daily    Hypokalemia       predniSONE 50 MG tablet    DELTASONE    30 tablet    Take 50 mg every other day    Multiple myeloma not having achieved remission (H)       SIMVASTATIN PO      Take 20 mg by mouth At Bedtime        traMADol 50 MG tablet    ULTRAM    60 tablet    Take 1 tablet (50 mg) by mouth every 6 hours as needed for moderate pain . Recommend trying after Tylenol and before Dilaudid.    Acute  bilateral low back pain without sciatica, Multiple myeloma in relapse (H)       venetoclax 100 MG tablet CHEMOTHERAPY    VENCLEXTA    100 tablet    Take 8 tablets (800 mg) by mouth daily    Multiple myeloma in relapse (H)

## 2018-01-11 NOTE — NURSING NOTE
Chief Complaint   Patient presents with     Port Draw     labs drawn via port by RN     /74 (BP Location: Left arm, Patient Position: Chair, Cuff Size: Adult Regular)  Pulse 113  Temp 96.9  F (36.1  C) (Oral)  Wt 49.3 kg (108 lb 11.2 oz)  SpO2 97%  BMI 18.65 kg/m2    Vitals taken. Port accessed by RN. Labs collected and sent. Line flushed with NS & Heparin. Pt tolerated well. Pt checked in for next appointment.    Iris Gutiérrez RN

## 2018-01-11 NOTE — PATIENT INSTRUCTIONS
Contact numbers:  Triage Main Line: [phone]  After hours: [phone]    Call with chills and/or temperature greater than or equal to 100.5 and questions or concerns.    If after hours, weekends, or holidays, call main hospital  at  [phone] and ask for Oncology doctor on call.

## 2018-01-11 NOTE — LETTER
1/11/2018      RE: Anthony Carbone  [address] [place] [address]       HEMATOLOGY/ONCOLOGY PROGRESS NOTE  Jan 11, 2018    REASON FOR VISIT: myeloma    Diagnosis: 72 year old gentleman with IgM lambda multiple myeloma originally diagnosed in 01/2012 as stage I, standard risk disease.   Treatment: Revlimid plus dexamethasone for 4-5 cycles but plateaued by late 03/2012.   - Velcade was added and he received another 2-3 cycles, achieving a good partial remission.      Transplant: Single auto after melphalan 200 mg/m2 preparative regimen on 01/09/2013  - Post-transplant course: unremarkable except for some mild steroid-induced hyperglycemia, gastritis, nausea and vomiting.      Maintenance: Lenalidomide at day-100 then developed a maculopapular rash. Lenalidomide was held and then we re-challenged him after about a month to 2 months at a lower dose (5 mg daily); rash returned.   - was started on maintenance Velcade, every other week through July 2014, when he was noted with abnormalities on myeloma studies drawn there. Noted with prostate cancer dx at about the time of relapse (see below).     Relapse: noted with return on M-spike in blood/urine with marrow involvement but negative PET.   - Started on retreatment with RVD on 8/27/14 with Revlimid at 15 mg 14 days of 21 days, dexamethasone 40 mg weekly and velcade weekly.Completed at total of 5 cycles without complication by 12/2014.   - Started Cycle 6 on inc'd Rev dosing of 20mg daily x 2 weeks on 12/11/14 which was complicated by pneumonia.  - Adm 12/21-1/10 for human metapneumovirus pneumonia complicated by anorexia, HTN, depression, anorexia with significant weight loss.   - Restarted Ernesto/Dex only on 2/4/15 with good tolerance but with thrombocytopenia.   - Bendamustine added to Velcade/dexamethasone on 5/21/15 due to disease progression  - Velcade discontinued on 7/9/2015 due to side effects (orthostasis)  - Schedule changed to bendamustine 80 mg/m2  "days 1 and 2 on 28-day cycle  - Cycle 1 tolerated poorly due to lightheadedness and weakness  - Cycle 2 dose reduced to 60 mg/m2 and pre-meds adjusted  - Cycle 5 received on 9/17/15.  - 10/1/2015 - increasing IgM, M-spike - started Pomalidomide 4mg/day (21 days out of 28 days) and weekly Decadron 20mg on Oct 1st, 2015.    - Carfilzomib added on 10/22/2015 making this CPD.  - C3-C6  received in [place]    - Returned to [facility] and resumed CPD here    - Adm: 4/12-4/14 with fever, confusion, neutropenia. Noted on MRI to have acute/subacute CVA and subacute/chronic CVA; started on Plavix, given brief course of Abx and GCSF prior to d/c.     - Continued on Carfilzomib and dexamethasone alone  - Pomalyst added back on 7/13/2016 for rising FLC  - Start daratumumab 11/10/16. Changed to every other week after 4 weekly treatments d/t profound fatigue and malaise.   - Adm 5/7-5/16 due to AMS, hypercalcemia, hyperuricemia, possible PNA, back pain, and JOSE MARIA. Started Kyprolis while inpatient.  - Re-admitted 5/25-/529 with recurrent AMS, found to have concomitant PNA  - Resumed Kyprolis 6/13/2017. Stopped due to worsening performance status and progressive myeloma.  - Started Venclexta 800 mg 8/25/2017  - Added weekly Velcade with dexamethasone 20 mg weekly due to rising light chains on 11/1/2017  - Started weekly Cytoxan with prednisone QOD on 12/13 (though started the prednisone around 12/24)    INTERVAL HISTORY:    Mr. Carbone is here with his wife.   He was doing well until he developed \"the crud\" late last week. He had just gotten over a URI and then he got struck with another one. Primarily congestion, worsening appetite, and fatigue. Occasional cough attributed to the nasal congestion. No fevers or chills through the illness. The worst days were Monday and Tuesday this week, he barely ate or drank those days. In the last 24 hours, he has started to improve quite a bit. Ate breakfast with no issues today. No dizziness or " "lightheadedness. Energy is better. He was able to shower on his own today. Otherwise, nothing new. No new pains. Back pain remains resolved. He had some abdominal discomort while he was ill, which resolved. No nausea or vomiting, no diarrhea recently. No mouth sores, change in GIVENS, chest pain, nausea, vomiting, constipation, bleeding, or swelling.  Remainder of 10-pt ROS otherwise is negative.    PHYSICAL EXAMINATION  /74 (BP Location: Left arm, Patient Position: Chair, Cuff Size: Adult Regular)  Pulse 113  Temp 96.9  F (36.1  C) (Oral)  Ht 1.626 m (5' 4\")  Wt 49.3 kg (108 lb 11.2 oz)  SpO2 97%  BMI 18.66 kg/m2    Wt Readings from Last 10 Encounters:   18 49.3 kg (108 lb 11.2 oz)   18 51.2 kg (112 lb 14.4 oz)   17 51.2 kg (112 lb 14.2 oz)   17 51.8 kg (114 lb 4.8 oz)   17 51.5 kg (113 lb 9.6 oz)   17 53 kg (116 lb 12.8 oz)   17 53.5 kg (118 lb)   17 54 kg (119 lb 1.6 oz)   17 53 kg (116 lb 12.8 oz)   17 54.2 kg (119 lb 6.4 oz)   General: Alert. Oriented to name, , location,  Able to ambulate independently, albeit slow and cautiously.  No acute distress. HEENT: PERRL, no palor or icterus. CVS: RRR with occasional premature beat. CHEST: CTAB, normal work of breathing ABDOMEN: soft non tender no masses     NEURO: AAOX3  CN 2-12 intact  SKIN: no bleeding or brusiing, no rashes EXTREMITIES: No edema.     LABS:   Results for WILLIAN ARCINIEGA (MRN [id number]) as of 2018 13:42   Ref. Range 2018 15:30 2018 13:26 2018 09:15 2018 12:58   Creatinine Latest Ref Range: 0.66 - 1.25 mg/dL 1.84 (H) 1.83 (H)  2.42 (H)   Results for WILLIAN ARCINIEGA (MRN [id number]) as of 2018 13:42   Ref. Range 2018 15:30 2018 13:26 2018 09:15 2018 12:58   Protein Total Latest Ref Range: 6.8 - 8.8 g/dL 10.8 (H) 10.8 (H)  11.2 (H)     Results for WILLIAN ARCINIEGA (MRN [id number]) as of 2018 13:42   Ref. " Range 1/11/2018 12:58   Sodium Latest Ref Range: 133 - 144 mmol/L 132 (L)   Potassium Latest Ref Range: 3.4 - 5.3 mmol/L 5.3   Chloride Latest Ref Range: 94 - 109 mmol/L 102   Carbon Dioxide Latest Ref Range: 20 - 32 mmol/L 18 (L)   Urea Nitrogen Latest Ref Range: 7 - 30 mg/dL 47 (H)   Creatinine Latest Ref Range: 0.66 - 1.25 mg/dL 2.42 (H)   GFR Estimate Latest Ref Range: >60 mL/min/1.7m2 26 (L)   GFR Estimate If Black Latest Ref Range: >60 mL/min/1.7m2 32 (L)   Calcium Latest Ref Range: 8.5 - 10.1 mg/dL 9.1   Anion Gap Latest Ref Range: 3 - 14 mmol/L 12   Albumin Latest Ref Range: 3.4 - 5.0 g/dL 2.2 (L)   Protein Total Latest Ref Range: 6.8 - 8.8 g/dL 11.2 (H)   Bilirubin Total Latest Ref Range: 0.2 - 1.3 mg/dL 0.5   Alkaline Phosphatase Latest Ref Range: 40 - 150 U/L 34 (L)   ALT Latest Ref Range: 0 - 70 U/L 18   AST Latest Ref Range: 0 - 45 U/L 12   Glucose Latest Ref Range: 70 - 99 mg/dL 220 (H)   WBC Latest Ref Range: 4.0 - 11.0 10e9/L 2.9 (L)   Hemoglobin Latest Ref Range: 13.3 - 17.7 g/dL 8.9 (L)   Hematocrit Latest Ref Range: 40.0 - 53.0 % 28.2 (L)   Platelet Count Latest Ref Range: 150 - 450 10e9/L 32 (LL)     IMPRESSION/PLAN:  72 year old male with relapsed MM after autologous transplant (1/9/2013), status-post multiple salvage regimens with progressive disease.    MM: With rising light chains, worsening renal function, and worsening TCP, started weekly cytoxan with prednisone 50  mg QOD on 12/14. MM labs drawn 1/2 demonstrated sharp increase, however this was obtained after only 3 doses of Cytoxan and less than a week of concurrent use with the prednisone. Given he has no other options, we decided to continue with close follow-up. Additionally, we have had multiple discussions regarding goals and hospice. He has felt that this treatment regimen was tolerable, and has wished to continue.    Today, his renal function has again declined and his protein slightly more elevated (which may be from impaired  renal function as well). This could be attributed to progressive myeloma, though could also be secondary to dehydration in the context of recent viral illness accompanied by brief anorexia. Given that he has only just started to recover from his recent illness and with the possibility that his myeloma is progressing through this regimen, will hold cytoxan today and administer a liter of NS. D/w Dr. Topete. Will plan to have him return tomorrow for additional fluids and Cytoxan, if still improving overall. Our infusion RNs will page me with an update.  - Continue monthly Zometa    Heme: Cytopenias 2/2 treatment and likely MM, now worsened with Cytoxan.   - Previously on Plavix, remains on hold given thrombocytopenia, indefinitely  - Transfuse to keep hemoglobin > 8, platelets > 10k.   - Will continue twice weekly checks    Renal/FEN: Creat baseline ~0.8-1.0, recently has been increased beyond baseline, likely secondary to progressive myeloma. Worsening today (1.83 --> 2.42), as above. IVF today and tomorrow, as above.  - Weight continues to decline, recently in setting of suspected viral URI. Eating better as of today.   -Encouraged protein/calorie supplements.   -discussed appetite stimulants including medical cannabis at last visit, decided to increase Zyprexa from 2.5 mg to 5 mg. Will continue that for now.    ID: Presently afebrile. Recently had another URI (this is the second one in the last few weeks), mostly nasal congestion and fatigue. He started to recover in the last 24 hours. He will notify us should this worsening.  - Continues ppx  mg BID     Back/rib pain: Started early May. Lumbar MRI at OSH showing mild-mod central and foraminal stenoses in lumbar spine, per patient and wife, there was no MM lesion there. Rib films inpatient negative, back films stable from prior imaging. Pain remains largely resolved now and he is not using any pain meds.    Fatigue: Stable today. Likely multifactorial in  setting of treatment, MM, deconditioning, anemia.      CV: Continue zocor and norvasc.   - Avoid QTc prolonging agents (history of QTc prolongation with syncopal episodes)     AMS: AMS started early May in setting of metabolic derangements, uremia, and possible infection.  Remains intermittently confused over the past few months but improved significantly, worsened over the weekend but then resolved. Stable today.  - Continue Zyprexa 2.5 mg at bedtime, increasing to 5 mg for appetite  - Holding off long-acting pain meds    Plan summary:  - No Cytoxan today  - Give 1 liter NS in infusion  - Recheck labs tomorrow, give an additional liter NS tomorrow and Cytoxan if still symptomatically improving in regards to URI/fatigue    I spent >25 minutes with the patient, with over 50% of the time spent counseling or coordinating their care as described above.        Leanne Reddy PA-C

## 2018-01-11 NOTE — PROGRESS NOTES
HEMATOLOGY/ONCOLOGY PROGRESS NOTE  Jan 11, 2018    REASON FOR VISIT: myeloma    Diagnosis: 72 year old gentleman with IgM lambda multiple myeloma originally diagnosed in 01/2012 as stage I, standard risk disease.   Treatment: Revlimid plus dexamethasone for 4-5 cycles but plateaued by late 03/2012.   - Velcade was added and he received another 2-3 cycles, achieving a good partial remission.      Transplant: Single auto after melphalan 200 mg/m2 preparative regimen on 01/09/2013  - Post-transplant course: unremarkable except for some mild steroid-induced hyperglycemia, gastritis, nausea and vomiting.      Maintenance: Lenalidomide at day-100 then developed a maculopapular rash. Lenalidomide was held and then we re-challenged him after about a month to 2 months at a lower dose (5 mg daily); rash returned.   - was started on maintenance Velcade, every other week through July 2014, when he was noted with abnormalities on myeloma studies drawn there. Noted with prostate cancer dx at about the time of relapse (see below).     Relapse: noted with return on M-spike in blood/urine with marrow involvement but negative PET.   - Started on retreatment with RVD on 8/27/14 with Revlimid at 15 mg 14 days of 21 days, dexamethasone 40 mg weekly and velcade weekly.Completed at total of 5 cycles without complication by 12/2014.   - Started Cycle 6 on inc'd Rev dosing of 20mg daily x 2 weeks on 12/11/14 which was complicated by pneumonia.  - Adm 12/21-1/10 for human metapneumovirus pneumonia complicated by anorexia, HTN, depression, anorexia with significant weight loss.   - Restarted Ernesto/Dex only on 2/4/15 with good tolerance but with thrombocytopenia.   - Bendamustine added to Velcade/dexamethasone on 5/21/15 due to disease progression  - Velcade discontinued on 7/9/2015 due to side effects (orthostasis)  - Schedule changed to bendamustine 80 mg/m2 days 1 and 2 on 28-day cycle  - Cycle 1 tolerated poorly due to lightheadedness and  "weakness  - Cycle 2 dose reduced to 60 mg/m2 and pre-meds adjusted  - Cycle 5 received on 9/17/15.  - 10/1/2015 - increasing IgM, M-spike - started Pomalidomide 4mg/day (21 days out of 28 days) and weekly Decadron 20mg on Oct 1st, 2015.    - Carfilzomib added on 10/22/2015 making this CPD.  - C3-C6  received in [place]    - Returned to [facility] and resumed CPD here    - Adm: 4/12-4/14 with fever, confusion, neutropenia. Noted on MRI to have acute/subacute CVA and subacute/chronic CVA; started on Plavix, given brief course of Abx and GCSF prior to d/c.     - Continued on Carfilzomib and dexamethasone alone  - Pomalyst added back on 7/13/2016 for rising FLC  - Start daratumumab 11/10/16. Changed to every other week after 4 weekly treatments d/t profound fatigue and malaise.   - Adm 5/7-5/16 due to AMS, hypercalcemia, hyperuricemia, possible PNA, back pain, and JOSE MARIA. Started Kyprolis while inpatient.  - Re-admitted 5/25-/529 with recurrent AMS, found to have concomitant PNA  - Resumed Kyprolis 6/13/2017. Stopped due to worsening performance status and progressive myeloma.  - Started Venclexta 800 mg 8/25/2017  - Added weekly Velcade with dexamethasone 20 mg weekly due to rising light chains on 11/1/2017  - Started weekly Cytoxan with prednisone QOD on 12/13 (though started the prednisone around 12/24)    INTERVAL HISTORY:    Mr. Carbone is here with his wife.   He was doing well until he developed \"the crud\" late last week. He had just gotten over a URI and then he got struck with another one. Primarily congestion, worsening appetite, and fatigue. Occasional cough attributed to the nasal congestion. No fevers or chills through the illness. The worst days were Monday and Tuesday this week, he barely ate or drank those days. In the last 24 hours, he has started to improve quite a bit. Ate breakfast with no issues today. No dizziness or lightheadedness. Energy is better. He was able to shower on his own today. Otherwise, " "nothing new. No new pains. Back pain remains resolved. He had some abdominal discomort while he was ill, which resolved. No nausea or vomiting, no diarrhea recently. No mouth sores, change in GIVENS, chest pain, nausea, vomiting, constipation, bleeding, or swelling.  Remainder of 10-pt ROS otherwise is negative.    PHYSICAL EXAMINATION  /74 (BP Location: Left arm, Patient Position: Chair, Cuff Size: Adult Regular)  Pulse 113  Temp 96.9  F (36.1  C) (Oral)  Ht 1.626 m (5' 4\")  Wt 49.3 kg (108 lb 11.2 oz)  SpO2 97%  BMI 18.66 kg/m2    Wt Readings from Last 10 Encounters:   18 49.3 kg (108 lb 11.2 oz)   18 51.2 kg (112 lb 14.4 oz)   17 51.2 kg (112 lb 14.2 oz)   17 51.8 kg (114 lb 4.8 oz)   17 51.5 kg (113 lb 9.6 oz)   17 53 kg (116 lb 12.8 oz)   17 53.5 kg (118 lb)   17 54 kg (119 lb 1.6 oz)   17 53 kg (116 lb 12.8 oz)   17 54.2 kg (119 lb 6.4 oz)   General: Alert. Oriented to name, , location,  Able to ambulate independently, albeit slow and cautiously.  No acute distress. HEENT: PERRL, no palor or icterus. CVS: RRR with occasional premature beat. CHEST: CTAB, normal work of breathing ABDOMEN: soft non tender no masses     NEURO: AAOX3  CN 2-12 intact  SKIN: no bleeding or brusiing, no rashes EXTREMITIES: No edema.     LABS:   Results for WILLIAN ARCINIEGA (MRN [id number]) as of 2018 13:42   Ref. Range 2018 15:30 2018 13:26 2018 09:15 2018 12:58   Creatinine Latest Ref Range: 0.66 - 1.25 mg/dL 1.84 (H) 1.83 (H)  2.42 (H)   Results for WILLIAN ARCINIEGA (MRN [id number]) as of 2018 13:42   Ref. Range 2018 15:30 2018 13:26 2018 09:15 2018 12:58   Protein Total Latest Ref Range: 6.8 - 8.8 g/dL 10.8 (H) 10.8 (H)  11.2 (H)     Results for WILLIAN ARCINIEGA (MRN [id number]) as of 2018 13:42   Ref. Range 2018 12:58   Sodium Latest Ref Range: 133 - 144 mmol/L 132 (L)   Potassium " Latest Ref Range: 3.4 - 5.3 mmol/L 5.3   Chloride Latest Ref Range: 94 - 109 mmol/L 102   Carbon Dioxide Latest Ref Range: 20 - 32 mmol/L 18 (L)   Urea Nitrogen Latest Ref Range: 7 - 30 mg/dL 47 (H)   Creatinine Latest Ref Range: 0.66 - 1.25 mg/dL 2.42 (H)   GFR Estimate Latest Ref Range: >60 mL/min/1.7m2 26 (L)   GFR Estimate If Black Latest Ref Range: >60 mL/min/1.7m2 32 (L)   Calcium Latest Ref Range: 8.5 - 10.1 mg/dL 9.1   Anion Gap Latest Ref Range: 3 - 14 mmol/L 12   Albumin Latest Ref Range: 3.4 - 5.0 g/dL 2.2 (L)   Protein Total Latest Ref Range: 6.8 - 8.8 g/dL 11.2 (H)   Bilirubin Total Latest Ref Range: 0.2 - 1.3 mg/dL 0.5   Alkaline Phosphatase Latest Ref Range: 40 - 150 U/L 34 (L)   ALT Latest Ref Range: 0 - 70 U/L 18   AST Latest Ref Range: 0 - 45 U/L 12   Glucose Latest Ref Range: 70 - 99 mg/dL 220 (H)   WBC Latest Ref Range: 4.0 - 11.0 10e9/L 2.9 (L)   Hemoglobin Latest Ref Range: 13.3 - 17.7 g/dL 8.9 (L)   Hematocrit Latest Ref Range: 40.0 - 53.0 % 28.2 (L)   Platelet Count Latest Ref Range: 150 - 450 10e9/L 32 (LL)     IMPRESSION/PLAN:  72 year old male with relapsed MM after autologous transplant (1/9/2013), status-post multiple salvage regimens with progressive disease.    MM: With rising light chains, worsening renal function, and worsening TCP, started weekly cytoxan with prednisone 50  mg QOD on 12/14. MM labs drawn 1/2 demonstrated sharp increase, however this was obtained after only 3 doses of Cytoxan and less than a week of concurrent use with the prednisone. Given he has no other options, we decided to continue with close follow-up. Additionally, we have had multiple discussions regarding goals and hospice. He has felt that this treatment regimen was tolerable, and has wished to continue.    Today, his renal function has again declined and his protein slightly more elevated (which may be from impaired renal function as well). This could be attributed to progressive myeloma, though could  also be secondary to dehydration in the context of recent viral illness accompanied by brief anorexia. Given that he has only just started to recover from his recent illness and with the possibility that his myeloma is progressing through this regimen, will hold cytoxan today and administer a liter of NS. D/w Dr. Topete. Will plan to have him return tomorrow for additional fluids and Cytoxan, if still improving overall. Our infusion RNs will page me with an update.  - Continue monthly Zometa    Heme: Cytopenias 2/2 treatment and likely MM, now worsened with Cytoxan.   - Previously on Plavix, remains on hold given thrombocytopenia, indefinitely  - Transfuse to keep hemoglobin > 8, platelets > 10k.   - Will continue twice weekly checks    Renal/FEN: Creat baseline ~0.8-1.0, recently has been increased beyond baseline, likely secondary to progressive myeloma. Worsening today (1.83 --> 2.42), as above. IVF today and tomorrow, as above.  - Weight continues to decline, recently in setting of suspected viral URI. Eating better as of today.   -Encouraged protein/calorie supplements.   -discussed appetite stimulants including medical cannabis at last visit, decided to increase Zyprexa from 2.5 mg to 5 mg. Will continue that for now.    ID: Presently afebrile. Recently had another URI (this is the second one in the last few weeks), mostly nasal congestion and fatigue. He started to recover in the last 24 hours. He will notify us should this worsening.  - Continues ppx  mg BID     Back/rib pain: Started early May. Lumbar MRI at OSH showing mild-mod central and foraminal stenoses in lumbar spine, per patient and wife, there was no MM lesion there. Rib films inpatient negative, back films stable from prior imaging. Pain remains largely resolved now and he is not using any pain meds.    Fatigue: Stable today. Likely multifactorial in setting of treatment, MM, deconditioning, anemia.      CV: Continue zocor and norvasc.    - Avoid QTc prolonging agents (history of QTc prolongation with syncopal episodes)     AMS: AMS started early May in setting of metabolic derangements, uremia, and possible infection.  Remains intermittently confused over the past few months but improved significantly, worsened over the weekend but then resolved. Stable today.  - Continue Zyprexa 2.5 mg at bedtime, increasing to 5 mg for appetite  - Holding off long-acting pain meds    Plan summary:  - No Cytoxan today  - Give 1 liter NS in infusion  - Recheck labs tomorrow, give an additional liter NS tomorrow and Cytoxan if still symptomatically improving in regards to URI/fatigue    I spent >25 minutes with the patient, with over 50% of the time spent counseling or coordinating their care as described above.         Leanne Reddy PA-C

## 2018-01-11 NOTE — MR AVS SNAPSHOT
After Visit Summary   1/11/2018    Anthony Carbone    MRN: [id number]           Patient Information     Date Of Birth          1945        Visit Information        Provider Department      1/11/2018 2:00 PM UC 14 ATC; UC ONCOLOGY INFUSION [facility]        Today's Diagnoses     Multiple myeloma in relapse (H)    -  1    Multiple myeloma (H)          Care Instructions    Contact numbers:  Triage Main Line: [phone]  After hours: [phone]    Call with chills and/or temperature greater than or equal to 100.5 and questions or concerns.    If after hours, weekends, or holidays, call main hospital  at  [phone] and ask for Oncology doctor on call.               Follow-ups after your visit        Your next 10 appointments already scheduled     Jan 12, 2018  2:00 PM CST   [facility] Lab Draw with [facility] LAB DRAW   [facility]    [address] [place] [address]  [address]  [facility] [id number] [phone]            Jan 12, 2018  2:30 PM CST   Infusion 120 with UC ONCOLOGY INFUSION, UC 27 ATC   [facility] ([facility])    [address] [place] [address]  [address]  [facility] [id number] [phone]            Jan 18, 2018  1:30 PM CST   [facility] Lab Draw with  MASONIC LAB DRAW   [facility]    [address] [place] [address]  [address]  [facility] [id number] [phone]            Jan 18, 2018  2:00 PM CST   (Arrive by 1:45 PM)   Return Visit with Leanne Reddy PA-C   [facility] ([facility])    [address] [place] [address]  [address]  [facility] [address] [phone]            Jan 18, 2018  3:00 PM CST   Infusion 120 with UC ONCOLOGY INFUSION, UC 23 ATC   [facility] ([facility])    [address] [place] [address]  [address]  [facility] [address]   [phone]            Jan 19, 2018  8:00 AM CST   (Arrive by 7:45 AM)   New Patient Visit with Gaston Bowie MD   [facility] ([facility])    [address] [place] [address] [address]  [address]  [facility] [address]   [phone]            Jan 25, 2018  1:30 PM CST   [facility]   [facility] Draw ([facility])    [address] [place] [address]  [address]  [facility] [address]   [phone]            Jan 25, 2018  2:00 PM CST   (Arrive by 1:45 PM)   Return Visit with Leanne Reddy PA-C   [facility] ([facility])    [address] [place] [address]  [address]  [facility] [address]   [phone]              Future tests that were ordered for you today     Open Future Orders        Priority Expected Expires Ordered    Basic metabolic panel Routine 1/12/2018 2/11/2018 1/11/2018    CBC with platelets differential Routine 1/12/2018 2/11/2018 1/11/2018            Who to contact     If you have questions or need follow up information about today's clinic visit or your schedule please contact [facility] directly at [phone].  Normal or non-critical lab and imaging results will be communicated to you by MyChart, letter or phone within 4 business days after the clinic has received the results. If you do not hear from us within 7 days, please contact the clinic through [employer] or phone. If you have a critical or abnormal lab result, we will notify you by phone as soon as possible.  Submit refill requests through [employer] or call your pharmacy and they will forward the refill request to us. Please allow 3 business days for your refill to be completed.          Additional Information About Your Visit        [employer] Information     [employer] gives you secure access to your electronic health record. If you see a primary care  provider, you can also send messages to your care team and make appointments. If you have questions, please call your primary care clinic.  If you do not have a primary care provider, please call [phone] and they will assist you.        Care EveryWhere ID     This is your Care EveryWhere ID. This could be used by other organizations to access your [facility] medical records  [id number]         Blood Pressure from Last 3 Encounters:   01/11/18 101/74   01/08/18 107/70   01/04/18 118/78    Weight from Last 3 Encounters:   01/11/18 49.3 kg (108 lb 11.2 oz)   01/04/18 51.2 kg (112 lb 14.4 oz)   12/29/17 51.2 kg (112 lb 14.2 oz)              We Performed the Following     CBC with platelets differential     Comprehensive metabolic panel        Primary Care Provider Office Phone # Fax #    Sanket Burciaga [phone]       [facility] [address] [address]        Equal Access to Services     ALBAN SHAH : Hadii aad ku hadasho Soomaali, waaxda luqadaha, qaybta kaalmada adeegyada, waxay paola jurado . So [facility] [phone].    ATENCIÓN: Si habla español, tiene a todd disposición servicios gratuitos de asistencia lingüística. [facility] [phone].    We comply with applicable federal civil rights laws and [place] laws. We do not discriminate on the basis of race, color, national origin, age, disability, sex, sexual orientation, or gender identity.            Thank you!     Thank you for choosing [facility]  for your care. Our goal is always to provide you with excellent care. Hearing back from our patients is one way we can continue to improve our services. Please take a few minutes to complete the written survey that you may receive in the mail after your visit with us. Thank you!             Your Updated Medication List - Protect others around you: Learn how to safely use, store and throw away your medicines at  www.disposemymeds.org.          This list is accurate as of: 1/11/18  4:15 PM.  Always use your most recent med list.                   Brand Name Dispense Instructions for use Diagnosis    acyclovir 400 MG tablet    ZOVIRAX    60 tablet    Take 1 tablet (400 mg) by mouth 2 times daily    Multiple myeloma in relapse (H)       amLODIPine 5 MG tablet    NORVASC    90 tablet    TAKE 1 TABLET BY MOUTH AT BEDTIME    Essential hypertension       clopidogrel 75 MG tablet    PLAVIX    90 tablet    TAKE 1 TABLET BY MOUTH EVERY DAY    History of stroke       dexamethasone 4 MG tablet    DECADRON    40 tablet    Take 20 mg weekly on day of Velcade    Multiple myeloma not having achieved remission (H)       esomeprazole 40 MG CR capsule    nexIUM    30 capsule    Take 1 capsule (40 mg) by mouth every morning (before breakfast)    Gastroesophageal reflux disease without esophagitis       LORazepam 0.5 MG tablet    ATIVAN    30 tablet    Take 1 tablet (0.5 mg) by mouth every 6 hours as needed for nausea or anxiety.    Multiple myeloma in relapse (H), Delirium       nystatin 599354 UNIT/ML suspension    MYCOSTATIN    473 mL    Take 5 mLs (500,000 Units) by mouth 4 times daily Swish and spit. Continue until symptoms resolve and then take for 3 more days.    Thrush       OLANZapine 5 MG tablet    zyPREXA    60 tablet    Take 0.5 tablets (2.5 mg) by mouth At Bedtime    Delirium, Multiple myeloma in relapse (H)       ondansetron 8 MG ODT tab    ZOFRAN-ODT    30 tablet    Take 1 tablet (8 mg) by mouth every 8 hours as needed for nausea    Nausea       oxyCODONE IR 5 MG tablet    ROXICODONE    15 tablet    Take 1 tablet (5 mg) by mouth every 6 hours as needed for moderate to severe pain        potassium chloride SA 20 MEQ CR tablet    K-DUR/KLOR-CON M    30 tablet    Take 1 tablet (20 mEq) by mouth daily    Hypokalemia       predniSONE 50 MG tablet    DELTASONE    30 tablet    Take 50 mg every other day    Multiple myeloma not having  achieved remission (H)       SIMVASTATIN PO      Take 20 mg by mouth At Bedtime        traMADol 50 MG tablet    ULTRAM    60 tablet    Take 1 tablet (50 mg) by mouth every 6 hours as needed for moderate pain . Recommend trying after Tylenol and before Dilaudid.    Acute bilateral low back pain without sciatica, Multiple myeloma in relapse (H)       venetoclax 100 MG tablet CHEMOTHERAPY    VENCLEXTA    100 tablet    Take 8 tablets (800 mg) by mouth daily    Multiple myeloma in relapse (H)

## 2018-01-11 NOTE — PROGRESS NOTES
Infusion Nursing Note:  Anthony Crabone presents for IVF  Met with PREM Herrera before infusion.    Note:   TORB- PREM Herrera/Cassandra Burkett RN  --hold cytoxon today  --give 1L NS  --have pt come back tomorrow for labs and possible cytoxan if pt is feeling better  --page Leanne once pt gets there and labs are back    Treatment Conditions:  Lab Results   Component Value Date    HGB 8.9 01/11/2018     Lab Results   Component Value Date    WBC 2.9 01/11/2018      Lab Results   Component Value Date    ANEU 2.3 01/08/2018     Lab Results   Component Value Date    PLT 32 01/11/2018      Lab Results   Component Value Date     01/11/2018                   Lab Results   Component Value Date    POTASSIUM 5.3 01/11/2018           Lab Results   Component Value Date    MAG 2.2 05/25/2017            Lab Results   Component Value Date    CR 2.42 01/11/2018                   Lab Results   Component Value Date    JAZMIN 9.1 01/11/2018                Lab Results   Component Value Date    BILITOTAL 0.5 01/11/2018           Lab Results   Component Value Date    ALBUMIN 2.2 01/11/2018                    Lab Results   Component Value Date    ALT 18 01/11/2018           Lab Results   Component Value Date    AST 12 01/11/2018       Intravenous Access:  Implanted Port.    Post Infusion Assessment:  Patient tolerated infusion without incident.  Blood return noted pre and post infusion.  No evidence of extravasations.  Access discontinued per protocol.    Discharge Plan:   Patient declined prescription refills.  Discharge instructions reviewed with: Patient and Family.  Patient and/or family verbalized understanding of discharge instructions and all questions answered.  Copy of AVS reviewed with patient and/or family.  Patient will return tomorrow for next appointment.  Patient discharged in stable condition accompanied by: self and wife.    Cassandra Burkett RN

## 2018-01-12 NOTE — TELEPHONE ENCOUNTER
[facility] Telephone Triage Note    Assessment: Spouse/Partner wife called in to triage reporting the following symptoms: diarrhea starting now with 1 episodes in the last 24 hours,. On the way to appointments today when Anthony experienced explosive diarrhea. 1 episode so far. No fever/chills at this time. No nausea/vomiting. Last episode of loose stools was +1 week ago.     Recommendations: .  wife will monitor for fever, n/v, push fluids and use immodium PRN.    Follow-Up: Patient voiced understanding of advice and/or instructions given.     Addendum:  Received a call this evening from patient's wife.  He is now having fevers 101.4, cough, overall feeling very week.    I advised patient to come to ER for evaluation.  ANC 2.6 yesterday, however, with his comorbidities and generalized weakness, I advised evaluation in the ER.  Anticipate likely admission.      Jeanna Lauren M.D.    Hematology and Oncology  [facility]  [phone]

## 2018-01-12 NOTE — LETTER
Transition Communication Hand-off for Care Transitions to Next Level of Care Provider    Name: Anthony Carbone  MRN #: [id number]  Primary Care Provider: Sanket Burciaga  Primary Clinic: [facility] [address] [address]    Hematologist: Dr. Topete/[facility]     Reason for Hospitalization:  JOSE MARIA (acute kidney injury) (H) [N17.9]  Fever, unspecified fever cause [R50.9]  Admit Date/Time: 1/12/2018  8:34 PM  Discharge Date: 1/15/2018  Payor Source: Payor: MEDICA / Plan: MEDICA PRIME SOLUTION / Product Type: Indemnity /     Copied from discharge summary:    In summary: Admitted for NF & JOSE MARIA. Felt to have viral URI, and a few days of anorexia. Felt pre-renal dehydration causing elevation of Cr up to 3.4 (baseline 1.7-1.9). Fever & URI symptoms have now resolved & PO intake improved, ANC 1.4, no bacterial source identified will not d/c on antibiotics. Cr (3.4/3.39) stable, making urine, no electrolyte or volume issues. Patient feeling better, d/c to home with close hospital f/u on 1/18 (or 19 scheduling working on aligning it with palliative care visit). Patient will get cytoxan likely Friday if still feeling better, to see Leanne Reddy PA-C first ensure he's still on the mend.     Discharge Plan:  Home with clinic follow up     Discharge Needs Assessment:  Needs       Most Recent Value    Transportation Available car, family or friend will provide        Follow-up plan:  Future Appointments  Date Time Provider Department Center   1/16/2018 6:00 AM Yanni Allred PT [facility]   1/19/2018 8:00 AM Gaston Bowie MD [facility]   1/19/2018 9:00 AM  [facility]   1/19/2018 9:30 AM Leanne Reddy PA-C [facility]   1/19/2018 10:00 AM  ONCOLOGY INFUSION [facility]   1/25/2018 1:30 PM  [facility]   1/25/2018 2:00 PM Leanne Reddy PA-C [facility]   1/25/2018 3:00 PM  ONCOLOGY INFUSION [facility]  [facility]       Tracy Gama, RNCC  Phone [phone]  Pager [phone]    AVS/Discharge Summary is the source of truth; this is a helpful guide for improved communication of patient story

## 2018-01-12 NOTE — IP AVS SNAPSHOT
[address] [address]    Phone:  [phone]                                       After Visit Summary   1/12/2018    Anthony Carbone    MRN: [id number]           After Visit Summary Signature Page     I have received my discharge instructions, and my questions have been answered. I have discussed any challenges I see with this plan with the nurse or doctor.    ..........................................................................................................................................  Patient/Patient Representative Signature      ..........................................................................................................................................  Patient Representative Print Name and Relationship to Patient    ..................................................               ................................................  Date                                            Time    ..........................................................................................................................................  Reviewed by Signature/Title    ...................................................              ..............................................  Date                                                            Time

## 2018-01-13 NOTE — ED NOTES
Pt arrived in triage with concerns for fever of 101.4 at home. Hx of multiple myeloma. Currently getting chemo. Temp 100.0 in triage.

## 2018-01-13 NOTE — PROGRESS NOTES
ED Boarding Patient Plan of Care    Admitting service: Heme Malignancy  For all critical changes in vital sign or critical lab values please notify the ED Physician immediately.     For non-critical changes or any other patient care questions, please contact the admitting service at: [phone]    While the patient remains in the ED please follow all active orders written by the ED providers or by the Medicine Team.  Do not release the sign and held orders until the patient arrives on the floor.    Once a bed is available please send text message to IP team to notify them that the patient is moving to the floor.    Checo Macias MD  Pager: [phone]

## 2018-01-13 NOTE — PROGRESS NOTES
"SPIRITUAL HEALTH SERVICES  SPIRITUAL ASSESSMENT Progress Note  [facility]C     REFERRAL SOURCE: Hospital  Request    Met with Anthony to offer spiritual and emotional support.  Anthony declined the visit at this time and said \"maybe another day.\"     PLAN: I will let the 1/14 on call  aware of this request.     Moriah Spear  Chaplain Resident  Pager [phone]    "

## 2018-01-13 NOTE — PLAN OF CARE
Problem: Patient Care Overview  Goal: Plan of Care/Patient Progress Review  Outcome: No Change  Patient arrived from emergency department, had fevers yesterday, spouse reports he had diarrhea yesterday, has been weak and isn't eating much. Patient is getting weekly cytoxan. Patient has no pain, vitals stable. Patient ambulated with assist to his bed, wife assisted with admission questions. Patient resting now, will continue to monitor.  Pt OK with BS report.

## 2018-01-13 NOTE — ED PROVIDER NOTES
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  The patient complains of cold symptoms over the past 6 days, including productive cough, runny nose and sinus pressure.  This morning he developed a headache that worsened this afternoon.  He also had one episode of diarrhea today for which she took Imodium with improvement.  Patient reports that he has been experiencing intermittent diarrhea with this last episode about 1 week ago.  He denies any sore throat, abdominal pain or nausea.  He did have a flu shot this year.  Patient's wife noted him to have fever to 101 F this afternoon prompting her to call the clinic and they were referred here to the ED for further evaluation.  Patient has taken 1 dose of Tylenol this morning, none since then.  Patient was supposed to receive Cytoxan on 1/4/2018 though this was held and he received IV fluids instead due to abnormal lab results.    Past Medical History:   Diagnosis Date     Cerebral artery occlusion with cerebral infarction (H)      Coronary artery disease, non-occlusive Jan 2012     Gastroesophageal reflux disease      Hx of migraines     haven;t bothered him since 2003     Hypertension      Malignant neoplasm (H) 1/2012     Migraine      Myeloma (H)     multiple     Nonulcer dyspepsia      Polio age 6    no sequelae     Polio     age 6     Prostate cancer (H)        Past Surgical History:   Procedure Laterality Date     BMT CELL PRODUCT INFUSION       CARDIAC SURGERY  2012    angiogram     COLONOSCOPY       ENT SURGERY      tonsillectomy     ESOPHAGOSCOPY, GASTROSCOPY, DUODENOSCOPY (EGD), COMBINED N/A 1/6/2015    Procedure: COMBINED ESOPHAGOSCOPY, GASTROSCOPY, DUODENOSCOPY (EGD);   Surgeon: Yamil Donaldson Chi, MD;  Location:  GI     ESOPHAGOSCOPY, GASTROSCOPY, DUODENOSCOPY (EGD), COMBINED Left 8/20/2015    Procedure: COMBINED ESOPHAGOSCOPY, GASTROSCOPY, DUODENOSCOPY (EGD), BIOPSY SINGLE OR MULTIPLE;  Surgeon: Mane Barragan MD;  Location:  GI     HERNIA REPAIR Bilateral      ORTHOPEDIC SURGERY Right 2011    toe     PHACOEMULSIFICATION CLEAR CORNEA WITH STANDARD INTRAOCULAR LENS IMPLANT Left 7/27/2017    Procedure: PHACOEMULSIFICATION CLEAR CORNEA WITH STANDARD INTRAOCULAR LENS IMPLANT;  LEFT EYE PHACOEMULSIFICATION CLEAR CORNEA WITH STANDARD INTRAOCULAR LENS IMPLANT ;  Surgeon: Tesfaye Yusuf MD;  Location:  EC     PHACOEMULSIFICATION CLEAR CORNEA WITH STANDARD INTRAOCULAR LENS IMPLANT Right 9/21/2017    Procedure: PHACOEMULSIFICATION CLEAR CORNEA WITH STANDARD INTRAOCULAR LENS IMPLANT;  RIGHT EYE PHACOEMULSIFICATION CLEAR CORNEA WITH STANDARD INTRAOCULAR LENS IMPLANT;  Surgeon: Tesfaye Yusuf MD;  Location:  [facility]     right inguinal hernia surgery       right toe surgery         Family History   Problem Relation Age of Onset     Melanoma No family hx of      Skin Cancer No family hx of        Social History   Substance Use Topics     Smoking status: Never Smoker     Smokeless tobacco: Never Used     Alcohol use Yes      Comment: rarely     Current Facility-Administered Medications   Medication     heparin lock flush 10 UNIT/ML injection 5-10 mL     lidocaine 1 % 1 mL     lidocaine (LMX4) kit     sodium chloride (PF) 0.9% PF flush 3 mL     sodium chloride (PF) 0.9% PF flush 3 mL     acetaminophen (TYLENOL) tablet 650 mg     Current Outpatient Prescriptions   Medication     predniSONE (DELTASONE) 50 MG tablet     acyclovir (ZOVIRAX) 400 MG tablet     potassium chloride SA (K-DUR/KLOR-CON M) 20 MEQ CR tablet     dexamethasone (DECADRON) 4 MG tablet     OLANZapine (ZYPREXA) 5 MG tablet     nystatin (MYCOSTATIN) 214284 UNIT/ML suspension     traMADol (ULTRAM) 50 MG tablet      "oxyCODONE (ROXICODONE) 5 MG IR tablet     SIMVASTATIN PO     amLODIPine (NORVASC) 5 MG tablet     venetoclax (VENCLEXTA) 100 MG tablet CHEMOTHERAPY     clopidogrel (PLAVIX) 75 MG tablet     LORazepam (ATIVAN) 0.5 MG tablet     ondansetron (ZOFRAN-ODT) 8 MG ODT tab     esomeprazole (NEXIUM) 40 MG CR capsule     Facility-Administered Medications Ordered in Other Encounters   Medication     filgrastim (NEUPOGEN) injection vial 780 mcg        Allergies   Allergen Reactions     Aspirin      Stomach upset     Crestor [Rosuvastatin] Muscle Pain (Myalgia)     Bactrim [Sulfamethoxazole W-Trimethoprim] Rash        I have reviewed the Medications, Allergies, Past Medical and Surgical History, and Social History in the Epic system.    Review of Systems   Constitutional: Positive for fever.   HENT: Positive for rhinorrhea and sinus pressure. Negative for congestion and sore throat.    Eyes: Negative for redness.   Respiratory: Positive for cough (productive). Negative for shortness of breath.    Cardiovascular: Negative for chest pain.   Gastrointestinal: Positive for diarrhea. Negative for abdominal pain and nausea.   Genitourinary: Negative for difficulty urinating.   Musculoskeletal: Negative for arthralgias and neck stiffness.   Skin: Negative for color change.   Neurological: Negative for headaches.   Psychiatric/Behavioral: Negative for confusion.   All other systems reviewed and are negative.      Physical Exam   BP: 115/68  Pulse: 122  Heart Rate: 90  Temp: 100  F (37.8  C)  Resp: 22  Height: 162.6 cm (5' 4\")  Weight: 49 kg (108 lb)  SpO2: 96 %      Physical Exam   Constitutional: He appears well-developed and well-nourished. No distress.   HENT:   Head: Normocephalic and atraumatic.   Mouth/Throat: Oropharynx is clear and moist. No oropharyngeal exudate.   Eyes: Pupils are equal, round, and reactive to light. No scleral icterus.   Neck: Normal range of motion.   Cardiovascular: Normal rate, regular rhythm, normal heart " sounds and intact distal pulses.    Pulmonary/Chest: Effort normal and breath sounds normal. No respiratory distress.   Abdominal: Soft. Bowel sounds are normal. There is no tenderness.   Musculoskeletal: Normal range of motion. He exhibits no edema or tenderness.   Neurological: He is alert. He has normal strength. Coordination normal.   Skin: Skin is warm and dry. No rash noted. He is not diaphoretic.   Psychiatric: He has a normal mood and affect. His behavior is normal.   Nursing note and vitals reviewed.      ED Course     ED Course     Procedures     8:38 PM  The patient was seen and examined by Dr. Glover in Room 11.              Critical Care time:    Results for orders placed or performed during the hospital encounter of 01/12/18 (from the past 24 hour(s))   Comprehensive metabolic panel   Result Value Ref Range    Sodium 136 133 - 144 mmol/L    Potassium 5.0 3.4 - 5.3 mmol/L    Chloride 108 94 - 109 mmol/L    Carbon Dioxide 17 (L) 20 - 32 mmol/L    Anion Gap 11 3 - 14 mmol/L    Glucose 117 (H) 70 - 99 mg/dL    Urea Nitrogen 54 (H) 7 - 30 mg/dL    Creatinine 3.34 (H) 0.66 - 1.25 mg/dL    GFR Estimate 18 (L) >60 mL/min/1.7m2    GFR Estimate If Black 22 (L) >60 mL/min/1.7m2    Calcium 8.9 8.5 - 10.1 mg/dL    Bilirubin Total 0.3 0.2 - 1.3 mg/dL    Albumin 2.0 (L) 3.4 - 5.0 g/dL    Protein Total 10.3 (H) 6.8 - 8.8 g/dL    Alkaline Phosphatase 36 (L) 40 - 150 U/L    ALT 31 0 - 70 U/L    AST 12 0 - 45 U/L   CBC with platelets differential   Result Value Ref Range    WBC 1.9 (L) 4.0 - 11.0 10e9/L    RBC Count 2.63 (L) 4.4 - 5.9 10e12/L    Hemoglobin 8.2 (L) 13.3 - 17.7 g/dL    Hematocrit 26.4 (L) 40.0 - 53.0 %     78 - 100 fl    MCH 31.2 26.5 - 33.0 pg    MCHC 31.1 (L) 31.5 - 36.5 g/dL    RDW 17.0 (H) 10.0 - 15.0 %    Platelet Count 27 (LL) 150 - 450 10e9/L    Diff Method Automated Method     % Neutrophils 79.4 %    % Lymphocytes 7.2 %    % Monocytes 11.9 %    % Eosinophils 0.0 %    % Basophils 0.5 %    %  Immature Granulocytes 1.0 %    Nucleated RBCs 0 0 /100    Absolute Neutrophil 1.5 (L) 1.6 - 8.3 10e9/L    Absolute Lymphocytes 0.1 (L) 0.8 - 5.3 10e9/L    Absolute Monocytes 0.2 0.0 - 1.3 10e9/L    Absolute Eosinophils 0.0 0.0 - 0.7 10e9/L    Absolute Basophils 0.0 0.0 - 0.2 10e9/L    Abs Immature Granulocytes 0.0 0 - 0.4 10e9/L    Absolute Nucleated RBC 0.0    Lactic acid   Result Value Ref Range    Lactic Acid 1.3 0.7 - 2.0 mmol/L   ISTAT gases lactate cele POCT   Result Value Ref Range    Ph Venous 7.42 7.32 - 7.43 pH    PCO2 Venous 27 (L) 40 - 50 mm Hg    PO2 Venous 31 25 - 47 mm Hg    Bicarbonate Venous 18 (L) 21 - 28 mmol/L    O2 Sat Venous 62 %    Lactic Acid 1.2 0.7 - 2.1 mmol/L   Influenza A/B antigen   Result Value Ref Range    Influenza A/B Agn Specimen Nasopharyngeal     Influenza A Negative NEG^Negative    Influenza B Negative NEG^Negative   Chest XR,  PA & LAT    Narrative    Exam: XR CHEST 2 VW, 1/12/2018 9:49 PM    Indication: fever, cough;     Comparison: 12/14/2017    Findings:   Right internal jugular Port-A-Cath tip projects at the high right  atrium. Low lung volumes with perihilar streaky opacities and linear  left mid to lower lung opacities. No pleural effusion or pneumothorax.  No new focal airspace opacity. Air distended colon, similar to prior.  Stable exaggerated thoracic kyphosis with pedicle anterior wedge  compression deformities. Left humeral neck fracture unchanged.  Sclerotic changes of upper ribs possibly from old trauma.      Impression    Impression: Low lung volumes with perihilar and left midlung streaky  opacities, favored to represent atelectasis. No new focal airspace  opacity.    I have personally reviewed the examination and initial interpretation  and I agree with the findings.    LUX BUENO MD   UA with Microscopic reflex to Culture   Result Value Ref Range    Color Urine Light Yellow     Appearance Urine Clear     Glucose Urine Negative NEG^Negative mg/dL     Bilirubin Urine Negative NEG^Negative    Ketones Urine Negative NEG^Negative mg/dL    Specific Gravity Urine 1.008 1.003 - 1.035    Blood Urine Negative NEG^Negative    pH Urine 5.5 5.0 - 7.0 pH    Protein Albumin Urine 30 (A) NEG^Negative mg/dL    Urobilinogen mg/dL Normal 0.0 - 2.0 mg/dL    Nitrite Urine Negative NEG^Negative    Leukocyte Esterase Urine Negative NEG^Negative    Source Urine     WBC Urine <1 0 - 2 /HPF    RBC Urine 1 0 - 2 /HPF    Mucous Urine Present (A) NEG^Negative /LPF   Blood culture   Result Value Ref Range    Specimen Description Blood Right Arm     Culture Micro PENDING      *Note: Due to a large number of results and/or encounters for the requested time period, some results have not been displayed. A complete set of results can be found in Results Review.      Medications   lidocaine 1 % 1 mL (not administered)   lidocaine (LMX4) kit (not administered)   sodium chloride (PF) 0.9% PF flush 3 mL (not administered)   sodium chloride (PF) 0.9% PF flush 3 mL (3 mLs Intravenous Not Given 1/12/18 2307)   acetaminophen (TYLENOL) tablet 650 mg (650 mg Oral Given 1/12/18 2309)   heparin lock flush 10 UNIT/ML injection 5-10 mL (5 mLs Intracatheter Given 1/13/18 0030)   0.9% sodium chloride BOLUS (0 mLs Intravenous Stopped 1/12/18 2239)   0.9% sodium chloride BOLUS (0 mLs Intravenous Stopped 1/13/18 0022)             Assessments & Plan (with Medical Decision Making)   72 year old male with history of multiple myeloma on chemotherapy to the emergency department with fever.  Patient has also had a cough and nasal congestion.  He had one episode of diarrhea today which is not unusual for him.  He denies any ongoing diarrhea.  He denies any abdominal pain.  No vomiting.  The patient has a fever here in the emergency department but otherwise normal vital signs.  He does not appear toxic.  The patient's ANC is 1500.  Once the testing is negative.  Chest radiograph does not reveal infiltrate.  Urine does not  appear infected.  He does have worsening renal function/acute kidney injury.  Patient was given IV fluids and acetaminophen here in the emergency department.  Cultures have been obtained.  He will be admitted to the oncology service for further evaluation and management of fever and acute kidney injury.    I have reviewed the nursing notes.    I have reviewed the findings, diagnosis, plan and need for follow up with the patient.    New Prescriptions    No medications on file       Final diagnoses:   Fever, unspecified fever cause   JOSE MARIA (acute kidney injury) (H)     I, Young Luz, am serving as a trained medical scribe to document services personally performed by Nick Glover MD, based on the provider's statements to me.   INick MD, was physically present and have reviewed and verified the accuracy of this note documented by Young Luz.     1/12/2018   [facility], EMERGENCY DEPARTMENT     Nick Glover MD  01/13/18 0103

## 2018-01-13 NOTE — H&P
Oncology History and Physical      Patient Name: Anthony Carbone MRN# [id number]   Age: 72 year old YOB: 1945     Date of Admission:2018    Primary care provider: Sanket Burciaga  Date of Service: 2018         Assessment and Plan:   Anthony Carbone is a 72 year old male with a history of multiple myeloma, status-post bone marrow transplant (2013) currently on chemotherapy with Zometa every 3 months (last on 10/3/17) and weekly Cytoxan, hypertension and GERD who presents with neutropenic fever and JOSE MARIA. He requires inpatient admission for workup and IV antimicrobials.    SIRS:   URI symptoms:   Relative Neutropenia:  Relatively neutropenic from chemotherapy. Meets SIRS criteria with temperature, HR, RR. LA normal. Blood cultures pending, No tenderness over port side. UA not suggestive of UTI. RVP negative, but patient endorsing URI symptoms over the last week. Wet rales in left lung on exam. CXR with left mid lung streaky opacities. Will treat as neutropenic fever with possible lung source.   -start cefepime (renally dosed)  -surveillance cultures this AM  -follow pending cultures  -RVP negative, but having URI symptoms  -if patient were to decompensate would add vancomycin.     JOSE MARIA on CKD: baseline Cr is 1.7. Cr today is 3.34. Suspect JOSE MARIA is secondary pre-renal etiologies. S/p 3 L NS in the ED.   -Monitor BMP  -Started NS at 100 mL/hour for 8 hours, will  today   -Encourage PO intake    Anemia:  Thrombocytopenia:  Likely due to recent chemo.   -monitor CBC  -Transfuse hgb if < 7  -plt are 27 today. Pt is not currently bleeding. Will not transfuse plt as the patient is not bleeding and defer to day team to set platelet goals.     Diarrhea: pt reportedly had one episode of large watery diarrhea before presentation. C diff ordered but has not been collected as the patient has not had any further bowel movements.   -monitor for now.     Multiple Myeloma:  originally  "diagnosed in 01/2012 as stage I, status-post bone marrow transplant (1/9/2013) currently on chemotherapy with Zometa every 3 months (last on 10/3/17) and weekly Cytoxan.   -chemotherapy held while patient is acutely ill  -will defer PTA steroids, venetoclax, acyclovir and further treatment decisions to the day team  -continue PTA zofran      HA:   Pt with moderate HA, states it is consistent with his usual migranes. No neck stiffness, exam non focal. Not overly concerned for meningitis but will continue to monitor for now.   -tylenol PRN    Proteinuria:   Elevated protein gap:  Likely due to underlying multiple myeloma.  -Monitor for now    NAGMA:  May be due to IVF, RTA, Diarrhea etc.   -Monitor BMP for now    Hypoalbuminemia:  Likely due to poor nutrition  -Monitor for now    Chronic medical issues:   GERD: continue PTA PPI  HTN: continue PTA amlodipine  HLD: continue PTA statin  Anxiety: continue PTA ativan  Chronic Pain: continue PTA oxycodone/tramadol  CAD: held PTA plavix as pt is thrombocytopenic    CODE: DNR/DNI, discussed with patient and wife   Diet/IVF: regular diet, s/p 3 L NS, currently on NS @ 100 mL/hour for 8 hours  DVT ppx: mechanical given thrombocytopenia  Disposition/Admission Status: pending workup and improvement of symptoms. PT/OT consult ordered    This patient will be staffed in the AM    Electronically signed by:  Checo Macias M.D. [facility]  Pager: [phone]  1/13/2018, 4:49 AM         Chief Complaint:   \"I feel lousy\"         HPI:   Anthony Carbone is a 72 year old male with a history of multiple myeloma, status-post bone marrow transplant (1/9/2013) currently on chemotherapy with Zometa every 3 months (last on 10/3/17) and weekly Cytoxan, hypertension and GERD who presents with neutropenic fever and JOSE MARIA.    Over the last week he has had URI symptoms of productive cough, congestion, rhinorrhea and general malaise and fatigue. He has not been eating or drinking that much " over the last week. He was scheduled to undergo chemo, but this was held off because he was not feeling well. Last evening he developed a fever and had one episode of watery diarrhea. He presented to the ED and was found to be febrile, tachycardic, tachypnic and relatively neutropenic with an ANC of 1.9. He was given 3 L NS and started on cefepime.     He endorses mild headache. The pain is bilateral across his forehead. No neck stiffness. He feels the HA is consistent with his usual migraines.    He denies nausea, vomiting, chest pain, SOB, dysuria, hematuria, rash.      10 point ROS is otherwise negative unless mentioned in the HPI.          Past Medical History:     Past Medical History:   Diagnosis Date     Cerebral artery occlusion with cerebral infarction (H)      Coronary artery disease, non-occlusive Jan 2012     Gastroesophageal reflux disease      Hx of migraines     haven;t bothered him since 2003     Hypertension      Malignant neoplasm (H) 1/2012     Migraine      Myeloma (H)     multiple     Nonulcer dyspepsia      Polio age 6    no sequelae     Polio     age 6     Prostate cancer (H)        PMH reviewed and up to date         Past Surgical History:     Past Surgical History:   Procedure Laterality Date     BMT CELL PRODUCT INFUSION       CARDIAC SURGERY  2012    angiogram     COLONOSCOPY       ENT SURGERY      tonsillectomy     ESOPHAGOSCOPY, GASTROSCOPY, DUODENOSCOPY (EGD), COMBINED N/A 1/6/2015    Procedure: COMBINED ESOPHAGOSCOPY, GASTROSCOPY, DUODENOSCOPY (EGD);  Surgeon: Yamil Donaldson Chi, MD;  Location:  GI     ESOPHAGOSCOPY, GASTROSCOPY, DUODENOSCOPY (EGD), COMBINED Left 8/20/2015    Procedure: COMBINED ESOPHAGOSCOPY, GASTROSCOPY, DUODENOSCOPY (EGD), BIOPSY SINGLE OR MULTIPLE;  Surgeon: Mane Barragan MD;  Location:  GI     HERNIA REPAIR Bilateral      ORTHOPEDIC SURGERY Right 2011    toe     PHACOEMULSIFICATION CLEAR CORNEA WITH STANDARD INTRAOCULAR LENS IMPLANT Left 7/27/2017     Procedure: PHACOEMULSIFICATION CLEAR CORNEA WITH STANDARD INTRAOCULAR LENS IMPLANT;  LEFT EYE PHACOEMULSIFICATION CLEAR CORNEA WITH STANDARD INTRAOCULAR LENS IMPLANT ;  Surgeon: Tesfaye Yusuf MD;  Location:  EC     PHACOEMULSIFICATION CLEAR CORNEA WITH STANDARD INTRAOCULAR LENS IMPLANT Right 9/21/2017    Procedure: PHACOEMULSIFICATION CLEAR CORNEA WITH STANDARD INTRAOCULAR LENS IMPLANT;  RIGHT EYE PHACOEMULSIFICATION CLEAR CORNEA WITH STANDARD INTRAOCULAR LENS IMPLANT;  Surgeon: Tesfaye Yusuf MD;  Location:  [facility]     right inguinal hernia surgery       right toe surgery         [facility] reviewed and up to date         Social History:     Social History     Social History     Marital status:      Spouse name: N/A     Number of children: N/A     Years of education: N/A     Occupational History     Not on file.     Social History Main Topics     Smoking status: Never Smoker     Smokeless tobacco: Never Used     Alcohol use Yes      Comment: rarely     Drug use: No     Sexual activity: Not on file     Other Topics Concern     Not on file     Social History Narrative            Family History:     Family History   Problem Relation Age of Onset     Melanoma No family hx of      Skin Cancer No family hx of             Immunizations:     Immunization History   Administered Date(s) Administered     HepB 01/07/2014     Hib (PRP-T) 01/07/2014     Influenza (High Dose) 3 valent vaccine 10/08/2014, 11/19/2015, 10/05/2016, 09/27/2017     Influenza (IIV3) PF 10/01/2012     Pneumo Conj 13-V (2010&after) 01/07/2014     Poliovirus, inactivated (IPV) 01/07/2014     TDAP Vaccine (Boostrix) 01/07/2014              Allergies:      Allergies   Allergen Reactions     Aspirin      Stomach upset     Crestor [Rosuvastatin] Muscle Pain (Myalgia)     Bactrim [Sulfamethoxazole W-Trimethoprim] Rash            Medications:     Prior to Admission medications    Medication Sig Last Dose Taking? Auth Provider   predniSONE (DELTASONE)  50 MG tablet Take 50 mg every other day   Leanne Reddy PA-C   acyclovir (ZOVIRAX) 400 MG tablet Take 1 tablet (400 mg) by mouth 2 times daily   Leanne Reddy PA-C   potassium chloride SA (K-DUR/KLOR-CON M) 20 MEQ CR tablet Take 1 tablet (20 mEq) by mouth daily   Kamari Topete MD   dexamethasone (DECADRON) 4 MG tablet Take 20 mg weekly on day of Velcade   Leanne Reddy PA-C   OLANZapine (ZYPREXA) 5 MG tablet Take 0.5 tablets (2.5 mg) by mouth At Bedtime   David Espana PA   nystatin (MYCOSTATIN) 078608 UNIT/ML suspension Take 5 mLs (500,000 Units) by mouth 4 times daily Swish and spit. Continue until symptoms resolve and then take for 3 more days.   David Espana PA   traMADol (ULTRAM) 50 MG tablet Take 1 tablet (50 mg) by mouth every 6 hours as needed for moderate pain . Recommend trying after Tylenol and before Dilaudid.   Kamari Topete MD   oxyCODONE (ROXICODONE) 5 MG IR tablet Take 1 tablet (5 mg) by mouth every 6 hours as needed for moderate to severe pain   Gavin Welsh MD   SIMVASTATIN PO Take 20 mg by mouth At Bedtime   Reported, Patient   amLODIPine (NORVASC) 5 MG tablet TAKE 1 TABLET BY MOUTH AT BEDTIME   Dmitri Banks MD   venetoclax (VENCLEXTA) 100 MG tablet CHEMOTHERAPY Take 8 tablets (800 mg) by mouth daily   aKmari Topete MD   clopidogrel (PLAVIX) 75 MG tablet TAKE 1 TABLET BY MOUTH EVERY DAY   Dmitri Banks MD   LORazepam (ATIVAN) 0.5 MG tablet Take 1 tablet (0.5 mg) by mouth every 6 hours as needed for nausea or anxiety.   Kamilla Silva PA-C   ondansetron (ZOFRAN-ODT) 8 MG ODT tab Take 1 tablet (8 mg) by mouth every 8 hours as needed for nausea   Brittney Armstrong PA-C   esomeprazole (NEXIUM) 40 MG CR capsule Take 1 capsule (40 mg) by mouth every morning (before breakfast)   Brittney Armstrong, ALPHONSO             Review of Systems:     A complete ROS was performed and is negative other than what is  "stated in the HPI.         Physical Exam:   Blood pressure 118/72, pulse 94, temperature 99.9  F (37.7  C), resp. rate 20, height 1.626 m (5' 4\"), weight 49 kg (108 lb), SpO2 93 %.    Physical Exam   Constitutional: chronically ill apppearing, resting comfortably   Head: Normocephalic and atraumatic. No tenderness at sinuses  Eyes: Conjunctivae are normal. Pupils are equal, round, and reactive to light.    Oral: Pharynx has no erythema or exudate, mucous membranes are moist  Neck:   No adenopathy, no bony tenderness  Cardiovascular: Regular rate and rhythm without murmurs or gallops  Pulmonary/Chest:Wet rales in the left middle lobe, right lung clear. No tenderness over port site  GI: Soft with good bowel sounds.  Non-tender, non-distended, with no guarding, no rebound, no peritoneal signs.   Musculoskeletal:  No edema or clubbing   Skin: Skin is warm and dry. No rash noted.   Neurological: Alert and oriented to person, place, and time. Nonfocal exam  Psychiatric:  Normal mood and affect.         Data:   Last Basic Metabolic Panel:  Lab Results   Component Value Date     01/12/2018      Lab Results   Component Value Date    POTASSIUM 5.0 01/12/2018     Lab Results   Component Value Date    CHLORIDE 108 01/12/2018     Lab Results   Component Value Date    JAZMIN 8.9 01/12/2018     Lab Results   Component Value Date    CO2 17 01/12/2018     Lab Results   Component Value Date    BUN 54 01/12/2018     Lab Results   Component Value Date    CR 3.34 01/12/2018     Lab Results   Component Value Date     01/12/2018     Lab Results   Component Value Date    WBC 1.9 01/12/2018     Lab Results   Component Value Date    RBC 2.63 01/12/2018     Lab Results   Component Value Date    HGB 8.2 01/12/2018     Lab Results   Component Value Date    HCT 26.4 01/12/2018     No components found for: MCT  Lab Results   Component Value Date     01/12/2018     Lab Results   Component Value Date    MCH 31.2 01/12/2018     Lab " Results   Component Value Date    MCHC 31.1 01/12/2018     Lab Results   Component Value Date    RDW 17.0 01/12/2018     Lab Results   Component Value Date    PLT 27 01/12/2018   LA normal        1/12: Blood cultures with no growth thus far, RVP negative,UA with mild proteinuria, not suggestive of UTI    1/12: CXR with linear opacities in the middle lung and bilateral atelectasis        Staff Addendum: Patient was seen and examined by me. All labs, VS and pertinent images were reviewed with the team on rounds. Plans for care were mutually developed and outlined above with my direct input.    See my brief note from today for plan.     Kamari Topete, DO

## 2018-01-13 NOTE — PROGRESS NOTES
Pt known to me from my panel outside hospital.  Refractory myeloma on palliative chemo with Cy/Pred, now admitted with new URI/febrile illness.    Seen with wife present today. Sounds like he is feeling better already over last night. No new F/C.  No diarrhea. Some sinus congestion still.  No difficulty with breathing. Rest of 10 point ROS neg.    Exam shows a debilitated man lying in bed. NAD/AAO. PERRL/EOMIB and full without icterus.  Mouth somewhat dry but no mucositic changes.  Neck is supple with no JVD. HRRR without tachycardia.  Lungs were clear from the anterolateral stations. Abd was benign with ABS.  No organomegaly felt.  No edema in LEs. No obvious focal neuro changes noted. No rashes, but skin is dry with multiple scattered echymoses.     Labs and CXR reviewed.     CMP  Recent Labs  Lab 01/13/18  0609 01/12/18 2112 01/11/18  1258    136 132*   POTASSIUM 4.8 5.0 5.3   CHLORIDE 115* 108 102   CO2 16* 17* 18*   ANIONGAP 10 11 12   GLC 90 117* 220*   BUN 50* 54* 47*   CR 3.40* 3.34* 2.42*   GFRESTIMATED 18* 18* 26*   GFRESTBLACK 22* 22* 32*   JAZMIN 8.0* 8.9 9.1   PROTTOTAL  --  10.3* 11.2*   ALBUMIN  --  2.0* 2.2*   BILITOTAL  --  0.3 0.5   ALKPHOS  --  36* 34*   AST  --  12 12   ALT  --  31 18     CBC  Recent Labs  Lab 01/13/18  0609 01/12/18 2112 01/11/18  1258 01/08/18  0915   WBC 2.3* 1.9* 2.9* 2.8*   RBC 2.24* 2.63* 2.74* 2.88*   HGB 7.3* 8.2* 8.9* 9.3*   HCT 22.5* 26.4* 28.2* 28.7*    100 103* 100   MCH 32.6 31.2 32.5 32.3   MCHC 32.4 31.1* 31.6 32.4   RDW 17.3* 17.0* 16.5* 16.9*   PLT 21* 27* 32* 41*       A/P:URI/fever: Will await RVP and continue empiric abx for now.   - Myeloma: treatment on hold save for some Pred. No Cytoxan this weekend. Due again for this on Monday.  - Dehydration and acute on chronic RF: tanked up in ED with 2L NS. Will follow creatinine.   - Heme: Follow counts daily and will rechk tonight.   - Lytes: monitor closely in setting of renal failure.     BMcClune,  DO

## 2018-01-14 NOTE — PLAN OF CARE
Problem: Patient Care Overview  Goal: Plan of Care/Patient Progress Review  OT 7C: CX-per chart review and discussion with OT, pt is at baseline functioning and has no OT needs at this time. Please reconsult if status changes.

## 2018-01-14 NOTE — PROGRESS NOTES
SPIRITUAL HEALTH SERVICES  SPIRITUAL ASSESSMENT Progress Note  [facility] 7C   ON-CALL VISIT    REFERRAL SOURCE: Hospital  Request    Attempted to visit pt this morning, but he was out for an appointment with rehab. I tried to visit this afternoon and pt said it was not a good time and that tomorrow would be better.    PLAN: Will triage for unit  for tomorrow.     Pantera Pendleton  Chaplain Resident  Pager [phone]

## 2018-01-14 NOTE — PLAN OF CARE
Problem: Patient Care Overview  Goal: Plan of Care/Patient Progress Review  Outcome: Improving  Patient received a platelet transfusion for platelets of 21, tolerated transfusion without issues. No pain, ambulated in halls with PT. Up in chair several hours. Tolerating regular diet, loose BM per wife, continues in droplet and contact isolation. Patient used call light appropriately today but placed bed alarm on while in bed for safety.  Pt ok with BS report if awake.

## 2018-01-14 NOTE — PROGRESS NOTES
01/14/18 0927   Quick Adds   Type of Visit Initial PT Evaluation       Present no   Language English   Living Environment   Lives With spouse   Living Arrangements house   Home Accessibility stairs to enter home;stairs within home;bed and bath on same level   Number of Stairs to Enter Home 1   Number of Stairs Within Home 12  (basement does not need to access)   Transportation Available car;family or friend will provide   Living Environment Comment Previously active and independent individual   Self-Care   Usual Activity Tolerance good   Current Activity Tolerance moderate   Regular Exercise yes   Activity/Exercise Type walking   Functional Level Prior   Ambulation 0-->independent   Transferring 0-->independent   Toileting 0-->independent   Bathing 0-->independent   Dressing 0-->independent   Eating 0-->independent   Communication 0-->understands/communicates without difficulty   Swallowing 0-->swallows foods/liquids without difficulty   Cognition 0 - no cognition issues reported   Fall history within last six months no   Prior Functional Level Comment Pt reports being independent with all ADLs and amb; pt reports being very active at home   General Information   Onset of Illness/Injury or Date of Surgery - Date 01/12/17   Referring Physician Checo Macias MD   Pertinent History of Current Problem (include personal factors and/or comorbidities that impact the POC) Anthony Carbone is a 72 year old male with a history of multiple myeloma, status-post bone marrow transplant (1/9/2013) currently on chemotherapy with Zometa every 3 months (last on 10/3/17) and weekly Cytoxan, hypertension and GERD who presents with neutropenic fever and JOSE MARIA. He requires inpatient admission for workup and IV antimicrobials.   Precautions/Limitations fall precautions   General Info Comments Activity: up with assist   Cognitive Status Examination   Level of Consciousness alert   Follows Commands and  "Answers Questions 100% of the time   Personal Safety and Judgment intact   Pain Assessment   Patient Currently in Pain No   Integumentary/Edema   Integumentary/Edema no deficits were identifed   Posture    Posture Kyphosis;Forward head position;Protracted shoulders   Range of Motion (ROM)   ROM Comment B LE WNL/WFL   Strength   Strength Comments B LE > 3/5 secondary to functional mobility   Bed Mobility   Bed Mobility Comments SBA - MOD I   Transfer Skills   Transfer Comments SBA   Gait   Gait Comments no AD + CGA/SBA   Balance   Balance Comments no overt LOB noted   Clinical Impression   Criteria for Skilled Therapeutic Intervention yes, treatment indicated   PT Diagnosis Impaired functional endurance   Influenced by the following impairments Impaired strength, endurance, balance   Functional limitations due to impairments Impaired gait/stairs   Clinical Presentation Stable/Uncomplicated   Clinical Presentation Rationale Clinical Judgement   Clinical Decision Making (Complexity) Low complexity   Therapy Frequency` 3 times/week   Predicted Duration of Therapy Intervention (days/wks) 1/27/17   Anticipated Discharge Disposition Home with Outpatient Therapy;Home with Assist   Risk & Benefits of therapy have been explained Yes   Patient, Family & other staff in agreement with plan of care Yes   Clinical Impression Comments No OT indication for ADLs at this time - if cognitive concerns arise, please consult OT.   [facility] [employer] TM \"6 Clicks\"   2016, Trustees of [facility], under license to [employer].  All rights reserved.   6 Clicks Short Forms Basic Mobility Inpatient Short Form   [facility] Move In HistoryPAC  \"6 Clicks\" V.2 Basic Mobility Inpatient Short Form   1. Turning from your back to your side while in a flat bed without using bedrails? 4 - None   2. Moving from lying on your back to sitting on the side of a flat bed without using bedrails? 4 - None   3. Moving to and from a bed to a chair " (including a wheelchair)? 4 - None   4. Standing up from a chair using your arms (e.g., wheelchair, or bedside chair)? 4 - None   5. To walk in hospital room? 3 - A Little   6. Climbing 3-5 steps with a railing? 3 - A Little   Basic Mobility Raw Score (Score out of 24.Lower scores equate to lower levels of function) 22   Total Evaluation Time   Total Evaluation Time (Minutes) 5

## 2018-01-14 NOTE — PLAN OF CARE
Problem: Patient Care Overview  Goal: Plan of Care/Patient Progress Review  Outcome: No Change  AVSS. A&Ox3-at times confused. Bed alarm on. Apical pulse regular. Lungs CTA. Abdomen soft. Bowel sounds active in all quadrants. Tolerating regular diet. Voiding spontaneously with adequate output. MIVF running at 25cc/hr. HGB 6.8 and Platelets 22. 1 Unit RBC given beginning at 2125. Denies pain. Continue with POC. Pt would like bedside report if awake.

## 2018-01-14 NOTE — PLAN OF CARE
Problem: Patient Care Overview  Goal: Plan of Care/Patient Progress Review  PT / 7C    Discharge Planner PT   Patient plan for discharge: home  Current status: Performing bed mobility and transfers + SBA.  Amb ~ 200' x 2 with no AD initially with CGA and progressing to SBA.  Climbed 12 stairs with SBA.   Barriers to return to prior living situation: activity tolerance, higher level strength/balance  Recommendations for discharge: home with assist from wife as needed and HH vs OP PT  Rationale for recommendations: Demonstrating all functional mobility + SBA this day; no overt LOB noted. Pt would benefit from continued skilled therapy to progress strength and balance for improve activity tolerance.       Entered by: Vonda Melendez 01/14/2018 1:17 PM

## 2018-01-14 NOTE — PROGRESS NOTES
Progress Note- Heme Onc    Patient Name: Anthony Carbone MRN# [id number]   Age: 72 year old YOB: 1945     Date of Admission:1/12/2018    Primary care provider: Sanket Burciaga  Date of Service: 1/14/2018         Assessment and Plan:   Anthony Carbone is a 72 year old male with a history of multiple myeloma, status-post bone marrow transplant (1/9/2013) currently on chemotherapy with Zometa every 3 months (last on 10/3/17) and weekly Cytoxan, hypertension and GERD who presents with neutropenic fever and JOSE MARIA. He requires inpatient admission for workup and IV antimicrobials.    SIRS:   URI symptoms:   Relative Neutropenia:  Relatively neutropenic from chemotherapy. Meets SIRS criteria with temperature, HR, RR. LA normal. Blood cultures pending, No tenderness over port side. UA not suggestive of UTI. RVP negative, but patient endorsing URI symptoms over the last week. Wet rales in left lung on exam. CXR with left mid lung streaky opacities. Will treat as neutropenic fever with possible lung source.   -start cefepime (renally dosed)  -surveillance NGTD  -Full RVP pending. URI symptoms better today.   -if patient were to decompensate would add vancomycin.     JOSE MARIA on CKD:  Suspect JOSE MARIA is secondary pre-renal etiologies.   -Given 3 L NS in the ED.   -Monitor BMP  -Encourage PO intake    Anemia:  Thrombocytopenia: multifactorial    -monitor CBC  -Transfuse hgb if < 7 and plts <20k; will get plts today.      Diarrhea: not currently an issue.   -monitor for now.     Multiple Myeloma:  originally diagnosed in 01/2012 as stage I, status-post bone marrow transplant (1/9/2013) currently on chemotherapy with Zometa every 3 months (last on 10/3/17) and weekly Cytoxan.   -chemotherapy held while patient is acutely ill  -continue PTA zofran      HA:   Pt with moderate HA, states it is consistent with his usual migranes. No neck stiffness, exam non focal. Not overly concerned for meningitis but will  "continue to monitor for now.   -tylenol PRN    Proteinuria:   Elevated protein gap:  Likely due to underlying multiple myeloma.  -Monitor for now    NAGMA:  May be due to IVF, RTA, Diarrhea etc.   -Monitor BMP for now    Hypoalbuminemia:  Likely due to poor nutrition  -Monitor for now    Chronic medical issues:   GERD: continue PTA PPI  HTN: continue PTA amlodipine  HLD: continue PTA statin  Anxiety: continue PTA ativan  Chronic Pain: continue PTA oxycodone/tramadol  CAD: held PTA plavix as pt is thrombocytopenic; will need to decide if we continue to do this or not. Has been on it as an outpatient.     CODE: DNR/DNI, discussed with patient and wife   Diet/IVF: regular diet,   DVT ppx: mechanical given thrombocytopenia  Disposition/Admission Status: pending workup and improvement of symptoms. PT/OT consult ordered    Kamari Topete DO         Chief Complaint:   None today         HPI:   Up at bedside today. Has worked with PT and felt good about it this AM.  No further fevers. No new infectious s/s. Planning on eating some food this AM.      10 point ROS is otherwise negative unless mentioned in the HPI.           Review of Systems:     A complete ROS was performed and is negative other than what is stated in the HPI.         Physical Exam:   Blood pressure 148/84, pulse 72, temperature 98.4  F (36.9  C), temperature source Oral, resp. rate 16, height 1.626 m (5' 4\"), weight 50.8 kg (112 lb 1.6 oz), SpO2 95 %.  Wt Readings from Last 5 Encounters:   01/14/18 50.8 kg (112 lb 1.6 oz)   01/11/18 49.3 kg (108 lb 11.2 oz)   01/04/18 51.2 kg (112 lb 14.4 oz)   12/29/17 51.2 kg (112 lb 14.2 oz)   12/26/17 51.8 kg (114 lb 4.8 oz)       Physical Exam   Constitutional: chronically ill apppearing, resting comfortably   Head: Normocephalic and atraumatic. No tenderness at sinuses  Eyes: Conjunctivae are normal. Pupils are equal, round, and reactive to light.    Oral: Pharynx has no erythema or exudate, mucous membranes are " moist  Neck:   No adenopathy, no bony tenderness  Cardiovascular: Regular rate and rhythm without murmurs or gallops  Pulmonary/Chest: Lungs were CTAB today.  No tenderness over port site  GI: Soft with good bowel sounds.  Non-tender, non-distended, with no guarding, no rebound, no peritoneal signs.   Musculoskeletal:  No edema or clubbing   Skin: Skin is warm and dry. No rash noted.   Neurological: Alert and oriented to person, place, and time. Nonfocal exam  Psychiatric:  Normal mood and affect.         Data:   CBC RESULTS:   Recent Labs   Lab Test  01/14/18   0802   WBC  2.0*   RBC  2.61*   HGB  8.3*   HCT  25.7*   MCV  99   MCH  31.8   MCHC  32.3   RDW  18.8*   PLT  21*     Recent Labs   Lab Test  01/14/18   0802  01/13/18   0609   NA  142  141   POTASSIUM  4.4  4.8   CHLORIDE  116*  115*   CO2  16*  16*   ANIONGAP  10  10   GLC  93  90   BUN  45*  50*   CR  3.40*  3.40*   JAZMIN  7.9*  8.0*     Liver Function Studies -   Recent Labs   Lab Test  01/12/18   2112   PROTTOTAL  10.3*   ALBUMIN  2.0*   BILITOTAL  0.3   ALKPHOS  36*   AST  12   ALT  31           1/12: Blood cultures with no growth thus far, RVP negative,UA with mild proteinuria, not suggestive of UTI    1/12: CXR with linear opacities in the middle lung and bilateral atelectasis          Kamari Topete, DO

## 2018-01-14 NOTE — DOWNTIME EVENT NOTE
The EMR was down for 2 hours on 1/14/2018.    Janette Jurado   was responsible for completing the paper charting during this time period.     The following information was re-entered into the system by Janette Jurado: MAR    The following information will remain in the paper chart: NONE    Janette Jurado  1/14/2018

## 2018-01-15 NOTE — DISCHARGE SUMMARY
[facility] [place] -- Discharge Summary -- Hematology / Oncology  Date of Admission:  1/12/2018  Date of Discharge:  1/15/2018  Discharging Provider: Lorena Albert  Date of Service (when I saw the patient): 01/15/18    Discharge Diagnoses   Active Problems:    Neutropenic fever (H)    History of Present Illness   Anthony Carbone is a 72 year old male with a history of multiple myeloma, status-post bone marrow transplant (1/9/2013) currently on chemotherapy with Zometa every 3 months (last on 10/3/17) and weekly Cytoxan, hypertension and GERD who presents with neutropenic fever and JOSE MARIA. Over the last week he has had URI symptoms of productive cough, congestion, rhinorrhea and general malaise and fatigue. He has not been eating or drinking that much over the last week. He was scheduled to undergo chemo, but this was held off because he was not feeling well. Last evening he developed a fever and had one episode of watery diarrhea. He presented to the ED and was found to be febrile, tachycardic, tachypnic and relatively neutropenic with an ANC of 1.9. He was given 3 L NS and started on cefepime. He endorses mild headache. The pain is bilateral across his forehead. No neck stiffness. He feels the HA is consistent with his usual migraines. He denies nausea, vomiting, chest pain, SOB, dysuria, hematuria, rash.    Hospital Course  In summary: Admitted for NF & JOSE MARIA. Felt to have viral URI, and a few days of anorexia. Felt pre-renal dehydration causing elevation of Cr up to 3.4 (baseline 1.7-1.9). Fever & URI symptoms have now resolved & PO intake improved, ANC 1.4, no bacterial source identified will not d/c on antibiotics. Cr (3.4/3.39) stable, making urine, no electrolyte or volume issues. Patient feeling better, d/c to home with close hospital f/u on 1/18 (or 19 scheduling working on aligning it with palliative care visit). Patient will get cytoxan likely Friday if still  feeling better, to see Leanne Reddy PA-C first ensure he's still on the mend.     Problem List  SIRS:   URI symptoms:   Relative Neutropenia:  Relatively neutropenic from chemotherapy. Meets SIRS criteria with temperature, HR, RR. LA normal. Blood cultures NTD. No tenderness over port site. UA not suggestive of UTI. RVP negative, but patient endorsing URI symptoms over the last week. Wet rales in left lung on exam. CXR with left mid lung streaky opacities. Treated initially as NF with possible pulmonary source with cefepime.  - d/c home w/o antibioitcs, therapy on hold for now, will be seen in clinic late this week.     JOSE MARIA on CKD: baseline Cr is 1.7. Cr today is 3.34. Suspect JOSE MARIA is secondary pre-renal etiologies. S/p aggressive rehydration. Cr stable at d/c. Anticipate improvement over next couple of days. PO intake improving. No issues with electrolytes or volume. Will d/c PTA K supplement for now. MM therapy on hold. Recheck BMP on Friday.      Anemia & Thrombocytopenia: Likely due to recent chemo & acute viral illness. PLT 56 today. Continue to hold plavix, not on DVT PPX.     Diarrhea: pt reportedly had one episode of large watery diarrhea before presentation. C diff ordered but has not been collected as the patient has not had any further bowel movements.     Multiple Myeloma: originally diagnosed in 01/2012 as stage I, status-post bone marrow transplant (1/9/2013) currently on chemotherapy with Zometa every 3 months (last on 10/3/17) and weekly Cytoxan.   - Continue Prednisone 50 mg PO every other day, will get Cytoxan likely Friday.  - not on venetoclax or dex.      Chronic medical issues:   GERD: continue PTA PPI  HTN: continue PTA amlodipine  HLD: continue PTA statin  Anxiety: continue PTA ativan  Chronic Pain: continue PTA oxycodone/tramadol  CAD: held PTA plavix as pt is thrombocytopenic.     CODE: DNR/DNI, discussed with patient and wife     Lorena  Bety  AGACNP-BC  [phone]  Hematology/Oncology  January 15, 2018    Pending Results   These results will be followed up by Efrem/Maureen  Unresulted Labs Ordered in the Past 30 Days of this Admission     Date and Time Order Name Status Description    1/14/2018 0912 Platelets prepare order unit In process     1/13/2018 0609 CRP inflammation In process     1/13/2018 0609 CBC with platelets In process     1/13/2018 0609 Basic metabolic panel In process     1/13/2018 0507 Blood culture Preliminary     1/13/2018 0507 Blood culture Preliminary     1/12/2018 2048 Blood culture Preliminary     1/12/2018 2048 Blood culture Preliminary         Code Status  FULL  Primary Care Physician   Sanket Burciaga    Physical Exam   Vital Signs with Ranges  Temp:  [97.3  F (36.3  C)-98.7  F (37.1  C)] 98.1  F (36.7  C)  Pulse:  [69-76] 74  Heart Rate:  [72-74] 74  Resp:  [16] 16  BP: ()/(56-84) 132/66  SpO2:  [95 %-97 %] 96 %  Constitutional: chronically ill apppearing, resting comfortably   Head: Normocephalic and atraumatic. No tenderness at sinuses  Eyes: Conjunctivae are normal. Pupils are equal, round, and reactive to light.    Oral: Pharynx has no erythema or exudate, mucous membranes are moist  Neck:   No adenopathy, no bony tenderness  Cardiovascular: Regular rate and rhythm without murmurs or gallops  Pulmonary/Chest: Lungs were CTAB today.  No tenderness over port site  GI: Soft with good bowel sounds.  Non-tender, non-distended, with no guarding, no rebound, no peritoneal signs.   Musculoskeletal:  No edema or clubbing   Skin: Skin is warm and dry. No rash noted.   Neurological: Alert and oriented to person, place, and time. Nonfocal exam  Psychiatric:  Normal mood and affect.    Discharge Disposition  Discharged home & in stable condition with close f/u scheduled. MM therapy currently on hold until visit this Friday.   Discharge Orders     CBC with platelets differential   Last Lab Result: Hemoglobin (g/dL)       Date                     Value                01/15/2018               8.6 (L)          ----------     Basic metabolic panel     Activity   Your activity upon discharge: activity as tolerated.     When to contact your care team   Please call the [facility] Triage RN Line [phone] ([facility] RN available M-F 8-5, after 5 pm the RN Advisor will page the On-Call Oncology Fellow who will return your call) for temperature greater than 100.4 F, uncontrolled nausea/vomiting/diarrhea or unrelieved constipation, pain not relieved by medications, bleeding not stopped by pressure, dizziness, chest pain, shortness of breath, changes in level of consciousness, or any other new concerning symptoms.     Adult [facility] Follow-up and recommended labs and tests   - F/U Labs & ALEXANDER visit (currently scheduled for 1/18, but trying to move to 1/19 r/t [facility] visit). Also has Cytoxan scheduled at that time. If patient is feeling well (will be evaluated by) Leanne Reddy PA-C okay to proceed with cytoxan infusion.     Appointments on [place] and/or [place] campus (with [facility] or [facility] provider or service). Call [phone] if you haven't heard regarding these appointments within 7 days of discharge.     Reason for your hospital stay   Admitted for NF & JOSE MARIA. Felt to have viral URI, and a few days of anorexia. Felt pre-renal dehydration causing elevation of Cr up to 3.4 (baseline 1.7-1.9). Fever & URI symptoms have now resolved & PO intake improved, ANC 1.4, no bacterial source identified will not d/c on antibiotics. Cr (3.4/3.39) stable, making urine, no electrolyte or volume issues. Patient feeling better, d/c to home with close hospital f/u on 1/18 (or 19 scheduling working on aligning it with palliative care visit). Efrem says okay to resume therapy as long as patient still feeling well (okay to continue prednisone), cytoxan scheduled for Friday.     DNR/DNI     Diet   Follow this diet upon discharge: regular diet as  tolerated.       Discharge Medications   Current Discharge Medication List      START taking these medications    Details   acetaminophen (TYLENOL) 325 MG tablet Take 2 tablets (650 mg) by mouth every 4 hours as needed for mild pain or fever  Qty: 100 tablet    Associated Diagnoses: Fever, unspecified fever cause         CONTINUE these medications which have CHANGED    Details   predniSONE (DELTASONE) 50 MG tablet Take 1 tablet (50 mg) by mouth every other day    Associated Diagnoses: Multiple myeloma, remission status unspecified (H)         CONTINUE these medications which have NOT CHANGED    Details   acyclovir (ZOVIRAX) 400 MG tablet Take 1 tablet (400 mg) by mouth 2 times daily  Qty: 60 tablet, Refills: 3    Associated Diagnoses: Multiple myeloma in relapse (H)      OLANZapine (ZYPREXA) 5 MG tablet Take 0.5 tablets (2.5 mg) by mouth At Bedtime  Qty: 60 tablet, Refills: 0    Associated Diagnoses: Delirium; Multiple myeloma in relapse (H)      traMADol (ULTRAM) 50 MG tablet Take 1 tablet (50 mg) by mouth every 6 hours as needed for moderate pain . Recommend trying after Tylenol and before Dilaudid.  Qty: 60 tablet, Refills: 3    Associated Diagnoses: Acute bilateral low back pain without sciatica; Multiple myeloma in relapse (H)      oxyCODONE (ROXICODONE) 5 MG IR tablet Take 1 tablet (5 mg) by mouth every 6 hours as needed for moderate to severe pain  Qty: 15 tablet, Refills: 0      SIMVASTATIN PO Take 20 mg by mouth At Bedtime      amLODIPine (NORVASC) 5 MG tablet TAKE 1 TABLET BY MOUTH AT BEDTIME  Qty: 90 tablet, Refills: 1    Associated Diagnoses: Essential hypertension      LORazepam (ATIVAN) 0.5 MG tablet Take 1 tablet (0.5 mg) by mouth every 6 hours as needed for nausea or anxiety.  Qty: 30 tablet, Refills: 0    Associated Diagnoses: Multiple myeloma in relapse (H); Delirium      ondansetron (ZOFRAN-ODT) 8 MG ODT tab Take 1 tablet (8 mg) by mouth every 8 hours as needed for nausea  Qty: 30 tablet, Refills:  3    Associated Diagnoses: Nausea      esomeprazole (NEXIUM) 40 MG CR capsule Take 1 capsule (40 mg) by mouth every morning (before breakfast)  Qty: 30 capsule, Refills: 0    Associated Diagnoses: Gastroesophageal reflux disease without esophagitis         STOP taking these medications       potassium chloride SA (K-DUR/KLOR-CON M) 20 MEQ CR tablet Comments:   Reason for Stopping:         dexamethasone (DECADRON) 4 MG tablet Comments:   Reason for Stopping:         nystatin (MYCOSTATIN) [id number] UNIT/ML suspension Comments:   Reason for Stopping:         venetoclax (VENCLEXTA) 100 MG tablet CHEMOTHERAPY Comments:   Reason for Stopping:         clopidogrel (PLAVIX) 75 MG tablet Comments:   Reason for Stopping:             Allergies   Allergies   Allergen Reactions     Aspirin      Stomach upset     Crestor [Rosuvastatin] Muscle Pain (Myalgia)     Bactrim [Sulfamethoxazole W-Trimethoprim] Rash     Data   Recent Labs   Lab Test  01/15/18   0842  01/14/18   0802  01/13/18   1756  01/13/18   0609  01/12/18   2112   08/29/17   1647  08/16/17   1435   05/13/17   0701   05/08/17   1032   01/07/16   1050   12/22/14   1438   WBC  1.7*  2.0*  2.1*  2.3*  1.9*   < >  4.7  3.7*   < >  5.4   < >  3.0*   < >  3.5*   < >   --    HGB  8.6*  8.3*  6.8*  7.3*  8.2*   < >  7.6*  8.2*   < >  8.3*   < >  10.0*   < >  10.0*   < >   --    PLT  53*  21*  22*  21*  27*   < >  122*  120*   < >  138*   < >  195   < >  108*   < >   --    NA  142  142   --   141  136   < >  137  137   < >  145*   < >  139   < >  144   < >   --    POTASSIUM  4.1  4.4   --   4.8  5.0   < >  4.1  4.2   < >  3.7   < >  3.9   < >  3.5   < >   --    CHLORIDE  114*  116*   --   115*  108   < >  103  104   < >  114*   < >  106   < >  108   < >   --    CO2  16*  16*   --   16*  17*   < >  27  23   < >  21   < >  23   < >   --    < >   --    ANIONGAP  12  10   --   10  11   < >  7  10   < >  10   < >  10   < >   --    < >   --    BUN  43*  45*   --   50*  54*   < >  20  18    < >  30   < >  47*   < >  16  22.9   < >   --    CR  3.39*  3.40*   --   3.40*  3.34*   < >  0.99  1.41*   < >  0.98   < >  1.44*   < >  0.7   < >   --    JAZMIN  8.3*  7.9*   --   8.0*  8.9   < >  8.9  8.9   < >  7.5*   < >  10.2*   < >  8.9   < >   --    MAG  1.7  1.8   --    --    --    --    --    --    < >  1.7   --   2.0   --    --    < >   --    PHOS   --    --    --    --    --    --    --    --    --   2.3*   --   3.8   --    --    < >  2.2*   LDH   --    --    --    --    --    --    --    --    --    --    --    --    --   154   --   452*   URIC   --    --    --    --    --    --   6.5  6.5   < >  2.8*   < >  8.3*   --    --    --    --    BILITOTAL   --    --    --    --   0.3   < >  0.6  0.3   < >  0.3   < >   --    < >  0.60   < >   --    ALKPHOS   --    --    --    --   36*   < >  45  51   < >  36*   < >   --    < >  48   < >   --    ALT   --    --    --    --   31   < >  14  12   < >  15   < >   --    < >  13   < >   --    AST   --    --    --    --   12   < >  10  10   < >  8   < >   --    < >  11   < >   --    ALBUMIN   --    --    --    --   2.0*   < >  3.1*  2.7*   < >  2.5*   < >   --    < >  3.9   < >   --     < > = values in this interval not displayed.       Staff Addendum: Patient was seen and examined by me. All labs, VS and pertinent images were reviewed with the team on rounds. Plans for care were mutually developed and outlined above with my direct input.    Interval Events/ROS: Yamil looks and is feeling much better. Had one loose stool yesterday yet is eating and drinking well. No new fevers. Would like to go home.     Exam: NAD/AAO, cooperative with exam. OP and lungs were clear, HRRR, Abd soft/NT/ND, no HSM or masses, No LE edema, Nro grossly non-focal, Skin exam was without rash.    A/P:  73 yo M with refractory MM (IgM), here with fever of unexplained origin.  - Will d/c today and pick back up on chemo this coming Friday.   - Yamil to monitor his fever curve at home. Casey is aware.   -  >30 min spent on coordination of D/C.     Kamari Topete DO

## 2018-01-15 NOTE — PLAN OF CARE
Problem: Patient Care Overview  Goal: Plan of Care/Patient Progress Review  Pt D/C to home with wife. Discharge instructions discussed with pt and paperwork signed. Port 28 day heparin locked and de-accessed. All belongings with Pt.

## 2018-01-15 NOTE — PROGRESS NOTES
"CLINICAL NUTRITION SERVICES - ASSESSMENT NOTE     Nutrition Prescription    RECOMMENDATIONS FOR MDs/PROVIDERS TO ORDER:  -Continue trying appetite stimulants for patient.   -Recommend patient follow with OP dietitian if he continues with poor PO to assess need for further nutritional interventions.    Malnutrition Status:    -Non-severe    Recommendations already ordered by Registered Dietitian (RD):  -Ordered magic cup    Future/Additional Recommendations:  -Monitor PO intakes, if pt eating <50% of meals recommend starting calorie counts to assess need for further nutritional interventions.      REASON FOR ASSESSMENT  Anthony Carbone is a/an 72 year old male assessed by the dietitian for Admission Nutrition Risk Screen for reduced oral intake over the last month    NUTRITION HISTORY  Pt sleeping at time of visit obtained nutrition history from wife. Per wife pt has low appetite at baseline. He eats 3 small meals per day. He has tried multiple supplements (ensure,boost,carnation instant breakfast,magic cup) in the past but does not like these or will like them for a while but then not want them anymore. Over the last week pt's intake has decreased further d/t not feeling well.     Per wife pt has been trying different medications to improve intake and is currently taking zyprexa.     Per H&P not eating or drinking much over the last week.   PMH: multiple myeloma, status-post bone marrow transplant (1/9/2013) currently on chemotherapy with Zometa every 3 months (last on 10/3/17) and weekly Cytoxan, hypertension and GERD. Admitted with fevers and JOSE MARAI    CURRENT NUTRITION ORDERS  Diet: Regular  Intake/Tolerance: no appetite per flowsheets.     LABS  Labs reviewed    MEDICATIONS  Medications reviewed    ANTHROPOMETRICS  Height: 162.6 cm (5' 4\")  Most Recent Weight: 50.8 kg (112 lb 1.6 oz)    IBW: 59.1 kg  BMI: Normal BMI  Weight History:   Wt Readings from Last 15 Encounters:   01/14/18 50.8 kg (112 lb 1.6 oz) "   01/11/18 49.3 kg (108 lb 11.2 oz)   01/04/18 51.2 kg (112 lb 14.4 oz)   12/29/17 51.2 kg (112 lb 14.2 oz)   12/26/17 51.8 kg (114 lb 4.8 oz)   12/22/17 51.5 kg (113 lb 9.6 oz)   12/19/17 53 kg (116 lb 12.8 oz)   12/13/17 53.5 kg (118 lb)   12/08/17 54 kg (119 lb 1.6 oz)   12/07/17 53 kg (116 lb 12.8 oz)   11/30/17 54.2 kg (119 lb 6.4 oz)   11/21/17 53.7 kg (118 lb 4.8 oz)   11/16/17 53 kg (116 lb 12.8 oz)   11/08/17 53.4 kg (117 lb 12.8 oz)   11/01/17 54.1 kg (119 lb 3.2 oz)   ~5% weight loss in 1 month  Dosing Weight: 51 kg    ASSESSED NUTRITION NEEDS  Estimated Energy Needs: 6610-0327 kcals/day (30 - 35 kcals/kg )  Justification: Underweight/repletion  Estimated Protein Needs: 61-77 grams protein/day (1.2 - 1.5 grams of pro/kg)  Justification: Hypercatabolism with critical illness and Repletion  Estimated Fluid Needs: 1 mL/kcal  Justification: Maintenance    PHYSICAL FINDINGS  See malnutrition section below.    MALNUTRITION  % Intake: </=75% for >/= 1 month-likely  % Weight Loss: 5% in 1 month   Subcutaneous Fat Loss: Unable to assess  Muscle Loss: Unable to assess  Fluid Accumulation/Edema: None noted  Malnutrition Diagnosis: (at least) Non-severe malnutrition in the context of    NUTRITION DIAGNOSIS  Inadequate oral intake related to hx of chronic decreased appetite/intake with even further decrease in the last week d/t not feeling well as evidenced by pt's wife verbalization.     INTERVENTIONS  Implementation  Nutrition Education: Provided education to patients wife regarding ways to increase kcals/protein, discussed supplements will try magic cup again.     Goals  Patient to consume at least 50% of nutritionally adequate meal trays TID, or the equivalent with supplements/snacks.     Monitoring/Evaluation  Progress toward goals will be monitored and evaluated per protocol.    Ritika De La Paz RD, LD  Pager: [phone]

## 2018-01-15 NOTE — PROGRESS NOTES
SPIRITUAL HEALTH SERVICES  SPIRITUAL ASSESSMENT Progress Note  [facility] ([place]) 7C    REFERRAL SOURCE: follow-up for on-call     I met Yamil and Casey, Yamil's wife. Casey shared they are looking forward to Yamil being discharged today. She shared that Yamil has been treated for cancer for six years and his current hospital stay is the result of an unknown infection. I offered a blessing to Yamil and Casey as he plans to transition back home today. Casey shared that she is [employer] and Yamil is Christian. Their maurizio is a source of meaning and support to them. [place] was requested and shared.     PLAN: I will notify unit  of care provided.     Oliva Mandel   Intern  Pager [phone]

## 2018-01-15 NOTE — PLAN OF CARE
Problem: Patient Care Overview  Goal: Plan of Care/Patient Progress Review  Outcome: Improving  AVSS. A&Ox4. Apical pulse regular. Lungs CTA. Abdomen soft. Bowel sounds active in all quadrants. Tolerating regular diet. Voiding spontaneously with adequate output. Port Hep locked. Pt ambulated in saini x2. Contact/Droplet precautions removed d/t Negative results from Nasal Swab. Denies pain. Continue with POC. Pt would not like bedside report.

## 2018-01-15 NOTE — PLAN OF CARE
"Problem: Patient Care Overview  Goal: Plan of Care/Patient Progress Review  Outcome: Improving  Pain: Denies pain   VSS   Output: Adequate output, using bedside urinal   Diet: Regular   Bowel function: bowel sounds heard in all four quadrants, pt reports \" unsure\" of passing gas, no bm this shift   Lines: R port, dressing CDI   Plan: Continue to monitor VS, pain, output, and bowel function       "

## 2018-01-16 NOTE — PLAN OF CARE
Problem: Patient Care Overview  Goal: Plan of Care/Patient Progress Review  Physical Therapy Discharge Summary  Reason for discharge:   Discharge from hospital to home with wife  Progress towards goals: See goals on Care Plan in [facility] electronic health record for goal details.  Goals partially met - is SBA for gait and stairs, aerobic activity and fallr isk goals NT due to discharged. Barriers to achieving goals: limited tolerance,  Decreased balance, fatigue  Recommendation(s):   Continued therapy is recommended. Rationale/Recommendations: Home vs outpatient PT to increase safety and independence with basic functional mobility, and to address unmet goals. Per notes

## 2018-01-19 PROBLEM — R19.7 DIARRHEA: Status: ACTIVE | Noted: 2018-01-01

## 2018-01-19 NOTE — MR AVS SNAPSHOT
After Visit Summary   1/19/2018    Anthony Carbone    MRN: [id number]           Patient Information     Date Of Birth          1945        Visit Information        Provider Department      1/19/2018 10:00 AM  17 ATC;  ONCOLOGY INFUSION [facility]        Today's Diagnoses     Multiple myeloma, remission status unspecified (H)    -  1    Multiple myeloma in relapse (H)          Care Instructions    Contact Numbers    [facility] Main Line: [phone]  [facility] Triage:  [phone]    Call triage with chills and/or temperature greater than or equal to 100.5, uncontrolled nausea/vomiting, diarrhea, constipation, dizziness, shortness of breath, chest pain, bleeding, unexplained bruising, or any new/concerning symptoms, questions/concerns.     If you are having any concerning symptoms or wish to speak to a provider before your next infusion visit, please call your care coordinator or triage to notify them so we can adequately serve you.       After Hours: [phone]    If after hours, weekends, or holidays, call main hospital  and ask for Oncology doctor on call.           January 2018 Sunday Monday Tuesday Wednesday Thursday Friday Saturday        1     2     Outpatient Visit    9:54 AM   [facility]     LEVEL 1    3:00 PM   (60 min.)    INFUSION CHAIR 11   [facility] 3     4     [facility] LAB DRAW    1:15 PM   (15 min.)    [facility] LAB DRAW   [facility] Lab Draw     [facility] RETURN    1:45 PM   (50 min.)   Leanne Reddy PA-C   [facility] ONC INFUSION 120    3:00 PM   (120 min.)    ONCOLOGY INFUSION   [facility] 5     6       7     8     LEVEL 5    9:00 AM   (300 min.)    INFUSION CHAIR 17   [facility]     Outpatient Visit   10:38 AM   Kamari Topete MD   [facility] Lab 9     10      11     [facility] LAB DRAW   12:45 PM   (15 min.)   [facility]     [facility] RETURN   12:55 PM   (50 min.)   Leanne Reddy PA-C [facility] ONC INFUSION 120    2:00 PM   (120 min.)    ONCOLOGY INFUSION   [facility] 12     Admission    8:34 PM   Kamari Topete MD   Unit 7C [facility]   (Discharge: 1/15/2018)     XR CHEST 2 VIEWS    8:40 PM   (15 min.)   UUXR1   [facility] [place],  Radiology 13       14     IP EVALUATION    6:00 AM   (60 min.)   Vonda Melendez, PT   [facility] [place], Physical Therapy 15     16     17     18     19     P NEW    7:45 AM   (60 min.)   Gaston Bowie MD   [facility] LAB DRAW    9:00 AM   (15 min.)    [facility] LAB DRAW   [facility] Draw     [facility] RETURN    9:15 AM   (50 min.)   Leanne Reddy PA-C   [facility] ONC INFUSION 120   10:00 AM   (120 min.)    ONCOLOGY INFUSION   [facility] 20       21     22     23     24     25     [facility] LAB DRAW    1:30 PM   (15 min.)    [facility]RAW   [facility]     [facility] RETURN    1:45 PM   (50 min.)   Leanne Reddy PA-C [facility] ONC INFUSION 120    3:00 PM   (120 min.)    ONCOLOGY INFUSION   [facility] 26     27       28     29     30     31 February 2018 Sunday Monday Tuesday Wednesday Thursday Friday Saturday                       1     2     3       4     5     6     7     8     9     10       11     12     13     14     15     16     17       18     19     20     21     22     23     24       25     26     27     28                                 Lab Results:  Recent Results (from the past 12 hour(s))   CBC with platelets differential    Collection Time: 01/19/18  9:14 AM   Result Value Ref Range    WBC 3.2 (L) 4.0 -  11.0 10e9/L    RBC Count 2.97 (L) 4.4 - 5.9 10e12/L    Hemoglobin 9.2 (L) 13.3 - 17.7 g/dL    Hematocrit 30.3 (L) 40.0 - 53.0 %     (H) 78 - 100 fl    MCH 31.0 26.5 - 33.0 pg    MCHC 30.4 (L) 31.5 - 36.5 g/dL    RDW 17.4 (H) 10.0 - 15.0 %    Platelet Count 35 (LL) 150 - 450 10e9/L    Diff Method Automated Method     % Neutrophils 85.2 %    % Lymphocytes 4.3 %    % Monocytes 8.6 %    % Eosinophils 0.0 %    % Basophils 0.0 %    % Immature Granulocytes 1.9 %    Nucleated RBCs 0 0 /100    Absolute Neutrophil 2.8 1.6 - 8.3 10e9/L    Absolute Lymphocytes 0.1 (L) 0.8 - 5.3 10e9/L    Absolute Monocytes 0.3 0.0 - 1.3 10e9/L    Absolute Eosinophils 0.0 0.0 - 0.7 10e9/L    Absolute Basophils 0.0 0.0 - 0.2 10e9/L    Abs Immature Granulocytes 0.1 0 - 0.4 10e9/L    Absolute Nucleated RBC 0.0     Platelet Estimate Confirming automated cell count    Basic metabolic panel    Collection Time: 01/19/18  9:14 AM   Result Value Ref Range    Sodium 137 133 - 144 mmol/L    Potassium 4.2 3.4 - 5.3 mmol/L    Chloride 108 94 - 109 mmol/L    Carbon Dioxide 16 (L) 20 - 32 mmol/L    Anion Gap 13 3 - 14 mmol/L    Glucose 185 (H) 70 - 99 mg/dL    Urea Nitrogen 44 (H) 7 - 30 mg/dL    Creatinine 2.70 (H) 0.66 - 1.25 mg/dL    GFR Estimate 23 (L) >60 mL/min/1.7m2    GFR Estimate If Black 28 (L) >60 mL/min/1.7m2    Calcium 8.5 8.5 - 10.1 mg/dL               Follow-ups after your visit        Your next 10 appointments already scheduled     Jan 25, 2018  1:30 PM [facility]   [facility] Lab Draw with  [facility] LAB DRAW   [facility] Lab Draw ([facility])    [address]  [address] [place] [address]   [phone]            Jan 25, 2018  2:00 PM CST   (Arrive by 1:45 PM)   Return Visit with ALPHONSO Coffey [facility] ([facility])    [address] [place] [address] [address]  [address]  [facility] [address]   [phone]            Jan 25, 2018  3:00 PM CST    Infusion 120 with [facility] ONCOLOGY INFUSION, [facility] 23 ATC   [facility] ([facility])    [address]  [address]  [facility] [address] [phone]              Who to contact     If you have questions or need follow up information about today's clinic visit or your schedule please contact [facility] directly at [phone].  Normal or non-critical lab and imaging results will be communicated to you by [employer], letter or phone within 4 business days after the clinic has received the results. If you do not hear from us within 7 days, please contact the clinic through [employer] or phone. If you have a critical or abnormal lab result, we will notify you by phone as soon as possible.  Submit refill requests through [employer] or call your pharmacy and they will forward the refill request to us. Please allow 3 business days for your refill to be completed.          Additional Information About Your Visit        [employer] Information     [employer] gives you secure access to your electronic health record. If you see a primary care provider, you can also send messages to your care team and make appointments. If you have questions, please call your primary care clinic.  If you do not have a primary care provider, please call [phone] and they will assist you.        Care EveryWhere ID     This is your Care EveryWhere ID. This could be used by other organizations to access your [place] medical records  [id number]         Blood Pressure from Last 3 Encounters:   01/19/18 109/75   01/19/18 102/67   01/15/18 132/66    Weight from Last 3 Encounters:   01/19/18 50.2 kg (110 lb 11.2 oz)   01/19/18 50.3 kg (110 lb 12.8 oz)   01/14/18 50.8 kg (112 lb 1.6 oz)              We Performed the Following     Basic metabolic panel     CBC with platelets differential        Primary Care Provider Office Phone # Fax #    Sanket Burciaga [phone]       [facility] [address] [facility] [address]        Equal Access to Services     ALBAN SHAH : Hadii chalo Davis, wanadine ge, qaclaudia michaudmaabhijit abid, jonny pricepetrmartha villa. So [facility] [phone].    ATENCIÓN: Si habla español, tiene a todd disposición servicios gratuitos de asistencia lingüística. Llame al [phone].    We comply with applicable federal civil rights laws and [place] laws. We do not discriminate on the basis of race, color, national origin, age, disability, sex, sexual orientation, or gender identity.            Thank you!     Thank you for choosing [facility]  for your care. Our goal is always to provide you with excellent care. Hearing back from our patients is one way we can continue to improve our services. Please take a few minutes to complete the written survey that you may receive in the mail after your visit with us. Thank you!             Your Updated Medication List - Protect others around you: Learn how to safely use, store and throw away your medicines at www.disposemymeds.org.          This list is accurate as of: 1/19/18 12:26 PM.  Always use your most recent med list.                   Brand Name Dispense Instructions for use Diagnosis    acetaminophen 325 MG tablet    TYLENOL    100 tablet    Take 2 tablets (650 mg) by mouth every 4 hours as needed for mild pain or fever    Fever, unspecified fever cause       acyclovir 400 MG tablet    ZOVIRAX    60 tablet    Take 1 tablet (400 mg) by mouth 2 times daily    Multiple myeloma in relapse (H)       amLODIPine 5 MG tablet    NORVASC    90 tablet    TAKE 1 TABLET BY MOUTH AT BEDTIME    Essential hypertension       esomeprazole 40 MG CR capsule    nexIUM    30 capsule    Take 1 capsule (40 mg) by mouth every morning (before breakfast)    Gastroesophageal reflux disease without esophagitis       LORazepam 0.5 MG tablet    ATIVAN    30 tablet    Take 1 tablet  (0.5 mg) by mouth every 6 hours as needed for nausea or anxiety.    Multiple myeloma in relapse (H), Delirium       OLANZapine 5 MG tablet    zyPREXA    60 tablet    Take by mouth At Bedtime Takes 1 tablet    Delirium, Multiple myeloma in relapse (H)       ondansetron 8 MG ODT tab    ZOFRAN-ODT    30 tablet    Take 1 tablet (8 mg) by mouth every 8 hours as needed for nausea    Nausea       oxyCODONE IR 5 MG tablet    ROXICODONE    15 tablet    Take 1 tablet (5 mg) by mouth every 6 hours as needed for moderate to severe pain        predniSONE 50 MG tablet    DELTASONE     Take 1 tablet (50 mg) by mouth every other day    Multiple myeloma, remission status unspecified (H)       SIMVASTATIN PO      Take 20 mg by mouth At Bedtime        traMADol 50 MG tablet    ULTRAM    60 tablet    Take 1 tablet (50 mg) by mouth every 6 hours as needed for moderate pain . Recommend trying after Tylenol and before Dilaudid.    Acute bilateral low back pain without sciatica, Multiple myeloma in relapse (H)

## 2018-01-19 NOTE — NURSING NOTE
"Oncology Rooming Note    January 19, 2018 8:02 AM   Anthony Carbone is a 72 year old male who presents for:    Chief Complaint   Patient presents with     Oncology Clinic Visit     new patient visit for consultation related to pain management      Initial Vitals: /67  Pulse 100  Temp 98.2  F (36.8  C) (Oral)  Resp 17  Ht 1.626 m (5' 4\")  Wt 50.3 kg (110 lb 12.8 oz)  SpO2 97%  BMI 19.02 kg/m2 Estimated body mass index is 19.02 kg/(m^2) as calculated from the following:    Height as of this encounter: 1.626 m (5' 4\").    Weight as of this encounter: 50.3 kg (110 lb 12.8 oz). Body surface area is 1.51 meters squared.  No Pain (0) Comment: Data Unavailable   No LMP for male patient.  Allergies reviewed: Yes  Medications reviewed: Yes    Medications: Medication refills not needed today.  Pharmacy name entered into [facility]:    [id number] - [address] AT [facility]  [facility] [place], [place] - [address]  [facility] [address] [facility] [address] [address] AT [facility] [address]    Clinical concerns: no concerns dr. rangel was notified.    6 minutes for nursing intake (face to face time)     Riley Felipe CMA              "

## 2018-01-19 NOTE — Clinical Note
1/19/2018       RE: Anthony Carbone  [address] [place] [place] [address]     Dear Colleague,    Thank you for referring your patient, Anthony Carbone, to the [facility]. Please see a copy of my visit note below.    HEMATOLOGY/ONCOLOGY PROGRESS NOTE  Jan 19, 2018    REASON FOR VISIT: myeloma    Diagnosis: 72 year old gentleman with IgM lambda multiple myeloma originally diagnosed in 01/2012 as stage I, standard risk disease.   Treatment: Revlimid plus dexamethasone for 4-5 cycles but plateaued by late 03/2012.   - Velcade was added and he received another 2-3 cycles, achieving a good partial remission.      Transplant: Single auto after melphalan 200 mg/m2 preparative regimen on 01/09/2013  - Post-transplant course: unremarkable except for some mild steroid-induced hyperglycemia, gastritis, nausea and vomiting.      Maintenance: Lenalidomide at day-100 then developed a maculopapular rash. Lenalidomide was held and then we re-challenged him after about a month to 2 months at a lower dose (5 mg daily); rash returned.   - was started on maintenance Velcade, every other week through July 2014, when he was noted with abnormalities on myeloma studies drawn there. Noted with prostate cancer dx at about the time of relapse (see below).     Relapse: noted with return on M-spike in blood/urine with marrow involvement but negative PET.   - Started on retreatment with RVD on 8/27/14 with Revlimid at 15 mg 14 days of 21 days, dexamethasone 40 mg weekly and velcade weekly.Completed at total of 5 cycles without complication by 12/2014.   - Started Cycle 6 on inc'd Rev dosing of 20mg daily x 2 weeks on 12/11/14 which was complicated by pneumonia.  - Adm 12/21-1/10 for human metapneumovirus pneumonia complicated by anorexia, HTN, depression, anorexia with significant weight loss.   - Restarted Ernesto/Dex only on 2/4/15 with good tolerance but with thrombocytopenia.   - Bendamustine added to  Velcade/dexamethasone on 5/21/15 due to disease progression  - Velcade discontinued on 7/9/2015 due to side effects (orthostasis)  - Schedule changed to bendamustine 80 mg/m2 days 1 and 2 on 28-day cycle  - Cycle 1 tolerated poorly due to lightheadedness and weakness  - Cycle 2 dose reduced to 60 mg/m2 and pre-meds adjusted  - Cycle 5 received on 9/17/15.  - 10/1/2015 - increasing IgM, M-spike - started Pomalidomide 4mg/day (21 days out of 28 days) and weekly Decadron 20mg on Oct 1st, 2015.    - Carfilzomib added on 10/22/2015 making this CPD.  - C3-C6  received in [place]    - [facility] and resumed CPD here    - Adm: 4/12-4/14 with fever, confusion, neutropenia. Noted on MRI to have acute/subacute CVA and subacute/chronic CVA; started on Plavix, given brief course of Abx and GCSF prior to d/c.     - Continued on Carfilzomib and dexamethasone alone  - Pomalyst added back on 7/13/2016 for rising FLC  - Start daratumumab 11/10/16. Changed to every other week after 4 weekly treatments d/t profound fatigue and malaise.   - Adm 5/7-5/16 due to AMS, hypercalcemia, hyperuricemia, possible PNA, back pain, and JOSE MARIA. Started Kyprolis while inpatient.  - Re-admitted 5/25-/529 with recurrent AMS, found to have concomitant PNA  - Resumed Kyprolis 6/13/2017. Stopped due to worsening performance status and progressive myeloma.  - Started Venclexta 800 mg 8/25/2017  - Added weekly Velcade with dexamethasone 20 mg weekly due to rising light chains on 11/1/2017  - Started weekly Cytoxan with prednisone QOD on 12/13 (though started the prednisone around 12/24)    He was supposed to receive Cytoxan walter 3, day 8 on 1/11/2018, however patient was not feeling well with upper respiratory symptoms, poor appetite and his renal function had worsened.  He was given IV fluids and Cytoxan was held.  He was supposed to return the following day for another liter of IV fluids, however developed diarrhea and he went to the emergency  "department.  He was febrile, tachycardic, tachypneic, creatinine worsened and he was weak.  He was admitted and given IV fluids, cefepime and was discharged on 1/15/18.  He is here today for hospital follow-up and depending on how he is feeling and labs today, may consider first dose of Cytoxan. ***    INTERVAL HISTORY:    ***    Remainder of 10-pt ROS otherwise is negative.    PHYSICAL EXAMINATION  /75 (BP Location: Right arm, Patient Position: Sitting, Cuff Size: Adult Regular)  Pulse 98  Temp 97.2  F (36.2  C) (Oral)  Resp 18  Ht 1.626 m (5' 4.02\")  Wt 50.2 kg (110 lb 11.2 oz)  SpO2 96%  BMI 18.99 kg/m2 ***    Wt Readings from Last 10 Encounters:   18 50.2 kg (110 lb 11.2 oz)   18 50.3 kg (110 lb 12.8 oz)   18 50.8 kg (112 lb 1.6 oz)   18 49.3 kg (108 lb 11.2 oz)   18 51.2 kg (112 lb 14.4 oz)   17 51.2 kg (112 lb 14.2 oz)   17 51.8 kg (114 lb 4.8 oz)   17 51.5 kg (113 lb 9.6 oz)   17 53 kg (116 lb 12.8 oz)   17 53.5 kg (118 lb)     ***General: Alert. Oriented to name, , location,  Able to ambulate independently, albeit slow and cautiously.  No acute distress. HEENT: PERRL, no palor or icterus. CVS: RRR with occasional premature beat. CHEST: CTAB, normal work of breathing ABDOMEN: soft non tender no masses     NEURO: AAOX3  CN 2-12 intact  SKIN: no bleeding or brusiing, no rashes EXTREMITIES: No edema.     LABS:   Results for WILLIAN ARCINIEGA (MRN [id number]) as of 2018 11:33   2018 09:14   Sodium 137   Potassium 4.2   Chloride 108   Carbon Dioxide 16 (L)   Urea Nitrogen 44 (H)   Creatinine 2.70 (H)   GFR Estimate 23 (L)   GFR Estimate If Black 28 (L)   Calcium 8.5   Anion Gap 13   Glucose 185 (H)   WBC 3.2 (L)   Hemoglobin 9.2 (L)   Hematocrit 30.3 (L)   Platelet Count 35 (LL)   RBC Count 2.97 (L)    (H)   MCH 31.0   MCHC 30.4 (L)   RDW 17.4 (H)   Diff Method Automated Method   % Neutrophils 85.2   % Lymphocytes " 4.3   % Monocytes 8.6   % Eosinophils 0.0   % Basophils 0.0   % Immature Granulocytes 1.9   Nucleated RBCs 0   Absolute Neutrophil 2.8   Absolute Lymphocytes 0.1 (L)   Absolute Monocytes 0.3   Absolute Eosinophils 0.0   Absolute Basophils 0.0   Abs Immature Granulocytes 0.1   Absolute Nucleated RBC 0.0   Platelet Estimate Confirming automa...       IMPRESSION/PLAN:  72 year old male with relapsed MM after autologous transplant (1/9/2013), status-post multiple salvage regimens with progressive disease.    MM: With rising light chains, worsening renal function, and worsening TCP, started weekly cytoxan with prednisone 50  mg QOD on 12/14. MM labs drawn 1/2 demonstrated sharp increase, however this was obtained after only 3 doses of Cytoxan and less than a week of concurrent use with the prednisone. Given he has no other options, we decided to continue with close follow-up. Additionally, we have had multiple discussions regarding goals and hospice. He has felt that this treatment regimen was tolerable, and has wished to continue.    Today, his renal function has again declined and his protein slightly more elevated (which may be from impaired renal function as well). This could be attributed to progressive myeloma, though could also be secondary to dehydration in the context of recent viral illness accompanied by brief anorexia. Given that he has only just started to recover from his recent illness and with the possibility that his myeloma is progressing through this regimen, will hold cytoxan today and administer a liter of NS. D/w Dr. Topete. Will plan to have him return tomorrow for additional fluids and Cytoxan, if still improving overall. Our infusion RNs will page me with an update.  - Continue monthly Zometa    Heme: Cytopenias 2/2 treatment and likely MM, now worsened with Cytoxan.   - Previously on Plavix, remains on hold given thrombocytopenia, indefinitely  - Transfuse to keep hemoglobin > 8, platelets >  10k.   - Will continue twice weekly checks    Renal/FEN: Creat baseline ~0.8-1.0, recently has been increased beyond baseline, likely secondary to progressive myeloma. Worsening today (1.83 --> 2.42), as above. IVF today and tomorrow, as above.  - Weight continues to decline, recently in setting of suspected viral URI. Eating better as of today.   -Encouraged protein/calorie supplements.   -discussed appetite stimulants including medical cannabis at last visit, decided to increase Zyprexa from 2.5 mg to 5 mg. Will continue that for now.    ID: Presently afebrile. Recently had another URI (this is the second one in the last few weeks), mostly nasal congestion and fatigue. He started to recover in the last 24 hours. He will notify us should this worsening.  - Continues ppx  mg BID     Back/rib pain: Started early May. Lumbar MRI at OSH showing mild-mod central and foraminal stenoses in lumbar spine, per patient and wife, there was no MM lesion there. Rib films inpatient negative, back films stable from prior imaging. Pain remains largely resolved now and he is not using any pain meds.    Fatigue: Stable today. Likely multifactorial in setting of treatment, MM, deconditioning, anemia.      CV: Continue zocor and norvasc.   - Avoid QTc prolonging agents (history of QTc prolongation with syncopal episodes)     AMS: AMS started early May in setting of metabolic derangements, uremia, and possible infection.  Remains intermittently confused over the past few months but improved significantly, worsened over the weekend but then resolved. Stable today.  - Continue Zyprexa 2.5 mg at bedtime, increasing to 5 mg for appetite  - Holding off long-acting pain meds    Plan summary:  - No Cytoxan today  - Give 1 liter NS in infusion  - Recheck labs tomorrow, give an additional liter NS tomorrow and Cytoxan if still symptomatically improving in regards to URI/fatigue    I spent >25 minutes with the patient, with over 50% of the  time spent counseling or coordinating their care as described above.         Leanne Reddy PA-C        Again, thank you for allowing me to participate in the care of your patient.      Sincerely,    Leanne Reddy PA-C

## 2018-01-19 NOTE — PROGRESS NOTES
"Palliative Staff/Outpatient Clinic    (This note was transcribed using voice recognition software. While I review and edit the transcription, I may miss errors. Please let me know of any serious mis-transcriptions and I will addend this note.)    CC/Patient ID:  Multiple myeloma  Our inpatient team met him last summer.    History:  He is seen with his wife today who supplements the history.  I reviewed his history in the chart and confirmed key details with him.  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  Been progressing the last year and is now on Cytoxan based chemotherapy.  He is continuing to have a significant functional decline the last several months.  He spends most of the day in bed or resting.  He is profoundly weak and tired.  He says he is to get Cytoxan and gets very weak, begins recovering partially only to get another dose of Cytoxan.  Physically otherwise his major complaint is diarrhea which happens once or twice a week, explosively, without warning pattern, is usually just 1 or 2 episodes they take.  Imodium.  It does not seem to be related temporally to chemo or food.    His appetite is low and is losing weight.  No nausea and vomiting.  Pain is much better than it was several months ago and he rarely takes tramadol.  He has had pain in his back from pathologic fractures.    He tells me his major goal is to feel stronger.  When I ask him what he looks forward to day-to-day he says \"taking a nap.\"  He is a vague idea of going to [place] for a few weeks where they have a condo and friends.  He also says that longevity is important to him in his office he chosen to continue treatment despite all of the problems lately.    SH:   Lives with his wife.  They have a dog.  No children.  Some extended family are involved in their life    ROS:  Patient completed comprehensive electronic ROS form reviewed and confirmed key results with patient today.     PE: /67  Pulse 100  " "Temp 98.2  F (36.8  C) (Oral)  Resp 17  Ht 1.626 m (5' 4\")  Wt 50.3 kg (110 lb 12.8 oz)  SpO2 97%  BMI 19.02 kg/m2  Tired, cachectic, chronically ill appearing;   Head NCAT.  Eyes anicteric without injection  Face symmetric, eyes conjugate  Mouth pink, moist, no lesions.  Neck supple without masses, thyromegaly, LAD. Unremarkable R chest mediport  Lungs unlabored, CTAB  CV rrr s1s2  Abd soft, ntnd, benign  Back well aligned, no masses, tenderness  No LE edema  Skin warm, dry, thin, multiple iatrogenic bruises  MSK joints of hand normal;   Neuro Face symmetric,   Neuropsych exam normal including affect, sensorium, gross memory, thought processes, and fund of knowledge.     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  Diarrhea.    Diarrhea: Suggested they take half a tablet of Imodium daily prophylactically.  They should stop this if he gets constipated.  They should continue Imodium after each diarrhea about.    Goals of care: Extensively discussed this with him.  With his frailty and progressive CKD and other problems I think it is really unlikely there is many interventions that we can do which will improve his strength and quality of life apart from stopping chemotherapy.  I discussed with him if he did that I think he would have some moderate improvements in his strength and energy for some number of weeks or months until the cancer caught up.  I really think we have a situation in which he is trading off quality of life for perhaps very modest gains in longevity and I told him that.  I discussed and recommended hospice care with him and explained what that meant.  His wife is very much engaged in this conversation-the patient was pretty apathetic throughout the entire interview and it is tough to  what he thought of my " recommendations.      I discussed the situation with his oncology team today in the cancer center directly.  Follow-up as needed    -The above was transcribed using voice recognition software. While I review and edit the transcription, I may miss errors. Please let me know of any serious mis-transcriptions and I will addend this note.    Chart data reviewed today:  Advance care planning: DNR/DNI, +HCD completed    Family History   Problem Relation Age of Onset     Melanoma No family hx of      Skin Cancer No family hx of      Past Medical History:   Diagnosis Date     Cerebral artery occlusion with cerebral infarction (H)      Coronary artery disease, non-occlusive Jan 2012     Gastroesophageal reflux disease      Hx of migraines     haven;t bothered him since 2003     Hypertension      Malignant neoplasm (H) 1/2012     Migraine      Myeloma (H)     multiple     Nonulcer dyspepsia      Polio age 6    no sequelae     Polio     age 6     Prostate cancer (H)      Past Surgical History:   Procedure Laterality Date     BMT CELL PRODUCT INFUSION       CARDIAC SURGERY  2012    angiogram     COLONOSCOPY       ENT SURGERY      tonsillectomy     ESOPHAGOSCOPY, GASTROSCOPY, DUODENOSCOPY (EGD), COMBINED N/A 1/6/2015    Procedure: COMBINED ESOPHAGOSCOPY, GASTROSCOPY, DUODENOSCOPY (EGD);  Surgeon: Yamil Donaldson Chi, MD;  Location:  GI     ESOPHAGOSCOPY, GASTROSCOPY, DUODENOSCOPY (EGD), COMBINED Left 8/20/2015    Procedure: COMBINED ESOPHAGOSCOPY, GASTROSCOPY, DUODENOSCOPY (EGD), BIOPSY SINGLE OR MULTIPLE;  Surgeon: Mane Barragan MD;  Location:  GI     HERNIA REPAIR Bilateral      ORTHOPEDIC SURGERY Right 2011    toe     PHACOEMULSIFICATION CLEAR CORNEA WITH STANDARD INTRAOCULAR LENS IMPLANT Left 7/27/2017    Procedure: PHACOEMULSIFICATION CLEAR CORNEA WITH STANDARD INTRAOCULAR LENS IMPLANT;  LEFT EYE PHACOEMULSIFICATION CLEAR CORNEA WITH STANDARD INTRAOCULAR LENS IMPLANT ;  Surgeon: Tesfaye Yusuf MD;  Location: [facility]      PHACOEMULSIFICATION CLEAR CORNEA WITH STANDARD INTRAOCULAR LENS IMPLANT Right 9/21/2017    Procedure: PHACOEMULSIFICATION CLEAR CORNEA WITH STANDARD INTRAOCULAR LENS IMPLANT;  RIGHT EYE PHACOEMULSIFICATION CLEAR CORNEA WITH STANDARD INTRAOCULAR LENS IMPLANT;  Surgeon: Tesfaye Yusuf MD;  Location:  [facility]     right inguinal hernia surgery       right toe surgery       Allergies   Allergen Reactions     Aspirin      Stomach upset     Crestor [Rosuvastatin] Muscle Pain (Myalgia)     Bactrim [Sulfamethoxazole W-Trimethoprim] Rash   *       Medications:   I have reviewed this patient's medication profile.  MNPMP: reviewed      Data reviewed:  I reviewed recent labs and imaging, comments on pertinent findings:  eGFR 18  Plt 21    Thank you for involving us in the patient's care.   Gaston Bowie MD / Palliative Medicine / Pager [phone] / [facility] Inpatient Team Consult Pager [phone] (answered 8am-430pm M-F) - ok to text page via [employer] / After-Hours Answering Service [phone] / Palliative Clinic in the [facility] the [facility] - [phone] (scheduling); [phone] (triage).

## 2018-01-19 NOTE — PATIENT INSTRUCTIONS
Contact Numbers    [facility] Main Line: [phone]  [facility] Triage:  [phone]    Call triage with chills and/or temperature greater than or equal to 100.5, uncontrolled nausea/vomiting, diarrhea, constipation, dizziness, shortness of breath, chest pain, bleeding, unexplained bruising, or any new/concerning symptoms, questions/concerns.     If you are having any concerning symptoms or wish to speak to a provider before your next infusion visit, please call your care coordinator or triage to notify them so we can adequately serve you.       After Hours: [phone]    If after hours, weekends, or holidays, call main hospital  and ask for Oncology doctor on call.           January 2018 Sunday Monday Tuesday Wednesday Thursday Friday Saturday        1     2     Outpatient Visit    9:54 AM   Kamari Topete MD   [facility]     LEVEL 1    3:00 PM   (60 min.)    INFUSION CHAIR 11   [facility] 3     4     [facility] DRAW    1:15 PM   (15 min.)    [facility] DRAW   [facility] Draw     [facility] RETURN    1:45 PM   (50 min.)   Leanne Reddy PA-C   [facility] ONC INFUSION 120    3:00 PM   (120 min.)   [facility]ION   [facility] 5     6       7     8     LEVEL 5    9:00 AM   (300 min.)    INFUSION CHAIR 17   [facility]     Outpatient Visit   10:38 AM   Kamari Topete MD   [facility] 9     10     11     [facility] DRAW   12:45 PM   (15 min.)    [facility]     P RETURN   12:55 PM   (50 min.)   Leanne Reddy PA-C   [facility] ONC INFUSION 120    2:00 PM   (120 min.)   [facility] ONCOL[facility]Y INFUSION   [facility] 12     Admission    8:34 PM   Kamari Topete MD   [address] [facility]   (Discharge: 1/15/2018)     XR CHEST 2 VIEWS    8:40 PM   (15  min.)   [id number]   [facility] [place] [facility],  Radiology 13       14     IP EVALUATION    6:00 AM   (60 min.)   Vonda Melendez, PT   [facility], Physical Therapy 15     16     17     18     19     [facility] NEW    7:45 AM   (60 min.)   Gaston Bowie MD   [facility] DRAW    9:00 AM   (15 min.)    [facility] DRAW   [facility] Draw     P RETURN    9:15 AM   (50 min.)   Leanne Reddy PA-C   [facility] ONC INFUSION 120   10:00 AM   (120 min.)    ONCOLOGY INFUSION   [facility] 20       21     22     23     24     25     [facility] DRAW    1:30 PM   (15 min.)    [facility] DRAW   [facility] Lab Draw     [facility] RETURN    1:45 PM   (50 min.)   Leanne Reddy PA-C   [facility] ONC INFUSION 120    3:00 PM   (120 min.)    ONCOLOGY INFUSION   [facility] 26     27       28     29     30     31 February 2018 Sunday Monday Tuesday Wednesday Thursday Friday Saturday                       1     2     3       4     5     6     7     8     9     10       11     12     13     14     15     16     17       18     19     20     21     22     23     24       25     26     27     28                                 Lab Results:  Recent Results (from the past 12 hour(s))   CBC with platelets differential    Collection Time: 01/19/18  9:14 AM   Result Value Ref Range    WBC 3.2 (L) 4.0 - 11.0 10e9/L    RBC Count 2.97 (L) 4.4 - 5.9 10e12/L    Hemoglobin 9.2 (L) 13.3 - 17.7 g/dL    Hematocrit 30.3 (L) 40.0 - 53.0 %     (H) 78 - 100 fl    MCH 31.0 26.5 - 33.0 pg    MCHC 30.4 (L) 31.5 - 36.5 g/dL    RDW 17.4 (H) 10.0 - 15.0 %    Platelet Count 35 (LL) 150 - 450 10e9/L    Diff Method Automated Method     % Neutrophils 85.2 %    % Lymphocytes 4.3 %    % Monocytes 8.6 %    % Eosinophils 0.0 %    % Basophils 0.0 %    % Immature  Granulocytes 1.9 %    Nucleated RBCs 0 0 /100    Absolute Neutrophil 2.8 1.6 - 8.3 10e9/L    Absolute Lymphocytes 0.1 (L) 0.8 - 5.3 10e9/L    Absolute Monocytes 0.3 0.0 - 1.3 10e9/L    Absolute Eosinophils 0.0 0.0 - 0.7 10e9/L    Absolute Basophils 0.0 0.0 - 0.2 10e9/L    Abs Immature Granulocytes 0.1 0 - 0.4 10e9/L    Absolute Nucleated RBC 0.0     Platelet Estimate Confirming automated cell count    Basic metabolic panel    Collection Time: 01/19/18  9:14 AM   Result Value Ref Range    Sodium 137 133 - 144 mmol/L    Potassium 4.2 3.4 - 5.3 mmol/L    Chloride 108 94 - 109 mmol/L    Carbon Dioxide 16 (L) 20 - 32 mmol/L    Anion Gap 13 3 - 14 mmol/L    Glucose 185 (H) 70 - 99 mg/dL    Urea Nitrogen 44 (H) 7 - 30 mg/dL    Creatinine 2.70 (H) 0.66 - 1.25 mg/dL    GFR Estimate 23 (L) >60 mL/min/1.7m2    GFR Estimate If Black 28 (L) >60 mL/min/1.7m2    Calcium 8.5 8.5 - 10.1 mg/dL

## 2018-01-19 NOTE — PROGRESS NOTES
HEMATOLOGY/ONCOLOGY PROGRESS NOTE  Jan 19, 2018    REASON FOR VISIT: myeloma    Diagnosis: 72 year old gentleman with IgM lambda multiple myeloma originally diagnosed in 01/2012 as stage I, standard risk disease.   Treatment: Revlimid plus dexamethasone for 4-5 cycles but plateaued by late 03/2012.   - Velcade was added and he received another 2-3 cycles, achieving a good partial remission.      Transplant: Single auto after melphalan 200 mg/m2 preparative regimen on 01/09/2013  - Post-transplant course: unremarkable except for some mild steroid-induced hyperglycemia, gastritis, nausea and vomiting.      Maintenance: Lenalidomide at day-100 then developed a maculopapular rash. Lenalidomide was held and then we re-challenged him after about a month to 2 months at a lower dose (5 mg daily); rash returned.   - was started on maintenance Velcade, every other week through July 2014, when he was noted with abnormalities on myeloma studies drawn there. Noted with prostate cancer dx at about the time of relapse (see below).     Relapse: noted with return on M-spike in blood/urine with marrow involvement but negative PET.   - Started on retreatment with RVD on 8/27/14 with Revlimid at 15 mg 14 days of 21 days, dexamethasone 40 mg weekly and velcade weekly.Completed at total of 5 cycles without complication by 12/2014.   - Started Cycle 6 on inc'd Rev dosing of 20mg daily x 2 weeks on 12/11/14 which was complicated by pneumonia.  - Adm 12/21-1/10 for human metapneumovirus pneumonia complicated by anorexia, HTN, depression, anorexia with significant weight loss.   - Restarted Ernesto/Dex only on 2/4/15 with good tolerance but with thrombocytopenia.   - Bendamustine added to Velcade/dexamethasone on 5/21/15 due to disease progression  - Velcade discontinued on 7/9/2015 due to side effects (orthostasis)  - Schedule changed to bendamustine 80 mg/m2 days 1 and 2 on 28-day cycle  - Cycle 1 tolerated poorly due to lightheadedness and  weakness  - Cycle 2 dose reduced to 60 mg/m2 and pre-meds adjusted  - Cycle 5 received on 9/17/15.  - 10/1/2015 - increasing IgM, M-spike - started Pomalidomide 4mg/day (21 days out of 28 days) and weekly Decadron 20mg on Oct 1st, 2015.    - Carfilzomib added on 10/22/2015 making this CPD.  - C3-C6  received in [place]    - Returned to [facility] and resumed CPD here    - Adm: 4/12-4/14 with fever, confusion, neutropenia. Noted on MRI to have acute/subacute CVA and subacute/chronic CVA; started on Plavix, given brief course of Abx and GCSF prior to d/c.     - Continued on Carfilzomib and dexamethasone alone  - Pomalyst added back on 7/13/2016 for rising FLC  - Start daratumumab 11/10/16. Changed to every other week after 4 weekly treatments d/t profound fatigue and malaise.   - Adm 5/7-5/16 due to AMS, hypercalcemia, hyperuricemia, possible PNA, back pain, and JOSE MARIA. Started Kyprolis while inpatient.  - Re-admitted 5/25-/529 with recurrent AMS, found to have concomitant PNA  - Resumed Kyprolis 6/13/2017. Stopped due to worsening performance status and progressive myeloma.  - Started Venclexta 800 mg 8/25/2017  - Added weekly Velcade with dexamethasone 20 mg weekly due to rising light chains on 11/1/2017  - Started weekly Cytoxan with prednisone QOD on 12/13 (though started the prednisone around 12/24)    He was supposed to receive Cytoxan walter 3, day 8 on 1/11/2018, however patient was not feeling well with upper respiratory symptoms, poor appetite and his renal function had worsened.  He was given IV fluids and Cytoxan was held.  He was supposed to return the following day for another liter of IV fluids, however developed diarrhea and he went to the emergency department.  He was febrile, tachycardic, tachypneic, creatinine worsened and he was weak.  He was admitted and given IV fluids, cefepime and was discharged on 1/15/18.  He is here today for hospital follow-up and depending on how he is feeling and labs today, may  "consider next cycle of Cytoxan.     INTERVAL HISTORY:    Anthony presents today with his wife is doing okay.  He admits that he has been feeling more fatigued recently since his hospitalization and states that he has been taking several naps throughout the day.  His wife states that his weakness has been slightly getting worse, but then also admits that he may be recovering from his recent hospital stay. She states that he gets fatigued with even showering and shaving.  He admits to one episode of diarrhea on 1/17 and another episode on 1/18.  He was given IV cefepime while inpatient and does have a C. difficile kit at home, however sample has not been collected or mailed in.  He has been taken Imodium 2 tablets after each diarrhea and that seems to help throughout the day.  His appetite has been \"fair\" and states that he is eating 3 small meals a day with a snack.  He has been taking in 32-40 ounces of fluids per day.  He has been taking Zyprexa to help with his appetite at night and he has not noticed any changes.  Denies vomiting, fever or chills, denies bleeding, lightheadedness, dizziness.  His urinating has been normal and his URI symptoms have significantly improved.  Denies any bleeding.  Regarding his weakness, he states that he feels steady on his feet although his wife states that he does walk slower than usual.  Back pain has been better and does not require medications anymore.    When discussing with the patient and his wife regarding his goal, he states he \"wants more energy\".  He and his wife are hoping to make it down to [place] sometime this winter to spend time at their condominium.    Remainder of 10-pt ROS otherwise is negative.    PHYSICAL EXAMINATION  /75 (BP Location: Right arm, Patient Position: Sitting, Cuff Size: Adult Regular)  Pulse 98  Temp 97.2  F (36.2  C) (Oral)  Resp 18  Ht 1.626 m (5' 4.02\")  Wt 50.2 kg (110 lb 11.2 oz)  SpO2 96%  BMI 18.99 kg/m2     Wt Readings from " Last 10 Encounters:   18 50.2 kg (110 lb 11.2 oz)   18 50.3 kg (110 lb 12.8 oz)   18 50.8 kg (112 lb 1.6 oz)   18 49.3 kg (108 lb 11.2 oz)   18 51.2 kg (112 lb 14.4 oz)   17 51.2 kg (112 lb 14.2 oz)   17 51.8 kg (114 lb 4.8 oz)   17 51.5 kg (113 lb 9.6 oz)   17 53 kg (116 lb 12.8 oz)   17 53.5 kg (118 lb)     General: Alert, frail. Oriented to name, , location,  Able to ambulate independently, albeit slow and cautiously.  No acute distress. HEENT: PERRL, no palor or icterus. CVS: RRR with occasional premature beat. CHEST: CTAB, normal work of breathing, right port without erythema, swelling or pus.  ABDOMEN: soft non tender no masses     NEURO: AAOX3  CN 2-12 intact  SKIN: no bleeding or brusiing, no rashes EXTREMITIES: No edema.     LABS:   Results for WILLIAN ARCINIEGA (MRN [id number]) as of 2018 11:33   2018 09:14   Sodium 137   Potassium 4.2   Chloride 108   Carbon Dioxide 16 (L)   Urea Nitrogen 44 (H)   Creatinine 2.70 (H)   GFR Estimate 23 (L)   GFR Estimate If Black 28 (L)   Calcium 8.5   Anion Gap 13   Glucose 185 (H)   WBC 3.2 (L)   Hemoglobin 9.2 (L)   Hematocrit 30.3 (L)   Platelet Count 35 (LL)   RBC Count 2.97 (L)    (H)   MCH 31.0   MCHC 30.4 (L)   RDW 17.4 (H)   Diff Method Automated Method   % Neutrophils 85.2   % Lymphocytes 4.3   % Monocytes 8.6   % Eosinophils 0.0   % Basophils 0.0   % Immature Granulocytes 1.9   Nucleated RBCs 0   Absolute Neutrophil 2.8   Absolute Lymphocytes 0.1 (L)   Absolute Monocytes 0.3   Absolute Eosinophils 0.0   Absolute Basophils 0.0   Abs Immature Granulocytes 0.1   Absolute Nucleated RBC 0.0   Platelet Estimate Confirming automa...       IMPRESSION/PLAN:  72 year old male with relapsed MM after autologous transplant (2013), status-post multiple salvage regimens with progressive disease.    MM: With rising light chains, worsening renal function, and worsening TCP, started weekly  "cytoxan with prednisone 50  mg QOD on 12/14. MM labs drawn 1/2 demonstrated sharp increase, however this was obtained after only 3 doses of Cytoxan and less than a week of concurrent use with the prednisone.  He missed his most recent Cytoxan dose and was hospitalized for neutropenic fever with no known source.  He feels much better today and we continue to discuss treatment goals and possible hospice.  He also saw palliative care today. We had a long discussion re: goals and Yamil really wants to continue with treatment. We discussed that with his goal of \"feeling better\", I worry that the cytoxan isn't honoring that goal. He would like to try one more week of treatment and reassess.  Labs are within treatment parameters to proceed and therefore he will receive another dose of Cytoxan today.     His renal function is actually improved today with a creatinine of 2.7 compared to 3.39 a few days ago.  Plan for a liter of fluids along with Cytoxan today.  This could be partly dehydration, but also concerning for progressive myeloma and this was discussed with the patient and wife today.  Plan for Dr. Toepte to see him sometime next week for follow-up.     Heme: Cytopenias 2/2 treatment and likely MM and improved today, however he likely due to skipping dose of Cytoxan last week.  - Previously on Plavix, remains on hold given thrombocytopenia, indefinitely  - Transfuse to keep hemoglobin > 8, platelets > 10k.   - Will continue twice weekly checks    Renal/FEN: Creat baseline ~0.8-1.0, recently has been increased beyond baseline, likely secondary to progressive myeloma.  Appetite has been low recently and likely dehydrated, however creatinine is improved today.  IV fluids also given today.  Few episodes of diarrhea recently as well that may be contributing to dehydration.  Plan for stool kit to test for C. Difficile.  -Encouraged protein/calorie supplements.   -Would recommend continuing Zyprexa at night to help with " appetite.    ID: Upper respiratory symptoms have significantly improved.  Lung exam is normal today.  Stool kit for C. difficile provided during hospitalization and recommended sending that in for testing.  - Continues ppx  mg BID     Back/rib pain: Started early May. Lumbar MRI at OSH showing mild-mod central and foraminal stenoses in lumbar spine, per patient and wife, there was no MM lesion there.  Recent imaging with no acute findings and pain has resolved.  He is not taking any pain medications currently.  We will continue to monitor.    Fatigue: Worsening and multifactorial.  Poor appetite, diarrhea, deconditioning and weakness.  Concerned that fatigue will continue getting worse while on Cytoxan treatments.  Will reassess at next visit.     CV: Continue zocor and norvasc.   - Avoid QTc prolonging agents (history of QTc prolongation with syncopal episodes)     AMS: AMS started early May in setting of metabolic derangements, uremia, and possible infection.  Remains intermittently confused over the past few months but improved significantly and stable today. I do believe there is perhaps some ongoing cognitive decline since the AMS episodes.  - Continue Zyprexa 5 mg for appetite  - Holding off long-acting pain meds    Plan summary:  - will proceed with Cytoxan today and give 1 liter NS in infusion  -Follow-up with Dr. Topete next week to reassess symptoms after Cytoxan today.  Also recommend discussing future goals and possible hospice.      Ayesha Duque PA-C      The patient was seen in conjunction with Ayesha Duque PA-C who served as a scribe for today's visit. I have reviewed the note and agree with the above findings and plan. -ECT

## 2018-01-19 NOTE — PROGRESS NOTES
Infusion Nursing Note:  Anthony Carbone presents today for cycle 3, day 15 Cyclophosphamide.    Patient seen by provider today: Yes: PREM Herrera   present during visit today: Not Applicable.    Note: N/A.    Intravenous Access:  Implanted Port.    Treatment Conditions:  Lab Results   Component Value Date    HGB 9.2 01/19/2018     Lab Results   Component Value Date    WBC 3.2 01/19/2018      Lab Results   Component Value Date    ANEU 2.8 01/19/2018     Lab Results   Component Value Date    PLT 35 01/19/2018      Lab Results   Component Value Date     01/19/2018                   Lab Results   Component Value Date    POTASSIUM 4.2 01/19/2018           Lab Results   Component Value Date    MAG 1.7 01/15/2018            Lab Results   Component Value Date    CR 2.70 01/19/2018                   Lab Results   Component Value Date    JAZMIN 8.5 01/19/2018                Lab Results   Component Value Date    BILITOTAL 0.3 01/12/2018           Lab Results   Component Value Date    ALBUMIN 2.0 01/12/2018                    Lab Results   Component Value Date    ALT 31 01/12/2018           Lab Results   Component Value Date    AST 12 01/12/2018     Results reviewed, labs MET treatment parameters, ok to proceed with treatment.    Post Infusion Assessment:  Patient tolerated infusion without incident.  Blood return noted pre and post infusion.  Site patent and intact, free from redness, edema or discomfort.  No evidence of extravasations.  Access discontinued per protocol.    Discharge Plan:   Patient declined prescription refills.  Discharge instructions reviewed with: Patient.  Patient and/or family verbalized understanding of discharge instructions and all questions answered.  Copy of AVS reviewed with patient and/or family.  Patient will return 1/25/2018 for next appointment.  Patient discharged in stable condition accompanied by: daughter.  Departure Mode: Ambulatory.    Guatam Burger RN

## 2018-01-19 NOTE — PATIENT INSTRUCTIONS
At your visit today, we discussed your risk for falls and preventive options.    Fall Prevention  Falls often occur due to slipping, tripping or losing your balance. Millions of people fall every year and injure themselves. Here are ways to reduce your risk of falling again.    Think about your fall, was there anything that caused your fall that can be fixed, removed, or replaced?    Make your home safe by keeping walkways clear of objects you may trip over.    Use non-slip pads under rugs. Do not use area rugs or small throw rugs.    Use non-slip mats in bathtubs and showers.    Install handrails and lights on staircases.    Do not walk in poorly lit areas.    Do not stand on chairs or wobbly ladders.    Use caution when reaching overhead or looking upward. This position can cause a loss of balance.    Be sure your shoes fit properly, have non-slip bottoms and are in good condition.     Wear shoes both inside and out. Avoid going barefoot or wearing slippers.    Be cautious when going up and down stairs, curbs, and when walking on uneven sidewalks.    If your balance is poor, consider using a cane or walker.    If your fall was related to alcohol use, stop or limit alcohol intake.     If your fall was related to use of sleeping medicines, talk to your doctor about this. You may need to reduce your dosage at bedtime if you awaken during the night to go to the bathroom.      To reduce the need for nighttime bathroom trips:    Avoid drinking fluids for several hours before going to bed    Empty your bladder before going to bed    Men can keep a urinal at the bedside    Stay as active as you can. Balance, flexibility, strength, and endurance all come from exercise. They all play a role in preventing falls. Ask your healthcare provider which types of activity are right for you.    Get your vision checked on a regular basis.    If you have pets, know where they are before you stand up or walk so you don't trip over  them.    Use night lights.  Date Last Reviewed: 11/5/2015 2000-2017 [employer]. [address] [place] [address], [place] [address]. All rights reserved. This information is not intended as a substitute for professional medical care. Always follow your healthcare professional's instructions.

## 2018-01-19 NOTE — MR AVS SNAPSHOT
After Visit Summary   1/19/2018    Anthony Carbone    MRN: [id number]           Patient Information     Date Of Birth          1945        Visit Information        Provider Department      1/19/2018 8:00 AM Gaston Bowie MD [facility]        Today's Diagnoses     Multiple myeloma in relapse (H)    -  1      Care Instructions      At your visit today, we discussed your risk for falls and preventive options.    Fall Prevention  Falls often occur due to slipping, tripping or losing your balance. Millions of people fall every year and injure themselves. Here are ways to reduce your risk of falling again.    Think about your fall, was there anything that caused your fall that can be fixed, removed, or replaced?    Make your home safe by keeping walkways clear of objects you may trip over.    Use non-slip pads under rugs. Do not use area rugs or small throw rugs.    Use non-slip mats in bathtubs and showers.    Install handrails and lights on staircases.    Do not walk in poorly lit areas.    Do not stand on chairs or wobbly ladders.    Use caution when reaching overhead or looking upward. This position can cause a loss of balance.    Be sure your shoes fit properly, have non-slip bottoms and are in good condition.     Wear shoes both inside and out. Avoid going barefoot or wearing slippers.    Be cautious when going up and down stairs, curbs, and when walking on uneven sidewalks.    If your balance is poor, consider using a cane or walker.    If your fall was related to alcohol use, stop or limit alcohol intake.     If your fall was related to use of sleeping medicines, talk to your doctor about this. You may need to reduce your dosage at bedtime if you awaken during the night to go to the bathroom.      To reduce the need for nighttime bathroom trips:    Avoid drinking fluids for several hours before going to bed    Empty your bladder before going to bed    Men  can keep a urinal at the bedside    Stay as active as you can. Balance, flexibility, strength, and endurance all come from exercise. They all play a role in preventing falls. Ask your healthcare provider which types of activity are right for you.    Get your vision checked on a regular basis.    If you have pets, know where they are before you stand up or walk so you don't trip over them.    Use night lights.  Date Last Reviewed: 11/5/2015 2000-2017 [employer]. [address] [place] [address]. All rights reserved. This information is not intended as a substitute for professional medical care. Always follow your healthcare professional's instructions.                Follow-ups after your visit        Your next 10 appointments already scheduled     Jan 19, 2018 10:00 AM CST   Infusion 120 with UC ONCOLOGY INFUSION, UC 17 ATC   [facility] ([facility])    [address] [place] [address]  [address] [place] [id number] [phone]            Jan 25, 2018  1:30 PM CST   [facility] Lab Draw with  [facility] LAB DRAW   [facility] ([facility])    [address] [place] [address]  [address]  [facility] [id number] [phone]            Jan 25, 2018  2:00 PM CST   (Arrive by 1:45 PM)   Return Visit with Leanne Reddy PA-C   [facility] ([facility])    [address] [place] [address]  [address]  [facility] [id number] [phone]            Jan 25, 2018  3:00 PM CST   Infusion 120 with UC ONCOLOGY INFUSION, UC 23 ATC   [facility] ([facility])    [address] [place] [address]  [address]  [facility] [id number] [phone]              Who to contact     If you have questions or need follow up information about today's clinic visit or your schedule please contact [facility] directly at [phone].  Normal or  "non-critical lab and imaging results will be communicated to you by MyChart, letter or phone within 4 business days after the clinic has received the results. If you do not hear from us within 7 days, please contact the clinic through V[employer] or phone. If you have a critical or abnormal lab result, we will notify you by phone as soon as possible.  Submit refill requests through [employer] or call your pharmacy and they will forward the refill request to us. Please allow 3 business days for your refill to be completed.          Additional Information About Your Visit        VidBid Information     [employer] gives you secure access to your electronic health record. If you see a primary care provider, you can also send messages to your care team and make appointments. If you have questions, please call your primary care clinic.  If you do not have a primary care provider, please call [phone] and they will assist you.        Care EveryWhere ID     This is your Care EveryWhere ID. This could be used by other organizations to access your [facility] medical records  [id number]        Your Vitals Were     Pulse Temperature Respirations Height Pulse Oximetry BMI (Body Mass Index)    100 98.2  F (36.8  C) (Oral) 17 1.626 m (5' 4\") 97% 19.02 kg/m2       Blood Pressure from Last 3 Encounters:   01/19/18 109/75   01/19/18 102/67   01/15/18 132/66    Weight from Last 3 Encounters:   01/19/18 50.2 kg (110 lb 11.2 oz)   01/19/18 50.3 kg (110 lb 12.8 oz)   01/14/18 50.8 kg (112 lb 1.6 oz)              Today, you had the following     No orders found for display       Primary Care Provider Office Phone # Fax #    Sanket Gualberto [phone]       [facility] [address] [facility] [address]        Equal Access to Services     ALBAN SHAH : Maribel Davis, wanadine ge, qaybta kaallakshmi abdi, jonny villa. So [facility] [phone].    ATENCIÓN: Si " delores tao, tiene a todd disposición servicios gratuitos de asistencia lingüística. Parminder posada [phone].    We comply with applicable federal civil rights laws and [place] laws. We do not discriminate on the basis of race, color, national origin, age, disability, sex, sexual orientation, or gender identity.            Thank you!     Thank you for choosing [facility]  for your care. Our goal is always to provide you with excellent care. Hearing back from our patients is one way we can continue to improve our services. Please take a few minutes to complete the written survey that you may receive in the mail after your visit with us. Thank you!             Your Updated Medication List - Protect others around you: Learn how to safely use, store and throw away your medicines at www.disposemymeds.org.          This list is accurate as of: 1/19/18  9:58 AM.  Always use your most recent med list.                   Brand Name Dispense Instructions for use Diagnosis    acetaminophen 325 MG tablet    TYLENOL    100 tablet    Take 2 tablets (650 mg) by mouth every 4 hours as needed for mild pain or fever    Fever, unspecified fever cause       acyclovir 400 MG tablet    ZOVIRAX    60 tablet    Take 1 tablet (400 mg) by mouth 2 times daily    Multiple myeloma in relapse (H)       amLODIPine 5 MG tablet    NORVASC    90 tablet    TAKE 1 TABLET BY MOUTH AT BEDTIME    Essential hypertension       esomeprazole 40 MG CR capsule    nexIUM    30 capsule    Take 1 capsule (40 mg) by mouth every morning (before breakfast)    Gastroesophageal reflux disease without esophagitis       LORazepam 0.5 MG tablet    ATIVAN    30 tablet    Take 1 tablet (0.5 mg) by mouth every 6 hours as needed for nausea or anxiety.    Multiple myeloma in relapse (H), Delirium       OLANZapine 5 MG tablet    zyPREXA    60 tablet    Take by mouth At Bedtime Takes 1 tablet    Delirium, Multiple myeloma in relapse (H)       ondansetron 8 MG  ODT tab    ZOFRAN-ODT    30 tablet    Take 1 tablet (8 mg) by mouth every 8 hours as needed for nausea    Nausea       oxyCODONE IR 5 MG tablet    ROXICODONE    15 tablet    Take 1 tablet (5 mg) by mouth every 6 hours as needed for moderate to severe pain        predniSONE 50 MG tablet    DELTASONE     Take 1 tablet (50 mg) by mouth every other day    Multiple myeloma, remission status unspecified (H)       SIMVASTATIN PO      Take 20 mg by mouth At Bedtime        traMADol 50 MG tablet    ULTRAM    60 tablet    Take 1 tablet (50 mg) by mouth every 6 hours as needed for moderate pain . Recommend trying after Tylenol and before Dilaudid.    Acute bilateral low back pain without sciatica, Multiple myeloma in relapse (H)

## 2018-01-19 NOTE — NURSING NOTE
"Oncology Rooming Note    January 19, 2018 9:34 AM   Anthony Carbone is a 72 year old male who presents for:    Chief Complaint   Patient presents with     Port Draw     Right power port accessed, labs drawn and sent, flushed with saline and heparin, vitals completed, checked into provider appointment.     Blood Draw     Multiple Myeloma, Hospital F/U.     Initial Vitals: /75 (BP Location: Right arm, Patient Position: Sitting, Cuff Size: Adult Regular)  Pulse 98  Temp 97.2  F (36.2  C) (Oral)  Resp 18  Ht 1.626 m (5' 4.02\")  Wt 50.2 kg (110 lb 11.2 oz)  SpO2 96%  BMI 18.99 kg/m2 Estimated body mass index is 18.99 kg/(m^2) as calculated from the following:    Height as of this encounter: 1.626 m (5' 4.02\").    Weight as of this encounter: 50.2 kg (110 lb 11.2 oz). Body surface area is 1.51 meters squared.  No Pain (0) Comment: Data Unavailable   No LMP for male patient.  Allergies reviewed: No  Medications reviewed: No    Medications: Medication refills not needed today.  Pharmacy name entered into [employer]:    [id number] [place] [id number] [facility] [address] [facility]  [employer] [place], [place] - [address] [facility]  [facility] [address] [place], [place] - [address] AT [facility]    Clinical concerns: Rooming completed during Dr Bowie visit. Leanne Reddy was NOT notified.    7 minutes for nursing intake (face to face time)     Elsy Ruiz LPN              "

## 2018-01-19 NOTE — LETTER
1/19/2018      RE: Anthony Carbone  [address] [place] [address]       HEMATOLOGY/ONCOLOGY PROGRESS NOTE  Jan 19, 2018    REASON FOR VISIT: myeloma    Diagnosis: 72 year old gentleman with IgM lambda multiple myeloma originally diagnosed in 01/2012 as stage I, standard risk disease.   Treatment: Revlimid plus dexamethasone for 4-5 cycles but plateaued by late 03/2012.   - Velcade was added and he received another 2-3 cycles, achieving a good partial remission.      Transplant: Single auto after melphalan 200 mg/m2 preparative regimen on 01/09/2013  - Post-transplant course: unremarkable except for some mild steroid-induced hyperglycemia, gastritis, nausea and vomiting.      Maintenance: Lenalidomide at day-100 then developed a maculopapular rash. Lenalidomide was held and then we re-challenged him after about a month to 2 months at a lower dose (5 mg daily); rash returned.   - was started on maintenance Velcade, every other week through July 2014, when he was noted with abnormalities on myeloma studies drawn there. Noted with prostate cancer dx at about the time of relapse (see below).     Relapse: noted with return on M-spike in blood/urine with marrow involvement but negative PET.   - Started on retreatment with RVD on 8/27/14 with Revlimid at 15 mg 14 days of 21 days, dexamethasone 40 mg weekly and velcade weekly.Completed at total of 5 cycles without complication by 12/2014.   - Started Cycle 6 on inc'd Rev dosing of 20mg daily x 2 weeks on 12/11/14 which was complicated by pneumonia.  - Adm 12/21-1/10 for human metapneumovirus pneumonia complicated by anorexia, HTN, depression, anorexia with significant weight loss.   - Restarted Ernesto/Dex only on 2/4/15 with good tolerance but with thrombocytopenia.   - Bendamustine added to Velcade/dexamethasone on 5/21/15 due to disease progression  - Velcade discontinued on 7/9/2015 due to side effects (orthostasis)  - Schedule changed to bendamustine 80 mg/m2  days 1 and 2 on 28-day cycle  - Cycle 1 tolerated poorly due to lightheadedness and weakness  - Cycle 2 dose reduced to 60 mg/m2 and pre-meds adjusted  - Cycle 5 received on 9/17/15.  - 10/1/2015 - increasing IgM, M-spike - started Pomalidomide 4mg/day (21 days out of 28 days) and weekly Decadron 20mg on Oct 1st, 2015.    - Carfilzomib added on 10/22/2015 making this CPD.  - C3-C6  received in [place]    - Returned to [facility] and resumed CPD here    - Adm: 4/12-4/14 with fever, confusion, neutropenia. Noted on MRI to have acute/subacute CVA and subacute/chronic CVA; started on Plavix, given brief course of Abx and GCSF prior to d/c.     - Continued on Carfilzomib and dexamethasone alone  - Pomalyst added back on 7/13/2016 for rising FLC  - Start daratumumab 11/10/16. Changed to every other week after 4 weekly treatments d/t profound fatigue and malaise.   - Adm 5/7-5/16 due to AMS, hypercalcemia, hyperuricemia, possible PNA, back pain, and JOSE MARIA. Started Kyprolis while inpatient.  - Re-admitted 5/25-/529 with recurrent AMS, found to have concomitant PNA  - Resumed Kyprolis 6/13/2017. Stopped due to worsening performance status and progressive myeloma.  - Started Venclexta 800 mg 8/25/2017  - Added weekly Velcade with dexamethasone 20 mg weekly due to rising light chains on 11/1/2017  - Started weekly Cytoxan with prednisone QOD on 12/13 (though started the prednisone around 12/24)    He was supposed to receive Cytoxan walter 3, day 8 on 1/11/2018, however patient was not feeling well with upper respiratory symptoms, poor appetite and his renal function had worsened.  He was given IV fluids and Cytoxan was held.  He was supposed to return the following day for another liter of IV fluids, however developed diarrhea and he went to the emergency department.  He was febrile, tachycardic, tachypneic, creatinine worsened and he was weak.  He was admitted and given IV fluids, cefepime and was discharged on 1/15/18.  He is here  "today for hospital follow-up and depending on how he is feeling and labs today, may consider next cycle of Cytoxan.     INTERVAL HISTORY:    Anthony presents today with his wife is doing okay.  He admits that he has been feeling more fatigued recently since his hospitalization and states that he has been taking several naps throughout the day.  His wife states that his weakness has been slightly getting worse, but then also admits that he may be recovering from his recent hospital stay. She states that he gets fatigued with even showering and shaving.  He admits to one episode of diarrhea on 1/17 and another episode on 1/18.  He was given IV cefepime while inpatient and does have a C. difficile kit at home, however sample has not been collected or mailed in.  He has been taken Imodium 2 tablets after each diarrhea and that seems to help throughout the day.  His appetite has been \"fair\" and states that he is eating 3 small meals a day with a snack.  He has been taking in 32-40 ounces of fluids per day.  He has been taking Zyprexa to help with his appetite at night and he has not noticed any changes.  Denies vomiting, fever or chills, denies bleeding, lightheadedness, dizziness.  His urinating has been normal and his URI symptoms have significantly improved.  Denies any bleeding.  Regarding his weakness, he states that he feels steady on his feet although his wife states that he does walk slower than usual.  Back pain has been better and does not require medications anymore.    When discussing with the patient and his wife regarding his goal, he states he \"wants more energy\".  He and his wife are hoping to make it down to [place] sometime this winter to spend time at their condominium.    Remainder of 10-pt ROS otherwise is negative.    PHYSICAL EXAMINATION  /75 (BP Location: Right arm, Patient Position: Sitting, Cuff Size: Adult Regular)  Pulse 98  Temp 97.2  F (36.2  C) (Oral)  Resp 18  Ht 1.626 m (5' " "4.02\")  Wt 50.2 kg (110 lb 11.2 oz)  SpO2 96%  BMI 18.99 kg/m2     Wt Readings from Last 10 Encounters:   18 50.2 kg (110 lb 11.2 oz)   18 50.3 kg (110 lb 12.8 oz)   18 50.8 kg (112 lb 1.6 oz)   18 49.3 kg (108 lb 11.2 oz)   18 51.2 kg (112 lb 14.4 oz)   17 51.2 kg (112 lb 14.2 oz)   17 51.8 kg (114 lb 4.8 oz)   17 51.5 kg (113 lb 9.6 oz)   17 53 kg (116 lb 12.8 oz)   17 53.5 kg (118 lb)     General: Alert, frail. Oriented to name, , location,  Able to ambulate independently, albeit slow and cautiously.  No acute distress. HEENT: PERRL, no palor or icterus. CVS: RRR with occasional premature beat. CHEST: CTAB, normal work of breathing, right port without erythema, swelling or pus.  ABDOMEN: soft non tender no masses     NEURO: AAOX3  CN 2-12 intact  SKIN: no bleeding or brusiing, no rashes EXTREMITIES: No edema.     LABS:   Results for WILLIAN ARCINIEGA (MRN [id number]) as of 2018 11:33   2018 09:14   Sodium 137   Potassium 4.2   Chloride 108   Carbon Dioxide 16 (L)   Urea Nitrogen 44 (H)   Creatinine 2.70 (H)   GFR Estimate 23 (L)   GFR Estimate If Black 28 (L)   Calcium 8.5   Anion Gap 13   Glucose 185 (H)   WBC 3.2 (L)   Hemoglobin 9.2 (L)   Hematocrit 30.3 (L)   Platelet Count 35 (LL)   RBC Count 2.97 (L)    (H)   MCH 31.0   MCHC 30.4 (L)   RDW 17.4 (H)   Diff Method Automated Method   % Neutrophils 85.2   % Lymphocytes 4.3   % Monocytes 8.6   % Eosinophils 0.0   % Basophils 0.0   % Immature Granulocytes 1.9   Nucleated RBCs 0   Absolute Neutrophil 2.8   Absolute Lymphocytes 0.1 (L)   Absolute Monocytes 0.3   Absolute Eosinophils 0.0   Absolute Basophils 0.0   Abs Immature Granulocytes 0.1   Absolute Nucleated RBC 0.0   Platelet Estimate Confirming automa...       IMPRESSION/PLAN:  72 year old male with relapsed MM after autologous transplant (2013), status-post multiple salvage regimens with progressive disease.    MM: " "With rising light chains, worsening renal function, and worsening TCP, started weekly cytoxan with prednisone 50  mg QOD on 12/14. MM labs drawn 1/2 demonstrated sharp increase, however this was obtained after only 3 doses of Cytoxan and less than a week of concurrent use with the prednisone.  He missed his most recent Cytoxan dose and was hospitalized for neutropenic fever with no known source.  He feels much better today and we continue to discuss treatment goals and possible hospice.  He also saw palliative care today. We had a long discussion re: goals and Yamil really wants to continue with treatment. We discussed that with his goal of \"feeling better\", I worry that the cytoxan isn't honoring that goal. He would like to try one more week of treatment and reassess.  Labs are within treatment parameters to proceed and therefore he will receive another dose of Cytoxan today.     His renal function is actually improved today with a creatinine of 2.7 compared to 3.39 a few days ago.  Plan for a liter of fluids along with Cytoxan today.  This could be partly dehydration, but also concerning for progressive myeloma and this was discussed with the patient and wife today.  Plan for Dr. Topete to see him sometime next week for follow-up.     Heme: Cytopenias 2/2 treatment and likely MM and improved today, however he likely due to skipping dose of Cytoxan last week.  - Previously on Plavix, remains on hold given thrombocytopenia, indefinitely  - Transfuse to keep hemoglobin > 8, platelets > 10k.   - Will continue twice weekly checks    Renal/FEN: Creat baseline ~0.8-1.0, recently has been increased beyond baseline, likely secondary to progressive myeloma.  Appetite has been low recently and likely dehydrated, however creatinine is improved today.  IV fluids also given today.  Few episodes of diarrhea recently as well that may be contributing to dehydration.  Plan for stool kit to test for C. Difficile.  -Encouraged " protein/calorie supplements.   -Would recommend continuing Zyprexa at night to help with appetite.    ID: Upper respiratory symptoms have significantly improved.  Lung exam is normal today.  Stool kit for C. difficile provided during hospitalization and recommended sending that in for testing.  - Continues ppx  mg BID     Back/rib pain: Started early May. Lumbar MRI at OSH showing mild-mod central and foraminal stenoses in lumbar spine, per patient and wife, there was no MM lesion there.  Recent imaging with no acute findings and pain has resolved.  He is not taking any pain medications currently.  We will continue to monitor.    Fatigue: Worsening and multifactorial.  Poor appetite, diarrhea, deconditioning and weakness.  Concerned that fatigue will continue getting worse while on Cytoxan treatments.  Will reassess at next visit.     CV: Continue zocor and norvasc.   - Avoid QTc prolonging agents (history of QTc prolongation with syncopal episodes)     AMS: AMS started early May in setting of metabolic derangements, uremia, and possible infection.  Remains intermittently confused over the past few months but improved significantly and stable today. I do believe there is perhaps some ongoing cognitive decline since the AMS episodes.  - Continue Zyprexa 5 mg for appetite  - Holding off long-acting pain meds    Plan summary:  - will proceed with Cytoxan today and give 1 liter NS in infusion  -Follow-up with Dr. Topete next week to reassess symptoms after Cytoxan today.  Also recommend discussing future goals and possible hospice.      Ayesha Duque PA-C      The patient was seen in conjunction with Ayesha Duque PA-C who served as a scribe for today's visit. I have reviewed the note and agree with the above findings and plan. -ECT      Leanne Reddy PA-C

## 2018-01-19 NOTE — MR AVS SNAPSHOT
After Visit Summary   1/19/2018    Anthony Carbone    MRN: [id number]           Patient Information     Date Of Birth          1945        Visit Information        Provider Department      1/19/2018 9:30 AM Leanne Reddy PA-C [facility]        Today's Diagnoses     Multiple myeloma in relapse (H)        Multiple myeloma, remission status unspecified (H)           Follow-ups after your visit        Your next 10 appointments already scheduled     Jan 25, 2018  1:30 PM CST   [facility] Draw with [facility] D[facility]   [facility] Lab Draw ([facility])    [address] [place] [address]  [address]  [facility] [id number] [phone]            Jan 25, 2018  2:00 PM CST   (Arrive by 1:45 PM)   Return Visit with Leanne Reddy PA-C   [facility] ([facility])    [address] [place] [address]  [address]  [facility] [id number] [phone]            Jan 25, 2018  3:00 PM CST   Infusion 120 with  ONCOLOGY INFUSION, UC 23 ATC   [facility] ([facility])    [address] [place] [address]  [address]  [facility] [id number] [phone]              Who to contact     If you have questions or need follow up information about today's clinic visit or your schedule please contact [facility] directly at [phone].  Normal or non-critical lab and imaging results will be communicated to you by MyChart, letter or phone within 4 business days after the clinic has received the results. If you do not hear from us within 7 days, please contact the clinic through MyChart or phone. If you have a critical or abnormal lab result, we will notify you by phone as soon as possible.  Submit refill requests through [employer] or call your pharmacy and they will forward the refill request to us. Please allow 3 business days for your refill to be completed.  "         Additional Information About Your Visit        MyChart Information     [employer] gives you secure access to your electronic health record. If you see a primary care provider, you can also send messages to your care team and make appointments. If you have questions, please call your primary care clinic.  If you do not have a primary care provider, please call [phone] and they will assist you.        Care EveryWhere ID     This is your Care EveryWhere ID. This could be used by other organizations to access your [place] medical records  [id number]        Your Vitals Were     Pulse Temperature Respirations Height Pulse Oximetry BMI (Body Mass Index)    98 97.2  F (36.2  C) (Oral) 18 1.626 m (5' 4.02\") 96% 18.99 kg/m2       Blood Pressure from Last 3 Encounters:   01/19/18 109/75   01/19/18 102/67   01/15/18 132/66    Weight from Last 3 Encounters:   01/19/18 50.2 kg (110 lb 11.2 oz)   01/19/18 50.3 kg (110 lb 12.8 oz)   01/14/18 50.8 kg (112 lb 1.6 oz)              Today, you had the following     No orders found for display       Primary Care Provider Office Phone # Fax #    Sanket Gualberto [phone]       [facility] [address] [facility] [address]        Equal Access to Services     ALBAN SHAH : Hadii chalo hernandezo Sojose, waaxda luqadaha, qaybta kaalmada trinidad, jonny villa. So [facility] [phone].    ATENCIÓN: Si habla español, tiene a todd disposición servicios gratuitos de asistencia lingüística. Llame al [phone].    We comply with applicable federal civil rights laws and [place] laws. We do not discriminate on the basis of race, color, national origin, age, disability, sex, sexual orientation, or gender identity.            Thank you!     Thank you for choosing [facility] CANCER [facility]  for your care. Our goal is always to provide you with excellent care. Hearing back from our patients is one way we can " continue to improve our services. Please take a few minutes to complete the written survey that you may receive in the mail after your visit with us. Thank you!             Your Updated Medication List - Protect others around you: Learn how to safely use, store and throw away your medicines at www.disposemymeds.org.          This list is accurate as of: 1/19/18  4:16 PM.  Always use your most recent med list.                   Brand Name Dispense Instructions for use Diagnosis    acetaminophen 325 MG tablet    TYLENOL    100 tablet    Take 2 tablets (650 mg) by mouth every 4 hours as needed for mild pain or fever    Fever, unspecified fever cause       acyclovir 400 MG tablet    ZOVIRAX    60 tablet    Take 1 tablet (400 mg) by mouth 2 times daily    Multiple myeloma in relapse (H)       amLODIPine 5 MG tablet    NORVASC    90 tablet    TAKE 1 TABLET BY MOUTH AT BEDTIME    Essential hypertension       esomeprazole 40 MG CR capsule    nexIUM    30 capsule    Take 1 capsule (40 mg) by mouth every morning (before breakfast)    Gastroesophageal reflux disease without esophagitis       LORazepam 0.5 MG tablet    ATIVAN    30 tablet    Take 1 tablet (0.5 mg) by mouth every 6 hours as needed for nausea or anxiety.    Multiple myeloma in relapse (H), Delirium       OLANZapine 5 MG tablet    zyPREXA    60 tablet    Take by mouth At Bedtime Takes 1 tablet    Delirium, Multiple myeloma in relapse (H)       ondansetron 8 MG ODT tab    ZOFRAN-ODT    30 tablet    Take 1 tablet (8 mg) by mouth every 8 hours as needed for nausea    Nausea       oxyCODONE IR 5 MG tablet    ROXICODONE    15 tablet    Take 1 tablet (5 mg) by mouth every 6 hours as needed for moderate to severe pain        predniSONE 50 MG tablet    DELTASONE     Take 1 tablet (50 mg) by mouth every other day    Multiple myeloma, remission status unspecified (H)       SIMVASTATIN PO      Take 20 mg by mouth At Bedtime        traMADol 50 MG tablet    ULTRAM    60 tablet     Take 1 tablet (50 mg) by mouth every 6 hours as needed for moderate pain . Recommend trying after Tylenol and before Dilaudid.    Acute bilateral low back pain without sciatica, Multiple myeloma in relapse (H)

## 2018-01-19 NOTE — LETTER
"1/19/2018       RE: Anthony Carbone  [address] [place] [address]     Dear Colleague,    Thank you for referring your patient, Anthony Carbone, to the [facility] at [facility]. Please see a copy of my visit note below.    Palliative Staff/Outpatient Clinic    (This note was transcribed using voice recognition software. While I review and edit the transcription, I may miss errors. Please let me know of any serious mis-transcriptions and I will addend this note.)    CC/Patient ID:  Multiple myeloma  Our inpatient team met him last summer.    History:  He is seen with his wife today who supplements the history.  I reviewed his history in the chart and confirmed key details with him.  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  Been progressing the last year and is now on Cytoxan based chemotherapy.  He is continuing to have a significant functional decline the last several months.  He spends most of the day in bed or resting.  He is profoundly weak and tired.  He says he is to get Cytoxan and gets very weak, begins recovering partially only to get another dose of Cytoxan.  Physically otherwise his major complaint is diarrhea which happens once or twice a week, explosively, without warning pattern, is usually just 1 or 2 episodes they take.  Imodium.  It does not seem to be related temporally to chemo or food.    His appetite is low and is losing weight.  No nausea and vomiting.  Pain is much better than it was several months ago and he rarely takes tramadol.  He has had pain in his back from pathologic fractures.    He tells me his major goal is to feel stronger.  When I ask him what he looks forward to day-to-day he says \"taking a nap.\"  He is a vague idea of going to [place] for a few weeks where they have a condo and friends.  He also says that longevity is important to him in his office he chosen to continue " "treatment despite all of the problems lately.    SH:   Lives with his wife.  They have a dog.  No children.  Some extended family are involved in their life    ROS:  Patient completed comprehensive electronic ROS form reviewed and confirmed key results with patient today.     PE: /67  Pulse 100  Temp 98.2  F (36.8  C) (Oral)  Resp 17  Ht 1.626 m (5' 4\")  Wt 50.3 kg (110 lb 12.8 oz)  SpO2 97%  BMI 19.02 kg/m2  Tired, cachectic, chronically ill appearing;   Head NCAT.  Eyes anicteric without injection  Face symmetric, eyes conjugate  Mouth pink, moist, no lesions.  Neck supple without masses, thyromegaly, LAD. Unremarkable R chest mediport  Lungs unlabored, CTAB  CV rrr s1s2  Abd soft, ntnd, benign  Back well aligned, no masses, tenderness  No LE edema  Skin warm, dry, thin, multiple iatrogenic bruises  MSK joints of hand normal;   Neuro Face symmetric,   Neuropsych exam normal including affect, sensorium, gross memory, thought processes, and fund of knowledge.     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  Diarrhea.    Diarrhea: Suggested they take half a tablet of Imodium daily prophylactically.  They should stop this if he gets constipated.  They should continue Imodium after each diarrhea about.    Goals of care: Extensively discussed this with him.  With his frailty and progressive CKD and other problems I think it is really unlikely there is many interventions that we can do which will improve his strength and quality of life apart from stopping chemotherapy.  I discussed with him if he did that I think he would have some moderate improvements in his strength and energy for some number of weeks or months until the cancer caught up.  I really think we have a situation in which he is trading off quality of life for perhaps " very modest gains in longevity and I told him that.  I discussed and recommended hospice care with him and explained what that meant.  His wife is very much engaged in this conversation-the patient was pretty apathetic throughout the entire interview and it is tough to  what he thought of my recommendations.      I discussed the situation with his oncology team today in the cancer center directly.  Follow-up as needed    -The above was transcribed using voice recognition software. While I review and edit the transcription, I may miss errors. Please let me know of any serious mis-transcriptions and I will addend this note.    Chart data reviewed today:  Advance care planning: DNR/DNI, +HCD completed    Family History   Problem Relation Age of Onset     Melanoma No family hx of      Skin Cancer No family hx of      Past Medical History:   Diagnosis Date     Cerebral artery occlusion with cerebral infarction (H)      Coronary artery disease, non-occlusive Jan 2012     Gastroesophageal reflux disease      Hx of migraines     haven;t bothered him since 2003     Hypertension      Malignant neoplasm (H) 1/2012     Migraine      Myeloma (H)     multiple     Nonulcer dyspepsia      Polio age 6    no sequelae     Polio     age 6     Prostate cancer (H)      Past Surgical History:   Procedure Laterality Date     BMT CELL PRODUCT INFUSION       CARDIAC SURGERY  2012    angiogram     COLONOSCOPY       ENT SURGERY      tonsillectomy     ESOPHAGOSCOPY, GASTROSCOPY, DUODENOSCOPY (EGD), COMBINED N/A 1/6/2015    Procedure: COMBINED ESOPHAGOSCOPY, GASTROSCOPY, DUODENOSCOPY (EGD);  Surgeon: Yamil Donaldson Chi, MD;  Location:  GI     ESOPHAGOSCOPY, GASTROSCOPY, DUODENOSCOPY (EGD), COMBINED Left 8/20/2015    Procedure: COMBINED ESOPHAGOSCOPY, GASTROSCOPY, DUODENOSCOPY (EGD), BIOPSY SINGLE OR MULTIPLE;  Surgeon: Mane Barragan MD;  Location:  GI     HERNIA REPAIR Bilateral      ORTHOPEDIC SURGERY Right 2011    toe      PHACOEMULSIFICATION CLEAR CORNEA WITH STANDARD INTRAOCULAR LENS IMPLANT Left 7/27/2017    Procedure: PHACOEMULSIFICATION CLEAR CORNEA WITH STANDARD INTRAOCULAR LENS IMPLANT;  LEFT EYE PHACOEMULSIFICATION CLEAR CORNEA WITH STANDARD INTRAOCULAR LENS IMPLANT ;  Surgeon: Tesfaye Yusuf MD;  Location: [facility]     PHACOEMULSIFICATION CLEAR CORNEA WITH STANDARD INTRAOCULAR LENS IMPLANT Right 9/21/2017    Procedure: PHACOEMULSIFICATION CLEAR CORNEA WITH STANDARD INTRAOCULAR LENS IMPLANT;  RIGHT EYE PHACOEMULSIFICATION CLEAR CORNEA WITH STANDARD INTRAOCULAR LENS IMPLANT;  Surgeon: Tesfaye Yusuf MD;  Location: [facility]     right inguinal hernia surgery       right toe surgery       Allergies   Allergen Reactions     Aspirin      Stomach upset     Crestor [Rosuvastatin] Muscle Pain (Myalgia)     Bactrim [Sulfamethoxazole W-Trimethoprim] Rash   *       Medications:   I have reviewed this patient's medication profile.  MNPMP: reviewed      Data reviewed:  I reviewed recent labs and imaging, comments on pertinent findings:  eGFR 18  Plt 21    Thank you for involving us in the patient's care.   Gaston Bowie MD / Palliative Medicine / Pager [phone] / [facility] Inpatient Team Consult Pager [phone] (answered 8am-430pm M-F) - ok to text page via [employer] / After-Hours Answering Service [phone] / Palliative Clinic in the [facility] at the [facility] - [phone] (scheduling); [phone] (triage).        Again, thank you for allowing me to participate in the care of your patient.      Sincerely,    Gaston Bowie MD

## 2018-01-19 NOTE — NURSING NOTE
Chief Complaint   Patient presents with     Port Draw     Right power port accessed, labs drawn and sent, flushed with saline and heparin, vitals completed, checked into provider appointment.   Marlen McgowanRN

## 2018-01-25 NOTE — MR AVS SNAPSHOT
After Visit Summary   1/25/2018    Anthony Carbone    MRN: [id number]           Patient Information     Date Of Birth          1945        Visit Information        Provider Department      1/25/2018 2:00 PM Leanne Reddy PA-C [facility]        Today's Diagnoses     Multiple myeloma in relapse (H)        Multiple myeloma, remission status unspecified (H)          Care Instructions      At your visit today, we discussed your risk for falls and preventive options.    Fall Prevention  Falls often occur due to slipping, tripping or losing your balance. Millions of people fall every year and injure themselves. Here are ways to reduce your risk of falling again.    Think about your fall, was there anything that caused your fall that can be fixed, removed, or replaced?    Make your home safe by keeping walkways clear of objects you may trip over.    Use non-slip pads under rugs. Do not use area rugs or small throw rugs.    Use non-slip mats in bathtubs and showers.    Install handrails and lights on staircases.    Do not walk in poorly lit areas.    Do not stand on chairs or wobbly ladders.    Use caution when reaching overhead or looking upward. This position can cause a loss of balance.    Be sure your shoes fit properly, have non-slip bottoms and are in good condition.     Wear shoes both inside and out. Avoid going barefoot or wearing slippers.    Be cautious when going up and down stairs, curbs, and when walking on uneven sidewalks.    If your balance is poor, consider using a cane or walker.    If your fall was related to alcohol use, stop or limit alcohol intake.     If your fall was related to use of sleeping medicines, talk to your doctor about this. You may need to reduce your dosage at bedtime if you awaken during the night to go to the bathroom.      To reduce the need for nighttime bathroom trips:    Avoid drinking fluids for several hours before going  to bed    Empty your bladder before going to bed    Men can keep a urinal at the bedside    Stay as active as you can. Balance, flexibility, strength, and endurance all come from exercise. They all play a role in preventing falls. Ask your healthcare provider which types of activity are right for you.    Get your vision checked on a regular basis.    If you have pets, know where they are before you stand up or walk so you don't trip over them.    Use night lights.  Date Last Reviewed: 11/5/2015 2000-2017 [employer]. [address] [place] [address]. All rights reserved. This information is not intended as a substitute for professional medical care. Always follow your healthcare professional's instructions.                Follow-ups after your visit        Your next 10 appointments already scheduled     Jan 25, 2018  3:00 PM CST   Infusion 120 with  ONCOLOGY INFUSION, UC 23 ATC   [facility] ([facility])    [address] [place] [id number] [phone]              Future tests that were ordered for you today     Open Future Orders        Priority Expected Expires Ordered    CBC with platelets differential Routine 2/1/2018 2/25/2018 1/25/2018    Comprehensive metabolic panel Routine 2/1/2018 2/25/2018 1/25/2018            Who to contact     If you have questions or need follow up information about today's clinic visit or your schedule please contact [facility] directly at [phone].  Normal or non-critical lab and imaging results will be communicated to you by MyChart, letter or phone within 4 business days after the clinic has received the results. If you do not hear from us within 7 days, please contact the clinic through MyChart or phone. If you have a critical or abnormal lab result, we will notify you by phone as soon as possible.  Submit refill requests through [employer] or call your  pharmacy and they will forward the refill request to us. Please allow 3 business days for your refill to be completed.          Additional Information About Your Visit        [employer] Information     [employer] gives you secure access to your electronic health record. If you see a primary care provider, you can also send messages to your care team and make appointments. If you have questions, please call your primary care clinic.  If you do not have a primary care provider, please call [phone] and they will assist you.        Care EveryWhere ID     This is your Care EveryWhere ID. This could be used by other organizations to access your [place] medical records  [id number]        Your Vitals Were     BMI (Body Mass Index)                   19.16 kg/m2            Blood Pressure from Last 3 Encounters:   01/19/18 109/75   01/19/18 102/67   01/15/18 132/66    Weight from Last 3 Encounters:   01/25/18 50.7 kg (111 lb 11.2 oz)   01/19/18 50.2 kg (110 lb 11.2 oz)   01/19/18 50.3 kg (110 lb 12.8 oz)               Primary Care Provider Office Phone # Fax #    Sanket Burciaga [phone]       [facility] [address] [facility] [address]        Equal Access to Services     ALBAN SHAH : Hadii chalo kern hadasho Soemilieali, waaxda luqadaha, qaybta kaalmada adeegyada, jonny villa. So [facility] [phone].    ATENCIÓN: Si habla español, tiene a todd disposición servicios gratuitos de asistencia lingüística. Llame al [phone].    We comply with applicable federal civil rights laws and [place] laws. We do not discriminate on the basis of race, color, national origin, age, disability, sex, sexual orientation, or gender identity.            Thank you!     Thank you for choosing [facility]  for your care. Our goal is always to provide you with excellent care. Hearing back from our patients is one way we can continue to improve our services.  Please take a few minutes to complete the written survey that you may receive in the mail after your visit with us. Thank you!             Your Updated Medication List - Protect others around you: Learn how to safely use, store and throw away your medicines at www.disposemymeds.org.          This list is accurate as of 1/25/18  2:54 PM.  Always use your most recent med list.                   Brand Name Dispense Instructions for use Diagnosis    acetaminophen 325 MG tablet    TYLENOL    100 tablet    Take 2 tablets (650 mg) by mouth every 4 hours as needed for mild pain or fever    Fever, unspecified fever cause       acyclovir 400 MG tablet    ZOVIRAX    60 tablet    Take 1 tablet (400 mg) by mouth 2 times daily    Multiple myeloma in relapse (H)       amLODIPine 5 MG tablet    NORVASC    90 tablet    TAKE 1 TABLET BY MOUTH AT BEDTIME    Essential hypertension       esomeprazole 40 MG CR capsule    nexIUM    30 capsule    Take 1 capsule (40 mg) by mouth every morning (before breakfast)    Gastroesophageal reflux disease without esophagitis       LORazepam 0.5 MG tablet    ATIVAN    30 tablet    Take 1 tablet (0.5 mg) by mouth every 6 hours as needed for nausea or anxiety.    Multiple myeloma in relapse (H), Delirium       OLANZapine 5 MG tablet    zyPREXA    60 tablet    Take by mouth At Bedtime Takes 1 tablet    Delirium, Multiple myeloma in relapse (H)       ondansetron 8 MG ODT tab    ZOFRAN-ODT    30 tablet    Take 1 tablet (8 mg) by mouth every 8 hours as needed for nausea    Nausea       oxyCODONE IR 5 MG tablet    ROXICODONE    15 tablet    Take 1 tablet (5 mg) by mouth every 6 hours as needed for moderate to severe pain        predniSONE 50 MG tablet    DELTASONE     Take 1 tablet (50 mg) by mouth every other day    Multiple myeloma, remission status unspecified (H)       SIMVASTATIN PO      Take 20 mg by mouth At Bedtime        traMADol 50 MG tablet    ULTRAM    60 tablet    Take 1 tablet (50 mg) by mouth  every 6 hours as needed for moderate pain . Recommend trying after Tylenol and before Dilaudid.    Acute bilateral low back pain without sciatica, Multiple myeloma in relapse (H)

## 2018-01-25 NOTE — PROGRESS NOTES
Infusion Nursing Note:  Anthony Carbone presents today for Cycle 3 Day 21 Cytoxan.    Patient seen by provider today: Yes: PREM Herrera    Note: Patient presents to infusion with wife. Educated on thrombocytopenic precautions. Verbalized understanding. No other concerns at this time.    TORB 1/25/18 1445 Leanne AMARO/Lashay Longoria RN: Ok to treat today.     Intravenous Access:  Implanted Port.    Treatment Conditions:  Lab Results   Component Value Date    HGB 8.6 01/25/2018     Lab Results   Component Value Date    WBC 4.2 01/25/2018      Lab Results   Component Value Date    ANEU 3.7 01/25/2018     Lab Results   Component Value Date    PLT 34 01/25/2018      Lab Results   Component Value Date     01/25/2018                   Lab Results   Component Value Date    POTASSIUM 4.2 01/25/2018           Lab Results   Component Value Date    MAG 1.7 01/15/2018            Lab Results   Component Value Date    CR 2.33 01/25/2018                   Lab Results   Component Value Date    JAZMIN 7.3 01/25/2018                Lab Results   Component Value Date    BILITOTAL 0.3 01/25/2018           Lab Results   Component Value Date    ALBUMIN 2.3 01/25/2018                    Lab Results   Component Value Date    ALT 26 01/25/2018           Lab Results   Component Value Date    AST 10 01/25/2018     Results reviewed, no parameters.    Post Infusion Assessment:  Patient tolerated infusion without incident.  Blood return noted pre and post infusion.  Access discontinued per protocol.    Discharge Plan:   Patient declined prescription refills.  Discharge instructions reviewed with: Patient and family  Patient and family verbalized understanding of discharge instructions and all questions answered.  AVS to patient via [employer].  Patient will return 2/1/18 for next appointment.   Patient discharged in stable condition accompanied by: wife.  Face to Face time: 0.    EMILY BAZZI, RN

## 2018-01-25 NOTE — LETTER
1/25/2018      RE: Anthony Carbone  [address] [place] [address]       HEMATOLOGY/ONCOLOGY PROGRESS NOTE  Jan 25, 2018    REASON FOR VISIT: myeloma    Diagnosis: 72 year old gentleman with IgM lambda multiple myeloma originally diagnosed in 01/2012 as stage I, standard risk disease.   Treatment: Revlimid plus dexamethasone for 4-5 cycles but plateaued by late 03/2012.   - Velcade was added and he received another 2-3 cycles, achieving a good partial remission.      Transplant: Single auto after melphalan 200 mg/m2 preparative regimen on 01/09/2013  - Post-transplant course: unremarkable except for some mild steroid-induced hyperglycemia, gastritis, nausea and vomiting.      Maintenance: Lenalidomide at day-100 then developed a maculopapular rash. Lenalidomide was held and then we re-challenged him after about a month to 2 months at a lower dose (5 mg daily); rash returned.   - was started on maintenance Velcade, every other week through July 2014, when he was noted with abnormalities on myeloma studies drawn there. Noted with prostate cancer dx at about the time of relapse (see below).     Relapse: noted with return on M-spike in blood/urine with marrow involvement but negative PET.   - Started on retreatment with RVD on 8/27/14 with Revlimid at 15 mg 14 days of 21 days, dexamethasone 40 mg weekly and velcade weekly.Completed at total of 5 cycles without complication by 12/2014.   - Started Cycle 6 on inc'd Rev dosing of 20mg daily x 2 weeks on 12/11/14 which was complicated by pneumonia.  - Adm 12/21-1/10 for human metapneumovirus pneumonia complicated by anorexia, HTN, depression, anorexia with significant weight loss.   - Restarted Ernesto/Dex only on 2/4/15 with good tolerance but with thrombocytopenia.   - Bendamustine added to Velcade/dexamethasone on 5/21/15 due to disease progression  - Velcade discontinued on 7/9/2015 due to side effects (orthostasis)  - Schedule changed to bendamustine 80 mg/m2  days 1 and 2 on 28-day cycle  - Cycle 1 tolerated poorly due to lightheadedness and weakness  - Cycle 2 dose reduced to 60 mg/m2 and pre-meds adjusted  - Cycle 5 received on 9/17/15.  - 10/1/2015 - increasing IgM, M-spike - started Pomalidomide 4mg/day (21 days out of 28 days) and weekly Decadron 20mg on Oct 1st, 2015.    - Carfilzomib added on 10/22/2015 making this CPD.  - C3-C6  received in [place]    - Returned to [facility] and resumed CPD here    - Adm: 4/12-4/14 with fever, confusion, neutropenia. Noted on MRI to have acute/subacute CVA and subacute/chronic CVA; started on Plavix, given brief course of Abx and GCSF prior to d/c.     - Continued on Carfilzomib and dexamethasone alone  - Pomalyst added back on 7/13/2016 for rising FLC  - Start daratumumab 11/10/16. Changed to every other week after 4 weekly treatments d/t profound fatigue and malaise.   - Adm 5/7-5/16 due to AMS, hypercalcemia, hyperuricemia, possible PNA, back pain, and JOSE MARIA. Started Kyprolis while inpatient.  - Re-admitted 5/25-/529 with recurrent AMS, found to have concomitant PNA  - Resumed Kyprolis 6/13/2017. Stopped due to worsening performance status and progressive myeloma.  - Started Venclexta 800 mg 8/25/2017  - Added weekly Velcade with dexamethasone 20 mg weekly due to rising light chains on 11/1/2017  - Started weekly Cytoxan with prednisone QOD on 12/13 (though started the prednisone around 12/24)      INTERVAL HISTORY:    Anthony presents today with his wife.  They both report that the last week was improved. He is eating better. Diarrhea is resolved. No infectious issues or fevers. Fatigue is present but stable and manageable right now. He feels he can continue on treatment. Still has a slight residual cough that is mostly gone. No SOB, chest pain, abdominal pain, nausea, vomtiing, constipation, bleeding, or swelling.    Remainder of 10-pt ROS otherwise is negative.    PHYSICAL EXAMINATION  Wt 50.7 kg (111 lb 11.2 oz)  BMI 19.16 kg/m2      Wt Readings from Last 10 Encounters:   18 50.7 kg (111 lb 11.2 oz)   18 50.2 kg (110 lb 11.2 oz)   18 50.3 kg (110 lb 12.8 oz)   18 50.8 kg (112 lb 1.6 oz)   18 49.3 kg (108 lb 11.2 oz)   18 51.2 kg (112 lb 14.4 oz)   17 51.2 kg (112 lb 14.2 oz)   17 51.8 kg (114 lb 4.8 oz)   17 51.5 kg (113 lb 9.6 oz)   17 53 kg (116 lb 12.8 oz)     General: Alert, frail. Oriented to name, , location,  Able to ambulate independently, albeit slow and cautiously.  No acute distress. HEENT: PERRL, no palor or icterus. CVS: RRR with occasional premature beat. CHEST: CTAB, normal work of breathing, right port without erythema, swelling or pus.  ABDOMEN: soft non tender no masses     NEURO: AAOX3  CN 2-12 intact  SKIN: no bleeding or brusiing, no rashes EXTREMITIES: No edema.     LABS:   Results for WILLIAN ARCINIEGA (MRN [id number]) as of 2018 14:47   Ref. Range 2018 13:42   Sodium Latest Ref Range: 133 - 144 mmol/L 137   Potassium Latest Ref Range: 3.4 - 5.3 mmol/L 4.2   Chloride Latest Ref Range: 94 - 109 mmol/L 108   Carbon Dioxide Latest Ref Range: 20 - 32 mmol/L 19 (L)   Urea Nitrogen Latest Ref Range: 7 - 30 mg/dL 49 (H)   Creatinine Latest Ref Range: 0.66 - 1.25 mg/dL 2.33 (H)   GFR Estimate Latest Ref Range: >60 mL/min/1.7m2 28 (L)   GFR Estimate If Black Latest Ref Range: >60 mL/min/1.7m2 33 (L)   Calcium Latest Ref Range: 8.5 - 10.1 mg/dL 7.3 (L)   Anion Gap Latest Ref Range: 3 - 14 mmol/L 9   Albumin Latest Ref Range: 3.4 - 5.0 g/dL 2.3 (L)   Protein Total Latest Ref Range: 6.8 - 8.8 g/dL 10.5 (H)   Bilirubin Total Latest Ref Range: 0.2 - 1.3 mg/dL 0.3   Alkaline Phosphatase Latest Ref Range: 40 - 150 U/L 42   ALT Latest Ref Range: 0 - 70 U/L 26   AST Latest Ref Range: 0 - 45 U/L 10   Glucose Latest Ref Range: 70 - 99 mg/dL 148 (H)   WBC Latest Ref Range: 4.0 - 11.0 10e9/L 4.2   Hemoglobin Latest Ref Range: 13.3 - 17.7 g/dL 8.6 (L)    Hematocrit Latest Ref Range: 40.0 - 53.0 % 27.1 (L)   Platelet Count Latest Ref Range: 150 - 450 10e9/L 34 (LL)     IMPRESSION/PLAN:  72 year old male with relapsed MM after autologous transplant (1/9/2013), status-post multiple salvage regimens with progressive disease.    MM: With rising light chains, worsening renal function, and worsening TCP, started weekly cytoxan with prednisone 50  mg QOD on 12/14. MM labs drawn 1/2 demonstrated sharp increase, however this was obtained after only 3 doses of Cytoxan and less than a week of concurrent use with the prednisone.  We have had multiple discussions re: goals and Yamil feels that he would like to continue at this time. We will continue this discussion weekly. Given he is tolerating fine, will continue with Cytoxan today. WIll draw MM labs. I will see if Dr. Topete can see next week.    Heme: Cytopenias 2/2 treatment and likely MM, stable  - Previously on Plavix, remains on hold given thrombocytopenia, indefinitely  - Transfuse to keep hemoglobin > 8, platelets > 10k.     Renal/FEN: Creat baseline ~0.8-1.0, recently has been increased beyond baseline, likely secondary to progressive myeloma.  This has been improved the last few weeks.  -Encouraged protein/calorie supplements.   -Would recommend continuing Zyprexa at night to help with appetite.    ID: Upper respiratory symptoms have significantly improved. No other infectious symptoms at this time.  - Continues ppx  mg BID     Back/rib pain: Started early May. Lumbar MRI at OSH showing mild-mod central and foraminal stenoses in lumbar spine, per patient and wife, there was no MM lesion there.  Recent imaging with no acute findings and pain has resolved.  He is not taking any pain medications currently.  We will continue to monitor.    Fatigue: Stable today.       CV: Continue zocor and norvasc.   - Avoid QTc prolonging agents (history of QTc prolongation with syncopal episodes)     AMS: AMS started early May  in setting of metabolic derangements, uremia, and possible infection.  Remains intermittently confused over the past few months but improved significantly and stable today. I do believe there is perhaps some ongoing cognitive decline since the AMS episodes.  - Continue Zyprexa 5 mg for appetite  - Holding off long-acting pain meds    Msg out to [facility] to see if he can see prior to [facility]'s departure from the . Will schedule with me as a placeholder.    I spent >25 minutes with the patient, with over 50% of the time spent counseling or coordinating their care as described above.    Leanne Murphy PA-C

## 2018-01-25 NOTE — NURSING NOTE
"Oncology Rooming Note    January 25, 2018 1:50 PM   Anthony Carbone is a 72 year old male who presents for:    Chief Complaint   Patient presents with     Port Draw     labs drawn via port by RN     Oncology Clinic Visit     Return-Multiple Myeloma     Initial Vitals: Wt 50.7 kg (111 lb 11.2 oz)  BMI 19.16 kg/m2 Estimated body mass index is 19.16 kg/(m^2) as calculated from the following:    Height as of 1/19/18: 1.626 m (5' 4.02\").    Weight as of this encounter: 50.7 kg (111 lb 11.2 oz). Body surface area is 1.51 meters squared.  No Pain (0) Comment: Data Unavailable   No LMP for male patient.  Allergies reviewed: Yes  Medications reviewed: Yes    Medications: Medication refills not needed today.  Pharmacy name entered into [facility]:    [id number] - [place], [place] - [address] [facility] [address]  [place], [place] - [address] [facility]  [id number] - [place], [place] - [address] AT [facility] [address]    Clinical concerns:     6 minutes for nursing intake (face to face time)     "

## 2018-01-25 NOTE — NURSING NOTE
Chief Complaint   Patient presents with     Port Draw     labs drawn via port by RN     Wt 50.7 kg (111 lb 11.2 oz)  BMI 19.16 kg/m2    Vitals taken. Port accessed by RN. Labs collected and sent. Line flushed with NS & Heparin. Pt tolerated well. Pt checked in for next appointment.    Iris Gutiérrez RN

## 2018-01-25 NOTE — PROGRESS NOTES
HEMATOLOGY/ONCOLOGY PROGRESS NOTE  Jan 25, 2018    REASON FOR VISIT: myeloma    Diagnosis: 72 year old gentleman with IgM lambda multiple myeloma originally diagnosed in 01/2012 as stage I, standard risk disease.   Treatment: Revlimid plus dexamethasone for 4-5 cycles but plateaued by late 03/2012.   - Velcade was added and he received another 2-3 cycles, achieving a good partial remission.      Transplant: Single auto after melphalan 200 mg/m2 preparative regimen on 01/09/2013  - Post-transplant course: unremarkable except for some mild steroid-induced hyperglycemia, gastritis, nausea and vomiting.      Maintenance: Lenalidomide at day-100 then developed a maculopapular rash. Lenalidomide was held and then we re-challenged him after about a month to 2 months at a lower dose (5 mg daily); rash returned.   - was started on maintenance Velcade, every other week through July 2014, when he was noted with abnormalities on myeloma studies drawn there. Noted with prostate cancer dx at about the time of relapse (see below).     Relapse: noted with return on M-spike in blood/urine with marrow involvement but negative PET.   - Started on retreatment with RVD on 8/27/14 with Revlimid at 15 mg 14 days of 21 days, dexamethasone 40 mg weekly and velcade weekly.Completed at total of 5 cycles without complication by 12/2014.   - Started Cycle 6 on inc'd Rev dosing of 20mg daily x 2 weeks on 12/11/14 which was complicated by pneumonia.  - Adm 12/21-1/10 for human metapneumovirus pneumonia complicated by anorexia, HTN, depression, anorexia with significant weight loss.   - Restarted Ernesto/Dex only on 2/4/15 with good tolerance but with thrombocytopenia.   - Bendamustine added to Velcade/dexamethasone on 5/21/15 due to disease progression  - Velcade discontinued on 7/9/2015 due to side effects (orthostasis)  - Schedule changed to bendamustine 80 mg/m2 days 1 and 2 on 28-day cycle  - Cycle 1 tolerated poorly due to lightheadedness and  weakness  - Cycle 2 dose reduced to 60 mg/m2 and pre-meds adjusted  - Cycle 5 received on 9/17/15.  - 10/1/2015 - increasing IgM, M-spike - started Pomalidomide 4mg/day (21 days out of 28 days) and weekly Decadron 20mg on Oct 1st, 2015.    - Carfilzomib added on 10/22/2015 making this CPD.  - C3-C6  received in [place]    - Returned to [facility] and resumed CPD here    - Adm: 4/12-4/14 with fever, confusion, neutropenia. Noted on MRI to have acute/subacute CVA and subacute/chronic CVA; started on Plavix, given brief course of Abx and GCSF prior to d/c.     - Continued on Carfilzomib and dexamethasone alone  - Pomalyst added back on 7/13/2016 for rising FLC  - Start daratumumab 11/10/16. Changed to every other week after 4 weekly treatments d/t profound fatigue and malaise.   - Adm 5/7-5/16 due to AMS, hypercalcemia, hyperuricemia, possible PNA, back pain, and JOSE MARIA. Started Kyprolis while inpatient.  - Re-admitted 5/25-/529 with recurrent AMS, found to have concomitant PNA  - Resumed Kyprolis 6/13/2017. Stopped due to worsening performance status and progressive myeloma.  - Started Venclexta 800 mg 8/25/2017  - Added weekly Velcade with dexamethasone 20 mg weekly due to rising light chains on 11/1/2017  - Started weekly Cytoxan with prednisone QOD on 12/13 (though started the prednisone around 12/24)      INTERVAL HISTORY:    Anthony presents today with his wife.  They both report that the last week was improved. He is eating better. Diarrhea is resolved. No infectious issues or fevers. Fatigue is present but stable and manageable right now. He feels he can continue on treatment. Still has a slight residual cough that is mostly gone. No SOB, chest pain, abdominal pain, nausea, vomtiing, constipation, bleeding, or swelling.    Remainder of 10-pt ROS otherwise is negative.    PHYSICAL EXAMINATION  Wt 50.7 kg (111 lb 11.2 oz)  BMI 19.16 kg/m2     Wt Readings from Last 10 Encounters:   01/25/18 50.7 kg (111 lb 11.2 oz)    18 50.2 kg (110 lb 11.2 oz)   18 50.3 kg (110 lb 12.8 oz)   18 50.8 kg (112 lb 1.6 oz)   18 49.3 kg (108 lb 11.2 oz)   18 51.2 kg (112 lb 14.4 oz)   17 51.2 kg (112 lb 14.2 oz)   17 51.8 kg (114 lb 4.8 oz)   17 51.5 kg (113 lb 9.6 oz)   17 53 kg (116 lb 12.8 oz)     General: Alert, frail. Oriented to name, , location,  Able to ambulate independently, albeit slow and cautiously.  No acute distress. HEENT: PERRL, no palor or icterus. CVS: RRR with occasional premature beat. CHEST: CTAB, normal work of breathing, right port without erythema, swelling or pus.  ABDOMEN: soft non tender no masses     NEURO: AAOX3  CN 2-12 intact  SKIN: no bleeding or brusiing, no rashes EXTREMITIES: No edema.     LABS:   Results for WILLIAN ARCINIEGA (MRN [id number]) as of 2018 14:47   Ref. Range 2018 13:42   Sodium Latest Ref Range: 133 - 144 mmol/L 137   Potassium Latest Ref Range: 3.4 - 5.3 mmol/L 4.2   Chloride Latest Ref Range: 94 - 109 mmol/L 108   Carbon Dioxide Latest Ref Range: 20 - 32 mmol/L 19 (L)   Urea Nitrogen Latest Ref Range: 7 - 30 mg/dL 49 (H)   Creatinine Latest Ref Range: 0.66 - 1.25 mg/dL 2.33 (H)   GFR Estimate Latest Ref Range: >60 mL/min/1.7m2 28 (L)   GFR Estimate If Black Latest Ref Range: >60 mL/min/1.7m2 33 (L)   Calcium Latest Ref Range: 8.5 - 10.1 mg/dL 7.3 (L)   Anion Gap Latest Ref Range: 3 - 14 mmol/L 9   Albumin Latest Ref Range: 3.4 - 5.0 g/dL 2.3 (L)   Protein Total Latest Ref Range: 6.8 - 8.8 g/dL 10.5 (H)   Bilirubin Total Latest Ref Range: 0.2 - 1.3 mg/dL 0.3   Alkaline Phosphatase Latest Ref Range: 40 - 150 U/L 42   ALT Latest Ref Range: 0 - 70 U/L 26   AST Latest Ref Range: 0 - 45 U/L 10   Glucose Latest Ref Range: 70 - 99 mg/dL 148 (H)   WBC Latest Ref Range: 4.0 - 11.0 10e9/L 4.2   Hemoglobin Latest Ref Range: 13.3 - 17.7 g/dL 8.6 (L)   Hematocrit Latest Ref Range: 40.0 - 53.0 % 27.1 (L)   Platelet Count Latest Ref Range:  150 - 450 10e9/L 34 (LL)     IMPRESSION/PLAN:  72 year old male with relapsed MM after autologous transplant (1/9/2013), status-post multiple salvage regimens with progressive disease.    MM: With rising light chains, worsening renal function, and worsening TCP, started weekly cytoxan with prednisone 50  mg QOD on 12/14. MM labs drawn 1/2 demonstrated sharp increase, however this was obtained after only 3 doses of Cytoxan and less than a week of concurrent use with the prednisone.  We have had multiple discussions re: goals and Yamil feels that he would like to continue at this time. We will continue this discussion weekly. Given he is tolerating fine, will continue with Cytoxan today. WIll draw MM labs. I will see if Dr. Topete can see next week.    Heme: Cytopenias 2/2 treatment and likely MM, stable  - Previously on Plavix, remains on hold given thrombocytopenia, indefinitely  - Transfuse to keep hemoglobin > 8, platelets > 10k.     Renal/FEN: Creat baseline ~0.8-1.0, recently has been increased beyond baseline, likely secondary to progressive myeloma.  This has been improved the last few weeks.  -Encouraged protein/calorie supplements.   -Would recommend continuing Zyprexa at night to help with appetite.    ID: Upper respiratory symptoms have significantly improved. No other infectious symptoms at this time.  - Continues ppx  mg BID     Back/rib pain: Started early May. Lumbar MRI at OSH showing mild-mod central and foraminal stenoses in lumbar spine, per patient and wife, there was no MM lesion there.  Recent imaging with no acute findings and pain has resolved.  He is not taking any pain medications currently.  We will continue to monitor.    Fatigue: Stable today.       CV: Continue zocor and norvasc.   - Avoid QTc prolonging agents (history of QTc prolongation with syncopal episodes)     AMS: AMS started early May in setting of metabolic derangements, uremia, and possible infection.  Remains  intermittently confused over the past few months but improved significantly and stable today. I do believe there is perhaps some ongoing cognitive decline since the AMS episodes.  - Continue Zyprexa 5 mg for appetite  - Holding off long-acting pain meds    [facility] Will schedule with me as a placeholder.    I spent >25 minutes with the patient, with over 50% of the time spent counseling or coordinating their care as described above.    Leanne Murphy PA-C

## 2018-01-25 NOTE — MR AVS SNAPSHOT
After Visit Summary   1/25/2018    Anthony Carbone    MRN: [id number]           Patient Information     Date Of Birth          1945        Visit Information        Provider Department      1/25/2018 3:00 PM  23 ATC;  ONCOLOGY INFUSION [facility]        Today's Diagnoses     Multiple myeloma in relapse (H)    -  1    Multiple myeloma, remission status unspecified (H)          Care Instructions    Contact Numbers    [facility] [place]: [phone]  [facility] [place]:  [phone]    Call triage with chills and/or temperature greater than or equal to 100.5, uncontrolled nausea/vomiting, diarrhea, constipation, dizziness, shortness of breath, chest pain, bleeding, unexplained bruising, or any new/concerning symptoms, questions/concerns.     If you are having any concerning symptoms or wish to speak to a provider before your next infusion visit, please call your care coordinator or triage to notify them so we can adequately serve you.       After Hours: [phone]    If after hours, weekends, or holidays, call main hospital  and ask for Oncology doctor on call.         January 2018 Sunday Monday Tuesday Wednesday Thursday Friday Saturday        1     2     Outpatient Visit    9:54 AM   Kamari Topete MD [facility]     LEVEL 1    3:00 PM   (60 min.)    INFUSION CHAIR 11   [facility] 3     4     [facility] LAB DRAW    1:15 PM   (15 min.)    [facility]IC LAB DRAW   [facility] Lab Draw     UMP RETURN    1:45 PM   (50 min.)   Leanne Reddy PA-C   [facility] ONC INFUSION 120    3:00 PM   (120 min.)    ONCOLOGY INFUSION   [facility] 5     6       7     8     LEVEL 5    9:00 AM   (300 min.)    INFUSION CHAIR 17   [facility]     Outpatient Visit   10:38 AM   Kamari Topete MD   [facility] Lab 9     10     11      P MASONIC LAB DRAW   12:45 PM   (15 min.)   [facility] LAB DRAW   [facility] Masonic Lab Draw     UMP RETURN   12:55 PM   (50 min.)   Leanne Reddy PA-C   [facility] ONC INFUSION 120    2:00 PM   (120 min.)   UC ONCOLOGY INFUSION   [facility] 12     Admission    8:34 PM   Kamari Topete MD   [address] [facility] [place]   (Discharge: 1/15/2018)     XR CHEST 2 VIEWS    8:40 PM   (15 min.)   UUXR1   [facility] [place],  Radiology 13       14     IP EVALUATION    6:00 AM   (60 min.)   Vonda Melendez, PT   [facility] [place], Physical Therapy 15     16     17     18     19     UMP NEW    7:45 AM   (60 min.)   Gaston Bowie MD   [facility] LAB DRAW    9:00 AM   (15 min.)   [facility] LAB DRAW   [facility] Masonic Lab Draw     UMP RETURN    9:15 AM   (50 min.)   [facility]   [facility]   10:00 AM   (120 min.)    ONCOLOGY INFUSION   [facility] 20       21     22     23     24     25     P [facility] LAB DRAW    1:30 PM   (15 min.)   UC [facility] LAB DRAW   [facility] Masonic Lab Draw     UMP RETURN    1:45 PM   (50 min.)   [facility]    3:00 PM   (120 min.)   UC ONCOLOGY INFUSION   [facility] 26     27       28     29     30     31 February 2018 Sunday Monday Tuesday Wednesday Thursday Friday Saturday                       1     UMP [facility] LAB DRAW   12:30 PM   (15 min.)   [facility]     UMP RETURN   12:55 PM   (50 min.)   Le[facility] Reddy [facility]-C   [facility]    2:00 PM   (120 min.)   UC ONCOLOGY INFUSION   [facility] 2     3       4     5     6     7     8     9     10       11     12     13     14     15      16     17       18     19     20     21     22     23     24       25     26     27     28                                Recent Results (from the past 24 hour(s))   CBC with platelets differential    Collection Time: 01/25/18  1:42 PM   Result Value Ref Range    WBC 4.2 4.0 - 11.0 10e9/L    RBC Count 2.72 (L) 4.4 - 5.9 10e12/L    Hemoglobin 8.6 (L) 13.3 - 17.7 g/dL    Hematocrit 27.1 (L) 40.0 - 53.0 %     78 - 100 fl    MCH 31.6 26.5 - 33.0 pg    MCHC 31.7 31.5 - 36.5 g/dL    RDW 17.2 (H) 10.0 - 15.0 %    Platelet Count 34 (LL) 150 - 450 10e9/L    Diff Method Automated Method     % Neutrophils 87.4 %    % Lymphocytes 3.8 %    % Monocytes 8.1 %    % Eosinophils 0.0 %    % Basophils 0.2 %    % Immature Granulocytes 0.5 %    Nucleated RBCs 0 0 /100    Absolute Neutrophil 3.7 1.6 - 8.3 10e9/L    Absolute Lymphocytes 0.2 (L) 0.8 - 5.3 10e9/L    Absolute Monocytes 0.3 0.0 - 1.3 10e9/L    Absolute Eosinophils 0.0 0.0 - 0.7 10e9/L    Absolute Basophils 0.0 0.0 - 0.2 10e9/L    Abs Immature Granulocytes 0.0 0 - 0.4 10e9/L    Absolute Nucleated RBC 0.0     Anisocytosis Slight     Macrocytes Present     Platelet Estimate Confirming automated cell count    Comprehensive metabolic panel    Collection Time: 01/25/18  1:42 PM   Result Value Ref Range    Sodium 137 133 - 144 mmol/L    Potassium 4.2 3.4 - 5.3 mmol/L    Chloride 108 94 - 109 mmol/L    Carbon Dioxide 19 (L) 20 - 32 mmol/L    Anion Gap 9 3 - 14 mmol/L    Glucose 148 (H) 70 - 99 mg/dL    Urea Nitrogen 49 (H) 7 - 30 mg/dL    Creatinine 2.33 (H) 0.66 - 1.25 mg/dL    GFR Estimate 28 (L) >60 mL/min/1.7m2    GFR Estimate If Black 33 (L) >60 mL/min/1.7m2    Calcium 7.3 (L) 8.5 - 10.1 mg/dL    Bilirubin Total 0.3 0.2 - 1.3 mg/dL    Albumin 2.3 (L) 3.4 - 5.0 g/dL    Protein Total 10.5 (H) 6.8 - 8.8 g/dL    Alkaline Phosphatase 42 40 - 150 U/L    ALT 26 0 - 70 U/L    AST 10 0 - 45 U/L                 Follow-ups after your visit        Your next 10 appointments already  scheduled     Feb 01, 2018 12:30 PM CST   [facility] Lab Draw with UC [facility]IC LAB DRAW   [facility] Lab Draw ([facility])    [address]  [address]  [facility] [id number] [phone]            Feb 01, 2018  1:10 PM CST   (Arrive by 12:55 PM)   Return Visit with Leanne Reddy PA-C   [facility] ([facility])    [address]  [address]  [facility] [id number] [phone]            Feb 01, 2018  2:00 PM CST   Infusion 120 with UC ONCOLOGY INFUSION, UC 32 ATC   [facility] ([facility])    [address]  [address]  [facility] [id number] [phone]              Future tests that were ordered for you today     Open Future Orders        Priority Expected Expires Ordered    CBC with platelets differential Routine 2/1/2018 2/25/2018 1/25/2018    Comprehensive metabolic panel Routine 2/1/2018 2/25/2018 1/25/2018            Who to contact     If you have questions or need follow up information about today's clinic visit or your schedule please contact [facility] directly at [phone].  Normal or non-critical lab and imaging results will be communicated to you by [employer], letter or phone within 4 business days after the clinic has received the results. If you do not hear from us within 7 days, please contact the clinic through [employer] or phone. If you have a critical or abnormal lab result, we will notify you by phone as soon as possible.  Submit refill requests through [employer] or call your pharmacy and they will forward the refill request to us. Please allow 3 business days for your refill to be completed.          Additional Information About Your Visit        [employer] Information     [employer] gives you secure access to your electronic health record. If you see a primary care provider, you can also send messages to your care team and  make appointments. If you have questions, please call your primary care clinic.  If you do not have a primary care provider, please call [phone] and they will assist you.        Care EveryWhere ID     This is your Care EveryWhere ID. This could be used by other organizations to access your [place] medical records  [id number]        Your Vitals Were     Pulse Temperature Respirations Pulse Oximetry          88 99.2  F (37.3  C) (Oral) 16 100%         Blood Pressure from Last 3 Encounters:   01/25/18 126/76   01/19/18 109/75   01/19/18 102/67    Weight from Last 3 Encounters:   01/25/18 50.7 kg (111 lb 11.2 oz)   01/19/18 50.2 kg (110 lb 11.2 oz)   01/19/18 50.3 kg (110 lb 12.8 oz)              We Performed the Following     CBC with platelets differential     Comprehensive metabolic panel     Kappa and lambda light chain (Serum)     Protein electrophoresis        Primary Care Provider Office Phone # Fax #    Sanket Gualberto [phone]       [place] [id number] [id number]        Equal Access to Services     ALBAN SHAH : Hadii chalo Davis, wacatherineda joo, qaybta albert abdi, jonny jurado . So [facility] [phone].    ATENCIÓN: Si habla español, tiene a todd disposición servicios gratuitos de asistencia lingüística. [facility] [phone].    We comply with applicable federal civil rights laws and [place] laws. We do not discriminate on the basis of race, color, national origin, age, disability, sex, sexual orientation, or gender identity.            Thank you!     Thank you for choosing [facility]  for your care. Our goal is always to provide you with excellent care. Hearing back from our patients is one way we can continue to improve our services. Please take a few minutes to complete the written survey that you may receive in the mail after your visit with us. Thank you!             Your  Updated Medication List - Protect others around you: Learn how to safely use, store and throw away your medicines at www.disposemymeds.org.          This list is accurate as of 1/25/18  4:14 PM.  Always use your most recent med list.                   Brand Name Dispense Instructions for use Diagnosis    acetaminophen 325 MG tablet    TYLENOL    100 tablet    Take 2 tablets (650 mg) by mouth every 4 hours as needed for mild pain or fever    Fever, unspecified fever cause       acyclovir 400 MG tablet    ZOVIRAX    60 tablet    Take 1 tablet (400 mg) by mouth 2 times daily    Multiple myeloma in relapse (H)       amLODIPine 5 MG tablet    NORVASC    90 tablet    TAKE 1 TABLET BY MOUTH AT BEDTIME    Essential hypertension       esomeprazole 40 MG CR capsule    nexIUM    30 capsule    Take 1 capsule (40 mg) by mouth every morning (before breakfast)    Gastroesophageal reflux disease without esophagitis       LORazepam 0.5 MG tablet    ATIVAN    30 tablet    Take 1 tablet (0.5 mg) by mouth every 6 hours as needed for nausea or anxiety.    Multiple myeloma in relapse (H), Delirium       OLANZapine 5 MG tablet    zyPREXA    60 tablet    Take by mouth At Bedtime Takes 1 tablet    Delirium, Multiple myeloma in relapse (H)       ondansetron 8 MG ODT tab    ZOFRAN-ODT    30 tablet    Take 1 tablet (8 mg) by mouth every 8 hours as needed for nausea    Nausea       oxyCODONE IR 5 MG tablet    ROXICODONE    15 tablet    Take 1 tablet (5 mg) by mouth every 6 hours as needed for moderate to severe pain        predniSONE 50 MG tablet    DELTASONE     Take 1 tablet (50 mg) by mouth every other day    Multiple myeloma, remission status unspecified (H)       SIMVASTATIN PO      Take 20 mg by mouth At Bedtime        traMADol 50 MG tablet    ULTRAM    60 tablet    Take 1 tablet (50 mg) by mouth every 6 hours as needed for moderate pain . Recommend trying after Tylenol and before Dilaudid.    Acute bilateral low back pain without  sciatica, Multiple myeloma in relapse (H)

## 2018-01-25 NOTE — PATIENT INSTRUCTIONS
Contact Numbers    [facility] [place]: [phone]  [facility] [place]:  [phone]    Call triage with chills and/or temperature greater than or equal to 100.5, uncontrolled nausea/vomiting, diarrhea, constipation, dizziness, shortness of breath, chest pain, bleeding, unexplained bruising, or any new/concerning symptoms, questions/concerns.     If you are having any concerning symptoms or wish to speak to a provider before your next infusion visit, please call your care coordinator or triage to notify them so we can adequately serve you.       After Hours: [phone]    If after hours, weekends, or holidays, call main hospital  and ask for Oncology doctor on call.         January 2018 Sunday Monday Tuesday Wednesday Thursday Friday Saturday        1     2     Outpatient Visit    9:54 AM   Kamari Topete MD   [facility] Lab     LEVEL 1    3:00 PM   (60 min.)    INFUSION CHAIR 11   [facility] 3     4     [facility]IC LAB DRAW    1:15 PM   (15 min.)    MASONIC LAB DRAW   [facility] Lab Draw     [facility] RETURN    1:45 PM   (50 min.)   Leanne Reddy PA-C   [facility] ONC INFUSION 120    3:00 PM   (120 min.)   UC ONCOLOGY INFUSION   [facility] 5     6       7     8     LEVEL 5    9:00 AM   (300 min.)    INFUSION CHAIR 17   [facility]     Outpatient Visit   10:38 AM   Kamari Topete MD   [facility] Lab 9     10     11     [facility] MAS[facility] LAB DRAW   12:45 PM   (15 min.)    [facility]     P RETURN   12:55 PM   (50 min.)   Leanne Reddy PA-C   [facility] ONC INFUSION 120    2:00 PM   (120 min.)   UC ONCOLOGY INFUSION   [facility] 12     Admission    8:34 PM   Kamari Topete MD   [address] [facility]   (Discharge: 1/15/2018)     XR CHEST 2 VIEWS    8:40 PM   (15  RENAL PROGRESS NOTE: Pt seen on dialysis. Follow up of ESRD     Present on Admission:   ESRD (end stage renal disease) ([facility]   Secondary hyperparathyroidism of renal origin ([facility]   Coronary atherosclerosis of native coronary artery   HTN (hypertension)   DM2 (diabetes mellitus, type 2) ([facility]   S/P coronary artery stent placement   Chronic kidney disease, stage V ([facility]         Subjective: Feels good,says feels much better compared toyesterday no SOB. BP dropped    Patient is on Dialysis. Objective:    Patient Vitals for the past 12 hrs:   Temp Pulse Resp BP SpO2   11/09/17 1309 98 °F (36.7 °C) 76 18 182/79 -   11/09/17 1300 - 68 18 180/80 -   11/09/17 1230 - 66 18 176/74 -   11/09/17 1215 - 63 18 151/64 -   11/09/17 1200 - 63 18 165/60 -   11/09/17 1115 - 60 18 153/68 -   11/09/17 1100 - (!) 59 18 154/61 -   11/09/17 1045 - (!) 58 18 120/61 -   11/09/17 1037 - (!) 55 18 101/48 -   11/09/17 1035 - (!) 56 18 (!) 68/43 -   11/09/17 1030 - (!) 58 18 108/50 -   11/09/17 1002 97.6 °F (36.4 °C) 62 18 152/72 -   11/09/17 0744 98.1 °F (36.7 °C) 63 20 176/74 96 %   11/09/17 0400 97.8 °F (36.6 °C) (!) 58 18 187/78 95 %        Intake/Output Summary (Last 24 hours) at 11/09/17 1346  Last data filed at 11/09/17 1300   Gross per 24 hour   Intake              320 ml   Output             1480 ml   Net            -1160 ml       Physical Assessment:     General Appearance: NAD  Lung: clear to auscultation  Heart: regular rate and rhythm and no murmurs, clicks or gallops  Lower Extremities: no  edema   Access: (R) IJ TDC.     Labs    CBC w/Diff    Recent Labs      11/09/17   0550  11/08/17   0600   WBC  8.7  8.9   RBC  3.75*  3.77*   HGB  10.6*  10.6*   HCT  34.1*  34.1*   MCV  90.9  90.5   MCH  28.3  28.1   MCHC  31.1  31.1   RDW  13.8  14.0    Recent Labs      11/09/17   0550  11/08/17   0600   MONOS  8  6   EOS  2  4   BASOS  0  0   RDW  13.8  14.0        Comprehensive Metabolic Profile    Recent Labs      11/09/17 0550  11/08/17   0600  11/07/17   1730   NA  140  137  135*   K  3.5  3.2*  3.7   CL  97*  97*  97*   CO2  35*  30  32   BUN  44*  69*  68*   CREA  3.62*  4.68*  4.93*    Recent Labs      11/09/17   0550  11/08/17   0600  11/07/17   1730   CA  9.1  9.9  9.8  8.9   PHOS  3.2  4.4   --    ALB   --    --   3.3*   TP   --    --   7.1   SGOT   --    --   16   TBILI   --    --   0.4          Basic Metabolic Profile       results  reviwed. MEDS:Reviwed.   Current Facility-Administered Medications   Medication Dose Route Frequency Provider Last Rate Last Dose    clopidogrel (PLAVIX) tablet 75 mg  75 mg Oral DAILY Urszula Coughlin NP        heparin (porcine) 1,000 unit/mL injection             heparin (porcine) injection 5,000 Units  5,000 Units SubCUTAneous Q8H Tenisha Ames MD   5,000 Units at 11/09/17 0918    nitroglycerin (NITROSTAT) tablet 0.4 mg  0.4 mg SubLINGual PRN Urszula Coughlin NP   0.4 mg at 11/08/17 1449    epoetin lee (EPOGEN;PROCRIT) 5,000 Units  5,000 Units IntraVENous DIALYSIS LELO CANALES & Destinee Miller MD   5,000 Units at 11/08/17 1651    doxercalciferol (HECTOROL) 4 mcg/2 mL injection 0.5 mcg  0.5 mcg IntraVENous DIALYSIS LELO CANALES & Destinee Miller MD   0.5 mcg at 11/08/17 1651    allopurinol (ZYLOPRIM) tablet 100 mg  100 mg Oral DAILY Benoit Gordillo MD   100 mg at 11/09/17 0908    aspirin chewable tablet 81 mg  81 mg Oral DAILY Benoit Gordillo MD   81 mg at 11/09/17 0907    B complex-vitaminC-folic acid (NEPHROCAP) cap  1 Cap Oral DAILY Benoit Gordillo MD   1 Cap at 11/09/17 8898    cloNIDine HCl (CATAPRES) tablet 0.2 mg  0.2 mg Oral TID Benoit Gordillo MD   0.2 mg at 11/09/17 0908    fenofibrate nanocrystallized (TRICOR) tablet 48 mg  48 mg Oral DAILY Benoit Gordillo MD   48 mg at 11/09/17 0908    hydrALAZINE (APRESOLINE) tablet 100 mg  100 mg Oral TID Benoit Gordillo MD   100 mg at 11/09/17 0908    insulin glargine (LANTUS) injection 20 Units  20 Units SubCUTAneous min.)   UUXR1   [facility] [place],  Radiology 13       14     IP EVALUATION    6:00 AM   (60 min.)   Vonda Melendez, PT   [facility], Physical Therapy 15     16     17     18     19     UMP NEW    7:45 AM   (60 min.)   Gaston Bowie MD   [facility] MASONIC LAB DRAW    9:00 AM   (15 min.)    MASONIC LAB DRAW   [facility] Masonic Lab Draw     UMP RETURN    9:15 AM   (50 min.)   Leanne Reddy PA-C   [facility] ONC INFUSION 120   10:00 AM   (120 min.)    ONCOLOGY INFUSION   [facility] 20       21     22     23     24     25     P MASONIC LAB DRAW    1:30 PM   (15 min.)    MASONIC LAB DRAW   [facility] Masonic Lab Draw     P RETURN    1:45 PM   (50 min.)   Leanne Reddy PA-C   [facility] ONC INFUSION 120    3:00 PM   (120 min.)    ONCOLOGY INFUSION   [facility] 26     27       28     29     30     31 February 2018 Sunday Monday Tuesday Wednesday Thursday Friday Saturday                       1     P [facility]ONIC LAB DRAW   12:30 PM   (15 min.)    MASONIC LAB DRAW   [facility] Masonic Lab Draw     UMP RETURN   12:55 PM   (50 min.)   Leanne Reddy PA-C   [facility] ONC INFUSION 120    2:00 PM   (120 min.)    ONCOLOGY INFUSION   [facility] 2     3       4     5     6     7     8     9     10       11     12     13     14     15     16     17       18     19     20     21     22     23     24       25     26     27     28                                Recent Results (from the past 24 hour(s))   CBC with platelets differential    Collection Time: 01/25/18  1:42 PM   Result Value Ref Range    WBC 4.2 4.0 - 11.0 10e9/L    RBC Count 2.72 (L) 4.4 - 5.9 10e12/L    Hemoglobin 8.6 (L) 13.3 - 17.7 g/dL    Hematocrit 27.1 (L) 40.0 - 53.0 %     78 - 100 fl    MCH 31.6 26.5 -  33.0 pg    MCHC 31.7 31.5 - 36.5 g/dL    RDW 17.2 (H) 10.0 - 15.0 %    Platelet Count 34 (LL) 150 - 450 10e9/L    Diff Method Automated Method     % Neutrophils 87.4 %    % Lymphocytes 3.8 %    % Monocytes 8.1 %    % Eosinophils 0.0 %    % Basophils 0.2 %    % Immature Granulocytes 0.5 %    Nucleated RBCs 0 0 /100    Absolute Neutrophil 3.7 1.6 - 8.3 10e9/L    Absolute Lymphocytes 0.2 (L) 0.8 - 5.3 10e9/L    Absolute Monocytes 0.3 0.0 - 1.3 10e9/L    Absolute Eosinophils 0.0 0.0 - 0.7 10e9/L    Absolute Basophils 0.0 0.0 - 0.2 10e9/L    Abs Immature Granulocytes 0.0 0 - 0.4 10e9/L    Absolute Nucleated RBC 0.0     Anisocytosis Slight     Macrocytes Present     Platelet Estimate Confirming automated cell count    Comprehensive metabolic panel    Collection Time: 01/25/18  1:42 PM   Result Value Ref Range    Sodium 137 133 - 144 mmol/L    Potassium 4.2 3.4 - 5.3 mmol/L    Chloride 108 94 - 109 mmol/L    Carbon Dioxide 19 (L) 20 - 32 mmol/L    Anion Gap 9 3 - 14 mmol/L    Glucose 148 (H) 70 - 99 mg/dL    Urea Nitrogen 49 (H) 7 - 30 mg/dL    Creatinine 2.33 (H) 0.66 - 1.25 mg/dL    GFR Estimate 28 (L) >60 mL/min/1.7m2    GFR Estimate If Black 33 (L) >60 mL/min/1.7m2    Calcium 7.3 (L) 8.5 - 10.1 mg/dL    Bilirubin Total 0.3 0.2 - 1.3 mg/dL    Albumin 2.3 (L) 3.4 - 5.0 g/dL    Protein Total 10.5 (H) 6.8 - 8.8 g/dL    Alkaline Phosphatase 42 40 - 150 U/L    ALT 26 0 - 70 U/L    AST 10 0 - 45 U/L          Silvia Asencio MD   20 Units at 11/09/17 7717    isosorbide mononitrate ER (IMDUR) tablet 30 mg  30 mg Oral DAILY Sushant Castro MD   30 mg at 11/09/17 0908    metoprolol tartrate (LOPRESSOR) tablet 100 mg  100 mg Oral BID Sushant Castro MD   100 mg at 11/09/17 0908    NIFEdipine ER (PROCARDIA XL) tablet 90 mg  90 mg Oral DAILY Sushant Castro MD   90 mg at 11/09/17 0908    senna-docusate (PERICOLACE) 8.6-50 mg per tablet 1 Tab  1 Tab Oral DAILY Sushant Castro MD   1 Tab at 11/09/17 0908    simvastatin (ZOCOR) tablet 40 mg  40 mg Oral QHS Sushant Castro MD   40 mg at 11/08/17 2150    tamsulosin (FLOMAX) capsule 0.4 mg  0.4 mg Oral DAILY Sushant Castro MD   0.4 mg at 11/09/17 0908    insulin lispro (HUMALOG) injection   SubCUTAneous AC&HS Sushant Castro MD   3 Units at 11/09/17 5763    glucose chewable tablet 16 g  16 g Oral PRN Sushant Castro MD        glucagon (GLUCAGEN) injection 1 mg  1 mg IntraMUSCular PRN Sushant Castro MD        dextrose (D50W) injection syrg 12.5-25 g  25-50 mL IntraVENous PRN Sushant Castro MD        acetaminophen (TYLENOL) tablet 650 mg  650 mg Oral Q4H PRN Sushant Castro MD   650 mg at 11/09/17 0908    bisacodyl (DULCOLAX) tablet 5 mg  5 mg Oral DAILY PRN Sushant Castro MD        ondansetron [facility]) injection 4 mg  4 mg IntraVENous Q4H PRN Sushant Castro MD           Impression:    ESRD: Tolerating HD well. Anemia of CKD: on Epogen. Hyperparathyroidism Yes  Hypertension. CAD . Plan  Dialysis for volume and solute management  Epogen  5000 units. Hectorol: as ordered. No Dialysis tomorrow, . Cardiac cath tomorrow. Dialysis again on 11/11/17, needs to change temporary catheter to [facility], discussed with Dr. Bernardo Wheeler & dr. Radha Paul. Will plan for DC with OP dialysis at [facility].           Brooks Sierra MD

## 2018-01-25 NOTE — PATIENT INSTRUCTIONS
At your visit today, we discussed your risk for falls and preventive options.    Fall Prevention  Falls often occur due to slipping, tripping or losing your balance. Millions of people fall every year and injure themselves. Here are ways to reduce your risk of falling again.    Think about your fall, was there anything that caused your fall that can be fixed, removed, or replaced?    Make your home safe by keeping walkways clear of objects you may trip over.    Use non-slip pads under rugs. Do not use area rugs or small throw rugs.    Use non-slip mats in bathtubs and showers.    Install handrails and lights on staircases.    Do not walk in poorly lit areas.    Do not stand on chairs or wobbly ladders.    Use caution when reaching overhead or looking upward. This position can cause a loss of balance.    Be sure your shoes fit properly, have non-slip bottoms and are in good condition.     Wear shoes both inside and out. Avoid going barefoot or wearing slippers.    Be cautious when going up and down stairs, curbs, and when walking on uneven sidewalks.    If your balance is poor, consider using a cane or walker.    If your fall was related to alcohol use, stop or limit alcohol intake.     If your fall was related to use of sleeping medicines, talk to your doctor about this. You may need to reduce your dosage at bedtime if you awaken during the night to go to the bathroom.      To reduce the need for nighttime bathroom trips:    Avoid drinking fluids for several hours before going to bed    Empty your bladder before going to bed    Men can keep a urinal at the bedside    Stay as active as you can. Balance, flexibility, strength, and endurance all come from exercise. They all play a role in preventing falls. Ask your healthcare provider which types of activity are right for you.    Get your vision checked on a regular basis.    If you have pets, know where they are before you stand up or walk so you don't trip over  them.    Use night lights.  Date Last Reviewed: 11/5/2015 2000-2017 [employer]. [address] [place] [address], [place] [address]. All rights reserved. This information is not intended as a substitute for professional medical care. Always follow your healthcare professional's instructions.

## 2018-01-31 NOTE — NURSING NOTE
"Oncology Rooming Note    January 31, 2018 5:22 PM   Anthony Carbone is a 72 year old male who presents for:    Chief Complaint   Patient presents with     Oncology Clinic Visit     RTN for MM     Initial Vitals: /68 (BP Location: Left arm, Patient Position: Left side, Cuff Size: Adult Regular)  Pulse 85  Temp 98.3  F (36.8  C) (Oral)  Resp 16  Wt 51.4 kg (113 lb 4.8 oz)  SpO2 97%  BMI 19.44 kg/m2 Estimated body mass index is 19.44 kg/(m^2) as calculated from the following:    Height as of 1/19/18: 1.626 m (5' 4.02\").    Weight as of this encounter: 51.4 kg (113 lb 4.8 oz). Body surface area is 1.52 meters squared.  No Pain (0) Comment: Data Unavailable   No LMP for male patient.  Allergies reviewed: No  Medications reviewed: No    Medications:   Pharmacy name entered into [employer]:    [id number] - [place] [id number] [facility]  [facility] [place], [place] - [address] [facility]  [facility] [address] [place], [place] - [address] [address] AT [facility] OF [address]    Clinical concerns: pt is here for MD visit     6 minutes for nursing intake (face to face time)     Christy Lynn MA              "

## 2018-01-31 NOTE — PROGRESS NOTES
/68 (BP Location: Left arm, Patient Position: Left side, Cuff Size: Adult Regular)  Pulse 85  Temp 98.3  F (36.8  C) (Oral)  Resp 16  Wt 51.4 kg (113 lb 4.8 oz)  SpO2 97%  BMI 19.44 kg/m2  Wt Readings from Last 5 Encounters:   01/31/18 51.4 kg (113 lb 4.8 oz)   01/25/18 50.7 kg (111 lb 11.2 oz)   01/19/18 50.2 kg (110 lb 11.2 oz)   01/19/18 50.3 kg (110 lb 12.8 oz)   01/14/18 50.8 kg (112 lb 1.6 oz)     HEMATOLOGY/ONCOLOGY PROGRESS NOTE  Jan 31, 2018    REASON FOR VISIT: myeloma    Diagnosis: 72 year old gentleman with IgM lambda multiple myeloma originally diagnosed in 01/2012 as stage I, standard risk disease.   Treatment: Revlimid plus dexamethasone for 4-5 cycles but plateaued by late 03/2012.   - Velcade was added and he received another 2-3 cycles, achieving a good partial remission.      Transplant: Single auto after melphalan 200 mg/m2 preparative regimen on 01/09/2013  - Post-transplant course: unremarkable except for some mild steroid-induced hyperglycemia, gastritis, nausea and vomiting.      Maintenance: Lenalidomide at day-100 then developed a maculopapular rash. Lenalidomide was held and then we re-challenged him after about a month to 2 months at a lower dose (5 mg daily); rash returned.   - was started on maintenance Velcade, every other week through July 2014, when he was noted with abnormalities on myeloma studies drawn there. Noted with prostate cancer dx at about the time of relapse (see below).     Relapse: noted with return on M-spike in blood/urine with marrow involvement but negative PET.   - Started on retreatment with RVD on 8/27/14 with Revlimid at 15 mg 14 days of 21 days, dexamethasone 40 mg weekly and velcade weekly.Completed at total of 5 cycles without complication by 12/2014.   - Started Cycle 6 on inc'd Rev dosing of 20mg daily x 2 weeks on 12/11/14 which was complicated by pneumonia.  - Adm 12/21-1/10 for human metapneumovirus pneumonia complicated by anorexia, HTN,  depression, anorexia with significant weight loss.   - Restarted Ernesto/Dex only on 2/4/15 with good tolerance but with thrombocytopenia.   - Bendamustine added to Velcade/dexamethasone on 5/21/15 due to disease progression  - Velcade discontinued on 7/9/2015 due to side effects (orthostasis)  - Schedule changed to bendamustine 80 mg/m2 days 1 and 2 on 28-day cycle  - Cycle 1 tolerated poorly due to lightheadedness and weakness  - Cycle 2 dose reduced to 60 mg/m2 and pre-meds adjusted  - Cycle 5 received on 9/17/15.  - 10/1/2015 - increasing IgM, M-spike - started Pomalidomide 4mg/day (21 days out of 28 days) and weekly Decadron 20mg on Oct 1st, 2015.    - Carfilzomib added on 10/22/2015 making this CPD.  - C3-C6  received in [place]    - Returned to [facility] and resumed CPD here    - Adm: 4/12-4/14 with fever, confusion, neutropenia. Noted on MRI to have acute/subacute CVA and subacute/chronic CVA; started on Plavix, given brief course of Abx and GCSF prior to d/c.     - Continued on Carfilzomib and dexamethasone alone  - Pomalyst added back on 7/13/2016 for rising FLC  - Start daratumumab 11/10/16. Changed to every other week after 4 weekly treatments d/t profound fatigue and malaise.   - Adm 5/7-5/16 due to AMS, hypercalcemia, hyperuricemia, possible PNA, back pain, and JOSE MARIA. Started Kyprolis while inpatient.  - Re-admitted 5/25-/529 with recurrent AMS, found to have concomitant PNA  - Resumed Kyprolis 6/13/2017. Stopped due to worsening performance status and progressive myeloma.  - Started Venclexta 800 mg 8/25/2017  - Added weekly Velcade with dexamethasone 20 mg weekly due to rising light chains on 11/1/2017  - Started weekly Cytoxan with prednisone QOD on 12/13 (though started the prednisone around 12/24) in palliative attempt at care.       INTERVAL HISTORY:   Anthony presents today with his wife. They both report that he has been doing well at home as of late and has had no pain. He spends most of the day awake and  on the couch or moving slowly around the house, but he is not sleeping all day.  His eating fluctuates as does his fluid intake. No new loose stools right now. No infectious issues or fevers. Fatigue is present but stable and manageable right now. He feels he can continue on treatment.. Denied SOB, chest pain, abdominal pain, nausea, vomtiing, constipation, bleeding, or swelling. They'd like to think about getting to [place] still     Remainder of 10-pt ROS otherwise is negative.    PHYSICAL EXAMINATION  /68 (BP Location: Left arm, Patient Position: Left side, Cuff Size: Adult Regular)  Pulse 85  Temp 98.3  F (36.8  C) (Oral)  Resp 16  Wt 51.4 kg (113 lb 4.8 oz)  SpO2 97%  BMI 19.44 kg/m2     Wt Readings from Last 10 Encounters:   18 51.4 kg (113 lb 4.8 oz)   18 50.7 kg (111 lb 11.2 oz)   18 50.2 kg (110 lb 11.2 oz)   18 50.3 kg (110 lb 12.8 oz)   18 50.8 kg (112 lb 1.6 oz)     General: Alert, frail. Oriented to name, , location,  Able to ambulate independently, albeit slow and cautiously.  No acute distress. HEENT: PERRL, no palor or icterus. CVS: RRR with occasional premature beat. CHEST: CTAB, normal work of breathing, right port without erythema, swelling or pus.  ABDOMEN: soft non tender no masses     NEURO: AAOX3  CN 2-12 intact  SKIN: no bleeding or brusiing, no rashes EXTREMITIES: No edema.     LABS:      Ref. Range 2018 13:42   Sodium Latest Ref Range: 133 - 144 mmol/L 137   Potassium Latest Ref Range: 3.4 - 5.3 mmol/L 4.2   Chloride Latest Ref Range: 94 - 109 mmol/L 108   Carbon Dioxide Latest Ref Range: 20 - 32 mmol/L 19 (L)   Urea Nitrogen Latest Ref Range: 7 - 30 mg/dL 49 (H)   Creatinine Latest Ref Range: 0.66 - 1.25 mg/dL 2.33 (H)   GFR Estimate Latest Ref Range: >60 mL/min/1.7m2 28 (L)   GFR Estimate If Black Latest Ref Range: >60 mL/min/1.7m2 33 (L)   Calcium Latest Ref Range: 8.5 - 10.1 mg/dL 7.3 (L)   Anion Gap Latest Ref Range: 3 - 14 mmol/L 9    Albumin Latest Ref Range: 3.4 - 5.0 g/dL 2.3 (L)   Protein Total Latest Ref Range: 6.8 - 8.8 g/dL 10.5 (H)   Bilirubin Total Latest Ref Range: 0.2 - 1.3 mg/dL 0.3   Alkaline Phosphatase Latest Ref Range: 40 - 150 U/L 42   ALT Latest Ref Range: 0 - 70 U/L 26   AST Latest Ref Range: 0 - 45 U/L 10   Glucose Latest Ref Range: 70 - 99 mg/dL 148 (H)   WBC Latest Ref Range: 4.0 - 11.0 10e9/L 4.2   Hemoglobin Latest Ref Range: 13.3 - 17.7 g/dL 8.6 (L)   Hematocrit Latest Ref Range: 40.0 - 53.0 % 27.1 (L)   Platelet Count Latest Ref Range: 150 - 450 10e9/L 34 (LL)     Myeloma labs reviewed and Casey and Yamil      IMPRESSION/PLAN:  72 year old male with relapsed MM after autologous transplant (1/9/2013), status-post multiple salvage regimens with progressive disease.    MM: With rising light chains, worsening renal function, and worsening TCP, started weekly cytoxan with prednisone 50  mg QOD on 12/14. MM labs drawn showing stabilization of his M-spike for now. Creat was looking better as well last week surprisingly and we'll draw this again tomorrow.   - We had another long discussions re: goals and Ymail feels that he would like to continue at this time as he's starting to feel well enough to get back to [place]. I asked him to consider what if things went poorly there or if he passed away there and what that would mean in terms of getting him back to [place]. As long as he and Casey felt prepared I'm fine with him going as this regimen can easily be kept going there.   - For now, we will continue this discussion at each visit. Given he is tolerating fine, will continue with Cytoxan this week.     Heme: Cytopenias 2/2 treatment and likely MM, stable  - Previously on Plavix, remains on hold given thrombocytopenia, indefinitely  - Transfuse to keep hemoglobin > 8, platelets > 10k.     Renal/FEN: Creat baseline ~0.8-1.0, recently has been increased beyond baseline, likely secondary to progressive myeloma.  This has been improved the last  few weeks.  -Encouraged protein/calorie supplements.   -Would recommend continuing Zyprexa at night to help with appetite.    ID: Upper respiratory symptoms have significantly improved. No other infectious symptoms at this time.  - Continues ppx  mg BID     Back/rib pain: Started early May. Lumbar MRI at OSH showing mild-mod central and foraminal stenoses in lumbar spine, per patient and wife, there was no MM lesion there.  Recent imaging with no acute findings and pain has resolved.  He is not taking any pain medications currently.  We will continue to monitor.    Fatigue: Stable today.       CV: Continue zocor and norvasc.   - Avoid QTc prolonging agents (history of QTc prolongation with syncopal episodes)     AMS: AMS started early May in setting of metabolic derangements, uremia, and possible infection.  Remains intermittently confused over the past few months but improved significantly and stable today. I do believe there is perhaps some ongoing cognitive decline since the AMS episodes.  - Continue Zyprexa 5 mg for appetite  - Holding off long-acting pain meds

## 2018-02-01 NOTE — NURSING NOTE
"Oncology Rooming Note    February 1, 2018 12:56 PM   Anthony Carbone is a 72 year old male who presents for:    Chief Complaint   Patient presents with     Port Draw     Labs drawn via port by RN. Line flushed and hep locked. VS taken.     Oncology Clinic Visit     Return: Multiple Myeloma     Initial Vitals: /76 (BP Location: Right arm, Patient Position: Sitting, Cuff Size: Adult Regular)  Pulse 96  Temp 98.8  F (37.1  C) (Oral)  Resp 18  Wt 51.2 kg (112 lb 12.8 oz)  SpO2 97%  BMI 19.35 kg/m2 Estimated body mass index is 19.35 kg/(m^2) as calculated from the following:    Height as of 1/19/18: 1.626 m (5' 4.02\").    Weight as of this encounter: 51.2 kg (112 lb 12.8 oz). Body surface area is 1.52 meters squared.  No Pain (0) Comment: Data Unavailable   No LMP for male patient.  Allergies reviewed: Yes  Medications reviewed: Yes    Medications: Medication refills not needed today.  Pharmacy name entered into [facility]:    [id number] - [place] [address] [facility] [address]  [place], [place] - [address] [facility]  [facility] [id number] [facility] [address] AT [facility] OF [address]    Clinical concerns: no new concerns Leanne Reddy was NOT notified.    10 minutes for nursing intake (face to face time)     Deanne Atkinson CMA              "

## 2018-02-01 NOTE — MR AVS SNAPSHOT
After Visit Summary   2/1/2018    Anthony Carbone    MRN: [id number]           Patient Information     Date Of Birth          1945        Visit Information        Provider Department      2/1/2018 2:00 PM [facility] AT[facility]; [facility] ONCOLOGY INFUSION [facility]        Today's Diagnoses     Multiple myeloma in relapse (H)    -  1    Multiple myeloma, remission status unspecified (H)          Care Instructions    Contact Numbers    [facility] Main Line: [phone]  [facility] Triage:  [phone]    Call triage with chills and/or temperature greater than or equal to 100.5, uncontrolled nausea/vomiting, diarrhea, constipation, dizziness, shortness of breath, chest pain, bleeding, unexplained bruising, or any new/concerning symptoms, questions/concerns.     If you are having any concerning symptoms or wish to speak to a provider before your next infusion visit, please call your care coordinator or triage to notify them so we can adequately serve you.       After Hours: [phone]    If after hours, weekends, or holidays, call main hospital  and ask for Oncology doctor on call.           February 2018 Sunday Monday Tuesday Wednesday Thursday Friday Saturday                       1     [facility] LAB DRAW   12:30 PM   (15 min.)    [facility] LAB DRAW   [facility] Lab Draw     P RETURN   12:55 PM   (50 min.)   Leanne Reddy PA-C   [facility] ONC INFUSION 120    2:00 PM   (120 min.)    ONCOLOGY INFUSION   [facility] 2     3       4     5     6     7     8     9     10       11     12     13     14     15     16     17       18     19     20     21     22     23     24       25     26     27     28 March 2018 Sunday Monday Tuesday Wednesday Thursday Friday Saturday                       1     2     3       4     5     6     7     8     9     10       11     12     13     14     15      16     17       18     19     20     21     22     23     24       25     26     27     28     29     30     31                  Lab Results:  Recent Results (from the past 12 hour(s))   CBC with platelets differential    Collection Time: 02/01/18 12:45 PM   Result Value Ref Range    WBC 2.8 (L) 4.0 - 11.0 10e9/L    RBC Count 2.50 (L) 4.4 - 5.9 10e12/L    Hemoglobin 7.9 (L) 13.3 - 17.7 g/dL    Hematocrit 26.0 (L) 40.0 - 53.0 %     (H) 78 - 100 fl    MCH 31.6 26.5 - 33.0 pg    MCHC 30.4 (L) 31.5 - 36.5 g/dL    RDW 17.7 (H) 10.0 - 15.0 %    Platelet Count 33 (LL) 150 - 450 10e9/L    Diff Method Automated Method     % Neutrophils 92.7 %    % Lymphocytes 2.5 %    % Monocytes 3.3 %    % Eosinophils 0.0 %    % Basophils 0.4 %    % Immature Granulocytes 1.1 %    Nucleated RBCs 0 0 /100    Absolute Neutrophil 2.6 1.6 - 8.3 10e9/L    Absolute Lymphocytes 0.1 (L) 0.8 - 5.3 10e9/L    Absolute Monocytes 0.1 0.0 - 1.3 10e9/L    Absolute Eosinophils 0.0 0.0 - 0.7 10e9/L    Absolute Basophils 0.0 0.0 - 0.2 10e9/L    Abs Immature Granulocytes 0.0 0 - 0.4 10e9/L    Absolute Nucleated RBC 0.0     Anisocytosis Slight     Poikilocytosis Slight     Macrocytes Present     Platelet Estimate Confirming automated cell count    Comprehensive metabolic panel    Collection Time: 02/01/18 12:45 PM   Result Value Ref Range    Sodium 138 133 - 144 mmol/L    Potassium 4.5 3.4 - 5.3 mmol/L    Chloride 109 94 - 109 mmol/L    Carbon Dioxide 19 (L) 20 - 32 mmol/L    Anion Gap 11 3 - 14 mmol/L    Glucose 234 (H) 70 - 99 mg/dL    Urea Nitrogen 37 (H) 7 - 30 mg/dL    Creatinine 1.94 (H) 0.66 - 1.25 mg/dL    GFR Estimate 34 (L) >60 mL/min/1.7m2    GFR Estimate If Black 41 (L) >60 mL/min/1.7m2    Calcium 7.1 (L) 8.5 - 10.1 mg/dL    Bilirubin Total 0.2 0.2 - 1.3 mg/dL    Albumin 2.3 (L) 3.4 - 5.0 g/dL    Protein Total 10.1 (H) 6.8 - 8.8 g/dL    Alkaline Phosphatase 51 40 - 150 U/L    ALT 25 0 - 70 U/L    AST 10 0 - 45 U/L               Follow-ups  after your visit        Future tests that were ordered for you today     Open Standing Orders        Priority Remaining Interval Expires Ordered    Red blood cell prepare order unit Routine 100/100 CONDITIONAL (SPECIFY) BLOOD  2/1/2018            Who to contact     If you have questions or need follow up information about today's clinic visit or your schedule please contact [facility] directly at [phone].  Normal or non-critical lab and imaging results will be communicated to you by [employer], letter or phone within 4 business days after the clinic has received the results. If you do not hear from us within 7 days, please contact the clinic through [employer] or phone. If you have a critical or abnormal lab result, we will notify you by phone as soon as possible.  Submit refill requests through [facility] or call your pharmacy and they will forward the refill request to us. Please allow 3 business days for your refill to be completed.          Additional Information About Your Visit        [employer] Information     [employer] gives you secure access to your electronic health record. If you see a primary care provider, you can also send messages to your care team and make appointments. If you have questions, please call your primary care clinic.  If you do not have a primary care provider, please call [phone] and they will assist you.        Care EveryWhere ID     This is your Care EveryWhere ID. This could be used by other organizations to access your [facility] medical records  [id number]         Blood Pressure from Last 3 Encounters:   02/01/18 120/76   01/31/18 128/68   01/25/18 126/76    Weight from Last 3 Encounters:   02/01/18 51.2 kg (112 lb 12.8 oz)   01/31/18 51.4 kg (113 lb 4.8 oz)   01/25/18 50.7 kg (111 lb 11.2 oz)              We Performed the Following     CBC with platelets differential     Comprehensive metabolic panel        Primary Care Provider Office Phone # Fax #    Sanket  Gualberto [phone]       [facility] [address] [facility] [address]        Equal Access to Services     ALBAN SHAH : Maribel Davis, conrad ge, sammieclaudia diazrobinabhijit amadorinesabhijit, jonny maggiein hayaakimberly amadorclaudia jimenez adrien villa. So [facility] [phone].    ATENCIÓN: Si habla español, tiene a todd disposición servicios gratuitos de asistencia lingüística. Llame al [phone].    We comply with applicable federal civil rights laws and [place] laws. We do not discriminate on the basis of race, color, national origin, age, disability, sex, sexual orientation, or gender identity.            Thank you!     Thank you for choosing [facility]  for your care. Our goal is always to provide you with excellent care. Hearing back from our patients is one way we can continue to improve our services. Please take a few minutes to complete the written survey that you may receive in the mail after your visit with us. Thank you!             Your Updated Medication List - Protect others around you: Learn how to safely use, store and throw away your medicines at www.disposemymeds.org.          This list is accurate as of 2/1/18  3:09 PM.  Always use your most recent med list.                   Brand Name Dispense Instructions for use Diagnosis    acetaminophen 325 MG tablet    TYLENOL    100 tablet    Take 2 tablets (650 mg) by mouth every 4 hours as needed for mild pain or fever    Fever, unspecified fever cause       acyclovir 400 MG tablet    ZOVIRAX    60 tablet    Take 1 tablet (400 mg) by mouth 2 times daily    Multiple myeloma in relapse (H)       amLODIPine 5 MG tablet    NORVASC    90 tablet    TAKE 1 TABLET BY MOUTH AT BEDTIME    Essential hypertension       esomeprazole 40 MG CR capsule    nexIUM    30 capsule    Take 1 capsule (40 mg) by mouth every morning (before breakfast)    Gastroesophageal reflux disease without esophagitis       LORazepam 0.5 MG  tablet    ATIVAN    30 tablet    Take 1 tablet (0.5 mg) by mouth every 6 hours as needed for nausea or anxiety.    Multiple myeloma in relapse (H), Delirium       OLANZapine 5 MG tablet    zyPREXA    60 tablet    Take by mouth At Bedtime Takes 1 tablet    Delirium, Multiple myeloma in relapse (H)       ondansetron 8 MG ODT tab    ZOFRAN-ODT    30 tablet    Take 1 tablet (8 mg) by mouth every 8 hours as needed for nausea    Nausea       oxyCODONE IR 5 MG tablet    ROXICODONE    15 tablet    Take 1 tablet (5 mg) by mouth every 6 hours as needed for moderate to severe pain        predniSONE 50 MG tablet    DELTASONE     Take 1 tablet (50 mg) by mouth every other day    Multiple myeloma, remission status unspecified (H)       SIMVASTATIN PO      Take 20 mg by mouth At Bedtime        traMADol 50 MG tablet    ULTRAM    60 tablet    Take 1 tablet (50 mg) by mouth every 6 hours as needed for moderate pain . Recommend trying after Tylenol and before Dilaudid.    Acute bilateral low back pain without sciatica, Multiple myeloma in relapse (H)

## 2018-02-01 NOTE — PATIENT INSTRUCTIONS
Lab Results:  Recent Results (from the past 12 hour(s))   CBC with platelets differential    Collection Time: 02/01/18 12:45 PM   Result Value Ref Range    WBC 2.8 (L) 4.0 - 11.0 10e9/L    RBC Count 2.50 (L) 4.4 - 5.9 10e12/L    Hemoglobin 7.9 (L) 13.3 - 17.7 g/dL    Hematocrit 26.0 (L) 40.0 - 53.0 %     (H) 78 - 100 fl    MCH 31.6 26.5 - 33.0 pg    MCHC 30.4 (L) 31.5 - 36.5 g/dL    RDW 17.7 (H) 10.0 - 15.0 %    Platelet Count 33 (LL) 150 - 450 10e9/L    Diff Method Automated Method     % Neutrophils 92.7 %    % Lymphocytes 2.5 %    % Monocytes 3.3 %    % Eosinophils 0.0 %    % Basophils 0.4 %    % Immature Granulocytes 1.1 %    Nucleated RBCs 0 0 /100    Absolute Neutrophil 2.6 1.6 - 8.3 10e9/L    Absolute Lymphocytes 0.1 (L) 0.8 - 5.3 10e9/L    Absolute Monocytes 0.1 0.0 - 1.3 10e9/L    Absolute Eosinophils 0.0 0.0 - 0.7 10e9/L    Absolute Basophils 0.0 0.0 - 0.2 10e9/L    Abs Immature Granulocytes 0.0 0 - 0.4 10e9/L    Absolute Nucleated RBC 0.0     Anisocytosis Slight     Poikilocytosis Slight     Macrocytes Present     Platelet Estimate Confirming automated cell count    Comprehensive metabolic panel    Collection Time: 02/01/18 12:45 PM   Result Value Ref Range    Sodium 138 133 - 144 mmol/L    Potassium 4.5 3.4 - 5.3 mmol/L    Chloride 109 94 - 109 mmol/L    Carbon Dioxide 19 (L) 20 - 32 mmol/L    Anion Gap 11 3 - 14 mmol/L    Glucose 234 (H) 70 - 99 mg/dL    Urea Nitrogen 37 (H) 7 - 30 mg/dL    Creatinine 1.94 (H) 0.66 - 1.25 mg/dL    GFR Estimate 34 (L) >60 mL/min/1.7m2    GFR Estimate If Black 41 (L) >60 mL/min/1.7m2    Calcium 7.1 (L) 8.5 - 10.1 mg/dL    Bilirubin Total 0.2 0.2 - 1.3 mg/dL    Albumin 2.3 (L) 3.4 - 5.0 g/dL    Protein Total 10.1 (H) 6.8 - 8.8 g/dL    Alkaline Phosphatase 51 40 - 150 U/L    ALT 25 0 - 70 U/L    AST 10 0 - 45 U/L

## 2018-02-01 NOTE — PROGRESS NOTES
HEMATOLOGY/ONCOLOGY PROGRESS NOTE  Feb 1, 2018    REASON FOR VISIT: myeloma    Diagnosis: 72 year old gentleman with IgM lambda multiple myeloma originally diagnosed in 01/2012 as stage I, standard risk disease.   Treatment: Revlimid plus dexamethasone for 4-5 cycles but plateaued by late 03/2012.   - Velcade was added and he received another 2-3 cycles, achieving a good partial remission.      Transplant: Single auto after melphalan 200 mg/m2 preparative regimen on 01/09/2013  - Post-transplant course: unremarkable except for some mild steroid-induced hyperglycemia, gastritis, nausea and vomiting.      Maintenance: Lenalidomide at day-100 then developed a maculopapular rash. Lenalidomide was held and then we re-challenged him after about a month to 2 months at a lower dose (5 mg daily); rash returned.   - was started on maintenance Velcade, every other week through July 2014, when he was noted with abnormalities on myeloma studies drawn there. Noted with prostate cancer dx at about the time of relapse (see below).     Relapse: noted with return on M-spike in blood/urine with marrow involvement but negative PET.   - Started on retreatment with RVD on 8/27/14 with Revlimid at 15 mg 14 days of 21 days, dexamethasone 40 mg weekly and velcade weekly.Completed at total of 5 cycles without complication by 12/2014.   - Started Cycle 6 on inc'd Rev dosing of 20mg daily x 2 weeks on 12/11/14 which was complicated by pneumonia.  - Adm 12/21-1/10 for human metapneumovirus pneumonia complicated by anorexia, HTN, depression, anorexia with significant weight loss.   - Restarted Ernesto/Dex only on 2/4/15 with good tolerance but with thrombocytopenia.   - Bendamustine added to Velcade/dexamethasone on 5/21/15 due to disease progression  - Velcade discontinued on 7/9/2015 due to side effects (orthostasis)  - Schedule changed to bendamustine 80 mg/m2 days 1 and 2 on 28-day cycle  - Cycle 1 tolerated poorly due to lightheadedness and  weakness  - Cycle 2 dose reduced to 60 mg/m2 and pre-meds adjusted  - Cycle 5 received on 9/17/15.  - 10/1/2015 - increasing IgM, M-spike - started Pomalidomide 4mg/day (21 days out of 28 days) and weekly Decadron 20mg on Oct 1st, 2015.    - Carfilzomib added on 10/22/2015 making this CPD.  - C3-C6  received in [place]    - Returned to [facility] and resumed CPD here    - Adm: 4/12-4/14 with fever, confusion, neutropenia. Noted on MRI to have acute/subacute CVA and subacute/chronic CVA; started on Plavix, given brief course of Abx and GCSF prior to d/c.     - Continued on Carfilzomib and dexamethasone alone  - Pomalyst added back on 7/13/2016 for rising FLC  - Start daratumumab 11/10/16. Changed to every other week after 4 weekly treatments d/t profound fatigue and malaise.   - Adm 5/7-5/16 due to AMS, hypercalcemia, hyperuricemia, possible PNA, back pain, and JOSE MARIA. Started Kyprolis while inpatient.  - Re-admitted 5/25-/529 with recurrent AMS, found to have concomitant PNA  - Resumed Kyprolis 6/13/2017. Stopped due to worsening performance status and progressive myeloma.  - Started Venclexta 800 mg 8/25/2017  - Added weekly Velcade with dexamethasone 20 mg weekly due to rising light chains on 11/1/2017  - Started weekly Cytoxan with prednisone QOD on 12/13 (though started the prednisone around 12/24)      INTERVAL HISTORY:    Anthony presents today with his wife.  He saw Dr. Topete last night and was doing well. They are here today just to follow-up labs. No new concerns since Efrem visit.  Remainder of 10-pt ROS otherwise is negative.    PHYSICAL EXAMINATION  /76 (BP Location: Right arm, Patient Position: Sitting, Cuff Size: Adult Regular)  Pulse 96  Temp 98.8  F (37.1  C) (Oral)  Resp 18  Wt 51.2 kg (112 lb 12.8 oz)  SpO2 97%  BMI 19.35 kg/m2     Wt Readings from Last 10 Encounters:   02/01/18 51.2 kg (112 lb 12.8 oz)   01/31/18 51.4 kg (113 lb 4.8 oz)   01/25/18 50.7 kg (111 lb 11.2 oz)   01/19/18 50.2 kg  (110 lb 11.2 oz)   18 50.3 kg (110 lb 12.8 oz)   18 50.8 kg (112 lb 1.6 oz)   18 49.3 kg (108 lb 11.2 oz)   18 51.2 kg (112 lb 14.4 oz)   17 51.2 kg (112 lb 14.2 oz)   17 51.8 kg (114 lb 4.8 oz)     General: Alert, frail. Oriented to name, , location,  Able to ambulate independently, albeit slow and cautiously.    Remainder of exam deferred given he was seen and examined less than 24 hours ago and has had no new symptoms or concerns    LABS:   Results for RENAEANTHONY LARES (MRN [id number]) as of 2018 13:19   Ref. Range 2018 09:14 2018 13:42 2018 15:00 2018 12:45   WBC Latest Ref Range: 4.0 - 11.0 10e9/L 3.2 (L) 4.2  2.8 (L)   Hemoglobin Latest Ref Range: 13.3 - 17.7 g/dL 9.2 (L) 8.6 (L)  7.9 (L)   Hematocrit Latest Ref Range: 40.0 - 53.0 % 30.3 (L) 27.1 (L)  26.0 (L)   Platelet Count Latest Ref Range: 150 - 450 10e9/L 35 (LL) 34 (LL)  33 (LL)     IMPRESSION/PLAN:  72 year old male with relapsed MM after autologous transplant (2013), status-post multiple salvage regimens with progressive disease.    MM: With rising light chains, worsening renal function, and worsening TCP, started weekly cytoxan with prednisone 50  mg QOD on . His light chains have increased, M-spike stable, renal function improving. Anthony met with Dr. Topete  and decided to continue treatment, and pending today's labs, add Ninlaro. Plan to add Ninlaro 3 mg days 1,8,15 q 28 day cycle. No dex given the prednisone. Yamil is in agreement with trying Ninlaro. We will watch his performance status closely.    We have had numerous discussions re: goals and continuing treatment. For now, Yamil feels that he is tolerating this regimen well and we will plan to continue these discussion. We will continue with cycle 4 day 1 Cytoxan today.    Heme: Cytopenias 2/2 treatment and likely MM, requiring intermittent pRBC, grossly stable  - Plan on pRBC tomorrow if poss  - Previously on  Plavix, remains on hold given thrombocytopenia, indefinitely  - Transfuse to keep hemoglobin > 8, platelets > 10k.     Renal/FEN: Worsening renal function in December-Jan (Cr max 3.40 on 1/13) in setting of progressive myeloma. This has been improving, perhaps getting a response with treatment.  -Encouraged protein/calorie supplements.   -Would recommend continuing Zyprexa at night to help with appetite.    ID: Afebrile w/o localizing infectious symptoms.  - Continues ppx  mg BID     Back/rib pain: Started early May. Lumbar MRI at OSH showing mild-mod central and foraminal stenoses in lumbar spine, per patient and wife, there was no MM lesion there.  Recent imaging with no acute findings and pain has resolved.  He is not taking any pain medications currently.  We will continue to monitor.    Fatigue: Stable today.       CV: Continue zocor and norvasc.   - Avoid QTc prolonging agents (history of QTc prolongation with syncopal episodes)     AMS: AMS started early May in setting of metabolic derangements, uremia, and possible infection.  Remains intermittently confused over the past few months but improved significantly and stable today. I do believe there is perhaps some ongoing cognitive decline since the AMS episodes.  - Continue Zyprexa 5 mg for appetite  - Holding off long-acting pain meds        Leanne weekly with cytoxan and labs  New MD in 4 weeks with cytoxan and labs    I spent >10 minutes with the patient, with over 50% of the time spent counseling or coordinating their care as described above.    Leanne Murphy PA-C

## 2018-02-01 NOTE — LETTER
2/1/2018      RE: Anthony Thomasjacksonbaljit  [address] [place] [address]       HEMATOLOGY/ONCOLOGY PROGRESS NOTE  Feb 1, 2018    REASON FOR VISIT: myeloma    Diagnosis: 72 year old gentleman with IgM lambda multiple myeloma originally diagnosed in 01/2012 as stage I, standard risk disease.   Treatment: Revlimid plus dexamethasone for 4-5 cycles but plateaued by late 03/2012.   - Velcade was added and he received another 2-3 cycles, achieving a good partial remission.      Transplant: Single auto after melphalan 200 mg/m2 preparative regimen on 01/09/2013  - Post-transplant course: unremarkable except for some mild steroid-induced hyperglycemia, gastritis, nausea and vomiting.      Maintenance: Lenalidomide at day-100 then developed a maculopapular rash. Lenalidomide was held and then we re-challenged him after about a month to 2 months at a lower dose (5 mg daily); rash returned.   - was started on maintenance Velcade, every other week through July 2014, when he was noted with abnormalities on myeloma studies drawn there. Noted with prostate cancer dx at about the time of relapse (see below).     Relapse: noted with return on M-spike in blood/urine with marrow involvement but negative PET.   - Started on retreatment with RVD on 8/27/14 with Revlimid at 15 mg 14 days of 21 days, dexamethasone 40 mg weekly and velcade weekly.Completed at total of 5 cycles without complication by 12/2014.   - Started Cycle 6 on inc'd Rev dosing of 20mg daily x 2 weeks on 12/11/14 which was complicated by pneumonia.  - Adm 12/21-1/10 for human metapneumovirus pneumonia complicated by anorexia, HTN, depression, anorexia with significant weight loss.   - Restarted Ernesto/Dex only on 2/4/15 with good tolerance but with thrombocytopenia.   - Bendamustine added to Velcade/dexamethasone on 5/21/15 due to disease progression  - Velcade discontinued on 7/9/2015 due to side effects (orthostasis)  - Schedule changed to bendamustine 80 mg/m2  days 1 and 2 on 28-day cycle  - Cycle 1 tolerated poorly due to lightheadedness and weakness  - Cycle 2 dose reduced to 60 mg/m2 and pre-meds adjusted  - Cycle 5 received on 9/17/15.  - 10/1/2015 - increasing IgM, M-spike - started Pomalidomide 4mg/day (21 days out of 28 days) and weekly Decadron 20mg on Oct 1st, 2015.    - Carfilzomib added on 10/22/2015 making this CPD.  - C3-C6  received in [place]    - Returned to [facility] and resumed CPD here    - Adm: 4/12-4/14 with fever, confusion, neutropenia. Noted on MRI to have acute/subacute CVA and subacute/chronic CVA; started on Plavix, given brief course of Abx and GCSF prior to d/c.     - Continued on Carfilzomib and dexamethasone alone  - Pomalyst added back on 7/13/2016 for rising FLC  - Start daratumumab 11/10/16. Changed to every other week after 4 weekly treatments d/t profound fatigue and malaise.   - Adm 5/7-5/16 due to AMS, hypercalcemia, hyperuricemia, possible PNA, back pain, and JOSE MARIA. Started Kyprolis while inpatient.  - Re-admitted 5/25-/529 with recurrent AMS, found to have concomitant PNA  - Resumed Kyprolis 6/13/2017. Stopped due to worsening performance status and progressive myeloma.  - Started Venclexta 800 mg 8/25/2017  - Added weekly Velcade with dexamethasone 20 mg weekly due to rising light chains on 11/1/2017  - Started weekly Cytoxan with prednisone QOD on 12/13 (though started the prednisone around 12/24)      INTERVAL HISTORY:    Anthony presents today with his wife.  He saw Dr. Topete last night and was doing well. They are here today just to follow-up labs. No new concerns since Efrem visit.  Remainder of 10-pt ROS otherwise is negative.    PHYSICAL EXAMINATION  /76 (BP Location: Right arm, Patient Position: Sitting, Cuff Size: Adult Regular)  Pulse 96  Temp 98.8  F (37.1  C) (Oral)  Resp 18  Wt 51.2 kg (112 lb 12.8 oz)  SpO2 97%  BMI 19.35 kg/m2     Wt Readings from Last 10 Encounters:   02/01/18 51.2 kg (112 lb 12.8 oz)    18 51.4 kg (113 lb 4.8 oz)   18 50.7 kg (111 lb 11.2 oz)   18 50.2 kg (110 lb 11.2 oz)   18 50.3 kg (110 lb 12.8 oz)   18 50.8 kg (112 lb 1.6 oz)   18 49.3 kg (108 lb 11.2 oz)   18 51.2 kg (112 lb 14.4 oz)   17 51.2 kg (112 lb 14.2 oz)   17 51.8 kg (114 lb 4.8 oz)     General: Alert, frail. Oriented to name, , location,  Able to ambulate independently, albeit slow and cautiously.    Remainder of exam deferred given he was seen and examined less than 24 hours ago and has had no new symptoms or concerns    LABS:   Results for DEMIAN ANTHONY FONTAINE (MRN [id number]) as of 2018 13:19   Ref. Range 2018 09:14 2018 13:42 2018 15:00 2018 12:45   WBC Latest Ref Range: 4.0 - 11.0 10e9/L 3.2 (L) 4.2  2.8 (L)   Hemoglobin Latest Ref Range: 13.3 - 17.7 g/dL 9.2 (L) 8.6 (L)  7.9 (L)   Hematocrit Latest Ref Range: 40.0 - 53.0 % 30.3 (L) 27.1 (L)  26.0 (L)   Platelet Count Latest Ref Range: 150 - 450 10e9/L 35 (LL) 34 (LL)  33 (LL)     IMPRESSION/PLAN:  72 year old male with relapsed MM after autologous transplant (2013), status-post multiple salvage regimens with progressive disease.    MM: With rising light chains, worsening renal function, and worsening TCP, started weekly cytoxan with prednisone 50  mg QOD on . His light chains have increased, M-spike stable, renal function improving. Anthony met with Dr. Topete  and decided to continue treatment, and pending today's labs, add Ninlaro. Plan to add Ninlaro 3 mg days 1,8,15 q 28 day cycle. No dex given the prednisone. Yamil is in agreement with trying Ninlaro. We will watch his performance status closely.    We have had numerous discussions re: goals and continuing treatment. For now, Yamil feels that he is tolerating this regimen well and we will plan to continue these discussion. We will continue with cycle 4 day 1 Cytoxan today.    Heme: Cytopenias 2/2 treatment and likely MM,  requiring intermittent pRBC, grossly stable  - Plan on pRBC tomorrow if poss  - Previously on Plavix, remains on hold given thrombocytopenia, indefinitely  - Transfuse to keep hemoglobin > 8, platelets > 10k.     Renal/FEN: Worsening renal function in December-Jan (Cr max 3.40 on 1/13) in setting of progressive myeloma. This has been improving, perhaps getting a response with treatment.  -Encouraged protein/calorie supplements.   -Would recommend continuing Zyprexa at night to help with appetite.    ID: Afebrile w/o localizing infectious symptoms.  - Continues ppx  mg BID     Back/rib pain: Started early May. Lumbar MRI at OSH showing mild-mod central and foraminal stenoses in lumbar spine, per patient and wife, there was no MM lesion there.  Recent imaging with no acute findings and pain has resolved.  He is not taking any pain medications currently.  We will continue to monitor.    Fatigue: Stable today.       CV: Continue zocor and norvasc.   - Avoid QTc prolonging agents (history of QTc prolongation with syncopal episodes)     AMS: AMS started early May in setting of metabolic derangements, uremia, and possible infection.  Remains intermittently confused over the past few months but improved significantly and stable today. I do believe there is perhaps some ongoing cognitive decline since the AMS episodes.  - Continue Zyprexa 5 mg for appetite  - Holding off long-acting pain meds        Leanne weekly with cytoxan and labs  New MD in 4 weeks with cytoxan and labs    I spent >10 minutes with the patient, with over 50% of the time spent counseling or coordinating their care as described above.    Leanne Murphy PA-C

## 2018-02-01 NOTE — MR AVS SNAPSHOT
After Visit Summary   2/1/2018    Anthony Carbone    MRN: [id number]           Patient Information     Date Of Birth          1945        Visit Information        Provider Department      2/1/2018 1:10 PM Leanne Reddy PA-C [facility]        Today's Diagnoses     Multiple myeloma in relapse (H)    -  1       Follow-ups after your visit        Future tests that were ordered for you today     Open Standing Orders        Priority Remaining Interval Expires Ordered    Red blood cell prepare order unit Routine 100/100 CONDITIONAL (SPECIFY) BLOOD  2/1/2018            Who to contact     If you have questions or need follow up information about today's clinic visit or your schedule please contact [facility] directly at [phone].  Normal or non-critical lab and imaging results will be communicated to you by [employer], letter or phone within 4 business days after the clinic has received the results. If you do not hear from us within 7 days, please contact the clinic through [employer] or phone. If you have a critical or abnormal lab result, we will notify you by phone as soon as possible.  Submit refill requests through Dash or call your pharmacy and they will forward the refill request to us. Please allow 3 business days for your refill to be completed.          Additional Information About Your Visit        MyChart Information     Dash gives you secure access to your electronic health record. If you see a primary care provider, you can also send messages to your care team and make appointments. If you have questions, please call your primary care clinic.  If you do not have a primary care provider, please call [phone] and they will assist you.        Care EveryWhere ID     This is your Care EveryWhere ID. This could be used by other organizations to access your [place] medical records  [id number]        Your Vitals Were     Pulse  Temperature Respirations Pulse Oximetry BMI (Body Mass Index)       96 98.8  F (37.1  C) (Oral) 18 97% 19.35 kg/m2        Blood Pressure from Last 3 Encounters:   02/01/18 120/76   01/31/18 128/68   01/25/18 126/76    Weight from Last 3 Encounters:   02/01/18 51.2 kg (112 lb 12.8 oz)   01/31/18 51.4 kg (113 lb 4.8 oz)   01/25/18 50.7 kg (111 lb 11.2 oz)              We Performed the Following     ABO/Rh type and screen        Primary Care Provider Office Phone # Fax #    Sanket Gualberto [phone]       [facility] [address] [id number]        Equal Access to Services     ALBAN SHAH : Maribel Davis, wanadine luqadaha, qaybta kaalmada adesalo, jonny villa. So [facility] [place] [phone].    ATENCIÓN: Si habla español, tiene a todd disposición servicios gratuitos de asistencia lingüística. Llame al [phone].    We comply with applicable federal civil rights laws and [place] laws. We do not discriminate on the basis of race, color, national origin, age, disability, sex, sexual orientation, or gender identity.            Thank you!     Thank you for choosing [facility]  for your care. Our goal is always to provide you with excellent care. Hearing back from our patients is one way we can continue to improve our services. Please take a few minutes to complete the written survey that you may receive in the mail after your visit with us. Thank you!             Your Updated Medication List - Protect others around you: Learn how to safely use, store and throw away your medicines at www.disposemymeds.org.          This list is accurate as of 2/1/18  2:56 PM.  Always use your most recent med list.                   Brand Name Dispense Instructions for use Diagnosis    acetaminophen 325 MG tablet    TYLENOL    100 tablet    Take 2 tablets (650 mg) by mouth every 4 hours as needed for mild pain or fever    Fever,  unspecified fever cause       acyclovir 400 MG tablet    ZOVIRAX    60 tablet    Take 1 tablet (400 mg) by mouth 2 times daily    Multiple myeloma in relapse (H)       amLODIPine 5 MG tablet    NORVASC    90 tablet    TAKE 1 TABLET BY MOUTH AT BEDTIME    Essential hypertension       esomeprazole 40 MG CR capsule    nexIUM    30 capsule    Take 1 capsule (40 mg) by mouth every morning (before breakfast)    Gastroesophageal reflux disease without esophagitis       LORazepam 0.5 MG tablet    ATIVAN    30 tablet    Take 1 tablet (0.5 mg) by mouth every 6 hours as needed for nausea or anxiety.    Multiple myeloma in relapse (H), Delirium       OLANZapine 5 MG tablet    zyPREXA    60 tablet    Take by mouth At Bedtime Takes 1 tablet    Delirium, Multiple myeloma in relapse (H)       ondansetron 8 MG ODT tab    ZOFRAN-ODT    30 tablet    Take 1 tablet (8 mg) by mouth every 8 hours as needed for nausea    Nausea       oxyCODONE IR 5 MG tablet    ROXICODONE    15 tablet    Take 1 tablet (5 mg) by mouth every 6 hours as needed for moderate to severe pain        predniSONE 50 MG tablet    DELTASONE     Take 1 tablet (50 mg) by mouth every other day    Multiple myeloma, remission status unspecified (H)       SIMVASTATIN PO      Take 20 mg by mouth At Bedtime        traMADol 50 MG tablet    ULTRAM    60 tablet    Take 1 tablet (50 mg) by mouth every 6 hours as needed for moderate pain . Recommend trying after Tylenol and before Dilaudid.    Acute bilateral low back pain without sciatica, Multiple myeloma in relapse (H)

## 2018-02-01 NOTE — PROGRESS NOTES
Infusion Nursing Note:  Anthony Carbone presents today for cycle 4, day 1 Cyclophosphamide.    Patient seen by provider today: Yes: PREM Herrera   present during visit today: Not Applicable.    Note: TORB per PREM Herrera on 2/1/2018 at 1345:  -With low Hgb, patient to receive two units pRBC  transfusion tomorrow (2/2/2018).    Intravenous Access:  Implanted Port.    Treatment Conditions:  Lab Results   Component Value Date    HGB 7.9 02/01/2018     Lab Results   Component Value Date    WBC 2.8 02/01/2018      Lab Results   Component Value Date    ANEU 2.6 02/01/2018     Lab Results   Component Value Date    PLT 33 02/01/2018      Lab Results   Component Value Date     02/01/2018                   Lab Results   Component Value Date    POTASSIUM 4.5 02/01/2018           Lab Results   Component Value Date    MAG 1.7 01/15/2018            Lab Results   Component Value Date    CR 1.94 02/01/2018                   Lab Results   Component Value Date    JAZMIN 7.1 02/01/2018                Lab Results   Component Value Date    BILITOTAL 0.2 02/01/2018           Lab Results   Component Value Date    ALBUMIN 2.3 02/01/2018                    Lab Results   Component Value Date    ALT 25 02/01/2018           Lab Results   Component Value Date    AST 10 02/01/2018       Results reviewed, labs MET treatment parameters, ok to proceed with treatment.    Post Infusion Assessment:  Patient tolerated infusion without incident.  Blood return noted pre and post infusion.  Site patent and intact, free from redness, edema or discomfort.  No evidence of extravasations.  Access discontinued per protocol.    Discharge Plan:   Patient declined prescription refills.  Discharge instructions reviewed with: Patient.  Patient and/or family verbalized understanding of discharge instructions and all questions answered.  Copy of AVS reviewed with patient and/or family.  Patient will return 2/2/2018 for next  appointment.  Patient discharged in stable condition accompanied by: wife.  Departure Mode: Ambulatory.    Gautam Burger RN

## 2018-02-02 NOTE — TELEPHONE ENCOUNTER
Provided patient and his wife with Ninlaro oral chemotherapy packet while he was in infusion today. He will still need new start oral chemotherapy teaching by [facility] pharmacist when the plan for his Ninlaro therapy is finalized.     Yoly Gao, Student Pharmacist  [facility]  [phone]

## 2018-02-02 NOTE — PATIENT INSTRUCTIONS
Contact Numbers  [facility]: [phone]  (Choose Option 3 for triage RN)  After Hours: [phone]    Call triage with chills and/or temperature greater than or equal to 100.5, uncontrolled nausea/vomiting, diarrhea, constipation, dizziness, shortness of breath, chest pain, bleeding, unexplained bruising, or any new/concerning symptoms, questions/concerns.     If after hours, weekends, or holidays, call the main clinic number. Calls will be forwarded to the hospital , please ask for the adult oncology doctor on call.     If you are having any concerning symptoms or wish to speak to a provider before your next infusion visit, please call your care coordinator or triage to notify them so we can adequately serve you.     If you need a refill on a narcotic prescription, please call triage or your care coordinator before your infusion appointment.             February 2018 Sunday Monday Tuesday Wednesday Thursday Friday Saturday                       1     [facility]   12:30 PM   (15 min.)   [facility]RAW   [facility] Lab Draw     [facility] RETURN   12:55 PM   (50 min.)   Leanne Reddy PA-C   [facility]     [facility] ONC INFUSION 120    2:00 PM   (120 min.)   [facility] ONCOLOGY INFUSION   [facility] 2     UMP ONC INFUSION 240    7:00 AM   (240 min.)   [facility] ONCOLOGY INFUSION   [facility] 3       4     5     6     7     8     [facility] LAB DRAW   12:45 PM   (15 min.)   [facility] DRAW   [facility] Lab Draw     [facility] RETURN   12:55 PM   (50 min.)   Leanne Reddy PA-C   [facility]     UMP ONC INFUSION 120    2:00 PM   (120 min.)   [facility] ONCOLOGY INFUSION   [facility] 9     10       11     12     13     14     15     [facility] DRAW   11:45 AM   (15 min.)   [facility] DRAW   [facility] Lab Draw     [facility] RETURN   12:05 PM   (50 min.)   Leanne Reddy  [facility]SO [facility] ONC INFUSION 120    1:30 PM   (120 min.)    ONCOLOGY INFUSION   [facility] 16     17       18     19     20     21     22     [facility]    9:30 AM   (15 min.)   [facility] LAB DRAW   [facility] Lab Draw     [facility] RETURN    9:45 AM   (50 min.)   Leanne Reddy PA-C   [facility] ONC INFUSION 120   11:00 AM   (120 min.)    ONCOLOGY INFUSION   [facility] 23     24       25     26     27     28                               March 2018 Sunday Monday Tuesday Wednesday Thursday Friday Saturday                       1     2     3       4     5     6     7     8     9     10       11     12     13     14     15     16     17       18     19     20     21     22     23     24       25     26     27     28     29     30     31                  Lab Results:  No results found for this or any previous visit (from the past 12 hour(s)).

## 2018-02-02 NOTE — MR AVS SNAPSHOT
After Visit Summary   2/2/2018    Anthony Carbone    MRN: [id number]           Patient Information     Date Of Birth          1945        Visit Information        Provider Department      2/2/2018 7:00 AM  13 ATC;  ONCOLOGY INFUSION [facility]        Today's Diagnoses     Multiple myeloma in relapse (H)    -  1      Care Instructions    Contact Numbers  [facility]: [phone]  (Choose Option 3 for triage RN)  After Hours: [phone]    Call triage with chills and/or temperature greater than or equal to 100.5, uncontrolled nausea/vomiting, diarrhea, constipation, dizziness, shortness of breath, chest pain, bleeding, unexplained bruising, or any new/concerning symptoms, questions/concerns.     If after hours, weekends, or holidays, call the main clinic number. Calls will be forwarded to the hospital , please ask for the adult oncology doctor on call.     If you are having any concerning symptoms or wish to speak to a provider before your next infusion visit, please call your care coordinator or triage to notify them so we can adequately serve you.     If you need a refill on a narcotic prescription, please call triage or your care coordinator before your infusion appointment.             February 2018 Sunday Monday Tuesday Wednesday Thursday Friday Saturday                       1     [facility] LAB DRAW   12:30 PM   (15 min.)    MASONIC LAB DRAW   [facility] Lab Draw     UMP RETURN   12:55 PM   (50 min.)   Leanne Reddy PA-C   [facility]     [facility] ONC INFUSION 120    2:00 PM   (120 min.)    ONCOLOGY INFUSION   [facility] 2     UMP ONC INFUSION 240    7:00 AM   (240 min.)    ONCOLOGY INFUSION   [facility] 3       4     5     6     7     8     [facility] LAB DRAW   12:45 PM   (15 min.)    [facility] LAB DRAW   [facility] Lab Draw     UMP RETURN   12:55  PM   (50 min.)   Leanne Reddy PA-C [facility] ONC INFUSION 120    2:00 PM   (120 min.)    ONCOLOGY INFUSION   [facility] 9     10       11     12     13     14     15     [facility] LAB DRAW   11:45 AM   (15 min.)    MASONIC LAB DRAW   [facility] Masonic Lab Draw     UMP RETURN   12:05 PM   (50 min.)   Leanne Reddy PA-C [facility] ONC INFUSION 120    1:30 PM   (120 min.)    ONCOLOGY INFUSION   [facility] 16     17       18     19     20     21     22     [facility] LAB DRAW    9:30 AM   (15 min.)    [facility] LAB DRAW   [facility] Masonic Lab Draw     UMP RETURN    9:45 AM   (50 min.)   [facility]   11:00 AM   (120 min.)    ONCOLOGY INFUSION   [facility] 23     24       25     26     27     28 March 2018 Sunday Monday Tuesday Wednesday Thursday Friday Saturday                       1     2     3       4     5     6     7     8     9     10       11     12     13     14     15     16     17       18     19     20     21     22     23     24       25     26     27     28     29     30     31                  Lab Results:  No results found for this or any previous visit (from the past 12 hour(s)).            Follow-ups after your visit        Your next 10 appointments already scheduled     Feb 08, 2018 12:45 PM CST   Masonic Lab Draw with  [facility] LAB DRAW   [facility]    [address]  [address]  [place] [id number] [phone]            Feb 08, 2018  1:10 PM CST   (Arrive by 12:55 PM)   Return Visit with ALPHONSO [facility] ([facility])    [address]  [address]  [place] [id number] [phone]             Feb 08, 2018  2:00 PM CST   Infusion 120 with UC ONCOLOGY INFUSION, UC 11 ATC   [facility] ([facility])    [address]  [address]  [place] [address]   [phone]            Feb 15, 2018 11:45 AM CST   Masonic Lab Draw with UC MASONIC LAB DRAW   [facility] Masonic Lab Draw ([facility])    [address]  [address]  [place] [address] [phone]            Feb 15, 2018 12:20 PM CST   (Arrive by 12:05 PM)   Return Visit with Leanne Reddy PA-C   [facility] ([facility])    [address]  [address]  [place] [address] [phone]            Feb 15, 2018  1:30 PM CST   Infusion 120 with UC ONCOLOGY INFUSION, UC 27 ATC   [facility] ([facility])    [address]  [address]  [place] [address] [phone]            Feb 22, 2018  9:30 AM CST   [facility] Lab Draw with UC [facility]ONIC LAB DRAW   [facility] Lab Draw ([facility])    [address]  [address]  [place] [address] [phone]            Feb 22, 2018 10:00 AM CST   (Arrive by 9:45 AM)   Return Visit with Leanne Reddy PA-C   [facility] ([facility])    [address] [place] [address]  [address]  [place] [address] [phone]              Future tests that were ordered for you today     Open Standing Orders        Priority Remaining Interval Expires Ordered    Red blood cell prepare order unit Routine 99/100 CONDITIONAL (SPECIFY) BLOOD  2/1/2018    Red blood cell prepare order unit Routine 100/100 CONDITIONAL (SPECIFY) BLOOD  2/1/2018            Who to contact     If you have questions or need follow up information about today's clinic visit or your schedule please contact [facility] directly at [phone].  Normal or  non-critical lab and imaging results will be communicated to you by MyChart, letter or phone within 4 business days after the clinic has received the results. If you do not hear from us within 7 days, please contact the clinic through [employer] or phone. If you have a critical or abnormal lab result, we will notify you by phone as soon as possible.  Submit refill requests through [employer] or call your pharmacy and they will forward the refill request to us. Please allow 3 business days for your refill to be completed.          Additional Information About Your Visit        [employer] Information     [employer] gives you secure access to your electronic health record. If you see a primary care provider, you can also send messages to your care team and make appointments. If you have questions, please call your primary care clinic.  If you do not have a primary care provider, please call [phone] and they will assist you.        Care EveryWhere ID     This is your Care EveryWhere ID. This could be used by other organizations to access your [place] medical records  [id number]        Your Vitals Were     Pulse Temperature Respirations Pulse Oximetry          57 98  F (36.7  C) (Oral) 18 98%         Blood Pressure from Last 3 Encounters:   02/02/18 (!) 155/91   02/01/18 120/76   01/31/18 128/68    Weight from Last 3 Encounters:   02/01/18 51.2 kg (112 lb 12.8 oz)   01/31/18 51.4 kg (113 lb 4.8 oz)   01/25/18 50.7 kg (111 lb 11.2 oz)              We Performed the Following     Transfuse red blood cell unit     Transfuse red blood cell unit        Primary Care Provider Office Phone # Fax #    Sanket Gualberto [phone]       [facility] [address] [address]        Equal Access to Services     ALBAN SHAH : Maribel Davis, conrad ge, jonny lisa. So [facility] [phone].    ATENCIÓN: Tom tao,  tiene a todd disposición servicios gratuitos de asistencia lingüística. Parminder posada [phone].    We comply with applicable federal civil rights laws and [place] laws. We do not discriminate on the basis of race, color, national origin, age, disability, sex, sexual orientation, or gender identity.            Thank you!     Thank you for choosing [facility] CANCER [facility] [place] [facility]  for your care. Our goal is always to provide you with excellent care. Hearing back from our patients is one way we can continue to improve our services. Please take a few minutes to complete the written survey that you may receive in the mail after your visit with us. Thank you!             Your Updated Medication List - Protect others around you: Learn how to safely use, store and throw away your medicines at www.disposemymeds.org.          This list is accurate as of 2/2/18 11:59 AM.  Always use your most recent med list.                   Brand Name Dispense Instructions for use Diagnosis    acetaminophen 325 MG tablet    TYLENOL    100 tablet    Take 2 tablets (650 mg) by mouth every 4 hours as needed for mild pain or fever    Fever, unspecified fever cause       acyclovir 400 MG tablet    ZOVIRAX    60 tablet    Take 1 tablet (400 mg) by mouth 2 times daily    Multiple myeloma in relapse (H)       amLODIPine 5 MG tablet    NORVASC    90 tablet    TAKE 1 TABLET BY MOUTH AT BEDTIME    Essential hypertension       esomeprazole 40 MG CR capsule    nexIUM    30 capsule    Take 1 capsule (40 mg) by mouth every morning (before breakfast)    Gastroesophageal reflux disease without esophagitis       ixazomib 3 MG capsule    NINLARO    4 capsule    Take 1 capsule (3 mg) by mouth every 7 days    Multiple myeloma in relapse (H), Multiple myeloma, remission status unspecified (H)       LORazepam 0.5 MG tablet    ATIVAN    30 tablet    Take 1 tablet (0.5 mg) by mouth every 6 hours as needed for nausea or anxiety.    Multiple myeloma in relapse (H),  Delirium       OLANZapine 5 MG tablet    zyPREXA    60 tablet    Take by mouth At Bedtime Takes 1 tablet    Delirium, Multiple myeloma in relapse (H)       ondansetron 8 MG ODT tab    ZOFRAN-ODT    30 tablet    Take 1 tablet (8 mg) by mouth every 8 hours as needed for nausea    Nausea       oxyCODONE IR 5 MG tablet    ROXICODONE    15 tablet    Take 1 tablet (5 mg) by mouth every 6 hours as needed for moderate to severe pain        predniSONE 50 MG tablet    DELTASONE     Take 1 tablet (50 mg) by mouth every other day    Multiple myeloma, remission status unspecified (H)       SIMVASTATIN PO      Take 20 mg by mouth At Bedtime        traMADol 50 MG tablet    ULTRAM    60 tablet    Take 1 tablet (50 mg) by mouth every 6 hours as needed for moderate pain . Recommend trying after Tylenol and before Dilaudid.    Acute bilateral low back pain without sciatica, Multiple myeloma in relapse (H)

## 2018-02-02 NOTE — PROGRESS NOTES
Infusion Nursing Note:  Anthony Carbone presents today for 2 units RBCs.    Patient seen by provider today: No    Note: Yamil was assessed by PREM Herrera yesterday. He offers no changes overnight.    Intravenous Access:  Implanted Port.    Treatment Conditions:  Lab Results   Component Value Date    HGB 7.9 02/01/2018     Lab Results   Component Value Date    WBC 2.8 02/01/2018      Lab Results   Component Value Date    ANEU 2.6 02/01/2018     Lab Results   Component Value Date    PLT 33 02/01/2018      Results reviewed, labs MET treatment parameters, ok to proceed with treatment.  Blood transfusion consent signed 07/11/17.    Post Infusion Assessment:  Patient tolerated infusion without incident.  Blood return noted pre and post infusion.  Access discontinued per protocol.    Discharge Plan:   [facility]  Patient will return 02/08 for next appointment.   Patient discharged in stable condition accompanied by: wife.  Departure Mode: Ambulatory.    Fabiana Del Cid RN

## 2018-02-08 NOTE — PATIENT INSTRUCTIONS
Contact Numbers    [facility] Main Line: [phone]  [facility] Triage:  [phone]    Call triage with chills and/or temperature greater than or equal to 100.5, uncontrolled nausea/vomiting, diarrhea, constipation, dizziness, shortness of breath, chest pain, bleeding, unexplained bruising, or any new/concerning symptoms, questions/concerns.     If you are having any concerning symptoms or wish to speak to a provider before your next infusion visit, please call your care coordinator or triage to notify them so we can adequately serve you.       After Hours: [phone]    If after hours, weekends, or holidays, call main hospital  and ask for Oncology doctor on call.         February 2018 Sunday Monday Tuesday Wednesday Thursday Friday Saturday                       1     [facility] LAB DRAW   12:30 PM   (15 min.)   [facility] LAB DRAW   [facility] Masonic Lab Draw     UMP RETURN   12:55 PM   (50 min.)   Leanne Reddy PA-C   [facility]    2:00 PM   (120 min.)    ONCOLOGY INFUSION   [facility] 2     P ONC INFUSION 240    7:00 AM   (240 min.)    ONCOLOGY INFUSION   [facility] 3       4     5     6     7     8     [facility] LAB DRAW   12:45 PM   (15 min.)   [facility] LAB DRAW   [facility] Masonic Lab Draw     UMP RETURN   12:55 PM   (50 min.)   Leanne Reddy PA-C   [facility] 120    2:00 PM   (120 min.)   UC ONCOLOGY INFUSION   [facility] 9     10       11     12     13     14     15     [facility] LAB DRAW   11:45 AM   (15 min.)   [facility] LAB DRAW   [facility] Masonic Lab Draw     UMP RETURN   12:05 PM   (50 min.)   Leanne Reddy PA-C   [facility] 120    1:30 PM   (120 min.)   UC ONCOLOGY INFUSION   [facility] 16     17       18     19     20     21     22     [facility]  LAB DRAW    9:30 AM   (15 min.)   [facility] LAB DRAW   [facility] Lab Draw     UMP RETURN    9:45 AM   (50 min.)   Leanne Reddy PA-C   [facility]     UMP ONC INFUSION 120   11:00 AM   (120 min.)   UC ONCOLOGY INFUSION   [facility] 23     24       25     26     27     28     [facility] LAB DRAW    3:30 PM   (15 min.)    MASONIC LAB DRAW   [facility] Lab Draw     UMP RETURN    3:45 PM   (30 min.)   Vargas Coe MD   [facility]                           March 2018 Sunday Monday Tuesday Wednesday Thursday Friday Saturday                       1     [facility] ONC INFUSION 120    1:00 PM   (120 min.)    ONCOLOGY INFUSION   [facility] 2     3       4     5     6     7     8     9     10       11     12     13     14     15     16     17       18     19     20     21     22     23     24       25     26     27     28     29     30     31                 Recent Results (from the past 24 hour(s))   CBC with platelets differential    Collection Time: 02/08/18 12:51 PM   Result Value Ref Range    WBC 2.1 (L) 4.0 - 11.0 10e9/L    RBC Count 3.14 (L) 4.4 - 5.9 10e12/L    Hemoglobin 9.7 (L) 13.3 - 17.7 g/dL    Hematocrit 30.2 (L) 40.0 - 53.0 %    MCV 96 78 - 100 fl    MCH 30.9 26.5 - 33.0 pg    MCHC 32.1 31.5 - 36.5 g/dL    RDW 18.5 (H) 10.0 - 15.0 %    Platelet Count 31 (LL) 150 - 450 10e9/L    Diff Method Automated Method     % Neutrophils 78.9 %    % Lymphocytes 7.5 %    % Monocytes 11.7 %    % Eosinophils 0.5 %    % Basophils 0.0 %    % Immature Granulocytes 1.4 %    Nucleated RBCs 0 0 /100    Absolute Neutrophil 1.7 1.6 - 8.3 10e9/L    Absolute Lymphocytes 0.2 (L) 0.8 - 5.3 10e9/L    Absolute Monocytes 0.3 0.0 - 1.3 10e9/L    Absolute Eosinophils 0.0 0.0 - 0.7 10e9/L    Absolute Basophils 0.0 0.0 - 0.2 10e9/L    Abs Immature Granulocytes 0.0 0 - 0.4 10e9/L    Absolute Nucleated RBC 0.0    Comprehensive metabolic  panel    Collection Time: 02/08/18 12:51 PM   Result Value Ref Range    Sodium 138 133 - 144 mmol/L    Potassium 3.6 3.4 - 5.3 mmol/L    Chloride 108 94 - 109 mmol/L    Carbon Dioxide 22 20 - 32 mmol/L    Anion Gap 8 3 - 14 mmol/L    Glucose 149 (H) 70 - 99 mg/dL    Urea Nitrogen 38 (H) 7 - 30 mg/dL    Creatinine 1.76 (H) 0.66 - 1.25 mg/dL    GFR Estimate 38 (L) >60 mL/min/1.7m2    GFR Estimate If Black 46 (L) >60 mL/min/1.7m2    Calcium 7.2 (L) 8.5 - 10.1 mg/dL    Bilirubin Total 0.3 0.2 - 1.3 mg/dL    Albumin 2.4 (L) 3.4 - 5.0 g/dL    Protein Total 9.8 (H) 6.8 - 8.8 g/dL    Alkaline Phosphatase 49 40 - 150 U/L    ALT 28 0 - 70 U/L    AST 10 0 - 45 U/L

## 2018-02-08 NOTE — MR AVS SNAPSHOT
After Visit Summary   2/8/2018    Anthony Carbone    MRN: [id number]           Patient Information     Date Of Birth          1945        Visit Information        Provider Department      2/8/2018 2:00 PM  11 ATC;  ONCOLOGY INFUSION [facility]        Today's Diagnoses     Multiple myeloma in relapse (H)    -  1    Multiple myeloma, remission status unspecified (H)          Care Instructions    Contact Numbers    [facility] Main Line: [phone]  [facility] Triage:  [phone]    Call triage with chills and/or temperature greater than or equal to 100.5, uncontrolled nausea/vomiting, diarrhea, constipation, dizziness, shortness of breath, chest pain, bleeding, unexplained bruising, or any new/concerning symptoms, questions/concerns.     If you are having any concerning symptoms or wish to speak to a provider before your next infusion visit, please call your care coordinator or triage to notify them so we can adequately serve you.       After Hours: [phone]    If after hours, weekends, or holidays, call main hospital  and ask for Oncology doctor on call.         February 2018 Sunday Monday Tuesday Wednesday Thursday Friday Saturday                       1     [facility] MASONIC LAB DRAW   12:30 PM   (15 min.)    [facility]     [facility] RETURN   12:55 PM   (50 min.)   [facility] Reddy PA-C   [facility]    2:00 PM   (120 min.)    ONCOLOGY INFUSION   [facility] 2     [facility] ONC INFUSION 240    7:00 AM   (240 min.)    ONCOLOGY INFUSION   [facility] 3       4     5     6     7     8     P MASONIC LAB DRAW   12:45 PM   (15 min.)    [facility]     [facility] RETURN   12:55 PM   (50 min.)   Leanne Reddy PA-C   [facility]     [facility] ONC INFUSION 120    2:00 PM   (120 min.)    ONCOLOGY  INFUSION   [facility] 9     10       11     12     13     14     15     [facility] LAB DRAW   11:45 AM   (15 min.)    MASONIC LAB DRAW   [facility] Masonic Lab Draw     [facility] RETURN   12:05 PM   (50 min.)   Leanne Reddy PA-C   [facility]     UMP ONC INFUSION 120    1:30 PM   (120 min.)   [facility] ONCOLOGY INFUSION   [facility] 16     17       18     19     20     21     22     [facility] LAB DRAW    9:30 AM   (15 min.)   [facility]RAW   [facility]     [facility] RETURN    9:45 AM   (50 min.)   Leanne Reddy PA-C   [facility]     [facility] ONC INFUSION 120   11:00 AM   (120 min.)    ONCOLOGY INFUSION   [facility] 23     24       25     26     27     28     [facility] MAS[facility] LAB DRAW    3:30 PM   (15 min.)   [facility] LAB DRAW   [facility] Masonic Lab Draw     [facility] RETURN    3:45 PM   (30 min.)   Vargas Coe MD   [facility]                           March 2018 Sunday Monday Tuesday Wednesday Thursday Friday Saturday                       1     [facility] ONC INFUSION 120    1:00 PM   (120 min.)   UC ONCOLOGY INFUSION   [facility] 2     3       4     5     6     7     8     9     10       11     12     13     14     15     16     17       18     19     20     21     22     23     24       25     26     27     28     29     30     31                 Recent Results (from the past 24 hour(s))   CBC with platelets differential    Collection Time: 02/08/18 12:51 PM   Result Value Ref Range    WBC 2.1 (L) 4.0 - 11.0 10e9/L    RBC Count 3.14 (L) 4.4 - 5.9 10e12/L    Hemoglobin 9.7 (L) 13.3 - 17.7 g/dL    Hematocrit 30.2 (L) 40.0 - 53.0 %    MCV 96 78 - 100 fl    MCH 30.9 26.5 - 33.0 pg    MCHC 32.1 31.5 - 36.5 g/dL    RDW 18.5 (H) 10.0 - 15.0 %    Platelet Count 31 (LL) 150 - 450 10e9/L    Diff Method Automated Method     % Neutrophils 78.9 %    % Lymphocytes 7.5 %     % Monocytes 11.7 %    % Eosinophils 0.5 %    % Basophils 0.0 %    % Immature Granulocytes 1.4 %    Nucleated RBCs 0 0 /100    Absolute Neutrophil 1.7 1.6 - 8.3 10e9/L    Absolute Lymphocytes 0.2 (L) 0.8 - 5.3 10e9/L    Absolute Monocytes 0.3 0.0 - 1.3 10e9/L    Absolute Eosinophils 0.0 0.0 - 0.7 10e9/L    Absolute Basophils 0.0 0.0 - 0.2 10e9/L    Abs Immature Granulocytes 0.0 0 - 0.4 10e9/L    Absolute Nucleated RBC 0.0    Comprehensive metabolic panel    Collection Time: 02/08/18 12:51 PM   Result Value Ref Range    Sodium 138 133 - 144 mmol/L    Potassium 3.6 3.4 - 5.3 mmol/L    Chloride 108 94 - 109 mmol/L    Carbon Dioxide 22 20 - 32 mmol/L    Anion Gap 8 3 - 14 mmol/L    Glucose 149 (H) 70 - 99 mg/dL    Urea Nitrogen 38 (H) 7 - 30 mg/dL    Creatinine 1.76 (H) 0.66 - 1.25 mg/dL    GFR Estimate 38 (L) >60 mL/min/1.7m2    GFR Estimate If Black 46 (L) >60 mL/min/1.7m2    Calcium 7.2 (L) 8.5 - 10.1 mg/dL    Bilirubin Total 0.3 0.2 - 1.3 mg/dL    Albumin 2.4 (L) 3.4 - 5.0 g/dL    Protein Total 9.8 (H) 6.8 - 8.8 g/dL    Alkaline Phosphatase 49 40 - 150 U/L    ALT 28 0 - 70 U/L    AST 10 0 - 45 U/L                 Follow-ups after your visit        Your next 10 appointments already scheduled     Feb 15, 2018 11:45 AM CST   M[facility]ic Lab Draw with  MASONIC LAB DRAW   [facility] Lab Draw ([facility])    [address] [place] [address]  [address] [place] [address] [phone]            Feb 15, 2018 12:20 PM CST   (Arrive by 12:05 PM)   Return Visit with Leanne Reddy PA-C   [facility] ([facility])    [address] [place] [address]  [address] [place] [id number] [phone]            Feb 15, 2018  1:30 PM CST   Infusion 120 with UC ONCOLOGY INFUSION, UC 27 ATC   [facility] ([facility])    [address] [place] [id number] [phone]            Feb 22, 2018   9:30 AM CST   Masonic Lab Draw with UC MASONIC LAB DRAW   [facility] Masonic Lab Draw ([facility])    [address]  [address]  [facility] [address]   [phone]            Feb 22, 2018 10:00 AM CST   (Arrive by 9:45 AM)   Return Visit with Leanne Reddy PA-C   [facility] ([facility])    [address] [place] [address]  [address]  [facility] [id number] [phone]            Feb 22, 2018 11:00 AM CST   Infusion 120 with UC ONCOLOGY INFUSION, UC 31 ATC   [facility] ([facility])    [address]  [address]  [facility] [address] [phone]            Feb 28, 2018  3:30 PM CST   Masonic Lab Draw with UC MASONIC LAB DRAW   [facility] Masonic Lab Draw ([facility])    [address] [place] [address]  [address]  [facility] [address] [phone]            Feb 28, 2018  4:00 PM CST   (Arrive by 3:45 PM)   Return Visit with Vargas Coe MD   [facility] ([facility])    [address] [place] [address]  [address]  [facility] [address] [phone]              Who to contact     If you have questions or need follow up information about today's clinic visit or your schedule please contact [facility] directly at [phone].  Normal or non-critical lab and imaging results will be communicated to you by MyChart, letter or phone within 4 business days after the clinic has received the results. If you do not hear from us within 7 days, please contact the clinic through MyChart or phone. If you have a critical or abnormal lab result, we will notify you by phone as soon as possible.  Submit refill requests through [place] or call your pharmacy and they will forward the refill request to us. Please allow 3 business days for your refill to be completed.          Additional Information About Your Visit         [employer] Information     [employer] gives you secure access to your electronic health record. If you see a primary care provider, you can also send messages to your care team and make appointments. If you have questions, please call your primary care clinic.  If you do not have a primary care provider, please call [phone] and they will assist you.        Care EveryWhere ID     This is your Care EveryWhere ID. This could be used by other organizations to access your [place] medical records  [id number]         Blood Pressure from Last 3 Encounters:   02/08/18 138/84   02/02/18 (!) 155/91   02/01/18 120/76    Weight from Last 3 Encounters:   02/08/18 52.8 kg (116 lb 4.8 oz)   02/01/18 51.2 kg (112 lb 12.8 oz)   01/31/18 51.4 kg (113 lb 4.8 oz)              We Performed the Following     CBC with platelets differential        Primary Care Provider Office Phone # Fax #    Sanket Gualberto [phone]       [facility] [address] [address]        Equal Access to Services     [facility] Hospital[facility]IL[facility]ED : Hadii chalo pardo Sojose, waaxda luvicki, qaybta kaallakshmi abdi, jonny villa. So [facility] [phone].    ATENCIÓN: Si habla español, tiene a todd disposición servicios gratAlta Vista Regional Hospitalos de asistencia lingüística. Parminder al [phone].    We comply with applicable federal civil rights laws and [place] laws. We do not discriminate on the basis of race, color, national origin, age, disability, sex, sexual orientation, or gender identity.            Thank you!     Thank you for choosing [facility]  for your care. Our goal is always to provide you with excellent care. Hearing back from our patients is one way we can continue to improve our services. Please take a few minutes to complete the written survey that you may receive in the mail after your visit with us. Thank you!             Your Updated Medication List - Protect others  around you: Learn how to safely use, store and throw away your medicines at www.disposemymeds.org.          This list is accurate as of 2/8/18  2:18 PM.  Always use your most recent med list.                   Brand Name Dispense Instructions for use Diagnosis    acetaminophen 325 MG tablet    TYLENOL    100 tablet    Take 2 tablets (650 mg) by mouth every 4 hours as needed for mild pain or fever    Fever, unspecified fever cause       acyclovir 400 MG tablet    ZOVIRAX    60 tablet    Take 1 tablet (400 mg) by mouth 2 times daily    Multiple myeloma in relapse (H)       amLODIPine 5 MG tablet    NORVASC    90 tablet    TAKE 1 TABLET BY MOUTH AT BEDTIME    Essential hypertension       esomeprazole 40 MG CR capsule    nexIUM    30 capsule    Take 1 capsule (40 mg) by mouth every morning (before breakfast)    Gastroesophageal reflux disease without esophagitis       ixazomib 3 MG capsule    NINLARO    4 capsule    Take 1 capsule (3 mg) by mouth every 7 days    Multiple myeloma in relapse (H), Multiple myeloma, remission status unspecified (H)       LORazepam 0.5 MG tablet    ATIVAN    30 tablet    Take 1 tablet (0.5 mg) by mouth every 6 hours as needed for nausea or anxiety.    Multiple myeloma in relapse (H), Delirium       OLANZapine 5 MG tablet    zyPREXA    60 tablet    Take by mouth At Bedtime Takes 1 tablet    Delirium, Multiple myeloma in relapse (H)       ondansetron 8 MG ODT tab    ZOFRAN-ODT    30 tablet    Take 1 tablet (8 mg) by mouth every 8 hours as needed for nausea    Nausea       oxyCODONE IR 5 MG tablet    ROXICODONE    15 tablet    Take 1 tablet (5 mg) by mouth every 6 hours as needed for moderate to severe pain        predniSONE 50 MG tablet    DELTASONE     Take 1 tablet (50 mg) by mouth every other day    Multiple myeloma, remission status unspecified (H)       SIMVASTATIN PO      Take 20 mg by mouth At Bedtime        traMADol 50 MG tablet    ULTRAM    60 tablet    Take 1 tablet (50 mg) by mouth  every 6 hours as needed for moderate pain . Recommend trying after Tylenol and before Dilaudid.    Acute bilateral low back pain without sciatica, Multiple myeloma in relapse (H)

## 2018-02-08 NOTE — PROGRESS NOTES
HEMATOLOGY/ONCOLOGY PROGRESS NOTE  Feb 8, 2018    REASON FOR VISIT: myeloma    Diagnosis: 72 year old gentleman with IgM lambda multiple myeloma originally diagnosed in 01/2012 as stage I, standard risk disease.   Treatment: Revlimid plus dexamethasone for 4-5 cycles but plateaued by late 03/2012.   - Velcade was added and he received another 2-3 cycles, achieving a good partial remission.      Transplant: Single auto after melphalan 200 mg/m2 preparative regimen on 01/09/2013  - Post-transplant course: unremarkable except for some mild steroid-induced hyperglycemia, gastritis, nausea and vomiting.      Maintenance: Lenalidomide at day-100 then developed a maculopapular rash. Lenalidomide was held and then we re-challenged him after about a month to 2 months at a lower dose (5 mg daily); rash returned.   - was started on maintenance Velcade, every other week through July 2014, when he was noted with abnormalities on myeloma studies drawn there. Noted with prostate cancer dx at about the time of relapse (see below).     Relapse: noted with return on M-spike in blood/urine with marrow involvement but negative PET.   - Started on retreatment with RVD on 8/27/14 with Revlimid at 15 mg 14 days of 21 days, dexamethasone 40 mg weekly and velcade weekly.Completed at total of 5 cycles without complication by 12/2014.   - Started Cycle 6 on inc'd Rev dosing of 20mg daily x 2 weeks on 12/11/14 which was complicated by pneumonia.  - Adm 12/21-1/10 for human metapneumovirus pneumonia complicated by anorexia, HTN, depression, anorexia with significant weight loss.   - Restarted Ernesto/Dex only on 2/4/15 with good tolerance but with thrombocytopenia.   - Bendamustine added to Velcade/dexamethasone on 5/21/15 due to disease progression  - Velcade discontinued on 7/9/2015 due to side effects (orthostasis)  - Schedule changed to bendamustine 80 mg/m2 days 1 and 2 on 28-day cycle  - Cycle 1 tolerated poorly due to lightheadedness and  "weakness  - Cycle 2 dose reduced to 60 mg/m2 and pre-meds adjusted  - Cycle 5 received on 9/17/15.  - 10/1/2015 - increasing IgM, M-spike - started Pomalidomide 4mg/day (21 days out of 28 days) and weekly Decadron 20mg on Oct 1st, 2015.    - Carfilzomib added on 10/22/2015 making this CPD.  - C3-C6  received in [place]    - Returned to [facility] and resumed CPD here    - Adm: 4/12-4/14 with fever, confusion, neutropenia. Noted on MRI to have acute/subacute CVA and subacute/chronic CVA; started on Plavix, given brief course of Abx and GCSF prior to d/c.     - Continued on Carfilzomib and dexamethasone alone  - Pomalyst added back on 7/13/2016 for rising FLC  - Start daratumumab 11/10/16. Changed to every other week after 4 weekly treatments d/t profound fatigue and malaise.   - Adm 5/7-5/16 due to AMS, hypercalcemia, hyperuricemia, possible PNA, back pain, and JOSE MARIA. Started Kyprolis while inpatient.  - Re-admitted 5/25-/529 with recurrent AMS, found to have concomitant PNA  - Resumed Kyprolis 6/13/2017. Stopped due to worsening performance status and progressive myeloma.  - Started Venclexta 800 mg 8/25/2017  - Added weekly Velcade with dexamethasone 20 mg weekly due to rising light chains on 11/1/2017  - Started weekly Cytoxan with prednisone QOD on 12/13 (though started the prednisone around 12/24)      INTERVAL HISTORY:    Anthony presents today with his wife.  Yamil has actually been doing really well this week. He has had increased energy, appetite, and even has gone out to eat a few times. He has no pain. No fevers or infectious symptoms. Remains without diarrhea. No nausea. No bleedingor swelling.  Remainder of 10-pt ROS otherwise is negative.    PHYSICAL EXAMINATION  /84 (BP Location: Right arm, Patient Position: Chair, Cuff Size: Adult Regular)  Pulse 84  Temp 98.1  F (36.7  C) (Oral)  Resp 20  Ht 1.626 m (5' 4.02\")  Wt 52.8 kg (116 lb 4.8 oz)  SpO2 97%  BMI 19.95 kg/m2     Wt Readings from Last 10 " Encounters:   18 52.8 kg (116 lb 4.8 oz)   18 51.2 kg (112 lb 12.8 oz)   18 51.4 kg (113 lb 4.8 oz)   18 50.7 kg (111 lb 11.2 oz)   18 50.2 kg (110 lb 11.2 oz)   18 50.3 kg (110 lb 12.8 oz)   18 50.8 kg (112 lb 1.6 oz)   18 49.3 kg (108 lb 11.2 oz)   18 51.2 kg (112 lb 14.4 oz)   17 51.2 kg (112 lb 14.2 oz)     General: Alert, frail. Oriented to name, , location,  Able to ambulate independently, albeit slow and cautiously.  No acute distress. HEENT: PERRL, no palor or icterus. CVS: RRR with occasional premature beat. CHEST: CTAB, normal work of breathing, right port without erythema, swelling or pus.  ABDOMEN: soft non tender no masses     NEURO: AAOX3  CN 2-12 intact  SKIN: no bleeding or brusiing, no rashes EXTREMITIES: No edema.     LABS:   Results for WILLIAN ARCINIEGA (MRN [id number]) as of 2018 13:33   Ref. Range 2018 12:51   Sodium Latest Ref Range: 133 - 144 mmol/L 138   Potassium Latest Ref Range: 3.4 - 5.3 mmol/L 3.6   Chloride Latest Ref Range: 94 - 109 mmol/L 108   Carbon Dioxide Latest Ref Range: 20 - 32 mmol/L 22   Urea Nitrogen Latest Ref Range: 7 - 30 mg/dL 38 (H)   Creatinine Latest Ref Range: 0.66 - 1.25 mg/dL 1.76 (H)   GFR Estimate Latest Ref Range: >60 mL/min/1.7m2 38 (L)   GFR Estimate If Black Latest Ref Range: >60 mL/min/1.7m2 46 (L)   Calcium Latest Ref Range: 8.5 - 10.1 mg/dL 7.2 (L)   Anion Gap Latest Ref Range: 3 - 14 mmol/L 8   Albumin Latest Ref Range: 3.4 - 5.0 g/dL 2.4 (L)   Protein Total Latest Ref Range: 6.8 - 8.8 g/dL 9.8 (H)   Bilirubin Total Latest Ref Range: 0.2 - 1.3 mg/dL 0.3   Alkaline Phosphatase Latest Ref Range: 40 - 150 U/L 49   ALT Latest Ref Range: 0 - 70 U/L 28   AST Latest Ref Range: 0 - 45 U/L 10   Glucose Latest Ref Range: 70 - 99 mg/dL 149 (H)   WBC Latest Ref Range: 4.0 - 11.0 10e9/L 2.1 (L)   Hemoglobin Latest Ref Range: 13.3 - 17.7 g/dL 9.7 (L)   Hematocrit Latest Ref Range: 40.0 -  53.0 % 30.2 (L)   Platelet Count Latest Ref Range: 150 - 450 10e9/L 31 (LL)     IMPRESSION/PLAN:  72 year old male with relapsed MM after autologous transplant (1/9/2013), status-post multiple salvage regimens with progressive disease.    MM: With rising light chains, worsening renal function, and worsening TCP, started weekly cytoxan with prednisone 50  mg QOD on 12/14. His light chains intially increased, however now have grossly stabilized, and clinically, Yamil has improved. His renal function has also been improving. Dr. Topete planned to add Ninlaro 3 mg days 1,8,15 q 28 day cycle to this regimen, but this is working on approval. We will continue with the Cytoxan, prednisone for now, as well as weekly follow-up. If he continues to do this well, we can space your visits to every other week.    Goals: We have had numerous discussions re: goals and continuing treatment. For now, Yamil feels that he is tolerating this regimen well and we will plan to continue these discussion.     Heme: Cytopenias 2/2 treatment and likely MM, requiring intermittent pRBC, grossly stable  - Previously on Plavix, remains on hold given thrombocytopenia, indefinitely  - Transfuse to keep hemoglobin > 8, platelets > 10k.     Renal/FEN: Worsening renal function in December-Jan (Cr max 3.40 on 1/13) in setting of progressive myeloma. This has been improving, perhaps getting a response with treatment.  -Encouraged protein/calorie supplements.   -Would recommend continuing Zyprexa at night to help with appetite.    ID: Afebrile w/o localizing infectious symptoms.  - Continues ppx  mg BID     Back/rib pain: Started early May. Lumbar MRI at OSH showing mild-mod central and foraminal stenoses in lumbar spine, per patient and wife, there was no MM lesion there.  Recent imaging with no acute findings and pain has resolved.  He is not taking any pain medications currently.  We will continue to monitor.    Fatigue: Stable today.       CV:  Continue zocor and norvasc.   - Avoid QTc prolonging agents (history of QTc prolongation with syncopal episodes)     AMS: AMS started early May in setting of metabolic derangements, uremia, and possible infection.  Remains intermittently confused over the past few months but improved significantly and stable today. I do believe there is perhaps some ongoing cognitive decline since the AMS episodes.  - Continue Zyprexa 5 mg for appetite  - Holding off long-acting pain meds    I spent >25 minutes with the patient, with over 50% of the time spent counseling or coordinating their care as described above.      Leanne Murphy PA-C

## 2018-02-08 NOTE — NURSING NOTE
Chief Complaint   Patient presents with     Oncology Clinic Visit     Multiple Myeloma, Weekly F/U.     Port Draw     labs drawn via port by RN     /84 (BP Location: Right arm, Patient Position: Chair, Cuff Size: Adult Regular)  Pulse 84  Temp 98.1  F (36.7  C) (Oral)  Wt 52.8 kg (116 lb 4.8 oz)  SpO2 97%  BMI 19.95 kg/m2    Vitals taken. Port accessed by RN. Labs collected and sent. Line flushed with NS & Heparin. Pt tolerated well. Pt checked in for next appointment.    Iris Gutiérrez, RN

## 2018-02-08 NOTE — PROGRESS NOTES
Infusion Nursing Note:  Anthony Carbone presents today for Cycle 4 Day 8 Cytoxan.    Patient seen by provider today: Yes: PREM Herrera   present during visit today: Not Applicable.    Note: IV Dexamethasone discontinued in tx plan by PREM Herrera - it is only to be given with Velcade.    Intravenous Access:  Implanted Port.    Treatment Conditions:  Lab Results   Component Value Date    HGB 9.7 02/08/2018     Lab Results   Component Value Date    WBC 2.1 02/08/2018      Lab Results   Component Value Date    ANEU 1.7 02/08/2018     Lab Results   Component Value Date    PLT 31 02/08/2018      Lab Results   Component Value Date     02/08/2018                   Lab Results   Component Value Date    POTASSIUM 3.6 02/08/2018             Lab Results   Component Value Date    CR 1.76 02/08/2018                   Lab Results   Component Value Date    JAZMIN 7.2 02/08/2018                Lab Results   Component Value Date    BILITOTAL 0.3 02/08/2018           Lab Results   Component Value Date    ALBUMIN 2.4 02/08/2018                    Lab Results   Component Value Date    ALT 28 02/08/2018           Lab Results   Component Value Date    AST 10 02/08/2018     Results reviewed, labs MET treatment parameters, ok to proceed with treatment.    Post Infusion Assessment:  Patient tolerated infusion without incident.  Blood return noted pre and post infusion.  Site patent and intact, free from redness, edema or discomfort.  No evidence of extravasations.  Access discontinued per protocol.    Discharge Plan:   Patient declined prescription refills.  Copy of AVS reviewed with patient and/or family. Patient will return 2/15/18 for next appointment.  Patient discharged in stable condition accompanied by: wife.  Departure Mode: Ambulatory.    Kaylen Chamberlain RN

## 2018-02-08 NOTE — NURSING NOTE
"Oncology Rooming Note    February 8, 2018 1:01 PM   Anthony Carbone is a 72 year old male who presents for:    Chief Complaint   Patient presents with     Oncology Clinic Visit     Multiple Myeloma, Weekly F/U.     Port Draw     labs drawn via port by RN     Initial Vitals: /84 (BP Location: Right arm, Patient Position: Chair, Cuff Size: Adult Regular)  Pulse 84  Temp 98.1  F (36.7  C) (Oral)  Resp 20  Ht 1.626 m (5' 4.02\")  Wt 52.8 kg (116 lb 4.8 oz)  SpO2 97%  BMI 19.95 kg/m2 Estimated body mass index is 19.95 kg/(m^2) as calculated from the following:    Height as of this encounter: 1.626 m (5' 4.02\").    Weight as of this encounter: 52.8 kg (116 lb 4.8 oz). Body surface area is 1.54 meters squared.  No Pain (0) Comment: Data Unavailable   No LMP for male patient.  Allergies reviewed: Yes  Medications reviewed: Yes    Medications: Medication refills not needed today.  Pharmacy name entered into [facility]:    [id number] [place] [address] [facility] [address]  [facility] [place], [place] - [address] [facility]  [id number] - [place], [place] - [address] AT [facility] OF [address]    Clinical concerns: Pt declined to review medications stating that there have been no changes since the last visit. Leanne Reddy was NOT notified.    7 minutes for nursing intake (face to face time)     Elsy Ruiz LPN              "

## 2018-02-08 NOTE — MR AVS SNAPSHOT
After Visit Summary   2/8/2018    Anthony Carbone    MRN: [id number]           Patient Information     Date Of Birth          1945        Visit Information        Provider Department      2/8/2018 1:10 PM Leanne Reddy PA-C [facility]        Today's Diagnoses     Multiple myeloma in relapse (H)        Multiple myeloma, remission status unspecified (H)           Follow-ups after your visit        Your next 10 appointments already scheduled     Feb 08, 2018  2:00 PM CST   Infusion 120 with UC ONCOLOGY INFUSION, UC 11 ATC   [facility] ([facility])    [address] [place] [address]  [address]  [facility] [address]   [phone]            Feb 15, 2018 11:45 AM CST   Masonic Lab Draw with UC MASONIC LAB DRAW   [facility] Masonic Lab Draw ([facility])    [address] [place] [address]  [address]  [facility] [address]   [phone]            Feb 15, 2018 12:20 PM CST   (Arrive by 12:05 PM)   Return Visit with Leanne Reddy PA-C   [facility] ([facility])    [address] [place] [address]  [address]  [facility] [address]   [phone]            Feb 15, 2018  1:30 PM CST   Infusion 120 with UC ONCOLOGY INFUSION, UC 27 ATC   [facility] ([facility])    [address]  [address]  [facility] [address]   [phone]            Feb 22, 2018  9:30 AM CST   Masonic Lab Draw with UC MASONIC LAB DRAW   [facility] Masonic Lab Draw ([facility])    [address] [place] [address]  [address]  [facility] [address]   [phone]            Feb 22, 2018 10:00 AM CST   (Arrive by 9:45 AM)   Return Visit with Leanne Reddy PA-C   [facility] ([facility])    [address] [place] [address]  [address]  [facility] [address]   [phone]            Feb  "22, 2018 11:00 AM CST   Infusion 120 with UC ONCOLOGY INFUSION, UC 31 ATC   [facility] ([facility])    [address]  [address]  [facility] [address] [phone]              Who to contact     If you have questions or need follow up information about today's clinic visit or your schedule please contact [facility] directly at [phone].  Normal or non-critical lab and imaging results will be communicated to you by [employer], letter or phone within 4 business days after the clinic has received the results. If you do not hear from us within 7 days, please contact the clinic through Booker or phone. If you have a critical or abnormal lab result, we will notify you by phone as soon as possible.  Submit refill requests through Booker or call your pharmacy and they will forward the refill request to us. Please allow 3 business days for your refill to be completed.          Additional Information About Your Visit        Booker Information     Booker gives you secure access to your electronic health record. If you see a primary care provider, you can also send messages to your care team and make appointments. If you have questions, please call your primary care clinic.  If you do not have a primary care provider, please call [phone] and they will assist you.        Care EveryWhere ID     This is your Care EveryWhere ID. This could be used by other organizations to access your [place] medical records  [id number]        Your Vitals Were     Pulse Temperature Respirations Height Pulse Oximetry BMI (Body Mass Index)    84 98.1  F (36.7  C) (Oral) 20 1.626 m (5' 4.02\") 97% 19.95 kg/m2       Blood Pressure from Last 3 Encounters:   02/08/18 138/84   02/02/18 (!) 155/91   02/01/18 120/76    Weight from Last 3 Encounters:   02/08/18 52.8 kg (116 lb 4.8 oz)   02/01/18 51.2 kg (112 lb 12.8 oz)   01/31/18 51.4 kg (113 lb 4.8 oz)            "   We Performed the Following     Comprehensive metabolic panel        Primary Care Provider Office Phone # Fax #    Sanket Burciaga [phone]       [facility] [address] [facility] [address]        Equal Access to Services     ALBAN SHAH : Hadii aad ku hadabdullahi Sojose, waaxda luqadaha, qaybta kaalmada adeclaudiayada, jonny jimenez adrien villa. So [facility] [phone].    ATENCIÓN: Si habla español, tiene a todd disposición servicios gratuitos de asistencia lingüística. Llame al [phone].    We comply with applicable federal civil rights laws and [place] laws. We do not discriminate on the basis of race, color, national origin, age, disability, sex, sexual orientation, or gender identity.            Thank you!     Thank you for choosing [facility]  for your care. Our goal is always to provide you with excellent care. Hearing back from our patients is one way we can continue to improve our services. Please take a few minutes to complete the written survey that you may receive in the mail after your visit with us. Thank you!             Your Updated Medication List - Protect others around you: Learn how to safely use, store and throw away your medicines at www.disposemymeds.org.          This list is accurate as of 2/8/18  1:45 PM.  Always use your most recent med list.                   Brand Name Dispense Instructions for use Diagnosis    acetaminophen 325 MG tablet    TYLENOL    100 tablet    Take 2 tablets (650 mg) by mouth every 4 hours as needed for mild pain or fever    Fever, unspecified fever cause       acyclovir 400 MG tablet    ZOVIRAX    60 tablet    Take 1 tablet (400 mg) by mouth 2 times daily    Multiple myeloma in relapse (H)       amLODIPine 5 MG tablet    NORVASC    90 tablet    TAKE 1 TABLET BY MOUTH AT BEDTIME    Essential hypertension       esomeprazole 40 MG CR capsule    nexIUM    30 capsule    Take 1 capsule (40 mg)  by mouth every morning (before breakfast)    Gastroesophageal reflux disease without esophagitis       ixazomib 3 MG capsule    NINLARO    4 capsule    Take 1 capsule (3 mg) by mouth every 7 days    Multiple myeloma in relapse (H), Multiple myeloma, remission status unspecified (H)       LORazepam 0.5 MG tablet    ATIVAN    30 tablet    Take 1 tablet (0.5 mg) by mouth every 6 hours as needed for nausea or anxiety.    Multiple myeloma in relapse (H), Delirium       OLANZapine 5 MG tablet    zyPREXA    60 tablet    Take by mouth At Bedtime Takes 1 tablet    Delirium, Multiple myeloma in relapse (H)       ondansetron 8 MG ODT tab    ZOFRAN-ODT    30 tablet    Take 1 tablet (8 mg) by mouth every 8 hours as needed for nausea    Nausea       oxyCODONE IR 5 MG tablet    ROXICODONE    15 tablet    Take 1 tablet (5 mg) by mouth every 6 hours as needed for moderate to severe pain        predniSONE 50 MG tablet    DELTASONE     Take 1 tablet (50 mg) by mouth every other day    Multiple myeloma, remission status unspecified (H)       SIMVASTATIN PO      Take 20 mg by mouth At Bedtime        traMADol 50 MG tablet    ULTRAM    60 tablet    Take 1 tablet (50 mg) by mouth every 6 hours as needed for moderate pain . Recommend trying after Tylenol and before Dilaudid.    Acute bilateral low back pain without sciatica, Multiple myeloma in relapse (H)

## 2018-02-15 NOTE — PATIENT INSTRUCTIONS
Contact Numbers  [facility]: [phone]    After Hours:  [phone]  Triage: [phone]    Please call the [facility] Triage line if you experience a temperature greater than or equal to 100.5, shaking chills, have uncontrolled nausea, vomiting and/or diarrhea, dizziness, shortness of breath, chest pain, bleeding, unexplained bruising, or if you have any other new/concerning symptoms, questions or concerns.     If it is after hours, weekends, or holidays, please call the main hospital  at  [phone] and ask to speak to the Oncology doctor on call.     If you are having any concerning symptoms or wish to speak to a provider before your next infusion visit, please call your care coordinator or triage to notify them so we can adequately serve you.     If you need a refill on a narcotic prescription or other medication, please call triage before your infusion appointment.           February 2018 Sunday Monday Tuesday Wednesday Thursday Friday Saturday                       1     P MASONIC LAB DRAW   12:30 PM   (15 min.)    MASONIC LAB DRAW   [facility] Lab Draw     UMP RETURN   12:55 PM   (50 min.)   Leanne Reddy PA-C   [facility] ONC INFUSION 120    2:00 PM   (120 min.)    ONCOLOGY INFUSION   [facility] 2     P ONC INFUSION 240    7:00 AM   (240 min.)    ONCOLOGY INFUSION   [facility] 3       4     5     6     7     8     UMP MASONIC LAB DRAW   12:45 PM   (15 min.)   UC MASONIC LAB DRAW   [facility] Lab Draw     UMP RETURN   12:55 PM   (50 min.)   [facility] Reddy [facility]   [facility]    2:00 PM   (120 min.)    ONCOLOGY INFUSION   [facility] 9     10       11     12     13     14     15     UMP MASONIC LAB DRAW   11:45 AM   (15 min.)   UC MASONIC LAB DRAW   [facility] Lab Draw     UMP RETURN   12:05 PM   (50  min.)   Leanne Reddy PA-C   [facility]     UMP ONC INFUSION 120    1:30 PM   (120 min.)   UC ONCOLOGY INFUSION   [facility] 16     17       18     19     20     21     22     UMP MASONIC LAB DRAW    9:30 AM   (15 min.)   UC MASONIC LAB DRAW   [facility] Lab Draw     UMP RETURN    9:45 AM   (50 min.)   Leanne Reddy PA-C   [facility]     UMP ONC INFUSION 120   11:00 AM   (120 min.)   UC ONCOLOGY INFUSION   [facility] 23     24       25     26     27     28     UMP MASONIC LAB DRAW    3:30 PM   (15 min.)   UC MASONIC LAB DRAW   [facility] Lab Draw     UMP RETURN    3:45 PM   (30 min.)   Vargas Coe MD   [facility]                           March 2018 Sunday Monday Tuesday Wednesday Thursday Friday Saturday                       1     UMP ONC INFUSION 120    1:00 PM   (120 min.)   UC ONCOLOGY INFUSION   [facility] 2     3       4     5     6     7     8     9     10       11     12     13     14     15     16     17       18     19     20     21     22     23     24       25     26     27     28     29     30     31                  Lab Results:  Recent Results (from the past 12 hour(s))   CBC with platelets differential    Collection Time: 02/15/18 12:08 PM   Result Value Ref Range    WBC 3.0 (L) 4.0 - 11.0 10e9/L    RBC Count 3.03 (L) 4.4 - 5.9 10e12/L    Hemoglobin 9.2 (L) 13.3 - 17.7 g/dL    Hematocrit 29.6 (L) 40.0 - 53.0 %    MCV 98 78 - 100 fl    MCH 30.4 26.5 - 33.0 pg    MCHC 31.1 (L) 31.5 - 36.5 g/dL    RDW 18.0 (H) 10.0 - 15.0 %    Platelet Count 42 (LL) 150 - 450 10e9/L    Diff Method Automated Method     % Neutrophils 93.6 %    % Lymphocytes 1.7 %    % Monocytes 4.0 %    % Eosinophils 0.0 %    % Basophils 0.0 %    % Immature Granulocytes 0.7 %    Nucleated RBCs 0 0 /100    Absolute Neutrophil 2.8 1.6 - 8.3 10e9/L    Absolute Lymphocytes 0.1  (L) 0.8 - 5.3 10e9/L    Absolute Monocytes 0.1 0.0 - 1.3 10e9/L    Absolute Eosinophils 0.0 0.0 - 0.7 10e9/L    Absolute Basophils 0.0 0.0 - 0.2 10e9/L    Abs Immature Granulocytes 0.0 0 - 0.4 10e9/L    Absolute Nucleated RBC 0.0     Platelet Estimate Confirming automated cell count    Comprehensive metabolic panel    Collection Time: 02/15/18 12:08 PM   Result Value Ref Range    Sodium 138 133 - 144 mmol/L    Potassium 4.4 3.4 - 5.3 mmol/L    Chloride 106 94 - 109 mmol/L    Carbon Dioxide 22 20 - 32 mmol/L    Anion Gap 10 3 - 14 mmol/L    Glucose 211 (H) 70 - 99 mg/dL    Urea Nitrogen 31 (H) 7 - 30 mg/dL    Creatinine 1.65 (H) 0.66 - 1.25 mg/dL    GFR Estimate 41 (L) >60 mL/min/1.7m2    GFR Estimate If Black 50 (L) >60 mL/min/1.7m2    Calcium 7.6 (L) 8.5 - 10.1 mg/dL    Bilirubin Total 0.3 0.2 - 1.3 mg/dL    Albumin 2.6 (L) 3.4 - 5.0 g/dL    Protein Total 9.7 (H) 6.8 - 8.8 g/dL    Alkaline Phosphatase 45 40 - 150 U/L    ALT 17 0 - 70 U/L    AST 8 0 - 45 U/L

## 2018-02-15 NOTE — MR AVS SNAPSHOT
After Visit Summary   2/15/2018    Anthony Carbone    MRN: [id number]           Patient Information     Date Of Birth          1945        Visit Information        Provider Department      2/15/2018 12:20 PM Leanne Reddy PA-C [facility]        Today's Diagnoses     Multiple myeloma in relapse (H)        Multiple myeloma, remission status unspecified (H)           Follow-ups after your visit        Your next 10 appointments already scheduled     Feb 15, 2018  1:30 PM CST   Infusion 120 with UC ONCOLOGY INFUSION, UC 27 ATC   [facility] ([facility])    [address] [place] [address]  [address]  [facility] [address]   [phone]            Feb 22, 2018  9:30 AM CST   Masonic Lab Draw with  MASONIC LAB DRAW   [facility] Masonic Lab Draw ([facility])    [address] [place] [address]  [address]  [facility] [address]   [phone]            Feb 22, 2018 10:00 AM CST   (Arrive by 9:45 AM)   Return Visit with Leanne Reddy PA-C   [facility] ([facility])    [address] [place] [address]  [address]  [facility] [address]   [phone]            Feb 22, 2018 11:00 AM CST   Infusion 120 with UC ONCOLOGY INFUSION, UC 31 ATC   [facility] ([facility])    [address] [place] [address]  [address]  [facility] [address]   [phone]            Feb 28, 2018  3:30 PM CST   Masonic Lab Draw with UC MASONIC LAB DRAW   [facility] Masonic Lab Draw ([facility])    [address] [place] [address]  [address]  [facility] [address]   [phone]            Feb 28, 2018  4:00 PM CST   (Arrive by 3:45 PM)   Return Visit with Vargas Coe MD   [facility] ([facility])    [address] [place] [address]  [address]  [facility] [address]   [phone]            Mar 01,  2018  1:00 PM CST   Infusion 120 with UC ONCOLOGY INFUSION, UC 10 ATC   [facility] ([facility])    [address]  [address]  [facility] [address] [phone]              Future tests that were ordered for you today     Open Future Orders        Priority Expected Expires Ordered    CBC with platelets differential Routine 2/22/2018 5/15/2018 2/15/2018    Comprehensive metabolic panel Routine 2/22/2018 5/15/2018 2/15/2018    Kappa and lambda light chain (Serum) Routine 2/22/2018 5/15/2018 2/15/2018    Protein electrophoresis Routine 2/22/2018 5/15/2018 2/15/2018            Who to contact     If you have questions or need follow up information about today's clinic visit or your schedule please contact [facility] directly at [phone].  Normal or non-critical lab and imaging results will be communicated to you by MyChart, letter or phone within 4 business days after the clinic has received the results. If you do not hear from us within 7 days, please contact the clinic through Psydexhart or phone. If you have a critical or abnormal lab result, we will notify you by phone as soon as possible.  Submit refill requests through Divitel or call your pharmacy and they will forward the refill request to us. Please allow 3 business days for your refill to be completed.          Additional Information About Your Visit        MyChart Information     Divitel gives you secure access to your electronic health record. If you see a primary care provider, you can also send messages to your care team and make appointments. If you have questions, please call your primary care clinic.  If you do not have a primary care provider, please call [phone] and they will assist you.        Care EveryWhere ID     This is your Care EveryWhere ID. This could be used by other organizations to access your [place] medical records  [id number]        Your Vitals Were   "   Pulse Temperature Respirations Height Pulse Oximetry BMI (Body Mass Index)    85 98  F (36.7  C) (Oral) 16 1.626 m (5' 4.02\") 96% 19.9 kg/m2       Blood Pressure from Last 3 Encounters:   02/15/18 143/85   02/08/18 138/84   02/02/18 (!) 155/91    Weight from Last 3 Encounters:   02/15/18 52.6 kg (116 lb)   02/08/18 52.8 kg (116 lb 4.8 oz)   02/01/18 51.2 kg (112 lb 12.8 oz)              We Performed the Following     Comprehensive metabolic panel        Primary Care Provider Office Phone # Fax #    Sanket Burciaga [phone]       [facility] [address] [facility] [address]        Equal Access to Services     ALBAN SHAH : Maribel Davis, wanadine aguirreqjose, kylah kaallakshmi abdi, jonny jurado . So [facility] [phone].    ATENCIÓN: Si habla español, tiene a todd disposición servicios gratuitos de asistencia lingüística. [facility] [phone].    We comply with applicable federal civil rights laws and [place] laws. We do not discriminate on the basis of race, color, national origin, age, disability, sex, sexual orientation, or gender identity.            Thank you!     Thank you for choosing [facility]  for your care. Our goal is always to provide you with excellent care. Hearing back from our patients is one way we can continue to improve our services. Please take a few minutes to complete the written survey that you may receive in the mail after your visit with us. Thank you!             Your Updated Medication List - Protect others around you: Learn how to safely use, store and throw away your medicines at www.disposemymeds.org.          This list is accurate as of 2/15/18  1:05 PM.  Always use your most recent med list.                   Brand Name Dispense Instructions for use Diagnosis    acetaminophen 325 MG tablet    TYLENOL    100 tablet    Take 2 tablets (650 mg) by mouth every 4 hours as needed for " mild pain or fever    Fever, unspecified fever cause       acyclovir 400 MG tablet    ZOVIRAX    60 tablet    Take 1 tablet (400 mg) by mouth 2 times daily    Multiple myeloma in relapse (H)       amLODIPine 5 MG tablet    NORVASC    90 tablet    TAKE 1 TABLET BY MOUTH AT BEDTIME    Essential hypertension       esomeprazole 40 MG CR capsule    nexIUM    30 capsule    Take 1 capsule (40 mg) by mouth every morning (before breakfast)    Gastroesophageal reflux disease without esophagitis       ixazomib 3 MG capsule    NINLARO    4 capsule    Take 1 capsule (3 mg) by mouth every 7 days    Multiple myeloma in relapse (H), Multiple myeloma, remission status unspecified (H)       LORazepam 0.5 MG tablet    ATIVAN    30 tablet    Take 1 tablet (0.5 mg) by mouth every 6 hours as needed for nausea or anxiety.    Multiple myeloma in relapse (H), Delirium       OLANZapine 5 MG tablet    zyPREXA    60 tablet    Take by mouth At Bedtime Takes 1 tablet    Delirium, Multiple myeloma in relapse (H)       ondansetron 8 MG ODT tab    ZOFRAN-ODT    30 tablet    Take 1 tablet (8 mg) by mouth every 8 hours as needed for nausea    Nausea       oxyCODONE IR 5 MG tablet    ROXICODONE    15 tablet    Take 1 tablet (5 mg) by mouth every 6 hours as needed for moderate to severe pain        predniSONE 50 MG tablet    DELTASONE     Take 1 tablet (50 mg) by mouth every other day    Multiple myeloma, remission status unspecified (H)       SIMVASTATIN PO      Take 20 mg by mouth At Bedtime        traMADol 50 MG tablet    ULTRAM    60 tablet    Take 1 tablet (50 mg) by mouth every 6 hours as needed for moderate pain . Recommend trying after Tylenol and before Dilaudid.    Acute bilateral low back pain without sciatica, Multiple myeloma in relapse (H)

## 2018-02-15 NOTE — NURSING NOTE
Chief Complaint   Patient presents with     Port Draw     labs drawn via port by RN     /85 (BP Location: Right arm, Patient Position: Chair, Cuff Size: Adult Regular)  Pulse 85  Temp 98  F (36.7  C) (Oral)  Wt 52.6 kg (116 lb)  SpO2 96%  BMI 19.9 kg/m2    Vitals taken. Port accessed by RN. Labs collected and sent. Line flushed with NS & Heparin. Pt tolerated well. Pt checked in for next appointment.    Iris Gutiérrez RN

## 2018-02-15 NOTE — PROGRESS NOTES
SPIRITUAL HEALTH SERVICES  SPIRITUAL ASSESSMENT Progress Note  [facility]    REASON FOR ENCOUNTER: Follow-up    Reviewed documentation. Short interaction with Yamil and his wife as they were transitioning from his infusion therapy when we talked. Either I or ant Hernandez will follow-up with yamil as he receives ongoing therapies at the [facility].    Nick Pace MDiv, [facility]  Staff   Pager [phone]

## 2018-02-15 NOTE — NURSING NOTE
"Oncology Rooming Note    February 15, 2018 12:16 PM   Anthony Carbone is a 72 year old male who presents for:    Chief Complaint   Patient presents with     Port Draw     labs drawn via port by RN     Oncology Clinic Visit     return patient visit for pre-infusion follow up related to multiple myeloma      Initial Vitals: /85 (BP Location: Right arm, Patient Position: Chair, Cuff Size: Adult Regular)  Pulse 85  Temp 98  F (36.7  C) (Oral)  Resp 16  Ht 1.626 m (5' 4.02\")  Wt 52.6 kg (116 lb)  SpO2 96%  BMI 19.9 kg/m2 Estimated body mass index is 19.9 kg/(m^2) as calculated from the following:    Height as of this encounter: 1.626 m (5' 4.02\").    Weight as of this encounter: 52.6 kg (116 lb). Body surface area is 1.54 meters squared.  No Pain (0) Comment: Data Unavailable   No LMP for male patient.  Allergies reviewed: Yes  Medications reviewed: Yes    Medications: Medication refills not needed today.  Pharmacy name entered into [facility]:    [id number] - [place] [address] AT [facility] [address]  [place], [place] - [address] [facility]  [id number] [id number] [address] AT [facility] [address]    Clinical concerns: no concerns darren was notified.    6 minutes for nursing intake (face to face time)     Riley Felipe CMA              "

## 2018-02-15 NOTE — PROGRESS NOTES
HEMATOLOGY/ONCOLOGY PROGRESS NOTE  Feb 15, 2018    REASON FOR VISIT: myeloma    Diagnosis: 72 year old gentleman with IgM lambda multiple myeloma originally diagnosed in 01/2012 as stage I, standard risk disease.   Treatment: Revlimid plus dexamethasone for 4-5 cycles but plateaued by late 03/2012.   - Velcade was added and he received another 2-3 cycles, achieving a good partial remission.      Transplant: Single auto after melphalan 200 mg/m2 preparative regimen on 01/09/2013  - Post-transplant course: unremarkable except for some mild steroid-induced hyperglycemia, gastritis, nausea and vomiting.      Maintenance: Lenalidomide at day-100 then developed a maculopapular rash. Lenalidomide was held and then we re-challenged him after about a month to 2 months at a lower dose (5 mg daily); rash returned.   - was started on maintenance Velcade, every other week through July 2014, when he was noted with abnormalities on myeloma studies drawn there. Noted with prostate cancer dx at about the time of relapse (see below).     Relapse: noted with return on M-spike in blood/urine with marrow involvement but negative PET.   - Started on retreatment with RVD on 8/27/14 with Revlimid at 15 mg 14 days of 21 days, dexamethasone 40 mg weekly and velcade weekly.Completed at total of 5 cycles without complication by 12/2014.   - Started Cycle 6 on inc'd Rev dosing of 20mg daily x 2 weeks on 12/11/14 which was complicated by pneumonia.  - Adm 12/21-1/10 for human metapneumovirus pneumonia complicated by anorexia, HTN, depression, anorexia with significant weight loss.   - Restarted Ernesto/Dex only on 2/4/15 with good tolerance but with thrombocytopenia.   - Bendamustine added to Velcade/dexamethasone on 5/21/15 due to disease progression  - Velcade discontinued on 7/9/2015 due to side effects (orthostasis)  - Schedule changed to bendamustine 80 mg/m2 days 1 and 2 on 28-day cycle  - Cycle 1 tolerated poorly due to lightheadedness and  weakness  - Cycle 2 dose reduced to 60 mg/m2 and pre-meds adjusted  - Cycle 5 received on 9/17/15.  - 10/1/2015 - increasing IgM, M-spike - started Pomalidomide 4mg/day (21 days out of 28 days) and weekly Decadron 20mg on Oct 1st, 2015.    - Carfilzomib added on 10/22/2015 making this CPD.  - C3-C6  received in [place]    - Returned to [facility] and resumed CPD here    - Adm: 4/12-4/14 with fever, confusion, neutropenia. Noted on MRI to have acute/subacute CVA and subacute/chronic CVA; started on Plavix, given brief course of Abx and GCSF prior to d/c.     - Continued on Carfilzomib and dexamethasone alone  - Pomalyst added back on 7/13/2016 for rising FLC  - Start daratumumab 11/10/16. Changed to every other week after 4 weekly treatments d/t profound fatigue and malaise.   - Adm 5/7-5/16 due to AMS, hypercalcemia, hyperuricemia, possible PNA, back pain, and JOSE MARIA. Started Kyprolis while inpatient.  - Re-admitted 5/25-/529 with recurrent AMS, found to have concomitant PNA  - Resumed Kyprolis 6/13/2017. Stopped due to worsening performance status and progressive myeloma.  - Started Venclexta 800 mg 8/25/2017  - Added weekly Velcade with dexamethasone 20 mg weekly due to rising light chains on 11/1/2017  - Started weekly Cytoxan with prednisone QOD on 12/13 (though started the prednisone around 12/24)      INTERVAL HISTORY:    Anthony presents today with his wife.  Yamil continues to do really well. He was even out cleaning his garage and sweeping it this week. His energy has been better, as has his appetite. He continues to even go out to lunch with his wife. These are all big improvements for him.  He has no pain. No fevers or infectious symptoms. Remains without diarrhea. No nausea. No bleedingor swelling.  Remainder of 10-pt ROS otherwise is negative.    PHYSICAL EXAMINATION  /85 (BP Location: Right arm, Patient Position: Chair, Cuff Size: Adult Regular)  Pulse 85  Temp 98  F (36.7  C) (Oral)  Resp 16  Ht 1.626 m  "(5' 402\")  Wt 52.6 kg (116 lb)  SpO2 96%  BMI 19.9 kg/m2     Wt Readings from Last 10 Encounters:   02/15/18 52.6 kg (116 lb)   18 52.8 kg (116 lb 4.8 oz)   18 51.2 kg (112 lb 12.8 oz)   18 51.4 kg (113 lb 4.8 oz)   18 50.7 kg (111 lb 11.2 oz)   18 50.2 kg (110 lb 11.2 oz)   18 50.3 kg (110 lb 12.8 oz)   18 50.8 kg (112 lb 1.6 oz)   18 49.3 kg (108 lb 11.2 oz)   18 51.2 kg (112 lb 14.4 oz)     General: Alert, frail. Oriented to name, , location,  Able to ambulate independently, albeit slow and cautiously.  No acute distress. HEENT: PERRL, no palor or icterus. CVS: RRR with occasional premature beat. CHEST: CTAB, normal work of breathing, right port without erythema, swelling or pus.  ABDOMEN: soft non tender no masses     NEURO: AAOX3  CN 2-12 intact  SKIN: no bleeding or brusiing, no rashes EXTREMITIES: No edema.     LABS:   Results for WILLIAN ARCINIEGA (MRN [id number]) as of 2/15/2018 12:52   Ref. Range 2/15/2018 12:08   Sodium Latest Ref Range: 133 - 144 mmol/L 138   Potassium Latest Ref Range: 3.4 - 5.3 mmol/L 4.4   Chloride Latest Ref Range: 94 - 109 mmol/L 106   Carbon Dioxide Latest Ref Range: 20 - 32 mmol/L 22   Urea Nitrogen Latest Ref Range: 7 - 30 mg/dL 31 (H)   Creatinine Latest Ref Range: 0.66 - 1.25 mg/dL 1.65 (H)   GFR Estimate Latest Ref Range: >60 mL/min/1.7m2 41 (L)   GFR Estimate If Black Latest Ref Range: >60 mL/min/1.7m2 50 (L)   Calcium Latest Ref Range: 8.5 - 10.1 mg/dL 7.6 (L)   Anion Gap Latest Ref Range: 3 - 14 mmol/L 10   Albumin Latest Ref Range: 3.4 - 5.0 g/dL 2.6 (L)   Protein Total Latest Ref Range: 6.8 - 8.8 g/dL 9.7 (H)   Bilirubin Total Latest Ref Range: 0.2 - 1.3 mg/dL 0.3   Alkaline Phosphatase Latest Ref Range: 40 - 150 U/L 45   ALT Latest Ref Range: 0 - 70 U/L 17   AST Latest Ref Range: 0 - 45 U/L 8   Glucose Latest Ref Range: 70 - 99 mg/dL 211 (H)   WBC Latest Ref Range: 4.0 - 11.0 10e9/L 3.0 (L)   Hemoglobin " Latest Ref Range: 13.3 - 17.7 g/dL 9.2 (L)   Hematocrit Latest Ref Range: 40.0 - 53.0 % 29.6 (L)   Platelet Count Latest Ref Range: 150 - 450 10e9/L 42 (LL)     IMPRESSION/PLAN:  72 year old male with relapsed MM after autologous transplant (1/9/2013), status-post multiple salvage regimens with progressive disease.    MM: With rising light chains, worsening renal function, and worsening TCP, started weekly cytoxan with prednisone 50  mg QOD on 12/14. Initially, he continued to decline clinically to the point where hospice was discussed at multiple visits. His light chains intially increased, however now have grossly stabilized, and clinically, Yamil has improved substantially. His renal function and total protein have also been improving. Dr. Topete planned to add Ninlaro 3 mg days 1,8,15 q 28 day cycle to this regimen, but this is working on approval. At this point, in light of the stable MM labs and the significant improvements Anthony has made clinically, will plan to check his MM labs next week, and if stable, will keep Ninlaro in our back pocket for future use. If rising, will plan to start the Ninlaro. We will continue with the Cytoxan, prednisone for now, as well as weekly follow-up. If he continues to do this well, we can space your visits to every other week.  - Due for Zometa in April, q3m    Goals: We have had numerous discussions re: goals and continuing treatment. For now, Yamil feels that he is tolerating this regimen well and we will plan to continue these discussion.     Heme: Cytopenias 2/2 treatment and likely MM, requiring intermittent pRBC, grossly stable  - Previously on Plavix, remains on hold given thrombocytopenia, indefinitely  - Transfuse to keep hemoglobin > 8, platelets > 10k.     Renal/FEN: Worsening renal function in December-Jan (Cr max 3.40 on 1/13) in setting of progressive myeloma. This has been improving, perhaps getting a response with treatment.  -Encouraged protein/calorie  supplements.   -Would recommend continuing Zyprexa at night to help with appetite.    ID: Afebrile w/o localizing infectious symptoms.  - Continues ppx  mg BID     Back/rib pain: Started early May. Lumbar MRI at OSH showing mild-mod central and foraminal stenoses in lumbar spine, per patient and wife, there was no MM lesion there.  Recent imaging with no acute findings and pain has resolved.  He is not taking any pain medications currently.  We will continue to monitor.    Fatigue: Stable today.       CV: Continue zocor and norvasc.   - Avoid QTc prolonging agents (history of QTc prolongation with syncopal episodes)     AMS: AMS started early May in setting of metabolic derangements, uremia, and possible infection.  Remains intermittently confused over the past few months but improved significantly and stable today. I do believe there is perhaps some ongoing cognitive decline since the AMS episodes.  - Continue Zyprexa 5 mg for appetite  - Holding off long-acting pain meds        Plan summary:  - Continue Cytoxan/Prednisone  - Continue weekly follow-up for now  - Will continue to get approval for Ninalaro -- hold off starting until we see light chains/SPEP next week    I spent >15 minutes with the patient, with over 50% of the time spent counseling or coordinating their care as described above.      Leanne Murphy PA-C

## 2018-02-15 NOTE — PROGRESS NOTES
Infusion Nursing Note:  Anthony Carbone presents today for C4D15 Cytoxan.    Patient seen by provider today: Yes: leanne AMARO   present during visit today: Not Applicable.    Note: Patient feels well. PO Prednisone 50mg taken today. No complaints made.    Intravenous Access:  Implanted Port.    Treatment Conditions:  Lab Results   Component Value Date    HGB 9.2 02/15/2018     Lab Results   Component Value Date    WBC 3.0 02/15/2018      Lab Results   Component Value Date    ANEU 2.8 02/15/2018     Lab Results   Component Value Date    PLT 42 02/15/2018      Lab Results   Component Value Date     02/15/2018                   Lab Results   Component Value Date    POTASSIUM 4.4 02/15/2018           Lab Results   Component Value Date    MAG 1.7 01/15/2018            Lab Results   Component Value Date    CR 1.65 02/15/2018                   Lab Results   Component Value Date    JAZMIN 7.6 02/15/2018                Lab Results   Component Value Date    BILITOTAL 0.3 02/15/2018           Lab Results   Component Value Date    ALBUMIN 2.6 02/15/2018                    Lab Results   Component Value Date    ALT 17 02/15/2018           Lab Results   Component Value Date    AST 8 02/15/2018       Results reviewed, labs MET treatment parameters, ok to proceed with treatment.    TORB: 2/15/18/1315H Leanne AMARO/ Silvana Hebert RN/ PLT 42, to proceed with treatment as planned    Post Infusion Assessment:  Patient tolerated infusion without incident.  Blood return noted pre and post infusion.  Site patent and intact, free from redness, edema or discomfort.  No evidence of extravasations.  Access discontinued per protocol.    Discharge Plan:   Patient declined prescription refills.  Discharge instructions reviewed with: Patient and Family.  Patient and/or family verbalized understanding of discharge instructions and all questions answered.  Copy of AVS reviewed with patient and/or family.  Patient  will return 2/22/18 for next appointment.  Patient discharged in stable condition accompanied by: self and daughter.  Departure Mode: Ambulatory.    ZACARIAS CASTAÑEDA RN

## 2018-02-15 NOTE — LETTER
2/15/2018      RE: Anthony Carbone  [address] [place] [address]       HEMATOLOGY/ONCOLOGY PROGRESS NOTE  Feb 15, 2018    REASON FOR VISIT: myeloma    Diagnosis: 72 year old gentleman with IgM lambda multiple myeloma originally diagnosed in 01/2012 as stage I, standard risk disease.   Treatment: Revlimid plus dexamethasone for 4-5 cycles but plateaued by late 03/2012.   - Velcade was added and he received another 2-3 cycles, achieving a good partial remission.      Transplant: Single auto after melphalan 200 mg/m2 preparative regimen on 01/09/2013  - Post-transplant course: unremarkable except for some mild steroid-induced hyperglycemia, gastritis, nausea and vomiting.      Maintenance: Lenalidomide at day-100 then developed a maculopapular rash. Lenalidomide was held and then we re-challenged him after about a month to 2 months at a lower dose (5 mg daily); rash returned.   - was started on maintenance Velcade, every other week through July 2014, when he was noted with abnormalities on myeloma studies drawn there. Noted with prostate cancer dx at about the time of relapse (see below).     Relapse: noted with return on M-spike in blood/urine with marrow involvement but negative PET.   - Started on retreatment with RVD on 8/27/14 with Revlimid at 15 mg 14 days of 21 days, dexamethasone 40 mg weekly and velcade weekly.Completed at total of 5 cycles without complication by 12/2014.   - Started Cycle 6 on inc'd Rev dosing of 20mg daily x 2 weeks on 12/11/14 which was complicated by pneumonia.  - Adm 12/21-1/10 for human metapneumovirus pneumonia complicated by anorexia, HTN, depression, anorexia with significant weight loss.   - Restarted Ernesto/Dex only on 2/4/15 with good tolerance but with thrombocytopenia.   - Bendamustine added to Velcade/dexamethasone on 5/21/15 due to disease progression  - Velcade discontinued on 7/9/2015 due to side effects (orthostasis)  - Schedule changed to bendamustine 80 mg/m2  days 1 and 2 on 28-day cycle  - Cycle 1 tolerated poorly due to lightheadedness and weakness  - Cycle 2 dose reduced to 60 mg/m2 and pre-meds adjusted  - Cycle 5 received on 9/17/15.  - 10/1/2015 - increasing IgM, M-spike - started Pomalidomide 4mg/day (21 days out of 28 days) and weekly Decadron 20mg on Oct 1st, 2015.    - Carfilzomib added on 10/22/2015 making this CPD.  - C3-C6  received in [place]    - Returned to [facility] and resumed CPD here    - Adm: 4/12-4/14 with fever, confusion, neutropenia. Noted on MRI to have acute/subacute CVA and subacute/chronic CVA; started on Plavix, given brief course of Abx and GCSF prior to d/c.     - Continued on Carfilzomib and dexamethasone alone  - Pomalyst added back on 7/13/2016 for rising FLC  - Start daratumumab 11/10/16. Changed to every other week after 4 weekly treatments d/t profound fatigue and malaise.   - Adm 5/7-5/16 due to AMS, hypercalcemia, hyperuricemia, possible PNA, back pain, and JOSE MARIA. Started Kyprolis while inpatient.  - Re-admitted 5/25-/529 with recurrent AMS, found to have concomitant PNA  - Resumed Kyprolis 6/13/2017. Stopped due to worsening performance status and progressive myeloma.  - Started Venclexta 800 mg 8/25/2017  - Added weekly Velcade with dexamethasone 20 mg weekly due to rising light chains on 11/1/2017  - Started weekly Cytoxan with prednisone QOD on 12/13 (though started the prednisone around 12/24)      INTERVAL HISTORY:    Anthony presents today with his wife.  Yamil continues to do really well. He was even out cleaning his garage and sweeping it this week. His energy has been better, as has his appetite. He continues to even go out to lunch with his wife. These are all big improvements for him.  He has no pain. No fevers or infectious symptoms. Remains without diarrhea. No nausea. No bleedingor swelling.  Remainder of 10-pt ROS otherwise is negative.    PHYSICAL EXAMINATION  /85 (BP Location: Right arm, Patient Position: Chair, Cuff  "Size: Adult Regular)  Pulse 85  Temp 98  F (36.7  C) (Oral)  Resp 16  Ht 1.626 m (5' 4.02\")  Wt 52.6 kg (116 lb)  SpO2 96%  BMI 19.9 kg/m2     Wt Readings from Last 10 Encounters:   02/15/18 52.6 kg (116 lb)   18 52.8 kg (116 lb 4.8 oz)   18 51.2 kg (112 lb 12.8 oz)   18 51.4 kg (113 lb 4.8 oz)   18 50.7 kg (111 lb 11.2 oz)   18 50.2 kg (110 lb 11.2 oz)   18 50.3 kg (110 lb 12.8 oz)   18 50.8 kg (112 lb 1.6 oz)   18 49.3 kg (108 lb 11.2 oz)   18 51.2 kg (112 lb 14.4 oz)     General: Alert, frail. Oriented to name, , location,  Able to ambulate independently, albeit slow and cautiously.  No acute distress. HEENT: PERRL, no palor or icterus. CVS: RRR with occasional premature beat. CHEST: CTAB, normal work of breathing, right port without erythema, swelling or pus.  ABDOMEN: soft non tender no masses     NEURO: AAOX3  CN 2-12 intact  SKIN: no bleeding or brusiing, no rashes EXTREMITIES: No edema.     LABS:   Results for WILLIAN ARCINIEGA (MRN [id number]) as of 2/15/2018 12:52   Ref. Range 2/15/2018 12:08   Sodium Latest Ref Range: 133 - 144 mmol/L 138   Potassium Latest Ref Range: 3.4 - 5.3 mmol/L 4.4   Chloride Latest Ref Range: 94 - 109 mmol/L 106   Carbon Dioxide Latest Ref Range: 20 - 32 mmol/L 22   Urea Nitrogen Latest Ref Range: 7 - 30 mg/dL 31 (H)   Creatinine Latest Ref Range: 0.66 - 1.25 mg/dL 1.65 (H)   GFR Estimate Latest Ref Range: >60 mL/min/1.7m2 41 (L)   GFR Estimate If Black Latest Ref Range: >60 mL/min/1.7m2 50 (L)   Calcium Latest Ref Range: 8.5 - 10.1 mg/dL 7.6 (L)   Anion Gap Latest Ref Range: 3 - 14 mmol/L 10   Albumin Latest Ref Range: 3.4 - 5.0 g/dL 2.6 (L)   Protein Total Latest Ref Range: 6.8 - 8.8 g/dL 9.7 (H)   Bilirubin Total Latest Ref Range: 0.2 - 1.3 mg/dL 0.3   Alkaline Phosphatase Latest Ref Range: 40 - 150 U/L 45   ALT Latest Ref Range: 0 - 70 U/L 17   AST Latest Ref Range: 0 - 45 U/L 8   Glucose Latest Ref Range: " 70 - 99 mg/dL 211 (H)   WBC Latest Ref Range: 4.0 - 11.0 10e9/L 3.0 (L)   Hemoglobin Latest Ref Range: 13.3 - 17.7 g/dL 9.2 (L)   Hematocrit Latest Ref Range: 40.0 - 53.0 % 29.6 (L)   Platelet Count Latest Ref Range: 150 - 450 10e9/L 42 (LL)     IMPRESSION/PLAN:  72 year old male with relapsed MM after autologous transplant (1/9/2013), status-post multiple salvage regimens with progressive disease.    MM: With rising light chains, worsening renal function, and worsening TCP, started weekly cytoxan with prednisone 50  mg QOD on 12/14. Initially, he continued to decline clinically to the point where hospice was discussed at multiple visits. His light chains intially increased, however now have grossly stabilized, and clinically, Yamil has improved substantially. His renal function and total protein have also been improving. Dr. Topete planned to add Ninlaro 3 mg days 1,8,15 q 28 day cycle to this regimen, but this is working on approval. At this point, in light of the stable MM labs and the significant improvements Anthony has made clinically, will plan to check his MM labs next week, and if stable, will keep Ninlaro in our back pocket for future use. If rising, will plan to start the Ninlaro. We will continue with the Cytoxan, prednisone for now, as well as weekly follow-up. If he continues to do this well, we can space your visits to every other week.  - Due for Zometa in April, q3m    Goals: We have had numerous discussions re: goals and continuing treatment. For now, Yamil feels that he is tolerating this regimen well and we will plan to continue these discussion.     Heme: Cytopenias 2/2 treatment and likely MM, requiring intermittent pRBC, grossly stable  - Previously on Plavix, remains on hold given thrombocytopenia, indefinitely  - Transfuse to keep hemoglobin > 8, platelets > 10k.     Renal/FEN: Worsening renal function in December-Jan (Cr max 3.40 on 1/13) in setting of progressive myeloma. This has been  improving, perhaps getting a response with treatment.  -Encouraged protein/calorie supplements.   -Would recommend continuing Zyprexa at night to help with appetite.    ID: Afebrile w/o localizing infectious symptoms.  - Continues ppx  mg BID     Back/rib pain: Started early May. Lumbar MRI at OSH showing mild-mod central and foraminal stenoses in lumbar spine, per patient and wife, there was no MM lesion there.  Recent imaging with no acute findings and pain has resolved.  He is not taking any pain medications currently.  We will continue to monitor.    Fatigue: Stable today.       CV: Continue zocor and norvasc.   - Avoid QTc prolonging agents (history of QTc prolongation with syncopal episodes)     AMS: AMS started early May in setting of metabolic derangements, uremia, and possible infection.  Remains intermittently confused over the past few months but improved significantly and stable today. I do believe there is perhaps some ongoing cognitive decline since the AMS episodes.  - Continue Zyprexa 5 mg for appetite  - Holding off long-acting pain meds        Plan summary:  - Continue Cytoxan/Prednisone  - Continue weekly follow-up for now  - Will continue to get approval for Ninalaro -- hold off starting until we see light chains/SPEP next week    I spent >15 minutes with the patient, with over 50% of the time spent counseling or coordinating their care as described above.      Leanne Murphy PA-C

## 2018-02-22 NOTE — PATIENT INSTRUCTIONS
Contact Numbers    [facility] Main Line: [phone]  [facility] Triage:  [phone]    Call triage with chills and/or temperature greater than or equal to 100.5, uncontrolled nausea/vomiting, diarrhea, constipation, dizziness, shortness of breath, chest pain, bleeding, unexplained bruising, or any new/concerning symptoms, questions/concerns.     If you are having any concerning symptoms or wish to speak to a provider before your next infusion visit, please call your care coordinator or triage to notify them so we can adequately serve you.       After Hours: [phone]    If after hours, weekends, or holidays, call main hospital  and ask for Oncology doctor on call.           February 2018 Sunday Monday Tuesday Wednesday Thursday Friday Saturday                       1     [facility]   12:30 PM   (15 min.)   [facility]   [facility] Draw     [facility] RETURN   12:55 PM   (50 min.)   Leanne Reddy PA-C   [facility]    2:00 PM   (120 min.)    ONCOLOGY INFUSION   [facility] 2     [facility] ONC INFUSION 240    7:00 AM   (240 min.)    ONCOLOGY INFUSION   [facility] 3       4     5     6     7     8     [facility]   12:45 PM   (15 min.)   [facility]   [facility] Draw     [facility] RETURN   12:55 PM   (50 min.)   Leanne Reddy PA-C   [facility] 120    2:00 PM   (120 min.)   [facility] ONCOLOGY INFUSION   [facility] 9     10       11     12     13     14     15     [facility]   11:45 AM   (15 min.)   [facility]   [facility] Draw     [facility] RETURN   12:05 PM   (50 min.)   Leanne Reddy PA-C   [facility] 120    1:30 PM   (120 min.)    ONCOLOGY INFUSION   [facility] 16     17       18     19     20     21     22     [facility]    9:30 AM   (15 min.)    [facility] DRAW   [facility] Lab Draw     [facility] RETURN    9:45 AM   (50 min.)   Leanne Reddy PA-C   [facility]     [facility] ONC INFUSION 120   11:00 AM   (120 min.)   [facility] ONCOLOGY INFUSION   [facility] 23     24       25     26     27     28     [facility] D[facility]    3:30 PM   (15 min.)    [facility] DRAW   [facility] Lab Draw     [facility] RETURN    3:45 PM   (30 min.)   Vargas Coe MD   [facility]                           March 2018 Sunday Monday Tuesday Wednesday Thursday Friday Saturday                       1     [facility] ONC INFUSION 120   10:00 AM   (120 min.)    ONCOLOGY INFUSION   [facility] 2     3       4     5     6     7     8     9     10       11     12     13     14     15     16     17       18     19     20     21     22     23     24       25     26     27     28     29     30     31                 Recent Results (from the past 24 hour(s))   CBC with platelets differential    Collection Time: 02/22/18  9:45 AM   Result Value Ref Range    WBC 2.5 (L) 4.0 - 11.0 10e9/L    RBC Count 2.71 (L) 4.4 - 5.9 10e12/L    Hemoglobin 8.3 (L) 13.3 - 17.7 g/dL    Hematocrit 26.6 (L) 40.0 - 53.0 %    MCV 98 78 - 100 fl    MCH 30.6 26.5 - 33.0 pg    MCHC 31.2 (L) 31.5 - 36.5 g/dL    RDW 18.2 (H) 10.0 - 15.0 %    Platelet Count 40 (LL) 150 - 450 10e9/L    Diff Method Automated Method     % Neutrophils 81.1 %    % Lymphocytes 3.5 %    % Monocytes 14.6 %    % Eosinophils 0.4 %    % Basophils 0.0 %    % Immature Granulocytes 0.4 %    Nucleated RBCs 0 0 /100    Absolute Neutrophil 2.1 1.6 - 8.3 10e9/L    Absolute Lymphocytes 0.1 (L) 0.8 - 5.3 10e9/L    Absolute Monocytes 0.4 0.0 - 1.3 10e9/L    Absolute Eosinophils 0.0 0.0 - 0.7 10e9/L    Absolute Basophils 0.0 0.0 - 0.2 10e9/L    Abs Immature Granulocytes 0.0 0 - 0.4 10e9/L    Absolute Nucleated RBC 0.0    Comprehensive  metabolic panel    Collection Time: 02/22/18  9:45 AM   Result Value Ref Range    Sodium 139 133 - 144 mmol/L    Potassium 3.9 3.4 - 5.3 mmol/L    Chloride 105 94 - 109 mmol/L    Carbon Dioxide 26 20 - 32 mmol/L    Anion Gap 8 3 - 14 mmol/L    Glucose 103 (H) 70 - 99 mg/dL    Urea Nitrogen 38 (H) 7 - 30 mg/dL    Creatinine 2.25 (H) 0.66 - 1.25 mg/dL    GFR Estimate 29 (L) >60 mL/min/1.7m2    GFR Estimate If Black 35 (L) >60 mL/min/1.7m2    Calcium 8.8 8.5 - 10.1 mg/dL    Bilirubin Total 0.3 0.2 - 1.3 mg/dL    Albumin 2.6 (L) 3.4 - 5.0 g/dL    Protein Total 9.0 (H) 6.8 - 8.8 g/dL    Alkaline Phosphatase 47 40 - 150 U/L    ALT 16 0 - 70 U/L    AST 9 0 - 45 U/L

## 2018-02-22 NOTE — PROGRESS NOTES
Infusion Nursing Note:    Patient presents today for Cycle 4 Day 21 Cytoxan.  Arrived with spouse.   Patient met with Leanne AMARO prior to infusion.    Lab Results   Component Value Date    HGB 8.3 02/22/2018     Lab Results   Component Value Date    WBC 2.5 02/22/2018      Lab Results   Component Value Date    ANEU 2.1 02/22/2018     Lab Results   Component Value Date    PLT 40 02/22/2018      Lab Results   Component Value Date     02/22/2018                   Lab Results   Component Value Date    POTASSIUM 3.9 02/22/2018           Lab Results   Component Value Date    MAG 1.7 01/15/2018            Lab Results   Component Value Date    CR 2.25 02/22/2018                   Lab Results   Component Value Date    JAZMIN 8.8 02/22/2018                Lab Results   Component Value Date    BILITOTAL 0.3 02/22/2018           Lab Results   Component Value Date    ALBUMIN 2.6 02/22/2018                    Lab Results   Component Value Date    ALT 16 02/22/2018           Lab Results   Component Value Date    AST 9 02/22/2018       Results reviewed, labs MET treatment parameters, ok to proceed with treatment.    Note: N/A.  2/22/18 1100 TORB: No decadron premed needed prior to chemo infusion. Give 1 liter NS bolus today for elevated creatinine. Leanne AMARO/Malina Melgoza RN    Intravenous Access:  Implanted Port.    Post Infusion Assessment:  Patient tolerated infusion without incident.  Blood return noted pre and post infusion.  No evidence of extravasations.  Access discontinued per protocol.    Discharge Plan:   Patient declined prescription refills.  Copy of AVS reviewed with patient and/or family.  Patient will return 2/28 for next appointment.  Patient discharged in stable condition accompanied by: wife.  Departure Mode: Ambulatory.  Face to Face time: 0.

## 2018-02-22 NOTE — NURSING NOTE
"Oncology Rooming Note    February 22, 2018 9:54 AM   Anthony Carbone is a 72 year old male who presents for:    Chief Complaint   Patient presents with     Oncology Clinic Visit     Multiple Myeloma, Weekly F/U.     Port Draw     Labs drawn by RN from Right Chest Port-a-Cath. Line flushed with Saline and Heparin.      Initial Vitals: /79  Pulse 80  Temp 98  F (36.7  C) (Oral)  Resp 16  Ht 1.626 m (5' 4.02\")  Wt 52.3 kg (115 lb 4.8 oz)  SpO2 95%  BMI 19.78 kg/m2 Estimated body mass index is 19.78 kg/(m^2) as calculated from the following:    Height as of this encounter: 1.626 m (5' 4.02\").    Weight as of this encounter: 52.3 kg (115 lb 4.8 oz). Body surface area is 1.54 meters squared.  No Pain (0) Comment: Data Unavailable   No LMP for male patient.  Allergies reviewed: Yes  Medications reviewed: Yes    Medications: Medication refills not needed today.  Pharmacy name entered into [facility]:    [id number] - [place] [address] AT [facility] [address]  [email or web address] [place], [place] - [address] [facility]  [facility] [id number] [facility] [id number] AT [facility] [address]    Clinical concerns: None Leanne Reddy was NOT notified.    7 minutes for nursing intake (face to face time)     Elsy Ruiz LPN              "

## 2018-02-22 NOTE — NURSING NOTE
Chief Complaint   Patient presents with     Oncology Clinic Visit     Multiple Myeloma, Weekly F/U.     Port Draw     Labs drawn by RN from Right Chest Port-a-Cath. Line flushed with Saline and Heparin.      Natividad Restrepo RN

## 2018-02-22 NOTE — PROGRESS NOTES
HEMATOLOGY/ONCOLOGY PROGRESS NOTE  Feb 22, 2018    REASON FOR VISIT: myeloma    Diagnosis: 72 year old gentleman with IgM lambda multiple myeloma originally diagnosed in 01/2012 as stage I, standard risk disease.   Treatment: Revlimid plus dexamethasone for 4-5 cycles but plateaued by late 03/2012.   - Velcade was added and he received another 2-3 cycles, achieving a good partial remission.      Transplant: Single auto after melphalan 200 mg/m2 preparative regimen on 01/09/2013  - Post-transplant course: unremarkable except for some mild steroid-induced hyperglycemia, gastritis, nausea and vomiting.      Maintenance: Lenalidomide at day-100 then developed a maculopapular rash. Lenalidomide was held and then we re-challenged him after about a month to 2 months at a lower dose (5 mg daily); rash returned.   - was started on maintenance Velcade, every other week through July 2014, when he was noted with abnormalities on myeloma studies drawn there. Noted with prostate cancer dx at about the time of relapse (see below).     Relapse: noted with return on M-spike in blood/urine with marrow involvement but negative PET.   - Started on retreatment with RVD on 8/27/14 with Revlimid at 15 mg 14 days of 21 days, dexamethasone 40 mg weekly and velcade weekly.Completed at total of 5 cycles without complication by 12/2014.   - Started Cycle 6 on inc'd Rev dosing of 20mg daily x 2 weeks on 12/11/14 which was complicated by pneumonia.  - Adm 12/21-1/10 for human metapneumovirus pneumonia complicated by anorexia, HTN, depression, anorexia with significant weight loss.   - Restarted Ernesto/Dex only on 2/4/15 with good tolerance but with thrombocytopenia.   - Bendamustine added to Velcade/dexamethasone on 5/21/15 due to disease progression  - Velcade discontinued on 7/9/2015 due to side effects (orthostasis)  - Schedule changed to bendamustine 80 mg/m2 days 1 and 2 on 28-day cycle  - Cycle 1 tolerated poorly due to lightheadedness and  "weakness  - Cycle 2 dose reduced to 60 mg/m2 and pre-meds adjusted  - Cycle 5 received on 9/17/15.  - 10/1/2015 - increasing IgM, M-spike - started Pomalidomide 4mg/day (21 days out of 28 days) and weekly Decadron 20mg on Oct 1st, 2015.    - Carfilzomib added on 10/22/2015 making this CPD.  - C3-C6  received in [place]    - Returned to [facility] and resumed CPD here    - Adm: 4/12-4/14 with fever, confusion, neutropenia. Noted on MRI to have acute/subacute CVA and subacute/chronic CVA; started on Plavix, given brief course of Abx and GCSF prior to d/c.     - Continued on Carfilzomib and dexamethasone alone  - Pomalyst added back on 7/13/2016 for rising FLC  - Start daratumumab 11/10/16. Changed to every other week after 4 weekly treatments d/t profound fatigue and malaise.   - Adm 5/7-5/16 due to AMS, hypercalcemia, hyperuricemia, possible PNA, back pain, and JOSE MARIA. Started Kyprolis while inpatient.  - Re-admitted 5/25-/529 with recurrent AMS, found to have concomitant PNA  - Resumed Kyprolis 6/13/2017. Stopped due to worsening performance status and progressive myeloma.  - Started Venclexta 800 mg 8/25/2017  - Added weekly Velcade with dexamethasone 20 mg weekly due to rising light chains on 11/1/2017  - Started weekly Cytoxan with prednisone QOD on 12/13 (though started the prednisone around 12/24)      INTERVAL HISTORY:    Anthony presents today with his wife.  Yamil continues to do really well. He developed a scratchy throat, some congestion, and an occasional cough earlier in the week. He never had a fever. He reports his symptoms are mostly resolved today. He had no SOB or chest pain. He has no pain. Remains without diarrhea. No nausea. No bleedingor swelling. Energy has been really good. Busy with family events, this weekend going to look at town homes.    Remainder of 10-pt ROS otherwise is negative.    PHYSICAL EXAMINATION  /79  Pulse 80  Temp 98  F (36.7  C) (Oral)  Resp 16  Ht 1.626 m (5' 4.02\")  Wt 52.3 " kg (115 lb 4.8 oz)  SpO2 95%  BMI 19.78 kg/m2     Wt Readings from Last 10 Encounters:   18 52.3 kg (115 lb 4.8 oz)   02/15/18 52.6 kg (116 lb)   18 52.8 kg (116 lb 4.8 oz)   18 51.2 kg (112 lb 12.8 oz)   18 51.4 kg (113 lb 4.8 oz)   18 50.7 kg (111 lb 11.2 oz)   18 50.2 kg (110 lb 11.2 oz)   18 50.3 kg (110 lb 12.8 oz)   18 50.8 kg (112 lb 1.6 oz)   18 49.3 kg (108 lb 11.2 oz)     General: Alert, frail. Oriented to name, , location,  Able to ambulate independently, albeit slow and cautiously.  No acute distress. HEENT: PERRL, no palor or icterus. CVS: RRR CHEST: CTAB, normal work of breathing, right port without erythema, swelling or pus.  ABDOMEN: soft non tender no masses     NEURO: AAOX3  CN 2-12 intact  SKIN: no bleeding or brusiing, no rashes EXTREMITIES: No edema.     LABS:   Results for WILLIAN ARCINIEGA (MRN [id number]) as of 2018 10:29   Ref. Range 2018 09:45   Sodium Latest Ref Range: 133 - 144 mmol/L 139   Potassium Latest Ref Range: 3.4 - 5.3 mmol/L 3.9   Chloride Latest Ref Range: 94 - 109 mmol/L 105   Carbon Dioxide Latest Ref Range: 20 - 32 mmol/L 26   Urea Nitrogen Latest Ref Range: 7 - 30 mg/dL 38 (H)   Creatinine Latest Ref Range: 0.66 - 1.25 mg/dL 2.25 (H)   GFR Estimate Latest Ref Range: >60 mL/min/1.7m2 29 (L)   GFR Estimate If Black Latest Ref Range: >60 mL/min/1.7m2 35 (L)   Calcium Latest Ref Range: 8.5 - 10.1 mg/dL 8.8   Anion Gap Latest Ref Range: 3 - 14 mmol/L 8   Albumin Latest Ref Range: 3.4 - 5.0 g/dL 2.6 (L)   Protein Total Latest Ref Range: 6.8 - 8.8 g/dL 9.0 (H)   Bilirubin Total Latest Ref Range: 0.2 - 1.3 mg/dL 0.3   Alkaline Phosphatase Latest Ref Range: 40 - 150 U/L 47   ALT Latest Ref Range: 0 - 70 U/L 16   AST Latest Ref Range: 0 - 45 U/L 9   Glucose Latest Ref Range: 70 - 99 mg/dL 103 (H)   WBC Latest Ref Range: 4.0 - 11.0 10e9/L 2.5 (L)   Hemoglobin Latest Ref Range: 13.3 - 17.7 g/dL 8.3 (L)    Hematocrit Latest Ref Range: 40.0 - 53.0 % 26.6 (L)   Platelet Count Latest Ref Range: 150 - 450 10e9/L 40 (LL)   Results for WILLIAN ARCINIEGA (MRN [id number]) as of 2/22/2018 10:29   Ref. Range 2/1/2018 12:45 2/1/2018 12:45 2/8/2018 12:51 2/15/2018 12:08 2/22/2018 09:45   Protein Total Latest Ref Range: 6.8 - 8.8 g/dL 10.1 (H)  9.8 (H) 9.7 (H) 9.0 (H)     IMPRESSION/PLAN:  72 year old male with relapsed MM after autologous transplant (1/9/2013), status-post multiple salvage regimens with progressive disease.    MM: With rising light chains, worsening renal function, and worsening TCP, started weekly cytoxan with prednisone 50  mg QOD on 12/14. Initially, he continued to decline clinically to the point where hospice was discussed at multiple visits. His light chains intially increased, however now have grossly stabilized, and clinically, Yamil has improved substantially. Yamil remains doing well today. Total protein is lowering, however creatinine is bumped today. Light chains are pending. Plan to continue with Cytoxan/Dex today, and follow-up next week with Dr. Coe, as scheduled.  - Dr. Topete planned to add Ninlaro 3 mg days 1,8,15 q 28 day cycle to this regimen, but this is working on approval. At this point, in light of the stable MM labs and the significant improvements, will hold off starting. If light chains increased today, adding Ninalaro would be appropriate.  - Continue weekly follow-up for now, but if continues to do this well, can space our visits to q2week  - Yamil inquired abuot going to [place] for 1-2 weeks. I think this will depend on the light chains, transfusion needs, though he does have an oncologist there.  - Due for Zometa in April, q3m    Goals: We have had numerous discussions re: goals and continuing treatment. For now, Yamil feels that he is tolerating this regimen well and we will plan to continue these discussion.     Heme: Cytopenias 2/2 treatment and likely MM, requiring  intermittent pRBC, grossly stable. Asymptomatic with hgb 8.3, plan to add possible pRBC next week. Counseled on symptoms of anemia and to call with any symptoms.  - Previously on Plavix, remains on hold given thrombocytopenia, indefinitely  - Transfuse to keep hemoglobin > 8, platelets > 10k.     Renal/FEN: Worsening renal function in December-Jan (Cr max 3.40 on 1/13) in setting of progressive myeloma. This has been improving, however increased again today. He endorses adequate hydration. Concern this could be MM, light chains pending. Will give a liter of NS.  -Encouraged protein/calorie supplements.   -Would recommend continuing Zyprexa at night to help with appetite.    ID: Afebrile. Had a minor sore throat, congestion, cough earlier this week without fevers. Reports symptoms mostly resolved now. He will monitor for fevers or worsening symptoms and let us know.  - Continues ppx  mg BID     Back/rib pain: Started early May. Lumbar MRI at OSH showing mild-mod central and foraminal stenoses in lumbar spine, per patient and wife, there was no MM lesion there.  Recent imaging with no acute findings and pain has resolved.  He is not taking any pain medications currently.  We will continue to monitor.    Fatigue: Stable today.       CV: Continue zocor and norvasc.   - Avoid QTc prolonging agents (history of QTc prolongation with syncopal episodes)     AMS: AMS started early May in setting of metabolic derangements, uremia, and possible infection.  Remains intermittently confused over the past few months but improved significantly and stable today. I do believe there is perhaps some ongoing cognitive decline since the AMS episodes.  - Continue Zyprexa 5 mg for appetite  - Holding off long-acting pain meds      Plan summary:  - Continue Cytoxan/Prednisone  - IVF today  - Continue weekly follow-up for now  - Light chains pending  - To see Dr. Coe next week    I spent >15 minutes with the patient, with over 50%  of the time spent counseling or coordinating their care as described above.      Leanne Murphy PA-C

## 2018-02-22 NOTE — MR AVS SNAPSHOT
After Visit Summary   2/22/2018    Anthony Carbone    MRN: [id number]           Patient Information     Date Of Birth          1945        Visit Information        Provider Department      2/22/2018 11:00 AM  31 ATC;  ONCOLOGY INFUSION [facility]        Today's Diagnoses     Multiple myeloma in relapse (H)    -  1    Multiple myeloma, remission status unspecified (H)          Care Instructions    Contact Numbers    [facility] Main Line: [phone]  [facility] Triage:  [phone]    Call triage with chills and/or temperature greater than or equal to 100.5, uncontrolled nausea/vomiting, diarrhea, constipation, dizziness, shortness of breath, chest pain, bleeding, unexplained bruising, or any new/concerning symptoms, questions/concerns.     If you are having any concerning symptoms or wish to speak to a provider before your next infusion visit, please call your care coordinator or triage to notify them so we can adequately serve you.       After Hours: [phone]    If after hours, weekends, or holidays, call main hospital  and ask for Oncology doctor on call.           February 2018 Sunday Monday Tuesday Wednesday Thursday Friday Saturday                       1     [facility] MASONIC LAB DRAW   12:30 PM   (15 min.)    MASONIC LAB DRAW   [facility] Masonic Lab Draw     UMP RETURN   12:55 PM   (50 min.)   Leanne Reddy PA-C   [facility]    2:00 PM   (120 min.)    ONCOLOGY INFUSION   [facility] 2     UMP ONC INFUSION 240    7:00 AM   (240 min.)    ONCOLOGY INFUSION   [facility] 3       4     5     6     7     8     P MASONIC LAB DRAW   12:45 PM   (15 min.)    [facility]     [facility] RETURN   12:55 PM   (50 min.)   Leanne Reddy PA-C   [facility]     UMP ONC INFUSION 120    2:00 PM   (120 min.)    ONCOLOGY  INFUSION   [facility] 9     10       11     12     13     14     15     UMP MASONIC LAB DRAW   11:45 AM   (15 min.)    MASONIC LAB DRAW   [facility] Masonic Lab Draw     UMP RETURN   12:05 PM   (50 min.)   Leanne Reddy PA-C   [facility]     UMP ONC INFUSION 120    1:30 PM   (120 min.)   UC ONCOLOGY INFUSION   [facility] 16     17       18     19     20     21     22     UMP MASONIC LAB DRAW    9:30 AM   (15 min.)   [facility]     [facility] RETURN    9:45 AM   (50 min.)   Leanne Reddy PA-C   [facility]     UMP ONC INFUSION 120   11:00 AM   (120 min.)    ONCOLOGY INFUSION   [facility] 23     24       25     26     27     28     UMP MASONIC LAB DRAW    3:30 PM   (15 min.)    [facility]     [facility] RETURN    3:45 PM   (30 min.)   Vargas Coe MD   [facility]                           March 2018 Sunday Monday Tuesday Wednesday Thursday Friday Saturday                       1     UMP ONC INFUSION 120   10:00 AM   (120 min.)   UC ONCOLOGY INFUSION   [facility] 2     3       4     5     6     7     8     9     10       11     12     13     14     15     16     17       18     19     20     21     22     23     24       25     26     27     28     29     30     31                 Recent Results (from the past 24 hour(s))   CBC with platelets differential    Collection Time: 02/22/18  9:45 AM   Result Value Ref Range    WBC 2.5 (L) 4.0 - 11.0 10e9/L    RBC Count 2.71 (L) 4.4 - 5.9 10e12/L    Hemoglobin 8.3 (L) 13.3 - 17.7 g/dL    Hematocrit 26.6 (L) 40.0 - 53.0 %    MCV 98 78 - 100 fl    MCH 30.6 26.5 - 33.0 pg    MCHC 31.2 (L) 31.5 - 36.5 g/dL    RDW 18.2 (H) 10.0 - 15.0 %    Platelet Count 40 (LL) 150 - 450 10e9/L    Diff Method Automated Method     % Neutrophils 81.1 %    % Lymphocytes  3.5 %    % Monocytes 14.6 %    % Eosinophils 0.4 %    % Basophils 0.0 %    % Immature Granulocytes 0.4 %    Nucleated RBCs 0 0 /100    Absolute Neutrophil 2.1 1.6 - 8.3 10e9/L    Absolute Lymphocytes 0.1 (L) 0.8 - 5.3 10e9/L    Absolute Monocytes 0.4 0.0 - 1.3 10e9/L    Absolute Eosinophils 0.0 0.0 - 0.7 10e9/L    Absolute Basophils 0.0 0.0 - 0.2 10e9/L    Abs Immature Granulocytes 0.0 0 - 0.4 10e9/L    Absolute Nucleated RBC 0.0    Comprehensive metabolic panel    Collection Time: 02/22/18  9:45 AM   Result Value Ref Range    Sodium 139 133 - 144 mmol/L    Potassium 3.9 3.4 - 5.3 mmol/L    Chloride 105 94 - 109 mmol/L    Carbon Dioxide 26 20 - 32 mmol/L    Anion Gap 8 3 - 14 mmol/L    Glucose 103 (H) 70 - 99 mg/dL    Urea Nitrogen 38 (H) 7 - 30 mg/dL    Creatinine 2.25 (H) 0.66 - 1.25 mg/dL    GFR Estimate 29 (L) >60 mL/min/1.7m2    GFR Estimate If Black 35 (L) >60 mL/min/1.7m2    Calcium 8.8 8.5 - 10.1 mg/dL    Bilirubin Total 0.3 0.2 - 1.3 mg/dL    Albumin 2.6 (L) 3.4 - 5.0 g/dL    Protein Total 9.0 (H) 6.8 - 8.8 g/dL    Alkaline Phosphatase 47 40 - 150 U/L    ALT 16 0 - 70 U/L    AST 9 0 - 45 U/L               Follow-ups after your visit        Your next 10 appointments already scheduled     Feb 28, 2018  3:30 PM CST   Masonic Lab Draw with  MASONIC LAB DRAW   [facility] Lab Draw ([facility])    [address] [place] [address]  [address]  [facility] [address] [phone]            Feb 28, 2018  4:00 PM CST   (Arrive by 3:45 PM)   Return Visit with Vargas Coe MD   [facility] ([facility])    [address]  [address]  [facility] [address] [phone]            Mar 01, 2018 10:00 AM CST   Infusion 120 with UC ONCOLOGY INFUSION,  [facility] ([facility])    [address]  [address] [place] [id number] [phone]              Who to contact     If  you have questions or need follow up information about today's clinic visit or your schedule please contact [facility]IC directly at [phone].  Normal or non-critical lab and imaging results will be communicated to you by MyChart, letter or phone within 4 business days after the clinic has received the results. If you do not hear from us within 7 days, please contact the clinic through MyChart or phone. If you have a critical or abnormal lab result, we will notify you by phone as soon as possible.  Submit refill requests through [employer] or call your pharmacy and they will forward the refill request to us. Please allow 3 business days for your refill to be completed.          Additional Information About Your Visit        [employer] Information     [employer] gives you secure access to your electronic health record. If you see a primary care provider, you can also send messages to your care team and make appointments. If you have questions, please call your primary care clinic.  If you do not have a primary care provider, please call [phone] and they will assist you.        Care EveryWhere ID     This is your Care EveryWhere ID. This could be used by other organizations to access your [place] medical records  [id number]         Blood Pressure from Last 3 Encounters:   02/22/18 113/79   02/15/18 143/85   02/08/18 138/84    Weight from Last 3 Encounters:   02/22/18 52.3 kg (115 lb 4.8 oz)   02/15/18 52.6 kg (116 lb)   02/08/18 52.8 kg (116 lb 4.8 oz)              We Performed the Following     CBC with platelets differential     Comprehensive metabolic panel     Kappa and lambda light chain (Serum)     Protein electrophoresis        Primary Care Provider Office Phone # Fax #    Sanket Burciaga [phone]       [facility] [address] [place] [address]        Equal Access to Services     ALBAN SHAH : conrad Peña,  jonny lisa ah. So [facility] [phone].    ATENCIÓN: Si delores tao, tiene a todd disposición servicios gratuitos de asistencia lingüística. Parminder al [phone].    We comply with applicable federal civil rights laws and [place] laws. We do not discriminate on the basis of race, color, national origin, age, disability, sex, sexual orientation, or gender identity.            Thank you!     Thank you for choosing [facility] [place]  for your care. Our goal is always to provide you with excellent care. Hearing back from our patients is one way we can continue to improve our services. Please take a few minutes to complete the written survey that you may receive in the mail after your visit with us. Thank you!             Your Updated Medication List - Protect others around you: Learn how to safely use, store and throw away your medicines at www.disposemymeds.org.          This list is accurate as of 2/22/18 11:33 AM.  Always use your most recent med list.                   Brand Name Dispense Instructions for use Diagnosis    acetaminophen 325 MG tablet    TYLENOL    100 tablet    Take 2 tablets (650 mg) by mouth every 4 hours as needed for mild pain or fever    Fever, unspecified fever cause       acyclovir 400 MG tablet    ZOVIRAX    60 tablet    Take 1 tablet (400 mg) by mouth 2 times daily    Multiple myeloma in relapse (H)       amLODIPine 5 MG tablet    NORVASC    90 tablet    TAKE 1 TABLET BY MOUTH AT BEDTIME    Essential hypertension       esomeprazole 40 MG CR capsule    nexIUM    30 capsule    Take 1 capsule (40 mg) by mouth every morning (before breakfast)    Gastroesophageal reflux disease without esophagitis       ixazomib 3 MG capsule    NINLARO    4 capsule    Take 1 capsule (3 mg) by mouth every 7 days    Multiple myeloma in relapse (H), Multiple myeloma, remission status unspecified (H)       LORazepam 0.5 MG tablet    ATIVAN    30  tablet    Take 1 tablet (0.5 mg) by mouth every 6 hours as needed for nausea or anxiety.    Multiple myeloma in relapse (H), Delirium       OLANZapine 5 MG tablet    zyPREXA    60 tablet    Take by mouth At Bedtime Takes 1 tablet    Delirium, Multiple myeloma in relapse (H)       ondansetron 8 MG ODT tab    ZOFRAN-ODT    30 tablet    Take 1 tablet (8 mg) by mouth every 8 hours as needed for nausea    Nausea       oxyCODONE IR 5 MG tablet    ROXICODONE    15 tablet    Take 1 tablet (5 mg) by mouth every 6 hours as needed for moderate to severe pain        predniSONE 50 MG tablet    DELTASONE     Take 1 tablet (50 mg) by mouth every other day    Multiple myeloma, remission status unspecified (H)       SIMVASTATIN PO      Take 20 mg by mouth At Bedtime        traMADol 50 MG tablet    ULTRAM    60 tablet    Take 1 tablet (50 mg) by mouth every 6 hours as needed for moderate pain . Recommend trying after Tylenol and before Dilaudid.    Acute bilateral low back pain without sciatica, Multiple myeloma in relapse (H)

## 2018-02-22 NOTE — MR AVS SNAPSHOT
After Visit Summary   2/22/2018    Anthony Carbone    MRN: [id number]           Patient Information     Date Of Birth          1945        Visit Information        Provider Department      2/22/2018 10:00 AM Leanne Reddy PA-C [facility]        Today's Diagnoses     Multiple myeloma, remission status unspecified (H)    -  1       Follow-ups after your visit        Your next 10 appointments already scheduled     Feb 22, 2018 11:00 AM CST   Infusion 120 with UC ONCOLOGY INFUSION, UC 31 ATC   [facility] ([facility])    [address] [place] [address]  [address]  [facility] [address]   [phone]            Feb 28, 2018  3:30 PM CST   Masonic Lab Draw with  MASONIC LAB DRAW   [facility] Lab Draw ([facility])    [address] [place] [address]  [address]  [facility] [address] [phone]            Feb 28, 2018  4:00 PM CST   (Arrive by 3:45 PM)   Return Visit with Vargas Coe MD   [facility] ([facility])    [address] [place] [address]  [address]  [facility] [address]   [phone]            Mar 01, 2018 10:00 AM CST   Infusion 120 with UC ONCOLOGY INFUSION, UC 10 ATC   [facility] ([facility])    [address] [place] [address]  [address]  [facility] [address] [phone]              Who to contact     If you have questions or need follow up information about today's clinic visit or your schedule please contact [facility] directly at [phone].  Normal or non-critical lab and imaging results will be communicated to you by MyChart, letter or phone within 4 business days after the clinic has received the results. If you do not hear from us within 7 days, please contact the clinic through MyChart or phone. If you have a critical or abnormal lab result, we will notify you by phone  "as soon as possible.  Submit refill requests through [employer] or call your pharmacy and they will forward the refill request to us. Please allow 3 business days for your refill to be completed.          Additional Information About Your Visit        [employer] Information     [employer] gives you secure access to your electronic health record. If you see a primary care provider, you can also send messages to your care team and make appointments. If you have questions, please call your primary care clinic.  If you do not have a primary care provider, please call [phone] and they will assist you.        Care EveryWhere ID     This is your Care EveryWhere ID. This could be used by other organizations to access your [place] medical records  [id number]        Your Vitals Were     Pulse Temperature Respirations Height Pulse Oximetry BMI (Body Mass Index)    80 98  F (36.7  C) (Oral) 16 1.626 m (5' 4.02\") 95% 19.78 kg/m2       Blood Pressure from Last 3 Encounters:   02/22/18 113/79   02/15/18 143/85   02/08/18 138/84    Weight from Last 3 Encounters:   02/22/18 52.3 kg (115 lb 4.8 oz)   02/15/18 52.6 kg (116 lb)   02/08/18 52.8 kg (116 lb 4.8 oz)              Today, you had the following     No orders found for display       Primary Care Provider Office Phone # Fax #    Sanket Burciaga [phone]       [facility] [address] [facility] [address]        Equal Access to Services     ARMANDO SHAH : Hadii aad ku hadasho Soomaali, waaxda luqadaha, qaybta kaalmada adeegyada, [employer] paola jurado . So [facility] [phone].    ATENCIÓN: Si habla español, tiene a todd disposición servicios gratuitos de asistencia lingüística. Llame al [phone].    We comply with applicable federal civil rights laws and [place] laws. We do not discriminate on the basis of race, color, national origin, age, disability, sex, sexual orientation, or gender identity.            Thank you!  "    Thank you for choosing [facility] CLINIC  for your care. Our goal is always to provide you with excellent care. Hearing back from our patients is one way we can continue to improve our services. Please take a few minutes to complete the written survey that you may receive in the mail after your visit with us. Thank you!             Your Updated Medication List - Protect others around you: Learn how to safely use, store and throw away your medicines at www.disposemymeds.org.          This list is accurate as of 2/22/18 10:40 AM.  Always use your most recent med list.                   Brand Name Dispense Instructions for use Diagnosis    acetaminophen 325 MG tablet    TYLENOL    100 tablet    Take 2 tablets (650 mg) by mouth every 4 hours as needed for mild pain or fever    Fever, unspecified fever cause       acyclovir 400 MG tablet    ZOVIRAX    60 tablet    Take 1 tablet (400 mg) by mouth 2 times daily    Multiple myeloma in relapse (H)       amLODIPine 5 MG tablet    NORVASC    90 tablet    TAKE 1 TABLET BY MOUTH AT BEDTIME    Essential hypertension       esomeprazole 40 MG CR capsule    nexIUM    30 capsule    Take 1 capsule (40 mg) by mouth every morning (before breakfast)    Gastroesophageal reflux disease without esophagitis       ixazomib 3 MG capsule    NINLARO    4 capsule    Take 1 capsule (3 mg) by mouth every 7 days    Multiple myeloma in relapse (H), Multiple myeloma, remission status unspecified (H)       LORazepam 0.5 MG tablet    ATIVAN    30 tablet    Take 1 tablet (0.5 mg) by mouth every 6 hours as needed for nausea or anxiety.    Multiple myeloma in relapse (H), Delirium       OLANZapine 5 MG tablet    zyPREXA    60 tablet    Take by mouth At Bedtime Takes 1 tablet    Delirium, Multiple myeloma in relapse (H)       ondansetron 8 MG ODT tab    ZOFRAN-ODT    30 tablet    Take 1 tablet (8 mg) by mouth every 8 hours as needed for nausea    Nausea       oxyCODONE IR 5 MG tablet     ROXICODONE    15 tablet    Take 1 tablet (5 mg) by mouth every 6 hours as needed for moderate to severe pain        predniSONE 50 MG tablet    DELTASONE     Take 1 tablet (50 mg) by mouth every other day    Multiple myeloma, remission status unspecified (H)       SIMVASTATIN PO      Take 20 mg by mouth At Bedtime        traMADol 50 MG tablet    ULTRAM    60 tablet    Take 1 tablet (50 mg) by mouth every 6 hours as needed for moderate pain . Recommend trying after Tylenol and before Dilaudid.    Acute bilateral low back pain without sciatica, Multiple myeloma in relapse (H)

## 2018-02-22 NOTE — LETTER
2/22/2018      RE: Anthony Carbone  [address] [place] [address]       HEMATOLOGY/ONCOLOGY PROGRESS NOTE  Feb 22, 2018    REASON FOR VISIT: myeloma    Diagnosis: 72 year old gentleman with IgM lambda multiple myeloma originally diagnosed in 01/2012 as stage I, standard risk disease.   Treatment: Revlimid plus dexamethasone for 4-5 cycles but plateaued by late 03/2012.   - Velcade was added and he received another 2-3 cycles, achieving a good partial remission.      Transplant: Single auto after melphalan 200 mg/m2 preparative regimen on 01/09/2013  - Post-transplant course: unremarkable except for some mild steroid-induced hyperglycemia, gastritis, nausea and vomiting.      Maintenance: Lenalidomide at day-100 then developed a maculopapular rash. Lenalidomide was held and then we re-challenged him after about a month to 2 months at a lower dose (5 mg daily); rash returned.   - was started on maintenance Velcade, every other week through July 2014, when he was noted with abnormalities on myeloma studies drawn there. Noted with prostate cancer dx at about the time of relapse (see below).     Relapse: noted with return on M-spike in blood/urine with marrow involvement but negative PET.   - Started on retreatment with RVD on 8/27/14 with Revlimid at 15 mg 14 days of 21 days, dexamethasone 40 mg weekly and velcade weekly.Completed at total of 5 cycles without complication by 12/2014.   - Started Cycle 6 on inc'd Rev dosing of 20mg daily x 2 weeks on 12/11/14 which was complicated by pneumonia.  - Adm 12/21-1/10 for human metapneumovirus pneumonia complicated by anorexia, HTN, depression, anorexia with significant weight loss.   - Restarted Ernesto/Dex only on 2/4/15 with good tolerance but with thrombocytopenia.   - Bendamustine added to Velcade/dexamethasone on 5/21/15 due to disease progression  - Velcade discontinued on 7/9/2015 due to side effects (orthostasis)  - Schedule changed to bendamustine 80 mg/m2  days 1 and 2 on 28-day cycle  - Cycle 1 tolerated poorly due to lightheadedness and weakness  - Cycle 2 dose reduced to 60 mg/m2 and pre-meds adjusted  - Cycle 5 received on 9/17/15.  - 10/1/2015 - increasing IgM, M-spike - started Pomalidomide 4mg/day (21 days out of 28 days) and weekly Decadron 20mg on Oct 1st, 2015.    - Carfilzomib added on 10/22/2015 making this CPD.  - C3-C6  received in [place]    - Returned to [facility] and resumed CPD here    - Adm: 4/12-4/14 with fever, confusion, neutropenia. Noted on MRI to have acute/subacute CVA and subacute/chronic CVA; started on Plavix, given brief course of Abx and GCSF prior to d/c.     - Continued on Carfilzomib and dexamethasone alone  - Pomalyst added back on 7/13/2016 for rising FLC  - Start daratumumab 11/10/16. Changed to every other week after 4 weekly treatments d/t profound fatigue and malaise.   - Adm 5/7-5/16 due to AMS, hypercalcemia, hyperuricemia, possible PNA, back pain, and JOSE MARIA. Started Kyprolis while inpatient.  - Re-admitted 5/25-/529 with recurrent AMS, found to have concomitant PNA  - Resumed Kyprolis 6/13/2017. Stopped due to worsening performance status and progressive myeloma.  - Started Venclexta 800 mg 8/25/2017  - Added weekly Velcade with dexamethasone 20 mg weekly due to rising light chains on 11/1/2017  - Started weekly Cytoxan with prednisone QOD on 12/13 (though started the prednisone around 12/24)      INTERVAL HISTORY:    Anthony presents today with his wife.  Yamil continues to do really well. He developed a scratchy throat, some congestion, and an occasional cough earlier in the week. He never had a fever. He reports his symptoms are mostly resolved today. He had no SOB or chest pain. He has no pain. Remains without diarrhea. No nausea. No bleedingor swelling. Energy has been really good. Busy with family events, this weekend going to look at town homes.    Remainder of 10-pt ROS otherwise is negative.    PHYSICAL EXAMINATION  /79   "Pulse 80  Temp 98  F (36.7  C) (Oral)  Resp 16  Ht 1.626 m (5' 4.02\")  Wt 52.3 kg (115 lb 4.8 oz)  SpO2 95%  BMI 19.78 kg/m2     Wt Readings from Last 10 Encounters:   18 52.3 kg (115 lb 4.8 oz)   02/15/18 52.6 kg (116 lb)   18 52.8 kg (116 lb 4.8 oz)   18 51.2 kg (112 lb 12.8 oz)   18 51.4 kg (113 lb 4.8 oz)   18 50.7 kg (111 lb 11.2 oz)   18 50.2 kg (110 lb 11.2 oz)   18 50.3 kg (110 lb 12.8 oz)   18 50.8 kg (112 lb 1.6 oz)   18 49.3 kg (108 lb 11.2 oz)     General: Alert, frail. Oriented to name, , location,  Able to ambulate independently, albeit slow and cautiously.  No acute distress. HEENT: PERRL, no palor or icterus. CVS: RRR CHEST: CTAB, normal work of breathing, right port without erythema, swelling or pus.  ABDOMEN: soft non tender no masses     NEURO: AAOX3  CN 2-12 intact  SKIN: no bleeding or brusiing, no rashes EXTREMITIES: No edema.     LABS:   Results for WILLIAN ARCINIEGA (MRN [id number]) as of 2018 10:29   Ref. Range 2018 09:45   Sodium Latest Ref Range: 133 - 144 mmol/L 139   Potassium Latest Ref Range: 3.4 - 5.3 mmol/L 3.9   Chloride Latest Ref Range: 94 - 109 mmol/L 105   Carbon Dioxide Latest Ref Range: 20 - 32 mmol/L 26   Urea Nitrogen Latest Ref Range: 7 - 30 mg/dL 38 (H)   Creatinine Latest Ref Range: 0.66 - 1.25 mg/dL 2.25 (H)   GFR Estimate Latest Ref Range: >60 mL/min/1.7m2 29 (L)   GFR Estimate If Black Latest Ref Range: >60 mL/min/1.7m2 35 (L)   Calcium Latest Ref Range: 8.5 - 10.1 mg/dL 8.8   Anion Gap Latest Ref Range: 3 - 14 mmol/L 8   Albumin Latest Ref Range: 3.4 - 5.0 g/dL 2.6 (L)   Protein Total Latest Ref Range: 6.8 - 8.8 g/dL 9.0 (H)   Bilirubin Total Latest Ref Range: 0.2 - 1.3 mg/dL 0.3   Alkaline Phosphatase Latest Ref Range: 40 - 150 U/L 47   ALT Latest Ref Range: 0 - 70 U/L 16   AST Latest Ref Range: 0 - 45 U/L 9   Glucose Latest Ref Range: 70 - 99 mg/dL 103 (H)   WBC Latest Ref Range: 4.0 - " 11.0 10e9/L 2.5 (L)   Hemoglobin Latest Ref Range: 13.3 - 17.7 g/dL 8.3 (L)   Hematocrit Latest Ref Range: 40.0 - 53.0 % 26.6 (L)   Platelet Count Latest Ref Range: 150 - 450 10e9/L 40 (LL)   Results for WILLIAN ARCINIEGA (MRN [id number]) as of 2/22/2018 10:29   Ref. Range 2/1/2018 12:45 2/1/2018 12:45 2/8/2018 12:51 2/15/2018 12:08 2/22/2018 09:45   Protein Total Latest Ref Range: 6.8 - 8.8 g/dL 10.1 (H)  9.8 (H) 9.7 (H) 9.0 (H)     IMPRESSION/PLAN:  72 year old male with relapsed MM after autologous transplant (1/9/2013), status-post multiple salvage regimens with progressive disease.    MM: With rising light chains, worsening renal function, and worsening TCP, started weekly cytoxan with prednisone 50  mg QOD on 12/14. Initially, he continued to decline clinically to the point where hospice was discussed at multiple visits. His light chains intially increased, however now have grossly stabilized, and clinically, Yamil has improved substantially. Yamil remains doing well today. Total protein is lowering, however creatinine is bumped today. Light chains are pending. Plan to continue with Cytoxan/Dex today, and follow-up next week with Dr. Coe, as scheduled.  - Dr. Topete planned to add Ninlaro 3 mg days 1,8,15 q 28 day cycle to this regimen, but this is working on approval. At this point, in light of the stable MM labs and the significant improvements, will hold off starting. If light chains increased today, adding Ninalaro would be appropriate.  - Continue weekly follow-up for now, but if continues to do this well, can space our visits to q2week  - Yamil inquired abuot going to [place] for 1-2 weeks. I think this will depend on the light chains, transfusion needs, though he does have an oncologist there.  - Due for Zometa in April, q3m    Goals: We have had numerous discussions re: goals and continuing treatment. For now, Yamil feels that he is tolerating this regimen well and we will plan to continue these  discussion.     Heme: Cytopenias 2/2 treatment and likely MM, requiring intermittent pRBC, grossly stable. Asymptomatic with hgb 8.3, plan to add possible pRBC next week. Counseled on symptoms of anemia and to call with any symptoms.  - Previously on Plavix, remains on hold given thrombocytopenia, indefinitely  - Transfuse to keep hemoglobin > 8, platelets > 10k.     Renal/FEN: Worsening renal function in December-Jan (Cr max 3.40 on 1/13) in setting of progressive myeloma. This has been improving, however increased again today. He endorses adequate hydration. Concern this could be MM, light chains pending. Will give a liter of NS.  -Encouraged protein/calorie supplements.   -Would recommend continuing Zyprexa at night to help with appetite.    ID: Afebrile. Had a minor sore throat, congestion, cough earlier this week without fevers. Reports symptoms mostly resolved now. He will monitor for fevers or worsening symptoms and let us know.  - Continues ppx  mg BID     Back/rib pain: Started early May. Lumbar MRI at OSH showing mild-mod central and foraminal stenoses in lumbar spine, per patient and wife, there was no MM lesion there.  Recent imaging with no acute findings and pain has resolved.  He is not taking any pain medications currently.  We will continue to monitor.    Fatigue: Stable today.       CV: Continue zocor and norvasc.   - Avoid QTc prolonging agents (history of QTc prolongation with syncopal episodes)     AMS: AMS started early May in setting of metabolic derangements, uremia, and possible infection.  Remains intermittently confused over the past few months but improved significantly and stable today. I do believe there is perhaps some ongoing cognitive decline since the AMS episodes.  - Continue Zyprexa 5 mg for appetite  - Holding off long-acting pain meds      Plan summary:  - Continue Cytoxan/Prednisone  - IVF today  - Continue weekly follow-up for now  - Light chains pending  - To see   Saravanan next week    I spent >15 minutes with the patient, with over 50% of the time spent counseling or coordinating their care as described above.      Leanne Murphy PA-C

## 2018-02-28 NOTE — MR AVS SNAPSHOT
After Visit Summary   2/28/2018    Anthony Carbone    MRN: [id number]           Patient Information     Date Of Birth          1945        Visit Information        Provider Department      2/28/2018 4:00 PM Vargas Coe MD [facility]        Today's Diagnoses     Multiple myeloma, remission status unspecified (H)          Care Instructions    It was a pleasure meeting you today at the [facility].  Overall, I'm pleased that your Multiple Myeloma is under good control.      Your anemia (low hemoglobin) is stable from last week and at this point I think we can hold on transfusion.  Your IgM protein in the blood has improved (decreased) since the end of January which is a sign that the Cytoxan medication is having a good effect on the disease.  In January it measured 4.6 g/dL and last week's measure was down to 3.4 g/dL.      We will plan to continue weekly cyclophosphamide and every-other-day Prednisone  Your next dose is tomorrow  I will arrange for a visit with Leanne, with labs prior to your chemotherapy visit next Thursday    If you have questions or concerns before your next appointment you can call Dr Coe's Care Coordinator, Kaylin Ontiveros at:  [phone]              Follow-ups after your visit        Follow-up notes from your care team     Return in about 1 week (around 3/7/2018).      Your next 10 appointments already scheduled     Mar 01, 2018 10:00 AM CST   Infusion 120 with UC ONCOLOGY INFUSION, UC 10 ATC   [facility])    [address] [place] [address]  [address]  [facility] [address] [phone]              Future tests that were ordered for you today     Open Standing Orders        Priority Remaining Interval Expires Ordered    Red blood cell prepare order unit Routine 99/100 CONDITIONAL (SPECIFY) BLOOD  2/28/2018            Who to contact     If you have questions or need follow  "up information about today's clinic visit or your schedule please contact [facility] directly at [phone].  Normal or non-critical lab and imaging results will be communicated to you by [employer], letter or phone within 4 business days after the clinic has received the results. If you do not hear from us within 7 days, please contact the clinic through [employer] or phone. If you have a critical or abnormal lab result, we will notify you by phone as soon as possible.  Submit refill requests through [employer] or call your pharmacy and they will forward the refill request to us. Please allow 3 business days for your refill to be completed.          Additional Information About Your Visit        [employer] Information     [employer] gives you secure access to your electronic health record. If you see a primary care provider, you can also send messages to your care team and make appointments. If you have questions, please call your primary care clinic.  If you do not have a primary care provider, please call [phone] and they will assist you.        Care EveryWhere ID     This is your Care EveryWhere ID. This could be used by other organizations to access your [place] medical records  [id number]        Your Vitals Were     Pulse Temperature Respirations Height Pulse Oximetry BMI (Body Mass Index)    97 98.2  F (36.8  C) (Oral) 24 1.626 m (5' 4.02\") 95% 19.82 kg/m2       Blood Pressure from Last 3 Encounters:   02/28/18 133/83   02/22/18 113/79   02/15/18 143/85    Weight from Last 3 Encounters:   02/28/18 52.4 kg (115 lb 8 oz)   02/22/18 52.3 kg (115 lb 4.8 oz)   02/15/18 52.6 kg (116 lb)              We Performed the Following     ABO/Rh type and screen     CBC with platelets differential     Comprehensive metabolic panel        Primary Care Provider Office Phone # Fax #    Sanket Burciaga [phone]       [facility] [address] [facility] [address]      "   Equal Access to Services     [facility] HospitalWILFRED : Hadii aad ku hadyamelluh Katarinajose, wacatherineda luqdyanaha, qaольгаta karobinjonny obrien. So [facility] [phone].    ATENCIÓN: Si habla español, tiene a todd disposición servicios gratuitos de asistencia lingüística. Llame al [phone].    We comply with applicable federal civil rights laws and [place] laws. We do not discriminate on the basis of race, color, national origin, age, disability, sex, sexual orientation, or gender identity.            Thank you!     Thank you for choosing [facility]  for your care. Our goal is always to provide you with excellent care. Hearing back from our patients is one way we can continue to improve our services. Please take a few minutes to complete the written survey that you may receive in the mail after your visit with us. Thank you!             Your Updated Medication List - Protect others around you: Learn how to safely use, store and throw away your medicines at www.disposemymeds.org.          This list is accurate as of 2/28/18  4:33 PM.  Always use your most recent med list.                   Brand Name Dispense Instructions for use Diagnosis    acetaminophen 325 MG tablet    TYLENOL    100 tablet    Take 2 tablets (650 mg) by mouth every 4 hours as needed for mild pain or fever    Fever, unspecified fever cause       acyclovir 400 MG tablet    ZOVIRAX    60 tablet    Take 1 tablet (400 mg) by mouth 2 times daily    Multiple myeloma in relapse (H)       amLODIPine 5 MG tablet    NORVASC    90 tablet    TAKE 1 TABLET BY MOUTH AT BEDTIME    Essential hypertension       esomeprazole 40 MG CR capsule    nexIUM    30 capsule    Take 1 capsule (40 mg) by mouth every morning (before breakfast)    Gastroesophageal reflux disease without esophagitis       ixazomib 3 MG capsule    NINLARO    4 capsule    Take 1 capsule (3 mg) by mouth every 7 days    Multiple myeloma in relapse (H),  Multiple myeloma, remission status unspecified (H)       LORazepam 0.5 MG tablet    ATIVAN    30 tablet    Take 1 tablet (0.5 mg) by mouth every 6 hours as needed for nausea or anxiety.    Multiple myeloma in relapse (H), Delirium       OLANZapine 5 MG tablet    zyPREXA    60 tablet    Take by mouth At Bedtime Takes 1 tablet    Delirium, Multiple myeloma in relapse (H)       ondansetron 8 MG ODT tab    ZOFRAN-ODT    30 tablet    Take 1 tablet (8 mg) by mouth every 8 hours as needed for nausea    Nausea       oxyCODONE IR 5 MG tablet    ROXICODONE    15 tablet    Take 1 tablet (5 mg) by mouth every 6 hours as needed for moderate to severe pain        predniSONE 50 MG tablet    DELTASONE     Take 1 tablet (50 mg) by mouth every other day    Multiple myeloma, remission status unspecified (H)       SIMVASTATIN PO      Take 20 mg by mouth At Bedtime        traMADol 50 MG tablet    ULTRAM    60 tablet    Take 1 tablet (50 mg) by mouth every 6 hours as needed for moderate pain . Recommend trying after Tylenol and before Dilaudid.    Acute bilateral low back pain without sciatica, Multiple myeloma in relapse (H)

## 2018-02-28 NOTE — LETTER
2018       RE: Anthony Carbone  [address]  [place] [address]     Dear Colleague,    Thank you for referring your patient, Anthony Carbone, to the [facility]. Please see a copy of my visit note below.        [facility] Hematology Follow Up Visit    Outpatient Visit Note:    Patient: Anthony Carbone  MRN: [id number]  : 1945  PETE: 2018    Anthony Carbone is a 72 year old man with a history of relapsed/refractory IgM Lambda Multiple Myeloma who returns for routine follow up on weekly Cytoxan and QOD Prednisone 50mg.    Forward History:  Originally diagnosed in 2012 as stage I, standard risk disease.     Treatment: Revlimid plus dexamethasone for 4-5 cycles but plateaued by late 2012.   - Velcade was added and he received another 2-3 cycles, achieving a good partial remission.       Transplant: Single auto after melphalan 200 mg/m2 preparative regimen on 2013  - Post-transplant course: unremarkable except for some mild steroid-induced hyperglycemia, gastritis, nausea and vomiting.       Maintenance: Lenalidomide at day-100 then developed a maculopapular rash. Lenalidomide was held and then we re-challenged him after about a month to 2 months at a lower dose (5 mg daily); rash returned.   - was started on maintenance Velcade, every other week through 2014, when he was noted with abnormalities on myeloma studies drawn there. Noted with prostate cancer dx at about the time of relapse (see below).      Relapse: noted with return on M-spike in blood/urine with marrow involvement but negative PET.   - Started on retreatment with RVD on 14 with Revlimid at 15 mg 14 days of 21 days, dexamethasone 40 mg weekly and velcade weekly.Completed at total of 5 cycles without complication by 2014.   - Started Cycle 6 on inc'd Rev dosing of 20mg daily x 2 weeks on 14 which was complicated by  pneumonia.  - Adm 12/21-1/10 for human metapneumovirus pneumonia complicated by anorexia, HTN, depression, anorexia with significant weight loss.   - Restarted Ernesto/Dex only on 2/4/15 with good tolerance but with thrombocytopenia.   - Bendamustine added to Velcade/dexamethasone on 5/21/15 due to disease progression  - Velcade discontinued on 7/9/2015 due to side effects (orthostasis)  - Schedule changed to bendamustine 80 mg/m2 days 1 and 2 on 28-day cycle  - Cycle 1 tolerated poorly due to lightheadedness and weakness  - Cycle 2 dose reduced to 60 mg/m2 and pre-meds adjusted  - Cycle 5 received on 9/17/15.  - 10/1/2015 - increasing IgM, M-spike - started Pomalidomide 4mg/day (21 days out of 28 days) and weekly Decadron 20mg on Oct 1st, 2015.    - Carfilzomib added on 10/22/2015 making this CPD.  - C3-C6  received in [place]    - Returned to [facility] and resumed CPD here    - Adm: 4/12-4/14 with fever, confusion, neutropenia. Noted on MRI to have acute/subacute CVA and subacute/chronic CVA; started on Plavix, given brief course of Abx and GCSF prior to d/c.     - Continued on Carfilzomib and dexamethasone alone  - Pomalyst added back on 7/13/2016 for rising FLC  - Start daratumumab 11/10/16. Changed to every other week after 4 weekly treatments d/t profound fatigue and malaise.   - Adm 5/7-5/16 due to AMS, hypercalcemia, hyperuricemia, possible PNA, back pain, and JOSE MARIA. Started Kyprolis while inpatient.  - Re-admitted 5/25-/529 with recurrent AMS, found to have concomitant PNA  - Resumed Kyprolis 6/13/2017. Stopped due to worsening performance status and progressive myeloma.  - Started Venclexta 800 mg 8/25/2017  - Added weekly Velcade with dexamethasone 20 mg weekly due to rising light chains on 11/1/2017  - Started weekly Cytoxan with prednisone QOD on 12/13 (though started the prednisone around 12/24)    Visit History:  Yamil returns today with his wife for follow up.  He reports his energy level is good.  He has no  dyspnea on exertion or chest pain.  He reports no new bone pain and is taking pain medications rarely.  He was denies free access to Ninlaro - out of pocket expenses for him would be >$2K.  He is tolerating Cytoxan well, with few side effects.  He is anticipating his next dose tomorrow.    Medications:  Current Outpatient Prescriptions   Medication Sig Dispense Refill     acetaminophen (TYLENOL) 325 MG tablet Take 2 tablets (650 mg) by mouth every 4 hours as needed for mild pain or fever 100 tablet      predniSONE (DELTASONE) 50 MG tablet Take 1 tablet (50 mg) by mouth every other day       acyclovir (ZOVIRAX) 400 MG tablet Take 1 tablet (400 mg) by mouth 2 times daily 60 tablet 3     OLANZapine (ZYPREXA) 5 MG tablet Take by mouth At Bedtime Takes 1 tablet 60 tablet 0     traMADol (ULTRAM) 50 MG tablet Take 1 tablet (50 mg) by mouth every 6 hours as needed for moderate pain . Recommend trying after Tylenol and before Dilaudid. 60 tablet 3     oxyCODONE (ROXICODONE) 5 MG IR tablet Take 1 tablet (5 mg) by mouth every 6 hours as needed for moderate to severe pain 15 tablet 0     SIMVASTATIN PO Take 20 mg by mouth At Bedtime       amLODIPine (NORVASC) 5 MG tablet TAKE 1 TABLET BY MOUTH AT BEDTIME 90 tablet 1     LORazepam (ATIVAN) 0.5 MG tablet Take 1 tablet (0.5 mg) by mouth every 6 hours as needed for nausea or anxiety. 30 tablet 0     ondansetron (ZOFRAN-ODT) 8 MG ODT tab Take 1 tablet (8 mg) by mouth every 8 hours as needed for nausea 30 tablet 3     esomeprazole (NEXIUM) 40 MG CR capsule Take 1 capsule (40 mg) by mouth every morning (before breakfast) 30 capsule 0     ixazomib (NINLARO) 3 MG capsule Take 1 capsule (3 mg) by mouth every 7 days (Patient not taking: Reported on 2/22/2018) 4 capsule 3        Allergies:  Allergies   Allergen Reactions     Aspirin      Stomach upset     Crestor [Rosuvastatin] Muscle Pain (Myalgia)     Bactrim [Sulfamethoxazole W-Trimethoprim] Rash       ROS:  A 14 point ROS is negative  except as stated in the HPI    Objective:  Vitals: B/P: 133/83, T: 98.2, P: 97, R: 24, Wt: 115 lbs 8 oz  Exam:   Gen: Appears well, no distress  Ext: no edema    Labs:  Reviewed recent SPEP from last week, IgM down to 3.4 g/dL compared with Jan SPEP that showed 4.6 g/dL indicating a good response.    Creatinine last week was up to 2.5, got IV fluids, but creatinine today is pending    Hgb is stable, no need for transfusion    Imaging:  none    Assessment:  In summary, Anthony Carbone is a 72 year old man with relapsed refractory, IgM Lambda multiple myeloma, responding to weekly cytoxan and prednisone.    Plan:  1. Continue weekly Cytoxan, next dose is tomorrow  2. See Leanne Reddy next week prior to next dose of Cytoxan with labs  3. Will follow up on his creatinine from today, consider IVF with next Cytoxan dose  4. Hgb is stable, no symptoms, so hold on transfusion  5. He will see Dr Fatima in a few weeks to establish care with her.  6. Explained that Ninlaro does not appear to be an option due to financial toxicity but even if it was an option, would probably hold this in reserve for evidence his response was slipping.    The patient is given our center's contact information and is instructed to call if he should have any further questions or concerns.  Otherwise, we will plan on seeing him back in 1 week with labs, chemo, visit.      Total Time Spent:  I spent a total of 40 minutes face-to-face with Anthony Carbone during today's office visit.  Over 50% of this time was spent counseling the patient and/or coordinating care regarding multiple myeloma.      Vargas Coe MD   of Medicine  [facility]

## 2018-02-28 NOTE — NURSING NOTE
"Oncology Rooming Note    February 28, 2018 3:57 PM   Anthony Carbone is a 72 year old male who presents for:    Chief Complaint   Patient presents with     Port Draw     Port lab collected by RN.      Oncology Clinic Visit     Return visit related to Multiple Myeloma     Initial Vitals: /83 (BP Location: Right arm, Patient Position: Sitting, Cuff Size: Adult Small)  Pulse 97  Temp 98.2  F (36.8  C) (Oral)  Resp 24  Ht 1.626 m (5' 4.02\")  Wt 52.4 kg (115 lb 8 oz)  SpO2 95%  BMI 19.82 kg/m2 Estimated body mass index is 19.82 kg/(m^2) as calculated from the following:    Height as of this encounter: 1.626 m (5' 4.02\").    Weight as of this encounter: 52.4 kg (115 lb 8 oz). Body surface area is 1.54 meters squared.  No Pain (0) Comment: Data Unavailable   No LMP for male patient.  Allergies reviewed: Yes  Medications reviewed: Yes    Medications: Medication refills not needed today.  Pharmacy name entered into [facility]:    [id number] [place] [id number] [facility] [address] [facility] [address] [facility] [address]  [place], [place] - [address]  [facility] [id number] [id number] AT [facility] [address]    Clinical concerns: No new concerns. Provider was notified.    10 minutes for nursing intake (face to face time)     Jeanna Diaz LPN            "

## 2018-02-28 NOTE — Clinical Note
Please schedule follow up with Leanne AMARO, next Thursday with 1 hour infusion visit for Cytoxan after the clinic visit.  Pt waiting in room

## 2018-02-28 NOTE — PATIENT INSTRUCTIONS
It was a pleasure meeting you today at the [facility].  Overall, I'm pleased that your Multiple Myeloma is under good control.      Your anemia (low hemoglobin) is stable from last week and at this point I think we can hold on transfusion.  Your IgM protein in the blood has improved (decreased) since the end of January which is a sign that the Cytoxan medication is having a good effect on the disease.  In January it measured 4.6 g/dL and last week's measure was down to 3.4 g/dL.      We will plan to continue weekly cyclophosphamide and every-other-day Prednisone  Your next dose is tomorrow  I will arrange for a visit with Leanne, with labs prior to your chemotherapy visit next Thursday    If you have questions or concerns before your next appointment you can call Dr Coe's Care Coordinator, Kaylin Ontiveros at:  [phone]

## 2018-02-28 NOTE — PROGRESS NOTES
[facility] Hematology Follow Up Visit    Outpatient Visit Note:    Patient: Anthony Carbone  MRN: [id number]  : 1945  PETE: 2018    Anhtony Carbone is a 72 year old man with a history of relapsed/refractory IgM Lambda Multiple Myeloma who returns for routine follow up on weekly Cytoxan and QOD Prednisone 50mg.    Forward History:  Originally diagnosed in 2012 as stage I, standard risk disease.     Treatment: Revlimid plus dexamethasone for 4-5 cycles but plateaued by late 2012.   - Velcade was added and he received another 2-3 cycles, achieving a good partial remission.       Transplant: Single auto after melphalan 200 mg/m2 preparative regimen on 2013  - Post-transplant course: unremarkable except for some mild steroid-induced hyperglycemia, gastritis, nausea and vomiting.       Maintenance: Lenalidomide at day-100 then developed a maculopapular rash. Lenalidomide was held and then we re-challenged him after about a month to 2 months at a lower dose (5 mg daily); rash returned.   - was started on maintenance Velcade, every other week through 2014, when he was noted with abnormalities on myeloma studies drawn there. Noted with prostate cancer dx at about the time of relapse (see below).      Relapse: noted with return on M-spike in blood/urine with marrow involvement but negative PET.   - Started on retreatment with RVD on 14 with Revlimid at 15 mg 14 days of 21 days, dexamethasone 40 mg weekly and velcade weekly.Completed at total of 5 cycles without complication by 2014.   - Started Cycle 6 on inc'd Rev dosing of 20mg daily x 2 weeks on 14 which was complicated by pneumonia.  - Adm -1/10 for human metapneumovirus pneumonia complicated by anorexia, HTN, depression, anorexia with significant weight loss.   - Restarted Ernesto/Dex only on 2/4/15 with good tolerance but with thrombocytopenia.   - Bendamustine added to  Velcade/dexamethasone on 5/21/15 due to disease progression  - Velcade discontinued on 7/9/2015 due to side effects (orthostasis)  - Schedule changed to bendamustine 80 mg/m2 days 1 and 2 on 28-day cycle  - Cycle 1 tolerated poorly due to lightheadedness and weakness  - Cycle 2 dose reduced to 60 mg/m2 and pre-meds adjusted  - Cycle 5 received on 9/17/15.  - 10/1/2015 - increasing IgM, M-spike - started Pomalidomide 4mg/day (21 days out of 28 days) and weekly Decadron 20mg on Oct 1st, 2015.    - Carfilzomib added on 10/22/2015 making this CPD.  - C3-C6  received in [place]    - Returned to [facility] and resumed CPD here    - Adm: 4/12-4/14 with fever, confusion, neutropenia. Noted on MRI to have acute/subacute CVA and subacute/chronic CVA; started on Plavix, given brief course of Abx and GCSF prior to d/c.     - Continued on Carfilzomib and dexamethasone alone  - Pomalyst added back on 7/13/2016 for rising FLC  - Start daratumumab 11/10/16. Changed to every other week after 4 weekly treatments d/t profound fatigue and malaise.   - Adm 5/7-5/16 due to AMS, hypercalcemia, hyperuricemia, possible PNA, back pain, and JOSE MARIA. Started Kyprolis while inpatient.  - Re-admitted 5/25-/529 with recurrent AMS, found to have concomitant PNA  - Resumed Kyprolis 6/13/2017. Stopped due to worsening performance status and progressive myeloma.  - Started Venclexta 800 mg 8/25/2017  - Added weekly Velcade with dexamethasone 20 mg weekly due to rising light chains on 11/1/2017  - Started weekly Cytoxan with prednisone QOD on 12/13 (though started the prednisone around 12/24)    Visit History:  Yamil returns today with his wife for follow up.  He reports his energy level is good.  He has no dyspnea on exertion or chest pain.  He reports no new bone pain and is taking pain medications rarely.  He was denies free access to Ninlaro - out of pocket expenses for him would be >$2K.  He is tolerating Cytoxan well, with few side effects.  He is  anticipating his next dose tomorrow.    Medications:  Current Outpatient Prescriptions   Medication Sig Dispense Refill     acetaminophen (TYLENOL) 325 MG tablet Take 2 tablets (650 mg) by mouth every 4 hours as needed for mild pain or fever 100 tablet      predniSONE (DELTASONE) 50 MG tablet Take 1 tablet (50 mg) by mouth every other day       acyclovir (ZOVIRAX) 400 MG tablet Take 1 tablet (400 mg) by mouth 2 times daily 60 tablet 3     OLANZapine (ZYPREXA) 5 MG tablet Take by mouth At Bedtime Takes 1 tablet 60 tablet 0     traMADol (ULTRAM) 50 MG tablet Take 1 tablet (50 mg) by mouth every 6 hours as needed for moderate pain . Recommend trying after Tylenol and before Dilaudid. 60 tablet 3     oxyCODONE (ROXICODONE) 5 MG IR tablet Take 1 tablet (5 mg) by mouth every 6 hours as needed for moderate to severe pain 15 tablet 0     SIMVASTATIN PO Take 20 mg by mouth At Bedtime       amLODIPine (NORVASC) 5 MG tablet TAKE 1 TABLET BY MOUTH AT BEDTIME 90 tablet 1     LORazepam (ATIVAN) 0.5 MG tablet Take 1 tablet (0.5 mg) by mouth every 6 hours as needed for nausea or anxiety. 30 tablet 0     ondansetron (ZOFRAN-ODT) 8 MG ODT tab Take 1 tablet (8 mg) by mouth every 8 hours as needed for nausea 30 tablet 3     esomeprazole (NEXIUM) 40 MG CR capsule Take 1 capsule (40 mg) by mouth every morning (before breakfast) 30 capsule 0     ixazomib (NINLARO) 3 MG capsule Take 1 capsule (3 mg) by mouth every 7 days (Patient not taking: Reported on 2/22/2018) 4 capsule 3        Allergies:  Allergies   Allergen Reactions     Aspirin      Stomach upset     Crestor [Rosuvastatin] Muscle Pain (Myalgia)     Bactrim [Sulfamethoxazole W-Trimethoprim] Rash       ROS:  A 14 point ROS is negative except as stated in the HPI    Objective:  Vitals: B/P: 133/83, T: 98.2, P: 97, R: 24, Wt: 115 lbs 8 oz  Exam:   Gen: Appears well, no distress  Ext: no edema    Labs:  Reviewed recent SPEP from last week, IgM down to 3.4 g/dL compared with Jan SPEP  that showed 4.6 g/dL indicating a good response.    Creatinine last week was up to 2.5, got IV fluids, but creatinine today is pending    Hgb is stable, no need for transfusion    Imaging:  none    Assessment:  In summary, Anthony Carbone is a 72 year old man with relapsed refractory, IgM Lambda multiple myeloma, responding to weekly cytoxan and prednisone.    Plan:  1. Continue weekly Cytoxan, next dose is tomorrow  2. See Leanne Reddy next week prior to next dose of Cytoxan with labs  3. Will follow up on his creatinine from today, consider IVF with next Cytoxan dose  4. Hgb is stable, no symptoms, so hold on transfusion  5. He will see Dr Fatima in a few weeks to establish care with her.  6. Explained that Ninlaro does not appear to be an option due to financial toxicity but even if it was an option, would probably hold this in reserve for evidence his response was slipping.    The patient is given our center's contact information and is instructed to call if he should have any further questions or concerns.  Otherwise, we will plan on seeing him back in 1 week with labs, chemo, visit.      Total Time Spent:  I spent a total of 40 minutes face-to-face with Anthony Carbone during today's office visit.  Over 50% of this time was spent counseling the patient and/or coordinating care regarding multiple myeloma.      Vargas Coe MD   of Medicine  [facility] [employer] [facility]

## 2018-03-01 NOTE — PATIENT INSTRUCTIONS
Contact Numbers    [facility] Main Line: [phone]  [facility] Triage:  [phone]    Call triage with chills and/or temperature greater than or equal to 100.5, uncontrolled nausea/vomiting, diarrhea, constipation, dizziness, shortness of breath, chest pain, bleeding, unexplained bruising, or any new/concerning symptoms, questions/concerns.     If you are having any concerning symptoms or wish to speak to a provider before your next infusion visit, please call your care coordinator or triage to notify them so we can adequately serve you.       After Hours: [phone]    If after hours, weekends, or holidays, call main hospital  and ask for Oncology doctor on call.           March 2018 Sunday Monday Tuesday Wednesday Thursday Friday Saturday                       1     [facility] ONC INFUSION 120   10:00 AM   (120 min.)    ONCOLOGY INFUSION   [facility] 2     3       4     5     6     7     8     [facility] LAB DRAW    8:30 AM   (15 min.)   [facility] LAB DRAW   [facility] Lab Draw     [facility] RETURN    8:55 AM   (50 min.)   Leanne Reddy PA-C   [facility]     [facility] ONC INFUSION 60   10:00 AM   (60 min.)    ONCOLOGY INFUSION   [facility] 9     10       11     12     13     14     15     16     17       18     19     20     21     22     23     24       25     26     27     28     29     30     31                April 2018 Sunday Monday Tuesday Wednesday Thursday Friday Saturday   1     2     3     4     5     6     7       8     9     10     11     12     13     14       15     16     17     18     19     20     21       22     23     24     25     26     27     28       29     30                                          Recent Results (from the past 24 hour(s))   CBC with platelets differential    Collection Time: 02/28/18  3:40 PM   Result Value Ref Range    WBC 3.2 (L) 4.0 - 11.0 10e9/L    RBC Count 2.66 (L) 4.4 - 5.9 10e12/L    Hemoglobin  8.4 (L) 13.3 - 17.7 g/dL    Hematocrit 26.9 (L) 40.0 - 53.0 %     (H) 78 - 100 fl    MCH 31.6 26.5 - 33.0 pg    MCHC 31.2 (L) 31.5 - 36.5 g/dL    RDW 18.9 (H) 10.0 - 15.0 %    Platelet Count 57 (L) 150 - 450 10e9/L    Diff Method Automated Method     % Neutrophils 95.3 %    % Lymphocytes 1.6 %    % Monocytes 2.5 %    % Eosinophils 0.0 %    % Basophils 0.0 %    % Immature Granulocytes 0.6 %    Nucleated RBCs 0 0 /100    Absolute Neutrophil 3.0 1.6 - 8.3 10e9/L    Absolute Lymphocytes 0.1 (L) 0.8 - 5.3 10e9/L    Absolute Monocytes 0.1 0.0 - 1.3 10e9/L    Absolute Eosinophils 0.0 0.0 - 0.7 10e9/L    Absolute Basophils 0.0 0.0 - 0.2 10e9/L    Abs Immature Granulocytes 0.0 0 - 0.4 10e9/L    Absolute Nucleated RBC 0.0    Comprehensive metabolic panel    Collection Time: 02/28/18  3:40 PM   Result Value Ref Range    Sodium 136 133 - 144 mmol/L    Potassium 4.4 3.4 - 5.3 mmol/L    Chloride 104 94 - 109 mmol/L    Carbon Dioxide 22 20 - 32 mmol/L    Anion Gap 10 3 - 14 mmol/L    Glucose 271 (H) 70 - 99 mg/dL    Urea Nitrogen 34 (H) 7 - 30 mg/dL    Creatinine 1.96 (H) 0.66 - 1.25 mg/dL    GFR Estimate 34 (L) >60 mL/min/1.7m2    GFR Estimate If Black 41 (L) >60 mL/min/1.7m2    Calcium 8.3 (L) 8.5 - 10.1 mg/dL    Bilirubin Total 0.3 0.2 - 1.3 mg/dL    Albumin 2.6 (L) 3.4 - 5.0 g/dL    Protein Total 9.2 (H) 6.8 - 8.8 g/dL    Alkaline Phosphatase 48 40 - 150 U/L    ALT 11 0 - 70 U/L    AST 6 0 - 45 U/L   ABO/Rh type and screen    Collection Time: 02/28/18  3:40 PM   Result Value Ref Range    Units Ordered 2     ABO O     RH(D) Pos     Antibody Screen Neg     Test Valid Only At          [facility]    Specimen Expires 03/03/2018     Crossmatch History verified  Red Blood Cells      Blood component    Collection Time: 02/28/18  3:40 PM   Result Value Ref Range    Unit Number [id number]     Blood Component Type Red Blood Cells LeukoRed Irrad (Part 2)     Division Number 00      Status of Unit Ready for patient 03/01/2018 0720     Blood Product Code [id number]     Unit Status AMY    Blood component    Collection Time: 02/28/18  3:40 PM   Result Value Ref Range    Unit Number [id number]     Blood Component Type Red Blood Cells LeukoReduced Irradiated     Division Number 00     Status of Unit Ready for patient 03/01/2018 0718     Blood Product Code [id number]     Unit Status AMY

## 2018-03-01 NOTE — PROGRESS NOTES
Infusion Nursing Note:  Anthony Carbone presents today for Cycle 5 Day 1 Cytoxan.    Patient seen and examined by Dr Coe in clinic on 2/28/18.      Intravenous Access:  Implanted Port.    Treatment Conditions:  Lab Results   Component Value Date    HGB 8.4 02/28/2018     Lab Results   Component Value Date    WBC 3.2 02/28/2018      Lab Results   Component Value Date    ANEU 3.0 02/28/2018     Lab Results   Component Value Date    PLT 57 02/28/2018      Lab Results   Component Value Date     02/28/2018                   Lab Results   Component Value Date    POTASSIUM 4.4 02/28/2018           Lab Results   Component Value Date    MAG 1.7 01/15/2018            Lab Results   Component Value Date    CR 1.96 02/28/2018                   Lab Results   Component Value Date    JAZMIN 8.3 02/28/2018                Lab Results   Component Value Date    BILITOTAL 0.3 02/28/2018           Lab Results   Component Value Date    ALBUMIN 2.6 02/28/2018                    Lab Results   Component Value Date    ALT 11 02/28/2018           Lab Results   Component Value Date    AST 6 02/28/2018   Dr Coe noted pt's crt in his note from yesterday's appt.  Results reviewed, labs MET treatment parameters, ok to proceed with treatment.      Post Infusion Assessment:  Patient tolerated infusion without incident.    Discharge Plan:   Patient declined prescription refills.  AVS to patient via [employer].  Patient will return 3/8/18 for cycle 5 day 8.  Face to Face time: 0.    Brooke Leone RN

## 2018-03-08 NOTE — NURSING NOTE
"Oncology Rooming Note    March 8, 2018 9:14 AM   Anthony Carbone is a 72 year old male who presents for:    Chief Complaint   Patient presents with     Port Draw     Labs drawn from port by RN. Line flushed with saline and heparin. Vs taken and pt checked in for appt     Oncology Clinic Visit     Return: Multiple Myeloma      Initial Vitals: BP 96/63 (BP Location: Right arm, Cuff Size: Adult Regular)  Pulse 80  Temp 98.1  F (36.7  C) (Oral)  Resp 16  Ht 1.626 m (5' 4.02\")  Wt 52.2 kg (115 lb 1.6 oz)  SpO2 97%  BMI 19.75 kg/m2 Estimated body mass index is 19.75 kg/(m^2) as calculated from the following:    Height as of this encounter: 1.626 m (5' 4.02\").    Weight as of this encounter: 52.2 kg (115 lb 1.6 oz). Body surface area is 1.54 meters squared.  No Pain (0) Comment: Data Unavailable   No LMP for male patient.  Allergies reviewed: YES  Medications reviewed: YES    Medications: Medication refills not needed today.  Pharmacy name entered into S[employer]:    [id number] [place] [address] [address] [facility] [address]  [employer] [place], [place] - [address] [facility]  [facility] [address] [place], [place] - [address] AT [facility] [address]    Clinical concerns: no new concerns.  Pt received flu shot elsewhere. See Immunizations     6 minutes for nursing intake (face to face time)     Alexia Shearer CMA                "

## 2018-03-08 NOTE — MR AVS SNAPSHOT
After Visit Summary   3/8/2018    Anthony Carbone    MRN: [id number]           Patient Information     Date Of Birth          1945        Visit Information        Provider Department      3/8/2018 9:10 AM Leanne Reddy PA-C [facility]        Today's Diagnoses     Multiple myeloma, remission status unspecified (H)    -  1       Follow-ups after your visit        Future tests that were ordered for you today     Open Standing Orders        Priority Remaining Interval Expires Ordered    Transfuse red blood cell unit Routine 2/2 TRANSFUSE 2 UNITS  3/8/2018    Red blood cell prepare order unit Routine 99/100 CONDITIONAL (SPECIFY) BLOOD  3/8/2018            Who to contact     If you have questions or need follow up information about today's clinic visit or your schedule please contact [facility] directly at [phone].  Normal or non-critical lab and imaging results will be communicated to you by [employer], letter or phone within 4 business days after the clinic has received the results. If you do not hear from us within 7 days, please contact the clinic through [employer] or phone. If you have a critical or abnormal lab result, we will notify you by phone as soon as possible.  Submit refill requests through [employer] or call your pharmacy and they will forward the refill request to us. Please allow 3 business days for your refill to be completed.          Additional Information About Your Visit        MyChart Information     [employer] gives you secure access to your electronic health record. If you see a primary care provider, you can also send messages to your care team and make appointments. If you have questions, please call your primary care clinic.  If you do not have a primary care provider, please call [phone] and they will assist you.        Care EveryWhere ID     This is your Care EveryWhere ID. This could be used by other organizations  "to access your [facility] medical records  [id number]        Your Vitals Were     Pulse Temperature Respirations Height Pulse Oximetry BMI (Body Mass Index)    80 98.1  F (36.7  C) (Oral) 16 1.626 m (5' 4.02\") 97% 19.75 kg/m2       Blood Pressure from Last 3 Encounters:   03/08/18 96/63   03/01/18 104/71   02/28/18 133/83    Weight from Last 3 Encounters:   03/08/18 52.2 kg (115 lb 1.6 oz)   02/28/18 52.4 kg (115 lb 8 oz)   02/22/18 52.3 kg (115 lb 4.8 oz)              We Performed the Following     ABO/Rh type and screen        Primary Care Provider Office Phone # Fax #    Sanket Gualberto [phone]       [facility] [address] [address]        Equal Access to Services     ARMANDO SHAH : Hadii chalo hernandezo Sojose, waaxda luqadaha, qaybta kaalmada adeclaudiayaabhijit, jonny jurado . So [facility] [phone].    ATENCIÓN: Si habla español, tiene a todd disposición servicios gratuitos de asistencia lingüística. Parminder al [phone].    We comply with applicable federal civil rights laws and [place] laws. We do not discriminate on the basis of race, color, national origin, age, disability, sex, sexual orientation, or gender identity.            Thank you!     Thank you for choosing [facility]  for your care. Our goal is always to provide you with excellent care. Hearing back from our patients is one way we can continue to improve our services. Please take a few minutes to complete the written survey that you may receive in the mail after your visit with us. Thank you!             Your Updated Medication List - Protect others around you: Learn how to safely use, store and throw away your medicines at www.disposemymeds.org.          This list is accurate as of 3/8/18 10:46 AM.  Always use your most recent med list.                   Brand Name Dispense Instructions for use Diagnosis    acetaminophen 325 MG tablet    TYLENOL    100 " tablet    Take 2 tablets (650 mg) by mouth every 4 hours as needed for mild pain or fever    Fever, unspecified fever cause       acyclovir 400 MG tablet    ZOVIRAX    60 tablet    Take 1 tablet (400 mg) by mouth 2 times daily    Multiple myeloma in relapse (H)       amLODIPine 5 MG tablet    NORVASC    90 tablet    TAKE 1 TABLET BY MOUTH AT BEDTIME    Essential hypertension       esomeprazole 40 MG CR capsule    nexIUM    30 capsule    Take 1 capsule (40 mg) by mouth every morning (before breakfast)    Gastroesophageal reflux disease without esophagitis       ixazomib 3 MG capsule    NINLARO    4 capsule    Take 1 capsule (3 mg) by mouth every 7 days    Multiple myeloma in relapse (H), Multiple myeloma, remission status unspecified (H)       LORazepam 0.5 MG tablet    ATIVAN    30 tablet    Take 1 tablet (0.5 mg) by mouth every 6 hours as needed for nausea or anxiety.    Multiple myeloma in relapse (H), Delirium       OLANZapine 5 MG tablet    zyPREXA    60 tablet    Take by mouth At Bedtime Takes 1 tablet    Delirium, Multiple myeloma in relapse (H)       ondansetron 8 MG ODT tab    ZOFRAN-ODT    30 tablet    Take 1 tablet (8 mg) by mouth every 8 hours as needed for nausea    Nausea       oxyCODONE IR 5 MG tablet    ROXICODONE    15 tablet    Take 1 tablet (5 mg) by mouth every 6 hours as needed for moderate to severe pain        predniSONE 50 MG tablet    DELTASONE     Take 1 tablet (50 mg) by mouth every other day    Multiple myeloma, remission status unspecified (H)       SIMVASTATIN PO      Take 20 mg by mouth At Bedtime        traMADol 50 MG tablet    ULTRAM    60 tablet    Take 1 tablet (50 mg) by mouth every 6 hours as needed for moderate pain . Recommend trying after Tylenol and before Dilaudid.    Acute bilateral low back pain without sciatica, Multiple myeloma in relapse (H)

## 2018-03-08 NOTE — PATIENT INSTRUCTIONS
Contact Numbers    [facility] Main Line: [phone]  [facility] Triage:  [phone]    Call triage with chills and/or temperature greater than or equal to 100.5, uncontrolled nausea/vomiting, diarrhea, constipation, dizziness, shortness of breath, chest pain, bleeding, unexplained bruising, or any new/concerning symptoms, questions/concerns.     If you are having any concerning symptoms or wish to speak to a provider before your next infusion visit, please call your care coordinator or triage to notify them so we can adequately serve you.       After Hours: [phone]    If after hours, weekends, or holidays, call main hospital  and ask for Oncology doctor on call.           March 2018 Sunday Monday Tuesday Wednesday Thursday Friday Saturday                       1     [facility] ONC INFUSION 120   10:00 AM   (120 min.)    ONCOLOGY INFUSION   [facility] 2     3       4     5     6     7     8     [facility] DRAW    8:30 AM   (15 min.)   [facility]   [facility] Lab Draw     [facility] RETURN    8:55 AM   (50 min.)   Leanne Reddy PA-C   [facility]   10:00 AM   (60 min.)    ONCOLOGY INFUSION   [facility] 9     10       11     12     13     14     15     16     [facility] DRAW    2:00 PM   (15 min.)   [facility]   [facility] Draw     [facility] RETURN    2:15 PM   (50 min.)   Leanne Reddy PA-C   [facility]    3:00 PM   (60 min.)    ONCOLOGY INFUSION   [facility] 17       18     19     20     21     22     [facility] DRAW   10:15 AM   (15 min.)   [facility]   [facility]     [facility] RETURN   10:35 AM   (50 min.)   Leanne Reddy PA-C   [facility]     [facility] ONC INFUSION 60   11:30 AM   (60 min.)    ONCOLOGY INFUSION   [facility] 23     24        25     26     27     28     29     30     31 April 2018 Sunday Monday Tuesday Wednesday Thursday Friday Saturday   1     2     3     4     5     6     7       8     9     10     11     12     13     14       15     16     17     18     19     20     21       22     23     24     25     26     27     28       29     30                                          Recent Results (from the past 24 hour(s))   CBC with platelets differential    Collection Time: 03/08/18  9:04 AM   Result Value Ref Range    WBC 3.5 (L) 4.0 - 11.0 10e9/L    RBC Count 2.38 (L) 4.4 - 5.9 10e12/L    Hemoglobin 7.5 (L) 13.3 - 17.7 g/dL    Hematocrit 24.0 (L) 40.0 - 53.0 %     (H) 78 - 100 fl    MCH 31.5 26.5 - 33.0 pg    MCHC 31.3 (L) 31.5 - 36.5 g/dL    RDW 20.3 (H) 10.0 - 15.0 %    Platelet Count 51 (L) 150 - 450 10e9/L    Diff Method Automated Method     % Neutrophils 85.5 %    % Lymphocytes 2.3 %    % Monocytes 11.0 %    % Eosinophils 0.3 %    % Basophils 0.3 %    % Immature Granulocytes 0.6 %    Nucleated RBCs 0 0 /100    Absolute Neutrophil 3.0 1.6 - 8.3 10e9/L    Absolute Lymphocytes 0.1 (L) 0.8 - 5.3 10e9/L    Absolute Monocytes 0.4 0.0 - 1.3 10e9/L    Absolute Eosinophils 0.0 0.0 - 0.7 10e9/L    Absolute Basophils 0.0 0.0 - 0.2 10e9/L    Abs Immature Granulocytes 0.0 0 - 0.4 10e9/L    Absolute Nucleated RBC 0.0    Comprehensive metabolic panel    Collection Time: 03/08/18  9:04 AM   Result Value Ref Range    Sodium 139 133 - 144 mmol/L    Potassium 3.7 3.4 - 5.3 mmol/L    Chloride 107 94 - 109 mmol/L    Carbon Dioxide 22 20 - 32 mmol/L    Anion Gap 11 3 - 14 mmol/L    Glucose 114 (H) 70 - 99 mg/dL    Urea Nitrogen 37 (H) 7 - 30 mg/dL    Creatinine 1.60 (H) 0.66 - 1.25 mg/dL    GFR Estimate 42 (L) >60 mL/min/1.7m2    GFR Estimate If Black 51 (L) >60 mL/min/1.7m2    Calcium 8.4 (L) 8.5 - 10.1 mg/dL    Bilirubin Total 0.3 0.2 - 1.3 mg/dL    Albumin 2.7 (L) 3.4 - 5.0 g/dL    Protein Total 8.5 6.8 - 8.8 g/dL     Alkaline Phosphatase 41 40 - 150 U/L    ALT 12 0 - 70 U/L    AST 8 0 - 45 U/L   ABO/Rh type and screen    Collection Time: 03/08/18  9:04 AM   Result Value Ref Range    ABO PENDING     Antibody Screen PENDING     Test Valid Only At          [facility]    Specimen Expires 03/11/2018

## 2018-03-08 NOTE — PROGRESS NOTES
Infusion Nursing Note:    Patient presents today for Cycle 5 Day 8 Cytoxan, 2 units packed red blood cells.  Arrived with spouse.  Patient met with Leanne AMARO prior to infusion.    Lab Results   Component Value Date    HGB 7.5 03/08/2018     Lab Results   Component Value Date    WBC 3.5 03/08/2018      Lab Results   Component Value Date    ANEU 3.0 03/08/2018     Lab Results   Component Value Date    PLT 51 03/08/2018      Lab Results   Component Value Date     03/08/2018                   Lab Results   Component Value Date    POTASSIUM 3.7 03/08/2018           Lab Results   Component Value Date    MAG 1.7 01/15/2018            Lab Results   Component Value Date    CR 1.60 03/08/2018                   Lab Results   Component Value Date    JAZMIN 8.4 03/08/2018                Lab Results   Component Value Date    BILITOTAL 0.3 03/08/2018           Lab Results   Component Value Date    ALBUMIN 2.7 03/08/2018                    Lab Results   Component Value Date    ALT 12 03/08/2018           Lab Results   Component Value Date    AST 8 03/08/2018       Results reviewed, labs MET treatment parameters, ok to proceed with treatment.  Blood transfusion consent signed 7/11/17.    Note: 3/8/18 0950 TORB: No extra IVF needed for creatinine today.  Give 2 units RBC for hgb of 7.5. Leanne AMARO/Malina Melgoza RN    Intravenous Access:  Implanted Port.    Post Infusion Assessment:  Patient tolerated infusion without incident.  Blood return noted pre and post infusion.  Access discontinued per protocol.    Discharge Plan:   Patient declined prescription refills.  Copy of AVS reviewed with patient and/or family.  Patient will return 3/16 for next appointment.  Patient discharged in stable condition accompanied by: wife.  Departure Mode: Ambulatory.  Face to Face time: 0.

## 2018-03-08 NOTE — PROGRESS NOTES
HEMATOLOGY/ONCOLOGY PROGRESS NOTE  Mar 8, 2018    REASON FOR VISIT: myeloma    Diagnosis: 72 year old gentleman with IgM lambda multiple myeloma originally diagnosed in 01/2012 as stage I, standard risk disease.   Treatment: Revlimid plus dexamethasone for 4-5 cycles but plateaued by late 03/2012.   - Velcade was added and he received another 2-3 cycles, achieving a good partial remission.      Transplant: Single auto after melphalan 200 mg/m2 preparative regimen on 01/09/2013  - Post-transplant course: unremarkable except for some mild steroid-induced hyperglycemia, gastritis, nausea and vomiting.      Maintenance: Lenalidomide at day-100 then developed a maculopapular rash. Lenalidomide was held and then we re-challenged him after about a month to 2 months at a lower dose (5 mg daily); rash returned.   - was started on maintenance Velcade, every other week through July 2014, when he was noted with abnormalities on myeloma studies drawn there. Noted with prostate cancer dx at about the time of relapse (see below).     Relapse: noted with return on M-spike in blood/urine with marrow involvement but negative PET.   - Started on retreatment with RVD on 8/27/14 with Revlimid at 15 mg 14 days of 21 days, dexamethasone 40 mg weekly and velcade weekly.Completed at total of 5 cycles without complication by 12/2014.   - Started Cycle 6 on inc'd Rev dosing of 20mg daily x 2 weeks on 12/11/14 which was complicated by pneumonia.  - Adm 12/21-1/10 for human metapneumovirus pneumonia complicated by anorexia, HTN, depression, anorexia with significant weight loss.   - Restarted Ernesto/Dex only on 2/4/15 with good tolerance but with thrombocytopenia.   - Bendamustine added to Velcade/dexamethasone on 5/21/15 due to disease progression  - Velcade discontinued on 7/9/2015 due to side effects (orthostasis)  - Schedule changed to bendamustine 80 mg/m2 days 1 and 2 on 28-day cycle  - Cycle 1 tolerated poorly due to lightheadedness and  weakness  - Cycle 2 dose reduced to 60 mg/m2 and pre-meds adjusted  - Cycle 5 received on 9/17/15.  - 10/1/2015 - increasing IgM, M-spike - started Pomalidomide 4mg/day (21 days out of 28 days) and weekly Decadron 20mg on Oct 1st, 2015.    - Carfilzomib added on 10/22/2015 making this CPD.  - C3-C6  received in [place]    - Returned to [facility] and resumed CPD here    - Adm: 4/12-4/14 with fever, confusion, neutropenia. Noted on MRI to have acute/subacute CVA and subacute/chronic CVA; started on Plavix, given brief course of Abx and GCSF prior to d/c.     - Continued on Carfilzomib and dexamethasone alone  - Pomalyst added back on 7/13/2016 for rising FLC  - Start daratumumab 11/10/16. Changed to every other week after 4 weekly treatments d/t profound fatigue and malaise.   - Adm 5/7-5/16 due to AMS, hypercalcemia, hyperuricemia, possible PNA, back pain, and JOSE MARIA. Started Kyprolis while inpatient.  - Re-admitted 5/25-/529 with recurrent AMS, found to have concomitant PNA  - Resumed Kyprolis 6/13/2017. Stopped due to worsening performance status and progressive myeloma.  - Started Venclexta 800 mg 8/25/2017  - Added weekly Velcade with dexamethasone 20 mg weekly due to rising light chains on 11/1/2017  - Started weekly Cytoxan with prednisone QOD on 12/13 (though started the prednisone around 12/24)      INTERVAL HISTORY:    Anthony presents today with his wife.  Yamil continues to do really well. He has been more active. Fatigue a little more pronounced in the last week but nothing he has been too bothered by. He swept his garage the other day and yesterday went out looking at town homes. He has an occasional cough once in awhile, left over from the URI he had a few weeks ago. He had no SOB or chest pain. He has no pain. Remains without diarrhea. No nausea. No bleedingor swelling. No other concerns today.  Remainder of 10-pt ROS otherwise is negative.    PHYSICAL EXAMINATION  BP 96/63 (BP Location: Right arm, Cuff Size:  "Adult Regular)  Pulse 80  Temp 98.1  F (36.7  C) (Oral)  Resp 16  Ht 1.626 m (5' 4.02\")  Wt 52.2 kg (115 lb 1.6 oz)  SpO2 97%  BMI 19.75 kg/m2     Wt Readings from Last 10 Encounters:   18 52.2 kg (115 lb 1.6 oz)   18 52.4 kg (115 lb 8 oz)   18 52.3 kg (115 lb 4.8 oz)   02/15/18 52.6 kg (116 lb)   18 52.8 kg (116 lb 4.8 oz)   18 51.2 kg (112 lb 12.8 oz)   18 51.4 kg (113 lb 4.8 oz)   18 50.7 kg (111 lb 11.2 oz)   18 50.2 kg (110 lb 11.2 oz)   18 50.3 kg (110 lb 12.8 oz)     General: Alert, frail. Oriented to name, , location,  Able to ambulate independently, albeit slow and cautiously.  No acute distress. HEENT: PERRL, no palor or icterus. CVS: RRR CHEST: CTAB, normal work of breathing, right port without erythema, swelling or pus.  ABDOMEN: soft non tender no masses     NEURO: AAOX3  CN 2-12 intact  SKIN: no bleeding or brusiing, no rashes EXTREMITIES: No edema.     LABS:   Results for WILLIAN ARCINIEGA (MRN [id number]) as of 3/8/2018 10:42   Ref. Range 3/8/2018 09:04   Sodium Latest Ref Range: 133 - 144 mmol/L 139   Potassium Latest Ref Range: 3.4 - 5.3 mmol/L 3.7   Chloride Latest Ref Range: 94 - 109 mmol/L 107   Carbon Dioxide Latest Ref Range: 20 - 32 mmol/L 22   Urea Nitrogen Latest Ref Range: 7 - 30 mg/dL 37 (H)   Creatinine Latest Ref Range: 0.66 - 1.25 mg/dL 1.60 (H)   GFR Estimate Latest Ref Range: >60 mL/min/1.7m2 42 (L)   GFR Estimate If Black Latest Ref Range: >60 mL/min/1.7m2 51 (L)   Calcium Latest Ref Range: 8.5 - 10.1 mg/dL 8.4 (L)   Anion Gap Latest Ref Range: 3 - 14 mmol/L 11   Albumin Latest Ref Range: 3.4 - 5.0 g/dL 2.7 (L)   Protein Total Latest Ref Range: 6.8 - 8.8 g/dL 8.5   Bilirubin Total Latest Ref Range: 0.2 - 1.3 mg/dL 0.3   Alkaline Phosphatase Latest Ref Range: 40 - 150 U/L 41   ALT Latest Ref Range: 0 - 70 U/L 12   AST Latest Ref Range: 0 - 45 U/L 8   Glucose Latest Ref Range: 70 - 99 mg/dL 114 (H)   WBC Latest Ref " Range: 4.0 - 11.0 10e9/L 3.5 (L)   Hemoglobin Latest Ref Range: 13.3 - 17.7 g/dL 7.5 (L)   Hematocrit Latest Ref Range: 40.0 - 53.0 % 24.0 (L)   Platelet Count Latest Ref Range: 150 - 450 10e9/L 51 (L)   Results for WILLIAN ARCINIEGA (MRN [id number]) as of 3/8/2018 10:42   Ref. Range 11/30/2017 11:58 1/2/2018 15:30 1/25/2018 15:00 2/22/2018 09:45   Albumin Fraction Latest Ref Range: 3.7 - 5.1 g/dL 3.4 (L) 3.1 (L) 3.3 (L) 3.2 (L)   Alpha 1 Fraction Latest Ref Range: 0.2 - 0.4 g/dL 0.4 0.4 0.5 (H) 0.4   Alpha 2 Fraction Latest Ref Range: 0.5 - 0.9 g/dL 1.2 (H) 1.2 (H) 1.3 (H) 1.1 (H)   Beta Fraction Latest Ref Range: 0.6 - 1.0 g/dL 0.7 0.7 0.7 0.7   ELP Interpretation: Unknown Monoclonal protei... Monoclonal protei... Large monoclonal ... Large monoclonal ...   Gamma Fraction Latest Ref Range: 0.7 - 1.6 g/dL 2.5 (H) 4.9 (H) 4.8 (H) 3.6 (H)   Kappa Free Lt Chain Latest Ref Range: 0.33 - 1.94 mg/dL <0.30 (L) <0.30 (L) <0.30 (L) <0.30 (L)   Kappa Lambda Ratio Latest Ref Range: 0.26 - 1.65  Unable to Calculate Unable to Calculate Unable to Calculate Unable to Calculate   Lambda Free Lt Chain Latest Ref Range: 0.57 - 2.63 mg/dL 137.50 (H) 257.00 (H) 339.00 (H) 345.00 (H)   Monoclonal Peak Latest Ref Range: 0.0 g/dL 2.3 (H) 4.6 (H) 4.6 (H) 3.4 (H)     Results for WILLIAN ARCINIEGA (MRN [id number]) as of 3/8/2018 09:37   Ref. Range 2/15/2018 12:08 2/22/2018 09:45 2/28/2018 15:40 2/28/2018 15:40 3/8/2018 09:04   Creatinine Latest Ref Range: 0.66 - 1.25 mg/dL 1.65 (H) 2.25 (H) 1.96 (H)  1.60 (H)     IMPRESSION/PLAN:  72 year old male with relapsed MM after autologous transplant (1/9/2013), status-post multiple salvage regimens with progressive disease.    MM: With rising light chains, worsening renal function, and worsening TCP, started weekly cytoxan with prednisone 50  mg QOD on 12/14. Initially, he continued to decline clinically to the point where hospice was discussed at multiple visits.  Tye now  improved, light chains stable. Clinically, Yamil has improved substantially on this regimen. Yamil remains doing well today. Plan to continue with Pred/Cytoxan. He met with Dr. Coe, but is now going to follow with Dr. Fatima per Dr. Coe's note. Message sent to Dr. Fatima.  - Dr. Topete planned to add Ninlaro 3 mg days 1,8,15 q 28 day cycle to this regimen, but this is working on approval. At this point, in light of the stable MM labs and the significant improvements, will hold off starting. If light chains increased today, adding Ninalaro would be appropriate.  - Continue weekly follow-up for now  - Yamil inquired abuot going to [place] for 1-2 weeks. I think this will depend on the light chains, transfusion needs, though he does have an oncologist there.  - Due for Zometa in April, q3m    Goals: We have had numerous discussions re: goals and continuing treatment. For now, Yamil feels that he is tolerating this regimen well and we will plan to continue these discussion.     Heme: Cytopenias 2/2 treatment and likely MM, requiring intermittent pRBC. Hemoglobin 7.5 today, symptomatic with only increased fatigue, will plan for pRBC today or tomorrow.   - Previously on Plavix, remains on hold given thrombocytopenia, indefinitely  - Transfuse to keep hemoglobin > 8, platelets > 10k.     Renal/FEN: Worsening renal function in December-Jan (Cr max 3.40 on 1/13) in setting of progressive myeloma. This remains improved/stable.  -Encouraged protein/calorie supplements.   -Would recommend continuing Zyprexa at night to help with appetite.    ID: Afebrile. No localizing infectious /s.  - Continues ppx  mg BID     Back/rib pain: Started early May. Lumbar MRI at OSH showing mild-mod central and foraminal stenoses in lumbar spine, per patient and wife, there was no MM lesion there.  Recent imaging with no acute findings. Pain remains spontaneously resolved.    Fatigue: Stable today.       CV: Continue zocor and norvasc.   -  Avoid QTc prolonging agents (history of QTc prolongation with syncopal episodes)     AMS: AMS started early May in setting of metabolic derangements, uremia, and possible infection.  Remains intermittently confused over the past few months but improved significantly and stable today. I do believe there is perhaps some ongoing cognitive decline since the AMS episodes.  - Continue Zyprexa 5 mg for appetite  - Holding off long-acting pain meds      Plan summary:  - Continue Cytoxan/Prednisone  - pRBC today or tomorrow  - Continue weekly follow-up for now  - Msg sent to [facility]

## 2018-03-08 NOTE — MR AVS SNAPSHOT
After Visit Summary   3/8/2018    Anthony Carbone    MRN: [id number]           Patient Information     Date Of Birth          1945        Visit Information        Provider Department      3/8/2018 10:00 AM  25 ATC;  ONCOLOGY INFUSION [facility]        Today's Diagnoses     Multiple myeloma in relapse (H)    -  1    Multiple myeloma, remission status unspecified (H)          Care Instructions    Contact Numbers    [facility] [place]: [phone]  [facility] [place]:  [phone]    Call triage with chills and/or temperature greater than or equal to 100.5, uncontrolled nausea/vomiting, diarrhea, constipation, dizziness, shortness of breath, chest pain, bleeding, unexplained bruising, or any new/concerning symptoms, questions/concerns.     If you are having any concerning symptoms or wish to speak to a provider before your next infusion visit, please call your care coordinator or triage to notify them so we can adequately serve you.       After Hours: [phone]    If after hours, weekends, or holidays, call main hospital  and ask for Oncology doctor on call.           March 2018 Sunday Monday Tuesday Wednesday Thursday Friday Saturday                       1     P ONC INFUSION 120   10:00 AM   (120 min.)    ONCOLOGY INFUSION   [facility] 2     3       4     5     6     7     8     P [facility] DRAW    8:30 AM   (15 min.)    [facility] DRAW   [facility] Lab Draw     P RETURN    8:55 AM   (50 min.)   Leanne Reddy PA-C [facility] ONC INFUSION 60   10:00 AM   (60 min.)    ONCOLOGY INFUSION   [facility] 9     10       11     12     13     14     15     16     [facility]    2:00 PM   (15 min.)    [facility] DRAW   [facility] Draw     [facility] RETURN    2:15 PM   (50 min.)   Leanne Reddy PA-C   formerly [facility] ONC  INFUSION 60    3:00 PM   (60 min.)    ONCOLOGY INFUSION   [facility] 17       18     19     20     21     22     [facility] DRAW   10:15 AM   (15 min.)   [facility]   [facility] Lab Draw     [facility] RETURN   10:35 AM   (50 min.)   Leanne Reddy PA-C   [facility] ONC INFUSION 60   11:30 AM   (60 min.)    ONCOLOGY INFUSION   [facility] 23     24       25     26     27     28     29     30     31 April 2018 Sunday Monday Tuesday Wednesday Thursday Friday Saturday   1     2     3     4     5     6     7       8     9     10     11     12     13     14       15     16     17     18     19     20     21       22     23     24     25     26     27     28       29     30                                          Recent Results (from the past 24 hour(s))   CBC with platelets differential    Collection Time: 03/08/18  9:04 AM   Result Value Ref Range    WBC 3.5 (L) 4.0 - 11.0 10e9/L    RBC Count 2.38 (L) 4.4 - 5.9 10e12/L    Hemoglobin 7.5 (L) 13.3 - 17.7 g/dL    Hematocrit 24.0 (L) 40.0 - 53.0 %     (H) 78 - 100 fl    MCH 31.5 26.5 - 33.0 pg    MCHC 31.3 (L) 31.5 - 36.5 g/dL    RDW 20.3 (H) 10.0 - 15.0 %    Platelet Count 51 (L) 150 - 450 10e9/L    Diff Method Automated Method     % Neutrophils 85.5 %    % Lymphocytes 2.3 %    % Monocytes 11.0 %    % Eosinophils 0.3 %    % Basophils 0.3 %    % Immature Granulocytes 0.6 %    Nucleated RBCs 0 0 /100    Absolute Neutrophil 3.0 1.6 - 8.3 10e9/L    Absolute Lymphocytes 0.1 (L) 0.8 - 5.3 10e9/L    Absolute Monocytes 0.4 0.0 - 1.3 10e9/L    Absolute Eosinophils 0.0 0.0 - 0.7 10e9/L    Absolute Basophils 0.0 0.0 - 0.2 10e9/L    Abs Immature Granulocytes 0.0 0 - 0.4 10e9/L    Absolute Nucleated RBC 0.0    Comprehensive metabolic panel    Collection Time: 03/08/18  9:04 AM   Result Value Ref Range    Sodium 139 133 - 144 mmol/L    Potassium 3.7 3.4 - 5.3 mmol/L     Chloride 107 94 - 109 mmol/L    Carbon Dioxide 22 20 - 32 mmol/L    Anion Gap 11 3 - 14 mmol/L    Glucose 114 (H) 70 - 99 mg/dL    Urea Nitrogen 37 (H) 7 - 30 mg/dL    Creatinine 1.60 (H) 0.66 - 1.25 mg/dL    GFR Estimate 42 (L) >60 mL/min/1.7m2    GFR Estimate If Black 51 (L) >60 mL/min/1.7m2    Calcium 8.4 (L) 8.5 - 10.1 mg/dL    Bilirubin Total 0.3 0.2 - 1.3 mg/dL    Albumin 2.7 (L) 3.4 - 5.0 g/dL    Protein Total 8.5 6.8 - 8.8 g/dL    Alkaline Phosphatase 41 40 - 150 U/L    ALT 12 0 - 70 U/L    AST 8 0 - 45 U/L   ABO/Rh type and screen    Collection Time: 03/08/18  9:04 AM   Result Value Ref Range    ABO PENDING     Antibody Screen PENDING     Test Valid Only At          [facility]    Specimen Expires 03/11/2018                Follow-ups after your visit        Your next 10 appointments already scheduled     Mar 16, 2018  2:00 PM CDT   [facility] with  [facility]   [facility] ([facility])    [address] [place] [address]  [address]  [facility] [address]   [phone]            Mar 16, 2018  2:30 PM CDT   (Arrive by 2:15 PM)   Return Visit with Leanne Reddy PA-C   [facility] ([facility])    [address] [place] [address]  [address]  [facility] [address]   [phone]            Mar 16, 2018  3:00 PM CDT   Infusion 60 with UC ONCOLOGY INFUSION, UC 10 ATC   [facility] ([facility])    [address] [place] [address]  [address]  [facility] [address]   [phone]            Mar 22, 2018 10:15 AM CDT   [facility] with [facility] DRAW   [facility] ([facility])    [address] [place] [address]  [address]  [facility] [address]   [phone]            Mar 22, 2018 10:50 AM CDT   (Arrive by 10:35 AM)   Return Visit with ALPHONSO Coffey [facility] ([facility])    [address]  [address]  [facility] [id number] [phone]            Mar 22, 2018 11:30 AM CDT   Infusion 60 with UC ONCOLOGY INFUSION, UC 24 ATC   [facility] ([facility])    [address]  [address]  [facility] [id number] [phone]              Future tests that were ordered for you today     Open Standing Orders        Priority Remaining Interval Expires Ordered    Transfuse red blood cell unit Routine 1/2 TRANSFUSE 2 UNITS  3/8/2018    Red blood cell prepare order unit Routine 99/100 CONDITIONAL (SPECIFY) BLOOD  3/8/2018            Who to contact     If you have questions or need follow up information about today's clinic visit or your schedule please contact [facility] directly at [phone].  Normal or non-critical lab and imaging results will be communicated to you by [employer], letter or phone within 4 business days after the clinic has received the results. If you do not hear from us within 7 days, please contact the clinic through [employer] or phone. If you have a critical or abnormal lab result, we will notify you by phone as soon as possible.  Submit refill requests through [employer] or call your pharmacy and they will forward the refill request to us. Please allow 3 business days for your refill to be completed.          Additional Information About Your Visit        MyChart Information     [employer] gives you secure access to your electronic health record. If you see a primary care provider, you can also send messages to your care team and make appointments. If you have questions, please call your primary care clinic.  If you do not have a primary care provider, please call [phone] and they will assist you.        Care EveryWhere ID     This is your Care EveryWhere ID. This could be used by other organizations to access your [place] medical records  [id number]          Blood Pressure from Last 3 Encounters:   03/08/18 96/63   03/01/18 104/71   02/28/18 133/83    Weight from Last 3 Encounters:   03/08/18 52.2 kg (115 lb 1.6 oz)   02/28/18 52.4 kg (115 lb 8 oz)   02/22/18 52.3 kg (115 lb 4.8 oz)              We Performed the Following     CBC with platelets differential     Comprehensive metabolic panel        Primary Care Provider Office Phone # Fax #    Sanket Burciaga [phone]       [facility] [address] [address]        Equal Access to Services     [facility] HospitalWILFRED : Hadii chalo Davis, conrad ge, kylah abdi, jonny jurado . So [facility] [phone].    ATENCIÓN: Si habla español, tiene a todd disposición servicios gratuitos de asistencia lingüística. Llame al [phone].    We comply with applicable federal civil rights laws and [place] laws. We do not discriminate on the basis of race, color, national origin, age, disability, sex, sexual orientation, or gender identity.            Thank you!     Thank you for choosing [facility] CL[facility]  for your care. Our goal is always to provide you with excellent care. Hearing back from our patients is one way we can continue to improve our services. Please take a few minutes to complete the written survey that you may receive in the mail after your visit with us. Thank you!             Your Updated Medication List - Protect others around you: Learn how to safely use, store and throw away your medicines at www.disposemymeds.org.          This list is accurate as of 3/8/18 11:43 AM.  Always use your most recent med list.                   Brand Name Dispense Instructions for use Diagnosis    acetaminophen 325 MG tablet    TYLENOL    100 tablet    Take 2 tablets (650 mg) by mouth every 4 hours as needed for mild pain or fever    Fever, unspecified fever cause       acyclovir 400 MG tablet    ZOVIRAX    60 tablet    Take 1  tablet (400 mg) by mouth 2 times daily    Multiple myeloma in relapse (H)       amLODIPine 5 MG tablet    NORVASC    90 tablet    TAKE 1 TABLET BY MOUTH AT BEDTIME    Essential hypertension       esomeprazole 40 MG CR capsule    nexIUM    30 capsule    Take 1 capsule (40 mg) by mouth every morning (before breakfast)    Gastroesophageal reflux disease without esophagitis       ixazomib 3 MG capsule    NINLARO    4 capsule    Take 1 capsule (3 mg) by mouth every 7 days    Multiple myeloma in relapse (H), Multiple myeloma, remission status unspecified (H)       LORazepam 0.5 MG tablet    ATIVAN    30 tablet    Take 1 tablet (0.5 mg) by mouth every 6 hours as needed for nausea or anxiety.    Multiple myeloma in relapse (H), Delirium       OLANZapine 5 MG tablet    zyPREXA    60 tablet    Take by mouth At Bedtime Takes 1 tablet    Delirium, Multiple myeloma in relapse (H)       ondansetron 8 MG ODT tab    ZOFRAN-ODT    30 tablet    Take 1 tablet (8 mg) by mouth every 8 hours as needed for nausea    Nausea       oxyCODONE IR 5 MG tablet    ROXICODONE    15 tablet    Take 1 tablet (5 mg) by mouth every 6 hours as needed for moderate to severe pain        predniSONE 50 MG tablet    DELTASONE     Take 1 tablet (50 mg) by mouth every other day    Multiple myeloma, remission status unspecified (H)       SIMVASTATIN PO      Take 20 mg by mouth At Bedtime        traMADol 50 MG tablet    ULTRAM    60 tablet    Take 1 tablet (50 mg) by mouth every 6 hours as needed for moderate pain . Recommend trying after Tylenol and before Dilaudid.    Acute bilateral low back pain without sciatica, Multiple myeloma in relapse (H)

## 2018-03-08 NOTE — LETTER
3/8/2018      RE: Anthony Carbone  [address] [place] [address]       HEMATOLOGY/ONCOLOGY PROGRESS NOTE  Mar 8, 2018    REASON FOR VISIT: myeloma    Diagnosis: 72 year old gentleman with IgM lambda multiple myeloma originally diagnosed in 01/2012 as stage I, standard risk disease.   Treatment: Revlimid plus dexamethasone for 4-5 cycles but plateaued by late 03/2012.   - Velcade was added and he received another 2-3 cycles, achieving a good partial remission.      Transplant: Single auto after melphalan 200 mg/m2 preparative regimen on 01/09/2013  - Post-transplant course: unremarkable except for some mild steroid-induced hyperglycemia, gastritis, nausea and vomiting.      Maintenance: Lenalidomide at day-100 then developed a maculopapular rash. Lenalidomide was held and then we re-challenged him after about a month to 2 months at a lower dose (5 mg daily); rash returned.   - was started on maintenance Velcade, every other week through July 2014, when he was noted with abnormalities on myeloma studies drawn there. Noted with prostate cancer dx at about the time of relapse (see below).     Relapse: noted with return on M-spike in blood/urine with marrow involvement but negative PET.   - Started on retreatment with RVD on 8/27/14 with Revlimid at 15 mg 14 days of 21 days, dexamethasone 40 mg weekly and velcade weekly.Completed at total of 5 cycles without complication by 12/2014.   - Started Cycle 6 on inc'd Rev dosing of 20mg daily x 2 weeks on 12/11/14 which was complicated by pneumonia.  - Adm 12/21-1/10 for human metapneumovirus pneumonia complicated by anorexia, HTN, depression, anorexia with significant weight loss.   - Restarted Ernesto/Dex only on 2/4/15 with good tolerance but with thrombocytopenia.   - Bendamustine added to Velcade/dexamethasone on 5/21/15 due to disease progression  - Velcade discontinued on 7/9/2015 due to side effects (orthostasis)  - Schedule changed to bendamustine 80 mg/m2  days 1 and 2 on 28-day cycle  - Cycle 1 tolerated poorly due to lightheadedness and weakness  - Cycle 2 dose reduced to 60 mg/m2 and pre-meds adjusted  - Cycle 5 received on 9/17/15.  - 10/1/2015 - increasing IgM, M-spike - started Pomalidomide 4mg/day (21 days out of 28 days) and weekly Decadron 20mg on Oct 1st, 2015.    - Carfilzomib added on 10/22/2015 making this CPD.  - C3-C6  received in [place]    - Returned to [facility] and resumed CPD here    - Adm: 4/12-4/14 with fever, confusion, neutropenia. Noted on MRI to have acute/subacute CVA and subacute/chronic CVA; started on Plavix, given brief course of Abx and GCSF prior to d/c.     - Continued on Carfilzomib and dexamethasone alone  - Pomalyst added back on 7/13/2016 for rising FLC  - Start daratumumab 11/10/16. Changed to every other week after 4 weekly treatments d/t profound fatigue and malaise.   - Adm 5/7-5/16 due to AMS, hypercalcemia, hyperuricemia, possible PNA, back pain, and JOSE MARIA. Started Kyprolis while inpatient.  - Re-admitted 5/25-/529 with recurrent AMS, found to have concomitant PNA  - Resumed Kyprolis 6/13/2017. Stopped due to worsening performance status and progressive myeloma.  - Started Venclexta 800 mg 8/25/2017  - Added weekly Velcade with dexamethasone 20 mg weekly due to rising light chains on 11/1/2017  - Started weekly Cytoxan with prednisone QOD on 12/13 (though started the prednisone around 12/24)      INTERVAL HISTORY:    Anthony presents today with his wife.  Yamil continues to do really well. He has been more active. Fatigue a little more pronounced in the last week but nothing he has been too bothered by. He swept his garage the other day and yesterday went out looking at town homes. He has an occasional cough once in awhile, left over from the URI he had a few weeks ago. He had no SOB or chest pain. He has no pain. Remains without diarrhea. No nausea. No bleedingor swelling. No other concerns today.  Remainder of 10-pt ROS otherwise is  "negative.    PHYSICAL EXAMINATION  BP 96/63 (BP Location: Right arm, Cuff Size: Adult Regular)  Pulse 80  Temp 98.1  F (36.7  C) (Oral)  Resp 16  Ht 1.626 m (5' 4.02\")  Wt 52.2 kg (115 lb 1.6 oz)  SpO2 97%  BMI 19.75 kg/m2     Wt Readings from Last 10 Encounters:   18 52.2 kg (115 lb 1.6 oz)   18 52.4 kg (115 lb 8 oz)   18 52.3 kg (115 lb 4.8 oz)   02/15/18 52.6 kg (116 lb)   18 52.8 kg (116 lb 4.8 oz)   18 51.2 kg (112 lb 12.8 oz)   18 51.4 kg (113 lb 4.8 oz)   18 50.7 kg (111 lb 11.2 oz)   18 50.2 kg (110 lb 11.2 oz)   18 50.3 kg (110 lb 12.8 oz)     General: Alert, frail. Oriented to name, , location,  Able to ambulate independently, albeit slow and cautiously.  No acute distress. HEENT: PERRL, no palor or icterus. CVS: RRR CHEST: CTAB, normal work of breathing, right port without erythema, swelling or pus.  ABDOMEN: soft non tender no masses     NEURO: AAOX3  CN 2-12 intact  SKIN: no bleeding or brusiing, no rashes EXTREMITIES: No edema.     LABS:   Results for WILLIAN ARCINIEGA (MRN [id number]) as of 3/8/2018 10:42   Ref. Range 3/8/2018 09:04   Sodium Latest Ref Range: 133 - 144 mmol/L 139   Potassium Latest Ref Range: 3.4 - 5.3 mmol/L 3.7   Chloride Latest Ref Range: 94 - 109 mmol/L 107   Carbon Dioxide Latest Ref Range: 20 - 32 mmol/L 22   Urea Nitrogen Latest Ref Range: 7 - 30 mg/dL 37 (H)   Creatinine Latest Ref Range: 0.66 - 1.25 mg/dL 1.60 (H)   GFR Estimate Latest Ref Range: >60 mL/min/1.7m2 42 (L)   GFR Estimate If Black Latest Ref Range: >60 mL/min/1.7m2 51 (L)   Calcium Latest Ref Range: 8.5 - 10.1 mg/dL 8.4 (L)   Anion Gap Latest Ref Range: 3 - 14 mmol/L 11   Albumin Latest Ref Range: 3.4 - 5.0 g/dL 2.7 (L)   Protein Total Latest Ref Range: 6.8 - 8.8 g/dL 8.5   Bilirubin Total Latest Ref Range: 0.2 - 1.3 mg/dL 0.3   Alkaline Phosphatase Latest Ref Range: 40 - 150 U/L 41   ALT Latest Ref Range: 0 - 70 U/L 12   AST Latest Ref Range: " 0 - 45 U/L 8   Glucose Latest Ref Range: 70 - 99 mg/dL 114 (H)   WBC Latest Ref Range: 4.0 - 11.0 10e9/L 3.5 (L)   Hemoglobin Latest Ref Range: 13.3 - 17.7 g/dL 7.5 (L)   Hematocrit Latest Ref Range: 40.0 - 53.0 % 24.0 (L)   Platelet Count Latest Ref Range: 150 - 450 10e9/L 51 (L)   Results for WILLIAN ARCINIEGA (MRN [id number]) as of 3/8/2018 10:42   Ref. Range 11/30/2017 11:58 1/2/2018 15:30 1/25/2018 15:00 2/22/2018 09:45   Albumin Fraction Latest Ref Range: 3.7 - 5.1 g/dL 3.4 (L) 3.1 (L) 3.3 (L) 3.2 (L)   Alpha 1 Fraction Latest Ref Range: 0.2 - 0.4 g/dL 0.4 0.4 0.5 (H) 0.4   Alpha 2 Fraction Latest Ref Range: 0.5 - 0.9 g/dL 1.2 (H) 1.2 (H) 1.3 (H) 1.1 (H)   Beta Fraction Latest Ref Range: 0.6 - 1.0 g/dL 0.7 0.7 0.7 0.7   ELP Interpretation: Unknown Monoclonal protei... Monoclonal protei... Large monoclonal ... Large monoclonal ...   Gamma Fraction Latest Ref Range: 0.7 - 1.6 g/dL 2.5 (H) 4.9 (H) 4.8 (H) 3.6 (H)   Kappa Free Lt Chain Latest Ref Range: 0.33 - 1.94 mg/dL <0.30 (L) <0.30 (L) <0.30 (L) <0.30 (L)   Kappa Lambda Ratio Latest Ref Range: 0.26 - 1.65  Unable to Calculate Unable to Calculate Unable to Calculate Unable to Calculate   Lambda Free Lt Chain Latest Ref Range: 0.57 - 2.63 mg/dL 137.50 (H) 257.00 (H) 339.00 (H) 345.00 (H)   Monoclonal Peak Latest Ref Range: 0.0 g/dL 2.3 (H) 4.6 (H) 4.6 (H) 3.4 (H)     Results for WILLIAN ARCINIEGA (MRN [id number]) as of 3/8/2018 09:37   Ref. Range 2/15/2018 12:08 2/22/2018 09:45 2/28/2018 15:40 2/28/2018 15:40 3/8/2018 09:04   Creatinine Latest Ref Range: 0.66 - 1.25 mg/dL 1.65 (H) 2.25 (H) 1.96 (H)  1.60 (H)     IMPRESSION/PLAN:  72 year old male with relapsed MM after autologous transplant (1/9/2013), status-post multiple salvage regimens with progressive disease.    MM: With rising light chains, worsening renal function, and worsening TCP, started weekly cytoxan with prednisone 50  mg QOD on 12/14. Initially, he continued to decline clinically  to the point where hospice was discussed at multiple visits.  Mspike now improved, light chains stable. Clinically, Yamil has improved substantially on this regimen. Yamil remains doing well today. Plan to continue with Pred/Cytoxan. He met with Dr. Coe, but is now going to follow with Dr. Ftaima per Dr. Coe's note. Message sent to Dr. Fatima.  - Dr. Topete planned to add Ninlaro 3 mg days 1,8,15 q 28 day cycle to this regimen, but this is working on approval. At this point, in light of the stable MM labs and the significant improvements, will hold off starting. If light chains increased today, adding Ninalaro would be appropriate.  - Continue weekly follow-up for now  - Yamil inquired abuot going to [place] for 1-2 weeks. I think this will depend on the light chains, transfusion needs, though he does have an oncologist there.  - Due for Zometa in April, q3m    Goals: We have had numerous discussions re: goals and continuing treatment. For now, Yamil feels that he is tolerating this regimen well and we will plan to continue these discussion.     Heme: Cytopenias 2/2 treatment and likely MM, requiring intermittent pRBC. Hemoglobin 7.5 today, symptomatic with only increased fatigue, will plan for pRBC today or tomorrow.   - Previously on Plavix, remains on hold given thrombocytopenia, indefinitely  - Transfuse to keep hemoglobin > 8, platelets > 10k.     Renal/FEN: Worsening renal function in December-Jan (Cr max 3.40 on 1/13) in setting of progressive myeloma. This remains improved/stable.  -Encouraged protein/calorie supplements.   -Would recommend continuing Zyprexa at night to help with appetite.    ID: Afebrile. No localizing infectious /s.  - Continues ppx  mg BID     Back/rib pain: Started early May. Lumbar MRI at OSH showing mild-mod central and foraminal stenoses in lumbar spine, per patient and wife, there was no MM lesion there.  Recent imaging with no acute findings. Pain remains spontaneously  resolved.    Fatigue: Stable today.       CV: Continue zocor and norvasc.   - Avoid QTc prolonging agents (history of QTc prolongation with syncopal episodes)     AMS: AMS started early May in setting of metabolic derangements, uremia, and possible infection.  Remains intermittently confused over the past few months but improved significantly and stable today. I do believe there is perhaps some ongoing cognitive decline since the AMS episodes.  - Continue Zyprexa 5 mg for appetite  - Holding off long-acting pain meds      Plan summary:  - Continue Cytoxan/Prednisone  - pRBC today or tomorrow  - Continue weekly follow-up for now  - Msg sent to Dr. Kushal Murphy PA-C

## 2018-03-16 NOTE — MR AVS SNAPSHOT
After Visit Summary   3/16/2018    Anthony Carbone    MRN: [id number]           Patient Information     Date Of Birth          1945        Visit Information        Provider Department      3/16/2018 3:00 PM UC 10 ATC;  ONCOLOGY INFUSION [facility]        Today's Diagnoses     Multiple myeloma in relapse (H)    -  1    Multiple myeloma, remission status unspecified (H)          Care Instructions    Contact Numbers    [facility] [place] Main Line: [phone]  [facility] [place] Triage:  [phone]    Call triage with chills and/or temperature greater than or equal to 100.5, uncontrolled nausea/vomiting, diarrhea, constipation, dizziness, shortness of breath, chest pain, bleeding, unexplained bruising, or any new/concerning symptoms, questions/concerns.     If you are having any concerning symptoms or wish to speak to a provider before your next infusion visit, please call your care coordinator or triage to notify them so we can adequately serve you.       After Hours: [phone]    If after hours, weekends, or holidays, call main hospital  and ask for Oncology doctor on call.           March 2018 Sunday Monday Tuesday Wednesday Thursday Friday Saturday                       1     P ONC INFUSION 120   10:00 AM   (120 min.)    ONCOLOGY INFUSION   [facility] 2     3       4     5     6     7     8     P [facility] DRAW    8:30 AM   (15 min.)    [facility] LAB DRAW   [facility] Lab Draw     P RETURN    8:55 AM   (50 min.)   Leanne Reddy PA-C [facility] ONC INFUSION 60   10:00 AM   (60 min.)    ONCOLOGY INFUSION   [facility] 9     10       11     12     13     14     15     16     [facility] D[facility]    2:00 PM   (15 min.)    [facility] LAB DRAW   [facility] Lab Draw     [facility] RETURN    2:15 PM   (50 min.)   Leanne Reddy PA-C   [facility] ONC  INFUSION 60    3:00 PM   (60 min.)   [facility] ONCOLOGY INFUSION   [facility] 17       18     19     20     21     22     [facility] LAB DRAW   10:15 AM   (15 min.)    [facility]   [facility]     [facility] RETURN   10:35 AM   (50 min.)   Leanne Reddy PA-C   [facility] ONC INFUSION 60   11:30 AM   (60 min.)    ONCOLOGY INFUSION   [facility] 23     24       25     26     27     28     29     30     31 April 2018 Sunday Monday Tuesday Wednesday Thursday Friday Saturday   1     2     3     4     5     6     7       8     9     10     11     12     13     14       15     16     17     [facility] LAB DRAW    9:30 AM   (15 min.)    [facility] LAB DRAW   [facility] Lab Draw     [facility] ONC RETURN   10:00 AM   (30 min.)   Navjot Fatima MD   [facility] Marrow Transplant 18     19     20     21       22     23     24     25     26     27     28       29     30                                          Recent Results (from the past 24 hour(s))   CBC with platelets differential    Collection Time: 03/16/18  2:08 PM   Result Value Ref Range    WBC 3.5 (L) 4.0 - 11.0 10e9/L    RBC Count 3.35 (L) 4.4 - 5.9 10e12/L    Hemoglobin 10.7 (L) 13.3 - 17.7 g/dL    Hematocrit 33.0 (L) 40.0 - 53.0 %    MCV 99 78 - 100 fl    MCH 31.9 26.5 - 33.0 pg    MCHC 32.4 31.5 - 36.5 g/dL    RDW 18.7 (H) 10.0 - 15.0 %    Platelet Count 49 (LL) 150 - 450 10e9/L    Diff Method Automated Method     % Neutrophils 84.6 %    % Lymphocytes 2.3 %    % Monocytes 11.6 %    % Eosinophils 0.6 %    % Basophils 0.3 %    % Immature Granulocytes 0.6 %    Nucleated RBCs 0 0 /100    Absolute Neutrophil 3.0 1.6 - 8.3 10e9/L    Absolute Lymphocytes 0.1 (L) 0.8 - 5.3 10e9/L    Absolute Monocytes 0.4 0.0 - 1.3 10e9/L    Absolute Eosinophils 0.0 0.0 - 0.7 10e9/L    Absolute Basophils 0.0 0.0 - 0.2 10e9/L    Abs Immature Granulocytes 0.0 0 - 0.4 10e9/L     Absolute Nucleated RBC 0.0    Comprehensive metabolic panel    Collection Time: 03/16/18  2:08 PM   Result Value Ref Range    Sodium 137 133 - 144 mmol/L    Potassium 3.5 3.4 - 5.3 mmol/L    Chloride 105 94 - 109 mmol/L    Carbon Dioxide 25 20 - 32 mmol/L    Anion Gap 7 3 - 14 mmol/L    Glucose 168 (H) 70 - 99 mg/dL    Urea Nitrogen 34 (H) 7 - 30 mg/dL    Creatinine 1.65 (H) 0.66 - 1.25 mg/dL    GFR Estimate 41 (L) >60 mL/min/1.7m2    GFR Estimate If Black 50 (L) >60 mL/min/1.7m2    Calcium 8.5 8.5 - 10.1 mg/dL    Bilirubin Total 0.4 0.2 - 1.3 mg/dL    Albumin 2.7 (L) 3.4 - 5.0 g/dL    Protein Total 8.4 6.8 - 8.8 g/dL    Alkaline Phosphatase 51 40 - 150 U/L    ALT 15 0 - 70 U/L    AST 7 0 - 45 U/L                 Follow-ups after your visit        Your next 10 appointments already scheduled     Mar 22, 2018 10:15 AM CDT   [facility] Draw with  [facility] DRAW   [place] [facility] ([facility])    [address] [place] [address]  [address]  [facility] [address] [phone]            Mar 22, 2018 10:50 AM CDT   (Arrive by 10:35 AM)   Return Visit with Leanne Reddy PA-C   [facility] ([facility])    [address] [place] [address]  [address]  [facility] [address]   [phone]            Mar 22, 2018 11:30 AM CDT   Infusion 60 with UC ONCOLOGY INFUSION, UC 24 ATC   [facility] ([facility])    [address] [place] [address]  [address]  [facility] [address]   [phone]            Apr 17, 2018  9:30 AM CDT   [facility] Draw with UC [facility] LAB DRAW   [place] [facility] Draw ([facility])    [address] [place] [address]  [address]  [facility] [address]   [phone]            Apr 17, 2018 10:00 AM CDT   RETURN ONC with Navjot Fatima MD   [place] [facility] ([facility])    [address]  [address]  [facility]  [address] [phone]              Who to contact     If you have questions or need follow up information about today's clinic visit or your schedule please contact [facility] directly at [phone].  Normal or non-critical lab and imaging results will be communicated to you by MyChart, letter or phone within 4 business days after the clinic has received the results. If you do not hear from us within 7 days, please contact the clinic through [employer] or phone. If you have a critical or abnormal lab result, we will notify you by phone as soon as possible.  Submit refill requests through [employer] or call your pharmacy and they will forward the refill request to us. Please allow 3 business days for your refill to be completed.          Additional Information About Your Visit        [email or web address] Information     [employer] gives you secure access to your electronic health record. If you see a primary care provider, you can also send messages to your care team and make appointments. If you have questions, please call your primary care clinic.  If you do not have a primary care provider, please call [phone] and they will assist you.        Care EveryWhere ID     This is your Care EveryWhere ID. This could be used by other organizations to access your [place] medical records  [id number]         Blood Pressure from Last 3 Encounters:   03/16/18 127/79   03/08/18 158/84   03/08/18 96/63    Weight from Last 3 Encounters:   03/16/18 52.8 kg (116 lb 6.4 oz)   03/08/18 52.2 kg (115 lb 1.6 oz)   02/28/18 52.4 kg (115 lb 8 oz)              We Performed the Following     CBC with platelets differential        Primary Care Provider Office Phone # Fax #    Sanket Burciaga [phone]       [facility] [address] [address]        Equal Access to Services     ALBAN SHAH : Maribel Davis, conrda ge, jonny lisa  erika amadorclaudia albertyancy lamynorkimberly ah. So [facility] [phone].    ATENCIÓN: Si delores tao, tiene a todd disposición servicios gratuitos de asistencia lingüística. Parminder posada [phone].    We comply with applicable federal civil rights laws and [place] laws. We do not discriminate on the basis of race, color, national origin, age, disability, sex, sexual orientation, or gender identity.            Thank you!     Thank you for choosing [facility]  for your care. Our goal is always to provide you with excellent care. Hearing back from our patients is one way we can continue to improve our services. Please take a few minutes to complete the written survey that you may receive in the mail after your visit with us. Thank you!             Your Updated Medication List - Protect others around you: Learn how to safely use, store and throw away your medicines at www.disposemymeds.org.          This list is accurate as of 3/16/18  4:41 PM.  Always use your most recent med list.                   Brand Name Dispense Instructions for use Diagnosis    acetaminophen 325 MG tablet    TYLENOL    100 tablet    Take 2 tablets (650 mg) by mouth every 4 hours as needed for mild pain or fever    Fever, unspecified fever cause       acyclovir 400 MG tablet    ZOVIRAX    60 tablet    Take 1 tablet (400 mg) by mouth 2 times daily    Multiple myeloma in relapse (H)       amLODIPine 5 MG tablet    NORVASC    90 tablet    TAKE 1 TABLET BY MOUTH AT BEDTIME    Essential hypertension       esomeprazole 40 MG CR capsule    nexIUM    30 capsule    Take 1 capsule (40 mg) by mouth every morning (before breakfast)    Gastroesophageal reflux disease without esophagitis       ixazomib 3 MG capsule    NINLARO    4 capsule    Take 1 capsule (3 mg) by mouth every 7 days    Multiple myeloma in relapse (H), Multiple myeloma, remission status unspecified (H)       LORazepam 0.5 MG tablet    ATIVAN    30 tablet    Take 1 tablet (0.5 mg) by mouth  every 6 hours as needed for nausea or anxiety.    Multiple myeloma in relapse (H), Delirium       OLANZapine 5 MG tablet    zyPREXA    60 tablet    Take by mouth At Bedtime Takes 1 tablet    Delirium, Multiple myeloma in relapse (H)       ondansetron 8 MG ODT tab    ZOFRAN-ODT    30 tablet    Take 1 tablet (8 mg) by mouth every 8 hours as needed for nausea    Nausea       oxyCODONE IR 5 MG tablet    ROXICODONE    15 tablet    Take 1 tablet (5 mg) by mouth every 6 hours as needed for moderate to severe pain        predniSONE 50 MG tablet    DELTASONE     Take 1 tablet (50 mg) by mouth every other day    Multiple myeloma, remission status unspecified (H)       SIMVASTATIN PO      Take 20 mg by mouth At Bedtime        traMADol 50 MG tablet    ULTRAM    60 tablet    Take 1 tablet (50 mg) by mouth every 6 hours as needed for moderate pain . Recommend trying after Tylenol and before Dilaudid.    Acute bilateral low back pain without sciatica, Multiple myeloma in relapse (H)

## 2018-03-16 NOTE — LETTER
3/16/2018      RE: Anthony Carbone  [address] [place] [address]       HEMATOLOGY/ONCOLOGY PROGRESS NOTE  Mar 16, 2018    REASON FOR VISIT: myeloma    Diagnosis: 72 year old gentleman with IgM lambda multiple myeloma originally diagnosed in 01/2012 as stage I, standard risk disease.   Treatment: Revlimid plus dexamethasone for 4-5 cycles but plateaued by late 03/2012.   - Velcade was added and he received another 2-3 cycles, achieving a good partial remission.      Transplant: Single auto after melphalan 200 mg/m2 preparative regimen on 01/09/2013  - Post-transplant course: unremarkable except for some mild steroid-induced hyperglycemia, gastritis, nausea and vomiting.      Maintenance: Lenalidomide at day-100 then developed a maculopapular rash. Lenalidomide was held and then we re-challenged him after about a month to 2 months at a lower dose (5 mg daily); rash returned.   - was started on maintenance Velcade, every other week through July 2014, when he was noted with abnormalities on myeloma studies drawn there. Noted with prostate cancer dx at about the time of relapse (see below).     Relapse: noted with return on M-spike in blood/urine with marrow involvement but negative PET.   - Started on retreatment with RVD on 8/27/14 with Revlimid at 15 mg 14 days of 21 days, dexamethasone 40 mg weekly and velcade weekly.Completed at total of 5 cycles without complication by 12/2014.   - Started Cycle 6 on inc'd Rev dosing of 20mg daily x 2 weeks on 12/11/14 which was complicated by pneumonia.  - Adm 12/21-1/10 for human metapneumovirus pneumonia complicated by anorexia, HTN, depression, anorexia with significant weight loss.   - Restarted Ernesto/Dex only on 2/4/15 with good tolerance but with thrombocytopenia.   - Bendamustine added to Velcade/dexamethasone on 5/21/15 due to disease progression  - Velcade discontinued on 7/9/2015 due to side effects (orthostasis)  - Schedule changed to bendamustine 80 mg/m2  "days 1 and 2 on 28-day cycle  - Cycle 1 tolerated poorly due to lightheadedness and weakness  - Cycle 2 dose reduced to 60 mg/m2 and pre-meds adjusted  - Cycle 5 received on 9/17/15.  - 10/1/2015 - increasing IgM, M-spike - started Pomalidomide 4mg/day (21 days out of 28 days) and weekly Decadron 20mg on Oct 1st, 2015.    - Carfilzomib added on 10/22/2015 making this CPD.  - C3-C6  received in [place]    - Returned to [facility] and resumed CPD here    - Adm: 4/12-4/14 with fever, confusion, neutropenia. Noted on MRI to have acute/subacute CVA and subacute/chronic CVA; started on Plavix, given brief course of Abx and GCSF prior to d/c.     - Continued on Carfilzomib and dexamethasone alone  - Pomalyst added back on 7/13/2016 for rising FLC  - Start daratumumab 11/10/16. Changed to every other week after 4 weekly treatments d/t profound fatigue and malaise.   - Adm 5/7-5/16 due to AMS, hypercalcemia, hyperuricemia, possible PNA, back pain, and JOSE MARIA. Started Kyprolis while inpatient.  - Re-admitted 5/25-/529 with recurrent AMS, found to have concomitant PNA  - Resumed Kyprolis 6/13/2017. Stopped due to worsening performance status and progressive myeloma.  - Started Venclexta 800 mg 8/25/2017  - Added weekly Velcade with dexamethasone 20 mg weekly due to rising light chains on 11/1/2017  - Started weekly Cytoxan with prednisone QOD on 12/13 (though started the prednisone around 12/24)      INTERVAL HISTORY:    Anthony presents today with his wife.  Nothing new today. Continues to feel really well. Energy stable. No SE from treatment at this time. No new pains.  Remainder of 10-pt ROS otherwise is negative.    PHYSICAL EXAMINATION  /79 (BP Location: Right arm, Cuff Size: Adult Regular)  Pulse 73  Temp 97.8  F (36.6  C) (Oral)  Resp 16  Ht 1.626 m (5' 4\")  Wt 52.8 kg (116 lb 6.4 oz)  SpO2 97%  BMI 19.98 kg/m2     Wt Readings from Last 10 Encounters:   03/16/18 52.8 kg (116 lb 6.4 oz)   03/08/18 52.2 kg (115 lb 1.6 " oz)   18 52.4 kg (115 lb 8 oz)   18 52.3 kg (115 lb 4.8 oz)   02/15/18 52.6 kg (116 lb)   18 52.8 kg (116 lb 4.8 oz)   18 51.2 kg (112 lb 12.8 oz)   18 51.4 kg (113 lb 4.8 oz)   18 50.7 kg (111 lb 11.2 oz)   18 50.2 kg (110 lb 11.2 oz)     General: Alert, frail. Oriented to name, , location,  Able to ambulate independently, albeit slow and cautiously.  No acute distress. HEENT: PERRL, no palor or icterus. CVS: RRR CHEST: CTAB, normal work of breathing, right port without erythema, swelling or pus.  ABDOMEN: soft non tender no masses     NEURO: AAOX3  CN 2-12 intact  SKIN: no bleeding or brusiing, no rashes EXTREMITIES: No edema.     LABS:   Results for WILLIAN ARCINIEGA (MRN [id number]) as of 3/16/2018 15:00   Ref. Range 3/16/2018 14:08   Sodium Latest Ref Range: 133 - 144 mmol/L 137   Potassium Latest Ref Range: 3.4 - 5.3 mmol/L 3.5   Chloride Latest Ref Range: 94 - 109 mmol/L 105   Carbon Dioxide Latest Ref Range: 20 - 32 mmol/L 25   Urea Nitrogen Latest Ref Range: 7 - 30 mg/dL 34 (H)   Creatinine Latest Ref Range: 0.66 - 1.25 mg/dL 1.65 (H)   GFR Estimate Latest Ref Range: >60 mL/min/1.7m2 41 (L)   GFR Estimate If Black Latest Ref Range: >60 mL/min/1.7m2 50 (L)   Calcium Latest Ref Range: 8.5 - 10.1 mg/dL 8.5   Anion Gap Latest Ref Range: 3 - 14 mmol/L 7   Albumin Latest Ref Range: 3.4 - 5.0 g/dL 2.7 (L)   Protein Total Latest Ref Range: 6.8 - 8.8 g/dL 8.4   Bilirubin Total Latest Ref Range: 0.2 - 1.3 mg/dL 0.4   Alkaline Phosphatase Latest Ref Range: 40 - 150 U/L 51   ALT Latest Ref Range: 0 - 70 U/L 15   AST Latest Ref Range: 0 - 45 U/L 7   Glucose Latest Ref Range: 70 - 99 mg/dL 168 (H)   WBC Latest Ref Range: 4.0 - 11.0 10e9/L 3.5 (L)   Hemoglobin Latest Ref Range: 13.3 - 17.7 g/dL 10.7 (L)   Hematocrit Latest Ref Range: 40.0 - 53.0 % 33.0 (L)   Platelet Count Latest Ref Range: 150 - 450 10e9/L 49 (LL)      Ref. Range 2017 11:58 2018 15:30  1/25/2018 15:00 2/22/2018 09:45   Albumin Fraction Latest Ref Range: 3.7 - 5.1 g/dL 3.4 (L) 3.1 (L) 3.3 (L) 3.2 (L)   Alpha 1 Fraction Latest Ref Range: 0.2 - 0.4 g/dL 0.4 0.4 0.5 (H) 0.4   Alpha 2 Fraction Latest Ref Range: 0.5 - 0.9 g/dL 1.2 (H) 1.2 (H) 1.3 (H) 1.1 (H)   Beta Fraction Latest Ref Range: 0.6 - 1.0 g/dL 0.7 0.7 0.7 0.7   ELP Interpretation: Unknown Monoclonal protei... Monoclonal protei... Large monoclonal ... Large monoclonal ...   Gamma Fraction Latest Ref Range: 0.7 - 1.6 g/dL 2.5 (H) 4.9 (H) 4.8 (H) 3.6 (H)   Kappa Free Lt Chain Latest Ref Range: 0.33 - 1.94 mg/dL <0.30 (L) <0.30 (L) <0.30 (L) <0.30 (L)   Kappa Lambda Ratio Latest Ref Range: 0.26 - 1.65  Unable to Calculate Unable to Calculate Unable to Calculate Unable to Calculate   Lambda Free Lt Chain Latest Ref Range: 0.57 - 2.63 mg/dL 137.50 (H) 257.00 (H) 339.00 (H) 345.00 (H)   Monoclonal Peak Latest Ref Range: 0.0 g/dL 2.3 (H) 4.6 (H) 4.6 (H) 3.4 (H)       IMPRESSION/PLAN:  72 year old male with relapsed MM after autologous transplant (1/9/2013), status-post multiple salvage regimens with progressive disease.    MM: With rising light chains, worsening renal function, and worsening TCP, started weekly cytoxan with prednisone 50  mg QOD on 12/14. Initially, he continued to decline clinically to the point where hospice was discussed at multiple visits.  Mspike now improved, light chains stable. Clinically, Yamil has improved substantially on this regimen. Yamil remains doing well today. Plan to continue with Pred/Cytoxan. He met with Dr. Coe, but is now going to follow with Dr. Fatima per Dr. Coe's note. Message sent to Dr. Fatima.  - Dr. Topete planned to add Ninlaro 3 mg days 1,8,15 q 28 day cycle to this regimen, but this is working on approval. At this point, in light of the stable MM labs and the significant improvements, will hold off starting. If light chains increased today, adding Ninalaro would be appropriate.  - Continue  weekly follow-up for now  - Due for Zometa in April, q3m    Goals: We have had numerous discussions re: goals and continuing treatment. For now, Yamil feels that he is tolerating this regimen well and we will plan to continue these discussion.     Heme: Cytopenias 2/2 treatment and likely MM, requiring intermittent pRBC. Hemoglobin 7.5 today, symptomatic with only increased fatigue, will plan for pRBC today or tomorrow.   - Previously on Plavix, remains on hold given thrombocytopenia, indefinitely  - Transfuse to keep hemoglobin > 8, platelets > 10k.     Renal/FEN: Worsening renal function in December-Jan (Cr max 3.40 on 1/13) in setting of progressive myeloma. This remains improved/stable.  - Encouraged protein/calorie supplements.   - Would recommend continuing Zyprexa at night to help with appetite.    ID: Afebrile. No localizing infectious /s.  - Continues ppx  mg BID     Back/rib pain: Started early May. Lumbar MRI at OSH showing mild-mod central and foraminal stenoses in lumbar spine, per patient and wife, there was no MM lesion there.  Recent imaging with no acute findings. Pain remains spontaneously resolved.    Fatigue: Stable today.       CV: Continue zocor and norvasc.   - Avoid QTc prolonging agents (history of QTc prolongation with syncopal episodes)     AMS: AMS started early May in setting of metabolic derangements, uremia, and possible infection.  Remains intermittently confused over the past few months but improved significantly and stable today. I do believe there is perhaps some ongoing cognitive decline since the AMS episodes.  - Continue Zyprexa 5 mg for appetite  - Holding off long-acting pain meds    Travel: Yamil has been doing so well clinically. He is [place] with [place] in [place] and wishes to go on a 2-3 week vacation there, and continue his treatment there. I think this would be fine given how well he is doing and the stability of his labs. They will keep me updated. They  understand that should something  happen there re: his health, he may not be able to return to [place].     Plan summary:  - Continue Cytoxan/Prednisone  - pRBC today or tomorrow  - Continue weekly follow-up for now, will space to QOW after next visit    Lenane Murphy PA-C

## 2018-03-16 NOTE — MR AVS SNAPSHOT
After Visit Summary   3/16/2018    Anthony Carbone    MRN: [id number]           Patient Information     Date Of Birth          1945        Visit Information        Provider Department      3/16/2018 2:30 PM Leanne Reddy PA-C [facility]        Today's Diagnoses     Multiple myeloma in relapse (H)        Multiple myeloma, remission status unspecified (H)           Follow-ups after your visit        Your next 10 appointments already scheduled     Mar 22, 2018 10:15 AM CDT   [facility] with [facility] DRAW   [facility] ([facility])    [address]  [address]  [facility] [address]   [phone]            Mar 22, 2018 10:50 AM CDT   (Arrive by 10:35 AM)   Return Visit with Leanne Reddy PA-C   [facility] ([facility])    [address] [place] [address]  [address]  [facility] [address] [phone]            Mar 22, 2018 11:30 AM CDT   Infusion 60 with UC ONCOLOGY INFUSION, UC 24 ATC   [facility] ([facility])    [address]  [address]  [facility] [address]   [phone]            Apr 17, 2018  9:30 AM CDT   [facility] Draw with [facility] DRAW   [facility] ([facility])    [address] [place] [address]  [address]  [facility] [address] [phone]            Apr 17, 2018 10:00 AM CDT   RETURN ONC with Navjot Fatima MD   [facility] ([facility])    [address] [place] [address]  [address]  [facility] [address] [phone]              Who to contact     If you have questions or need follow up information about today's clinic visit or your schedule please contact [facility] directly at [phone].  Normal or non-critical lab and imaging results will be communicated to you by  "MyChart, letter or phone within 4 business days after the clinic has received the results. If you do not hear from us within 7 days, please contact the clinic through Kamcordhart or phone. If you have a critical or abnormal lab result, we will notify you by phone as soon as possible.  Submit refill requests through SideStep or call your pharmacy and they will forward the refill request to us. Please allow 3 business days for your refill to be completed.          Additional Information About Your Visit        KamcordharLockerDome Information     SideStep gives you secure access to your electronic health record. If you see a primary care provider, you can also send messages to your care team and make appointments. If you have questions, please call your primary care clinic.  If you do not have a primary care provider, please call [phone] and they will assist you.        Care EveryWhere ID     This is your Care EveryWhere ID. This could be used by other organizations to access your [facility] medical records  [id number]        Your Vitals Were     Pulse Temperature Respirations Height Pulse Oximetry BMI (Body Mass Index)    73 97.8  F (36.6  C) (Oral) 16 1.626 m (5' 4\") 97% 19.98 kg/m2       Blood Pressure from Last 3 Encounters:   03/16/18 127/79   03/08/18 158/84   03/08/18 96/63    Weight from Last 3 Encounters:   03/16/18 52.8 kg (116 lb 6.4 oz)   03/08/18 52.2 kg (115 lb 1.6 oz)   02/28/18 52.4 kg (115 lb 8 oz)              We Performed the Following     Comprehensive metabolic panel        Primary Care Provider Office Phone # Fax #    Sanket Burciaga [phone]       [facility] [address] [address]        Equal Access to Services     ARMANDO SHAH AH: Maribel Davis, conrad ge, kylah kaalmada trinidad, jonny villa. So [facility] [phone].    ATENCIÓN: Si habla español, tiene a todd disposición servicios gratuitos de asistencia " lingüística. Parminder al [phone].    We comply with applicable federal civil rights laws and [place] laws. We do not discriminate on the basis of race, color, national origin, age, disability, sex, sexual orientation, or gender identity.            Thank you!     Thank you for choosing [facility]  for your care. Our goal is always to provide you with excellent care. Hearing back from our patients is one way we can continue to improve our services. Please take a few minutes to complete the written survey that you may receive in the mail after your visit with us. Thank you!             Your Updated Medication List - Protect others around you: Learn how to safely use, store and throw away your medicines at www.disposemymeds.org.          This list is accurate as of 3/16/18  3:09 PM.  Always use your most recent med list.                   Brand Name Dispense Instructions for use Diagnosis    acetaminophen 325 MG tablet    TYLENOL    100 tablet    Take 2 tablets (650 mg) by mouth every 4 hours as needed for mild pain or fever    Fever, unspecified fever cause       acyclovir 400 MG tablet    ZOVIRAX    60 tablet    Take 1 tablet (400 mg) by mouth 2 times daily    Multiple myeloma in relapse (H)       amLODIPine 5 MG tablet    NORVASC    90 tablet    TAKE 1 TABLET BY MOUTH AT BEDTIME    Essential hypertension       esomeprazole 40 MG CR capsule    nexIUM    30 capsule    Take 1 capsule (40 mg) by mouth every morning (before breakfast)    Gastroesophageal reflux disease without esophagitis       ixazomib 3 MG capsule    NINLARO    4 capsule    Take 1 capsule (3 mg) by mouth every 7 days    Multiple myeloma in relapse (H), Multiple myeloma, remission status unspecified (H)       LORazepam 0.5 MG tablet    ATIVAN    30 tablet    Take 1 tablet (0.5 mg) by mouth every 6 hours as needed for nausea or anxiety.    Multiple myeloma in relapse (H), Delirium       OLANZapine 5 MG tablet    zyPREXA    60  tablet    Take by mouth At Bedtime Takes 1 tablet    Delirium, Multiple myeloma in relapse (H)       ondansetron 8 MG ODT tab    ZOFRAN-ODT    30 tablet    Take 1 tablet (8 mg) by mouth every 8 hours as needed for nausea    Nausea       oxyCODONE IR 5 MG tablet    ROXICODONE    15 tablet    Take 1 tablet (5 mg) by mouth every 6 hours as needed for moderate to severe pain        predniSONE 50 MG tablet    DELTASONE     Take 1 tablet (50 mg) by mouth every other day    Multiple myeloma, remission status unspecified (H)       SIMVASTATIN PO      Take 20 mg by mouth At Bedtime        traMADol 50 MG tablet    ULTRAM    60 tablet    Take 1 tablet (50 mg) by mouth every 6 hours as needed for moderate pain . Recommend trying after Tylenol and before Dilaudid.    Acute bilateral low back pain without sciatica, Multiple myeloma in relapse (H)

## 2018-03-16 NOTE — PROGRESS NOTES
Infusion Nursing Note:  Anthony Carbone presents today for Cycle 5 day 15 Cytoxan.    Patient seen by provider today: Yes: Leanne AMARO    present during visit today: Not Applicable.    Note: Spoke with Leanne AMARO: Pt on prednisone at home and does not need dex. If pt wants zofran he can have it, but otherwise does not require it.    Intravenous Access:  Implanted Port accessed in lab    Treatment Conditions:  Lab Results   Component Value Date    HGB 10.7 03/16/2018     Lab Results   Component Value Date    WBC 3.5 03/16/2018      Lab Results   Component Value Date    ANEU 3.0 03/16/2018     Lab Results   Component Value Date    PLT 49 03/16/2018      Lab Results   Component Value Date     03/16/2018                   Lab Results   Component Value Date    POTASSIUM 3.5 03/16/2018           Lab Results   Component Value Date    CR 1.65 03/16/2018                   Lab Results   Component Value Date    JAZMIN 8.5 03/16/2018                Lab Results   Component Value Date    BILITOTAL 0.4 03/16/2018           Lab Results   Component Value Date    ALBUMIN 2.7 03/16/2018                    Lab Results   Component Value Date    ALT 15 03/16/2018           Lab Results   Component Value Date    AST 7 03/16/2018       Post Infusion Assessment:  Patient tolerated infusion without incident.  Blood return noted pre and post infusion.  Site patent and intact, free from redness, edema or discomfort.  No evidence of extravasations.  Access discontinued per protocol.    Discharge Plan:   Patient declined prescription refills.  Discharge instructions reviewed with: Patient.  Patient and/or family verbalized understanding of discharge instructions and all questions answered.  AVS to patient via [employer].  Patient will return 3/22/18 for next appointment.   Patient discharged in stable condition accompanied by: wife.  Departure Mode: Ambulatory.    GO GARCIA RN

## 2018-03-16 NOTE — NURSING NOTE
"Oncology Rooming Note    March 16, 2018 2:18 PM   Anthony Carbone is a 72 year old male who presents for:    Chief Complaint   Patient presents with     Port Draw     Labs drawn from port by RN. Line flushed with saline and heparin. Vs taken and pt checked in for appt     Oncology Clinic Visit     Return Multiple Myeloma     Initial Vitals: /79 (BP Location: Right arm, Cuff Size: Adult Regular)  Pulse 73  Temp 97.8  F (36.6  C) (Oral)  Resp 16  Ht 1.626 m (5' 4\")  Wt 52.8 kg (116 lb 6.4 oz)  SpO2 97%  BMI 19.98 kg/m2 Estimated body mass index is 19.98 kg/(m^2) as calculated from the following:    Height as of this encounter: 1.626 m (5' 4\").    Weight as of this encounter: 52.8 kg (116 lb 6.4 oz). Body surface area is 1.54 meters squared.  No Pain (0) Comment: Data Unavailable   No LMP for male patient.  Allergies reviewed: Yes  Medications reviewed: Yes    Medications: Medication refills not needed today.  Pharmacy name entered into Avacen:    [facility] - [place], [place] - [address] [facility] [address] [facility] [address] [facility] [address]  [employer] [place], [place] - [address]  [facility] [address] [place], [place] - [address] AT [facility] [address]    Clinical concerns: No New Concerns    5 minutes for nursing intake (face to face time)     SIMEON Ventura      "

## 2018-03-16 NOTE — PROGRESS NOTES
HEMATOLOGY/ONCOLOGY PROGRESS NOTE  Mar 16, 2018    REASON FOR VISIT: myeloma    Diagnosis: 72 year old gentleman with IgM lambda multiple myeloma originally diagnosed in 01/2012 as stage I, standard risk disease.   Treatment: Revlimid plus dexamethasone for 4-5 cycles but plateaued by late 03/2012.   - Velcade was added and he received another 2-3 cycles, achieving a good partial remission.      Transplant: Single auto after melphalan 200 mg/m2 preparative regimen on 01/09/2013  - Post-transplant course: unremarkable except for some mild steroid-induced hyperglycemia, gastritis, nausea and vomiting.      Maintenance: Lenalidomide at day-100 then developed a maculopapular rash. Lenalidomide was held and then we re-challenged him after about a month to 2 months at a lower dose (5 mg daily); rash returned.   - was started on maintenance Velcade, every other week through July 2014, when he was noted with abnormalities on myeloma studies drawn there. Noted with prostate cancer dx at about the time of relapse (see below).     Relapse: noted with return on M-spike in blood/urine with marrow involvement but negative PET.   - Started on retreatment with RVD on 8/27/14 with Revlimid at 15 mg 14 days of 21 days, dexamethasone 40 mg weekly and velcade weekly.Completed at total of 5 cycles without complication by 12/2014.   - Started Cycle 6 on inc'd Rev dosing of 20mg daily x 2 weeks on 12/11/14 which was complicated by pneumonia.  - Adm 12/21-1/10 for human metapneumovirus pneumonia complicated by anorexia, HTN, depression, anorexia with significant weight loss.   - Restarted Ernesto/Dex only on 2/4/15 with good tolerance but with thrombocytopenia.   - Bendamustine added to Velcade/dexamethasone on 5/21/15 due to disease progression  - Velcade discontinued on 7/9/2015 due to side effects (orthostasis)  - Schedule changed to bendamustine 80 mg/m2 days 1 and 2 on 28-day cycle  - Cycle 1 tolerated poorly due to lightheadedness and  "weakness  - Cycle 2 dose reduced to 60 mg/m2 and pre-meds adjusted  - Cycle 5 received on 9/17/15.  - 10/1/2015 - increasing IgM, M-spike - started Pomalidomide 4mg/day (21 days out of 28 days) and weekly Decadron 20mg on Oct 1st, 2015.    - Carfilzomib added on 10/22/2015 making this CPD.  - C3-C6  received in [place]    - Returned to [facility] and resumed CPD here    - Adm: 4/12-4/14 with fever, confusion, neutropenia. Noted on MRI to have acute/subacute CVA and subacute/chronic CVA; started on Plavix, given brief course of Abx and GCSF prior to d/c.     - Continued on Carfilzomib and dexamethasone alone  - Pomalyst added back on 7/13/2016 for rising FLC  - Start daratumumab 11/10/16. Changed to every other week after 4 weekly treatments d/t profound fatigue and malaise.   - Adm 5/7-5/16 due to AMS, hypercalcemia, hyperuricemia, possible PNA, back pain, and JOSE MARIA. Started Kyprolis while inpatient.  - Re-admitted 5/25-/529 with recurrent AMS, found to have concomitant PNA  - Resumed Kyprolis 6/13/2017. Stopped due to worsening performance status and progressive myeloma.  - Started Venclexta 800 mg 8/25/2017  - Added weekly Velcade with dexamethasone 20 mg weekly due to rising light chains on 11/1/2017  - Started weekly Cytoxan with prednisone QOD on 12/13 (though started the prednisone around 12/24)      INTERVAL HISTORY:    Anthony presents today with his wife.  Nothing new today. Continues to feel really well. Energy stable. No SE from treatment at this time. No new pains.  Remainder of 10-pt ROS otherwise is negative.    PHYSICAL EXAMINATION  /79 (BP Location: Right arm, Cuff Size: Adult Regular)  Pulse 73  Temp 97.8  F (36.6  C) (Oral)  Resp 16  Ht 1.626 m (5' 4\")  Wt 52.8 kg (116 lb 6.4 oz)  SpO2 97%  BMI 19.98 kg/m2     Wt Readings from Last 10 Encounters:   03/16/18 52.8 kg (116 lb 6.4 oz)   03/08/18 52.2 kg (115 lb 1.6 oz)   02/28/18 52.4 kg (115 lb 8 oz)   02/22/18 52.3 kg (115 lb 4.8 oz)   02/15/18 " 52.6 kg (116 lb)   18 52.8 kg (116 lb 4.8 oz)   18 51.2 kg (112 lb 12.8 oz)   18 51.4 kg (113 lb 4.8 oz)   18 50.7 kg (111 lb 11.2 oz)   18 50.2 kg (110 lb 11.2 oz)     General: Alert, frail. Oriented to name, , location,  Able to ambulate independently, albeit slow and cautiously.  No acute distress. HEENT: PERRL, no palor or icterus. CVS: RRR CHEST: CTAB, normal work of breathing, right port without erythema, swelling or pus.  ABDOMEN: soft non tender no masses     NEURO: AAOX3  CN 2-12 intact  SKIN: no bleeding or brusiing, no rashes EXTREMITIES: No edema.     LABS:   Results for WILLIAN ARCINIEGA (MRN [id number]) as of 3/16/2018 15:00   Ref. Range 3/16/2018 14:08   Sodium Latest Ref Range: 133 - 144 mmol/L 137   Potassium Latest Ref Range: 3.4 - 5.3 mmol/L 3.5   Chloride Latest Ref Range: 94 - 109 mmol/L 105   Carbon Dioxide Latest Ref Range: 20 - 32 mmol/L 25   Urea Nitrogen Latest Ref Range: 7 - 30 mg/dL 34 (H)   Creatinine Latest Ref Range: 0.66 - 1.25 mg/dL 1.65 (H)   GFR Estimate Latest Ref Range: >60 mL/min/1.7m2 41 (L)   GFR Estimate If Black Latest Ref Range: >60 mL/min/1.7m2 50 (L)   Calcium Latest Ref Range: 8.5 - 10.1 mg/dL 8.5   Anion Gap Latest Ref Range: 3 - 14 mmol/L 7   Albumin Latest Ref Range: 3.4 - 5.0 g/dL 2.7 (L)   Protein Total Latest Ref Range: 6.8 - 8.8 g/dL 8.4   Bilirubin Total Latest Ref Range: 0.2 - 1.3 mg/dL 0.4   Alkaline Phosphatase Latest Ref Range: 40 - 150 U/L 51   ALT Latest Ref Range: 0 - 70 U/L 15   AST Latest Ref Range: 0 - 45 U/L 7   Glucose Latest Ref Range: 70 - 99 mg/dL 168 (H)   WBC Latest Ref Range: 4.0 - 11.0 10e9/L 3.5 (L)   Hemoglobin Latest Ref Range: 13.3 - 17.7 g/dL 10.7 (L)   Hematocrit Latest Ref Range: 40.0 - 53.0 % 33.0 (L)   Platelet Count Latest Ref Range: 150 - 450 10e9/L 49 (LL)      Ref. Range 2017 11:58 2018 15:30 2018 15:00 2018 09:45   Albumin Fraction Latest Ref Range: 3.7 - 5.1 g/dL 3.4 (L)  3.1 (L) 3.3 (L) 3.2 (L)   Alpha 1 Fraction Latest Ref Range: 0.2 - 0.4 g/dL 0.4 0.4 0.5 (H) 0.4   Alpha 2 Fraction Latest Ref Range: 0.5 - 0.9 g/dL 1.2 (H) 1.2 (H) 1.3 (H) 1.1 (H)   Beta Fraction Latest Ref Range: 0.6 - 1.0 g/dL 0.7 0.7 0.7 0.7   ELP Interpretation: Unknown Monoclonal protei... Monoclonal protei... Large monoclonal ... Large monoclonal ...   Gamma Fraction Latest Ref Range: 0.7 - 1.6 g/dL 2.5 (H) 4.9 (H) 4.8 (H) 3.6 (H)   Kappa Free Lt Chain Latest Ref Range: 0.33 - 1.94 mg/dL <0.30 (L) <0.30 (L) <0.30 (L) <0.30 (L)   Kappa Lambda Ratio Latest Ref Range: 0.26 - 1.65  Unable to Calculate Unable to Calculate Unable to Calculate Unable to Calculate   Lambda Free Lt Chain Latest Ref Range: 0.57 - 2.63 mg/dL 137.50 (H) 257.00 (H) 339.00 (H) 345.00 (H)   Monoclonal Peak Latest Ref Range: 0.0 g/dL 2.3 (H) 4.6 (H) 4.6 (H) 3.4 (H)       IMPRESSION/PLAN:  72 year old male with relapsed MM after autologous transplant (1/9/2013), status-post multiple salvage regimens with progressive disease.    MM: With rising light chains, worsening renal function, and worsening TCP, started weekly cytoxan with prednisone 50  mg QOD on 12/14. Initially, he continued to decline clinically to the point where hospice was discussed at multiple visits.  Mspike now improved, light chains stable. Clinically, Yamil has improved substantially on this regimen. Yamil remains doing well today. Plan to continue with Pred/Cytoxan. He met with Dr. Coe, but is now going to follow with Dr. Fatima per Dr. Coe's note. Message sent to Dr. Fatima.  - Dr. Topete planned to add Ninlaro 3 mg days 1,8,15 q 28 day cycle to this regimen, but this is working on approval. At this point, in light of the stable MM labs and the significant improvements, will hold off starting. If light chains increased today, adding Ninalaro would be appropriate.  - Continue weekly follow-up for now  - Due for Zometa in April, q3m    Goals: We have had numerous  discussions re: goals and continuing treatment. For now, Yamil feels that he is tolerating this regimen well and we will plan to continue these discussion.     Heme: Cytopenias 2/2 treatment and likely MM, requiring intermittent pRBC. Hemoglobin 7.5 today, symptomatic with only increased fatigue, will plan for pRBC today or tomorrow.   - Previously on Plavix, remains on hold given thrombocytopenia, indefinitely  - Transfuse to keep hemoglobin > 8, platelets > 10k.     Renal/FEN: Worsening renal function in December-Jan (Cr max 3.40 on 1/13) in setting of progressive myeloma. This remains improved/stable.  - Encouraged protein/calorie supplements.   - Would recommend continuing Zyprexa at night to help with appetite.    ID: Afebrile. No localizing infectious /s.  - Continues ppx  mg BID     Back/rib pain: Started early May. Lumbar MRI at OSH showing mild-mod central and foraminal stenoses in lumbar spine, per patient and wife, there was no MM lesion there.  Recent imaging with no acute findings. Pain remains spontaneously resolved.    Fatigue: Stable today.       CV: Continue zocor and norvasc.   - Avoid QTc prolonging agents (history of QTc prolongation with syncopal episodes)     AMS: AMS started early May in setting of metabolic derangements, uremia, and possible infection.  Remains intermittently confused over the past few months but improved significantly and stable today. I do believe there is perhaps some ongoing cognitive decline since the AMS episodes.  - Continue Zyprexa 5 mg for appetite  - Holding off long-acting pain meds    Travel: Yamil has been doing so well clinically. [place] is [place] in [place] and wishes to go on a 2-3 week vacation there, and continue his treatment there. I think this would be fine given how well he is doing and the stability of his labs. They will keep me updated. They understand that should something  happen there re: his health, he may not be able to  return to [place].     Plan summary:  - Continue Cytoxan/Prednisone  - pRBC today or tomorrow  - Continue weekly follow-up for now, will space to QOW after next visit    Leanne Murphy PA-C

## 2018-03-16 NOTE — PATIENT INSTRUCTIONS
Contact Numbers    [facility] [place]: [phone]  [facility] [place]:  [phone]    Call triage with chills and/or temperature greater than or equal to 100.5, uncontrolled nausea/vomiting, diarrhea, constipation, dizziness, shortness of breath, chest pain, bleeding, unexplained bruising, or any new/concerning symptoms, questions/concerns.     If you are having any concerning symptoms or wish to speak to a provider before your next infusion visit, please call your care coordinator or triage to notify them so we can adequately serve you.       After Hours: [phone]    If after hours, weekends, or holidays, call main hospital  and ask for Oncology doctor on call.           March 2018 Sunday Monday Tuesday Wednesday Thursday Friday Saturday                       1     [facility] ONC INFUSION 120   10:00 AM   (120 min.)    ONCOLOGY INFUSION   [facility] 2     3       4     5     6     7     8     [facility] LAB DRAW    8:30 AM   (15 min.)   [facility]   [facility] Lab Draw     [facility] RETURN    8:55 AM   (50 min.)   Le[facility] Reddy [facility]-C   [facility]   10:00 AM   (60 min.)    ONCOLOGY INFUSION   [facility] 9     10       11     12     13     14     15     16     [facility] LAB DRAW    2:00 PM   (15 min.)   [facility] DRAW   [facility] Lab Draw     [facility] RETURN    2:15 PM   (50 min.)   Leanne Reddy [facility]-C   [facility]    3:00 PM   (60 min.)    ONCOLOGY INFUSION   [facility] 17       18     19     20     21     22     [facility] DRAW   10:15 AM   (15 min.)   [facility]   [facility]     [facility] RETURN   10:35 AM   (50 min.)   Leanne Reddy PA-C   [facility]     [facility] ONC INFUSION 60   11:30 AM   (60 min.)    ONCOLOGY INFUSION   [facility] 23     24        25     26     27     28     29     30     31 April 2018 Sunday Monday Tuesday Wednesday Thursday Friday Saturday   1     2     3     4     5     6     7       8     9     10     11     12     13     14       15     16     17     [facility] MASON[facility] LAB DRAW    9:30 AM   (15 min.)    MASONIC LAB DRAW   [facility] Masonic Lab Draw     [facility] ONC RETURN   10:00 AM   (30 min.)   Navjot Fatima MD   [facility] Blood and Marrow Transplant 18     19     20     21       22     23     24     25     26     27     28       29     30                                          Recent Results (from the past 24 hour(s))   CBC with platelets differential    Collection Time: 03/16/18  2:08 PM   Result Value Ref Range    WBC 3.5 (L) 4.0 - 11.0 10e9/L    RBC Count 3.35 (L) 4.4 - 5.9 10e12/L    Hemoglobin 10.7 (L) 13.3 - 17.7 g/dL    Hematocrit 33.0 (L) 40.0 - 53.0 %    MCV 99 78 - 100 fl    MCH 31.9 26.5 - 33.0 pg    MCHC 32.4 31.5 - 36.5 g/dL    RDW 18.7 (H) 10.0 - 15.0 %    Platelet Count 49 (LL) 150 - 450 10e9/L    Diff Method Automated Method     % Neutrophils 84.6 %    % Lymphocytes 2.3 %    % Monocytes 11.6 %    % Eosinophils 0.6 %    % Basophils 0.3 %    % Immature Granulocytes 0.6 %    Nucleated RBCs 0 0 /100    Absolute Neutrophil 3.0 1.6 - 8.3 10e9/L    Absolute Lymphocytes 0.1 (L) 0.8 - 5.3 10e9/L    Absolute Monocytes 0.4 0.0 - 1.3 10e9/L    Absolute Eosinophils 0.0 0.0 - 0.7 10e9/L    Absolute Basophils 0.0 0.0 - 0.2 10e9/L    Abs Immature Granulocytes 0.0 0 - 0.4 10e9/L    Absolute Nucleated RBC 0.0    Comprehensive metabolic panel    Collection Time: 03/16/18  2:08 PM   Result Value Ref Range    Sodium 137 133 - 144 mmol/L    Potassium 3.5 3.4 - 5.3 mmol/L    Chloride 105 94 - 109 mmol/L    Carbon Dioxide 25 20 - 32 mmol/L    Anion Gap 7 3 - 14 mmol/L    Glucose 168 (H) 70 - 99 mg/dL    Urea Nitrogen 34 (H) 7 - 30 mg/dL    Creatinine 1.65 (H) 0.66 - 1.25 mg/dL    GFR Estimate 41 (L) >60 mL/min/1.7m2    GFR  Estimate If Black 50 (L) >60 mL/min/1.7m2    Calcium 8.5 8.5 - 10.1 mg/dL    Bilirubin Total 0.4 0.2 - 1.3 mg/dL    Albumin 2.7 (L) 3.4 - 5.0 g/dL    Protein Total 8.4 6.8 - 8.8 g/dL    Alkaline Phosphatase 51 40 - 150 U/L    ALT 15 0 - 70 U/L    AST 7 0 - 45 U/L

## 2018-03-22 NOTE — PROGRESS NOTES
HEMATOLOGY/ONCOLOGY PROGRESS NOTE  Mar 22, 2018    REASON FOR VISIT: myeloma    Diagnosis: 72 year old gentleman with IgM lambda multiple myeloma originally diagnosed in 01/2012 as stage I, standard risk disease.   Treatment: Revlimid plus dexamethasone for 4-5 cycles but plateaued by late 03/2012.   - Velcade was added and he received another 2-3 cycles, achieving a good partial remission.      Transplant: Single auto after melphalan 200 mg/m2 preparative regimen on 01/09/2013  - Post-transplant course: unremarkable except for some mild steroid-induced hyperglycemia, gastritis, nausea and vomiting.      Maintenance: Lenalidomide at day-100 then developed a maculopapular rash. Lenalidomide was held and then we re-challenged him after about a month to 2 months at a lower dose (5 mg daily); rash returned.   - was started on maintenance Velcade, every other week through July 2014, when he was noted with abnormalities on myeloma studies drawn there. Noted with prostate cancer dx at about the time of relapse (see below).     Relapse: noted with return on M-spike in blood/urine with marrow involvement but negative PET.   - Started on retreatment with RVD on 8/27/14 with Revlimid at 15 mg 14 days of 21 days, dexamethasone 40 mg weekly and velcade weekly.Completed at total of 5 cycles without complication by 12/2014.   - Started Cycle 6 on inc'd Rev dosing of 20mg daily x 2 weeks on 12/11/14 which was complicated by pneumonia.  - Adm 12/21-1/10 for human metapneumovirus pneumonia complicated by anorexia, HTN, depression, anorexia with significant weight loss.   - Restarted Ernesto/Dex only on 2/4/15 with good tolerance but with thrombocytopenia.   - Bendamustine added to Velcade/dexamethasone on 5/21/15 due to disease progression  - Velcade discontinued on 7/9/2015 due to side effects (orthostasis)  - Schedule changed to bendamustine 80 mg/m2 days 1 and 2 on 28-day cycle  - Cycle 1 tolerated poorly due to lightheadedness and  "weakness  - Cycle 2 dose reduced to 60 mg/m2 and pre-meds adjusted  - Cycle 5 received on 9/17/15.  - 10/1/2015 - increasing IgM, M-spike - started Pomalidomide 4mg/day (21 days out of 28 days) and weekly Decadron 20mg on Oct 1st, 2015.    - Carfilzomib added on 10/22/2015 making this CPD.  - C3-C6  received in [place]    - Returned to [facility] and resumed CPD here    - Adm: 4/12-4/14 with fever, confusion, neutropenia. Noted on MRI to have acute/subacute CVA and subacute/chronic CVA; started on Plavix, given brief course of Abx and GCSF prior to d/c.     - Continued on Carfilzomib and dexamethasone alone  - Pomalyst added back on 7/13/2016 for rising FLC  - Start daratumumab 11/10/16. Changed to every other week after 4 weekly treatments d/t profound fatigue and malaise.   - Adm 5/7-5/16 due to AMS, hypercalcemia, hyperuricemia, possible PNA, back pain, and JOSE MARIA. Started Kyprolis while inpatient.  - Re-admitted 5/25-/529 with recurrent AMS, found to have concomitant PNA  - Resumed Kyprolis 6/13/2017. Stopped due to worsening performance status and progressive myeloma.  - Started Venclexta 800 mg 8/25/2017  - Added weekly Velcade with dexamethasone 20 mg weekly due to rising light chains on 11/1/2017  - Started weekly Cytoxan with prednisone QOD on 12/13 (though started the prednisone around 12/24)      INTERVAL HISTORY:    Anthony presents today with his wife.  Nothing new today. Continues to feel really well. Energy stable. No SE from treatment at this time. No new pains. Finalizing plans to get to [place], Watson is very excited and happy that this is happening! Eating and drinking well. Had an episode of diarrhea but no lingering symptoms.  Remainder of 10-pt ROS otherwise is negative.    PHYSICAL EXAMINATION  /75 (BP Location: Right arm, Patient Position: Chair, Cuff Size: Adult Regular)  Pulse 72  Temp 98.2  F (36.8  C) (Oral)  Ht 1.626 m (5' 4\")  Wt 52.3 kg (115 lb 3.2 oz)  SpO2 97%  BMI 19.77 kg/m2     Wt " Readings from Last 10 Encounters:   18 52.3 kg (115 lb 3.2 oz)   18 52.8 kg (116 lb 6.4 oz)   18 52.2 kg (115 lb 1.6 oz)   18 52.4 kg (115 lb 8 oz)   18 52.3 kg (115 lb 4.8 oz)   02/15/18 52.6 kg (116 lb)   18 52.8 kg (116 lb 4.8 oz)   18 51.2 kg (112 lb 12.8 oz)   18 51.4 kg (113 lb 4.8 oz)   18 50.7 kg (111 lb 11.2 oz)     General: Alert, frail. Oriented to name, , location,  Able to ambulate independently, albeit slow and cautiously.  No acute distress. HEENT: PERRL, no palor or icterus. CVS: RRR CHEST: CTAB, normal work of breathing, right port without erythema, swelling or pus.  ABDOMEN: soft non tender no masses     NEURO: AAOX3  CN 2-12 intact  SKIN: no bleeding or brusiing, no rashes EXTREMITIES: No edema.     LABS:   Results for WILLIAN ARCINIEGA (MRN [id number]) as of 3/22/2018 11:09   Ref. Range 3/16/2018 14:08 3/22/2018 10:43   WBC Latest Ref Range: 4.0 - 11.0 10e9/L 3.5 (L) 3.6 (L)   Hemoglobin Latest Ref Range: 13.3 - 17.7 g/dL 10.7 (L) 10.4 (L)   Hematocrit Latest Ref Range: 40.0 - 53.0 % 33.0 (L) 31.0 (L)   Platelet Count Latest Ref Range: 150 - 450 10e9/L 49 (LL) 49 (LL)      Ref. Range 2017 11:58 2018 15:30 2018 15:00 2018 09:45   Albumin Fraction Latest Ref Range: 3.7 - 5.1 g/dL 3.4 (L) 3.1 (L) 3.3 (L) 3.2 (L)   Alpha 1 Fraction Latest Ref Range: 0.2 - 0.4 g/dL 0.4 0.4 0.5 (H) 0.4   Alpha 2 Fraction Latest Ref Range: 0.5 - 0.9 g/dL 1.2 (H) 1.2 (H) 1.3 (H) 1.1 (H)   Beta Fraction Latest Ref Range: 0.6 - 1.0 g/dL 0.7 0.7 0.7 0.7   ELP Interpretation: Unknown Monoclonal protei... Monoclonal protei... Large monoclonal ... Large monoclonal ...   Gamma Fraction Latest Ref Range: 0.7 - 1.6 g/dL 2.5 (H) 4.9 (H) 4.8 (H) 3.6 (H)   Kappa Free Lt Chain Latest Ref Range: 0.33 - 1.94 mg/dL <0.30 (L) <0.30 (L) <0.30 (L) <0.30 (L)   Kappa Lambda Ratio Latest Ref Range: 0.26 - 1.65  Unable to Calculate Unable to Calculate Unable to  Calculate Unable to Calculate   Lambda Free Lt Chain Latest Ref Range: 0.57 - 2.63 mg/dL 137.50 (H) 257.00 (H) 339.00 (H) 345.00 (H)   Monoclonal Peak Latest Ref Range: 0.0 g/dL 2.3 (H) 4.6 (H) 4.6 (H) 3.4 (H)     Results for WILLIAN ARCINIEGA (MRN [id number]) as of 3/22/2018 11:43   Ref. Range 3/22/2018 10:43   Sodium Latest Ref Range: 133 - 144 mmol/L 138   Potassium Latest Ref Range: 3.4 - 5.3 mmol/L 3.6   Chloride Latest Ref Range: 94 - 109 mmol/L 105   Carbon Dioxide Latest Ref Range: 20 - 32 mmol/L 25   Urea Nitrogen Latest Ref Range: 7 - 30 mg/dL 32 (H)   Creatinine Latest Ref Range: 0.66 - 1.25 mg/dL 1.60 (H)   GFR Estimate Latest Ref Range: >60 mL/min/1.7m2 42 (L)   GFR Estimate If Black Latest Ref Range: >60 mL/min/1.7m2 51 (L)   Calcium Latest Ref Range: 8.5 - 10.1 mg/dL 9.2   Anion Gap Latest Ref Range: 3 - 14 mmol/L 8   Albumin Latest Ref Range: 3.4 - 5.0 g/dL 2.8 (L)   Protein Total Latest Ref Range: 6.8 - 8.8 g/dL 8.4   Bilirubin Total Latest Ref Range: 0.2 - 1.3 mg/dL 0.3   Alkaline Phosphatase Latest Ref Range: 40 - 150 U/L 51   ALT Latest Ref Range: 0 - 70 U/L 14   AST Latest Ref Range: 0 - 45 U/L 8     IMPRESSION/PLAN:  72 year old male with relapsed MM after autologous transplant (1/9/2013), status-post multiple salvage regimens with progressive disease.    MM: With rising light chains, worsening renal function, and worsening TCP, started weekly cytoxan with prednisone 50  mg QOD on 12/14. Initially, he continued to decline clinically to the point where hospice was discussed at multiple visits.  M-spike, renal function, total protein now improved, light chains stable. Clinically, Yamil has improved substantially on this regimen with definite improvement in QOL and functioning. Yamil remains doing well today. Plan to continue with Pred/Cytoxan.   - Dr. Topete planned to add Ninlaro 3 mg days 1,8,15 q 28 day cycle to this regimen, but this is working on approval. At this point, in light of the  stable MM labs and the significant improvements, will hold off starting. If light chains increase, adding Ninalaro would be appropriate as tolerated. Biochemical markers to be drawn next week.  - Yamil's wish is to get to [place]. Given he is is doing so well, counts stable, and that he has established with an MD there and can have his labs monitored, I'm OK with him going. He understands if he gets sick there, he may end up being too weak to return home. Plans for travel not yet finalized, but likely leaving around 4/1 and returning prior to seeing Dr. Fatima. He has a visit scheduled with his [place] MD.  - Follow-up with Dr. Fatima in April  - Due for Zometa in April, q3m    Goals: We have had numerous discussions re: goals and continuing treatment. For now, Yamil feels that he is tolerating this regimen well and we will plan to continue these discussions. One of his big goals is to get to [place] and he is now clinically stable to do so!    Heme: Cytopenias 2/2 treatment and likely MM, requiring intermittent pRBC, usually once/month  - Previously on Plavix, remains on hold given thrombocytopenia, indefinitely  - Transfuse to keep hemoglobin > 8, platelets > 10k.     Renal/FEN: Worsening renal function in December-Jan (Cr max 3.40 on 1/13) in setting of progressive myeloma. This remains improved/stable.  - Encouraged protein/calorie supplements.   - Would recommend continuing Zyprexa at night to help with appetite.    ID: Afebrile. No localizing infectious /s.  - Continues ppx  mg BID  - With the prolonged prednisone use, discussed PCP prophylaxis with Yamil and Casey today and they are in agreement with starting something. Due to his renal function and cytopenias, will do monthly pentamidine for PCP prophylaxis, starting today.     Back/rib pain: Started early May. Lumbar MRI at OSH showing mild-mod central and foraminal stenoses in lumbar spine, per patient and wife, there was no MM lesion there.  Recent  imaging with no acute findings. Pain remains spontaneously resolved.     CV: Continue zocor and norvasc.   - Avoid QTc prolonging agents (history of QTc prolongation with syncopal episodes)     AMS: AMS started early May in setting of metabolic derangements, uremia, and possible infection.  Remains intermittently confused over the past few months but improved significantly and stable today. I do believe there is perhaps some ongoing cognitive decline since the AMS episodes.  - Continue Zyprexa 5 mg for appetite  - Holding off long-acting pain meds      Plan summary:  - Continue Cytoxan/Prednisone  - I will follow-up with Yamil prior to leaving for [place]. Following that, he is doing well to space visits to [facility]  - Start pentamidine today    I spent >15 minutes with the patient, with over 50% of the time spent counseling or coordinating their care as described above.    Leanne Murphy PA-C

## 2018-03-22 NOTE — MR AVS SNAPSHOT
After Visit Summary   3/22/2018    Anthony Carbone    MRN: [id number]           Patient Information     Date Of Birth          1945        Visit Information        Provider Department      3/22/2018 10:50 AM Leanne Reddy PA-C [facility]        Today's Diagnoses     Multiple myeloma in relapse (H)    -  1       Follow-ups after your visit        Your next 10 appointments already scheduled     Mar 29, 2018  7:00 AM CDT   [facility] Lab Draw with [facility] LAB DRAW   [facility]    [address] [place] [address]  [address]  [facility] [address]   [phone]            Mar 29, 2018  7:30 AM CDT   (Arrive by 7:15 AM)   Return Visit with Leanne Reddy PA-C   [facility] ([facility])    [address] [place] [address]  [address]  [facility] [address]   [phone]            Mar 29, 2018  9:00 AM CDT   Infusion 60 with UC ONCOLOGY INFUSION, UC 16 ATC   [facility] ([facility])    [address] [place] [address]  [address]  [facility] [address]   [phone]            Apr 05, 2018 12:45 PM CDT   Masonic Lab Draw with UC MASONIC LAB DRAW   [facility] Masonic Lab Draw ([facility])    [address] [place] [address]  [address]  [facility] [address]   [phone]            Apr 05, 2018  1:10 PM CDT   (Arrive by 12:55 PM)   Return Visit with Leanne Reddy PA-C   [facility] ([facility])    [address] [place] [address]  [address]  [facility] [address]   [phone]            Apr 05, 2018  2:00 PM CDT   Infusion 60 with UC ONCOLOGY INFUSION, UC 11 ATC   [facility] ([facility])    [address] [place] [address]  [address]  [facility] [address]   [phone]            Apr 17, 2018  9:30 AM CDT   Masonic Lab Draw with UC  "MASONIC LAB DRAW   [facility] Lab Draw ([facility])    [address]  [address]  [facility] [address] [phone]            Apr 17, 2018 10:00 AM CDT   RETURN ONC with Navjot Fatima MD   [facility] Blood and Marrow Transplant ([facility])    [address]  [address]  [facility] [address] [phone]              Who to contact     If you have questions or need follow up information about today's clinic visit or your schedule please contact [facility] directly at [phone].  Normal or non-critical lab and imaging results will be communicated to you by MyChart, letter or phone within 4 business days after the clinic has received the results. If you do not hear from us within 7 days, please contact the clinic through [employer] or phone. If you have a critical or abnormal lab result, we will notify you by phone as soon as possible.  Submit refill requests through [employer] or call your pharmacy and they will forward the refill request to us. Please allow 3 business days for your refill to be completed.          Additional Information About Your Visit        [employer] Information     [employer] gives you secure access to your electronic health record. If you see a primary care provider, you can also send messages to your care team and make appointments. If you have questions, please call your primary care clinic.  If you do not have a primary care provider, please call [phone] and they will assist you.        Care EveryWhere ID     This is your Care EveryWhere ID. This could be used by other organizations to access your [place] medical records  [id number]        Your Vitals Were     Pulse Temperature Height Pulse Oximetry BMI (Body Mass Index)       72 98.2  F (36.8  C) (Oral) 1.626 m (5' 4\") 97% 19.77 kg/m2        Blood Pressure from Last 3 Encounters:   03/22/18 120/75   03/16/18 127/79   03/08/18 158/84    " Weight from Last 3 Encounters:   03/22/18 52.3 kg (115 lb 3.2 oz)   03/16/18 52.8 kg (116 lb 6.4 oz)   03/08/18 52.2 kg (115 lb 1.6 oz)              Today, you had the following     No orders found for display       Primary Care Provider Office Phone # Fax #    Sanket Burciaga [phone]       [facility] [address] [address]        Equal Access to Services     ALBAN SHAH : Hadii chalo ku hadasho Soomaali, waaxda luqadaha, qaybta kaalmada adeegyada, waxdannie guevara hayfishn kayleigh jurado . So [facility] [phone].    ATENCIÓN: Si habla español, tiene a todd disposición servicios gratuitos de asistencia lingüística. Parminder al [phone].    We comply with applicable federal civil rights laws and [place] laws. We do not discriminate on the basis of race, color, national origin, age, disability, sex, sexual orientation, or gender identity.            Thank you!     Thank you for choosing [facility] CANCER [facility]  for your care. Our goal is always to provide you with excellent care. Hearing back from our patients is one way we can continue to improve our services. Please take a few minutes to complete the written survey that you may receive in the mail after your visit with us. Thank you!             Your Updated Medication List - Protect others around you: Learn how to safely use, store and throw away your medicines at www.disposemymeds.org.          This list is accurate as of 3/22/18 11:45 AM.  Always use your most recent med list.                   Brand Name Dispense Instructions for use Diagnosis    acetaminophen 325 MG tablet    TYLENOL    100 tablet    Take 2 tablets (650 mg) by mouth every 4 hours as needed for mild pain or fever    Fever, unspecified fever cause       acyclovir 400 MG tablet    ZOVIRAX    60 tablet    Take 1 tablet (400 mg) by mouth 2 times daily    Multiple myeloma in relapse (H)       amLODIPine 5 MG tablet    NORVASC    90 tablet     TAKE 1 TABLET BY MOUTH AT BEDTIME    Essential hypertension       esomeprazole 40 MG CR capsule    nexIUM    30 capsule    Take 1 capsule (40 mg) by mouth every morning (before breakfast)    Gastroesophageal reflux disease without esophagitis       ixazomib 3 MG capsule    NINLARO    4 capsule    Take 1 capsule (3 mg) by mouth every 7 days    Multiple myeloma in relapse (H), Multiple myeloma, remission status unspecified (H)       LORazepam 0.5 MG tablet    ATIVAN    30 tablet    Take 1 tablet (0.5 mg) by mouth every 6 hours as needed for nausea or anxiety.    Multiple myeloma in relapse (H), Delirium       OLANZapine 5 MG tablet    zyPREXA    60 tablet    Take by mouth At Bedtime Takes 1 tablet    Delirium, Multiple myeloma in relapse (H)       ondansetron 8 MG ODT tab    ZOFRAN-ODT    30 tablet    Take 1 tablet (8 mg) by mouth every 8 hours as needed for nausea    Nausea       oxyCODONE IR 5 MG tablet    ROXICODONE    15 tablet    Take 1 tablet (5 mg) by mouth every 6 hours as needed for moderate to severe pain        predniSONE 50 MG tablet    DELTASONE     Take 1 tablet (50 mg) by mouth every other day    Multiple myeloma, remission status unspecified (H)       SIMVASTATIN PO      Take 20 mg by mouth At Bedtime        traMADol 50 MG tablet    ULTRAM    60 tablet    Take 1 tablet (50 mg) by mouth every 6 hours as needed for moderate pain . Recommend trying after Tylenol and before Dilaudid.    Acute bilateral low back pain without sciatica, Multiple myeloma in relapse (H)

## 2018-03-22 NOTE — PATIENT INSTRUCTIONS
Contact Numbers    [facility] Main Line: [phone]  [facility] Triage:  [phone]    Call triage with chills and/or temperature greater than or equal to 100.5, uncontrolled nausea/vomiting, diarrhea, constipation, dizziness, shortness of breath, chest pain, bleeding, unexplained bruising, or any new/concerning symptoms, questions/concerns.     If you are having any concerning symptoms or wish to speak to a provider before your next infusion visit, please call your care coordinator or triage to notify them so we can adequately serve you.       After Hours: [phone]    If after hours, weekends, or holidays, call main hospital  and ask for Oncology doctor on call.         March 2018 Sunday Monday Tuesday Wednesday Thursday Friday Saturday                       1     [facility] ONC INFUSION 120   10:00 AM   (120 min.)    ONCOLOGY INFUSION   [facility] 2     3       4     5     6     7     8     [facility] DRAW    8:30 AM   (15 min.)   [facility]   [facility] Lab Draw     UMP RETURN    8:55 AM   (50 min.)   Leanne Reddy PA-C   [facility] 60   10:00 AM   (60 min.)    ONCOLOGY INFUSION   [facility] 9     10       11     12     13     14     15     16     [facility] DRAW    2:00 PM   (15 min.)   [facility] DRAW   [facility] Lab Draw     UMP RETURN    2:15 PM   (50 min.)   Leanne Reddy PA-C   [facility] 60    3:00 PM   (60 min.)    ONCOLOGY INFUSION   [facility] 17       18     19     20     21     22     [facility] DRAW   10:15 AM   (15 min.)   [facility]   [facility]     [facility] RETURN   10:35 AM   (50 min.)   Leanne Reddy PA-C   [facility]     UMP ONC INFUSION 60   11:30 AM   (60 min.)    ONCOLOGY INFUSION   [facility] 23     24        25     26     27     28     29     [facility] DRAW    7:00 AM   (15 min.)    [facility] LAB DRAW   [facility] Mason[facility] Lab Draw     UMP RETURN    7:15 AM   (50 min.)   Leanne Reddy PA-C   [facility]     UMP ONC INFUSION 60    9:00 AM   (60 min.)   UC ONCOLOGY INFUSION   [facility] 30 31 April 2018 Sunday Monday Tuesday Wednesday Thursday Friday Saturday   1     2     3     4     5     [facility] DRAW   12:45 PM   (15 min.)    [facility]   [facility]     [facility] RETURN   12:55 PM   (50 min.)   Leanne Reddy PA-C   [facility]     UMP ONC INFUSION 60    2:00 PM   (60 min.)    ONCOLOGY INFUSION   [facility] 6     7       8     9     10     11     12     13     14       15     16     17     [facility] DRAW    9:30 AM   (15 min.)    [facility] LAB DRAW   [facility] Lab Draw     UMP ONC RETURN   10:00 AM   (30 min.)   Navjot Fatima MD   [facility] Blood and Marrow Transplant 18     19     20     21       22     23     24     25     26     27     28       29     30                                          Recent Results (from the past 24 hour(s))   CBC with platelets differential    Collection Time: 03/22/18 10:43 AM   Result Value Ref Range    WBC 3.6 (L) 4.0 - 11.0 10e9/L    RBC Count 3.21 (L) 4.4 - 5.9 10e12/L    Hemoglobin 10.4 (L) 13.3 - 17.7 g/dL    Hematocrit 31.0 (L) 40.0 - 53.0 %    MCV 97 78 - 100 fl    MCH 32.4 26.5 - 33.0 pg    MCHC 33.5 31.5 - 36.5 g/dL    RDW 18.4 (H) 10.0 - 15.0 %    Platelet Count 49 (LL) 150 - 450 10e9/L    Diff Method Automated Method     % Neutrophils 86.1 %    % Lymphocytes 1.9 %    % Monocytes 10.3 %    % Eosinophils 0.6 %    % Basophils 0.3 %    % Immature Granulocytes 0.8 %    Nucleated RBCs 0 0 /100    Absolute Neutrophil 3.1 1.6 - 8.3 10e9/L    Absolute Lymphocytes 0.1 (L) 0.8 - 5.3 10e9/L    Absolute Monocytes 0.4  0.0 - 1.3 10e9/L    Absolute Eosinophils 0.0 0.0 - 0.7 10e9/L    Absolute Basophils 0.0 0.0 - 0.2 10e9/L    Abs Immature Granulocytes 0.0 0 - 0.4 10e9/L    Absolute Nucleated RBC 0.0    Comprehensive metabolic panel    Collection Time: 03/22/18 10:43 AM   Result Value Ref Range    Sodium 138 133 - 144 mmol/L    Potassium 3.6 3.4 - 5.3 mmol/L    Chloride 105 94 - 109 mmol/L    Carbon Dioxide 25 20 - 32 mmol/L    Anion Gap 8 3 - 14 mmol/L    Glucose 92 70 - 99 mg/dL    Urea Nitrogen 32 (H) 7 - 30 mg/dL    Creatinine 1.60 (H) 0.66 - 1.25 mg/dL    GFR Estimate 42 (L) >60 mL/min/1.7m2    GFR Estimate If Black 51 (L) >60 mL/min/1.7m2    Calcium 9.2 8.5 - 10.1 mg/dL    Bilirubin Total 0.3 0.2 - 1.3 mg/dL    Albumin 2.8 (L) 3.4 - 5.0 g/dL    Protein Total 8.4 6.8 - 8.8 g/dL    Alkaline Phosphatase 51 40 - 150 U/L    ALT 14 0 - 70 U/L    AST 8 0 - 45 U/L

## 2018-03-22 NOTE — MR AVS SNAPSHOT
After Visit Summary   3/22/2018    Anthony Carbone    MRN: [id number]           Patient Information     Date Of Birth          1945        Visit Information        Provider Department      3/22/2018 11:30 AM  24 ATC;  ONCOLOGY INFUSION [facility]        Today's Diagnoses     Multiple myeloma in relapse (H)    -  1    Multiple myeloma, remission status unspecified (H)          Care Instructions    Contact Numbers    [facility] Main Line: [phone]  [facility] Triage:  [phone]    Call triage with chills and/or temperature greater than or equal to 100.5, uncontrolled nausea/vomiting, diarrhea, constipation, dizziness, shortness of breath, chest pain, bleeding, unexplained bruising, or any new/concerning symptoms, questions/concerns.     If you are having any concerning symptoms or wish to speak to a provider before your next infusion visit, please call your care coordinator or triage to notify them so we can adequately serve you.       After Hours: [phone]    If after hours, weekends, or holidays, call main hospital  and ask for Oncology doctor on call.         March 2018 Sunday Monday Tuesday Wednesday Thursday Friday Saturday                       1     P ONC INFUSION 120   10:00 AM   (120 min.)    ONCOLOGY INFUSION   [facility] 2     3       4     5     6     7     8     P [facility] LAB DRAW    8:30 AM   (15 min.)    [facility] LAB DRAW   [facility] Lab Draw     P RETURN    8:55 AM   (50 min.)   Leanne Reddy PA-C [facility] ONC INFUSION 60   10:00 AM   (60 min.)    ONCOLOGY INFUSION   [facility] 9     10       11     12     13     14     15     16     [facility] DRAW    2:00 PM   (15 min.)    [facility]ON[facility] LAB DRAW   [facility] Lab Draw     UMP RETURN    2:15 PM   (50 min.)   Leanne Reddy PA-C   [facility] ONC  INFUSION 60    3:00 PM   (60 min.)   UC ONCOLOGY INFUSION   [facility] 17       18     19     20     21     22     [facility] DRAW   10:15 AM   (15 min.)    [facility] LAB DRAW   [facility] Lab Draw     UMP RETURN   10:35 AM   (50 min.)   Leanne Reddy PA-C   [facility]     UMP ONC INFUSION 60   11:30 AM   (60 min.)    ONCOLOGY INFUSION   [facility] 23     24       25     26     27     28     29     [facility] DRAW    7:00 AM   (15 min.)    [facility]ON[facility] LAB DRAW   [facility] Lab Draw     UMP RETURN    7:15 AM   (50 min.)   Leanne Reddy PA-C   [facility]     UMP ONC INFUSION 60    9:00 AM   (60 min.)    ONCOLOGY INFUSION   [facility] 30 31 April 2018 Sunday Monday Tuesday Wednesday Thursday Friday Saturday   1     2     3     4     5     [facility] DRAW   12:45 PM   (15 min.)    [facility]ON[facility] LAB DRAW   [facility] Lab Draw     UMP RETURN   12:55 PM   (50 min.)   Leanne Reddy PA-C   [facility]     UMP ONC INFUSION 60    2:00 PM   (60 min.)    ONCOLOGY INFUSION   [facility] 6     7       8     9     10     11     12     13     14       15     16     17     [facility] DRAW    9:30 AM   (15 min.)    [facility] LAB DRAW   [facility] Lab Draw     UMP ONC RETURN   10:00 AM   (30 min.)   Navjot Fatima MD   [facility] Blood and Marrow Transplant 18     19     20     21       22     23     24     25     26     27     28       29     30                                          Recent Results (from the past 24 hour(s))   CBC with platelets differential    Collection Time: 03/22/18 10:43 AM   Result Value Ref Range    WBC 3.6 (L) 4.0 - 11.0 10e9/L    RBC Count 3.21 (L) 4.4 - 5.9 10e12/L    Hemoglobin 10.4 (L) 13.3 - 17.7 g/dL    Hematocrit 31.0 (L) 40.0 - 53.0 %    MCV 97 78 - 100 fl    MCH  32.4 26.5 - 33.0 pg    MCHC 33.5 31.5 - 36.5 g/dL    RDW 18.4 (H) 10.0 - 15.0 %    Platelet Count 49 (LL) 150 - 450 10e9/L    Diff Method Automated Method     % Neutrophils 86.1 %    % Lymphocytes 1.9 %    % Monocytes 10.3 %    % Eosinophils 0.6 %    % Basophils 0.3 %    % Immature Granulocytes 0.8 %    Nucleated RBCs 0 0 /100    Absolute Neutrophil 3.1 1.6 - 8.3 10e9/L    Absolute Lymphocytes 0.1 (L) 0.8 - 5.3 10e9/L    Absolute Monocytes 0.4 0.0 - 1.3 10e9/L    Absolute Eosinophils 0.0 0.0 - 0.7 10e9/L    Absolute Basophils 0.0 0.0 - 0.2 10e9/L    Abs Immature Granulocytes 0.0 0 - 0.4 10e9/L    Absolute Nucleated RBC 0.0    Comprehensive metabolic panel    Collection Time: 03/22/18 10:43 AM   Result Value Ref Range    Sodium 138 133 - 144 mmol/L    Potassium 3.6 3.4 - 5.3 mmol/L    Chloride 105 94 - 109 mmol/L    Carbon Dioxide 25 20 - 32 mmol/L    Anion Gap 8 3 - 14 mmol/L    Glucose 92 70 - 99 mg/dL    Urea Nitrogen 32 (H) 7 - 30 mg/dL    Creatinine 1.60 (H) 0.66 - 1.25 mg/dL    GFR Estimate 42 (L) >60 mL/min/1.7m2    GFR Estimate If Black 51 (L) >60 mL/min/1.7m2    Calcium 9.2 8.5 - 10.1 mg/dL    Bilirubin Total 0.3 0.2 - 1.3 mg/dL    Albumin 2.8 (L) 3.4 - 5.0 g/dL    Protein Total 8.4 6.8 - 8.8 g/dL    Alkaline Phosphatase 51 40 - 150 U/L    ALT 14 0 - 70 U/L    AST 8 0 - 45 U/L                 Follow-ups after your visit        Your next 10 appointments already scheduled     Mar 29, 2018  7:00 AM T   [facility] Lab Draw with  MASONIC LAB DRAW   [facility] Mason[facility] Lab Draw ([facility])    [address] [address] [phone]            Mar 29, 2018  7:30 AM CDT   (Arrive by 7:15 AM)   Return Visit with Leanne Reddy PA-C   [facility] ([facility])    [address] [place] [address]   [phone]            Mar 29, 2018  9:00 AM CDT   Infusion 60 with UC ONCOLOGY  INFUSION, UC 16 ATC   [facility] ([facility])    [address]  [address]  [facility] [address]   [phone]            Apr 05, 2018 12:45 PM CDT   Masonic Lab Draw with UC MASONIC LAB DRAW   [facility] Masonic Lab Draw ([facility])    [address]  [address]  [facility] [address]   [phone]            Apr 05, 2018  1:10 PM CDT   (Arrive by 12:55 PM)   Return Visit with Leanne Reddy PA-C   [facility] ([facility])    [address]  [address]  [facility] [address] [phone]            Apr 05, 2018  2:00 PM CDT   Infusion 60 with UC ONCOLOGY INFUSION, UC 11 ATC   [facility] ([facility])    [address] [place] [address]  [address]  [facility] [address] [phone]            Apr 17, 2018  9:30 AM CDT   Masonic Lab Draw with  MASONIC LAB DRAW   [facility] Lab Draw ([facility])    [address]  [address]  [facility] [address] [phone]            Apr 17, 2018 10:00 AM CDT   RETURN ONC with Navjot Fatima MD   [facility] Blood and Marrow Transplant ([facility])    [address] [place] [address]  [address]  [facility] [id number] [phone]              Who to contact     If you have questions or need follow up information about today's clinic visit or your schedule please contact [facility] directly at [phone].  Normal or non-critical lab and imaging results will be communicated to you by MyChart, letter or phone within 4 business days after the clinic has received the results. If you do not hear from us within 7 days, please contact the clinic through MyChart or phone. If you have a critical or abnormal lab result, we will notify you by phone as soon as possible.  Submit refill requests through [employer] or call  your pharmacy and they will forward the refill request to us. Please allow 3 business days for your refill to be completed.          Additional Information About Your Visit        [employer] Information     [employer] gives you secure access to your electronic health record. If you see a primary care provider, you can also send messages to your care team and make appointments. If you have questions, please call your primary care clinic.  If you do not have a primary care provider, please call [phone] and they will assist you.        Care EveryWhere ID     This is your Care EveryWhere ID. This could be used by other organizations to access your [place] medical records  [id number]         Blood Pressure from Last 3 Encounters:   03/22/18 120/75   03/16/18 127/79   03/08/18 158/84    Weight from Last 3 Encounters:   03/22/18 52.3 kg (115 lb 3.2 oz)   03/16/18 52.8 kg (116 lb 6.4 oz)   03/08/18 52.2 kg (115 lb 1.6 oz)              We Performed the Following     CBC with platelets differential     Comprehensive metabolic panel        Primary Care Provider Office Phone # Fax #    Sanket Burciaga [phone]       [facility] [address] [address]        Equal Access to Services     ALBAN SHAH : Hadii chalo hernandezo Soemilieali, waaxda luqadaha, qaybta kaalmada adeegyada, jonny villa. So [facility] [phone].    ATENCIÓN: Si habla español, tiene a todd disposición servicios gratuitos de asistencia lingüística. Llame al [phone].    We comply with applicable federal civil rights laws and [place] laws. We do not discriminate on the basis of race, color, national origin, age, disability, sex, sexual orientation, or gender identity.            Thank you!     Thank you for choosing [facility]  for your care. Our goal is always to provide you with excellent care. Hearing back from our patients is one way we can continue to  improve our services. Please take a few minutes to complete the written survey that you may receive in the mail after your visit with us. Thank you!             Your Updated Medication List - Protect others around you: Learn how to safely use, store and throw away your medicines at www.disposemymeds.org.          This list is accurate as of 3/22/18 12:45 PM.  Always use your most recent med list.                   Brand Name Dispense Instructions for use Diagnosis    acetaminophen 325 MG tablet    TYLENOL    100 tablet    Take 2 tablets (650 mg) by mouth every 4 hours as needed for mild pain or fever    Fever, unspecified fever cause       acyclovir 400 MG tablet    ZOVIRAX    60 tablet    Take 1 tablet (400 mg) by mouth 2 times daily    Multiple myeloma in relapse (H)       amLODIPine 5 MG tablet    NORVASC    90 tablet    TAKE 1 TABLET BY MOUTH AT BEDTIME    Essential hypertension       esomeprazole 40 MG CR capsule    nexIUM    30 capsule    Take 1 capsule (40 mg) by mouth every morning (before breakfast)    Gastroesophageal reflux disease without esophagitis       ixazomib 3 MG capsule    NINLARO    4 capsule    Take 1 capsule (3 mg) by mouth every 7 days    Multiple myeloma in relapse (H), Multiple myeloma, remission status unspecified (H)       LORazepam 0.5 MG tablet    ATIVAN    30 tablet    Take 1 tablet (0.5 mg) by mouth every 6 hours as needed for nausea or anxiety.    Multiple myeloma in relapse (H), Delirium       OLANZapine 5 MG tablet    zyPREXA    60 tablet    Take by mouth At Bedtime Takes 1 tablet    Delirium, Multiple myeloma in relapse (H)       ondansetron 8 MG ODT tab    ZOFRAN-ODT    30 tablet    Take 1 tablet (8 mg) by mouth every 8 hours as needed for nausea    Nausea       oxyCODONE IR 5 MG tablet    ROXICODONE    15 tablet    Take 1 tablet (5 mg) by mouth every 6 hours as needed for moderate to severe pain        predniSONE 50 MG tablet    DELTASONE     Take 1 tablet (50 mg) by mouth every  other day    Multiple myeloma, remission status unspecified (H)       SIMVASTATIN PO      Take 20 mg by mouth At Bedtime        traMADol 50 MG tablet    ULTRAM    60 tablet    Take 1 tablet (50 mg) by mouth every 6 hours as needed for moderate pain . Recommend trying after Tylenol and before Dilaudid.    Acute bilateral low back pain without sciatica, Multiple myeloma in relapse (H)

## 2018-03-22 NOTE — LETTER
3/22/2018      RE: Anthony Carbone  [address] [place] [address]       HEMATOLOGY/ONCOLOGY PROGRESS NOTE  Mar 22, 2018    REASON FOR VISIT: myeloma    Diagnosis: 72 year old gentleman with IgM lambda multiple myeloma originally diagnosed in 01/2012 as stage I, standard risk disease.   Treatment: Revlimid plus dexamethasone for 4-5 cycles but plateaued by late 03/2012.   - Velcade was added and he received another 2-3 cycles, achieving a good partial remission.      Transplant: Single auto after melphalan 200 mg/m2 preparative regimen on 01/09/2013  - Post-transplant course: unremarkable except for some mild steroid-induced hyperglycemia, gastritis, nausea and vomiting.      Maintenance: Lenalidomide at day-100 then developed a maculopapular rash. Lenalidomide was held and then we re-challenged him after about a month to 2 months at a lower dose (5 mg daily); rash returned.   - was started on maintenance Velcade, every other week through July 2014, when he was noted with abnormalities on myeloma studies drawn there. Noted with prostate cancer dx at about the time of relapse (see below).     Relapse: noted with return on M-spike in blood/urine with marrow involvement but negative PET.   - Started on retreatment with RVD on 8/27/14 with Revlimid at 15 mg 14 days of 21 days, dexamethasone 40 mg weekly and velcade weekly.Completed at total of 5 cycles without complication by 12/2014.   - Started Cycle 6 on inc'd Rev dosing of 20mg daily x 2 weeks on 12/11/14 which was complicated by pneumonia.  - Adm 12/21-1/10 for human metapneumovirus pneumonia complicated by anorexia, HTN, depression, anorexia with significant weight loss.   - Restarted Ernesto/Dex only on 2/4/15 with good tolerance but with thrombocytopenia.   - Bendamustine added to Velcade/dexamethasone on 5/21/15 due to disease progression  - Velcade discontinued on 7/9/2015 due to side effects (orthostasis)  - Schedule changed to bendamustine 80 mg/m2  days 1 and 2 on 28-day cycle  - Cycle 1 tolerated poorly due to lightheadedness and weakness  - Cycle 2 dose reduced to 60 mg/m2 and pre-meds adjusted  - Cycle 5 received on 9/17/15.  - 10/1/2015 - increasing IgM, M-spike - started Pomalidomide 4mg/day (21 days out of 28 days) and weekly Decadron 20mg on Oct 1st, 2015.    - Carfilzomib added on 10/22/2015 making this CPD.  - C3-C6  received in [place]    - Returned to [facility] and resumed CPD here    - Adm: 4/12-4/14 with fever, confusion, neutropenia. Noted on MRI to have acute/subacute CVA and subacute/chronic CVA; started on Plavix, given brief course of Abx and GCSF prior to d/c.     - Continued on Carfilzomib and dexamethasone alone  - Pomalyst added back on 7/13/2016 for rising FLC  - Start daratumumab 11/10/16. Changed to every other week after 4 weekly treatments d/t profound fatigue and malaise.   - Adm 5/7-5/16 due to AMS, hypercalcemia, hyperuricemia, possible PNA, back pain, and JOSE MARIA. Started Kyprolis while inpatient.  - Re-admitted 5/25-/529 with recurrent AMS, found to have concomitant PNA  - Resumed Kyprolis 6/13/2017. Stopped due to worsening performance status and progressive myeloma.  - Started Venclexta 800 mg 8/25/2017  - Added weekly Velcade with dexamethasone 20 mg weekly due to rising light chains on 11/1/2017  - Started weekly Cytoxan with prednisone QOD on 12/13 (though started the prednisone around 12/24)      INTERVAL HISTORY:    Anthony presents today with his wife.  Nothing new today. Continues to feel really well. Energy stable. No SE from treatment at this time. No new pains. Finalizing plans to get to [place], Watson is very excited and happy that this is happening! Eating and drinking well. Had an episode of diarrhea but no lingering symptoms.  Remainder of 10-pt ROS otherwise is negative.    PHYSICAL EXAMINATION  /75 (BP Location: Right arm, Patient Position: Chair, Cuff Size: Adult Regular)  Pulse 72  Temp 98.2  F (36.8  C) (Oral)   "Ht 1.626 m (5' 4\")  Wt 52.3 kg (115 lb 3.2 oz)  SpO2 97%  BMI 19.77 kg/m2     Wt Readings from Last 10 Encounters:   18 52.3 kg (115 lb 3.2 oz)   18 52.8 kg (116 lb 6.4 oz)   18 52.2 kg (115 lb 1.6 oz)   18 52.4 kg (115 lb 8 oz)   18 52.3 kg (115 lb 4.8 oz)   02/15/18 52.6 kg (116 lb)   18 52.8 kg (116 lb 4.8 oz)   18 51.2 kg (112 lb 12.8 oz)   18 51.4 kg (113 lb 4.8 oz)   18 50.7 kg (111 lb 11.2 oz)     General: Alert, frail. Oriented to name, , location,  Able to ambulate independently, albeit slow and cautiously.  No acute distress. HEENT: PERRL, no palor or icterus. CVS: RRR CHEST: CTAB, normal work of breathing, right port without erythema, swelling or pus.  ABDOMEN: soft non tender no masses     NEURO: AAOX3  CN 2-12 intact  SKIN: no bleeding or brusiing, no rashes EXTREMITIES: No edema.     LABS:   Results for WILLIAN ARCINIEGA (MRN [id number]) as of 3/22/2018 11:09   Ref. Range 3/16/2018 14:08 3/22/2018 10:43   WBC Latest Ref Range: 4.0 - 11.0 10e9/L 3.5 (L) 3.6 (L)   Hemoglobin Latest Ref Range: 13.3 - 17.7 g/dL 10.7 (L) 10.4 (L)   Hematocrit Latest Ref Range: 40.0 - 53.0 % 33.0 (L) 31.0 (L)   Platelet Count Latest Ref Range: 150 - 450 10e9/L 49 (LL) 49 (LL)      Ref. Range 2017 11:58 2018 15:30 2018 15:00 2018 09:45   Albumin Fraction Latest Ref Range: 3.7 - 5.1 g/dL 3.4 (L) 3.1 (L) 3.3 (L) 3.2 (L)   Alpha 1 Fraction Latest Ref Range: 0.2 - 0.4 g/dL 0.4 0.4 0.5 (H) 0.4   Alpha 2 Fraction Latest Ref Range: 0.5 - 0.9 g/dL 1.2 (H) 1.2 (H) 1.3 (H) 1.1 (H)   Beta Fraction Latest Ref Range: 0.6 - 1.0 g/dL 0.7 0.7 0.7 0.7   ELP Interpretation: Unknown Monoclonal protei... Monoclonal protei... Large monoclonal ... Large monoclonal ...   Gamma Fraction Latest Ref Range: 0.7 - 1.6 g/dL 2.5 (H) 4.9 (H) 4.8 (H) 3.6 (H)   Kappa Free Lt Chain Latest Ref Range: 0.33 - 1.94 mg/dL <0.30 (L) <0.30 (L) <0.30 (L) <0.30 (L)   Kappa Lambda " Ratio Latest Ref Range: 0.26 - 1.65  Unable to Calculate Unable to Calculate Unable to Calculate Unable to Calculate   Lambda Free Lt Chain Latest Ref Range: 0.57 - 2.63 mg/dL 137.50 (H) 257.00 (H) 339.00 (H) 345.00 (H)   Monoclonal Peak Latest Ref Range: 0.0 g/dL 2.3 (H) 4.6 (H) 4.6 (H) 3.4 (H)     Results for WILLIAN ARCINIEGA (MRN [id number]) as of 3/22/2018 11:43   Ref. Range 3/22/2018 10:43   Sodium Latest Ref Range: 133 - 144 mmol/L 138   Potassium Latest Ref Range: 3.4 - 5.3 mmol/L 3.6   Chloride Latest Ref Range: 94 - 109 mmol/L 105   Carbon Dioxide Latest Ref Range: 20 - 32 mmol/L 25   Urea Nitrogen Latest Ref Range: 7 - 30 mg/dL 32 (H)   Creatinine Latest Ref Range: 0.66 - 1.25 mg/dL 1.60 (H)   GFR Estimate Latest Ref Range: >60 mL/min/1.7m2 42 (L)   GFR Estimate If Black Latest Ref Range: >60 mL/min/1.7m2 51 (L)   Calcium Latest Ref Range: 8.5 - 10.1 mg/dL 9.2   Anion Gap Latest Ref Range: 3 - 14 mmol/L 8   Albumin Latest Ref Range: 3.4 - 5.0 g/dL 2.8 (L)   Protein Total Latest Ref Range: 6.8 - 8.8 g/dL 8.4   Bilirubin Total Latest Ref Range: 0.2 - 1.3 mg/dL 0.3   Alkaline Phosphatase Latest Ref Range: 40 - 150 U/L 51   ALT Latest Ref Range: 0 - 70 U/L 14   AST Latest Ref Range: 0 - 45 U/L 8     IMPRESSION/PLAN:  72 year old male with relapsed MM after autologous transplant (1/9/2013), status-post multiple salvage regimens with progressive disease.    MM: With rising light chains, worsening renal function, and worsening TCP, started weekly cytoxan with prednisone 50  mg QOD on 12/14. Initially, he continued to decline clinically to the point where hospice was discussed at multiple visits.  M-spike, renal function, total protein now improved, light chains stable. Clinically, Yamil has improved substantially on this regimen with definite improvement in QOL and functioning. Yamil remains doing well today. Plan to continue with Pred/Cytoxan.   - Dr. Topete planned to add Ninlaro 3 mg days 1,8,15 q 28 day  cycle to this regimen, but this is working on approval. At this point, in light of the stable MM labs and the significant improvements, will hold off starting. If light chains increase, adding Ninalaro would be appropriate as tolerated. Biochemical markers to be drawn next week.  - Yamil's wish is to get to [place]. Given he is is doing so well, counts stable, and that he has established with an MD there and can have his labs monitored, I'm OK with him going. He understands if he gets sick there, he may end up being too weak to return home. Plans for travel not yet finalized, but likely leaving around 4/1 and returning prior to seeing Dr. Fatima. He has a visit scheduled with his [place] MD.  - Follow-up with Dr. Fatima in April  - Due for Zometa in April, q3m    Goals: We have had numerous discussions re: goals and continuing treatment. For now, Yamil feels that he is tolerating this regimen well and we will plan to continue these discussions. One of his big goals is to get to [place] and he is now clinically stable to do so!    Heme: Cytopenias 2/2 treatment and likely MM, requiring intermittent pRBC, usually once/month  - Previously on Plavix, remains on hold given thrombocytopenia, indefinitely  - Transfuse to keep hemoglobin > 8, platelets > 10k.     Renal/FEN: Worsening renal function in December-Jan (Cr max 3.40 on 1/13) in setting of progressive myeloma. This remains improved/stable.  - Encouraged protein/calorie supplements.   - Would recommend continuing Zyprexa at night to help with appetite.    ID: Afebrile. No localizing infectious /s.  - Continues ppx  mg BID  - With the prolonged prednisone use, discussed PCP prophylaxis with Yamil and Casey today and they are in agreement with starting something. Due to his renal function and cytopenias, will do monthly pentamidine for PCP prophylaxis, starting today.     Back/rib pain: Started early May. Lumbar MRI at OSH showing mild-mod central and foraminal  stenoses in lumbar spine, per patient and wife, there was no MM lesion there.  Recent imaging with no acute findings. Pain remains spontaneously resolved.     CV: Continue zocor and norvasc.   - Avoid QTc prolonging agents (history of QTc prolongation with syncopal episodes)     AMS: AMS started early May in setting of metabolic derangements, uremia, and possible infection.  Remains intermittently confused over the past few months but improved significantly and stable today. I do believe there is perhaps some ongoing cognitive decline since the AMS episodes.  - Continue Zyprexa 5 mg for appetite  - Holding off long-acting pain meds      Plan summary:  - Continue Cytoxan/Prednisone  - I will follow-up with Yamil prior to leaving for [place]. Following that, he is doing well to space visits to [facility]  - Start pentamidine today    I spent >15 minutes with the patient, with over 50% of the time spent counseling or coordinating their care as described above.    Leanne Murphy PA-C

## 2018-03-22 NOTE — NURSING NOTE
"Oncology Rooming Note    March 22, 2018 10:54 AM   Anthony Carbone is a 72 year old male who presents for:    Chief Complaint   Patient presents with     Port Draw     labs drawn via port by RN     Oncology Clinic Visit     Return: Multiple myeloma      Initial Vitals: /75 (BP Location: Right arm, Patient Position: Chair, Cuff Size: Adult Regular)  Pulse 72  Temp 98.2  F (36.8  C) (Oral)  Ht 1.626 m (5' 4\")  Wt 52.3 kg (115 lb 3.2 oz)  SpO2 97%  BMI 19.77 kg/m2 Estimated body mass index is 19.77 kg/(m^2) as calculated from the following:    Height as of this encounter: 1.626 m (5' 4\").    Weight as of this encounter: 52.3 kg (115 lb 3.2 oz). Body surface area is 1.54 meters squared.  No Pain (0) Comment: Data Unavailable   No LMP for male patient.  Allergies reviewed: Yes  Medications reviewed: Yes    Medications: Medication refills not needed today.  Pharmacy name entered into [facility]:    [id number] - [place] [id number] AT [facility] [address]  [place], [place] - [address] [facility]  [facility] [id number] [facility] [address] AT [facility] [address]    Clinical concerns: N/A    5 minutes for nursing intake (face to face time)     Anabela Madera CMA              "

## 2018-03-22 NOTE — PROGRESS NOTES
Infusion Nursing Note:  Anthony Carbone presents today for Cycle 5 Day 21 Cytoxan, Pentamidine Neb.    Patient seen by provider today: Yes: PREM Herrera    Note: Patient declined Zofran prior to Cytoxan. Pentamidine administered as ordered for PCP prophylaxis. Medication and side effects reviewed with patient and wife. All questions answered. No other concerns at this time.     Intravenous Access:  Implanted Port.    Treatment Conditions:  Lab Results   Component Value Date    HGB 10.4 03/22/2018     Lab Results   Component Value Date    WBC 3.6 03/22/2018      Lab Results   Component Value Date    ANEU 3.1 03/22/2018     Lab Results   Component Value Date    PLT 49 03/22/2018      Lab Results   Component Value Date     03/22/2018                   Lab Results   Component Value Date    POTASSIUM 3.6 03/22/2018           Lab Results   Component Value Date    MAG 1.7 01/15/2018            Lab Results   Component Value Date    CR 1.60 03/22/2018                   Lab Results   Component Value Date    JAZMIN 9.2 03/22/2018                Lab Results   Component Value Date    BILITOTAL 0.3 03/22/2018           Lab Results   Component Value Date    ALBUMIN 2.8 03/22/2018                    Lab Results   Component Value Date    ALT 14 03/22/2018           Lab Results   Component Value Date    AST 8 03/22/2018     Results reviewed, labs MET treatment parameters, ok to proceed with treatment.      Post Infusion Assessment:  Patient tolerated infusion without incident.  Blood return noted pre and post infusion.  Access discontinued per protocol.    Discharge Plan:   Patient declined prescription refills.  Discharge instructions reviewed with: Patient.  Patient and family verbalized understanding of discharge instructions and all questions answered.  Copy of AVS reviewed with patient and family.  Patient will return 3/29/18 for next appointment.  Patient discharged in stable condition accompanied by:  wife.  Face to Face time: 2.    EMILY BAZZI, RN

## 2018-03-22 NOTE — NURSING NOTE
Chief Complaint   Patient presents with     Port Draw     labs drawn via port by RN     /75 (BP Location: Right arm, Patient Position: Chair, Cuff Size: Adult Regular)  Pulse 72  Temp 98.2  F (36.8  C) (Oral)  Wt 52.3 kg (115 lb 3.2 oz)  SpO2 97%  BMI 19.77 kg/m2    Vitals taken. Port accessed by RN. Labs collected and sent. Line flushed with NS & Heparin. Pt tolerated well. Pt checked in for next appointment.    Iris Gutiérrez RN

## 2018-03-29 NOTE — PROGRESS NOTES
Infusion Nursing Note:  Anthony Carbone presents today for cycle 6, day 1 Cyclophosphamide and IV fluid bolus.    Patient seen by provider today: Yes: PREM Herrera   present during visit today: Not Applicable.    Intravenous Access:  Implanted Port.    Treatment Conditions:  Lab Results   Component Value Date    HGB 9.6 03/29/2018     Lab Results   Component Value Date    WBC 3.4 03/29/2018      Lab Results   Component Value Date    ANEU 2.9 03/29/2018     Lab Results   Component Value Date    PLT 53 03/29/2018      Lab Results   Component Value Date     03/29/2018                   Lab Results   Component Value Date    POTASSIUM 3.7 03/29/2018        Lab Results   Component Value Date    CR 1.46 03/29/2018                   Lab Results   Component Value Date    JAZMIN 8.1 03/29/2018                Lab Results   Component Value Date    BILITOTAL 0.4 03/29/2018           Lab Results   Component Value Date    ALBUMIN 2.7 03/29/2018                    Lab Results   Component Value Date    ALT 20 03/29/2018           Lab Results   Component Value Date    AST 9 03/29/2018     Results reviewed, labs MET treatment parameters, ok to proceed with treatment.    Post Infusion Assessment:  Patient tolerated infusion without incident.  Blood return noted pre and post infusion.  Site patent and intact, free from redness, edema or discomfort.  No evidence of extravasations.  Access discontinued per protocol.    Discharge Plan:   Patient declined prescription refills.  Discharge instructions reviewed with: Patient and Family.  Patient and/or family verbalized understanding of discharge instructions and all questions answered.  Copy of AVS reviewed with patient and/or family.  Patient will return 4/17/2018 for next appointment.  Patient discharged in stable condition accompanied by: wife.  Departure Mode: Ambulatory.    Gautam Burger RN

## 2018-03-29 NOTE — MR AVS SNAPSHOT
After Visit Summary   3/29/2018    Anthony Carbone    MRN: [id number]           Patient Information     Date Of Birth          1945        Visit Information        Provider Department      3/29/2018 7:30 AM Leanne Reddy PA-C [facility]        Today's Diagnoses     Multiple myeloma in relapse (H)    -  1       Follow-ups after your visit        Your next 10 appointments already scheduled     Mar 29, 2018  9:00 AM CDT   Infusion 60 with UC ONCOLOGY INFUSION, UC 16 ATC   [facility] ([facility])    [address] [place] [address]  [address]  [facility] [address]   [phone]            Apr 05, 2018 12:45 PM CDT   Masonic Lab Draw with UC MASONIC LAB DRAW   [facility] Masonic Lab Draw ([facility])    [address] [place] [address]  [address]  [facility] [address]   [phone]            Apr 05, 2018  1:10 PM CDT   (Arrive by 12:55 PM)   Return Visit with Leanne Reddy PA-C   [facility] ([facility])    [address] [place] [address]  [address]  [facility] [address]   [phone]            Apr 05, 2018  2:00 PM CDT   Infusion 60 with UC ONCOLOGY INFUSION, UC 11 ATC   [facility] ([facility])    [address] [place] [address]  [address]  [facility] [address]   [phone]            Apr 17, 2018  9:30 AM CDT   Masonic Lab Draw with UC MASONIC LAB DRAW   [facility] Masonic Lab Draw ([facility])    [address] [place] [address]  [address]  [facility] [address]   [phone]            Apr 17, 2018 10:00 AM CDT   RETURN ONC with Navjot Fatima MD   [facility] Blood and Marrow Transplant ([facility])    [address] [place] [address]  [address]  [facility] [address]   [phone]              Who to contact     If you have questions or need follow up information about today's  clinic visit or your schedule please contact [facility] directly at [phone].  Normal or non-critical lab and imaging results will be communicated to you by MyChart, letter or phone within 4 business days after the clinic has received the results. If you do not hear from us within 7 days, please contact the clinic through [email or web address] or phone. If you have a critical or abnormal lab result, we will notify you by phone as soon as possible.  Submit refill requests through [employer] or call your pharmacy and they will forward the refill request to us. Please allow 3 business days for your refill to be completed.          Additional Information About Your Visit        [email or web address] Acquis[employer] Information     See[employer] gives you secure access to your electronic health record. If you see a primary care provider, you can also send messages to your care team and make appointments. If you have questions, please call your primary care clinic.  If you do not have a primary care provider, please call [phone] and they will assist you.        Care EveryWhere ID     This is your Care EveryWhere ID. This could be used by other organizations to access your [facility] medical records  [id number]        Your Vitals Were     Pulse Temperature Respirations Pulse Oximetry BMI (Body Mass Index)       89 98.2  F (36.8  C) 16 95% 20.13 kg/m2        Blood Pressure from Last 3 Encounters:   03/29/18 105/69   03/22/18 120/75   03/16/18 127/79    Weight from Last 3 Encounters:   03/29/18 53.2 kg (117 lb 4.8 oz)   03/22/18 52.3 kg (115 lb 3.2 oz)   03/16/18 52.8 kg (116 lb 6.4 oz)              Today, you had the following     No orders found for display       Primary Care Provider Office Phone # Fax #    Sanket Burciaga [phone]       [facility] [address] [facility] [address]        Equal Access to Services     ALBAN SHAH AH: Maribel Davis, conrad ge, kylah price  jonny abdiclaudia curielaan ah. Katarina [facility] [phone].    ATENCIÓN: Si delores tao, tiene a todd disposición servicios gratuitos de asistencia lingüística. Parminder al [phone].    We comply with applicable federal civil rights laws and [place] laws. We do not discriminate on the basis of race, color, national origin, age, disability, sex, sexual orientation, or gender identity.            Thank you!     Thank you for choosing [facility]  for your care. Our goal is always to provide you with excellent care. Hearing back from our patients is one way we can continue to improve our services. Please take a few minutes to complete the written survey that you may receive in the mail after your visit with us. Thank you!             Your Updated Medication List - Protect others around you: Learn how to safely use, store and throw away your medicines at www.disposemymeds.org.          This list is accurate as of 3/29/18  8:03 AM.  Always use your most recent med list.                   Brand Name Dispense Instructions for use Diagnosis    acetaminophen 325 MG tablet    TYLENOL    100 tablet    Take 2 tablets (650 mg) by mouth every 4 hours as needed for mild pain or fever    Fever, unspecified fever cause       acyclovir 400 MG tablet    ZOVIRAX    60 tablet    Take 1 tablet (400 mg) by mouth 2 times daily    Multiple myeloma in relapse (H)       amLODIPine 5 MG tablet    NORVASC    90 tablet    TAKE 1 TABLET BY MOUTH AT BEDTIME    Essential hypertension       esomeprazole 40 MG CR capsule    nexIUM    30 capsule    Take 1 capsule (40 mg) by mouth every morning (before breakfast)    Gastroesophageal reflux disease without esophagitis       ixazomib 3 MG capsule    NINLARO    4 capsule    Take 1 capsule (3 mg) by mouth every 7 days    Multiple myeloma in relapse (H), Multiple myeloma, remission status unspecified (H)       LORazepam 0.5 MG tablet    ATIVAN    30 tablet    Take 1  tablet (0.5 mg) by mouth every 6 hours as needed for nausea or anxiety.    Multiple myeloma in relapse (H), Delirium       OLANZapine 5 MG tablet    zyPREXA    60 tablet    Take by mouth At Bedtime Takes 1 tablet    Delirium, Multiple myeloma in relapse (H)       ondansetron 8 MG ODT tab    ZOFRAN-ODT    30 tablet    Take 1 tablet (8 mg) by mouth every 8 hours as needed for nausea    Nausea       oxyCODONE IR 5 MG tablet    ROXICODONE    15 tablet    Take 1 tablet (5 mg) by mouth every 6 hours as needed for moderate to severe pain        predniSONE 50 MG tablet    DELTASONE     Take 1 tablet (50 mg) by mouth every other day    Multiple myeloma, remission status unspecified (H)       SIMVASTATIN PO      Take 20 mg by mouth At Bedtime        traMADol 50 MG tablet    ULTRAM    60 tablet    Take 1 tablet (50 mg) by mouth every 6 hours as needed for moderate pain . Recommend trying after Tylenol and before Dilaudid.    Acute bilateral low back pain without sciatica, Multiple myeloma in relapse (H)

## 2018-03-29 NOTE — PROGRESS NOTES
HEMATOLOGY/ONCOLOGY PROGRESS NOTE  Mar 29, 2018    REASON FOR VISIT: myeloma    Diagnosis: 72 year old gentleman with IgM lambda multiple myeloma originally diagnosed in 01/2012 as stage I, standard risk disease.   Treatment: Revlimid plus dexamethasone for 4-5 cycles but plateaued by late 03/2012.   - Velcade was added and he received another 2-3 cycles, achieving a good partial remission.      Transplant: Single auto after melphalan 200 mg/m2 preparative regimen on 01/09/2013  - Post-transplant course: unremarkable except for some mild steroid-induced hyperglycemia, gastritis, nausea and vomiting.      Maintenance: Lenalidomide at day-100 then developed a maculopapular rash. Lenalidomide was held and then we re-challenged him after about a month to 2 months at a lower dose (5 mg daily); rash returned.   - was started on maintenance Velcade, every other week through July 2014, when he was noted with abnormalities on myeloma studies drawn there. Noted with prostate cancer dx at about the time of relapse (see below).     Relapse: noted with return on M-spike in blood/urine with marrow involvement but negative PET.   - Started on retreatment with RVD on 8/27/14 with Revlimid at 15 mg 14 days of 21 days, dexamethasone 40 mg weekly and velcade weekly.Completed at total of 5 cycles without complication by 12/2014.   - Started Cycle 6 on inc'd Rev dosing of 20mg daily x 2 weeks on 12/11/14 which was complicated by pneumonia.  - Adm 12/21-1/10 for human metapneumovirus pneumonia complicated by anorexia, HTN, depression, anorexia with significant weight loss.   - Restarted Ernesto/Dex only on 2/4/15 with good tolerance but with thrombocytopenia.   - Bendamustine added to Velcade/dexamethasone on 5/21/15 due to disease progression  - Velcade discontinued on 7/9/2015 due to side effects (orthostasis)  - Schedule changed to bendamustine 80 mg/m2 days 1 and 2 on 28-day cycle  - Cycle 1 tolerated poorly due to lightheadedness and  weakness  - Cycle 2 dose reduced to 60 mg/m2 and pre-meds adjusted  - Cycle 5 received on 9/17/15.  - 10/1/2015 - increasing IgM, M-spike - started Pomalidomide 4mg/day (21 days out of 28 days) and weekly Decadron 20mg on Oct 1st, 2015.    - Carfilzomib added on 10/22/2015 making this CPD.  - C3-C6  received in [place]    - Returned to [facility] and resumed CPD here    - Adm: 4/12-4/14 with fever, confusion, neutropenia. Noted on MRI to have acute/subacute CVA and subacute/chronic CVA; started on Plavix, given brief course of Abx and GCSF prior to d/c.     - Continued on Carfilzomib and dexamethasone alone  - Pomalyst added back on 7/13/2016 for rising FLC  - Start daratumumab 11/10/16. Changed to every other week after 4 weekly treatments d/t profound fatigue and malaise.   - Adm 5/7-5/16 due to AMS, hypercalcemia, hyperuricemia, possible PNA, back pain, and JOSE MARIA. Started Kyprolis while inpatient.  - Re-admitted 5/25-/529 with recurrent AMS, found to have concomitant PNA  - Resumed Kyprolis 6/13/2017. Stopped due to worsening performance status and progressive myeloma.  - Started Venclexta 800 mg 8/25/2017  - Added weekly Velcade with dexamethasone 20 mg weekly due to rising light chains on 11/1/2017  - Started weekly Cytoxan with prednisone QOD on 12/13 (though started the prednisone around 12/24)      INTERVAL HISTORY:    Anthony presents today with his wife.  Nothing new today. Continues to feel really well. Energy stable. No SE from treatment at this time. No new pains. Eating and drinking well. Going to [place] on Monday and very excited about this. Remainder of 10-pt ROS otherwise is negative.    PHYSICAL EXAMINATION  /69  Pulse 89  Temp 98.2  F (36.8  C)  Resp 16  Wt 53.2 kg (117 lb 4.8 oz)  SpO2 95%  BMI 20.13 kg/m2     Wt Readings from Last 10 Encounters:   03/29/18 53.2 kg (117 lb 4.8 oz)   03/22/18 52.3 kg (115 lb 3.2 oz)   03/16/18 52.8 kg (116 lb 6.4 oz)   03/08/18 52.2 kg (115 lb 1.6 oz)    18 52.4 kg (115 lb 8 oz)   18 52.3 kg (115 lb 4.8 oz)   02/15/18 52.6 kg (116 lb)   18 52.8 kg (116 lb 4.8 oz)   18 51.2 kg (112 lb 12.8 oz)   18 51.4 kg (113 lb 4.8 oz)     General: Alert, frail. Oriented to name, , location,  Able to ambulate independently, albeit slow and cautiously.  No acute distress. HEENT: PERRL, no palor or icterus. CVS: RRR CHEST: CTAB, normal work of breathing, right port without erythema, swelling or pus.  ABDOMEN: soft non tender no masses     NEURO: AAOX3  CN 2-12 intact  SKIN: no bleeding or brusiing, no rashes EXTREMITIES: No edema.     LABS:   Results for WILLIAN ARCINIEGA (MRN [id number]) as of 3/29/2018 07:27   Ref. Range 3/16/2018 14:08 3/22/2018 10:43 3/29/2018 07:17   WBC Latest Ref Range: 4.0 - 11.0 10e9/L 3.5 (L) 3.6 (L) 3.4 (L)   Hemoglobin Latest Ref Range: 13.3 - 17.7 g/dL 10.7 (L) 10.4 (L) 9.6 (L)   Hematocrit Latest Ref Range: 40.0 - 53.0 % 33.0 (L) 31.0 (L) 30.1 (L)   Platelet Count Latest Ref Range: 150 - 450 10e9/L 49 (LL) 49 (LL) 53 (L)     IMPRESSION/PLAN:  72 year old male with relapsed MM after autologous transplant (2013), status-post multiple salvage regimens with progressive disease.    MM: With rising light chains, worsening renal function, and worsening TCP, started weekly cytoxan with prednisone 50  mg QOD on . Initially, he continued to decline clinically to the point where hospice was discussed at multiple visits.  M-spike, renal function, total protein now improved, light chains stable. Clinically, Yamil has improved substantially on this regimen with definite improvement in QOL and functioning. Yamil remains doing well today. Plan to continue with Pred/Cytoxan.   - Dr. Topete planned to add Ninlaro 3 mg days 1,8,15 q 28 day cycle to this regimen, but this is working on approval. At this point, in light of the stable MM labs and the significant improvements, will hold off starting. If light chains increase,  adding Ninalaro would be appropriate as tolerated. Biochemical markers to be drawn next week.  - Yamil's wish is to get to [place]. Given he is is doing so well, counts stable, and that he has established with an MD there and can have his labs monitored, I'm OK with him going. He leaves on Monday.  - Follow-up with Dr. Fatima in April  - Due for Zometa in April, q3m    Goals: We have had numerous discussions re: goals and continuing treatment. For now, Yamil feels that he is tolerating this regimen well and we will plan to continue these discussions. One of his big goals is to get to [place] and he is now clinically stable to do so!    Heme: Cytopenias 2/2 treatment and likely MM, requiring intermittent pRBC, usually once/month. Blood counts stable today. He will have this monitored in [place]. Bleeding precuations, anemia precautions reviewed today.  - Previously on Plavix, remains on hold given thrombocytopenia, indefinitely  - Transfuse to keep hemoglobin > 8, platelets > 10k.     Renal/FEN: Worsening renal function in December-Jan (Cr max 3.40 on 1/13) in setting of progressive myeloma. This remains improved/stable.  - Encouraged protein/calorie supplements.   - Would recommend continuing Zyprexa at night to help with appetite.    ID: Afebrile. No localizing infectious /s.  - Continues ppx  mg BID  - With the prolonged prednisone use, discussed PCP prophylaxis with Yamil and Casey today and they are in agreement with starting something. Due to his renal function and cytopenias, started monthly pentamidine 3/22/18.     Back/rib pain: Started early May. Lumbar MRI at OSH showing mild-mod central and foraminal stenoses in lumbar spine, per patient and wife, there was no MM lesion there.  Recent imaging with no acute findings. Pain remains spontaneously resolved.     CV: Continue zocor and norvasc.   - Avoid QTc prolonging agents (history of QTc prolongation with syncopal episodes)     AMS: AMS started early May in  setting of metabolic derangements, uremia, and possible infection.  Remains intermittently confused over the past few months but improved significantly and stable today. I do believe there is perhaps some ongoing cognitive decline since the AMS episodes.  - Continue Zyprexa 5 mg for appetite  - Holding off long-acting pain meds      I spent >15 minutes with the patient, with over 50% of the time spent counseling or coordinating their care as described above.    Leanne Murphy PA-C

## 2018-03-29 NOTE — MR AVS SNAPSHOT
After Visit Summary   3/29/2018    Anthony Carbone    MRN: [id number]           Patient Information     Date Of Birth          1945        Visit Information        Provider Department      3/29/2018 9:00 AM UC 16 ATC;  ONCOLOGY INFUSION [facility]        Today's Diagnoses     Multiple myeloma in relapse (H)    -  1    Multiple myeloma, remission status unspecified (H)          Care Instructions    Contact Numbers    [facility] Main Line: [phone]  [facility] Triage:  [phone]    Call triage with chills and/or temperature greater than or equal to 100.5, uncontrolled nausea/vomiting, diarrhea, constipation, dizziness, shortness of breath, chest pain, bleeding, unexplained bruising, or any new/concerning symptoms, questions/concerns.     If you are having any concerning symptoms or wish to speak to a provider before your next infusion visit, please call your care coordinator or triage to notify them so we can adequately serve you.       After Hours: [phone]    If after hours, weekends, or holidays, call main hospital  and ask for Oncology doctor on call.           March 2018 Sunday Monday Tuesday Wednesday Thursday Friday Saturday                       1     P ONC INFUSION 120   10:00 AM   (120 min.)    ONCOLOGY INFUSION   [facility] 2     3       4     5     6     7     8     P [facility] LAB DRAW    8:30 AM   (15 min.)    [facility] LAB DRAW   [facility] Lab Draw     P RETURN    8:55 AM   (50 min.)   Leanne Reddy PA-C [facility] ONC INFUSION 60   10:00 AM   (60 min.)    ONCOLOGY INFUSION   [facility] 9     10       11     12     13     14     15     16     P [facility] LAB DRAW    2:00 PM   (15 min.)    [facility] LAB DRAW   [facility] Lab Draw     UMP RETURN    2:15 PM   (50 min.)   Leanne Reddy PA-C   [facility] ONC  INFUSION 60    3:00 PM   (60 min.)   UC ONCOLOGY INFUSION   [facility] 17       18     19     20     21     22     UMP [facility] LAB DRAW   10:15 AM   (15 min.)    [facility] LAB DRAW   [facility] Lab Draw     UMP RETURN   10:35 AM   (50 min.)   Leanne Reddy PA-C   [facility]     UMP ONC INFUSION 60   11:30 AM   (60 min.)    ONCOLOGY INFUSION   [facility] 23     24       25     26     27     28     29     [facility] LAB DRAW    7:00 AM   (15 min.)    [facility] LAB DRAW   [facility] Lab Draw     UMP RETURN    7:15 AM   (50 min.)   Leanne Reddy PA-C   [facility]     UMP ONC INFUSION 60    9:00 AM   (60 min.)    ONCOLOGY INFUSION   [facility] 30 31 April 2018 Sunday Monday Tuesday Wednesday Thursday Friday Saturday   1     2     3     4     5     6     7       8     9     10     11     12     13     14       15     16     17     UMP [facility] LAB DRAW    9:30 AM   (15 min.)    [facility] LAB DRAW   [facility] Lab Draw     UMP ONC RETURN   10:00 AM   (30 min.)   Navjot Fatima MD   [facility] Blood and Marrow Transplant     UMP ONC INFUSION 60   10:30 AM   (60 min.)    ONCOLOGY INFUSION   [facility] 18     19     20     21       22     23     24     25     26     27     28       29     30                                           Lab Results:  Recent Results (from the past 12 hour(s))   CBC with platelets differential    Collection Time: 03/29/18  7:17 AM   Result Value Ref Range    WBC 3.4 (L) 4.0 - 11.0 10e9/L    RBC Count 3.06 (L) 4.4 - 5.9 10e12/L    Hemoglobin 9.6 (L) 13.3 - 17.7 g/dL    Hematocrit 30.1 (L) 40.0 - 53.0 %    MCV 98 78 - 100 fl    MCH 31.4 26.5 - 33.0 pg    MCHC 31.9 31.5 - 36.5 g/dL    RDW 18.7 (H) 10.0 - 15.0 %    Platelet Count 53 (L) 150 - 450 10e9/L    Diff Method Automated Method     % Neutrophils 85.8 %    %  Lymphocytes 2.1 %    % Monocytes 8.8 %    % Eosinophils 1.2 %    % Basophils 0.3 %    % Immature Granulocytes 1.8 %    Nucleated RBCs 0 0 /100    Absolute Neutrophil 2.9 1.6 - 8.3 10e9/L    Absolute Lymphocytes 0.1 (L) 0.8 - 5.3 10e9/L    Absolute Monocytes 0.3 0.0 - 1.3 10e9/L    Absolute Eosinophils 0.0 0.0 - 0.7 10e9/L    Absolute Basophils 0.0 0.0 - 0.2 10e9/L    Abs Immature Granulocytes 0.1 0 - 0.4 10e9/L    Absolute Nucleated RBC 0.0    Comprehensive metabolic panel    Collection Time: 03/29/18  7:17 AM   Result Value Ref Range    Sodium 138 133 - 144 mmol/L    Potassium 3.7 3.4 - 5.3 mmol/L    Chloride 106 94 - 109 mmol/L    Carbon Dioxide 22 20 - 32 mmol/L    Anion Gap 10 3 - 14 mmol/L    Glucose 179 (H) 70 - 99 mg/dL    Urea Nitrogen 22 7 - 30 mg/dL    Creatinine 1.46 (H) 0.66 - 1.25 mg/dL    GFR Estimate 47 (L) >60 mL/min/1.7m2    GFR Estimate If Black 57 (L) >60 mL/min/1.7m2    Calcium 8.1 (L) 8.5 - 10.1 mg/dL    Bilirubin Total 0.4 0.2 - 1.3 mg/dL    Albumin 2.7 (L) 3.4 - 5.0 g/dL    Protein Total 8.1 6.8 - 8.8 g/dL    Alkaline Phosphatase 52 40 - 150 U/L    ALT 20 0 - 70 U/L    AST 9 0 - 45 U/L   ABO/Rh type and screen    Collection Time: 03/29/18  7:17 AM   Result Value Ref Range    ABO PENDING     Antibody Screen PENDING     Test Valid Only At          [facility]    Specimen Expires 04/01/2018                Follow-ups after your visit        Your next 10 appointments already scheduled     Mar 29, 2018  9:00 AM CDT   Infusion 60 with  ONCOLOGY INFUSION,  16 ATC   [facility] ([facility])    [address] [place] [address] [phone]            Apr 17, 2018  9:30 AM CDT   [facility] Lab Draw with UC [facility] LAB DRAW   [facility] Lab Draw ([facility])    [address]  [address]  [place] [id number] [phone]            Apr 17, 2018  10:00 AM CDT   RETURN ONC with Navjot Fatima MD   [facility] Blood and Marrow Transplant ([facility])    [address]  [address]  [place] [address] [phone]            Apr 17, 2018 10:30 AM CDT   Infusion 60 with UC ONCOLOGY INFUSION, UC 24 ATC   [facility] ([facility])    [address]  [address]  [place] [address] [phone]              Who to contact     If you have questions or need follow up information about today's clinic visit or your schedule please contact [facility] directly at [phone].  Normal or non-critical lab and imaging results will be communicated to you by [employer], letter or phone within 4 business days after the clinic has received the results. If you do not hear from us within 7 days, please contact the clinic through [employer] or phone. If you have a critical or abnormal lab result, we will notify you by phone as soon as possible.  Submit refill requests through [employer] or call your pharmacy and they will forward the refill request to us. Please allow 3 business days for your refill to be completed.          Additional Information About Your Visit        [employer] Information     [employer] gives you secure access to your electronic health record. If you see a primary care provider, you can also send messages to your care team and make appointments. If you have questions, please call your primary care clinic.  If you do not have a primary care provider, please call [phone] and they will assist you.        Care EveryWhere ID     This is your Care EveryWhere ID. This could be used by other organizations to access your [facility] medical records  [id number]         Blood Pressure from Last 3 Encounters:   03/29/18 105/69   03/22/18 120/75   03/16/18 127/79    Weight from Last 3 Encounters:   03/29/18 53.2 kg (117 lb 4.8 oz)   03/22/18 52.3 kg (115 lb 3.2 oz)    03/16/18 52.8 kg (116 lb 6.4 oz)              We Performed the Following     ABO/Rh type and screen     CBC with platelets differential     Comprehensive metabolic panel     Kappa and lambda light chain     Protein electrophoresis        Primary Care Provider Office Phone # Fax #    Sanket Burciaga [phone]       [facility] [address] [facility] [address]        Equal Access to Services     [facility] Center[facility]ILFRED : Hadii aad ku hadasho Soomaali, waaxda luqadaha, qaybta kaalmada adeegyada, waxay idiin hayaan adeeg kharash la'aan . So [facility] [phone].    ATENCIÓN: Si habla espjoshua, tiene a todd disposición servicios gratuitos de asistencia lingüística. Parminder al [phone].    We comply with applicable federal civil rights laws and [place] laws. We do not discriminate on the basis of race, color, national origin, age, disability, sex, sexual orientation, or gender identity.            Thank you!     Thank you for choosing [facility]  for your care. Our goal is always to provide you with excellent care. Hearing back from our patients is one way we can continue to improve our services. Please take a few minutes to complete the written survey that you may receive in the mail after your visit with us. Thank you!             Your Updated Medication List - Protect others around you: Learn how to safely use, store and throw away your medicines at www.disposemymeds.org.          This list is accurate as of 3/29/18  8:42 AM.  Always use your most recent med list.                   Brand Name Dispense Instructions for use Diagnosis    acetaminophen 325 MG tablet    TYLENOL    100 tablet    Take 2 tablets (650 mg) by mouth every 4 hours as needed for mild pain or fever    Fever, unspecified fever cause       acyclovir 400 MG tablet    ZOVIRAX    60 tablet    Take 1 tablet (400 mg) by mouth 2 times daily    Multiple myeloma in relapse (H)       amLODIPine 5 MG tablet     NORVASC    90 tablet    TAKE 1 TABLET BY MOUTH AT BEDTIME    Essential hypertension       esomeprazole 40 MG CR capsule    nexIUM    30 capsule    Take 1 capsule (40 mg) by mouth every morning (before breakfast)    Gastroesophageal reflux disease without esophagitis       ixazomib 3 MG capsule    NINLARO    4 capsule    Take 1 capsule (3 mg) by mouth every 7 days    Multiple myeloma in relapse (H), Multiple myeloma, remission status unspecified (H)       LORazepam 0.5 MG tablet    ATIVAN    30 tablet    Take 1 tablet (0.5 mg) by mouth every 6 hours as needed for nausea or anxiety.    Multiple myeloma in relapse (H), Delirium       OLANZapine 5 MG tablet    zyPREXA    60 tablet    Take by mouth At Bedtime Takes 1 tablet    Delirium, Multiple myeloma in relapse (H)       ondansetron 8 MG ODT tab    ZOFRAN-ODT    30 tablet    Take 1 tablet (8 mg) by mouth every 8 hours as needed for nausea    Nausea       oxyCODONE IR 5 MG tablet    ROXICODONE    15 tablet    Take 1 tablet (5 mg) by mouth every 6 hours as needed for moderate to severe pain        predniSONE 50 MG tablet    DELTASONE     Take 1 tablet (50 mg) by mouth every other day    Multiple myeloma, remission status unspecified (H)       SIMVASTATIN PO      Take 20 mg by mouth At Bedtime        traMADol 50 MG tablet    ULTRAM    60 tablet    Take 1 tablet (50 mg) by mouth every 6 hours as needed for moderate pain . Recommend trying after Tylenol and before Dilaudid.    Acute bilateral low back pain without sciatica, Multiple myeloma in relapse (H)

## 2018-03-29 NOTE — PATIENT INSTRUCTIONS
Contact Numbers    [facility] Main Line: [phone]  [facility] Triage:  [phone]    Call triage with chills and/or temperature greater than or equal to 100.5, uncontrolled nausea/vomiting, diarrhea, constipation, dizziness, shortness of breath, chest pain, bleeding, unexplained bruising, or any new/concerning symptoms, questions/concerns.     If you are having any concerning symptoms or wish to speak to a provider before your next infusion visit, please call your care coordinator or triage to notify them so we can adequately serve you.       After Hours: [phone]    If after hours, weekends, or holidays, call main hospital  and ask for Oncology doctor on call.           March 2018 Sunday Monday Tuesday Wednesday Thursday Friday Saturday                       1     [facility] ONC INFUSION 120   10:00 AM   (120 min.)    ONCOLOGY INFUSION   [facility] 2     3       4     5     6     7     8     [facility] DRAW    8:30 AM   (15 min.)   [facility]   [facility] Lab Draw     [facility] RETURN    8:55 AM   (50 min.)   Leanne Reddy PA-C   [facility] 60   10:00 AM   (60 min.)    ONCOLOGY INFUSION   [facility] 9     10       11     12     13     14     15     16     [facility] DRAW    2:00 PM   (15 min.)   [facility]   [facility] Draw     [facility] RETURN    2:15 PM   (50 min.)   Leanne Reddy PA-C   [facility] 60    3:00 PM   (60 min.)    ONCOLOGY INFUSION   [facility] 17       18     19     20     21     22     [facility] DRAW   10:15 AM   (15 min.)   [facility]   [facility]     [facility] RETURN   10:35 AM   (50 min.)   Leanne Reddy PA-C   [facility]     [facility] ONC INFUSION 60   11:30 AM   (60 min.)    ONCOLOGY INFUSION   [facility] 23     24        25     26     27     28     29     [facility] DRAW    7:00 AM   (15 min.)    [facility] DRAW   [facility] Lab Draw     [facility] RETURN    7:15 AM   (50 min.)   Leanne Reddy PA-C   [facility]     [facility] ONC INFUSION 60    9:00 AM   (60 min.)    ONCOLOGY INFUSION   [facility] 30 31 April 2018 Sunday Monday Tuesday Wednesday Thursday Friday Saturday   1     2     3     4     5     6     7       8     9     10     11     12     13     14       15     16     17     [facility] DRAW    9:30 AM   (15 min.)    [facility] DRAW   [facility] Lab Draw     P ONC RETURN   10:00 AM   (30 min.)   Navjot Fatima MD   [facility] Marrow Transplant     [facility] ONC INFUSION 60   10:30 AM   (60 min.)    ONCOLOGY INFUSION   [facility] 18     19     20     21       22     23     24     25     26     27     28       29     30                                           Lab Results:  Recent Results (from the past 12 hour(s))   CBC with platelets differential    Collection Time: 03/29/18  7:17 AM   Result Value Ref Range    WBC 3.4 (L) 4.0 - 11.0 10e9/L    RBC Count 3.06 (L) 4.4 - 5.9 10e12/L    Hemoglobin 9.6 (L) 13.3 - 17.7 g/dL    Hematocrit 30.1 (L) 40.0 - 53.0 %    MCV 98 78 - 100 fl    MCH 31.4 26.5 - 33.0 pg    MCHC 31.9 31.5 - 36.5 g/dL    RDW 18.7 (H) 10.0 - 15.0 %    Platelet Count 53 (L) 150 - 450 10e9/L    Diff Method Automated Method     % Neutrophils 85.8 %    % Lymphocytes 2.1 %    % Monocytes 8.8 %    % Eosinophils 1.2 %    % Basophils 0.3 %    % Immature Granulocytes 1.8 %    Nucleated RBCs 0 0 /100    Absolute Neutrophil 2.9 1.6 - 8.3 10e9/L    Absolute Lymphocytes 0.1 (L) 0.8 - 5.3 10e9/L    Absolute Monocytes 0.3 0.0 - 1.3 10e9/L    Absolute Eosinophils 0.0 0.0 - 0.7 10e9/L    Absolute Basophils 0.0 0.0 - 0.2 10e9/L    Abs Immature Granulocytes 0.1 0 - 0.4 10e9/L    Absolute Nucleated RBC 0.0     Comprehensive metabolic panel    Collection Time: 03/29/18  7:17 AM   Result Value Ref Range    Sodium 138 133 - 144 mmol/L    Potassium 3.7 3.4 - 5.3 mmol/L    Chloride 106 94 - 109 mmol/L    Carbon Dioxide 22 20 - 32 mmol/L    Anion Gap 10 3 - 14 mmol/L    Glucose 179 (H) 70 - 99 mg/dL    Urea Nitrogen 22 7 - 30 mg/dL    Creatinine 1.46 (H) 0.66 - 1.25 mg/dL    GFR Estimate 47 (L) >60 mL/min/1.7m2    GFR Estimate If Black 57 (L) >60 mL/min/1.7m2    Calcium 8.1 (L) 8.5 - 10.1 mg/dL    Bilirubin Total 0.4 0.2 - 1.3 mg/dL    Albumin 2.7 (L) 3.4 - 5.0 g/dL    Protein Total 8.1 6.8 - 8.8 g/dL    Alkaline Phosphatase 52 40 - 150 U/L    ALT 20 0 - 70 U/L    AST 9 0 - 45 U/L   ABO/Rh type and screen    Collection Time: 03/29/18  7:17 AM   Result Value Ref Range    ABO PENDING     Antibody Screen PENDING     Test Valid Only At          [facility]    Specimen Expires 04/01/2018

## 2018-03-29 NOTE — NURSING NOTE
"Oncology Rooming Note    March 29, 2018 7:37 AM   Anthony Carbone is a 72 year old male who presents for:    Chief Complaint   Patient presents with     Port Draw     accessed with power needle, heparin locked, vitals checked     Oncology Clinic Visit     Return: Multiple Myeloma     Initial Vitals: /69  Pulse 89  Temp 98.2  F (36.8  C)  Resp 16  Wt 53.2 kg (117 lb 4.8 oz)  SpO2 95%  BMI 20.13 kg/m2 Estimated body mass index is 20.13 kg/(m^2) as calculated from the following:    Height as of 3/22/18: 1.626 m (5' 4\").    Weight as of this encounter: 53.2 kg (117 lb 4.8 oz). Body surface area is 1.55 meters squared.  No Pain (0) Comment: Data Unavailable   No LMP for male patient.  Allergies reviewed: Yes  Medications reviewed: Yes    Medications: Medication refills not needed today.  Pharmacy name entered into [facility]:    [id number] [place] [address] [facility] [address]  [employer] [place], [place] - [address] [facility]  [facility] [address] [place], [place] - [address] AT [facility] OF [address]    Clinical concerns: no new concerns today Leanne Reddy was notified.    10 minutes for nursing intake (face to face time)     Deanne Atkinson CMA              "

## 2018-03-29 NOTE — LETTER
3/29/2018      RE: Anthony Carbone  [address] [place] [address]       HEMATOLOGY/ONCOLOGY PROGRESS NOTE  Mar 29, 2018    REASON FOR VISIT: myeloma    Diagnosis: 72 year old gentleman with IgM lambda multiple myeloma originally diagnosed in 01/2012 as stage I, standard risk disease.   Treatment: Revlimid plus dexamethasone for 4-5 cycles but plateaued by late 03/2012.   - Velcade was added and he received another 2-3 cycles, achieving a good partial remission.      Transplant: Single auto after melphalan 200 mg/m2 preparative regimen on 01/09/2013  - Post-transplant course: unremarkable except for some mild steroid-induced hyperglycemia, gastritis, nausea and vomiting.      Maintenance: Lenalidomide at day-100 then developed a maculopapular rash. Lenalidomide was held and then we re-challenged him after about a month to 2 months at a lower dose (5 mg daily); rash returned.   - was started on maintenance Velcade, every other week through July 2014, when he was noted with abnormalities on myeloma studies drawn there. Noted with prostate cancer dx at about the time of relapse (see below).     Relapse: noted with return on M-spike in blood/urine with marrow involvement but negative PET.   - Started on retreatment with RVD on 8/27/14 with Revlimid at 15 mg 14 days of 21 days, dexamethasone 40 mg weekly and velcade weekly.Completed at total of 5 cycles without complication by 12/2014.   - Started Cycle 6 on inc'd Rev dosing of 20mg daily x 2 weeks on 12/11/14 which was complicated by pneumonia.  - Adm 12/21-1/10 for human metapneumovirus pneumonia complicated by anorexia, HTN, depression, anorexia with significant weight loss.   - Restarted Ernesto/Dex only on 2/4/15 with good tolerance but with thrombocytopenia.   - Bendamustine added to Velcade/dexamethasone on 5/21/15 due to disease progression  - Velcade discontinued on 7/9/2015 due to side effects (orthostasis)  - Schedule changed to bendamustine 80 mg/m2  days 1 and 2 on 28-day cycle  - Cycle 1 tolerated poorly due to lightheadedness and weakness  - Cycle 2 dose reduced to 60 mg/m2 and pre-meds adjusted  - Cycle 5 received on 9/17/15.  - 10/1/2015 - increasing IgM, M-spike - started Pomalidomide 4mg/day (21 days out of 28 days) and weekly Decadron 20mg on Oct 1st, 2015.    - Carfilzomib added on 10/22/2015 making this CPD.  - C3-C6  received in [place]    - Returned to [facility] and resumed CPD here    - Adm: 4/12-4/14 with fever, confusion, neutropenia. Noted on MRI to have acute/subacute CVA and subacute/chronic CVA; started on Plavix, given brief course of Abx and GCSF prior to d/c.     - Continued on Carfilzomib and dexamethasone alone  - Pomalyst added back on 7/13/2016 for rising FLC  - Start daratumumab 11/10/16. Changed to every other week after 4 weekly treatments d/t profound fatigue and malaise.   - Adm 5/7-5/16 due to AMS, hypercalcemia, hyperuricemia, possible PNA, back pain, and JOSE MARIA. Started Kyprolis while inpatient.  - Re-admitted 5/25-/529 with recurrent AMS, found to have concomitant PNA  - Resumed Kyprolis 6/13/2017. Stopped due to worsening performance status and progressive myeloma.  - Started Venclexta 800 mg 8/25/2017  - Added weekly Velcade with dexamethasone 20 mg weekly due to rising light chains on 11/1/2017  - Started weekly Cytoxan with prednisone QOD on 12/13 (though started the prednisone around 12/24)      INTERVAL HISTORY:    Anthony presents today with his wife.  Nothing new today. Continues to feel really well. Energy stable. No SE from treatment at this time. No new pains. Eating and drinking well. Going to [place] on Monday and very excited about this. Remainder of 10-pt ROS otherwise is negative.    PHYSICAL EXAMINATION  /69  Pulse 89  Temp 98.2  F (36.8  C)  Resp 16  Wt 53.2 kg (117 lb 4.8 oz)  SpO2 95%  BMI 20.13 kg/m2     Wt Readings from Last 10 Encounters:   03/29/18 53.2 kg (117 lb 4.8 oz)   03/22/18 52.3 kg (115 lb 3.2  oz)   18 52.8 kg (116 lb 6.4 oz)   18 52.2 kg (115 lb 1.6 oz)   18 52.4 kg (115 lb 8 oz)   18 52.3 kg (115 lb 4.8 oz)   02/15/18 52.6 kg (116 lb)   18 52.8 kg (116 lb 4.8 oz)   18 51.2 kg (112 lb 12.8 oz)   18 51.4 kg (113 lb 4.8 oz)     General: Alert, frail. Oriented to name, , location,  Able to ambulate independently, albeit slow and cautiously.  No acute distress. HEENT: PERRL, no palor or icterus. CVS: RRR CHEST: CTAB, normal work of breathing, right port without erythema, swelling or pus.  ABDOMEN: soft non tender no masses     NEURO: AAOX3  CN 2-12 intact  SKIN: no bleeding or brusiing, no rashes EXTREMITIES: No edema.     LABS:   Results for WILLIAN ARCINIEGA (MRN [id number]) as of 3/29/2018 07:27   Ref. Range 3/16/2018 14:08 3/22/2018 10:43 3/29/2018 07:17   WBC Latest Ref Range: 4.0 - 11.0 10e9/L 3.5 (L) 3.6 (L) 3.4 (L)   Hemoglobin Latest Ref Range: 13.3 - 17.7 g/dL 10.7 (L) 10.4 (L) 9.6 (L)   Hematocrit Latest Ref Range: 40.0 - 53.0 % 33.0 (L) 31.0 (L) 30.1 (L)   Platelet Count Latest Ref Range: 150 - 450 10e9/L 49 (LL) 49 (LL) 53 (L)     IMPRESSION/PLAN:  72 year old male with relapsed MM after autologous transplant (2013), status-post multiple salvage regimens with progressive disease.    MM: With rising light chains, worsening renal function, and worsening TCP, started weekly cytoxan with prednisone 50  mg QOD on . Initially, he continued to decline clinically to the point where hospice was discussed at multiple visits.  M-spike, renal function, total protein now improved, light chains stable. Clinically, Yamil has improved substantially on this regimen with definite improvement in QOL and functioning. Yamil remains doing well today. Plan to continue with Pred/Cytoxan.   - Dr. Topete planned to add Ninlaro 3 mg days 1,8,15 q 28 day cycle to this regimen, but this is working on approval. At this point, in light of the stable MM labs and the  significant improvements, will hold off starting. If light chains increase, adding Ninalaro would be appropriate as tolerated. Biochemical markers to be drawn next week.  - Yamil's wish is to get to [place]. Given he is is doing so well, counts stable, and that he has established with an MD there and can have his labs monitored, I'm OK with him going. He leaves on Monday.  - Follow-up with Dr. Fatima in April  - Due for Zometa in April, q3m    Goals: We have had numerous discussions re: goals and continuing treatment. For now, Yamil feels that he is tolerating this regimen well and we will plan to continue these discussions. One of his big goals is to get to [place] and he is now clinically stable to do so!    Heme: Cytopenias 2/2 treatment and likely MM, requiring intermittent pRBC, usually once/month. Blood counts stable today. He will have this monitored in [place]. Bleeding precuations, anemia precautions reviewed today.  - Previously on Plavix, remains on hold given thrombocytopenia, indefinitely  - Transfuse to keep hemoglobin > 8, platelets > 10k.     Renal/FEN: Worsening renal function in December-Jan (Cr max 3.40 on 1/13) in setting of progressive myeloma. This remains improved/stable.  - Encouraged protein/calorie supplements.   - Would recommend continuing Zyprexa at night to help with appetite.    ID: Afebrile. No localizing infectious /s.  - Continues ppx  mg BID  - With the prolonged prednisone use, discussed PCP prophylaxis with Yamil and Casey today and they are in agreement with starting something. Due to his renal function and cytopenias, started monthly pentamidine 3/22/18.     Back/rib pain: Started early May. Lumbar MRI at OSH showing mild-mod central and foraminal stenoses in lumbar spine, per patient and wife, there was no MM lesion there.  Recent imaging with no acute findings. Pain remains spontaneously resolved.     CV: Continue zocor and norvasc.   - Avoid QTc prolonging agents (history of  QTc prolongation with syncopal episodes)     AMS: AMS started early May in setting of metabolic derangements, uremia, and possible infection.  Remains intermittently confused over the past few months but improved significantly and stable today. I do believe there is perhaps some ongoing cognitive decline since the AMS episodes.  - Continue Zyprexa 5 mg for appetite  - Holding off long-acting pain meds      I spent >15 minutes with the patient, with over 50% of the time spent counseling or coordinating their care as described above.    Leanne Murphy PA-C

## 2018-04-17 NOTE — NURSING NOTE
"Oncology Rooming Note    April 17, 2018 10:23 AM   Anthony Carbone is a 72 year old male who presents for:    Chief Complaint   Patient presents with     Port Draw     Labs drawn by RN from Right Chest Port-a-Cath. Line flushed with Saline and Heparin.      RECHECK     PT is here for labs and to see MD HOBSON     Initial Vitals: /81  Pulse 76  Temp 98.5  F (36.9  C) (Oral)  Resp 16  Wt 54.1 kg (119 lb 4.3 oz)  SpO2 95%  BMI 20.47 kg/m2 Estimated body mass index is 20.47 kg/(m^2) as calculated from the following:    Height as of 3/22/18: 1.626 m (5' 4\").    Weight as of this encounter: 54.1 kg (119 lb 4.3 oz). Body surface area is 1.56 meters squared.  No Pain (0) Comment: Data Unavailable   No LMP for male patient.  Allergies reviewed: Yes  Medications reviewed: Yes    Medications: Medication refills not needed today.  Pharmacy name entered into [facility]:    [id number] - [place], [place] - [facility] AT [facility] [address]  [employer] [facility] [id number] [place] [address]  [facility] [id number] - [place], [place] - [address] AT [facility] OF [address]    Clinical concerns: none     6 minutes for nursing intake (face to face time)     Christy Lynn MA              "

## 2018-04-17 NOTE — PROGRESS NOTES
Infusion Nursing Note:  Anthony Carbone presents today for Blood transfusion.    Patient seen by provider today: Yes: Dr. Fatima   present during visit today: Not Applicable.    Note: Blood consent from 7/11/17    TORB 4/17/18 1129 Dr. Fatima/Alexia Petersen RN:  Defer treatment one week  Transfuse blood today.    Intravenous Access:  Implanted Port.    Treatment Conditions:  Lab Results   Component Value Date    HGB 7.9 04/17/2018     Lab Results   Component Value Date    WBC 1.9 04/17/2018      Lab Results   Component Value Date    ANEU 1.1 04/17/2018     Lab Results   Component Value Date    PLT 50 04/17/2018      Lab Results   Component Value Date     04/17/2018                   Lab Results   Component Value Date    POTASSIUM 4.2 04/17/2018           Lab Results   Component Value Date    MAG 1.7 01/15/2018            Lab Results   Component Value Date    CR 1.81 04/17/2018                   Lab Results   Component Value Date    JAZMIN 7.7 04/17/2018                Lab Results   Component Value Date    BILITOTAL 0.3 04/17/2018           Lab Results   Component Value Date    ALBUMIN 2.8 04/17/2018                    Lab Results   Component Value Date    ALT 11 04/17/2018           Lab Results   Component Value Date    AST 7 04/17/2018     Results reviewed, labs did NOT meet treatment parameters: Blood transfusion instead, see TORB above.    Post Infusion Assessment:  Patient tolerated infusion without incident.  Blood return noted pre and post infusion.  Site patent and intact, free from redness, edema or discomfort.  No evidence of extravasations.  Access discontinued per protocol.    Discharge Plan:   Patient declined prescription refills.  Discharge instructions reviewed with: Patient and Family.  Patient and/or family verbalized understanding of discharge instructions and all questions answered.  AVS to patient via [employer].  Patient will return 4/24/18 for next appointment.   Patient  discharged in stable condition accompanied by: wife.  Departure Mode: Ambulatory.    GO GARCIA, RN

## 2018-04-17 NOTE — MR AVS SNAPSHOT
After Visit Summary   4/17/2018    Anthony Carbone    MRN: [id number]           Patient Information     Date Of Birth          1945        Visit Information        Provider Department      4/17/2018 10:00 AM Navjot Fatima MD [facility] Blood and Marrow Transplant        Today's Diagnoses     Multiple myeloma, remission status unspecified (H)    -  1    Acute bilateral low back pain without sciatica        Multiple myeloma in relapse (H)              [facility] ([facility])  [address] [place] [address]  Phone: [phone]  Clinic Hours:   Monday-Thursday:7am to 7pm   Friday: 7am to 5pm   Weekends and holidays:    8am to noon (in general)  If your fever is 100.5  or greater,   call the clinic.  After hours call the   hospital at [phone] or   [phone]. Ask for the BMT   fellow on-call            Follow-ups after your visit        Your next 10 appointments already scheduled     Apr 24, 2018 12:30 PM CDT   [facility] Lab Draw with [facility] DRAW   [facility] Lab Draw ([facility])    [address] [place] [address]  [address] [place] [address] [phone]            Apr 24, 2018  1:00 PM CDT   Infusion 60 with  ONCOLOGY INFUSION, UC 31 ATC   [facility] ([facility])    [address] [place] [address]  [address] [place] [address] [phone]              Who to contact     If you have questions or need follow up information about today's clinic visit or your schedule please contact [facility] BLOOD AND MARROW TRANSPLANT directly at [phone].  Normal or non-critical lab and imaging results will be communicated to you by MyChart, letter or phone within 4 business days after the clinic has received the results. If you do not hear from us within 7 days, please contact the clinic through MyChart or phone. If you have a critical or abnormal lab result, we will notify you by phone  as soon as possible.  Submit refill requests through [employer] or call your pharmacy and they will forward the refill request to us. Please allow 3 business days for your refill to be completed.          Additional Information About Your Visit        [employer] Information     [employer] gives you secure access to your electronic health record. If you see a primary care provider, you can also send messages to your care team and make appointments. If you have questions, please call your primary care clinic.  If you do not have a primary care provider, please call [phone] and they will assist you.        Care EveryWhere ID     This is your Care EveryWhere ID. This could be used by other organizations to access your [place] medical records  [id number]        Your Vitals Were     Pulse Temperature Respirations Pulse Oximetry BMI (Body Mass Index)       76 98.5  F (36.9  C) (Oral) 16 95% 20.47 kg/m2        Blood Pressure from Last 3 Encounters:   04/17/18 148/87   04/17/18 123/81   03/29/18 105/69    Weight from Last 3 Encounters:   04/17/18 54.1 kg (119 lb 4.3 oz)   03/29/18 53.2 kg (117 lb 4.8 oz)   03/22/18 52.3 kg (115 lb 3.2 oz)              We Performed the Following     ABO/Rh type and screen     Blood component     Blood component          Today's Medication Changes          These changes are accurate as of 4/17/18 11:59 PM.  If you have any questions, ask your nurse or doctor.               Stop taking these medicines if you haven't already. Please contact your care team if you have questions.     ixazomib 3 MG capsule   Commonly known as:  NINLARO   Stopped by:  Navjot Fatima MD           LORazepam 0.5 MG tablet   Commonly known as:  ATIVAN   Stopped by:  Navjot Fatima MD           oxyCODONE IR 5 MG tablet   Commonly known as:  ROXICODONE   Stopped by:  Navjot Fatima MD                Where to get your medicines      Some of these will need a paper prescription and others can be bought over the  counter.  Ask your nurse if you have questions.     Bring a paper prescription for each of these medications     traMADol 50 MG tablet               Information about OPIOIDS     PRESCRIPTION OPIOIDS: WHAT YOU NEED TO KNOW   You have a prescription for an opioid (narcotic) pain medicine. Opioids can cause addiction. If you have a history of chemical dependency of any type, you are at a higher risk of becoming addicted to opioids. Only take this medicine after all other options have been tried. Take it for as short a time and as few doses as possible.     Do not:    Drive. If you drive while taking these medicines, you could be arrested for driving under the influence (DUI).    Operate heavy machinery    Do any other dangerous activities while taking these medicines.     Drink any alcohol while taking these medicines.      Take with any other medicines that contain acetaminophen. Read all labels carefully. Look for the word  acetaminophen  or  Tylenol.  Ask your pharmacist if you have questions or are unsure.    Store your pills in a secure place, locked if possible. We will not replace any lost or stolen medicine. If you don t finish your medicine, please throw away (dispose) as directed by your pharmacist. The [place] Pollution Control Agency has more information about safe disposal: https://www.pca.[facility].[place].us/living-green/managing-unwanted-medications    All opioids tend to cause constipation. Drink plenty of water and eat foods that have a lot of fiber, such as fruits, vegetables, prune juice, apple juice and high-fiber cereal. Take a laxative (Miralax, milk of magnesia, Colace, Senna) if you don t move your bowels at least every other day.          Recent Review Flowsheet Data     BMT Recent Results Latest Ref Rng & Units 2/22/2018 2/28/2018 3/8/2018 3/16/2018 3/22/2018 3/29/2018 4/17/2018    WBC 4.0 - 11.0 10e9/L 2.5(L) 3.2(L) 3.5(L) 3.5(L) 3.6(L) 3.4(L) 1.9(L)    Hemoglobin 13.3 - 17.7 g/dL 8.3(L) 8.4(L)  7.5(L) 10.7(L) 10.4(L) 9.6(L) 7.9(L)    Platelet Count 150 - 450 10e9/L 40(LL) 57(L) 51(L) 49(LL) 49(LL) 53(L) 50(L)    Platelets 150 - 450 10:9/L - - - - - - -    Neutrophils (Absolute) 1.6 - 8.3 10e9/L 2.1 3.0 3.0 3.0 3.1 2.9 1.1(L)    Blasts (Absolute) 10e9/L - - - - - - -    INR 0.86 - 1.14 - - - - - - -    Sodium 133 - 144 mmol/L 139 136 139 137 138 138 140    Potassium 3.4 - 5.3 mmol/L 3.9 4.4 3.7 3.5 3.6 3.7 4.2    Chloride 94 - 109 mmol/L 105 104 107 105 105 106 109    Glucose 70 - 99 mg/dL 103(H) 271(H) 114(H) 168(H) 92 179(H) 110(H)    Urea Nitrogen 7 - 30 mg/dL 38(H) 34(H) 37(H) 34(H) 32(H) 22 34(H)    Creatinine 0.66 - 1.25 mg/dL 2.25(H) 1.96(H) 1.60(H) 1.65(H) 1.60(H) 1.46(H) 1.81(H)    Calcium (Total) 8.5 - 10.1 mg/dL 8.8 8.3(L) 8.4(L) 8.5 9.2 8.1(L) 7.7(L)    Protein (Total) 6.8 - 8.8 g/dL 9.0(H) 9.2(H) 8.5 8.4 8.4 8.1 7.8    Albumin 3.4 - 5.0 g/dL 2.6(L) 2.6(L) 2.7(L) 2.7(L) 2.8(L) 2.7(L) 2.8(L)    Bilirubin (Direct) 0.0 - 0.2 mg/dL - - - - - - -    Alkaline Phosphatase 40 - 150 U/L 47 48 41 51 51 52 44    AST 0 - 45 U/L 9 6 8 7 8 9 7    ALT 0 - 70 U/L 16 11 12 15 14 20 11    MCV 78 - 100 fl 98 101(H) 101(H) 99 97 98 102(H)               Primary Care Provider Office Phone # Fax #    Sanket Burciaga [phone]       [facility] [address] [facility] [address]        Equal Access to Services     ALBAN SHAH : Hadii aad ku hadasho Soomaali, waaxda luqadaha, qaybta kaalmada adeegyada, waxay idiin hayfishn kayleigh villa. So [facility] [phone].    ATENCIÓN: Si habla español, tiene a todd disposición servicios gratuitos de asistencia lingüística. Parminder al [phone].    We comply with applicable federal civil rights laws and [place] laws. We do not discriminate on the basis of race, color, national origin, age, disability, sex, sexual orientation, or gender identity.            Thank you!     Thank you for choosing [facility] BLOOD AND MARROW TRANSPLANT  for  your care. Our goal is always to provide you with excellent care. Hearing back from our patients is one way we can continue to improve our services. Please take a few minutes to complete the written survey that you may receive in the mail after your visit with us. Thank you!             Your Updated Medication List - Protect others around you: Learn how to safely use, store and throw away your medicines at www.disposemymeds.org.          This list is accurate as of 4/17/18 11:59 PM.  Always use your most recent med list.                   Brand Name Dispense Instructions for use Diagnosis    acetaminophen 325 MG tablet    TYLENOL    100 tablet    Take 2 tablets (650 mg) by mouth every 4 hours as needed for mild pain or fever    Fever, unspecified fever cause       acyclovir 400 MG tablet    ZOVIRAX    60 tablet    Take 1 tablet (400 mg) by mouth 2 times daily    Multiple myeloma in relapse (H)       amLODIPine 5 MG tablet    NORVASC    90 tablet    TAKE 1 TABLET BY MOUTH AT BEDTIME    Essential hypertension       esomeprazole 40 MG CR capsule    nexIUM    30 capsule    Take 1 capsule (40 mg) by mouth every morning (before breakfast)    Gastroesophageal reflux disease without esophagitis       OLANZapine 5 MG tablet    zyPREXA    60 tablet    Take by mouth At Bedtime Takes 1 tablet    Delirium, Multiple myeloma in relapse (H)       ondansetron 8 MG ODT tab    ZOFRAN-ODT    30 tablet    Take 1 tablet (8 mg) by mouth every 8 hours as needed for nausea    Nausea       predniSONE 50 MG tablet    DELTASONE    15 tablet    Take 1 tablet (50 mg) by mouth every other day    Multiple myeloma, remission status unspecified (H)       SIMVASTATIN PO      Take 20 mg by mouth At Bedtime        traMADol 50 MG tablet    ULTRAM    60 tablet    Take 1 tablet (50 mg) by mouth every 6 hours as needed for moderate pain . Recommend trying after Tylenol and before Dilaudid.    Acute bilateral low back pain without sciatica, Multiple myeloma  in relapse (H)

## 2018-04-17 NOTE — MR AVS SNAPSHOT
After Visit Summary   4/17/2018    Anthony Carbone    MRN: [id number]           Patient Information     Date Of Birth          1945        Visit Information        Provider Department      4/17/2018 10:30 AM  24 ATC;  ONCOLOGY INFUSION [facility]        Today's Diagnoses     Multiple myeloma in relapse (H)    -  1    Multiple myeloma, remission status unspecified (H)          Care Instructions    Contact Numbers    [facility] [place] [phone]  [facility] [place]:  [phone]    Call triage with chills and/or temperature greater than or equal to 100.5, uncontrolled nausea/vomiting, diarrhea, constipation, dizziness, shortness of breath, chest pain, bleeding, unexplained bruising, or any new/concerning symptoms, questions/concerns.     If you are having any concerning symptoms or wish to speak to a provider before your next infusion visit, please call your care coordinator or triage to notify them so we can adequately serve you.       After Hours: [phone]          April 2018 Sunday Monday Tuesday Wednesday Thursday Friday Saturday   1     2     3     4     5     6     7       8     9     10     11     12     13     14       15     16     17     [facility]    9:30 AM   (15 min.)   [facility]   [facility] Lab Draw     [facility] ONC RETURN   10:00 AM   (30 min.)   Navjot Fatima MD   [facility] Blood and Marrow Transplant     [facility] ONC INFUSION 60   10:30 AM   (60 min.)    ONCOLOGY INFUSION   [facility] 18     19     20     21       22     23     24     [facility] DRAW   12:30 PM   (15 min.)   [facility]   [facility] Lab Draw     [facility] ONC INFUSION 60    1:00 PM   (60 min.)    ONCOLOGY INFUSION   [facility] 25     26     27     28       29     30                                         May 2018   [facility] Monday Tuesday Wednesday Thursday Friday Saturday             1     2     3     4      5       6     7     8     9     10     11     12       13     14     15     16     17     18     19       20     21     22     23     24     25     26       27     28     29     30     31                           Recent Results (from the past 24 hour(s))   CBC with platelets differential    Collection Time: 04/17/18 10:09 AM   Result Value Ref Range    WBC 1.9 (L) 4.0 - 11.0 10e9/L    RBC Count 2.37 (L) 4.4 - 5.9 10e12/L    Hemoglobin 7.9 (L) 13.3 - 17.7 g/dL    Hematocrit 24.1 (L) 40.0 - 53.0 %     (H) 78 - 100 fl    MCH 33.3 (H) 26.5 - 33.0 pg    MCHC 32.8 31.5 - 36.5 g/dL    RDW 21.6 (H) 10.0 - 15.0 %    Platelet Count 50 (L) 150 - 450 10e9/L    Diff Method Automated Method     % Neutrophils 60.1 %    % Lymphocytes 28.7 %    % Monocytes 9.6 %    % Eosinophils 0.0 %    % Basophils 0.5 %    % Immature Granulocytes 1.1 %    Nucleated RBCs 0 0 /100    Absolute Neutrophil 1.1 (L) 1.6 - 8.3 10e9/L    Absolute Lymphocytes 0.5 (L) 0.8 - 5.3 10e9/L    Absolute Monocytes 0.2 0.0 - 1.3 10e9/L    Absolute Eosinophils 0.0 0.0 - 0.7 10e9/L    Absolute Basophils 0.0 0.0 - 0.2 10e9/L    Abs Immature Granulocytes 0.0 0 - 0.4 10e9/L    Absolute Nucleated RBC 0.0     Platelet Estimate Confirming automated cell count    Comprehensive metabolic panel    Collection Time: 04/17/18 10:09 AM   Result Value Ref Range    Sodium 140 133 - 144 mmol/L    Potassium 4.2 3.4 - 5.3 mmol/L    Chloride 109 94 - 109 mmol/L    Carbon Dioxide 21 20 - 32 mmol/L    Anion Gap 9 3 - 14 mmol/L    Glucose 110 (H) 70 - 99 mg/dL    Urea Nitrogen 34 (H) 7 - 30 mg/dL    Creatinine 1.81 (H) 0.66 - 1.25 mg/dL    GFR Estimate 37 (L) >60 mL/min/1.7m2    GFR Estimate If Black 45 (L) >60 mL/min/1.7m2    Calcium 7.7 (L) 8.5 - 10.1 mg/dL    Bilirubin Total 0.3 0.2 - 1.3 mg/dL    Albumin 2.8 (L) 3.4 - 5.0 g/dL    Protein Total 7.8 6.8 - 8.8 g/dL    Alkaline Phosphatase 44 40 - 150 U/L    ALT 11 0 - 70 U/L    AST 7 0 - 45 U/L   ABO/Rh type and screen     Collection Time: 04/17/18 10:09 AM   Result Value Ref Range    Units Ordered 2     ABO O     RH(D) Pos     Antibody Screen Neg     Test Valid Only At          [facility]    Specimen Expires 04/20/2018     Crossmatch Red Blood Cells    Blood component    Collection Time: 04/17/18 10:09 AM   Result Value Ref Range    Unit Number [id number]     Blood Component Type Red Blood Cells LeukoReduced Irradiated     Division Number 00     Status of Unit Released to care unit 04/17/2018 1511     Blood Product Code [id number]     Unit Status ISS    Blood component    Collection Time: 04/17/18 10:09 AM   Result Value Ref Range    Unit Number [id number]     Blood Component Type Red Blood Cells LeukoReduced Irradiated     Division Number 00     Status of Unit Released to care unit 04/17/2018 1332     Blood Product Code [id number]     Unit Status ISS                Follow-ups after your visit        Your next 10 appointments already scheduled     Apr 24, 2018 12:30 PM CDT   [facility] Lab Draw with [facility] DRAW   [facility] Lab Draw ([facility])    [address] [place] [address]  [address] [place] [address] [phone]            Apr 24, 2018  1:00 PM CDT   Infusion 60 with UC ONCOLOGY INFUSION, UC 31 ATC   [facility] ([facility])    [address] [place] [address]  [address] [place] [address] [phone]              Future tests that were ordered for you today     Open Standing Orders        Priority Remaining Interval Expires Ordered    Red blood cell prepare order unit Routine 99/100 CONDITIONAL (SPECIFY) BLOOD  4/17/2018            Who to contact     If you have questions or need follow up information about today's clinic visit or your schedule please contact [facility] CANCER [facility] directly at [phone].  Normal or non-critical lab and imaging results will be communicated to you by  MyChart, letter or phone within 4 business days after the clinic has received the results. If you do not hear from us within 7 days, please contact the clinic through [employer] or phone. If you have a critical or abnormal lab result, we will notify you by phone as soon as possible.  Submit refill requests through [employer] or call your pharmacy and they will forward the refill request to us. Please allow 3 business days for your refill to be completed.          Additional Information About Your Visit        [employer] Information     [employer] gives you secure access to your electronic health record. If you see a primary care provider, you can also send messages to your care team and make appointments. If you have questions, please call your primary care clinic.  If you do not have a primary care provider, please call [phone] and they will assist you.        Care EveryWhere ID     This is your Care EveryWhere ID. This could be used by other organizations to access your [place] medical records  [id number]        Your Vitals Were     Pulse Temperature Respirations Pulse Oximetry          72 97.5  F (36.4  C) (Oral) 18 96%         Blood Pressure from Last 3 Encounters:   04/17/18 148/87   04/17/18 123/81   03/29/18 105/69    Weight from Last 3 Encounters:   04/17/18 54.1 kg (119 lb 4.3 oz)   03/29/18 53.2 kg (117 lb 4.8 oz)   03/22/18 52.3 kg (115 lb 3.2 oz)              We Performed the Following     CBC with platelets differential     Comprehensive metabolic panel     Transfuse red blood cell unit     Transfuse red blood cell unit          Today's Medication Changes          These changes are accurate as of 4/17/18  4:47 PM.  If you have any questions, ask your nurse or doctor.               Stop taking these medicines if you haven't already. Please contact your care team if you have questions.     ixazomib 3 MG capsule   Commonly known as:  NINLARO   Stopped by:  Navjot Fatima MD           LORazepam 0.5 MG  tablet   Commonly known as:  ATIVAN   Stopped by:  Navojt Fatima MD           oxyCODONE IR 5 MG tablet   Commonly known as:  ROXICODONE   Stopped by:  Navjot Fatima MD                Where to get your medicines      Some of these will need a paper prescription and others can be bought over the counter.  Ask your nurse if you have questions.     Bring a paper prescription for each of these medications     traMADol 50 MG tablet                Primary Care Provider Office Phone # Fax #    Sanket Burciaga [phone]       [facility] [address] [facility] [address]        Equal Access to Services     [facility]: Hadii chalo pardo Sojose, waaxabhijit ge, qaclaudia kamonika abdi, jonny jurado . So [facility] [phone].    ATENCIÓN: Si habla español, tiene a todd disposición servicios gratuitos de asistencia lingüística. [facility] [phone].    We comply with applicable federal civil rights laws and [place] laws. We do not discriminate on the basis of race, color, national origin, age, disability, sex, sexual orientation, or gender identity.            Thank you!     Thank you for choosing [facility] CANCER CLINIC  for your care. Our goal is always to provide you with excellent care. Hearing back from our patients is one way we can continue to improve our services. Please take a few minutes to complete the written survey that you may receive in the mail after your visit with us. Thank you!             Your Updated Medication List - Protect others around you: Learn how to safely use, store and throw away your medicines at www.disposemymeds.org.          This list is accurate as of 4/17/18  4:47 PM.  Always use your most recent med list.                   Brand Name Dispense Instructions for use Diagnosis    acetaminophen 325 MG tablet    TYLENOL    100 tablet    Take 2 tablets (650 mg) by mouth every 4 hours as needed for mild  pain or fever    Fever, unspecified fever cause       acyclovir 400 MG tablet    ZOVIRAX    60 tablet    Take 1 tablet (400 mg) by mouth 2 times daily    Multiple myeloma in relapse (H)       amLODIPine 5 MG tablet    NORVASC    90 tablet    TAKE 1 TABLET BY MOUTH AT BEDTIME    Essential hypertension       esomeprazole 40 MG CR capsule    nexIUM    30 capsule    Take 1 capsule (40 mg) by mouth every morning (before breakfast)    Gastroesophageal reflux disease without esophagitis       OLANZapine 5 MG tablet    zyPREXA    60 tablet    Take by mouth At Bedtime Takes 1 tablet    Delirium, Multiple myeloma in relapse (H)       ondansetron 8 MG ODT tab    ZOFRAN-ODT    30 tablet    Take 1 tablet (8 mg) by mouth every 8 hours as needed for nausea    Nausea       predniSONE 50 MG tablet    DELTASONE    15 tablet    Take 1 tablet (50 mg) by mouth every other day    Multiple myeloma, remission status unspecified (H)       SIMVASTATIN PO      Take 20 mg by mouth At Bedtime        traMADol 50 MG tablet    ULTRAM    60 tablet    Take 1 tablet (50 mg) by mouth every 6 hours as needed for moderate pain . Recommend trying after Tylenol and before Dilaudid.    Acute bilateral low back pain without sciatica, Multiple myeloma in relapse (H)

## 2018-04-17 NOTE — NURSING NOTE
Chief Complaint   Patient presents with     Port Draw     Labs drawn by RN from Right Chest Port-a-Cath. Line flushed with Saline and Heparin.      Natividad Restrepo RN

## 2018-04-17 NOTE — PATIENT INSTRUCTIONS
Contact Numbers    [facility] [place]: [phone]  [facility] [place]:  [phone]    Call triage with chills and/or temperature greater than or equal to 100.5, uncontrolled nausea/vomiting, diarrhea, constipation, dizziness, shortness of breath, chest pain, bleeding, unexplained bruising, or any new/concerning symptoms, questions/concerns.     If you are having any concerning symptoms or wish to speak to a provider before your next infusion visit, please call your care coordinator or triage to notify them so we can adequately serve you.       After Hours: [phone]          April 2018 Sunday Monday Tuesday Wednesday Thursday Friday Saturday   1     2     3     4     5     6     7       8     9     10     11     12     13     14       15     16     17     [facility]    9:30 AM   (15 min.)    [facility]   [facility]     [facility] RETURN   10:00 AM   (30 min.)   Navjot Fatima MD   [facility] Blood and Marrow Transplant     [facility] ONC INFUSION 60   10:30 AM   (60 min.)    ONCOLOGY INFUSION   [facility] 18     19     20     21       22     23     24     [facility]   12:30 PM   (15 min.)    [facility]   [facility]     [facility] INFUSION 60    1:00 PM   (60 min.)    ONCOLOGY INFUSION   [facility] 25     26     27     28       29     30                                         May 2018   [facility] Monday Tuesday Wednesday Thursday Friday Saturday             1     2     3     4     5       6     7     8     9     10     11     12       13     14     15     16     17     18     19       20     21     22     23     24     25     26       27     28     29     30     31                           Recent Results (from the past 24 hour(s))   CBC with platelets differential    Collection Time: 04/17/18 10:09 AM   Result Value Ref Range    WBC 1.9 (L) 4.0 - 11.0 10e9/L    RBC Count 2.37 (L) 4.4 - 5.9 10e12/L    Hemoglobin 7.9 (L) 13.3  - 17.7 g/dL    Hematocrit 24.1 (L) 40.0 - 53.0 %     (H) 78 - 100 fl    MCH 33.3 (H) 26.5 - 33.0 pg    MCHC 32.8 31.5 - 36.5 g/dL    RDW 21.6 (H) 10.0 - 15.0 %    Platelet Count 50 (L) 150 - 450 10e9/L    Diff Method Automated Method     % Neutrophils 60.1 %    % Lymphocytes 28.7 %    % Monocytes 9.6 %    % Eosinophils 0.0 %    % Basophils 0.5 %    % Immature Granulocytes 1.1 %    Nucleated RBCs 0 0 /100    Absolute Neutrophil 1.1 (L) 1.6 - 8.3 10e9/L    Absolute Lymphocytes 0.5 (L) 0.8 - 5.3 10e9/L    Absolute Monocytes 0.2 0.0 - 1.3 10e9/L    Absolute Eosinophils 0.0 0.0 - 0.7 10e9/L    Absolute Basophils 0.0 0.0 - 0.2 10e9/L    Abs Immature Granulocytes 0.0 0 - 0.4 10e9/L    Absolute Nucleated RBC 0.0     Platelet Estimate Confirming automated cell count    Comprehensive metabolic panel    Collection Time: 04/17/18 10:09 AM   Result Value Ref Range    Sodium 140 133 - 144 mmol/L    Potassium 4.2 3.4 - 5.3 mmol/L    Chloride 109 94 - 109 mmol/L    Carbon Dioxide 21 20 - 32 mmol/L    Anion Gap 9 3 - 14 mmol/L    Glucose 110 (H) 70 - 99 mg/dL    Urea Nitrogen 34 (H) 7 - 30 mg/dL    Creatinine 1.81 (H) 0.66 - 1.25 mg/dL    GFR Estimate 37 (L) >60 mL/min/1.7m2    GFR Estimate If Black 45 (L) >60 mL/min/1.7m2    Calcium 7.7 (L) 8.5 - 10.1 mg/dL    Bilirubin Total 0.3 0.2 - 1.3 mg/dL    Albumin 2.8 (L) 3.4 - 5.0 g/dL    Protein Total 7.8 6.8 - 8.8 g/dL    Alkaline Phosphatase 44 40 - 150 U/L    ALT 11 0 - 70 U/L    AST 7 0 - 45 U/L   ABO/Rh type and screen    Collection Time: 04/17/18 10:09 AM   Result Value Ref Range    Units Ordered 2     ABO O     RH(D) Pos     Antibody Screen Neg     Test Valid Only At          [facility]    Specimen Expires 04/20/2018     Crossmatch Red Blood Cells    Blood component    Collection Time: 04/17/18 10:09 AM   Result Value Ref Range    Unit Number [id number]     Blood Component Type Red Blood Cells LeukoReduced Irradiated      Division Number 00     Status of Unit Released to care unit 04/17/2018 1511     Blood Product Code R0327K21     Unit Status ISS    Blood component    Collection Time: 04/17/18 10:09 AM   Result Value Ref Range    Unit Number [id number]     Blood Component Type Red Blood Cells LeukoReduced Irradiated     Division Number 00     Status of Unit Released to care unit 04/17/2018 1332     Blood Product Code M3502P10     Unit Status ISS

## 2018-04-18 NOTE — PROGRESS NOTES
HEMATOLOGY/ONCOLOGY PROGRESS NOTE  Apr 17, 2018    REASON FOR VISIT: myeloma    Diagnosis: 72 year old gentleman with IgM lambda multiple myeloma originally diagnosed in 01/2012 as stage I, standard risk disease.   Treatment: Revlimid plus dexamethasone for 4-5 cycles but plateaued by late 03/2012.   - Velcade was added and he received another 2-3 cycles, achieving a good partial remission.      Transplant: Single auto after melphalan 200 mg/m2 preparative regimen on 01/09/2013  - Post-transplant course: unremarkable except for some mild steroid-induced hyperglycemia, gastritis, nausea and vomiting.      Maintenance: Lenalidomide at day-100 then developed a maculopapular rash. Lenalidomide was held and then we re-challenged him after about a month to 2 months at a lower dose (5 mg daily); rash returned.   - was started on maintenance Velcade, every other week through July 2014, when he was noted with abnormalities on myeloma studies drawn there. Noted with prostate cancer dx at about the time of relapse (see below).     Relapse: noted with return on M-spike in blood/urine with marrow involvement but negative PET.   - Started on retreatment with RVD on 8/27/14 with Revlimid at 15 mg 14 days of 21 days, dexamethasone 40 mg weekly and velcade weekly.Completed at total of 5 cycles without complication by 12/2014.   - Started Cycle 6 on inc'd Rev dosing of 20mg daily x 2 weeks on 12/11/14 which was complicated by pneumonia.  - Adm 12/21-1/10 for human metapneumovirus pneumonia complicated by anorexia, HTN, depression, anorexia with significant weight loss.   - Restarted Ernesto/Dex only on 2/4/15 with good tolerance but with thrombocytopenia.   - Bendamustine added to Velcade/dexamethasone on 5/21/15 due to disease progression  - Velcade discontinued on 7/9/2015 due to side effects (orthostasis)  - Schedule changed to bendamustine 80 mg/m2 days 1 and 2 on 28-day cycle  - Cycle 1 tolerated poorly due to lightheadedness and  weakness  - Cycle 2 dose reduced to 60 mg/m2 and pre-meds adjusted  - Cycle 5 received on 9/17/15.  - 10/1/2015 - increasing IgM, M-spike - started Pomalidomide 4mg/day (21 days out of 28 days) and weekly Decadron 20mg on Oct 1st, 2015.    - Carfilzomib added on 10/22/2015 making this CPD.  - C3-C6  received in [place]    - Returned to [facility] and resumed CPD here    - Adm: 4/12-4/14 with fever, confusion, neutropenia. Noted on MRI to have acute/subacute CVA and subacute/chronic CVA; started on Plavix, given brief course of Abx and GCSF prior to d/c.     - Continued on Carfilzomib and dexamethasone alone  - Pomalyst added back on 7/13/2016 for rising FLC  - Start daratumumab 11/10/16. Changed to every other week after 4 weekly treatments d/t profound fatigue and malaise.   - Adm 5/7-5/16 due to AMS, hypercalcemia, hyperuricemia, possible PNA, back pain, and JOSE MARIA. Started Kyprolis while inpatient.  - Re-admitted 5/25-/529 with recurrent AMS, found to have concomitant PNA  - Resumed Kyprolis 6/13/2017. Stopped due to worsening performance status and progressive myeloma.  - Started Venclexta 800 mg 8/25/2017  - Added weekly Velcade with dexamethasone 20 mg weekly due to rising light chains on 11/1/2017  - Started weekly Cytoxan with prednisone QOD on 12/13 (though started the prednisone around 12/24) in palliative attempt at care.       INTERVAL HISTORY:   Anthony presents today with his wife.  They recently returned yesterday from a trip to [place].  Anthony reports that his been feeling well.  He has had some musculoskeletal low back pain which is chronic, got a little bit worse in [place].  There is no new bone pain.  He denies any nausea or diarrhea secondary to treatment.  He has good energy and appetite.    Remainder of 10-pt ROS otherwise is negative.    PHYSICAL EXAMINATION  /81  Pulse 76  Temp 98.5  F (36.9  C) (Oral)  Resp 16  Wt 54.1 kg (119 lb 4.3 oz)  SpO2 95%  BMI 20.47 kg/m2     Gen: Small frail  appearing male, pleasant, NAD   HEENT: MMM, no oral lesions  Neck: supple  Lymph: no cervical, sc, axillary LAD   CV: RRR, no murmurs  Resp: CTAB  GI: Soft, NTND, no HSM   Ext: trace edema   Neuro: AOX3, no focal deficits   Skin: no rash   MSK: Tenderness to palpation of lower thoracic spine    LABS:     Results for WILLIAN ARCINIEGA (MRN [id number]) as of 4/17/2018 23:29   Ref. Range 1/25/2018 15:00 2/22/2018 09:45 3/29/2018 07:17   Albumin Fraction Latest Ref Range: 3.7 - 5.1 g/dL 3.3 (L) 3.2 (L) 3.1 (L)   Alpha 1 Fraction Latest Ref Range: 0.2 - 0.4 g/dL 0.5 (H) 0.4 0.4   Alpha 2 Fraction Latest Ref Range: 0.5 - 0.9 g/dL 1.3 (H) 1.1 (H) 0.9   Beta Fraction Latest Ref Range: 0.6 - 1.0 g/dL 0.7 0.7 0.7   ELP Interpretation: Unknown Large monoclonal ... Large monoclonal ... Monoclonal protei...   Gamma Fraction Latest Ref Range: 0.7 - 1.6 g/dL 4.8 (H) 3.6 (H) 2.6 (H)   Kappa Free Lt Chain Latest Ref Range: 0.33 - 1.94 mg/dL <0.30 (L) <0.30 (L) <0.30 (L)   Kappa Lambda Ratio Latest Ref Range: 0.26 - 1.65  Unable to Calculate Unable to Calculate Unable to Calculate   Lambda Free Lt Chain Latest Ref Range: 0.57 - 2.63 mg/dL 339.00 (H) 345.00 (H) 228.25 (H)   Monoclonal Peak Latest Ref Range: 0.0 g/dL 4.6 (H) 3.4 (H) 2.4 (H)     Lab Results   Component Value Date    WBC 1.9 04/17/2018     Lab Results   Component Value Date    RBC 2.37 04/17/2018     Lab Results   Component Value Date    HGB 7.9 04/17/2018     Lab Results   Component Value Date    HCT 24.1 04/17/2018     No components found for: MCT  Lab Results   Component Value Date     04/17/2018     Lab Results   Component Value Date    MCH 33.3 04/17/2018     Lab Results   Component Value Date    MCHC 32.8 04/17/2018     Lab Results   Component Value Date    RDW 21.6 04/17/2018     Lab Results   Component Value Date    PLT 50 04/17/2018     Last Basic Metabolic Panel:  Lab Results   Component Value Date     04/17/2018      Lab Results   Component  Value Date    POTASSIUM 4.2 04/17/2018     Lab Results   Component Value Date    CHLORIDE 109 04/17/2018     Lab Results   Component Value Date    JAZMIN 7.7 04/17/2018     Lab Results   Component Value Date    CO2 21 04/17/2018     Lab Results   Component Value Date    BUN 34 04/17/2018     Lab Results   Component Value Date    CR 1.81 04/17/2018     Lab Results   Component Value Date     04/17/2018         IMPRESSION/PLAN:  72 year old male with relapsed MM after autologous transplant (1/9/2013), status-post multiple salvage regimens with progressive disease.    MM: With rising light chains, worsening renal function, and worsening TCP, started weekly cytoxan with prednisone 50  mg QOD on 12/14.  He has had nice reduction in monoclonal spike and lambda light chains with this regimen.  He appears to be tolerating it well symptomatically.  Today, his white count is low with absolute neutrophil count 1.1.  We would like to hold off on Cytoxan for today to give him more time to recover counts.  Overall, he is having good treatment response, so we would not want to increase his risk for treatment related infectious complications.  At time of progression, we would consider adding Ixazomib.     -Defer chemotherapy today, will recheck counts in 1 week and consider resuming at that time  -Zometa every 3 months, plan to give next week    Heme: Cytopenias 2/2 treatment and likely MM, stable  - Previously on Plavix, remains on hold given thrombocytopenia, indefinitely  - Transfuse to keep hemoglobin > 8, platelets > 10k.  1 unit of blood today.    Renal/FEN: Creat baseline ~0.8-1.0, recently has been increased beyond baseline, likely secondary to progressive myeloma.  This has been improved the last few weeks.  -Encouraged protein/calorie supplements.   - Zyprexa at night to help with appetite.    ID: No issues.  - Continues ppx  mg BID  -Pentamidine in 1 week, gets this monthly for pneumocystis prophylaxis due to  heavy pretreatment     Back/rib pain: Started early May. Lumbar MRI at OSH showing mild-mod central and foraminal stenoses in lumbar spine, per patient and wife, there was no MM lesion there.       CV: Continue zocor and norvasc.   - Avoid QTc prolonging agents (history of QTc prolongation with syncopal episodes)     Patient seen and discussed with Dr. Fatima who agrees with plan.       Bella Dyer MD  Hematology Oncology fellow  [phone]    _______________________________________________    ATTENDING NOTE    I have seen and personally evaluated the patient today.  I reviewed vitals, medications, laboratory results, and I viewed pertinent imaging studies.  After doing so, I formulated a plan with Dr. Dyer as documented in this note.  I personally communicated recommendations to the patient over a 30 minute face-to-face visit.      Navjot Fatima MD, pager [phone]

## 2018-04-24 NOTE — PROGRESS NOTES
Infusion Nursing Note:  Anthony Carbone presents today for Day 8 Cycle 6 Cytoxan, q3 month Zometa, and Pentamidine nebulization.    Patient seen by provider today: No   present during visit today: Not Applicable.    Note: Patient states he feels well overall; however, does acknowledge mild amounts of fatigue. Denies dizziness/lightheadedness or SOB. Denies any active bleeding.     Intravenous Access:  Implanted Port.    Treatment Conditions:  Lab Results   Component Value Date    HGB 10.3 04/24/2018     Lab Results   Component Value Date    WBC 3.6 04/24/2018      Lab Results   Component Value Date    ANEU 2.5 04/24/2018     Lab Results   Component Value Date    PLT 41 04/24/2018      Lab Results   Component Value Date     04/24/2018                   Lab Results   Component Value Date    POTASSIUM 3.5 04/24/2018           Lab Results   Component Value Date    MAG 1.7 01/15/2018            Lab Results   Component Value Date    CR 1.60 04/24/2018                   Lab Results   Component Value Date    JAZMIN 8.8 04/24/2018                Lab Results   Component Value Date    BILITOTAL 0.3 04/24/2018           Lab Results   Component Value Date    ALBUMIN 2.6 04/24/2018                    Lab Results   Component Value Date    ALT 12 04/24/2018           Lab Results   Component Value Date    AST 8 04/24/2018       Results reviewed, labs MET treatment parameters, ok to proceed with treatment. Corrected calcium: 9.92      Post Infusion Assessment:  Patient tolerated infusion without incident.  Patient tolerated Pentamidine nebulization without incident.  Blood return noted pre and post infusion.  Site patent and intact, free from redness, edema or discomfort.  No evidence of extravasations.  Access discontinued per protocol.    Discharge Plan:   Patient declined prescription refills.  Discharge instructions reviewed with: Patient and Family.  Patient and/or family verbalized understanding of  discharge instructions and all questions answered.  AVS to patient via IMshopping.  Patient will return 5/1/18 for next appointment.   Patient discharged in stable condition accompanied by: self and wife.  Departure Mode: Ambulatory.  Face to Face time: 0 minutes.    Tyron Daniels RN

## 2018-04-24 NOTE — PATIENT INSTRUCTIONS
Contact Numbers    [facility] Main Line: [phone]  [facility] Triage:  [phone]    Call triage with chills and/or temperature greater than or equal to 100.5, uncontrolled nausea/vomiting, diarrhea, constipation, dizziness, shortness of breath, chest pain, bleeding, unexplained bruising, or any new/concerning symptoms, questions/concerns.     If you are having any concerning symptoms or wish to speak to a provider before your next infusion visit, please call your care coordinator or triage to notify them so we can adequately serve you.       After Hours: [phone]    If after hours, weekends, or holidays, call main hospital  and ask for Oncology doctor on call.         April 2018 Sunday Monday Tuesday Wednesday Thursday Friday Saturday   1     2     3     4     5     6     7       8     9     10     11     12     13     14       15     16     17     [facility]    9:30 AM   (15 min.)    [facility] DRAW   [facility] Draw     [facility] RETURN   10:00 AM   (30 min.)   Navjot Fatima MD   [facility] Blood and Marrow Transplant     [facility] ONC INFUSION 60   10:30 AM   (60 min.)    ONCOLOGY INFUSION   [facility] 18     19     20     21       22     23     24     [facility]   12:30 PM   (15 min.)    [facility]RAW   [facility]     [facility] INFUSION 60    1:00 PM   (60 min.)    ONCOLOGY INFUSION   [facility] 25     26     27     28       29     30                                         May 2018   [facility] Monday Tuesday Wednesday Thursday Friday Saturday             1     [facility]   10:15 AM   (15 min.)    [facility]   [facility] Draw     [facility] RETURN   10:45 AM   (50 min.)   Leanne Reddy PA-C   [facility]     [facility] INFUSION 60   12:00 PM   (60 min.)    ONCOLOGY INFUSION   [facility] 2     3     4     5       6     7     8     9     10     11      12       13     14     15     16     17     18     19       20     21     22     23     24     25     26       27     28     29     30     31                            Lab Results:  Recent Results (from the past 12 hour(s))   CBC with platelets differential    Collection Time: 04/24/18  1:25 PM   Result Value Ref Range    WBC 3.6 (L) 4.0 - 11.0 10e9/L    RBC Count 3.11 (L) 4.4 - 5.9 10e12/L    Hemoglobin 10.3 (L) 13.3 - 17.7 g/dL    Hematocrit 30.9 (L) 40.0 - 53.0 %    MCV 99 78 - 100 fl    MCH 33.1 (H) 26.5 - 33.0 pg    MCHC 33.3 31.5 - 36.5 g/dL    RDW 18.9 (H) 10.0 - 15.0 %    Platelet Count 41 (LL) 150 - 450 10e9/L    Diff Method Automated Method     % Neutrophils 69.6 %    % Lymphocytes 13.8 %    % Monocytes 15.7 %    % Eosinophils 0.6 %    % Basophils 0.0 %    % Immature Granulocytes 0.3 %    Nucleated RBCs 0 0 /100    Absolute Neutrophil 2.5 1.6 - 8.3 10e9/L    Absolute Lymphocytes 0.5 (L) 0.8 - 5.3 10e9/L    Absolute Monocytes 0.6 0.0 - 1.3 10e9/L    Absolute Eosinophils 0.0 0.0 - 0.7 10e9/L    Absolute Basophils 0.0 0.0 - 0.2 10e9/L    Abs Immature Granulocytes 0.0 0 - 0.4 10e9/L    Absolute Nucleated RBC 0.0     Platelet Estimate Confirming automated cell count    Comprehensive metabolic panel    Collection Time: 04/24/18  1:25 PM   Result Value Ref Range    Sodium 142 133 - 144 mmol/L    Potassium 3.5 3.4 - 5.3 mmol/L    Chloride 108 94 - 109 mmol/L    Carbon Dioxide 25 20 - 32 mmol/L    Anion Gap 9 3 - 14 mmol/L    Glucose 133 (H) 70 - 99 mg/dL    Urea Nitrogen 26 7 - 30 mg/dL    Creatinine 1.60 (H) 0.66 - 1.25 mg/dL    GFR Estimate 42 (L) >60 mL/min/1.7m2    GFR Estimate If Black 51 (L) >60 mL/min/1.7m2    Calcium 8.8 8.5 - 10.1 mg/dL    Bilirubin Total 0.3 0.2 - 1.3 mg/dL    Albumin 2.6 (L) 3.4 - 5.0 g/dL    Protein Total 8.1 6.8 - 8.8 g/dL    Alkaline Phosphatase 46 40 - 150 U/L    ALT 12 0 - 70 U/L    AST 8 0 - 45 U/L

## 2018-04-24 NOTE — MR AVS SNAPSHOT
After Visit Summary   4/24/2018    Anthony Carbone    MRN: [id number]           Patient Information     Date Of Birth          1945        Visit Information        Provider Department      4/24/2018 1:00 PM CINDY 31 ATC;  ONCOLOGY INFUSION [facility]        Today's Diagnoses     Multiple myeloma in relapse (H)    -  1    Multiple myeloma, remission status unspecified (H)          Care Instructions    Contact Numbers    [facility] Main Line: [phone]  [facility] Triage:  [phone]    Call triage with chills and/or temperature greater than or equal to 100.5, uncontrolled nausea/vomiting, diarrhea, constipation, dizziness, shortness of breath, chest pain, bleeding, unexplained bruising, or any new/concerning symptoms, questions/concerns.     If you are having any concerning symptoms or wish to speak to a provider before your next infusion visit, please call your care coordinator or triage to notify them so we can adequately serve you.       After Hours: [phone]    If after hours, weekends, or holidays, call main hospital  and ask for Oncology doctor on call.         April 2018 Sunday Monday Tuesday Wednesday Thursday Friday Saturday   1     2     3     4     5     6     7       8     9     10     11     12     13     14       15     16     17     [facility] LAB DRAW    9:30 AM   (15 min.)    [facility] DRAW   [facility] Lab Draw     [facility] ONC RETURN   10:00 AM   (30 min.)   Navjot Fatima MD   [facility] Blood and Marrow Transplant     [facility] ONC INFUSION 60   10:30 AM   (60 min.)    ONCOLOGY INFUSION   [facility] 18     19     20     21       22     23     24     [facility] LAB DRAW   12:30 PM   (15 min.)    [facility] LAB DRAW   [facility] Lab Draw     [facility] ONC INFUSION 60    1:00 PM   (60 min.)    ONCOLOGY INFUSION   [facility] 25     26     27     28       29     30                                          May 2018   [facility] Monday Tuesday Wednesday Thursday Friday Saturday             1     [facility] DRAW   10:15 AM   (15 min.)    [facility] LAB DRAW   [facility] Lab Draw     [facility] RETURN   10:45 AM   (50 min.)   Leanne Reddy PA-C   [facility] ONC INFUSION 60   12:00 PM   (60 min.)    ONCOLOGY INFUSION   [facility] 2     3     4     5       6     7     8     9     10     11     12       13     14     15     16     17     18     19       20     21     22     23     24     25     26       27     28     29     30     31                            Lab Results:  Recent Results (from the past 12 hour(s))   CBC with platelets differential    Collection Time: 04/24/18  1:25 PM   Result Value Ref Range    WBC 3.6 (L) 4.0 - 11.0 10e9/L    RBC Count 3.11 (L) 4.4 - 5.9 10e12/L    Hemoglobin 10.3 (L) 13.3 - 17.7 g/dL    Hematocrit 30.9 (L) 40.0 - 53.0 %    MCV 99 78 - 100 fl    MCH 33.1 (H) 26.5 - 33.0 pg    MCHC 33.3 31.5 - 36.5 g/dL    RDW 18.9 (H) 10.0 - 15.0 %    Platelet Count 41 (LL) 150 - 450 10e9/L    Diff Method Automated Method     % Neutrophils 69.6 %    % Lymphocytes 13.8 %    % Monocytes 15.7 %    % Eosinophils 0.6 %    % Basophils 0.0 %    % Immature Granulocytes 0.3 %    Nucleated RBCs 0 0 /100    Absolute Neutrophil 2.5 1.6 - 8.3 10e9/L    Absolute Lymphocytes 0.5 (L) 0.8 - 5.3 10e9/L    Absolute Monocytes 0.6 0.0 - 1.3 10e9/L    Absolute Eosinophils 0.0 0.0 - 0.7 10e9/L    Absolute Basophils 0.0 0.0 - 0.2 10e9/L    Abs Immature Granulocytes 0.0 0 - 0.4 10e9/L    Absolute Nucleated RBC 0.0     Platelet Estimate Confirming automated cell count    Comprehensive metabolic panel    Collection Time: 04/24/18  1:25 PM   Result Value Ref Range    Sodium 142 133 - 144 mmol/L    Potassium 3.5 3.4 - 5.3 mmol/L    Chloride 108 94 - 109 mmol/L    Carbon Dioxide 25 20 - 32 mmol/L    Anion Gap 9 3 - 14 mmol/L    Glucose 133 (H) 70 - 99 mg/dL    Urea  Nitrogen 26 7 - 30 mg/dL    Creatinine 1.60 (H) 0.66 - 1.25 mg/dL    GFR Estimate 42 (L) >60 mL/min/1.7m2    GFR Estimate If Black 51 (L) >60 mL/min/1.7m2    Calcium 8.8 8.5 - 10.1 mg/dL    Bilirubin Total 0.3 0.2 - 1.3 mg/dL    Albumin 2.6 (L) 3.4 - 5.0 g/dL    Protein Total 8.1 6.8 - 8.8 g/dL    Alkaline Phosphatase 46 40 - 150 U/L    ALT 12 0 - 70 U/L    AST 8 0 - 45 U/L               Follow-ups after your visit        Your next 10 appointments already scheduled     May 01, 2018 10:15 AM CDT   [facility] Lab Draw with [facility] LAB DRAW   [facility] Lab Draw ([facility])    [address] [place] [address]  [address]  [facility] [id number] [phone]            May 01, 2018 11:00 AM CDT   (Arrive by 10:45 AM)   Return Visit with Leanne Reddy PA-C   [facility] ([facility])    [address] [place] [address]  [address]  [facility] [id number] [phone]            May 01, 2018 12:00 PM CDT   Infusion 60 with  ONCOLOGY INFUSION, UC 28 ATC   [facility] ([facility])    [address] [place] [address]  [address]  [facility] [address] [phone]              Who to contact     If you have questions or need follow up information about today's clinic visit or your schedule please contact [facility] CANCER [facility] directly at [phone].  Normal or non-critical lab and imaging results will be communicated to you by MyChart, letter or phone within 4 business days after the clinic has received the results. If you do not hear from us within 7 days, please contact the clinic through MyChart or phone. If you have a critical or abnormal lab result, we will notify you by phone as soon as possible.  Submit refill requests through [employer] or call your pharmacy and they will forward the refill request to us. Please allow 3 business days for your refill to be completed.          Additional Information  About Your Visit        [employer] Information     [employer] gives you secure access to your electronic health record. If you see a primary care provider, you can also send messages to your care team and make appointments. If you have questions, please call your primary care clinic.  If you do not have a primary care provider, please call [phone] and they will assist you.        Care EveryWhere ID     This is your Care EveryWhere ID. This could be used by other organizations to access your [facility] medical records  [id number]        Your Vitals Were     Pulse Temperature Respirations Pulse Oximetry BMI (Body Mass Index)       73 98.7  F (37.1  C) (Oral) 16 96% 20.47 kg/m2        Blood Pressure from Last 3 Encounters:   04/24/18 122/71   04/17/18 148/87   04/17/18 123/81    Weight from Last 3 Encounters:   04/24/18 54.1 kg (119 lb 4.3 oz)   04/17/18 54.1 kg (119 lb 4.3 oz)   03/29/18 53.2 kg (117 lb 4.8 oz)              We Performed the Following     CBC with platelets differential     Comprehensive metabolic panel        Primary Care Provider Office Phone # Fax #    Sanket Gualberto [phone]       [place] [id number] [place] [id number]        Equal Access to Services     ALBAN SHAH : Hadii chalo ku hadasho Soomaali, waaxda luqadaha, qaybta kaalmada adeegyada, jonny villa. So [facility] [phone].    ATENCIÓN: Si habla español, tiene a todd disposición servicios gratuitos de asistencia lingüística. Llame al [phone].    We comply with applicable federal civil rights laws and [place] laws. We do not discriminate on the basis of race, color, national origin, age, disability, sex, sexual orientation, or gender identity.            Thank you!     Thank you for choosing [facility] CANCER [facility]  for your care. Our goal is always to provide you with excellent care. Hearing back from our patients is one way we can continue to improve our  services. Please take a few minutes to complete the written survey that you may receive in the mail after your visit with us. Thank you!             Your Updated Medication List - Protect others around you: Learn how to safely use, store and throw away your medicines at www.disposemymeds.org.          This list is accurate as of 4/24/18  4:28 PM.  Always use your most recent med list.                   Brand Name Dispense Instructions for use Diagnosis    acetaminophen 325 MG tablet    TYLENOL    100 tablet    Take 2 tablets (650 mg) by mouth every 4 hours as needed for mild pain or fever    Fever, unspecified fever cause       acyclovir 400 MG tablet    ZOVIRAX    60 tablet    Take 1 tablet (400 mg) by mouth 2 times daily    Multiple myeloma in relapse (H)       amLODIPine 5 MG tablet    NORVASC    90 tablet    TAKE 1 TABLET BY MOUTH AT BEDTIME    Essential hypertension       esomeprazole 40 MG CR capsule    nexIUM    30 capsule    Take 1 capsule (40 mg) by mouth every morning (before breakfast)    Gastroesophageal reflux disease without esophagitis       OLANZapine 5 MG tablet    zyPREXA    60 tablet    Take by mouth At Bedtime Takes 1 tablet    Delirium, Multiple myeloma in relapse (H)       ondansetron 8 MG ODT tab    ZOFRAN-ODT    30 tablet    Take 1 tablet (8 mg) by mouth every 8 hours as needed for nausea    Nausea       predniSONE 50 MG tablet    DELTASONE    15 tablet    Take 1 tablet (50 mg) by mouth every other day    Multiple myeloma, remission status unspecified (H)       SIMVASTATIN PO      Take 20 mg by mouth At Bedtime        traMADol 50 MG tablet    ULTRAM    60 tablet    Take 1 tablet (50 mg) by mouth every 6 hours as needed for moderate pain . Recommend trying after Tylenol and before Dilaudid.    Acute bilateral low back pain without sciatica, Multiple myeloma in relapse (H)

## 2018-05-01 NOTE — MR AVS SNAPSHOT
After Visit Summary   5/1/2018    Anthony Carbone    MRN: [id number]           Patient Information     Date Of Birth          1945        Visit Information        Provider Department      5/1/2018 11:00 AM Leanne Reddy PA-C [facility]        Today's Diagnoses     Multiple myeloma, remission status unspecified (H)    -  1       Follow-ups after your visit        Your next 10 appointments already scheduled     May 08, 2018  1:30 PM CDT   [facility] Lab Draw with UC [facility] LAB DRAW   [facility] Lab Draw ([facility])    [address] [place] [address]  [address]  [facility] [address]   [phone]            May 08, 2018  2:00 PM CDT   Infusion 60 with UC ONCOLOGY INFUSION, UC 21 ATC   [facility] ([facility])    [address] [place] [address]  [address]  [facility] [address]   [phone]            May 15, 2018 11:15 AM CDT   [facility] Draw with [facility] DRAW   [facility]    [address] [place] [address]  [address]  [facility] [address]   [phone]            May 15, 2018 11:50 AM CDT   (Arrive by 11:35 AM)   Return Visit with Leanne Reddy PA-C   [facility] ([facility])    [address] [place] [address]  [address]  [facility] [address]   [phone]            May 15, 2018 12:30 PM CDT   Infusion 60 with UC ONCOLOGY INFUSION, UC 21 ATC   [facility] ([facility])    [address] [place] [address]  [address]  [facility] [address]   [phone]            May 29, 2018 11:00 AM CDT   [facility] Draw with UC [facility] LAB DRAW   [facility] Draw ([facility])    [address] [place] [address]  [address]  [facility] [address]   [phone]            May 29, 2018 11:30 AM CDT   Infusion 60 with UC ONCOLOGY INFUSION, UC 24  "ATC   [facility] ([facility])    [address] [facility]  [address]  [facility] [address] [phone]              Who to contact     If you have questions or need follow up information about today's clinic visit or your schedule please contact [facility]CER [facility] directly at [phone].  Normal or non-critical lab and imaging results will be communicated to you by MyChart, letter or phone within 4 business days after the clinic has received the results. If you do not hear from us within 7 days, please contact the clinic through [employer] or phone. If you have a critical or abnormal lab result, we will notify you by phone as soon as possible.  Submit refill requests through [employer] or call your pharmacy and they will forward the refill request to us. Please allow 3 business days for your refill to be completed.          Additional Information About Your Visit        [employer] Information     [employer] gives you secure access to your electronic health record. If you see a primary care provider, you can also send messages to your care team and make appointments. If you have questions, please call your primary care clinic.  If you do not have a primary care provider, please call [phone] and they will assist you.        Care EveryWhere ID     This is your Care EveryWhere ID. This could be used by other organizations to access your [place] medical records  [id number]        Your Vitals Were     Pulse Temperature Height Pulse Oximetry BMI (Body Mass Index)       88 97.6  F (36.4  C) (Oral) 1.626 m (5' 4.02\") 96% 19.94 kg/m2        Blood Pressure from Last 3 Encounters:   05/01/18 132/80   04/24/18 122/71   04/17/18 148/87    Weight from Last 3 Encounters:   05/01/18 52.7 kg (116 lb 3.2 oz)   04/24/18 54.1 kg (119 lb 4.3 oz)   04/17/18 54.1 kg (119 lb 4.3 oz)               Primary Care Provider Office Phone # Fax #    Sanket Burciaga [phone] "       [facility] [address] [facility] [address]        Equal Access to Services     RAFALARMANDO ALYSSA : Hadii chalo Davis, wacatherineda joo, qaclaudia michaudmaabhijit abdi, jonny pricepetrmartha villa. So [facility] [phone].    ATENCIÓN: Si habla español, tiene a todd disposición servicios gratuitos de asistencia lingüística. Llame al [phone].    We comply with applicable federal civil rights laws and [place] laws. We do not discriminate on the basis of race, color, national origin, age, disability, sex, sexual orientation, or gender identity.            Thank you!     Thank you for choosing [facility] CANCER [facility]  for your care. Our goal is always to provide you with excellent care. Hearing back from our patients is one way we can continue to improve our services. Please take a few minutes to complete the written survey that you may receive in the mail after your visit with us. Thank you!             Your Updated Medication List - Protect others around you: Learn how to safely use, store and throw away your medicines at www.disposemymeds.org.          This list is accurate as of 5/1/18  1:22 PM.  Always use your most recent med list.                   Brand Name Dispense Instructions for use Diagnosis    acetaminophen 325 MG tablet    TYLENOL    100 tablet    Take 2 tablets (650 mg) by mouth every 4 hours as needed for mild pain or fever    Fever, unspecified fever cause       acyclovir 400 MG tablet    ZOVIRAX    60 tablet    Take 1 tablet (400 mg) by mouth 2 times daily    Multiple myeloma in relapse (H)       amLODIPine 5 MG tablet    NORVASC    90 tablet    TAKE 1 TABLET BY MOUTH AT BEDTIME    Essential hypertension       esomeprazole 40 MG CR capsule    nexIUM    30 capsule    Take 1 capsule (40 mg) by mouth every morning (before breakfast)    Gastroesophageal reflux disease without esophagitis       OLANZapine 5 MG tablet    zyPREXA    60 tablet    Take  by mouth At Bedtime Takes 1 tablet    Delirium, Multiple myeloma in relapse (H)       ondansetron 8 MG ODT tab    ZOFRAN-ODT    30 tablet    Take 1 tablet (8 mg) by mouth every 8 hours as needed for nausea    Nausea       predniSONE 50 MG tablet    DELTASONE    15 tablet    Take 1 tablet (50 mg) by mouth every other day    Multiple myeloma, remission status unspecified (H)       SIMVASTATIN PO      Take 20 mg by mouth At Bedtime        traMADol 50 MG tablet    ULTRAM    60 tablet    Take 1 tablet (50 mg) by mouth every 6 hours as needed for moderate pain . Recommend trying after Tylenol and before Dilaudid.    Acute bilateral low back pain without sciatica, Multiple myeloma in relapse (H)

## 2018-05-01 NOTE — NURSING NOTE
Chief Complaint   Patient presents with     Port Draw     labs drawn via port by RN     /80 (BP Location: Right arm, Patient Position: Chair, Cuff Size: Adult Regular)  Pulse 88  Temp 97.6  F (36.4  C) (Oral)  Wt 52.7 kg (116 lb 3.2 oz)  SpO2 96%  BMI 19.95 kg/m2    Vitals taken. Port accessed by RN. Labs collected and sent. Line flushed with NS & Heparin. Pt tolerated well. Pt checked in for next appointment.    Iris Gutiérrez RN

## 2018-05-01 NOTE — LETTER
5/1/2018      RE: Anthony Thomasjacksonbaljit  [address] [place] [address]       HEMATOLOGY/ONCOLOGY PROGRESS NOTE  May 1, 2018    REASON FOR VISIT: myeloma    Diagnosis: 72 year old gentleman with IgM lambda multiple myeloma originally diagnosed in 01/2012 as stage I, standard risk disease.   Treatment: Revlimid plus dexamethasone for 4-5 cycles but plateaued by late 03/2012.   - Velcade was added and he received another 2-3 cycles, achieving a good partial remission.      Transplant: Single auto after melphalan 200 mg/m2 preparative regimen on 01/09/2013  - Post-transplant course: unremarkable except for some mild steroid-induced hyperglycemia, gastritis, nausea and vomiting.      Maintenance: Lenalidomide at day-100 then developed a maculopapular rash. Lenalidomide was held and then we re-challenged him after about a month to 2 months at a lower dose (5 mg daily); rash returned.   - was started on maintenance Velcade, every other week through July 2014, when he was noted with abnormalities on myeloma studies drawn there. Noted with prostate cancer dx at about the time of relapse (see below).     Relapse: noted with return on M-spike in blood/urine with marrow involvement but negative PET.   - Started on retreatment with RVD on 8/27/14 with Revlimid at 15 mg 14 days of 21 days, dexamethasone 40 mg weekly and velcade weekly.Completed at total of 5 cycles without complication by 12/2014.   - Started Cycle 6 on inc'd Rev dosing of 20mg daily x 2 weeks on 12/11/14 which was complicated by pneumonia.  - Adm 12/21-1/10 for human metapneumovirus pneumonia complicated by anorexia, HTN, depression, anorexia with significant weight loss.   - Restarted Ernesto/Dex only on 2/4/15 with good tolerance but with thrombocytopenia.   - Bendamustine added to Velcade/dexamethasone on 5/21/15 due to disease progression  - Velcade discontinued on 7/9/2015 due to side effects (orthostasis)  - Schedule changed to bendamustine 80 mg/m2  "days 1 and 2 on 28-day cycle  - Cycle 1 tolerated poorly due to lightheadedness and weakness  - Cycle 2 dose reduced to 60 mg/m2 and pre-meds adjusted  - Cycle 5 received on 9/17/15.  - 10/1/2015 - increasing IgM, M-spike - started Pomalidomide 4mg/day (21 days out of 28 days) and weekly Decadron 20mg on Oct 1st, 2015.    - Carfilzomib added on 10/22/2015 making this CPD.  - C3-C6  received in [place]    - Returned to [facility] and resumed CPD here    - Adm: 4/12-4/14 with fever, confusion, neutropenia. Noted on MRI to have acute/subacute CVA and subacute/chronic CVA; started on Plavix, given brief course of Abx and GCSF prior to d/c.     - Continued on Carfilzomib and dexamethasone alone  - Pomalyst added back on 7/13/2016 for rising FLC  - Start daratumumab 11/10/16. Changed to every other week after 4 weekly treatments d/t profound fatigue and malaise.   - Adm 5/7-5/16 due to AMS, hypercalcemia, hyperuricemia, possible PNA, back pain, and JOSE MARIA. Started Kyprolis while inpatient.  - Re-admitted 5/25-/529 with recurrent AMS, found to have concomitant PNA  - Resumed Kyprolis 6/13/2017. Stopped due to worsening performance status and progressive myeloma.  - Started Venclexta 800 mg 8/25/2017  - Added weekly Velcade with dexamethasone 20 mg weekly due to rising light chains on 11/1/2017  - Started weekly Cytoxan with prednisone QOD on 12/13 (though started the prednisone around 12/24)      INTERVAL HISTORY:    Anthony presents today with his wife.  He has been doing well. Energy is a little \"parmjit\" on the chemotherapy but feels it is tolerable right now. He has been busy around the house, going out to eat with friends, etc. He hasn't had any new concerns. Back pain is minimal, improved from when in [place]. He does note pain when he is on the floor using the Dustbuster. No fevers, chills, SOB, or other infectious symptoms. No new pains. He has not had any diarrhea or constipation, bleeding, or swelling. Remainder of 10-pt ROS " "otherwise is negative.    PHYSICAL EXAMINATION  /80 (BP Location: Right arm, Patient Position: Chair, Cuff Size: Adult Regular)  Pulse 88  Temp 97.6  F (36.4  C) (Oral)  Ht 1.626 m (5' 4.02\")  Wt 52.7 kg (116 lb 3.2 oz)  SpO2 96%  BMI 19.94 kg/m2     Wt Readings from Last 10 Encounters:   18 52.7 kg (116 lb 3.2 oz)   18 54.1 kg (119 lb 4.3 oz)   18 54.1 kg (119 lb 4.3 oz)   18 53.2 kg (117 lb 4.8 oz)   18 52.3 kg (115 lb 3.2 oz)   18 52.8 kg (116 lb 6.4 oz)   18 52.2 kg (115 lb 1.6 oz)   18 52.4 kg (115 lb 8 oz)   18 52.3 kg (115 lb 4.8 oz)   02/15/18 52.6 kg (116 lb)     General: Alert, frail. Oriented to name, , location,  Able to ambulate independently, albeit slow and cautiously.  No acute distress. HEENT: PERRL, no palor or icterus. CVS: RRR CHEST: CTAB, normal work of breathing, right port without erythema, swelling or pus.  ABDOMEN: soft non tender no masses     NEURO: AAOX3  CN 2-12 intact  SKIN: no bleeding or brusiing, no rashes EXTREMITIES: No edema.     LABS:   Results for WILLIAN ARCINIEGA (MRN [id number]) as of 2018 10:46   Ref. Range 2018 13:25 2018 10:25   WBC Latest Ref Range: 4.0 - 11.0 10e9/L 3.6 (L) 1.8 (L)   Hemoglobin Latest Ref Range: 13.3 - 17.7 g/dL 10.3 (L) 10.1 (L)   Hematocrit Latest Ref Range: 40.0 - 53.0 % 30.9 (L) 30.8 (L)   Platelet Count Latest Ref Range: 150 - 450 10e9/L 41 (LL) 45 (LL)   Results for WILLIAN ARCINIEGA (MRN [id number]) as of 2018 10:46   Ref. Range 2018 15:30 2018 15:00 2018 09:45 3/29/2018 07:17   Kappa Free Lt Chain Latest Ref Range: 0.33 - 1.94 mg/dL <0.30 (L) <0.30 (L) <0.30 (L) <0.30 (L)   Kappa Lambda Ratio Latest Ref Range: 0.26 - 1.65  Unable to Calculate Unable to Calculate Unable to Calculate Unable to Calculate   Lambda Free Lt Chain Latest Ref Range: 0.57 - 2.63 mg/dL 257.00 (H) 339.00 (H) 345.00 (H) 228.25 (H)   Monoclonal Peak Latest Ref " Range: 0.0 g/dL 4.6 (H) 4.6 (H) 3.4 (H) 2.4 (H)     IMPRESSION/PLAN:  72 year old male with relapsed MM after autologous transplant (1/9/2013), status-post multiple salvage regimens with progressive disease.    MM: Continues on prednisone QOD with weekly cytoxan with improvement in performance status and biochemical markers, tolerating overall well with mild fatigue.  Will check SPEP/light chains today and if stable, will move cytoxan to every other week per Dr. Fatima. Will continue with cytoxan today.  - Can potentially discuss adding Ninlaro to this regimen should he progress and have adequate PS at that time    Goals: We have had numerous discussions re: goals and continuing treatment. For now, Yamil feels that he is tolerating this regimen well and we will plan to continue these discussions.     Heme: Cytopenias 2/2 treatment and likely MM, requiring intermittent pRBC, usually once/month. Blood counts stable today with exception of mild neutropenia.  - Previously on Plavix, remains on hold given thrombocytopenia, indefinitely  - Transfuse to keep hemoglobin > 8, platelets > 10k.     Renal/FEN: Worsening renal function in December-Jan (Cr max 3.40 on 1/13) in setting of progressive myeloma. This is overall improved but does fluctuate. Will give a liter of NS today for mildly increased Creatinine.  - Encouraged protein/calorie supplements.   - Would recommend continuing Zyprexa at night to help with appetite.    ID: Afebrile. No localizing infectious /s.  - Continues ppx  mg BID  - Continue monthly PCP prophylaxis with  pentamidine 3/22/18, given the chronic steroids     Back/rib pain: Started early May. Lumbar MRI at OSH showing mild-mod central and foraminal stenoses in lumbar spine, per patient and wife, there was no MM lesion there.  Recent imaging with no acute findings. Pain remains minimal.     CV: Continue zocor and norvasc.   - Avoid QTc prolonging agents (history of QTc prolongation with syncopal  episodes)     AMS: AMS started early May in setting of metabolic derangements, uremia, and possible infection.  Remains intermittently confused over the past few months but improved significantly and stable today. I do believe there is perhaps some ongoing cognitive decline since the AMS episodes.  - Continue Zyprexa 5 mg for appetite  - Holding off long-acting pain meds      I spent >15 minutes with the patient, with over 50% of the time spent counseling or coordinating their care as described above.    Leanne Reddy PA-C

## 2018-05-01 NOTE — PATIENT INSTRUCTIONS
Contact Numbers  [facility]: [phone]    After Hours:  [phone]  Triage: [phone]    Please call the [facility] Triage line if you experience a temperature greater than or equal to 100.5, shaking chills, have uncontrolled nausea, vomiting and/or diarrhea, dizziness, shortness of breath, chest pain, bleeding, unexplained bruising, or if you have any other new/concerning symptoms, questions or concerns.     If it is after hours, weekends, or holidays, please call the main hospital  at  [phone] and ask to speak to the Oncology doctor on call.     If you are having any concerning symptoms or wish to speak to a provider before your next infusion visit, please call your care coordinator or triage to notify them so we can adequately serve you.     If you need a refill on a narcotic prescription or other medication, please call triage before your infusion appointment.         May 2018   [facility] Monday Tuesday Wednesday Thursday Friday Saturday             1     [facility] DRAW   10:15 AM   (15 min.)    [facility] DRAW   [facility] Lab Draw     [facility] RETURN   10:45 AM   (50 min.)   Leanne Reddy PA-C   [facility]   12:00 PM   (60 min.)    ONCOLOGY INFUSION   [facility] 2     3     4     5       6     7     8     [facility] DRAW    1:30 PM   (15 min.)    [facility] DRAW   [facility] Lab Draw     P ONC INFUSION 60    2:00 PM   (60 min.)    ONCOLOGY INFUSION   [facility] 9     10     11     12       13     14     15     [facility] DRAW   11:15 AM   (15 min.)    [facility] DRAW   [facility] Lab Draw     [facility] RETURN   11:35 AM   (50 min.)   Leanne Reddy PA-C   [facility]     [facility] ONC INFUSION 60   12:30 PM   (60 min.)    ONCOLOGY INFUSION   [facility] 16     17     18     19       20     21     22      23     24     25     26       27     28     29     [facility]   11:00 AM   (15 min.)   [facility] LAB DRAW   [facility] Lab Draw     [facility] ONC INFUSION 60   11:30 AM   (60 min.)    ONCOLOGY INFUSION   [facility] 30 31 June 2018 Sunday Monday Tuesday Wednesday Thursday Friday Saturday                            1     2       3     4     5     6     7     8     9       10     11     12     13     14     15     16       17     18     19     20     21     22     23       24     25     26     27     28     29     30                 Recent Results (from the past 24 hour(s))   CBC with platelets differential    Collection Time: 05/01/18 10:25 AM   Result Value Ref Range    WBC 1.8 (L) 4.0 - 11.0 10e9/L    RBC Count 3.07 (L) 4.4 - 5.9 10e12/L    Hemoglobin 10.1 (L) 13.3 - 17.7 g/dL    Hematocrit 30.8 (L) 40.0 - 53.0 %     78 - 100 fl    MCH 32.9 26.5 - 33.0 pg    MCHC 32.8 31.5 - 36.5 g/dL    RDW 18.4 (H) 10.0 - 15.0 %    Platelet Count 45 (LL) 150 - 450 10e9/L    Diff Method Automated Method     % Neutrophils 77.6 %    % Lymphocytes 13.7 %    % Monocytes 8.2 %    % Eosinophils 0.0 %    % Basophils 0.0 %    % Immature Granulocytes 0.5 %    Nucleated RBCs 0 0 /100    Absolute Neutrophil 1.4 (L) 1.6 - 8.3 10e9/L    Absolute Lymphocytes 0.3 (L) 0.8 - 5.3 10e9/L    Absolute Monocytes 0.2 0.0 - 1.3 10e9/L    Absolute Eosinophils 0.0 0.0 - 0.7 10e9/L    Absolute Basophils 0.0 0.0 - 0.2 10e9/L    Abs Immature Granulocytes 0.0 0 - 0.4 10e9/L    Absolute Nucleated RBC 0.0     Platelet Estimate Confirming automated cell count    Comprehensive metabolic panel    Collection Time: 05/01/18 10:25 AM   Result Value Ref Range    Sodium 139 133 - 144 mmol/L    Potassium 4.1 3.4 - 5.3 mmol/L    Chloride 107 94 - 109 mmol/L    Carbon Dioxide 19 (L) 20 - 32 mmol/L    Anion Gap 12 3 - 14 mmol/L    Glucose 201 (H) 70 - 99 mg/dL    Urea Nitrogen 25 7 - 30 mg/dL     Creatinine 1.88 (H) 0.66 - 1.25 mg/dL    GFR Estimate 35 (L) >60 mL/min/1.7m2    GFR Estimate If Black 43 (L) >60 mL/min/1.7m2    Calcium 8.8 8.5 - 10.1 mg/dL    Bilirubin Total 0.3 0.2 - 1.3 mg/dL    Albumin 2.6 (L) 3.4 - 5.0 g/dL    Protein Total 8.5 6.8 - 8.8 g/dL    Alkaline Phosphatase 44 40 - 150 U/L    ALT 10 0 - 70 U/L    AST 8 0 - 45 U/L

## 2018-05-01 NOTE — PROGRESS NOTES
Infusion Nursing Note:  Anthony Carbone presents today for C6D15 Cytoxan-1 L NS.    Patient seen by provider today: Yes: PREM Herrera    Treatment Conditions:  Lab Results   Component Value Date    HGB 10.1 05/01/2018     Lab Results   Component Value Date    WBC 1.8 05/01/2018      Lab Results   Component Value Date    ANEU 1.4 05/01/2018     Lab Results   Component Value Date    PLT 45 05/01/2018      Lab Results   Component Value Date     05/01/2018                   Lab Results   Component Value Date    POTASSIUM 4.1 05/01/2018                  Lab Results   Component Value Date    CR 1.88 05/01/2018                   Lab Results   Component Value Date    JAZMIN 8.8 05/01/2018                Lab Results   Component Value Date    BILITOTAL 0.3 05/01/2018           Lab Results   Component Value Date    ALBUMIN 2.6 05/01/2018                    Lab Results   Component Value Date    ALT 10 05/01/2018           Lab Results   Component Value Date    AST 8 05/01/2018         Intravenous Access:  Implanted Port.  Access dc'd at time of discharge.      Note:   TORB 5/1/18@1120 PREM Herrera-Jessica Melo RN  --OK to give Cytoxan today with ANC:1.4 and PLT:45,000  Results reviewed, copy given to patient.  Proceed with treatment.    Copy of AVS given to patient. + Blood return from PORT pre and post infusion.  Tolerated infusion without incident. No Prescriptions filled today.   D/C in care of spouse.  Pt will return 5/8 for next appointment.       Jessica Melo RN

## 2018-05-01 NOTE — PROGRESS NOTES
HEMATOLOGY/ONCOLOGY PROGRESS NOTE  May 1, 2018    REASON FOR VISIT: myeloma    Diagnosis: 72 year old gentleman with IgM lambda multiple myeloma originally diagnosed in 01/2012 as stage I, standard risk disease.   Treatment: Revlimid plus dexamethasone for 4-5 cycles but plateaued by late 03/2012.   - Velcade was added and he received another 2-3 cycles, achieving a good partial remission.      Transplant: Single auto after melphalan 200 mg/m2 preparative regimen on 01/09/2013  - Post-transplant course: unremarkable except for some mild steroid-induced hyperglycemia, gastritis, nausea and vomiting.      Maintenance: Lenalidomide at day-100 then developed a maculopapular rash. Lenalidomide was held and then we re-challenged him after about a month to 2 months at a lower dose (5 mg daily); rash returned.   - was started on maintenance Velcade, every other week through July 2014, when he was noted with abnormalities on myeloma studies drawn there. Noted with prostate cancer dx at about the time of relapse (see below).     Relapse: noted with return on M-spike in blood/urine with marrow involvement but negative PET.   - Started on retreatment with RVD on 8/27/14 with Revlimid at 15 mg 14 days of 21 days, dexamethasone 40 mg weekly and velcade weekly.Completed at total of 5 cycles without complication by 12/2014.   - Started Cycle 6 on inc'd Rev dosing of 20mg daily x 2 weeks on 12/11/14 which was complicated by pneumonia.  - Adm 12/21-1/10 for human metapneumovirus pneumonia complicated by anorexia, HTN, depression, anorexia with significant weight loss.   - Restarted Ernesto/Dex only on 2/4/15 with good tolerance but with thrombocytopenia.   - Bendamustine added to Velcade/dexamethasone on 5/21/15 due to disease progression  - Velcade discontinued on 7/9/2015 due to side effects (orthostasis)  - Schedule changed to bendamustine 80 mg/m2 days 1 and 2 on 28-day cycle  - Cycle 1 tolerated poorly due to lightheadedness and  "weakness  - Cycle 2 dose reduced to 60 mg/m2 and pre-meds adjusted  - Cycle 5 received on 9/17/15.  - 10/1/2015 - increasing IgM, M-spike - started Pomalidomide 4mg/day (21 days out of 28 days) and weekly Decadron 20mg on Oct 1st, 2015.    - Carfilzomib added on 10/22/2015 making this CPD.  - C3-C6  received in [place]    - Returned to [facility] and resumed CPD here    - Adm: 4/12-4/14 with fever, confusion, neutropenia. Noted on MRI to have acute/subacute CVA and subacute/chronic CVA; started on Plavix, given brief course of Abx and GCSF prior to d/c.     - Continued on Carfilzomib and dexamethasone alone  - Pomalyst added back on 7/13/2016 for rising FLC  - Start daratumumab 11/10/16. Changed to every other week after 4 weekly treatments d/t profound fatigue and malaise.   - Adm 5/7-5/16 due to AMS, hypercalcemia, hyperuricemia, possible PNA, back pain, and JOSE MARIA. Started Kyprolis while inpatient.  - Re-admitted 5/25-/529 with recurrent AMS, found to have concomitant PNA  - Resumed Kyprolis 6/13/2017. Stopped due to worsening performance status and progressive myeloma.  - Started Venclexta 800 mg 8/25/2017  - Added weekly Velcade with dexamethasone 20 mg weekly due to rising light chains on 11/1/2017  - Started weekly Cytoxan with prednisone QOD on 12/13 (though started the prednisone around 12/24)      INTERVAL HISTORY:    Anthony presents today with his wife.  He has been doing well. Energy is a little \"parmjit\" on the chemotherapy but feels it is tolerable right now. He has been busy around the house, going out to eat with friends, etc. He hasn't had any new concerns. Back pain is minimal, improved from when in [place]. He does note pain when he is on the floor using the Dustbuster. No fevers, chills, SOB, or other infectious symptoms. No new pains. He has not had any diarrhea or constipation, bleeding, or swelling. Remainder of 10-pt ROS otherwise is negative.    PHYSICAL EXAMINATION  /80 (BP Location: Right arm, " "Patient Position: Chair, Cuff Size: Adult Regular)  Pulse 88  Temp 97.6  F (36.4  C) (Oral)  Ht 1.626 m (5' 4.02\")  Wt 52.7 kg (116 lb 3.2 oz)  SpO2 96%  BMI 19.94 kg/m2     Wt Readings from Last 10 Encounters:   18 52.7 kg (116 lb 3.2 oz)   18 54.1 kg (119 lb 4.3 oz)   18 54.1 kg (119 lb 4.3 oz)   18 53.2 kg (117 lb 4.8 oz)   18 52.3 kg (115 lb 3.2 oz)   18 52.8 kg (116 lb 6.4 oz)   18 52.2 kg (115 lb 1.6 oz)   18 52.4 kg (115 lb 8 oz)   18 52.3 kg (115 lb 4.8 oz)   02/15/18 52.6 kg (116 lb)     General: Alert, frail. Oriented to name, , location,  Able to ambulate independently, albeit slow and cautiously.  No acute distress. HEENT: PERRL, no palor or icterus. CVS: RRR CHEST: CTAB, normal work of breathing, right port without erythema, swelling or pus.  ABDOMEN: soft non tender no masses     NEURO: AAOX3  CN 2-12 intact  SKIN: no bleeding or brusiing, no rashes EXTREMITIES: No edema.     LABS:   Results for WILLIAN ARCINIEGA (MRN [id number]) as of 2018 10:46   Ref. Range 2018 13:25 2018 10:25   WBC Latest Ref Range: 4.0 - 11.0 10e9/L 3.6 (L) 1.8 (L)   Hemoglobin Latest Ref Range: 13.3 - 17.7 g/dL 10.3 (L) 10.1 (L)   Hematocrit Latest Ref Range: 40.0 - 53.0 % 30.9 (L) 30.8 (L)   Platelet Count Latest Ref Range: 150 - 450 10e9/L 41 (LL) 45 (LL)   Results for WILLIAN ARCINIEGA (MRN [id number]) as of 2018 10:46   Ref. Range 2018 15:30 2018 15:00 2018 09:45 3/29/2018 07:17   Kappa Free Lt Chain Latest Ref Range: 0.33 - 1.94 mg/dL <0.30 (L) <0.30 (L) <0.30 (L) <0.30 (L)   Kappa Lambda Ratio Latest Ref Range: 0.26 - 1.65  Unable to Calculate Unable to Calculate Unable to Calculate Unable to Calculate   Lambda Free Lt Chain Latest Ref Range: 0.57 - 2.63 mg/dL 257.00 (H) 339.00 (H) 345.00 (H) 228.25 (H)   Monoclonal Peak Latest Ref Range: 0.0 g/dL 4.6 (H) 4.6 (H) 3.4 (H) 2.4 (H)     IMPRESSION/PLAN:  72 year old male " with relapsed MM after autologous transplant (1/9/2013), status-post multiple salvage regimens with progressive disease.    MM: Continues on prednisone QOD with weekly cytoxan with improvement in performance status and biochemical markers, tolerating overall well with mild fatigue.  Will check SPEP/light chains today and if stable, will move cytoxan to every other week per Dr. Fatima. Will continue with cytoxan today.  - Can potentially discuss adding Ninlaro to this regimen should he progress and have adequate PS at that time    Goals: We have had numerous discussions re: goals and continuing treatment. For now, Yamil feels that he is tolerating this regimen well and we will plan to continue these discussions.     Heme: Cytopenias 2/2 treatment and likely MM, requiring intermittent pRBC, usually once/month. Blood counts stable today with exception of mild neutropenia.  - Previously on Plavix, remains on hold given thrombocytopenia, indefinitely  - Transfuse to keep hemoglobin > 8, platelets > 10k.     Renal/FEN: Worsening renal function in December-Jan (Cr max 3.40 on 1/13) in setting of progressive myeloma. This is overall improved but does fluctuate. Will give a liter of NS today for mildly increased Creatinine.  - Encouraged protein/calorie supplements.   - Would recommend continuing Zyprexa at night to help with appetite.    ID: Afebrile. No localizing infectious /s.  - Continues ppx  mg BID  - Continue monthly PCP prophylaxis with  pentamidine 3/22/18, given the chronic steroids     Back/rib pain: Started early May. Lumbar MRI at OSH showing mild-mod central and foraminal stenoses in lumbar spine, per patient and wife, there was no MM lesion there.  Recent imaging with no acute findings. Pain remains minimal.     CV: Continue zocor and norvasc.   - Avoid QTc prolonging agents (history of QTc prolongation with syncopal episodes)     AMS: AMS started early May in setting of metabolic derangements, uremia,  and possible infection.  Remains intermittently confused over the past few months but improved significantly and stable today. I do believe there is perhaps some ongoing cognitive decline since the AMS episodes.  - Continue Zyprexa 5 mg for appetite  - Holding off long-acting pain meds      I spent >15 minutes with the patient, with over 50% of the time spent counseling or coordinating their care as described above.    Leanne Murphy PA-C      Addendum:Results for WILLIAN ARCINIEGA (MRN [id number]) as of 5/3/2018 11:19   Ref. Range 2/22/2018 09:45 3/29/2018 07:17 5/1/2018 12:09   Monoclonal Peak Latest Ref Range: 0.0 g/dL 3.4 (H) 2.4 (H) 2.9 (H)     Discussed w/ Dr. Fatima. Plan to move to QOW cytoxan in effort to allow improved marrow recovery and improved tolerance for Yamil. Called [facility], they were happy to hear this news.  ECT

## 2018-05-01 NOTE — MR AVS SNAPSHOT
After Visit Summary   5/1/2018    Anthony Carbone    MRN: [id number]           Patient Information     Date Of Birth          1945        Visit Information        Provider Department      5/1/2018 12:00 PM  28 ATC;  ONCOLOGY INFUSION [facility]        Today's Diagnoses     Multiple myeloma in relapse (H)    -  1    Multiple myeloma, remission status unspecified (H)          Care Instructions    Contact Numbers  [facility]: [phone]    After Hours:  [phone]  Triage: [phone]    Please call the [facility] Triage line if you experience a temperature greater than or equal to 100.5, shaking chills, have uncontrolled nausea, vomiting and/or diarrhea, dizziness, shortness of breath, chest pain, bleeding, unexplained bruising, or if you have any other new/concerning symptoms, questions or concerns.     If it is after hours, weekends, or holidays, please call the main hospital  at  [phone] and ask to speak to the Oncology doctor on call.     If you are having any concerning symptoms or wish to speak to a provider before your next infusion visit, please call your care coordinator or triage to notify them so we can adequately serve you.     If you need a refill on a narcotic prescription or other medication, please call triage before your infusion appointment.         May 2018   [facility] Monday Tuesday Wednesday Thursday Friday Saturday             1     [facility] MASONIC LAB DRAW   10:15 AM   (15 min.)    MASONIC LAB DRAW   [facility] Lab Draw     [facility] RETURN   10:45 AM   (50 min.)   Leanne Reddy PA-C   [facility] ONC INFUSION 60   12:00 PM   (60 min.)    ONCOLOGY INFUSION   [facility] 2     3     4     5       6     7     8     [facility] MASONIC LAB DRAW    1:30 PM   (15 min.)   [facility]IC LAB DRAW   [facility] Lab Draw     [facility] ONC INFUSION 60    2:00 PM   (60 min.)    ONCOLOGY  INFUSION   [facility] 9     10     11     12       13     14     15     [facility] MASONIC LAB DRAW   11:15 AM   (15 min.)    MASONIC LAB DRAW   [facility] Lab Draw     [facility] RETURN   11:35 AM   (50 min.)   Leanne Reddy PA-C   [facility]     [facility] ONC INFUSION 60   12:30 PM   (60 min.)   UC ONCOLOGY INFUSION   [facility] 16     17     18     19       20     21     22     23     24     25     26       27     28     29     [facility] MASONIC LAB DRAW   11:00 AM   (15 min.)    MASONIC LAB DRAW   [facility] Lab Draw     P ONC INFUSION 60   11:30 AM   (60 min.)   UC ONCOLOGY INFUSION   [facility] 30 31 June 2018 Sunday Monday Tuesday Wednesday Thursday Friday Saturday                            1     2       3     4     5     6     7     8     9       10     11     12     13     14     15     16       17     18     19     20     21     22     23       24     25     26     27     28     29     30                 Recent Results (from the past 24 hour(s))   CBC with platelets differential    Collection Time: 05/01/18 10:25 AM   Result Value Ref Range    WBC 1.8 (L) 4.0 - 11.0 10e9/L    RBC Count 3.07 (L) 4.4 - 5.9 10e12/L    Hemoglobin 10.1 (L) 13.3 - 17.7 g/dL    Hematocrit 30.8 (L) 40.0 - 53.0 %     78 - 100 fl    MCH 32.9 26.5 - 33.0 pg    MCHC 32.8 31.5 - 36.5 g/dL    RDW 18.4 (H) 10.0 - 15.0 %    Platelet Count 45 (LL) 150 - 450 10e9/L    Diff Method Automated Method     % Neutrophils 77.6 %    % Lymphocytes 13.7 %    % Monocytes 8.2 %    % Eosinophils 0.0 %    % Basophils 0.0 %    % Immature Granulocytes 0.5 %    Nucleated RBCs 0 0 /100    Absolute Neutrophil 1.4 (L) 1.6 - 8.3 10e9/L    Absolute Lymphocytes 0.3 (L) 0.8 - 5.3 10e9/L    Absolute Monocytes 0.2 0.0 - 1.3 10e9/L    Absolute Eosinophils 0.0 0.0 - 0.7 10e9/L    Absolute Basophils 0.0 0.0 - 0.2 10e9/L    Abs Immature Granulocytes  0.0 0 - 0.4 10e9/L    Absolute Nucleated RBC 0.0     Platelet Estimate Confirming automated cell count    Comprehensive metabolic panel    Collection Time: 05/01/18 10:25 AM   Result Value Ref Range    Sodium 139 133 - 144 mmol/L    Potassium 4.1 3.4 - 5.3 mmol/L    Chloride 107 94 - 109 mmol/L    Carbon Dioxide 19 (L) 20 - 32 mmol/L    Anion Gap 12 3 - 14 mmol/L    Glucose 201 (H) 70 - 99 mg/dL    Urea Nitrogen 25 7 - 30 mg/dL    Creatinine 1.88 (H) 0.66 - 1.25 mg/dL    GFR Estimate 35 (L) >60 mL/min/1.7m2    GFR Estimate If Black 43 (L) >60 mL/min/1.7m2    Calcium 8.8 8.5 - 10.1 mg/dL    Bilirubin Total 0.3 0.2 - 1.3 mg/dL    Albumin 2.6 (L) 3.4 - 5.0 g/dL    Protein Total 8.5 6.8 - 8.8 g/dL    Alkaline Phosphatase 44 40 - 150 U/L    ALT 10 0 - 70 U/L    AST 8 0 - 45 U/L                 Follow-ups after your visit        Your next 10 appointments already scheduled     May 08, 2018  1:30 PM CDT   [facility] Lab Draw with  MASONIC LAB DRAW   [facility] ([facility])    [address] [place] [address]  [address]  [facility] [address] [phone]            May 08, 2018  2:00 PM CDT   Infusion 60 with  ONCOLOGY INFUSION, UC 21 ATC   [facility] ([facility])    [address] [place] [address]  [address]  [facility] [address] [phone]            May 15, 2018 11:15 AM CDT   Masonic Lab Draw with  MASONIC LAB DRAW   [facility] Masonic Lab Draw ([facility])    [address] [place] [address]  [address]  [facility] [address] [phone]            May 15, 2018 11:50 AM CDT   (Arrive by 11:35 AM)   Return Visit with Leanne Reddy PA-C   [facility] ([facility])    [address] [place] [address]  [address]  [facility] [address] [phone]            May 15, 2018 12:30 PM CDT   Infusion 60 with UC ONCOLOGY INFUSION, UC 21 [facility])    [address]  [address]  [facility] [address]   [phone]            May 29, 2018 11:00 AM CDT   Masonic Lab Draw with  MASONIC LAB DRAW   [facility] Lab Draw ([facility])    [address] [place] [address]  [address]  [facility] [address] [phone]            May 29, 2018 11:30 AM CDT   Infusion 60 with UC ONCOLOGY INFUSION, UC 24 ATC   [facility] ([facility])    [address] [place] [address]  [address]  [facility] [address] [phone]              Who to contact     If you have questions or need follow up information about today's clinic visit or your schedule please contact [facility] directly at [phone].  Normal or non-critical lab and imaging results will be communicated to you by MyChart, letter or phone within 4 business days after the clinic has received the results. If you do not hear from us within 7 days, please contact the clinic through [employer] or phone. If you have a critical or abnormal lab result, we will notify you by phone as soon as possible.  Submit refill requests through [employer] or call your pharmacy and they will forward the refill request to us. Please allow 3 business days for your refill to be completed.          Additional Information About Your Visit        MyChart Information     [employer] gives you secure access to your electronic health record. If you see a primary care provider, you can also send messages to your care team and make appointments. If you have questions, please call your primary care clinic.  If you do not have a primary care provider, please call [phone] and they will assist you.        Care EveryWhere ID     This is your Care EveryWhere ID. This could be used by other organizations to access your [facility] medical records  [id number]         Blood Pressure from Last 3 Encounters:   05/01/18 132/80   04/24/18 122/71    04/17/18 148/87    Weight from Last 3 Encounters:   05/01/18 52.7 kg (116 lb 3.2 oz)   04/24/18 54.1 kg (119 lb 4.3 oz)   04/17/18 54.1 kg (119 lb 4.3 oz)              We Performed the Following     CBC with platelets differential     Comprehensive metabolic panel     Kappa and lambda light chain (Serum)     Protein electrophoresis        Primary Care Provider Office Phone # Fax #    Sanket Burciaga [phone]       [facility] [address] [address]        Equal Access to Services     [facility] ALYSSA : Hadii chalo Davis, wanadine ge, qaclaudia kaallakshmi abdi, jonny jurado . So [facility] [phone].    ATENCIÓN: Si habla español, tiene a todd disposición servicios gratuitos de asistencia lingüística. [facility] [phone].    We comply with applicable federal civil rights laws and [place] laws. We do not discriminate on the basis of race, color, national origin, age, disability, sex, sexual orientation, or gender identity.            Thank you!     Thank you for choosing [facility]ER [facility]  for your care. Our goal is always to provide you with excellent care. Hearing back from our patients is one way we can continue to improve our services. Please take a few minutes to complete the written survey that you may receive in the mail after your visit with us. Thank you!             Your Updated Medication List - Protect others around you: Learn how to safely use, store and throw away your medicines at www.disposemymeds.org.          This list is accurate as of 5/1/18 12:56 PM.  Always use your most recent med list.                   Brand Name Dispense Instructions for use Diagnosis    acetaminophen 325 MG tablet    TYLENOL    100 tablet    Take 2 tablets (650 mg) by mouth every 4 hours as needed for mild pain or fever    Fever, unspecified fever cause       acyclovir 400 MG tablet    ZOVIRAX    60 tablet    Take 1  tablet (400 mg) by mouth 2 times daily    Multiple myeloma in relapse (H)       amLODIPine 5 MG tablet    NORVASC    90 tablet    TAKE 1 TABLET BY MOUTH AT BEDTIME    Essential hypertension       esomeprazole 40 MG CR capsule    nexIUM    30 capsule    Take 1 capsule (40 mg) by mouth every morning (before breakfast)    Gastroesophageal reflux disease without esophagitis       OLANZapine 5 MG tablet    zyPREXA    60 tablet    Take by mouth At Bedtime Takes 1 tablet    Delirium, Multiple myeloma in relapse (H)       ondansetron 8 MG ODT tab    ZOFRAN-ODT    30 tablet    Take 1 tablet (8 mg) by mouth every 8 hours as needed for nausea    Nausea       predniSONE 50 MG tablet    DELTASONE    15 tablet    Take 1 tablet (50 mg) by mouth every other day    Multiple myeloma, remission status unspecified (H)       SIMVASTATIN PO      Take 20 mg by mouth At Bedtime        traMADol 50 MG tablet    ULTRAM    60 tablet    Take 1 tablet (50 mg) by mouth every 6 hours as needed for moderate pain . Recommend trying after Tylenol and before Dilaudid.    Acute bilateral low back pain without sciatica, Multiple myeloma in relapse (H)

## 2018-05-01 NOTE — NURSING NOTE
"Oncology Rooming Note    May 1, 2018 10:49 AM   Anthony Carbone is a 72 year old male who presents for:    Chief Complaint   Patient presents with     Port Draw     labs drawn via port by RN     Oncology Clinic Visit     f/u Multiple Myeloma     Initial Vitals: /80 (BP Location: Right arm, Patient Position: Chair, Cuff Size: Adult Regular)  Pulse 88  Temp 97.6  F (36.4  C) (Oral)  Ht 1.626 m (5' 4.02\")  Wt 52.7 kg (116 lb 3.2 oz)  SpO2 96%  BMI 19.94 kg/m2 Estimated body mass index is 19.94 kg/(m^2) as calculated from the following:    Height as of this encounter: 1.626 m (5' 4.02\").    Weight as of this encounter: 52.7 kg (116 lb 3.2 oz). Body surface area is 1.54 meters squared.  No Pain (0) Comment: Data Unavailable   No LMP for male patient.  Allergies reviewed: Yes  Medications reviewed: Yes    Medications: Medication refills not needed today.  Pharmacy name entered into [facility]:    [id number] - [place], [place] - [address] [facility] [address] [facility] [address]  [facility] [facility] [id number] [place] [address]  [id number] [address] AT [facility] OF [address]    Clinical concerns: none Leanne was NOT notified.    10 minutes for nursing intake (face to face time)     MATTHEW KRAMER LPN            "

## 2018-05-15 NOTE — NURSING NOTE
Chief Complaint   Patient presents with     Port Draw     labs drawn from port by RN     Port accessed, labs drawn, flushed with NS & heparin.  Patient checked in for provider visit.  Adina Melgoza RN

## 2018-05-15 NOTE — MR AVS SNAPSHOT
After Visit Summary   5/15/2018    Anthony Carbone    MRN: [id number]           Patient Information     Date Of Birth          1945        Visit Information        Provider Department      5/15/2018 11:50 AM Leanne Reddy PA-C [facility]        Today's Diagnoses     Multiple myeloma, remission status unspecified (H)        Multiple myeloma in relapse (H)           Follow-ups after your visit        Your next 10 appointments already scheduled     May 29, 2018 11:00 AM CDT   Masonic Lab Draw with UC MASONIC LAB DRAW   [facility] Lab Draw ([facility])    [address]  [address]  [facility] [address]   [phone]            May 29, 2018 11:30 AM CDT   Infusion 60 with UC ONCOLOGY INFUSION, UC 24 ATC   [facility] ([facility])    [address] [place] [address]  [address]  [facility] [address]   [phone]            Jun 14, 2018 10:50 AM CDT   (Arrive by 10:35 AM)   Return Visit with Leanne Reddy PA-C   [facility] ([facility])    [address] [place] [address]  [address]  [facility] [address]   [phone]            Jun 14, 2018 12:00 PM CDT   Infusion 60 with UC ONCOLOGY INFUSION, UC 28 ATC   [facility] ([facility])    [address]  [address]  [facility] [address]   [phone]            Jun 26, 2018 11:15 AM CDT   Masonic Lab Draw with UC MASONIC LAB DRAW   [facility] Draw ([facility])    [address] [place] [address]  [address]  [facility] [address]   [phone]            Jun 26, 2018 11:50 AM CDT   (Arrive by 11:35 AM)   Return Visit with Leanne Reddy PA-C   [facility] ([facility])    [address] [place] [address]  [address]  [facility] [address]   [phone]            Jun  "26, 2018 12:30 PM CDT   Infusion 60 with UC ONCOLOGY INFUSION, UC 29 ATC   [facility] ([facility])    [address]  [address]  [facility] [address] [phone]              Who to contact     If you have questions or need follow up information about today's clinic visit or your schedule please contact [facility] directly at [phone].  Normal or non-critical lab and imaging results will be communicated to you by [employer], letter or phone within 4 business days after the clinic has received the results. If you do not hear from us within 7 days, please contact the clinic through [employer] or phone. If you have a critical or abnormal lab result, we will notify you by phone as soon as possible.  Submit refill requests through [employer] or call your pharmacy and they will forward the refill request to us. Please allow 3 business days for your refill to be completed.          Additional Information About Your Visit        [employer] Information     [employer] gives you secure access to your electronic health record. If you see a primary care provider, you can also send messages to your care team and make appointments. If you have questions, please call your primary care clinic.  If you do not have a primary care provider, please call [phone] and they will assist you.        Care EveryWhere ID     This is your Care EveryWhere ID. This could be used by other organizations to access your [place] medical records  [id number]        Your Vitals Were     Pulse Temperature Respirations Height Pulse Oximetry BMI (Body Mass Index)    87 98  F (36.7  C) 16 1.626 m (5' 4\") 96% 19.93 kg/m2       Blood Pressure from Last 3 Encounters:   05/15/18 132/83   05/01/18 132/80   04/24/18 122/71    Weight from Last 3 Encounters:   05/15/18 52.7 kg (116 lb 1.6 oz)   05/01/18 52.7 kg (116 lb 3.2 oz)   04/24/18 54.1 kg (119 lb 4.3 oz)              Today, you had the " following     No orders found for display         Where to get your medicines      These medications were sent to [id number] - [place], [place] - [address] [facility] & [facility] 7  [address] [address]     Phone:  [phone]     predniSONE 50 MG tablet          Primary Care Provider Office Phone # Fax #    Sanket Burciaga [phone]       [facility] [address] [facility] [address]        Equal Access to Services     ALBAN SHAH : Hadii aad ku hadasho Soomaali, waaxda luqadaha, qaybta kaalmada adeegyada, waxay maggiein hayfishn kayleigh jurado . So [facility] [phone].    ATENCIÓN: Si habla español, tiene a todd disposición servicios gratuitos de asistencia lingüística. [facility] [phone].    We comply with applicable federal civil rights laws and [place] laws. We do not discriminate on the basis of race, color, national origin, age, disability, sex, sexual orientation, or gender identity.            Thank you!     Thank you for choosing [facility]  for your care. Our goal is always to provide you with excellent care. Hearing back from our patients is one way we can continue to improve our services. Please take a few minutes to complete the written survey that you may receive in the mail after your visit with us. Thank you!             Your Updated Medication List - Protect others around you: Learn how to safely use, store and throw away your medicines at www.disposemymeds.org.          This list is accurate as of 5/15/18 12:44 PM.  Always use your most recent med list.                   Brand Name Dispense Instructions for use Diagnosis    acetaminophen 325 MG tablet    TYLENOL    100 tablet    Take 2 tablets (650 mg) by mouth every 4 hours as needed for mild pain or fever    Fever, unspecified fever cause       acyclovir 400 MG tablet    ZOVIRAX    60 tablet    Take 1 tablet (400 mg) by mouth 2 times  daily    Multiple myeloma in relapse (H)       amLODIPine 5 MG tablet    NORVASC    90 tablet    TAKE 1 TABLET BY MOUTH AT BEDTIME    Essential hypertension       esomeprazole 40 MG CR capsule    nexIUM    30 capsule    Take 1 capsule (40 mg) by mouth every morning (before breakfast)    Gastroesophageal reflux disease without esophagitis       OLANZapine 5 MG tablet    zyPREXA    60 tablet    Take by mouth At Bedtime Takes 1 tablet    Delirium, Multiple myeloma in relapse (H)       ondansetron 8 MG ODT tab    ZOFRAN-ODT    30 tablet    Take 1 tablet (8 mg) by mouth every 8 hours as needed for nausea    Nausea       predniSONE 50 MG tablet    DELTASONE    15 tablet    Take 1 tablet (50 mg) by mouth every other day    Multiple myeloma, remission status unspecified (H)       SIMVASTATIN PO      Take 20 mg by mouth At Bedtime        traMADol 50 MG tablet    ULTRAM    60 tablet    Take 1 tablet (50 mg) by mouth every 6 hours as needed for moderate pain . Recommend trying after Tylenol and before Dilaudid.    Acute bilateral low back pain without sciatica, Multiple myeloma in relapse (H)

## 2018-05-15 NOTE — PATIENT INSTRUCTIONS
[facility] Main Line: [phone]    Call triage nurse with chills and/or temperature greater than or equal to 100.4, uncontrolled nausea/vomiting, diarrhea, constipation, dizziness, shortness of breath, chest pain, bleeding, unexplained bruising, or any new/concerning symptoms, questions/concerns.   If you are having any concerning symptoms or wish to speak to a provider before your next infusion visit, please call your care coordinator or triage to notify them so we can adequately serve you.   Triage Nurse Line: [phone]    If after hours, weekends, or holidays, call main hospital  and ask for Oncology doctor on call @ [phone]               May 2018   [facility] Monday Tuesday Wednesday Thursday Friday Saturday             1     [facility] DRAW   10:15 AM   (15 min.)   [facility] DRAW   [facility] Mason[facility] Lab Draw     [facility] RETURN   10:45 AM   (50 min.)   Leanne Reddy PA-C   [facility]     [facility] ONC INFUSION 60   12:00 PM   (60 min.)    ONCOLOGY INFUSION   [facility] 2     3     4     5       6     7     8     9     10     11     12       13     14     15     [facility] DRAW   11:15 AM   (15 min.)   [facility] DRAW   [facility] Lab Draw     [facility] RETURN   11:35 AM   (50 min.)   Leanne Reddy PA-C   [facility]   12:30 PM   (60 min.)    ONCOLOGY INFUSION   [facility] 16     17     18     19       20     21     22     23     24     25     26       27     28     29     [facility] DRAW   11:00 AM   (15 min.)   [facility] DRAW   [facility] Lab Draw     [facility] ONC INFUSION 60   11:30 AM   (60 min.)    ONCOLOGY INFUSION   [facility] 30 31 June 2018 Sunday Monday Tuesday Wednesday Thursday Friday Saturday                            1     2       3     4     5     6     7     8      9       10     11     12     13     14     [facility]   10[facility] AM   (15 min.)   [facility] DRAW   [facility] Lab Draw     [facility] RETURN   10:35 AM   (50 min.)   [facility]   12:00 PM   (60 min.)    ONCOLOGY INFUSION   [facility] 15     16       17     18     19     20     21     22     23       24     25     26     [facility]   11:15 AM   (15 min.)   [facility] DRAW   [facility] Lab Draw     P RETURN   11:35 AM   (50 min.)   Leanne Reddy PA-C [facility] ONC INFUSION 60   12:30 PM   (60 min.)    ONCOLOGY INFUSION   [facility] 27     28     29     30                  Lab Results:  Recent Results (from the past 12 hour(s))   CBC with platelets differential    Collection Time: 05/15/18 11:13 AM   Result Value Ref Range    WBC 4.1 4.0 - 11.0 10e9/L    RBC Count 2.77 (L) 4.4 - 5.9 10e12/L    Hemoglobin 9.3 (L) 13.3 - 17.7 g/dL    Hematocrit 28.8 (L) 40.0 - 53.0 %     (H) 78 - 100 fl    MCH 33.6 (H) 26.5 - 33.0 pg    MCHC 32.3 31.5 - 36.5 g/dL    RDW 18.9 (H) 10.0 - 15.0 %    Platelet Count 50 (L) 150 - 450 10e9/L    Diff Method Automated Method     % Neutrophils 85.2 %    % Lymphocytes 8.1 %    % Monocytes 5.6 %    % Eosinophils 0.2 %    % Basophils 0.2 %    % Immature Granulocytes 0.7 %    Nucleated RBCs 0 0 /100    Absolute Neutrophil 3.5 1.6 - 8.3 10e9/L    Absolute Lymphocytes 0.3 (L) 0.8 - 5.3 10e9/L    Absolute Monocytes 0.2 0.0 - 1.3 10e9/L    Absolute Eosinophils 0.0 0.0 - 0.7 10e9/L    Absolute Basophils 0.0 0.0 - 0.2 10e9/L    Abs Immature Granulocytes 0.0 0 - 0.4 10e9/L    Absolute Nucleated RBC 0.0    Comprehensive metabolic panel    Collection Time: 05/15/18 11:13 AM   Result Value Ref Range    Sodium 138 133 - 144 mmol/L    Potassium 4.3 3.4 - 5.3 mmol/L    Chloride 108 94 - 109 mmol/L    Carbon Dioxide  19 (L) 20 - 32 mmol/L    Anion Gap 10 3 - 14 mmol/L    Glucose 168 (H) 70 - 99 mg/dL    Urea Nitrogen 27 7 - 30 mg/dL    Creatinine 1.96 (H) 0.66 - 1.25 mg/dL    GFR Estimate 34 (L) >60 mL/min/1.7m2    GFR Estimate If Black 41 (L) >60 mL/min/1.7m2    Calcium 9.1 8.5 - 10.1 mg/dL    Bilirubin Total 0.2 0.2 - 1.3 mg/dL    Albumin 2.5 (L) 3.4 - 5.0 g/dL    Protein Total 9.7 (H) 6.8 - 8.8 g/dL    Alkaline Phosphatase 47 40 - 150 U/L    ALT 14 0 - 70 U/L    AST 7 0 - 45 U/L

## 2018-05-15 NOTE — PROGRESS NOTES
Infusion Nursing Note:  Anthony Carbone presents today for Cycle 6 Day 21 Cytoxan (+ 500ml IV NS).    Patient seen by provider today: Yes: Leanne AMARO   present during visit today: Not Applicable.    Note: Yamil states he doesn't need any IV antiemetics prior to today's chemo.  Will give 500ml IV NS today as per Leanne, Yamil did not want a liter today.    Intravenous Access:  Implanted Port.    Treatment Conditions:  Lab Results   Component Value Date    HGB 9.3 05/15/2018     Lab Results   Component Value Date    WBC 4.1 05/15/2018      Lab Results   Component Value Date    ANEU 3.5 05/15/2018     Lab Results   Component Value Date    PLT 50 05/15/2018      Lab Results   Component Value Date     05/15/2018                   Lab Results   Component Value Date    POTASSIUM 4.3 05/15/2018           Lab Results   Component Value Date    MAG 1.7 01/15/2018            Lab Results   Component Value Date    CR 1.96 05/15/2018                   Lab Results   Component Value Date    JAZMIN 9.1 05/15/2018                Lab Results   Component Value Date    BILITOTAL 0.2 05/15/2018           Lab Results   Component Value Date    ALBUMIN 2.5 05/15/2018                    Lab Results   Component Value Date    ALT 14 05/15/2018           Lab Results   Component Value Date    AST 7 05/15/2018       Results reviewed, labs MET treatment parameters, ok to proceed with treatment.      Post Infusion Assessment:  Patient tolerated infusion without incident.  Site patent and intact, free from redness, edema or discomfort.  No evidence of extravasations.  Access discontinued per protocol.    Discharge Plan:   Patient declined prescription refills.  AVS to patient via [employer].  Patient will return 5/29/18 for next appointment.   Patient discharged in stable condition accompanied by: wife.  Departure Mode: Ambulatory.  Face to Face time: 0.    Estrella Chou RN

## 2018-05-15 NOTE — MR AVS SNAPSHOT
After Visit Summary   5/15/2018    Anthony Carbone    MRN: [id number]           Patient Information     Date Of Birth          1945        Visit Information        Provider Department      5/15/2018 12:30 PM  21 ATC;  ONCOLOGY INFUSION [facility]        Today's Diagnoses     Multiple myeloma in relapse (H)    -  1    Multiple myeloma, remission status unspecified (H)          [facility] Main Line: [phone]    Call triage nurse with chills and/or temperature greater than or equal to 100.4, uncontrolled nausea/vomiting, diarrhea, constipation, dizziness, shortness of breath, chest pain, bleeding, unexplained bruising, or any new/concerning symptoms, questions/concerns.   If you are having any concerning symptoms or wish to speak to a provider before your next infusion visit, please call your care coordinator or triage to notify them so we can adequately serve you.   Triage Nurse Line: [phone]    If after hours, weekends, or holidays, call main hospital  and ask for Oncology doctor on call @ [phone]               May 2018   [facility] Monday Tuesday Wednesday Thursday Friday Saturday             1     [facility] MASONIC LAB DRAW   10:15 AM   (15 min.)    MASONIC LAB DRAW   [facility] Lab Draw     [facility] RETURN   10:45 AM   (50 min.)   Leanne Reddy PA-C   [facility] ONC INFUSION 60   12:00 PM   (60 min.)    ONCOLOGY INFUSION   [facility] 2     3     4     5       6     7     8     9     10     11     12       13     14     15     P MASONIC LAB DRAW   11:15 AM   (15 min.)    [facility]     [facility] RETURN   11:35 AM   (50 min.)   Leanne Reddy PA-C   [facility] ONC INFUSION 60   12:30 PM   (60 min.)    ONCOLOGY INFUSION   [facility] 16     17     18     19        20     21     22     23     24     25     26       27     28     29     [facility] DRAW   11:00 AM   (15 min.)    MASONIC LAB DRAW   [facility] Masonic Lab Draw     UMP ONC INFUSION 60   11:30 AM   (60 min.)    ONCOLOGY INFUSION   [facility] 30 31 June 2018 Sunday Monday Tuesday Wednesday Thursday Friday Saturday                            1     2       3     4     5     6     7     8     9       10     11     12     13     14     [facility] DRAW   10:15 AM   (15 min.)    MASONIC LAB DRAW   [facility] Masonic Lab Draw     [facility] RETURN   10:35 AM   (50 min.)   Leanne Reddy PA-C   [facility]     [facility] ONC INFUSION 60   12:00 PM   (60 min.)    ONCOLOGY INFUSION   [facility] 15     16       17     18     19     20     21     22     23       24     25     26     [facility] LAB DRAW   11:15 AM   (15 min.)    MASONIC LAB DRAW   [facility] Lab Draw     [facility] RETURN   11:35 AM   (50 min.)   Leanne Redyd PA-C   [facility]     [facility] ONC INFUSION 60   12:30 PM   (60 min.)    ONCOLOGY INFUSION   [facility] 27     28     29     30                  Lab Results:  Recent Results (from the past 12 hour(s))   CBC with platelets differential    Collection Time: 05/15/18 11:13 AM   Result Value Ref Range    WBC 4.1 4.0 - 11.0 10e9/L    RBC Count 2.77 (L) 4.4 - 5.9 10e12/L    Hemoglobin 9.3 (L) 13.3 - 17.7 g/dL    Hematocrit 28.8 (L) 40.0 - 53.0 %     (H) 78 - 100 fl    MCH 33.6 (H) 26.5 - 33.0 pg    MCHC 32.3 31.5 - 36.5 g/dL    RDW 18.9 (H) 10.0 - 15.0 %    Platelet Count 50 (L) 150 - 450 10e9/L    Diff Method Automated Method     % Neutrophils 85.2 %    % Lymphocytes 8.1 %    % Monocytes 5.6 %    % Eosinophils 0.2 %    % Basophils 0.2 %    % Immature Granulocytes 0.7 %    Nucleated RBCs 0 0 /100    Absolute Neutrophil 3.5 1.6 - 8.3 10e9/L    Absolute  Lymphocytes 0.3 (L) 0.8 - 5.3 10e9/L    Absolute Monocytes 0.2 0.0 - 1.3 10e9/L    Absolute Eosinophils 0.0 0.0 - 0.7 10e9/L    Absolute Basophils 0.0 0.0 - 0.2 10e9/L    Abs Immature Granulocytes 0.0 0 - 0.4 10e9/L    Absolute Nucleated RBC 0.0    Comprehensive metabolic panel    Collection Time: 05/15/18 11:13 AM   Result Value Ref Range    Sodium 138 133 - 144 mmol/L    Potassium 4.3 3.4 - 5.3 mmol/L    Chloride 108 94 - 109 mmol/L    Carbon Dioxide 19 (L) 20 - 32 mmol/L    Anion Gap 10 3 - 14 mmol/L    Glucose 168 (H) 70 - 99 mg/dL    Urea Nitrogen 27 7 - 30 mg/dL    Creatinine 1.96 (H) 0.66 - 1.25 mg/dL    GFR Estimate 34 (L) >60 mL/min/1.7m2    GFR Estimate If Black 41 (L) >60 mL/min/1.7m2    Calcium 9.1 8.5 - 10.1 mg/dL    Bilirubin Total 0.2 0.2 - 1.3 mg/dL    Albumin 2.5 (L) 3.4 - 5.0 g/dL    Protein Total 9.7 (H) 6.8 - 8.8 g/dL    Alkaline Phosphatase 47 40 - 150 U/L    ALT 14 0 - 70 U/L    AST 7 0 - 45 U/L             Follow-ups after your visit        Your next 10 appointments already scheduled     May 29, 2018 11:00 AM CDT   [facility] Lab Draw with  [facility]ONIC LAB DRAW   [facility]    [address] [place] [address]  [address]  [facility] [id number] [phone]            May 29, 2018 11:30 AM CDT   Infusion 60 with UC ONCOLOGY INFUSION, UC 24 ATC   [facility]    [address] [place] [address]  [address]  [facility] [id number] [phone]            Jun 14, 2018 10:15 AM CDT   Masonic Lab Draw with UC MASONIC LAB DRAW   [facility] Masonic Lab Draw ([facility])    [address] [place] [address]  [address]  [facility] [id number] [phone]            Jun 14, 2018 10:50 AM CDT   (Arrive by 10:35 AM)   Return Visit with Leanne Reddy PA-C   [facility] ([facility])    [address] [place] [address]  [address] [place] [id number]    [phone]            Jun 14, 2018 12:00 PM CDT   Infusion 60 with UC ONCOLOGY INFUSION, UC 28 ATC   [facility] ([facility])    [address] [facility]  [address]  [facility] [address]   [phone]            Jun 26, 2018 11:15 AM CDT   Masonic Lab Draw with UC MASONIC LAB DRAW   [facility] Draw ([facility])    [address] [place] [address]  [address]  [facility] [id number] [phone]            Jun 26, 2018 11:50 AM CDT   (Arrive by 11:35 AM)   Return Visit with Leanne Reddy PA-C   [facility] ([facility])    [address] [place] [address]  [address]  [facility] [id number] [phone]            Jun 26, 2018 12:30 PM CDT   Infusion 60 with UC ONCOLOGY INFUSION   [facility] ([facility])    [address] [place] [address]  [address]  [facility] [id number] [phone]              Who to contact     If you have questions or need follow up information about today's clinic visit or your schedule please contact [facility] directly at [phone].  Normal or non-critical lab and imaging results will be communicated to you by MyChart, letter or phone within 4 business days after the clinic has received the results. If you do not hear from us within 7 days, please contact the clinic through MyChart or phone. If you have a critical or abnormal lab result, we will notify you by phone as soon as possible.  Submit refill requests through [employer] or call your pharmacy and they will forward the refill request to us. Please allow 3 business days for your refill to be completed.          Additional Information About Your Visit        [employer] Information     [employer] gives you secure access to your electronic health record. If you see a primary care provider, you can also send messages to your care team and make appointments. If you  have questions, please call your primary care clinic.  If you do not have a primary care provider, please call [phone] and they will assist you.        Care EveryWhere ID     This is your Care EveryWhere ID. This could be used by other organizations to access your [facility] medical records  [id number]         Blood Pressure from Last 3 Encounters:   05/15/18 132/83   05/01/18 132/80   04/24/18 122/71    Weight from Last 3 Encounters:   05/15/18 52.7 kg (116 lb 1.6 oz)   05/01/18 52.7 kg (116 lb 3.2 oz)   04/24/18 54.1 kg (119 lb 4.3 oz)              We Performed the Following     CBC with platelets differential     Comprehensive metabolic panel          Where to get your medicines      These medications were sent to [facility] [id number] [place] [address] [facility] & [address] [address]     Phone:  [phone]     predniSONE 50 MG tablet          Primary Care Provider Office Phone # Fax #    Sanket Gualberto [phone]       [facility] [address] [facility] [address]        Equal Access to Services     ALBAN SHAH : Hadii chalo hernandezo Soemilieali, waaxda luqadaha, qaybta kaalmada adeegyada, jonny villa. So [facility] [phone].    ATENCIÓN: Si habla español, tiene a todd disposición servicios gratuitos de asistencia lingüística. Campbellame al [phone].    We comply with applicable federal civil rights laws and [place] laws. We do not discriminate on the basis of race, color, national origin, age, disability, sex, sexual orientation, or gender identity.            Thank you!     Thank you for choosing [facility]  for your care. Our goal is always to provide you with excellent care. Hearing back from our patients is one way we can continue to improve our services. Please take a few minutes to complete the written survey that you may receive in the mail after your  visit with us. Thank you!             Your Updated Medication List - Protect others around you: Learn how to safely use, store and throw away your medicines at www.disposemymeds.org.          This list is accurate as of 5/15/18  2:27 PM.  Always use your most recent med list.                   Brand Name Dispense Instructions for use Diagnosis    acetaminophen 325 MG tablet    TYLENOL    100 tablet    Take 2 tablets (650 mg) by mouth every 4 hours as needed for mild pain or fever    Fever, unspecified fever cause       acyclovir 400 MG tablet    ZOVIRAX    60 tablet    Take 1 tablet (400 mg) by mouth 2 times daily    Multiple myeloma in relapse (H)       amLODIPine 5 MG tablet    NORVASC    90 tablet    TAKE 1 TABLET BY MOUTH AT BEDTIME    Essential hypertension       esomeprazole 40 MG CR capsule    nexIUM    30 capsule    Take 1 capsule (40 mg) by mouth every morning (before breakfast)    Gastroesophageal reflux disease without esophagitis       OLANZapine 5 MG tablet    zyPREXA    60 tablet    Take by mouth At Bedtime Takes 1 tablet    Delirium, Multiple myeloma in relapse (H)       ondansetron 8 MG ODT tab    ZOFRAN-ODT    30 tablet    Take 1 tablet (8 mg) by mouth every 8 hours as needed for nausea    Nausea       predniSONE 50 MG tablet    DELTASONE    15 tablet    Take 1 tablet (50 mg) by mouth every other day    Multiple myeloma, remission status unspecified (H)       SIMVASTATIN PO      Take 20 mg by mouth At Bedtime        traMADol 50 MG tablet    ULTRAM    60 tablet    Take 1 tablet (50 mg) by mouth every 6 hours as needed for moderate pain . Recommend trying after Tylenol and before Dilaudid.    Acute bilateral low back pain without sciatica, Multiple myeloma in relapse (H)

## 2018-05-15 NOTE — NURSING NOTE
"Oncology Rooming Note    May 15, 2018 11:37 AM   Anthony Carbone is a 72 year old male who presents for:    Chief Complaint   Patient presents with     Port Draw     labs drawn from port by RN     Oncology Clinic Visit     Multiple myeloma     Initial Vitals: /83  Pulse 87  Temp 98  F (36.7  C)  Resp 16  Ht 1.626 m (5' 4\")  Wt 52.7 kg (116 lb 1.6 oz)  SpO2 96%  BMI 19.93 kg/m2 Estimated body mass index is 19.93 kg/(m^2) as calculated from the following:    Height as of this encounter: 1.626 m (5' 4\").    Weight as of this encounter: 52.7 kg (116 lb 1.6 oz). Body surface area is 1.54 meters squared.  No Pain (0) Comment: Data Unavailable   No LMP for male patient.  Allergies reviewed: Yes  Medications reviewed: Yes    Medications: Medication refills not needed today.  Pharmacy name entered into [facility]:    [id number] [place] [address] AT [facility]  [facility] [id number] [place] [address]  [facility] [place], [place] - [address] [address] AT [facility] [address] [facility]    Clinical concerns: No New Concerns    5 minutes for nursing intake (face to face time)     SIMEON Ventura    "

## 2018-05-15 NOTE — LETTER
5/15/2018      RE: Anthony Thomasjacksonbaljit  [address] [place] [address]       HEMATOLOGY/ONCOLOGY PROGRESS NOTE  May 15, 2018    REASON FOR VISIT: myeloma    Diagnosis: 72 year old gentleman with IgM lambda multiple myeloma originally diagnosed in 01/2012 as stage I, standard risk disease.   Treatment: Revlimid plus dexamethasone for 4-5 cycles but plateaued by late 03/2012.   - Velcade was added and he received another 2-3 cycles, achieving a good partial remission.      Transplant: Single auto after melphalan 200 mg/m2 preparative regimen on 01/09/2013  - Post-transplant course: unremarkable except for some mild steroid-induced hyperglycemia, gastritis, nausea and vomiting.      Maintenance: Lenalidomide at day-100 then developed a maculopapular rash. Lenalidomide was held and then we re-challenged him after about a month to 2 months at a lower dose (5 mg daily); rash returned.   - was started on maintenance Velcade, every other week through July 2014, when he was noted with abnormalities on myeloma studies drawn there. Noted with prostate cancer dx at about the time of relapse (see below).     Relapse: noted with return on M-spike in blood/urine with marrow involvement but negative PET.   - Started on retreatment with RVD on 8/27/14 with Revlimid at 15 mg 14 days of 21 days, dexamethasone 40 mg weekly and velcade weekly.Completed at total of 5 cycles without complication by 12/2014.   - Started Cycle 6 on inc'd Rev dosing of 20mg daily x 2 weeks on 12/11/14 which was complicated by pneumonia.  - Adm 12/21-1/10 for human metapneumovirus pneumonia complicated by anorexia, HTN, depression, anorexia with significant weight loss.   - Restarted Ernesto/Dex only on 2/4/15 with good tolerance but with thrombocytopenia.   - Bendamustine added to Velcade/dexamethasone on 5/21/15 due to disease progression  - Velcade discontinued on 7/9/2015 due to side effects (orthostasis)  - Schedule changed to bendamustine 80 mg/m2  days 1 and 2 on 28-day cycle  - Cycle 1 tolerated poorly due to lightheadedness and weakness  - Cycle 2 dose reduced to 60 mg/m2 and pre-meds adjusted  - Cycle 5 received on 9/17/15.  - 10/1/2015 - increasing IgM, M-spike - started Pomalidomide 4mg/day (21 days out of 28 days) and weekly Decadron 20mg on Oct 1st, 2015.    - Carfilzomib added on 10/22/2015 making this CPD.  - C3-C6  received in [place]    - Returned to [facility] and resumed CPD here    - Adm: 4/12-4/14 with fever, confusion, neutropenia. Noted on MRI to have acute/subacute CVA and subacute/chronic CVA; started on Plavix, given brief course of Abx and GCSF prior to d/c.     - Continued on Carfilzomib and dexamethasone alone  - Pomalyst added back on 7/13/2016 for rising FLC  - Start daratumumab 11/10/16. Changed to every other week after 4 weekly treatments d/t profound fatigue and malaise.   - Adm 5/7-5/16 due to AMS, hypercalcemia, hyperuricemia, possible PNA, back pain, and JOSE MARIA. Started Kyprolis while inpatient.  - Re-admitted 5/25-/529 with recurrent AMS, found to have concomitant PNA  - Resumed Kyprolis 6/13/2017. Stopped due to worsening performance status and progressive myeloma.  - Started Venclexta 800 mg 8/25/2017  - Added weekly Velcade with dexamethasone 20 mg weekly due to rising light chains on 11/1/2017  - Started weekly Cytoxan with prednisone QOD on 12/13 (though started the prednisone around 12/24)      INTERVAL HISTORY:    Anthony presents today with his wife.  Overall, having more days with better energy now that the Cytoxan is every other week. He has no concerns at all. Eating and drinking at baseline, has good days and days where his intake is a little less. Remains active with house work, going to garden center, repairing the weed whip, etc. Endorses good QOL right now. No fevers, chills, SOB, or other infectious symptoms. No new pains. He has not had any diarrhea or constipation, bleeding, or swelling. Remainder of 10-pt ROS  "otherwise is negative.    PHYSICAL EXAMINATION  /83  Pulse 87  Temp 98  F (36.7  C)  Resp 16  Ht 1.626 m (5' 4\")  Wt 52.7 kg (116 lb 1.6 oz)  SpO2 96%  BMI 19.93 kg/m2     Wt Readings from Last 10 Encounters:   05/15/18 52.7 kg (116 lb 1.6 oz)   18 52.7 kg (116 lb 3.2 oz)   18 54.1 kg (119 lb 4.3 oz)   18 54.1 kg (119 lb 4.3 oz)   18 53.2 kg (117 lb 4.8 oz)   18 52.3 kg (115 lb 3.2 oz)   18 52.8 kg (116 lb 6.4 oz)   18 52.2 kg (115 lb 1.6 oz)   18 52.4 kg (115 lb 8 oz)   18 52.3 kg (115 lb 4.8 oz)     General: Alert, frail. Oriented to name, , location,  Able to ambulate independently, albeit slow and cautiously.  No acute distress. HEENT: PERRL, no palor or icterus. CVS: RRR CHEST: CTAB, normal work of breathing, right port without erythema, swelling or pus.  ABDOMEN: soft non tender no masses     NEURO: AAOX3  CN 2-12 intact  SKIN: no bleeding or brusiing, no rashes EXTREMITIES: No edema.     LABS:     IMPRESSION/PLAN:  72 year old male with relapsed MM after autologous transplant (2013), status-post multiple salvage regimens with progressive disease.    MM: Continues on prednisone QOD with weekly cytoxan with improvement in performance status and biochemical markers, tolerating overall well with mild fatigue. Due to recurrent neutropenia, Cytoxan changed to every other week with improved tolerance. His biochemical markers were increased at last check, and renal function is worsening; however, given we have little options left and clinically, Yamil continues to do well, will continue with treatment at this time and will monitor again in 2 weeks. D/w Dr. Fatima.  - Can potentially discuss adding Ninlaro to this regimen should he progress and have adequate PS at that time    Goals: We have had numerous discussions re: goals and continuing treatment. For now, Yamil feels that he is tolerating this regimen well and we will plan to continue these " discussions.     Heme: Cytopenias 2/2 treatment and likely MM, requiring intermittent pRBC, usually once/month. Stable.  - Previously on Plavix, remains on hold given thrombocytopenia, indefinitely  - Transfuse to keep hemoglobin > 8, platelets > 10k.     Renal/FEN: Worsening renal function in December-Jan (Cr max 3.40 on 1/13) in setting of progressive myeloma. Remains fluctuant, overall trend worsening over last few weeks. He didn't want fluids today, encouraged adequate PO hydration.  - Encouraged protein/calorie supplements.   - Would recommend continuing Zyprexa at night to help with appetite.    ID: Afebrile. No localizing infectious /s.  - Continues ppx  mg BID  - Continue monthly PCP prophylaxis with  pentamidine 3/22/18, given the chronic steroids     Back/rib pain: Started early May. Lumbar MRI at OSH showing mild-mod central and foraminal stenoses in lumbar spine, per patient and wife, there was no MM lesion there.  Recent imaging with no acute findings. Pain remains minimal, not needing pain meds right now.     CV: Continue zocor and norvasc.   - Avoid QTc prolonging agents (history of QTc prolongation with syncopal episodes)     AMS: AMS started early May in setting of metabolic derangements, uremia, and possible infection.  Remains intermittently confused over the past few months but improved significantly and stable today. I do believe there is perhaps some ongoing cognitive decline since the AMS episodes.  - Continue Zyprexa 5 mg for appetite  - Holding off long-acting pain meds      I spent >15 minutes with the patient, with over 50% of the time spent counseling or coordinating their care as described above.    Leanne Murphy PA-C

## 2018-05-15 NOTE — PROGRESS NOTES
HEMATOLOGY/ONCOLOGY PROGRESS NOTE  May 15, 2018    REASON FOR VISIT: myeloma    Diagnosis: 72 year old gentleman with IgM lambda multiple myeloma originally diagnosed in 01/2012 as stage I, standard risk disease.   Treatment: Revlimid plus dexamethasone for 4-5 cycles but plateaued by late 03/2012.   - Velcade was added and he received another 2-3 cycles, achieving a good partial remission.      Transplant: Single auto after melphalan 200 mg/m2 preparative regimen on 01/09/2013  - Post-transplant course: unremarkable except for some mild steroid-induced hyperglycemia, gastritis, nausea and vomiting.      Maintenance: Lenalidomide at day-100 then developed a maculopapular rash. Lenalidomide was held and then we re-challenged him after about a month to 2 months at a lower dose (5 mg daily); rash returned.   - was started on maintenance Velcade, every other week through July 2014, when he was noted with abnormalities on myeloma studies drawn there. Noted with prostate cancer dx at about the time of relapse (see below).     Relapse: noted with return on M-spike in blood/urine with marrow involvement but negative PET.   - Started on retreatment with RVD on 8/27/14 with Revlimid at 15 mg 14 days of 21 days, dexamethasone 40 mg weekly and velcade weekly.Completed at total of 5 cycles without complication by 12/2014.   - Started Cycle 6 on inc'd Rev dosing of 20mg daily x 2 weeks on 12/11/14 which was complicated by pneumonia.  - Adm 12/21-1/10 for human metapneumovirus pneumonia complicated by anorexia, HTN, depression, anorexia with significant weight loss.   - Restarted Ernesto/Dex only on 2/4/15 with good tolerance but with thrombocytopenia.   - Bendamustine added to Velcade/dexamethasone on 5/21/15 due to disease progression  - Velcade discontinued on 7/9/2015 due to side effects (orthostasis)  - Schedule changed to bendamustine 80 mg/m2 days 1 and 2 on 28-day cycle  - Cycle 1 tolerated poorly due to lightheadedness and  weakness  - Cycle 2 dose reduced to 60 mg/m2 and pre-meds adjusted  - Cycle 5 received on 9/17/15.  - 10/1/2015 - increasing IgM, M-spike - started Pomalidomide 4mg/day (21 days out of 28 days) and weekly Decadron 20mg on Oct 1st, 2015.    - Carfilzomib added on 10/22/2015 making this CPD.  - C3-C6  received in [place]    - Returned to [facility] and resumed CPD here    - Adm: 4/12-4/14 with fever, confusion, neutropenia. Noted on MRI to have acute/subacute CVA and subacute/chronic CVA; started on Plavix, given brief course of Abx and GCSF prior to d/c.     - Continued on Carfilzomib and dexamethasone alone  - Pomalyst added back on 7/13/2016 for rising FLC  - Start daratumumab 11/10/16. Changed to every other week after 4 weekly treatments d/t profound fatigue and malaise.   - Adm 5/7-5/16 due to AMS, hypercalcemia, hyperuricemia, possible PNA, back pain, and JOSE MARIA. Started Kyprolis while inpatient.  - Re-admitted 5/25-/529 with recurrent AMS, found to have concomitant PNA  - Resumed Kyprolis 6/13/2017. Stopped due to worsening performance status and progressive myeloma.  - Started Venclexta 800 mg 8/25/2017  - Added weekly Velcade with dexamethasone 20 mg weekly due to rising light chains on 11/1/2017  - Started weekly Cytoxan with prednisone QOD on 12/13 (though started the prednisone around 12/24)      INTERVAL HISTORY:    Anthony presents today with his wife.  Overall, having more days with better energy now that the Cytoxan is every other week. He has no concerns at all. Eating and drinking at baseline, has good days and days where his intake is a little less. Remains active with house work, going to garden center, repairing the weed whip, etc. Endorses good QOL right now. No fevers, chills, SOB, or other infectious symptoms. No new pains. He has not had any diarrhea or constipation, bleeding, or swelling. Remainder of 10-pt ROS otherwise is negative.    PHYSICAL EXAMINATION  /83  Pulse 87  Temp 98  F (36.7  C)  " Resp 16  Ht 1.626 m (5' 4\")  Wt 52.7 kg (116 lb 1.6 oz)  SpO2 96%  BMI 19.93 kg/m2     Wt Readings from Last 10 Encounters:   05/15/18 52.7 kg (116 lb 1.6 oz)   18 52.7 kg (116 lb 3.2 oz)   18 54.1 kg (119 lb 4.3 oz)   18 54.1 kg (119 lb 4.3 oz)   18 53.2 kg (117 lb 4.8 oz)   18 52.3 kg (115 lb 3.2 oz)   18 52.8 kg (116 lb 6.4 oz)   18 52.2 kg (115 lb 1.6 oz)   18 52.4 kg (115 lb 8 oz)   18 52.3 kg (115 lb 4.8 oz)     General: Alert, frail. Oriented to name, , location,  Able to ambulate independently, albeit slow and cautiously.  No acute distress. HEENT: PERRL, no palor or icterus. CVS: RRR CHEST: CTAB, normal work of breathing, right port without erythema, swelling or pus.  ABDOMEN: soft non tender no masses     NEURO: AAOX3  CN 2-12 intact  SKIN: no bleeding or brusiing, no rashes EXTREMITIES: No edema.     LABS:     IMPRESSION/PLAN:  72 year old male with relapsed MM after autologous transplant (2013), status-post multiple salvage regimens with progressive disease.    MM: Continues on prednisone QOD with weekly cytoxan with improvement in performance status and biochemical markers, tolerating overall well with mild fatigue. Due to recurrent neutropenia, Cytoxan changed to every other week with improved tolerance. His biochemical markers were increased at last check, and renal function is worsening; however, given we have little options left and clinically, Yamil continues to do well, will continue with treatment at this time and will monitor again in 2 weeks. D/w Dr. Fatima.  - Can potentially discuss adding Ninlaro to this regimen should he progress and have adequate PS at that time    Goals: We have had numerous discussions re: goals and continuing treatment. For now, Yamil feels that he is tolerating this regimen well and we will plan to continue these discussions.     Heme: Cytopenias 2/2 treatment and likely MM, requiring intermittent pRBC, " usually once/month. Stable.  - Previously on Plavix, remains on hold given thrombocytopenia, indefinitely  - Transfuse to keep hemoglobin > 8, platelets > 10k.     Renal/FEN: Worsening renal function in December-Jan (Cr max 3.40 on 1/13) in setting of progressive myeloma. Remains fluctuant, overall trend worsening over last few weeks. He didn't want fluids today, encouraged adequate PO hydration.  - Encouraged protein/calorie supplements.   - Would recommend continuing Zyprexa at night to help with appetite.    ID: Afebrile. No localizing infectious /s.  - Continues ppx  mg BID  - Continue monthly PCP prophylaxis with  pentamidine 3/22/18, given the chronic steroids     Back/rib pain: Started early May. Lumbar MRI at OSH showing mild-mod central and foraminal stenoses in lumbar spine, per patient and wife, there was no MM lesion there.  Recent imaging with no acute findings. Pain remains minimal, not needing pain meds right now.     CV: Continue zocor and norvasc.   - Avoid QTc prolonging agents (history of QTc prolongation with syncopal episodes)     AMS: AMS started early May in setting of metabolic derangements, uremia, and possible infection.  Remains intermittently confused over the past few months but improved significantly and stable today. I do believe there is perhaps some ongoing cognitive decline since the AMS episodes.  - Continue Zyprexa 5 mg for appetite  - Holding off long-acting pain meds      I spent >15 minutes with the patient, with over 50% of the time spent counseling or coordinating their care as described above.    Leanne Murphy PA-C

## 2018-05-24 NOTE — PROGRESS NOTES
RNCC received a call from Casey this morning with an update on Yamil. Per Casey, she wanted to let us know that on Sunday Yamil slipped and feel. He is okay. A little sore and he got a scratch on his head. He had a scheduled PCP appointment on Monday for his routine follow up and they assessed him and bandage up his scratch and stated that nothing further needed to be done at this time. He has been a little more weak per Casey. Needing more assistance getting up out of chairs. No fever no new pains not nauseated. Eating and drinking the same. Casey was wanting to let Leanne know and was wondering if she should bring him in to be assessed. Casey did say that Yamil was feeling better yesterday evening and even better this morning. Per Leanne, since Yamil was assessed and evaluated by his PCP on Monday there is no need for him to come in at this time if he is getting better. With that said Casey should call into triage with any new or worsening symptoms. Casey verbalized understanding and was grateful for the follow up. She also mentioned that the PCP sent over some orders for the next lab draw for Yamil.

## 2018-05-29 NOTE — MR AVS SNAPSHOT
After Visit Summary   5/29/2018    Anthony Carbone    MRN: [id number]           Patient Information     Date Of Birth          1945        Visit Information        Provider Department      5/29/2018 11:30 AM  24 ATC;  ONCOLOGY INFUSION [facility]        Today's Diagnoses     Multiple myeloma in relapse (H)    -  1    Multiple myeloma, remission status unspecified (H)          Care Instructions    Post Blood Products Discharge Instructions    You have just received a blood product.  During the next 48 hours, please be aware of the following symptoms of a blood product reaction.  If you should experience any of these symptoms call your physician.    -Temperature 100.0 or greater  -Shaking or chills  -Shortness of breath or wheezing  -Headache  -Persistent non-productive cough  -Hives  -Itching  -Extreme weakness  -Facial swelling or flushing  -Red urine    If you experience any of these symptoms, please call triage at [phone]. This is also the on-call number for after hours, weekends, or holidays.     Contact Numbers    [place] Main Line: [phone]  [place] Triage and after hours / weekends / holidays:  [phone]      Please call the triage or after hours line if you experience a temperature greater than or equal to 100.5, shaking chills, have uncontrolled nausea, vomiting and/or diarrhea, dizziness, shortness of breath, chest pain, bleeding, unexplained bruising, or if you have any other new/concerning symptoms, questions or concerns.      If you are having any concerning symptoms or wish to speak to a provider before your next infusion visit, please call your care coordinator or triage to notify them so we can adequately serve you.     If you need a refill on a narcotic prescription or other medication, please call before your infusion appointment.                   May 2018   [facility] Monday Tuesday Wednesday Thursday Friday Saturday             1     [facility]  LAB DRAW   10:15 AM   (15 min.)   [facility]RAW   [facility] Lab Draw     [facility] RETURN   10:45 AM   (50 min.)   Leanne Reddy PA-C   [facility]     UMP ONC INFUSION 60   12:00 PM   (60 min.)    ONCOLOGY INFUSION   [facility] 2     3     4     5       6     7     8     9     10     11     12       13     14     15     [facility] LAB DRAW   11:15 AM   (15 min.)   [facility]   [facility] Lab Draw     [facility] RETURN   11:35 AM   (50 min.)   Leanne Reddy PA-C   [facility]     [facility] ONC INFUSION 60   12:30 PM   (60 min.)    ONCOLOGY INFUSION   [facility] 16     17     18     19       20     21     22     23     24     25     26       27     28     29     [facility] LAB DRAW   11:00 AM   (15 min.)    [facility]     [facility] ONC INFUSION 60   11:30 AM   (60 min.)    ONCOLOGY INFUSION   [facility] 30     31     [facility] LAB DRAW    9:15 AM   (15 min.)    [facility]     [facility] RETURN    9:45 AM   (50 min.)   Leanne Reddy PA-C   [facility]                      June 2018 Sunday Monday Tuesday Wednesday Thursday Friday Saturday                            1     2       3     4     5     6     7     8     9       10     11     12     13     14     [facility] LAB DRAW   10:15 AM   (15 min.)    [facility] LAB DRAW   [facility] Lab Draw     UMP RETURN   10:35 AM   (50 min.)   Leanne Reddy PA-C   [facility]     [facility] ONC INFUSION 60   12:00 PM   (60 min.)    ONCOLOGY INFUSION   [facility] 15     16       17     18     19     20     21     22     23       24     25     26     [facility] LAB DRAW   11:15 AM   (15 min.)    [facility] LAB DRAW   [facility] Lab Draw     UMP RETURN   11:35 AM   (50 min.)   Leanne Reddy  [facility] [place]   [facility]     UMP ONC INFUSION 60   12:30 PM   (60 min.)   [facility] ONCOLOGY INFUSION   [facility] 27     28     29     30                 Recent Results (from the past 24 hour(s))   CBC with platelets differential    Collection Time: 05/29/18 11:06 AM   Result Value Ref Range    WBC 4.0 4.0 - 11.0 10e9/L    RBC Count 2.58 (L) 4.4 - 5.9 10e12/L    Hemoglobin 8.6 (L) 13.3 - 17.7 g/dL    Hematocrit 27.4 (L) 40.0 - 53.0 %     (H) 78 - 100 fl    MCH 33.3 (H) 26.5 - 33.0 pg    MCHC 31.4 (L) 31.5 - 36.5 g/dL    RDW 18.1 (H) 10.0 - 15.0 %    Platelet Count 53 (L) 150 - 450 10e9/L    Diff Method Automated Method     % Neutrophils 87.0 %    % Lymphocytes 6.1 %    % Monocytes 5.8 %    % Eosinophils 0.0 %    % Basophils 0.3 %    % Immature Granulocytes 0.8 %    Nucleated RBCs 0 0 /100    Absolute Neutrophil 3.5 1.6 - 8.3 10e9/L    Absolute Lymphocytes 0.2 (L) 0.8 - 5.3 10e9/L    Absolute Monocytes 0.2 0.0 - 1.3 10e9/L    Absolute Eosinophils 0.0 0.0 - 0.7 10e9/L    Absolute Basophils 0.0 0.0 - 0.2 10e9/L    Abs Immature Granulocytes 0.0 0 - 0.4 10e9/L    Absolute Nucleated RBC 0.0    Protein electrophoresis    Collection Time: 05/29/18 11:06 AM   Result Value Ref Range    Albumin Fraction PENDING 3.7 - 5.1 g/dL    Alpha 1 Fraction PENDING 0.2 - 0.4 g/dL    Alpha 2 Fraction PENDING 0.5 - 0.9 g/dL    Beta Fraction PENDING 0.6 - 1.0 g/dL    Gamma Fraction PENDING 0.7 - 1.6 g/dL    Monoclonal Peak PENDING 0.0 g/dL    ELP Interpretation: PENDING    Comprehensive metabolic panel    Collection Time: 05/29/18 11:06 AM   Result Value Ref Range    Sodium 135 133 - 144 mmol/L    Potassium 4.5 3.4 - 5.3 mmol/L    Chloride 104 94 - 109 mmol/L    Carbon Dioxide 20 20 - 32 mmol/L    Anion Gap 10 3 - 14 mmol/L    Glucose 139 (H) 70 - 99 mg/dL    Urea Nitrogen 29 7 - 30 mg/dL    Creatinine 2.22 (H) 0.66 - 1.25 mg/dL    GFR Estimate 29 (L) >60 mL/min/1.7m2    GFR Estimate If Black 35  (L) >60 mL/min/1.7m2    Calcium 8.5 8.5 - 10.1 mg/dL    Bilirubin Total 0.4 0.2 - 1.3 mg/dL    Albumin 2.5 (L) 3.4 - 5.0 g/dL    Protein Total 10.1 (H) 6.8 - 8.8 g/dL    Alkaline Phosphatase 45 40 - 150 U/L    ALT 12 0 - 70 U/L    AST 14 0 - 45 U/L   ABO/Rh type and screen    Collection Time: 05/29/18 11:06 AM   Result Value Ref Range    Units Ordered 2     ABO O     RH(D) Pos     Antibody Screen Neg     Test Valid Only At          [facility]    Specimen Expires 06/01/2018     Crossmatch Red Blood Cells    Blood component    Collection Time: 05/29/18 11:06 AM   Result Value Ref Range    Unit Number [id number]     Blood Component Type Red Blood Cells LeukoReduced Irradiated     Division Number 00     Status of Unit Ready for patient 05/29/2018 1411     Blood Product Code [id number]     Unit Status AMY    Blood component    Collection Time: 05/29/18 11:06 AM   Result Value Ref Range    Unit Number [id number]     Blood Component Type Red Blood Cells LeukoReduced Irradiated     Division Number 00     Status of Unit Released to care unit 05/29/2018 1413     Blood Product Code [id number]     Unit Status ISS                  Follow-ups after your visit        Your next 10 appointments already scheduled     May 31, 2018  9:15 AM CDT   [facility] Lab Draw with  [facility] LAB DRAW   [facility] Lab Draw ([facility])    [address]  [address]  [facility] [address] [phone]            May 31, 2018 10:00 AM CDT   (Arrive by 9:45 AM)   Return Visit with Leanne Reddy PA-C   [facility] ([facility])    [address]  [address]  [facility] [address] [phone]            Jun 14, 2018 10:15 AM CDT   [facility] Lab Draw with  MASONIC LAB DRAW   [facility] Draw ([facility])    [address]  [address]  [facility] [address]    [phone]            Jun 14, 2018 10:50 AM CDT   (Arrive by 10:35 AM)   Return Visit with ALPHONSO [facility] ([facility])    [address] [place] [address]  [address]  [facility] [address]   [phone]            Jun 14, 2018 12:00 PM CDT   Infusion 60 with UC ONCOLOGY INFUSION, UC 28 ATC   [facility] ([facility])    [address] [place] [address]  [address]  [facility] [address] [phone]            Jun 26, 2018 11:15 AM CDT   [facility] Lab Draw with  MASONIC LAB DRAW   [facility] Lab Draw ([facility])    [address] [place] [address]  [address]  [facility] [address]   [phone]            Jun 26, 2018 11:50 AM CDT   (Arrive by 11:35 AM)   Return Visit with Leanne Reddy PA-C   [facility] ([facility])    [address] [place] [address]  [address]  [facility] [address] [phone]            Jun 26, 2018 12:30 PM CDT   Infusion 60 with UC ONCOLOGY INFUSION, UC 29 ATC   [facility] ([facility])    [address] [place] [address]  [address]  [facility] [address] [phone]              Future tests that were ordered for you today     Open Standing Orders        Priority Remaining Interval Expires Ordered    Red blood cell prepare order unit Routine 99/100 CONDITIONAL (SPECIFY) BLOOD  5/29/2018            Who to contact     If you have questions or need follow up information about today's clinic visit or your schedule please contact [facility] directly at [phone].  Normal or non-critical lab and imaging results will be communicated to you by MyChart, letter or phone within 4 business days after the clinic has received the results. If you do not hear from us within 7 days, please contact the clinic through MyChart or phone. If you have a critical or abnormal  lab result, we will notify you by phone as soon as possible.  Submit refill requests through [employer] or call your pharmacy and they will forward the refill request to us. Please allow 3 business days for your refill to be completed.          Additional Information About Your Visit        [employer] Information     [employer] gives you secure access to your electronic health record. If you see a primary care provider, you can also send messages to your care team and make appointments. If you have questions, please call your primary care clinic.  If you do not have a primary care provider, please call [phone] and they will assist you.        Care EveryWhere ID     This is your Care EveryWhere ID. This could be used by other organizations to access your [facility] medical records  [id number]        Your Vitals Were     Pulse Temperature Respirations Pulse Oximetry BMI (Body Mass Index)       88 98.4  F (36.9  C) (Oral) 16 95% 19.21 kg/m2        Blood Pressure from Last 3 Encounters:   05/29/18 96/61   05/15/18 132/83   05/01/18 132/80    Weight from Last 3 Encounters:   05/29/18 50.8 kg (111 lb 14.4 oz)   05/15/18 52.7 kg (116 lb 1.6 oz)   05/01/18 52.7 kg (116 lb 3.2 oz)              We Performed the Following     ABO/Rh type and screen     Blood component     Blood component     CBC with platelets differential     Comprehensive metabolic panel     Kappa and lambda light chain     Protein electrophoresis     Transfuse red blood cell unit        Primary Care Provider Office Phone # Fax #    Sanket Gualberto [phone]       [facility] [address] [address]        Equal Access to Services     [facility]: Hadii chalo kern hadabdullahi Sojose, waaxda luqadaha, qaybta kaalmada jonny abdi . So [facility] [phone].    ATENCIÓN: Si habla español, tiene a todd disposición servicios gratuitos de asistencia lingüística. Llame al  [phone].    We comply with applicable federal civil rights laws and [place] laws. We do not discriminate on the basis of race, color, national origin, age, disability, sex, sexual orientation, or gender identity.            Thank you!     Thank you for choosing [facility]  for your care. Our goal is always to provide you with excellent care. Hearing back from our patients is one way we can continue to improve our services. Please take a few minutes to complete the written survey that you may receive in the mail after your visit with us. Thank you!             Your Updated Medication List - Protect others around you: Learn how to safely use, store and throw away your medicines at www.disposemymeds.org.          This list is accurate as of 5/29/18  2:58 PM.  Always use your most recent med list.                   Brand Name Dispense Instructions for use Diagnosis    acetaminophen 325 MG tablet    TYLENOL    100 tablet    Take 2 tablets (650 mg) by mouth every 4 hours as needed for mild pain or fever    Fever, unspecified fever cause       acyclovir 400 MG tablet    ZOVIRAX    60 tablet    Take 1 tablet (400 mg) by mouth 2 times daily    Multiple myeloma in relapse (H)       amLODIPine 5 MG tablet    NORVASC    90 tablet    TAKE 1 TABLET BY MOUTH AT BEDTIME    Essential hypertension       esomeprazole 40 MG CR capsule    nexIUM    30 capsule    Take 1 capsule (40 mg) by mouth every morning (before breakfast)    Gastroesophageal reflux disease without esophagitis       OLANZapine 5 MG tablet    zyPREXA    60 tablet    Take by mouth At Bedtime Takes 1 tablet    Delirium, Multiple myeloma in relapse (H)       ondansetron 8 MG ODT tab    ZOFRAN-ODT    30 tablet    Take 1 tablet (8 mg) by mouth every 8 hours as needed for nausea    Nausea       predniSONE 50 MG tablet    DELTASONE    15 tablet    Take 1 tablet (50 mg) by mouth every other day    Multiple myeloma, remission status unspecified (H)        SIMVASTATIN PO      Take 20 mg by mouth At Bedtime        traMADol 50 MG tablet    ULTRAM    60 tablet    Take 1 tablet (50 mg) by mouth every 6 hours as needed for moderate pain . Recommend trying after Tylenol and before Dilaudid.    Acute bilateral low back pain without sciatica, Multiple myeloma in relapse (H)

## 2018-05-29 NOTE — PROGRESS NOTES
Infusion Nursing Note:  Anthony Carbone presents today for IV fluids and PRBCs 1 unit. Scheduled chemotherapy not given today.    Patient seen by provider today: No   present during visit today: Not Applicable.    Note: pt presents with wife (Casey) with several concerns today. Pt is not a great historian, so most information was provided per Casey today and then endorsed per Yamil. Casey reports that Yamil is eating and drinking very little, and that he has become weaker in the past two weeks. Yamil states no desire to eat or drink. Nausea is mild and controlled with prn antiemetics. Casey also states that Yamil's fatigue is more pronounced, and he is spending more and more time in bed. Yamil has not had any fevers, chills, or other symptoms of infection. They both deny other new symptoms or concerns. Yamil fell on 5/20 and has a healing scab on his right forehead. Provider is aware, and pt has no new cognitive or vision changes. Casey is concerned that Yamil is not strong enough for chemotherapy today. Pt has lost over 4 pounds in the past two weeks. Creatinine up to 2.22. Hemoglobin down to 8.6.      TORB 5/29/18 1145 PREM Herrera/Rhea Sarmiento RN  1. Hold Cytoxan today.  2. Give NS 1 Liter IV x 1 today.  3. Give PRBCs 1 unit today for hemoglobin 8.6.  4. Schedule pt for labs, visit with Leanne, and possible IV fluids/pentamidine nebulizer on Thursday or Friday this week.    Pt and Casey understand this plan and are agreeable. Labs and visit with Leanne scheduled for Thursday 5/31, but infusion schedule is full. Leanne will need to call over to infusion to see if Yamil can be added to the schedule on Thursday. Leanne aware of this plan.     Intravenous Access:  Implanted Port.    Treatment Conditions:  Lab Results   Component Value Date    HGB 8.6 05/29/2018     Lab Results   Component Value Date    WBC 4.0 05/29/2018      Lab Results   Component Value Date    ANEU 3.5 05/29/2018     Lab Results   Component Value Date     PLT 53 05/29/2018      Lab Results   Component Value Date     05/29/2018                   Lab Results   Component Value Date    POTASSIUM 4.5 05/29/2018           Lab Results   Component Value Date                  Lab Results   Component Value Date    CR 2.22 05/29/2018                   Lab Results   Component Value Date    JAZMIN 8.5 05/29/2018                Lab Results   Component Value Date    BILITOTAL 0.4 05/29/2018           Lab Results   Component Value Date    ALBUMIN 2.5 05/29/2018                    Lab Results   Component Value Date    ALT 12 05/29/2018           Lab Results   Component Value Date    AST 14 05/29/2018     Blood Consent signed 7/11/17      Post Infusion Assessment:  Patient tolerated infusion without incident.  Blood return noted pre and post infusion.  Site patent and intact, free from redness, edema or discomfort.  No evidence of extravasations.  Access discontinued per protocol.    Discharge Plan:   Patient declined prescription refills.  Copy of AVS reviewed with patient and/or family.  Patient will return Thursday 5/31 for labs, provider visit, and possible IV fluids/pentamidine nebulizer.  Patient discharged in stable condition accompanied by: wife.  Departure Mode: Ambulatory.  Face to Face time: 20 minutes.    MARI LOVETT RN

## 2018-05-29 NOTE — PATIENT INSTRUCTIONS
Post Blood Products Discharge Instructions    You have just received a blood product.  During the next 48 hours, please be aware of the following symptoms of a blood product reaction.  If you should experience any of these symptoms call your physician.    -Temperature 100.0 or greater  -Shaking or chills  -Shortness of breath or wheezing  -Headache  -Persistent non-productive cough  -Hives  -Itching  -Extreme weakness  -Facial swelling or flushing  -Red urine    If you experience any of these symptoms, please call triage at [phone]. This is also the on-call number for after hours, weekends, or holidays.     Contact Numbers    [facility] [place]: [phone]  [facility] [place] and after hours / weekends / holidays:  [phone]      Please call the triage or after hours line if you experience a temperature greater than or equal to 100.5, shaking chills, have uncontrolled nausea, vomiting and/or diarrhea, dizziness, shortness of breath, chest pain, bleeding, unexplained bruising, or if you have any other new/concerning symptoms, questions or concerns.      If you are having any concerning symptoms or wish to speak to a provider before your next infusion visit, please call your care coordinator or triage to notify them so we can adequately serve you.     If you need a refill on a narcotic prescription or other medication, please call before your infusion appointment.                   May 2018   [facility] Monday Tuesday Wednesday Thursday Friday Saturday             1     [facility]   10:15 AM   (15 min.)   [facility]   [facility] Lab Draw     [facility] RETURN   10:45 AM   (50 min.)   Leanne Reddy PA-C   [facility] ONC INFUSION 60   12:00 PM   (60 min.)    ONCOLOGY INFUSION   [facility] 2     3     4     5       6     7     8     9     10     11     12       13     14     15     [facility] DRAW   11:15 AM   (15 min.)   [facility] LAB DRAW    [facility] Draw     [facility] RETURN   11:35 AM   (50 min.)   Leanne Reddy PA-C   [facility]     [facility] ONC INFUSION 60   12:30 PM   (60 min.)    ONCOLOGY INFUSION   [facility] 16     17     18     19       20     21     22     23     24     25     26       27     28     29     [facility] DRAW   11:00 AM   (15 min.)   [facility]   [facility] Draw     [facility] ONC INFUSION 60   11:30 AM   (60 min.)    ONCOLOGY INFUSION   [facility] 30     31     [facility] DRAW    9:15 AM   (15 min.)    [facility] DRAW   [facility] Draw     [facility] RETURN    9:45 AM   (50 min.)   Leanne Reddy PA-C   [facility]                      June 2018 Sunday Monday Tuesday Wednesday Thursday Friday Saturday                            1     2       3     4     5     6     7     8     9       10     11     12     13     14     [facility] DRAW   10:15 AM   (15 min.)    [facility] DRAW   [facility] Draw     [facility] RETURN   10:35 AM   (50 min.)   Leanne Reddy PA-C   [facility]     [facility] ONC INFUSION 60   12:00 PM   (60 min.)    ONCOLOGY INFUSION   [facility] 15     16       17     18     19     20     21     22     23       24     25     26     [facility] DRAW   11:15 AM   (15 min.)    [facility] DRAW   [facility] Draw     [facility] RETURN   11:35 AM   (50 min.)   Leanne Reddy PA-C   [facility]     [facility] ONC INFUSION 60   12:30 PM   (60 min.)    ONCOLOGY INFUSION   [facility] 27     28     29     30                 Recent Results (from the past 24 hour(s))   CBC with platelets differential    Collection Time: 05/29/18 11:06 AM   Result Value Ref Range    WBC 4.0 4.0 - 11.0 10e9/L    RBC Count 2.58 (L) 4.4 - 5.9 10e12/L    Hemoglobin 8.6 (L) 13.3 - 17.7 g/dL    Hematocrit 27.4  (L) 40.0 - 53.0 %     (H) 78 - 100 fl    MCH 33.3 (H) 26.5 - 33.0 pg    MCHC 31.4 (L) 31.5 - 36.5 g/dL    RDW 18.1 (H) 10.0 - 15.0 %    Platelet Count 53 (L) 150 - 450 10e9/L    Diff Method Automated Method     % Neutrophils 87.0 %    % Lymphocytes 6.1 %    % Monocytes 5.8 %    % Eosinophils 0.0 %    % Basophils 0.3 %    % Immature Granulocytes 0.8 %    Nucleated RBCs 0 0 /100    Absolute Neutrophil 3.5 1.6 - 8.3 10e9/L    Absolute Lymphocytes 0.2 (L) 0.8 - 5.3 10e9/L    Absolute Monocytes 0.2 0.0 - 1.3 10e9/L    Absolute Eosinophils 0.0 0.0 - 0.7 10e9/L    Absolute Basophils 0.0 0.0 - 0.2 10e9/L    Abs Immature Granulocytes 0.0 0 - 0.4 10e9/L    Absolute Nucleated RBC 0.0    Protein electrophoresis    Collection Time: 05/29/18 11:06 AM   Result Value Ref Range    Albumin Fraction PENDING 3.7 - 5.1 g/dL    Alpha 1 Fraction PENDING 0.2 - 0.4 g/dL    Alpha 2 Fraction PENDING 0.5 - 0.9 g/dL    Beta Fraction PENDING 0.6 - 1.0 g/dL    Gamma Fraction PENDING 0.7 - 1.6 g/dL    Monoclonal Peak PENDING 0.0 g/dL    ELP Interpretation: PENDING    Comprehensive metabolic panel    Collection Time: 05/29/18 11:06 AM   Result Value Ref Range    Sodium 135 133 - 144 mmol/L    Potassium 4.5 3.4 - 5.3 mmol/L    Chloride 104 94 - 109 mmol/L    Carbon Dioxide 20 20 - 32 mmol/L    Anion Gap 10 3 - 14 mmol/L    Glucose 139 (H) 70 - 99 mg/dL    Urea Nitrogen 29 7 - 30 mg/dL    Creatinine 2.22 (H) 0.66 - 1.25 mg/dL    GFR Estimate 29 (L) >60 mL/min/1.7m2    GFR Estimate If Black 35 (L) >60 mL/min/1.7m2    Calcium 8.5 8.5 - 10.1 mg/dL    Bilirubin Total 0.4 0.2 - 1.3 mg/dL    Albumin 2.5 (L) 3.4 - 5.0 g/dL    Protein Total 10.1 (H) 6.8 - 8.8 g/dL    Alkaline Phosphatase 45 40 - 150 U/L    ALT 12 0 - 70 U/L    AST 14 0 - 45 U/L   ABO/Rh type and screen    Collection Time: 05/29/18 11:06 AM   Result Value Ref Range    Units Ordered 2     ABO O     RH(D) Pos     Antibody Screen Neg     Test Valid Only At          [facility]  [facility]    Specimen Expires 06/01/2018     Crossmatch Red Blood Cells    Blood component    Collection Time: 05/29/18 11:06 AM   Result Value Ref Range    Unit Number [id number]     Blood Component Type Red Blood Cells LeukoReduced Irradiated     Division Number 00     Status of Unit Ready for patient 05/29/2018 1411     Blood Product Code [id number]     Unit Status AMY    Blood component    Collection Time: 05/29/18 11:06 AM   Result Value Ref Range    Unit Number [id number]     Blood Component Type Red Blood Cells LeukoReduced Irradiated     Division Number 00     Status of Unit Released to care unit 05/29/2018 1413     Blood Product Code O1761P79     Unit Status ISS

## 2018-05-31 NOTE — NURSING NOTE
"Oncology Rooming Note    May 31, 2018 9:52 AM   Anthony Carbone is a 72 year old male who presents for:    Chief Complaint   Patient presents with     Port Draw     labs drawn via port by RN     Oncology Clinic Visit     Return visit related to Multiple Myeloma     Initial Vitals: /82 (BP Location: Right arm, Patient Position: Chair, Cuff Size: Adult Regular)  Pulse 99  Temp 97.9  F (36.6  C) (Oral)  Ht 1.626 m (5' 4.02\")  Wt 51.9 kg (114 lb 8 oz)  SpO2 96%  BMI 19.64 kg/m2 Estimated body mass index is 19.64 kg/(m^2) as calculated from the following:    Height as of this encounter: 1.626 m (5' 4.02\").    Weight as of this encounter: 51.9 kg (114 lb 8 oz). Body surface area is 1.53 meters squared.  No Pain (0) Comment: Data Unavailable   No LMP for male patient.  Allergies reviewed: Yes  Medications reviewed: Yes    Medications: Medication refills not needed today.  Pharmacy name entered into EPIC:    [id number] - [place], [place] - [address] [facility] [address] [facility] [address]  [employer] [facility] [address] [facility]  [facility] [id number] [facility] [id number] AT [facility] [address]    Clinical concerns: No new concerns. Provider was notified.    10 minutes for nursing intake (face to face time)     Jeanna Diaz LPN            "
Chief Complaint   Patient presents with     Port Draw     labs drawn via port by RN     /82 (BP Location: Right arm, Patient Position: Chair, Cuff Size: Adult Regular)  Pulse 99  Temp 97.9  F (36.6  C) (Oral)  Wt 51.9 kg (114 lb 8 oz)  SpO2 96%  BMI 19.65 kg/m2    Vitals taken. Port accessed by RN. Labs collected and sent. Line flushed with NS & Heparin. Pt tolerated well. Pt checked in for next appointment.    Iris Gutiérrez, RN    
no

## 2018-05-31 NOTE — PROGRESS NOTES
HEMATOLOGY/ONCOLOGY PROGRESS NOTE  May 31, 2018    REASON FOR VISIT: myeloma    Diagnosis: 72 year old gentleman with IgM lambda multiple myeloma originally diagnosed in 01/2012 as stage I, standard risk disease.   Treatment: Revlimid plus dexamethasone for 4-5 cycles but plateaued by late 03/2012.   - Velcade was added and he received another 2-3 cycles, achieving a good partial remission.      Transplant: Single auto after melphalan 200 mg/m2 preparative regimen on 01/09/2013  - Post-transplant course: unremarkable except for some mild steroid-induced hyperglycemia, gastritis, nausea and vomiting.      Maintenance: Lenalidomide at day-100 then developed a maculopapular rash. Lenalidomide was held and then we re-challenged him after about a month to 2 months at a lower dose (5 mg daily); rash returned.   - was started on maintenance Velcade, every other week through July 2014, when he was noted with abnormalities on myeloma studies drawn there. Noted with prostate cancer dx at about the time of relapse (see below).     Relapse: noted with return on M-spike in blood/urine with marrow involvement but negative PET.   - Started on retreatment with RVD on 8/27/14 with Revlimid at 15 mg 14 days of 21 days, dexamethasone 40 mg weekly and velcade weekly.Completed at total of 5 cycles without complication by 12/2014.   - Started Cycle 6 on inc'd Rev dosing of 20mg daily x 2 weeks on 12/11/14 which was complicated by pneumonia.  - Adm 12/21-1/10 for human metapneumovirus pneumonia complicated by anorexia, HTN, depression, anorexia with significant weight loss.   - Restarted Ernesto/Dex only on 2/4/15 with good tolerance but with thrombocytopenia.   - Bendamustine added to Velcade/dexamethasone on 5/21/15 due to disease progression  - Velcade discontinued on 7/9/2015 due to side effects (orthostasis)  - Schedule changed to bendamustine 80 mg/m2 days 1 and 2 on 28-day cycle  - Cycle 1 tolerated poorly due to lightheadedness and  weakness  - Cycle 2 dose reduced to 60 mg/m2 and pre-meds adjusted  - Cycle 5 received on 9/17/15.  - 10/1/2015 - increasing IgM, M-spike - started Pomalidomide 4mg/day (21 days out of 28 days) and weekly Decadron 20mg on Oct 1st, 2015.    - Carfilzomib added on 10/22/2015 making this CPD.  - C3-C6  received in [place]    - Returned to [facility] and resumed CPD here    - Adm: 4/12-4/14 with fever, confusion, neutropenia. Noted on MRI to have acute/subacute CVA and subacute/chronic CVA; started on Plavix, given brief course of Abx and GCSF prior to d/c.     - Continued on Carfilzomib and dexamethasone alone  - Pomalyst added back on 7/13/2016 for rising FLC  - Start daratumumab 11/10/16. Changed to every other week after 4 weekly treatments d/t profound fatigue and malaise.   - Adm 5/7-5/16 due to AMS, hypercalcemia, hyperuricemia, possible PNA, back pain, and JOSE MARIA. Started Kyprolis while inpatient.  - Re-admitted 5/25-/529 with recurrent AMS, found to have concomitant PNA  - Resumed Kyprolis 6/13/2017. Stopped due to worsening performance status and progressive myeloma.  - Started Venclexta 800 mg 8/25/2017  - Added weekly Velcade with dexamethasone 20 mg weekly due to rising light chains on 11/1/2017  - Started weekly Cytoxan with prednisone QOD on 12/13 (though started the prednisone around 12/24)      INTERVAL HISTORY:    Anthony presents today with his wife.  He hasn't been feeling that well over the last 1-2 weeks. He has had worsening fatigue and appetite. There are days he eats very little. He drinks about 48-60 oz fluids daily. He has little appetite. He received pRBC on Tuesday this week, and feels a bit better today. He has had one episode of diarrhea this week, otherwise no bowel changes. No fevers, chills, cough, SOB, chest pain, abdominal pain, nausea, vomiting, urinary concerns, bleeding, swelling, rashes, or any confusion. No new pains and no back pain right now. Remainder of 10-pt ROS otherwise is  "negative.    PHYSICAL EXAMINATION  /82 (BP Location: Right arm, Patient Position: Chair, Cuff Size: Adult Regular)  Pulse 99  Temp 97.9  F (36.6  C) (Oral)  Ht 1.626 m (5' 4.02\")  Wt 51.9 kg (114 lb 8 oz)  SpO2 96%  BMI 19.64 kg/m2     Wt Readings from Last 10 Encounters:   18 51.9 kg (114 lb 8 oz)   18 50.8 kg (111 lb 14.4 oz)   05/15/18 52.7 kg (116 lb 1.6 oz)   18 52.7 kg (116 lb 3.2 oz)   18 54.1 kg (119 lb 4.3 oz)   18 54.1 kg (119 lb 4.3 oz)   18 53.2 kg (117 lb 4.8 oz)   18 52.3 kg (115 lb 3.2 oz)   18 52.8 kg (116 lb 6.4 oz)   18 52.2 kg (115 lb 1.6 oz)     General: Alert, frail. Oriented to name, , location,  Able to ambulate independently, albeit slow and cautiously.  No acute distress. HEENT: PERRL, no palor or icterus. CVS: RRR CHEST: CTAB, normal work of breathing, right port without erythema, swelling or pus.  ABDOMEN: soft non tender no masses     NEURO: AAOX3  CN 2-12 intact  SKIN: no bleeding or brusiing, no rashes EXTREMITIES: No edema.     LABS:   Results for WILLIAN ARCINIEGA (MRN [id number]) as of 2018 11:34   Ref. Range 2018 09:45   Sodium Latest Ref Range: 133 - 144 mmol/L 135   Potassium Latest Ref Range: 3.4 - 5.3 mmol/L 4.5   Chloride Latest Ref Range: 94 - 109 mmol/L 106   Carbon Dioxide Latest Ref Range: 20 - 32 mmol/L 18 (L)   Urea Nitrogen Latest Ref Range: 7 - 30 mg/dL 28   Creatinine Latest Ref Range: 0.66 - 1.25 mg/dL 2.30 (H)   GFR Estimate Latest Ref Range: >60 mL/min/1.7m2 28 (L)   GFR Estimate If Black Latest Ref Range: >60 mL/min/1.7m2 34 (L)   Calcium Latest Ref Range: 8.5 - 10.1 mg/dL 8.2 (L)   Anion Gap Latest Ref Range: 3 - 14 mmol/L 11   Albumin Latest Ref Range: 3.4 - 5.0 g/dL 2.4 (L)   Protein Total Latest Ref Range: 6.8 - 8.8 g/dL 10.0 (H)   Bilirubin Total Latest Ref Range: 0.2 - 1.3 mg/dL 0.4   Alkaline Phosphatase Latest Ref Range: 40 - 150 U/L 42   ALT Latest Ref Range: 0 - 70 U/L " 10   AST Latest Ref Range: 0 - 45 U/L 9   Hemoglobin A1C Latest Ref Range: 0 - 5.6 % 6.3 (H)   Cholesterol Latest Ref Range: <200 mg/dL <200   HDL Cholesterol Latest Ref Range: >39 mg/dL 54   LDL Cholesterol Calculated Latest Ref Range: <100 mg/dL 127 (H)   Non HDL Cholesterol Latest Ref Range: <130 mg/dL 146 (H)   Triglycerides Latest Ref Range: <150 mg/dL 92   Glucose Latest Ref Range: 70 - 99 mg/dL 133 (H)   WBC Latest Ref Range: 4.0 - 11.0 10e9/L 3.7 (L)   Hemoglobin Latest Ref Range: 13.3 - 17.7 g/dL 9.8 (L)   Hematocrit Latest Ref Range: 40.0 - 53.0 % 30.5 (L)   Platelet Count Latest Ref Range: 150 - 450 10e9/L 52 (L)   Results for HANSACHARLYWILLIAN LARES (MRN [id number]) as of 5/31/2018 10:19   Ref. Range 2/22/2018 09:45 3/29/2018 07:17 5/1/2018 12:09 5/29/2018 11:06   Lambda Free Lt Chain Latest Ref Range: 0.57 - 2.63 mg/dL 345.00 (H) 228.25 (H) 299.50 (H) 260.00 (H)   Monoclonal Peak Latest Ref Range: 0.0 g/dL 3.4 (H) 2.4 (H) 2.9 (H) 4.4 (H)       IMPRESSION/PLAN:  72 year old male with relapsed MM after autologous transplant (1/9/2013), status-post multiple salvage regimens with progressive disease.    MM: Continues on prednisone QOD with weekly cytoxan with improvement in performance status and biochemical markers, tolerating overall well with mild fatigue. Due to recurrent neutropenia, Cytoxan changed to every other week with improved tolerance. His appetite and fatigue have both worsened this week, as is typical for him in setting of progressive myeloma. His labwork shows rising Mspike, worsening renal functnio, and rising protein. I am concerned that his myeloma is progressing through the cytoxan and prednisone. Yamil wishes to continue to try and treat his myeloma. A message was sent to Dr. Fatima. I am not sure Yamil will be able to tolerate Ninlaro.     Heme: Cytopenias 2/2 treatment and likely MM, requiring intermittent pRBC, usually once/month. Stable.  - Previously on Plavix, remains on hold given  thrombocytopenia, indefinitely  - Transfuse to keep hemoglobin > 8, platelets > 10k.     Renal/FEN: Worsening renal function in December-Jan (Cr max 3.40 on 1/13) in setting of progressive myeloma. Worsening over the last few weeks.  - Encouraged protein/calorie supplements.   - Would recommend continuing Zyprexa at night to help with appetite. Discussed additional appetite stimulation with medical cannabis, or Marinol, and he declined but was open to trying Zyprexa 5 mg BID. Discussed watching for sedation or grogginess at that dose. If so, decrease back to 5 mg nightly.  - Push fluids, goal 64 oz daily    ID: Afebrile. No localizing infectious /s.  - Continues ppx  mg BID  - Continue monthly PCP prophylaxis with  pentamidine 3/22/18, given the chronic steroids     Back/rib pain: Started early May 2017. Lumbar MRI at OSH showing mild-mod central and foraminal stenoses in lumbar spine, per patient and wife, there was no MM lesion there. Imaging performed here with no acute findings. Pain remains minimal, not needing pain meds right now.     CV: Continue zocor and norvasc.   - Avoid QTc prolonging agents (history of QTc prolongation with syncopal episodes)     AMS: AMS started early May in setting of metabolic derangements, uremia, and possible infection.  Remains intermittently confused over the past few months but improved significantly and stable today. I do believe there is perhaps some ongoing cognitive decline since the AMS episodes.  - Continue Zyprexa 5 mg for appetite  - Holding off long-acting pain meds      I will tentatively plan to see Yamil back next week unless I hear differently from Dr. Fatima.    I spent >25 minutes with the patient, with over 50% of the time spent counseling or coordinating their care as described above.    Leanne Murphy PA-C

## 2018-05-31 NOTE — Clinical Note
Leanne jackman/ labs next Thursday, please schedule ASAP due to limited availability. Not the 730 am one but the later AM one. Pt left

## 2018-05-31 NOTE — MR AVS SNAPSHOT
After Visit Summary   5/31/2018    Anthony Carbone    MRN: [id number]           Patient Information     Date Of Birth          1945        Visit Information        Provider Department      5/31/2018 10:00 AM Leanne Reddy PA-C [facility]        Today's Diagnoses     Multiple myeloma, remission status unspecified (H)    -  1    American Diabetes Association (ADA) 1100 calorie diet        Hyperlipidemia        Elevated blood sugar           Follow-ups after your visit        Your next 10 appointments already scheduled     Jun 07, 2018  2:15 PM CDT   [facility] Lab Draw with [facility] MASONIC LAB DRAW   [facility] ([facility])    [address] [place] [address]  [address]  [facility] [address]   [phone]            Jun 07, 2018  2:50 PM CDT   (Arrive by 2:35 PM)   Return Visit with Leanne Reddy PA-C   [facility] ([facility])    [address] [place] [address]  [address]  [facility] [address]   [phone]            Jun 14, 2018 10:15 AM CDT   Masonic Lab Draw with UC MASONIC LAB DRAW   [facility] Masonic Lab Draw ([facility])    [address] [place] [address]  [address]  [facility] [address]   [phone]            Jun 14, 2018 10:50 AM CDT   (Arrive by 10:35 AM)   Return Visit with Leanne Reddy PA-C   [facility] ([facility])    [address] [place] [address]  [address]  [facility] [address]   [phone]            Jun 14, 2018 12:00 PM CDT   Infusion 60 with UC ONCOLOGY INFUSION, UC 28 ATC   [facility] ([facility])    [address] [place] [address] [address]  [address]  [facility] [address]   [phone]            Jun 26, 2018 11:15 AM CDT   Masonic Lab Draw with UC MASONIC LAB DRAW   [facility] Masonic Lab Draw ([facility])    [address] [place] [address]  Se  [address]  [facility] [address]   [phone]            Jun 26, 2018 11:50 AM CDT   (Arrive by 11:35 AM)   Return Visit with Leanne Reddy PA-C   [facility] ([facility])    [address] [place] [address] [address]  [address]  [facility] [address] [phone]            Jun 26, 2018 12:30 PM CDT   Infusion 60 with UC ONCOLOGY INFUSION, UC 29 ATC   [facility] ([facility])    [address]  [address]  [facility] [address] [phone]              Future tests that were ordered for you today     Open Future Orders        Priority Expected Expires Ordered    CBC with platelets differential Routine 6/7/2018 8/31/2018 5/31/2018    Comprehensive metabolic panel Routine 6/7/2018 8/31/2018 5/31/2018            Who to contact     If you have questions or need follow up information about today's clinic visit or your schedule please contact [facility] directly at [phone].  Normal or non-critical lab and imaging results will be communicated to you by Carousellhart, letter or phone within 4 business days after the clinic has received the results. If you do not hear from us within 7 days, please contact the clinic through [employer] or phone. If you have a critical or abnormal lab result, we will notify you by phone as soon as possible.  Submit refill requests through [email or web address] or call your pharmacy and they will forward the refill request to us. Please allow 3 business days for your refill to be completed.          Additional Information About Your Visit        [email or web address] Information     [email or web address] gives you secure access to your electronic health record. If you see a primary care provider, you can also send messages to your care team and make appointments. If you have questions, please call your primary care clinic.  If you do not have a primary care provider, please call [phone] and they will assist  "you.        Care EveryWhere ID     This is your Care EveryWhere ID. This could be used by other organizations to access your [place] medical records  [id number]        Your Vitals Were     Pulse Temperature Height Pulse Oximetry BMI (Body Mass Index)       99 97.9  F (36.6  C) (Oral) 1.626 m (5' 4.02\") 96% 19.64 kg/m2        Blood Pressure from Last 3 Encounters:   05/31/18 121/82   05/29/18 127/72   05/15/18 132/83    Weight from Last 3 Encounters:   05/31/18 51.9 kg (114 lb 8 oz)   05/29/18 50.8 kg (111 lb 14.4 oz)   05/15/18 52.7 kg (116 lb 1.6 oz)              We Performed the Following     *CBC with platelets differential     Comprehensive metabolic panel     Hemoglobin A1c     Lipid panel reflex to direct LDL Fasting        Primary Care Provider Office Phone # Fax #    Sanket Gualberto [phone]       [facility] [address] [facility] [address]        Equal Access to Services     ALBAN SHAH : Hadii aad ku hadasho Soomaali, waaxda luqadaha, qaybta kaalmada adeegyaabhijit, jonny jurado . So [facility] [phone].    ATENCIÓN: Si habla español, tiene a todd disposición servicios gratuitos de asistencia lingüística. [facility] [phone].    We comply with applicable federal civil rights laws and [place] laws. We do not discriminate on the basis of race, color, national origin, age, disability, sex, sexual orientation, or gender identity.            Thank you!     Thank you for choosing [facility]  for your care. Our goal is always to provide you with excellent care. Hearing back from our patients is one way we can continue to improve our services. Please take a few minutes to complete the written survey that you may receive in the mail after your visit with us. Thank you!             Your Updated Medication List - Protect others around you: Learn how to safely use, store and throw away your medicines at www.disposemymeds.org.    "       This list is accurate as of 5/31/18 11:39 AM.  Always use your most recent med list.                   Brand Name Dispense Instructions for use Diagnosis    acetaminophen 325 MG tablet    TYLENOL    100 tablet    Take 2 tablets (650 mg) by mouth every 4 hours as needed for mild pain or fever    Fever, unspecified fever cause       acyclovir 400 MG tablet    ZOVIRAX    60 tablet    Take 1 tablet (400 mg) by mouth 2 times daily    Multiple myeloma in relapse (H)       amLODIPine 5 MG tablet    NORVASC    90 tablet    TAKE 1 TABLET BY MOUTH AT BEDTIME    Essential hypertension       esomeprazole 40 MG CR capsule    nexIUM    30 capsule    Take 1 capsule (40 mg) by mouth every morning (before breakfast)    Gastroesophageal reflux disease without esophagitis       OLANZapine 5 MG tablet    zyPREXA    60 tablet    Take by mouth At Bedtime Takes 1 tablet    Delirium, Multiple myeloma in relapse (H)       ondansetron 8 MG ODT tab    ZOFRAN-ODT    30 tablet    Take 1 tablet (8 mg) by mouth every 8 hours as needed for nausea    Nausea       predniSONE 50 MG tablet    DELTASONE    15 tablet    Take 1 tablet (50 mg) by mouth every other day    Multiple myeloma, remission status unspecified (H)       SIMVASTATIN PO      Take 20 mg by mouth At Bedtime        traMADol 50 MG tablet    ULTRAM    60 tablet    Take 1 tablet (50 mg) by mouth every 6 hours as needed for moderate pain . Recommend trying after Tylenol and before Dilaudid.    Acute bilateral low back pain without sciatica, Multiple myeloma in relapse (H)

## 2018-05-31 NOTE — LETTER
5/31/2018      RE: Anthony Carbone  [address] [place] [address]       HEMATOLOGY/ONCOLOGY PROGRESS NOTE  May 31, 2018    REASON FOR VISIT: myeloma    Diagnosis: 72 year old gentleman with IgM lambda multiple myeloma originally diagnosed in 01/2012 as stage I, standard risk disease.   Treatment: Revlimid plus dexamethasone for 4-5 cycles but plateaued by late 03/2012.   - Velcade was added and he received another 2-3 cycles, achieving a good partial remission.      Transplant: Single auto after melphalan 200 mg/m2 preparative regimen on 01/09/2013  - Post-transplant course: unremarkable except for some mild steroid-induced hyperglycemia, gastritis, nausea and vomiting.      Maintenance: Lenalidomide at day-100 then developed a maculopapular rash. Lenalidomide was held and then we re-challenged him after about a month to 2 months at a lower dose (5 mg daily); rash returned.   - was started on maintenance Velcade, every other week through July 2014, when he was noted with abnormalities on myeloma studies drawn there. Noted with prostate cancer dx at about the time of relapse (see below).     Relapse: noted with return on M-spike in blood/urine with marrow involvement but negative PET.   - Started on retreatment with RVD on 8/27/14 with Revlimid at 15 mg 14 days of 21 days, dexamethasone 40 mg weekly and velcade weekly.Completed at total of 5 cycles without complication by 12/2014.   - Started Cycle 6 on inc'd Rev dosing of 20mg daily x 2 weeks on 12/11/14 which was complicated by pneumonia.  - Adm 12/21-1/10 for human metapneumovirus pneumonia complicated by anorexia, HTN, depression, anorexia with significant weight loss.   - Restarted Ernesto/Dex only on 2/4/15 with good tolerance but with thrombocytopenia.   - Bendamustine added to Velcade/dexamethasone on 5/21/15 due to disease progression  - Velcade discontinued on 7/9/2015 due to side effects (orthostasis)  - Schedule changed to bendamustine 80 mg/m2  days 1 and 2 on 28-day cycle  - Cycle 1 tolerated poorly due to lightheadedness and weakness  - Cycle 2 dose reduced to 60 mg/m2 and pre-meds adjusted  - Cycle 5 received on 9/17/15.  - 10/1/2015 - increasing IgM, M-spike - started Pomalidomide 4mg/day (21 days out of 28 days) and weekly Decadron 20mg on Oct 1st, 2015.    - Carfilzomib added on 10/22/2015 making this CPD.  - C3-C6  received in [place]    - Returned to [facility] and resumed CPD here    - Adm: 4/12-4/14 with fever, confusion, neutropenia. Noted on MRI to have acute/subacute CVA and subacute/chronic CVA; started on Plavix, given brief course of Abx and GCSF prior to d/c.     - Continued on Carfilzomib and dexamethasone alone  - Pomalyst added back on 7/13/2016 for rising FLC  - Start daratumumab 11/10/16. Changed to every other week after 4 weekly treatments d/t profound fatigue and malaise.   - Adm 5/7-5/16 due to AMS, hypercalcemia, hyperuricemia, possible PNA, back pain, and JOSE MARIA. Started Kyprolis while inpatient.  - Re-admitted 5/25-/529 with recurrent AMS, found to have concomitant PNA  - Resumed Kyprolis 6/13/2017. Stopped due to worsening performance status and progressive myeloma.  - Started Venclexta 800 mg 8/25/2017  - Added weekly Velcade with dexamethasone 20 mg weekly due to rising light chains on 11/1/2017  - Started weekly Cytoxan with prednisone QOD on 12/13 (though started the prednisone around 12/24)      INTERVAL HISTORY:    Anthony presents today with his wife.  He hasn't been feeling that well over the last 1-2 weeks. He has had worsening fatigue and appetite. There are days he eats very little. He drinks about 48-60 oz fluids daily. He has little appetite. He received pRBC on Tuesday this week, and feels a bit better today. He has had one episode of diarrhea this week, otherwise no bowel changes. No fevers, chills, cough, SOB, chest pain, abdominal pain, nausea, vomiting, urinary concerns, bleeding, swelling, rashes, or any confusion. No  "new pains and no back pain right now. Remainder of 10-pt ROS otherwise is negative.    PHYSICAL EXAMINATION  /82 (BP Location: Right arm, Patient Position: Chair, Cuff Size: Adult Regular)  Pulse 99  Temp 97.9  F (36.6  C) (Oral)  Ht 1.626 m (5' 4.02\")  Wt 51.9 kg (114 lb 8 oz)  SpO2 96%  BMI 19.64 kg/m2     Wt Readings from Last 10 Encounters:   18 51.9 kg (114 lb 8 oz)   18 50.8 kg (111 lb 14.4 oz)   05/15/18 52.7 kg (116 lb 1.6 oz)   18 52.7 kg (116 lb 3.2 oz)   18 54.1 kg (119 lb 4.3 oz)   18 54.1 kg (119 lb 4.3 oz)   18 53.2 kg (117 lb 4.8 oz)   18 52.3 kg (115 lb 3.2 oz)   18 52.8 kg (116 lb 6.4 oz)   18 52.2 kg (115 lb 1.6 oz)     General: Alert, frail. Oriented to name, , location,  Able to ambulate independently, albeit slow and cautiously.  No acute distress. HEENT: PERRL, no palor or icterus. CVS: RRR CHEST: CTAB, normal work of breathing, right port without erythema, swelling or pus.  ABDOMEN: soft non tender no masses     NEURO: AAOX3  CN 2-12 intact  SKIN: no bleeding or brusiing, no rashes EXTREMITIES: No edema.     LABS:   Results for WILLIAN ARCINIEGA (MRN [id number]) as of 2018 11:34   Ref. Range 2018 09:45   Sodium Latest Ref Range: 133 - 144 mmol/L 135   Potassium Latest Ref Range: 3.4 - 5.3 mmol/L 4.5   Chloride Latest Ref Range: 94 - 109 mmol/L 106   Carbon Dioxide Latest Ref Range: 20 - 32 mmol/L 18 (L)   Urea Nitrogen Latest Ref Range: 7 - 30 mg/dL 28   Creatinine Latest Ref Range: 0.66 - 1.25 mg/dL 2.30 (H)   GFR Estimate Latest Ref Range: >60 mL/min/1.7m2 28 (L)   GFR Estimate If Black Latest Ref Range: >60 mL/min/1.7m2 34 (L)   Calcium Latest Ref Range: 8.5 - 10.1 mg/dL 8.2 (L)   Anion Gap Latest Ref Range: 3 - 14 mmol/L 11   Albumin Latest Ref Range: 3.4 - 5.0 g/dL 2.4 (L)   Protein Total Latest Ref Range: 6.8 - 8.8 g/dL 10.0 (H)   Bilirubin Total Latest Ref Range: 0.2 - 1.3 mg/dL 0.4   Alkaline " Phosphatase Latest Ref Range: 40 - 150 U/L 42   ALT Latest Ref Range: 0 - 70 U/L 10   AST Latest Ref Range: 0 - 45 U/L 9   Hemoglobin A1C Latest Ref Range: 0 - 5.6 % 6.3 (H)   Cholesterol Latest Ref Range: <200 mg/dL <200   HDL Cholesterol Latest Ref Range: >39 mg/dL 54   LDL Cholesterol Calculated Latest Ref Range: <100 mg/dL 127 (H)   Non HDL Cholesterol Latest Ref Range: <130 mg/dL 146 (H)   Triglycerides Latest Ref Range: <150 mg/dL 92   Glucose Latest Ref Range: 70 - 99 mg/dL 133 (H)   WBC Latest Ref Range: 4.0 - 11.0 10e9/L 3.7 (L)   Hemoglobin Latest Ref Range: 13.3 - 17.7 g/dL 9.8 (L)   Hematocrit Latest Ref Range: 40.0 - 53.0 % 30.5 (L)   Platelet Count Latest Ref Range: 150 - 450 10e9/L 52 (L)   Results for RENAEWILLIAN LARES (MRN [id number]) as of 5/31/2018 10:19   Ref. Range 2/22/2018 09:45 3/29/2018 07:17 5/1/2018 12:09 5/29/2018 11:06   Lambda Free Lt Chain Latest Ref Range: 0.57 - 2.63 mg/dL 345.00 (H) 228.25 (H) 299.50 (H) 260.00 (H)   Monoclonal Peak Latest Ref Range: 0.0 g/dL 3.4 (H) 2.4 (H) 2.9 (H) 4.4 (H)       IMPRESSION/PLAN:  72 year old male with relapsed MM after autologous transplant (1/9/2013), status-post multiple salvage regimens with progressive disease.    MM: Continues on prednisone QOD with weekly cytoxan with improvement in performance status and biochemical markers, tolerating overall well with mild fatigue. Due to recurrent neutropenia, Cytoxan changed to every other week with improved tolerance. His appetite and fatigue have both worsened this week, as is typical for him in setting of progressive myeloma. His labwork shows rising Mspike, worsening renal functnio, and rising protein. I am concerned that his myeloma is progressing through the cytoxan and prednisone. Yamil wishes to continue to try and treat his myeloma. A message was sent to Dr. Fatima. I am not sure Yamil will be able to tolerate Ninlaro.     Heme: Cytopenias 2/2 treatment and likely MM, requiring intermittent  pRBC, usually once/month. Stable.  - Previously on Plavix, remains on hold given thrombocytopenia, indefinitely  - Transfuse to keep hemoglobin > 8, platelets > 10k.     Renal/FEN: Worsening renal function in December-Jan (Cr max 3.40 on 1/13) in setting of progressive myeloma. Worsening over the last few weeks.  - Encouraged protein/calorie supplements.   - Would recommend continuing Zyprexa at night to help with appetite. Discussed additional appetite stimulation with medical cannabis, or Marinol, and he declined but was open to trying Zyprexa 5 mg BID. Discussed watching for sedation or grogginess at that dose. If so, decrease back to 5 mg nightly.  - Push fluids, goal 64 oz daily    ID: Afebrile. No localizing infectious /s.  - Continues ppx  mg BID  - Continue monthly PCP prophylaxis with  pentamidine 3/22/18, given the chronic steroids     Back/rib pain: Started early May 2017. Lumbar MRI at OSH showing mild-mod central and foraminal stenoses in lumbar spine, per patient and wife, there was no MM lesion there. Imaging performed here with no acute findings. Pain remains minimal, not needing pain meds right now.     CV: Continue zocor and norvasc.   - Avoid QTc prolonging agents (history of QTc prolongation with syncopal episodes)     AMS: AMS started early May in setting of metabolic derangements, uremia, and possible infection.  Remains intermittently confused over the past few months but improved significantly and stable today. I do believe there is perhaps some ongoing cognitive decline since the AMS episodes.  - Continue Zyprexa 5 mg for appetite  - Holding off long-acting pain meds      I will tentatively plan to see Yamil back next week unless I hear differently from Dr. Fatima.    I spent >25 minutes with the patient, with over 50% of the time spent counseling or coordinating their care as described above.    ALPHONSO Peres PA-C

## 2018-06-05 NOTE — NURSING NOTE
"Oncology Rooming Note    June 5, 2018 12:30 PM   Anthony Carbone is a 72 year old male who presents for:    Chief Complaint   Patient presents with     Port Draw     port accessed and labs drawn by rn.  vs taken.     Oncology Clinic Visit     Pt is here for labs and to see provider for rtn for MM     Initial Vitals: /67 (BP Location: Right arm, Patient Position: Sitting, Cuff Size: Adult Regular)  Pulse 101  Temp 97.7  F (36.5  C) (Oral)  Resp 16  Wt 50.3 kg (111 lb)  SpO2 95%  BMI 19.04 kg/m2 Estimated body mass index is 19.04 kg/(m^2) as calculated from the following:    Height as of 5/31/18: 1.626 m (5' 4.02\").    Weight as of this encounter: 50.3 kg (111 lb). Body surface area is 1.51 meters squared.  No Pain (0) Comment: Data Unavailable   No LMP for male patient.  Allergies reviewed: Yes  Medications reviewed: Yes    Medications: Medication refills not needed today.  Pharmacy name entered into [facility]:    [id number] - [place], [place] [facility]  [employer] [facility] [id number] [place] [address]  [id number] - [place], [place] - [address] AT [facility] OF [address]    Clinical concerns: none     6 minutes for nursing intake (face to face time)     Christy Lynn MA              "

## 2018-06-05 NOTE — MR AVS SNAPSHOT
After Visit Summary   6/5/2018    Anthony Carbone    MRN: [id number]           Patient Information     Date Of Birth          1945        Visit Information        Provider Department      6/5/2018 12:00 PM Navjot Fatima MD [facility] Blood and Marrow Transplant        Today's Diagnoses     Multiple myeloma in relapse (H)              [facility] ([facility])  [address] [place] [address]  Phone: [phone]  Clinic Hours:   Monday-Thursday:7am to 7pm   Friday: 7am to 5pm   Weekends and holidays:    8am to noon (in general)  If your fever is 100.5  or greater,   call the clinic.  After hours call the   hospital at [phone] or   [phone]. Ask for the BMT   fellow on-call            Follow-ups after your visit        Your next 10 appointments already scheduled     Jun 07, 2018  2:15 PM CDT   [facility] Lab Draw with  [facility] LAB DRAW   [facility] ([facility])    [address] [place] [address]  [address]  [facility] [id number] [phone]            Jun 07, 2018  2:50 PM CDT   (Arrive by 2:35 PM)   Return Visit with Leanne Reddy PA-C   [facility] ([facility])    [address] [place] [address]  [address]  [facility] [id number] [phone]            Jun 14, 2018 10:15 AM CDT   M[facility]ic Lab Draw with [facility] MASONIC LAB DRAW   [facility] ([facility])    [address] [place] [address]  [address]  [facility] [id number] [phone]            Jun 14, 2018 10:50 AM CDT   (Arrive by 10:35 AM)   Return Visit with Leanne Reddy PA-C   [facility] ([facility])    [address] [place] [address]  [address]  [facility] [address]   [phone]            Jun 14, 2018 12:00 PM CDT   Infusion 60 with UC ONCOLOGY INFUSION, UC 28 ATC   [facility] ([facility])     [address] [facility]  [address]  [facility] [address] [phone]            Jun 26, 2018 11:15 AM CDT   Masonic Lab Draw with  MASONIC LAB DRAW   [facility] ([facility])    [address] [place] [address]  [address]  [facility] [address] [phone]            Jun 26, 2018 11:50 AM CDT   (Arrive by 11:35 AM)   Return Visit with Leanne Reddy PA-C   [facility] ([facility])    [address] [place] [address]  [address]  [facility] [id number] [phone]            Jun 26, 2018 12:30 PM CDT   Infusion 60 with UC ONCOLOGY INFUSION, UC 29 ATC   [facility] ([facility])    [address] [place] [address]  [address]  [facility] [id number] [phone]              Who to contact     If you have questions or need follow up information about today's clinic visit or your schedule please contact [facility] BLOOD AND MARROW TRANSPLANT directly at [phone].  Normal or non-critical lab and imaging results will be communicated to you by [employer], letter or phone within 4 business days after the clinic has received the results. If you do not hear from us within 7 days, please contact the clinic through [employer] or phone. If you have a critical or abnormal lab result, we will notify you by phone as soon as possible.  Submit refill requests through [employer] or call your pharmacy and they will forward the refill request to us. Please allow 3 business days for your refill to be completed.          Additional Information About Your Visit        MyChart Information     Wish gives you secure access to your electronic health record. If you see a primary care provider, you can also send messages to your care team and make appointments. If you have questions, please call your primary care clinic.  If you do not have a primary care provider, please call [phone] and they will assist you.         Care EveryWhere ID     This is your Care EveryWhere ID. This could be used by other organizations to access your [place] medical records  [id number]        Your Vitals Were     Pulse Temperature Respirations Pulse Oximetry BMI (Body Mass Index)       101 97.7  F (36.5  C) (Oral) 16 95% 19.04 kg/m2        Blood Pressure from Last 3 Encounters:   06/05/18 110/67   05/31/18 121/82   05/29/18 127/72    Weight from Last 3 Encounters:   06/05/18 50.3 kg (111 lb)   05/31/18 51.9 kg (114 lb 8 oz)   05/29/18 50.8 kg (111 lb 14.4 oz)              We Performed the Following     CBC with platelets differential     Comprehensive metabolic panel     EKG 12-lead complete w/read - Clinics        Recent Review Flowsheet Data     BMT Recent Results Latest Ref Rng & Units 4/17/2018 4/24/2018 5/1/2018 5/15/2018 5/29/2018 5/31/2018 6/5/2018    WBC 4.0 - 11.0 10e9/L 1.9(L) 3.6(L) 1.8(L) 4.1 4.0 3.7(L) 4.1    Hemoglobin 13.3 - 17.7 g/dL 7.9(L) 10.3(L) 10.1(L) 9.3(L) 8.6(L) 9.8(L) 10.0(L)    Platelet Count 150 - 450 10e9/L 50(L) 41(LL) 45(LL) 50(L) 53(L) 52(L) 53(L)    Platelets 150 - 450 10:9/L - - - - - - -    Neutrophils (Absolute) 1.6 - 8.3 10e9/L 1.1(L) 2.5 1.4(L) 3.5 3.5 3.0 3.6    Blasts (Absolute) 10e9/L - - - - - - -    INR 0.86 - 1.14 - - - - - - -    Sodium 133 - 144 mmol/L 140 142 139 138 135 135 137    Potassium 3.4 - 5.3 mmol/L 4.2 3.5 4.1 4.3 4.5 4.5 4.8    Chloride 94 - 109 mmol/L 109 108 107 108 104 106 106    Glucose 70 - 99 mg/dL 110(H) 133(H) 201(H) 168(H) 139(H) 133(H) 130(H)    Urea Nitrogen 7 - 30 mg/dL 34(H) 26 25 27 29 28 29    Creatinine 0.66 - 1.25 mg/dL 1.81(H) 1.60(H) 1.88(H) 1.96(H) 2.22(H) 2.30(H) 2.59(H)    Calcium (Total) 8.5 - 10.1 mg/dL 7.7(L) 8.8 8.8 9.1 8.5 8.2(L) 8.2(L)    Protein (Total) 6.8 - 8.8 g/dL 7.8 8.1 8.5 9.7(H) 10.1(H) 10.0(H) 10.6(H)    Albumin 3.4 - 5.0 g/dL 2.8(L) 2.6(L) 2.6(L) 2.5(L) 2.5(L) 2.4(L) 2.5(L)    Bilirubin (Direct) 0.0 - 0.2 mg/dL - - - - - - -    Alkaline Phosphatase 40  - 150 U/L 44 46 44 47 45 42 40    AST 0 - 45 U/L 7 8 8 7 14 9 9    ALT 0 - 70 U/L 11 12 10 14 12 10 10    MCV 78 - 100 fl 102(H) 99 100 104(H) 106(H) 102(H) 104(H)               Primary Care Provider Office Phone # Fax #    Sanket Burciaga [phone]       [facility] [address] [facility] [address]        Equal Access to Services     ALBAN SHAH AH: Hadii aad ku hadasho Soomaali, waaxda luqadaha, qaybta kaalmada adeegyada, waxay idiin hayaan adeeg kharamartha lamarcelina . So [facility] [phone].    ATENCIÓN: Si habla español, tiene a todd disposición servicios gratuitos de asistencia lingüística. [facility] [phone].    We comply with applicable federal civil rights laws and [place] laws. We do not discriminate on the basis of race, color, national origin, age, disability, sex, sexual orientation, or gender identity.            Thank you!     Thank you for choosing [facility] BLOOD AND MARROW TRANSPLANT  for your care. Our goal is always to provide you with excellent care. Hearing back from our patients is one way we can continue to improve our services. Please take a few minutes to complete the written survey that you may receive in the mail after your visit with us. Thank you!             Your Updated Medication List - Protect others around you: Learn how to safely use, store and throw away your medicines at www.disposemymeds.org.          This list is accurate as of 6/5/18  4:55 PM.  Always use your most recent med list.                   Brand Name Dispense Instructions for use Diagnosis    acetaminophen 325 MG tablet    TYLENOL    100 tablet    Take 2 tablets (650 mg) by mouth every 4 hours as needed for mild pain or fever    Fever, unspecified fever cause       acyclovir 400 MG tablet    ZOVIRAX    60 tablet    Take 1 tablet (400 mg) by mouth 2 times daily    Multiple myeloma in relapse (H)       amLODIPine 5 MG tablet    NORVASC    90 tablet    TAKE 1 TABLET BY MOUTH AT BEDTIME     Essential hypertension       esomeprazole 40 MG CR capsule    nexIUM    30 capsule    Take 1 capsule (40 mg) by mouth every morning (before breakfast)    Gastroesophageal reflux disease without esophagitis       OLANZapine 5 MG tablet    zyPREXA    60 tablet    Take by mouth At Bedtime Takes 1 tablet    Delirium, Multiple myeloma in relapse (H)       ondansetron 8 MG ODT tab    ZOFRAN-ODT    30 tablet    Take 1 tablet (8 mg) by mouth every 8 hours as needed for nausea    Nausea       predniSONE 50 MG tablet    DELTASONE    15 tablet    Take 1 tablet (50 mg) by mouth every other day    Multiple myeloma, remission status unspecified (H)       SIMVASTATIN PO      Take 20 mg by mouth At Bedtime        traMADol 50 MG tablet    ULTRAM    60 tablet    Take 1 tablet (50 mg) by mouth every 6 hours as needed for moderate pain . Recommend trying after Tylenol and before Dilaudid.    Acute bilateral low back pain without sciatica, Multiple myeloma in relapse (H)

## 2018-06-05 NOTE — PROGRESS NOTES
HEMATOLOGY/ONCOLOGY PROGRESS NOTE  Jun 5, 2018    REASON FOR VISIT: myeloma    Diagnosis: 72 year old gentleman with IgM lambda multiple myeloma originally diagnosed in 01/2012 as stage I, standard risk disease.   Treatment: Revlimid plus dexamethasone for 4-5 cycles but plateaued by late 03/2012.   - Velcade was added and he received another 2-3 cycles, achieving a good partial remission.      Transplant: Single auto after melphalan 200 mg/m2 preparative regimen on 01/09/2013  - Post-transplant course: unremarkable except for some mild steroid-induced hyperglycemia, gastritis, nausea and vomiting.      Maintenance: Lenalidomide at day-100 then developed a maculopapular rash. Lenalidomide was held and then we re-challenged him after about a month to 2 months at a lower dose (5 mg daily); rash returned.   - was started on maintenance Velcade, every other week through July 2014, when he was noted with abnormalities on myeloma studies drawn there. Noted with prostate cancer dx at about the time of relapse (see below).     Relapse: noted with return on M-spike in blood/urine with marrow involvement but negative PET.   - Started on retreatment with RVD on 8/27/14 with Revlimid at 15 mg 14 days of 21 days, dexamethasone 40 mg weekly and velcade weekly.Completed at total of 5 cycles without complication by 12/2014.   - Started Cycle 6 on inc'd Rev dosing of 20mg daily x 2 weeks on 12/11/14 which was complicated by pneumonia.  - Adm 12/21-1/10 for human metapneumovirus pneumonia complicated by anorexia, HTN, depression, anorexia with significant weight loss.   - Restarted Ernesto/Dex only on 2/4/15 with good tolerance but with thrombocytopenia.   - Bendamustine added to Velcade/dexamethasone on 5/21/15 due to disease progression  - Velcade discontinued on 7/9/2015 due to side effects (orthostasis)  - Schedule changed to bendamustine 80 mg/m2 days 1 and 2 on 28-day cycle  - Cycle 1 tolerated poorly due to lightheadedness and  weakness  - Cycle 2 dose reduced to 60 mg/m2 and pre-meds adjusted  - Cycle 5 received on 9/17/15.  - 10/1/2015 - increasing IgM, M-spike - started Pomalidomide 4mg/day (21 days out of 28 days) and weekly Decadron 20mg on Oct 1st, 2015.    - Carfilzomib added on 10/22/2015 making this CPD.  - C3-C6  received in [place]    - Returned to [facility] and resumed CPD here    - Adm: 4/12-4/14 with fever, confusion, neutropenia. Noted on MRI to have acute/subacute CVA and subacute/chronic CVA; started on Plavix, given brief course of Abx and GCSF prior to d/c.     - Continued on Carfilzomib and dexamethasone alone  - Pomalyst added back on 7/13/2016 for rising FLC  - Start daratumumab 11/10/16. Changed to every other week after 4 weekly treatments d/t profound fatigue and malaise.   - Adm 5/7-5/16 due to AMS, hypercalcemia, hyperuricemia, possible PNA, back pain, and JOSE MARIA. Started Kyprolis while inpatient.  - Re-admitted 5/25-/529 with recurrent AMS, found to have concomitant PNA  - Resumed Kyprolis 6/13/2017. Stopped due to worsening performance status and progressive myeloma.  - Started Venclexta 800 mg 8/25/2017  - Added weekly Velcade with dexamethasone 20 mg weekly due to rising light chains on 11/1/2017  - Started weekly Cytoxan with prednisone QOD on 12/13 (though started the prednisone around 12/24) in palliative attempt at care. Responded x 5 months.  - Progressive disease with worsening JOSE MARIA and M spike up to 4.4, switched to panobinostat, thalidomide, bortezomib, dex 6/8/18       INTERVAL HISTORY:   Anthony presents today with his wife.  Yamil has been more tired.  He is obviously concerned about the change in his kidney function and the increase in his monoclonal proteins.  He denies any new pain.  He is eating and drinking well.  His urine output is normal.  His stools are soft, and he takes one half Imodium once a day to help with this.    Remainder of 10-pt ROS otherwise is negative.    PHYSICAL EXAMINATION  /67  (BP Location: Right arm, Patient Position: Sitting, Cuff Size: Adult Regular)  Pulse 101  Temp 97.7  F (36.5  C) (Oral)  Resp 16  Wt 50.3 kg (111 lb)  SpO2 95%  BMI 19.04 kg/m2     Gen: Small frail appearing male, pleasant, NAD   HEENT: MMM, no oral lesions  Neck: supple  Lymph: no cervical, sc, axillary LAD   Resp: Effort normal  GI: Nondistended  Ext: no edema   Neuro: AOX3, no focal deficits   Skin: no rash   MSK: Tenderness to palpation of lower thoracic spine    LABS:   Multiple myeloma:    CBC with Differential    Recent Labs   Lab Test  06/05/18   1137  05/31/18   0945  05/29/18   1106   WBC  4.1  3.7*  4.0   ANEU  3.6  3.0  3.5   ALYM  0.3*  0.2*  0.2*   ZEN  0.2  0.4  0.2   AEOS  0.0  0.0  0.0   HGB  10.0*  9.8*  8.6*   HCT  32.0*  30.5*  27.4*   PLT  53*  52*  53*        Chemistries    Recent Labs   Lab Test  06/05/18   1137  05/31/18   0945  05/29/18   1106   NA  137  135  135   POTASSIUM  4.8  4.5  4.5   CHLORIDE  106  106  104   CO2  19*  18*  20   BUN  29  28  29   CR  2.59*  2.30*  2.22*   GLC  130*  133*  139*         Calcium, magnesium, phosphorous    Recent Labs   Lab Test  06/05/18   1137  05/31/18   0945  05/29/18   1106   01/15/18   0842  01/14/18   0802   05/25/17   1304   05/13/17   0701   05/08/17   1032   01/09/15   0245   JAZMIN  8.2*  8.2*  8.5   < >  8.3*  7.9*   < >  9.2   < >  7.5*   < >  10.2*   < >  8.6   MAG   --    --    --    --   1.7  1.8   --   2.2   < >  1.7   --   2.0   < >  2.3   PHOS   --    --    --    --    --    --    --    --    --   2.3*   --   3.8   --   3.4    < > = values in this interval not displayed.         LFTs    Recent Labs   Lab Test  06/05/18   1137  05/31/18   0945  05/29/18   1106   BILITOTAL  0.4  0.4  0.4   ALKPHOS  40  42  45   AST  9  9  14   ALT  10  10  12   ALBUMIN  2.5*  2.4*  2.5*        LDH    Recent Labs   Lab Test  01/07/16   1050  12/22/14   1438  07/08/14   0934   LDH  154  452*  399        Beta-2 Microglobulin    Recent Labs   Lab Test   07/31/14   1145  01/02/14   0832  07/08/13   1120  05/08/13   1056  02/08/13   0842  11/07/12   1126   MYCR6PMZA  2.0  2.2  1.6  1.6  1.3  1.3          M spike    Recent Labs   Lab Test  05/29/18   1106  05/01/18   1209  03/29/18   0717  02/22/18   0945  01/25/18   1500  01/02/18   1530   ELPM  4.4*  2.9*  2.4*  3.4*  4.6*  4.6*       [place] FLC    Recent Labs   Lab Test  05/29/18   1106  05/01/18   1209  03/29/18   0717  02/22/18   0945  01/25/18   1500  01/02/18   1530   KAPPAFREELT  <0.30*  <0.30*  <0.30*  <0.30*  <0.30*  <0.30*       Lambda FLC    Recent Labs   Lab Test  05/29/18   1106  05/01/18   1209  03/29/18   0717  02/22/18   0945  01/25/18   1500  01/02/18   1530   LAMBDAFREELT  260.00*  299.50*  228.25*  345.00*  339.00*  257.00*       FLC Ratio    Recent Labs   Lab Test  05/29/18   1106  05/01/18   1209  03/29/18   0717  02/22/18   0945  01/25/18   1500  01/02/18   1530   KLR  Unable to Calculate  Unable to Calculate  Unable to Calculate  Unable to Calculate  Unable to Calculate  Unable to Calculate              IMPRESSION/PLAN:  72 year old male with relapsed MM after autologous transplant (1/9/2013), status-post multiple salvage regimens with progressive disease.    MM: Unfortunately, Anthony has progressive disease.  We discussed the limited options for his therapy, given his extensive prior treatment course.  His co-pay for excessive mood was excessively high.  Therefore, another FDA approved option we have available to us is panobinostat, given in combination with low-dose thalidomide, bortezomib, and dexamethasone, as published in Daryl et al, Lancet Hematology December 2016.  In this trial, the majority of patients responded, even up to 90% response rate in a refractory setting.  Side effects included neutropenia, thrombocytopenia, neuropathy, diarrhea, constipation, and others.  We discussed these risks and potential benefits at length, and he agreed to begin this therapy once we have insurance  approval.  Hopefully we can start later this week or early next.    Heme: Cytopenias 2/2 treatment and likely MM, stable  - Previously on Plavix, remains on hold given thrombocytopenia, indefinitely  - Transfuse to keep hemoglobin > 8, platelets > 10k.   - If he is able to start the regimen above, start aspirin for DVT prophylaxis, cautiously due to thrombocytopenia.    Renal/FEN: Creat baseline ~0.8-1.0, recently has been increased beyond baseline, likely secondary to progressive myeloma.    -Encouraged protein/calorie supplements.   - Zyprexa at night to help with appetite.    ID: No issues.  - Continues ppx  mg BID and monthly pentamidine.     Back/rib pain: Started early May. Lumbar MRI at OSH showing mild-mod central and foraminal stenoses in lumbar spine, per patient and wife, there was no MM lesion there.       CV: Continue zocor and norvasc.   - Avoid QTc prolonging agents (history of QTc prolongation with syncopal episodes)   -Pretreatment EKG showed a normal QTC today.      Return to clinic on Thursday for labs, pharmacy teaching, and hopefully to begin his new regimen soon.      Navjot Fatima MD, pager [phone]

## 2018-06-06 NOTE — TELEPHONE ENCOUNTER
Prior Authorization Approval    Authorization Effective Date: 3/8/2018  Authorization Expiration Date: 6/5/2021  Medication: Thalomid-PA Approved, $1706 copay  Approved Dose/Quantity: 28/28ds  Reference #:   n/a  Insurance Company: MEDICA - Phone [phone] Fax [phone]  Expected CoPay:     $1701.06  CoPay Card Available:    No, pt has Medicare and they don't allow use of copay cards  Foundation Assistance Needed:  Yes, but patients income is too high to qualify  Which Pharmacy is filling the prescription (Not needed for infusion/clinic administered): [facility] - [place], [place] - [address]

## 2018-06-07 NOTE — NURSING NOTE
Chief Complaint   Patient presents with     Port Draw     labs drawn via port by RN     /79 (BP Location: Right arm, Patient Position: Chair, Cuff Size: Adult Regular)  Pulse 109  Temp 96.3  F (35.7  C) (Oral)  Wt 50.8 kg (111 lb 14.4 oz)  SpO2 96%  BMI 19.2 kg/m2    Vitals taken. Port accessed by RN. Labs collected and sent. Line flushed with NS & Heparin. Pt tolerated well. Pt checked in for next appointment.    Iris Gutiérrez RN

## 2018-06-07 NOTE — LETTER
6/7/2018      RE: Anthony Carbone  [address] [place] [address]       HEMATOLOGY/ONCOLOGY PROGRESS NOTE  Jun 7, 2018    REASON FOR VISIT: myeloma    Diagnosis: 72 year old gentleman with IgM lambda multiple myeloma originally diagnosed in 01/2012 as stage I, standard risk disease.   Treatment: Revlimid plus dexamethasone for 4-5 cycles but plateaued by late 03/2012.   - Velcade was added and he received another 2-3 cycles, achieving a good partial remission.      Transplant: Single auto after melphalan 200 mg/m2 preparative regimen on 01/09/2013  - Post-transplant course: unremarkable except for some mild steroid-induced hyperglycemia, gastritis, nausea and vomiting.      Maintenance: Lenalidomide at day-100 then developed a maculopapular rash. Lenalidomide was held and then we re-challenged him after about a month to 2 months at a lower dose (5 mg daily); rash returned.   - was started on maintenance Velcade, every other week through July 2014, when he was noted with abnormalities on myeloma studies drawn there. Noted with prostate cancer dx at about the time of relapse (see below).     Relapse: noted with return on M-spike in blood/urine with marrow involvement but negative PET.   - Started on retreatment with RVD on 8/27/14 with Revlimid at 15 mg 14 days of 21 days, dexamethasone 40 mg weekly and velcade weekly.Completed at total of 5 cycles without complication by 12/2014.   - Started Cycle 6 on inc'd Rev dosing of 20mg daily x 2 weeks on 12/11/14 which was complicated by pneumonia.  - Adm 12/21-1/10 for human metapneumovirus pneumonia complicated by anorexia, HTN, depression, anorexia with significant weight loss.   - Restarted Ernesto/Dex only on 2/4/15 with good tolerance but with thrombocytopenia.   - Bendamustine added to Velcade/dexamethasone on 5/21/15 due to disease progression  - Velcade discontinued on 7/9/2015 due to side effects (orthostasis)  - Schedule changed to bendamustine 80 mg/m2  days 1 and 2 on 28-day cycle  - Cycle 1 tolerated poorly due to lightheadedness and weakness  - Cycle 2 dose reduced to 60 mg/m2 and pre-meds adjusted  - Cycle 5 received on 9/17/15.  - 10/1/2015 - increasing IgM, M-spike - started Pomalidomide 4mg/day (21 days out of 28 days) and weekly Decadron 20mg on Oct 1st, 2015.    - Carfilzomib added on 10/22/2015 making this CPD.  - C3-C6  received in [place]    - Returned to [facility] and resumed CPD here    - Adm: 4/12-4/14 with fever, confusion, neutropenia. Noted on MRI to have acute/subacute CVA and subacute/chronic CVA; started on Plavix, given brief course of Abx and GCSF prior to d/c.     - Continued on Carfilzomib and dexamethasone alone  - Pomalyst added back on 7/13/2016 for rising FLC  - Start daratumumab 11/10/16. Changed to every other week after 4 weekly treatments d/t profound fatigue and malaise.   - Adm 5/7-5/16 due to AMS, hypercalcemia, hyperuricemia, possible PNA, back pain, and JOSE MARIA. Started Kyprolis while inpatient.  - Re-admitted 5/25-/529 with recurrent AMS, found to have concomitant PNA  - Resumed Kyprolis 6/13/2017. Stopped due to worsening performance status and progressive myeloma.  - Started Venclexta 800 mg 8/25/2017  - Added weekly Velcade with dexamethasone 20 mg weekly due to rising light chains on 11/1/2017  - Started weekly Cytoxan with prednisone QOD on 12/13 (though started the prednisone around 12/24)      INTERVAL HISTORY:    Anthony presents today with his wife.  Nothing new since seeing Dr. Fatima. He continues to endorse weakness and fatigue, which has worsened over the last month and more sharply so in the last 1-2 weeks. Drinking around 30 oz fluid daily and knows he could do more. Appetite is poor but he is managing three small meals. He and Casey have talked about hospice versus  No fevers, chills, cough, SOB, chest pain, abdominal pain, nausea, vomiting, urinary concerns, bleeding, swelling, rashes, or any confusion. No new pains and  "no back pain right now. Remainder of 10-pt ROS otherwise is negative.    PHYSICAL EXAMINATION  /79 (BP Location: Right arm, Patient Position: Chair, Cuff Size: Adult Regular)  Pulse 109  Temp 96.3  F (35.7  C) (Oral)  Ht 1.626 m (5' 4.02\")  Wt 50.8 kg (111 lb 14.4 oz)  SpO2 96%  BMI 19.2 kg/m2     Wt Readings from Last 10 Encounters:   18 50.8 kg (111 lb 14.4 oz)   18 50.3 kg (111 lb)   18 51.9 kg (114 lb 8 oz)   18 50.8 kg (111 lb 14.4 oz)   05/15/18 52.7 kg (116 lb 1.6 oz)   18 52.7 kg (116 lb 3.2 oz)   18 54.1 kg (119 lb 4.3 oz)   18 54.1 kg (119 lb 4.3 oz)   18 53.2 kg (117 lb 4.8 oz)   18 52.3 kg (115 lb 3.2 oz)     General: Alert, frail. Oriented to name, , location,  Able to ambulate independently, albeit slow and cautiously.  No acute distress. HEENT: PERRL, no palor or icterus. CVS: RRR CHEST: CTAB, normal work of breathing, right port without erythema, swelling or pus.  ABDOMEN: soft non tender no masses     NEURO: AAOX3  CN 2-12 intact  SKIN: no bleeding or brusiing, no rashes EXTREMITIES: No edema.     LABS:   Results for WILLIAN ARCINIEGA (MRN [id number]) as of 2018 11:34   Ref. Range 2018 09:45   Sodium Latest Ref Range: 133 - 144 mmol/L 135   Potassium Latest Ref Range: 3.4 - 5.3 mmol/L 4.5   Chloride Latest Ref Range: 94 - 109 mmol/L 106   Carbon Dioxide Latest Ref Range: 20 - 32 mmol/L 18 (L)   Urea Nitrogen Latest Ref Range: 7 - 30 mg/dL 28   Creatinine Latest Ref Range: 0.66 - 1.25 mg/dL 2.30 (H)   GFR Estimate Latest Ref Range: >60 mL/min/1.7m2 28 (L)   GFR Estimate If Black Latest Ref Range: >60 mL/min/1.7m2 34 (L)   Calcium Latest Ref Range: 8.5 - 10.1 mg/dL 8.2 (L)   Anion Gap Latest Ref Range: 3 - 14 mmol/L 11   Albumin Latest Ref Range: 3.4 - 5.0 g/dL 2.4 (L)   Protein Total Latest Ref Range: 6.8 - 8.8 g/dL 10.0 (H)   Bilirubin Total Latest Ref Range: 0.2 - 1.3 mg/dL 0.4   Alkaline Phosphatase Latest Ref " Range: 40 - 150 U/L 42   ALT Latest Ref Range: 0 - 70 U/L 10   AST Latest Ref Range: 0 - 45 U/L 9   Hemoglobin A1C Latest Ref Range: 0 - 5.6 % 6.3 (H)   Cholesterol Latest Ref Range: <200 mg/dL <200   HDL Cholesterol Latest Ref Range: >39 mg/dL 54   LDL Cholesterol Calculated Latest Ref Range: <100 mg/dL 127 (H)   Non HDL Cholesterol Latest Ref Range: <130 mg/dL 146 (H)   Triglycerides Latest Ref Range: <150 mg/dL 92   Glucose Latest Ref Range: 70 - 99 mg/dL 133 (H)   WBC Latest Ref Range: 4.0 - 11.0 10e9/L 3.7 (L)   Hemoglobin Latest Ref Range: 13.3 - 17.7 g/dL 9.8 (L)   Hematocrit Latest Ref Range: 40.0 - 53.0 % 30.5 (L)   Platelet Count Latest Ref Range: 150 - 450 10e9/L 52 (L)   Results for RENAELUDA WILLIAN FONTAINE (MRN [id number]) as of 5/31/2018 10:19   Ref. Range 2/22/2018 09:45 3/29/2018 07:17 5/1/2018 12:09 5/29/2018 11:06   Lambda Free Lt Chain Latest Ref Range: 0.57 - 2.63 mg/dL 345.00 (H) 228.25 (H) 299.50 (H) 260.00 (H)   Monoclonal Peak Latest Ref Range: 0.0 g/dL 3.4 (H) 2.4 (H) 2.9 (H) 4.4 (H)       IMPRESSION/PLAN:  72 year old male with relapsed MM after autologous transplant (1/9/2013), status-post multiple salvage regimens with progressive disease.    MM: Progressive disease on prednisoneCytoxan. He met with Dr. Fatima, who recommended panobinostat/Velcade/thalidomide. Unfortunately, this regimen is carrying an excessive out of pocket cost. Pharmacy is actively looking into grants. Casey Lobo, and I had a conversation about treatment versus hospice. If his weakness continues to progress, I am concerned that he won't be able to tolerate further treatment. They are going to think about things. I will see them back in one week to continue this discussion.     Weakness: Yamil has been increasingly tired and weak over the last month. He has fallen several times. Instructed him to use the walker. If worsening or continuing to have falls, he will let us know.    Heme: Cytopenias 2/2 treatment and likely MM,  requiring intermittent pRBC, usually once/month. Stable.  - Previously on Plavix, remains on hold given thrombocytopenia, indefinitely  - Transfuse to keep hemoglobin > 8, platelets > 10k.     Renal/FEN: Worsening renal function in December-Jan (Cr max 3.40 on 1/13) in setting of progressive myeloma. Worsening over the last few weeks.  - Encouraged protein/calorie supplements.   - Continue Zyprexa for appetite  - Push fluids, goal 64 oz daily    ID: Afebrile. No localizing infectious /s.  - Continues ppx  mg BID  - Continue monthly PCP prophylaxis with  pentamidine 3/22/18, given the chronic steroids     Back/rib pain: Started early May 2017. Lumbar MRI at OSH showing mild-mod central and foraminal stenoses in lumbar spine, per patient and wife, there was no MM lesion there. Imaging performed here with no acute findings. Pain remains minimal, not needing pain meds right now.     CV: Continue zocor and norvasc.   - Avoid QTc prolonging agents (history of QTc prolongation with syncopal episodes)   - If we do start new regimen, will need periodic EKG for close monitoring    AMS: AMS started early May in setting of metabolic derangements, uremia, and possible infection.  Remains intermittently confused over the past few months but improved significantly and stable today. I do believe there is perhaps some ongoing cognitive decline since the AMS episodes.  - Continue Zyprexa 5 mg for appetite  - Holding off long-acting pain meds      I spent >15 minutes with the patient, with over 50% of the time spent counseling or coordinating their care as described above.    ALPHONSO Peres PA-C

## 2018-06-07 NOTE — Clinical Note
6/7/2018       RE: Anthony Carbone  [address] [place] [address]     Dear Colleague,    Thank you for referring your patient, Anthony Carbone, to the [facility]. Please see a copy of my visit note below.    No notes on file    Again, thank you for allowing me to participate in the care of your patient.      Sincerely,    Leanne Reddy PA-C

## 2018-06-07 NOTE — PROGRESS NOTES
HEMATOLOGY/ONCOLOGY PROGRESS NOTE  Jun 7, 2018    REASON FOR VISIT: myeloma    Diagnosis: 72 year old gentleman with IgM lambda multiple myeloma originally diagnosed in 01/2012 as stage I, standard risk disease.   Treatment: Revlimid plus dexamethasone for 4-5 cycles but plateaued by late 03/2012.   - Velcade was added and he received another 2-3 cycles, achieving a good partial remission.      Transplant: Single auto after melphalan 200 mg/m2 preparative regimen on 01/09/2013  - Post-transplant course: unremarkable except for some mild steroid-induced hyperglycemia, gastritis, nausea and vomiting.      Maintenance: Lenalidomide at day-100 then developed a maculopapular rash. Lenalidomide was held and then we re-challenged him after about a month to 2 months at a lower dose (5 mg daily); rash returned.   - was started on maintenance Velcade, every other week through July 2014, when he was noted with abnormalities on myeloma studies drawn there. Noted with prostate cancer dx at about the time of relapse (see below).     Relapse: noted with return on M-spike in blood/urine with marrow involvement but negative PET.   - Started on retreatment with RVD on 8/27/14 with Revlimid at 15 mg 14 days of 21 days, dexamethasone 40 mg weekly and velcade weekly.Completed at total of 5 cycles without complication by 12/2014.   - Started Cycle 6 on inc'd Rev dosing of 20mg daily x 2 weeks on 12/11/14 which was complicated by pneumonia.  - Adm 12/21-1/10 for human metapneumovirus pneumonia complicated by anorexia, HTN, depression, anorexia with significant weight loss.   - Restarted Ernesto/Dex only on 2/4/15 with good tolerance but with thrombocytopenia.   - Bendamustine added to Velcade/dexamethasone on 5/21/15 due to disease progression  - Velcade discontinued on 7/9/2015 due to side effects (orthostasis)  - Schedule changed to bendamustine 80 mg/m2 days 1 and 2 on 28-day cycle  - Cycle 1 tolerated poorly due to lightheadedness and  weakness  - Cycle 2 dose reduced to 60 mg/m2 and pre-meds adjusted  - Cycle 5 received on 9/17/15.  - 10/1/2015 - increasing IgM, M-spike - started Pomalidomide 4mg/day (21 days out of 28 days) and weekly Decadron 20mg on Oct 1st, 2015.    - Carfilzomib added on 10/22/2015 making this CPD.  - C3-C6  received in [place]    - Returned to [facility] and resumed CPD here    - Adm: 4/12-4/14 with fever, confusion, neutropenia. Noted on MRI to have acute/subacute CVA and subacute/chronic CVA; started on Plavix, given brief course of Abx and GCSF prior to d/c.     - Continued on Carfilzomib and dexamethasone alone  - Pomalyst added back on 7/13/2016 for rising FLC  - Start daratumumab 11/10/16. Changed to every other week after 4 weekly treatments d/t profound fatigue and malaise.   - Adm 5/7-5/16 due to AMS, hypercalcemia, hyperuricemia, possible PNA, back pain, and JOSE MARIA. Started Kyprolis while inpatient.  - Re-admitted 5/25-/529 with recurrent AMS, found to have concomitant PNA  - Resumed Kyprolis 6/13/2017. Stopped due to worsening performance status and progressive myeloma.  - Started Venclexta 800 mg 8/25/2017  - Added weekly Velcade with dexamethasone 20 mg weekly due to rising light chains on 11/1/2017  - Started weekly Cytoxan with prednisone QOD on 12/13 (though started the prednisone around 12/24)      INTERVAL HISTORY:    Anthony presents today with his wife.  Nothing new since seeing Dr. Fatima. He continues to endorse weakness and fatigue, which has worsened over the last month and more sharply so in the last 1-2 weeks. Drinking around 30 oz fluid daily and knows he could do more. Appetite is poor but he is managing three small meals. He and Casey have talked about hospice versus  No fevers, chills, cough, SOB, chest pain, abdominal pain, nausea, vomiting, urinary concerns, bleeding, swelling, rashes, or any confusion. No new pains and no back pain right now. Remainder of 10-pt ROS otherwise is negative.    PHYSICAL  "EXAMINATION  /79 (BP Location: Right arm, Patient Position: Chair, Cuff Size: Adult Regular)  Pulse 109  Temp 96.3  F (35.7  C) (Oral)  Ht 1.626 m (5' 4.02\")  Wt 50.8 kg (111 lb 14.4 oz)  SpO2 96%  BMI 19.2 kg/m2     Wt Readings from Last 10 Encounters:   18 50.8 kg (111 lb 14.4 oz)   18 50.3 kg (111 lb)   18 51.9 kg (114 lb 8 oz)   18 50.8 kg (111 lb 14.4 oz)   05/15/18 52.7 kg (116 lb 1.6 oz)   18 52.7 kg (116 lb 3.2 oz)   18 54.1 kg (119 lb 4.3 oz)   18 54.1 kg (119 lb 4.3 oz)   18 53.2 kg (117 lb 4.8 oz)   18 52.3 kg (115 lb 3.2 oz)     General: Alert, frail. Oriented to name, , location,  Able to ambulate independently, albeit slow and cautiously.  No acute distress. HEENT: PERRL, no palor or icterus. CVS: RRR CHEST: CTAB, normal work of breathing, right port without erythema, swelling or pus.  ABDOMEN: soft non tender no masses     NEURO: AAOX3  CN 2-12 intact  SKIN: no bleeding or brusiing, no rashes EXTREMITIES: No edema.     LABS:   Results for WILLIAN ARCINIEGA (MRN [id number]) as of 2018 11:34   Ref. Range 2018 09:45   Sodium Latest Ref Range: 133 - 144 mmol/L 135   Potassium Latest Ref Range: 3.4 - 5.3 mmol/L 4.5   Chloride Latest Ref Range: 94 - 109 mmol/L 106   Carbon Dioxide Latest Ref Range: 20 - 32 mmol/L 18 (L)   Urea Nitrogen Latest Ref Range: 7 - 30 mg/dL 28   Creatinine Latest Ref Range: 0.66 - 1.25 mg/dL 2.30 (H)   GFR Estimate Latest Ref Range: >60 mL/min/1.7m2 28 (L)   GFR Estimate If Black Latest Ref Range: >60 mL/min/1.7m2 34 (L)   Calcium Latest Ref Range: 8.5 - 10.1 mg/dL 8.2 (L)   Anion Gap Latest Ref Range: 3 - 14 mmol/L 11   Albumin Latest Ref Range: 3.4 - 5.0 g/dL 2.4 (L)   Protein Total Latest Ref Range: 6.8 - 8.8 g/dL 10.0 (H)   Bilirubin Total Latest Ref Range: 0.2 - 1.3 mg/dL 0.4   Alkaline Phosphatase Latest Ref Range: 40 - 150 U/L 42   ALT Latest Ref Range: 0 - 70 U/L 10   AST Latest Ref Range: " 0 - 45 U/L 9   Hemoglobin A1C Latest Ref Range: 0 - 5.6 % 6.3 (H)   Cholesterol Latest Ref Range: <200 mg/dL <200   HDL Cholesterol Latest Ref Range: >39 mg/dL 54   LDL Cholesterol Calculated Latest Ref Range: <100 mg/dL 127 (H)   Non HDL Cholesterol Latest Ref Range: <130 mg/dL 146 (H)   Triglycerides Latest Ref Range: <150 mg/dL 92   Glucose Latest Ref Range: 70 - 99 mg/dL 133 (H)   WBC Latest Ref Range: 4.0 - 11.0 10e9/L 3.7 (L)   Hemoglobin Latest Ref Range: 13.3 - 17.7 g/dL 9.8 (L)   Hematocrit Latest Ref Range: 40.0 - 53.0 % 30.5 (L)   Platelet Count Latest Ref Range: 150 - 450 10e9/L 52 (L)   Results for WILLIAN ARCINIEGA (MRN [id number]) as of 5/31/2018 10:19   Ref. Range 2/22/2018 09:45 3/29/2018 07:17 5/1/2018 12:09 5/29/2018 11:06   Lambda Free Lt Chain Latest Ref Range: 0.57 - 2.63 mg/dL 345.00 (H) 228.25 (H) 299.50 (H) 260.00 (H)   Monoclonal Peak Latest Ref Range: 0.0 g/dL 3.4 (H) 2.4 (H) 2.9 (H) 4.4 (H)       IMPRESSION/PLAN:  72 year old male with relapsed MM after autologous transplant (1/9/2013), status-post multiple salvage regimens with progressive disease.    MM: Progressive disease on prednisoneCytoxan. He met with Dr. Fatima, who recommended panobinostat/Velcade/thalidomide. Unfortunately, this regimen is carrying an excessive out of pocket cost. Pharmacy is actively looking into grants. Yamil, Casey, and I had a conversation about treatment versus hospice. If his weakness continues to progress, I am concerned that he won't be able to tolerate further treatment. They are going to think about things. I will see them back in one week to continue this discussion.     Weakness: Yamil has been increasingly tired and weak over the last month. He has fallen several times. Instructed him to use the walker. If worsening or continuing to have falls, he will let us know.    Heme: Cytopenias 2/2 treatment and likely MM, requiring intermittent pRBC, usually once/month. Stable.  - Previously on Plavix,  remains on hold given thrombocytopenia, indefinitely  - Transfuse to keep hemoglobin > 8, platelets > 10k.     Renal/FEN: Worsening renal function in December-Jan (Cr max 3.40 on 1/13) in setting of progressive myeloma. Worsening over the last few weeks.  - Encouraged protein/calorie supplements.   - Continue Zyprexa for appetite  - Push fluids, goal 64 oz daily    ID: Afebrile. No localizing infectious /s.  - Continues ppx  mg BID  - Continue monthly PCP prophylaxis with  pentamidine 3/22/18, given the chronic steroids     Back/rib pain: Started early May 2017. Lumbar MRI at OSH showing mild-mod central and foraminal stenoses in lumbar spine, per patient and wife, there was no MM lesion there. Imaging performed here with no acute findings. Pain remains minimal, not needing pain meds right now.     CV: Continue zocor and norvasc.   - Avoid QTc prolonging agents (history of QTc prolongation with syncopal episodes)   - If we do start new regimen, will need periodic EKG for close monitoring    AMS: AMS started early May in setting of metabolic derangements, uremia, and possible infection.  Remains intermittently confused over the past few months but improved significantly and stable today. I do believe there is perhaps some ongoing cognitive decline since the AMS episodes.  - Continue Zyprexa 5 mg for appetite  - Holding off long-acting pain meds      I spent >15 minutes with the patient, with over 50% of the time spent counseling or coordinating their care as described above.    Leanne Murphy PA-C

## 2018-06-14 NOTE — PROGRESS NOTES
Infusion Nursing Note:  Anthony Carbone presents today for 1 L NS IV.    Patient seen by provider today: Yes: PREM Herrera    Treatment Conditions:  Lab Results   Component Value Date    HGB 8.9 06/14/2018     Lab Results   Component Value Date    WBC 4.9 06/14/2018      Lab Results   Component Value Date    ANEU 3.6 06/07/2018     Lab Results   Component Value Date    PLT 45 06/14/2018      Lab Results   Component Value Date     06/14/2018                   Lab Results   Component Value Date    POTASSIUM 4.4 06/14/2018           Lab Results   Component Value Date    MAG 2.3 06/14/2018            Lab Results   Component Value Date    CR 2.82 06/14/2018                   Lab Results   Component Value Date    JAZMIN 8.2 06/14/2018                Lab Results   Component Value Date    BILITOTAL 0.3 06/14/2018           Lab Results   Component Value Date    ALBUMIN 2.2 06/14/2018                    Lab Results   Component Value Date    ALT 9 06/14/2018           Lab Results   Component Value Date    AST 9 06/14/2018         Intravenous Access:  Implanted Port.  Access dc'd at time of discharge.      Note:   Results reviewed, copy given to patient.  Proceed with treatment.    Copy of AVS given to patient. + Blood return from PORT pre and post infusion.  Tolerated infusion without incident. No Prescriptions filled today.   D/C in care of spouse, plan is for pt to transfer to hospice.      Jessica Melo RN

## 2018-06-14 NOTE — MR AVS SNAPSHOT
After Visit Summary   6/14/2018    Anthony Carbone    MRN: [id number]           Patient Information     Date Of Birth          1945        Visit Information        Provider Department      6/14/2018 12:00 PM UC 28 ATC;  ONCOLOGY INFUSION [facility]        Today's Diagnoses     Multiple myeloma in relapse (H)    -  1    Multiple myeloma, remission status unspecified (H)           Follow-ups after your visit        Who to contact     If you have questions or need follow up information about today's clinic visit or your schedule please contact [facility] directly at [phone].  Normal or non-critical lab and imaging results will be communicated to you by [employer], letter or phone within 4 business days after the clinic has received the results. If you do not hear from us within 7 days, please contact the clinic through [employer] or phone. If you have a critical or abnormal lab result, we will notify you by phone as soon as possible.  Submit refill requests through [facility] or call your pharmacy and they will forward the refill request to us. Please allow 3 business days for your refill to be completed.          Additional Information About Your Visit        MyChart Information     [facility] gives you secure access to your electronic health record. If you see a primary care provider, you can also send messages to your care team and make appointments. If you have questions, please call your primary care clinic.  If you do not have a primary care provider, please call [phone] and they will assist you.        Care EveryWhere ID     This is your Care EveryWhere ID. This could be used by other organizations to access your [facility] medical records  [id number]         Blood Pressure from Last 3 Encounters:   06/14/18 116/82   06/07/18 123/79   06/05/18 110/67    Weight from Last 3 Encounters:   06/14/18 49.1 kg (108 lb 3.2 oz)   06/07/18 50.8 kg (111 lb  14.4 oz)   06/05/18 50.3 kg (111 lb)              We Performed the Following     CBC with platelets differential     Comprehensive metabolic panel     Magnesium     Phosphorus          Today's Medication Changes          These changes are accurate as of 6/14/18  2:05 PM.  If you have any questions, ask your nurse or doctor.               Stop taking these medicines if you haven't already. Please contact your care team if you have questions.     thalidomide 50 MG capsule CHEMO   Commonly known as:  THALOMID   Stopped by:  Leanne Reddy PA-C                Where to get your medicines      These medications were sent to [id number] [place] [address] [facility]  [address] [place] [address]     Phone:  [phone]     ondansetron 8 MG ODT tab    predniSONE 50 MG tablet                Primary Care Provider Office Phone # Fax #    Sanket Burciaga [phone]       [facility] [address] [facility] [address]        Equal Access to Services     ALBAN SHAH AH: Hadii aad ku hadasho Soomaali, waaxda luqadaha, qaybta kaalmada adeegyada, waxay maggiein hayfishn kayleigh jurado . So [facility] [phone].    ATENCIÓN: Si habla español, tiene a todd disposición servicios gratuitos de asistencia lingüística. [facility] [phone].    We comply with applicable federal civil rights laws and [place] laws. We do not discriminate on the basis of race, color, national origin, age, disability, sex, sexual orientation, or gender identity.            Thank you!     Thank you for choosing [facility]  for your care. Our goal is always to provide you with excellent care. Hearing back from our patients is one way we can continue to improve our services. Please take a few minutes to complete the written survey that you may receive in the mail after your visit with us. Thank you!             Your Updated Medication  List - Protect others around you: Learn how to safely use, store and throw away your medicines at www.disposemymeds.org.          This list is accurate as of 6/14/18  2:05 PM.  Always use your most recent med list.                   Brand Name Dispense Instructions for use Diagnosis    acetaminophen 325 MG tablet    TYLENOL    100 tablet    Take 2 tablets (650 mg) by mouth every 4 hours as needed for mild pain or fever    Fever, unspecified fever cause       * acyclovir 400 MG tablet    ZOVIRAX    60 tablet    Take 1 tablet (400 mg) by mouth 2 times daily    Multiple myeloma in relapse (H)       * acyclovir 400 MG tablet    ZOVIRAX    60 tablet    Take 1 tablet (400 mg) by mouth 2 times daily Viral Prophylaxis.    Multiple myeloma in relapse (H), Multiple myeloma, remission status unspecified (H)       amLODIPine 5 MG tablet    NORVASC    90 tablet    TAKE 1 TABLET BY MOUTH AT BEDTIME    Essential hypertension       esomeprazole 40 MG CR capsule    nexIUM    30 capsule    Take 1 capsule (40 mg) by mouth every morning (before breakfast)    Gastroesophageal reflux disease without esophagitis       loperamide 2 MG capsule    IMODIUM    30 capsule    Start with 2 caps (4 mg) at the first sign of diarrhea, and then 1 capsule every 2 hours until diarrhea free for 12 hours.    Multiple myeloma in relapse (H), Multiple myeloma, remission status unspecified (H)       OLANZapine 5 MG tablet    zyPREXA    60 tablet    Take by mouth At Bedtime Takes 1 tablet    Delirium, Multiple myeloma in relapse (H)       ondansetron 8 MG ODT tab    ZOFRAN-ODT    30 tablet    Take 1 tablet (8 mg) by mouth every 8 hours as needed for nausea    Nausea       ondansetron 8 MG tablet    ZOFRAN    10 tablet    Take 1 tablet (8 mg) by mouth every 8 hours as needed (Nausea/Vomiting)    Multiple myeloma in relapse (H), Multiple myeloma, remission status unspecified (H)       predniSONE 50 MG tablet    DELTASONE    15 tablet    Take 1 tablet (50 mg)  by mouth every other day    Multiple myeloma, remission status unspecified (H)       prochlorperazine 10 MG tablet    COMPAZINE    30 tablet    Take 1 tablet (10 mg) by mouth every 6 hours as needed (Nausea/Vomiting)    Multiple myeloma in relapse (H), Multiple myeloma, remission status unspecified (H)       SIMVASTATIN PO      Take 20 mg by mouth At Bedtime        traMADol 50 MG tablet    ULTRAM    60 tablet    Take 1 tablet (50 mg) by mouth every 6 hours as needed for moderate pain . Recommend trying after Tylenol and before Dilaudid.    Acute bilateral low back pain without sciatica, Multiple myeloma in relapse (H)       * Notice:  This list has 2 medication(s) that are the same as other medications prescribed for you. Read the directions carefully, and ask your doctor or other care provider to review them with you.

## 2018-06-14 NOTE — NURSING NOTE
Port accessed by RN using powerport needle, pt tolerated well. Labs collected and sent, line flushed with NS and heparin. Vitals taken and pt checked in for next appt.   Emma Izaguirre

## 2018-06-14 NOTE — LETTER
6/14/2018      RE: Anthony Carbone  [address] [place] [address]       HEMATOLOGY/ONCOLOGY PROGRESS NOTE  Jun 14, 2018    REASON FOR VISIT: myeloma    Diagnosis: 72 year old gentleman with IgM lambda multiple myeloma originally diagnosed in 01/2012 as stage I, standard risk disease.   Treatment: Revlimid plus dexamethasone for 4-5 cycles but plateaued by late 03/2012.   - Velcade was added and he received another 2-3 cycles, achieving a good partial remission.      Transplant: Single auto after melphalan 200 mg/m2 preparative regimen on 01/09/2013  - Post-transplant course: unremarkable except for some mild steroid-induced hyperglycemia, gastritis, nausea and vomiting.      Maintenance: Lenalidomide at day-100 then developed a maculopapular rash. Lenalidomide was held and then we re-challenged him after about a month to 2 months at a lower dose (5 mg daily); rash returned.   - was started on maintenance Velcade, every other week through July 2014, when he was noted with abnormalities on myeloma studies drawn there. Noted with prostate cancer dx at about the time of relapse (see below).     Relapse: noted with return on M-spike in blood/urine with marrow involvement but negative PET.   - Started on retreatment with RVD on 8/27/14 with Revlimid at 15 mg 14 days of 21 days, dexamethasone 40 mg weekly and velcade weekly.Completed at total of 5 cycles without complication by 12/2014.   - Started Cycle 6 on inc'd Rev dosing of 20mg daily x 2 weeks on 12/11/14 which was complicated by pneumonia.  - Adm 12/21-1/10 for human metapneumovirus pneumonia complicated by anorexia, HTN, depression, anorexia with significant weight loss.   - Restarted Ernesto/Dex only on 2/4/15 with good tolerance but with thrombocytopenia.   - Bendamustine added to Velcade/dexamethasone on 5/21/15 due to disease progression  - Velcade discontinued on 7/9/2015 due to side effects (orthostasis)  - Schedule changed to bendamustine 80 mg/m2  "days 1 and 2 on 28-day cycle  - Cycle 1 tolerated poorly due to lightheadedness and weakness  - Cycle 2 dose reduced to 60 mg/m2 and pre-meds adjusted  - Cycle 5 received on 9/17/15.  - 10/1/2015 - increasing IgM, M-spike - started Pomalidomide 4mg/day (21 days out of 28 days) and weekly Decadron 20mg on Oct 1st, 2015.    - Carfilzomib added on 10/22/2015 making this CPD.  - C3-C6  received in [place]    - Returned to [facility] and resumed CPD here    - Adm: 4/12-4/14 with fever, confusion, neutropenia. Noted on MRI to have acute/subacute CVA and subacute/chronic CVA; started on Plavix, given brief course of Abx and GCSF prior to d/c.     - Continued on Carfilzomib and dexamethasone alone  - Pomalyst added back on 7/13/2016 for rising FLC  - Start daratumumab 11/10/16. Changed to every other week after 4 weekly treatments d/t profound fatigue and malaise.   - Adm 5/7-5/16 due to AMS, hypercalcemia, hyperuricemia, possible PNA, back pain, and JOSE MARIA. Started Kyprolis while inpatient.  - Re-admitted 5/25-/529 with recurrent AMS, found to have concomitant PNA  - Resumed Kyprolis 6/13/2017. Stopped due to worsening performance status and progressive myeloma.  - Started Venclexta 800 mg 8/25/2017  - Added weekly Velcade with dexamethasone 20 mg weekly due to rising light chains on 11/1/2017  - Started weekly Cytoxan with prednisone QOD on 12/13 (though started the prednisone around 12/24)      INTERVAL HISTORY:    Anthony presents today with his wife.  Since his last visit, the fatigue, weakness, and appetite have all gradually worsened. Yamil is spending most of the day in bed. Sleeps about 12 hours at night, and then is awake for 30 minutes, and goes back to sleep. When awake, eating very little, mostly melon, raspberries, tiny amounts. No appetite. Intermittently confused per Casey. This morning, he was talking about a \"cat in the cage\", they do not own a cat.  Existing back pain has worsened a little, used tramadol three times " since last visit. No fevers, chills, cough, SOB, chest pain, abdominal pain, nausea, vomiting, diarrhea, urinary concerns, bleeding, swelling, rashes. Remainder of 10-pt ROS otherwise is negative.    PHYSICAL EXAMINATION  /82  Pulse 105  Temp 98  F (36.7  C)  Resp 18  Wt 49.1 kg (108 lb 3.2 oz)  SpO2 95%  BMI 18.56 kg/m2     Wt Readings from Last 10 Encounters:   18 49.1 kg (108 lb 3.2 oz)   18 50.8 kg (111 lb 14.4 oz)   18 50.3 kg (111 lb)   18 51.9 kg (114 lb 8 oz)   18 50.8 kg (111 lb 14.4 oz)   05/15/18 52.7 kg (116 lb 1.6 oz)   18 52.7 kg (116 lb 3.2 oz)   18 54.1 kg (119 lb 4.3 oz)   18 54.1 kg (119 lb 4.3 oz)   18 53.2 kg (117 lb 4.8 oz)     General: Alert, frail. Oriented to name, , location, but thought it was year . In a wheelchair today.  No acute distress. Intermittently sleeping during visit. HEENT: PERRL, no palor or icterus. CVS: RRR CHEST: CTAB, normal work of breathing, right port without erythema, swelling or pus.  ABDOMEN: soft non tender no masses     NEURO: AAOX3  CN 2-12 intact  SKIN: no bleeding or brusiing, no rashes EXTREMITIES: No edema.     LABS:      Ref. Range 2018 09:45 3/29/2018 07:17 2018 12:09 2018 11:06   Lambda Free Lt Chain Latest Ref Range: 0.57 - 2.63 mg/dL 345.00 (H) 228.25 (H) 299.50 (H) 260.00 (H)   Monoclonal Peak Latest Ref Range: 0.0 g/dL 3.4 (H) 2.4 (H) 2.9 (H) 4.4 (H)   Results for WILLIAN ARCINIEGA (MRN [id number]) as of 2018 11:23   Ref. Range 2018 09:45 2018 11:37 2018 12:39 2018 14:32 2018 10:38   Protein Total Latest Ref Range: 6.8 - 8.8 g/dL 10.0 (H) 10.6 (H)  10.3 (H) 10.9 (H)     Results for WILLIAN ARCINIEGA (MRN [id number]) as of 2018 11:23   Ref. Range 2018 09:45 2018 11:37 2018 12:39 2018 14:32 2018 10:38   Creatinine Latest Ref Range: 0.66 - 1.25 mg/dL 2.30 (H) 2.59 (H)  2.67 (H) 2.82 (H)      IMPRESSION/PLAN:   72 year old male with relapsed MM after autologous transplant (1/9/2013), status-post multiple salvage regimens with progressive disease.    MM: Progressive disease on prednisoneCytoxan. He met with Dr. Fatima, who recommended panobinostat/Velcade/thalidomide. Unfortunately, this regimen is carrying an excessive out of pocket cost, and has been on hold.    However, over the last few weeks, Yamil has become increasingly tired, weak, PS of at least a 3, spending most of the day in bed. Even a trip to the bathroom is exhausting for Yamil. He is eating very little. He has been intermittently confused. These have all been gradual changes, there are no new acute symptoms. Both Yamil and Casey expressed concerns today that any further treatment may be too difficult for Yamil. I am in agreement. We discussed that further chemotherapy will likely be very difficult for hip and compromise his quality of life even greater than the myeloma has. Apart from rising Mspike on 5/31, we have continued evidence of progressive disease, with a rising creatine, and increasingly elevated total protein.     Today, we were all in mutual agreement that hospice is the most appropriate next step to honor Yamil's wishes to be comfortable. He has an advanced directive and a will in place. I did offer an infectious work-up, but that my concern for an infection was low given the gradual onset of symptoms and lack of any localizing infectious symptoms. They preferred to hold off. We will continue the prednisone 50 mg every other day, as this helped palliate his symptoms and likely has helped with his pain as well, as his back pain has been significantly improved while on the medication. It may also be helping his appetite and energy, as both of these also improved while on the medication. We will give a liter of NS in clinic today. A referral to hospice was made. It has been a pleasure to be involved in Yamil's care.        I spent >40  minutes with the patient, with over 50% of the time spent counseling or coordinating their care as described above.    ALPHONSO Peres PA-C

## 2018-06-14 NOTE — PROGRESS NOTES
HEMATOLOGY/ONCOLOGY PROGRESS NOTE  Jun 14, 2018    REASON FOR VISIT: myeloma    Diagnosis: 72 year old gentleman with IgM lambda multiple myeloma originally diagnosed in 01/2012 as stage I, standard risk disease.   Treatment: Revlimid plus dexamethasone for 4-5 cycles but plateaued by late 03/2012.   - Velcade was added and he received another 2-3 cycles, achieving a good partial remission.      Transplant: Single auto after melphalan 200 mg/m2 preparative regimen on 01/09/2013  - Post-transplant course: unremarkable except for some mild steroid-induced hyperglycemia, gastritis, nausea and vomiting.      Maintenance: Lenalidomide at day-100 then developed a maculopapular rash. Lenalidomide was held and then we re-challenged him after about a month to 2 months at a lower dose (5 mg daily); rash returned.   - was started on maintenance Velcade, every other week through July 2014, when he was noted with abnormalities on myeloma studies drawn there. Noted with prostate cancer dx at about the time of relapse (see below).     Relapse: noted with return on M-spike in blood/urine with marrow involvement but negative PET.   - Started on retreatment with RVD on 8/27/14 with Revlimid at 15 mg 14 days of 21 days, dexamethasone 40 mg weekly and velcade weekly.Completed at total of 5 cycles without complication by 12/2014.   - Started Cycle 6 on inc'd Rev dosing of 20mg daily x 2 weeks on 12/11/14 which was complicated by pneumonia.  - Adm 12/21-1/10 for human metapneumovirus pneumonia complicated by anorexia, HTN, depression, anorexia with significant weight loss.   - Restarted Ernesto/Dex only on 2/4/15 with good tolerance but with thrombocytopenia.   - Bendamustine added to Velcade/dexamethasone on 5/21/15 due to disease progression  - Velcade discontinued on 7/9/2015 due to side effects (orthostasis)  - Schedule changed to bendamustine 80 mg/m2 days 1 and 2 on 28-day cycle  - Cycle 1 tolerated poorly due to lightheadedness and  "weakness  - Cycle 2 dose reduced to 60 mg/m2 and pre-meds adjusted  - Cycle 5 received on 9/17/15.  - 10/1/2015 - increasing IgM, M-spike - started Pomalidomide 4mg/day (21 days out of 28 days) and weekly Decadron 20mg on Oct 1st, 2015.    - Carfilzomib added on 10/22/2015 making this CPD.  - C3-C6  received in [place]    - Returned to [facility] and resumed CPD here    - Adm: 4/12-4/14 with fever, confusion, neutropenia. Noted on MRI to have acute/subacute CVA and subacute/chronic CVA; started on Plavix, given brief course of Abx and GCSF prior to d/c.     - Continued on Carfilzomib and dexamethasone alone  - Pomalyst added back on 7/13/2016 for rising FLC  - Start daratumumab 11/10/16. Changed to every other week after 4 weekly treatments d/t profound fatigue and malaise.   - Adm 5/7-5/16 due to AMS, hypercalcemia, hyperuricemia, possible PNA, back pain, and JOSE MARIA. Started Kyprolis while inpatient.  - Re-admitted 5/25-/529 with recurrent AMS, found to have concomitant PNA  - Resumed Kyprolis 6/13/2017. Stopped due to worsening performance status and progressive myeloma.  - Started Venclexta 800 mg 8/25/2017  - Added weekly Velcade with dexamethasone 20 mg weekly due to rising light chains on 11/1/2017  - Started weekly Cytoxan with prednisone QOD on 12/13 (though started the prednisone around 12/24)      INTERVAL HISTORY:    Anthony presents today with his wife.  Since his last visit, the fatigue, weakness, and appetite have all gradually worsened. Yamil is spending most of the day in bed. Sleeps about 12 hours at night, and then is awake for 30 minutes, and goes back to sleep. When awake, eating very little, mostly melon, raspberries, tiny amounts. No appetite. Intermittently confused per Casey. This morning, he was talking about a \"cat in the cage\", they do not own a cat.  Existing back pain has worsened a little, used tramadol three times since last visit. No fevers, chills, cough, SOB, chest pain, abdominal pain, nausea, " vomiting, diarrhea, urinary concerns, bleeding, swelling, rashes. Remainder of 10-pt ROS otherwise is negative.    PHYSICAL EXAMINATION  /82  Pulse 105  Temp 98  F (36.7  C)  Resp 18  Wt 49.1 kg (108 lb 3.2 oz)  SpO2 95%  BMI 18.56 kg/m2     Wt Readings from Last 10 Encounters:   18 49.1 kg (108 lb 3.2 oz)   18 50.8 kg (111 lb 14.4 oz)   18 50.3 kg (111 lb)   18 51.9 kg (114 lb 8 oz)   18 50.8 kg (111 lb 14.4 oz)   05/15/18 52.7 kg (116 lb 1.6 oz)   18 52.7 kg (116 lb 3.2 oz)   18 54.1 kg (119 lb 4.3 oz)   18 54.1 kg (119 lb 4.3 oz)   18 53.2 kg (117 lb 4.8 oz)     General: Alert, frail. Oriented to name, , location, but thought it was year . In a wheelchair today.  No acute distress. Intermittently sleeping during visit. HEENT: PERRL, no palor or icterus. CVS: RRR CHEST: CTAB, normal work of breathing, right port without erythema, swelling or pus.  ABDOMEN: soft non tender no masses     NEURO: AAOX3  CN 2-12 intact  SKIN: no bleeding or brusiing, no rashes EXTREMITIES: No edema.     LABS:      Ref. Range 2018 09:45 3/29/2018 07:17 2018 12:09 2018 11:06   Lambda Free Lt Chain Latest Ref Range: 0.57 - 2.63 mg/dL 345.00 (H) 228.25 (H) 299.50 (H) 260.00 (H)   Monoclonal Peak Latest Ref Range: 0.0 g/dL 3.4 (H) 2.4 (H) 2.9 (H) 4.4 (H)   Results for WILLIAN ARCINIEGA (MRN [id number]) as of 2018 11:23   Ref. Range 2018 09:45 2018 11:37 2018 12:39 2018 14:32 2018 10:38   Protein Total Latest Ref Range: 6.8 - 8.8 g/dL 10.0 (H) 10.6 (H)  10.3 (H) 10.9 (H)     Results for WILLIAN ARCINIEGA (MRN [id number]) as of 2018 11:23   Ref. Range 2018 09:45 2018 11:37 2018 12:39 2018 14:32 2018 10:38   Creatinine Latest Ref Range: 0.66 - 1.25 mg/dL 2.30 (H) 2.59 (H)  2.67 (H) 2.82 (H)     IMPRESSION/PLAN:   72 year old male with relapsed MM after autologous transplant (2013),  status-post multiple salvage regimens with progressive disease.    MM: Progressive disease on prednisoneCytoxan. He met with Dr. Fatima, who recommended panobinostat/Velcade/thalidomide. Unfortunately, this regimen is carrying an excessive out of pocket cost, and has been on hold.    However, over the last few weeks, Yamil has become increasingly tired, weak, PS of at least a 3, spending most of the day in bed. Even a trip to the bathroom is exhausting for Yamil. He is eating very little. He has been intermittently confused. These have all been gradual changes, there are no new acute symptoms. Both Yamil and Casey expressed concerns today that any further treatment may be too difficult for Yamil. I am in agreement. We discussed that further chemotherapy will likely be very difficult for hip and compromise his quality of life even greater than the myeloma has. Apart from rising Mspike on 5/31, we have continued evidence of progressive disease, with a rising creatine, and increasingly elevated total protein.     Today, we were all in mutual agreement that hospice is the most appropriate next step to honor Yamil's wishes to be comfortable. He has an advanced directive and a will in place. I did offer an infectious work-up, but that my concern for an infection was low given the gradual onset of symptoms and lack of any localizing infectious symptoms. They preferred to hold off. We will continue the prednisone 50 mg every other day, as this helped palliate his symptoms and likely has helped with his pain as well, as his back pain has been significantly improved while on the medication. It may also be helping his appetite and energy, as both of these also improved while on the medication. We will give a liter of NS in clinic today. A referral to hospice was made. It has been a pleasure to be involved in Yamil's care.        I spent >40 minutes with the patient, with over 50% of the time spent counseling or coordinating their care as  described above.    Leanne Murphy PA-C

## 2018-06-14 NOTE — NURSING NOTE
"Oncology Rooming Note    June 14, 2018 10:53 AM   Anthony Carbone is a 72 year old male who presents for:    Chief Complaint   Patient presents with     Port Draw     Oncology Clinic Visit     Return for Multiple Myeloma      Initial Vitals: /82  Pulse 105  Temp 98  F (36.7  C)  Resp 18  Wt 49.1 kg (108 lb 3.2 oz)  SpO2 95%  BMI 18.56 kg/m2 Estimated body mass index is 18.56 kg/(m^2) as calculated from the following:    Height as of 6/7/18: 1.626 m (5' 4.02\").    Weight as of this encounter: 49.1 kg (108 lb 3.2 oz). Body surface area is 1.49 meters squared.  No Pain (0) Comment: Data Unavailable   No LMP for male patient.  Allergies reviewed: Yes  Medications reviewed: Yes    Medications: Medication refills not needed today.  Pharmacy name entered into [facility]:    [id number] - [facility] [address] [address] AT [facility] [address]  [place] [facility] [id number] [place] [address]  [id number] [place], [place] - [address] [address] AT [facility] [address]    Clinical concerns: Refills Zofran 8 mg  Leanne was notified.    5  minutes for nursing intake (face to face time)     Vilma Hook MA              "

## 2018-06-14 NOTE — MR AVS SNAPSHOT
After Visit Summary   6/14/2018    Anthony Carbone    MRN: [id number]           Patient Information     Date Of Birth          1945        Visit Information        Provider Department      6/14/2018 10:50 AM Leanne Reddy PA-C [facility]        Today's Diagnoses     Multiple myeloma, remission status unspecified (H)        Nausea           Follow-ups after your visit        Additional Services     HOSPICE REFERRAL       **Order classes of: [facility], [facility] and [facility] will route to the Home Care and Hospice Referral Pool.  Home Care or Hospice will then contact the patient to schedule their appointment.**    Hospice eligibility overview: prognosis of 6 months or less, end stage disease, patient goals are comfort only, patient is not seeking curative treatment.    If you do not hear from Hospice, or you would like to call to schedule, please call the referring place of service that your provider has listed below.  ______________________________________________________________________    Your provider has referred you to: [facility]: [facility] [phone]   [email or web address]    Anticipated Start Date for Services: 6/15/2018  PLEASE Schedule Hospice consult for 24 - 48 hours.  Please call if there is need for a variance to this timeline.    REASON FOR REFERRAL: Hospice Diagnosis: end-stage multiple myeloma    ADDITIONAL SERVICES NEEDED: n/a    OTHER PERTINENT INFORMATION: Patient was last seen by provider on 6/14/18 for weekly follow-up of multiple myeloma. He has had progression on multiple lines of treatment. He has been off of treatment for about a  Month with progressive functional decline, increasing fatigue and weakness, and poor appetite.  Factors that indicate prognosis of less than 6 months:  Weight loss: 6 lb weight loss since 5/31    Current Outpatient  Prescriptions:  acetaminophen (TYLENOL) 325 MG tablet, Take 2 tablets (650 mg) by mouth every 4 hours as needed for mild pain or fever, Disp: 100 tablet, Rfl:   acyclovir (ZOVIRAX) 400 MG tablet, Take 1 tablet (400 mg) by mouth 2 times daily Viral Prophylaxis., Disp: 60 tablet, Rfl: 7  acyclovir (ZOVIRAX) 400 MG tablet, Take 1 tablet (400 mg) by mouth 2 times daily, Disp: 60 tablet, Rfl: 2  amLODIPine (NORVASC) 5 MG tablet, TAKE 1 TABLET BY MOUTH AT BEDTIME, Disp: 90 tablet, Rfl: 0  esomeprazole (NEXIUM) 40 MG CR capsule, Take 1 capsule (40 mg) by mouth every morning (before breakfast), Disp: 30 capsule, Rfl: 0  loperamide (IMODIUM) 2 MG capsule, Start with 2 caps (4 mg) at the first sign of diarrhea, and then 1 capsule every 2 hours until diarrhea free for 12 hours., Disp: 30 capsule, Rfl: 0  OLANZapine (ZYPREXA) 5 MG tablet, Take by mouth At Bedtime Takes 1 tablet, Disp: 60 tablet, Rfl: 0  ondansetron (ZOFRAN) 8 MG tablet, Take 1 tablet (8 mg) by mouth every 8 hours as needed (Nausea/Vomiting), Disp: 10 tablet, Rfl: 3  ondansetron (ZOFRAN-ODT) 8 MG ODT tab, Take 1 tablet (8 mg) by mouth every 8 hours as needed for nausea, Disp: 30 tablet, Rfl: 3  predniSONE (DELTASONE) 50 MG tablet, Take 1 tablet (50 mg) by mouth every other day, Disp: 15 tablet, Rfl: 0  prochlorperazine (COMPAZINE) 10 MG tablet, Take 1 tablet (10 mg) by mouth every 6 hours as needed (Nausea/Vomiting), Disp: 30 tablet, Rfl: 3  SIMVASTATIN PO, Take 20 mg by mouth At Bedtime, Disp: , Rfl:   traMADol (ULTRAM) 50 MG tablet, Take 1 tablet (50 mg) by mouth every 6 hours as needed for moderate pain . Recommend trying after Tylenol and before Dilaudid., Disp: 60 tablet, Rfl: 3      Patient Active Problem List:     Multiple myeloma (H)     Hypertension     Multiple myeloma (H)     Neutropenic fever (H)     Encounter for long-term current use of medication     GERD (gastroesophageal reflux disease)     Pneumonia     Syncope     Syncope, unspecified syncope  type     Myeloma (H)     Altered mental status     Neoplasm of uncertain behavior of skin     AK (actinic keratosis)     Back pain     Urinary tract infection     Diarrhea    This patient is under my care, and I have initiated the establishment of the plan of care. This patient will be followed by a physician who will periodically review the plan of care.    Physician/Provider to provide follow up care: Sanket Burciaga certified Physician at time of discharge: Dr. Navjot Fatima    Please be aware that coverage of these services is subject to the terms and limitations of your health insurance plan.  Call member services at your health plan with any benefit or coverage questions.                  Your next 10 appointments already scheduled     Jun 26, 2018 11:15 AM CDT   M[facility]ic Lab Draw with UC MASONIC LAB DRAW   [facility] ([facility])    [address] [place] [address]  [address]  [facility] [address]   [phone]            Jun 26, 2018 11:50 AM CDT   (Arrive by 11:35 AM)   Return Visit with Leanne Reddy PA-C   [facility] ([facility])    [address] [place] [address]  [address]  [facility] [address]   [phone]            Jun 26, 2018 12:30 PM CDT   Infusion 60 with UC ONCOLOGY INFUSION, UC 29 ATC   [facility] ([facility])    [address]  [address]  [facility] [address]   [phone]            Jul 03, 2018 12:00 PM CDT   Masonic Lab Draw with UC MASONIC LAB DRAW   [facility] Mason[facility] Lab Draw ([facility])    [address]  [address]  [facility] [address]   [phone]            Jul 03, 2018 12:30 PM CDT   Infusion 60 with UC ONCOLOGY INFUSION, UC 29 ATC   [facility] ([facility])    [address] [place] [address]  [address]  [facility] [address]   [phone]             Jul 10, 2018  2:30 PM CDT   Masonic Lab Draw with  MASONIC LAB DRAW   [facility] Lab Draw ([facility])    [address]  [address]  [facility] [address] [phone]            Jul 10, 2018  3:00 PM CDT   RETURN ONC with Navjot Fatima MD   [facility] Blood and Marrow Transplant ([facility])    [address]  [address]  [facility] [address] [phone]            Jul 10, 2018  3:30 PM CDT   Infusion 60 with UC ONCOLOGY INFUSION   [facility] ([facility])    [address]  [address]  [facility] [address] [phone]              Who to contact     If you have questions or need follow up information about today's clinic visit or your schedule please contact [facility] directly at [phone].  Normal or non-critical lab and imaging results will be communicated to you by CyberSponsehart, letter or phone within 4 business days after the clinic has received the results. If you do not hear from us within 7 days, please contact the clinic through Att[employer] Livet or phone. If you have a critical or abnormal lab result, we will notify you by phone as soon as possible.  Submit refill requests through vWise or call your pharmacy and they will forward the refill request to us. Please allow 3 business days for your refill to be completed.          Additional Information About Your Visit        [employer] Information     vWise gives you secure access to your electronic health record. If you see a primary care provider, you can also send messages to your care team and make appointments. If you have questions, please call your primary care clinic.  If you do not have a primary care provider, please call [phone] and they will assist you.        Care EveryWhere ID     This is your Care EveryWhere ID. This could be used by other organizations to access your [facility]  records  [id number]        Your Vitals Were     Pulse Temperature Respirations Pulse Oximetry BMI (Body Mass Index)       105 98  F (36.7  C) 18 95% 18.56 kg/m2        Blood Pressure from Last 3 Encounters:   06/14/18 116/82   06/07/18 123/79   06/05/18 110/67    Weight from Last 3 Encounters:   06/14/18 49.1 kg (108 lb 3.2 oz)   06/07/18 50.8 kg (111 lb 14.4 oz)   06/05/18 50.3 kg (111 lb)              We Performed the Following     HOSPICE REFERRAL          Today's Medication Changes          These changes are accurate as of 6/14/18 12:07 PM.  If you have any questions, ask your nurse or doctor.               Stop taking these medicines if you haven't already. Please contact your care team if you have questions.     thalidomide 50 MG capsule CHEMO   Commonly known as:  THALOMID   Stopped by:  Leanne Reddy PA-C                Where to get your medicines      These medications were sent to [facility] [address] [place] [address] AT [facility] & [address] [address]     Phone:  [phone]     ondansetron 8 MG ODT tab    predniSONE 50 MG tablet                Primary Care Provider Office Phone # Fax #    Sanket Burciaga [phone]       [place] [id number] [facility] [address]        Equal Access to Services     ALBAN SHAH AH: Hadii chalo ku hadasho Soomaali, waaxda luqadaha, qaybta kaalmada adeegyada, wax paola villa. So [facility] [phone].    ATENCIÓN: Si habla español, tiene a todd disposición servicios gratuitos de asistencia lingüística. Parminder al [phone].    We comply with applicable federal civil rights laws and [place] laws. We do not discriminate on the basis of race, color, national origin, age, disability, sex, sexual orientation, or gender identity.            Thank you!     Thank you for choosing [facility]  for your care. Our goal is always to  provide you with excellent care. Hearing back from our patients is one way we can continue to improve our services. Please take a few minutes to complete the written survey that you may receive in the mail after your visit with us. Thank you!             Your Updated Medication List - Protect others around you: Learn how to safely use, store and throw away your medicines at www.disposemymeds.org.          This list is accurate as of 6/14/18 12:07 PM.  Always use your most recent med list.                   Brand Name Dispense Instructions for use Diagnosis    acetaminophen 325 MG tablet    TYLENOL    100 tablet    Take 2 tablets (650 mg) by mouth every 4 hours as needed for mild pain or fever    Fever, unspecified fever cause       * acyclovir 400 MG tablet    ZOVIRAX    60 tablet    Take 1 tablet (400 mg) by mouth 2 times daily    Multiple myeloma in relapse (H)       * acyclovir 400 MG tablet    ZOVIRAX    60 tablet    Take 1 tablet (400 mg) by mouth 2 times daily Viral Prophylaxis.    Multiple myeloma in relapse (H), Multiple myeloma, remission status unspecified (H)       amLODIPine 5 MG tablet    NORVASC    90 tablet    TAKE 1 TABLET BY MOUTH AT BEDTIME    Essential hypertension       esomeprazole 40 MG CR capsule    nexIUM    30 capsule    Take 1 capsule (40 mg) by mouth every morning (before breakfast)    Gastroesophageal reflux disease without esophagitis       loperamide 2 MG capsule    IMODIUM    30 capsule    Start with 2 caps (4 mg) at the first sign of diarrhea, and then 1 capsule every 2 hours until diarrhea free for 12 hours.    Multiple myeloma in relapse (H), Multiple myeloma, remission status unspecified (H)       OLANZapine 5 MG tablet    zyPREXA    60 tablet    Take by mouth At Bedtime Takes 1 tablet    Delirium, Multiple myeloma in relapse (H)       ondansetron 8 MG ODT tab    ZOFRAN-ODT    30 tablet    Take 1 tablet (8 mg) by mouth every 8 hours as needed for nausea    Nausea       ondansetron  8 MG tablet    ZOFRAN    10 tablet    Take 1 tablet (8 mg) by mouth every 8 hours as needed (Nausea/Vomiting)    Multiple myeloma in relapse (H), Multiple myeloma, remission status unspecified (H)       predniSONE 50 MG tablet    DELTASONE    15 tablet    Take 1 tablet (50 mg) by mouth every other day    Multiple myeloma, remission status unspecified (H)       prochlorperazine 10 MG tablet    COMPAZINE    30 tablet    Take 1 tablet (10 mg) by mouth every 6 hours as needed (Nausea/Vomiting)    Multiple myeloma in relapse (H), Multiple myeloma, remission status unspecified (H)       SIMVASTATIN PO      Take 20 mg by mouth At Bedtime        traMADol 50 MG tablet    ULTRAM    60 tablet    Take 1 tablet (50 mg) by mouth every 6 hours as needed for moderate pain . Recommend trying after Tylenol and before Dilaudid.    Acute bilateral low back pain without sciatica, Multiple myeloma in relapse (H)       * Notice:  This list has 2 medication(s) that are the same as other medications prescribed for you. Read the directions carefully, and ask your doctor or other care provider to review them with you.

## 2018-12-15 NOTE — NURSING NOTE
"Oncology Rooming Note    June 7, 2018 2:41 PM   Anthony Carbone is a 72 year old male who presents for:    Chief Complaint   Patient presents with     Port Draw     labs drawn via port by RN     Oncology Clinic Visit     f/u Multiple myeloma     Initial Vitals: /79 (BP Location: Right arm, Patient Position: Chair, Cuff Size: Adult Regular)  Pulse 109  Temp 96.3  F (35.7  C) (Oral)  Ht 1.626 m (5' 4.02\")  Wt 50.8 kg (111 lb 14.4 oz)  SpO2 96%  BMI 19.2 kg/m2 Estimated body mass index is 19.2 kg/(m^2) as calculated from the following:    Height as of this encounter: 1.626 m (5' 4.02\").    Weight as of this encounter: 50.8 kg (111 lb 14.4 oz). Body surface area is 1.51 meters squared.  No Pain (0) Comment: Data Unavailable   No LMP for male patient.  Allergies reviewed: Yes  Medications reviewed: Yes    Medications: MEDICATION REFILLS NEEDED TODAY. Provider was notified.  Pharmacy name entered into [facility]:    [facility] - [place], [place] - [facility] [address] [facility] [address]  [employer] [facility] [address] [facility] [place]  [facility] [place], [place] - [address] [address] AT [facility] [address]    Clinical concerns: Yes, Patient complains of increased weakness the past few weeks.  Leanne was notified.    10 minutes for nursing intake (face to face time)     MATTHEW KRAMER LPN            " Principal Discharge DX:	Abdominal pain

## 2019-12-20 NOTE — ED PROVIDER NOTES
History     Chief Complaint:  Shoulder injury     HPI   Anthony Carbone is a 71 year old male on Plavix with a history of back fractures and multiple myeloma who presents with a shoulder injury. The patient fell and injured his left shoulder. The patient was pushing a fence that was collapsing; his wife states that one the rails came lose and the patient fell to the ground while holding the rail. The patient fell to the side around 0930 with immediate pain. He rates it at 7-8/10, and has not taken anything for it yet. The patient did have 50mg of Tramadol at 0830 this morning for his Multiple Myeloma--this is part of his regular regimen. The patient denies any new back back, lower extremity pain, elbow pain and neck and is not having difficulty breathing. He also denies numbness and tingling in his left arm. Of note, the patient's oncologist is Dr. Topete at the [facility] and he had 20 minutes of Zometa infusion with a new drug three days ago. The patient is currently doing oral chemotherapy with Venclexta, 800mg per day.     Allergies:  Aspirin  Crestor [Rosuvastatin]  Bactrim [Sulfamethoxazole W-Trimethoprim]     Medications:    Simvastatin  Norvasc  Zovirax  Venclexta  Plavix  Ultram  Ativan  Zofran  Zyprexa  Nexium    Past Medical History:    Cerebral artery occlusion with cerebral infarction  Coronary artery disease   Gastroesophageal reflux disease  Migraine  Malignant neoplasm  Multiple myeloma  Nonulcer dyspepsia  Polio  Prostate cancer    Past Surgical History:    BMT Cell Product Infusion  Angiogram  Colonoscopy  Tonsillectomy  EGD Combined  Hernia repair  Orthopedic surgery, toe  Phacoemulsification clear cornea with standard intraocular lens implant     Family History:    History reviewed. No pertinent family history.     Social History:  The patient was accompanied to the ED by his wife.  Smoking Status: Never  Smokeless Tobacco: Never  Alcohol Use: Rarely   Marital Status:   "     Review of Systems   Respiratory: Negative for chest tightness.    Musculoskeletal: Positive for arthralgias. Negative for back pain, gait problem and neck pain.   Neurological: Negative for numbness.   All other systems reviewed and are negative.      Physical Exam   First Vitals:  BP: 108/64  Pulse: 85  Heart Rate: 85  Temp: 97.4  F (36.3  C)  Resp: 14  Height: 162.6 cm (5' 4\")  Weight: 54.4 kg (120 lb)  SpO2: 96 %    Physical Exam   Constitutional: He is oriented to person, place, and time. He appears well-developed and well-nourished.   HENT:   Head: Normocephalic and atraumatic.   Eyes: Conjunctivae are normal.   Neck: Normal range of motion.   No c spine tenderness   Cardiovascular: Normal rate, regular rhythm and intact distal pulses.    Pulmonary/Chest: Effort normal and breath sounds normal.   Abdominal: Soft. Bowel sounds are normal. There is no tenderness.   Musculoskeletal:        Left shoulder: He exhibits decreased range of motion, tenderness, bony tenderness and pain. He exhibits no swelling, no deformity and no laceration.        Left elbow: Normal.        Left wrist: Normal.        Thoracic back: Normal.        Lumbar back: Normal.        Left hand: Normal.   Neurological: He is alert and oriented to person, place, and time.   Skin: Skin is warm and dry. He is not diaphoretic.   Psychiatric: His behavior is normal. Thought content normal.   Nursing note and vitals reviewed.      Emergency Department Course     Imaging:  Radiology findings were communicated with the patient and family who voiced understanding of the findings.    Shoulder XR, G/E 3 views:   IMPRESSION: Mildly displaced oblique fracture of the left proximal  humeral diaphysis. Mild degenerative changes of the acromioclavicular  Joint.  Report per radiology     Emergency Department Course:  Nursing notes and vitals reviewed.  The patient was sent for a Shoulder XR, G/E 3 views while in the emergency department, results above. "   1114: I performed an exam of the patient as documented above.   1238: I spoke with Dr. Sweet of Ortho regarding patient's follow up post-discharge.   1239: Patient rechecked and updated.   Findings and plan explained to the Patient and spouse. Patient discharged home with instructions regarding supportive care, medications, and reasons to return. The importance of close follow-up was reviewed. The patient was prescribed Oxycodone.   I personally reviewed the imaging results with the Patient and spouse and answered all related questions prior to discharge.    Impression & Plan      Medical Decision Making:  Anthony Carbone is a 71 year old male with past medical history significant for multiple myeloma who presents to the emergency department today with left shoulder pain status post fall.     Differential diagnosis included fracture, dislocation, contusion, sprain. X-ray is significant for proximal left humerus fracture with minimal displacement. The patient was placed in a sling and I discussed the patient with ortho who recommended an MRI. An  Outpatient MRI was ordered for the patient to have prior to follow up with [facility]. He declined pain medication in the emergency department but was given a prescription for oxycodone to use as needed for breakthrough pain. Patient counseled on results, diagnosis, and disposition with follow up with orthopedics. Patient and wife understanding and agreeable to plan. Patient discharged in stable condition.       Diagnosis:    ICD-10-CM    1. Other closed displaced fracture of proximal end of left humerus, initial encounter S42.292A MR Shoulder Left w/o Contrast       Disposition:  Discharged to home.    Discharge Medications:  Discharge Medication List as of 10/6/2017  1:13 PM      START taking these medications    Details   oxyCODONE (ROXICODONE) 5 MG IR tablet Take 1 tablet (5 mg) by mouth every 6 hours as needed for moderate to severe pain, Disp-15  tablet, R-0, Local Print             Scribe Disclosure:  I, Nevaeh Marshall, am serving as a scribe at 11:14 AM on 10/6/2017 to document services personally performed by Gavin Welsh MD based on my observations and the provider's statements to me.   10/6/2017    EMERGENCY DEPARTMENT       Gavin Welsh MD  10/07/17 0106     contact guard

## 2020-09-14 NOTE — MR AVS SNAPSHOT
After Visit Summary   6/7/2018    Anthony Carbone    MRN: [id number]           Patient Information     Date Of Birth          1945        Visit Information        Provider Department      6/7/2018 2:50 PM Leanne Reddy PA-C [facility]        Today's Diagnoses     Multiple myeloma in relapse (H)    -  1    Multiple myeloma, remission status unspecified (H)           Follow-ups after your visit        Your next 10 appointments already scheduled     Jun 14, 2018 10:15 AM CDT   Masonic Lab Draw with UC MASONIC LAB DRAW   [facility] Mason[facility] Lab Draw ([facility])    [address]  [address]  [place] [address]   [phone]            Jun 14, 2018 10:50 AM CDT   (Arrive by 10:35 AM)   Return Visit with Leanne Reddy PA-C   [facility] ([facility])    [address]  [address]  [place] [address]   [phone]            Jun 14, 2018 12:00 PM CDT   Infusion 60 with UC ONCOLOGY INFUSION, UC 28 ATC   [facility] ([facility])    [address]  [address]  [place] [address]   [phone]            Jun 26, 2018 11:15 AM CDT   Masonic Lab Draw with UC MASONIC LAB DRAW   [facility] Lab Draw ([facility])    [address]  [address]  [place] [address]   [phone]            Jun 26, 2018 11:50 AM CDT   (Arrive by 11:35 AM)   Return Visit with Leanne Reddy PA-C   [facility] ([facility])    [address]  [address]  [place] [address]   [phone]            Jun 26, 2018 12:30 PM CDT   Infusion 60 with UC ONCOLOGY INFUSION, UC 29 ATC   [facility] ([facility])    [address]  [address]  [place] [address]   [phone]             Scheduled procedure with Patient at      Telephone Information:   Mobile [phone]      Scheduled Via: Case Request Order for  [facility]   Procedure date: 09-28   Procedure time: NA ; Did patient request this specific time? n/a    -If non-ambulatory location, select reason: Not applicable  -Did patient request a specific facility other than MD's preferred facility? n/a; If so, which facility?   -If a MAC pt is scheduled at [facility], pt was notified a family member/friend is need to stay with the patient over night after their procedure: n/a    Rep Contacted?: n/a            Notified patient about receiving an animated Shaina program? No    Was patient informed of COVID testing and self isolation prior to procedure?    The following have been confirmed:  Insurance name confirmed as [facility], will be the same at time of procedure?: Yes Ins Accepted at Facility? Yes  Latex Allergy No  Diabetic No  Sleep Apnea No  Diuretic/Water pill No  Defibrillator/Pacemaker No  MRSA hx No  Blood thinners: Coumadin (Warfarin) or Plavix No      Aspirin No      Phentermine (diet pill) No    Pre-Op testing required Yes, Patient informed Yes  Prep required? Yes, Pharmacy is [facility]  & [facility] 2 DAY PREP (include location and/or phone number); Briefly reviewed? Yes; Prep cost range discussed? No  If procedure is scheduled 7 days or less, patient was told to  prep letter?: n/a       Jul 03, 2018 12:00 PM CDT   Masonic Lab Draw with  MASONIC LAB DRAW   [facility] Lab Draw ([facility])    [address]  [address]  [place] [address] [phone]            Jul 03, 2018 12:30 PM CDT   Infusion 60 with UC ONCOLOGY INFUSION   [facility] ([facility])    [address]  [address]  [place] [id number] [phone]              Future tests that were ordered for you today     Open Future Orders        Priority Expected Expires Ordered    CBC with platelets differential Routine 6/14/2018 9/7/2018 6/7/2018    Comprehensive metabolic panel Routine 6/14/2018 9/7/2018 6/7/2018            Who to contact     If you have questions or need follow up information about today's clinic visit or your schedule please contact [facility] directly at [phone].  Normal or non-critical lab and imaging results will be communicated to you by Soceaniqhart, letter or phone within 4 business days after the clinic has received the results. If you do not hear from us within 7 days, please contact the clinic through [employer] or phone. If you have a critical or abnormal lab result, we will notify you by phone as soon as possible.  Submit refill requests through [employer] or call your pharmacy and they will forward the refill request to us. Please allow 3 business days for your refill to be completed.          Additional Information About Your Visit        [employer] Information     [employer] gives you secure access to your electronic health record. If you see a primary care provider, you can also send messages to your care team and make appointments. If you have questions, please call your primary care clinic.  If you do not have a primary care provider, please call [phone] and they will assist you.        Care EveryWhere ID     This is your Care EveryWhere ID. This could be used by other organizations to  "access your [facility] medical records  [id number]        Your Vitals Were     Pulse Temperature Height Pulse Oximetry BMI (Body Mass Index)       109 96.3  F (35.7  C) (Oral) 1.626 m (5' 4.02\") 96% 19.2 kg/m2        Blood Pressure from Last 3 Encounters:   06/07/18 123/79   06/05/18 110/67   05/31/18 121/82    Weight from Last 3 Encounters:   06/07/18 50.8 kg (111 lb 14.4 oz)   06/05/18 50.3 kg (111 lb)   05/31/18 51.9 kg (114 lb 8 oz)              We Performed the Following     CBC with platelets differential     Comprehensive metabolic panel        Primary Care Provider Office Phone # Fax #    Sanket Burciaga [phone]       [facility] [address] [place] [address]        Equal Access to Services     [facility] Hospital[facility]IL[facility]ED : Hadii chalo Davis, waaxda luqjose, qaybta kaalmada trinidad, jonny jurado . So [facility] [phone].    ATENCIÓN: Si habla español, tiene a todd disposición servicios gratuitos de asistencia lingüística. Llame al [phone].    We comply with applicable federal civil rights laws and [place] laws. We do not discriminate on the basis of race, color, national origin, age, disability, sex, sexual orientation, or gender identity.            Thank you!     Thank you for choosing [facility]  for your care. Our goal is always to provide you with excellent care. Hearing back from our patients is one way we can continue to improve our services. Please take a few minutes to complete the written survey that you may receive in the mail after your visit with us. Thank you!             Your Updated Medication List - Protect others around you: Learn how to safely use, store and throw away your medicines at www.disposemymeds.org.          This list is accurate as of 6/7/18  3:43 PM.  Always use your most recent med list.                   Brand Name Dispense Instructions for use Diagnosis    acetaminophen 325 MG " tablet    TYLENOL    100 tablet    Take 2 tablets (650 mg) by mouth every 4 hours as needed for mild pain or fever    Fever, unspecified fever cause       acyclovir 400 MG tablet    ZOVIRAX    60 tablet    Take 1 tablet (400 mg) by mouth 2 times daily    Multiple myeloma in relapse (H)       amLODIPine 5 MG tablet    NORVASC    90 tablet    TAKE 1 TABLET BY MOUTH AT BEDTIME    Essential hypertension       esomeprazole 40 MG CR capsule    nexIUM    30 capsule    Take 1 capsule (40 mg) by mouth every morning (before breakfast)    Gastroesophageal reflux disease without esophagitis       OLANZapine 5 MG tablet    zyPREXA    60 tablet    Take by mouth At Bedtime Takes 1 tablet    Delirium, Multiple myeloma in relapse (H)       ondansetron 8 MG ODT tab    ZOFRAN-ODT    30 tablet    Take 1 tablet (8 mg) by mouth every 8 hours as needed for nausea    Nausea       predniSONE 50 MG tablet    DELTASONE    15 tablet    Take 1 tablet (50 mg) by mouth every other day    Multiple myeloma, remission status unspecified (H)       SIMVASTATIN PO      Take 20 mg by mouth At Bedtime        traMADol 50 MG tablet    ULTRAM    60 tablet    Take 1 tablet (50 mg) by mouth every 6 hours as needed for moderate pain . Recommend trying after Tylenol and before Dilaudid.    Acute bilateral low back pain without sciatica, Multiple myeloma in relapse (H)

## 2021-02-08 NOTE — IP AVS SNAPSHOT
[facility]    [address]    [place] [address]    Phone:  [phone]    Fax:  [phone]                                       After Visit Summary   7/27/2017    Anthony Carbone    MRN: [id number]           After Visit Summary Signature Page     I have received my discharge instructions, and my questions have been answered. I have discussed any challenges I see with this plan with the nurse or doctor.    ..........................................................................................................................................  Patient/Patient Representative Signature      ..........................................................................................................................................  Patient Representative Print Name and Relationship to Patient    ..................................................               ................................................  Date                                            Time    ..........................................................................................................................................  Reviewed by Signature/Title    ...................................................              ..............................................  Date                                                            Time           1.87

## 2021-05-26 ENCOUNTER — RECORDS - HEALTHEAST (OUTPATIENT)
Dept: ADMINISTRATIVE | Facility: CLINIC | Age: 76
End: 2021-05-26

## 2021-06-01 ENCOUNTER — RECORDS - HEALTHEAST (OUTPATIENT)
Dept: ADMINISTRATIVE | Facility: CLINIC | Age: 76
End: 2021-06-01

## 2021-06-17 NOTE — MR AVS SNAPSHOT
After Visit Summary   5/24/2017    Anthony Carbone    MRN: [id number]           Patient Information     Date Of Birth          1945        Visit Information        Provider Department      5/24/2017 1:00 PM  11 ATC;  ONCOLOGY INFUSION [facility]        Today's Diagnoses     Multiple myeloma (H)    -  1    Multiple myeloma in relapse (H)          [facility] Main Line: [phone]    Call triage nurse with chills and/or temperature greater than or equal to 100.4, uncontrolled nausea/vomiting, diarrhea, constipation, dizziness, shortness of breath, chest pain, bleeding, unexplained bruising, or any new/concerning symptoms, questions/concerns.   If you are having any concerning symptoms or wish to speak to a provider before your next infusion visit, please call your care coordinator or triage to notify them so we can adequately serve you.   Triage Nurse Line: [phone]    If after hours, weekends, or holidays, call main hospital  and ask for Oncology doctor on call @ [phone]               May 2017   [facility] Monday Tuesday Wednesday Thursday Friday Saturday        1     2     3     4     5     6       7     Admission    6:21 PM   Joanie Anderson MD   Unit [address] [facility] [place]   (Discharge: 5/16/2017) 8     XR CHEST PORT 1 VIEW    3:05 PM   (20 min.)   [id number]   [facility],  Radiology 9     [facility]ONIC LAB DRAW   11:30 AM   (15 min.)    MASONIC LAB DRAW   [facility] Masonic Lab Draw     [facility] ONC RETURN   12:00 PM   (30 min.)    BMT DOM   [facility] Blood and Marrow Transplant 10     XR RIBS RIGHT 2 VIEWS    3:20 PM   (20 min.)   UUXR3   [facility],  Radiology 11     IP EVALUATION    6:00 AM   (60 min.)   Lashay Kelley, PT   [facility], Physical Therapy     IP EVALUATION    6:00 AM   (60 min.)   Lashay Gonzalez, OT   [facility], Occupational Therapy     XR LUMBAR SPINE 2/3 VIEWS    1:10 PM   (20  min.)   UUXR3   [facility],  Radiology     XR THORACIC SPINE 2 VIEWS    1:15 PM   (20 min.)   UUXR3   [facility] [place],  Radiology 12     IP TREATMENT    6:00 AM   (30 min.)   Dulce Maria Bejarano, OT   [facility] [place], Occupational Therapy     XR ABDOMEN 2 VIEWS    9:15 AM   (15 min.)   UUXR3   [facility],  Radiology     CT HEAD WO   11:40 AM   (15 min.)   UUCT1   [facility] [place], CT     IP TREATMENT    1:00 PM   (30 min.)   Lashay Kelley, PT   [facility] [place], Physical Therapy 13     IP TREATMENT    6:00 AM   (30 min.)   Dulce Maria Bejarano, OT   [facility] [place], Occupational Therapy     IP TREATMENT   11:00 AM   (30 min.)   Lashay Kelley, PT   [facility] [place], Physical Therapy   14     IP TREATMENT    1:00 PM   (30 min.)   Lashay Kelley, PT   [facility] [place], Physical Therapy 15     IP TREATMENT    6:00 AM   (30 min.)   Lashay Gonzalez, OT   [facility] [place], Occupational Therapy 16     17     18     19     P [facility] LAB DRAW    9:45 AM   (15 min.)    MASONIC LAB DRAW   [facility] Lab Draw     [facility] RETURN   10:05 AM   (50 min.)   [facility]   11:30 AM   (120 min.)    ONCOLOGY INFUSION   [facility] 20       21     22     23     P [facility]IC LAB DRAW   10:30 AM   (15 min.)   [facility] LAB DRAW   [facility] Lab Draw     [facility] RETURN   10:45 AM   (50 min.)   [facility] Reddy [facility]    1:00 PM   (60 min.)    ONCOLOGY INFUSION   [facility] 24     P ONC INFUSION 60    1:00 PM   (60 min.)    ONCOLOGY INFUSION   [facility]     UMP RETURN    2:25 PM   (50 min.)   Leda Gold PA M [facility] 25     P [facility] LAB DRAW   11:45 AM   (15 min.)   [facility] LAB DRAW   [facility] Lab Draw     UMP RETURN   12:05 PM   (50 min.)   Leanne Reddy PA-C   [facility]  Cancer [facility]     UMP ONC INFUSION 60    1:00 PM   (60 min.)   UC ONCOLOGY INFUSION   [facility] 26     27       28     29     30     31 June 2017 Sunday Monday Tuesday Wednesday Thursday Friday Saturday                       1     2     3       4     5     6     [facility] LAB DRAW    1:15 PM   (15 min.)    MASONIC LAB DRAW   [facility] Masonic Lab Draw     UMP RETURN    1:25 PM   (50 min.)   Leanne Reddy PA-C   [facility]     [facility] ONC INFUSION 60    3:00 PM   (60 min.)   UC ONCOLOGY INFUSION   [facility] 7     UMP ONC INFUSION 60    2:30 PM   (60 min.)   UC ONCOLOGY INFUSION   [facility] 8     9     10       11     12     13     [facility] LAB DRAW    1:30 PM   (15 min.)    MASONIC LAB DRAW   [facility] Masonic Lab Draw     UMP ONC INFUSION 60    2:00 PM   (60 min.)    ONCOLOGY INFUSION   [facility] 14     UMP ONC INFUSION 60    2:00 PM   (60 min.)   UC ONCOLOGY INFUSION   [facility] 15     16     17       18     19     20     [facility] LAB DRAW    1:30 PM   (15 min.)    MASONIC LAB DRAW   [facility] Masonic Lab Draw     UMP ONC INFUSION 60    2:00 PM   (60 min.)   UC ONCOLOGY INFUSION   [facility] 21     UMP ONC INFUSION 60    2:00 PM   (60 min.)   UC ONCOLOGY INFUSION   [facility] 22     23     24       25     26     27     28     29     30                       Lab Results:  Recent Results (from the past 12 hour(s))   Comprehensive metabolic panel    Collection Time: 05/24/17  2:15 PM   Result Value Ref Range    Sodium 138 133 - 144 mmol/L    Potassium 4.6 3.4 - 5.3 mmol/L    Chloride 104 94 - 109 mmol/L    Carbon Dioxide 24 20 - 32 mmol/L    Anion Gap 10 3 - 14 mmol/L    Glucose 208 (H) 70 - 99 mg/dL    Urea Nitrogen 28 7 - 30 mg/dL    Creatinine 1.17 0.66 - 1.25 mg/dL    GFR Estimate 61 >60 mL/min/1.7m2     GFR Estimate If Black 74 >60 mL/min/1.7m2    Calcium 9.1 8.5 - 10.1 mg/dL    Bilirubin Total 0.3 0.2 - 1.3 mg/dL    Albumin 2.9 (L) 3.4 - 5.0 g/dL    Protein Total 7.8 6.8 - 8.8 g/dL    Alkaline Phosphatase 47 40 - 150 U/L    ALT 19 0 - 70 U/L    AST 14 0 - 45 U/L   *CBC with platelets differential    Collection Time: 05/24/17  2:15 PM   Result Value Ref Range    WBC 5.1 4.0 - 11.0 10e9/L    RBC Count 2.70 (L) 4.4 - 5.9 10e12/L    Hemoglobin 8.3 (L) 13.3 - 17.7 g/dL    Hematocrit 26.6 (L) 40.0 - 53.0 %    MCV 99 78 - 100 fl    MCH 30.7 26.5 - 33.0 pg    MCHC 31.2 (L) 31.5 - 36.5 g/dL    RDW 18.5 (H) 10.0 - 15.0 %    Platelet Count 120 (L) 150 - 450 10e9/L    Diff Method Automated Method     % Neutrophils 89.7 %    % Lymphocytes 2.9 %    % Monocytes 6.6 %    % Eosinophils 0.0 %    % Basophils 0.0 %    % Immature Granulocytes 0.8 %    Nucleated RBCs 0 0 /100    Absolute Neutrophil 4.6 1.6 - 8.3 10e9/L    Absolute Lymphocytes 0.2 (L) 0.8 - 5.3 10e9/L    Absolute Monocytes 0.3 0.0 - 1.3 10e9/L    Absolute Eosinophils 0.0 0.0 - 0.7 10e9/L    Absolute Basophils 0.0 0.0 - 0.2 10e9/L    Abs Immature Granulocytes 0.0 0 - 0.4 10e9/L    Absolute Nucleated RBC 0.0    Uric acid    Collection Time: 05/24/17  2:15 PM   Result Value Ref Range    Uric Acid 5.9 3.5 - 7.2 mg/dL   Magnesium    Collection Time: 05/24/17  2:15 PM   Result Value Ref Range    Magnesium 2.1 1.6 - 2.3 mg/dL   Calcium ionized    Collection Time: 05/24/17  4:00 PM   Result Value Ref Range    Calcium Ionized 4.7 4.4 - 5.2 mg/dL             Follow-ups after your visit        Your next 10 appointments already scheduled     May 25, 2017 11:45 AM CDT   [facility] Lab Draw with  [facility] DRAW   [facility] Draw ([facility])    [address] [place] [id number] [phone]            May 25, 2017 12:20 PM CDT   (Arrive by 12:05 PM)   Return Visit with ALPHONSO [facility]  Cancer [facility] ([facility])    [address]  [address] [facility] [address]   [phone]            May 25, 2017  1:00 PM CDT   Infusion 60 with UC ONCOLOGY INFUSION, UC 29 ATC   [facility] ([facility])    [address] [place] [address]   [phone]            Jun 06, 2017  1:15 PM CDT   [facility] Lab Draw with UC MASONIC LAB DRAW   [facility] Lab Draw ([facility])    [address] [place] [id number] [phone]            Jun 06, 2017  1:40 PM CDT   (Arrive by 1:25 PM)   Return Visit with Leanne Reddy PA-C   [facility] ([facility])    [address] [place] [address]   [phone]            Jun 06, 2017  3:00 PM CDT   Infusion 60 with UC ONCOLOGY INFUSION, UC 26 ATC   [facility] ([facility])    [address] [place] [address] [phone]            Jun 07, 2017  2:30 PM CDT   Infusion 60 with UC ONCOLOGY INFUSION, UC 29 ATC   [facility] ([facility])    [address] [place] [id number] [phone]              Future tests that were ordered for you today     Open Future Orders        Priority Expected Expires Ordered    Routine UA with micro reflex to culture Routine 5/24/2017 5/24/2018 5/24/2017            Who to contact     If you have questions or need follow up information about today's clinic visit or your schedule please contact [facility] CANCER [facility] directly at [phone].  Normal or non-critical lab and imaging results will be communicated to you by MyChart, letter or phone within 4 business days after the clinic has received the results. If you do not hear from us within 7 days, please contact the  clinic through [employer] or phone. If you have a critical or abnormal lab result, we will notify you by phone as soon as possible.  Submit refill requests through [employer] or call your pharmacy and they will forward the refill request to us. Please allow 3 business days for your refill to be completed.          Additional Information About Your Visit        [employer] Information     [employer] gives you secure access to your electronic health record. If you see a primary care provider, you can also send messages to your care team and make appointments. If you have questions, please call your primary care clinic.  If you do not have a primary care provider, please call [phone] and they will assist you.        Care EveryWhere ID     This is your Care EveryWhere ID. This could be used by other organizations to access your [facility] medical records  [id number]        Your Vitals Were     Pulse Temperature Respirations             84 96.6  F (35.9  C) (Tympanic) 16          Blood Pressure from Last 3 Encounters:   05/24/17 123/73   05/23/17 141/82   05/19/17 120/76    Weight from Last 3 Encounters:   05/23/17 59.2 kg (130 lb 8 oz)   05/19/17 59.1 kg (130 lb 6.4 oz)   05/16/17 59.1 kg (130 lb 3.2 oz)              We Performed the Following     *CBC with platelets differential     Comprehensive metabolic panel     Magnesium     Uric acid        Primary Care Provider Office Phone # Fax #    Sanket Burciaga [phone]       [facility] [address] [facility] [address]        Thank you!     Thank you for choosing [facility] CANCER [facility]  for your care. Our goal is always to provide you with excellent care. Hearing back from our patients is one way we can continue to improve our services. Please take a few minutes to complete the written survey that you may receive in the mail after your visit with us. Thank you!             Your Updated Medication List - Protect others around you:  Learn how to safely use, store and throw away your medicines at www.disposemymeds.org.          This list is accurate as of: 5/24/17  4:30 PM.  Always use your most recent med list.                   Brand Name Dispense Instructions for use    acetaminophen 500 MG tablet    TYLENOL     Take 2 tablets (1,000 mg) by mouth every 6 hours as needed for mild pain       acyclovir 400 MG tablet    ZOVIRAX    60 tablet    Take 1 tablet (400 mg) by mouth 2 times daily       amLODIPine 5 MG tablet    NORVASC    30 tablet    Take one tablet at bedtime.       clopidogrel 75 MG tablet    PLAVIX    30 tablet    Take 1 tablet (75 mg) by mouth daily       esomeprazole 40 MG CR capsule    nexIUM    30 capsule    Take 1 capsule (40 mg) by mouth every morning (before breakfast)       HYDROmorphone 2 MG tablet    DILAUDID     Take 1 tablet (2 mg) by mouth every 4 hours as needed for moderate to severe pain       lidocaine 5 % Patch    LIDODERM    30 patch    Place 3 patches onto the skin every 24 hours       LORazepam 0.5 MG tablet    ATIVAN    30 tablet    Take 1 tablet (0.5 mg) by mouth every 4 hours as needed       midodrine 2.5 MG tablet    PROAMATINE    90 tablet    Take 1 tablet (2.5 mg) by mouth 3 times daily       morphine 15 MG 12 hr tablet    MS CONTIN    60 tablet    Take 1 tablet (15 mg) by mouth every 12 hours       OLANZapine 5 MG tablet    zyPREXA    60 tablet    Take 1 tablet (5 mg) by mouth 2 times daily       ondansetron 8 MG ODT tab    ZOFRAN-ODT    30 tablet    Take 1 tablet (8 mg) by mouth every 8 hours as needed for nausea       potassium chloride SA 20 MEQ CR tablet    K-DUR/KLOR-CON M    60 tablet    TAKE 1 TABLET BY MOUTH TWICE DAILY.       simethicone 80 MG chewable tablet    MYLICON    180 tablet    Take 2 tablets (160 mg) by mouth 4 times daily as needed for cramping or other (gas pain)       simvastatin 5 MG tablet    ZOCOR    30 tablet    Take 4 tablets (20 mg) by mouth daily       sucralfate 1 GM tablet     CARAFATE    120 tablet    Take 1 tablet (1 g) by mouth 4 times daily as needed          Yes - the patient is able to be screened

## 2023-02-14 NOTE — PROGRESS NOTES
CC:  46-year-old female follows up with bilateral knee osteoarthritis.  We last saw her about 2 years ago.  She received intra-articular injections that time and got some relief with those.  She is having progressively worsening pain in both of her knees.  She states the left is worse than the right.  It is worse with weight-bearing and standing for prolonged periods of time.  She has trouble flexing secondary to the pain.  She rates the pain as a 6/10 and states it is a constant pain.  She is been trying over-the-counter p.o. medications including ibuprofen and Tylenol without much relief.    History reviewed. No pertinent past medical history.    Past Surgical History:   Procedure Laterality Date     SECTION  04 and 08    HYSTERECTOMY      TUBAL LIGATION  08       Current Outpatient Medications on File Prior to Visit   Medication Sig Dispense Refill    cetirizine (ZYRTEC) 10 MG tablet Take 10 mg by mouth once daily.      ipratropium (ATROVENT) 42 mcg (0.06 %) nasal spray USE 2 SPRAY(S) IN EACH NOSTRIL 4 TIMES DAILY      meloxicam (MOBIC) 15 MG tablet Take 1 tablet (15 mg total) by mouth once daily. 30 tablet 11     No current facility-administered medications on file prior to visit.       ROS:    Constitution: Denies chills, fever, and sweats.  HENT: Denies headaches or blurry vision.  Cardiovascular: Denies chest pain or irregular heart beat.  Respiratory: Denies cough or shortness of breath.  Gastrointestinal: Denies abdominal pain, nausea, or vomiting.  Genitourinary:  Denies urinary incontinence, bladder and kidney issues  Musculoskeletal:  Denies muscle cramps.  Neurological: Denies dizziness or focal weakness.  Psychiatric/Behavioral: Normal mental status.  Hematologic/Lymphatic: Denies bleeding problem or easy bruising/bleeding.  Skin: Denies rash or suspicious lesions.    Physical examination     Gen - No acute distress, well nourished, well groomed   Eyes - Extraoccular motions  HEMATOLOGY/ONCOLOGY PROGRESS NOTE  May 19, 2017    REASON FOR VISIT: patient with multiple myeloma    Diagnosis: 71 year old gentleman with IgM lambda multiple myeloma originally diagnosed in 01/2012 as stage I, standard risk disease.   Treatment: Revlimid plus dexamethasone for 4-5 cycles but plateaued by late 03/2012.   - Velcade was added and he received another 2-3 cycles, achieving a good partial remission.     Transplant: Single auto after melphalan 200 mg/m2 preparative regimen on 01/09/2013  - Post-transplant course: unremarkable except for some mild steroid-induced hyperglycemia, gastritis, nausea and vomiting.     Maintenance: Lenalidomide at day-100 then developed a maculopapular rash. Lenalidomide was held and then we re-challenged him after about a month to 2 months at a lower dose (5 mg daily); rash returned.   - was started on maintenance Velcade, every other week through July 2014, when he was noted with abnormalities on myeloma studies drawn there. Noted with prostate cancer dx at about the time of relapse (see below).    Relapse: noted with return on M-spike in blood/urine with marrow involvement but negative PET.   - Started on retreatment with RVD on 8/27/14 with Revlimid at 15 mg 14 days of 21 days, dexamethasone 40 mg weekly and velcade weekly.Completed at total of 5 cycles without complication by 12/2014.   - Started Cycle 6 on inc'd Rev dosing of 20mg daily x 2 weeks on 12/11/14 which was complicated by pneumonia.  - Adm 12/21-1/10 for human metapneumovirus pneumonia complicated by anorexia, HTN, depression, anorexia with significant weight loss.   - Restarted Ernesto/Dex only on 2/4/15 with good tolerance but with thrombocytopenia.   - Bendamustine added to Velcade/dexamethasone on 5/21/15 due to disease progression  - Velcade discontinued on 7/9/2015 due to side effects (orthostasis)  - Schedule changed to bendamustine 80 mg/m2 days 1 and 2 on 28-day cycle  - Cycle 1 tolerated poorly due to  "lightheadedness and weakness  - Cycle 2 dose reduced to 60 mg/m2 and pre-meds adjusted  - Cycle 5 received on 9/17/15.  - 10/1/2015 - increasing IgM, M-spike - started Pomalidomide 4mg/day (21 days out of 28 days) and weekly Decadron 20mg on Oct 1st, 2015.    - Carfilzomib added on 10/22/2015 making this CPD.  - C3-C6  received in [place]    - Returned to [facility] and resumed CPD here    - Adm: 4/12-4/14 with fever, confusion, neutropenia. Noted on MRI to have acute/subacute CVA and subacute/chronic CVA; started on Plavix, given brief course of Abx and GCSF prior to d/c.     - Continued on Carfilzomib and dexamethasone alone  - Pomalyst added back on 7/13/2016 for rising FLC  - Start daratumumab 11/10/16. Changed to every other week after 4 weekly treatments d/t profound fatigue and malaise.    INTERVAL HISTORY:    Yamil presents with his wife today for follow-up. He is residing at the TCU. He is not feeling great today, pain being the main issue. When asked how the pain is, he says he is \"suffering\". Pain is 9-10/10 in intensity. Unchanged in character or location, remains painful in lower ribs bilaterally extending to his lumbar area. No new sites of pain or new types of pain. No pleuritic pain. No bowel/bladder dysfunction or paresthesias. He does take Dilaudid at the TCU and this is marginally effective. Lidocaine patches the same. Heat warmer felt good in the car. Confusion much better, he does not feel confused. He hasn't had any fevers, chills, cough, SOB, chest pain, abdominal pain, GERD, diarrhea, constipation (not taking any promotility agents), bleeding, or swelling. Remainder of ROS otherwise negative.    PHYSICAL EXAMINATION  /76  Pulse 98  Temp 98.2  F (36.8  C) (Oral)  Resp 16  Ht 1.626 m (5' 4.02\")  Wt 59.1 kg (130 lb 6.4 oz)  SpO2 96%  BMI 22.37 kg/m2    Wt Readings from Last 10 Encounters:   05/19/17 59.1 kg (130 lb 6.4 oz)   05/16/17 59.1 kg (130 lb 3.2 oz)   01/26/17 64.6 kg (142 lb 6.4 oz) " intact, pupils equally round and reactive to light and accommodation   ENT - normocephalic, atruamtic, oropharynx clear   Neck - Supple, no abnormal masses   Cardiovascular - regular rate and rhythm   Pulmonary - clear to auscultation bilaterally, no wheezes, ronchi, or rales   Abdomen - soft, non-tender, non-distended, positive bowel sounds   Psych - The patient is alert and oriented x3 with normal mood and affect    Examination of the Right Lower Extremity:     Skin intact throughout.  Motor function is intact distally EHL/FHL/TA/tracee   +2 dorsalis pedis and posterior tibial pulses   Sensation to light touch intact distally dorsal, plantar, and first web space     Examination of the Right knee:    ROM 0 - 150   Effusion positive  Tenderness to palpation at the joint line positive  Pain during range of motion positive  Crepitation during range of motion positive     positive increased pain noted with flexion past 90   positive antalgic gait noted   negative Lachman's Test   negative Anterior Drawer Test   negative Posterior Drawer Test   negative McMurrays Test   negative Disco Test   negative Varus/Valgus instability    Examination of the Left Lower Extremity:     Skin intact throughout.  Motor function is intact distally EHL/FHL/TA/tracee   +2 dorsalis pedis and posterior tibial pulses   Sensation to light touch intact distally dorsal, plantar, and first web space     Examination of the Left knee:    ROM 0 - 150   Effusion positive  Tenderness to palpation at the joint line positive  Pain during range of motion positive  Crepitation during range of motion positive     positive increased pain noted with flexion past 90   positive antalgic gait noted   negative Lachman's Test   negative Anterior Drawer Test   negative Posterior Drawer Test   negative McMurrays Test   negative Disco Test   negative Varus/Valgus instability    X-ray images were examined and personally interpreted by me.  Three views of bilateral knees    01/12/17 64.7 kg (142 lb 10.2 oz)   01/06/17 65.1 kg (143 lb 8 oz)   01/05/17 64.5 kg (142 lb 3.2 oz)   12/29/16 63.2 kg (139 lb 6.4 oz)   12/23/16 63.2 kg (139 lb 4.8 oz)   12/15/16 63.3 kg (139 lb 9.6 oz)   12/09/16 63.1 kg (139 lb 1.6 oz)   General: appears in pain, hunched over, using wheelchair. Unable to transfer.  No acute distress. HEENT: PERRL, no palor or icterus. NECK: supple - no cervical or supraclavicular LAD.  CVS: RRR. CHEST: Fine crackles in the LLL, otherwise clear to auscultation b/l . ABDOMEN: soft non tender no masses palpated in a seated position.  NEURO: AAOX3  Grossly non focal neuro exam.  SKIN: no obvious rashes.  LYMPH NODES: no palpable cervical or supraclavicular LAD.  EXTREMITIES: No edema. MSK: generalized non-focal tenderness at lower rib margins bilaterally extending to lumbar paraspinal muslces    LABS:  Results for WILLIAN ARCINIEGA (MRN [id number]) as of 5/18/2017 18:37   Ref. Range 1/26/2017 08:59 1/31/2017 00:00 3/23/2017 09:02 4/20/2017 00:00 4/20/2017 00:00 5/8/2017 10:32   Albumin Fraction Latest Ref Range: 3.7 - 5.1 g/dL 3.5 (L)     3.6 (L)   Alpha 1 Fraction Latest Ref Range: 0.2 - 0.4 g/dL 0.4     0.5 (H)   Alpha 2 Fraction Latest Ref Range: 0.5 - 0.9 g/dL 1.0 (H)     1.1 (H)   Beta Fraction Latest Ref Range: 0.6 - 1.0 g/dL 0.8     0.7   ELP Interpretation: Unknown (Note)...     (Note)...   Gamma Fraction Latest Ref Range: 0.7 - 1.6 g/dL 1.1     2.4 (H)   IGA Latest Ref Range: 70 - 380 mg/dL  <25 <25 <25 <25 <7 (L)   IGG Latest Ref Range: 695 - 1620 mg/dL  91 (A) 101 116 116 138 (L)   IGM Latest Ref Range: 60 - 265 mg/dL 1540 (H) 1307 (A) 2092 2180 2180 3410 (H)   Immunofixation ELP Unknown      (Note)...   Kappa Free Lt Chain Latest Ref Range: 0.33 - 1.94 mg/dL <0.30... (L)   3.05  <0.30... (L)   Kappa Free Lt Chains mg/L Latest Units: mg/L  3.14 (A) 6.09      Kappa Lambda Ratio Latest Ref Range: 0.26 - 1.65  Unable to Calculate 0.02 (A) 0.03 0.01  Unable to  dated 02/14/2023 show osteoarthritis of both knees with loss of joint space especially in the medial compartment, periarticular osteophytes, and subchondral sclerosis.    Dx:  Bilateral knee osteoarthritis    Plan:  I did offer the patient intra-articular injections and she agreed.  We injected both the right and left knee with a mixture of 2, 2, 1.  She tolerated that well.  Follow up p.r.n..   Calculate   Lambda Free Lt Chain Latest Ref Range: 0.57 - 2.63 mg/dL 26.50 (H)   248.32  31.00 (H)   Lambda Free Lt Chaines mg/L Latest Units: mg/L  141.64 (A) 208.77      Monoclonal Peak Latest Ref Range: 0.0 g/dL 0.9 (H)     2.1 (H)   Results for WILLIAN ARCINIEGA (MRN [id number]) as of 5/19/2017 10:28   Ref. Range 5/16/2017 06:43 5/19/2017 10:15   WBC Latest Ref Range: 4.0 - 11.0 10e9/L 2.1 (L) 3.4 (L)   Hemoglobin Latest Ref Range: 13.3 - 17.7 g/dL 9.2 (L) 9.7 (L)   Hematocrit Latest Ref Range: 40.0 - 53.0 % 30.5 (L) 31.3 (L)   Platelet Count Latest Ref Range: 150 - 450 10e9/L 60 (L) 71 (L)   Results for WILLIAN ARCINIEGA (MRN [id number]) as of 5/19/2017 10:54   Ref. Range 5/19/2017 10:15   Sodium Latest Ref Range: 133 - 144 mmol/L 139   Potassium Latest Ref Range: 3.4 - 5.3 mmol/L 4.4   Chloride Latest Ref Range: 94 - 109 mmol/L 107   Carbon Dioxide Latest Ref Range: 20 - 32 mmol/L 24   Urea Nitrogen Latest Ref Range: 7 - 30 mg/dL 15   Creatinine Latest Ref Range: 0.66 - 1.25 mg/dL 0.83   GFR Estimate Latest Ref Range: >60 mL/min/1.7m2 >90...   GFR Estimate If Black Latest Ref Range: >60 mL/min/1.7m2 >90...   Calcium Latest Ref Range: 8.5 - 10.1 mg/dL 8.4 (L)   Anion Gap Latest Ref Range: 3 - 14 mmol/L 8   Albumin Latest Ref Range: 3.4 - 5.0 g/dL 3.1 (L)   Protein Total Latest Ref Range: 6.8 - 8.8 g/dL 8.0   Bilirubin Total Latest Ref Range: 0.2 - 1.3 mg/dL 0.5   Alkaline Phosphatase Latest Ref Range: 40 - 150 U/L 49   ALT Latest Ref Range: 0 - 70 U/L 22   AST Latest Ref Range: 0 - 45 U/L 17   Glucose Latest Ref Range: 70 - 99 mg/dL 103 (H)     IMPRESSION/PLAN:  71 year old male with relapsed MM after autologous transplant (1/9/2013), with recent progression on daratumumab/pom/dex, with recent hospitalization 5/7-5/16 for back pain, JOSE MARIA,and  Hypercalcemia.    1. MM: Previously on edgardo/pom/dex while wintering in [place], progressive disease on SPEP inpatient (M spike 0.9 --> 2.1, IgM 3410)  - Received  solumedrol 100 mg IV daily 5/8-5/10. Started PO dex 40 mg daily x 3 days on 5/11 with Kyprolis 5/11 and 5/12  - Unclear what plan moving forward is. Message sent to Dr. Topete, he is out today. I will see Yamil back early next week.    2. Encephalopathy: Onset of AMS 5/7 en route from [place]. Likely multifactorial in setting of metabolic derangements/possible infection/uremia. Mostly resolved inpatient with exception of sundowning, CT head negative. Viscosity index 2.3.  - Continues to improve, continue Zyprexa 5 mg BID for now    3. Heme: Cytopenias 2/2 treatment and likely MM in setting of progression. Stable.  - Continues on Plavix for history of CVA -- although will need to monitor platelets and hold for platelets <50    4. Renal/FEN: JOSE MARIA on admission, likely multifactorial in setting of NSAIDs, uremia, hyperCa2+  - Creat stable today  - Hypercalcemia: s/p pamidronate x 1 on 5/8, resolved  - Hyperuricemia: s/p rasburicase x 1 on 5/8    5. ID: Possible PNA, completed course of PO Levaquin. Afebrile without localzing infectious s/s today.  - Continues ppx  mg BID    6. Back/rib pain: Started early May. Lumbar MRI at OSH showing mild-mod central and foraminal stenoses in lumbar spine, per patient and wife, there was no MM lesion there. Rib films inpatient negative, back films stable from prior imaging. Uncontrolled pain today of same quality/location, uncontrolled 10/10 in intensity. No bowel/bladder dysfunction or paresthesias.  - Really not optimizing the PRN dilaudid. Given 0.5 mg IV dilaudid in infusion with dramatic improvement in pain   - Discussed admission for pain control versus trial of MS Contin 15 mg q12h. GIven the significant improvement, decision as mutually made between myself, patient, and patient's wife to try the MS Contin. Call or go to ED with worsening symptoms, new pain, or any neurologic symptoms. Discussed possibility for constipation and use of promotility agents, as well as  possibility for recurrence of the AMS, which case they will call.  - If the severity persists, may consider thoracic MRI and repeating the lumbar MRI  - Continue Tylenol, Voltaren, lidocaine    7. CV: Continue Norvasc and Zocor.   - Avoid QTc prolonging agents (history of QTc prolongation with syncopal episodes)      I spent >50 minutes with the patient, with over 50% of the time spent counseling or coordinating their care as described above.      Leanne Reddy PA-C  Hematology, Oncology, and Transplant  [facility]  [address] [place] [address] [id number] [phone]

## 2023-03-22 NOTE — IP AVS SNAPSHOT
MRN:[id number]                      After Visit Summary   1/12/2018    Anthony Carbone    MRN: [id number]           Thank you!     Thank you for choosing [facility] for your care. Our goal is always to provide you with excellent care. Hearing back from our patients is one way we can continue to improve our services. Please take a few minutes to complete the written survey that you may receive in the mail after you visit with us. Thank you!        Patient Information     Date Of Birth          1945        Designated Caregiver       Most Recent Value    Caregiver    Will someone help with your care after discharge? yes    Name of designated caregiver Casey    Phone number of caregiver [phone] cell    Caregiver address [address]      About your hospital stay     You were admitted on:  January 13, 2018 You last received care in the:  [address] [facility]    You were discharged on:  January 15, 2018        Reason for your hospital stay       Admitted for NF & JOSE MARIA. Felt to have viral URI, and a few days of anorexia. Felt pre-renal dehydration causing elevation of Cr up to 3.4 (baseline 1.7-1.9). Fever & URI symptoms have now resolved & PO intake improved, ANC 1.4, no bacterial source identified will not d/c on antibiotics. Cr (3.4/3.39) stable, making urine, no electrolyte or volume issues. Patient feeling better, d/c to home with close hospital f/u on 1/18 (or 19 scheduling working on aligning it with palliative care visit). Efrem says okay to resume therapy as long as patient still feeling well (okay to continue prednisone), cytoxan scheduled for Friday.                  Who to Call     For medical emergencies, please call 911.  For non-urgent questions about your medical care, please call your primary care provider or clinic, [phone]          Attending Provider     Provider Specialty    Nick Glover MD Emergency Medicine    Kamari Topete MD Hematology        Primary Care Provider Office Phone # Fax #    Sanket Burciaga [phone]       When to contact your care team       Please call the [facility] Triage RN Line [phone] ([facility] RN available M-F 8-5, after 5 pm the RN Advisor will page the On-Call Oncology Fellow who will return your call) for temperature greater than 100.4 F, uncontrolled nausea/vomiting/diarrhea or unrelieved constipation, pain not relieved by medications, bleeding not stopped by pressure, dizziness, chest pain, shortness of breath, changes in level of consciousness, or any other new concerning symptoms.                  After Care Instructions     Activity       Your activity upon discharge: activity as tolerated.            Diet       Follow this diet upon discharge: regular diet as tolerated.                  Follow-up Appointments     Adult [facility] Follow-up and recommended labs and tests       - F/U Labs & ALEXANDER visit (currently scheduled for 1/18, but trying to move to 1/19 r/t Jamir visit). Also has Cytoxan scheduled at that time. If patient is feeling well (will be evaluated by) Leanne arreola to proceed with cytoxan infusion.     Appointments on [place] and/or [facility] (with [facility] or [facility] provider or service). Call [phone] if you haven't heard regarding these appointments within 7 days of discharge.                  Your next 10 appointments already scheduled     Jan 19, 2018  8:00 AM CST   (Arrive by 7:45 AM)   New Patient Visit with Gaston Bowie MD   [facility] ([facility])    [address] [place] [address]  [address]  [facility] [address]   [phone]            Jan 19, 2018  9:00 AM CST   [facility] Draw with  [facility] DRAW   [facility] ([facility])    [address]  [address]  [facility] [address]   [phone]            Jan 19, 2018  9:30 AM CST   (Arrive by 9:15 AM)    Return Visit with ALPHONSO Coffey [facility] [facility]    [address]  [address]  [place] [facility] [place] [address]   [phone]            Jan 19, 2018 10:00 AM CST   Infusion 120 with UC ONCOLOGY INFUSION, UC 17 ATC   [facility] [facility]    [address]  [address]  [place] [place] [address]   [phone]            Jan 25, 2018  1:30 PM CST   [facility] Lab Draw with [facility] DRAW   [facility] ([place] [facility])    [address]  [address]  [place] [place] [address]   [phone]            Jan 25, 2018  2:00 PM CST   (Arrive by 1:45 PM)   Return Visit with Leanne Reddy PA-C   [facility] [facility]    [address]  [address]  [place] [place] [address]   [phone]            Jan 25, 2018  3:00 PM CST   Infusion 120 with UC ONCOLOGY INFUSION, UC 23 ATC   [facility] [facility]    [address]  [address]  [place] [place] [address]   [phone]              Future tests that were ordered for you     Basic metabolic panel           CBC with platelets differential       Last Lab Result: Hemoglobin (g/dL)       Date                     Value                 01/15/2018               8.6 (L)          ----------                  Pending Results     Date and Time Order Name Status Description    1/14/2018 0912 Platelets prepare order unit In process     1/13/2018 0609 CRP inflammation In process     1/13/2018 0609 CBC with platelets In process     1/13/2018 0609 Basic metabolic panel In process     1/13/2018 0507 Blood culture Preliminary     1/13/2018 0507 Blood culture Preliminary     1/12/2018 2048 Blood culture Preliminary     1/12/2018 2048 Blood culture Preliminary             Statement of Approval     Ordered           "01/15/18 1243  I have reviewed and agree with all the recommendations and orders detailed in this document.  EFFECTIVE NOW     Approved and electronically signed by:  Lorena Albert APRN CNP             Admission Information     Date & Time Provider Department Dept. Phone    1/12/2018 Kamari Topete MD [address] [facility] [phone]      Your Vitals Were     Blood Pressure Pulse Temperature Respirations Height Weight    132/66 (BP Location: Right arm) 74 98.1  F (36.7  C) (Oral) 16 1.626 m (5' 4\") 50.8 kg (112 lb 1.6 oz)    Pulse Oximetry BMI (Body Mass Index)                96% 19.24 kg/m2          [employer] Information     [employer] gives you secure access to your electronic health record. If you see a primary care provider, you can also send messages to your care team and make appointments. If you have questions, please call your primary care clinic.  If you do not have a primary care provider, please call [phone] and they will assist you.        Care EveryWhere ID     This is your Care EveryWhere ID. This could be used by other organizations to access your [place] medical records  [id number]        Equal Access to Services     ALABN SHAH AH: Hadii chalo Davis, waaxda lulouisadaha, qaybta kaalmada adeinesda, jonny villa. So [facility] [phone].    ATENCIÓN: Si habla español, tiene a todd disposición servicios gratuitos de asistencia lingüística. Parminder al [phone].    We comply with applicable federal civil rights laws and [place] laws. We do not discriminate on the basis of race, color, national origin, age, disability, sex, sexual orientation, or gender identity.               Review of your medicines      START taking        Dose / Directions    acetaminophen 325 MG tablet   Commonly known as:  TYLENOL   Used for:  Fever, unspecified fever cause        Dose:  650 mg   Take 2 tablets (650 mg) by mouth every 4 hours as needed for mild pain or " fever   Quantity:  100 tablet   Refills:  0         CONTINUE these medicines which may have CHANGED, or have new prescriptions. If we are uncertain of the size of tablets/capsules you have at home, strength may be listed as something that might have changed.        Dose / Directions    predniSONE 50 MG tablet   Commonly known as:  DELTASONE   This may have changed:    - how much to take  - how to take this  - when to take this  - additional instructions   Used for:  Multiple myeloma, remission status unspecified (H)        Dose:  50 mg   Start taking on:  1/16/2018   Take 1 tablet (50 mg) by mouth every other day   Refills:  0         CONTINUE these medicines which have NOT CHANGED        Dose / Directions    acyclovir 400 MG tablet   Commonly known as:  ZOVIRAX   Indication:  Unsure of dose   Used for:  Multiple myeloma in relapse (H)        Dose:  400 mg   Take 1 tablet (400 mg) by mouth 2 times daily   Quantity:  60 tablet   Refills:  3       amLODIPine 5 MG tablet   Commonly known as:  NORVASC   Used for:  Essential hypertension        TAKE 1 TABLET BY MOUTH AT BEDTIME   Quantity:  90 tablet   Refills:  1       esomeprazole 40 MG CR capsule   Commonly known as:  nexIUM   Used for:  Gastroesophageal reflux disease without esophagitis        Dose:  40 mg   Take 1 capsule (40 mg) by mouth every morning (before breakfast)   Quantity:  30 capsule   Refills:  0       LORazepam 0.5 MG tablet   Commonly known as:  ATIVAN   Used for:  Multiple myeloma in relapse (H), Delirium        Dose:  0.5 mg   Take 1 tablet (0.5 mg) by mouth every 6 hours as needed for nausea or anxiety.   Quantity:  30 tablet   Refills:  0       OLANZapine 5 MG tablet   Commonly known as:  zyPREXA   Used for:  Delirium, Multiple myeloma in relapse (H)        Dose:  2.5 mg   Take 0.5 tablets (2.5 mg) by mouth At Bedtime   Quantity:  60 tablet   Refills:  0       ondansetron 8 MG ODT tab   Commonly known as:  ZOFRAN-ODT   Used for:  Nausea         Dose:  8 mg   Take 1 tablet (8 mg) by mouth every 8 hours as needed for nausea   Quantity:  30 tablet   Refills:  3       oxyCODONE IR 5 MG tablet   Commonly known as:  ROXICODONE        Dose:  5 mg   Take 1 tablet (5 mg) by mouth every 6 hours as needed for moderate to severe pain   Quantity:  15 tablet   Refills:  0       SIMVASTATIN PO        Dose:  20 mg   Take 20 mg by mouth At Bedtime   Refills:  0       traMADol 50 MG tablet   Commonly known as:  ULTRAM   Used for:  Acute bilateral low back pain without sciatica, Multiple myeloma in relapse (H)        Dose:  50 mg   Take 1 tablet (50 mg) by mouth every 6 hours as needed for moderate pain . Recommend trying after Tylenol and before Dilaudid.   Quantity:  60 tablet   Refills:  3         STOP taking     clopidogrel 75 MG tablet   Commonly known as:  PLAVIX           dexamethasone 4 MG tablet   Commonly known as:  DECADRON           nystatin 907367 UNIT/ML suspension   Commonly known as:  MYCOSTATIN           potassium chloride SA 20 MEQ CR tablet   Commonly known as:  K-DUR/KLOR-CON M           venetoclax 100 MG tablet CHEMOTHERAPY   Commonly known as:  VENCLEXTA                Where to get your medicines      Some of these will need a paper prescription and others can be bought over the counter. Ask your nurse if you have questions.     You don't need a prescription for these medications     acetaminophen 325 MG tablet    predniSONE 50 MG tablet                Protect others around you: Learn how to safely use, store and throw away your medicines at www.disposemymeds.org.             Medication List: This is a list of all your medications and when to take them. Check marks below indicate your daily home schedule. Keep this list as a reference.      Medications           Morning Afternoon Evening Bedtime As Needed    acetaminophen 325 MG tablet   Commonly known as:  TYLENOL   Take 2 tablets (650 mg) by mouth every 4 hours as needed for mild pain or fever   Last  time this was given:  650 mg on 1/13/2018  6:03 AM                                acyclovir 400 MG tablet   Commonly known as:  ZOVIRAX   Take 1 tablet (400 mg) by mouth 2 times daily                                amLODIPine 5 MG tablet   Commonly known as:  NORVASC   TAKE 1 TABLET BY MOUTH AT BEDTIME   Last time this was given:  5 mg on 1/14/2018  9:02 PM                                esomeprazole 40 MG CR capsule   Commonly known as:  nexIUM   Take 1 capsule (40 mg) by mouth every morning (before breakfast)   Last time this was given:  40 mg on 1/13/2018  7:57 AM                                LORazepam 0.5 MG tablet   Commonly known as:  ATIVAN   Take 1 tablet (0.5 mg) by mouth every 6 hours as needed for nausea or anxiety.                                OLANZapine 5 MG tablet   Commonly known as:  zyPREXA   Take 0.5 tablets (2.5 mg) by mouth At Bedtime   Last time this was given:  2.5 mg on 1/14/2018  9:02 PM                                ondansetron 8 MG ODT tab   Commonly known as:  ZOFRAN-ODT   Take 1 tablet (8 mg) by mouth every 8 hours as needed for nausea                                oxyCODONE IR 5 MG tablet   Commonly known as:  ROXICODONE   Take 1 tablet (5 mg) by mouth every 6 hours as needed for moderate to severe pain                                predniSONE 50 MG tablet   Commonly known as:  DELTASONE   Take 1 tablet (50 mg) by mouth every other day   Start taking on:  1/16/2018                                SIMVASTATIN PO   Take 20 mg by mouth At Bedtime   Last time this was given:  20 mg on 1/14/2018  9:02 PM                                traMADol 50 MG tablet   Commonly known as:  ULTRAM   Take 1 tablet (50 mg) by mouth every 6 hours as needed for moderate pain . Recommend trying after Tylenol and before Dilaudid.                                   Fluconazole Counseling:  Patient counseled regarding adverse effects of fluconazole including but not limited to headache, diarrhea, nausea, upset stomach, liver function test abnormalities, taste disturbance, and stomach pain.  There is a rare possibility of liver failure that can occur when taking fluconazole.  The patient understands that monitoring of LFTs and kidney function test may be required, especially at baseline. The patient verbalized understanding of the proper use and possible adverse effects of fluconazole.  All of the patient's questions and concerns were addressed.

## 2024-04-25 NOTE — H&P
[facility] History and Physical    Anthony Carbone MRN# [id number]   Age: 71 year old YOB: 1945     Date of Admission:  5/7/2017    Home clinic: [facility]  Primary care provider: Sanket Burciaga          Assessment and Plan:   71 year old male with relapsed MM S/P  autologous transplant (1/9/2013), with recent progression on carfilzomib therapy, now on Ana/Pom/Dex presented to the ED for evaluation of sever back pain , JOSE MARIA and confusion   1- Back pain : unclear the cause started 3 weeks ago and as per the ED note the wife states that MRI at OSH showed possible compression and spinal stenosis, started on some pain medication but his pain continue worsening, no neurological symptoms , no trama or fall- will. Unclear if the pain related to his MM or other cause   - consider MRI of the thoracolumber if the pat. Tolerated   - consider NS as indicated   - pain medication PRN  2- JOSE MARIA : worsening than baseline, likely d/o dehydration   - IVF for now   - monitor lab   3-  Confusion: seems confusion to time and places, could be related to the pain/ medication?? may consider further image if worsening   4- ID: no fever will do infectious workup and will f/U  - c/w prophy medication   5- hx of relapse MM: on  Ana/Pom/Dex  6- GI: History of GERD, no issues recently  - Continue Nexium QD and Carafate PRN  7- Neuro: Continues on clopidogrel for suspected subacute CVA 4/2016  8- FTT and deconditioning: Neutritional consult  - PT OT   9- CV: History of prolonged QTc anad orthostasis.  - Continue amlodipine 5 mg at bedtime, daily BP checks, and midodrine for symptomatic orthostatic hypotension. Avoid QTc prolonging meds  FEN  - IVF  - monitor lytes and replace as needed  - ADAT  DVT PPX Mechanical  Code status Full Code   -        Chief Complaint:   Back pain     History is obtained from the patient and medical record          History of Present Illness:   71 year old male  "with relapsed MM S/P  autologous transplant (1/9/2013), with recent progression on carfilzomib therapy, now on Ana/Pom/Dex presented to the ED for evaluation of sever back pain . The pat. Was in  Vacation in [place] for the past 3.5 months. About 3 weeks ago  he started to  have back pain evaluated per the ED note Patient's wife reported that the MRI showed possible compression and spinal stenosis. Patient was started on Tramadol on Thursday (3 days ago) for the pain\" but the pain was worsening , Patient flew back from  [place] today and presented to the ED with worsening back pain and confusion, found to have JOSE MARIA and abnormal lab, he also was treated with doxycycline for cough and possible PNA,. The pat. Seems confusion at the time of exam and unable to provide detail hx about his pain. He denies any chest pain, no SOB, no abdominal pain . deniesany fever, no N/V or urinary symptoms. no neurological symptoms , no trama or fall.       Cancer Treatment History:   Diagnosis: 71 year old gentleman with IgM lambda multiple myeloma originally diagnosed in 01/2012 as stage I, standard risk disease.   Treatment: Revlimid plus dexamethasone for 4-5 cycles but plateaued by late 03/2012.   - Velcade was added and he received another 2-3 cycles, achieving a good partial remission.      Transplant: Single auto after melphalan 200 mg/m2 preparative regimen on 01/09/2013  - Post-transplant course: unremarkable except for some mild steroid-induced hyperglycemia, gastritis, nausea and vomiting.      Maintenance: Lenalidomide at day-100 then developed a maculopapular rash. Lenalidomide was held and then we re-challenged him after about a month to 2 months at a lower dose (5 mg daily); rash returned.   - was started on maintenance Velcade, every other week through July 2014, when he was noted with abnormalities on myeloma studies drawn there. Noted with prostate cancer dx at about the time of relapse (see below).     Relapse: noted with " return on M-spike in blood/urine with marrow involvement but negative PET.   - Started on retreatment with RVD on 8/27/14 with Revlimid at 15 mg 14 days of 21 days, dexamethasone 40 mg weekly and velcade weekly.Completed at total of 5 cycles without complication by 12/2014.   - Started Cycle 6 on inc'd Rev dosing of 20mg daily x 2 weeks on 12/11/14 which was complicated by pneumonia.  - Adm 12/21-1/10 for human metapneumovirus pneumonia complicated by anorexia, HTN, depression, anorexia with significant weight loss.   - Restarted Ernesto/Dex only on 2/4/15 with good tolerance but with thrombocytopenia.   - Bendamustine added to Velcade/dexamethasone on 5/21/15 due to disease progression  - Velcade discontinued on 7/9/2015 due to side effects (orthostasis)  - Schedule changed to bendamustine 80 mg/m2 days 1 and 2 on 28-day cycle  - Cycle 1 tolerated poorly due to lightheadedness and weakness  - Cycle 2 dose reduced to 60 mg/m2 and pre-meds adjusted  - Cycle 5 received on 9/17/15.  - 10/1/2015 - increasing IgM, M-spike - started Pomalidomide 4mg/day (21 days out of 28 days) and weekly Decadron 20mg on Oct 1st, 2015.    - Carfilzomib added on 10/22/2015 making this CPD.  - C3-C6  received in [place]    - Returned to [facility] and resumed CPD here    - Adm: 4/12-4/14 with fever, confusion, neutropenia. Noted on MRI to have acute/subacute CVA and subacute/chronic CVA; started on Plavix, given brief course of Abx and GCSF prior to d/c.     - Continued on Carfilzomib and dexamethasone alone  - Pomalyst added back on 7/13/2016 for rising FLC  - Start daratumumab 11/10/16. Changed to every other week after 4 weekly treatments d/t profound fatigue and malaise.         Past Medical History:     Past Medical History:   Diagnosis Date     Coronary artery disease, non-occlusive Jan 2012     Hx of migraines     haven;t bothered him since 2003     Hypertension      Malignant neoplasm (H) 1/2012     Myeloma (H)     multiple     Nonulcer  dyspepsia      Polio age 6    no sequelae     Prostate cancer (H)             Past Surgical History:      Past Surgical History:   Procedure Laterality Date     COLONOSCOPY       ESOPHAGOSCOPY, GASTROSCOPY, DUODENOSCOPY (EGD), COMBINED N/A 1/6/2015    Procedure: COMBINED ESOPHAGOSCOPY, GASTROSCOPY, DUODENOSCOPY (EGD);  Surgeon: Yamil Donaldson Chi, MD;  Location:  GI     ESOPHAGOSCOPY, GASTROSCOPY, DUODENOSCOPY (EGD), COMBINED Left 8/20/2015    Procedure: COMBINED ESOPHAGOSCOPY, GASTROSCOPY, DUODENOSCOPY (EGD), BIOPSY SINGLE OR MULTIPLE;  Surgeon: Mane Barragan MD;  Location:  GI     right inguinal hernia surgery       right toe surgery              Social History:     Social History     Social History     Marital status:      Spouse name: N/A     Number of children: N/A     Years of education: N/A     Occupational History     Not on file.     Social History Main Topics     Smoking status: Never Smoker     Smokeless tobacco: Never Used     Alcohol use Yes      Comment: rarely     Drug use: No     Sexual activity: Not on file     Other Topics Concern     Not on file     Social History Narrative            Family History:   The family history is negative for Melanoma and Skin Cancer.    Family history   reviewed and updated in [facility]         Immunizations:     Immunization History   Administered Date(s) Administered     HIB 01/07/2014     Hepatitis B 01/07/2014     Influenza (High Dose) 3 valent vaccine 10/08/2014, 11/19/2015, 10/05/2016     Influenza (IIV3) 10/01/2012     Pneumococcal (PCV 13) 01/07/2014     Poliovirus, inactivated (IPV) 01/07/2014     TDAP Vaccine (Boostrix) 01/07/2014            Allergies:     Allergies   Allergen Reactions     Aspirin      Stomach upset     Bactrim [Sulfamethoxazole W-Trimethoprim] Rash            Medications:     Current Facility-Administered Medications   Medication     lactated ringers infusion     Current Outpatient Prescriptions   Medication Sig     potassium  chloride SA (K-DUR/KLOR-CON M) 20 MEQ CR tablet TAKE 1 TABLET BY MOUTH TWICE DAILY.     pomalidomide (POMALYST) 2 MG capsule Take 1 capsule (2 mg) by mouth daily Swallow whole, do NOT break, chew or open capsule. Take 2 hours before or after food.     midodrine (PROAMATINE) 2.5 MG tablet Take 1 tablet (2.5 mg) by mouth 3 times daily     ACYCLOVIR PO Take 400 mg by mouth 2 times daily      ondansetron (ZOFRAN-ODT) 8 MG ODT tab Take 1 tablet (8 mg) by mouth every 8 hours as needed for nausea     amLODIPine (NORVASC) 5 MG tablet Take one tablet at bedtime.     dexamethasone (DECADRON) 4 MG tablet Take 20mg twice weekly on weeks of Kyprolis (3 weeks on, 1 week off, repeat) - Takes the same 2 days as chemo each week     clopidogrel (PLAVIX) 75 MG tablet Take 1 tablet (75 mg) by mouth daily     sucralfate (CARAFATE) 1 GM tablet Take 1 tablet (1 g) by mouth 4 times daily as needed     Esomeprazole Magnesium (NEXIUM PO) Take 40 mg by mouth every morning (before breakfast)     SIMVASTATIN PO Take 20 mg by mouth daily      Facility-Administered Medications Ordered in Other Encounters   Medication     filgrastim (NEUPOGEN) injection vial 780 mcg            Review of Systems:   A comprehensive review of systems was performed and found to be negative except as described in this note         Physical Exam:     Vitals were reviewed  Patient Vitals for the past 8 hrs:   BP Temp Temp src Pulse Heart Rate Resp SpO2 Weight   05/07/17 2111 (!) 152/106 - - - 98 (!) 45 97 % -   05/07/17 2030 145/88 - - - 74 13 - -   05/07/17 2015 - - - - 78 17 - -   05/07/17 2000 (!) 142/102 - - - 76 16 - -   05/07/17 1945 - - - - 77 15 - -   05/07/17 1930 148/82 - - - 81 12 92 % -   05/07/17 1915 - - - - 84 14 95 % -   05/07/17 1900 150/82 - - - 86 17 - -   05/07/17 1830 (!) 144/98 - - - 92 19 92 % -   05/07/17 1823 162/90 98.1  F (36.7  C) Oral 93 93 16 95 % 58.2 kg (128 lb 6.4 oz)     Constitutional: Awake, alert, cooperative, no apparent  distress.  Eyes: Lids and lashes normal, pupils equal, round and reactive to light, extra ocular muscles intact, sclera clear, conjunctiva normal.  ENT: Normocephalic, without obvious abnormality, atramatic, sinuses nontender on palpation, external ears without lesions, oral pharynx with moist mucus membranes, tonsils without erythema or exudates, gums normal and good dentition.  Neck: Supple, symmetrical, trachea midline, no adenopathy, thyroid symmetric, not enlarged and no tenderness, skin normal.  Hematologic / Lymphatic: No cervical lymphadenopathy and no supraclavicular lymphadenopathy.  Back: Symmetric, no curvature, spinous processes are non-tender on palpation, paraspinous muscles are non-tender on palpation, no costal vertebral tenderness.  Lungs: No increased work of breathing, good air exchange, clear to auscultation bilaterally, no crackles or wheezing.  Cardiovascular: Regular rate and rhythm, normal S1 and S2, no S3 or S4, and no murmur noted.  Abdomen:  normal bowel sounds, soft, non-distended, non-tender, no masses palpated, no hepatosplenomegally.  Musculoskeletal: No redness, warmth, or swelling of the joints.  Full range of motion noted.  Motor strength is 5 out of 5 all extremities bilaterally.  Tone is normal.  Neurologic: Awake, alert, confused to  place and time.  Cranial nerves II-XII are grossly intact.  Motor is 5 out of 5 bilaterally.  Skin: No rashes, erythema, pallor, petechia or purpura.         Data:     Results for orders placed or performed during the hospital encounter of 05/07/17   CBC with platelets differential   Result Value Ref Range    WBC 4.0 4.0 - 11.0 10e9/L    RBC Count 3.43 (L) 4.4 - 5.9 10e12/L    Hemoglobin 10.9 (L) 13.3 - 17.7 g/dL    Hematocrit 33.8 (L) 40.0 - 53.0 %    MCV 99 78 - 100 fl    MCH 31.8 26.5 - 33.0 pg    MCHC 32.2 31.5 - 36.5 g/dL    RDW 17.8 (H) 10.0 - 15.0 %    Platelet Count 201 150 - 450 10e9/L    Diff Method Automated Method     % Neutrophils 77.1  %    % Lymphocytes 10.8 %    % Monocytes 8.0 %    % Eosinophils 0.3 %    % Basophils 2.8 %    % Immature Granulocytes 1.0 %    Nucleated RBCs 0 0 /100    Absolute Neutrophil 3.1 1.6 - 8.3 10e9/L    Absolute Lymphocytes 0.4 (L) 0.8 - 5.3 10e9/L    Absolute Monocytes 0.3 0.0 - 1.3 10e9/L    Absolute Eosinophils 0.0 0.0 - 0.7 10e9/L    Absolute Basophils 0.1 0.0 - 0.2 10e9/L    Abs Immature Granulocytes 0.0 0 - 0.4 10e9/L    Absolute Nucleated RBC 0.0    Comprehensive metabolic panel   Result Value Ref Range    Sodium 136 133 - 144 mmol/L    Potassium 4.4 3.4 - 5.3 mmol/L    Chloride 102 94 - 109 mmol/L    Carbon Dioxide 21 20 - 32 mmol/L    Anion Gap 13 3 - 14 mmol/L    Glucose 151 (H) 70 - 99 mg/dL    Urea Nitrogen 54 (H) 7 - 30 mg/dL    Creatinine 1.80 (H) 0.66 - 1.25 mg/dL    GFR Estimate 37 (L) >60 mL/min/1.7m2    GFR Estimate If Black 45 (L) >60 mL/min/1.7m2    Calcium 10.3 (H) 8.5 - 10.1 mg/dL    Bilirubin Total 0.5 0.2 - 1.3 mg/dL    Albumin 3.4 3.4 - 5.0 g/dL    Protein Total 9.5 (H) 6.8 - 8.8 g/dL    Alkaline Phosphatase 66 40 - 150 U/L    ALT 14 0 - 70 U/L    AST 11 0 - 45 U/L   Lipase   Result Value Ref Range    Lipase 95 73 - 393 U/L     Attestation:  Total time: 70 minutes    Shelly Menendez MD      Attending Only

## 2024-06-24 NOTE — ANESTHESIA CARE TRANSFER NOTE
Patient: Anthony Carbone    Procedure(s):  LEFT EYE PHACOEMULSIFICATION CLEAR CORNEA WITH STANDARD INTRAOCULAR LENS IMPLANT  - Wound Class: I-Clean    Diagnosis: CATARACT  Diagnosis Additional Information: No value filed.    Anesthesia Type:   MAC     Note:  Airway :Room Air  Patient transferred to:PACU  Comments: VSS, report to RN      Vitals: (Last set prior to Anesthesia Care Transfer)    CRNA VITALS  7/27/2017 1418 - 7/27/2017 1452      7/27/2017             Pulse: 61    Ht Rate: 60    SpO2: 99 %                Electronically Signed By: ARIANA Bautista CRNA  July 27, 2017  2:52 PM   - - -

## 2024-12-05 NOTE — MR AVS SNAPSHOT
After Visit Summary   1/4/2018    Anthony Carbone    MRN: [id number]           Patient Information     Date Of Birth          1945        Visit Information        Provider Department      1/4/2018 2:00 PM Leanne Reddy PA-C [facility]        Today's Diagnoses     Multiple myeloma in relapse (H)          Care Instructions      At your visit today, we discussed your risk for falls and preventive options.    Fall Prevention  Falls often occur due to slipping, tripping or losing your balance. Millions of people fall every year and injure themselves. Here are ways to reduce your risk of falling again.    Think about your fall, was there anything that caused your fall that can be fixed, removed, or replaced?    Make your home safe by keeping walkways clear of objects you may trip over.    Use non-slip pads under rugs. Do not use area rugs or small throw rugs.    Use non-slip mats in bathtubs and showers.    Install handrails and lights on staircases.    Do not walk in poorly lit areas.    Do not stand on chairs or wobbly ladders.    Use caution when reaching overhead or looking upward. This position can cause a loss of balance.    Be sure your shoes fit properly, have non-slip bottoms and are in good condition.     Wear shoes both inside and out. Avoid going barefoot or wearing slippers.    Be cautious when going up and down stairs, curbs, and when walking on uneven sidewalks.    If your balance is poor, consider using a cane or walker.    If your fall was related to alcohol use, stop or limit alcohol intake.     If your fall was related to use of sleeping medicines, talk to your doctor about this. You may need to reduce your dosage at bedtime if you awaken during the night to go to the bathroom.      To reduce the need for nighttime bathroom trips:    Avoid drinking fluids for several hours before going to bed    Empty your bladder before going to bed    Men can  keep a urinal at the bedside    Stay as active as you can. Balance, flexibility, strength, and endurance all come from exercise. They all play a role in preventing falls. Ask your healthcare provider which types of activity are right for you.    Get your vision checked on a regular basis.    If you have pets, know where they are before you stand up or walk so you don't trip over them.    Use night lights.  Date Last Reviewed: 11/5/2015 2000-2017 [employer]. [address] [place] [address]. All rights reserved. This information is not intended as a substitute for professional medical care. Always follow your healthcare professional's instructions.                Follow-ups after your visit        Your next 10 appointments already scheduled     Jan 08, 2018  9:00 AM CST   Level 5 with  INFUSION CHAIR [address] [place] [address] [facility] ([facility])    [facility]  [address]  [facility] [address]   [phone]            Jan 11, 2018 12:45 PM CST   M[facility] Lab Draw with  MAS[facility] LAB DRAW   [facility])    [address] [place] [address]  [address]  [facility] [address]   [phone]            Jan 11, 2018  1:10 PM CST   (Arrive by 12:55 PM)   Return Visit with Leanne Reddy PA-C   [facility])    [address] [place] [address]  [address]  [facility] [address]   [phone]            Jan 11, 2018  2:00 PM CST   Infusion 120 with  ONCOLOGY INFUSION, UC 14 ATC   [facility] ([facility])    [address] [place] [address]  [address]  [facility] [address]   [phone]            Jan 18, 2018  1:30 PM CST   Masonic Lab Draw with UC MASONIC LAB DRAW   [facility] Lab Draw ([facility])    [address] [place] [address]  [address]  [facility] [address]    [phone]            Jan 18, 2018  2:00 PM CST   (Arrive by 1:45 PM)   Return Visit with Leanne Reddy PA-C   [facility] ([facility])    [address] [place] [address]  [address]  [facility] [id number] [phone]            Jan 18, 2018  3:00 PM CST   Infusion 120 with UC ONCOLOGY INFUSION, UC 23 ATC   [facility] ([facility])    [address] [place] [address]  [address]  [facility] [id number] [phone]            Jan 19, 2018  8:00 AM CST   (Arrive by 7:45 AM)   New Patient Visit with Gaston Bowie MD   [facility] ([facility])    [address] [place] [address]  [address]  [facility] [address] [phone]              Who to contact     If you have questions or need follow up information about today's clinic visit or your schedule please contact [facility] directly at [phone].  Normal or non-critical lab and imaging results will be communicated to you by bright boxhart, letter or phone within 4 business days after the clinic has received the results. If you do not hear from us within 7 days, please contact the clinic through [employer] or phone. If you have a critical or abnormal lab result, we will notify you by phone as soon as possible.  Submit refill requests through [employer] or call your pharmacy and they will forward the refill request to us. Please allow 3 business days for your refill to be completed.          Additional Information About Your Visit        [employer] Information     [employer] gives you secure access to your electronic health record. If you see a primary care provider, you can also send messages to your care team and make appointments. If you have questions, please call your primary care clinic.  If you do not have a primary care provider, please call [phone] and they will assist you.        Care EveryWhere ID     This is your  "Care EveryWhere ID. This could be used by other organizations to access your [place] medical records  [id number]        Your Vitals Were     Pulse Temperature Respirations Height Pulse Oximetry BMI (Body Mass Index)    82 97.4  F (36.3  C) (Oral) 16 1.626 m (5' 4.02\") 99% 19.37 kg/m2       Blood Pressure from Last 3 Encounters:   01/04/18 118/78   12/29/17 123/80   12/29/17 117/82    Weight from Last 3 Encounters:   01/04/18 51.2 kg (112 lb 14.4 oz)   12/29/17 51.2 kg (112 lb 14.2 oz)   12/26/17 51.8 kg (114 lb 4.8 oz)              Today, you had the following     No orders found for display       Primary Care Provider Office Phone # Fax #    Sanket Burciaga [phone]       [facility] [address] [facility] [address]        Equal Access to Services     ALBAN SHAH : Hadii chalo ku hadasho Soomaali, waaxda luqadaha, qaybta kaalmada adeegyada, waxay maggiein erika jurado . So [facility] [phone].    ATENCIÓN: Si habla español, tiene a todd disposición servicios gratuitos de asistencia lingüística. [facility] [phone].    We comply with applicable federal civil rights laws and [place] laws. We do not discriminate on the basis of race, color, national origin, age, disability, sex, sexual orientation, or gender identity.            Thank you!     Thank you for choosing [facility]  for your care. Our goal is always to provide you with excellent care. Hearing back from our patients is one way we can continue to improve our services. Please take a few minutes to complete the written survey that you may receive in the mail after your visit with us. Thank you!             Your Updated Medication List - Protect others around you: Learn how to safely use, store and throw away your medicines at www.disposemymeds.org.          This list is accurate as of: 1/4/18  3:50 PM.  Always use your most recent med list.                   Brand Name Dispense " Instructions for use Diagnosis    acyclovir 400 MG tablet    ZOVIRAX    60 tablet    Take 1 tablet (400 mg) by mouth 2 times daily    Multiple myeloma in relapse (H)       amLODIPine 5 MG tablet    NORVASC    90 tablet    TAKE 1 TABLET BY MOUTH AT BEDTIME    Essential hypertension       clopidogrel 75 MG tablet    PLAVIX    90 tablet    TAKE 1 TABLET BY MOUTH EVERY DAY    History of stroke       dexamethasone 4 MG tablet    DECADRON    40 tablet    Take 20 mg weekly on day of Velcade    Multiple myeloma not having achieved remission (H)       esomeprazole 40 MG CR capsule    nexIUM    30 capsule    Take 1 capsule (40 mg) by mouth every morning (before breakfast)    Gastroesophageal reflux disease without esophagitis       LORazepam 0.5 MG tablet    ATIVAN    30 tablet    Take 1 tablet (0.5 mg) by mouth every 6 hours as needed for nausea or anxiety.    Multiple myeloma in relapse (H), Delirium       nystatin [id number] UNIT/ML suspension    MYCOSTATIN    473 mL    Take 5 mLs (500,000 Units) by mouth 4 times daily Swish and spit. Continue until symptoms resolve and then take for 3 more days.    Thrush       OLANZapine 5 MG tablet    zyPREXA    60 tablet    Take 0.5 tablets (2.5 mg) by mouth At Bedtime    Delirium, Multiple myeloma in relapse (H)       ondansetron 8 MG ODT tab    ZOFRAN-ODT    30 tablet    Take 1 tablet (8 mg) by mouth every 8 hours as needed for nausea    Nausea       oxyCODONE IR 5 MG tablet    ROXICODONE    15 tablet    Take 1 tablet (5 mg) by mouth every 6 hours as needed for moderate to severe pain        potassium chloride SA 20 MEQ CR tablet    K-DUR/KLOR-CON M    30 tablet    Take 1 tablet (20 mEq) by mouth daily    Hypokalemia       predniSONE 50 MG tablet    DELTASONE    30 tablet    Take 50 mg every other day    Multiple myeloma not having achieved remission (H)       SIMVASTATIN PO      Take 20 mg by mouth At Bedtime        traMADol 50 MG tablet    ULTRAM    60 tablet    Take 1 tablet (50 mg)  by mouth every 6 hours as needed for moderate pain . Recommend trying after Tylenol and before Dilaudid.    Acute bilateral low back pain without sciatica, Multiple myeloma in relapse (H)       venetoclax 100 MG tablet CHEMOTHERAPY    VENCLEXTA    100 tablet    Take 8 tablets (800 mg) by mouth daily    Multiple myeloma in relapse (H)          No
